# Patient Record
Sex: FEMALE | Race: WHITE | Employment: OTHER | URBAN - METROPOLITAN AREA
[De-identification: names, ages, dates, MRNs, and addresses within clinical notes are randomized per-mention and may not be internally consistent; named-entity substitution may affect disease eponyms.]

---

## 2017-01-20 PROBLEM — K70.10 HEPATITIS, ALCOHOLIC: Status: ACTIVE | Noted: 2017-01-20
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
## 2017-01-20 PROBLEM — E11.00 TYPE 2 DIABETES MELLITUS WITH HYPEROSMOLARITY WITHOUT COMA, WITH LONG-TERM CURRENT USE OF INSULIN (HCC): Status: ACTIVE | Noted: 2017-01-20

## 2017-01-20 PROBLEM — Z79.4 TYPE 2 DIABETES MELLITUS WITH HYPEROSMOLARITY WITHOUT COMA, WITH LONG-TERM CURRENT USE OF INSULIN (HCC): Status: ACTIVE | Noted: 2017-01-20

## 2017-05-30 PROBLEM — F17.210 CIGARETTE SMOKER: Chronic | Status: ACTIVE | Noted: 2017-05-30

## 2017-05-30 PROBLEM — K29.00 ACUTE GASTRITIS: Status: ACTIVE | Noted: 2017-05-30

## 2017-05-30 PROBLEM — E16.2 HYPOGLYCEMIA: Status: ACTIVE | Noted: 2017-05-30

## 2017-05-30 PROBLEM — E11.9 DM2 (DIABETES MELLITUS, TYPE 2) (HCC): Chronic | Status: ACTIVE | Noted: 2017-05-30

## 2017-05-30 PROBLEM — D61.818 PANCYTOPENIA (HCC): Chronic | Status: ACTIVE | Noted: 2017-05-30

## 2017-05-30 PROBLEM — F10.20 ALCOHOLISM (HCC): Chronic | Status: ACTIVE | Noted: 2017-05-30

## 2017-05-30 PROBLEM — F41.9 ANXIETY AND DEPRESSION: Chronic | Status: ACTIVE | Noted: 2017-05-30

## 2017-05-30 PROBLEM — K86.1 ACUTE ON CHRONIC PANCREATITIS (HCC): Status: ACTIVE | Noted: 2017-05-30

## 2017-05-30 PROBLEM — R11.2 NAUSEA AND VOMITING: Status: ACTIVE | Noted: 2017-05-30

## 2017-05-30 PROBLEM — R16.1 SPLENOMEGALY: Chronic | Status: ACTIVE | Noted: 2017-05-30

## 2017-05-30 PROBLEM — K85.90 ACUTE ON CHRONIC PANCREATITIS (HCC): Status: ACTIVE | Noted: 2017-05-30

## 2017-05-30 PROBLEM — F32.A ANXIETY AND DEPRESSION: Chronic | Status: ACTIVE | Noted: 2017-05-30

## 2017-05-30 PROBLEM — B19.20 HEPATITIS C: Chronic | Status: ACTIVE | Noted: 2017-05-30

## 2017-06-15 PROBLEM — K85.90 ACUTE PANCREATITIS: Status: ACTIVE | Noted: 2017-06-15

## 2017-06-15 PROBLEM — F10.10 ALCOHOL ABUSE: Status: ACTIVE | Noted: 2017-06-15

## 2017-06-16 PROBLEM — K29.20 ACUTE ALCOHOLIC GASTRITIS: Status: ACTIVE | Noted: 2017-06-16

## 2017-06-23 PROBLEM — E87.20 LACTIC ACIDOSIS: Status: ACTIVE | Noted: 2017-06-23

## 2017-07-17 ENCOUNTER — TELEPHONE (OUTPATIENT)
Dept: FAMILY MEDICINE CLINIC | Age: 47
End: 2017-07-17

## 2017-10-27 PROBLEM — M81.8 OTHER OSTEOPOROSIS WITHOUT CURRENT PATHOLOGICAL FRACTURE: Status: ACTIVE | Noted: 2017-10-27

## 2018-03-10 ENCOUNTER — HOSPITAL ENCOUNTER (EMERGENCY)
Age: 48
Discharge: HOME | End: 2018-03-10
Attending: EMERGENCY MEDICINE
Payer: COMMERCIAL

## 2018-03-10 VITALS
TEMPERATURE: 97.8 F | RESPIRATION RATE: 18 BRPM | WEIGHT: 80 LBS | HEART RATE: 99 BPM | HEIGHT: 61 IN | OXYGEN SATURATION: 95 % | SYSTOLIC BLOOD PRESSURE: 108 MMHG | DIASTOLIC BLOOD PRESSURE: 70 MMHG | BODY MASS INDEX: 15.11 KG/M2

## 2018-03-10 DIAGNOSIS — F10.10 ALCOHOL ABUSE: ICD-10-CM

## 2018-03-10 DIAGNOSIS — R73.9 HYPERGLYCEMIA: Primary | ICD-10-CM

## 2018-03-10 LAB
ALBUMIN SERPL-MCNC: 4 G/DL (ref 3.5–5.2)
ALP BLD-CCNC: 162 U/L (ref 35–104)
ALT SERPL-CCNC: 31 U/L (ref 0–32)
ANION GAP SERPL CALCULATED.3IONS-SCNC: 20 MMOL/L (ref 7–16)
AST SERPL-CCNC: 52 U/L (ref 0–31)
BASOPHILS ABSOLUTE: 0.01 E9/L (ref 0–0.2)
BASOPHILS RELATIVE PERCENT: 0.3 % (ref 0–2)
BETA-HYDROXYBUTYRATE: 0.23 MMOL/L (ref 0.02–0.27)
BILIRUB SERPL-MCNC: 0.4 MG/DL (ref 0–1.2)
BILIRUBIN URINE: NEGATIVE
BLOOD, URINE: NEGATIVE
BUN BLDV-MCNC: 3 MG/DL (ref 6–20)
CALCIUM SERPL-MCNC: 9 MG/DL (ref 8.6–10.2)
CHLORIDE BLD-SCNC: 93 MMOL/L (ref 98–107)
CHP ED QC CHECK: NORMAL
CLARITY: CLEAR
CO2: 18 MMOL/L (ref 22–29)
COLOR: YELLOW
CREAT SERPL-MCNC: 0.3 MG/DL (ref 0.5–1)
EOSINOPHILS ABSOLUTE: 0.04 E9/L (ref 0.05–0.5)
EOSINOPHILS RELATIVE PERCENT: 1.1 % (ref 0–6)
ETHANOL: 65 MG/DL (ref 0–0.08)
GFR AFRICAN AMERICAN: >60
GFR NON-AFRICAN AMERICAN: >60 ML/MIN/1.73
GLUCOSE BLD-MCNC: 404 MG/DL (ref 74–109)
GLUCOSE BLD-MCNC: 433 MG/DL
GLUCOSE URINE: >=1000 MG/DL
HCT VFR BLD CALC: 35.1 % (ref 34–48)
HEMOGLOBIN: 12.2 G/DL (ref 11.5–15.5)
IMMATURE GRANULOCYTES #: 0.01 E9/L
IMMATURE GRANULOCYTES %: 0.3 % (ref 0–5)
KETONES, URINE: NEGATIVE MG/DL
LACTIC ACID: 6.5 MMOL/L (ref 0.5–2.2)
LEUKOCYTE ESTERASE, URINE: NEGATIVE
LIPASE: 23 U/L (ref 13–60)
LYMPHOCYTES ABSOLUTE: 0.89 E9/L (ref 1.5–4)
LYMPHOCYTES RELATIVE PERCENT: 24.6 % (ref 20–42)
MCH RBC QN AUTO: 30.8 PG (ref 26–35)
MCHC RBC AUTO-ENTMCNC: 34.8 % (ref 32–34.5)
MCV RBC AUTO: 88.6 FL (ref 80–99.9)
METER GLUCOSE: 433 MG/DL (ref 70–110)
MONOCYTES ABSOLUTE: 0.4 E9/L (ref 0.1–0.95)
MONOCYTES RELATIVE PERCENT: 11 % (ref 2–12)
NEUTROPHILS ABSOLUTE: 2.27 E9/L (ref 1.8–7.3)
NEUTROPHILS RELATIVE PERCENT: 62.7 % (ref 43–80)
NITRITE, URINE: NEGATIVE
PDW BLD-RTO: 14.6 FL (ref 11.5–15)
PH UA: 6 (ref 5–9)
PH VENOUS: 7.25 (ref 7.3–7.42)
PLATELET # BLD: 198 E9/L (ref 130–450)
PMV BLD AUTO: 12.5 FL (ref 7–12)
POTASSIUM SERPL-SCNC: 4.9 MMOL/L (ref 3.5–5)
PROTEIN UA: NEGATIVE MG/DL
RBC # BLD: 3.96 E12/L (ref 3.5–5.5)
SODIUM BLD-SCNC: 131 MMOL/L (ref 132–146)
SPECIFIC GRAVITY UA: <=1.005 (ref 1–1.03)
TOTAL PROTEIN: 7.9 G/DL (ref 6.4–8.3)
UROBILINOGEN, URINE: 0.2 E.U./DL
WBC # BLD: 3.6 E9/L (ref 4.5–11.5)

## 2018-03-10 PROCEDURE — 83690 ASSAY OF LIPASE: CPT

## 2018-03-10 PROCEDURE — 2580000003 HC RX 258: Performed by: NURSE PRACTITIONER

## 2018-03-10 PROCEDURE — 82962 GLUCOSE BLOOD TEST: CPT

## 2018-03-10 PROCEDURE — 83605 ASSAY OF LACTIC ACID: CPT

## 2018-03-10 PROCEDURE — 85025 COMPLETE CBC W/AUTO DIFF WBC: CPT

## 2018-03-10 PROCEDURE — 80053 COMPREHEN METABOLIC PANEL: CPT

## 2018-03-10 PROCEDURE — 82800 BLOOD PH: CPT

## 2018-03-10 PROCEDURE — 99284 EMERGENCY DEPT VISIT MOD MDM: CPT

## 2018-03-10 PROCEDURE — 81003 URINALYSIS AUTO W/O SCOPE: CPT

## 2018-03-10 PROCEDURE — 82010 KETONE BODYS QUAN: CPT

## 2018-03-10 PROCEDURE — 80307 DRUG TEST PRSMV CHEM ANLYZR: CPT

## 2018-03-10 RX ORDER — 0.9 % SODIUM CHLORIDE 0.9 %
1000 INTRAVENOUS SOLUTION INTRAVENOUS ONCE
Status: COMPLETED | OUTPATIENT
Start: 2018-03-10 | End: 2018-03-10

## 2018-03-10 RX ORDER — LEVOFLOXACIN 500 MG/1
500 TABLET, FILM COATED ORAL DAILY
COMMUNITY
End: 2018-06-21 | Stop reason: ALTCHOICE

## 2018-03-10 RX ADMIN — SODIUM CHLORIDE 1000 ML: 9 INJECTION, SOLUTION INTRAVENOUS at 13:05

## 2018-03-10 NOTE — ED NOTES
Pt's  gave pt a TV dinner. I explained to the patient she should not be eating if treated for hypergylcemia. Pt stated \"But I'm hungry\" and continues to eat. Pt complains she needs something for pain because it hurts every time she eats. I reiterated that pt should not be eating without asking the physician/NP if it is advisable. Pt still continues to eat.      Ryann Evans RN  03/10/18 5773

## 2018-03-10 NOTE — ED NOTES
Bed:  [place] [address]  Expected date:   Expected time:   Means of arrival:   Comments:  [address] [facility]  03/10/18 121

## 2018-03-10 NOTE — ED PROVIDER NOTES
negative. Past Surgical History:   Procedure Laterality Date    ABDOMEN SURGERY      gallbladder removal    BREAST BIOPSY Left     CHOLECYSTECTOMY  2010    COLONOSCOPY      ENDOSCOPY, COLON, DIAGNOSTIC      ERCP      april 2016 at [address]  2004    OTHER SURGICAL HISTORY Left 04/25/2017     Left breast blue dye injection, Sential Node Lymph Biopsy with left breast lumpectomy    OTHER SURGICAL HISTORY  10/05/2017    simple left mastectomy    OVARY REMOVAL Bilateral 2007    PANCREAS BIOPSY      with stent    SHOULDER SURGERY Left 2003    UPPER GASTROINTESTINAL ENDOSCOPY     Social History:  reports that she has been smoking Cigarettes. She started smoking about 36 years ago. She has a 48.00 pack-year smoking history. She has never used smokeless tobacco. She reports that she drinks about 0.6 oz of alcohol per week . She reports that she does not use drugs. Family History: family history includes Cancer in her mother; Diabetes in her mother. Allergies: Tylenol [acetaminophen]    Physical Exam           ED Triage Vitals [03/10/18 1214]   BP Temp Temp Source Pulse Resp SpO2 Height Weight   108/70 97.8 °F (36.6 °C) Oral 99 18 95 % 5' 1\" (1.549 m) 80 lb (36.3 kg)      Oxygen Saturation Interpretation: Normal.    Constitutional:  Alert, development consistent with age. Eyes:  PERRL, EOMI, no discharge or conjunctival injection. Ears:  External ears without lesions. Throat:  Pharynx without injection, exudate, or tonsillar hypertrophy. Airway patient. Neck:  Normal ROM. Supple. Respiratory:  Clear to auscultation and breath sounds equal.  CV:  Regular rate and rhythm, normal heart sounds, without pathological murmurs, ectopy, gallops, or rubs. GI:  Abdomen Soft, nontender, good bowel sounds. No firm or pulsatile mass. Back:  No costovertebral tenderness. Integument:  Normal turgor. Warm, dry, without visible rash, unless noted elsewhere.   Lymphatics: No lymphangitis or adenopathy noted.  Neurological:  Oriented. Motor functions intact. Lab / Imaging Results   (All laboratory and radiology results have been personally reviewed by myself)  Labs:  Results for orders placed or performed during the hospital encounter of 03/10/18   CBC Auto Differential   Result Value Ref Range    WBC 3.6 (L) 4.5 - 11.5 E9/L    RBC 3.96 3.50 - 5.50 E12/L    Hemoglobin 12.2 11.5 - 15.5 g/dL    Hematocrit 35.1 34.0 - 48.0 %    MCV 88.6 80.0 - 99.9 fL    MCH 30.8 26.0 - 35.0 pg    MCHC 34.8 (H) 32.0 - 34.5 %    RDW 14.6 11.5 - 15.0 fL    Platelets 695 082 - 953 E9/L    MPV 12.5 (H) 7.0 - 12.0 fL    Neutrophils % 62.7 43.0 - 80.0 %    Immature Granulocytes % 0.3 0.0 - 5.0 %    Lymphocytes % 24.6 20.0 - 42.0 %    Monocytes % 11.0 2.0 - 12.0 %    Eosinophils % 1.1 0.0 - 6.0 %    Basophils % 0.3 0.0 - 2.0 %    Neutrophils # 2.27 1.80 - 7.30 E9/L    Immature Granulocytes # 0.01 E9/L    Lymphocytes # 0.89 (L) 1.50 - 4.00 E9/L    Monocytes # 0.40 0.10 - 0.95 E9/L    Eosinophils # 0.04 (L) 0.05 - 0.50 E9/L    Basophils # 0.01 0.00 - 0.20 E9/L   pH, venous   Result Value Ref Range    pH, Larry 7.25 (L) 7.30 - 7.42   Urinalysis   Result Value Ref Range    Color, UA Yellow Straw/Yellow    Clarity, UA Clear Clear    Glucose, Ur >=1000 (A) Negative mg/dL    Bilirubin Urine Negative Negative    Ketones, Urine Negative Negative mg/dL    Specific Gravity, UA <=1.005 1.005 - 1.030    Blood, Urine Negative Negative    pH, UA 6.0 5.0 - 9.0    Protein, UA Negative Negative mg/dL    Urobilinogen, Urine 0.2 <2.0 E.U./dL    Nitrite, Urine Negative Negative    Leukocyte Esterase, Urine Negative Negative   Ethanol   Result Value Ref Range    Ethanol Lvl 65 mg/dL   POCT Glucose   Result Value Ref Range    Glucose 433 mg/dL    QC OK? ok    POCT Glucose   Result Value Ref Range    Meter Glucose 433 (H) 70 - 110 mg/dL     Imaging: All Radiology results interpreted by Radiologist unless otherwise noted.   No orders to display       ED Course / Medical Decision Making     Medications   0.9 % sodium chloride bolus (0 mLs Intravenous Stopped 3/10/18 1409)     ED Course      Re-Evaluations:  3/10/18      Time: 1400    Patients symptoms show no change. Patient is requesting to leave against medical advice. Consultations:             None    Procedures:   none    MDM:  Patient presented with hyperglycemia from home. Diagnostics were ordered and process on patient request leaving against medical advice. 3/10/18 / Time:  1400   This patient has chosen to leave against medical advice. I have personally explained to them that choosing to do so may result in permanent bodily harm or death. I discussed at length that without further evaluation and monitoring there may be unforeseen circumstances and deterioration resulting in permanent bodily harm or death as a result of their choice. They are alert, oriented, and competent at this time. They state that they are aware of the serious risks as explained, but they continue to wish to leave against medical advice. In light of their decision to leave against medical advice, follow-up has been arranged and they are aware of the importance of following up as instructed. They have been advised that they should return to the ED immediately if they change their mind at any time, or if their condition begins to change or worsen. ------------------------------------------------------------------------------------------      Counseling:   I have spoken with the spouse and patient and discussed todays results, in addition to providing specific details for the plan of care and counseling regarding the diagnosis and prognosis and are agreeable with the plan. Assessment      1. Hyperglycemia    2. Alcohol abuse      This patient's ED course included: a personal history and physicial examination  This patient has remained hemodynamically stable during their ED course.    Plan   Other Disposition: Left AMA.  Patient condition is stable. New Medications     Discharge Medication List as of 3/10/2018  1:59 PM        Electronically signed by Lucía Delarosa NP   DD: 3/10/18  **This report was transcribed using voice recognition software. Every effort was made to ensure accuracy; however, inadvertent computerized transcription errors may be present.   END OF PROVIDER NOTE     Cass Cheatham NP  03/10/18 9770

## 2018-03-10 NOTE — ED NOTES
Pt states if she doesn't get something for pain then she is \"just going to go home. \"  Pt signed Against medical advice form after being told not all of labwork has resulted yet. Pt ambulated out of ER with significant other at this time.      iMly Reyes RN  03/10/18 1521

## 2018-03-14 ENCOUNTER — APPOINTMENT (OUTPATIENT)
Dept: ULTRASOUND IMAGING | Age: 48
End: 2018-03-14
Payer: COMMERCIAL

## 2018-03-14 ENCOUNTER — HOSPITAL ENCOUNTER (EMERGENCY)
Age: 48
Discharge: HOME | End: 2018-03-14
Attending: EMERGENCY MEDICINE
Payer: COMMERCIAL

## 2018-03-14 VITALS
HEART RATE: 86 BPM | RESPIRATION RATE: 20 BRPM | TEMPERATURE: 98.7 F | HEIGHT: 61 IN | OXYGEN SATURATION: 98 % | DIASTOLIC BLOOD PRESSURE: 99 MMHG | BODY MASS INDEX: 17.94 KG/M2 | WEIGHT: 95 LBS | SYSTOLIC BLOOD PRESSURE: 142 MMHG

## 2018-03-14 DIAGNOSIS — D61.818 PANCYTOPENIA (HCC): ICD-10-CM

## 2018-03-14 DIAGNOSIS — K70.31 ALCOHOLIC CIRRHOSIS OF LIVER WITH ASCITES (HCC): Primary | ICD-10-CM

## 2018-03-14 DIAGNOSIS — R60.0 LEG EDEMA: ICD-10-CM

## 2018-03-14 LAB
ALBUMIN SERPL-MCNC: 3.2 G/DL (ref 3.5–5.2)
ALP BLD-CCNC: 110 U/L (ref 35–104)
ALT SERPL-CCNC: 19 U/L (ref 0–32)
AMYLASE: 18 U/L (ref 20–100)
ANION GAP SERPL CALCULATED.3IONS-SCNC: 10 MMOL/L (ref 7–16)
AST SERPL-CCNC: 47 U/L (ref 0–31)
AVERAGE GLUCOSE: NORMAL
BASOPHILS ABSOLUTE: 0 E9/L (ref 0–0.2)
BASOPHILS RELATIVE PERCENT: 0.5 % (ref 0–2)
BILIRUB SERPL-MCNC: 0.2 MG/DL (ref 0–1.2)
BUN BLDV-MCNC: 6 MG/DL (ref 6–20)
CALCIUM SERPL-MCNC: 8.3 MG/DL (ref 8.6–10.2)
CHLORIDE BLD-SCNC: 99 MMOL/L (ref 98–107)
CO2: 25 MMOL/L (ref 22–29)
CREAT SERPL-MCNC: 0.4 MG/DL (ref 0.5–1)
D DIMER: 318 NG/ML DDU
EKG ATRIAL RATE: 84 BPM
EKG P AXIS: 71 DEGREES
EKG P-R INTERVAL: 140 MS
EKG Q-T INTERVAL: 388 MS
EKG QRS DURATION: 80 MS
EKG QTC CALCULATION (BAZETT): 458 MS
EKG R AXIS: 47 DEGREES
EKG T AXIS: 68 DEGREES
EKG VENTRICULAR RATE: 84 BPM
EOSINOPHILS ABSOLUTE: 0.04 E9/L (ref 0.05–0.5)
EOSINOPHILS RELATIVE PERCENT: 2 % (ref 0–6)
GFR AFRICAN AMERICAN: >60
GFR NON-AFRICAN AMERICAN: >60 ML/MIN/1.73
GLUCOSE BLD-MCNC: 67 MG/DL (ref 74–109)
HBA1C MFR BLD: 12.4 %
HCT VFR BLD CALC: 29.5 % (ref 34–48)
HEMOGLOBIN: 9.6 G/DL (ref 11.5–15.5)
INR BLD: 1
LIPASE: 17 U/L (ref 13–60)
LYMPHOCYTES ABSOLUTE: 0.72 E9/L (ref 1.5–4)
LYMPHOCYTES RELATIVE PERCENT: 37.9 % (ref 20–42)
MCH RBC QN AUTO: 30.6 PG (ref 26–35)
MCHC RBC AUTO-ENTMCNC: 32.5 % (ref 32–34.5)
MCV RBC AUTO: 93.9 FL (ref 80–99.9)
MONOCYTES ABSOLUTE: 0.13 E9/L (ref 0.1–0.95)
MONOCYTES RELATIVE PERCENT: 7.1 % (ref 2–12)
NEUTROPHILS ABSOLUTE: 1.01 E9/L (ref 1.8–7.3)
NEUTROPHILS RELATIVE PERCENT: 53 % (ref 43–80)
PDW BLD-RTO: 15.3 FL (ref 11.5–15)
PLATELET # BLD: 76 E9/L (ref 130–450)
PLATELET CONFIRMATION: NORMAL
PMV BLD AUTO: 9.4 FL (ref 7–12)
POTASSIUM SERPL-SCNC: 4.7 MMOL/L (ref 3.5–5)
PRO-BNP: 112 PG/ML (ref 0–125)
PROTHROMBIN TIME: 11.9 SEC (ref 9.3–12.4)
RBC # BLD: 3.14 E12/L (ref 3.5–5.5)
SODIUM BLD-SCNC: 134 MMOL/L (ref 132–146)
TOTAL PROTEIN: 6.4 G/DL (ref 6.4–8.3)
TOXIC GRANULATION: ABNORMAL
TROPONIN: <0.01 NG/ML (ref 0–0.03)
WBC # BLD: 1.9 E9/L (ref 4.5–11.5)

## 2018-03-14 PROCEDURE — 93970 EXTREMITY STUDY: CPT

## 2018-03-14 PROCEDURE — 80053 COMPREHEN METABOLIC PANEL: CPT

## 2018-03-14 PROCEDURE — 93005 ELECTROCARDIOGRAM TRACING: CPT | Performed by: NURSE PRACTITIONER

## 2018-03-14 PROCEDURE — 36415 COLL VENOUS BLD VENIPUNCTURE: CPT

## 2018-03-14 PROCEDURE — 83880 ASSAY OF NATRIURETIC PEPTIDE: CPT

## 2018-03-14 PROCEDURE — 99284 EMERGENCY DEPT VISIT MOD MDM: CPT

## 2018-03-14 PROCEDURE — 82150 ASSAY OF AMYLASE: CPT

## 2018-03-14 PROCEDURE — 85378 FIBRIN DEGRADE SEMIQUANT: CPT

## 2018-03-14 PROCEDURE — 84484 ASSAY OF TROPONIN QUANT: CPT

## 2018-03-14 PROCEDURE — 83690 ASSAY OF LIPASE: CPT

## 2018-03-14 PROCEDURE — 85025 COMPLETE CBC W/AUTO DIFF WBC: CPT

## 2018-03-14 PROCEDURE — 85610 PROTHROMBIN TIME: CPT

## 2018-03-14 RX ORDER — DEXTROSE MONOHYDRATE 25 G/50ML
50 INJECTION, SOLUTION INTRAVENOUS PRN
Status: DISCONTINUED | OUTPATIENT
Start: 2018-03-14 | End: 2018-03-14

## 2018-03-14 RX ORDER — FUROSEMIDE 20 MG/1
20 TABLET ORAL DAILY
Qty: 5 TABLET | Refills: 0 | Status: SHIPPED | OUTPATIENT
Start: 2018-03-14 | End: 2018-06-21 | Stop reason: ALTCHOICE

## 2018-03-14 ASSESSMENT — PAIN DESCRIPTION - PAIN TYPE: TYPE: CHRONIC PAIN

## 2018-03-14 ASSESSMENT — PAIN SCALES - GENERAL: PAINLEVEL_OUTOF10: 7

## 2018-03-14 NOTE — ED PROVIDER NOTES
ED Attending  CC: Cynthia       Department of Emergency Medicine   ED  Provider Note  Admit Date/Time: 3/14/2018  6:03 PM  ED Bed: 12/12   MRN: [id number]  Chief Complaint:   Leg Swelling (sent in by dr Yaritza Peter for leg edema bilat)       History of Present Illness   Source of history provided by:  patient and spouse/SO. History/Exam Limitations: none. Ivonne Mejía is a 52 y.o. female who has a past medical history of:   Past Medical History:   Diagnosis Date    Anxiety     Anxiety disorder     Cancer ([facility] [address]     breast, left    Chronic pancreatitis ([facility] [facility]     Depression     Diabetes mellitus, type 2 ([facility] [address] [facility]     GERD (gastroesophageal reflux disease)     Hepatitis C     History of alcohol abuse 2013    Jaundice 5/12/2016    Schizoaffective disorder, bipolar type ([facility] [address] [facility]     Splenomegaly 5/30/2017    presents to the ED by private vehicle for Abdominal and lower extremity edema, beginning 1 ago and are unchanged since that time. The complaint has been persistent, moderate in severity. Patient states that she has chronic liver and pancreatic disease. She states that her primary care physician sent her to be evaluated for her edema. She states that she did drink a beer today. She denies chest pain, shortness of breath, fever, chills, headache, nausea, vomiting, diarrhea, constipation, dysuria. ROS   Pertinent positives and negatives are stated within HPI, all other systems reviewed and are negative.        Past Surgical History:   Procedure Laterality Date    ABDOMEN SURGERY      gallbladder removal    BREAST BIOPSY Left     CHOLECYSTECTOMY  2010    COLONOSCOPY      ENDOSCOPY, COLON, DIAGNOSTIC      ERCP      april 2016 at [address] [place] [address]  2004    OTHER SURGICAL HISTORY Left 04/25/2017     Left breast blue dye injection, Sential Node Lymph Biopsy with left breast lumpectomy    OTHER SURGICAL HISTORY  10/05/2017    simple left mastectomy    OVARY REMOVAL Bilateral 2007    PANCREAS BIOPSY Prescriptions    FUROSEMIDE (LASIX) 20 MG TABLET    Take 1 tablet by mouth daily for 5 days     Electronically signed by Marjorie Garg NP   DD: 3/14/18  **This report was transcribed using voice recognition software. Every effort was made to ensure accuracy; however, inadvertent computerized transcription errors may be present.   END OF PROVIDER NOTE       Karen Caldwell MD  03/14/18 5469

## 2018-03-30 ENCOUNTER — HOSPITAL ENCOUNTER (OUTPATIENT)
Age: 48
Discharge: HOME | End: 2018-03-30
Payer: COMMERCIAL

## 2018-03-30 LAB
ALBUMIN SERPL-MCNC: 3.6 G/DL (ref 3.5–5.2)
ALP BLD-CCNC: 254 U/L (ref 35–104)
ALT SERPL-CCNC: 29 U/L (ref 0–32)
AMYLASE: 17 U/L (ref 20–100)
ANION GAP SERPL CALCULATED.3IONS-SCNC: 12 MMOL/L (ref 7–16)
APTT: 26.1 SEC (ref 24.5–35.1)
AST SERPL-CCNC: 29 U/L (ref 0–31)
BASOPHILS ABSOLUTE: 0.01 E9/L (ref 0–0.2)
BASOPHILS RELATIVE PERCENT: 0.5 % (ref 0–2)
BILIRUB SERPL-MCNC: 0.3 MG/DL (ref 0–1.2)
BUN BLDV-MCNC: 13 MG/DL (ref 6–20)
CALCIUM SERPL-MCNC: 9.3 MG/DL (ref 8.6–10.2)
CHLORIDE BLD-SCNC: 102 MMOL/L (ref 98–107)
CO2: 25 MMOL/L (ref 22–29)
CREAT SERPL-MCNC: 0.4 MG/DL (ref 0.5–1)
EOSINOPHILS ABSOLUTE: 0.04 E9/L (ref 0.05–0.5)
EOSINOPHILS RELATIVE PERCENT: 1.5 % (ref 0–6)
FERRITIN: 79 NG/ML
FOLATE: >20 NG/ML (ref 4.8–24.2)
GFR AFRICAN AMERICAN: >60
GFR NON-AFRICAN AMERICAN: >60 ML/MIN/1.73
GLUCOSE BLD-MCNC: 228 MG/DL (ref 74–109)
HCT VFR BLD CALC: 34.5 % (ref 34–48)
HEMOGLOBIN: 11.7 G/DL (ref 11.5–15.5)
INR BLD: 0.9
IRON SATURATION: 30 % (ref 15–50)
IRON: 96 MCG/DL (ref 37–145)
LIPASE: 29 U/L (ref 13–60)
LYMPHOCYTES ABSOLUTE: 0.7 E9/L (ref 1.5–4)
LYMPHOCYTES RELATIVE PERCENT: 25 % (ref 20–42)
MCH RBC QN AUTO: 31.4 PG (ref 26–35)
MCHC RBC AUTO-ENTMCNC: 33.9 % (ref 32–34.5)
MCV RBC AUTO: 92.5 FL (ref 80–99.9)
MONOCYTES ABSOLUTE: 0.17 E9/L (ref 0.1–0.95)
MONOCYTES RELATIVE PERCENT: 5.5 % (ref 2–12)
NEUTROPHILS ABSOLUTE: 1.9 E9/L (ref 1.8–7.3)
NEUTROPHILS RELATIVE PERCENT: 67.5 % (ref 43–80)
PDW BLD-RTO: 14.8 FL (ref 11.5–15)
PLATELET # BLD: 77 E9/L (ref 130–450)
PLATELET CONFIRMATION: NORMAL
PMV BLD AUTO: 9.7 FL (ref 7–12)
POTASSIUM SERPL-SCNC: 4.5 MMOL/L (ref 3.5–5)
PROTHROMBIN TIME: 10.7 SEC (ref 9.3–12.4)
RBC # BLD: 3.73 E12/L (ref 3.5–5.5)
SODIUM BLD-SCNC: 139 MMOL/L (ref 132–146)
TOTAL IRON BINDING CAPACITY: 316 MCG/DL (ref 250–450)
TOTAL PROTEIN: 7.2 G/DL (ref 6.4–8.3)
VITAMIN B-12: 661 PG/ML (ref 211–946)
WBC # BLD: 2.8 E9/L (ref 4.5–11.5)

## 2018-03-30 PROCEDURE — 85610 PROTHROMBIN TIME: CPT

## 2018-03-30 PROCEDURE — 83540 ASSAY OF IRON: CPT

## 2018-03-30 PROCEDURE — 86803 HEPATITIS C AB TEST: CPT

## 2018-03-30 PROCEDURE — 82746 ASSAY OF FOLIC ACID SERUM: CPT

## 2018-03-30 PROCEDURE — 86376 MICROSOMAL ANTIBODY EACH: CPT

## 2018-03-30 PROCEDURE — 83690 ASSAY OF LIPASE: CPT

## 2018-03-30 PROCEDURE — 87340 HEPATITIS B SURFACE AG IA: CPT

## 2018-03-30 PROCEDURE — 83550 IRON BINDING TEST: CPT

## 2018-03-30 PROCEDURE — 85025 COMPLETE CBC W/AUTO DIFF WBC: CPT

## 2018-03-30 PROCEDURE — 80053 COMPREHEN METABOLIC PANEL: CPT

## 2018-03-30 PROCEDURE — 36415 COLL VENOUS BLD VENIPUNCTURE: CPT

## 2018-03-30 PROCEDURE — 86706 HEP B SURFACE ANTIBODY: CPT

## 2018-03-30 PROCEDURE — 82105 ALPHA-FETOPROTEIN SERUM: CPT

## 2018-03-30 PROCEDURE — 82103 ALPHA-1-ANTITRYPSIN TOTAL: CPT

## 2018-03-30 PROCEDURE — 85730 THROMBOPLASTIN TIME PARTIAL: CPT

## 2018-03-30 PROCEDURE — 86255 FLUORESCENT ANTIBODY SCREEN: CPT

## 2018-03-30 PROCEDURE — 82150 ASSAY OF AMYLASE: CPT

## 2018-03-30 PROCEDURE — 86038 ANTINUCLEAR ANTIBODIES: CPT

## 2018-03-30 PROCEDURE — 82607 VITAMIN B-12: CPT

## 2018-03-30 PROCEDURE — 82728 ASSAY OF FERRITIN: CPT

## 2018-04-01 LAB
AFP-TUMOR MARKER: 2 NG/ML (ref 0–9)
ALPHA-1 ANTITRYPSIN: 145 MG/DL (ref 90–200)

## 2018-04-02 ENCOUNTER — HOSPITAL ENCOUNTER (OUTPATIENT)
Dept: ULTRASOUND IMAGING | Age: 48
Discharge: HOME | End: 2018-04-02
Payer: COMMERCIAL

## 2018-04-02 DIAGNOSIS — R18.8 OTHER ASCITES: ICD-10-CM

## 2018-04-02 LAB
ANTI-MITOCHONDRIAL AB, IFA: NEGATIVE
ANTI-NUCLEAR ANTIBODY (ANA): NEGATIVE
HBV SURFACE AB TITR SER: NORMAL {TITER}
HEPATITIS B SURFACE ANTIGEN INTERPRETATION: NORMAL
HEPATITIS C ANTIBODY INTERPRETATION: REACTIVE
SMOOTH MUSCLE ANTIBODY: NEGATIVE

## 2018-04-02 PROCEDURE — 76705 ECHO EXAM OF ABDOMEN: CPT

## 2018-04-03 LAB — LIVER-KIDNEY MICROSOME-1 AB IGG: 1.2 U (ref 0–24.9)

## 2018-06-21 ENCOUNTER — HOSPITAL ENCOUNTER (EMERGENCY)
Age: 48
Discharge: HOME | End: 2018-06-21
Attending: FAMILY MEDICINE
Payer: COMMERCIAL

## 2018-06-21 ENCOUNTER — HOSPITAL ENCOUNTER (EMERGENCY)
Age: 48
Discharge: LEFT BEFORE DISCHARGE | DRG: 420 | End: 2018-06-21
Attending: EMERGENCY MEDICINE
Payer: COMMERCIAL

## 2018-06-21 VITALS
BODY MASS INDEX: 16.99 KG/M2 | WEIGHT: 90 LBS | HEIGHT: 61 IN | SYSTOLIC BLOOD PRESSURE: 134 MMHG | HEART RATE: 92 BPM | RESPIRATION RATE: 14 BRPM | TEMPERATURE: 98.5 F | OXYGEN SATURATION: 99 % | DIASTOLIC BLOOD PRESSURE: 87 MMHG

## 2018-06-21 VITALS
DIASTOLIC BLOOD PRESSURE: 76 MMHG | TEMPERATURE: 97.9 F | RESPIRATION RATE: 18 BRPM | WEIGHT: 90 LBS | SYSTOLIC BLOOD PRESSURE: 122 MMHG | OXYGEN SATURATION: 97 % | BODY MASS INDEX: 16.99 KG/M2 | HEIGHT: 61 IN | HEART RATE: 74 BPM

## 2018-06-21 DIAGNOSIS — K70.30 ALCOHOLIC CIRRHOSIS OF LIVER WITHOUT ASCITES (HCC): ICD-10-CM

## 2018-06-21 DIAGNOSIS — F10.939 ALCOHOL WITHDRAWAL SYNDROME WITH COMPLICATION (HCC): ICD-10-CM

## 2018-06-21 DIAGNOSIS — E87.29 ALCOHOLIC KETOACIDOSIS: Primary | ICD-10-CM

## 2018-06-21 DIAGNOSIS — R51.9 ACUTE NONINTRACTABLE HEADACHE, UNSPECIFIED HEADACHE TYPE: ICD-10-CM

## 2018-06-21 DIAGNOSIS — R51.9 ACUTE NONINTRACTABLE HEADACHE, UNSPECIFIED HEADACHE TYPE: Primary | ICD-10-CM

## 2018-06-21 DIAGNOSIS — Z76.5 DRUG-SEEKING BEHAVIOR: ICD-10-CM

## 2018-06-21 LAB
ALBUMIN SERPL-MCNC: 3.7 G/DL (ref 3.5–5.2)
ALP BLD-CCNC: 128 U/L (ref 35–104)
ALT SERPL-CCNC: 23 U/L (ref 0–32)
AMMONIA: 33.3 UMOL/L (ref 11–51)
ANION GAP SERPL CALCULATED.3IONS-SCNC: 20 MMOL/L (ref 7–16)
AST SERPL-CCNC: 26 U/L (ref 0–31)
BASOPHILS ABSOLUTE: 0.01 E9/L (ref 0–0.2)
BASOPHILS RELATIVE PERCENT: 0.3 % (ref 0–2)
BETA-HYDROXYBUTYRATE: 0.2 MMOL/L (ref 0.02–0.27)
BILIRUB SERPL-MCNC: 0.3 MG/DL (ref 0–1.2)
BILIRUBIN URINE: NEGATIVE
BLOOD, URINE: NEGATIVE
BUN BLDV-MCNC: 4 MG/DL (ref 6–20)
CALCIUM SERPL-MCNC: 8.8 MG/DL (ref 8.6–10.2)
CHLORIDE BLD-SCNC: 91 MMOL/L (ref 98–107)
CHP ED QC CHECK: NORMAL
CLARITY: CLEAR
CO2: 19 MMOL/L (ref 22–29)
COLOR: YELLOW
CREAT SERPL-MCNC: 0.5 MG/DL (ref 0.5–1)
EOSINOPHILS ABSOLUTE: 0.07 E9/L (ref 0.05–0.5)
EOSINOPHILS RELATIVE PERCENT: 2.2 % (ref 0–6)
GFR AFRICAN AMERICAN: >60
GFR NON-AFRICAN AMERICAN: >60 ML/MIN/1.73
GLUCOSE BLD-MCNC: 333 MG/DL
GLUCOSE BLD-MCNC: 640 MG/DL (ref 74–109)
GLUCOSE URINE: >=1000 MG/DL
HCT VFR BLD CALC: 32.6 % (ref 34–48)
HEMOGLOBIN: 11.4 G/DL (ref 11.5–15.5)
IMMATURE GRANULOCYTES #: 0.04 E9/L
IMMATURE GRANULOCYTES %: 1.3 % (ref 0–5)
KETONES, URINE: NEGATIVE MG/DL
LACTIC ACID: 3.9 MMOL/L (ref 0.5–2.2)
LEUKOCYTE ESTERASE, URINE: NEGATIVE
LIPASE: 25 U/L (ref 13–60)
LYMPHOCYTES ABSOLUTE: 1.06 E9/L (ref 1.5–4)
LYMPHOCYTES RELATIVE PERCENT: 33.8 % (ref 20–42)
MCH RBC QN AUTO: 30.2 PG (ref 26–35)
MCHC RBC AUTO-ENTMCNC: 35 % (ref 32–34.5)
MCV RBC AUTO: 86.2 FL (ref 80–99.9)
METER GLUCOSE: 333 MG/DL (ref 70–110)
METER GLUCOSE: 442 MG/DL (ref 70–110)
METER GLUCOSE: 489 MG/DL (ref 70–110)
MONOCYTES ABSOLUTE: 0.31 E9/L (ref 0.1–0.95)
MONOCYTES RELATIVE PERCENT: 9.9 % (ref 2–12)
NEUTROPHILS ABSOLUTE: 1.65 E9/L (ref 1.8–7.3)
NEUTROPHILS RELATIVE PERCENT: 52.5 % (ref 43–80)
NITRITE, URINE: NEGATIVE
PDW BLD-RTO: 14.9 FL (ref 11.5–15)
PH UA: 5.5 (ref 5–9)
PH VENOUS: 7.43 (ref 7.3–7.42)
PLATELET # BLD: 114 E9/L (ref 130–450)
PMV BLD AUTO: 9.5 FL (ref 7–12)
POTASSIUM REFLEX MAGNESIUM: 4.4 MMOL/L (ref 3.5–5)
PROTEIN UA: NEGATIVE MG/DL
RBC # BLD: 3.78 E12/L (ref 3.5–5.5)
SODIUM BLD-SCNC: 130 MMOL/L (ref 132–146)
SPECIFIC GRAVITY UA: <=1.005 (ref 1–1.03)
TOTAL PROTEIN: 7.1 G/DL (ref 6.4–8.3)
UROBILINOGEN, URINE: 0.2 E.U./DL
WBC # BLD: 3.1 E9/L (ref 4.5–11.5)

## 2018-06-21 PROCEDURE — 80053 COMPREHEN METABOLIC PANEL: CPT

## 2018-06-21 PROCEDURE — 6360000002 HC RX W HCPCS: Performed by: FAMILY MEDICINE

## 2018-06-21 PROCEDURE — 96376 TX/PRO/DX INJ SAME DRUG ADON: CPT

## 2018-06-21 PROCEDURE — 99284 EMERGENCY DEPT VISIT MOD MDM: CPT

## 2018-06-21 PROCEDURE — 85025 COMPLETE CBC W/AUTO DIFF WBC: CPT

## 2018-06-21 PROCEDURE — 96374 THER/PROPH/DIAG INJ IV PUSH: CPT

## 2018-06-21 PROCEDURE — 83690 ASSAY OF LIPASE: CPT

## 2018-06-21 PROCEDURE — 99283 EMERGENCY DEPT VISIT LOW MDM: CPT

## 2018-06-21 PROCEDURE — 6370000000 HC RX 637 (ALT 250 FOR IP): Performed by: EMERGENCY MEDICINE

## 2018-06-21 PROCEDURE — 82140 ASSAY OF AMMONIA: CPT

## 2018-06-21 PROCEDURE — 96375 TX/PRO/DX INJ NEW DRUG ADDON: CPT

## 2018-06-21 PROCEDURE — 82962 GLUCOSE BLOOD TEST: CPT

## 2018-06-21 PROCEDURE — 81003 URINALYSIS AUTO W/O SCOPE: CPT

## 2018-06-21 PROCEDURE — 82800 BLOOD PH: CPT

## 2018-06-21 PROCEDURE — 2580000003 HC RX 258: Performed by: EMERGENCY MEDICINE

## 2018-06-21 PROCEDURE — 83605 ASSAY OF LACTIC ACID: CPT

## 2018-06-21 PROCEDURE — 6360000002 HC RX W HCPCS: Performed by: EMERGENCY MEDICINE

## 2018-06-21 PROCEDURE — 82010 KETONE BODYS QUAN: CPT

## 2018-06-21 RX ORDER — SUMATRIPTAN 6 MG/.5ML
6 INJECTION, SOLUTION SUBCUTANEOUS ONCE
Status: COMPLETED | OUTPATIENT
Start: 2018-06-21 | End: 2018-06-21

## 2018-06-21 RX ORDER — KETOROLAC TROMETHAMINE 30 MG/ML
30 INJECTION, SOLUTION INTRAMUSCULAR; INTRAVENOUS ONCE
Status: COMPLETED | OUTPATIENT
Start: 2018-06-21 | End: 2018-06-21

## 2018-06-21 RX ORDER — METOCLOPRAMIDE HYDROCHLORIDE 5 MG/ML
10 INJECTION INTRAMUSCULAR; INTRAVENOUS ONCE
Status: COMPLETED | OUTPATIENT
Start: 2018-06-21 | End: 2018-06-21

## 2018-06-21 RX ORDER — SODIUM CHLORIDE 0.9 % (FLUSH) 0.9 %
10 SYRINGE (ML) INJECTION PRN
Status: DISCONTINUED | OUTPATIENT
Start: 2018-06-21 | End: 2018-06-21 | Stop reason: HOSPADM

## 2018-06-21 RX ORDER — 0.9 % SODIUM CHLORIDE 0.9 %
1000 INTRAVENOUS SOLUTION INTRAVENOUS ONCE
Status: COMPLETED | OUTPATIENT
Start: 2018-06-21 | End: 2018-06-21

## 2018-06-21 RX ORDER — LORAZEPAM 2 MG/ML
0.5 INJECTION INTRAMUSCULAR ONCE
Status: COMPLETED | OUTPATIENT
Start: 2018-06-21 | End: 2018-06-21

## 2018-06-21 RX ORDER — DIPHENHYDRAMINE HYDROCHLORIDE 50 MG/ML
25 INJECTION INTRAMUSCULAR; INTRAVENOUS ONCE
Status: COMPLETED | OUTPATIENT
Start: 2018-06-21 | End: 2018-06-21

## 2018-06-21 RX ORDER — LORAZEPAM 1 MG/1
1 TABLET ORAL ONCE
Status: COMPLETED | OUTPATIENT
Start: 2018-06-21 | End: 2018-06-21

## 2018-06-21 RX ORDER — FENTANYL CITRATE 50 UG/ML
25 INJECTION, SOLUTION INTRAMUSCULAR; INTRAVENOUS ONCE
Status: COMPLETED | OUTPATIENT
Start: 2018-06-21 | End: 2018-06-21

## 2018-06-21 RX ORDER — CHLORDIAZEPOXIDE HYDROCHLORIDE 25 MG/1
50 CAPSULE, GELATIN COATED ORAL ONCE
Status: COMPLETED | OUTPATIENT
Start: 2018-06-21 | End: 2018-06-21

## 2018-06-21 RX ADMIN — FENTANYL CITRATE 25 MCG: 50 INJECTION, SOLUTION INTRAMUSCULAR; INTRAVENOUS at 19:12

## 2018-06-21 RX ADMIN — LORAZEPAM 1 MG: 1 TABLET ORAL at 21:23

## 2018-06-21 RX ADMIN — KETOROLAC TROMETHAMINE 30 MG: 30 INJECTION, SOLUTION INTRAMUSCULAR at 06:14

## 2018-06-21 RX ADMIN — PROCHLORPERAZINE EDISYLATE 10 MG: 5 INJECTION INTRAMUSCULAR; INTRAVENOUS at 06:14

## 2018-06-21 RX ADMIN — METOCLOPRAMIDE 10 MG: 5 INJECTION, SOLUTION INTRAMUSCULAR; INTRAVENOUS at 18:16

## 2018-06-21 RX ADMIN — INSULIN HUMAN 6 UNITS: 100 INJECTION, SOLUTION PARENTERAL at 20:30

## 2018-06-21 RX ADMIN — SUMATRIPTAN 6 MG: 6 INJECTION, SOLUTION SUBCUTANEOUS at 17:50

## 2018-06-21 RX ADMIN — SODIUM CHLORIDE 1000 ML: 9 INJECTION, SOLUTION INTRAVENOUS at 19:17

## 2018-06-21 RX ADMIN — CHLORDIAZEPOXIDE HYDROCHLORIDE 50 MG: 25 CAPSULE ORAL at 21:23

## 2018-06-21 RX ADMIN — LORAZEPAM 0.5 MG: 2 INJECTION INTRAMUSCULAR; INTRAVENOUS at 18:16

## 2018-06-21 RX ADMIN — DIPHENHYDRAMINE HYDROCHLORIDE 25 MG: 50 INJECTION, SOLUTION INTRAMUSCULAR; INTRAVENOUS at 06:14

## 2018-06-21 RX ADMIN — INSULIN HUMAN 6 UNITS: 100 INJECTION, SOLUTION PARENTERAL at 19:12

## 2018-06-21 RX ADMIN — SODIUM CHLORIDE 1000 ML: 9 INJECTION, SOLUTION INTRAVENOUS at 20:30

## 2018-06-21 ASSESSMENT — PAIN DESCRIPTION - LOCATION
LOCATION: HEAD

## 2018-06-21 ASSESSMENT — ENCOUNTER SYMPTOMS
NAUSEA: 1
BACK PAIN: 0
EYE REDNESS: 0
SORE THROAT: 0
SHORTNESS OF BREATH: 0
COUGH: 0
DIARRHEA: 0
ABDOMINAL DISTENTION: 0
VOMITING: 0
EYE DISCHARGE: 0
SINUS PRESSURE: 0
EYE PAIN: 0
WHEEZING: 0

## 2018-06-21 ASSESSMENT — PAIN DESCRIPTION - ONSET
ONSET: PROGRESSIVE
ONSET: ON-GOING

## 2018-06-21 ASSESSMENT — PAIN SCALES - GENERAL
PAINLEVEL_OUTOF10: 10

## 2018-06-21 ASSESSMENT — PAIN DESCRIPTION - DESCRIPTORS
DESCRIPTORS: CONSTANT
DESCRIPTORS: HEADACHE;THROBBING
DESCRIPTORS: CONSTANT

## 2018-06-21 ASSESSMENT — PAIN DESCRIPTION - PAIN TYPE
TYPE: ACUTE PAIN

## 2018-06-21 ASSESSMENT — PAIN DESCRIPTION - PROGRESSION
CLINICAL_PROGRESSION: NOT CHANGED
CLINICAL_PROGRESSION: NOT CHANGED

## 2018-06-21 ASSESSMENT — PAIN DESCRIPTION - FREQUENCY
FREQUENCY: CONTINUOUS
FREQUENCY: CONTINUOUS

## 2018-06-22 ENCOUNTER — HOSPITAL ENCOUNTER (INPATIENT)
Age: 48
LOS: 2 days | Discharge: LEFT BEFORE DISCHARGE | DRG: 420 | End: 2018-06-24
Attending: EMERGENCY MEDICINE | Admitting: INTERNAL MEDICINE
Payer: COMMERCIAL

## 2018-06-22 ENCOUNTER — APPOINTMENT (OUTPATIENT)
Dept: GENERAL RADIOLOGY | Age: 48
DRG: 420 | End: 2018-06-22
Payer: COMMERCIAL

## 2018-06-22 ENCOUNTER — APPOINTMENT (OUTPATIENT)
Dept: CT IMAGING | Age: 48
DRG: 420 | End: 2018-06-22
Payer: COMMERCIAL

## 2018-06-22 DIAGNOSIS — F10.939 ALCOHOL WITHDRAWAL SYNDROME WITH COMPLICATION (HCC): ICD-10-CM

## 2018-06-22 DIAGNOSIS — E13.10 DIABETIC KETOACIDOSIS WITHOUT COMA ASSOCIATED WITH OTHER SPECIFIED DIABETES MELLITUS (HCC): Primary | ICD-10-CM

## 2018-06-22 LAB
ACETAMINOPHEN LEVEL: <5 MCG/ML (ref 10–30)
ALBUMIN SERPL-MCNC: 3.8 G/DL (ref 3.5–5.2)
ALP BLD-CCNC: 136 U/L (ref 35–104)
ALT SERPL-CCNC: 24 U/L (ref 0–32)
AMPHETAMINE SCREEN, URINE: NOT DETECTED
AMYLASE: 14 U/L (ref 20–100)
ANION GAP SERPL CALCULATED.3IONS-SCNC: 20 MMOL/L (ref 7–16)
AST SERPL-CCNC: 25 U/L (ref 0–31)
BACTERIA: ABNORMAL /HPF
BARBITURATE SCREEN URINE: NOT DETECTED
BASOPHILS ABSOLUTE: 0.02 E9/L (ref 0–0.2)
BASOPHILS RELATIVE PERCENT: 0.6 % (ref 0–2)
BENZODIAZEPINE SCREEN, URINE: POSITIVE
BETA-HYDROXYBUTYRATE: 0.31 MMOL/L (ref 0.02–0.27)
BILIRUB SERPL-MCNC: 0.2 MG/DL (ref 0–1.2)
BILIRUBIN URINE: NEGATIVE
BLOOD, URINE: NEGATIVE
BUN BLDV-MCNC: 4 MG/DL (ref 6–20)
CALCIUM SERPL-MCNC: 9.2 MG/DL (ref 8.6–10.2)
CANNABINOID SCREEN URINE: NOT DETECTED
CHLORIDE BLD-SCNC: 90 MMOL/L (ref 98–107)
CHP ED QC CHECK: YES
CLARITY: CLEAR
CO2: 23 MMOL/L (ref 22–29)
COCAINE METABOLITE SCREEN URINE: NOT DETECTED
COLOR: YELLOW
CREAT SERPL-MCNC: 0.5 MG/DL (ref 0.5–1)
EOSINOPHILS ABSOLUTE: 0.1 E9/L (ref 0.05–0.5)
EOSINOPHILS RELATIVE PERCENT: 3.2 % (ref 0–6)
EPITHELIAL CELLS, UA: ABNORMAL /HPF
ETHANOL: 112 MG/DL (ref 0–0.08)
GFR AFRICAN AMERICAN: >60
GFR NON-AFRICAN AMERICAN: >60 ML/MIN/1.73
GLUCOSE BLD-MCNC: 223 MG/DL
GLUCOSE BLD-MCNC: 689 MG/DL (ref 74–109)
GLUCOSE URINE: >=1000 MG/DL
HCG(URINE) PREGNANCY TEST: NEGATIVE
HCT VFR BLD CALC: 36.4 % (ref 34–48)
HEMOGLOBIN: 12.6 G/DL (ref 11.5–15.5)
IMMATURE GRANULOCYTES #: 0.01 E9/L
IMMATURE GRANULOCYTES %: 0.3 % (ref 0–5)
KETONES, URINE: NEGATIVE MG/DL
LACTIC ACID: 4 MMOL/L (ref 0.5–2.2)
LEUKOCYTE ESTERASE, URINE: NEGATIVE
LIPASE: 20 U/L (ref 13–60)
LYMPHOCYTES ABSOLUTE: 0.73 E9/L (ref 1.5–4)
LYMPHOCYTES RELATIVE PERCENT: 23.5 % (ref 20–42)
MCH RBC QN AUTO: 29.9 PG (ref 26–35)
MCHC RBC AUTO-ENTMCNC: 34.6 % (ref 32–34.5)
MCV RBC AUTO: 86.3 FL (ref 80–99.9)
METER GLUCOSE: 143 MG/DL (ref 70–110)
METER GLUCOSE: 223 MG/DL (ref 70–110)
METER GLUCOSE: 358 MG/DL (ref 70–110)
METHADONE SCREEN, URINE: NOT DETECTED
MONOCYTES ABSOLUTE: 0.23 E9/L (ref 0.1–0.95)
MONOCYTES RELATIVE PERCENT: 7.4 % (ref 2–12)
NEUTROPHILS ABSOLUTE: 2.01 E9/L (ref 1.8–7.3)
NEUTROPHILS RELATIVE PERCENT: 65 % (ref 43–80)
NITRITE, URINE: NEGATIVE
OPIATE SCREEN URINE: NOT DETECTED
PDW BLD-RTO: 15 FL (ref 11.5–15)
PH UA: 5.5 (ref 5–9)
PH VENOUS: 7.43 (ref 7.3–7.42)
PHENCYCLIDINE SCREEN URINE: NOT DETECTED
PLATELET # BLD: 113 E9/L (ref 130–450)
PMV BLD AUTO: 8.9 FL (ref 7–12)
POTASSIUM SERPL-SCNC: 3.8 MMOL/L (ref 3.5–5)
PROPOXYPHENE SCREEN: NOT DETECTED
PROTEIN UA: NEGATIVE MG/DL
RBC # BLD: 4.22 E12/L (ref 3.5–5.5)
RBC UA: ABNORMAL /HPF (ref 0–2)
SALICYLATE, SERUM: <0.3 MG/DL (ref 0–30)
SODIUM BLD-SCNC: 133 MMOL/L (ref 132–146)
SPECIFIC GRAVITY UA: 1.01 (ref 1–1.03)
TOTAL PROTEIN: 7.5 G/DL (ref 6.4–8.3)
TRICYCLIC ANTIDEPRESSANTS SCREEN SERUM: NEGATIVE NG/ML
UROBILINOGEN, URINE: 0.2 E.U./DL
WBC # BLD: 3.1 E9/L (ref 4.5–11.5)
WBC UA: ABNORMAL /HPF (ref 0–5)

## 2018-06-22 PROCEDURE — 2580000003 HC RX 258: Performed by: PHYSICIAN ASSISTANT

## 2018-06-22 PROCEDURE — 71045 X-RAY EXAM CHEST 1 VIEW: CPT

## 2018-06-22 PROCEDURE — 85025 COMPLETE CBC W/AUTO DIFF WBC: CPT

## 2018-06-22 PROCEDURE — 81025 URINE PREGNANCY TEST: CPT

## 2018-06-22 PROCEDURE — G0480 DRUG TEST DEF 1-7 CLASSES: HCPCS

## 2018-06-22 PROCEDURE — 82010 KETONE BODYS QUAN: CPT

## 2018-06-22 PROCEDURE — 2580000003 HC RX 258: Performed by: EMERGENCY MEDICINE

## 2018-06-22 PROCEDURE — 74176 CT ABD & PELVIS W/O CONTRAST: CPT

## 2018-06-22 PROCEDURE — 96374 THER/PROPH/DIAG INJ IV PUSH: CPT

## 2018-06-22 PROCEDURE — 83605 ASSAY OF LACTIC ACID: CPT

## 2018-06-22 PROCEDURE — 82150 ASSAY OF AMYLASE: CPT

## 2018-06-22 PROCEDURE — 99285 EMERGENCY DEPT VISIT HI MDM: CPT

## 2018-06-22 PROCEDURE — 02HV33Z INSERTION OF INFUSION DEVICE INTO SUPERIOR VENA CAVA, PERCUTANEOUS APPROACH: ICD-10-PCS | Performed by: RADIOLOGY

## 2018-06-22 PROCEDURE — 96376 TX/PRO/DX INJ SAME DRUG ADON: CPT

## 2018-06-22 PROCEDURE — 82800 BLOOD PH: CPT

## 2018-06-22 PROCEDURE — 2000000000 HC ICU R&B

## 2018-06-22 PROCEDURE — 80307 DRUG TEST PRSMV CHEM ANLYZR: CPT

## 2018-06-22 PROCEDURE — 96375 TX/PRO/DX INJ NEW DRUG ADDON: CPT

## 2018-06-22 PROCEDURE — 83690 ASSAY OF LIPASE: CPT

## 2018-06-22 PROCEDURE — 6360000002 HC RX W HCPCS: Performed by: EMERGENCY MEDICINE

## 2018-06-22 PROCEDURE — 80053 COMPREHEN METABOLIC PANEL: CPT

## 2018-06-22 PROCEDURE — 82962 GLUCOSE BLOOD TEST: CPT

## 2018-06-22 PROCEDURE — 6370000000 HC RX 637 (ALT 250 FOR IP): Performed by: EMERGENCY MEDICINE

## 2018-06-22 PROCEDURE — 6360000002 HC RX W HCPCS: Performed by: PHYSICIAN ASSISTANT

## 2018-06-22 PROCEDURE — 6370000000 HC RX 637 (ALT 250 FOR IP): Performed by: PHYSICIAN ASSISTANT

## 2018-06-22 PROCEDURE — 81001 URINALYSIS AUTO W/SCOPE: CPT

## 2018-06-22 RX ORDER — LORAZEPAM 1 MG/1
4 TABLET ORAL
Status: DISCONTINUED | OUTPATIENT
Start: 2018-06-22 | End: 2018-06-24 | Stop reason: HOSPADM

## 2018-06-22 RX ORDER — MORPHINE SULFATE 4 MG/ML
4 INJECTION, SOLUTION INTRAMUSCULAR; INTRAVENOUS ONCE
Status: COMPLETED | OUTPATIENT
Start: 2018-06-22 | End: 2018-06-22

## 2018-06-22 RX ORDER — DEXTROSE MONOHYDRATE 25 G/50ML
12.5 INJECTION, SOLUTION INTRAVENOUS PRN
Status: DISCONTINUED | OUTPATIENT
Start: 2018-06-22 | End: 2018-06-24 | Stop reason: HOSPADM

## 2018-06-22 RX ORDER — 0.9 % SODIUM CHLORIDE 0.9 %
15 INTRAVENOUS SOLUTION INTRAVENOUS ONCE
Status: DISCONTINUED | OUTPATIENT
Start: 2018-06-22 | End: 2018-06-23

## 2018-06-22 RX ORDER — SODIUM CHLORIDE 0.9 % (FLUSH) 0.9 %
10 SYRINGE (ML) INJECTION PRN
Status: DISCONTINUED | OUTPATIENT
Start: 2018-06-22 | End: 2018-06-22 | Stop reason: SDUPTHER

## 2018-06-22 RX ORDER — KETOROLAC TROMETHAMINE 30 MG/ML
15 INJECTION, SOLUTION INTRAMUSCULAR; INTRAVENOUS ONCE
Status: COMPLETED | OUTPATIENT
Start: 2018-06-22 | End: 2018-06-22

## 2018-06-22 RX ORDER — SODIUM CHLORIDE 0.9 % (FLUSH) 0.9 %
10 SYRINGE (ML) INJECTION EVERY 12 HOURS SCHEDULED
Status: DISCONTINUED | OUTPATIENT
Start: 2018-06-23 | End: 2018-06-22 | Stop reason: SDUPTHER

## 2018-06-22 RX ORDER — FOLIC ACID 1 MG/1
1 TABLET ORAL DAILY
Status: DISCONTINUED | OUTPATIENT
Start: 2018-06-22 | End: 2018-06-24 | Stop reason: HOSPADM

## 2018-06-22 RX ORDER — SODIUM CHLORIDE 9 MG/ML
INJECTION, SOLUTION INTRAVENOUS CONTINUOUS
Status: DISCONTINUED | OUTPATIENT
Start: 2018-06-22 | End: 2018-06-23

## 2018-06-22 RX ORDER — LORAZEPAM 2 MG/ML
0.5 INJECTION INTRAMUSCULAR ONCE
Status: COMPLETED | OUTPATIENT
Start: 2018-06-22 | End: 2018-06-22

## 2018-06-22 RX ORDER — SODIUM CHLORIDE 0.9 % (FLUSH) 0.9 %
10 SYRINGE (ML) INJECTION EVERY 12 HOURS SCHEDULED
Status: DISCONTINUED | OUTPATIENT
Start: 2018-06-22 | End: 2018-06-23 | Stop reason: SDUPTHER

## 2018-06-22 RX ORDER — DEXTROSE AND SODIUM CHLORIDE 5; .45 G/100ML; G/100ML
INJECTION, SOLUTION INTRAVENOUS CONTINUOUS PRN
Status: DISCONTINUED | OUTPATIENT
Start: 2018-06-22 | End: 2018-06-23

## 2018-06-22 RX ORDER — MAGNESIUM SULFATE 1 G/100ML
1 INJECTION INTRAVENOUS PRN
Status: DISCONTINUED | OUTPATIENT
Start: 2018-06-22 | End: 2018-06-23

## 2018-06-22 RX ORDER — NICOTINE 21 MG/24HR
1 PATCH, TRANSDERMAL 24 HOURS TRANSDERMAL DAILY
Status: DISCONTINUED | OUTPATIENT
Start: 2018-06-22 | End: 2018-06-23

## 2018-06-22 RX ORDER — POTASSIUM CHLORIDE 7.45 MG/ML
10 INJECTION INTRAVENOUS PRN
Status: DISCONTINUED | OUTPATIENT
Start: 2018-06-22 | End: 2018-06-23

## 2018-06-22 RX ORDER — LORAZEPAM 2 MG/ML
3 INJECTION INTRAMUSCULAR
Status: DISCONTINUED | OUTPATIENT
Start: 2018-06-22 | End: 2018-06-24 | Stop reason: HOSPADM

## 2018-06-22 RX ORDER — 0.9 % SODIUM CHLORIDE 0.9 %
1000 INTRAVENOUS SOLUTION INTRAVENOUS ONCE
Status: COMPLETED | OUTPATIENT
Start: 2018-06-22 | End: 2018-06-22

## 2018-06-22 RX ORDER — ONDANSETRON 2 MG/ML
4 INJECTION INTRAMUSCULAR; INTRAVENOUS ONCE
Status: COMPLETED | OUTPATIENT
Start: 2018-06-22 | End: 2018-06-22

## 2018-06-22 RX ORDER — LORAZEPAM 2 MG/ML
2 INJECTION INTRAMUSCULAR
Status: DISCONTINUED | OUTPATIENT
Start: 2018-06-22 | End: 2018-06-24 | Stop reason: HOSPADM

## 2018-06-22 RX ORDER — LORAZEPAM 1 MG/1
2 TABLET ORAL
Status: DISCONTINUED | OUTPATIENT
Start: 2018-06-22 | End: 2018-06-24 | Stop reason: HOSPADM

## 2018-06-22 RX ORDER — THIAMINE MONONITRATE (VIT B1) 100 MG
100 TABLET ORAL DAILY
Status: DISCONTINUED | OUTPATIENT
Start: 2018-06-22 | End: 2018-06-24 | Stop reason: HOSPADM

## 2018-06-22 RX ORDER — LORAZEPAM 1 MG/1
1 TABLET ORAL
Status: DISCONTINUED | OUTPATIENT
Start: 2018-06-22 | End: 2018-06-24 | Stop reason: HOSPADM

## 2018-06-22 RX ORDER — LORAZEPAM 2 MG/ML
4 INJECTION INTRAMUSCULAR
Status: DISCONTINUED | OUTPATIENT
Start: 2018-06-22 | End: 2018-06-24 | Stop reason: HOSPADM

## 2018-06-22 RX ORDER — SODIUM CHLORIDE 0.9 % (FLUSH) 0.9 %
10 SYRINGE (ML) INJECTION PRN
Status: DISCONTINUED | OUTPATIENT
Start: 2018-06-22 | End: 2018-06-23 | Stop reason: SDUPTHER

## 2018-06-22 RX ORDER — LORAZEPAM 2 MG/ML
1 INJECTION INTRAMUSCULAR
Status: DISCONTINUED | OUTPATIENT
Start: 2018-06-22 | End: 2018-06-24 | Stop reason: HOSPADM

## 2018-06-22 RX ORDER — LORAZEPAM 1 MG/1
3 TABLET ORAL
Status: DISCONTINUED | OUTPATIENT
Start: 2018-06-22 | End: 2018-06-24 | Stop reason: HOSPADM

## 2018-06-22 RX ADMIN — LORAZEPAM 0.5 MG: 2 INJECTION INTRAMUSCULAR; INTRAVENOUS at 19:22

## 2018-06-22 RX ADMIN — ONDANSETRON 4 MG: 2 INJECTION INTRAMUSCULAR; INTRAVENOUS at 19:08

## 2018-06-22 RX ADMIN — FOLIC ACID 1 MG: 1 TABLET ORAL at 23:10

## 2018-06-22 RX ADMIN — MORPHINE SULFATE 4 MG: 4 INJECTION, SOLUTION INTRAMUSCULAR; INTRAVENOUS at 20:23

## 2018-06-22 RX ADMIN — SODIUM CHLORIDE 1000 ML: 9 INJECTION, SOLUTION INTRAVENOUS at 20:20

## 2018-06-22 RX ADMIN — SODIUM CHLORIDE: 9 INJECTION, SOLUTION INTRAVENOUS at 21:13

## 2018-06-22 RX ADMIN — KETOROLAC TROMETHAMINE 15 MG: 30 INJECTION, SOLUTION INTRAMUSCULAR at 20:23

## 2018-06-22 RX ADMIN — KETOROLAC TROMETHAMINE 15 MG: 30 INJECTION, SOLUTION INTRAMUSCULAR at 19:22

## 2018-06-22 RX ADMIN — SODIUM CHLORIDE 1000 ML: 9 INJECTION, SOLUTION INTRAVENOUS at 19:08

## 2018-06-22 RX ADMIN — LORAZEPAM 1 MG: 2 INJECTION INTRAMUSCULAR; INTRAVENOUS at 21:12

## 2018-06-22 RX ADMIN — SODIUM CHLORIDE 0.1 UNITS/KG/HR: 9 INJECTION, SOLUTION INTRAVENOUS at 21:00

## 2018-06-22 RX ADMIN — DEXTROSE AND SODIUM CHLORIDE: 5; 450 INJECTION, SOLUTION INTRAVENOUS at 22:17

## 2018-06-22 RX ADMIN — Medication 100 MG: at 23:11

## 2018-06-22 RX ADMIN — INSULIN HUMAN 8 UNITS: 100 INJECTION, SOLUTION PARENTERAL at 19:01

## 2018-06-22 ASSESSMENT — PAIN DESCRIPTION - PAIN TYPE: TYPE: ACUTE PAIN

## 2018-06-22 ASSESSMENT — PAIN DESCRIPTION - LOCATION: LOCATION: ABDOMEN;BACK

## 2018-06-22 ASSESSMENT — PAIN SCALES - GENERAL
PAINLEVEL_OUTOF10: 8
PAINLEVEL_OUTOF10: 8
PAINLEVEL_OUTOF10: 10
PAINLEVEL_OUTOF10: 10

## 2018-06-22 ASSESSMENT — PAIN DESCRIPTION - PROGRESSION: CLINICAL_PROGRESSION: NOT CHANGED

## 2018-06-23 PROBLEM — D75.89 BICYTOPENIA: Chronic | Status: ACTIVE | Noted: 2018-06-23

## 2018-06-23 PROBLEM — K86.1 CHRONIC PANCREATITIS (HCC): Chronic | Status: ACTIVE | Noted: 2018-06-23

## 2018-06-23 LAB
ALBUMIN SERPL-MCNC: 3 G/DL (ref 3.5–5.2)
ALP BLD-CCNC: 103 U/L (ref 35–104)
ALT SERPL-CCNC: 19 U/L (ref 0–32)
ANION GAP SERPL CALCULATED.3IONS-SCNC: 7 MMOL/L (ref 7–16)
ANION GAP SERPL CALCULATED.3IONS-SCNC: 7 MMOL/L (ref 7–16)
AST SERPL-CCNC: 23 U/L (ref 0–31)
BILIRUB SERPL-MCNC: 0.3 MG/DL (ref 0–1.2)
BILIRUBIN DIRECT: <0.2 MG/DL (ref 0–0.3)
BILIRUBIN, INDIRECT: ABNORMAL MG/DL (ref 0–1)
BUN BLDV-MCNC: 4 MG/DL (ref 6–20)
BUN BLDV-MCNC: 5 MG/DL (ref 6–20)
CALCIUM SERPL-MCNC: 8.3 MG/DL (ref 8.6–10.2)
CALCIUM SERPL-MCNC: 8.5 MG/DL (ref 8.6–10.2)
CHLORIDE BLD-SCNC: 102 MMOL/L (ref 98–107)
CHLORIDE BLD-SCNC: 102 MMOL/L (ref 98–107)
CO2: 29 MMOL/L (ref 22–29)
CO2: 31 MMOL/L (ref 22–29)
CREAT SERPL-MCNC: 0.3 MG/DL (ref 0.5–1)
CREAT SERPL-MCNC: 0.4 MG/DL (ref 0.5–1)
GFR AFRICAN AMERICAN: >60
GFR AFRICAN AMERICAN: >60
GFR NON-AFRICAN AMERICAN: >60 ML/MIN/1.73
GFR NON-AFRICAN AMERICAN: >60 ML/MIN/1.73
GLUCOSE BLD-MCNC: 149 MG/DL (ref 74–109)
GLUCOSE BLD-MCNC: 172 MG/DL (ref 74–109)
HCT VFR BLD CALC: 31 % (ref 34–48)
HEMOGLOBIN: 10.5 G/DL (ref 11.5–15.5)
LACTIC ACID: 0.9 MMOL/L (ref 0.5–2.2)
MAGNESIUM: 1.7 MG/DL (ref 1.6–2.6)
MAGNESIUM: 1.9 MG/DL (ref 1.6–2.6)
MCH RBC QN AUTO: 30.3 PG (ref 26–35)
MCHC RBC AUTO-ENTMCNC: 33.9 % (ref 32–34.5)
MCV RBC AUTO: 89.6 FL (ref 80–99.9)
METER GLUCOSE: 123 MG/DL (ref 70–110)
METER GLUCOSE: 138 MG/DL (ref 70–110)
METER GLUCOSE: 150 MG/DL (ref 70–110)
METER GLUCOSE: 153 MG/DL (ref 70–110)
METER GLUCOSE: 161 MG/DL (ref 70–110)
METER GLUCOSE: 167 MG/DL (ref 70–110)
METER GLUCOSE: 171 MG/DL (ref 70–110)
METER GLUCOSE: 182 MG/DL (ref 70–110)
METER GLUCOSE: 221 MG/DL (ref 70–110)
METER GLUCOSE: 364 MG/DL (ref 70–110)
METER GLUCOSE: 417 MG/DL (ref 70–110)
METER GLUCOSE: 79 MG/DL (ref 70–110)
PDW BLD-RTO: 15.3 FL (ref 11.5–15)
PHOSPHORUS: 3.1 MG/DL (ref 2.5–4.5)
PHOSPHORUS: 3.6 MG/DL (ref 2.5–4.5)
PLATELET # BLD: 105 E9/L (ref 130–450)
PMV BLD AUTO: 9.2 FL (ref 7–12)
POTASSIUM REFLEX MAGNESIUM: 3.6 MMOL/L (ref 3.5–5)
POTASSIUM SERPL-SCNC: 4.4 MMOL/L (ref 3.5–5)
RBC # BLD: 3.46 E12/L (ref 3.5–5.5)
SODIUM BLD-SCNC: 138 MMOL/L (ref 132–146)
SODIUM BLD-SCNC: 140 MMOL/L (ref 132–146)
TOTAL PROTEIN: 5.7 G/DL (ref 6.4–8.3)
TROPONIN: <0.01 NG/ML (ref 0–0.03)
TROPONIN: <0.01 NG/ML (ref 0–0.03)
WBC # BLD: 2.4 E9/L (ref 4.5–11.5)

## 2018-06-23 PROCEDURE — 6360000002 HC RX W HCPCS: Performed by: EMERGENCY MEDICINE

## 2018-06-23 PROCEDURE — 82962 GLUCOSE BLOOD TEST: CPT

## 2018-06-23 PROCEDURE — 6370000000 HC RX 637 (ALT 250 FOR IP): Performed by: EMERGENCY MEDICINE

## 2018-06-23 PROCEDURE — 36592 COLLECT BLOOD FROM PICC: CPT

## 2018-06-23 PROCEDURE — 87081 CULTURE SCREEN ONLY: CPT

## 2018-06-23 PROCEDURE — 2580000003 HC RX 258: Performed by: INTERNAL MEDICINE

## 2018-06-23 PROCEDURE — 6370000000 HC RX 637 (ALT 250 FOR IP): Performed by: INTERNAL MEDICINE

## 2018-06-23 PROCEDURE — 80076 HEPATIC FUNCTION PANEL: CPT

## 2018-06-23 PROCEDURE — 2580000003 HC RX 258: Performed by: EMERGENCY MEDICINE

## 2018-06-23 PROCEDURE — 83605 ASSAY OF LACTIC ACID: CPT

## 2018-06-23 PROCEDURE — 6360000002 HC RX W HCPCS: Performed by: INTERNAL MEDICINE

## 2018-06-23 PROCEDURE — 84100 ASSAY OF PHOSPHORUS: CPT

## 2018-06-23 PROCEDURE — 36415 COLL VENOUS BLD VENIPUNCTURE: CPT

## 2018-06-23 PROCEDURE — 2060000000 HC ICU INTERMEDIATE R&B

## 2018-06-23 PROCEDURE — 85027 COMPLETE CBC AUTOMATED: CPT

## 2018-06-23 PROCEDURE — 80048 BASIC METABOLIC PNL TOTAL CA: CPT

## 2018-06-23 PROCEDURE — 80053 COMPREHEN METABOLIC PANEL: CPT

## 2018-06-23 PROCEDURE — 83735 ASSAY OF MAGNESIUM: CPT

## 2018-06-23 PROCEDURE — 84484 ASSAY OF TROPONIN QUANT: CPT

## 2018-06-23 RX ORDER — TRAMADOL HYDROCHLORIDE 50 MG/1
50 TABLET ORAL EVERY 6 HOURS PRN
Status: DISCONTINUED | OUTPATIENT
Start: 2018-06-23 | End: 2018-06-24 | Stop reason: HOSPADM

## 2018-06-23 RX ORDER — INSULIN GLARGINE 100 [IU]/ML
25 INJECTION, SOLUTION SUBCUTANEOUS ONCE
Status: COMPLETED | OUTPATIENT
Start: 2018-06-23 | End: 2018-06-23

## 2018-06-23 RX ORDER — MORPHINE SULFATE 2 MG/ML
1 INJECTION, SOLUTION INTRAMUSCULAR; INTRAVENOUS EVERY 4 HOURS PRN
Status: DISCONTINUED | OUTPATIENT
Start: 2018-06-23 | End: 2018-06-24 | Stop reason: HOSPADM

## 2018-06-23 RX ORDER — DOCUSATE SODIUM 100 MG/1
100 CAPSULE, LIQUID FILLED ORAL 2 TIMES DAILY
Status: DISCONTINUED | OUTPATIENT
Start: 2018-06-23 | End: 2018-06-24 | Stop reason: HOSPADM

## 2018-06-23 RX ORDER — METOCLOPRAMIDE 10 MG/1
10 TABLET ORAL EVERY 6 HOURS
Status: DISCONTINUED | OUTPATIENT
Start: 2018-06-23 | End: 2018-06-23

## 2018-06-23 RX ORDER — NICOTINE 21 MG/24HR
1 PATCH, TRANSDERMAL 24 HOURS TRANSDERMAL DAILY
Status: DISCONTINUED | OUTPATIENT
Start: 2018-06-23 | End: 2018-06-24 | Stop reason: HOSPADM

## 2018-06-23 RX ORDER — PANTOPRAZOLE SODIUM 40 MG/1
40 TABLET, DELAYED RELEASE ORAL DAILY
Status: DISCONTINUED | OUTPATIENT
Start: 2018-06-23 | End: 2018-06-24 | Stop reason: HOSPADM

## 2018-06-23 RX ORDER — DEXTROSE MONOHYDRATE 50 MG/ML
100 INJECTION, SOLUTION INTRAVENOUS PRN
Status: DISCONTINUED | OUTPATIENT
Start: 2018-06-23 | End: 2018-06-24 | Stop reason: HOSPADM

## 2018-06-23 RX ORDER — SODIUM CHLORIDE 0.9 % (FLUSH) 0.9 %
10 SYRINGE (ML) INJECTION PRN
Status: DISCONTINUED | OUTPATIENT
Start: 2018-06-23 | End: 2018-06-24 | Stop reason: HOSPADM

## 2018-06-23 RX ORDER — ONDANSETRON 2 MG/ML
4 INJECTION INTRAMUSCULAR; INTRAVENOUS EVERY 6 HOURS PRN
Status: DISCONTINUED | OUTPATIENT
Start: 2018-06-23 | End: 2018-06-24 | Stop reason: HOSPADM

## 2018-06-23 RX ORDER — ARIPIPRAZOLE 5 MG/1
5 TABLET ORAL DAILY
Status: DISCONTINUED | OUTPATIENT
Start: 2018-06-23 | End: 2018-06-24 | Stop reason: HOSPADM

## 2018-06-23 RX ORDER — NICOTINE POLACRILEX 4 MG
15 LOZENGE BUCCAL PRN
Status: DISCONTINUED | OUTPATIENT
Start: 2018-06-23 | End: 2018-06-24 | Stop reason: HOSPADM

## 2018-06-23 RX ORDER — M-VIT,TX,IRON,MINS/CALC/FOLIC 27MG-0.4MG
1 TABLET ORAL DAILY
Status: DISCONTINUED | OUTPATIENT
Start: 2018-06-23 | End: 2018-06-24 | Stop reason: HOSPADM

## 2018-06-23 RX ORDER — SODIUM CHLORIDE 0.9 % (FLUSH) 0.9 %
10 SYRINGE (ML) INJECTION EVERY 12 HOURS SCHEDULED
Status: DISCONTINUED | OUTPATIENT
Start: 2018-06-23 | End: 2018-06-24 | Stop reason: HOSPADM

## 2018-06-23 RX ORDER — DICYCLOMINE HYDROCHLORIDE 10 MG/1
20 CAPSULE ORAL
Status: DISCONTINUED | OUTPATIENT
Start: 2018-06-23 | End: 2018-06-24 | Stop reason: HOSPADM

## 2018-06-23 RX ORDER — ALPRAZOLAM 0.25 MG/1
0.5 TABLET ORAL 3 TIMES DAILY PRN
Status: DISCONTINUED | OUTPATIENT
Start: 2018-06-23 | End: 2018-06-24 | Stop reason: HOSPADM

## 2018-06-23 RX ORDER — DEXTROSE MONOHYDRATE 25 G/50ML
12.5 INJECTION, SOLUTION INTRAVENOUS PRN
Status: DISCONTINUED | OUTPATIENT
Start: 2018-06-23 | End: 2018-06-24 | Stop reason: HOSPADM

## 2018-06-23 RX ORDER — INSULIN GLARGINE 100 [IU]/ML
25 INJECTION, SOLUTION SUBCUTANEOUS NIGHTLY
Status: DISCONTINUED | OUTPATIENT
Start: 2018-06-24 | End: 2018-06-24 | Stop reason: HOSPADM

## 2018-06-23 RX ORDER — PREGABALIN 50 MG/1
50 CAPSULE ORAL 2 TIMES DAILY
Status: DISCONTINUED | OUTPATIENT
Start: 2018-06-23 | End: 2018-06-24 | Stop reason: HOSPADM

## 2018-06-23 RX ADMIN — LORAZEPAM 2 MG: 2 INJECTION INTRAMUSCULAR; INTRAVENOUS at 16:53

## 2018-06-23 RX ADMIN — POTASSIUM CHLORIDE 10 MEQ: 7.46 INJECTION, SOLUTION INTRAVENOUS at 06:12

## 2018-06-23 RX ADMIN — MORPHINE SULFATE 1 MG: 2 INJECTION, SOLUTION INTRAMUSCULAR; INTRAVENOUS at 20:08

## 2018-06-23 RX ADMIN — DOCUSATE SODIUM 100 MG: 100 CAPSULE, LIQUID FILLED ORAL at 09:45

## 2018-06-23 RX ADMIN — Medication 100 MG: at 11:10

## 2018-06-23 RX ADMIN — DOCUSATE SODIUM 100 MG: 100 CAPSULE, LIQUID FILLED ORAL at 20:08

## 2018-06-23 RX ADMIN — DICYCLOMINE HYDROCHLORIDE 20 MG: 10 CAPSULE ORAL at 07:02

## 2018-06-23 RX ADMIN — LORAZEPAM 2 MG: 2 INJECTION INTRAMUSCULAR; INTRAVENOUS at 19:02

## 2018-06-23 RX ADMIN — PREGABALIN 50 MG: 50 CAPSULE ORAL at 09:45

## 2018-06-23 RX ADMIN — VORTIOXETINE 10 MG: 10 TABLET, FILM COATED ORAL at 09:45

## 2018-06-23 RX ADMIN — MULTIPLE VITAMINS W/ MINERALS TAB 1 TABLET: TAB at 09:45

## 2018-06-23 RX ADMIN — LORAZEPAM 1 MG: 2 INJECTION INTRAMUSCULAR; INTRAVENOUS at 11:10

## 2018-06-23 RX ADMIN — Medication 10 ML: at 09:45

## 2018-06-23 RX ADMIN — ONDANSETRON 4 MG: 2 INJECTION INTRAMUSCULAR; INTRAVENOUS at 19:02

## 2018-06-23 RX ADMIN — INSULIN LISPRO 6 UNITS: 100 INJECTION, SOLUTION INTRAVENOUS; SUBCUTANEOUS at 17:04

## 2018-06-23 RX ADMIN — INSULIN LISPRO 3 UNITS: 100 INJECTION, SOLUTION INTRAVENOUS; SUBCUTANEOUS at 22:00

## 2018-06-23 RX ADMIN — FOLIC ACID 1 MG: 1 TABLET ORAL at 12:16

## 2018-06-23 RX ADMIN — ARIPIPRAZOLE 5 MG: 5 TABLET ORAL at 09:45

## 2018-06-23 RX ADMIN — MORPHINE SULFATE 1 MG: 2 INJECTION, SOLUTION INTRAMUSCULAR; INTRAVENOUS at 05:34

## 2018-06-23 RX ADMIN — PREGABALIN 50 MG: 50 CAPSULE ORAL at 20:08

## 2018-06-23 RX ADMIN — INSULIN GLARGINE 25 UNITS: 100 INJECTION, SOLUTION SUBCUTANEOUS at 11:08

## 2018-06-23 RX ADMIN — PANTOPRAZOLE SODIUM 40 MG: 40 TABLET, DELAYED RELEASE ORAL at 09:45

## 2018-06-23 RX ADMIN — DEXTROSE AND SODIUM CHLORIDE: 5; 450 INJECTION, SOLUTION INTRAVENOUS at 04:35

## 2018-06-23 RX ADMIN — MORPHINE SULFATE 1 MG: 2 INJECTION, SOLUTION INTRAMUSCULAR; INTRAVENOUS at 09:44

## 2018-06-23 RX ADMIN — LORAZEPAM 2 MG: 2 INJECTION INTRAMUSCULAR; INTRAVENOUS at 14:41

## 2018-06-23 RX ADMIN — LORAZEPAM 1 MG: 1 TABLET ORAL at 23:15

## 2018-06-23 RX ADMIN — LORAZEPAM 2 MG: 2 INJECTION INTRAMUSCULAR; INTRAVENOUS at 21:12

## 2018-06-23 RX ADMIN — PANCRELIPASE 6 CAPSULE: 60000; 12000; 38000 CAPSULE, DELAYED RELEASE PELLETS ORAL at 16:56

## 2018-06-23 RX ADMIN — DICYCLOMINE HYDROCHLORIDE 20 MG: 10 CAPSULE ORAL at 20:13

## 2018-06-23 RX ADMIN — Medication 10 ML: at 19:02

## 2018-06-23 RX ADMIN — MORPHINE SULFATE 1 MG: 2 INJECTION, SOLUTION INTRAMUSCULAR; INTRAVENOUS at 14:19

## 2018-06-23 RX ADMIN — POTASSIUM CHLORIDE 10 MEQ: 7.46 INJECTION, SOLUTION INTRAVENOUS at 06:36

## 2018-06-23 RX ADMIN — PANCRELIPASE 6 CAPSULE: 60000; 12000; 38000 CAPSULE, DELAYED RELEASE PELLETS ORAL at 12:16

## 2018-06-23 RX ADMIN — POTASSIUM CHLORIDE 10 MEQ: 7.46 INJECTION, SOLUTION INTRAVENOUS at 05:35

## 2018-06-23 RX ADMIN — ALPRAZOLAM 0.5 MG: 0.25 TABLET ORAL at 23:15

## 2018-06-23 ASSESSMENT — PAIN SCALES - GENERAL
PAINLEVEL_OUTOF10: 8
PAINLEVEL_OUTOF10: 8
PAINLEVEL_OUTOF10: 0
PAINLEVEL_OUTOF10: 2
PAINLEVEL_OUTOF10: 8
PAINLEVEL_OUTOF10: 0
PAINLEVEL_OUTOF10: 0
PAINLEVEL_OUTOF10: 10
PAINLEVEL_OUTOF10: 0
PAINLEVEL_OUTOF10: 8
PAINLEVEL_OUTOF10: 9
PAINLEVEL_OUTOF10: 0

## 2018-06-23 ASSESSMENT — PAIN DESCRIPTION - FREQUENCY
FREQUENCY: CONTINUOUS
FREQUENCY: INTERMITTENT
FREQUENCY: CONTINUOUS

## 2018-06-23 ASSESSMENT — PAIN DESCRIPTION - DESCRIPTORS
DESCRIPTORS: ACHING
DESCRIPTORS: ACHING;CONSTANT
DESCRIPTORS: ACHING;CONSTANT
DESCRIPTORS: ACHING
DESCRIPTORS: ACHING;DULL;DISCOMFORT

## 2018-06-23 ASSESSMENT — PAIN DESCRIPTION - ONSET
ONSET: ON-GOING
ONSET: ON-GOING

## 2018-06-23 ASSESSMENT — PAIN DESCRIPTION - ORIENTATION
ORIENTATION: OTHER (COMMENT)
ORIENTATION: MID;LOWER
ORIENTATION: RIGHT;LEFT;LOWER
ORIENTATION: LEFT;RIGHT;LOWER

## 2018-06-23 ASSESSMENT — PAIN DESCRIPTION - PROGRESSION
CLINICAL_PROGRESSION: GRADUALLY WORSENING
CLINICAL_PROGRESSION: GRADUALLY WORSENING

## 2018-06-23 ASSESSMENT — PAIN DESCRIPTION - PAIN TYPE
TYPE: ACUTE PAIN
TYPE: CHRONIC PAIN
TYPE: ACUTE PAIN

## 2018-06-23 ASSESSMENT — PAIN DESCRIPTION - LOCATION
LOCATION: NECK;ABDOMEN
LOCATION: NECK;ABDOMEN
LOCATION: ABDOMEN

## 2018-06-24 VITALS
OXYGEN SATURATION: 96 % | HEART RATE: 85 BPM | BODY MASS INDEX: 18.54 KG/M2 | RESPIRATION RATE: 18 BRPM | DIASTOLIC BLOOD PRESSURE: 89 MMHG | TEMPERATURE: 98.7 F | WEIGHT: 98.2 LBS | HEIGHT: 61 IN | SYSTOLIC BLOOD PRESSURE: 121 MMHG

## 2018-06-24 LAB
ALBUMIN SERPL-MCNC: 3.2 G/DL (ref 3.5–5.2)
ALP BLD-CCNC: 97 U/L (ref 35–104)
ALT SERPL-CCNC: 19 U/L (ref 0–32)
ANION GAP SERPL CALCULATED.3IONS-SCNC: 6 MMOL/L (ref 7–16)
AST SERPL-CCNC: 25 U/L (ref 0–31)
BASOPHILS ABSOLUTE: 0.01 E9/L (ref 0–0.2)
BASOPHILS RELATIVE PERCENT: 0.4 % (ref 0–2)
BILIRUB SERPL-MCNC: 0.3 MG/DL (ref 0–1.2)
BUN BLDV-MCNC: 5 MG/DL (ref 6–20)
CALCIUM SERPL-MCNC: 9 MG/DL (ref 8.6–10.2)
CHLORIDE BLD-SCNC: 100 MMOL/L (ref 98–107)
CO2: 33 MMOL/L (ref 22–29)
CREAT SERPL-MCNC: 0.4 MG/DL (ref 0.5–1)
EOSINOPHILS ABSOLUTE: 0.14 E9/L (ref 0.05–0.5)
EOSINOPHILS RELATIVE PERCENT: 5.5 % (ref 0–6)
GFR AFRICAN AMERICAN: >60
GFR NON-AFRICAN AMERICAN: >60 ML/MIN/1.73
GLUCOSE BLD-MCNC: 183 MG/DL (ref 74–109)
HCT VFR BLD CALC: 31.2 % (ref 34–48)
HEMOGLOBIN: 10.4 G/DL (ref 11.5–15.5)
IMMATURE GRANULOCYTES #: 0.01 E9/L
IMMATURE GRANULOCYTES %: 0.4 % (ref 0–5)
LYMPHOCYTES ABSOLUTE: 0.75 E9/L (ref 1.5–4)
LYMPHOCYTES RELATIVE PERCENT: 29.6 % (ref 20–42)
MAGNESIUM: 1.8 MG/DL (ref 1.6–2.6)
MCH RBC QN AUTO: 29.5 PG (ref 26–35)
MCHC RBC AUTO-ENTMCNC: 33.3 % (ref 32–34.5)
MCV RBC AUTO: 88.6 FL (ref 80–99.9)
METER GLUCOSE: 170 MG/DL (ref 70–110)
METER GLUCOSE: 348 MG/DL (ref 70–110)
MONOCYTES ABSOLUTE: 0.21 E9/L (ref 0.1–0.95)
MONOCYTES RELATIVE PERCENT: 8.3 % (ref 2–12)
MRSA CULTURE ONLY: NORMAL
NEUTROPHILS ABSOLUTE: 1.41 E9/L (ref 1.8–7.3)
NEUTROPHILS RELATIVE PERCENT: 55.8 % (ref 43–80)
PDW BLD-RTO: 15.3 FL (ref 11.5–15)
PHOSPHORUS: 4.6 MG/DL (ref 2.5–4.5)
PLATELET # BLD: 106 E9/L (ref 130–450)
PMV BLD AUTO: 9.2 FL (ref 7–12)
POTASSIUM REFLEX MAGNESIUM: 4.2 MMOL/L (ref 3.5–5)
POTASSIUM SERPL-SCNC: 4.2 MMOL/L (ref 3.5–5)
RBC # BLD: 3.52 E12/L (ref 3.5–5.5)
SODIUM BLD-SCNC: 139 MMOL/L (ref 132–146)
TOTAL PROTEIN: 6.1 G/DL (ref 6.4–8.3)
TROPONIN: <0.01 NG/ML (ref 0–0.03)
WBC # BLD: 2.5 E9/L (ref 4.5–11.5)

## 2018-06-24 PROCEDURE — 6370000000 HC RX 637 (ALT 250 FOR IP): Performed by: EMERGENCY MEDICINE

## 2018-06-24 PROCEDURE — 83735 ASSAY OF MAGNESIUM: CPT

## 2018-06-24 PROCEDURE — 6370000000 HC RX 637 (ALT 250 FOR IP): Performed by: INTERNAL MEDICINE

## 2018-06-24 PROCEDURE — 2580000003 HC RX 258: Performed by: INTERNAL MEDICINE

## 2018-06-24 PROCEDURE — 84100 ASSAY OF PHOSPHORUS: CPT

## 2018-06-24 PROCEDURE — 85025 COMPLETE CBC W/AUTO DIFF WBC: CPT

## 2018-06-24 PROCEDURE — 82962 GLUCOSE BLOOD TEST: CPT

## 2018-06-24 PROCEDURE — 36415 COLL VENOUS BLD VENIPUNCTURE: CPT

## 2018-06-24 PROCEDURE — 80053 COMPREHEN METABOLIC PANEL: CPT

## 2018-06-24 PROCEDURE — 6360000002 HC RX W HCPCS: Performed by: INTERNAL MEDICINE

## 2018-06-24 PROCEDURE — 80048 BASIC METABOLIC PNL TOTAL CA: CPT

## 2018-06-24 RX ADMIN — INSULIN LISPRO 4 UNITS: 100 INJECTION, SOLUTION INTRAVENOUS; SUBCUTANEOUS at 12:20

## 2018-06-24 RX ADMIN — ARIPIPRAZOLE 5 MG: 5 TABLET ORAL at 10:11

## 2018-06-24 RX ADMIN — Medication 10 ML: at 10:12

## 2018-06-24 RX ADMIN — MORPHINE SULFATE 1 MG: 2 INJECTION, SOLUTION INTRAMUSCULAR; INTRAVENOUS at 00:52

## 2018-06-24 RX ADMIN — MORPHINE SULFATE 1 MG: 2 INJECTION, SOLUTION INTRAMUSCULAR; INTRAVENOUS at 10:30

## 2018-06-24 RX ADMIN — PANTOPRAZOLE SODIUM 40 MG: 40 TABLET, DELAYED RELEASE ORAL at 10:11

## 2018-06-24 RX ADMIN — MORPHINE SULFATE 1 MG: 2 INJECTION, SOLUTION INTRAMUSCULAR; INTRAVENOUS at 06:16

## 2018-06-24 RX ADMIN — PREGABALIN 50 MG: 50 CAPSULE ORAL at 10:11

## 2018-06-24 RX ADMIN — LORAZEPAM 1 MG: 1 TABLET ORAL at 10:30

## 2018-06-24 RX ADMIN — VORTIOXETINE 10 MG: 10 TABLET, FILM COATED ORAL at 10:11

## 2018-06-24 RX ADMIN — DICYCLOMINE HYDROCHLORIDE 20 MG: 10 CAPSULE ORAL at 12:14

## 2018-06-24 RX ADMIN — DOCUSATE SODIUM 100 MG: 100 CAPSULE, LIQUID FILLED ORAL at 10:11

## 2018-06-24 RX ADMIN — Medication 10 ML: at 00:52

## 2018-06-24 RX ADMIN — LORAZEPAM 1 MG: 1 TABLET ORAL at 12:20

## 2018-06-24 RX ADMIN — Medication 100 MG: at 10:11

## 2018-06-24 RX ADMIN — FOLIC ACID 1 MG: 1 TABLET ORAL at 10:11

## 2018-06-24 RX ADMIN — MULTIPLE VITAMINS W/ MINERALS TAB 1 TABLET: TAB at 10:11

## 2018-06-24 RX ADMIN — LORAZEPAM 2 MG: 1 TABLET ORAL at 04:03

## 2018-06-24 RX ADMIN — INSULIN LISPRO 1 UNITS: 100 INJECTION, SOLUTION INTRAVENOUS; SUBCUTANEOUS at 06:46

## 2018-06-24 RX ADMIN — DICYCLOMINE HYDROCHLORIDE 20 MG: 10 CAPSULE ORAL at 06:50

## 2018-06-24 ASSESSMENT — PAIN DESCRIPTION - PROGRESSION: CLINICAL_PROGRESSION: NOT CHANGED

## 2018-06-24 ASSESSMENT — PAIN SCALES - GENERAL
PAINLEVEL_OUTOF10: 9
PAINLEVEL_OUTOF10: 3
PAINLEVEL_OUTOF10: 0
PAINLEVEL_OUTOF10: 8
PAINLEVEL_OUTOF10: 8

## 2018-06-24 ASSESSMENT — PAIN DESCRIPTION - PAIN TYPE
TYPE: ACUTE PAIN
TYPE: ACUTE PAIN

## 2018-06-24 ASSESSMENT — PAIN DESCRIPTION - ORIENTATION
ORIENTATION: RIGHT;MID
ORIENTATION: MID

## 2018-06-24 ASSESSMENT — PAIN DESCRIPTION - LOCATION
LOCATION: ABDOMEN;NECK
LOCATION: NECK;ABDOMEN

## 2018-06-24 ASSESSMENT — PAIN DESCRIPTION - FREQUENCY: FREQUENCY: CONTINUOUS

## 2018-06-24 ASSESSMENT — PAIN DESCRIPTION - ONSET
ONSET: ON-GOING
ONSET: ON-GOING

## 2018-06-24 ASSESSMENT — PAIN DESCRIPTION - DESCRIPTORS
DESCRIPTORS: ACHING
DESCRIPTORS: ACHING

## 2018-06-29 LAB
7-AMINOCLONAZEPAM, URINE: <5 NG/ML
ALPHA-HYDROXYALPRAZOLAM, URINE: 60 NG/ML
ALPHA-HYDROXYMIDAZOLAM, URINE: <20 NG/ML
ALPRAZOLAM, URINE: 73 NG/ML
CHLORDIAZEPOXIDE, URINE: 108 NG/ML
CLONAZEPAM, URINE: <5 NG/ML
DIAZEPAM, URINE: <20 NG/ML
LORAZEPAM, URINE: 115 NG/ML
MIDAZOLAM, URINE: <20 NG/ML
NORDIAZEPAM, URINE: <20 NG/ML
OXAZEPAM, URINE: <20 NG/ML
TEMAZEPAM, URINE: <20 NG/ML

## 2018-07-01 ENCOUNTER — HOSPITAL ENCOUNTER (EMERGENCY)
Age: 48
Discharge: HOME | End: 2018-07-01
Attending: EMERGENCY MEDICINE
Payer: COMMERCIAL

## 2018-07-01 VITALS
SYSTOLIC BLOOD PRESSURE: 169 MMHG | WEIGHT: 90 LBS | HEART RATE: 81 BPM | DIASTOLIC BLOOD PRESSURE: 86 MMHG | BODY MASS INDEX: 16.99 KG/M2 | OXYGEN SATURATION: 100 % | TEMPERATURE: 97.8 F | RESPIRATION RATE: 18 BRPM | HEIGHT: 61 IN

## 2018-07-01 DIAGNOSIS — F10.10 CHRONIC ALCOHOL ABUSE: ICD-10-CM

## 2018-07-01 DIAGNOSIS — K29.00 OTHER ACUTE GASTRITIS, PRESENCE OF BLEEDING UNSPECIFIED: Primary | ICD-10-CM

## 2018-07-01 DIAGNOSIS — K86.0 ALCOHOL-INDUCED CHRONIC PANCREATITIS (HCC): ICD-10-CM

## 2018-07-01 LAB
ALBUMIN SERPL-MCNC: 3.6 G/DL (ref 3.5–5.2)
ALP BLD-CCNC: 143 U/L (ref 35–104)
ALT SERPL-CCNC: 29 U/L (ref 0–32)
AMMONIA: 26.9 UMOL/L (ref 11–51)
ANION GAP SERPL CALCULATED.3IONS-SCNC: 9 MMOL/L (ref 7–16)
AST SERPL-CCNC: 37 U/L (ref 0–31)
BASOPHILS ABSOLUTE: 0.01 E9/L (ref 0–0.2)
BASOPHILS RELATIVE PERCENT: 0.4 % (ref 0–2)
BILIRUB SERPL-MCNC: 0.5 MG/DL (ref 0–1.2)
BILIRUBIN DIRECT: <0.2 MG/DL (ref 0–0.3)
BILIRUBIN, INDIRECT: ABNORMAL MG/DL (ref 0–1)
BUN BLDV-MCNC: 4 MG/DL (ref 6–20)
CALCIUM SERPL-MCNC: 8.7 MG/DL (ref 8.6–10.2)
CHLORIDE BLD-SCNC: 91 MMOL/L (ref 98–107)
CHP ED QC CHECK: NORMAL
CO2: 28 MMOL/L (ref 22–29)
CREAT SERPL-MCNC: 0.4 MG/DL (ref 0.5–1)
EOSINOPHILS ABSOLUTE: 0.09 E9/L (ref 0.05–0.5)
EOSINOPHILS RELATIVE PERCENT: 3.3 % (ref 0–6)
GFR AFRICAN AMERICAN: >60
GFR NON-AFRICAN AMERICAN: >60 ML/MIN/1.73
GLUCOSE BLD-MCNC: 433 MG/DL
GLUCOSE BLD-MCNC: 482 MG/DL (ref 74–109)
HCT VFR BLD CALC: 30.4 % (ref 34–48)
HEMOGLOBIN: 10.2 G/DL (ref 11.5–15.5)
IMMATURE GRANULOCYTES #: 0.01 E9/L
IMMATURE GRANULOCYTES %: 0.4 % (ref 0–5)
INR BLD: 1
LACTIC ACID: 1.2 MMOL/L (ref 0.5–2.2)
LIPASE: 20 U/L (ref 13–60)
LYMPHOCYTES ABSOLUTE: 0.69 E9/L (ref 1.5–4)
LYMPHOCYTES RELATIVE PERCENT: 25.3 % (ref 20–42)
MCH RBC QN AUTO: 30 PG (ref 26–35)
MCHC RBC AUTO-ENTMCNC: 33.6 % (ref 32–34.5)
MCV RBC AUTO: 89.4 FL (ref 80–99.9)
METER GLUCOSE: 433 MG/DL (ref 70–110)
MONOCYTES ABSOLUTE: 0.22 E9/L (ref 0.1–0.95)
MONOCYTES RELATIVE PERCENT: 8.1 % (ref 2–12)
NEUTROPHILS ABSOLUTE: 1.71 E9/L (ref 1.8–7.3)
NEUTROPHILS RELATIVE PERCENT: 62.5 % (ref 43–80)
PDW BLD-RTO: 15.1 FL (ref 11.5–15)
PLATELET # BLD: 64 E9/L (ref 130–450)
PLATELET CONFIRMATION: NORMAL
PMV BLD AUTO: 9.6 FL (ref 7–12)
POTASSIUM SERPL-SCNC: 4.5 MMOL/L (ref 3.5–5)
PROTHROMBIN TIME: 11.3 SEC (ref 9.3–12.4)
RBC # BLD: 3.4 E12/L (ref 3.5–5.5)
SODIUM BLD-SCNC: 128 MMOL/L (ref 132–146)
TOTAL PROTEIN: 6.5 G/DL (ref 6.4–8.3)
WBC # BLD: 2.7 E9/L (ref 4.5–11.5)

## 2018-07-01 PROCEDURE — 96375 TX/PRO/DX INJ NEW DRUG ADDON: CPT

## 2018-07-01 PROCEDURE — 6370000000 HC RX 637 (ALT 250 FOR IP): Performed by: EMERGENCY MEDICINE

## 2018-07-01 PROCEDURE — 80076 HEPATIC FUNCTION PANEL: CPT

## 2018-07-01 PROCEDURE — 96374 THER/PROPH/DIAG INJ IV PUSH: CPT

## 2018-07-01 PROCEDURE — 99283 EMERGENCY DEPT VISIT LOW MDM: CPT

## 2018-07-01 PROCEDURE — 2580000003 HC RX 258: Performed by: EMERGENCY MEDICINE

## 2018-07-01 PROCEDURE — 83605 ASSAY OF LACTIC ACID: CPT

## 2018-07-01 PROCEDURE — 85025 COMPLETE CBC W/AUTO DIFF WBC: CPT

## 2018-07-01 PROCEDURE — 83690 ASSAY OF LIPASE: CPT

## 2018-07-01 PROCEDURE — 36415 COLL VENOUS BLD VENIPUNCTURE: CPT

## 2018-07-01 PROCEDURE — 6360000002 HC RX W HCPCS: Performed by: EMERGENCY MEDICINE

## 2018-07-01 PROCEDURE — 82962 GLUCOSE BLOOD TEST: CPT

## 2018-07-01 PROCEDURE — C9113 INJ PANTOPRAZOLE SODIUM, VIA: HCPCS | Performed by: EMERGENCY MEDICINE

## 2018-07-01 PROCEDURE — 82140 ASSAY OF AMMONIA: CPT

## 2018-07-01 PROCEDURE — 80048 BASIC METABOLIC PNL TOTAL CA: CPT

## 2018-07-01 PROCEDURE — 85610 PROTHROMBIN TIME: CPT

## 2018-07-01 RX ORDER — PANTOPRAZOLE SODIUM 40 MG/1
40 TABLET, DELAYED RELEASE ORAL DAILY
Qty: 10 TABLET | Refills: 0 | Status: SHIPPED | OUTPATIENT
Start: 2018-07-01 | End: 2018-09-22 | Stop reason: ALTCHOICE

## 2018-07-01 RX ORDER — 0.9 % SODIUM CHLORIDE 0.9 %
1000 INTRAVENOUS SOLUTION INTRAVENOUS ONCE
Status: COMPLETED | OUTPATIENT
Start: 2018-07-01 | End: 2018-07-01

## 2018-07-01 RX ORDER — SUCRALFATE ORAL 1 G/10ML
1 SUSPENSION ORAL 4 TIMES DAILY
Qty: 1200 ML | Refills: 3 | Status: SHIPPED | OUTPATIENT
Start: 2018-07-01 | End: 2018-07-11

## 2018-07-01 RX ORDER — ONDANSETRON 4 MG/1
4 TABLET, ORALLY DISINTEGRATING ORAL EVERY 8 HOURS PRN
Qty: 24 TABLET | Refills: 0 | Status: SHIPPED | OUTPATIENT
Start: 2018-07-01 | End: 2020-03-11 | Stop reason: ALTCHOICE

## 2018-07-01 RX ORDER — FENTANYL CITRATE 50 UG/ML
50 INJECTION, SOLUTION INTRAMUSCULAR; INTRAVENOUS ONCE
Status: COMPLETED | OUTPATIENT
Start: 2018-07-01 | End: 2018-07-01

## 2018-07-01 RX ORDER — PANTOPRAZOLE SODIUM 40 MG/10ML
40 INJECTION, POWDER, LYOPHILIZED, FOR SOLUTION INTRAVENOUS ONCE
Status: COMPLETED | OUTPATIENT
Start: 2018-07-01 | End: 2018-07-01

## 2018-07-01 RX ORDER — ONDANSETRON 2 MG/ML
4 INJECTION INTRAMUSCULAR; INTRAVENOUS ONCE
Status: COMPLETED | OUTPATIENT
Start: 2018-07-01 | End: 2018-07-01

## 2018-07-01 RX ADMIN — FENTANYL CITRATE 50 MCG: 50 INJECTION, SOLUTION INTRAMUSCULAR; INTRAVENOUS at 21:07

## 2018-07-01 RX ADMIN — SODIUM CHLORIDE 1000 ML: 9 INJECTION, SOLUTION INTRAVENOUS at 21:03

## 2018-07-01 RX ADMIN — PANTOPRAZOLE SODIUM 40 MG: 40 INJECTION, POWDER, FOR SOLUTION INTRAVENOUS at 21:55

## 2018-07-01 RX ADMIN — ONDANSETRON 4 MG: 2 INJECTION INTRAMUSCULAR; INTRAVENOUS at 21:01

## 2018-07-01 RX ADMIN — LIDOCAINE HYDROCHLORIDE: 20 SOLUTION ORAL; TOPICAL at 21:01

## 2018-07-01 ASSESSMENT — ENCOUNTER SYMPTOMS
BACK PAIN: 0
VOMITING: 1
SINUS PRESSURE: 0
SHORTNESS OF BREATH: 0
EYE REDNESS: 0
DIARRHEA: 0
ABDOMINAL DISTENTION: 0
NAUSEA: 1
SORE THROAT: 0
EYE DISCHARGE: 0
COUGH: 0
EYE PAIN: 0
ABDOMINAL PAIN: 1
WHEEZING: 0

## 2018-07-01 ASSESSMENT — PAIN DESCRIPTION - DESCRIPTORS: DESCRIPTORS: ACHING;STABBING

## 2018-07-01 ASSESSMENT — PAIN DESCRIPTION - FREQUENCY: FREQUENCY: CONTINUOUS

## 2018-07-01 ASSESSMENT — PAIN SCALES - GENERAL
PAINLEVEL_OUTOF10: 10
PAINLEVEL_OUTOF10: 10

## 2018-07-01 ASSESSMENT — PAIN DESCRIPTION - LOCATION: LOCATION: ABDOMEN

## 2018-07-01 ASSESSMENT — PAIN DESCRIPTION - PAIN TYPE: TYPE: ACUTE PAIN

## 2018-07-01 NOTE — ED PROVIDER NOTES
Metabolic Panel   Result Value Ref Range    Sodium 128 (L) 132 - 146 mmol/L    Potassium 4.5 3.5 - 5.0 mmol/L    Chloride 91 (L) 98 - 107 mmol/L    CO2 28 22 - 29 mmol/L    Anion Gap 9 7 - 16 mmol/L    Glucose 482 (H) 74 - 109 mg/dL    BUN 4 (L) 6 - 20 mg/dL    CREATININE 0.4 (L) 0.5 - 1.0 mg/dL    GFR Non-African American >60 >=60 mL/min/1.73    GFR African American >60     Calcium 8.7 8.6 - 10.2 mg/dL   Hepatic Function Panel   Result Value Ref Range    Total Protein 6.5 6.4 - 8.3 g/dL    Alb 3.6 3.5 - 5.2 g/dL    Alkaline Phosphatase 143 (H) 35 - 104 U/L    ALT 29 0 - 32 U/L    AST 37 (H) 0 - 31 U/L    Total Bilirubin 0.5 0.0 - 1.2 mg/dL    Bilirubin, Direct <0.2 0.0 - 0.3 mg/dL    Bilirubin, Indirect see below 0.0 - 1.0 mg/dL   Protime-INR   Result Value Ref Range    Protime 11.3 9.3 - 12.4 sec    INR 1.0    Ammonia   Result Value Ref Range    Ammonia 26.9 11.0 - 51.0 umol/L   Lactic Acid, Plasma   Result Value Ref Range    Lactic Acid 1.2 0.5 - 2.2 mmol/L   Lipase   Result Value Ref Range    Lipase 20 13 - 60 U/L   Platelet Confirmation   Result Value Ref Range    Platelet Confirmation CONFIRMED    POCT Glucose   Result Value Ref Range    Glucose 433 mg/dL    QC OK? ok    POCT Glucose   Result Value Ref Range    Meter Glucose 433 (H) 70 - 110 mg/dL       Radiology:  No orders to display       ------------------------- NURSING NOTES AND VITALS REVIEWED ---------------------------  Date / Time Roomed:  7/1/2018  7:23 PM  ED Bed Assignment:  28/28    The nursing notes within the ED encounter and vital signs as below have been reviewed. /84   Pulse 89   Temp 97.8 °F (36.6 °C) (Oral)   Resp 18   Ht 5' 1\" (1.549 m)   Wt 90 lb (40.8 kg)   SpO2 100%   BMI 17.01 kg/m²   Oxygen Saturation Interpretation: Normal      ------------------------------------------ PROGRESS NOTES ------------------------------------------  9:46 PM    Patient's lab values indicate no acute process at this time.  However, given the

## 2018-07-02 LAB
EKG ATRIAL RATE: 96 BPM
EKG P AXIS: 64 DEGREES
EKG P-R INTERVAL: 132 MS
EKG Q-T INTERVAL: 362 MS
EKG QRS DURATION: 74 MS
EKG QTC CALCULATION (BAZETT): 457 MS
EKG R AXIS: 15 DEGREES
EKG T AXIS: 80 DEGREES
EKG VENTRICULAR RATE: 96 BPM

## 2018-07-11 ENCOUNTER — HOSPITAL ENCOUNTER (EMERGENCY)
Age: 48
Discharge: HOME | End: 2018-07-11
Payer: COMMERCIAL

## 2018-07-11 ENCOUNTER — APPOINTMENT (OUTPATIENT)
Dept: CT IMAGING | Age: 48
End: 2018-07-11
Payer: COMMERCIAL

## 2018-07-11 VITALS
WEIGHT: 90 LBS | SYSTOLIC BLOOD PRESSURE: 94 MMHG | BODY MASS INDEX: 16.99 KG/M2 | TEMPERATURE: 98.6 F | HEART RATE: 71 BPM | RESPIRATION RATE: 16 BRPM | OXYGEN SATURATION: 98 % | DIASTOLIC BLOOD PRESSURE: 71 MMHG | HEIGHT: 61 IN

## 2018-07-11 DIAGNOSIS — R10.84 GENERALIZED ABDOMINAL PAIN: Primary | ICD-10-CM

## 2018-07-11 DIAGNOSIS — K59.00 CONSTIPATION, UNSPECIFIED CONSTIPATION TYPE: ICD-10-CM

## 2018-07-11 LAB
ALBUMIN SERPL-MCNC: 3.9 G/DL (ref 3.5–5.2)
ALP BLD-CCNC: 133 U/L (ref 35–104)
ALT SERPL-CCNC: 38 U/L (ref 0–32)
ANION GAP SERPL CALCULATED.3IONS-SCNC: 13 MMOL/L (ref 7–16)
AST SERPL-CCNC: 54 U/L (ref 0–31)
BASOPHILS ABSOLUTE: 0.02 E9/L (ref 0–0.2)
BASOPHILS RELATIVE PERCENT: 0.7 % (ref 0–2)
BILIRUB SERPL-MCNC: 0.4 MG/DL (ref 0–1.2)
BILIRUBIN URINE: NEGATIVE
BLOOD, URINE: NEGATIVE
BUN BLDV-MCNC: 9 MG/DL (ref 6–20)
CALCIUM SERPL-MCNC: 10.1 MG/DL (ref 8.6–10.2)
CHLORIDE BLD-SCNC: 98 MMOL/L (ref 98–107)
CLARITY: CLEAR
CO2: 28 MMOL/L (ref 22–29)
COLOR: YELLOW
CREAT SERPL-MCNC: 0.4 MG/DL (ref 0.5–1)
EKG ATRIAL RATE: 84 BPM
EKG P AXIS: 71 DEGREES
EKG P-R INTERVAL: 128 MS
EKG Q-T INTERVAL: 388 MS
EKG QRS DURATION: 84 MS
EKG QTC CALCULATION (BAZETT): 458 MS
EKG R AXIS: 46 DEGREES
EKG T AXIS: 79 DEGREES
EKG VENTRICULAR RATE: 84 BPM
EOSINOPHILS ABSOLUTE: 0.07 E9/L (ref 0.05–0.5)
EOSINOPHILS RELATIVE PERCENT: 2.6 % (ref 0–6)
GFR AFRICAN AMERICAN: >60
GFR NON-AFRICAN AMERICAN: >60 ML/MIN/1.73
GLUCOSE BLD-MCNC: 497 MG/DL (ref 74–109)
GLUCOSE URINE: >=1000 MG/DL
HCG(URINE) PREGNANCY TEST: NEGATIVE
HCT VFR BLD CALC: 35.7 % (ref 34–48)
HEMOGLOBIN: 12.2 G/DL (ref 11.5–15.5)
IMMATURE GRANULOCYTES #: 0.01 E9/L
IMMATURE GRANULOCYTES %: 0.4 % (ref 0–5)
KETONES, URINE: NEGATIVE MG/DL
LACTIC ACID: 0.9 MMOL/L (ref 0.5–2.2)
LEUKOCYTE ESTERASE, URINE: NEGATIVE
LIPASE: 14 U/L (ref 13–60)
LYMPHOCYTES ABSOLUTE: 0.67 E9/L (ref 1.5–4)
LYMPHOCYTES RELATIVE PERCENT: 24.6 % (ref 20–42)
MCH RBC QN AUTO: 30 PG (ref 26–35)
MCHC RBC AUTO-ENTMCNC: 34.2 % (ref 32–34.5)
MCV RBC AUTO: 87.7 FL (ref 80–99.9)
MONOCYTES ABSOLUTE: 0.2 E9/L (ref 0.1–0.95)
MONOCYTES RELATIVE PERCENT: 7.4 % (ref 2–12)
NEUTROPHILS ABSOLUTE: 1.75 E9/L (ref 1.8–7.3)
NEUTROPHILS RELATIVE PERCENT: 64.3 % (ref 43–80)
NITRITE, URINE: NEGATIVE
PDW BLD-RTO: 14.4 FL (ref 11.5–15)
PH UA: 6 (ref 5–9)
PLATELET # BLD: 87 E9/L (ref 130–450)
PLATELET CONFIRMATION: NORMAL
PMV BLD AUTO: 10.2 FL (ref 7–12)
POTASSIUM SERPL-SCNC: 5 MMOL/L (ref 3.5–5)
PROTEIN UA: NEGATIVE MG/DL
RBC # BLD: 4.07 E12/L (ref 3.5–5.5)
SODIUM BLD-SCNC: 139 MMOL/L (ref 132–146)
SPECIFIC GRAVITY UA: 1.01 (ref 1–1.03)
TOTAL PROTEIN: 8.1 G/DL (ref 6.4–8.3)
TROPONIN: <0.01 NG/ML (ref 0–0.03)
UROBILINOGEN, URINE: 0.2 E.U./DL
WBC # BLD: 2.7 E9/L (ref 4.5–11.5)

## 2018-07-11 PROCEDURE — 81003 URINALYSIS AUTO W/O SCOPE: CPT

## 2018-07-11 PROCEDURE — 36415 COLL VENOUS BLD VENIPUNCTURE: CPT

## 2018-07-11 PROCEDURE — 96375 TX/PRO/DX INJ NEW DRUG ADDON: CPT

## 2018-07-11 PROCEDURE — 96376 TX/PRO/DX INJ SAME DRUG ADON: CPT

## 2018-07-11 PROCEDURE — 83690 ASSAY OF LIPASE: CPT

## 2018-07-11 PROCEDURE — 80053 COMPREHEN METABOLIC PANEL: CPT

## 2018-07-11 PROCEDURE — 99284 EMERGENCY DEPT VISIT MOD MDM: CPT

## 2018-07-11 PROCEDURE — 6360000002 HC RX W HCPCS: Performed by: NURSE PRACTITIONER

## 2018-07-11 PROCEDURE — 83605 ASSAY OF LACTIC ACID: CPT

## 2018-07-11 PROCEDURE — 2580000003 HC RX 258: Performed by: NURSE PRACTITIONER

## 2018-07-11 PROCEDURE — 93005 ELECTROCARDIOGRAM TRACING: CPT | Performed by: NURSE PRACTITIONER

## 2018-07-11 PROCEDURE — 74176 CT ABD & PELVIS W/O CONTRAST: CPT

## 2018-07-11 PROCEDURE — 85025 COMPLETE CBC W/AUTO DIFF WBC: CPT

## 2018-07-11 PROCEDURE — 81025 URINE PREGNANCY TEST: CPT

## 2018-07-11 PROCEDURE — 84484 ASSAY OF TROPONIN QUANT: CPT

## 2018-07-11 PROCEDURE — C9113 INJ PANTOPRAZOLE SODIUM, VIA: HCPCS | Performed by: NURSE PRACTITIONER

## 2018-07-11 PROCEDURE — 96374 THER/PROPH/DIAG INJ IV PUSH: CPT

## 2018-07-11 RX ORDER — 0.9 % SODIUM CHLORIDE 0.9 %
1000 INTRAVENOUS SOLUTION INTRAVENOUS ONCE
Status: COMPLETED | OUTPATIENT
Start: 2018-07-11 | End: 2018-07-11

## 2018-07-11 RX ORDER — PANTOPRAZOLE SODIUM 40 MG/10ML
40 INJECTION, POWDER, LYOPHILIZED, FOR SOLUTION INTRAVENOUS ONCE
Status: COMPLETED | OUTPATIENT
Start: 2018-07-11 | End: 2018-07-11

## 2018-07-11 RX ORDER — POLYETHYLENE GLYCOL 3350 17 G/17G
17 POWDER, FOR SOLUTION ORAL DAILY
Qty: 510 G | Refills: 0 | Status: SHIPPED | OUTPATIENT
Start: 2018-07-11 | End: 2018-08-10

## 2018-07-11 RX ORDER — ONDANSETRON 2 MG/ML
4 INJECTION INTRAMUSCULAR; INTRAVENOUS ONCE
Status: COMPLETED | OUTPATIENT
Start: 2018-07-11 | End: 2018-07-11

## 2018-07-11 RX ADMIN — HYDROMORPHONE HYDROCHLORIDE 1 MG: 1 INJECTION, SOLUTION INTRAMUSCULAR; INTRAVENOUS; SUBCUTANEOUS at 13:02

## 2018-07-11 RX ADMIN — SODIUM CHLORIDE 1000 ML: 9 INJECTION, SOLUTION INTRAVENOUS at 13:02

## 2018-07-11 RX ADMIN — PANTOPRAZOLE SODIUM 40 MG: 40 INJECTION, POWDER, FOR SOLUTION INTRAVENOUS at 13:01

## 2018-07-11 RX ADMIN — ONDANSETRON 4 MG: 2 INJECTION INTRAMUSCULAR; INTRAVENOUS at 13:02

## 2018-07-11 RX ADMIN — HYDROMORPHONE HYDROCHLORIDE 1 MG: 1 INJECTION, SOLUTION INTRAMUSCULAR; INTRAVENOUS; SUBCUTANEOUS at 15:23

## 2018-07-11 ASSESSMENT — PAIN SCALES - GENERAL
PAINLEVEL_OUTOF10: 8
PAINLEVEL_OUTOF10: 8
PAINLEVEL_OUTOF10: 6
PAINLEVEL_OUTOF10: 2
PAINLEVEL_OUTOF10: 9

## 2018-07-11 ASSESSMENT — PAIN DESCRIPTION - PROGRESSION: CLINICAL_PROGRESSION: GRADUALLY WORSENING

## 2018-07-11 ASSESSMENT — PAIN DESCRIPTION - ORIENTATION: ORIENTATION: MID;UPPER

## 2018-07-11 ASSESSMENT — PAIN DESCRIPTION - DESCRIPTORS: DESCRIPTORS: STABBING;SHARP

## 2018-07-11 ASSESSMENT — PAIN DESCRIPTION - PAIN TYPE
TYPE: ACUTE PAIN
TYPE: CHRONIC PAIN

## 2018-07-11 ASSESSMENT — PAIN DESCRIPTION - LOCATION: LOCATION: ABDOMEN

## 2018-07-11 ASSESSMENT — PAIN DESCRIPTION - FREQUENCY: FREQUENCY: CONTINUOUS

## 2018-07-11 NOTE — ED PROVIDER NOTES
The pain is aggravated by none and relieved by none and nothing. There has been NO chills, cloudy urine, constipation, urinary frequency, urinary incontinence or urinary urgency. GYN History: status post hysterectomy. STD History: no history of PID, STD's. No LMP recorded. Patient has had a hysterectomy. Current Pregnancy: NA. Birth Control: Status post hysterectomy. Gravid Status: No obstetric history on file. HEART Score For Major Cardiac Events  (Max Score 10 Points)  HISTORY       [x]   Slightly Suspicious  0       []   Moderately Suspicious  +1       []   Highly Suspicious  +2    EK point: No ST depression but LBBB, LVH repolarization changes (ex:digoxin);               2 points: ST depression/elevation not due to LBBB, LVH or digoxin         [x]   Normal  0       []   Nonspecific Repolarization Disturbance  +1       []   Significant ST Depression  +2    AGE       []   <45  0       [x]   45-64  +1       []    >65  +2    RISK FACTORS:  1. HTN    2. Hypercholesterolemia    3. DM     4. Cigarette smoking (current or cessation < 3 mos)    5. Positive family history  (parent or sibling with CVD before age 72).    6. Obesity (BMI >30kg/m2)         []   No Risk factors Known  0       [x]   1-2 Risk Factors  +1       []   >3 Risk Factors or History of Atherosclerotic Disease  +2      INITIAL TROPONIN       [x]   < Normal Limit   0       []   1-3 x Normal Limit   +1       []   >3 x Normal Limit   +2     -----------------------------------------------------------------------------------------------------------------  SCORE TOTAL:  2 POINTS     Low Score          (0-3 Points), risk of MACE of 0.9-1.7% (discuss d/c home with f/u)  Moderate Score (4-6 Points), risk of MACE of 12-16.6% (discuss admission for        further testing)  High Score         (7-10 Points), risk of MACE of 50-65% (Admit ALL as they are        candidates for early invasive measures)    ROS   Pertinent positives and negatives are stated within HPI, all other systems reviewed and are negative. Past Surgical History:   Procedure Laterality Date    ABDOMEN SURGERY      gallbladder removal    BREAST BIOPSY Left     CHOLECYSTECTOMY  2010    COLONOSCOPY      ENDOSCOPY, COLON, DIAGNOSTIC      ERCP      april 2016 [address]  2004    OTHER SURGICAL HISTORY Left 04/25/2017     Left breast blue dye injection, Sential Node Lymph Biopsy with left breast lumpectomy    OTHER SURGICAL HISTORY  10/05/2017    simple left mastectomy    OVARY REMOVAL Bilateral 2007    PANCREAS BIOPSY      with stent    SHOULDER SURGERY Left 2003    UPPER GASTROINTESTINAL ENDOSCOPY     Social History:  reports that she has been smoking Cigarettes. She started smoking about 36 years ago. She has a 64.00 pack-year smoking history. She has never used smokeless tobacco. She reports that she drinks about 1.8 oz of alcohol per week . She reports that she does not use drugs. Family History: family history includes Cancer in her mother; Diabetes in her mother. Allergies: Dye [iodides] and Tylenol [acetaminophen]    Physical Exam           ED Triage Vitals   BP Temp Temp Source Pulse Resp SpO2 Height Weight   07/11/18 1210 07/11/18 1210 07/11/18 1210 07/11/18 1210 07/11/18 1210 07/11/18 1210 07/11/18 1209 07/11/18 1209   106/78 98.5 °F (36.9 °C) Oral 93 16 99 % 5' 1\" (1.549 m) 90 lb (40.8 kg)      Oxygen Saturation Interpretation: Normal.    · General Appearance/Constitutional:  Alert, development consistent with age. · HEENT:  NC/NT. PERRLA. Airway patent. · Neck:  Supple. No lymphadenopathy. · Respiratory:  No retractions. Lungs Clear to auscultation and breath sounds equal.  · CV:  Regular rate and rhythm. · GI:  General Appearance: normal.         Bowel sounds: normal bowel sounds. Distension:  None.             Tenderness: mild tenderness is present in the epigastrium and in the RUQ, no rebound tenderness, no guarding, abdominal rigidity is absent. Liver: non-tender. Spleen:  non-tender. Pulsatile Mass: absent. Hernia:  no inguinal or femoral hernias noted. · Back: CVA Tenderness: No.  · Integument:  Normal turgor. Warm, dry, without visible rash, unless noted elsewhere. · Lymphatics: No edema, cap.refill <3sec. · Neurological:  Orientation age-appropriate. Motor functions intact.     Lab / Imaging Results   (All laboratory and radiology results have been personally reviewed by myself)  Labs:  Results for orders placed or performed during the hospital encounter of 07/11/18   Troponin   Result Value Ref Range    Troponin <0.01 0.00 - 0.03 ng/mL   CBC Auto Differential   Result Value Ref Range    WBC 2.7 (L) 4.5 - 11.5 E9/L    RBC 4.07 3.50 - 5.50 E12/L    Hemoglobin 12.2 11.5 - 15.5 g/dL    Hematocrit 35.7 34.0 - 48.0 %    MCV 87.7 80.0 - 99.9 fL    MCH 30.0 26.0 - 35.0 pg    MCHC 34.2 32.0 - 34.5 %    RDW 14.4 11.5 - 15.0 fL    Platelets 87 (L) 767 - 450 E9/L    MPV 10.2 7.0 - 12.0 fL    Neutrophils % 64.3 43.0 - 80.0 %    Immature Granulocytes % 0.4 0.0 - 5.0 %    Lymphocytes % 24.6 20.0 - 42.0 %    Monocytes % 7.4 2.0 - 12.0 %    Eosinophils % 2.6 0.0 - 6.0 %    Basophils % 0.7 0.0 - 2.0 %    Neutrophils # 1.75 (L) 1.80 - 7.30 E9/L    Immature Granulocytes # 0.01 E9/L    Lymphocytes # 0.67 (L) 1.50 - 4.00 E9/L    Monocytes # 0.20 0.10 - 0.95 E9/L    Eosinophils # 0.07 0.05 - 0.50 E9/L    Basophils # 0.02 0.00 - 0.20 E9/L   Comprehensive Metabolic Panel   Result Value Ref Range    Sodium 139 132 - 146 mmol/L    Potassium 5.0 3.5 - 5.0 mmol/L    Chloride 98 98 - 107 mmol/L    CO2 28 22 - 29 mmol/L    Anion Gap 13 7 - 16 mmol/L    Glucose 497 (H) 74 - 109 mg/dL    BUN 9 6 - 20 mg/dL    CREATININE 0.4 (L) 0.5 - 1.0 mg/dL    GFR Non-African American >60 >=60 mL/min/1.73    GFR African American >60     Calcium 10.1 8.6 - 10.2 mg/dL    Total Protein 8.1 6.4 - 8.3 g/dL    Alb 3.9 3.5 - 5.2 g/dL    Total Bilirubin 0.4 0.0 - 1.2 mg/dL    Alkaline Phosphatase 133 (H) 35 - 104 U/L    ALT 38 (H) 0 - 32 U/L    AST 54 (H) 0 - 31 U/L   Lipase   Result Value Ref Range    Lipase 14 13 - 60 U/L   Lactic Acid, Plasma   Result Value Ref Range    Lactic Acid 0.9 0.5 - 2.2 mmol/L   Urinalysis   Result Value Ref Range    Color, UA Yellow Straw/Yellow    Clarity, UA Clear Clear    Glucose, Ur >=1000 (A) Negative mg/dL    Bilirubin Urine Negative Negative    Ketones, Urine Negative Negative mg/dL    Specific Gravity, UA 1.010 1.005 - 1.030    Blood, Urine Negative Negative    pH, UA 6.0 5.0 - 9.0    Protein, UA Negative Negative mg/dL    Urobilinogen, Urine 0.2 <2.0 E.U./dL    Nitrite, Urine Negative Negative    Leukocyte Esterase, Urine Negative Negative   Pregnancy, Urine   Result Value Ref Range    HCG(Urine) Pregnancy Test NEGATIVE NEGATIVE   Platelet Confirmation   Result Value Ref Range    Platelet Confirmation CONFIRMED    EKG 12 Lead   Result Value Ref Range    Ventricular Rate 84 BPM    Atrial Rate 84 BPM    P-R Interval 128 ms    QRS Duration 84 ms    Q-T Interval 388 ms    QTc Calculation (Bazett) 458 ms    P Axis 71 degrees    R Axis 46 degrees    T Axis 79 degrees     Imaging: All Radiology results interpreted by Radiologist unless otherwise noted. CT ABDOMEN PELVIS WO CONTRAST   Final Result        ED Course / Medical Decision Making     Medications   0.9 % sodium chloride bolus (0 mLs Intravenous Stopped 7/11/18 1400)   pantoprazole (PROTONIX) injection 40 mg (40 mg Intravenous Given 7/11/18 1301)   HYDROmorphone (DILAUDID) injection 1 mg (1 mg Intravenous Given 7/11/18 1302)   ondansetron (ZOFRAN) injection 4 mg (4 mg Intravenous Given 7/11/18 1302)   HYDROmorphone (DILAUDID) injection 1 mg (1 mg Intravenous Given 7/11/18 1523)     ED Course as of Jul 11 1623 Wed Jul 11, 2018   1349 Patient awake and alert. She states that the symptoms are improved.  Patient states that although she has had for 1 dose    POLYETHYLENE GLYCOL (MIRALAX) POWDER    Take 17 g by mouth daily     Electronically signed by EVERETT Aiken CNP   DD: 7/11/18    END OF ED PROVIDER NOTE       EVERETT Lemus CNP  07/11/18 7919

## 2018-07-11 NOTE — ED NOTES
Pt co severe epigastric pain for past 2 weeks & nausea with emesis.  Admits to drinking 1/2 can beer yesterday     Talya Cowart RN  07/11/18 7518

## 2018-07-12 LAB
AVERAGE GLUCOSE: NORMAL
HBA1C MFR BLD: 9.5 %

## 2018-07-19 ENCOUNTER — HOSPITAL ENCOUNTER (EMERGENCY)
Age: 48
Discharge: HOME | End: 2018-07-19
Attending: EMERGENCY MEDICINE
Payer: COMMERCIAL

## 2018-07-19 VITALS
DIASTOLIC BLOOD PRESSURE: 86 MMHG | HEIGHT: 61 IN | WEIGHT: 85 LBS | OXYGEN SATURATION: 97 % | SYSTOLIC BLOOD PRESSURE: 124 MMHG | BODY MASS INDEX: 16.05 KG/M2 | RESPIRATION RATE: 16 BRPM | TEMPERATURE: 98.4 F | HEART RATE: 92 BPM

## 2018-07-19 DIAGNOSIS — R10.10 PAIN OF UPPER ABDOMEN: Primary | ICD-10-CM

## 2018-07-19 PROCEDURE — 99283 EMERGENCY DEPT VISIT LOW MDM: CPT

## 2018-07-19 RX ORDER — ONDANSETRON 2 MG/ML
4 INJECTION INTRAMUSCULAR; INTRAVENOUS ONCE
Status: DISCONTINUED | OUTPATIENT
Start: 2018-07-19 | End: 2018-07-19 | Stop reason: HOSPADM

## 2018-07-19 RX ORDER — KETOROLAC TROMETHAMINE 30 MG/ML
30 INJECTION, SOLUTION INTRAMUSCULAR; INTRAVENOUS ONCE
Status: DISCONTINUED | OUTPATIENT
Start: 2018-07-19 | End: 2018-07-19 | Stop reason: HOSPADM

## 2018-07-19 RX ORDER — 0.9 % SODIUM CHLORIDE 0.9 %
1000 INTRAVENOUS SOLUTION INTRAVENOUS ONCE
Status: DISCONTINUED | OUTPATIENT
Start: 2018-07-19 | End: 2018-07-19 | Stop reason: HOSPADM

## 2018-07-19 ASSESSMENT — PAIN DESCRIPTION - LOCATION: LOCATION: ABDOMEN;BACK

## 2018-07-19 ASSESSMENT — PAIN DESCRIPTION - PAIN TYPE: TYPE: CHRONIC PAIN

## 2018-07-19 ASSESSMENT — PAIN SCALES - GENERAL: PAINLEVEL_OUTOF10: 8

## 2018-07-20 NOTE — ED NOTES
Patient left without treatment, after being seen by Dr Danisha Mcqueen.      Rhina Gallagher, RN  07/19/18 2041

## 2018-07-21 ENCOUNTER — HOSPITAL ENCOUNTER (EMERGENCY)
Age: 48
Discharge: HOME | End: 2018-07-21
Payer: COMMERCIAL

## 2018-07-21 ENCOUNTER — HOSPITAL ENCOUNTER (EMERGENCY)
Age: 48
Discharge: HOME | End: 2018-07-21
Attending: EMERGENCY MEDICINE
Payer: COMMERCIAL

## 2018-07-21 VITALS
SYSTOLIC BLOOD PRESSURE: 122 MMHG | HEART RATE: 85 BPM | BODY MASS INDEX: 16.05 KG/M2 | WEIGHT: 85 LBS | OXYGEN SATURATION: 97 % | HEIGHT: 61 IN | RESPIRATION RATE: 18 BRPM | TEMPERATURE: 98.2 F | DIASTOLIC BLOOD PRESSURE: 81 MMHG

## 2018-07-21 VITALS
RESPIRATION RATE: 14 BRPM | OXYGEN SATURATION: 99 % | SYSTOLIC BLOOD PRESSURE: 108 MMHG | DIASTOLIC BLOOD PRESSURE: 66 MMHG | TEMPERATURE: 98.7 F | HEART RATE: 78 BPM

## 2018-07-21 DIAGNOSIS — R10.13 EPIGASTRIC PAIN: Primary | ICD-10-CM

## 2018-07-21 PROCEDURE — 99283 EMERGENCY DEPT VISIT LOW MDM: CPT

## 2018-07-21 ASSESSMENT — PAIN DESCRIPTION - LOCATION: LOCATION: ABDOMEN;BACK

## 2018-07-21 ASSESSMENT — PAIN SCALES - GENERAL: PAINLEVEL_OUTOF10: 8

## 2018-09-22 ENCOUNTER — HOSPITAL ENCOUNTER (INPATIENT)
Age: 48
LOS: 10 days | Discharge: HOME | DRG: 249 | End: 2018-10-02
Attending: EMERGENCY MEDICINE | Admitting: INTERNAL MEDICINE
Payer: COMMERCIAL

## 2018-09-22 ENCOUNTER — APPOINTMENT (OUTPATIENT)
Dept: ULTRASOUND IMAGING | Age: 48
DRG: 249 | End: 2018-09-22
Payer: COMMERCIAL

## 2018-09-22 DIAGNOSIS — E08.10 DIABETIC KETOACIDOSIS WITHOUT COMA ASSOCIATED WITH DIABETES MELLITUS DUE TO UNDERLYING CONDITION (HCC): Primary | ICD-10-CM

## 2018-09-22 DIAGNOSIS — E11.42 DM TYPE 2 WITH DIABETIC PERIPHERAL NEUROPATHY (HCC): ICD-10-CM

## 2018-09-22 DIAGNOSIS — F90.8 ATTENTION DEFICIT HYPERACTIVITY DISORDER (ADHD), OTHER TYPE: ICD-10-CM

## 2018-09-22 DIAGNOSIS — R10.10 PAIN OF UPPER ABDOMEN: ICD-10-CM

## 2018-09-22 DIAGNOSIS — F41.9 ANXIETY: ICD-10-CM

## 2018-09-22 DIAGNOSIS — K86.1 IDIOPATHIC CHRONIC PANCREATITIS (HCC): ICD-10-CM

## 2018-09-22 DIAGNOSIS — R73.9 HYPERGLYCEMIA: ICD-10-CM

## 2018-09-22 DIAGNOSIS — R10.9 INTRACTABLE ABDOMINAL PAIN: ICD-10-CM

## 2018-09-22 PROBLEM — G89.29 CHRONIC ABDOMINAL PAIN: Status: ACTIVE | Noted: 2018-09-22

## 2018-09-22 PROBLEM — E87.8 ELECTROLYTE IMBALANCE: Status: ACTIVE | Noted: 2018-09-22

## 2018-09-22 PROBLEM — E87.20 LACTIC ACIDOSIS: Status: ACTIVE | Noted: 2018-09-22

## 2018-09-22 LAB
ALBUMIN SERPL-MCNC: 4.4 G/DL (ref 3.5–5.2)
ALP BLD-CCNC: 146 U/L (ref 35–104)
ALT SERPL-CCNC: 29 U/L (ref 0–32)
AMMONIA: <10 UMOL/L (ref 11–51)
AMYLASE: 12 U/L (ref 20–100)
ANION GAP SERPL CALCULATED.3IONS-SCNC: 11 MMOL/L (ref 7–16)
ANION GAP SERPL CALCULATED.3IONS-SCNC: 19 MMOL/L (ref 7–16)
ANION GAP SERPL CALCULATED.3IONS-SCNC: 6 MMOL/L (ref 7–16)
AST SERPL-CCNC: 44 U/L (ref 0–31)
BASOPHILS ABSOLUTE: 0.03 E9/L (ref 0–0.2)
BASOPHILS RELATIVE PERCENT: 0.6 % (ref 0–2)
BETA-HYDROXYBUTYRATE: 1.51 MMOL/L (ref 0.02–0.27)
BILIRUB SERPL-MCNC: 0.4 MG/DL (ref 0–1.2)
BUN BLDV-MCNC: 12 MG/DL (ref 6–20)
BUN BLDV-MCNC: 13 MG/DL (ref 6–20)
BUN BLDV-MCNC: 16 MG/DL (ref 6–20)
CALCIUM SERPL-MCNC: 7.9 MG/DL (ref 8.6–10.2)
CALCIUM SERPL-MCNC: 8.3 MG/DL (ref 8.6–10.2)
CALCIUM SERPL-MCNC: 9.9 MG/DL (ref 8.6–10.2)
CHLORIDE BLD-SCNC: 106 MMOL/L (ref 98–107)
CHLORIDE BLD-SCNC: 93 MMOL/L (ref 98–107)
CHLORIDE BLD-SCNC: 99 MMOL/L (ref 98–107)
CO2: 19 MMOL/L (ref 22–29)
CO2: 22 MMOL/L (ref 22–29)
CO2: 25 MMOL/L (ref 22–29)
CREAT SERPL-MCNC: 0.4 MG/DL (ref 0.5–1)
CREAT SERPL-MCNC: 0.5 MG/DL (ref 0.5–1)
CREAT SERPL-MCNC: 0.5 MG/DL (ref 0.5–1)
EOSINOPHILS ABSOLUTE: 0.09 E9/L (ref 0.05–0.5)
EOSINOPHILS RELATIVE PERCENT: 1.7 % (ref 0–6)
ETHANOL: <10 MG/DL (ref 0–0.08)
GFR AFRICAN AMERICAN: >60
GFR NON-AFRICAN AMERICAN: >60 ML/MIN/1.73
GLUCOSE BLD-MCNC: 318 MG/DL (ref 74–109)
GLUCOSE BLD-MCNC: 392 MG/DL
GLUCOSE BLD-MCNC: 502 MG/DL
GLUCOSE BLD-MCNC: 527 MG/DL (ref 74–109)
GLUCOSE BLD-MCNC: 605 MG/DL (ref 74–109)
HCT VFR BLD CALC: 41.5 % (ref 34–48)
HEMOGLOBIN: 14.2 G/DL (ref 11.5–15.5)
IMMATURE GRANULOCYTES #: 0.03 E9/L
IMMATURE GRANULOCYTES %: 0.6 % (ref 0–5)
LACTIC ACID: 2.9 MMOL/L (ref 0.5–2.2)
LIPASE: 25 U/L (ref 13–60)
LYMPHOCYTES ABSOLUTE: 1.13 E9/L (ref 1.5–4)
LYMPHOCYTES RELATIVE PERCENT: 20.8 % (ref 20–42)
MAGNESIUM: 1.8 MG/DL (ref 1.6–2.6)
MAGNESIUM: 2.1 MG/DL (ref 1.6–2.6)
MCH RBC QN AUTO: 29.6 PG (ref 26–35)
MCHC RBC AUTO-ENTMCNC: 34.2 % (ref 32–34.5)
MCV RBC AUTO: 86.5 FL (ref 80–99.9)
METER GLUCOSE: 300 MG/DL (ref 70–110)
METER GLUCOSE: 356 MG/DL (ref 70–110)
METER GLUCOSE: 392 MG/DL (ref 70–110)
MONOCYTES ABSOLUTE: 0.36 E9/L (ref 0.1–0.95)
MONOCYTES RELATIVE PERCENT: 6.6 % (ref 2–12)
NEUTROPHILS ABSOLUTE: 3.79 E9/L (ref 1.8–7.3)
NEUTROPHILS RELATIVE PERCENT: 69.7 % (ref 43–80)
PDW BLD-RTO: 14.3 FL (ref 11.5–15)
PH VENOUS: 7.36 (ref 7.3–7.42)
PHOSPHORUS: 2.6 MG/DL (ref 2.5–4.5)
PHOSPHORUS: 3.6 MG/DL (ref 2.5–4.5)
PLATELET # BLD: 117 E9/L (ref 130–450)
PMV BLD AUTO: 10.6 FL (ref 7–12)
POC ANION GAP: 14
POC BUN: 14
POC CHLORIDE: 101
POC CO2: 25
POC CREATININE: 0.5
POC POTASSIUM: 4
POC SODIUM: 134
POTASSIUM SERPL-SCNC: 4 MMOL/L (ref 3.5–5)
POTASSIUM SERPL-SCNC: 4.5 MMOL/L (ref 3.5–5)
POTASSIUM SERPL-SCNC: 5.9 MMOL/L (ref 3.5–5)
RBC # BLD: 4.8 E12/L (ref 3.5–5.5)
SODIUM BLD-SCNC: 130 MMOL/L (ref 132–146)
SODIUM BLD-SCNC: 131 MMOL/L (ref 132–146)
SODIUM BLD-SCNC: 139 MMOL/L (ref 132–146)
TOTAL PROTEIN: 8.2 G/DL (ref 6.4–8.3)
WBC # BLD: 5.4 E9/L (ref 4.5–11.5)

## 2018-09-22 PROCEDURE — 84100 ASSAY OF PHOSPHORUS: CPT

## 2018-09-22 PROCEDURE — 80053 COMPREHEN METABOLIC PANEL: CPT

## 2018-09-22 PROCEDURE — 83605 ASSAY OF LACTIC ACID: CPT

## 2018-09-22 PROCEDURE — 96375 TX/PRO/DX INJ NEW DRUG ADDON: CPT

## 2018-09-22 PROCEDURE — 76705 ECHO EXAM OF ABDOMEN: CPT

## 2018-09-22 PROCEDURE — 82962 GLUCOSE BLOOD TEST: CPT

## 2018-09-22 PROCEDURE — 6360000002 HC RX W HCPCS: Performed by: NURSE PRACTITIONER

## 2018-09-22 PROCEDURE — 85025 COMPLETE CBC W/AUTO DIFF WBC: CPT

## 2018-09-22 PROCEDURE — 99285 EMERGENCY DEPT VISIT HI MDM: CPT

## 2018-09-22 PROCEDURE — 6370000000 HC RX 637 (ALT 250 FOR IP): Performed by: NURSE PRACTITIONER

## 2018-09-22 PROCEDURE — 82140 ASSAY OF AMMONIA: CPT

## 2018-09-22 PROCEDURE — 2580000003 HC RX 258: Performed by: NURSE PRACTITIONER

## 2018-09-22 PROCEDURE — 80048 BASIC METABOLIC PNL TOTAL CA: CPT

## 2018-09-22 PROCEDURE — 36415 COLL VENOUS BLD VENIPUNCTURE: CPT

## 2018-09-22 PROCEDURE — 6360000002 HC RX W HCPCS: Performed by: EMERGENCY MEDICINE

## 2018-09-22 PROCEDURE — 96376 TX/PRO/DX INJ SAME DRUG ADON: CPT

## 2018-09-22 PROCEDURE — 96365 THER/PROPH/DIAG IV INF INIT: CPT

## 2018-09-22 PROCEDURE — 83735 ASSAY OF MAGNESIUM: CPT

## 2018-09-22 PROCEDURE — 82800 BLOOD PH: CPT

## 2018-09-22 PROCEDURE — 82010 KETONE BODYS QUAN: CPT

## 2018-09-22 PROCEDURE — 82150 ASSAY OF AMYLASE: CPT

## 2018-09-22 PROCEDURE — G0480 DRUG TEST DEF 1-7 CLASSES: HCPCS

## 2018-09-22 PROCEDURE — 83690 ASSAY OF LIPASE: CPT

## 2018-09-22 PROCEDURE — 2000000000 HC ICU R&B

## 2018-09-22 RX ORDER — METHYLPHENIDATE HYDROCHLORIDE 10 MG/1
10 TABLET ORAL DAILY
Status: ON HOLD | COMMUNITY
End: 2018-10-02

## 2018-09-22 RX ORDER — ALPRAZOLAM 0.5 MG/1
0.5 TABLET ORAL 3 TIMES DAILY PRN
Status: DISCONTINUED | OUTPATIENT
Start: 2018-09-22 | End: 2018-10-02 | Stop reason: HOSPADM

## 2018-09-22 RX ORDER — SPIRONOLACTONE 25 MG/1
25 TABLET ORAL DAILY
Status: DISCONTINUED | OUTPATIENT
Start: 2018-09-23 | End: 2018-09-23

## 2018-09-22 RX ORDER — METOCLOPRAMIDE HYDROCHLORIDE 5 MG/ML
10 INJECTION INTRAMUSCULAR; INTRAVENOUS ONCE
Status: COMPLETED | OUTPATIENT
Start: 2018-09-22 | End: 2018-09-22

## 2018-09-22 RX ORDER — FLUVOXAMINE MALEATE 50 MG/1
50 TABLET, COATED ORAL DAILY
Status: ON HOLD | COMMUNITY
End: 2020-02-15

## 2018-09-22 RX ORDER — PREGABALIN 50 MG/1
50 CAPSULE ORAL 2 TIMES DAILY
Status: DISCONTINUED | OUTPATIENT
Start: 2018-09-22 | End: 2018-10-02 | Stop reason: HOSPADM

## 2018-09-22 RX ORDER — PANTOPRAZOLE SODIUM 40 MG/1
40 TABLET, DELAYED RELEASE ORAL DAILY
COMMUNITY
End: 2020-02-15 | Stop reason: ALTCHOICE

## 2018-09-22 RX ORDER — MAGNESIUM SULFATE 1 G/100ML
1 INJECTION INTRAVENOUS PRN
Status: DISCONTINUED | OUTPATIENT
Start: 2018-09-22 | End: 2018-10-02 | Stop reason: HOSPADM

## 2018-09-22 RX ORDER — SODIUM CHLORIDE 9 MG/ML
INJECTION, SOLUTION INTRAVENOUS CONTINUOUS
Status: DISCONTINUED | OUTPATIENT
Start: 2018-09-22 | End: 2018-09-22

## 2018-09-22 RX ORDER — FUROSEMIDE 20 MG/1
20 TABLET ORAL DAILY
COMMUNITY
End: 2022-10-13 | Stop reason: SDUPTHER

## 2018-09-22 RX ORDER — DEXTROSE MONOHYDRATE 25 G/50ML
12.5 INJECTION, SOLUTION INTRAVENOUS PRN
Status: DISCONTINUED | OUTPATIENT
Start: 2018-09-22 | End: 2018-10-02 | Stop reason: HOSPADM

## 2018-09-22 RX ORDER — ONDANSETRON 2 MG/ML
4 INJECTION INTRAMUSCULAR; INTRAVENOUS ONCE
Status: COMPLETED | OUTPATIENT
Start: 2018-09-22 | End: 2018-09-22

## 2018-09-22 RX ORDER — ASPIRIN 81 MG/1
81 TABLET ORAL DAILY
Status: DISCONTINUED | OUTPATIENT
Start: 2018-09-23 | End: 2018-09-23

## 2018-09-22 RX ORDER — ALBUTEROL SULFATE 90 UG/1
2 AEROSOL, METERED RESPIRATORY (INHALATION) EVERY 6 HOURS PRN
COMMUNITY
End: 2022-05-23 | Stop reason: SDUPTHER

## 2018-09-22 RX ORDER — M-VIT,TX,IRON,MINS/CALC/FOLIC 27MG-0.4MG
1 TABLET ORAL DAILY
Status: DISCONTINUED | OUTPATIENT
Start: 2018-09-23 | End: 2018-10-02 | Stop reason: HOSPADM

## 2018-09-22 RX ORDER — ALBUTEROL SULFATE 90 UG/1
2 AEROSOL, METERED RESPIRATORY (INHALATION) EVERY 6 HOURS PRN
Status: DISCONTINUED | OUTPATIENT
Start: 2018-09-22 | End: 2018-10-02 | Stop reason: HOSPADM

## 2018-09-22 RX ORDER — ARIPIPRAZOLE 10 MG/1
10 TABLET ORAL DAILY
COMMUNITY
End: 2018-09-22 | Stop reason: ALTCHOICE

## 2018-09-22 RX ORDER — SODIUM CHLORIDE 9 MG/ML
INJECTION, SOLUTION INTRAVENOUS CONTINUOUS
Status: DISCONTINUED | OUTPATIENT
Start: 2018-09-22 | End: 2018-09-23

## 2018-09-22 RX ORDER — METOCLOPRAMIDE 5 MG/1
10 TABLET ORAL DAILY PRN
Status: DISCONTINUED | OUTPATIENT
Start: 2018-09-22 | End: 2018-09-23

## 2018-09-22 RX ORDER — TRAZODONE HYDROCHLORIDE 150 MG/1
150 TABLET ORAL NIGHTLY
Status: ON HOLD | COMMUNITY
End: 2020-02-15

## 2018-09-22 RX ORDER — KETOROLAC TROMETHAMINE 30 MG/ML
30 INJECTION, SOLUTION INTRAMUSCULAR; INTRAVENOUS ONCE
Status: COMPLETED | OUTPATIENT
Start: 2018-09-22 | End: 2018-09-22

## 2018-09-22 RX ORDER — TRAMADOL HYDROCHLORIDE 50 MG/1
50 TABLET ORAL EVERY 6 HOURS PRN
Status: DISCONTINUED | OUTPATIENT
Start: 2018-09-22 | End: 2018-10-02 | Stop reason: HOSPADM

## 2018-09-22 RX ORDER — FLUVOXAMINE MALEATE 50 MG/1
50 TABLET, COATED ORAL DAILY
Status: DISCONTINUED | OUTPATIENT
Start: 2018-09-23 | End: 2018-10-02 | Stop reason: HOSPADM

## 2018-09-22 RX ORDER — POTASSIUM CHLORIDE 7.45 MG/ML
10 INJECTION INTRAVENOUS PRN
Status: DISCONTINUED | OUTPATIENT
Start: 2018-09-22 | End: 2018-10-02 | Stop reason: HOSPADM

## 2018-09-22 RX ORDER — FAMOTIDINE 20 MG/1
20 TABLET, FILM COATED ORAL NIGHTLY
Status: DISCONTINUED | OUTPATIENT
Start: 2018-09-22 | End: 2018-10-02 | Stop reason: HOSPADM

## 2018-09-22 RX ORDER — 0.9 % SODIUM CHLORIDE 0.9 %
1000 INTRAVENOUS SOLUTION INTRAVENOUS ONCE
Status: DISCONTINUED | OUTPATIENT
Start: 2018-09-22 | End: 2018-09-25

## 2018-09-22 RX ORDER — NICOTINE 21 MG/24HR
1 PATCH, TRANSDERMAL 24 HOURS TRANSDERMAL EVERY 24 HOURS
COMMUNITY
End: 2019-10-21

## 2018-09-22 RX ORDER — FUROSEMIDE 20 MG/1
20 TABLET ORAL DAILY PRN
Status: DISCONTINUED | OUTPATIENT
Start: 2018-09-22 | End: 2018-09-24

## 2018-09-22 RX ORDER — NICOTINE POLACRILEX 4 MG
15 LOZENGE BUCCAL PRN
Status: DISCONTINUED | OUTPATIENT
Start: 2018-09-22 | End: 2018-10-02 | Stop reason: HOSPADM

## 2018-09-22 RX ORDER — NICOTINE 21 MG/24HR
1 PATCH, TRANSDERMAL 24 HOURS TRANSDERMAL EVERY 24 HOURS
Status: DISCONTINUED | OUTPATIENT
Start: 2018-09-22 | End: 2018-10-02 | Stop reason: HOSPADM

## 2018-09-22 RX ORDER — DEXTROSE MONOHYDRATE 50 MG/ML
100 INJECTION, SOLUTION INTRAVENOUS PRN
Status: DISCONTINUED | OUTPATIENT
Start: 2018-09-22 | End: 2018-10-02 | Stop reason: HOSPADM

## 2018-09-22 RX ORDER — ASPIRIN 81 MG/1
81 TABLET ORAL DAILY
Status: ON HOLD | COMMUNITY
End: 2018-10-02 | Stop reason: HOSPADM

## 2018-09-22 RX ORDER — DIPHENHYDRAMINE HYDROCHLORIDE 50 MG/ML
25 INJECTION INTRAMUSCULAR; INTRAVENOUS ONCE
Status: COMPLETED | OUTPATIENT
Start: 2018-09-22 | End: 2018-09-22

## 2018-09-22 RX ORDER — DEXTROSE AND SODIUM CHLORIDE 5; .45 G/100ML; G/100ML
INJECTION, SOLUTION INTRAVENOUS CONTINUOUS PRN
Status: DISCONTINUED | OUTPATIENT
Start: 2018-09-22 | End: 2018-09-23

## 2018-09-22 RX ORDER — METHYLPHENIDATE HYDROCHLORIDE 5 MG/1
10 TABLET ORAL DAILY
Status: DISCONTINUED | OUTPATIENT
Start: 2018-09-23 | End: 2018-10-02 | Stop reason: HOSPADM

## 2018-09-22 RX ORDER — 0.9 % SODIUM CHLORIDE 0.9 %
1000 INTRAVENOUS SOLUTION INTRAVENOUS ONCE
Status: COMPLETED | OUTPATIENT
Start: 2018-09-22 | End: 2018-09-22

## 2018-09-22 RX ORDER — DEXTROSE, SODIUM CHLORIDE, AND POTASSIUM CHLORIDE 5; .45; .15 G/100ML; G/100ML; G/100ML
INJECTION INTRAVENOUS CONTINUOUS PRN
Status: DISCONTINUED | OUTPATIENT
Start: 2018-09-22 | End: 2018-09-23

## 2018-09-22 RX ORDER — ARIPIPRAZOLE 15 MG/1
15 TABLET ORAL DAILY
COMMUNITY
End: 2019-10-21

## 2018-09-22 RX ORDER — PANTOPRAZOLE SODIUM 40 MG/1
40 TABLET, DELAYED RELEASE ORAL DAILY
Status: DISCONTINUED | OUTPATIENT
Start: 2018-09-23 | End: 2018-10-02 | Stop reason: HOSPADM

## 2018-09-22 RX ORDER — ARIPIPRAZOLE 15 MG/1
15 TABLET ORAL DAILY
Status: DISCONTINUED | OUTPATIENT
Start: 2018-09-23 | End: 2018-10-02 | Stop reason: HOSPADM

## 2018-09-22 RX ORDER — METOCLOPRAMIDE 10 MG/1
10 TABLET ORAL DAILY PRN
COMMUNITY
End: 2019-10-21

## 2018-09-22 RX ORDER — SPIRONOLACTONE 25 MG/1
50 TABLET ORAL DAILY
COMMUNITY
End: 2022-10-13 | Stop reason: SDUPTHER

## 2018-09-22 RX ORDER — TRAZODONE HYDROCHLORIDE 50 MG/1
150 TABLET ORAL NIGHTLY
Status: DISCONTINUED | OUTPATIENT
Start: 2018-09-22 | End: 2018-10-02 | Stop reason: HOSPADM

## 2018-09-22 RX ORDER — SODIUM CHLORIDE AND POTASSIUM CHLORIDE .9; .15 G/100ML; G/100ML
SOLUTION INTRAVENOUS CONTINUOUS
Status: DISCONTINUED | OUTPATIENT
Start: 2018-09-22 | End: 2018-09-23

## 2018-09-22 RX ADMIN — FAMOTIDINE 20 MG: 20 TABLET ORAL at 22:15

## 2018-09-22 RX ADMIN — SODIUM CHLORIDE 0.1 UNITS/KG/HR: 9 INJECTION, SOLUTION INTRAVENOUS at 19:47

## 2018-09-22 RX ADMIN — PREGABALIN 50 MG: 50 CAPSULE ORAL at 22:16

## 2018-09-22 RX ADMIN — POTASSIUM CHLORIDE AND SODIUM CHLORIDE: 900; 150 INJECTION, SOLUTION INTRAVENOUS at 21:09

## 2018-09-22 RX ADMIN — SODIUM CHLORIDE 1000 ML: 9 INJECTION, SOLUTION INTRAVENOUS at 19:01

## 2018-09-22 RX ADMIN — ONDANSETRON 4 MG: 2 INJECTION INTRAMUSCULAR; INTRAVENOUS at 15:29

## 2018-09-22 RX ADMIN — TRAMADOL HYDROCHLORIDE 50 MG: 50 TABLET, FILM COATED ORAL at 22:15

## 2018-09-22 RX ADMIN — ONDANSETRON 4 MG: 2 INJECTION INTRAMUSCULAR; INTRAVENOUS at 16:07

## 2018-09-22 RX ADMIN — ALPRAZOLAM 0.5 MG: 0.5 TABLET ORAL at 22:15

## 2018-09-22 RX ADMIN — METOCLOPRAMIDE 10 MG: 5 INJECTION, SOLUTION INTRAMUSCULAR; INTRAVENOUS at 16:06

## 2018-09-22 RX ADMIN — DIPHENHYDRAMINE HYDROCHLORIDE 25 MG: 50 INJECTION, SOLUTION INTRAMUSCULAR; INTRAVENOUS at 16:07

## 2018-09-22 RX ADMIN — VORTIOXETINE 10 MG: 10 TABLET, FILM COATED ORAL at 23:21

## 2018-09-22 RX ADMIN — SODIUM CHLORIDE 1000 ML: 9 INJECTION, SOLUTION INTRAVENOUS at 15:29

## 2018-09-22 RX ADMIN — TRAZODONE HYDROCHLORIDE 150 MG: 50 TABLET ORAL at 22:15

## 2018-09-22 RX ADMIN — KETOROLAC TROMETHAMINE 30 MG: 30 INJECTION, SOLUTION INTRAMUSCULAR; INTRAVENOUS at 15:29

## 2018-09-22 ASSESSMENT — PAIN SCALES - GENERAL
PAINLEVEL_OUTOF10: 10
PAINLEVEL_OUTOF10: 8

## 2018-09-22 ASSESSMENT — PAIN DESCRIPTION - ORIENTATION: ORIENTATION: RIGHT

## 2018-09-22 ASSESSMENT — PAIN DESCRIPTION - LOCATION
LOCATION: ABDOMEN;GENERALIZED
LOCATION: ABDOMEN

## 2018-09-22 ASSESSMENT — PAIN DESCRIPTION - ONSET: ONSET: GRADUAL

## 2018-09-22 ASSESSMENT — PAIN DESCRIPTION - DESCRIPTORS: DESCRIPTORS: ACHING

## 2018-09-22 ASSESSMENT — PAIN DESCRIPTION - FREQUENCY: FREQUENCY: CONTINUOUS

## 2018-09-22 ASSESSMENT — PAIN DESCRIPTION - PAIN TYPE
TYPE: ACUTE PAIN;CHRONIC PAIN
TYPE: ACUTE PAIN;CHRONIC PAIN
TYPE: CHRONIC PAIN

## 2018-09-23 ENCOUNTER — APPOINTMENT (OUTPATIENT)
Dept: GENERAL RADIOLOGY | Age: 48
DRG: 249 | End: 2018-09-23
Payer: COMMERCIAL

## 2018-09-23 LAB
ALBUMIN SERPL-MCNC: 3.2 G/DL (ref 3.5–5.2)
ALP BLD-CCNC: 107 U/L (ref 35–104)
ALT SERPL-CCNC: 25 U/L (ref 0–32)
ANION GAP SERPL CALCULATED.3IONS-SCNC: 5 MMOL/L (ref 7–16)
ANION GAP SERPL CALCULATED.3IONS-SCNC: 8 MMOL/L (ref 7–16)
ANION GAP SERPL CALCULATED.3IONS-SCNC: 9 MMOL/L (ref 7–16)
AST SERPL-CCNC: 42 U/L (ref 0–31)
BACTERIA: NORMAL /HPF
BASOPHILS ABSOLUTE: 0.05 E9/L (ref 0–0.2)
BASOPHILS RELATIVE PERCENT: 0.9 % (ref 0–2)
BILIRUB SERPL-MCNC: 0.4 MG/DL (ref 0–1.2)
BILIRUBIN DIRECT: <0.2 MG/DL (ref 0–0.3)
BILIRUBIN URINE: NEGATIVE
BILIRUBIN, INDIRECT: ABNORMAL MG/DL (ref 0–1)
BLOOD, URINE: NEGATIVE
BUN BLDV-MCNC: 10 MG/DL (ref 6–20)
BUN BLDV-MCNC: 7 MG/DL (ref 6–20)
BUN BLDV-MCNC: 8 MG/DL (ref 6–20)
CALCIUM SERPL-MCNC: 7.6 MG/DL (ref 8.6–10.2)
CALCIUM SERPL-MCNC: 8.2 MG/DL (ref 8.6–10.2)
CALCIUM SERPL-MCNC: 8.2 MG/DL (ref 8.6–10.2)
CHLORIDE BLD-SCNC: 105 MMOL/L (ref 98–107)
CHLORIDE BLD-SCNC: 105 MMOL/L (ref 98–107)
CHLORIDE BLD-SCNC: 107 MMOL/L (ref 98–107)
CLARITY: ABNORMAL
CO2: 23 MMOL/L (ref 22–29)
CO2: 24 MMOL/L (ref 22–29)
CO2: 24 MMOL/L (ref 22–29)
COLOR: YELLOW
CREAT SERPL-MCNC: 0.2 MG/DL (ref 0.5–1)
CREAT SERPL-MCNC: 0.3 MG/DL (ref 0.5–1)
CREAT SERPL-MCNC: 0.3 MG/DL (ref 0.5–1)
EOSINOPHILS ABSOLUTE: 0 E9/L (ref 0.05–0.5)
EOSINOPHILS RELATIVE PERCENT: 1 % (ref 0–6)
EPITHELIAL CELLS, UA: NORMAL /HPF
GFR AFRICAN AMERICAN: >60
GFR NON-AFRICAN AMERICAN: >60 ML/MIN/1.73
GLUCOSE BLD-MCNC: 120 MG/DL (ref 74–109)
GLUCOSE BLD-MCNC: 174 MG/DL (ref 74–109)
GLUCOSE BLD-MCNC: 201 MG/DL (ref 74–109)
GLUCOSE URINE: >=1000 MG/DL
HBA1C MFR BLD: 11.5 % (ref 4–5.6)
HCT VFR BLD CALC: 33.9 % (ref 34–48)
HEMOGLOBIN: 11.7 G/DL (ref 11.5–15.5)
KETONES, URINE: NEGATIVE MG/DL
LACTIC ACID: 1.4 MMOL/L (ref 0.5–2.2)
LEUKOCYTE ESTERASE, URINE: NEGATIVE
LYMPHOCYTES ABSOLUTE: 0.51 E9/L (ref 1.5–4)
LYMPHOCYTES RELATIVE PERCENT: 10.4 % (ref 20–42)
MAGNESIUM: 1.5 MG/DL (ref 1.6–2.6)
MAGNESIUM: 1.6 MG/DL (ref 1.6–2.6)
MAGNESIUM: 2.1 MG/DL (ref 1.6–2.6)
MCH RBC QN AUTO: 30.1 PG (ref 26–35)
MCHC RBC AUTO-ENTMCNC: 34.5 % (ref 32–34.5)
MCV RBC AUTO: 87.1 FL (ref 80–99.9)
METER GLUCOSE: 145 MG/DL (ref 70–110)
METER GLUCOSE: 147 MG/DL (ref 70–110)
METER GLUCOSE: 151 MG/DL (ref 70–110)
METER GLUCOSE: 157 MG/DL (ref 70–110)
METER GLUCOSE: 161 MG/DL (ref 70–110)
METER GLUCOSE: 163 MG/DL (ref 70–110)
METER GLUCOSE: 164 MG/DL (ref 70–110)
METER GLUCOSE: 171 MG/DL (ref 70–110)
METER GLUCOSE: 173 MG/DL (ref 70–110)
METER GLUCOSE: 201 MG/DL (ref 70–110)
METER GLUCOSE: 220 MG/DL (ref 70–110)
METER GLUCOSE: 223 MG/DL (ref 70–110)
METER GLUCOSE: 271 MG/DL (ref 70–110)
MONOCYTES ABSOLUTE: 0.15 E9/L (ref 0.1–0.95)
MONOCYTES RELATIVE PERCENT: 2.6 % (ref 2–12)
NEUTROPHILS ABSOLUTE: 4.39 E9/L (ref 1.8–7.3)
NEUTROPHILS RELATIVE PERCENT: 86.1 % (ref 43–80)
NITRITE, URINE: NEGATIVE
OSMOLALITY: 287 MOSM/KG (ref 285–310)
PDW BLD-RTO: 14.9 FL (ref 11.5–15)
PH UA: 6 (ref 5–9)
PHOSPHORUS: 1.8 MG/DL (ref 2.5–4.5)
PHOSPHORUS: 2 MG/DL (ref 2.5–4.5)
PHOSPHORUS: 3.3 MG/DL (ref 2.5–4.5)
PLATELET # BLD: 58 E9/L (ref 130–450)
PLATELET CONFIRMATION: NORMAL
PMV BLD AUTO: 9.8 FL (ref 7–12)
POTASSIUM SERPL-SCNC: 3.4 MMOL/L (ref 3.5–5)
POTASSIUM SERPL-SCNC: 3.7 MMOL/L (ref 3.5–5)
POTASSIUM SERPL-SCNC: 3.9 MMOL/L (ref 3.5–5)
PROTEIN UA: NEGATIVE MG/DL
RBC # BLD: 3.89 E12/L (ref 3.5–5.5)
RBC # BLD: NORMAL 10*6/UL
RBC UA: NORMAL /HPF (ref 0–2)
SODIUM BLD-SCNC: 136 MMOL/L (ref 132–146)
SODIUM BLD-SCNC: 137 MMOL/L (ref 132–146)
SODIUM BLD-SCNC: 137 MMOL/L (ref 132–146)
SPECIFIC GRAVITY UA: 1.01 (ref 1–1.03)
TOTAL PROTEIN: 6.1 G/DL (ref 6.4–8.3)
UROBILINOGEN, URINE: 0.2 E.U./DL
WBC # BLD: 5.1 E9/L (ref 4.5–11.5)
WBC UA: NORMAL /HPF (ref 0–5)

## 2018-09-23 PROCEDURE — 85025 COMPLETE CBC W/AUTO DIFF WBC: CPT

## 2018-09-23 PROCEDURE — 6360000002 HC RX W HCPCS: Performed by: NURSE PRACTITIONER

## 2018-09-23 PROCEDURE — 36415 COLL VENOUS BLD VENIPUNCTURE: CPT

## 2018-09-23 PROCEDURE — 6370000000 HC RX 637 (ALT 250 FOR IP): Performed by: NURSE PRACTITIONER

## 2018-09-23 PROCEDURE — 80048 BASIC METABOLIC PNL TOTAL CA: CPT

## 2018-09-23 PROCEDURE — 83735 ASSAY OF MAGNESIUM: CPT

## 2018-09-23 PROCEDURE — 81001 URINALYSIS AUTO W/SCOPE: CPT

## 2018-09-23 PROCEDURE — 83605 ASSAY OF LACTIC ACID: CPT

## 2018-09-23 PROCEDURE — 1200000000 HC SEMI PRIVATE

## 2018-09-23 PROCEDURE — 74018 RADEX ABDOMEN 1 VIEW: CPT

## 2018-09-23 PROCEDURE — 87081 CULTURE SCREEN ONLY: CPT

## 2018-09-23 PROCEDURE — 83036 HEMOGLOBIN GLYCOSYLATED A1C: CPT

## 2018-09-23 PROCEDURE — 82962 GLUCOSE BLOOD TEST: CPT

## 2018-09-23 PROCEDURE — 84100 ASSAY OF PHOSPHORUS: CPT

## 2018-09-23 PROCEDURE — 2500000003 HC RX 250 WO HCPCS: Performed by: NURSE PRACTITIONER

## 2018-09-23 PROCEDURE — 2580000003 HC RX 258: Performed by: NURSE PRACTITIONER

## 2018-09-23 PROCEDURE — 80076 HEPATIC FUNCTION PANEL: CPT

## 2018-09-23 PROCEDURE — 83930 ASSAY OF BLOOD OSMOLALITY: CPT

## 2018-09-23 PROCEDURE — 6370000000 HC RX 637 (ALT 250 FOR IP): Performed by: PHYSICIAN ASSISTANT

## 2018-09-23 RX ORDER — DIPHENOXYLATE HYDROCHLORIDE AND ATROPINE SULFATE 2.5; .025 MG/1; MG/1
1 TABLET ORAL ONCE
Status: COMPLETED | OUTPATIENT
Start: 2018-09-23 | End: 2018-09-23

## 2018-09-23 RX ORDER — POTASSIUM CHLORIDE 20MEQ/15ML
40 LIQUID (ML) ORAL PRN
Status: DISCONTINUED | OUTPATIENT
Start: 2018-09-23 | End: 2018-10-02 | Stop reason: HOSPADM

## 2018-09-23 RX ORDER — POTASSIUM CHLORIDE 7.45 MG/ML
10 INJECTION INTRAVENOUS PRN
Status: DISCONTINUED | OUTPATIENT
Start: 2018-09-23 | End: 2018-10-02 | Stop reason: HOSPADM

## 2018-09-23 RX ORDER — DIPHENOXYLATE HCL/ATROPINE 2.5-.025/5
5 LIQUID (ML) ORAL ONCE
Status: DISCONTINUED | OUTPATIENT
Start: 2018-09-23 | End: 2018-09-23 | Stop reason: CLARIF

## 2018-09-23 RX ORDER — SODIUM CHLORIDE 9 MG/ML
INJECTION, SOLUTION INTRAVENOUS CONTINUOUS
Status: DISCONTINUED | OUTPATIENT
Start: 2018-09-23 | End: 2018-09-29

## 2018-09-23 RX ORDER — INSULIN GLARGINE 100 [IU]/ML
25 INJECTION, SOLUTION SUBCUTANEOUS DAILY
Status: DISCONTINUED | OUTPATIENT
Start: 2018-09-23 | End: 2018-09-29

## 2018-09-23 RX ORDER — METOCLOPRAMIDE HYDROCHLORIDE 5 MG/ML
5 INJECTION INTRAMUSCULAR; INTRAVENOUS EVERY 6 HOURS PRN
Status: DISCONTINUED | OUTPATIENT
Start: 2018-09-23 | End: 2018-10-02 | Stop reason: HOSPADM

## 2018-09-23 RX ORDER — MORPHINE SULFATE 2 MG/ML
2 INJECTION, SOLUTION INTRAMUSCULAR; INTRAVENOUS EVERY 4 HOURS PRN
Status: DISCONTINUED | OUTPATIENT
Start: 2018-09-23 | End: 2018-09-24

## 2018-09-23 RX ORDER — POTASSIUM CHLORIDE 20 MEQ/1
40 TABLET, EXTENDED RELEASE ORAL PRN
Status: DISCONTINUED | OUTPATIENT
Start: 2018-09-23 | End: 2018-10-02 | Stop reason: HOSPADM

## 2018-09-23 RX ADMIN — INSULIN LISPRO 2 UNITS: 100 INJECTION, SOLUTION INTRAVENOUS; SUBCUTANEOUS at 20:11

## 2018-09-23 RX ADMIN — MAGNESIUM SULFATE IN DEXTROSE 1 G: 10 INJECTION, SOLUTION INTRAVENOUS at 11:12

## 2018-09-23 RX ADMIN — ARIPIPRAZOLE 15 MG: 15 TABLET ORAL at 11:40

## 2018-09-23 RX ADMIN — MULTIPLE VITAMINS W/ MINERALS TAB 1 TABLET: TAB at 11:32

## 2018-09-23 RX ADMIN — FAMOTIDINE 20 MG: 20 TABLET ORAL at 20:00

## 2018-09-23 RX ADMIN — DIPHENOXYLATE HYDROCHLORIDE AND ATROPINE SULFATE 1 TABLET: 2.5; .025 TABLET ORAL at 20:17

## 2018-09-23 RX ADMIN — TRAZODONE HYDROCHLORIDE 150 MG: 50 TABLET ORAL at 20:00

## 2018-09-23 RX ADMIN — METHYLPHENIDATE HYDROCHLORIDE 10 MG: 5 TABLET ORAL at 11:40

## 2018-09-23 RX ADMIN — VORTIOXETINE 10 MG: 10 TABLET, FILM COATED ORAL at 20:59

## 2018-09-23 RX ADMIN — INSULIN LISPRO 3 UNITS: 100 INJECTION, SOLUTION INTRAVENOUS; SUBCUTANEOUS at 17:22

## 2018-09-23 RX ADMIN — TRAMADOL HYDROCHLORIDE 50 MG: 50 TABLET, FILM COATED ORAL at 06:01

## 2018-09-23 RX ADMIN — INSULIN GLARGINE 25 UNITS: 100 INJECTION, SOLUTION SUBCUTANEOUS at 08:26

## 2018-09-23 RX ADMIN — PANCRELIPASE 6 CAPSULE: 60000; 12000; 38000 CAPSULE, DELAYED RELEASE PELLETS ORAL at 13:10

## 2018-09-23 RX ADMIN — PANCRELIPASE 6 CAPSULE: 60000; 12000; 38000 CAPSULE, DELAYED RELEASE PELLETS ORAL at 16:33

## 2018-09-23 RX ADMIN — PANTOPRAZOLE SODIUM 40 MG: 40 TABLET, DELAYED RELEASE ORAL at 11:33

## 2018-09-23 RX ADMIN — ALPRAZOLAM 0.5 MG: 0.5 TABLET ORAL at 20:00

## 2018-09-23 RX ADMIN — PREGABALIN 50 MG: 50 CAPSULE ORAL at 20:00

## 2018-09-23 RX ADMIN — FLUVOXAMINE MALEATE 50 MG: 50 TABLET, FILM COATED ORAL at 11:40

## 2018-09-23 RX ADMIN — MORPHINE SULFATE 2 MG: 2 INJECTION, SOLUTION INTRAMUSCULAR; INTRAVENOUS at 13:14

## 2018-09-23 RX ADMIN — MORPHINE SULFATE 2 MG: 2 INJECTION, SOLUTION INTRAMUSCULAR; INTRAVENOUS at 20:30

## 2018-09-23 RX ADMIN — POTASSIUM CHLORIDE, DEXTROSE MONOHYDRATE AND SODIUM CHLORIDE: 150; 5; 450 INJECTION, SOLUTION INTRAVENOUS at 01:06

## 2018-09-23 RX ADMIN — INSULIN LISPRO 3 UNITS: 100 INJECTION, SOLUTION INTRAVENOUS; SUBCUTANEOUS at 13:08

## 2018-09-23 RX ADMIN — TRAMADOL HYDROCHLORIDE 50 MG: 50 TABLET, FILM COATED ORAL at 20:00

## 2018-09-23 RX ADMIN — SODIUM PHOSPHATE, MONOBASIC, MONOHYDRATE 15 MMOL: 276; 142 INJECTION, SOLUTION INTRAVENOUS at 14:23

## 2018-09-23 RX ADMIN — POTASSIUM CHLORIDE 40 MEQ: 1500 TABLET, EXTENDED RELEASE ORAL at 12:02

## 2018-09-23 RX ADMIN — SODIUM CHLORIDE: 9 INJECTION, SOLUTION INTRAVENOUS at 10:00

## 2018-09-23 RX ADMIN — MORPHINE SULFATE 2 MG: 2 INJECTION, SOLUTION INTRAMUSCULAR; INTRAVENOUS at 16:25

## 2018-09-23 RX ADMIN — SODIUM CHLORIDE: 9 INJECTION, SOLUTION INTRAVENOUS at 17:25

## 2018-09-23 RX ADMIN — POTASSIUM CHLORIDE, DEXTROSE MONOHYDRATE AND SODIUM CHLORIDE: 150; 5; 450 INJECTION, SOLUTION INTRAVENOUS at 07:51

## 2018-09-23 RX ADMIN — METOCLOPRAMIDE 5 MG: 5 INJECTION, SOLUTION INTRAMUSCULAR; INTRAVENOUS at 16:32

## 2018-09-23 RX ADMIN — MAGNESIUM SULFATE IN DEXTROSE 1 G: 10 INJECTION, SOLUTION INTRAVENOUS at 12:08

## 2018-09-23 RX ADMIN — MORPHINE SULFATE 2 MG: 2 INJECTION, SOLUTION INTRAMUSCULAR; INTRAVENOUS at 08:26

## 2018-09-23 RX ADMIN — PREGABALIN 50 MG: 50 CAPSULE ORAL at 11:32

## 2018-09-23 ASSESSMENT — PAIN DESCRIPTION - ORIENTATION
ORIENTATION: LOWER
ORIENTATION: RIGHT;LOWER

## 2018-09-23 ASSESSMENT — PAIN SCALES - GENERAL
PAINLEVEL_OUTOF10: 9
PAINLEVEL_OUTOF10: 9
PAINLEVEL_OUTOF10: 0
PAINLEVEL_OUTOF10: 9
PAINLEVEL_OUTOF10: 0
PAINLEVEL_OUTOF10: 9
PAINLEVEL_OUTOF10: 7
PAINLEVEL_OUTOF10: 7
PAINLEVEL_OUTOF10: 9
PAINLEVEL_OUTOF10: 0

## 2018-09-23 ASSESSMENT — PAIN DESCRIPTION - PAIN TYPE
TYPE: ACUTE PAIN;CHRONIC PAIN
TYPE: ACUTE PAIN
TYPE: ACUTE PAIN;CHRONIC PAIN

## 2018-09-23 ASSESSMENT — PAIN DESCRIPTION - LOCATION
LOCATION: ABDOMEN;GENERALIZED
LOCATION: ABDOMEN
LOCATION: ABDOMEN

## 2018-09-23 ASSESSMENT — PAIN DESCRIPTION - DESCRIPTORS: DESCRIPTORS: ACHING

## 2018-09-23 ASSESSMENT — PAIN DESCRIPTION - FREQUENCY: FREQUENCY: CONTINUOUS

## 2018-09-23 ASSESSMENT — PAIN DESCRIPTION - ONSET: ONSET: ON-GOING

## 2018-09-23 ASSESSMENT — PAIN DESCRIPTION - PROGRESSION: CLINICAL_PROGRESSION: NOT CHANGED

## 2018-09-23 NOTE — H&P
 PANCREAS BIOPSY      with stent    SHOULDER SURGERY Left 2003    UPPER GASTROINTESTINAL ENDOSCOPY         Medications Prior to Admission:    Prior to Admission medications    Medication Sig Start Date End Date Taking? Authorizing Provider   ARIPiprazole (ABILIFY) 15 MG tablet Take 15 mg by mouth daily   Yes Historical Provider, MD   pantoprazole (PROTONIX) 40 MG tablet Take 40 mg by mouth daily   Yes Historical Provider, MD   traZODone (DESYREL) 150 MG tablet Take 150 mg by mouth nightly   Yes Historical Provider, MD   fluvoxaMINE (LUVOX) 50 MG tablet Take 50 mg by mouth daily   Yes Historical Provider, MD   spironolactone (ALDACTONE) 25 MG tablet Take 25 mg by mouth daily   Yes Historical Provider, MD   nicotine (NICODERM CQ) 21 MG/24HR Place 1 patch onto the skin every 24 hours   Yes Historical Provider, MD   albuterol sulfate  (90 Base) MCG/ACT inhaler Inhale 2 puffs into the lungs every 6 hours as needed for Wheezing or Shortness of Breath   Yes Historical Provider, MD   aspirin 81 MG EC tablet Take 81 mg by mouth daily   Yes Historical Provider, MD   metoclopramide (REGLAN) 10 MG tablet Take 10 mg by mouth daily as needed (nausea)   Yes Historical Provider, MD   methylphenidate (RITALIN) 10 MG tablet Take 10 mg by mouth daily. .   Yes Historical Provider, MD   furosemide (LASIX) 20 MG tablet Take 20 mg by mouth daily as needed (swelling)   Yes Historical Provider, MD   ondansetron (ZOFRAN ODT) 4 MG disintegrating tablet Take 1 tablet by mouth every 8 hours as needed for Nausea or Vomiting 7/1/18  Yes Cayetano Duarte,    VORTIoxetine HBr (TRINTELLIX) 20 MG TABS tablet Take 10 mg by mouth nightly    Yes Historical Provider, MD   insulin aspart (NOVOLOG) 100 UNIT/ML injection vial Inject 0-20 Units into the skin 3 times daily (before meals)    Yes Historical Provider, MD   ALPRAZolam (XANAX) 0.5 MG tablet Take 0.5 mg by mouth 3 times daily as needed for Anxiety.  Daniel Head Historical Provider, MD Distension:  Mild. Tenderness: moderate tenderness is present in the upper abdomen. Liver: non-palpable and non-tender. Spleen:  non-palpable and non-tender. Pulsatile Mass: absent. Hernia:  no inguinal or femoral hernias noted. ? Back: CVA Tenderness: No.  ? : (chaperone present during examination). deferred}. ? Integument:  Normal turgor. Warm, dry, without visible rash, unless noted elsewhere. ? Lymphatics: No edema, cap.refill <3sec. ? Neurological:  Orientation age-appropriate. Motor functions intact. LABS:  Recent Labs      09/22/18   1500  09/22/18   1930  09/22/18 1939 09/22/18 2054 09/22/18   2210   NA  131*  130*   --    --   139   K  5.9*  4.5   --    --   4.0   CL  93*  99   --    --   106   CO2  19*  25   --    --   22   BUN  16  13   --    --   12   CREATININE  0.5  0.5  0.5   --   0.4*   GLUCOSE  605*  527*   --   392  318*   CALCIUM  9.9  8.3*   --    --   7.9*       Recent Labs      09/22/18   1500   WBC  5.4   RBC  4.80   HGB  14.2   HCT  41.5   MCV  86.5   MCH  29.6   MCHC  34.2   RDW  14.3   PLT  117*   MPV  10.6       Recent Labs      09/22/18   1939   POCGLU  502           Radiology: Us Gallbladder Ruq    Result Date: 9/22/2018  Reading location:  200 CLINICAL STATEMENT: Right upper quadrant pain. Technique high-resolution sonography of the right upper quadrant was performed. Color-flow was employed. The gallbladder has been removed. There is no fluid or mass in the gallbladder fossa. The liver is heterogeneous and I suspect hepatocellular disease or hepatic steatosis. The bile ducts are not dilated. The common bile duct measures 4 mm. There are no focal masses. The pancreas is only incompletely seen due to overlying bowel gas.  Incidental note is made of a right renal cyst.     1. The patient is status post

## 2018-09-23 NOTE — ED PROVIDER NOTES
<3sec.  · Neurological:  Orientation age-appropriate. Motor functions intact.     Lab / Imaging Results   (All laboratory and radiology results have been personally reviewed by myself)  Labs:  Results for orders placed or performed during the hospital encounter of 09/22/18   CBC auto differential   Result Value Ref Range    WBC 5.4 4.5 - 11.5 E9/L    RBC 4.80 3.50 - 5.50 E12/L    Hemoglobin 14.2 11.5 - 15.5 g/dL    Hematocrit 41.5 34.0 - 48.0 %    MCV 86.5 80.0 - 99.9 fL    MCH 29.6 26.0 - 35.0 pg    MCHC 34.2 32.0 - 34.5 %    RDW 14.3 11.5 - 15.0 fL    Platelets 436 (L) 167 - 450 E9/L    MPV 10.6 7.0 - 12.0 fL    Neutrophils % 69.7 43.0 - 80.0 %    Immature Granulocytes % 0.6 0.0 - 5.0 %    Lymphocytes % 20.8 20.0 - 42.0 %    Monocytes % 6.6 2.0 - 12.0 %    Eosinophils % 1.7 0.0 - 6.0 %    Basophils % 0.6 0.0 - 2.0 %    Neutrophils # 3.79 1.80 - 7.30 E9/L    Immature Granulocytes # 0.03 E9/L    Lymphocytes # 1.13 (L) 1.50 - 4.00 E9/L    Monocytes # 0.36 0.10 - 0.95 E9/L    Eosinophils # 0.09 0.05 - 0.50 E9/L    Basophils # 0.03 0.00 - 0.20 E9/L   Comprehensive Metabolic Panel   Result Value Ref Range    Sodium 131 (L) 132 - 146 mmol/L    Potassium 5.9 (H) 3.5 - 5.0 mmol/L    Chloride 93 (L) 98 - 107 mmol/L    CO2 19 (L) 22 - 29 mmol/L    Anion Gap 19 (H) 7 - 16 mmol/L    Glucose 605 (HH) 74 - 109 mg/dL    BUN 16 6 - 20 mg/dL    CREATININE 0.5 0.5 - 1.0 mg/dL    GFR Non-African American >60 >=60 mL/min/1.73    GFR African American >60     Calcium 9.9 8.6 - 10.2 mg/dL    Total Protein 8.2 6.4 - 8.3 g/dL    Alb 4.4 3.5 - 5.2 g/dL    Total Bilirubin 0.4 0.0 - 1.2 mg/dL    Alkaline Phosphatase 146 (H) 35 - 104 U/L    ALT 29 0 - 32 U/L    AST 44 (H) 0 - 31 U/L   Lactic Acid, Plasma   Result Value Ref Range    Lactic Acid 2.9 (H) 0.5 - 2.2 mmol/L   Ammonia   Result Value Ref Range    Ammonia <10.0 (L) 11.0 - 51.0 umol/L   Lipase   Result Value Ref Range    Lipase 25 13 - 60 U/L   Amylase   Result Value Ref Range Amylase 12 (L) 20 - 100 U/L   Ethanol   Result Value Ref Range    Ethanol Lvl <10 mg/dL   Beta-Hydroxybutyrate   Result Value Ref Range    Beta-Hydroxybutyrate 1.51 (H) 0.02 - 0.27 mmol/L   pH, venous   Result Value Ref Range    pH, Larry 7.36 7.30 - 1.33   Basic Metabolic Panel   Result Value Ref Range    Sodium 130 (L) 132 - 146 mmol/L    Potassium 4.5 3.5 - 5.0 mmol/L    Chloride 99 98 - 107 mmol/L    CO2 25 22 - 29 mmol/L    Anion Gap 6 (L) 7 - 16 mmol/L    Glucose 527 (HH) 74 - 109 mg/dL    BUN 13 6 - 20 mg/dL    CREATININE 0.5 0.5 - 1.0 mg/dL    GFR Non-African American >60 >=60 mL/min/1.73    GFR African American >60     Calcium 8.3 (L) 8.6 - 10.2 mg/dL   Magnesium   Result Value Ref Range    Magnesium 2.1 1.6 - 2.6 mg/dL   Phosphorus   Result Value Ref Range    Phosphorus 3.6 2.5 - 4.5 mg/dL   POCT glucose - every hour   Result Value Ref Range    Glucose 392 mg/dL   POCT chem basic w/ ICA   Result Value Ref Range    POC BUN 14     POC Chloride 101     POC Creatinine 0.5     POC Anion Gap 14     POC Glucose 502     POC Potassium 4     POC Sodium 134     POC CO2 25    POCT Glucose   Result Value Ref Range    Meter Glucose 392 (H) 70 - 110 mg/dL     Imaging: All Radiology results interpreted by Radiologist unless otherwise noted. US GALLBLADDER RUQ   Final Result   1. The patient is status post cholecystectomy. 2. The liver is heterogeneous and I suspect hepatocellular disease or   steatosis. 3. No obstruction of the bile ducts.         ED Course / Medical Decision Making     Medications   dextrose 50 % solution 12.5 g (not administered)   potassium chloride 10 mEq/100 mL IVPB (Peripheral Line) (not administered)   magnesium sulfate 1 g in dextrose 5% 100 mL IVPB (not administered)   sodium phosphate 10 mmol in dextrose 5 % 250 mL IVPB (not administered)     Or   sodium phosphate 15 mmol in dextrose 5 % 250 mL IVPB (not administered)     Or   sodium phosphate 20 mmol in dextrose 5 % 500 mL IVPB (not administered)   dextrose 5 % and 0.45 % sodium chloride infusion (not administered)   insulin regular (HUMULIN R;NOVOLIN R) 100 Units in sodium chloride 0.9 % 100 mL infusion (0.05 Units/kg/hr × 36.3 kg Intravenous Rate/Dose Change 9/22/18 2105)   0.9 % sodium chloride bolus (not administered)   0.9% NaCl with KCl 20 mEq infusion ( Intravenous New Bag 9/22/18 2109)   0.9 % sodium chloride bolus (0 mLs Intravenous Stopped 9/22/18 1700)   ondansetron (ZOFRAN) injection 4 mg (4 mg Intravenous Given 9/22/18 1529)   ketorolac (TORADOL) injection 30 mg (30 mg Intravenous Given 9/22/18 1529)   ondansetron (ZOFRAN) injection 4 mg (4 mg Intravenous Given 9/22/18 1607)   metoclopramide (REGLAN) injection 10 mg (10 mg Intravenous Given 9/22/18 1606)   diphenhydrAMINE (BENADRYL) injection 25 mg (25 mg Intravenous Given 9/22/18 1607)   0.9 % sodium chloride bolus (0 mLs Intravenous Stopped 9/22/18 2008)     ED Course as of Sep 22 2117   Sat Sep 22, 2018   1901 Venous Access Procedure Note  Indication: Need blood for laboratory evaluation and MD skill needed    Procedure: The patient was placed in the appropriate position and the skin over the puncture site was prepped with alcohol. Intravenous access was obtained in the right external jugular vein and the site was secured appropriately. Procedure was preformed under live ultrasound for guidance. The patient tolerated the procedure well. Complications: None        [SO]      ED Course User Index  [SO] Barbara Da Silva DO     Re-examination:  9/22/18       Time: 9pm    Patients symptoms show no change. Consults:   None    Procedures:   none    MDM:   Labs were checked and the patient was treated for hyperglycemia associated with DKA. IVF and insulin drip were started in the ER. Patient was medicated for pain and electrolyte imbalances were corrected. US of the GB was negative for an obstruction or acute problem with the liver.  Patient has alcoholic cirrhosis and chronic pancreatitis but both are stable at this time. Patient will be admitted to continued treatment and evaluation. Counseling: The emergency provider has spoken with the patient and discussed todays results, in addition to providing specific details for the plan of care and counseling regarding the diagnosis and prognosis. Questions are answered at this time and they are agreeable with the plan. Assessment    No diagnosis found. Plan   Admit to telemetry  Patient condition is stable    New Medications     New Prescriptions    No medications on file     Electronically signed by EVERETT Mcclendon CNP   DD: 9/22/18  **This report was transcribed using voice recognition software. Every effort was made to ensure accuracy; however, inadvertent computerized transcription errors may be present.   END OF ED PROVIDER NOTE      EVERETT Mcclendon CNP  09/22/18 8542

## 2018-09-23 NOTE — PROGRESS NOTES
[facility] Hospitalist Progress Note    Admitting Date and Time: 9/22/2018  2:01 PM  Admit Dx: Hyperglycemia [R73.9]    Subjective:    Pt feels slightly better. Has marcos history of DM2 with questionable compliance and poor control. Does not know home Lantus dosing, but states was taking. Per chart, Lantus 50 units nightly unless not eating, than 25 units. Also states takes sliding scale but doesn't know which insulin or how much, no standing meal time insulin. Has not been eating or drinking, dehydrated and likely Hyperosmolar rather than DKA. Wants water now, but no appetite. No fevers or chills. No chest pain or shortness of breath. Admits to chronic abdominal pain in RUQ and epigastric region, pain worse than daily/usual pain. Nausea and vomiting of non-bilious/non-bloody emesis. Poor intake. No BM in last 48 hours at least. ? Ileus due to chronic pancreatitis flair. No urinary symptoms, decreased output. Per RN: Gap closed x 2 sets of labs, transition to subcutaneous? Change electrolyte replacement due to limited access? ROS: denies fever, chills, cp, sob, n/v, HA unless stated above.       insulin glargine  25 Units Subcutaneous Daily    sodium chloride  1,000 mL Intravenous Once    ARIPiprazole  15 mg Oral Daily    aspirin  81 mg Oral Daily    famotidine  20 mg Oral Nightly    fluvoxaMINE  50 mg Oral Daily    lipase-protease-amylase  6 capsule Oral TID WC    methylphenidate  10 mg Oral Daily    therapeutic multivitamin-minerals  1 tablet Oral Daily    nicotine  1 patch Transdermal Q24H    pantoprazole  40 mg Oral Daily    pregabalin  50 mg Oral BID    spironolactone  25 mg Oral Daily    traZODone  150 mg Oral Nightly    VORTIoxetine HBr  10 mg Oral Nightly       potassium chloride 40 mEq PRN   Or     potassium chloride 40 mEq PRN   Or     potassium chloride 10 mEq PRN   morphine 2 mg Q4H PRN   dextrose 12.5 g PRN   potassium chloride 10 mEq PRN   magnesium sulfate 1 g PRN   sodium

## 2018-09-24 ENCOUNTER — APPOINTMENT (OUTPATIENT)
Dept: CT IMAGING | Age: 48
DRG: 249 | End: 2018-09-24
Payer: COMMERCIAL

## 2018-09-24 LAB
ANION GAP SERPL CALCULATED.3IONS-SCNC: 10 MMOL/L (ref 7–16)
BILIRUBIN URINE: NEGATIVE
BLOOD, URINE: NEGATIVE
BUN BLDV-MCNC: 6 MG/DL (ref 6–20)
CALCIUM SERPL-MCNC: 7.4 MG/DL (ref 8.6–10.2)
CHLORIDE BLD-SCNC: 109 MMOL/L (ref 98–107)
CLARITY: CLEAR
CO2: 19 MMOL/L (ref 22–29)
COLOR: YELLOW
CREAT SERPL-MCNC: 0.3 MG/DL (ref 0.5–1)
GFR AFRICAN AMERICAN: >60
GFR NON-AFRICAN AMERICAN: >60 ML/MIN/1.73
GLUCOSE BLD-MCNC: 128 MG/DL (ref 74–109)
GLUCOSE URINE: >=1000 MG/DL
HCT VFR BLD CALC: 26.5 % (ref 34–48)
HEMOGLOBIN: 8.9 G/DL (ref 11.5–15.5)
KETONES, URINE: NEGATIVE MG/DL
LEUKOCYTE ESTERASE, URINE: NEGATIVE
LIPASE: 8 U/L (ref 13–60)
MAGNESIUM: 1.6 MG/DL (ref 1.6–2.6)
MCH RBC QN AUTO: 30.2 PG (ref 26–35)
MCHC RBC AUTO-ENTMCNC: 33.6 % (ref 32–34.5)
MCV RBC AUTO: 89.8 FL (ref 80–99.9)
METER GLUCOSE: 139 MG/DL (ref 70–110)
METER GLUCOSE: 145 MG/DL (ref 70–110)
METER GLUCOSE: 225 MG/DL (ref 70–110)
METER GLUCOSE: 227 MG/DL (ref 70–110)
METER GLUCOSE: 257 MG/DL (ref 70–110)
MRSA CULTURE ONLY: NORMAL
NITRITE, URINE: NEGATIVE
PDW BLD-RTO: 15.1 FL (ref 11.5–15)
PH UA: 5.5 (ref 5–9)
PHOSPHORUS: 2 MG/DL (ref 2.5–4.5)
PLATELET # BLD: 46 E9/L (ref 130–450)
PLATELET CONFIRMATION: NORMAL
PMV BLD AUTO: 11.4 FL (ref 7–12)
POTASSIUM SERPL-SCNC: 3.8 MMOL/L (ref 3.5–5)
PROTEIN UA: NEGATIVE MG/DL
RBC # BLD: 2.95 E12/L (ref 3.5–5.5)
SODIUM BLD-SCNC: 138 MMOL/L (ref 132–146)
SPECIFIC GRAVITY UA: 1.02 (ref 1–1.03)
UROBILINOGEN, URINE: 0.2 E.U./DL
WBC # BLD: 3.3 E9/L (ref 4.5–11.5)

## 2018-09-24 PROCEDURE — 6360000002 HC RX W HCPCS: Performed by: INTERNAL MEDICINE

## 2018-09-24 PROCEDURE — 6370000000 HC RX 637 (ALT 250 FOR IP): Performed by: NURSE PRACTITIONER

## 2018-09-24 PROCEDURE — 6360000002 HC RX W HCPCS: Performed by: NURSE PRACTITIONER

## 2018-09-24 PROCEDURE — 80048 BASIC METABOLIC PNL TOTAL CA: CPT

## 2018-09-24 PROCEDURE — 87088 URINE BACTERIA CULTURE: CPT

## 2018-09-24 PROCEDURE — 83735 ASSAY OF MAGNESIUM: CPT

## 2018-09-24 PROCEDURE — 74176 CT ABD & PELVIS W/O CONTRAST: CPT

## 2018-09-24 PROCEDURE — 1200000000 HC SEMI PRIVATE

## 2018-09-24 PROCEDURE — 81003 URINALYSIS AUTO W/O SCOPE: CPT

## 2018-09-24 PROCEDURE — 2580000003 HC RX 258: Performed by: NURSE PRACTITIONER

## 2018-09-24 PROCEDURE — 36415 COLL VENOUS BLD VENIPUNCTURE: CPT

## 2018-09-24 PROCEDURE — 85027 COMPLETE CBC AUTOMATED: CPT

## 2018-09-24 PROCEDURE — 84100 ASSAY OF PHOSPHORUS: CPT

## 2018-09-24 PROCEDURE — 83690 ASSAY OF LIPASE: CPT

## 2018-09-24 PROCEDURE — 51701 INSERT BLADDER CATHETER: CPT

## 2018-09-24 PROCEDURE — 82962 GLUCOSE BLOOD TEST: CPT

## 2018-09-24 RX ORDER — MORPHINE SULFATE 2 MG/ML
2 INJECTION, SOLUTION INTRAMUSCULAR; INTRAVENOUS EVERY 4 HOURS PRN
Status: DISCONTINUED | OUTPATIENT
Start: 2018-09-24 | End: 2018-09-24

## 2018-09-24 RX ORDER — MORPHINE SULFATE 2 MG/ML
2 INJECTION, SOLUTION INTRAMUSCULAR; INTRAVENOUS
Status: DISCONTINUED | OUTPATIENT
Start: 2018-09-24 | End: 2018-09-25

## 2018-09-24 RX ADMIN — VORTIOXETINE 10 MG: 10 TABLET, FILM COATED ORAL at 20:15

## 2018-09-24 RX ADMIN — MORPHINE SULFATE 2 MG: 2 INJECTION, SOLUTION INTRAMUSCULAR; INTRAVENOUS at 18:41

## 2018-09-24 RX ADMIN — METHYLPHENIDATE HYDROCHLORIDE 10 MG: 5 TABLET ORAL at 08:55

## 2018-09-24 RX ADMIN — ARIPIPRAZOLE 15 MG: 15 TABLET ORAL at 08:59

## 2018-09-24 RX ADMIN — METOCLOPRAMIDE 5 MG: 5 INJECTION, SOLUTION INTRAMUSCULAR; INTRAVENOUS at 11:16

## 2018-09-24 RX ADMIN — FAMOTIDINE 20 MG: 20 TABLET ORAL at 20:15

## 2018-09-24 RX ADMIN — METOCLOPRAMIDE 5 MG: 5 INJECTION, SOLUTION INTRAMUSCULAR; INTRAVENOUS at 21:07

## 2018-09-24 RX ADMIN — MORPHINE SULFATE 2 MG: 2 INJECTION, SOLUTION INTRAMUSCULAR; INTRAVENOUS at 06:37

## 2018-09-24 RX ADMIN — TRAZODONE HYDROCHLORIDE 150 MG: 50 TABLET ORAL at 20:15

## 2018-09-24 RX ADMIN — PANCRELIPASE 6 CAPSULE: 60000; 12000; 38000 CAPSULE, DELAYED RELEASE PELLETS ORAL at 12:21

## 2018-09-24 RX ADMIN — SODIUM CHLORIDE: 9 INJECTION, SOLUTION INTRAVENOUS at 00:19

## 2018-09-24 RX ADMIN — ALPRAZOLAM 0.5 MG: 0.5 TABLET ORAL at 20:17

## 2018-09-24 RX ADMIN — PANTOPRAZOLE SODIUM 40 MG: 40 TABLET, DELAYED RELEASE ORAL at 08:56

## 2018-09-24 RX ADMIN — TRAMADOL HYDROCHLORIDE 50 MG: 50 TABLET, FILM COATED ORAL at 11:16

## 2018-09-24 RX ADMIN — PANCRELIPASE 6 CAPSULE: 60000; 12000; 38000 CAPSULE, DELAYED RELEASE PELLETS ORAL at 16:58

## 2018-09-24 RX ADMIN — SODIUM CHLORIDE: 9 INJECTION, SOLUTION INTRAVENOUS at 16:20

## 2018-09-24 RX ADMIN — MORPHINE SULFATE 2 MG: 2 INJECTION, SOLUTION INTRAMUSCULAR; INTRAVENOUS at 02:27

## 2018-09-24 RX ADMIN — INSULIN GLARGINE 25 UNITS: 100 INJECTION, SOLUTION SUBCUTANEOUS at 09:00

## 2018-09-24 RX ADMIN — MORPHINE SULFATE 2 MG: 2 INJECTION, SOLUTION INTRAMUSCULAR; INTRAVENOUS at 10:38

## 2018-09-24 RX ADMIN — INSULIN LISPRO 2 UNITS: 100 INJECTION, SOLUTION INTRAVENOUS; SUBCUTANEOUS at 20:14

## 2018-09-24 RX ADMIN — MORPHINE SULFATE 2 MG: 2 INJECTION, SOLUTION INTRAMUSCULAR; INTRAVENOUS at 21:42

## 2018-09-24 RX ADMIN — INSULIN LISPRO 6 UNITS: 100 INJECTION, SOLUTION INTRAVENOUS; SUBCUTANEOUS at 09:00

## 2018-09-24 RX ADMIN — PREGABALIN 50 MG: 50 CAPSULE ORAL at 08:59

## 2018-09-24 RX ADMIN — MULTIPLE VITAMINS W/ MINERALS TAB 1 TABLET: TAB at 08:59

## 2018-09-24 RX ADMIN — FLUVOXAMINE MALEATE 50 MG: 50 TABLET, FILM COATED ORAL at 08:59

## 2018-09-24 RX ADMIN — TRAMADOL HYDROCHLORIDE 50 MG: 50 TABLET, FILM COATED ORAL at 20:15

## 2018-09-24 RX ADMIN — INSULIN LISPRO 9 UNITS: 100 INJECTION, SOLUTION INTRAVENOUS; SUBCUTANEOUS at 16:58

## 2018-09-24 RX ADMIN — MORPHINE SULFATE 2 MG: 2 INJECTION, SOLUTION INTRAMUSCULAR; INTRAVENOUS at 15:29

## 2018-09-24 RX ADMIN — PREGABALIN 50 MG: 50 CAPSULE ORAL at 20:15

## 2018-09-24 ASSESSMENT — PAIN SCALES - GENERAL
PAINLEVEL_OUTOF10: 9
PAINLEVEL_OUTOF10: 8
PAINLEVEL_OUTOF10: 6
PAINLEVEL_OUTOF10: 8
PAINLEVEL_OUTOF10: 9
PAINLEVEL_OUTOF10: 9
PAINLEVEL_OUTOF10: 0
PAINLEVEL_OUTOF10: 8
PAINLEVEL_OUTOF10: 8
PAINLEVEL_OUTOF10: 2

## 2018-09-24 ASSESSMENT — PAIN DESCRIPTION - PAIN TYPE
TYPE: ACUTE PAIN
TYPE: ACUTE PAIN

## 2018-09-24 ASSESSMENT — PAIN DESCRIPTION - FREQUENCY
FREQUENCY: CONTINUOUS
FREQUENCY: CONTINUOUS

## 2018-09-24 ASSESSMENT — PAIN DESCRIPTION - LOCATION
LOCATION: ABDOMEN
LOCATION: ABDOMEN

## 2018-09-24 ASSESSMENT — PAIN DESCRIPTION - ONSET
ONSET: ON-GOING
ONSET: ON-GOING

## 2018-09-24 ASSESSMENT — PAIN DESCRIPTION - ORIENTATION: ORIENTATION: OTHER (COMMENT)

## 2018-09-24 ASSESSMENT — PAIN DESCRIPTION - DESCRIPTORS
DESCRIPTORS: ACHING;CONSTANT;SORE
DESCRIPTORS: ACHING

## 2018-09-24 ASSESSMENT — PAIN DESCRIPTION - PROGRESSION: CLINICAL_PROGRESSION: NOT CHANGED

## 2018-09-24 NOTE — PROGRESS NOTES
P Quality Flow/Interdisciplinary Rounds Progress Note        Quality Flow Rounds held on September 24, 2018    Disciplines Attending:  Bedside Nurse, ,  and Nursing Unit Leadership    Hillary Jacques was admitted on 9/22/2018  2:01 PM    Anticipated Discharge Date:       Disposition:    Abe Score:  Abe Scale Score: 19    Readmission Risk              Risk of Unplanned Readmission:        44             Discussed patient goal for the day, patient clinical progression, and barriers to discharge.   The following Goal(s) of the Day/Commitment(s) have been identified:  Need Code, Transfer from ICU, Hep C+, RA, IV F      Martin Baumann  September 24, 2018

## 2018-09-25 ENCOUNTER — APPOINTMENT (OUTPATIENT)
Dept: MRI IMAGING | Age: 48
DRG: 249 | End: 2018-09-25
Payer: COMMERCIAL

## 2018-09-25 ENCOUNTER — APPOINTMENT (OUTPATIENT)
Dept: CT IMAGING | Age: 48
DRG: 249 | End: 2018-09-25
Payer: COMMERCIAL

## 2018-09-25 PROBLEM — E43 SEVERE PROTEIN-CALORIE MALNUTRITION (HCC): Status: ACTIVE | Noted: 2018-09-25

## 2018-09-25 LAB
ABO/RH: NORMAL
ALBUMIN SERPL-MCNC: 2.3 G/DL (ref 3.5–5.2)
ALP BLD-CCNC: 93 U/L (ref 35–104)
ALT SERPL-CCNC: 25 U/L (ref 0–32)
ANION GAP SERPL CALCULATED.3IONS-SCNC: 7 MMOL/L (ref 7–16)
ANTIBODY SCREEN: NORMAL
AST SERPL-CCNC: 25 U/L (ref 0–31)
BILIRUB SERPL-MCNC: 0.3 MG/DL (ref 0–1.2)
BUN BLDV-MCNC: 6 MG/DL (ref 6–20)
CALCIUM SERPL-MCNC: 7.6 MG/DL (ref 8.6–10.2)
CHLORIDE BLD-SCNC: 109 MMOL/L (ref 98–107)
CO2: 24 MMOL/L (ref 22–29)
CREAT SERPL-MCNC: 0.3 MG/DL (ref 0.5–1)
GFR AFRICAN AMERICAN: >60
GFR NON-AFRICAN AMERICAN: >60 ML/MIN/1.73
GLUCOSE BLD-MCNC: 90 MG/DL (ref 74–109)
HCT VFR BLD CALC: 29.9 % (ref 34–48)
HEMOGLOBIN: 9.8 G/DL (ref 11.5–15.5)
LIPASE: 8 U/L (ref 13–60)
MAGNESIUM: 1.8 MG/DL (ref 1.6–2.6)
MCH RBC QN AUTO: 29.8 PG (ref 26–35)
MCHC RBC AUTO-ENTMCNC: 32.8 % (ref 32–34.5)
MCV RBC AUTO: 90.9 FL (ref 80–99.9)
METER GLUCOSE: 100 MG/DL (ref 70–110)
METER GLUCOSE: 167 MG/DL (ref 70–110)
METER GLUCOSE: 88 MG/DL (ref 70–110)
METER GLUCOSE: 94 MG/DL (ref 70–110)
PDW BLD-RTO: 15.3 FL (ref 11.5–15)
PHOSPHORUS: 2.6 MG/DL (ref 2.5–4.5)
PLATELET # BLD: 53 E9/L (ref 130–450)
PLATELET CONFIRMATION: NORMAL
PMV BLD AUTO: 10.3 FL (ref 7–12)
POTASSIUM SERPL-SCNC: 3.4 MMOL/L (ref 3.5–5)
RBC # BLD: 3.29 E12/L (ref 3.5–5.5)
SODIUM BLD-SCNC: 140 MMOL/L (ref 132–146)
TOTAL PROTEIN: 5.2 G/DL (ref 6.4–8.3)
WBC # BLD: 2.9 E9/L (ref 4.5–11.5)

## 2018-09-25 PROCEDURE — 2500000003 HC RX 250 WO HCPCS: Performed by: HOSPITALIST

## 2018-09-25 PROCEDURE — 2580000003 HC RX 258: Performed by: HOSPITALIST

## 2018-09-25 PROCEDURE — 36415 COLL VENOUS BLD VENIPUNCTURE: CPT

## 2018-09-25 PROCEDURE — 83690 ASSAY OF LIPASE: CPT

## 2018-09-25 PROCEDURE — G0008 ADMIN INFLUENZA VIRUS VAC: HCPCS | Performed by: INTERNAL MEDICINE

## 2018-09-25 PROCEDURE — A9577 INJ MULTIHANCE: HCPCS | Performed by: RADIOLOGY

## 2018-09-25 PROCEDURE — 6370000000 HC RX 637 (ALT 250 FOR IP): Performed by: NURSE PRACTITIONER

## 2018-09-25 PROCEDURE — 82962 GLUCOSE BLOOD TEST: CPT

## 2018-09-25 PROCEDURE — 86900 BLOOD TYPING SEROLOGIC ABO: CPT

## 2018-09-25 PROCEDURE — 85027 COMPLETE CBC AUTOMATED: CPT

## 2018-09-25 PROCEDURE — 83735 ASSAY OF MAGNESIUM: CPT

## 2018-09-25 PROCEDURE — 6360000002 HC RX W HCPCS: Performed by: HOSPITALIST

## 2018-09-25 PROCEDURE — 6360000002 HC RX W HCPCS: Performed by: INTERNAL MEDICINE

## 2018-09-25 PROCEDURE — 6370000000 HC RX 637 (ALT 250 FOR IP): Performed by: HOSPITALIST

## 2018-09-25 PROCEDURE — 74176 CT ABD & PELVIS W/O CONTRAST: CPT

## 2018-09-25 PROCEDURE — 80053 COMPREHEN METABOLIC PANEL: CPT

## 2018-09-25 PROCEDURE — 6360000002 HC RX W HCPCS: Performed by: NURSE PRACTITIONER

## 2018-09-25 PROCEDURE — 99223 1ST HOSP IP/OBS HIGH 75: CPT | Performed by: SURGERY

## 2018-09-25 PROCEDURE — 74183 MRI ABD W/O CNTR FLWD CNTR: CPT

## 2018-09-25 PROCEDURE — 86850 RBC ANTIBODY SCREEN: CPT

## 2018-09-25 PROCEDURE — 86901 BLOOD TYPING SEROLOGIC RH(D): CPT

## 2018-09-25 PROCEDURE — 2580000003 HC RX 258: Performed by: NURSE PRACTITIONER

## 2018-09-25 PROCEDURE — 90686 IIV4 VACC NO PRSV 0.5 ML IM: CPT | Performed by: INTERNAL MEDICINE

## 2018-09-25 PROCEDURE — 84100 ASSAY OF PHOSPHORUS: CPT

## 2018-09-25 PROCEDURE — 1200000000 HC SEMI PRIVATE

## 2018-09-25 PROCEDURE — 6360000004 HC RX CONTRAST MEDICATION: Performed by: RADIOLOGY

## 2018-09-25 RX ORDER — 0.9 % SODIUM CHLORIDE 0.9 %
500 INTRAVENOUS SOLUTION INTRAVENOUS ONCE
Status: COMPLETED | OUTPATIENT
Start: 2018-09-25 | End: 2018-09-25

## 2018-09-25 RX ORDER — LORAZEPAM 2 MG/ML
1 INJECTION INTRAMUSCULAR PRN
Status: COMPLETED | OUTPATIENT
Start: 2018-09-25 | End: 2018-09-25

## 2018-09-25 RX ORDER — HYDROMORPHONE HCL 110MG/55ML
1 PATIENT CONTROLLED ANALGESIA SYRINGE INTRAVENOUS
Status: DISCONTINUED | OUTPATIENT
Start: 2018-09-25 | End: 2018-10-02 | Stop reason: HOSPADM

## 2018-09-25 RX ORDER — HYDROMORPHONE HCL 110MG/55ML
0.5 PATIENT CONTROLLED ANALGESIA SYRINGE INTRAVENOUS
Status: DISCONTINUED | OUTPATIENT
Start: 2018-09-25 | End: 2018-10-02 | Stop reason: HOSPADM

## 2018-09-25 RX ORDER — HYDROMORPHONE HCL 110MG/55ML
0.5 PATIENT CONTROLLED ANALGESIA SYRINGE INTRAVENOUS EVERY 4 HOURS PRN
Status: DISCONTINUED | OUTPATIENT
Start: 2018-09-25 | End: 2018-09-25

## 2018-09-25 RX ORDER — 0.9 % SODIUM CHLORIDE 0.9 %
1000 INTRAVENOUS SOLUTION INTRAVENOUS ONCE
Status: COMPLETED | OUTPATIENT
Start: 2018-09-25 | End: 2018-09-25

## 2018-09-25 RX ORDER — OXYCODONE HYDROCHLORIDE 5 MG/1
5 TABLET ORAL EVERY 4 HOURS PRN
Status: DISCONTINUED | OUTPATIENT
Start: 2018-09-25 | End: 2018-10-02 | Stop reason: HOSPADM

## 2018-09-25 RX ORDER — ACETAMINOPHEN 325 MG/1
650 TABLET ORAL EVERY 6 HOURS PRN
Status: DISCONTINUED | OUTPATIENT
Start: 2018-09-25 | End: 2018-10-02 | Stop reason: HOSPADM

## 2018-09-25 RX ORDER — OXYCODONE HYDROCHLORIDE 5 MG/1
10 TABLET ORAL EVERY 4 HOURS PRN
Status: DISCONTINUED | OUTPATIENT
Start: 2018-09-25 | End: 2018-10-02 | Stop reason: HOSPADM

## 2018-09-25 RX ADMIN — SODIUM CHLORIDE 1000 ML: 9 INJECTION, SOLUTION INTRAVENOUS at 17:41

## 2018-09-25 RX ADMIN — ACETAMINOPHEN 650 MG: 325 TABLET, FILM COATED ORAL at 21:41

## 2018-09-25 RX ADMIN — METHYLPHENIDATE HYDROCHLORIDE 10 MG: 5 TABLET ORAL at 08:16

## 2018-09-25 RX ADMIN — HYDROMORPHONE HYDROCHLORIDE 0.5 MG: 2 INJECTION INTRAMUSCULAR; INTRAVENOUS; SUBCUTANEOUS at 11:01

## 2018-09-25 RX ADMIN — HYDROMORPHONE HYDROCHLORIDE 1 MG: 2 INJECTION INTRAMUSCULAR; INTRAVENOUS; SUBCUTANEOUS at 19:38

## 2018-09-25 RX ADMIN — GADOBENATE DIMEGLUMINE 20 ML: 529 INJECTION, SOLUTION INTRAVENOUS at 21:32

## 2018-09-25 RX ADMIN — ALPRAZOLAM 0.5 MG: 0.5 TABLET ORAL at 21:45

## 2018-09-25 RX ADMIN — POTASSIUM CHLORIDE 40 MEQ: 1500 TABLET, EXTENDED RELEASE ORAL at 08:07

## 2018-09-25 RX ADMIN — FLUVOXAMINE MALEATE 50 MG: 50 TABLET, FILM COATED ORAL at 08:09

## 2018-09-25 RX ADMIN — TRAZODONE HYDROCHLORIDE 150 MG: 50 TABLET ORAL at 21:41

## 2018-09-25 RX ADMIN — SODIUM CHLORIDE: 9 INJECTION, SOLUTION INTRAVENOUS at 00:57

## 2018-09-25 RX ADMIN — PREGABALIN 50 MG: 50 CAPSULE ORAL at 08:09

## 2018-09-25 RX ADMIN — VORTIOXETINE 10 MG: 10 TABLET, FILM COATED ORAL at 21:41

## 2018-09-25 RX ADMIN — SODIUM CHLORIDE: 9 INJECTION, SOLUTION INTRAVENOUS at 08:11

## 2018-09-25 RX ADMIN — FAMOTIDINE 20 MG: 20 TABLET ORAL at 21:41

## 2018-09-25 RX ADMIN — MULTIPLE VITAMINS W/ MINERALS TAB 1 TABLET: TAB at 08:09

## 2018-09-25 RX ADMIN — METOCLOPRAMIDE 5 MG: 5 INJECTION, SOLUTION INTRAMUSCULAR; INTRAVENOUS at 17:50

## 2018-09-25 RX ADMIN — METRONIDAZOLE 500 MG: 500 INJECTION, SOLUTION INTRAVENOUS at 18:29

## 2018-09-25 RX ADMIN — METOCLOPRAMIDE 5 MG: 5 INJECTION, SOLUTION INTRAMUSCULAR; INTRAVENOUS at 05:09

## 2018-09-25 RX ADMIN — PREGABALIN 50 MG: 50 CAPSULE ORAL at 21:41

## 2018-09-25 RX ADMIN — INFLUENZA A VIRUS A/MICHIGAN/45/2015 X-275 (H1N1) ANTIGEN (FORMALDEHYDE INACTIVATED), INFLUENZA A VIRUS A/SINGAPORE/INFIMH-16-0019/2016 IVR-186 (H3N2) ANTIGEN (FORMALDEHYDE INACTIVATED), INFLUENZA B VIRUS B/PHUKET/3073/2013 ANTIGEN (FORMALDEHYDE INACTIVATED), AND INFLUENZA B VIRUS B/MARYLAND/15/2016 BX-69A ANTIGEN (FORMALDEHYDE INACTIVATED) 0.5 ML: 15; 15; 15; 15 INJECTION, SUSPENSION INTRAMUSCULAR at 08:17

## 2018-09-25 RX ADMIN — SODIUM CHLORIDE 500 ML: 9 INJECTION, SOLUTION INTRAVENOUS at 21:42

## 2018-09-25 RX ADMIN — LORAZEPAM 1 MG: 2 INJECTION INTRAMUSCULAR; INTRAVENOUS at 20:35

## 2018-09-25 RX ADMIN — MORPHINE SULFATE 2 MG: 2 INJECTION, SOLUTION INTRAMUSCULAR; INTRAVENOUS at 04:54

## 2018-09-25 RX ADMIN — HYDROMORPHONE HYDROCHLORIDE 0.5 MG: 2 INJECTION INTRAMUSCULAR; INTRAVENOUS; SUBCUTANEOUS at 15:11

## 2018-09-25 RX ADMIN — SODIUM CHLORIDE 1 G: 9 INJECTION INTRAMUSCULAR; INTRAVENOUS; SUBCUTANEOUS at 18:30

## 2018-09-25 RX ADMIN — MORPHINE SULFATE 2 MG: 2 INJECTION, SOLUTION INTRAMUSCULAR; INTRAVENOUS at 00:57

## 2018-09-25 RX ADMIN — ARIPIPRAZOLE 15 MG: 15 TABLET ORAL at 08:09

## 2018-09-25 RX ADMIN — MORPHINE SULFATE 2 MG: 2 INJECTION, SOLUTION INTRAMUSCULAR; INTRAVENOUS at 08:09

## 2018-09-25 RX ADMIN — PANTOPRAZOLE SODIUM 40 MG: 40 TABLET, DELAYED RELEASE ORAL at 08:09

## 2018-09-25 ASSESSMENT — PAIN SCALES - GENERAL
PAINLEVEL_OUTOF10: 8
PAINLEVEL_OUTOF10: 9
PAINLEVEL_OUTOF10: 9
PAINLEVEL_OUTOF10: 10
PAINLEVEL_OUTOF10: 9
PAINLEVEL_OUTOF10: 9
PAINLEVEL_OUTOF10: 10
PAINLEVEL_OUTOF10: 0
PAINLEVEL_OUTOF10: 9
PAINLEVEL_OUTOF10: 9

## 2018-09-25 ASSESSMENT — PAIN DESCRIPTION - PAIN TYPE
TYPE: CHRONIC PAIN
TYPE: CHRONIC PAIN
TYPE: ACUTE PAIN
TYPE: ACUTE PAIN

## 2018-09-25 ASSESSMENT — PAIN DESCRIPTION - LOCATION
LOCATION: ABDOMEN
LOCATION: GENERALIZED

## 2018-09-25 ASSESSMENT — PAIN DESCRIPTION - PROGRESSION: CLINICAL_PROGRESSION: NOT CHANGED

## 2018-09-25 ASSESSMENT — PAIN DESCRIPTION - ORIENTATION: ORIENTATION: LOWER

## 2018-09-25 ASSESSMENT — PAIN DESCRIPTION - DESCRIPTORS
DESCRIPTORS: ACHING
DESCRIPTORS: ACHING;DISCOMFORT;DULL

## 2018-09-25 ASSESSMENT — PAIN DESCRIPTION - FREQUENCY: FREQUENCY: CONTINUOUS

## 2018-09-25 ASSESSMENT — PAIN DESCRIPTION - ONSET: ONSET: ON-GOING

## 2018-09-25 NOTE — PROGRESS NOTES
Nutrition Assessment    Type and Reason for Visit: Initial    Nutrition Recommendations: Continue current diet (advance when medically appropriate), Start ONS (Ensure Clear TID)    Malnutrition Assessment:  · Malnutrition Status: Meets the criteria for severe malnutrition  · Context: Chronic illness  · Findings of the 6 clinical characteristics of malnutrition (Minimum of 2 out of 6 clinical characteristics is required to make the diagnosis of moderate or severe Protein Calorie Malnutrition based on AND/ASPEN Guidelines):  1. Energy Intake-Less than or equal to 50%, greater than or equal to 3 months    2. Weight Loss-No significant weight loss   3. Fat Loss-Severe subcutaneous fat loss, Orbital, Triceps  4. Muscle Loss-Severe muscle mass loss, Clavicles (pectoralis and deltoids), Temples (temporalis muscle), Thigh (quadriceps), Calf (gastrocnemius)  5. Fluid Accumulation-No significant fluid accumulation   6.  Strength-Not measured    Nutrition Diagnosis:   · Problem: Severe malnutrition, in context of chronic illness  · Etiology: related to Insufficient energy/nutrient consumption, Alteration in GI function     Signs and symptoms:  as evidenced by Patient report of, Diarrhea, Diet history of poor intake, Intake 25-50%, Severe muscle loss, Severe loss of subcutaneous fat, Nausea, Vomiting, BMI    Nutrition Assessment:  · Nutrition-Focused Physical Findings: pt alert, oriented, GI flat, cramping, soft, tender, +BS, no edema, skin WDL, +I/O, pt with oily stool as per pt, +Creon   · Wound Type: None  · Current Nutrition Therapies:  · Oral Diet Orders: Clear Liquid, Carb Control 4 Carbs/Meal   · Oral Diet intake: 26-50%  · Oral Nutrition Supplement (ONS) Orders: None  · Anthropometric Measures:  · Ht: 5' 1\" (154.9 cm)   · Current Body Wt: 98 lb (44.5 kg) (9/25 BS)  · Admission Body Wt: 98 lb (44.5 kg) (9/25 BS.  Adm wt 9/22 stated)  · Usual Body Wt: 98 lb (44.5 kg) (6/24/18 BS)  · % Weight Change: no changes

## 2018-09-25 NOTE — PLAN OF CARE
Problem: Pain:  Goal: Pain level will decrease  Pain level will decrease   Outcome: Not Met This Shift      Problem: Falls - Risk of:  Goal: Will remain free from falls  Will remain free from falls   Outcome: Met This Shift

## 2018-09-25 NOTE — PROGRESS NOTES
Physical Therapy      0421/0421-01    Patient unavailable for physical therapy treatment due to fatigue.

## 2018-09-25 NOTE — PROGRESS NOTES
however, inadvertent computerized transcription errors may be present.   Electronically signed by EVERETT Bragg CNP on 9/25/2018 at 2:46 PM

## 2018-09-26 LAB
ANION GAP SERPL CALCULATED.3IONS-SCNC: 7 MMOL/L (ref 7–16)
BASOPHILS ABSOLUTE: 0 E9/L (ref 0–0.2)
BASOPHILS RELATIVE PERCENT: 0 % (ref 0–2)
BUN BLDV-MCNC: 5 MG/DL (ref 6–20)
CALCIUM SERPL-MCNC: 7.7 MG/DL (ref 8.6–10.2)
CHLORIDE BLD-SCNC: 107 MMOL/L (ref 98–107)
CO2: 26 MMOL/L (ref 22–29)
CREAT SERPL-MCNC: 0.3 MG/DL (ref 0.5–1)
EOSINOPHILS ABSOLUTE: 0.03 E9/L (ref 0.05–0.5)
EOSINOPHILS RELATIVE PERCENT: 1.7 % (ref 0–6)
GFR AFRICAN AMERICAN: >60
GFR NON-AFRICAN AMERICAN: >60 ML/MIN/1.73
GLUCOSE BLD-MCNC: 175 MG/DL (ref 74–109)
HCT VFR BLD CALC: 27.2 % (ref 34–48)
HEMOGLOBIN: 9 G/DL (ref 11.5–15.5)
LYMPHOCYTES ABSOLUTE: 0.12 E9/L (ref 1.5–4)
LYMPHOCYTES RELATIVE PERCENT: 7.8 % (ref 20–42)
MCH RBC QN AUTO: 30 PG (ref 26–35)
MCHC RBC AUTO-ENTMCNC: 33.1 % (ref 32–34.5)
MCV RBC AUTO: 90.7 FL (ref 80–99.9)
METER GLUCOSE: 141 MG/DL (ref 70–110)
METER GLUCOSE: 144 MG/DL (ref 70–110)
METER GLUCOSE: 173 MG/DL (ref 70–110)
METER GLUCOSE: 183 MG/DL (ref 70–110)
METER GLUCOSE: 184 MG/DL (ref 70–110)
MONOCYTES ABSOLUTE: 0.04 E9/L (ref 0.1–0.95)
MONOCYTES RELATIVE PERCENT: 2.6 % (ref 2–12)
NEUTROPHILS ABSOLUTE: 1.32 E9/L (ref 1.8–7.3)
NEUTROPHILS RELATIVE PERCENT: 87.9 % (ref 43–80)
PDW BLD-RTO: 14.9 FL (ref 11.5–15)
PLATELET # BLD: 56 E9/L (ref 130–450)
PLATELET CONFIRMATION: NORMAL
PMV BLD AUTO: 10.4 FL (ref 7–12)
POTASSIUM SERPL-SCNC: 3.8 MMOL/L (ref 3.5–5)
RBC # BLD: 3 E12/L (ref 3.5–5.5)
RBC # BLD: NORMAL 10*6/UL
SODIUM BLD-SCNC: 140 MMOL/L (ref 132–146)
URINE CULTURE, ROUTINE: NORMAL
WBC # BLD: 1.5 E9/L (ref 4.5–11.5)

## 2018-09-26 PROCEDURE — 36415 COLL VENOUS BLD VENIPUNCTURE: CPT

## 2018-09-26 PROCEDURE — G8988 SELF CARE GOAL STATUS: HCPCS

## 2018-09-26 PROCEDURE — 87040 BLOOD CULTURE FOR BACTERIA: CPT

## 2018-09-26 PROCEDURE — 80048 BASIC METABOLIC PNL TOTAL CA: CPT

## 2018-09-26 PROCEDURE — 6360000002 HC RX W HCPCS: Performed by: HOSPITALIST

## 2018-09-26 PROCEDURE — 2580000003 HC RX 258: Performed by: STUDENT IN AN ORGANIZED HEALTH CARE EDUCATION/TRAINING PROGRAM

## 2018-09-26 PROCEDURE — 6370000000 HC RX 637 (ALT 250 FOR IP): Performed by: NURSE PRACTITIONER

## 2018-09-26 PROCEDURE — G8979 MOBILITY GOAL STATUS: HCPCS | Performed by: PHYSICAL THERAPIST

## 2018-09-26 PROCEDURE — 1200000000 HC SEMI PRIVATE

## 2018-09-26 PROCEDURE — 97116 GAIT TRAINING THERAPY: CPT | Performed by: PHYSICAL THERAPIST

## 2018-09-26 PROCEDURE — 2580000003 HC RX 258: Performed by: HOSPITALIST

## 2018-09-26 PROCEDURE — 2500000003 HC RX 250 WO HCPCS: Performed by: HOSPITALIST

## 2018-09-26 PROCEDURE — 97161 PT EVAL LOW COMPLEX 20 MIN: CPT | Performed by: PHYSICAL THERAPIST

## 2018-09-26 PROCEDURE — 6360000002 HC RX W HCPCS: Performed by: NURSE PRACTITIONER

## 2018-09-26 PROCEDURE — 82962 GLUCOSE BLOOD TEST: CPT

## 2018-09-26 PROCEDURE — 85025 COMPLETE CBC W/AUTO DIFF WBC: CPT

## 2018-09-26 PROCEDURE — G8987 SELF CARE CURRENT STATUS: HCPCS

## 2018-09-26 PROCEDURE — 97165 OT EVAL LOW COMPLEX 30 MIN: CPT

## 2018-09-26 PROCEDURE — 97530 THERAPEUTIC ACTIVITIES: CPT

## 2018-09-26 PROCEDURE — 6370000000 HC RX 637 (ALT 250 FOR IP): Performed by: HOSPITALIST

## 2018-09-26 PROCEDURE — 6360000002 HC RX W HCPCS: Performed by: STUDENT IN AN ORGANIZED HEALTH CARE EDUCATION/TRAINING PROGRAM

## 2018-09-26 PROCEDURE — G8978 MOBILITY CURRENT STATUS: HCPCS | Performed by: PHYSICAL THERAPIST

## 2018-09-26 RX ORDER — CIPROFLOXACIN 2 MG/ML
400 INJECTION, SOLUTION INTRAVENOUS EVERY 12 HOURS
Status: DISCONTINUED | OUTPATIENT
Start: 2018-09-26 | End: 2018-09-26 | Stop reason: ALTCHOICE

## 2018-09-26 RX ADMIN — PANCRELIPASE 6 CAPSULE: 60000; 12000; 38000 CAPSULE, DELAYED RELEASE PELLETS ORAL at 11:53

## 2018-09-26 RX ADMIN — METHYLPHENIDATE HYDROCHLORIDE 10 MG: 5 TABLET ORAL at 09:35

## 2018-09-26 RX ADMIN — INSULIN LISPRO 3 UNITS: 100 INJECTION, SOLUTION INTRAVENOUS; SUBCUTANEOUS at 11:53

## 2018-09-26 RX ADMIN — INSULIN LISPRO 1 UNITS: 100 INJECTION, SOLUTION INTRAVENOUS; SUBCUTANEOUS at 20:10

## 2018-09-26 RX ADMIN — INSULIN GLARGINE 25 UNITS: 100 INJECTION, SOLUTION SUBCUTANEOUS at 09:29

## 2018-09-26 RX ADMIN — HYDROMORPHONE HYDROCHLORIDE 1 MG: 2 INJECTION INTRAMUSCULAR; INTRAVENOUS; SUBCUTANEOUS at 21:37

## 2018-09-26 RX ADMIN — OXYCODONE HYDROCHLORIDE 5 MG: 5 TABLET ORAL at 19:00

## 2018-09-26 RX ADMIN — SODIUM CHLORIDE: 9 INJECTION, SOLUTION INTRAVENOUS at 08:52

## 2018-09-26 RX ADMIN — HYDROMORPHONE HYDROCHLORIDE 0.5 MG: 2 INJECTION INTRAMUSCULAR; INTRAVENOUS; SUBCUTANEOUS at 14:46

## 2018-09-26 RX ADMIN — METRONIDAZOLE 500 MG: 500 INJECTION, SOLUTION INTRAVENOUS at 11:01

## 2018-09-26 RX ADMIN — PREGABALIN 50 MG: 50 CAPSULE ORAL at 20:06

## 2018-09-26 RX ADMIN — HYDROMORPHONE HYDROCHLORIDE 1 MG: 2 INJECTION INTRAMUSCULAR; INTRAVENOUS; SUBCUTANEOUS at 03:29

## 2018-09-26 RX ADMIN — SODIUM CHLORIDE 1 G: 9 INJECTION INTRAMUSCULAR; INTRAVENOUS; SUBCUTANEOUS at 18:53

## 2018-09-26 RX ADMIN — MULTIPLE VITAMINS W/ MINERALS TAB 1 TABLET: TAB at 09:34

## 2018-09-26 RX ADMIN — OXYCODONE HYDROCHLORIDE 5 MG: 5 TABLET ORAL at 13:20

## 2018-09-26 RX ADMIN — PANCRELIPASE 6 CAPSULE: 60000; 12000; 38000 CAPSULE, DELAYED RELEASE PELLETS ORAL at 08:16

## 2018-09-26 RX ADMIN — INSULIN LISPRO 3 UNITS: 100 INJECTION, SOLUTION INTRAVENOUS; SUBCUTANEOUS at 16:56

## 2018-09-26 RX ADMIN — TRAZODONE HYDROCHLORIDE 150 MG: 50 TABLET ORAL at 20:06

## 2018-09-26 RX ADMIN — METRONIDAZOLE 500 MG: 500 INJECTION, SOLUTION INTRAVENOUS at 01:57

## 2018-09-26 RX ADMIN — VORTIOXETINE 10 MG: 10 TABLET, FILM COATED ORAL at 20:06

## 2018-09-26 RX ADMIN — METRONIDAZOLE 500 MG: 500 INJECTION, SOLUTION INTRAVENOUS at 17:43

## 2018-09-26 RX ADMIN — PANTOPRAZOLE SODIUM 40 MG: 40 TABLET, DELAYED RELEASE ORAL at 09:34

## 2018-09-26 RX ADMIN — HYDROMORPHONE HYDROCHLORIDE 0.5 MG: 2 INJECTION INTRAMUSCULAR; INTRAVENOUS; SUBCUTANEOUS at 17:46

## 2018-09-26 RX ADMIN — HYDROMORPHONE HYDROCHLORIDE 1 MG: 2 INJECTION INTRAMUSCULAR; INTRAVENOUS; SUBCUTANEOUS at 08:44

## 2018-09-26 RX ADMIN — PREGABALIN 50 MG: 50 CAPSULE ORAL at 09:34

## 2018-09-26 RX ADMIN — PANCRELIPASE 6 CAPSULE: 60000; 12000; 38000 CAPSULE, DELAYED RELEASE PELLETS ORAL at 16:46

## 2018-09-26 RX ADMIN — HYDROMORPHONE HYDROCHLORIDE 1 MG: 2 INJECTION INTRAMUSCULAR; INTRAVENOUS; SUBCUTANEOUS at 00:06

## 2018-09-26 RX ADMIN — ARIPIPRAZOLE 15 MG: 15 TABLET ORAL at 09:35

## 2018-09-26 RX ADMIN — FLUVOXAMINE MALEATE 50 MG: 50 TABLET, FILM COATED ORAL at 09:35

## 2018-09-26 RX ADMIN — METOCLOPRAMIDE 5 MG: 5 INJECTION, SOLUTION INTRAMUSCULAR; INTRAVENOUS at 16:46

## 2018-09-26 RX ADMIN — ALPRAZOLAM 0.5 MG: 0.5 TABLET ORAL at 23:34

## 2018-09-26 RX ADMIN — FAMOTIDINE 20 MG: 20 TABLET ORAL at 20:05

## 2018-09-26 ASSESSMENT — PAIN DESCRIPTION - DESCRIPTORS
DESCRIPTORS: ACHING;CONSTANT;DISCOMFORT;CRAMPING
DESCRIPTORS: ACHING;CONSTANT;DISCOMFORT
DESCRIPTORS: ACHING;CONSTANT;DISCOMFORT;CRAMPING
DESCRIPTORS: ACHING;CONSTANT;DISCOMFORT
DESCRIPTORS: ACHING;DISCOMFORT;CONSTANT
DESCRIPTORS: ACHING;CONSTANT;DISCOMFORT

## 2018-09-26 ASSESSMENT — PAIN DESCRIPTION - PROGRESSION: CLINICAL_PROGRESSION: NOT CHANGED

## 2018-09-26 ASSESSMENT — PAIN DESCRIPTION - LOCATION
LOCATION: ABDOMEN;BACK
LOCATION: ABDOMEN

## 2018-09-26 ASSESSMENT — PAIN SCALES - GENERAL
PAINLEVEL_OUTOF10: 9
PAINLEVEL_OUTOF10: 0
PAINLEVEL_OUTOF10: 10
PAINLEVEL_OUTOF10: 9
PAINLEVEL_OUTOF10: 9
PAINLEVEL_OUTOF10: 0
PAINLEVEL_OUTOF10: 9
PAINLEVEL_OUTOF10: 8
PAINLEVEL_OUTOF10: 0
PAINLEVEL_OUTOF10: 9
PAINLEVEL_OUTOF10: 0
PAINLEVEL_OUTOF10: 0
PAINLEVEL_OUTOF10: 10
PAINLEVEL_OUTOF10: 4
PAINLEVEL_OUTOF10: 8

## 2018-09-26 ASSESSMENT — PAIN DESCRIPTION - PAIN TYPE
TYPE: CHRONIC PAIN
TYPE: ACUTE PAIN
TYPE: CHRONIC PAIN;ACUTE PAIN
TYPE: ACUTE PAIN
TYPE: ACUTE PAIN
TYPE: CHRONIC PAIN

## 2018-09-26 ASSESSMENT — PAIN DESCRIPTION - ONSET: ONSET: ON-GOING

## 2018-09-26 ASSESSMENT — PAIN DESCRIPTION - FREQUENCY
FREQUENCY: CONTINUOUS

## 2018-09-26 ASSESSMENT — PAIN DESCRIPTION - ORIENTATION
ORIENTATION: MID;LOWER
ORIENTATION: MID
ORIENTATION: MID;LOWER

## 2018-09-26 NOTE — CONSULTS
[facility]  Dr. Kylee Curry M.D., Dr. Car Mason M.D., Dr. Fiona Campbell D.O., Dr. Lamberto Harrison M.D. Patient Name: Petra Solomon  MRN: [id number]  : 1970 (50 y.o. female)  Allergies: is allergic to dye [iodides] and tylenol [acetaminophen]. Date of Service: 2018       Reason for Consultation:  Ieocolitis     HISTORY OF PRESENT ILLNESS:      The patient is a 50 y.o. female with history of remote alcohol use disorder, Hep C, chronic pancreatitis and DM who presents with generalized abdominal pain, diarrhea and hyperglycemia. Patient was ultimately treated in ICU for HHS/DKA and subsequently transferred to the medical floor. During admission, patient underwent CT imaging studies which showed inflammatory changes of the distal ileum, cecum and ascending colon. GI was consulted for further workup. Patient states that she developed progressively worsening lower abdominal pain approximately 2 weeks ago. The pain was dull and aching with intermittent severe sharp and stabbing pains located in the periumbilical region and radiating to the lower abdomen. It was associated with fever, chills and diarrhea. The diarrhea awokened her each night. She admits to tenesmus and stool incontinence. Denies n/v/c, hematemesis, hematochezia and melena. This is the first time this pain has occurred and is different than her chronic pancreatic pain. She does not have a personal or family history of IBD. Denies recent travel, change in dietary habits, new pets at home or sick contacts. Last colonoscopy was many years ago and was normal.     REVIEW OF SYSTEMS:   Constitutional: Admits to fever, chills, denies unintentional weight loss. HEENT: Denies double or blurry vision, headaches, ear pain or ringing in the ears. No drainage from the ears, nose or throat. Cardiovascular: Denies any chest pain, irregular heartbeats, or palpitations.    Respiratory: Denies shortness of breath, coughing, sputum Home Medications:  Prior to Admission medications    Medication Sig Start Date End Date Taking? Authorizing Provider   ARIPiprazole (ABILIFY) 15 MG tablet Take 15 mg by mouth daily   Yes Historical Provider, MD   pantoprazole (PROTONIX) 40 MG tablet Take 40 mg by mouth daily   Yes Historical Provider, MD   traZODone (DESYREL) 150 MG tablet Take 150 mg by mouth nightly   Yes Historical Provider, MD   fluvoxaMINE (LUVOX) 50 MG tablet Take 50 mg by mouth daily   Yes Historical Provider, MD   spironolactone (ALDACTONE) 25 MG tablet Take 25 mg by mouth daily   Yes Historical Provider, MD   nicotine (NICODERM CQ) 21 MG/24HR Place 1 patch onto the skin every 24 hours   Yes Historical Provider, MD   albuterol sulfate  (90 Base) MCG/ACT inhaler Inhale 2 puffs into the lungs every 6 hours as needed for Wheezing or Shortness of Breath   Yes Historical Provider, MD   aspirin 81 MG EC tablet Take 81 mg by mouth daily   Yes Historical Provider, MD   metoclopramide (REGLAN) 10 MG tablet Take 10 mg by mouth daily as needed (nausea)   Yes Historical Provider, MD   methylphenidate (RITALIN) 10 MG tablet Take 10 mg by mouth daily. .   Yes Historical Provider, MD   furosemide (LASIX) 20 MG tablet Take 20 mg by mouth daily as needed (swelling)   Yes Historical Provider, MD   ondansetron (ZOFRAN ODT) 4 MG disintegrating tablet Take 1 tablet by mouth every 8 hours as needed for Nausea or Vomiting 7/1/18  Yes Cayetano Duarte,    VORTIoxetine HBr (TRINTELLIX) 20 MG TABS tablet Take 10 mg by mouth nightly    Yes Historical Provider, MD   insulin aspart (NOVOLOG) 100 UNIT/ML injection vial Inject 0-20 Units into the skin 3 times daily (before meals)    Yes Historical Provider, MD   ALPRAZolam (XANAX) 0.5 MG tablet Take 0.5 mg by mouth 3 times daily as needed for Anxiety.  .   Yes Historical Provider, MD   famotidine (PEPCID) 20 MG tablet Take 20 mg by mouth nightly    Yes Historical Provider, MD   insulin glargine (LANTUS) Metabolic Panel   Result Value Ref Range    Sodium 139 132 - 146 mmol/L    Potassium 4.0 3.5 - 5.0 mmol/L    Chloride 106 98 - 107 mmol/L    CO2 22 22 - 29 mmol/L    Anion Gap 11 7 - 16 mmol/L    Glucose 318 (H) 74 - 109 mg/dL    BUN 12 6 - 20 mg/dL    CREATININE 0.4 (L) 0.5 - 1.0 mg/dL    GFR Non-African American >60 >=60 mL/min/1.73    GFR African American >60     Calcium 7.9 (L) 8.6 - 10.2 mg/dL   Magnesium   Result Value Ref Range    Magnesium 1.8 1.6 - 2.6 mg/dL   Phosphorus   Result Value Ref Range    Phosphorus 2.6 2.5 - 4.5 mg/dL   Lactic acid, plasma   Result Value Ref Range    Lactic Acid 1.4 0.5 - 2.2 mmol/L   CBC auto differential   Result Value Ref Range    WBC 5.1 4.5 - 11.5 E9/L    RBC 3.89 3.50 - 5.50 E12/L    Hemoglobin 11.7 11.5 - 15.5 g/dL    Hematocrit 33.9 (L) 34.0 - 48.0 %    MCV 87.1 80.0 - 99.9 fL    MCH 30.1 26.0 - 35.0 pg    MCHC 34.5 32.0 - 34.5 %    RDW 14.9 11.5 - 15.0 fL    Platelets 58 (L) 251 - 450 E9/L    MPV 9.8 7.0 - 12.0 fL    Neutrophils % 86.1 (H) 43.0 - 80.0 %    Lymphocytes % 10.4 (L) 20.0 - 42.0 %    Monocytes % 2.6 2.0 - 12.0 %    Eosinophils % 1.0 0.0 - 6.0 %    Basophils % 0.9 0.0 - 2.0 %    Neutrophils # 4.39 1.80 - 7.30 E9/L    Lymphocytes # 0.51 (L) 1.50 - 4.00 E9/L    Monocytes # 0.15 0.10 - 0.95 E9/L    Eosinophils # 0.00 (L) 0.05 - 0.50 E9/L    Basophils # 0.05 0.00 - 0.20 E9/L    RBC Morphology Normal    Basic Metabolic Panel   Result Value Ref Range    Sodium 137 132 - 146 mmol/L    Potassium 3.7 3.5 - 5.0 mmol/L    Chloride 105 98 - 107 mmol/L    CO2 24 22 - 29 mmol/L    Anion Gap 8 7 - 16 mmol/L    Glucose 120 (H) 74 - 109 mg/dL    BUN 7 6 - 20 mg/dL    CREATININE 0.3 (L) 0.5 - 1.0 mg/dL    GFR Non-African American >60 >=60 mL/min/1.73    GFR African American >60     Calcium 7.6 (L) 8.6 - 10.2 mg/dL   Magnesium   Result Value Ref Range    Magnesium 2.1 1.6 - 2.6 mg/dL   Phosphorus   Result Value Ref Range    Phosphorus 3.3 2.5 - 4.5 mg/dL   Microscopic Urinalysis Result Value Ref Range    WBC, UA 1-3 0 - 5 /HPF    RBC, UA NONE 0 - 2 /HPF    Epi Cells FEW /HPF    Bacteria, UA NONE /HPF   Platelet Confirmation   Result Value Ref Range    Platelet Confirmation CONFIRMED    Hepatic function panel   Result Value Ref Range    Total Protein 6.1 (L) 6.4 - 8.3 g/dL    Alb 3.2 (L) 3.5 - 5.2 g/dL    Alkaline Phosphatase 107 (H) 35 - 104 U/L    ALT 25 0 - 32 U/L    AST 42 (H) 0 - 31 U/L    Total Bilirubin 0.4 0.0 - 1.2 mg/dL    Bilirubin, Direct <0.2 0.0 - 0.3 mg/dL    Bilirubin, Indirect see below 0.0 - 1.0 mg/dL   Osmolality, Serum   Result Value Ref Range    Osmolality 287 285 - 310 mOsm/Kg   Basic metabolic panel   Result Value Ref Range    Sodium 138 132 - 146 mmol/L    Potassium 3.8 3.5 - 5.0 mmol/L    Chloride 109 (H) 98 - 107 mmol/L    CO2 19 (L) 22 - 29 mmol/L    Anion Gap 10 7 - 16 mmol/L    Glucose 128 (H) 74 - 109 mg/dL    BUN 6 6 - 20 mg/dL    CREATININE 0.3 (L) 0.5 - 1.0 mg/dL    GFR Non-African American >60 >=60 mL/min/1.73    GFR African American >60     Calcium 7.4 (L) 8.6 - 10.2 mg/dL   CBC   Result Value Ref Range    WBC 3.3 (L) 4.5 - 11.5 E9/L    RBC 2.95 (L) 3.50 - 5.50 E12/L    Hemoglobin 8.9 (L) 11.5 - 15.5 g/dL    Hematocrit 26.5 (L) 34.0 - 48.0 %    MCV 89.8 80.0 - 99.9 fL    MCH 30.2 26.0 - 35.0 pg    MCHC 33.6 32.0 - 34.5 %    RDW 15.1 (H) 11.5 - 15.0 fL    Platelets 46 (L) 886 - 450 E9/L    MPV 11.4 7.0 - 12.0 fL   Magnesium   Result Value Ref Range    Magnesium 1.6 1.6 - 2.6 mg/dL   Phosphorus   Result Value Ref Range    Phosphorus 2.0 (L) 2.5 - 4.5 mg/dL   Hemoglobin A1c   Result Value Ref Range    Hemoglobin A1C 11.5 (H) 4.0 - 5.6 %   Lipase   Result Value Ref Range    Lipase 8 (L) 13 - 60 U/L   Urinalysis   Result Value Ref Range    Color, UA Yellow Straw/Yellow    Clarity, UA Clear Clear    Glucose, Ur >=1000 (A) Negative mg/dL    Bilirubin Urine Negative Negative    Ketones, Urine Negative Negative mg/dL    Specific Gravity, UA 1.025 1.005 - 1.030 Blood, Urine Negative Negative    pH, UA 5.5 5.0 - 9.0    Protein, UA Negative Negative mg/dL    Urobilinogen, Urine 0.2 <2.0 E.U./dL    Nitrite, Urine Negative Negative    Leukocyte Esterase, Urine Negative Negative   Comprehensive metabolic panel   Result Value Ref Range    Sodium 140 132 - 146 mmol/L    Potassium 3.4 (L) 3.5 - 5.0 mmol/L    Chloride 109 (H) 98 - 107 mmol/L    CO2 24 22 - 29 mmol/L    Anion Gap 7 7 - 16 mmol/L    Glucose 90 74 - 109 mg/dL    BUN 6 6 - 20 mg/dL    CREATININE 0.3 (L) 0.5 - 1.0 mg/dL    GFR Non-African American >60 >=60 mL/min/1.73    GFR African American >60     Calcium 7.6 (L) 8.6 - 10.2 mg/dL    Total Protein 5.2 (L) 6.4 - 8.3 g/dL    Alb 2.3 (L) 3.5 - 5.2 g/dL    Total Bilirubin 0.3 0.0 - 1.2 mg/dL    Alkaline Phosphatase 93 35 - 104 U/L    ALT 25 0 - 32 U/L    AST 25 0 - 31 U/L   CBC   Result Value Ref Range    WBC 2.9 (L) 4.5 - 11.5 E9/L    RBC 3.29 (L) 3.50 - 5.50 E12/L    Hemoglobin 9.8 (L) 11.5 - 15.5 g/dL    Hematocrit 29.9 (L) 34.0 - 48.0 %    MCV 90.9 80.0 - 99.9 fL    MCH 29.8 26.0 - 35.0 pg    MCHC 32.8 32.0 - 34.5 %    RDW 15.3 (H) 11.5 - 15.0 fL    Platelets 53 (L) 939 - 450 E9/L    MPV 10.3 7.0 - 12.0 fL   Magnesium   Result Value Ref Range    Magnesium 1.8 1.6 - 2.6 mg/dL   Phosphorus   Result Value Ref Range    Phosphorus 2.6 2.5 - 4.5 mg/dL   Lipase   Result Value Ref Range    Lipase 8 (L) 13 - 60 U/L   Platelet Confirmation   Result Value Ref Range    Platelet Confirmation SEE BELOW    Platelet Confirmation   Result Value Ref Range    Platelet Confirmation CONFIRMED    Basic metabolic panel   Result Value Ref Range    Sodium 140 132 - 146 mmol/L    Potassium 3.8 3.5 - 5.0 mmol/L    Chloride 107 98 - 107 mmol/L    CO2 26 22 - 29 mmol/L    Anion Gap 7 7 - 16 mmol/L    Glucose 175 (H) 74 - 109 mg/dL    BUN 5 (L) 6 - 20 mg/dL    CREATININE 0.3 (L) 0.5 - 1.0 mg/dL    GFR Non-African American >60 >=60 mL/min/1.73    GFR African American >60     Calcium 7.7 (L) 8.6 -

## 2018-09-26 NOTE — CARE COORDINATION
Ss note:9/26/2018.2:17 PM Per Keeley Nicole at [facility], pt has been accepted and Ervin Toro has been submitted, will await insurance approval. HENS completed.  SANDEE Haile

## 2018-09-26 NOTE — PROGRESS NOTES
GENERAL SURGERY  DAILY PROGRESS NOTE  9/26/2018    Subjective:  Resting in bed. Tmax 101 overnight     Objective:  /82   Pulse 97   Temp 99.1 °F (37.3 °C) (Oral)   Resp 16   Ht 5' 1\" (1.549 m)   Wt 95 lb (43.1 kg)   SpO2 98%   BMI 17.95 kg/m²     GENERAL:  Laying in bed, awake, alert, cooperative, no apparent distress  LUNGS:  No increased work of breathing  CARDIOVASCULAR:  Regular rate  ABDOMEN:  Soft, non-tender, non-distended      Assessment/Plan:  50 y.o. female w/ colitis, h/o chronic pancreatitis     MRCP demonstrating ascites, parris-pancreatic fluid   Pancytopenia - ?  Related to hep C  Continue rocephin, flagyl       Electronically signed by Suzie Vogel MD on 9/26/2018 at 7:48 AM

## 2018-09-26 NOTE — PROGRESS NOTES
oriented x 3; follows 2 step directions. fair  Problem solving skills   fair  Memory    fair  Sequencing  Communication: intact   Visual perceptual skills: intact     Glasses: yes   Edema: no     Sensation: intact   Hand Dominance:  Left     X Right     Left Right Comment   Passive range of motion [facility]     Active range of motion [facility]     Muscle Grade 4/5 4/5    /pinch Strength Intact  Intact     [] Malnutrition indicators have been identified and nursing has been notified to ensure a dietitian consult is ordered. Function Assessment    Status  Goal Comment   Feeding:  Independent   Independent      Grooming:  Minimal Assist  Independent      UE dressing:  Minimal Assist  Independent     LE dressing:  Minimal Assist  Independent     Bathing: Moderate Assist  Independent     Bed Mobility:     Supervision  for supine to sit,   Supervision  for scooting,  Sit to supine: n/t as pt was up in chair Independent     Functional Transfers:    Minimal Assist  for sit to stand transfer from EOB  Independent  Safety: good    Functional Mobility: 50 feet x 2 using hand rail in hallway and  Minimal assist  Independent       Pt/family participated in establishment of goals. Balance:   Sitting: good   Standing: fair with hand rail    Endurance: fair       Assessment of Current Deficits:   [x]Functional mobility  []ROM  [x]Strength   []Cognition   []Safety Awareness    [x]ADLs [x]IADLs   [x]Endurance  [x]Balance    []Vision  []Sensation     []Gross Motor Coordination       []Fine Motor Coordination     · Low Complexity  · History: Brief history including review of medical records relating to the problem  · Exam: 1-3 performance Deficits  · Assistance/Modification: No assistance or modifications required to perform tasks. No comorbities affecting occupational performance.     Patients Goal: go to rehab   Treatment: OT eval, bed mobility, sitting balance at EOB for 5 minutes with supervision, transfer training

## 2018-09-26 NOTE — PROGRESS NOTES
with left breast lumpectomy    OTHER SURGICAL HISTORY  10/05/2017    simple left mastectomy    OVARY REMOVAL Bilateral 2007    PANCREAS BIOPSY      with stent    SHOULDER SURGERY Left 2003    UPPER GASTROINTESTINAL ENDOSCOPY         The admitting diagnosis and active problem list as listed above have been reviewed prior to the initiation of this evaluation. Nursing cleared the patient for evaluation. Patient is agreeable to evaluation. Precautions: falls  ,   Social history: Patient lives with friend Lisa Barrett in a single family home with 3 steps to enter      rail  Equipment owned: ,  wheeled walker  Mentation: alert, cooperative, oriented x 3 and follows directions,    Prior level of function: indep  ROM: within functional limits   STRENGTH: 3-/5  PAIN: (measured on a visual analog scale with 0=no pain and 10=excruciating pain) 6/10. abdomen  FUNCTIONAL ASSESSMENT   Bed Mobility- Supine to sit-Independent     Scooting- Supervision     Sit to supine-  not assessed the patient is up in the chair; nursing aware    Patient able to dangle on edge of bed for 10 minutes with no assist   Transfers-sit to stand- Minimal assists     Gait:  Patient ambulated 60 feet x 2  using  rail  With Minimal assists for balance and safety      Balance: sit-good         stand fair       Edema: no  Endurance: fair  minus    Treatment:evaluation;   gait  Patient/family education:effects of immobilty      Rehab Potential: good  for baseline  Patients Goal: None stated   ASSESSMENT  Patient exhibits decreased   strength, endurance,  balance, coordination impairing functional mobility. Goals to be met in 3 days. Bed mobility-Independent  Transfers-Independent  Ambulation-Independent for 100 feet using  wheeled walker  Stairs-up and down 3  step(s) using 1 rail(s) with Supervision   Comments:       Increase strength in affected muscle groups by 1/3 grade  Increase balance to allow for improvement towards functional goals.   Increase

## 2018-09-27 ENCOUNTER — APPOINTMENT (OUTPATIENT)
Dept: GENERAL RADIOLOGY | Age: 48
DRG: 249 | End: 2018-09-27
Payer: COMMERCIAL

## 2018-09-27 LAB
ANION GAP SERPL CALCULATED.3IONS-SCNC: 11 MMOL/L (ref 7–16)
BUN BLDV-MCNC: 5 MG/DL (ref 6–20)
CALCIUM SERPL-MCNC: 8.5 MG/DL (ref 8.6–10.2)
CHLORIDE BLD-SCNC: 103 MMOL/L (ref 98–107)
CO2: 27 MMOL/L (ref 22–29)
CREAT SERPL-MCNC: 0.4 MG/DL (ref 0.5–1)
GFR AFRICAN AMERICAN: >60
GFR NON-AFRICAN AMERICAN: >60 ML/MIN/1.73
GLUCOSE BLD-MCNC: 104 MG/DL (ref 74–109)
HCT VFR BLD CALC: 31.2 % (ref 34–48)
HEMOGLOBIN: 10.5 G/DL (ref 11.5–15.5)
MCH RBC QN AUTO: 29.7 PG (ref 26–35)
MCHC RBC AUTO-ENTMCNC: 33.7 % (ref 32–34.5)
MCV RBC AUTO: 88.4 FL (ref 80–99.9)
METER GLUCOSE: 112 MG/DL (ref 70–110)
METER GLUCOSE: 127 MG/DL (ref 70–110)
METER GLUCOSE: 239 MG/DL (ref 70–110)
METER GLUCOSE: 324 MG/DL (ref 70–110)
PDW BLD-RTO: 14.3 FL (ref 11.5–15)
PLATELET # BLD: 67 E9/L (ref 130–450)
PLATELET CONFIRMATION: NORMAL
PMV BLD AUTO: 10.5 FL (ref 7–12)
POTASSIUM SERPL-SCNC: 3.8 MMOL/L (ref 3.5–5)
RBC # BLD: 3.53 E12/L (ref 3.5–5.5)
SODIUM BLD-SCNC: 141 MMOL/L (ref 132–146)
WBC # BLD: 2.2 E9/L (ref 4.5–11.5)

## 2018-09-27 PROCEDURE — 36415 COLL VENOUS BLD VENIPUNCTURE: CPT

## 2018-09-27 PROCEDURE — 6360000002 HC RX W HCPCS: Performed by: HOSPITALIST

## 2018-09-27 PROCEDURE — 2500000003 HC RX 250 WO HCPCS: Performed by: HOSPITALIST

## 2018-09-27 PROCEDURE — 6360000002 HC RX W HCPCS: Performed by: PHYSICIAN ASSISTANT

## 2018-09-27 PROCEDURE — 6370000000 HC RX 637 (ALT 250 FOR IP): Performed by: PHYSICIAN ASSISTANT

## 2018-09-27 PROCEDURE — 6370000000 HC RX 637 (ALT 250 FOR IP): Performed by: NURSE PRACTITIONER

## 2018-09-27 PROCEDURE — 1200000000 HC SEMI PRIVATE

## 2018-09-27 PROCEDURE — 82962 GLUCOSE BLOOD TEST: CPT

## 2018-09-27 PROCEDURE — 97530 THERAPEUTIC ACTIVITIES: CPT

## 2018-09-27 PROCEDURE — 6360000002 HC RX W HCPCS: Performed by: NURSE PRACTITIONER

## 2018-09-27 PROCEDURE — 2580000003 HC RX 258: Performed by: HOSPITALIST

## 2018-09-27 PROCEDURE — 80048 BASIC METABOLIC PNL TOTAL CA: CPT

## 2018-09-27 PROCEDURE — 85027 COMPLETE CBC AUTOMATED: CPT

## 2018-09-27 PROCEDURE — 2580000003 HC RX 258: Performed by: STUDENT IN AN ORGANIZED HEALTH CARE EDUCATION/TRAINING PROGRAM

## 2018-09-27 PROCEDURE — 74018 RADEX ABDOMEN 1 VIEW: CPT

## 2018-09-27 PROCEDURE — 6360000002 HC RX W HCPCS: Performed by: STUDENT IN AN ORGANIZED HEALTH CARE EDUCATION/TRAINING PROGRAM

## 2018-09-27 PROCEDURE — 6370000000 HC RX 637 (ALT 250 FOR IP): Performed by: HOSPITALIST

## 2018-09-27 RX ADMIN — METHYLNALTREXONE BROMIDE 7 MG: 12 INJECTION, SOLUTION SUBCUTANEOUS at 22:28

## 2018-09-27 RX ADMIN — OXYCODONE HYDROCHLORIDE 10 MG: 5 TABLET ORAL at 19:33

## 2018-09-27 RX ADMIN — FAMOTIDINE 20 MG: 20 TABLET ORAL at 21:13

## 2018-09-27 RX ADMIN — METOCLOPRAMIDE 5 MG: 5 INJECTION, SOLUTION INTRAMUSCULAR; INTRAVENOUS at 14:13

## 2018-09-27 RX ADMIN — MAGNESIUM HYDROXIDE 30 ML: 400 SUSPENSION ORAL at 22:28

## 2018-09-27 RX ADMIN — METRONIDAZOLE 500 MG: 500 INJECTION, SOLUTION INTRAVENOUS at 17:33

## 2018-09-27 RX ADMIN — INSULIN LISPRO 6 UNITS: 100 INJECTION, SOLUTION INTRAVENOUS; SUBCUTANEOUS at 21:14

## 2018-09-27 RX ADMIN — PANTOPRAZOLE SODIUM 40 MG: 40 TABLET, DELAYED RELEASE ORAL at 09:04

## 2018-09-27 RX ADMIN — PANCRELIPASE 6 CAPSULE: 60000; 12000; 38000 CAPSULE, DELAYED RELEASE PELLETS ORAL at 17:33

## 2018-09-27 RX ADMIN — ALPRAZOLAM 0.5 MG: 0.5 TABLET ORAL at 21:13

## 2018-09-27 RX ADMIN — METRONIDAZOLE 500 MG: 500 INJECTION, SOLUTION INTRAVENOUS at 01:26

## 2018-09-27 RX ADMIN — HYDROMORPHONE HYDROCHLORIDE 0.5 MG: 2 INJECTION INTRAMUSCULAR; INTRAVENOUS; SUBCUTANEOUS at 11:07

## 2018-09-27 RX ADMIN — PANCRELIPASE 6 CAPSULE: 60000; 12000; 38000 CAPSULE, DELAYED RELEASE PELLETS ORAL at 07:53

## 2018-09-27 RX ADMIN — SODIUM CHLORIDE 1 G: 9 INJECTION INTRAMUSCULAR; INTRAVENOUS; SUBCUTANEOUS at 17:33

## 2018-09-27 RX ADMIN — HYDROMORPHONE HYDROCHLORIDE 0.5 MG: 2 INJECTION INTRAMUSCULAR; INTRAVENOUS; SUBCUTANEOUS at 07:54

## 2018-09-27 RX ADMIN — HYDROMORPHONE HYDROCHLORIDE 0.5 MG: 2 INJECTION INTRAMUSCULAR; INTRAVENOUS; SUBCUTANEOUS at 14:13

## 2018-09-27 RX ADMIN — ARIPIPRAZOLE 15 MG: 15 TABLET ORAL at 09:04

## 2018-09-27 RX ADMIN — PANCRELIPASE 6 CAPSULE: 60000; 12000; 38000 CAPSULE, DELAYED RELEASE PELLETS ORAL at 11:52

## 2018-09-27 RX ADMIN — METRONIDAZOLE 500 MG: 500 INJECTION, SOLUTION INTRAVENOUS at 10:11

## 2018-09-27 RX ADMIN — PREGABALIN 50 MG: 50 CAPSULE ORAL at 09:04

## 2018-09-27 RX ADMIN — HYDROMORPHONE HYDROCHLORIDE 0.5 MG: 2 INJECTION INTRAMUSCULAR; INTRAVENOUS; SUBCUTANEOUS at 21:13

## 2018-09-27 RX ADMIN — VORTIOXETINE 10 MG: 10 TABLET, FILM COATED ORAL at 21:13

## 2018-09-27 RX ADMIN — HYDROMORPHONE HYDROCHLORIDE 1 MG: 2 INJECTION INTRAMUSCULAR; INTRAVENOUS; SUBCUTANEOUS at 04:39

## 2018-09-27 RX ADMIN — INSULIN LISPRO 6 UNITS: 100 INJECTION, SOLUTION INTRAVENOUS; SUBCUTANEOUS at 07:56

## 2018-09-27 RX ADMIN — METOCLOPRAMIDE 5 MG: 5 INJECTION, SOLUTION INTRAMUSCULAR; INTRAVENOUS at 07:54

## 2018-09-27 RX ADMIN — HYDROMORPHONE HYDROCHLORIDE 1 MG: 2 INJECTION INTRAMUSCULAR; INTRAVENOUS; SUBCUTANEOUS at 00:46

## 2018-09-27 RX ADMIN — INSULIN GLARGINE 25 UNITS: 100 INJECTION, SOLUTION SUBCUTANEOUS at 09:04

## 2018-09-27 RX ADMIN — METHYLPHENIDATE HYDROCHLORIDE 10 MG: 5 TABLET ORAL at 09:04

## 2018-09-27 RX ADMIN — SODIUM CHLORIDE: 9 INJECTION, SOLUTION INTRAVENOUS at 14:12

## 2018-09-27 RX ADMIN — TRAZODONE HYDROCHLORIDE 150 MG: 50 TABLET ORAL at 21:13

## 2018-09-27 RX ADMIN — PREGABALIN 50 MG: 50 CAPSULE ORAL at 21:13

## 2018-09-27 RX ADMIN — MULTIPLE VITAMINS W/ MINERALS TAB 1 TABLET: TAB at 09:04

## 2018-09-27 RX ADMIN — OXYCODONE HYDROCHLORIDE 5 MG: 5 TABLET ORAL at 11:54

## 2018-09-27 RX ADMIN — HYDROMORPHONE HYDROCHLORIDE 1 MG: 2 INJECTION INTRAMUSCULAR; INTRAVENOUS; SUBCUTANEOUS at 17:33

## 2018-09-27 RX ADMIN — FLUVOXAMINE MALEATE 50 MG: 50 TABLET, FILM COATED ORAL at 09:04

## 2018-09-27 ASSESSMENT — PAIN DESCRIPTION - ORIENTATION
ORIENTATION: MID;LOWER
ORIENTATION: MID
ORIENTATION: MID
ORIENTATION: LOWER;MID
ORIENTATION: LOWER;MID
ORIENTATION: MID

## 2018-09-27 ASSESSMENT — PAIN DESCRIPTION - ONSET
ONSET: ON-GOING

## 2018-09-27 ASSESSMENT — PAIN SCALES - GENERAL
PAINLEVEL_OUTOF10: 8
PAINLEVEL_OUTOF10: 8
PAINLEVEL_OUTOF10: 10
PAINLEVEL_OUTOF10: 7
PAINLEVEL_OUTOF10: 9
PAINLEVEL_OUTOF10: 0
PAINLEVEL_OUTOF10: 7
PAINLEVEL_OUTOF10: 8
PAINLEVEL_OUTOF10: 0
PAINLEVEL_OUTOF10: 7
PAINLEVEL_OUTOF10: 0
PAINLEVEL_OUTOF10: 10

## 2018-09-27 ASSESSMENT — PAIN DESCRIPTION - LOCATION
LOCATION: ABDOMEN

## 2018-09-27 ASSESSMENT — PAIN DESCRIPTION - FREQUENCY
FREQUENCY: CONTINUOUS

## 2018-09-27 ASSESSMENT — PAIN DESCRIPTION - DESCRIPTORS
DESCRIPTORS: ACHING;CONSTANT;DISCOMFORT
DESCRIPTORS: ACHING;CONSTANT;DISCOMFORT
DESCRIPTORS: ACHING;CONSTANT;CRAMPING;DISCOMFORT
DESCRIPTORS: ACHING;CONSTANT;CRAMPING;SORE
DESCRIPTORS: ACHING;CRAMPING;CONSTANT;DISCOMFORT
DESCRIPTORS: ACHING;CONSTANT;CRAMPING;DISCOMFORT
DESCRIPTORS: ACHING;CONSTANT;CRAMPING
DESCRIPTORS: ACHING;CONSTANT;CRAMPING;SORE

## 2018-09-27 ASSESSMENT — PAIN DESCRIPTION - PAIN TYPE
TYPE: ACUTE PAIN

## 2018-09-27 ASSESSMENT — PAIN DESCRIPTION - PROGRESSION
CLINICAL_PROGRESSION: NOT CHANGED

## 2018-09-27 NOTE — PROGRESS NOTES
malnutrition ([facility] [address]       Recommendation for discharge: subacute  Equipment prescriptions needed: none    AM-PAC Mobility Inpatient   How much difficulty turning over in bed?: None  How much difficulty sitting down on / standing up from a chair with arms?: A Little  How much difficulty moving from lying on back to sitting on side of bed?: A Little  How much help from another person moving to and from a bed to a chair?: A Little  How much help from another person needed to walk in hospital room?: A Little  How much help from another person for climbing 3-5 steps with a railing?: A Lot  AM-PAC Inpatient Mobility Raw Score : 18  AM-PAC Inpatient T-Scale Score : 43.63  Mobility Inpatient CMS 0-100% Score: 46.58  Mobility Inpatient CMS G-Code Modifier : CK      Precautions: falls    S: Patient cleared by nursing for treatment. Patient is agreeable to treatment. Pain status: (measured on a visual analog scale with 0=no pain and 10=excruciating pain) 0/10. O: Pt was instructed in and performed the following:   Bed Mobility- Supine to sit-Independent     Scooting- Independent     Sit to supine-Independent   Transfers-sit to stand- Minimal assist     Gait:  Patient ambulated 70 ft x 2 using no device with Minimal assist.  Comments: cues for safety. Pt reports nausea with pt returning to bed to rest. Visitor present    Steps:   NA  Treatment: as above   Comment: Call light left by patient. A: Pt with nausea limiting function, decreased strength and endurance limiting function   P: Continue with physical therapy   Megan Ruffin, PORTER    Patient exhibits decreased   strength, endurance,  balance, coordination impairing functional mobility. Goals to be met in 3 days.   Bed mobility-Independent  Transfers-Independent  Ambulation-Independent for 100 feet using  wheeled walker  Stairs-up and down 3  step(s) using 1 rail(s) with Supervision   Comments:       Increase strength in affected muscle groups by 1/3 grade  Increase balance to allow for improvement towards functional goals. Increase endurance to allow for improvement towards functional goals.

## 2018-09-27 NOTE — PROGRESS NOTES
PROGRESS NOTE    By Danelle Carty D.O GI Fellow    [facility]  Dr. Reginaldo Bright MD, Dr. Abby Valdovinos MD, Dr Erika Duarte, Dr. Adrian Domínguezfanny Patel  50 y.o.  female    SUBJECTIVE:  Patient seen and examined today at bedside. States that she is feeling better since admission. Still having rlq abdominal pain and fever. Denies diarrhea, constipation, N/V. She is tolerating diet.      OBJECTIVE:    /79   Pulse 66   Temp 98.5 °F (36.9 °C) (Oral)   Resp 18   Ht 5' 1\" (1.549 m)   Wt 104 lb 3.2 oz (47.3 kg)   SpO2 98%   BMI 19.69 kg/m²     Gen: NAD, AAO x 3  HEENT:PEERL, no icterus  Heart: RRR, no M/R/G  Lungs: CTAB  Abd.: soft, tenderness in rlq, ND, BS +, no G/R, no HSM  Extr.: no C/C/E, no bruising         Lab Results   Component Value Date    WBC 1.5 09/26/2018    WBC 2.9 09/25/2018    WBC 3.3 09/24/2018    HGB 9.0 09/26/2018    HGB 9.8 09/25/2018    HGB 8.9 09/24/2018    HCT 27.2 09/26/2018    MCV 90.7 09/26/2018    RDW 14.9 09/26/2018    PLT 56 09/26/2018    PLT 53 09/25/2018    PLT 46 09/24/2018     Lab Results   Component Value Date     09/26/2018    K 3.8 09/26/2018    K 4.2 06/24/2018     09/26/2018    CO2 26 09/26/2018    BUN 5 09/26/2018    CREATININE 0.3 09/26/2018    CALCIUM 7.7 09/26/2018    PROT 5.2 09/25/2018    LABALBU 2.3 09/25/2018    BILITOT 0.3 09/25/2018    BILITOT 0.4 09/23/2018    BILITOT 0.4 09/22/2018    ALKPHOS 93 09/25/2018    ALKPHOS 107 09/23/2018    ALKPHOS 146 09/22/2018    AST 25 09/25/2018    AST 42 09/23/2018    AST 44 09/22/2018    ALT 25 09/25/2018    ALT 25 09/23/2018    ALT 29 09/22/2018     Lab Results   Component Value Date    LIPASE 8 09/25/2018    LIPASE 8 09/24/2018    LIPASE 25 09/22/2018     Lab Results   Component Value Date    AMYLASE 12 09/22/2018         ASSESSMENT/PLAN:    1) Ileocolitis, suspect infectious colitis, possible IBD, doubt ischemic  - patient is improving overall, she had not had a BM to send for

## 2018-09-27 NOTE — PROGRESS NOTES
[facility] Hospitalist Progress Note    Admitting Date and Time: 9/22/2018  2:01 PM  Admit Dx: Hyperglycemia [R73.9]    Subjective:  Patient reports that her pain is slowly improving. She is tolerating a low fiber diet. ROS: denies fever, chills, cp, sob, n/v, HA unless stated above.       cefTRIAXone (ROCEPHIN) IV  1 g Intravenous Q24H    potassium replacement protocol   Other RX Placeholder    metroNIDAZOLE  500 mg Intravenous Q8H    insulin glargine  25 Units Subcutaneous Daily    insulin lispro  0-18 Units Subcutaneous TID WC    insulin lispro  0-9 Units Subcutaneous Nightly    ARIPiprazole  15 mg Oral Daily    famotidine  20 mg Oral Nightly    fluvoxaMINE  50 mg Oral Daily    lipase-protease-amylase  6 capsule Oral TID WC    methylphenidate  10 mg Oral Daily    therapeutic multivitamin-minerals  1 tablet Oral Daily    nicotine  1 patch Transdermal Q24H    pantoprazole  40 mg Oral Daily    pregabalin  50 mg Oral BID    traZODone  150 mg Oral Nightly    VORTIoxetine HBr  10 mg Oral Nightly       iohexol 50 mL ONCE PRN   HYDROmorphone 0.5 mg Q3H PRN   Or     HYDROmorphone 1 mg Q3H PRN   oxyCODONE 5 mg Q4H PRN   Or     oxyCODONE 10 mg Q4H PRN   acetaminophen 650 mg Q6H PRN   potassium chloride 40 mEq PRN   Or     potassium chloride 40 mEq PRN   Or     potassium chloride 10 mEq PRN   metoclopramide 5 mg Q6H PRN   dextrose 12.5 g PRN   potassium chloride 10 mEq PRN   magnesium sulfate 1 g PRN   sodium phosphate IVPB 10 mmol PRN   Or     sodium phosphate IVPB 15 mmol PRN   Or     sodium phosphate IVPB 20 mmol PRN   albuterol sulfate HFA 2 puff Q6H PRN   ALPRAZolam 0.5 mg TID PRN   traMADol 50 mg Q6H PRN   dextrose 12.5 g PRN   potassium chloride 10 mEq PRN   magnesium sulfate 1 g PRN   sodium phosphate IVPB 10 mmol PRN   Or     sodium phosphate IVPB 15 mmol PRN   Or     sodium phosphate IVPB 20 mmol PRN   glucose 15 g PRN   dextrose 12.5 g PRN   glucagon (rDNA) 1 mg PRN   dextrose 100 mL/hr PRN        Objective:    /79   Pulse 66   Temp 98.5 °F (36.9 °C) (Oral)   Resp 18   Ht 5' 1\" (1.549 m)   Wt 104 lb 3.2 oz (47.3 kg)   SpO2 98%   BMI 19.69 kg/m²     General Appearance: alert and oriented to person, place and time, ill/uncomfortable and in no acute distress  Skin: warm and dry  Head: normocephalic and atraumatic  Eyes: pupils equal, round, conjunctivae normal. Sclera without icterus  Neck: neck supple and non tender without mass. No JVD. R EJ in place. Pulmonary/Chest: clear to auscultation bilaterally- no wheezes, rales or rhonchi, normal air movement, no respiratory distress  Cardiovascular: normal rate, normal S1 and S2 and no carotid bruits  Abdomen:  Tenderness lower abdomen and flank area. Abdomen is soft, distended, normal bowel sounds  Extremities: no cyanosis, no clubbing and no edema  Neurologic: no cranial nerve deficit and speech normal      Recent Labs      09/25/18   0555  09/26/18   0630  09/27/18   1211   NA  140  140  141   K  3.4*  3.8  3.8   CL  109*  107  103   CO2  24  26  27   BUN  6  5*  5*   CREATININE  0.3*  0.3*  0.4*   GLUCOSE  90  175*  104   CALCIUM  7.6*  7.7*  8.5*       Recent Labs      09/25/18   0555  09/26/18   0630  09/27/18   1211   WBC  2.9*  1.5*  2.2*   RBC  3.29*  3.00*  3.53   HGB  9.8*  9.0*  10.5*   HCT  29.9*  27.2*  31.2*   MCV  90.9  90.7  88.4   MCH  29.8  30.0  29.7   MCHC  32.8  33.1  33.7   RDW  15.3*  14.9  14.3   PLT  53*  56*  67*   MPV  10.3  10.4  10.5       Radiology:   [facility]IA US 9/22/2018  Narrative   Reading location:  [facility]       CLINICAL STATEMENT: Right upper quadrant pain.       Technique high-resolution sonography of the right upper quadrant was   performed. Color-flow was employed.       The gallbladder has been removed. There is no fluid or mass in the   gallbladder fossa.       The liver is heterogeneous and I suspect hepatocellular disease or   hepatic steatosis. The bile ducts are not dilated.  The common bile   duct

## 2018-09-27 NOTE — CARE COORDINATION
Ss note:9/27/2018.2:19 PM Per Carmencita Ohs at [facility] has been obtained and is good thru Saturday 9-29-18. Met with pt and VINI Ortiz and they are aware they will need to bring Creon and Luvox to facility from home. Pt's SO wishes to transport pt to SNF via private car. HENS completed.  SANDEE Wolfe

## 2018-09-28 LAB
ANION GAP SERPL CALCULATED.3IONS-SCNC: 7 MMOL/L (ref 7–16)
BUN BLDV-MCNC: 4 MG/DL (ref 6–20)
CALCIUM SERPL-MCNC: 8.2 MG/DL (ref 8.6–10.2)
CHLORIDE BLD-SCNC: 101 MMOL/L (ref 98–107)
CO2: 30 MMOL/L (ref 22–29)
CREAT SERPL-MCNC: 0.4 MG/DL (ref 0.5–1)
GFR AFRICAN AMERICAN: >60
GFR NON-AFRICAN AMERICAN: >60 ML/MIN/1.73
GLUCOSE BLD-MCNC: 399 MG/DL (ref 74–109)
HCT VFR BLD CALC: 28 % (ref 34–48)
HEMOGLOBIN: 9.2 G/DL (ref 11.5–15.5)
MCH RBC QN AUTO: 29.4 PG (ref 26–35)
MCHC RBC AUTO-ENTMCNC: 32.9 % (ref 32–34.5)
MCV RBC AUTO: 89.5 FL (ref 80–99.9)
METER GLUCOSE: 182 MG/DL (ref 70–110)
METER GLUCOSE: 190 MG/DL (ref 70–110)
METER GLUCOSE: 423 MG/DL (ref 70–110)
METER GLUCOSE: 456 MG/DL (ref 70–110)
METER GLUCOSE: 59 MG/DL (ref 70–110)
PDW BLD-RTO: 14.6 FL (ref 11.5–15)
PLATELET # BLD: 87 E9/L (ref 130–450)
PLATELET CONFIRMATION: NORMAL
PMV BLD AUTO: 10 FL (ref 7–12)
POTASSIUM SERPL-SCNC: 4.1 MMOL/L (ref 3.5–5)
RBC # BLD: 3.13 E12/L (ref 3.5–5.5)
SODIUM BLD-SCNC: 138 MMOL/L (ref 132–146)
WBC # BLD: 1.5 E9/L (ref 4.5–11.5)

## 2018-09-28 PROCEDURE — 87077 CULTURE AEROBIC IDENTIFY: CPT

## 2018-09-28 PROCEDURE — 1200000000 HC SEMI PRIVATE

## 2018-09-28 PROCEDURE — 97530 THERAPEUTIC ACTIVITIES: CPT

## 2018-09-28 PROCEDURE — 87329 GIARDIA AG IA: CPT

## 2018-09-28 PROCEDURE — 6360000002 HC RX W HCPCS: Performed by: HOSPITALIST

## 2018-09-28 PROCEDURE — 6360000002 HC RX W HCPCS: Performed by: NURSE PRACTITIONER

## 2018-09-28 PROCEDURE — 97116 GAIT TRAINING THERAPY: CPT

## 2018-09-28 PROCEDURE — 80048 BASIC METABOLIC PNL TOTAL CA: CPT

## 2018-09-28 PROCEDURE — 82962 GLUCOSE BLOOD TEST: CPT

## 2018-09-28 PROCEDURE — 89055 LEUKOCYTE ASSESSMENT FECAL: CPT

## 2018-09-28 PROCEDURE — 6370000000 HC RX 637 (ALT 250 FOR IP): Performed by: NURSE PRACTITIONER

## 2018-09-28 PROCEDURE — 2500000003 HC RX 250 WO HCPCS: Performed by: HOSPITALIST

## 2018-09-28 PROCEDURE — 97535 SELF CARE MNGMENT TRAINING: CPT

## 2018-09-28 PROCEDURE — 97110 THERAPEUTIC EXERCISES: CPT

## 2018-09-28 PROCEDURE — 2580000003 HC RX 258: Performed by: HOSPITALIST

## 2018-09-28 PROCEDURE — 85027 COMPLETE CBC AUTOMATED: CPT

## 2018-09-28 PROCEDURE — 87324 CLOSTRIDIUM AG IA: CPT

## 2018-09-28 PROCEDURE — 6370000000 HC RX 637 (ALT 250 FOR IP): Performed by: HOSPITALIST

## 2018-09-28 PROCEDURE — 87045 FECES CULTURE AEROBIC BACT: CPT

## 2018-09-28 PROCEDURE — 36415 COLL VENOUS BLD VENIPUNCTURE: CPT

## 2018-09-28 PROCEDURE — 6360000002 HC RX W HCPCS: Performed by: STUDENT IN AN ORGANIZED HEALTH CARE EDUCATION/TRAINING PROGRAM

## 2018-09-28 PROCEDURE — 2580000003 HC RX 258: Performed by: STUDENT IN AN ORGANIZED HEALTH CARE EDUCATION/TRAINING PROGRAM

## 2018-09-28 RX ORDER — POLYETHYLENE GLYCOL 3350 17 G/17G
17 POWDER, FOR SOLUTION ORAL DAILY
Status: DISCONTINUED | OUTPATIENT
Start: 2018-09-28 | End: 2018-09-30

## 2018-09-28 RX ADMIN — METHYLPHENIDATE HYDROCHLORIDE 10 MG: 5 TABLET ORAL at 09:42

## 2018-09-28 RX ADMIN — METOCLOPRAMIDE 5 MG: 5 INJECTION, SOLUTION INTRAMUSCULAR; INTRAVENOUS at 13:15

## 2018-09-28 RX ADMIN — OXYCODONE HYDROCHLORIDE 10 MG: 5 TABLET ORAL at 18:13

## 2018-09-28 RX ADMIN — TRAZODONE HYDROCHLORIDE 150 MG: 50 TABLET ORAL at 20:33

## 2018-09-28 RX ADMIN — INSULIN GLARGINE 25 UNITS: 100 INJECTION, SOLUTION SUBCUTANEOUS at 09:43

## 2018-09-28 RX ADMIN — INSULIN LISPRO 3 UNITS: 100 INJECTION, SOLUTION INTRAVENOUS; SUBCUTANEOUS at 18:13

## 2018-09-28 RX ADMIN — INSULIN LISPRO 18 UNITS: 100 INJECTION, SOLUTION INTRAVENOUS; SUBCUTANEOUS at 09:43

## 2018-09-28 RX ADMIN — PREGABALIN 50 MG: 50 CAPSULE ORAL at 09:34

## 2018-09-28 RX ADMIN — METHYLNALTREXONE BROMIDE 12 MG: 12 INJECTION, SOLUTION SUBCUTANEOUS at 20:32

## 2018-09-28 RX ADMIN — HYDROMORPHONE HYDROCHLORIDE 1 MG: 2 INJECTION INTRAMUSCULAR; INTRAVENOUS; SUBCUTANEOUS at 04:08

## 2018-09-28 RX ADMIN — PANTOPRAZOLE SODIUM 40 MG: 40 TABLET, DELAYED RELEASE ORAL at 09:34

## 2018-09-28 RX ADMIN — METRONIDAZOLE 500 MG: 500 INJECTION, SOLUTION INTRAVENOUS at 16:21

## 2018-09-28 RX ADMIN — FLUVOXAMINE MALEATE 50 MG: 50 TABLET, FILM COATED ORAL at 09:34

## 2018-09-28 RX ADMIN — SODIUM CHLORIDE 1 G: 9 INJECTION INTRAMUSCULAR; INTRAVENOUS; SUBCUTANEOUS at 17:17

## 2018-09-28 RX ADMIN — HYDROMORPHONE HYDROCHLORIDE 1 MG: 2 INJECTION INTRAMUSCULAR; INTRAVENOUS; SUBCUTANEOUS at 09:33

## 2018-09-28 RX ADMIN — HYDROMORPHONE HYDROCHLORIDE 1 MG: 2 INJECTION INTRAMUSCULAR; INTRAVENOUS; SUBCUTANEOUS at 16:20

## 2018-09-28 RX ADMIN — OXYCODONE HYDROCHLORIDE 10 MG: 5 TABLET ORAL at 05:49

## 2018-09-28 RX ADMIN — PANCRELIPASE 6 CAPSULE: 60000; 12000; 38000 CAPSULE, DELAYED RELEASE PELLETS ORAL at 16:20

## 2018-09-28 RX ADMIN — METRONIDAZOLE 500 MG: 500 INJECTION, SOLUTION INTRAVENOUS at 12:20

## 2018-09-28 RX ADMIN — METRONIDAZOLE 500 MG: 500 INJECTION, SOLUTION INTRAVENOUS at 01:33

## 2018-09-28 RX ADMIN — FAMOTIDINE 20 MG: 20 TABLET ORAL at 20:33

## 2018-09-28 RX ADMIN — SODIUM CHLORIDE 75 ML/HR: 9 INJECTION, SOLUTION INTRAVENOUS at 07:15

## 2018-09-28 RX ADMIN — ARIPIPRAZOLE 15 MG: 15 TABLET ORAL at 09:34

## 2018-09-28 RX ADMIN — METOCLOPRAMIDE 5 MG: 5 INJECTION, SOLUTION INTRAMUSCULAR; INTRAVENOUS at 20:41

## 2018-09-28 RX ADMIN — HYDROMORPHONE HYDROCHLORIDE 0.5 MG: 2 INJECTION INTRAMUSCULAR; INTRAVENOUS; SUBCUTANEOUS at 20:33

## 2018-09-28 RX ADMIN — METOCLOPRAMIDE 5 MG: 5 INJECTION, SOLUTION INTRAMUSCULAR; INTRAVENOUS at 04:24

## 2018-09-28 RX ADMIN — ALPRAZOLAM 0.5 MG: 0.5 TABLET ORAL at 20:33

## 2018-09-28 RX ADMIN — PREGABALIN 50 MG: 50 CAPSULE ORAL at 20:33

## 2018-09-28 RX ADMIN — POLYETHYLENE GLYCOL 3350 17 G: 17 POWDER, FOR SOLUTION ORAL at 09:33

## 2018-09-28 RX ADMIN — VORTIOXETINE 10 MG: 10 TABLET, FILM COATED ORAL at 20:33

## 2018-09-28 RX ADMIN — MULTIPLE VITAMINS W/ MINERALS TAB 1 TABLET: TAB at 09:34

## 2018-09-28 RX ADMIN — PANCRELIPASE 6 CAPSULE: 60000; 12000; 38000 CAPSULE, DELAYED RELEASE PELLETS ORAL at 12:17

## 2018-09-28 RX ADMIN — HYDROMORPHONE HYDROCHLORIDE 1 MG: 2 INJECTION INTRAMUSCULAR; INTRAVENOUS; SUBCUTANEOUS at 13:15

## 2018-09-28 ASSESSMENT — PAIN SCALES - GENERAL
PAINLEVEL_OUTOF10: 7
PAINLEVEL_OUTOF10: 5
PAINLEVEL_OUTOF10: 7
PAINLEVEL_OUTOF10: 4
PAINLEVEL_OUTOF10: 8
PAINLEVEL_OUTOF10: 10
PAINLEVEL_OUTOF10: 9
PAINLEVEL_OUTOF10: 0
PAINLEVEL_OUTOF10: 8
PAINLEVEL_OUTOF10: 5
PAINLEVEL_OUTOF10: 6

## 2018-09-28 ASSESSMENT — PAIN DESCRIPTION - LOCATION
LOCATION: ABDOMEN

## 2018-09-28 ASSESSMENT — PAIN DESCRIPTION - ONSET
ONSET: ON-GOING

## 2018-09-28 ASSESSMENT — PAIN DESCRIPTION - ORIENTATION
ORIENTATION: MID

## 2018-09-28 ASSESSMENT — PAIN DESCRIPTION - PAIN TYPE
TYPE: ACUTE PAIN

## 2018-09-28 ASSESSMENT — PAIN DESCRIPTION - FREQUENCY
FREQUENCY: CONTINUOUS

## 2018-09-28 ASSESSMENT — PAIN DESCRIPTION - DESCRIPTORS
DESCRIPTORS: ACHING;CRAMPING;DISCOMFORT;SORE
DESCRIPTORS: ACHING;DISCOMFORT;CONSTANT
DESCRIPTORS: ACHING;DISCOMFORT;CRAMPING;SORE
DESCRIPTORS: ACHING;DISCOMFORT;CONSTANT

## 2018-09-28 ASSESSMENT — PAIN DESCRIPTION - PROGRESSION
CLINICAL_PROGRESSION: NOT CHANGED

## 2018-09-28 NOTE — PROGRESS NOTES
[facility] Quality Flow/Interdisciplinary Rounds Progress Note        Quality Flow Rounds held on September 28, 2018    Disciplines Attending:  Bedside Nurse, ,  and Nursing Unit Leadership    Hank Brumfield was admitted on 9/22/2018  2:01 PM    Anticipated Discharge Date:  Expected Discharge Date: 09/28/18    Disposition:    Abe Score:  Abe Scale Score: 20    Readmission Risk              Risk of Unplanned Readmission:        48             Discussed patient goal for the day, patient clinical progression, and barriers to discharge.   The following Goal(s) of the Day/Commitment(s) have been identified:  Activity progression, stool sent today, plan is [employer]- prompt for [place]  September 28, 2018

## 2018-09-28 NOTE — PROGRESS NOTES
[id number]    Patient unavailable for physical therapy treatment due to declined this AM d/t fatigue and pain in stomach.  Pt requested to come back in PM.         Angela Santillan, PTA

## 2018-09-28 NOTE — PROGRESS NOTES
phosphate IVPB 15 mmol PRN   Or     sodium phosphate IVPB 20 mmol PRN   glucose 15 g PRN   dextrose 12.5 g PRN   glucagon (rDNA) 1 mg PRN   dextrose 100 mL/hr PRN        Objective:    /80   Pulse 66   Temp 98.3 °F (36.8 °C) (Oral)   Resp 15   Ht 5' 1\" (1.549 m)   Wt 110 lb (49.9 kg)   SpO2 99%   BMI 20.78 kg/m²     General Appearance: alert and oriented to person, place and time, ill/uncomfortable and in no acute distress  Skin: warm and dry  Head: normocephalic and atraumatic  Eyes: pupils equal, round, conjunctivae normal. Sclera without icterus  Neck: neck supple and non tender without mass. Pulmonary/Chest: clear to auscultation bilaterally- no wheezes, rales or rhonchi, normal air movement, no respiratory distress  Cardiovascular: normal rate, normal S1 and S2 and no carotid bruits  Abdomen:  Tenderness lower abdomen and flank area. Abdomen is soft, distended, normal bowel sounds  Extremities: no cyanosis, no clubbing and no edema  Neurologic: no cranial nerve deficit and speech normal      Recent Labs      09/26/18   0630  09/27/18   1211  09/28/18   0700   NA  140  141  138   K  3.8  3.8  4.1   CL  107  103  101   CO2  26  27  30*   BUN  5*  5*  4*   CREATININE  0.3*  0.4*  0.4*   GLUCOSE  175*  104  399*   CALCIUM  7.7*  8.5*  8.2*       Recent Labs      09/26/18   0630  09/27/18   1211  09/28/18   0700   WBC  1.5*  2.2*  1.5*   RBC  3.00*  3.53  3.13*   HGB  9.0*  10.5*  9.2*   HCT  27.2*  31.2*  28.0*   MCV  90.7  88.4  89.5   MCH  30.0  29.7  29.4   MCHC  33.1  33.7  32.9   RDW  14.9  14.3  14.6   PLT  56*  67*  87*   MPV  10.4  10.5  10.0       Radiology:   [facility] US 9/22/2018  Narrative   Reading location:  [address]       CLINICAL STATEMENT: Right upper quadrant pain.       Technique high-resolution sonography of the right upper quadrant was   performed. Color-flow was employed.       The gallbladder has been removed.  There is no fluid or mass in the   gallbladder fossa.       The liver is heterogeneous and I suspect hepatocellular disease or   hepatic steatosis. The bile ducts are not dilated. The common bile   duct measures 4 mm.       There are no focal masses.       The pancreas is only incompletely seen due to overlying bowel gas.       Incidental note is made of a right renal cyst.           Impression   1. The patient is status post cholecystectomy. 2. The liver is heterogeneous and I suspect hepatocellular disease or   steatosis. 3. No obstruction of the bile ducts. Assessment:    Principal Problem:    Hyperglycemia  Active Problems:    Schizoaffective disorder, bipolar type (HCC)    Uncontrolled diabetes mellitus (HCC)    Chronic abdominal pain    Chronic pancreatitis (HCC)    Electrolyte imbalance    Lactic acidosis    Severe protein-calorie malnutrition (HCC)  Resolved Problems:    * No resolved hospital problems. *      Plan:  1. Poorly controlled diabetes type II with hyperglycemia: hyperosmolar state  Questionable compliance with medications/insulin at home. Hgb A1c is 11.5. Continue Lantus and insulin sliding scale. FBS high this am - has been better controlled until this am - may need to adjust medication. Diet adjusted to include carbohydrate control. Continue hypoglycemic protocol. 2. Chronic pancreatitis with Chronic RUQ/Epigastric abdominal pain-  Pain control, Fluids, and symptom management. Continue Creon. 3. Electrolyte imbalance-  Likely due to diarrhea. PRN replacements. 4. Lactic acidosis- Likely due to dehydration due to and continued Spironolactone usage at home. Oral diuretic stopped. Continue hydration. Last Lactic normal.   5. Schizoaffective/Bipolar  overlap- Continue home regimen. 6. Ileocoliits:  GI following - no urgent indication for a colonoscopy - can be done as an outpatient. Continue Flagyl and Rocephin. Stool sent for culture, giardia, cryptosporidium, WBC and C Diff    7. Hypotension:  Improved.      NOTE: This report was transcribed

## 2018-09-29 LAB
ANION GAP SERPL CALCULATED.3IONS-SCNC: 7 MMOL/L (ref 7–16)
BUN BLDV-MCNC: 7 MG/DL (ref 6–20)
C DIFFICILE TOXIN, EIA: NORMAL
CALCIUM SERPL-MCNC: 8 MG/DL (ref 8.6–10.2)
CHLORIDE BLD-SCNC: 100 MMOL/L (ref 98–107)
CO2: 27 MMOL/L (ref 22–29)
CREAT SERPL-MCNC: 0.4 MG/DL (ref 0.5–1)
GFR AFRICAN AMERICAN: >60
GFR NON-AFRICAN AMERICAN: >60 ML/MIN/1.73
GLUCOSE BLD-MCNC: 416 MG/DL (ref 74–109)
HCT VFR BLD CALC: 28.5 % (ref 34–48)
HEMOGLOBIN: 9 G/DL (ref 11.5–15.5)
MCH RBC QN AUTO: 28.6 PG (ref 26–35)
MCHC RBC AUTO-ENTMCNC: 31.6 % (ref 32–34.5)
MCV RBC AUTO: 90.5 FL (ref 80–99.9)
METER GLUCOSE: 117 MG/DL (ref 70–110)
METER GLUCOSE: 283 MG/DL (ref 70–110)
METER GLUCOSE: 499 MG/DL (ref 70–110)
METER GLUCOSE: 91 MG/DL (ref 70–110)
PDW BLD-RTO: 14.4 FL (ref 11.5–15)
PLATELET # BLD: 98 E9/L (ref 130–450)
PLATELET CONFIRMATION: NORMAL
PMV BLD AUTO: 10.1 FL (ref 7–12)
POTASSIUM SERPL-SCNC: 4.6 MMOL/L (ref 3.5–5)
RBC # BLD: 3.15 E12/L (ref 3.5–5.5)
SODIUM BLD-SCNC: 134 MMOL/L (ref 132–146)
WBC # BLD: 1.7 E9/L (ref 4.5–11.5)
WHITE BLOOD CELLS (WBC), STOOL: NORMAL

## 2018-09-29 PROCEDURE — 2500000003 HC RX 250 WO HCPCS: Performed by: HOSPITALIST

## 2018-09-29 PROCEDURE — 6360000002 HC RX W HCPCS: Performed by: HOSPITALIST

## 2018-09-29 PROCEDURE — 6370000000 HC RX 637 (ALT 250 FOR IP): Performed by: NURSE PRACTITIONER

## 2018-09-29 PROCEDURE — 36415 COLL VENOUS BLD VENIPUNCTURE: CPT

## 2018-09-29 PROCEDURE — 82705 FATS/LIPIDS FECES QUAL: CPT

## 2018-09-29 PROCEDURE — 6360000002 HC RX W HCPCS: Performed by: STUDENT IN AN ORGANIZED HEALTH CARE EDUCATION/TRAINING PROGRAM

## 2018-09-29 PROCEDURE — 85027 COMPLETE CBC AUTOMATED: CPT

## 2018-09-29 PROCEDURE — 2580000003 HC RX 258: Performed by: STUDENT IN AN ORGANIZED HEALTH CARE EDUCATION/TRAINING PROGRAM

## 2018-09-29 PROCEDURE — 82962 GLUCOSE BLOOD TEST: CPT

## 2018-09-29 PROCEDURE — 87328 CRYPTOSPORIDIUM AG IA: CPT

## 2018-09-29 PROCEDURE — 6360000002 HC RX W HCPCS: Performed by: NURSE PRACTITIONER

## 2018-09-29 PROCEDURE — 80048 BASIC METABOLIC PNL TOTAL CA: CPT

## 2018-09-29 PROCEDURE — 6370000000 HC RX 637 (ALT 250 FOR IP): Performed by: PHYSICIAN ASSISTANT

## 2018-09-29 PROCEDURE — 87425 ROTAVIRUS AG IA: CPT

## 2018-09-29 PROCEDURE — 6370000000 HC RX 637 (ALT 250 FOR IP): Performed by: HOSPITALIST

## 2018-09-29 PROCEDURE — 1200000000 HC SEMI PRIVATE

## 2018-09-29 RX ORDER — INSULIN GLARGINE 100 [IU]/ML
25 INJECTION, SOLUTION SUBCUTANEOUS NIGHTLY
Status: DISCONTINUED | OUTPATIENT
Start: 2018-09-30 | End: 2018-09-30

## 2018-09-29 RX ORDER — GLIPIZIDE 5 MG/1
10 TABLET ORAL
Status: DISCONTINUED | OUTPATIENT
Start: 2018-09-29 | End: 2018-09-29

## 2018-09-29 RX ORDER — INSULIN GLARGINE 100 [IU]/ML
25 INJECTION, SOLUTION SUBCUTANEOUS 2 TIMES DAILY
Status: DISCONTINUED | OUTPATIENT
Start: 2018-09-29 | End: 2018-09-29

## 2018-09-29 RX ADMIN — HYDROMORPHONE HYDROCHLORIDE 1 MG: 2 INJECTION INTRAMUSCULAR; INTRAVENOUS; SUBCUTANEOUS at 04:34

## 2018-09-29 RX ADMIN — METOCLOPRAMIDE 5 MG: 5 INJECTION, SOLUTION INTRAMUSCULAR; INTRAVENOUS at 16:49

## 2018-09-29 RX ADMIN — FLUVOXAMINE MALEATE 50 MG: 50 TABLET, FILM COATED ORAL at 08:15

## 2018-09-29 RX ADMIN — PREGABALIN 50 MG: 50 CAPSULE ORAL at 21:02

## 2018-09-29 RX ADMIN — PANTOPRAZOLE SODIUM 40 MG: 40 TABLET, DELAYED RELEASE ORAL at 08:15

## 2018-09-29 RX ADMIN — HYDROMORPHONE HYDROCHLORIDE 1 MG: 2 INJECTION INTRAMUSCULAR; INTRAVENOUS; SUBCUTANEOUS at 11:02

## 2018-09-29 RX ADMIN — HYDROMORPHONE HYDROCHLORIDE 1 MG: 2 INJECTION INTRAMUSCULAR; INTRAVENOUS; SUBCUTANEOUS at 21:10

## 2018-09-29 RX ADMIN — MAGNESIUM HYDROXIDE 30 ML: 400 SUSPENSION ORAL at 08:15

## 2018-09-29 RX ADMIN — PANCRELIPASE 6 CAPSULE: 60000; 12000; 38000 CAPSULE, DELAYED RELEASE PELLETS ORAL at 12:06

## 2018-09-29 RX ADMIN — MULTIPLE VITAMINS W/ MINERALS TAB 1 TABLET: TAB at 08:15

## 2018-09-29 RX ADMIN — METRONIDAZOLE 500 MG: 500 INJECTION, SOLUTION INTRAVENOUS at 17:27

## 2018-09-29 RX ADMIN — POLYETHYLENE GLYCOL 3350 17 G: 17 POWDER, FOR SOLUTION ORAL at 08:15

## 2018-09-29 RX ADMIN — TRAZODONE HYDROCHLORIDE 150 MG: 50 TABLET ORAL at 21:02

## 2018-09-29 RX ADMIN — ARIPIPRAZOLE 15 MG: 15 TABLET ORAL at 08:15

## 2018-09-29 RX ADMIN — HYDROMORPHONE HYDROCHLORIDE 1 MG: 2 INJECTION INTRAMUSCULAR; INTRAVENOUS; SUBCUTANEOUS at 17:25

## 2018-09-29 RX ADMIN — METOCLOPRAMIDE 5 MG: 5 INJECTION, SOLUTION INTRAMUSCULAR; INTRAVENOUS at 07:57

## 2018-09-29 RX ADMIN — PANCRELIPASE 6 CAPSULE: 60000; 12000; 38000 CAPSULE, DELAYED RELEASE PELLETS ORAL at 07:51

## 2018-09-29 RX ADMIN — PANCRELIPASE 6 CAPSULE: 60000; 12000; 38000 CAPSULE, DELAYED RELEASE PELLETS ORAL at 16:49

## 2018-09-29 RX ADMIN — FAMOTIDINE 20 MG: 20 TABLET ORAL at 21:02

## 2018-09-29 RX ADMIN — SODIUM CHLORIDE 1 G: 9 INJECTION INTRAMUSCULAR; INTRAVENOUS; SUBCUTANEOUS at 18:55

## 2018-09-29 RX ADMIN — ALPRAZOLAM 0.5 MG: 0.5 TABLET ORAL at 21:51

## 2018-09-29 RX ADMIN — METRONIDAZOLE 500 MG: 500 INJECTION, SOLUTION INTRAVENOUS at 09:46

## 2018-09-29 RX ADMIN — HYDROMORPHONE HYDROCHLORIDE 0.5 MG: 2 INJECTION INTRAMUSCULAR; INTRAVENOUS; SUBCUTANEOUS at 00:07

## 2018-09-29 RX ADMIN — HYDROMORPHONE HYDROCHLORIDE 1 MG: 2 INJECTION INTRAMUSCULAR; INTRAVENOUS; SUBCUTANEOUS at 14:25

## 2018-09-29 RX ADMIN — INSULIN LISPRO 5 UNITS: 100 INJECTION, SOLUTION INTRAVENOUS; SUBCUTANEOUS at 21:02

## 2018-09-29 RX ADMIN — OXYCODONE HYDROCHLORIDE 10 MG: 5 TABLET ORAL at 18:44

## 2018-09-29 RX ADMIN — PREGABALIN 50 MG: 50 CAPSULE ORAL at 08:15

## 2018-09-29 RX ADMIN — OXYCODONE HYDROCHLORIDE 10 MG: 5 TABLET ORAL at 05:50

## 2018-09-29 RX ADMIN — INSULIN GLARGINE 25 UNITS: 100 INJECTION, SOLUTION SUBCUTANEOUS at 08:04

## 2018-09-29 RX ADMIN — INSULIN LISPRO 18 UNITS: 100 INJECTION, SOLUTION INTRAVENOUS; SUBCUTANEOUS at 08:03

## 2018-09-29 RX ADMIN — METRONIDAZOLE 500 MG: 500 INJECTION, SOLUTION INTRAVENOUS at 01:17

## 2018-09-29 RX ADMIN — METHYLPHENIDATE HYDROCHLORIDE 10 MG: 5 TABLET ORAL at 08:15

## 2018-09-29 RX ADMIN — VORTIOXETINE 10 MG: 10 TABLET, FILM COATED ORAL at 21:02

## 2018-09-29 RX ADMIN — HYDROMORPHONE HYDROCHLORIDE 1 MG: 2 INJECTION INTRAMUSCULAR; INTRAVENOUS; SUBCUTANEOUS at 08:00

## 2018-09-29 RX ADMIN — OXYCODONE HYDROCHLORIDE 10 MG: 5 TABLET ORAL at 12:29

## 2018-09-29 ASSESSMENT — PAIN DESCRIPTION - LOCATION
LOCATION: ABDOMEN

## 2018-09-29 ASSESSMENT — PAIN DESCRIPTION - DESCRIPTORS
DESCRIPTORS: ACHING;CONSTANT;DISCOMFORT
DESCRIPTORS: ACHING
DESCRIPTORS: ACHING;CONSTANT;DISCOMFORT
DESCRIPTORS: ACHING
DESCRIPTORS: ACHING;CONSTANT;DISCOMFORT
DESCRIPTORS: ACHING;CONSTANT;DISCOMFORT
DESCRIPTORS: ACHING

## 2018-09-29 ASSESSMENT — PAIN SCALES - GENERAL
PAINLEVEL_OUTOF10: 0
PAINLEVEL_OUTOF10: 8
PAINLEVEL_OUTOF10: 6
PAINLEVEL_OUTOF10: 8
PAINLEVEL_OUTOF10: 7
PAINLEVEL_OUTOF10: 6
PAINLEVEL_OUTOF10: 8
PAINLEVEL_OUTOF10: 7
PAINLEVEL_OUTOF10: 6
PAINLEVEL_OUTOF10: 0
PAINLEVEL_OUTOF10: 0
PAINLEVEL_OUTOF10: 8
PAINLEVEL_OUTOF10: 8
PAINLEVEL_OUTOF10: 0
PAINLEVEL_OUTOF10: 0
PAINLEVEL_OUTOF10: 8
PAINLEVEL_OUTOF10: 6
PAINLEVEL_OUTOF10: 6
PAINLEVEL_OUTOF10: 7
PAINLEVEL_OUTOF10: 7
PAINLEVEL_OUTOF10: 8

## 2018-09-29 ASSESSMENT — PAIN DESCRIPTION - PAIN TYPE
TYPE: ACUTE PAIN

## 2018-09-29 ASSESSMENT — PAIN DESCRIPTION - PROGRESSION
CLINICAL_PROGRESSION: NOT CHANGED

## 2018-09-29 ASSESSMENT — PAIN DESCRIPTION - FREQUENCY: FREQUENCY: CONTINUOUS

## 2018-09-29 NOTE — PROGRESS NOTES
was transcribed using voice recognition software. Every effort was made to ensure accuracy; however, inadvertent computerized transcription errors may be present.   Electronically signed by EVERETT Gorman CNP on 9/29/2018 at 12:48 PM

## 2018-09-29 NOTE — PROGRESS NOTES
PROGRESS NOTE    [facility]  Dr. Puneet Hearn MD, Dr. Wayne Lozano MD, Dr Madalyn Grijalva, Dr. America Kuo MD      Antoni Francouce  50 y.o.  female    SUBJECTIVE:  Patient seen and examined today at bedside. No new complaints. States that she is feeling better. Pain is improved but still present in RLQ. Denies N/D/C, fever and chills. She is attempting to eat regular diet today. She is up and walking around.      OBJECTIVE:    /64   Pulse 77   Temp 98.7 °F (37.1 °C) (Oral)   Resp 16   Ht 5' 1\" (1.549 m)   Wt 112 lb (50.8 kg)   SpO2 98%   BMI 21.16 kg/m²     Gen: NAD, AAO x 3, thin   HEENT:PEERL, no icterus  Heart: RRR, no M/R/G  Lungs: CTAB  Abd.: soft, tender in RLQ, ND, BS +, no G/R, no HSM  Extr.: no C/C/E, no bruising         Lab Results   Component Value Date    WBC 1.5 09/28/2018    WBC 2.2 09/27/2018    WBC 1.5 09/26/2018    HGB 9.2 09/28/2018    HGB 10.5 09/27/2018    HGB 9.0 09/26/2018    HCT 28.0 09/28/2018    MCV 89.5 09/28/2018    RDW 14.6 09/28/2018    PLT 87 09/28/2018    PLT 67 09/27/2018    PLT 56 09/26/2018     Lab Results   Component Value Date     09/28/2018    K 4.1 09/28/2018    K 4.2 06/24/2018     09/28/2018    CO2 30 09/28/2018    BUN 4 09/28/2018    CREATININE 0.4 09/28/2018    CALCIUM 8.2 09/28/2018    PROT 5.2 09/25/2018    LABALBU 2.3 09/25/2018    BILITOT 0.3 09/25/2018    BILITOT 0.4 09/23/2018    BILITOT 0.4 09/22/2018    ALKPHOS 93 09/25/2018    ALKPHOS 107 09/23/2018    ALKPHOS 146 09/22/2018    AST 25 09/25/2018    AST 42 09/23/2018    AST 44 09/22/2018    ALT 25 09/25/2018    ALT 25 09/23/2018    ALT 29 09/22/2018     Lab Results   Component Value Date    LIPASE 8 09/25/2018    LIPASE 8 09/24/2018    LIPASE 25 09/22/2018     Lab Results   Component Value Date    AMYLASE 12 09/22/2018         ASSESSMENT/PLAN:    1) Ileocolitis, suspect infectious colitis, possible IBD, doubt ischemic  - patient is improving overall, stool culture collected

## 2018-09-30 ENCOUNTER — APPOINTMENT (OUTPATIENT)
Dept: GENERAL RADIOLOGY | Age: 48
DRG: 249 | End: 2018-09-30
Payer: COMMERCIAL

## 2018-09-30 LAB
ANION GAP SERPL CALCULATED.3IONS-SCNC: 10 MMOL/L (ref 7–16)
BUN BLDV-MCNC: 9 MG/DL (ref 6–20)
CALCIUM SERPL-MCNC: 8.7 MG/DL (ref 8.6–10.2)
CHLORIDE BLD-SCNC: 96 MMOL/L (ref 98–107)
CO2: 32 MMOL/L (ref 22–29)
CREAT SERPL-MCNC: 0.5 MG/DL (ref 0.5–1)
CULTURE, STOOL: NORMAL
GFR AFRICAN AMERICAN: >60
GFR NON-AFRICAN AMERICAN: >60 ML/MIN/1.73
GLUCOSE BLD-MCNC: 382 MG/DL (ref 74–109)
HCT VFR BLD CALC: 33.4 % (ref 34–48)
HEMOGLOBIN: 10.7 G/DL (ref 11.5–15.5)
MCH RBC QN AUTO: 29.3 PG (ref 26–35)
MCHC RBC AUTO-ENTMCNC: 32 % (ref 32–34.5)
MCV RBC AUTO: 91.5 FL (ref 80–99.9)
METER GLUCOSE: 231 MG/DL (ref 70–110)
METER GLUCOSE: 344 MG/DL (ref 70–110)
METER GLUCOSE: 356 MG/DL (ref 70–110)
METER GLUCOSE: 376 MG/DL (ref 70–110)
PDW BLD-RTO: 14.4 FL (ref 11.5–15)
PLATELET # BLD: 128 E9/L (ref 130–450)
PMV BLD AUTO: 10.1 FL (ref 7–12)
POTASSIUM SERPL-SCNC: 4.8 MMOL/L (ref 3.5–5)
RBC # BLD: 3.65 E12/L (ref 3.5–5.5)
ROTAVIRUS ANTIGEN: NORMAL
SODIUM BLD-SCNC: 138 MMOL/L (ref 132–146)
WBC # BLD: 2.4 E9/L (ref 4.5–11.5)

## 2018-09-30 PROCEDURE — 85027 COMPLETE CBC AUTOMATED: CPT

## 2018-09-30 PROCEDURE — 2580000003 HC RX 258: Performed by: STUDENT IN AN ORGANIZED HEALTH CARE EDUCATION/TRAINING PROGRAM

## 2018-09-30 PROCEDURE — 6370000000 HC RX 637 (ALT 250 FOR IP): Performed by: HOSPITALIST

## 2018-09-30 PROCEDURE — 97110 THERAPEUTIC EXERCISES: CPT

## 2018-09-30 PROCEDURE — 1200000000 HC SEMI PRIVATE

## 2018-09-30 PROCEDURE — 6360000002 HC RX W HCPCS: Performed by: STUDENT IN AN ORGANIZED HEALTH CARE EDUCATION/TRAINING PROGRAM

## 2018-09-30 PROCEDURE — 6360000002 HC RX W HCPCS: Performed by: NURSE PRACTITIONER

## 2018-09-30 PROCEDURE — 6370000000 HC RX 637 (ALT 250 FOR IP): Performed by: NURSE PRACTITIONER

## 2018-09-30 PROCEDURE — 6360000002 HC RX W HCPCS: Performed by: HOSPITALIST

## 2018-09-30 PROCEDURE — 74018 RADEX ABDOMEN 1 VIEW: CPT

## 2018-09-30 PROCEDURE — 82962 GLUCOSE BLOOD TEST: CPT

## 2018-09-30 PROCEDURE — 80048 BASIC METABOLIC PNL TOTAL CA: CPT

## 2018-09-30 PROCEDURE — 36415 COLL VENOUS BLD VENIPUNCTURE: CPT

## 2018-09-30 PROCEDURE — 97116 GAIT TRAINING THERAPY: CPT

## 2018-09-30 PROCEDURE — 2500000003 HC RX 250 WO HCPCS: Performed by: HOSPITALIST

## 2018-09-30 RX ORDER — FLUCONAZOLE 100 MG/1
150 TABLET ORAL ONCE
Status: COMPLETED | OUTPATIENT
Start: 2018-09-30 | End: 2018-10-01

## 2018-09-30 RX ORDER — SENNA AND DOCUSATE SODIUM 50; 8.6 MG/1; MG/1
2 TABLET, FILM COATED ORAL 2 TIMES DAILY
Status: DISCONTINUED | OUTPATIENT
Start: 2018-09-30 | End: 2018-10-02 | Stop reason: HOSPADM

## 2018-09-30 RX ORDER — SODIUM PHOSPHATE,MONO-DIBASIC 19G-7G/118
ENEMA (ML) RECTAL ONCE
Status: DISCONTINUED | OUTPATIENT
Start: 2018-09-30 | End: 2018-09-30

## 2018-09-30 RX ORDER — POLYETHYLENE GLYCOL 3350, SODIUM CHLORIDE, SODIUM BICARBONATE, POTASSIUM CHLORIDE 420; 11.2; 5.72; 1.48 G/4L; G/4L; G/4L; G/4L
2000 POWDER, FOR SOLUTION ORAL ONCE
Status: COMPLETED | OUTPATIENT
Start: 2018-09-30 | End: 2018-09-30

## 2018-09-30 RX ORDER — INSULIN GLARGINE 100 [IU]/ML
15 INJECTION, SOLUTION SUBCUTANEOUS 2 TIMES DAILY
Status: DISCONTINUED | OUTPATIENT
Start: 2018-09-30 | End: 2018-10-02 | Stop reason: HOSPADM

## 2018-09-30 RX ADMIN — INSULIN GLARGINE 15 UNITS: 100 INJECTION, SOLUTION SUBCUTANEOUS at 20:31

## 2018-09-30 RX ADMIN — ARIPIPRAZOLE 15 MG: 15 TABLET ORAL at 07:56

## 2018-09-30 RX ADMIN — INSULIN LISPRO 15 UNITS: 100 INJECTION, SOLUTION INTRAVENOUS; SUBCUTANEOUS at 17:53

## 2018-09-30 RX ADMIN — INSULIN LISPRO 6 UNITS: 100 INJECTION, SOLUTION INTRAVENOUS; SUBCUTANEOUS at 20:32

## 2018-09-30 RX ADMIN — FAMOTIDINE 20 MG: 20 TABLET ORAL at 20:29

## 2018-09-30 RX ADMIN — HYDROMORPHONE HYDROCHLORIDE 1 MG: 2 INJECTION INTRAMUSCULAR; INTRAVENOUS; SUBCUTANEOUS at 07:54

## 2018-09-30 RX ADMIN — HYDROMORPHONE HYDROCHLORIDE 1 MG: 2 INJECTION INTRAMUSCULAR; INTRAVENOUS; SUBCUTANEOUS at 21:31

## 2018-09-30 RX ADMIN — SODIUM CHLORIDE 1 G: 9 INJECTION INTRAMUSCULAR; INTRAVENOUS; SUBCUTANEOUS at 18:50

## 2018-09-30 RX ADMIN — POLYETHYLENE GLYCOL 3350 17 G: 17 POWDER, FOR SOLUTION ORAL at 07:57

## 2018-09-30 RX ADMIN — INSULIN LISPRO 6 UNITS: 100 INJECTION, SOLUTION INTRAVENOUS; SUBCUTANEOUS at 17:57

## 2018-09-30 RX ADMIN — FLUVOXAMINE MALEATE 50 MG: 50 TABLET, FILM COATED ORAL at 07:56

## 2018-09-30 RX ADMIN — PREGABALIN 50 MG: 50 CAPSULE ORAL at 07:57

## 2018-09-30 RX ADMIN — HYDROMORPHONE HYDROCHLORIDE 1 MG: 2 INJECTION INTRAMUSCULAR; INTRAVENOUS; SUBCUTANEOUS at 16:00

## 2018-09-30 RX ADMIN — VORTIOXETINE 10 MG: 10 TABLET, FILM COATED ORAL at 20:28

## 2018-09-30 RX ADMIN — TRAZODONE HYDROCHLORIDE 150 MG: 50 TABLET ORAL at 20:29

## 2018-09-30 RX ADMIN — PANCRELIPASE 6 CAPSULE: 60000; 12000; 38000 CAPSULE, DELAYED RELEASE PELLETS ORAL at 11:49

## 2018-09-30 RX ADMIN — MULTIPLE VITAMINS W/ MINERALS TAB 1 TABLET: TAB at 07:57

## 2018-09-30 RX ADMIN — ALPRAZOLAM 0.5 MG: 0.5 TABLET ORAL at 23:11

## 2018-09-30 RX ADMIN — INSULIN LISPRO 6 UNITS: 100 INJECTION, SOLUTION INTRAVENOUS; SUBCUTANEOUS at 11:49

## 2018-09-30 RX ADMIN — METRONIDAZOLE 500 MG: 500 INJECTION, SOLUTION INTRAVENOUS at 21:31

## 2018-09-30 RX ADMIN — OXYCODONE HYDROCHLORIDE 10 MG: 5 TABLET ORAL at 12:43

## 2018-09-30 RX ADMIN — METOCLOPRAMIDE 5 MG: 5 INJECTION, SOLUTION INTRAMUSCULAR; INTRAVENOUS at 04:00

## 2018-09-30 RX ADMIN — SENNOSIDES AND DOCUSATE SODIUM 2 TABLET: 8.6; 5 TABLET ORAL at 11:27

## 2018-09-30 RX ADMIN — PANCRELIPASE 6 CAPSULE: 60000; 12000; 38000 CAPSULE, DELAYED RELEASE PELLETS ORAL at 07:56

## 2018-09-30 RX ADMIN — METRONIDAZOLE 500 MG: 500 INJECTION, SOLUTION INTRAVENOUS at 06:58

## 2018-09-30 RX ADMIN — METHYLPHENIDATE HYDROCHLORIDE 10 MG: 5 TABLET ORAL at 07:56

## 2018-09-30 RX ADMIN — HYDROMORPHONE HYDROCHLORIDE 1 MG: 2 INJECTION INTRAMUSCULAR; INTRAVENOUS; SUBCUTANEOUS at 11:24

## 2018-09-30 RX ADMIN — HYDROMORPHONE HYDROCHLORIDE 1 MG: 2 INJECTION INTRAMUSCULAR; INTRAVENOUS; SUBCUTANEOUS at 00:51

## 2018-09-30 RX ADMIN — POLYETHYLENE GLYCOL-3350, SODIUM CHLORIDE, POTASSIUM CHLORIDE AND SODIUM BICARBONATE 2000 ML: 420; 11.2; 5.72; 1.48 POWDER, FOR SOLUTION ORAL at 12:44

## 2018-09-30 RX ADMIN — OXYCODONE HYDROCHLORIDE 10 MG: 5 TABLET ORAL at 17:53

## 2018-09-30 RX ADMIN — METRONIDAZOLE 500 MG: 500 INJECTION, SOLUTION INTRAVENOUS at 14:28

## 2018-09-30 RX ADMIN — PANTOPRAZOLE SODIUM 40 MG: 40 TABLET, DELAYED RELEASE ORAL at 07:57

## 2018-09-30 RX ADMIN — INSULIN LISPRO 15 UNITS: 100 INJECTION, SOLUTION INTRAVENOUS; SUBCUTANEOUS at 07:59

## 2018-09-30 RX ADMIN — HYDROMORPHONE HYDROCHLORIDE 1 MG: 2 INJECTION INTRAMUSCULAR; INTRAVENOUS; SUBCUTANEOUS at 04:02

## 2018-09-30 RX ADMIN — INSULIN GLARGINE 15 UNITS: 100 INJECTION, SOLUTION SUBCUTANEOUS at 14:28

## 2018-09-30 RX ADMIN — PREGABALIN 50 MG: 50 CAPSULE ORAL at 20:29

## 2018-09-30 ASSESSMENT — PAIN SCALES - GENERAL
PAINLEVEL_OUTOF10: 6
PAINLEVEL_OUTOF10: 10
PAINLEVEL_OUTOF10: 7
PAINLEVEL_OUTOF10: 7
PAINLEVEL_OUTOF10: 9
PAINLEVEL_OUTOF10: 5
PAINLEVEL_OUTOF10: 6
PAINLEVEL_OUTOF10: 7
PAINLEVEL_OUTOF10: 10
PAINLEVEL_OUTOF10: 8
PAINLEVEL_OUTOF10: 7
PAINLEVEL_OUTOF10: 5
PAINLEVEL_OUTOF10: 8

## 2018-09-30 ASSESSMENT — PAIN DESCRIPTION - PAIN TYPE
TYPE: ACUTE PAIN

## 2018-09-30 ASSESSMENT — PAIN DESCRIPTION - FREQUENCY
FREQUENCY: CONTINUOUS
FREQUENCY: CONTINUOUS

## 2018-09-30 ASSESSMENT — PAIN DESCRIPTION - LOCATION
LOCATION: ABDOMEN

## 2018-09-30 ASSESSMENT — PAIN DESCRIPTION - DESCRIPTORS
DESCRIPTORS: ACHING;DISCOMFORT
DESCRIPTORS: ACHING;DISCOMFORT;SHARP
DESCRIPTORS: ACHING;DISCOMFORT
DESCRIPTORS: ACHING;SHARP;DISCOMFORT

## 2018-09-30 NOTE — PROGRESS NOTES
PROGRESS NOTE    [facility]  Dr. Casandra Bernardo MD, Dr. Pedro Pena MD, Dr Belinda Petit, Dr. Darby Alvarado  50 y.o.  female    SUBJECTIVE:  Patient seen and examined today at bedside. No new complaints. States that she is feeling better. Pain is improved, minimally, still present in RLQ and left quadrant. Denies N/D/C, fever and chills. She is eating a regular diet and tolerating well per nursing staff. She is up and walking around.      OBJECTIVE:    /62   Pulse 70   Temp 98.1 °F (36.7 °C) (Oral)   Resp 16   Ht 5' 1\" (1.549 m)   Wt 112 lb (50.8 kg)   SpO2 100%   BMI 21.16 kg/m²     Gen: NAD, AAO x 3, thin   HEENT:PEERL, no icterus  Heart: RRR, no M/R/G  Lungs: CTAB  Abd.: soft, tender in RLQ and Left quadrant, ND, BS +, no G/R, no HSM  Extr.: no C/C/E, no bruising         Lab Results   Component Value Date    WBC 1.7 09/29/2018    WBC 1.5 09/28/2018    WBC 2.2 09/27/2018    HGB 9.0 09/29/2018    HGB 9.2 09/28/2018    HGB 10.5 09/27/2018    HCT 28.5 09/29/2018    MCV 90.5 09/29/2018    RDW 14.4 09/29/2018    PLT 98 09/29/2018    PLT 87 09/28/2018    PLT 67 09/27/2018     Lab Results   Component Value Date     09/29/2018    K 4.6 09/29/2018    K 4.2 06/24/2018     09/29/2018    CO2 27 09/29/2018    BUN 7 09/29/2018    CREATININE 0.4 09/29/2018    CALCIUM 8.0 09/29/2018    PROT 5.2 09/25/2018    LABALBU 2.3 09/25/2018    BILITOT 0.3 09/25/2018    BILITOT 0.4 09/23/2018    BILITOT 0.4 09/22/2018    ALKPHOS 93 09/25/2018    ALKPHOS 107 09/23/2018    ALKPHOS 146 09/22/2018    AST 25 09/25/2018    AST 42 09/23/2018    AST 44 09/22/2018    ALT 25 09/25/2018    ALT 25 09/23/2018    ALT 29 09/22/2018     Lab Results   Component Value Date    LIPASE 8 09/25/2018    LIPASE 8 09/24/2018    LIPASE 25 09/22/2018     Lab Results   Component Value Date    AMYLASE 12 09/22/2018         ASSESSMENT/PLAN:    1) Ileocolitis, suspect infectious colitis, possible IBD, doubt

## 2018-09-30 NOTE — PROGRESS NOTES
[facility] Hospitalist Progress Note    Admitting Date and Time: 9/22/2018  2:01 PM  Admit Dx: Hyperglycemia [R73.9]    Subjective:  Patient reports that the pain in her lower abdomen has improved, but she continues to have pain in her upper abdomen and left flank area. She has still not had a bowel movement. Abdomen is more distended today. She complains of yeast symptoms. ROS: denies fever, chills, cp, sob, n/v, HA unless stated above.       sennosides-docusate sodium  2 tablet Oral BID    fluconazole  150 mg Oral Once    insulin glargine  25 Units Subcutaneous Nightly    cefTRIAXone (ROCEPHIN) IV  1 g Intravenous Q24H    potassium replacement protocol   Other RX Placeholder    metroNIDAZOLE  500 mg Intravenous Q8H    insulin lispro  0-18 Units Subcutaneous TID WC    insulin lispro  0-9 Units Subcutaneous Nightly    ARIPiprazole  15 mg Oral Daily    famotidine  20 mg Oral Nightly    fluvoxaMINE  50 mg Oral Daily    lipase-protease-amylase  6 capsule Oral TID WC    methylphenidate  10 mg Oral Daily    therapeutic multivitamin-minerals  1 tablet Oral Daily    nicotine  1 patch Transdermal Q24H    pantoprazole  40 mg Oral Daily    pregabalin  50 mg Oral BID    traZODone  150 mg Oral Nightly    VORTIoxetine HBr  10 mg Oral Nightly       magnesium hydroxide 30 mL Daily PRN   iohexol 50 mL ONCE PRN   HYDROmorphone 0.5 mg Q3H PRN   Or     HYDROmorphone 1 mg Q3H PRN   oxyCODONE 5 mg Q4H PRN   Or     oxyCODONE 10 mg Q4H PRN   acetaminophen 650 mg Q6H PRN   potassium chloride 40 mEq PRN   Or     potassium chloride 40 mEq PRN   Or     potassium chloride 10 mEq PRN   metoclopramide 5 mg Q6H PRN   dextrose 12.5 g PRN   potassium chloride 10 mEq PRN   magnesium sulfate 1 g PRN   sodium phosphate IVPB 10 mmol PRN   Or     sodium phosphate IVPB 15 mmol PRN   Or     sodium phosphate IVPB 20 mmol PRN   albuterol sulfate HFA 2 puff Q6H PRN   ALPRAZolam 0.5 mg TID PRN   traMADol 50 mg Q6H PRN

## 2018-10-01 LAB
ANION GAP SERPL CALCULATED.3IONS-SCNC: 7 MMOL/L (ref 7–16)
BLOOD CULTURE, ROUTINE: NORMAL
BUN BLDV-MCNC: 8 MG/DL (ref 6–20)
C-REACTIVE PROTEIN: 0.6 MG/DL (ref 0–0.4)
CALCIUM SERPL-MCNC: 8.4 MG/DL (ref 8.6–10.2)
CHLORIDE BLD-SCNC: 97 MMOL/L (ref 98–107)
CO2: 32 MMOL/L (ref 22–29)
CREAT SERPL-MCNC: 0.4 MG/DL (ref 0.5–1)
CRYPTOSPORIDIUM ANTIGEN STOOL: NORMAL
CULTURE, BLOOD 2: NORMAL
GFR AFRICAN AMERICAN: >60
GFR NON-AFRICAN AMERICAN: >60 ML/MIN/1.73
GIARDIA ANTIGEN STOOL: NORMAL
GLUCOSE BLD-MCNC: 398 MG/DL (ref 74–109)
HCT VFR BLD CALC: 29 % (ref 34–48)
HEMOGLOBIN: 9.3 G/DL (ref 11.5–15.5)
MCH RBC QN AUTO: 29.7 PG (ref 26–35)
MCHC RBC AUTO-ENTMCNC: 32.1 % (ref 32–34.5)
MCV RBC AUTO: 92.7 FL (ref 80–99.9)
METER GLUCOSE: 151 MG/DL (ref 70–110)
METER GLUCOSE: 157 MG/DL (ref 70–110)
METER GLUCOSE: 287 MG/DL (ref 70–110)
METER GLUCOSE: 391 MG/DL (ref 70–110)
METER GLUCOSE: 396 MG/DL (ref 70–110)
PDW BLD-RTO: 14.8 FL (ref 11.5–15)
PLATELET # BLD: 121 E9/L (ref 130–450)
PMV BLD AUTO: 10.1 FL (ref 7–12)
POTASSIUM SERPL-SCNC: 4.7 MMOL/L (ref 3.5–5)
RBC # BLD: 3.13 E12/L (ref 3.5–5.5)
SEDIMENTATION RATE, ERYTHROCYTE: 25 MM/HR (ref 0–20)
SODIUM BLD-SCNC: 136 MMOL/L (ref 132–146)
WBC # BLD: 1.9 E9/L (ref 4.5–11.5)

## 2018-10-01 PROCEDURE — 6370000000 HC RX 637 (ALT 250 FOR IP): Performed by: STUDENT IN AN ORGANIZED HEALTH CARE EDUCATION/TRAINING PROGRAM

## 2018-10-01 PROCEDURE — 6360000002 HC RX W HCPCS: Performed by: NURSE PRACTITIONER

## 2018-10-01 PROCEDURE — 6370000000 HC RX 637 (ALT 250 FOR IP): Performed by: NURSE PRACTITIONER

## 2018-10-01 PROCEDURE — 6360000002 HC RX W HCPCS: Performed by: HOSPITALIST

## 2018-10-01 PROCEDURE — 6370000000 HC RX 637 (ALT 250 FOR IP): Performed by: HOSPITALIST

## 2018-10-01 PROCEDURE — 97530 THERAPEUTIC ACTIVITIES: CPT

## 2018-10-01 PROCEDURE — 85027 COMPLETE CBC AUTOMATED: CPT

## 2018-10-01 PROCEDURE — 86140 C-REACTIVE PROTEIN: CPT

## 2018-10-01 PROCEDURE — APPSS30 APP SPLIT SHARED TIME 16-30 MINUTES: Performed by: NURSE PRACTITIONER

## 2018-10-01 PROCEDURE — 1200000000 HC SEMI PRIVATE

## 2018-10-01 PROCEDURE — 97535 SELF CARE MNGMENT TRAINING: CPT

## 2018-10-01 PROCEDURE — 2580000003 HC RX 258: Performed by: STUDENT IN AN ORGANIZED HEALTH CARE EDUCATION/TRAINING PROGRAM

## 2018-10-01 PROCEDURE — 85651 RBC SED RATE NONAUTOMATED: CPT

## 2018-10-01 PROCEDURE — 97116 GAIT TRAINING THERAPY: CPT

## 2018-10-01 PROCEDURE — 80048 BASIC METABOLIC PNL TOTAL CA: CPT

## 2018-10-01 PROCEDURE — 2500000003 HC RX 250 WO HCPCS: Performed by: HOSPITALIST

## 2018-10-01 PROCEDURE — 36415 COLL VENOUS BLD VENIPUNCTURE: CPT

## 2018-10-01 PROCEDURE — 6360000002 HC RX W HCPCS: Performed by: STUDENT IN AN ORGANIZED HEALTH CARE EDUCATION/TRAINING PROGRAM

## 2018-10-01 PROCEDURE — 99233 SBSQ HOSP IP/OBS HIGH 50: CPT | Performed by: INTERNAL MEDICINE

## 2018-10-01 PROCEDURE — 82962 GLUCOSE BLOOD TEST: CPT

## 2018-10-01 RX ORDER — POLYETHYLENE GLYCOL 3350, SODIUM CHLORIDE, SODIUM BICARBONATE, POTASSIUM CHLORIDE 420; 11.2; 5.72; 1.48 G/4L; G/4L; G/4L; G/4L
4000 POWDER, FOR SOLUTION ORAL ONCE
Status: COMPLETED | OUTPATIENT
Start: 2018-10-01 | End: 2018-10-01

## 2018-10-01 RX ORDER — PREDNISONE 20 MG/1
40 TABLET ORAL DAILY
Status: DISCONTINUED | OUTPATIENT
Start: 2018-10-01 | End: 2018-10-01

## 2018-10-01 RX ADMIN — METHYLPHENIDATE HYDROCHLORIDE 10 MG: 5 TABLET ORAL at 08:54

## 2018-10-01 RX ADMIN — INSULIN LISPRO 6 UNITS: 100 INJECTION, SOLUTION INTRAVENOUS; SUBCUTANEOUS at 12:21

## 2018-10-01 RX ADMIN — POLYETHYLENE GLYCOL-3350, SODIUM CHLORIDE, POTASSIUM CHLORIDE AND SODIUM BICARBONATE 4000 ML: 420; 11.2; 5.72; 1.48 POWDER, FOR SOLUTION ORAL at 15:15

## 2018-10-01 RX ADMIN — FLUCONAZOLE 150 MG: 100 TABLET ORAL at 08:54

## 2018-10-01 RX ADMIN — PANTOPRAZOLE SODIUM 40 MG: 40 TABLET, DELAYED RELEASE ORAL at 08:56

## 2018-10-01 RX ADMIN — HYDROMORPHONE HYDROCHLORIDE 1 MG: 2 INJECTION INTRAMUSCULAR; INTRAVENOUS; SUBCUTANEOUS at 11:22

## 2018-10-01 RX ADMIN — TRAMADOL HYDROCHLORIDE 50 MG: 50 TABLET, FILM COATED ORAL at 10:08

## 2018-10-01 RX ADMIN — PANCRELIPASE 6 CAPSULE: 60000; 12000; 38000 CAPSULE, DELAYED RELEASE PELLETS ORAL at 07:53

## 2018-10-01 RX ADMIN — INSULIN GLARGINE 15 UNITS: 100 INJECTION, SOLUTION SUBCUTANEOUS at 08:57

## 2018-10-01 RX ADMIN — HYDROMORPHONE HYDROCHLORIDE 1 MG: 2 INJECTION INTRAMUSCULAR; INTRAVENOUS; SUBCUTANEOUS at 07:59

## 2018-10-01 RX ADMIN — HYDROMORPHONE HYDROCHLORIDE 1 MG: 2 INJECTION INTRAMUSCULAR; INTRAVENOUS; SUBCUTANEOUS at 19:01

## 2018-10-01 RX ADMIN — FAMOTIDINE 20 MG: 20 TABLET ORAL at 21:08

## 2018-10-01 RX ADMIN — ALPRAZOLAM 0.5 MG: 0.5 TABLET ORAL at 21:20

## 2018-10-01 RX ADMIN — MESALAMINE 1000 MG: 250 CAPSULE ORAL at 22:17

## 2018-10-01 RX ADMIN — MESALAMINE 1000 MG: 250 CAPSULE ORAL at 12:21

## 2018-10-01 RX ADMIN — INSULIN GLARGINE 15 UNITS: 100 INJECTION, SOLUTION SUBCUTANEOUS at 21:11

## 2018-10-01 RX ADMIN — FLUVOXAMINE MALEATE 50 MG: 50 TABLET, FILM COATED ORAL at 08:57

## 2018-10-01 RX ADMIN — OXYCODONE HYDROCHLORIDE 10 MG: 5 TABLET ORAL at 23:49

## 2018-10-01 RX ADMIN — METRONIDAZOLE 500 MG: 500 INJECTION, SOLUTION INTRAVENOUS at 22:17

## 2018-10-01 RX ADMIN — MULTIPLE VITAMINS W/ MINERALS TAB 1 TABLET: TAB at 08:57

## 2018-10-01 RX ADMIN — SODIUM CHLORIDE 1 G: 9 INJECTION INTRAMUSCULAR; INTRAVENOUS; SUBCUTANEOUS at 18:26

## 2018-10-01 RX ADMIN — SENNOSIDES AND DOCUSATE SODIUM 2 TABLET: 8.6; 5 TABLET ORAL at 08:56

## 2018-10-01 RX ADMIN — OXYCODONE HYDROCHLORIDE 10 MG: 5 TABLET ORAL at 14:01

## 2018-10-01 RX ADMIN — METRONIDAZOLE 500 MG: 500 INJECTION, SOLUTION INTRAVENOUS at 13:56

## 2018-10-01 RX ADMIN — OXYCODONE HYDROCHLORIDE 10 MG: 5 TABLET ORAL at 18:30

## 2018-10-01 RX ADMIN — METOCLOPRAMIDE 5 MG: 5 INJECTION, SOLUTION INTRAMUSCULAR; INTRAVENOUS at 10:12

## 2018-10-01 RX ADMIN — HYDROMORPHONE HYDROCHLORIDE 1 MG: 2 INJECTION INTRAMUSCULAR; INTRAVENOUS; SUBCUTANEOUS at 04:09

## 2018-10-01 RX ADMIN — ARIPIPRAZOLE 15 MG: 15 TABLET ORAL at 08:54

## 2018-10-01 RX ADMIN — VORTIOXETINE 10 MG: 10 TABLET, FILM COATED ORAL at 21:07

## 2018-10-01 RX ADMIN — TRAZODONE HYDROCHLORIDE 150 MG: 50 TABLET ORAL at 21:08

## 2018-10-01 RX ADMIN — HYDROMORPHONE HYDROCHLORIDE 1 MG: 2 INJECTION INTRAMUSCULAR; INTRAVENOUS; SUBCUTANEOUS at 22:17

## 2018-10-01 RX ADMIN — PREGABALIN 50 MG: 50 CAPSULE ORAL at 08:55

## 2018-10-01 RX ADMIN — PREGABALIN 50 MG: 50 CAPSULE ORAL at 21:07

## 2018-10-01 RX ADMIN — SENNOSIDES AND DOCUSATE SODIUM 2 TABLET: 8.6; 5 TABLET ORAL at 21:07

## 2018-10-01 RX ADMIN — METRONIDAZOLE 500 MG: 500 INJECTION, SOLUTION INTRAVENOUS at 06:07

## 2018-10-01 RX ADMIN — INSULIN LISPRO 9 UNITS: 100 INJECTION, SOLUTION INTRAVENOUS; SUBCUTANEOUS at 12:14

## 2018-10-01 RX ADMIN — INSULIN LISPRO 6 UNITS: 100 INJECTION, SOLUTION INTRAVENOUS; SUBCUTANEOUS at 07:56

## 2018-10-01 RX ADMIN — OXYCODONE HYDROCHLORIDE 10 MG: 5 TABLET ORAL at 06:51

## 2018-10-01 RX ADMIN — HYDROMORPHONE HYDROCHLORIDE 1 MG: 2 INJECTION INTRAMUSCULAR; INTRAVENOUS; SUBCUTANEOUS at 16:02

## 2018-10-01 RX ADMIN — INSULIN LISPRO 15 UNITS: 100 INJECTION, SOLUTION INTRAVENOUS; SUBCUTANEOUS at 07:53

## 2018-10-01 RX ADMIN — INSULIN LISPRO 7 UNITS: 100 INJECTION, SOLUTION INTRAVENOUS; SUBCUTANEOUS at 21:11

## 2018-10-01 ASSESSMENT — PAIN DESCRIPTION - PAIN TYPE
TYPE: ACUTE PAIN
TYPE: CHRONIC PAIN
TYPE: ACUTE PAIN

## 2018-10-01 ASSESSMENT — PAIN DESCRIPTION - FREQUENCY
FREQUENCY: CONTINUOUS

## 2018-10-01 ASSESSMENT — PAIN DESCRIPTION - DESCRIPTORS
DESCRIPTORS: ACHING
DESCRIPTORS: ACHING;DISCOMFORT;NAGGING

## 2018-10-01 ASSESSMENT — PAIN SCALES - GENERAL
PAINLEVEL_OUTOF10: 9
PAINLEVEL_OUTOF10: 9
PAINLEVEL_OUTOF10: 7
PAINLEVEL_OUTOF10: 7
PAINLEVEL_OUTOF10: 9
PAINLEVEL_OUTOF10: 9
PAINLEVEL_OUTOF10: 5
PAINLEVEL_OUTOF10: 4
PAINLEVEL_OUTOF10: 9
PAINLEVEL_OUTOF10: 9
PAINLEVEL_OUTOF10: 0
PAINLEVEL_OUTOF10: 6
PAINLEVEL_OUTOF10: 8
PAINLEVEL_OUTOF10: 9
PAINLEVEL_OUTOF10: 7
PAINLEVEL_OUTOF10: 8
PAINLEVEL_OUTOF10: 3
PAINLEVEL_OUTOF10: 8

## 2018-10-01 ASSESSMENT — PAIN DESCRIPTION - ONSET: ONSET: ON-GOING

## 2018-10-01 ASSESSMENT — PAIN DESCRIPTION - LOCATION
LOCATION: ABDOMEN
LOCATION: GENERALIZED
LOCATION: ABDOMEN
LOCATION: GENERALIZED

## 2018-10-01 NOTE — PROGRESS NOTES
negative and patient is still having symptoms despite IV abx  - will order fecal calprotectin, ESR and CRP  - start Pentasa 1g QID  - hold glucocorticoid until post-colonoscopy   - continue flagyl and rocephin (cipro relatively contraindicated in patients with concurrent liver disease)  - continue supportive care  - clear liquids today, NPO after breakfast in AM  - colonoscopy tentatively scheduled for tomorrow  - bowel prep with 4L nulytely      2) Hep C and chronic liver disease  - stable at this time  - defer to outpatient management     3) Chronic pancreatitis 2/2 ETOH  - stable at this time  - defer to outpatient management     4) DM/HHS/DKA  - management per primary      Above recommendations are tentative at this time. Case will be discussed with attending physician. Norris Miramontes DO  10/1/2018  10:37 AM     Pt seen and independently examined. D/w Dr. Barb Best. Agree with physical exam and A&P.       Grace Fortune MD  10/1/2018  12:41 PM

## 2018-10-01 NOTE — PROGRESS NOTES
[facility] Progress Note    Admitting Date and Time: 9/22/2018  2:01 PM  Admit Dx: Hyperglycemia [R73.9]    Subjective:    Pt sitting up in bed visiting with significant other. Patient continues to complain of nausea and diffuse abdominal pain. Patient was advised that since she is ambulating a little better she may not qualify for rehab. Patient will be NPO tonight for scope tomorrow    ROS: denies fever, chills, cp, sob, n/v, HA unless stated above.        mesalamine  1,000 mg Oral 4x Daily    sennosides-docusate sodium  2 tablet Oral BID    insulin glargine  15 Units Subcutaneous BID    insulin lispro  6 Units Subcutaneous TID WC    cefTRIAXone (ROCEPHIN) IV  1 g Intravenous Q24H    potassium replacement protocol   Other RX Placeholder    metroNIDAZOLE  500 mg Intravenous Q8H    insulin lispro  0-18 Units Subcutaneous TID WC    insulin lispro  0-9 Units Subcutaneous Nightly    ARIPiprazole  15 mg Oral Daily    famotidine  20 mg Oral Nightly    fluvoxaMINE  50 mg Oral Daily    lipase-protease-amylase  6 capsule Oral TID WC    methylphenidate  10 mg Oral Daily    therapeutic multivitamin-minerals  1 tablet Oral Daily    nicotine  1 patch Transdermal Q24H    pantoprazole  40 mg Oral Daily    pregabalin  50 mg Oral BID    traZODone  150 mg Oral Nightly    VORTIoxetine HBr  10 mg Oral Nightly       magnesium hydroxide 30 mL Daily PRN   iohexol 50 mL ONCE PRN   HYDROmorphone 0.5 mg Q3H PRN   Or     HYDROmorphone 1 mg Q3H PRN   oxyCODONE 5 mg Q4H PRN   Or     oxyCODONE 10 mg Q4H PRN   acetaminophen 650 mg Q6H PRN   potassium chloride 40 mEq PRN   Or     potassium chloride 40 mEq PRN   Or     potassium chloride 10 mEq PRN   metoclopramide 5 mg Q6H PRN   dextrose 12.5 g PRN   potassium chloride 10 mEq PRN   magnesium sulfate 1 g PRN   sodium phosphate IVPB 10 mmol PRN   Or     sodium phosphate IVPB 15 mmol PRN   Or     sodium phosphate IVPB 20 mmol PRN   albuterol sulfate HFA 2 puff Q6H PRN   ALPRAZolam 0.5 mg TID PRN   traMADol 50 mg Q6H PRN   dextrose 12.5 g PRN   potassium chloride 10 mEq PRN   magnesium sulfate 1 g PRN   sodium phosphate IVPB 10 mmol PRN   Or     sodium phosphate IVPB 15 mmol PRN   Or     sodium phosphate IVPB 20 mmol PRN   glucose 15 g PRN   dextrose 12.5 g PRN   glucagon (rDNA) 1 mg PRN   dextrose 100 mL/hr PRN        Objective:    /65   Pulse 72   Temp 98.8 °F (37.1 °C) (Oral)   Resp 18   Ht 5' 1\" (1.549 m)   Wt 99 lb 6.4 oz (45.1 kg)   SpO2 99%   BMI 18.78 kg/m²     General Appearance: alert and oriented to person, place and time and in no acute distress  Skin: warm and dry  Head: normocephalic and atraumatic  Eyes: pupils equal, round, and reactive to light, extraocular eye movements intact, conjunctivae normal  Neck: neck supple and non tender without mass   Pulmonary/Chest: clear to auscultation bilaterally- no wheezes, rales or rhonchi, normal air movement, no respiratory distress  Cardiovascular: normal rate, normal S1 and S2 and no carotid bruits  Abdomen: semi soft and distended with diffuse tenderness noted,  bowel sounds, no masses or organomegaly  Extremities: no cyanosis, no clubbing and no edema  Neurologic: no cranial nerve deficit and speech normal        Recent Labs      09/29/18   0732  09/30/18   0649  10/01/18   0628   NA  134  138  136   K  4.6  4.8  4.7   CL  100  96*  97*   CO2  27  32*  32*   BUN  7  9  8   CREATININE  0.4*  0.5  0.4*   GLUCOSE  416*  382*  398*   CALCIUM  8.0*  8.7  8.4*       Recent Labs      09/29/18   0732  09/30/18   0649  10/01/18   0628   WBC  1.7*  2.4*  1.9*   RBC  3.15*  3.65  3.13*   HGB  9.0*  10.7*  9.3*   HCT  28.5*  33.4*  29.0*   MCV  90.5  91.5  92.7   MCH  28.6  29.3  29.7   MCHC  31.6*  32.0  32.1   RDW  14.4  14.4  14.8   PLT  98*  128*  121*   MPV  10.1  10.1  10.1         Assessment:    Principal Problem:    Hyperglycemia  Active Problems:    Schizoaffective disorder, bipolar type (HCC)

## 2018-10-01 NOTE — PROGRESS NOTES
[facility] Quality Flow/Interdisciplinary Rounds Progress Note        Quality Flow Rounds held on October 1, 2018    Disciplines Attending:  Bedside Nurse, ,  and Nursing Unit Leadership    Camilo Leon was admitted on 9/22/2018  2:01 PM    Anticipated Discharge Date:  Expected Discharge Date: 09/29/18    Disposition:    Abe Score:  Abe Scale Score: 21    Readmission Risk              Risk of Unplanned Readmission:        45             Discussed patient goal for the day, patient clinical progression, and barriers to discharge.   The following Goal(s) of the Day/Commitment(s) have been identified:  [facility] pre-cert good thru today  Activity progression, bed alarm refusal signed    Jacob Martinez  October 1, 2018

## 2018-10-02 ENCOUNTER — ANESTHESIA (OUTPATIENT)
Dept: ENDOSCOPY | Age: 48
DRG: 249 | End: 2018-10-02
Payer: COMMERCIAL

## 2018-10-02 ENCOUNTER — ANESTHESIA EVENT (OUTPATIENT)
Dept: ENDOSCOPY | Age: 48
DRG: 249 | End: 2018-10-02
Payer: COMMERCIAL

## 2018-10-02 VITALS
DIASTOLIC BLOOD PRESSURE: 75 MMHG | WEIGHT: 97.9 LBS | TEMPERATURE: 97.7 F | RESPIRATION RATE: 20 BRPM | SYSTOLIC BLOOD PRESSURE: 120 MMHG | HEART RATE: 63 BPM | OXYGEN SATURATION: 100 % | BODY MASS INDEX: 18.48 KG/M2 | HEIGHT: 61 IN

## 2018-10-02 VITALS
RESPIRATION RATE: 10 BRPM | DIASTOLIC BLOOD PRESSURE: 61 MMHG | SYSTOLIC BLOOD PRESSURE: 96 MMHG | OXYGEN SATURATION: 100 %

## 2018-10-02 LAB
ANION GAP SERPL CALCULATED.3IONS-SCNC: 9 MMOL/L (ref 7–16)
BASOPHILS ABSOLUTE: 0 E9/L (ref 0–0.2)
BASOPHILS RELATIVE PERCENT: 0 % (ref 0–2)
BUN BLDV-MCNC: 7 MG/DL (ref 6–20)
CALCIUM SERPL-MCNC: 8.7 MG/DL (ref 8.6–10.2)
CHLORIDE BLD-SCNC: 99 MMOL/L (ref 98–107)
CO2: 31 MMOL/L (ref 22–29)
CREAT SERPL-MCNC: 0.4 MG/DL (ref 0.5–1)
EOSINOPHILS ABSOLUTE: 0.05 E9/L (ref 0.05–0.5)
EOSINOPHILS RELATIVE PERCENT: 2 % (ref 0–6)
FECAL NEUTRAL FAT: ABNORMAL
FECAL SPLIT FATS: ABNORMAL
GFR AFRICAN AMERICAN: >60
GFR NON-AFRICAN AMERICAN: >60 ML/MIN/1.73
GLUCOSE BLD-MCNC: 98 MG/DL (ref 74–109)
HCT VFR BLD CALC: 27.8 % (ref 34–48)
HEMOGLOBIN: 8.9 G/DL (ref 11.5–15.5)
LYMPHOCYTES ABSOLUTE: 0.88 E9/L (ref 1.5–4)
LYMPHOCYTES RELATIVE PERCENT: 35 % (ref 20–42)
MCH RBC QN AUTO: 29.2 PG (ref 26–35)
MCHC RBC AUTO-ENTMCNC: 32 % (ref 32–34.5)
MCV RBC AUTO: 91.1 FL (ref 80–99.9)
METER GLUCOSE: 102 MG/DL (ref 70–110)
METER GLUCOSE: 132 MG/DL (ref 70–110)
METER GLUCOSE: 82 MG/DL (ref 70–110)
MONOCYTES ABSOLUTE: 0.22 E9/L (ref 0.1–0.95)
MONOCYTES RELATIVE PERCENT: 9 % (ref 2–12)
NEUTROPHILS ABSOLUTE: 1.35 E9/L (ref 1.8–7.3)
NEUTROPHILS RELATIVE PERCENT: 54 % (ref 43–80)
PDW BLD-RTO: 14.8 FL (ref 11.5–15)
PLATELET # BLD: 107 E9/L (ref 130–450)
PMV BLD AUTO: 10.5 FL (ref 7–12)
POTASSIUM SERPL-SCNC: 4.5 MMOL/L (ref 3.5–5)
RBC # BLD: 3.05 E12/L (ref 3.5–5.5)
RBC # BLD: NORMAL 10*6/UL
SODIUM BLD-SCNC: 139 MMOL/L (ref 132–146)
WBC # BLD: 2.5 E9/L (ref 4.5–11.5)

## 2018-10-02 PROCEDURE — 2580000003 HC RX 258: Performed by: STUDENT IN AN ORGANIZED HEALTH CARE EDUCATION/TRAINING PROGRAM

## 2018-10-02 PROCEDURE — 0DBE8ZX EXCISION OF LARGE INTESTINE, VIA NATURAL OR ARTIFICIAL OPENING ENDOSCOPIC, DIAGNOSTIC: ICD-10-PCS | Performed by: ANESTHESIOLOGY

## 2018-10-02 PROCEDURE — 82962 GLUCOSE BLOOD TEST: CPT

## 2018-10-02 PROCEDURE — 2580000003 HC RX 258: Performed by: NURSE ANESTHETIST, CERTIFIED REGISTERED

## 2018-10-02 PROCEDURE — 80048 BASIC METABOLIC PNL TOTAL CA: CPT

## 2018-10-02 PROCEDURE — 2580000003 HC RX 258: Performed by: NURSE PRACTITIONER

## 2018-10-02 PROCEDURE — 3700000001 HC ADD 15 MINUTES (ANESTHESIA): Performed by: INTERNAL MEDICINE

## 2018-10-02 PROCEDURE — 99239 HOSP IP/OBS DSCHRG MGMT >30: CPT | Performed by: INTERNAL MEDICINE

## 2018-10-02 PROCEDURE — 85025 COMPLETE CBC W/AUTO DIFF WBC: CPT

## 2018-10-02 PROCEDURE — APPSS30 APP SPLIT SHARED TIME 16-30 MINUTES: Performed by: NURSE PRACTITIONER

## 2018-10-02 PROCEDURE — 6370000000 HC RX 637 (ALT 250 FOR IP): Performed by: HOSPITALIST

## 2018-10-02 PROCEDURE — 6360000002 HC RX W HCPCS: Performed by: NURSE ANESTHETIST, CERTIFIED REGISTERED

## 2018-10-02 PROCEDURE — 88305 TISSUE EXAM BY PATHOLOGIST: CPT

## 2018-10-02 PROCEDURE — APPSS45 APP SPLIT SHARED TIME 31-45 MINUTES: Performed by: NURSE PRACTITIONER

## 2018-10-02 PROCEDURE — 3700000000 HC ANESTHESIA ATTENDED CARE: Performed by: INTERNAL MEDICINE

## 2018-10-02 PROCEDURE — 3609010300 HC COLONOSCOPY W/BIOPSY SINGLE/MULTIPLE: Performed by: INTERNAL MEDICINE

## 2018-10-02 PROCEDURE — 6360000002 HC RX W HCPCS: Performed by: NURSE PRACTITIONER

## 2018-10-02 PROCEDURE — 7100000011 HC PHASE II RECOVERY - ADDTL 15 MIN: Performed by: INTERNAL MEDICINE

## 2018-10-02 PROCEDURE — 7100000010 HC PHASE II RECOVERY - FIRST 15 MIN: Performed by: INTERNAL MEDICINE

## 2018-10-02 PROCEDURE — 6360000002 HC RX W HCPCS: Performed by: HOSPITALIST

## 2018-10-02 PROCEDURE — 2500000003 HC RX 250 WO HCPCS: Performed by: HOSPITALIST

## 2018-10-02 PROCEDURE — 6370000000 HC RX 637 (ALT 250 FOR IP): Performed by: NURSE PRACTITIONER

## 2018-10-02 PROCEDURE — 2709999900 HC NON-CHARGEABLE SUPPLY: Performed by: INTERNAL MEDICINE

## 2018-10-02 PROCEDURE — 36415 COLL VENOUS BLD VENIPUNCTURE: CPT

## 2018-10-02 RX ORDER — METRONIDAZOLE 500 MG/1
500 TABLET ORAL 3 TIMES DAILY
Refills: 0 | DISCHARGE
Start: 2018-10-02 | End: 2018-10-05

## 2018-10-02 RX ORDER — PREGABALIN 50 MG/1
50 CAPSULE ORAL 2 TIMES DAILY
Qty: 6 CAPSULE | Refills: 0 | Status: SHIPPED | OUTPATIENT
Start: 2018-10-02 | End: 2019-11-15 | Stop reason: ALTCHOICE

## 2018-10-02 RX ORDER — SODIUM CHLORIDE 9 MG/ML
INJECTION, SOLUTION INTRAVENOUS CONTINUOUS
Status: DISCONTINUED | OUTPATIENT
Start: 2018-10-02 | End: 2018-10-02 | Stop reason: HOSPADM

## 2018-10-02 RX ORDER — SENNA AND DOCUSATE SODIUM 50; 8.6 MG/1; MG/1
2 TABLET, FILM COATED ORAL 2 TIMES DAILY
DISCHARGE
Start: 2018-10-02 | End: 2022-05-23 | Stop reason: SDUPTHER

## 2018-10-02 RX ORDER — TRAMADOL HYDROCHLORIDE 50 MG/1
50 TABLET ORAL EVERY 6 HOURS PRN
Qty: 12 TABLET | Refills: 0 | Status: SHIPPED | OUTPATIENT
Start: 2018-10-02 | End: 2018-10-05

## 2018-10-02 RX ORDER — PROPOFOL 10 MG/ML
INJECTION, EMULSION INTRAVENOUS CONTINUOUS PRN
Status: DISCONTINUED | OUTPATIENT
Start: 2018-10-02 | End: 2018-10-02 | Stop reason: SDUPTHER

## 2018-10-02 RX ORDER — FENTANYL CITRATE 50 UG/ML
INJECTION, SOLUTION INTRAMUSCULAR; INTRAVENOUS PRN
Status: DISCONTINUED | OUTPATIENT
Start: 2018-10-02 | End: 2018-10-02 | Stop reason: SDUPTHER

## 2018-10-02 RX ORDER — CEFDINIR 300 MG/1
300 CAPSULE ORAL 2 TIMES DAILY
Qty: 6 CAPSULE | Refills: 0 | DISCHARGE
Start: 2018-10-02 | End: 2018-10-05

## 2018-10-02 RX ORDER — MIDAZOLAM HYDROCHLORIDE 1 MG/ML
INJECTION INTRAMUSCULAR; INTRAVENOUS PRN
Status: DISCONTINUED | OUTPATIENT
Start: 2018-10-02 | End: 2018-10-02 | Stop reason: SDUPTHER

## 2018-10-02 RX ORDER — METHYLPHENIDATE HYDROCHLORIDE 10 MG/1
10 TABLET ORAL DAILY
Qty: 3 TABLET | Refills: 0 | Status: SHIPPED | OUTPATIENT
Start: 2018-10-02 | End: 2018-10-05

## 2018-10-02 RX ORDER — SODIUM CHLORIDE 9 MG/ML
INJECTION, SOLUTION INTRAVENOUS CONTINUOUS PRN
Status: DISCONTINUED | OUTPATIENT
Start: 2018-10-02 | End: 2018-10-02 | Stop reason: SDUPTHER

## 2018-10-02 RX ORDER — INSULIN GLARGINE 100 [IU]/ML
15 INJECTION, SOLUTION SUBCUTANEOUS 2 TIMES DAILY
Qty: 1 VIAL | Refills: 3 | DISCHARGE
Start: 2018-10-02 | End: 2022-05-23 | Stop reason: SDUPTHER

## 2018-10-02 RX ORDER — OXYCODONE HYDROCHLORIDE 10 MG/1
10 TABLET ORAL EVERY 4 HOURS PRN
Qty: 18 TABLET | Refills: 0 | Status: SHIPPED | OUTPATIENT
Start: 2018-10-02 | End: 2018-10-05

## 2018-10-02 RX ORDER — LACTULOSE 10 G/15ML
45 SOLUTION ORAL DAILY
Status: DISCONTINUED | OUTPATIENT
Start: 2018-10-02 | End: 2018-10-02 | Stop reason: HOSPADM

## 2018-10-02 RX ORDER — PROPOFOL 10 MG/ML
INJECTION, EMULSION INTRAVENOUS PRN
Status: DISCONTINUED | OUTPATIENT
Start: 2018-10-02 | End: 2018-10-02 | Stop reason: SDUPTHER

## 2018-10-02 RX ORDER — LACTULOSE 10 G/15ML
45 SOLUTION ORAL DAILY
Refills: 1 | DISCHARGE
Start: 2018-10-02 | End: 2020-08-25

## 2018-10-02 RX ORDER — ALPRAZOLAM 0.5 MG/1
0.5 TABLET ORAL 3 TIMES DAILY PRN
Qty: 9 TABLET | Refills: 0 | Status: SHIPPED | OUTPATIENT
Start: 2018-10-02 | End: 2018-10-05

## 2018-10-02 RX ADMIN — DEXTROSE MONOHYDRATE 12.5 G: 25 INJECTION, SOLUTION INTRAVENOUS at 15:25

## 2018-10-02 RX ADMIN — MULTIPLE VITAMINS W/ MINERALS TAB 1 TABLET: TAB at 12:31

## 2018-10-02 RX ADMIN — METOCLOPRAMIDE 5 MG: 5 INJECTION, SOLUTION INTRAMUSCULAR; INTRAVENOUS at 18:33

## 2018-10-02 RX ADMIN — HYDROMORPHONE HYDROCHLORIDE 1 MG: 2 INJECTION INTRAMUSCULAR; INTRAVENOUS; SUBCUTANEOUS at 03:28

## 2018-10-02 RX ADMIN — OXYCODONE HYDROCHLORIDE 10 MG: 5 TABLET ORAL at 13:50

## 2018-10-02 RX ADMIN — HYDROMORPHONE HYDROCHLORIDE 1 MG: 2 INJECTION INTRAMUSCULAR; INTRAVENOUS; SUBCUTANEOUS at 18:33

## 2018-10-02 RX ADMIN — SODIUM CHLORIDE 50 ML/HR: 9 INJECTION, SOLUTION INTRAVENOUS at 12:12

## 2018-10-02 RX ADMIN — FLUVOXAMINE MALEATE 50 MG: 50 TABLET, FILM COATED ORAL at 10:38

## 2018-10-02 RX ADMIN — PANTOPRAZOLE SODIUM 40 MG: 40 TABLET, DELAYED RELEASE ORAL at 10:30

## 2018-10-02 RX ADMIN — PROPOFOL 100 MCG/KG/MIN: 10 INJECTION, EMULSION INTRAVENOUS at 16:54

## 2018-10-02 RX ADMIN — HYDROMORPHONE HYDROCHLORIDE 1 MG: 2 INJECTION INTRAMUSCULAR; INTRAVENOUS; SUBCUTANEOUS at 15:13

## 2018-10-02 RX ADMIN — HYDROMORPHONE HYDROCHLORIDE 1 MG: 2 INJECTION INTRAMUSCULAR; INTRAVENOUS; SUBCUTANEOUS at 12:07

## 2018-10-02 RX ADMIN — SENNOSIDES AND DOCUSATE SODIUM 2 TABLET: 8.6; 5 TABLET ORAL at 10:30

## 2018-10-02 RX ADMIN — PROPOFOL 100 MCG/KG/MIN: 10 INJECTION, EMULSION INTRAVENOUS at 17:13

## 2018-10-02 RX ADMIN — OXYCODONE HYDROCHLORIDE 10 MG: 5 TABLET ORAL at 06:46

## 2018-10-02 RX ADMIN — MIDAZOLAM 2 MG: 1 INJECTION INTRAMUSCULAR; INTRAVENOUS at 16:51

## 2018-10-02 RX ADMIN — METHYLPHENIDATE HYDROCHLORIDE 10 MG: 5 TABLET ORAL at 10:31

## 2018-10-02 RX ADMIN — PREGABALIN 50 MG: 50 CAPSULE ORAL at 10:30

## 2018-10-02 RX ADMIN — SODIUM CHLORIDE: 9 INJECTION, SOLUTION INTRAVENOUS at 16:50

## 2018-10-02 RX ADMIN — METRONIDAZOLE 500 MG: 500 INJECTION, SOLUTION INTRAVENOUS at 14:54

## 2018-10-02 RX ADMIN — OXYCODONE HYDROCHLORIDE 10 MG: 5 TABLET ORAL at 19:31

## 2018-10-02 RX ADMIN — PROPOFOL 40 MG: 10 INJECTION, EMULSION INTRAVENOUS at 16:55

## 2018-10-02 RX ADMIN — ARIPIPRAZOLE 15 MG: 15 TABLET ORAL at 10:38

## 2018-10-02 RX ADMIN — METRONIDAZOLE 500 MG: 500 INJECTION, SOLUTION INTRAVENOUS at 06:23

## 2018-10-02 RX ADMIN — METOCLOPRAMIDE 5 MG: 5 INJECTION, SOLUTION INTRAMUSCULAR; INTRAVENOUS at 12:06

## 2018-10-02 RX ADMIN — FENTANYL CITRATE 50 MCG: 50 INJECTION, SOLUTION INTRAMUSCULAR; INTRAVENOUS at 16:54

## 2018-10-02 RX ADMIN — PANCRELIPASE 6 CAPSULE: 60000; 12000; 38000 CAPSULE, DELAYED RELEASE PELLETS ORAL at 18:40

## 2018-10-02 RX ADMIN — PROPOFOL 70 MG: 10 INJECTION, EMULSION INTRAVENOUS at 16:54

## 2018-10-02 ASSESSMENT — PAIN DESCRIPTION - PROGRESSION: CLINICAL_PROGRESSION: NOT CHANGED

## 2018-10-02 ASSESSMENT — PAIN SCALES - GENERAL
PAINLEVEL_OUTOF10: 8
PAINLEVEL_OUTOF10: 7
PAINLEVEL_OUTOF10: 8
PAINLEVEL_OUTOF10: 6
PAINLEVEL_OUTOF10: 9
PAINLEVEL_OUTOF10: 10
PAINLEVEL_OUTOF10: 10
PAINLEVEL_OUTOF10: 9
PAINLEVEL_OUTOF10: 0

## 2018-10-02 ASSESSMENT — PAIN DESCRIPTION - FREQUENCY: FREQUENCY: CONTINUOUS

## 2018-10-02 ASSESSMENT — PAIN DESCRIPTION - DESCRIPTORS: DESCRIPTORS: ACHING;DISCOMFORT;NAGGING

## 2018-10-02 ASSESSMENT — PAIN DESCRIPTION - ONSET: ONSET: ON-GOING

## 2018-10-02 ASSESSMENT — PAIN DESCRIPTION - ORIENTATION: ORIENTATION: RIGHT

## 2018-10-02 ASSESSMENT — PAIN DESCRIPTION - LOCATION: LOCATION: ABDOMEN

## 2018-10-02 ASSESSMENT — PAIN DESCRIPTION - PAIN TYPE: TYPE: ACUTE PAIN

## 2018-10-02 NOTE — PROGRESS NOTES
g PRN   sodium phosphate IVPB 10 mmol PRN   Or     sodium phosphate IVPB 15 mmol PRN   Or     sodium phosphate IVPB 20 mmol PRN   glucose 15 g PRN   dextrose 12.5 g PRN   glucagon (rDNA) 1 mg PRN   dextrose 100 mL/hr PRN        Objective:    BP (!) 109/55   Pulse 62   Temp 98 °F (36.7 °C) (Oral)   Resp 12   Ht 5' 1\" (1.549 m)   Wt 97 lb 14.4 oz (44.4 kg)   SpO2 100%   BMI 18.50 kg/m²     General Appearance: alert and oriented to person, place and time and in no acute distress  Skin: warm and dry  Head: normocephalic and atraumatic  Eyes: pupils equal, round, and reactive to light, extraocular eye movements intact, conjunctivae normal  Neck: neck supple and non tender without mass   Pulmonary/Chest: clear to auscultation bilaterally- no wheezes, rales or rhonchi, normal air movement, no respiratory distress  Cardiovascular: normal rate, normal S1 and S2 and no carotid bruits  Abdomen: softly distended and tender with bowel sounds noted   Extremities: no cyanosis, no clubbing and no edema  Neurologic: no cranial nerve deficit and speech normal        Recent Labs      09/30/18   0649  10/01/18   0628  10/02/18   0821   NA  138  136  139   K  4.8  4.7  4.5   CL  96*  97*  99   CO2  32*  32*  31*   BUN  9  8  7   CREATININE  0.5  0.4*  0.4*   GLUCOSE  382*  398*  98   CALCIUM  8.7  8.4*  8.7       Recent Labs      09/30/18   0649  10/01/18   0628  10/02/18   0821   WBC  2.4*  1.9*  2.5*   RBC  3.65  3.13*  3.05*   HGB  10.7*  9.3*  8.9*   HCT  33.4*  29.0*  27.8*   MCV  91.5  92.7  91.1   MCH  29.3  29.7  29.2   MCHC  32.0  32.1  32.0   RDW  14.4  14.8  14.8   PLT  128*  121*  107*   MPV  10.1  10.1  10.5         Assessment:    Principal Problem:    Hyperglycemia  Active Problems:    Schizoaffective disorder, bipolar type (HCC)    Uncontrolled diabetes mellitus (HCC)    Chronic abdominal pain    Chronic pancreatitis (HCC)    Electrolyte imbalance    Lactic acidosis    Severe protein-calorie malnutrition

## 2018-10-02 NOTE — ANESTHESIA POSTPROCEDURE EVALUATION
Department of Anesthesiology  Postprocedure Note    Patient: Petra Solomon  MRN: [id number]  YOB: 1970  Date of evaluation: 10/2/2018  Time:  5:53 PM     Procedure Summary     Date:  10/02/18 Room / Location:  [address] ENDOSCOPY    Anesthesia Start:  6072 Anesthesia Stop:  6323    Procedure:  COLONOSCOPY WITH BIOPSY (N/A ) Diagnosis:  (colitis)    Surgeon:  Car Mason MD Responsible Provider:  Aminta Cabot, MD    Anesthesia Type:  MAC ASA Status:  4          Anesthesia Type: MAC    Max Phase I: Max Score: 6    Max Phase II:      Last vitals: Reviewed and per EMR flowsheets.        Anesthesia Post Evaluation    Patient location during evaluation: PACU  Patient participation: complete - patient participated  Level of consciousness: awake and alert  Airway patency: patent  Nausea & Vomiting: no nausea and no vomiting  Complications: no  Cardiovascular status: hemodynamically stable  Respiratory status: acceptable  Hydration status: euvolemic

## 2018-10-02 NOTE — ANESTHESIA PRE PROCEDURE
12.5 g  12.5 g Intravenous PRN Mariano Eng, APRN - CNP        potassium chloride 10 mEq/100 mL IVPB (Peripheral Line)  10 mEq Intravenous PRN Mariano Eng, APRN - CNP        magnesium sulfate 1 g in dextrose 5% 100 mL IVPB  1 g Intravenous PRN Mariano Eng, APRN - CNP        sodium phosphate 10 mmol in dextrose 5 % 250 mL IVPB  10 mmol Intravenous PRN Mariano Eng, APRN - CNP        Or    sodium phosphate 15 mmol in dextrose 5 % 250 mL IVPB  15 mmol Intravenous PRN Mariano Eng, APRN - CNP        Or    sodium phosphate 20 mmol in dextrose 5 % 500 mL IVPB  20 mmol Intravenous PRN Mariano Eng, APRN - CNP        albuterol sulfate  (90 Base) MCG/ACT inhaler 2 puff  2 puff Inhalation Q6H PRN [facility], APRN - CNP        ALPRAZolam Jeyson Woody) tablet 0.5 mg  0.5 mg Oral TID PRN [facility], APRN - CNP   0.5 mg at 10/01/18 2120    ARIPiprazole (ABILIFY) tablet 15 mg  15 mg Oral Daily [facility], APRN - CNP   15 mg at 10/02/18 1038    famotidine (PEPCID) tablet 20 mg  20 mg Oral Nightly [facility], APRN - CNP   20 mg at 10/01/18 2108    fluvoxaMINE (LUVOX) tablet 50 mg  50 mg Oral Daily [facility], APRN - CNP   50 mg at 10/02/18 1038    lipase-protease-amylase (CREON) 11620 units delayed release capsule 6 capsule  6 capsule Oral TID WC [facility], APRN - CNP   6 capsule at 10/01/18 0753    methylphenidate (RITALIN) tablet 10 mg  10 mg Oral Daily [facility], APRN - CNP   10 mg at 10/02/18 1031    therapeutic multivitamin-minerals 1 tablet  1 tablet Oral Daily [facility], APRN - CNP   1 tablet at 10/02/18 1231    nicotine (NICODERM CQ) 21 MG/24HR 1 patch  1 patch Transdermal Q24H [facility], APRN - CNP   1 patch at 10/01/18 2217    pantoprazole (PROTONIX) tablet 40 mg  40 mg Oral Daily [facility], APRN - CNP   40 mg at 10/02/18 1030    pregabalin (LYRICA) capsule 50 mg  50 mg Oral BID [facility], APRN - CNP   50 mg at 10/02/18 1030    traMADol (ULTRAM) tablet 50 mg  50 mg Oral Q6H PRN [facility], APRN - CNP   50 mg at 10/01/18 1008    traZODone (DESYREL) tablet 150 mg  150 mg Oral Nightly Curlie Earing, APRN - CNP   150 mg at 10/01/18 2108    VORTIoxetine (TRINTELLIX) tablet 10 mg  10 mg Oral Nightly Curlie Earing, APRN - CNP   10 mg at 10/01/18 2107    dextrose 50 % solution 12.5 g  12.5 g Intravenous PRN Curlie Earing, APRN - CNP        potassium chloride 10 mEq/100 mL IVPB (Peripheral Line)  10 mEq Intravenous PRN Curlie Earing, APRN - CNP        magnesium sulfate 1 g in dextrose 5% 100 mL IVPB  1 g Intravenous PRN Curlie Earing, APRN - CNP   Stopped at 09/23/18 1326    sodium phosphate 10 mmol in dextrose 5 % 250 mL IVPB  10 mmol Intravenous PRN Curlie Earing, APRN - CNP        Or    sodium phosphate 15 mmol in dextrose 5 % 250 mL IVPB  15 mmol Intravenous PRN Curlie Earing, APRN - CNP   Stopped at 09/23/18 2001    Or    sodium phosphate 20 mmol in dextrose 5 % 500 mL IVPB  20 mmol Intravenous PRN Curlie Earing, APRN - CNP        glucose (GLUTOSE) 40 % oral gel 15 g  15 g Oral PRN Curlie Earing, APRN - CNP        dextrose 50 % solution 12.5 g  12.5 g Intravenous PRN Curlie Earing, APRN - CNP   12.5 g at 10/02/18 1525    glucagon (rDNA) injection 1 mg  1 mg Intramuscular PRN Curlie Earing, APRN - CNP        dextrose 5 % solution  100 mL/hr Intravenous PRN Curlie Earing, APRN - CNP           Allergies: Allergies   Allergen Reactions    Dye [Iodides]     Tylenol [Acetaminophen]      Was told not to take by her   For a bad liver     OK  to use sparingly         Problem List:    Patient Active Problem List   Diagnosis Code    Chronic pancreatitis due to acute alcohol intoxication ([facility] K86.0    Schizoaffective disorder, bipolar type (HCC) F25.0    Anxiety disorder F41.9    Depression F32.9    History of alcohol abuse Z87.898    Pancreatic pseudocyst ([facility] K86.3    Liver dysfunction K76.89    Asthma J45.909    Gastroesophageal reflux disease without esophagitis K21.9    Bipolar I disorder ([facility] F31.9    Diabetes mellitus, type 2 ([facility] [address]     GERD (gastroesophageal reflux disease)     Hepatitis C     Jaundice 5/12/2016    Schizoaffective disorder, bipolar type ([facility] [address] [facility]     Splenomegaly 5/30/2017       Past Surgical History:        Procedure Laterality Date    ABDOMEN SURGERY      gallbladder removal    BREAST BIOPSY Left     CHOLECYSTECTOMY  2010    COLONOSCOPY      ENDOSCOPY, COLON, DIAGNOSTIC      ERCP      april 2016 at [address]  2004    OTHER SURGICAL HISTORY Left 04/25/2017     Left breast blue dye injection, Sential Node Lymph Biopsy with left breast lumpectomy    OTHER SURGICAL HISTORY  10/05/2017    simple left mastectomy    OVARY REMOVAL Bilateral 2007    PANCREAS BIOPSY      with stent    SHOULDER SURGERY Left 2003    UPPER GASTROINTESTINAL ENDOSCOPY         Social History:    Social History   Substance Use Topics    Smoking status: Current Every Day Smoker     Packs/day: 0.50     Years: 32.00     Types: Cigarettes     Start date: 1/1/1982    Smokeless tobacco: Never Used    Alcohol use 1.8 oz/week     3 Cans of beer per week      Comment: daily  2 or 3   24oz cans                                Ready to quit: Not Answered  Counseling given: Not Answered      Vital Signs (Current):   Vitals:    10/01/18 1845 10/01/18 2349 10/02/18 0600 10/02/18 0841   BP: 118/60   (!) 109/55   Pulse: 74   62   Resp: 18   12   Temp: 98 °F (36.7 °C)   98 °F (36.7 °C)   TempSrc: Oral   Oral   SpO2: 98% 97%  100%   Weight:   97 lb 14.4 oz (44.4 kg)    Height:                                                  BP Readings from Last 3 Encounters:   10/02/18 (!) 109/55   07/21/18 108/66   07/21/18 122/81       NPO Status:                                                                                 BMI:   Wt Readings from Last 3 Encounters:   10/02/18 97 lb 14.4 oz (44.4 kg)   07/21/18 85 lb (38.6 kg)   07/19/18 85 lb (38.6 kg)     Body mass index is 18.5 kg/m².     CBC:   Lab Results   Component Value ROS.                                     Anesthesia Plan      MAC     ASA 4       Induction: intravenous. Anesthetic plan and risks discussed with patient. Plan discussed with CRNA. PAT Chart Review:     Chart reviewed per protocol by Josefina Quintanilla MD at 12:57 AM on October 2, 2018. Final assessment and plan per day of surgery anesthesia staff. DOS STAFF ADDENDUM:    Pt seen and examined, chart reviewed (including anesthesia, drug and allergy history). Anesthetic plan, risks, benefits, alternatives, and personnel involved discussed with patient. Patient verbalized an understanding and agrees to proceed. Plan discussed with care team members and agreed upon.     Zane Berry MD  Staff Anesthesiologist  4:35 PM

## 2018-10-02 NOTE — PLAN OF CARE
Patient seen prior to colonoscopy  No new complaints. States that stool still has no current completely clear. Somewhat reluctant to have tapwater enema- above discussed with the patient and explained the rationale for enema in order to achieve best possible prep follow for appropriate visualization and obtaining of biopsy samples. Patient voiced understanding and agreement to the enema at this point. Proceed with colonoscopy as planned.     Samaria Wilson MD  10/2/2018  1:17 PM

## 2018-10-02 NOTE — DISCHARGE SUMMARY
[facility] Physician Discharge Summary       Alyson De Leon [facility] [address]  [address] [facility]  [phone]    Call in 1 week  As needed, If symptoms worsen    [facility]  [address] [address]  [address] [place] [address], [address] [place] [phone]    Schedule an appointment as soon as possible for a visit in 2 weeks  Call for a follow up appointment       Activity level: As Tolerated     Diet: Diet NPO Time Specified Exceptions are: Sips with Juan Miguel Patrick.     Patient ID:  Gena Acosta  [id number]  50 y.o.  1970    Admit date: 9/22/2018    Discharge date and time:  10/2/2018  3:44 PM    Admission Diagnoses: Principal Problem:    Hyperglycemia  Active Problems:    Schizoaffective disorder, bipolar type ([address]    Uncontrolled diabetes mellitus ([address]    Chronic abdominal pain    Chronic pancreatitis (HCC)    Electrolyte imbalance    Lactic acidosis    Severe protein-calorie malnutrition ([address]  Resolved Problems:    * No resolved hospital problems. *      Discharge Diagnoses: Principal Problem:    Hyperglycemia  Active Problems:    Schizoaffective disorder, bipolar type (HCC)    Uncontrolled diabetes mellitus (HCC)    Chronic abdominal pain    Chronic pancreatitis (HCC)    Electrolyte imbalance    Lactic acidosis    Severe protein-calorie malnutrition ([address]  Resolved Problems:    * No resolved hospital problems. *      Consults:  IP CONSULT TO GENERAL SURGERY  IP CONSULT TO GI    Procedures: Colonoscopy with Biopsy     Hospital Course: Arturo Walker is a 50 y.o female who presented to the ED with complaints of abdominal pain. Patient was admitted for hyperglycemia and abdominal pain. During patient lengthy hospital stay patient was seen by general surgery for abdominal pain. No surgical intervention was planned during patient admission. GI was consulted for ileocolitis.  Patient mouth daily as needed (swelling)      ondansetron (ZOFRAN ODT) 4 MG disintegrating tablet Take 1 tablet by mouth every 8 hours as needed for Nausea or Vomiting  Qty: 24 tablet, Refills: 0      VORTIoxetine HBr (TRINTELLIX) 20 MG TABS tablet Take 10 mg by mouth nightly       insulin aspart (NOVOLOG) 100 UNIT/ML injection vial Inject 0-20 Units into the skin 3 times daily (before meals)       ALPRAZolam (XANAX) 0.5 MG tablet Take 0.5 mg by mouth 3 times daily as needed for Anxiety. .      famotidine (PEPCID) 20 MG tablet Take 20 mg by mouth nightly       insulin glargine (LANTUS) 100 UNIT/ML injection vial Inject 50 Units into the skin every morning If unable to keep down food, take only half the dose.   Qty: 1 vial, Refills: 3      lipase-protease-amylase (CREON) 11222 units delayed release capsule Take 6 capsules by mouth 3 times daily (with meals) Indications: Creon  Qty: 270 capsule, Refills: 0      Multiple Vitamins-Minerals (THERAPEUTIC MULTIVITAMIN-MINERALS) tablet Take 1 tablet by mouth daily      pregabalin (LYRICA) 50 MG capsule Take 1 capsule by mouth 2 times daily  Qty: 60 capsule, Refills: 5    Associated Diagnoses: Idiopathic chronic pancreatitis (HCC); DM type 2 with diabetic peripheral neuropathy (HCC)      traMADol (ULTRAM) 50 MG tablet Take 50 mg by mouth every 6 hours as needed for Pain               Note that more than 30 minutes was spent in preparing discharge papers, discussing discharge with patient, medication review, etc.    Signed:  Electronically signed by EVERETT Elliott CNP on 10/2/2018 at 3:44 PM

## 2018-11-09 ENCOUNTER — HOSPITAL ENCOUNTER (OUTPATIENT)
Dept: INFUSION THERAPY | Age: 48
Discharge: HOME | End: 2018-11-09
Payer: MEDICAID

## 2018-11-09 ENCOUNTER — TELEPHONE (OUTPATIENT)
Dept: INFUSION THERAPY | Age: 48
End: 2018-11-09

## 2018-11-09 ENCOUNTER — OFFICE VISIT (OUTPATIENT)
Dept: ONCOLOGY | Age: 48
End: 2018-11-09
Payer: MEDICAID

## 2018-11-09 VITALS
DIASTOLIC BLOOD PRESSURE: 67 MMHG | WEIGHT: 101 LBS | RESPIRATION RATE: 20 BRPM | SYSTOLIC BLOOD PRESSURE: 100 MMHG | TEMPERATURE: 97.9 F | HEIGHT: 61 IN | BODY MASS INDEX: 19.07 KG/M2 | HEART RATE: 100 BPM

## 2018-11-09 DIAGNOSIS — Z85.3 PERSONAL HISTORY OF BREAST CANCER: Primary | ICD-10-CM

## 2018-11-09 PROCEDURE — G8482 FLU IMMUNIZE ORDER/ADMIN: HCPCS | Performed by: INTERNAL MEDICINE

## 2018-11-09 PROCEDURE — G8427 DOCREV CUR MEDS BY ELIG CLIN: HCPCS | Performed by: INTERNAL MEDICINE

## 2018-11-09 PROCEDURE — G8420 CALC BMI NORM PARAMETERS: HCPCS | Performed by: INTERNAL MEDICINE

## 2018-11-09 PROCEDURE — 4004F PT TOBACCO SCREEN RCVD TLK: CPT | Performed by: INTERNAL MEDICINE

## 2018-11-09 PROCEDURE — 99215 OFFICE O/P EST HI 40 MIN: CPT | Performed by: INTERNAL MEDICINE

## 2018-11-09 PROCEDURE — 99213 OFFICE O/P EST LOW 20 MIN: CPT

## 2018-11-09 RX ORDER — METHYLPHENIDATE HYDROCHLORIDE 10 MG/1
10 TABLET ORAL 2 TIMES DAILY
COMMUNITY
End: 2020-02-15 | Stop reason: ALTCHOICE

## 2018-11-09 RX ORDER — HYDROXYZINE 50 MG/1
50 TABLET, FILM COATED ORAL EVERY 8 HOURS PRN
COMMUNITY
End: 2019-10-21

## 2018-11-09 RX ORDER — ALPRAZOLAM 0.5 MG/1
0.5 TABLET ORAL EVERY 8 HOURS
Status: ON HOLD | COMMUNITY
End: 2020-02-15

## 2018-11-09 RX ORDER — BISACODYL 10 MG
10 SUPPOSITORY, RECTAL RECTAL DAILY
COMMUNITY
End: 2021-06-20

## 2018-11-09 RX ORDER — BUSPIRONE HYDROCHLORIDE 5 MG/1
5 TABLET ORAL 2 TIMES DAILY
Status: ON HOLD | COMMUNITY
End: 2020-02-15

## 2018-11-09 RX ORDER — ANASTROZOLE 1 MG/1
1 TABLET ORAL DAILY
Qty: 30 TABLET | Refills: 0 | Status: SHIPPED | OUTPATIENT
Start: 2018-11-09 | End: 2018-12-07 | Stop reason: SDUPTHER

## 2018-11-09 NOTE — TELEPHONE ENCOUNTER
Faxed referral and clinicals to Rohan Corley MS, [facility] (ATTN:  Cuong Gomez) to schedule patient visit.

## 2018-11-09 NOTE — PROGRESS NOTES
Met with patient and her significant other in the exam room for follow up with Dr. Boyce Overall per his request.  Patient has been lost to follow up for a year. States that she remembers me from her previous appts at the center. Upon inquiring, patient states that she finally decided to stop drinking 3 months ago and is so happy that she did. Reports feeling so much better. Provided support and encouragement. She was discharged to an Assisted living after her last admission. Patient states that she did not start the Arimidex medication, hasn't had the Prolia injection in over a year, and takes her calcium and vitamin d daily. She had her left mastectomy with Dr. Essie Coffman on 10/5/17 at [facility]. Patient had DYLON BLSO in her mid thirties per patient. Dr. Boyce Overall recommended starting the Arimidex medication, resuming Prolia (order to be sent to the infusion center), calcium/vitamin D daily, DEXA scan, and Right mammogram.  Reviewed in detail with patient and wrote everything down. Aware that they will call with the infusion and scan appts once scheduled. Answered numerous questions regarding the medication, follow up schedule, and medication side effects to her apparent satisfaction. Denies any current needs for assistance. Patient appreciative of visit and information. More Cardona, BSW, RN, OCN  Oncology Nurse Navigator

## 2018-11-09 NOTE — PROGRESS NOTES
patient remains unchanged. Oncotype DX Recurrence Score 7;  Margins still positive for DCIS. Left Mastectomy was recommended (patient was non-compliant with appointments). Not a candidate for immediate reconstruction per Dr. Renu Womack (Plastic Surgery Team). Reconstruction can be performed even after 3-6 months after surgery. Left Mastectomy was performed on 10/05/2017. Breast, left, simple mastectomy (185 g; 17.0 x 15.5 x 2.7 cm): Infiltrating ductal carcinoma, moderately differentiated (1.1 cm maximum dimension) 12 o'clock position. Ductal carcinoma in situ, solid and cribriform types, intermediate nuclear grade associated with previous biopsy/excision site (cavity-3.8 cm maximum dimension) of superior aspect of breast.    Infiltrating ductal carcinoma, moderately differentiated (0.6 cm maximum dimension) of central region of breast (separate/second focus) with associated ductal carcinoma in situ, solid and cribriform types,intermediate nuclear grade. Fibroadenoma, remote, microscopic (1). Nipple and areola: Negative for carcinoma. Margins of resection: Infiltrating ductal carcinoma (12 o'clock) comes to within 0.1 cm of inked superior and 0.2 cm of inked posterior/deep margin of resection. Ductal carcinoma in situ with associated biopsy cavity is present less than 0.1 cm from inked deep margin of resection and 0.3 cm from inked anterior margin of resection. Infiltrating ductal carcinoma (central breast parenchyma) is present less than 0.2 cm from inked anterior margin of resection. Lymphovascular invasion: Not identified. Dermal lymphovascular invasion: Not identified. Pathologic stage: at least [id number] o'clock lesion; pT1b-central lesion. Regional lymph nodes: See ER-; only [place] lymph node (1) is examined. p(sn)N0 (i-). Review of Systems;  CONSTITUTIONAL: No fever, chills. Fair appetite and energy level. ENMT: Eyes: No diplopia; Nose: No epistaxis.  Mouth: No sore

## 2018-11-12 DIAGNOSIS — M81.8 IDIOPATHIC OSTEOPOROSIS: ICD-10-CM

## 2018-11-13 ENCOUNTER — TELEPHONE (OUTPATIENT)
Dept: ONCOLOGY | Age: 48
End: 2018-11-13

## 2018-11-26 ENCOUNTER — HOSPITAL ENCOUNTER (OUTPATIENT)
Age: 48
Discharge: HOME | End: 2018-11-26
Payer: MEDICAID

## 2018-11-26 LAB
ALBUMIN SERPL-MCNC: 3.7 G/DL (ref 3.5–5.2)
ALP BLD-CCNC: 274 U/L (ref 35–104)
ALT SERPL-CCNC: 254 U/L (ref 0–32)
ANION GAP SERPL CALCULATED.3IONS-SCNC: 16 MMOL/L (ref 7–16)
AST SERPL-CCNC: 272 U/L (ref 0–31)
BASOPHILS ABSOLUTE: 0.02 E9/L (ref 0–0.2)
BASOPHILS RELATIVE PERCENT: 0.6 % (ref 0–2)
BILIRUB SERPL-MCNC: 0.6 MG/DL (ref 0–1.2)
BUN BLDV-MCNC: 8 MG/DL (ref 6–20)
CALCIUM SERPL-MCNC: 9.7 MG/DL (ref 8.6–10.2)
CHLORIDE BLD-SCNC: 99 MMOL/L (ref 98–107)
CO2: 24 MMOL/L (ref 22–29)
CREAT SERPL-MCNC: 0.4 MG/DL (ref 0.5–1)
EOSINOPHILS ABSOLUTE: 0.06 E9/L (ref 0.05–0.5)
EOSINOPHILS RELATIVE PERCENT: 1.8 % (ref 0–6)
GFR AFRICAN AMERICAN: >60
GFR NON-AFRICAN AMERICAN: >60 ML/MIN/1.73
GLUCOSE BLD-MCNC: 306 MG/DL (ref 74–99)
HCT VFR BLD CALC: 39 % (ref 34–48)
HEMOGLOBIN: 12.6 G/DL (ref 11.5–15.5)
IMMATURE GRANULOCYTES #: 0.01 E9/L
IMMATURE GRANULOCYTES %: 0.3 % (ref 0–5)
LYMPHOCYTES ABSOLUTE: 0.72 E9/L (ref 1.5–4)
LYMPHOCYTES RELATIVE PERCENT: 22 % (ref 20–42)
MCH RBC QN AUTO: 30.1 PG (ref 26–35)
MCHC RBC AUTO-ENTMCNC: 32.3 % (ref 32–34.5)
MCV RBC AUTO: 93.3 FL (ref 80–99.9)
MONOCYTES ABSOLUTE: 0.28 E9/L (ref 0.1–0.95)
MONOCYTES RELATIVE PERCENT: 8.5 % (ref 2–12)
NEUTROPHILS ABSOLUTE: 2.19 E9/L (ref 1.8–7.3)
NEUTROPHILS RELATIVE PERCENT: 66.8 % (ref 43–80)
PDW BLD-RTO: 14 FL (ref 11.5–15)
PLATELET # BLD: 88 E9/L (ref 130–450)
PLATELET CONFIRMATION: NORMAL
PMV BLD AUTO: 10.3 FL (ref 7–12)
POTASSIUM SERPL-SCNC: 5.2 MMOL/L (ref 3.5–5)
RBC # BLD: 4.18 E12/L (ref 3.5–5.5)
RBC # BLD: NORMAL 10*6/UL
SODIUM BLD-SCNC: 139 MMOL/L (ref 132–146)
TOTAL PROTEIN: 7.7 G/DL (ref 6.4–8.3)
WBC # BLD: 3.3 E9/L (ref 4.5–11.5)

## 2018-11-26 PROCEDURE — 86706 HEP B SURFACE ANTIBODY: CPT

## 2018-11-26 PROCEDURE — 85025 COMPLETE CBC W/AUTO DIFF WBC: CPT

## 2018-11-26 PROCEDURE — 87522 HEPATITIS C REVRS TRNSCRPJ: CPT

## 2018-11-26 PROCEDURE — 36415 COLL VENOUS BLD VENIPUNCTURE: CPT

## 2018-11-26 PROCEDURE — 86704 HEP B CORE ANTIBODY TOTAL: CPT

## 2018-11-26 PROCEDURE — 80053 COMPREHEN METABOLIC PANEL: CPT

## 2018-11-26 PROCEDURE — 87340 HEPATITIS B SURFACE AG IA: CPT

## 2018-11-27 ENCOUNTER — HOSPITAL ENCOUNTER (OUTPATIENT)
Dept: INFUSION THERAPY | Age: 48
Setting detail: INFUSION SERIES
Discharge: HOME | End: 2018-11-27
Payer: MEDICAID

## 2018-11-27 DIAGNOSIS — M81.8 OTHER OSTEOPOROSIS WITHOUT CURRENT PATHOLOGICAL FRACTURE: ICD-10-CM

## 2018-11-27 LAB
HBV SURFACE AB TITR SER: NORMAL {TITER}
HCV QNT BY NAAT IU/ML: ABNORMAL IU/ML
HCV QNT BY NAAT LOG IU/ML: 7.02 LOG IU/ML
HEPATITIS B SURFACE ANTIGEN INTERPRETATION: NORMAL
INTERPRETATION: DETECTED

## 2018-11-27 PROCEDURE — 6360000002 HC RX W HCPCS: Performed by: INTERNAL MEDICINE

## 2018-11-27 PROCEDURE — 96372 THER/PROPH/DIAG INJ SC/IM: CPT

## 2018-11-27 RX ORDER — GABAPENTIN 100 MG/1
400 CAPSULE ORAL 3 TIMES DAILY
COMMUNITY
End: 2022-05-23 | Stop reason: SDUPTHER

## 2018-11-27 RX ADMIN — DENOSUMAB 60 MG: 60 INJECTION SUBCUTANEOUS at 12:20

## 2018-11-29 LAB — HEPATITIS B CORE TOTAL ANTIBODY: NONREACTIVE

## 2018-12-05 ENCOUNTER — HOSPITAL ENCOUNTER (OUTPATIENT)
Dept: GENERAL RADIOLOGY | Age: 48
Discharge: HOME | End: 2018-12-07
Payer: MEDICAID

## 2018-12-05 DIAGNOSIS — Z85.3 PERSONAL HISTORY OF BREAST CANCER: ICD-10-CM

## 2018-12-05 PROCEDURE — 77080 DXA BONE DENSITY AXIAL: CPT

## 2018-12-05 PROCEDURE — 77065 DX MAMMO INCL CAD UNI: CPT

## 2018-12-07 ENCOUNTER — OFFICE VISIT (OUTPATIENT)
Dept: ONCOLOGY | Age: 48
End: 2018-12-07
Payer: MEDICAID

## 2018-12-07 ENCOUNTER — HOSPITAL ENCOUNTER (OUTPATIENT)
Dept: INFUSION THERAPY | Age: 48
Discharge: HOME | End: 2018-12-07
Payer: MEDICAID

## 2018-12-07 VITALS
SYSTOLIC BLOOD PRESSURE: 103 MMHG | HEART RATE: 85 BPM | RESPIRATION RATE: 20 BRPM | TEMPERATURE: 97.9 F | HEIGHT: 61 IN | WEIGHT: 97.8 LBS | DIASTOLIC BLOOD PRESSURE: 73 MMHG | BODY MASS INDEX: 18.46 KG/M2

## 2018-12-07 DIAGNOSIS — Z85.3 PERSONAL HISTORY OF BREAST CANCER: Primary | ICD-10-CM

## 2018-12-07 PROCEDURE — 99212 OFFICE O/P EST SF 10 MIN: CPT

## 2018-12-07 PROCEDURE — 99214 OFFICE O/P EST MOD 30 MIN: CPT | Performed by: INTERNAL MEDICINE

## 2018-12-07 RX ORDER — OXYCODONE HYDROCHLORIDE 15 MG/1
10 TABLET ORAL EVERY 6 HOURS PRN
COMMUNITY
End: 2020-03-10 | Stop reason: ALTCHOICE

## 2018-12-07 RX ORDER — ANASTROZOLE 1 MG/1
1 TABLET ORAL DAILY
Qty: 30 TABLET | Refills: 3 | Status: SHIPPED
Start: 2018-12-07 | End: 2022-06-21 | Stop reason: SDUPTHER

## 2018-12-07 RX ORDER — ACETAMINOPHEN 325 MG/1
650 TABLET ORAL EVERY 4 HOURS PRN
COMMUNITY
End: 2019-10-21

## 2018-12-07 RX ORDER — OXYCODONE AND ACETAMINOPHEN 10; 325 MG/1; MG/1
1 TABLET ORAL EVERY 8 HOURS PRN
COMMUNITY
End: 2019-10-21

## 2018-12-07 RX ORDER — TRAMADOL HYDROCHLORIDE 50 MG/1
50 TABLET ORAL EVERY 6 HOURS PRN
COMMUNITY
End: 2020-08-25

## 2018-12-07 RX ORDER — LANOLIN ALCOHOL/MO/W.PET/CERES
3 CREAM (GRAM) TOPICAL DAILY
COMMUNITY
End: 2020-08-25

## 2018-12-07 RX ORDER — OMEPRAZOLE 20 MG/1
40 CAPSULE, DELAYED RELEASE ORAL DAILY
COMMUNITY
End: 2022-05-23

## 2018-12-07 NOTE — PROGRESS NOTES
Left.  Tumor size, size of largest invasive carcinoma: 7 mm. Histologic type: Invasive mammary carcinoma of no special type(ductal,not otherwise specified). Histologic grade:   Glandular (acinar)/tubular differentiation: Score 2.   Nuclear pleomorphism: Score 1.   Mitotic rate: Score 1.   Overall grade: Grade 1. Tumor focality: Single focus of invasive carcinoma. Ductal carcinoma in situ: DCIS is present.  Windell Maxcy of DCIS: Estimated size: At least 15 mm.   Architectural patterns: Cribriform and solid.   Nuclear grade: Grade 2 (intermediate).   Necrosis: Not identified. Margins:   Invasive carcinoma:    -Margins uninvolved by invasive carcinoma.    -Distance from closest margin: 4 mm (anterior and lateral margins).   DCIS:    -Margins positive for DCIS (posterior from the postero-medial to postero-lateral aspects). Lymph nodes:   Total number of lymph nodes examined (sentinel and non-sentinel): 1.   Number of sentinel lymph nodes examined: 1. Lymph vascular invasion: Not identified. Pathologic staging:   Primary tumor (pT): pT1b.   Regional lymph nodes (pN):pN0 (i-) (sn- only a sentinel lymph node evaluated). Re-excision of posteromedial to posterolateral margins for DCIS was performed on 05/05/2017:  Left breast, re-excision:  Extensive residual intermediate-grade duct carcinoma in situ of solid, micropapillary, and cribriform types (see comment). Histologic changes consistent with healing biopsy/excision site. Fibrocystic change, including patchy stromal fibroplasia and occasional small benign breast cysts. Adenosis and sclerosing adenosis. Comment:     Residual DCIS is identified within 7 of 10 microscopic sections examined.  Microscopically, the maximum dimension of DCIS is 2.0 cm.  Invasive carcinoma is not identified.  DCIS involves the anterior, posterior, medial, inferior, and superior inked excision margins. Residual Invasive carcinoma is not identified.   The pathological Staging (pTNM) for this patient remains unchanged. Oncotype DX Recurrence Score 7;  Margins still positive for DCIS. Left Mastectomy was recommended (patient was non-compliant with appointments). Not a candidate for immediate reconstruction per Dr. Yael Palafox (Plastic Surgery Team). Reconstruction can be performed even after 3-6 months after surgery. Left Mastectomy was performed on 10/05/2017. Breast, left, simple mastectomy (185 g; 17.0 x 15.5 x 2.7 cm): Infiltrating ductal carcinoma, moderately differentiated (1.1 cm maximum dimension) 12 o'clock position. Ductal carcinoma in situ, solid and cribriform types, intermediate nuclear grade associated with previous biopsy/excision site (cavity-3.8 cm maximum dimension) of superior aspect of breast.    Infiltrating ductal carcinoma, moderately differentiated (0.6 cm maximum dimension) of central region of breast (separate/second focus) with associated ductal carcinoma in situ, solid and cribriform types,intermediate nuclear grade. Fibroadenoma, remote, microscopic (1). Nipple and areola: Negative for carcinoma. Margins of resection: Infiltrating ductal carcinoma (12 o'clock) comes to within 0.1 cm of inked superior and 0.2 cm of inked posterior/deep margin of resection. Ductal carcinoma in situ with associated biopsy cavity is present less than 0.1 cm from inked deep margin of resection and 0.3 cm from inked anterior margin of resection. Infiltrating ductal carcinoma (central breast parenchyma) is present less than 0.2 cm from inked anterior margin of resection. Lymphovascular invasion: Not identified. Dermal lymphovascular invasion: Not identified. Pathologic stage: at least cI0l-56 o'clock lesion; pT1b-central lesion. Regional lymph nodes: See [facility]-; only [place] lymph node (1) is examined. p(sn)N0 (i-). Hx of DYLON/BSO in 2007; [address] 26.5 10/17/2017. Estradiol <5.0 on 10/17/2017. DEXA scan Oct 2017 osteoporosis lumbar spine and femoral necks.   Ca/VitD Negative for carcinoma. Margins of resection: Infiltrating ductal carcinoma (12 o'clock) comes to within 0.1 cm of inked superior and 0.2 cm of inked posterior/deep margin of resection. Ductal carcinoma in situ with associated biopsy cavity is present less than 0.1 cm from inked deep margin of resection and 0.3 cm from inked anterior margin of resection. Infiltrating ductal carcinoma (central breast parenchyma) is present less than 0.2 cm from inked anterior margin of resection. Lymphovascular invasion: Not identified. Dermal lymphovascular invasion: Not identified. Pathologic stage: at least rP7a-90 o'clock lesion; pT1b-central lesion. Regional lymph nodes: See [facility]-; only [place] lymph node (1) is examined. p(sn)N0 (i-). Hx of DYLON/BSO in 2007; [address] [facility] 26.5 10/17/2017. Estradiol <5.0 on 10/17/2017. DEXA scan Oct 2017 osteoporosis lumbar spine and femoral necks. Ca/VitD Prolia q6months (Last 11/27/2018)  We recommended endocrine therapy (Arimidex 1 mg po daily) Side effects of Arimidex reviewed with patient. She agreed to proceed. Right Diagnostic Mammogram 12/05/2018 Negative for malignancy. DEXA scan 12/05/2018 osteopenia in LS; Osteoporosis involving the hips. Arimidex 1 mg po daily was started on 11/09/2018 which she is tolerating well. Continue Arimidex, Ca/VitD. Prolia q6months. RTC 4 months. Genetics: My-Risk recommended as well as Genetic counseling.     Tano Webster MD   90/9/1024  Board Certified Medical Oncologist

## 2019-04-09 ENCOUNTER — OFFICE VISIT (OUTPATIENT)
Dept: ONCOLOGY | Age: 49
End: 2019-04-09
Payer: MEDICAID

## 2019-04-09 ENCOUNTER — HOSPITAL ENCOUNTER (OUTPATIENT)
Dept: INFUSION THERAPY | Age: 49
Discharge: HOME | End: 2019-04-09
Payer: MEDICAID

## 2019-04-09 VITALS
WEIGHT: 109.4 LBS | TEMPERATURE: 98.5 F | BODY MASS INDEX: 20.65 KG/M2 | DIASTOLIC BLOOD PRESSURE: 73 MMHG | SYSTOLIC BLOOD PRESSURE: 101 MMHG | RESPIRATION RATE: 20 BRPM | HEIGHT: 61 IN | HEART RATE: 81 BPM

## 2019-04-09 DIAGNOSIS — Z85.3 PERSONAL HISTORY OF BREAST CANCER: ICD-10-CM

## 2019-04-09 DIAGNOSIS — Z85.3 PERSONAL HISTORY OF BREAST CANCER: Primary | ICD-10-CM

## 2019-04-09 DIAGNOSIS — M81.8 OTHER OSTEOPOROSIS WITHOUT CURRENT PATHOLOGICAL FRACTURE: Primary | ICD-10-CM

## 2019-04-09 LAB
ALBUMIN SERPL-MCNC: 3.7 G/DL (ref 3.5–5.2)
ALP BLD-CCNC: 156 U/L (ref 35–104)
ALT SERPL-CCNC: 52 U/L (ref 0–32)
ANION GAP SERPL CALCULATED.3IONS-SCNC: 9 MMOL/L (ref 7–16)
AST SERPL-CCNC: 88 U/L (ref 0–31)
BASOPHILS ABSOLUTE: 0.03 E9/L (ref 0–0.2)
BASOPHILS RELATIVE PERCENT: 0.9 % (ref 0–2)
BILIRUB SERPL-MCNC: 0.5 MG/DL (ref 0–1.2)
BUN BLDV-MCNC: 7 MG/DL (ref 6–20)
CALCIUM SERPL-MCNC: 9.7 MG/DL (ref 8.6–10.2)
CHLORIDE BLD-SCNC: 94 MMOL/L (ref 98–107)
CO2: 30 MMOL/L (ref 22–29)
CREAT SERPL-MCNC: 0.5 MG/DL (ref 0.5–1)
EOSINOPHILS ABSOLUTE: 0.09 E9/L (ref 0.05–0.5)
EOSINOPHILS RELATIVE PERCENT: 2.7 % (ref 0–6)
GFR AFRICAN AMERICAN: >60
GFR NON-AFRICAN AMERICAN: >60 ML/MIN/1.73
GLUCOSE BLD-MCNC: 260 MG/DL (ref 74–99)
HCT VFR BLD CALC: 38.4 % (ref 34–48)
HEMOGLOBIN: 12.7 G/DL (ref 11.5–15.5)
LYMPHOCYTES ABSOLUTE: 0.83 E9/L (ref 1.5–4)
LYMPHOCYTES RELATIVE PERCENT: 25.9 % (ref 20–42)
MCH RBC QN AUTO: 30.9 PG (ref 26–35)
MCHC RBC AUTO-ENTMCNC: 33.1 % (ref 32–34.5)
MCV RBC AUTO: 93.4 FL (ref 80–99.9)
MONOCYTES ABSOLUTE: 0.29 E9/L (ref 0.1–0.95)
MONOCYTES RELATIVE PERCENT: 8.9 % (ref 2–12)
NEUTROPHILS ABSOLUTE: 1.98 E9/L (ref 1.8–7.3)
NEUTROPHILS RELATIVE PERCENT: 61.6 % (ref 43–80)
PDW BLD-RTO: 13.3 FL (ref 11.5–15)
PLATELET # BLD: 71 E9/L (ref 130–450)
PLATELET CONFIRMATION: NORMAL
PMV BLD AUTO: 10 FL (ref 7–12)
POTASSIUM SERPL-SCNC: 4.7 MMOL/L (ref 3.5–5)
RBC # BLD: 4.11 E12/L (ref 3.5–5.5)
RBC # BLD: NORMAL 10*6/UL
SODIUM BLD-SCNC: 133 MMOL/L (ref 132–146)
TOTAL PROTEIN: 7.6 G/DL (ref 6.4–8.3)
WBC # BLD: 3.2 E9/L (ref 4.5–11.5)

## 2019-04-09 PROCEDURE — 99214 OFFICE O/P EST MOD 30 MIN: CPT | Performed by: INTERNAL MEDICINE

## 2019-04-09 PROCEDURE — 99212 OFFICE O/P EST SF 10 MIN: CPT

## 2019-04-09 PROCEDURE — 80053 COMPREHEN METABOLIC PANEL: CPT

## 2019-04-09 PROCEDURE — 36415 COLL VENOUS BLD VENIPUNCTURE: CPT

## 2019-04-09 PROCEDURE — 85025 COMPLETE CBC W/AUTO DIFF WBC: CPT

## 2019-04-09 NOTE — PROGRESS NOTES
Department of [place] [id number]    Attending Clinic Note    Reason for Visit: Follow-up on a patient with Left Breast Cancer    PCP:  Jasson Bryson MD    History of Present Illness: The left breast mass was located at the 11 o'clock position. Age of menarche 15;  3 Para 3; LMP early 29's; [id number] [facility] Mother with Breast Cancer at Age 61    Bilateral Diagnostic Mammogram on 2017:  Circumscribed solid nodule without increased vascularity at the 11 o'clock position corresponding to the patient's palpable lesion. 6 mm smooth bordered nodule within the left upper outer quadrant which is not identifiable on the lateral view (6 month f/u left breast mammogram recommended). Unremarkable right-sided mammogram demonstrating no evidence of malignancy. Annual right sided mammogram recommended. Left Breast Ultrasound on 2017 noted circumscribed solid nodule without increased vascularity or shadowing located at the 11 o'clock position corresponding to the patient's palpable lesion. Nodule measures 0.5 cm x 0.4 cm x 0.5 cm. Left Breast Mass 11 o'clock Core Needle Biopsy was on 2017: Invasive Well Differentiated Ductal Carcinoma; Grade 1; Lymph-vascular invasion Not identified. ER positive (100%); ID positive (5%), HER/2neu Equivocal (2+)  HER/2neu FISH Negative (Her2/CEN17 signal ratio 1.2)    Blue dye injection with left breast lumpectomy and sentinel lymph node biopsy after technetium sulfur colloid injection was performed on 2017:  A.  Lymph node, left axillary, left breast sentinel node, biopsy: One lymph node with no diagnostic abnormality. Christina Avila, left, excision of mass:  -Invasive well-differentiated ductal carcinoma and intermediate grade ductal carcinoma in situ (see comment and cancer case summary). -Fibrocystic changes. CANCER CASE SUMMARY:  Procedure: Excision without image guided localization. Lymph node sampling: [place] lymph node.   Specimen laterality: patient remains unchanged. Oncotype DX Recurrence Score 7;  Margins still positive for DCIS. Left Mastectomy was recommended (patient was non-compliant with appointments). Not a candidate for immediate reconstruction per Dr. Selam Nation (Plastic Surgery Team). Reconstruction can be performed even after 3-6 months after surgery. Left Mastectomy was performed on 10/05/2017. Breast, left, simple mastectomy (185 g; 17.0 x 15.5 x 2.7 cm): Infiltrating ductal carcinoma, moderately differentiated (1.1 cm maximum dimension) 12 o'clock position. Ductal carcinoma in situ, solid and cribriform types, intermediate nuclear grade associated with previous biopsy/excision site (cavity-3.8 cm maximum dimension) of superior aspect of breast.    Infiltrating ductal carcinoma, moderately differentiated (0.6 cm maximum dimension) of central region of breast (separate/second focus) with associated ductal carcinoma in situ, solid and cribriform types,intermediate nuclear grade. Fibroadenoma, remote, microscopic (1). Nipple and areola: Negative for carcinoma. Margins of resection: Infiltrating ductal carcinoma (12 o'clock) comes to within 0.1 cm of inked superior and 0.2 cm of inked posterior/deep margin of resection. Ductal carcinoma in situ with associated biopsy cavity is present less than 0.1 cm from inked deep margin of resection and 0.3 cm from inked anterior margin of resection. Infiltrating ductal carcinoma (central breast parenchyma) is present less than 0.2 cm from inked anterior margin of resection. Lymphovascular invasion: Not identified. Dermal lymphovascular invasion: Not identified. Pathologic stage: at least [id number]-84 o'clock lesion; pT1b-central lesion. Regional lymph nodes: See [facility]-; only [place] lymph node (1) is examined. p(sn)N0 (i-). Hx of DYLON/BSO in 2007; [address] [facility] [address] 26.5 10/17/2017. Estradiol <5.0 on 10/17/2017. DEXA scan Oct 2017 osteoporosis lumbar spine and femoral necks.   Ca/VitD Prolia q6months (Last 11/27/2018)  We recommended endocrine therapy (Arimidex 1 mg po daily) Side effects of Arimidex reviewed with patient. She agreed to proceed. Right Diagnostic Mammogram 12/05/2018 Negative for malignancy. DEXA scan 12/05/2018 osteopenia in LS; Osteoporosis involving the hips. Arimidex 1 mg po daily was started on 11/09/2018 which she is tolerating well. Review of Systems;  CONSTITUTIONAL: No fever, chills. Fair appetite and energy level. ENMT: Eyes: No diplopia; Nose: No epistaxis. Mouth: No sore throat. RESPIRATORY: No hemoptysis, shortness of breath, cough. CARDIOVASCULAR: No chest pain, palpitations. GASTROINTESTINAL: Intermittent abdominal pain. GENITOURINARY: No dysuria, urinary frequency, hematuria. NEURO: No syncope, presyncope, headache. Remainder:  ROS NEGATIVE    Past Medical History:      Diagnosis Date    Alcoholism ([facility] [address]     Since teens to early 25s    Anxiety and depression     Cancer ([facility] [address]     breast, left    Chronic pancreatitis ([facility] [address] [facility]     Diabetes mellitus, type 2 ([facility] [address] [facility]     GERD (gastroesophageal reflux disease)     Hepatitis C     Jaundice 5/12/2016    Schizoaffective disorder, bipolar type ([facility] [address] [facility]     Splenomegaly 5/30/2017     Medications:  Reviewed and reconciled. Allergies: Allergies   Allergen Reactions    Dye [Iodides]     Tylenol [Acetaminophen]      Was told not to take by her DRScott For a bad liver     OK  to use sparingly       Physical Exam:  /73 (Site: Right Upper Arm, Position: Sitting, Cuff Size: Small Adult)   Pulse 81   Temp 98.5 °F (36.9 °C) (Temporal)   Resp 20   Ht 5' 1\" (1.549 m)   Wt 109 lb 6.4 oz (49.6 kg)   BMI 20.67 kg/m²   GENERAL: Alert, oriented x 3, not in acute distress  HEENT: PERRLA; EOMI. Oropharynx clear. NECK: Supple. Without lymphadenopathy. LUNGS: Good air entry bilaterally. No wheezing, crackles or ronchi. BREASTS: Left mastectomy incision intact.  No palpable masses in right breast. No palpable LN.  CARDIOVASCULAR: Regular rate. No murmurs, rubs or gallops. ABDOMEN: Soft. Non-tender, non-distended. Positive bowel sounds. EXTREMITIES: Without clubbing, cyanosis, or edema. NEUROLOGIC: No focal deficits. ECOG PS 1    Impression/Plan:  51 y/o female who underwent Blue dye injection with left breast lumpectomy and sentinel lymph node biopsy after technetium sulfur colloid injection on 04/25/2017:  A.  Lymph node, left axillary, left breast sentinel node, biopsy: One lymph node with no diagnostic abnormality. Chick Mitchell, left, excision of mass:  -Invasive well-differentiated ductal carcinoma and intermediate grade ductal carcinoma in situ (see comment and cancer case summary). -Fibrocystic changes. CANCER CASE SUMMARY:  Procedure: Excision without image guided localization. Lymph node sampling: [place] lymph node. Specimen laterality: Left. Tumor size, size of largest invasive carcinoma: 7 mm. Histologic type: Invasive mammary carcinoma of no special type (ductal,not otherwise specified). Histologic grade:   Glandular (acinar)/tubular differentiation: Score 2.   Nuclear pleomorphism: Score 1.   Mitotic rate: Score 1.   Overall grade: Grade 1. Tumor focality: Single focus of invasive carcinoma. Ductal carcinoma in situ: DCIS is present.  Janace Toledo of DCIS: Estimated size: At least 15 mm.   Architectural patterns: Cribriform and solid.   Nuclear grade: Grade 2 (intermediate).   Necrosis: Not identified. Margins:   Invasive carcinoma:    -Margins uninvolved by invasive carcinoma.    -Distance from closest margin: 4 mm (anterior and lateral margins).   DCIS:    -Margins positive for DCIS (posterior from the postero-medial to postero-lateral aspects). Lymph nodes:   Total number of lymph nodes examined (sentinel and non-sentinel): 1.   Number of sentinel lymph nodes examined: 1. Lymph vascular invasion: Not identified.     Pathologic staging:   Primary tumor (pT): pT1b.   Regional lymph nodes (pN):pN0 (i-) (sn- only a sentinel lymph node evaluated). ER positive (100%); HI positive (5%),   HER/2neu Equivocal (2+);   HER/2neu FISH Negative (Her2/CEN17 signal ratio 1.2)    Re-excision of posteromedial to posterolateral margins for DCIS was performed on 05/05/2017:  Left breast, re-excision:  Extensive residual intermediate-grade duct carcinoma in situ of solid, micropapillary, and cribriform types (see comment). Histologic changes consistent with healing biopsy/excision site. Fibrocystic change, including patchy stromal fibroplasia and occasional small benign breast cysts. Adenosis and sclerosing adenosis. Comment: Residual DCIS is identified within 7 of 10 microscopic sections examined.  Microscopically, the maximum dimension of DCIS is 2.0 cm. Invasive carcinoma is not identified. DCIS involves the anterior, posterior, medial, inferior, and superior inked excision margins. Residual Invasive carcinoma is not identified. The pathological Staging (pTNM) for this patient remains unchanged. Oncotype DX Recurrence Score 7;  Margins still positive for DCIS. Left Mastectomy was recommended (patient was non-compliant with appointments). Not a candidate for immediate reconstruction per Dr. Muriel Mason (Plastic Surgery Team). Reconstruction can be performed even after 3-6 months after surgery. Left Mastectomy was performed on 10/05/2017. Breast, left, simple mastectomy (185 g; 17.0 x 15.5 x 2.7 cm): Infiltrating ductal carcinoma, moderately differentiated (1.1 cm maximum dimension) 12 o'clock position.   Ductal carcinoma in situ, solid and cribriform types, intermediate nuclear grade associated with previous biopsy/excision site (cavity-3.8 cm maximum dimension) of superior aspect of breast.    Infiltrating ductal carcinoma, moderately differentiated (0.6 cm maximum dimension) of central region of breast (separate/second focus) with associated ductal carcinoma in situ, solid and cribriform types,intermediate nuclear grade. Fibroadenoma, remote, microscopic (1). Nipple and areola: Negative for carcinoma. Margins of resection: Infiltrating ductal carcinoma (12 o'clock) comes to within 0.1 cm of inked superior and 0.2 cm of inked posterior/deep margin of resection. Ductal carcinoma in situ with associated biopsy cavity is present less than 0.1 cm from inked deep margin of resection and 0.3 cm from inked anterior margin of resection. Infiltrating ductal carcinoma (central breast parenchyma) is present less than 0.2 cm from inked anterior margin of resection. Lymphovascular invasion: Not identified. Dermal lymphovascular invasion: Not identified. Pathologic stage: at least pN0w-36 o'clock lesion; pT1b-central lesion. Regional lymph nodes: See [id number]; only [place] lymph node (1) is examined. p(sn)N0 (i-). Hx of DYLON/BSO in 2007; [address] [facility] 26.5 10/17/2017. Estradiol <5.0 on 10/17/2017. DEXA scan Oct 2017 osteoporosis lumbar spine and femoral necks. Ca/VitD Prolia q6months (Last 11/27/2018)    We recommended endocrine therapy (Arimidex 1 mg po daily) Side effects of Arimidex reviewed with patient. She agreed to proceed. Right Diagnostic Mammogram 12/05/2018 Negative for malignancy. DEXA scan 12/05/2018 osteopenia in LS; Osteoporosis involving the hips. Arimidex 1 mg po daily was started on 11/09/2018 which she is tolerating well. Continue Arimidex, Ca/VitD. Prolia q6months. RTC 4 months. Genetics: My-Risk recommended as well as Genetic counseling.     Amy Knowles MD   [id number]/0/4986  Board Certified Medical Oncologist

## 2019-04-20 ENCOUNTER — HOSPITAL ENCOUNTER (EMERGENCY)
Age: 49
Discharge: HOME | End: 2019-04-20
Attending: EMERGENCY MEDICINE
Payer: MEDICAID

## 2019-04-20 VITALS
HEART RATE: 78 BPM | SYSTOLIC BLOOD PRESSURE: 147 MMHG | OXYGEN SATURATION: 97 % | WEIGHT: 108 LBS | TEMPERATURE: 98 F | DIASTOLIC BLOOD PRESSURE: 96 MMHG | BODY MASS INDEX: 20.41 KG/M2 | RESPIRATION RATE: 14 BRPM

## 2019-04-20 DIAGNOSIS — R10.9 CHRONIC ABDOMINAL PAIN: Primary | ICD-10-CM

## 2019-04-20 DIAGNOSIS — G89.29 CHRONIC ABDOMINAL PAIN: Primary | ICD-10-CM

## 2019-04-20 DIAGNOSIS — R11.2 NON-INTRACTABLE VOMITING WITH NAUSEA, UNSPECIFIED VOMITING TYPE: ICD-10-CM

## 2019-04-20 LAB
ALBUMIN SERPL-MCNC: 4.1 G/DL (ref 3.5–5.2)
ALP BLD-CCNC: 163 U/L (ref 35–104)
ALT SERPL-CCNC: 53 U/L (ref 0–32)
AMMONIA: 44 UMOL/L (ref 11–51)
ANION GAP SERPL CALCULATED.3IONS-SCNC: 8 MMOL/L (ref 7–16)
APTT: 35.6 SEC (ref 24.5–35.1)
AST SERPL-CCNC: 83 U/L (ref 0–31)
BASOPHILS ABSOLUTE: 0.01 E9/L (ref 0–0.2)
BASOPHILS RELATIVE PERCENT: 0.3 % (ref 0–2)
BETA-HYDROXYBUTYRATE: 0.3 MMOL/L (ref 0.02–0.27)
BILIRUB SERPL-MCNC: 0.9 MG/DL (ref 0–1.2)
BILIRUBIN URINE: NEGATIVE
BLOOD, URINE: NEGATIVE
BUN BLDV-MCNC: 6 MG/DL (ref 6–20)
CALCIUM SERPL-MCNC: 9.7 MG/DL (ref 8.6–10.2)
CHLORIDE BLD-SCNC: 95 MMOL/L (ref 98–107)
CLARITY: CLEAR
CO2: 32 MMOL/L (ref 22–29)
CO2: 36 MMOL/L (ref 22–29)
COLOR: YELLOW
CREAT SERPL-MCNC: 0.5 MG/DL (ref 0.5–1)
EKG ATRIAL RATE: 74 BPM
EKG P AXIS: 74 DEGREES
EKG P-R INTERVAL: 146 MS
EKG Q-T INTERVAL: 388 MS
EKG QRS DURATION: 72 MS
EKG QTC CALCULATION (BAZETT): 430 MS
EKG R AXIS: 50 DEGREES
EKG T AXIS: 88 DEGREES
EKG VENTRICULAR RATE: 74 BPM
EOSINOPHILS ABSOLUTE: 0.06 E9/L (ref 0.05–0.5)
EOSINOPHILS RELATIVE PERCENT: 1.6 % (ref 0–6)
GFR AFRICAN AMERICAN: >60
GFR AFRICAN AMERICAN: >60
GFR NON-AFRICAN AMERICAN: >60 ML/MIN/1.73
GFR NON-AFRICAN AMERICAN: >60 ML/MIN/1.73
GLUCOSE BLD-MCNC: 158 MG/DL (ref 74–99)
GLUCOSE BLD-MCNC: 167 MG/DL (ref 74–99)
GLUCOSE URINE: NEGATIVE MG/DL
HCT VFR BLD CALC: 38.9 % (ref 34–48)
HEMOGLOBIN: 13.4 G/DL (ref 11.5–15.5)
IMMATURE GRANULOCYTES #: 0.01 E9/L
IMMATURE GRANULOCYTES %: 0.3 % (ref 0–5)
INR BLD: 1.1
KETONES, URINE: NEGATIVE MG/DL
LACTIC ACID: 0.9 MMOL/L (ref 0.5–2.2)
LEUKOCYTE ESTERASE, URINE: NEGATIVE
LIPASE: 11 U/L (ref 13–60)
LYMPHOCYTES ABSOLUTE: 0.96 E9/L (ref 1.5–4)
LYMPHOCYTES RELATIVE PERCENT: 25.8 % (ref 20–42)
MCH RBC QN AUTO: 31.5 PG (ref 26–35)
MCHC RBC AUTO-ENTMCNC: 34.4 % (ref 32–34.5)
MCV RBC AUTO: 91.5 FL (ref 80–99.9)
MONOCYTES ABSOLUTE: 0.27 E9/L (ref 0.1–0.95)
MONOCYTES RELATIVE PERCENT: 7.3 % (ref 2–12)
NEUTROPHILS ABSOLUTE: 2.41 E9/L (ref 1.8–7.3)
NEUTROPHILS RELATIVE PERCENT: 64.7 % (ref 43–80)
NITRITE, URINE: NEGATIVE
PDW BLD-RTO: 12.9 FL (ref 11.5–15)
PH UA: 8.5 (ref 5–9)
PH VENOUS: 7.38 (ref 7.3–7.42)
PLATELET # BLD: 71 E9/L (ref 130–450)
PLATELET CONFIRMATION: NORMAL
PMV BLD AUTO: 10.1 FL (ref 7–12)
POC ANION GAP: 6 MMOL/L (ref 7–16)
POC BUN: 5 MG/DL (ref 8–23)
POC CHLORIDE: 95 MMOL/L (ref 100–108)
POC CREATININE: 0.5 MG/DL (ref 0.5–1)
POC POTASSIUM: 4.2 MMOL/L (ref 3.5–5)
POC SODIUM: 137 MMOL/L (ref 132–146)
POTASSIUM SERPL-SCNC: 4.8 MMOL/L (ref 3.5–5)
PROTEIN UA: NEGATIVE MG/DL
PROTHROMBIN TIME: 11.9 SEC (ref 9.3–12.4)
RBC # BLD: 4.25 E12/L (ref 3.5–5.5)
SODIUM BLD-SCNC: 135 MMOL/L (ref 132–146)
SPECIFIC GRAVITY UA: 1.01 (ref 1–1.03)
TOTAL PROTEIN: 8.8 G/DL (ref 6.4–8.3)
TROPONIN: <0.01 NG/ML (ref 0–0.03)
UROBILINOGEN, URINE: 0.2 E.U./DL
WBC # BLD: 3.7 E9/L (ref 4.5–11.5)

## 2019-04-20 PROCEDURE — 83605 ASSAY OF LACTIC ACID: CPT

## 2019-04-20 PROCEDURE — 82800 BLOOD PH: CPT

## 2019-04-20 PROCEDURE — 85025 COMPLETE CBC W/AUTO DIFF WBC: CPT

## 2019-04-20 PROCEDURE — 85730 THROMBOPLASTIN TIME PARTIAL: CPT

## 2019-04-20 PROCEDURE — 84520 ASSAY OF UREA NITROGEN: CPT

## 2019-04-20 PROCEDURE — 84484 ASSAY OF TROPONIN QUANT: CPT

## 2019-04-20 PROCEDURE — 36415 COLL VENOUS BLD VENIPUNCTURE: CPT

## 2019-04-20 PROCEDURE — 81003 URINALYSIS AUTO W/O SCOPE: CPT

## 2019-04-20 PROCEDURE — 82010 KETONE BODYS QUAN: CPT

## 2019-04-20 PROCEDURE — 82140 ASSAY OF AMMONIA: CPT

## 2019-04-20 PROCEDURE — 6360000002 HC RX W HCPCS: Performed by: EMERGENCY MEDICINE

## 2019-04-20 PROCEDURE — 82565 ASSAY OF CREATININE: CPT

## 2019-04-20 PROCEDURE — 85610 PROTHROMBIN TIME: CPT

## 2019-04-20 PROCEDURE — 96372 THER/PROPH/DIAG INJ SC/IM: CPT

## 2019-04-20 PROCEDURE — 80053 COMPREHEN METABOLIC PANEL: CPT

## 2019-04-20 PROCEDURE — 83690 ASSAY OF LIPASE: CPT

## 2019-04-20 PROCEDURE — 93005 ELECTROCARDIOGRAM TRACING: CPT | Performed by: EMERGENCY MEDICINE

## 2019-04-20 PROCEDURE — 82947 ASSAY GLUCOSE BLOOD QUANT: CPT

## 2019-04-20 PROCEDURE — 80051 ELECTROLYTE PANEL: CPT

## 2019-04-20 PROCEDURE — 99284 EMERGENCY DEPT VISIT MOD MDM: CPT

## 2019-04-20 RX ORDER — MORPHINE SULFATE 10 MG/ML
5 INJECTION, SOLUTION INTRAMUSCULAR; INTRAVENOUS ONCE
Status: DISCONTINUED | OUTPATIENT
Start: 2019-04-20 | End: 2019-04-20

## 2019-04-20 RX ORDER — 0.9 % SODIUM CHLORIDE 0.9 %
1000 INTRAVENOUS SOLUTION INTRAVENOUS ONCE
Status: DISCONTINUED | OUTPATIENT
Start: 2019-04-20 | End: 2019-04-20

## 2019-04-20 RX ORDER — ONDANSETRON 2 MG/ML
4 INJECTION INTRAMUSCULAR; INTRAVENOUS ONCE
Status: DISCONTINUED | OUTPATIENT
Start: 2019-04-20 | End: 2019-04-20

## 2019-04-20 RX ORDER — MORPHINE SULFATE 4 MG/ML
4 INJECTION, SOLUTION INTRAMUSCULAR; INTRAVENOUS ONCE
Status: COMPLETED | OUTPATIENT
Start: 2019-04-20 | End: 2019-04-20

## 2019-04-20 RX ORDER — PROMETHAZINE HYDROCHLORIDE 25 MG/ML
25 INJECTION, SOLUTION INTRAMUSCULAR; INTRAVENOUS ONCE
Status: COMPLETED | OUTPATIENT
Start: 2019-04-20 | End: 2019-04-20

## 2019-04-20 RX ADMIN — MORPHINE SULFATE 4 MG: 4 INJECTION, SOLUTION INTRAMUSCULAR; INTRAVENOUS at 07:23

## 2019-04-20 RX ADMIN — PROMETHAZINE HYDROCHLORIDE 25 MG: 25 INJECTION INTRAMUSCULAR; INTRAVENOUS at 07:24

## 2019-04-20 ASSESSMENT — ENCOUNTER SYMPTOMS
DIARRHEA: 0
SINUS PRESSURE: 0
SORE THROAT: 0
ABDOMINAL PAIN: 1
EYE PAIN: 0
EYE REDNESS: 0
BACK PAIN: 0
EYE DISCHARGE: 0
COUGH: 0
WHEEZING: 0
VOMITING: 1
ABDOMINAL DISTENTION: 0
SHORTNESS OF BREATH: 0
NAUSEA: 1

## 2019-04-20 ASSESSMENT — PAIN SCALES - GENERAL
PAINLEVEL_OUTOF10: 4
PAINLEVEL_OUTOF10: 8
PAINLEVEL_OUTOF10: 10

## 2019-04-20 ASSESSMENT — PAIN DESCRIPTION - LOCATION: LOCATION: ABDOMEN

## 2019-04-20 ASSESSMENT — PAIN DESCRIPTION - ORIENTATION: ORIENTATION: RIGHT;LEFT;MID;UPPER

## 2019-04-20 NOTE — ED PROVIDER NOTES
Patient is a 51 y/o female who presents to the ED with abdominal pain, nausea and vomiting. Patient states she has a history of chronic pancreatitis. Yesterday, her pain increased and is currently 10/10. The pain radiates across her entire upper abdomen. She is nauseated and has vomited. She denies any fever, diarrhea, hematemesis, hematochezia, melena, dysuria or hematuria. Review of Systems   Constitutional: Negative for chills and fever. HENT: Negative for ear pain, sinus pressure and sore throat. Eyes: Negative for pain, discharge and redness. Respiratory: Negative for cough, shortness of breath and wheezing. Cardiovascular: Negative for chest pain. Gastrointestinal: Positive for abdominal pain, nausea and vomiting. Negative for abdominal distention and diarrhea. Genitourinary: Negative for dysuria and frequency. Musculoskeletal: Negative for arthralgias and back pain. Skin: Negative for rash and wound. Neurological: Negative for weakness and headaches. Hematological: Negative for adenopathy. All other systems reviewed and are negative. Physical Exam   Constitutional: She is oriented to person, place, and time. She appears well-developed and well-nourished. No distress. HENT:   Head: Normocephalic and atraumatic. Right Ear: External ear normal.   Left Ear: External ear normal.   Nose: Nose normal.   Mouth/Throat: Oropharynx is clear and moist.   Eyes: Pupils are equal, round, and reactive to light. Conjunctivae are normal.   Neck: Normal range of motion. Neck supple. Cardiovascular: Normal rate, regular rhythm and normal heart sounds. No murmur heard. Pulmonary/Chest: Effort normal and breath sounds normal. No stridor. No respiratory distress. She has no wheezes. She has no rales. Abdominal: Soft. Bowel sounds are normal. She exhibits no distension. There is tenderness (RUQ, epigastric, LUQ). There is no guarding. Musculoskeletal: Normal range of motion.  She exhibits no edema. Neurological: She is alert and oriented to person, place, and time. Skin: Skin is warm and dry. No rash noted. She is not diaphoretic. Nursing note and vitals reviewed. Procedures    MDM       EKG:  Normal sinus rhythm with ventricular rate of 74. HI interval, QRS duration and QT interval within normal range. Normal axis. Nonspecific ST changes. No previous EKG.           --------------------------------------------- PAST HISTORY ---------------------------------------------  Past Medical History:  has a past medical history of Alcoholism ([facility] 75.), Anxiety and depression, Cancer ([facility] 75.), Chronic pancreatitis ([facility] 75.), Diabetes mellitus, type 2 ([facility] 75.), GERD (gastroesophageal reflux disease), Hepatitis C, Jaundice, Schizoaffective disorder, bipolar type ([facility] and Splenomegaly. Past Surgical History:  has a past surgical history that includes Hysterectomy (2004); Ovary removal (Bilateral, 2007); shoulder surgery (Left, 2003); Cholecystectomy (2010); ERCP; Abdomen surgery; Colonoscopy; Upper gastrointestinal endoscopy; other surgical history (Left, 04/25/2017); Breast biopsy (Left); Pancreas Biopsy; Endoscopy, colon, diagnostic; other surgical history (10/05/2017); and Colonoscopy (N/A, 10/2/2018). Social History:  reports that she has been smoking cigarettes. She started smoking about 37 years ago. She has a 16.00 pack-year smoking history. She has never used smokeless tobacco. She reports that she drinks about 1.8 oz of alcohol per week. She reports that she does not use drugs. Family History: family history includes Cancer in her mother; Diabetes in her mother. The patients home medications have been reviewed.     Allergies: Dye [iodides] and Tylenol [acetaminophen]    -------------------------------------------------- RESULTS -------------------------------------------------  Labs:  Results for orders placed or performed during the hospital encounter of 04/20/19   Comprehensive Metabolic Panel   Result Value Ref Range    Sodium 135 132 - 146 mmol/L    Potassium 4.8 3.5 - 5.0 mmol/L    Chloride 95 (L) 98 - 107 mmol/L    CO2 32 (H) 22 - 29 mmol/L    Anion Gap 8 7 - 16 mmol/L    Glucose 167 (H) 74 - 99 mg/dL    BUN 6 6 - 20 mg/dL    CREATININE 0.5 0.5 - 1.0 mg/dL    GFR Non-African American >60 >=60 mL/min/1.73    GFR African American >60     Calcium 9.7 8.6 - 10.2 mg/dL    Total Protein 8.8 (H) 6.4 - 8.3 g/dL    Alb 4.1 3.5 - 5.2 g/dL    Total Bilirubin 0.9 0.0 - 1.2 mg/dL    Alkaline Phosphatase 163 (H) 35 - 104 U/L    ALT 53 (H) 0 - 32 U/L    AST 83 (H) 0 - 31 U/L   Lipase   Result Value Ref Range    Lipase 11 (L) 13 - 60 U/L   Lactic Acid, Plasma   Result Value Ref Range    Lactic Acid 0.9 0.5 - 2.2 mmol/L   Troponin   Result Value Ref Range    Troponin <0.01 0.00 - 0.03 ng/mL   Urinalysis   Result Value Ref Range    Color, UA Yellow Straw/Yellow    Clarity, UA Clear Clear    Glucose, Ur Negative Negative mg/dL    Bilirubin Urine Negative Negative    Ketones, Urine Negative Negative mg/dL    Specific Gravity, UA 1.010 1.005 - 1.030    Blood, Urine Negative Negative    pH, UA 8.5 5.0 - 9.0    Protein, UA Negative Negative mg/dL    Urobilinogen, Urine 0.2 <2.0 E.U./dL    Nitrite, Urine Negative Negative    Leukocyte Esterase, Urine Negative Negative   Ammonia   Result Value Ref Range    Ammonia 44.0 11.0 - 51.0 umol/L   Protime-INR   Result Value Ref Range    Protime 11.9 9.3 - 12.4 sec    INR 1.1    APTT   Result Value Ref Range    aPTT 35.6 (H) 24.5 - 35.1 sec   pH, venous   Result Value Ref Range    pH, Larry 7.38 7.30 - 7.42   CBC auto differential   Result Value Ref Range    WBC 3.7 (L) 4.5 - 11.5 E9/L    RBC 4.25 3.50 - 5.50 E12/L    Hemoglobin 13.4 11.5 - 15.5 g/dL    Hematocrit 38.9 34.0 - 48.0 %    MCV 91.5 80.0 - 99.9 fL    MCH 31.5 26.0 - 35.0 pg    MCHC 34.4 32.0 - 34.5 %    RDW 12.9 11.5 - 15.0 fL    Platelets 71 (L) 005 - 450 E9/L    MPV 10.1 7.0 - 12.0 fL    Neutrophils % 64.7 43.0 - 80.0 %    Immature Granulocytes % 0.3 0.0 - 5.0 %    Lymphocytes % 25.8 20.0 - 42.0 %    Monocytes % 7.3 2.0 - 12.0 %    Eosinophils % 1.6 0.0 - 6.0 %    Basophils % 0.3 0.0 - 2.0 %    Neutrophils # 2.41 1.80 - 7.30 E9/L    Immature Granulocytes # 0.01 E9/L    Lymphocytes # 0.96 (L) 1.50 - 4.00 E9/L    Monocytes # 0.27 0.10 - 0.95 E9/L    Eosinophils # 0.06 0.05 - 0.50 E9/L    Basophils # 0.01 0.00 - 0.20 E9/L   Platelet Confirmation   Result Value Ref Range    Platelet Confirmation CONFIRMED    POCT Venous   Result Value Ref Range    POC Sodium 137 132 - 146 mmol/L    POC Potassium 4.2 3.5 - 5.0 mmol/L    POC Chloride 95 (L) 100 - 108 mmol/L    CO2 36 (H) 22 - 29 mmol/L    POC Anion Gap 6 (L) 7 - 16 mmol/L    POC Glucose 158 (H) 74 - 99 mg/dl    POC BUN 5 (L) 8 - 23 mg/dL    POC Creatinine 0.5 0.5 - 1.0 mg/dL    GFR Non-African American >60 >=60 mL/min/1.73    GFR African American >60    EKG 12 Lead   Result Value Ref Range    Ventricular Rate 74 BPM    Atrial Rate 74 BPM    P-R Interval 146 ms    QRS Duration 72 ms    Q-T Interval 388 ms    QTc Calculation (Bazett) 430 ms    P Axis 74 degrees    R Axis 50 degrees    T Axis 88 degrees       Radiology:  No orders to display       ------------------------- NURSING NOTES AND VITALS REVIEWED ---------------------------  Date / Time Roomed:  4/20/2019  4:32 AM  ED Bed Assignment:  14/14    The nursing notes within the ED encounter and vital signs as below have been reviewed. BP (!) 147/96   Pulse 78   Temp 98 °F (36.7 °C)   Resp 14   Wt 108 lb (49 kg)   SpO2 97%   BMI 20.41 kg/m²   Oxygen Saturation Interpretation: Normal      ------------------------------------------ PROGRESS NOTES ------------------------------------------  I have spoken with the patient and discussed todays results, in addition to providing specific details for the plan of care and counseling regarding the diagnosis and prognosis.   Their questions are answered at this time and they

## 2019-05-30 ENCOUNTER — HOSPITAL ENCOUNTER (OUTPATIENT)
Dept: INFUSION THERAPY | Age: 49
Setting detail: INFUSION SERIES
Discharge: HOME | End: 2019-05-30
Payer: MEDICAID

## 2019-05-30 VITALS
OXYGEN SATURATION: 98 % | DIASTOLIC BLOOD PRESSURE: 69 MMHG | TEMPERATURE: 98 F | RESPIRATION RATE: 16 BRPM | HEART RATE: 75 BPM | SYSTOLIC BLOOD PRESSURE: 110 MMHG

## 2019-05-30 DIAGNOSIS — M81.8 OTHER OSTEOPOROSIS WITHOUT CURRENT PATHOLOGICAL FRACTURE: Primary | ICD-10-CM

## 2019-05-30 PROCEDURE — 96372 THER/PROPH/DIAG INJ SC/IM: CPT

## 2019-05-30 PROCEDURE — 6360000002 HC RX W HCPCS: Performed by: INTERNAL MEDICINE

## 2019-05-30 RX ADMIN — DENOSUMAB 60 MG: 60 INJECTION SUBCUTANEOUS at 12:05

## 2019-07-25 ENCOUNTER — HOSPITAL ENCOUNTER (EMERGENCY)
Age: 49
Discharge: HOME | End: 2019-07-25
Payer: MEDICAID

## 2019-07-25 VITALS
TEMPERATURE: 98.7 F | OXYGEN SATURATION: 99 % | HEIGHT: 61 IN | BODY MASS INDEX: 21.71 KG/M2 | WEIGHT: 115 LBS | HEART RATE: 81 BPM | DIASTOLIC BLOOD PRESSURE: 77 MMHG | SYSTOLIC BLOOD PRESSURE: 108 MMHG | RESPIRATION RATE: 18 BRPM

## 2019-07-25 DIAGNOSIS — J06.9 VIRAL UPPER RESPIRATORY TRACT INFECTION: Primary | ICD-10-CM

## 2019-07-25 LAB — STREP GRP A PCR: NEGATIVE

## 2019-07-25 PROCEDURE — 99283 EMERGENCY DEPT VISIT LOW MDM: CPT

## 2019-07-25 PROCEDURE — 87880 STREP A ASSAY W/OPTIC: CPT

## 2019-07-25 RX ORDER — PSEUDOEPHEDRINE HCL 120 MG/1
120 TABLET, FILM COATED, EXTENDED RELEASE ORAL EVERY 12 HOURS
Qty: 14 TABLET | Refills: 0 | Status: SHIPPED | OUTPATIENT
Start: 2019-07-25 | End: 2019-08-01

## 2019-07-25 ASSESSMENT — PAIN DESCRIPTION - FREQUENCY: FREQUENCY: CONTINUOUS

## 2019-07-25 ASSESSMENT — PAIN DESCRIPTION - ORIENTATION: ORIENTATION: LEFT;RIGHT

## 2019-07-25 ASSESSMENT — PAIN DESCRIPTION - PROGRESSION: CLINICAL_PROGRESSION: NOT CHANGED

## 2019-07-25 ASSESSMENT — PAIN DESCRIPTION - LOCATION: LOCATION: EAR;THROAT

## 2019-07-25 ASSESSMENT — PAIN SCALES - GENERAL: PAINLEVEL_OUTOF10: 6

## 2019-07-25 ASSESSMENT — PAIN DESCRIPTION - DESCRIPTORS: DESCRIPTORS: ACHING;SORE

## 2019-08-05 DIAGNOSIS — Z85.3 PERSONAL HISTORY OF BREAST CANCER: Primary | ICD-10-CM

## 2019-08-06 ENCOUNTER — HOSPITAL ENCOUNTER (OUTPATIENT)
Dept: INFUSION THERAPY | Age: 49
Discharge: HOME | End: 2019-08-06
Payer: MEDICAID

## 2019-08-06 ENCOUNTER — OFFICE VISIT (OUTPATIENT)
Dept: ONCOLOGY | Age: 49
End: 2019-08-06
Payer: MEDICAID

## 2019-08-06 ENCOUNTER — HOSPITAL ENCOUNTER (OUTPATIENT)
Age: 49
Discharge: HOME | End: 2019-08-06
Payer: MEDICAID

## 2019-08-06 VITALS
DIASTOLIC BLOOD PRESSURE: 72 MMHG | HEIGHT: 61 IN | BODY MASS INDEX: 22.37 KG/M2 | SYSTOLIC BLOOD PRESSURE: 110 MMHG | WEIGHT: 118.5 LBS | TEMPERATURE: 97.4 F | HEART RATE: 75 BPM

## 2019-08-06 DIAGNOSIS — Z85.3 PERSONAL HISTORY OF BREAST CANCER: Primary | ICD-10-CM

## 2019-08-06 DIAGNOSIS — Z85.3 PERSONAL HISTORY OF BREAST CANCER: ICD-10-CM

## 2019-08-06 LAB
ALBUMIN SERPL-MCNC: 3.6 G/DL (ref 3.5–5.2)
ALP BLD-CCNC: 172 U/L (ref 35–104)
ALT SERPL-CCNC: 14 U/L (ref 0–32)
ANION GAP SERPL CALCULATED.3IONS-SCNC: 10 MMOL/L (ref 7–16)
AST SERPL-CCNC: 23 U/L (ref 0–31)
BASOPHILS ABSOLUTE: 0.02 E9/L (ref 0–0.2)
BASOPHILS RELATIVE PERCENT: 0.5 % (ref 0–2)
BILIRUB SERPL-MCNC: 0.4 MG/DL (ref 0–1.2)
BUN BLDV-MCNC: 8 MG/DL (ref 6–20)
CALCIUM SERPL-MCNC: 9.6 MG/DL (ref 8.6–10.2)
CHLORIDE BLD-SCNC: 102 MMOL/L (ref 98–107)
CO2: 28 MMOL/L (ref 22–29)
CREAT SERPL-MCNC: 0.7 MG/DL (ref 0.5–1)
EOSINOPHILS ABSOLUTE: 0.11 E9/L (ref 0.05–0.5)
EOSINOPHILS RELATIVE PERCENT: 2.7 % (ref 0–6)
GFR AFRICAN AMERICAN: >60
GFR NON-AFRICAN AMERICAN: >60 ML/MIN/1.73
GLUCOSE BLD-MCNC: 135 MG/DL (ref 74–99)
HCT VFR BLD CALC: 38.6 % (ref 34–48)
HEMOGLOBIN: 12.7 G/DL (ref 11.5–15.5)
IMMATURE GRANULOCYTES #: 0.01 E9/L
IMMATURE GRANULOCYTES %: 0.2 % (ref 0–5)
LYMPHOCYTES ABSOLUTE: 1.04 E9/L (ref 1.5–4)
LYMPHOCYTES RELATIVE PERCENT: 25.8 % (ref 20–42)
MCH RBC QN AUTO: 30 PG (ref 26–35)
MCHC RBC AUTO-ENTMCNC: 32.9 % (ref 32–34.5)
MCV RBC AUTO: 91 FL (ref 80–99.9)
MONOCYTES ABSOLUTE: 0.31 E9/L (ref 0.1–0.95)
MONOCYTES RELATIVE PERCENT: 7.7 % (ref 2–12)
NEUTROPHILS ABSOLUTE: 2.54 E9/L (ref 1.8–7.3)
NEUTROPHILS RELATIVE PERCENT: 63.1 % (ref 43–80)
PDW BLD-RTO: 13.8 FL (ref 11.5–15)
PLATELET # BLD: 81 E9/L (ref 130–450)
PLATELET CONFIRMATION: NORMAL
PMV BLD AUTO: 10.3 FL (ref 7–12)
POTASSIUM SERPL-SCNC: 4.6 MMOL/L (ref 3.5–5)
RBC # BLD: 4.24 E12/L (ref 3.5–5.5)
RBC # BLD: NORMAL 10*6/UL
SODIUM BLD-SCNC: 140 MMOL/L (ref 132–146)
TOTAL PROTEIN: 7.6 G/DL (ref 6.4–8.3)
WBC # BLD: 4 E9/L (ref 4.5–11.5)

## 2019-08-06 PROCEDURE — 36415 COLL VENOUS BLD VENIPUNCTURE: CPT

## 2019-08-06 PROCEDURE — 80053 COMPREHEN METABOLIC PANEL: CPT

## 2019-08-06 PROCEDURE — 99214 OFFICE O/P EST MOD 30 MIN: CPT | Performed by: INTERNAL MEDICINE

## 2019-08-06 PROCEDURE — 82105 ALPHA-FETOPROTEIN SERUM: CPT

## 2019-08-06 PROCEDURE — 99213 OFFICE O/P EST LOW 20 MIN: CPT

## 2019-08-06 PROCEDURE — 85025 COMPLETE CBC W/AUTO DIFF WBC: CPT

## 2019-08-06 NOTE — PROGRESS NOTES
Regular rate. No murmurs, rubs or gallops. ABDOMEN: Soft. Non-tender, non-distended. Positive bowel sounds. EXTREMITIES: Without clubbing, cyanosis, or edema. NEUROLOGIC: No focal deficits. ECOG PS 1    Impression/Plan:  51 y/o female who underwent Blue dye injection with left breast lumpectomy and sentinel lymph node biopsy after technetium sulfur colloid injection on 04/25/2017:  A.  Lymph node, left axillary, left breast sentinel node, biopsy: One lymph node with no diagnostic abnormality. Louis Qureshi, left, excision of mass:  -Invasive well-differentiated ductal carcinoma and intermediate grade ductal carcinoma in situ (see comment and cancer case summary). -Fibrocystic changes. CANCER CASE SUMMARY:  Procedure: Excision without image guided localization. Lymph node sampling: [place] lymph node. Specimen laterality: Left. Tumor size, size of largest invasive carcinoma: 7 mm. Histologic type: Invasive mammary carcinoma of no special type (ductal,not otherwise specified). Histologic grade:   Glandular (acinar)/tubular differentiation: Score 2.   Nuclear pleomorphism: Score 1.   Mitotic rate: Score 1.   Overall grade: Grade 1. Tumor focality: Single focus of invasive carcinoma. Ductal carcinoma in situ: DCIS is present.  Oralia Rios of DCIS: Estimated size: At least 15 mm.   Architectural patterns: Cribriform and solid.   Nuclear grade: Grade 2 (intermediate).   Necrosis: Not identified. Margins:   Invasive carcinoma:    -Margins uninvolved by invasive carcinoma.    -Distance from closest margin: 4 mm (anterior and lateral margins).   DCIS:    -Margins positive for DCIS (posterior from the postero-medial to postero-lateral aspects). Lymph nodes:   Total number of lymph nodes examined (sentinel and non-sentinel): 1.   Number of sentinel lymph nodes examined: 1. Lymph vascular invasion: Not identified.     Pathologic staging:   Primary tumor (pT): pT1b.   Regional lymph nodes (pN):pN0 (i-) (sn- nuclear grade. Fibroadenoma, remote, microscopic (1). Nipple and areola: Negative for carcinoma. Margins of resection: Infiltrating ductal carcinoma (12 o'clock) comes to within 0.1 cm of inked superior and 0.2 cm of inked posterior/deep margin of resection. Ductal carcinoma in situ with associated biopsy cavity is present less than 0.1 cm from inked deep margin of resection and 0.3 cm from inked anterior margin of resection. Infiltrating ductal carcinoma (central breast parenchyma) is present less than 0.2 cm from inked anterior margin of resection. Lymphovascular invasion: Not identified. Dermal lymphovascular invasion: Not identified. Pathologic stage: at least qG2e-63 o'clock lesion; pT1b-central lesion. Regional lymph nodes: See [id number]; only [place] lymph node (1) is examined. p(sn)N0 (i-). Hx of DYLON/BSO in 2007; [address] 26.5 10/17/2017. Estradiol <5.0 on 10/17/2017. DEXA scan Oct 2017 osteoporosis lumbar spine and femoral necks. Ca/VitD Prolia q6months    We recommended endocrine therapy (Arimidex 1 mg po daily) Side effects of Arimidex reviewed with patient. She agreed to proceed. Right Diagnostic Mammogram 12/05/2018 Negative for malignancy. DEXA scan 12/05/2018 osteopenia in LS; Osteoporosis involving the hips. Arimidex 1 mg po daily was started on 11/09/2018 which she is tolerating well. Continue Arimidex, Ca/VitD. Prolia q6months. RTC 4 months with prior mammogram and DEXA scan    Genetics: My-Risk recommended as well as Genetic counseling.     Juan Barrett MD   9/2/2419  Board Certified Medical Oncologist

## 2019-08-08 LAB — AFP-TUMOR MARKER: 2 NG/ML (ref 0–9)

## 2019-09-04 ENCOUNTER — HOSPITAL ENCOUNTER (EMERGENCY)
Age: 49
Discharge: HOME | End: 2019-09-05
Attending: EMERGENCY MEDICINE
Payer: MEDICAID

## 2019-09-04 DIAGNOSIS — G62.9 NEUROPATHY: Primary | ICD-10-CM

## 2019-09-04 PROCEDURE — 99282 EMERGENCY DEPT VISIT SF MDM: CPT

## 2019-09-05 ENCOUNTER — HOSPITAL ENCOUNTER (EMERGENCY)
Age: 49
Discharge: HOME | End: 2019-09-05
Payer: MEDICAID

## 2019-09-05 VITALS
TEMPERATURE: 98.4 F | WEIGHT: 120 LBS | SYSTOLIC BLOOD PRESSURE: 106 MMHG | DIASTOLIC BLOOD PRESSURE: 76 MMHG | HEART RATE: 72 BPM | RESPIRATION RATE: 16 BRPM | OXYGEN SATURATION: 99 % | HEIGHT: 61 IN | BODY MASS INDEX: 22.66 KG/M2

## 2019-09-05 VITALS
HEART RATE: 74 BPM | DIASTOLIC BLOOD PRESSURE: 72 MMHG | BODY MASS INDEX: 22.66 KG/M2 | OXYGEN SATURATION: 94 % | HEIGHT: 61 IN | WEIGHT: 120 LBS | SYSTOLIC BLOOD PRESSURE: 110 MMHG | RESPIRATION RATE: 16 BRPM | TEMPERATURE: 98.2 F

## 2019-09-05 DIAGNOSIS — R10.84 GENERALIZED ABDOMINAL PAIN: Primary | ICD-10-CM

## 2019-09-05 DIAGNOSIS — M79.605 LEFT LEG PAIN: ICD-10-CM

## 2019-09-05 DIAGNOSIS — M79.604 RIGHT LEG PAIN: ICD-10-CM

## 2019-09-05 LAB
AMYLASE: 9 U/L (ref 20–100)
BASOPHILS ABSOLUTE: 0.02 E9/L (ref 0–0.2)
BASOPHILS RELATIVE PERCENT: 0.4 % (ref 0–2)
EOSINOPHILS ABSOLUTE: 0.1 E9/L (ref 0.05–0.5)
EOSINOPHILS RELATIVE PERCENT: 2 % (ref 0–6)
HCT VFR BLD CALC: 39.8 % (ref 34–48)
HEMOGLOBIN: 12.9 G/DL (ref 11.5–15.5)
IMMATURE GRANULOCYTES #: 0.01 E9/L
IMMATURE GRANULOCYTES %: 0.2 % (ref 0–5)
LIPASE: 14 U/L (ref 13–60)
LYMPHOCYTES ABSOLUTE: 0.78 E9/L (ref 1.5–4)
LYMPHOCYTES RELATIVE PERCENT: 15.5 % (ref 20–42)
MCH RBC QN AUTO: 30.1 PG (ref 26–35)
MCHC RBC AUTO-ENTMCNC: 32.4 % (ref 32–34.5)
MCV RBC AUTO: 93 FL (ref 80–99.9)
MONOCYTES ABSOLUTE: 0.28 E9/L (ref 0.1–0.95)
MONOCYTES RELATIVE PERCENT: 5.6 % (ref 2–12)
NEUTROPHILS ABSOLUTE: 3.85 E9/L (ref 1.8–7.3)
NEUTROPHILS RELATIVE PERCENT: 76.3 % (ref 43–80)
PDW BLD-RTO: 14.3 FL (ref 11.5–15)
PLATELET # BLD: 80 E9/L (ref 130–450)
PLATELET CONFIRMATION: NORMAL
PMV BLD AUTO: 10.3 FL (ref 7–12)
RBC # BLD: 4.28 E12/L (ref 3.5–5.5)
REASON FOR REJECTION: NORMAL
REJECTED TEST: NORMAL
WBC # BLD: 5 E9/L (ref 4.5–11.5)

## 2019-09-05 PROCEDURE — 83690 ASSAY OF LIPASE: CPT

## 2019-09-05 PROCEDURE — 6360000002 HC RX W HCPCS: Performed by: NURSE PRACTITIONER

## 2019-09-05 PROCEDURE — 96372 THER/PROPH/DIAG INJ SC/IM: CPT

## 2019-09-05 PROCEDURE — 36415 COLL VENOUS BLD VENIPUNCTURE: CPT

## 2019-09-05 PROCEDURE — 96374 THER/PROPH/DIAG INJ IV PUSH: CPT

## 2019-09-05 PROCEDURE — 85025 COMPLETE CBC W/AUTO DIFF WBC: CPT

## 2019-09-05 PROCEDURE — 99284 EMERGENCY DEPT VISIT MOD MDM: CPT

## 2019-09-05 PROCEDURE — 96375 TX/PRO/DX INJ NEW DRUG ADDON: CPT

## 2019-09-05 PROCEDURE — 82150 ASSAY OF AMYLASE: CPT

## 2019-09-05 PROCEDURE — 6360000002 HC RX W HCPCS: Performed by: EMERGENCY MEDICINE

## 2019-09-05 RX ORDER — LUBIPROSTONE 8 UG/1
8 CAPSULE, GELATIN COATED ORAL DAILY
COMMUNITY
End: 2022-05-23 | Stop reason: SDUPTHER

## 2019-09-05 RX ORDER — FLUCONAZOLE 200 MG/1
200 TABLET ORAL WEEKLY
COMMUNITY
End: 2019-10-21

## 2019-09-05 RX ORDER — MORPHINE SULFATE 4 MG/ML
10 INJECTION, SOLUTION INTRAMUSCULAR; INTRAVENOUS ONCE
Status: COMPLETED | OUTPATIENT
Start: 2019-09-05 | End: 2019-09-05

## 2019-09-05 RX ORDER — MIRTAZAPINE 15 MG/1
15 TABLET, FILM COATED ORAL NIGHTLY
COMMUNITY
End: 2019-10-21

## 2019-09-05 RX ORDER — DIPHENHYDRAMINE HYDROCHLORIDE 50 MG/ML
25 INJECTION INTRAMUSCULAR; INTRAVENOUS ONCE
Status: COMPLETED | OUTPATIENT
Start: 2019-09-05 | End: 2019-09-05

## 2019-09-05 RX ORDER — MORPHINE SULFATE 10 MG/ML
6 INJECTION, SOLUTION INTRAMUSCULAR; INTRAVENOUS ONCE
Status: COMPLETED | OUTPATIENT
Start: 2019-09-05 | End: 2019-09-05

## 2019-09-05 RX ADMIN — MORPHINE SULFATE 10 MG: 4 INJECTION, SOLUTION INTRAMUSCULAR; INTRAVENOUS at 00:22

## 2019-09-05 RX ADMIN — DIPHENHYDRAMINE HYDROCHLORIDE 25 MG: 50 INJECTION, SOLUTION INTRAMUSCULAR; INTRAVENOUS at 15:05

## 2019-09-05 RX ADMIN — MORPHINE SULFATE 6 MG: 10 INJECTION INTRAVENOUS at 15:08

## 2019-09-05 ASSESSMENT — PAIN SCALES - GENERAL
PAINLEVEL_OUTOF10: 10
PAINLEVEL_OUTOF10: 4
PAINLEVEL_OUTOF10: 8
PAINLEVEL_OUTOF10: 5
PAINLEVEL_OUTOF10: 10

## 2019-09-05 ASSESSMENT — PAIN DESCRIPTION - LOCATION
LOCATION: ABDOMEN;LEG
LOCATION: LEG

## 2019-09-05 ASSESSMENT — PAIN DESCRIPTION - DESCRIPTORS: DESCRIPTORS: ACHING;SHARP

## 2019-09-05 ASSESSMENT — PAIN DESCRIPTION - PAIN TYPE: TYPE: CHRONIC PAIN

## 2019-09-05 ASSESSMENT — PAIN DESCRIPTION - ORIENTATION
ORIENTATION: RIGHT;LEFT
ORIENTATION: RIGHT;LEFT

## 2019-09-05 ASSESSMENT — PAIN DESCRIPTION - PROGRESSION: CLINICAL_PROGRESSION: GRADUALLY IMPROVING

## 2019-09-05 NOTE — ED PROVIDER NOTES
Saturation Interpretation: Normal    The patients available past medical records and past encounters were reviewed. ------------------------------ ED COURSE/MEDICAL DECISION MAKING----------------------  Medications   morphine sulfate (PF) injection 10 mg (10 mg Intramuscular Given 9/5/19 0022)       Medical Decision Making:    Pt with a Hx of DM and neuropathy presents for bilateral leg pain. Morphine medication given. Pt feels better after medication. Pt will be d/c and will follow up with her PCP. She is educated on signs and symptoms that require emergent evaluation. Pt is advised to return to the ED if her symptoms change or worsen. If her pain persists, pt may need further evaluation. Pt is agreeable to plan and all questions have been answered at this time. This patient's ED course included: a personal history and physicial examination, re-evaluation prior to disposition and multiple bedside re-evaluations    This patient has remained hemodynamically stable and been closely monitored during their ED course. Re-Evaluations:           Re-evaluation. Patients symptoms are improving    Consultations:             None. Counseling: The emergency provider has spoken with the patient and spouse/SO and discussed todays results, in addition to providing specific details for the plan of care and counseling regarding the diagnosis and prognosis. Questions are answered at this time and they are agreeable with the plan.     --------------------------------- IMPRESSION AND DISPOSITION ---------------------------------    IMPRESSION  1. Neuropathy        DISPOSITION  Disposition: Discharge to home  Patient condition is good    9/5/19, 12:13 AM.    This note is prepared by Richie Brooks, acting as Scribe for Rajan Gutierrez DO. Rajan Gutierrez DO:  The scribe's documentation has been prepared under my direction and personally reviewed by me in its entirety.   I confirm that the note above

## 2019-09-05 NOTE — ED PROVIDER NOTES
[facility]    Department of Emergency Medicine   ED  Provider Note  Admit Date/RoomTime: 9/5/2019  1:48 PM  ED Room: 12/12  MRN: [id number]  Chief Complaint       Abdominal Pain (chronic pancreatitis sbdomen is distended beginning a couple days ago) and Leg Pain (chronic bilateral leg pain)    History of Present Illness   Source of history provided by:  patient. History/Exam Limitations: none. Rojas Gonsales is a 52 y.o. old female who has a past medical history of:   Past Medical History:   Diagnosis Date    Alcoholism ([facility]     Since teens to early 25s    Anxiety and depression     Cancer ([facility]     breast, left    Chronic pancreatitis ([facility]     Diabetes mellitus, type 2 ([facility]     GERD (gastroesophageal reflux disease)     Hepatitis C     Jaundice 5/12/2016    Schizoaffective disorder, bipolar type ([facility]     Splenomegaly 5/30/2017    presents to the emergency department by private vehicle, for complaints of gradual onset, still present, intermittent episodes aching, cramping, pressure, stabbing pain in the upper abdomen without radiation which began 3 day(s) prior to arrival.   There has been similar episodes in the past.  Since onset the symptoms have been persistent. The pain is associated with abdominal pain and bloating. The pain is aggravated by none and relieved by nothing. There has been NO none. GYN History: hysterectomy. STD History: no history of PID, STD's. No LMP recorded. Patient has had a hysterectomy. Current Pregnancy: NA. Birth Control: None. Gravid Status: No obstetric history on file. .  ROS   Pertinent positives and negatives are stated within HPI, all other systems reviewed and are negative.     Past Surgical History:   Procedure Laterality Date    ABDOMEN SURGERY      gallbladder removal    BREAST BIOPSY Left     CHOLECYSTECTOMY  2010    COLONOSCOPY      COLONOSCOPY N/A 10/2/2018    COLONOSCOPY WITH BIOPSY performed by Yudelka Nelson MD at [facility] ENDOSCOPY    ENDOSCOPY, COLON, DIAGNOSTIC      ERCP      april 2016 at [address]  2004    OTHER SURGICAL HISTORY Left 04/25/2017     Left breast blue dye injection, Sential Node Lymph Biopsy with left breast lumpectomy    OTHER SURGICAL HISTORY  10/05/2017    simple left mastectomy    OVARY REMOVAL Bilateral 2007    PANCREAS BIOPSY      with stent    SHOULDER SURGERY Left 2003    UPPER GASTROINTESTINAL ENDOSCOPY     Social History:  reports that she has been smoking cigarettes. She started smoking about 37 years ago. She has a 16.00 pack-year smoking history. She has never used smokeless tobacco. She reports that she drinks about 3.0 standard drinks of alcohol per week. She reports that she does not use drugs. Family History: family history includes Cancer in her mother; Diabetes in her mother. Allergies: Dye [iodides] and Tylenol [acetaminophen]    Physical Exam           ED Triage Vitals   BP Temp Temp Source Pulse Resp SpO2 Height Weight   09/05/19 1345 09/05/19 1345 09/05/19 1345 09/05/19 1401 09/05/19 1345 09/05/19 1401 09/05/19 1400 09/05/19 1400   136/82 98.4 °F (36.9 °C) Oral 82 16 99 % 5' 1\" (1.549 m) 120 lb (54.4 kg)      Oxygen Saturation Interpretation: Normal.    · General Appearance/Constitutional:  Alert, development consistent with age. · HEENT:  NC/NT. PERRLA. Airway patent. · Neck:  Supple. No lymphadenopathy. · Respiratory:  No retractions. Lungs Clear to auscultation and breath sounds equal.  · CV:  Regular rate and rhythm. · GI:  General Appearance: normal.         Bowel sounds: normal bowel sounds. Distension:  Mild. Tenderness: mild tenderness is present in the upper abdomen. Liver: enlarged- 4 cm below costal margin, tender and non-tender. Spleen:  non-palpable and non-tender. Pulsatile Mass: absent. Hernia:  no inguinal or femoral hernias noted.   · Back: CVA Tenderness: No.  · : (chaperone present during examination). deferred. · Integument:  Normal turgor. Warm, dry, without visible rash, unless noted elsewhere. · Lymphatics: No edema, cap.refill <3sec. · Neurological:  Orientation age-appropriate. Motor functions intact. Lab / Imaging Results   (All laboratory and radiology results have been personally reviewed by myself)  Labs:  Results for orders placed or performed during the hospital encounter of 09/05/19   CBC Auto Differential   Result Value Ref Range    WBC 5.0 4.5 - 11.5 E9/L    RBC 4.28 3.50 - 5.50 E12/L    Hemoglobin 12.9 11.5 - 15.5 g/dL    Hematocrit 39.8 34.0 - 48.0 %    MCV 93.0 80.0 - 99.9 fL    MCH 30.1 26.0 - 35.0 pg    MCHC 32.4 32.0 - 34.5 %    RDW 14.3 11.5 - 15.0 fL    Platelets 80 (L) 205 - 450 E9/L    MPV 10.3 7.0 - 12.0 fL    Neutrophils % 76.3 43.0 - 80.0 %    Immature Granulocytes % 0.2 0.0 - 5.0 %    Lymphocytes % 15.5 (L) 20.0 - 42.0 %    Monocytes % 5.6 2.0 - 12.0 %    Eosinophils % 2.0 0.0 - 6.0 %    Basophils % 0.4 0.0 - 2.0 %    Neutrophils Absolute 3.85 1.80 - 7.30 E9/L    Immature Granulocytes # 0.01 E9/L    Lymphocytes Absolute 0.78 (L) 1.50 - 4.00 E9/L    Monocytes Absolute 0.28 0.10 - 0.95 E9/L    Eosinophils Absolute 0.10 0.05 - 0.50 E9/L    Basophils Absolute 0.02 0.00 - 0.20 E9/L   Lipase   Result Value Ref Range    Lipase 14 13 - 60 U/L   Amylase   Result Value Ref Range    Amylase 9 (L) 20 - 100 U/L   Platelet Confirmation   Result Value Ref Range    Platelet Confirmation CONFIRMED    SPECIMEN REJECTION   Result Value Ref Range    Rejected Test BMP     Reason for Rejection see below      Imaging: All Radiology results interpreted by Radiologist unless otherwise noted.   No orders to display     ED Course / Medical Decision Making     Medications   morphine (PF) injection 6 mg (6 mg Intravenous Given 9/5/19 8068)   diphenhydrAMINE (BENADRYL) injection 25 mg (25 mg Intravenous Given 9/5/19 1987)        Re-examination:  9/5/19       Time: 215

## 2019-09-05 NOTE — ED NOTES
Called facility for update (they had called earlier to give a report)     Elba Her RN  09/05/19 4917

## 2019-09-24 ENCOUNTER — HOSPITAL ENCOUNTER (EMERGENCY)
Age: 49
Discharge: HOME | End: 2019-09-24
Attending: EMERGENCY MEDICINE
Payer: MEDICAID

## 2019-09-24 VITALS
TEMPERATURE: 98 F | RESPIRATION RATE: 16 BRPM | HEIGHT: 61 IN | HEART RATE: 97 BPM | DIASTOLIC BLOOD PRESSURE: 78 MMHG | BODY MASS INDEX: 23.98 KG/M2 | OXYGEN SATURATION: 97 % | WEIGHT: 127 LBS | SYSTOLIC BLOOD PRESSURE: 122 MMHG

## 2019-09-24 DIAGNOSIS — R10.9 CHRONIC ABDOMINAL PAIN: Primary | ICD-10-CM

## 2019-09-24 DIAGNOSIS — E16.2 HYPOGLYCEMIA: ICD-10-CM

## 2019-09-24 DIAGNOSIS — G89.29 CHRONIC ABDOMINAL PAIN: Primary | ICD-10-CM

## 2019-09-24 DIAGNOSIS — G62.9 NEUROPATHY: ICD-10-CM

## 2019-09-24 DIAGNOSIS — D69.6 THROMBOCYTOPENIA (HCC): ICD-10-CM

## 2019-09-24 DIAGNOSIS — R18.8 OTHER ASCITES: ICD-10-CM

## 2019-09-24 LAB
ALBUMIN SERPL-MCNC: 3.8 G/DL (ref 3.5–5.2)
ALP BLD-CCNC: 159 U/L (ref 35–104)
ALT SERPL-CCNC: 15 U/L (ref 0–32)
ANION GAP SERPL CALCULATED.3IONS-SCNC: 12 MMOL/L (ref 7–16)
AST SERPL-CCNC: 45 U/L (ref 0–31)
BASOPHILS ABSOLUTE: 0.03 E9/L (ref 0–0.2)
BASOPHILS RELATIVE PERCENT: 0.5 % (ref 0–2)
BILIRUB SERPL-MCNC: 0.5 MG/DL (ref 0–1.2)
BUN BLDV-MCNC: 7 MG/DL (ref 6–20)
CALCIUM SERPL-MCNC: 9.9 MG/DL (ref 8.6–10.2)
CHLORIDE BLD-SCNC: 97 MMOL/L (ref 98–107)
CO2: 29 MMOL/L (ref 22–29)
CREAT SERPL-MCNC: 0.6 MG/DL (ref 0.5–1)
EOSINOPHILS ABSOLUTE: 0.15 E9/L (ref 0.05–0.5)
EOSINOPHILS RELATIVE PERCENT: 2.6 % (ref 0–6)
GFR AFRICAN AMERICAN: >60
GFR NON-AFRICAN AMERICAN: >60 ML/MIN/1.73
GLUCOSE BLD-MCNC: 65 MG/DL (ref 74–99)
HCT VFR BLD CALC: 41.6 % (ref 34–48)
HEMOGLOBIN: 13.3 G/DL (ref 11.5–15.5)
IMMATURE GRANULOCYTES #: 0.02 E9/L
IMMATURE GRANULOCYTES %: 0.3 % (ref 0–5)
LACTIC ACID: 1.9 MMOL/L (ref 0.5–2.2)
LIPASE: 12 U/L (ref 13–60)
LYMPHOCYTES ABSOLUTE: 1.55 E9/L (ref 1.5–4)
LYMPHOCYTES RELATIVE PERCENT: 26.7 % (ref 20–42)
MCH RBC QN AUTO: 29.9 PG (ref 26–35)
MCHC RBC AUTO-ENTMCNC: 32 % (ref 32–34.5)
MCV RBC AUTO: 93.5 FL (ref 80–99.9)
MONOCYTES ABSOLUTE: 0.55 E9/L (ref 0.1–0.95)
MONOCYTES RELATIVE PERCENT: 9.5 % (ref 2–12)
NEUTROPHILS ABSOLUTE: 3.5 E9/L (ref 1.8–7.3)
NEUTROPHILS RELATIVE PERCENT: 60.4 % (ref 43–80)
PDW BLD-RTO: 14.3 FL (ref 11.5–15)
PLATELET # BLD: 78 E9/L (ref 130–450)
PLATELET CONFIRMATION: NORMAL
PMV BLD AUTO: 11.7 FL (ref 7–12)
POTASSIUM SERPL-SCNC: 5.3 MMOL/L (ref 3.5–5)
RBC # BLD: 4.45 E12/L (ref 3.5–5.5)
SODIUM BLD-SCNC: 138 MMOL/L (ref 132–146)
TOTAL PROTEIN: 8.3 G/DL (ref 6.4–8.3)
WBC # BLD: 5.8 E9/L (ref 4.5–11.5)

## 2019-09-24 PROCEDURE — 96374 THER/PROPH/DIAG INJ IV PUSH: CPT

## 2019-09-24 PROCEDURE — 83605 ASSAY OF LACTIC ACID: CPT

## 2019-09-24 PROCEDURE — 83690 ASSAY OF LIPASE: CPT

## 2019-09-24 PROCEDURE — 85025 COMPLETE CBC W/AUTO DIFF WBC: CPT

## 2019-09-24 PROCEDURE — 99284 EMERGENCY DEPT VISIT MOD MDM: CPT

## 2019-09-24 PROCEDURE — 36415 COLL VENOUS BLD VENIPUNCTURE: CPT

## 2019-09-24 PROCEDURE — 6360000002 HC RX W HCPCS: Performed by: EMERGENCY MEDICINE

## 2019-09-24 PROCEDURE — 96372 THER/PROPH/DIAG INJ SC/IM: CPT

## 2019-09-24 PROCEDURE — 80053 COMPREHEN METABOLIC PANEL: CPT

## 2019-09-24 RX ORDER — MORPHINE SULFATE 4 MG/ML
4 INJECTION, SOLUTION INTRAMUSCULAR; INTRAVENOUS ONCE
Status: COMPLETED | OUTPATIENT
Start: 2019-09-24 | End: 2019-09-24

## 2019-09-24 RX ORDER — MORPHINE SULFATE 4 MG/ML
4 INJECTION, SOLUTION INTRAMUSCULAR; INTRAVENOUS ONCE
Status: DISCONTINUED | OUTPATIENT
Start: 2019-09-24 | End: 2019-09-24

## 2019-09-24 RX ADMIN — MORPHINE SULFATE 4 MG: 4 INJECTION, SOLUTION INTRAMUSCULAR; INTRAVENOUS at 14:26

## 2019-09-24 RX ADMIN — MORPHINE SULFATE 4 MG: 4 INJECTION, SOLUTION INTRAMUSCULAR; INTRAVENOUS at 15:27

## 2019-09-24 ASSESSMENT — PAIN SCALES - GENERAL
PAINLEVEL_OUTOF10: 9
PAINLEVEL_OUTOF10: 8
PAINLEVEL_OUTOF10: 6
PAINLEVEL_OUTOF10: 9
PAINLEVEL_OUTOF10: 4

## 2019-09-24 ASSESSMENT — PAIN DESCRIPTION - PAIN TYPE
TYPE: ACUTE PAIN

## 2019-09-24 ASSESSMENT — PAIN DESCRIPTION - LOCATION
LOCATION: ABDOMEN

## 2019-09-24 ASSESSMENT — PAIN DESCRIPTION - PROGRESSION
CLINICAL_PROGRESSION: GRADUALLY IMPROVING
CLINICAL_PROGRESSION: GRADUALLY IMPROVING

## 2019-09-24 ASSESSMENT — PAIN DESCRIPTION - DESCRIPTORS: DESCRIPTORS: DISCOMFORT;CRAMPING

## 2019-09-24 NOTE — ED PROVIDER NOTES
History: family history includes Cancer in her mother; Diabetes in her mother. The patients home medications have been reviewed. Allergies: Dye [iodides] and Tylenol [acetaminophen]        ---------------------------------------------------PHYSICAL EXAM--------------------------------------    Constitutional:  Well developed, well nourished, no acute distress, non-toxic appearance   Eyes:  PERRL, conjunctiva normal, EOMI  HENT:  Atraumatic, external ears normal, nose normal, oropharynx moist. Neck- normal range of motion, no tenderness, supple   Respiratory:  No respiratory distress, normal breath sounds, no rales, no wheezing   Cardiovascular:  Normal rate, normal rhythm, no murmurs, no gallops, no rubs. Radial and DP pulses 2+ bilaterally. GI:  Soft, distended with fluid-wave, normal bowel sounds, diffusely tender, no organomegaly, no mass, no rebound, no guarding   :  No costovertebral angle tenderness   Musculoskeletal:  No edema, no tenderness, no deformities. Lower extremities tender to palpation of skin withlight touch  Integument:  Well hydrated, no rash. Adequate perfusion. Neurologic:  Alert & oriented x 3, CN 2-12 normal,  no focal deficits noted. Psychiatric:  Speech and behavior appropriate       -------------------------------------------------- RESULTS -------------------------------------------------  I have personally reviewed all laboratory and imaging results for this patient. Results are listed below.      LABS:  Results for orders placed or performed during the hospital encounter of 09/24/19   CBC auto differential   Result Value Ref Range    WBC 5.8 4.5 - 11.5 E9/L    RBC 4.45 3.50 - 5.50 E12/L    Hemoglobin 13.3 11.5 - 15.5 g/dL    Hematocrit 41.6 34.0 - 48.0 %    MCV 93.5 80.0 - 99.9 fL    MCH 29.9 26.0 - 35.0 pg    MCHC 32.0 32.0 - 34.5 %    RDW 14.3 11.5 - 15.0 fL    Platelets 78 (L) 991 - 450 E9/L    MPV 11.7 7.0 - 12.0 fL   Comprehensive Metabolic Panel   Result Value Ref with discharge plan. Patient in no acute distress and non-toxic in appearance. F/u with specialists   Thrombocytopenia chronic   S/o driving home    This patient's ED course included: re-evaluation prior to disposition, IV medications and a personal history and physicial eaxmination    This patient has remained hemodynamically stable, improved and been closely monitored during their ED course. Re-Evaluations:             Time: 3:33 PM  Re-evaluation. Patients symptoms are improving  Repeat physical examination is improved        Consultations:             none    Critical Care: none        Counseling: The emergency provider has spoken with the patient and spouse/SO and discussed todays results, in addition to providing specific details for the plan of care and counseling regarding the diagnosis and prognosis. Questions are answered at this time and they are agreeable with the plan.       --------------------------------- IMPRESSION AND DISPOSITION ---------------------------------    IMPRESSION  1. Chronic abdominal pain    2. Neuropathy    3. Thrombocytopenia (HCC)    4. Other ascites    5.  Hypoglycemia        DISPOSITION  Disposition: Discharge to home  Patient condition is stable                  Ed Yeager DO  09/24/19 1539

## 2019-09-24 NOTE — ED NOTES
Pt will mirna eat pudding and is requesting orange juice (both given)-refusing healthy choice meal.     Tru Canales RN  09/24/19 2000 [facility] [place], [place]  09/24/19 [id number]

## 2019-10-03 ENCOUNTER — HOSPITAL ENCOUNTER (EMERGENCY)
Age: 49
Discharge: HOME | End: 2019-10-03
Attending: FAMILY MEDICINE
Payer: MEDICAID

## 2019-10-03 VITALS
BODY MASS INDEX: 23.98 KG/M2 | TEMPERATURE: 98.1 F | OXYGEN SATURATION: 98 % | SYSTOLIC BLOOD PRESSURE: 118 MMHG | WEIGHT: 127 LBS | DIASTOLIC BLOOD PRESSURE: 82 MMHG | RESPIRATION RATE: 16 BRPM | HEART RATE: 82 BPM | HEIGHT: 61 IN

## 2019-10-03 DIAGNOSIS — H65.03 BILATERAL ACUTE SEROUS OTITIS MEDIA, RECURRENCE NOT SPECIFIED: Primary | ICD-10-CM

## 2019-10-03 PROCEDURE — 99282 EMERGENCY DEPT VISIT SF MDM: CPT

## 2019-10-03 PROCEDURE — 6370000000 HC RX 637 (ALT 250 FOR IP)

## 2019-10-03 RX ORDER — CEFDINIR 300 MG/1
300 CAPSULE ORAL 2 TIMES DAILY
Qty: 20 CAPSULE | Refills: 0 | Status: SHIPPED | OUTPATIENT
Start: 2019-10-03 | End: 2019-10-13

## 2019-10-03 RX ORDER — NEOMYCIN SULFATE, POLYMYXIN B SULFATE AND HYDROCORTISONE 10; 3.5; 1 MG/ML; MG/ML; [USP'U]/ML
3 SUSPENSION/ DROPS AURICULAR (OTIC) EVERY 6 HOURS SCHEDULED
Status: DISCONTINUED | OUTPATIENT
Start: 2019-10-03 | End: 2019-10-03 | Stop reason: HOSPADM

## 2019-10-03 RX ORDER — NEOMYCIN SULFATE, POLYMYXIN B SULFATE AND HYDROCORTISONE 10; 3.5; 1 MG/ML; MG/ML; [USP'U]/ML
SUSPENSION/ DROPS AURICULAR (OTIC)
Status: COMPLETED
Start: 2019-10-03 | End: 2019-10-03

## 2019-10-03 RX ADMIN — NEOMYCIN SULFATE, POLYMYXIN B SULFATE AND HYDROCORTISONE 3 DROP: 10; 3.5; 1 SUSPENSION/ DROPS AURICULAR (OTIC) at 06:57

## 2019-10-03 ASSESSMENT — PAIN SCALES - GENERAL: PAINLEVEL_OUTOF10: 8

## 2019-10-03 ASSESSMENT — PAIN DESCRIPTION - LOCATION: LOCATION: EAR

## 2019-10-03 ASSESSMENT — PAIN DESCRIPTION - DESCRIPTORS: DESCRIPTORS: ACHING

## 2019-10-03 ASSESSMENT — PAIN DESCRIPTION - ORIENTATION: ORIENTATION: RIGHT

## 2019-10-03 ASSESSMENT — PAIN DESCRIPTION - PAIN TYPE: TYPE: ACUTE PAIN

## 2019-10-21 ENCOUNTER — APPOINTMENT (OUTPATIENT)
Dept: GENERAL RADIOLOGY | Age: 49
End: 2019-10-21
Payer: MEDICAID

## 2019-10-21 ENCOUNTER — HOSPITAL ENCOUNTER (EMERGENCY)
Age: 49
Discharge: HOME | End: 2019-10-21
Attending: FAMILY MEDICINE
Payer: MEDICAID

## 2019-10-21 VITALS
RESPIRATION RATE: 16 BRPM | HEART RATE: 82 BPM | BODY MASS INDEX: 24.55 KG/M2 | HEIGHT: 61 IN | SYSTOLIC BLOOD PRESSURE: 100 MMHG | WEIGHT: 130 LBS | OXYGEN SATURATION: 98 % | TEMPERATURE: 98.4 F | DIASTOLIC BLOOD PRESSURE: 60 MMHG

## 2019-10-21 DIAGNOSIS — M10.00 ACUTE IDIOPATHIC GOUT, UNSPECIFIED SITE: ICD-10-CM

## 2019-10-21 DIAGNOSIS — M79.605 LEFT LEG PAIN: Primary | ICD-10-CM

## 2019-10-21 PROCEDURE — 99283 EMERGENCY DEPT VISIT LOW MDM: CPT

## 2019-10-21 PROCEDURE — 73610 X-RAY EXAM OF ANKLE: CPT

## 2019-10-21 RX ORDER — INDOMETHACIN 50 MG/1
50 CAPSULE ORAL
Qty: 15 CAPSULE | Refills: 0 | Status: SHIPPED | OUTPATIENT
Start: 2019-10-21 | End: 2019-11-15 | Stop reason: ALTCHOICE

## 2019-10-21 SDOH — HEALTH STABILITY: MENTAL HEALTH: HOW OFTEN DO YOU HAVE A DRINK CONTAINING ALCOHOL?: NEVER

## 2019-10-21 ASSESSMENT — PAIN DESCRIPTION - ONSET: ONSET: GRADUAL

## 2019-10-21 ASSESSMENT — PAIN SCALES - GENERAL: PAINLEVEL_OUTOF10: 8

## 2019-10-21 ASSESSMENT — PAIN DESCRIPTION - FREQUENCY: FREQUENCY: CONTINUOUS

## 2019-10-21 ASSESSMENT — PAIN DESCRIPTION - LOCATION: LOCATION: ANKLE

## 2019-10-21 ASSESSMENT — PAIN DESCRIPTION - PAIN TYPE: TYPE: ACUTE PAIN

## 2019-10-21 ASSESSMENT — PAIN DESCRIPTION - PROGRESSION: CLINICAL_PROGRESSION: GRADUALLY WORSENING

## 2019-10-21 ASSESSMENT — PAIN DESCRIPTION - ORIENTATION: ORIENTATION: LEFT

## 2019-10-21 ASSESSMENT — PAIN DESCRIPTION - DESCRIPTORS: DESCRIPTORS: ACHING;CONSTANT;DISCOMFORT;SHOOTING

## 2019-10-27 ENCOUNTER — HOSPITAL ENCOUNTER (EMERGENCY)
Age: 49
Discharge: HOME | End: 2019-10-27
Attending: EMERGENCY MEDICINE
Payer: MEDICAID

## 2019-10-27 VITALS
SYSTOLIC BLOOD PRESSURE: 118 MMHG | HEIGHT: 61 IN | BODY MASS INDEX: 24.55 KG/M2 | WEIGHT: 130 LBS | RESPIRATION RATE: 16 BRPM | HEART RATE: 85 BPM | TEMPERATURE: 99.1 F | DIASTOLIC BLOOD PRESSURE: 64 MMHG | OXYGEN SATURATION: 97 %

## 2019-10-27 DIAGNOSIS — R60.0 EDEMA OF SOFT TISSUE OF LEFT ANKLE REGION: Primary | ICD-10-CM

## 2019-10-27 PROCEDURE — 6360000002 HC RX W HCPCS: Performed by: EMERGENCY MEDICINE

## 2019-10-27 PROCEDURE — 6360000002 HC RX W HCPCS

## 2019-10-27 PROCEDURE — 96372 THER/PROPH/DIAG INJ SC/IM: CPT

## 2019-10-27 PROCEDURE — 99282 EMERGENCY DEPT VISIT SF MDM: CPT

## 2019-10-27 RX ORDER — METHYLPREDNISOLONE 4 MG/1
TABLET ORAL
Qty: 1 KIT | Refills: 0 | Status: SHIPPED | OUTPATIENT
Start: 2019-10-27 | End: 2019-11-02

## 2019-10-27 RX ORDER — DEXAMETHASONE SODIUM PHOSPHATE 10 MG/ML
4.5 INJECTION, SOLUTION INTRAMUSCULAR; INTRAVENOUS ONCE
Status: COMPLETED | OUTPATIENT
Start: 2019-10-27 | End: 2019-10-27

## 2019-10-27 RX ADMIN — DEXAMETHASONE SODIUM PHOSPHATE 4.5 MG: 10 INJECTION, SOLUTION INTRAMUSCULAR; INTRAVENOUS at 04:26

## 2019-10-27 RX ADMIN — ENOXAPARIN SODIUM 60 MG: 100 INJECTION SUBCUTANEOUS at 04:27

## 2019-10-27 ASSESSMENT — PAIN DESCRIPTION - FREQUENCY: FREQUENCY: CONTINUOUS

## 2019-10-27 ASSESSMENT — PAIN DESCRIPTION - ORIENTATION: ORIENTATION: LEFT

## 2019-10-27 ASSESSMENT — PAIN DESCRIPTION - PAIN TYPE: TYPE: ACUTE PAIN

## 2019-10-27 ASSESSMENT — PAIN DESCRIPTION - LOCATION: LOCATION: ANKLE

## 2019-10-27 ASSESSMENT — PAIN DESCRIPTION - DESCRIPTORS: DESCRIPTORS: ACHING;SHARP

## 2019-11-12 DIAGNOSIS — Z79.811 USE OF ANASTROZOLE (ARIMIDEX): Primary | ICD-10-CM

## 2019-11-13 ENCOUNTER — OFFICE VISIT (OUTPATIENT)
Dept: SURGERY | Age: 49
End: 2019-11-13
Payer: MEDICAID

## 2019-11-13 VITALS
HEIGHT: 61 IN | RESPIRATION RATE: 16 BRPM | OXYGEN SATURATION: 98 % | BODY MASS INDEX: 25.49 KG/M2 | TEMPERATURE: 98 F | WEIGHT: 135 LBS | HEART RATE: 92 BPM | SYSTOLIC BLOOD PRESSURE: 102 MMHG | DIASTOLIC BLOOD PRESSURE: 72 MMHG

## 2019-11-13 DIAGNOSIS — Z79.4 TYPE 2 DIABETES MELLITUS WITHOUT COMPLICATION, WITH LONG-TERM CURRENT USE OF INSULIN (HCC): ICD-10-CM

## 2019-11-13 DIAGNOSIS — E11.9 TYPE 2 DIABETES MELLITUS WITHOUT COMPLICATION, WITH LONG-TERM CURRENT USE OF INSULIN (HCC): ICD-10-CM

## 2019-11-13 DIAGNOSIS — F17.200 SMOKING ADDICTION: Primary | ICD-10-CM

## 2019-11-13 PROCEDURE — 99203 OFFICE O/P NEW LOW 30 MIN: CPT | Performed by: PHYSICIAN ASSISTANT

## 2019-11-14 ENCOUNTER — HOSPITAL ENCOUNTER (OUTPATIENT)
Dept: INTERVENTIONAL RADIOLOGY/VASCULAR | Age: 49
Discharge: HOME | End: 2019-11-14
Payer: MEDICAID

## 2019-11-14 ENCOUNTER — HOSPITAL ENCOUNTER (OUTPATIENT)
Dept: ULTRASOUND IMAGING | Age: 49
Discharge: HOME | End: 2019-11-14
Payer: MEDICAID

## 2019-11-14 DIAGNOSIS — R18.8 OTHER ASCITES: ICD-10-CM

## 2019-11-14 DIAGNOSIS — K74.60 CIRRHOSIS OF LIVER WITHOUT ASCITES, UNSPECIFIED HEPATIC CIRRHOSIS TYPE (HCC): ICD-10-CM

## 2019-11-14 PROCEDURE — 76705 ECHO EXAM OF ABDOMEN: CPT

## 2019-11-15 ENCOUNTER — HOSPITAL ENCOUNTER (EMERGENCY)
Age: 49
Discharge: HOME | End: 2019-11-16
Attending: EMERGENCY MEDICINE
Payer: MEDICAID

## 2019-11-15 DIAGNOSIS — R60.9 DEPENDENT EDEMA: ICD-10-CM

## 2019-11-15 DIAGNOSIS — M25.572 ARTHRALGIA OF ANKLE OR FOOT, LEFT: Primary | ICD-10-CM

## 2019-11-15 DIAGNOSIS — Z87.19 HISTORY OF CIRRHOSIS: ICD-10-CM

## 2019-11-15 PROCEDURE — 96372 THER/PROPH/DIAG INJ SC/IM: CPT

## 2019-11-15 PROCEDURE — 6370000000 HC RX 637 (ALT 250 FOR IP): Performed by: EMERGENCY MEDICINE

## 2019-11-15 PROCEDURE — 6360000002 HC RX W HCPCS: Performed by: EMERGENCY MEDICINE

## 2019-11-15 PROCEDURE — 99282 EMERGENCY DEPT VISIT SF MDM: CPT

## 2019-11-15 RX ORDER — POTASSIUM CHLORIDE 20 MEQ/1
40 TABLET, EXTENDED RELEASE ORAL ONCE
Status: COMPLETED | OUTPATIENT
Start: 2019-11-15 | End: 2019-11-15

## 2019-11-15 RX ORDER — FUROSEMIDE 40 MG/1
40 TABLET ORAL ONCE
Status: COMPLETED | OUTPATIENT
Start: 2019-11-15 | End: 2019-11-15

## 2019-11-15 RX ORDER — PROMETHAZINE HYDROCHLORIDE 25 MG/ML
25 INJECTION, SOLUTION INTRAMUSCULAR; INTRAVENOUS ONCE
Status: DISCONTINUED | OUTPATIENT
Start: 2019-11-15 | End: 2019-11-16 | Stop reason: HOSPADM

## 2019-11-15 RX ADMIN — POTASSIUM CHLORIDE 40 MEQ: 20 TABLET, EXTENDED RELEASE ORAL at 23:57

## 2019-11-15 RX ADMIN — FUROSEMIDE 40 MG: 40 TABLET ORAL at 23:57

## 2019-11-15 RX ADMIN — HYDROMORPHONE HYDROCHLORIDE 1 MG: 1 INJECTION, SOLUTION INTRAMUSCULAR; INTRAVENOUS; SUBCUTANEOUS at 23:57

## 2019-11-15 ASSESSMENT — PAIN - FUNCTIONAL ASSESSMENT: PAIN_FUNCTIONAL_ASSESSMENT: PREVENTS OR INTERFERES SOME ACTIVE ACTIVITIES AND ADLS

## 2019-11-15 ASSESSMENT — PAIN DESCRIPTION - PROGRESSION: CLINICAL_PROGRESSION: NOT CHANGED

## 2019-11-15 ASSESSMENT — PAIN DESCRIPTION - DESCRIPTORS: DESCRIPTORS: BURNING;CONSTANT

## 2019-11-15 ASSESSMENT — PAIN DESCRIPTION - PAIN TYPE: TYPE: ACUTE PAIN

## 2019-11-15 ASSESSMENT — PAIN SCALES - GENERAL
PAINLEVEL_OUTOF10: 7
PAINLEVEL_OUTOF10: 7

## 2019-11-15 ASSESSMENT — PAIN DESCRIPTION - FREQUENCY: FREQUENCY: CONTINUOUS

## 2019-11-15 ASSESSMENT — PAIN DESCRIPTION - ONSET: ONSET: ON-GOING

## 2019-11-15 ASSESSMENT — PAIN DESCRIPTION - LOCATION: LOCATION: ANKLE

## 2019-11-16 VITALS
HEART RATE: 64 BPM | RESPIRATION RATE: 16 BRPM | HEIGHT: 61 IN | WEIGHT: 140 LBS | SYSTOLIC BLOOD PRESSURE: 116 MMHG | BODY MASS INDEX: 26.43 KG/M2 | OXYGEN SATURATION: 100 % | TEMPERATURE: 97.8 F | DIASTOLIC BLOOD PRESSURE: 66 MMHG

## 2019-12-06 ENCOUNTER — HOSPITAL ENCOUNTER (EMERGENCY)
Age: 49
Discharge: HOME | End: 2019-12-06
Attending: EMERGENCY MEDICINE
Payer: MEDICAID

## 2019-12-06 ENCOUNTER — APPOINTMENT (OUTPATIENT)
Dept: CT IMAGING | Age: 49
End: 2019-12-06
Payer: MEDICAID

## 2019-12-06 ENCOUNTER — APPOINTMENT (OUTPATIENT)
Dept: GENERAL RADIOLOGY | Age: 49
End: 2019-12-06
Payer: MEDICAID

## 2019-12-06 VITALS
WEIGHT: 140 LBS | HEART RATE: 89 BPM | BODY MASS INDEX: 26.43 KG/M2 | TEMPERATURE: 97.8 F | OXYGEN SATURATION: 100 % | SYSTOLIC BLOOD PRESSURE: 130 MMHG | DIASTOLIC BLOOD PRESSURE: 83 MMHG | HEIGHT: 61 IN | RESPIRATION RATE: 18 BRPM

## 2019-12-06 DIAGNOSIS — K86.1 CHRONIC PANCREATITIS, UNSPECIFIED PANCREATITIS TYPE (HCC): ICD-10-CM

## 2019-12-06 DIAGNOSIS — G89.29 CHRONIC ABDOMINAL PAIN: Primary | ICD-10-CM

## 2019-12-06 DIAGNOSIS — R10.9 CHRONIC ABDOMINAL PAIN: Primary | ICD-10-CM

## 2019-12-06 DIAGNOSIS — M25.472 PAIN AND SWELLING OF LEFT ANKLE: ICD-10-CM

## 2019-12-06 DIAGNOSIS — K59.00 CONSTIPATION, UNSPECIFIED CONSTIPATION TYPE: ICD-10-CM

## 2019-12-06 DIAGNOSIS — M25.572 PAIN AND SWELLING OF LEFT ANKLE: ICD-10-CM

## 2019-12-06 LAB
ALBUMIN SERPL-MCNC: 3.9 G/DL (ref 3.5–5.2)
ALP BLD-CCNC: 261 U/L (ref 35–104)
ALT SERPL-CCNC: 27 U/L (ref 0–32)
AMMONIA: 30 UMOL/L (ref 11–51)
ANION GAP SERPL CALCULATED.3IONS-SCNC: 10 MMOL/L (ref 7–16)
AST SERPL-CCNC: 23 U/L (ref 0–31)
BASOPHILS ABSOLUTE: 0 E9/L (ref 0–0.2)
BASOPHILS RELATIVE PERCENT: 0.4 % (ref 0–2)
BILIRUB SERPL-MCNC: 0.3 MG/DL (ref 0–1.2)
BILIRUBIN URINE: NEGATIVE
BLOOD, URINE: NEGATIVE
BUN BLDV-MCNC: 12 MG/DL (ref 6–20)
BURR CELLS: ABNORMAL
CALCIUM SERPL-MCNC: 8.8 MG/DL (ref 8.6–10.2)
CHLORIDE BLD-SCNC: 97 MMOL/L (ref 98–107)
CHP ED QC CHECK: YES
CLARITY: CLEAR
CO2: 24 MMOL/L (ref 22–29)
COLOR: YELLOW
CREAT SERPL-MCNC: 0.7 MG/DL (ref 0.5–1)
EOSINOPHILS ABSOLUTE: 0.08 E9/L (ref 0.05–0.5)
EOSINOPHILS RELATIVE PERCENT: 1.7 % (ref 0–6)
GFR AFRICAN AMERICAN: >60
GFR NON-AFRICAN AMERICAN: >60 ML/MIN/1.73
GLUCOSE BLD-MCNC: 378 MG/DL
GLUCOSE BLD-MCNC: 507 MG/DL (ref 74–99)
GLUCOSE URINE: >=1000 MG/DL
HCT VFR BLD CALC: 36.3 % (ref 34–48)
HEMOGLOBIN: 11.9 G/DL (ref 11.5–15.5)
INR BLD: 0.9
KETONES, URINE: NEGATIVE MG/DL
LEUKOCYTE ESTERASE, URINE: NEGATIVE
LIPASE: 14 U/L (ref 13–60)
LYMPHOCYTES ABSOLUTE: 0.96 E9/L (ref 1.5–4)
LYMPHOCYTES RELATIVE PERCENT: 20 % (ref 20–42)
MCH RBC QN AUTO: 30.3 PG (ref 26–35)
MCHC RBC AUTO-ENTMCNC: 32.8 % (ref 32–34.5)
MCV RBC AUTO: 92.4 FL (ref 80–99.9)
METER GLUCOSE: 378 MG/DL (ref 74–99)
MONOCYTES ABSOLUTE: 0.34 E9/L (ref 0.1–0.95)
MONOCYTES RELATIVE PERCENT: 7 % (ref 2–12)
NEUTROPHILS ABSOLUTE: 3.41 E9/L (ref 1.8–7.3)
NEUTROPHILS RELATIVE PERCENT: 71.3 % (ref 43–80)
NITRITE, URINE: NEGATIVE
PDW BLD-RTO: 14.4 FL (ref 11.5–15)
PH UA: 6 (ref 5–9)
PLATELET # BLD: 114 E9/L (ref 130–450)
PMV BLD AUTO: 11.6 FL (ref 7–12)
POIKILOCYTES: ABNORMAL
POLYCHROMASIA: ABNORMAL
POTASSIUM SERPL-SCNC: 4.6 MMOL/L (ref 3.5–5)
PROTEIN UA: NEGATIVE MG/DL
PROTHROMBIN TIME: 9.9 SEC (ref 9.3–12.4)
RBC # BLD: 3.93 E12/L (ref 3.5–5.5)
REASON FOR REJECTION: NORMAL
REJECTED TEST: NORMAL
SODIUM BLD-SCNC: 131 MMOL/L (ref 132–146)
SPECIFIC GRAVITY UA: <=1.005 (ref 1–1.03)
TEAR DROP CELLS: ABNORMAL
TOTAL PROTEIN: 7.8 G/DL (ref 6.4–8.3)
URIC ACID, SERUM: 4.2 MG/DL (ref 2.4–5.7)
UROBILINOGEN, URINE: 0.2 E.U./DL
WBC # BLD: 4.8 E9/L (ref 4.5–11.5)

## 2019-12-06 PROCEDURE — 84550 ASSAY OF BLOOD/URIC ACID: CPT

## 2019-12-06 PROCEDURE — 85025 COMPLETE CBC W/AUTO DIFF WBC: CPT

## 2019-12-06 PROCEDURE — 80053 COMPREHEN METABOLIC PANEL: CPT

## 2019-12-06 PROCEDURE — 6360000002 HC RX W HCPCS: Performed by: PHYSICIAN ASSISTANT

## 2019-12-06 PROCEDURE — 73630 X-RAY EXAM OF FOOT: CPT

## 2019-12-06 PROCEDURE — 99284 EMERGENCY DEPT VISIT MOD MDM: CPT

## 2019-12-06 PROCEDURE — 82140 ASSAY OF AMMONIA: CPT

## 2019-12-06 PROCEDURE — 96372 THER/PROPH/DIAG INJ SC/IM: CPT

## 2019-12-06 PROCEDURE — 74176 CT ABD & PELVIS W/O CONTRAST: CPT

## 2019-12-06 PROCEDURE — 81003 URINALYSIS AUTO W/O SCOPE: CPT

## 2019-12-06 PROCEDURE — 96374 THER/PROPH/DIAG INJ IV PUSH: CPT

## 2019-12-06 PROCEDURE — 6360000002 HC RX W HCPCS: Performed by: EMERGENCY MEDICINE

## 2019-12-06 PROCEDURE — 82962 GLUCOSE BLOOD TEST: CPT

## 2019-12-06 PROCEDURE — 36415 COLL VENOUS BLD VENIPUNCTURE: CPT

## 2019-12-06 PROCEDURE — 6370000000 HC RX 637 (ALT 250 FOR IP): Performed by: EMERGENCY MEDICINE

## 2019-12-06 PROCEDURE — 85610 PROTHROMBIN TIME: CPT

## 2019-12-06 PROCEDURE — 83690 ASSAY OF LIPASE: CPT

## 2019-12-06 PROCEDURE — 96375 TX/PRO/DX INJ NEW DRUG ADDON: CPT

## 2019-12-06 RX ORDER — ONDANSETRON 2 MG/ML
4 INJECTION INTRAMUSCULAR; INTRAVENOUS ONCE
Status: COMPLETED | OUTPATIENT
Start: 2019-12-06 | End: 2019-12-06

## 2019-12-06 RX ORDER — KETOROLAC TROMETHAMINE 30 MG/ML
30 INJECTION, SOLUTION INTRAMUSCULAR; INTRAVENOUS ONCE
Status: DISCONTINUED | OUTPATIENT
Start: 2019-12-06 | End: 2019-12-06

## 2019-12-06 RX ORDER — MORPHINE SULFATE 4 MG/ML
4 INJECTION, SOLUTION INTRAMUSCULAR; INTRAVENOUS ONCE
Status: COMPLETED | OUTPATIENT
Start: 2019-12-06 | End: 2019-12-06

## 2019-12-06 RX ADMIN — METHYLNALTREXONE BROMIDE 12 MG: 12 INJECTION, SOLUTION SUBCUTANEOUS at 04:15

## 2019-12-06 RX ADMIN — ONDANSETRON 4 MG: 2 INJECTION INTRAMUSCULAR; INTRAVENOUS at 03:11

## 2019-12-06 RX ADMIN — INSULIN HUMAN 10 UNITS: 100 INJECTION, SOLUTION PARENTERAL at 03:11

## 2019-12-06 RX ADMIN — MORPHINE SULFATE 4 MG: 4 INJECTION, SOLUTION INTRAMUSCULAR; INTRAVENOUS at 03:12

## 2019-12-06 ASSESSMENT — PAIN DESCRIPTION - FREQUENCY: FREQUENCY: CONTINUOUS

## 2019-12-06 ASSESSMENT — PAIN SCALES - GENERAL: PAINLEVEL_OUTOF10: 7

## 2019-12-06 ASSESSMENT — PAIN DESCRIPTION - ORIENTATION: ORIENTATION: LEFT;UPPER

## 2019-12-06 ASSESSMENT — PAIN DESCRIPTION - PAIN TYPE: TYPE: ACUTE PAIN

## 2019-12-10 ENCOUNTER — OFFICE VISIT (OUTPATIENT)
Dept: ONCOLOGY | Age: 49
End: 2019-12-10
Payer: MEDICAID

## 2019-12-10 ENCOUNTER — HOSPITAL ENCOUNTER (OUTPATIENT)
Dept: INFUSION THERAPY | Age: 49
Discharge: HOME | End: 2019-12-10
Payer: MEDICAID

## 2019-12-10 VITALS
TEMPERATURE: 96.7 F | HEART RATE: 87 BPM | DIASTOLIC BLOOD PRESSURE: 69 MMHG | BODY MASS INDEX: 27.78 KG/M2 | SYSTOLIC BLOOD PRESSURE: 114 MMHG | WEIGHT: 147 LBS | OXYGEN SATURATION: 97 %

## 2019-12-10 DIAGNOSIS — Z09 FOLLOW UP: Primary | ICD-10-CM

## 2019-12-10 LAB
ALBUMIN SERPL-MCNC: 3.6 G/DL (ref 3.5–5.2)
ALP BLD-CCNC: 247 U/L (ref 35–104)
ALT SERPL-CCNC: 36 U/L (ref 0–32)
ANION GAP SERPL CALCULATED.3IONS-SCNC: 11 MMOL/L (ref 7–16)
AST SERPL-CCNC: 35 U/L (ref 0–31)
BASOPHILS ABSOLUTE: 0 E9/L (ref 0–0.2)
BASOPHILS RELATIVE PERCENT: 0.2 % (ref 0–2)
BILIRUB SERPL-MCNC: 0.5 MG/DL (ref 0–1.2)
BUN BLDV-MCNC: 19 MG/DL (ref 6–20)
CALCIUM SERPL-MCNC: 9.5 MG/DL (ref 8.6–10.2)
CHLORIDE BLD-SCNC: 103 MMOL/L (ref 98–107)
CO2: 24 MMOL/L (ref 22–29)
CREAT SERPL-MCNC: 0.6 MG/DL (ref 0.5–1)
EOSINOPHILS ABSOLUTE: 0 E9/L (ref 0.05–0.5)
EOSINOPHILS RELATIVE PERCENT: 0.6 % (ref 0–6)
GFR AFRICAN AMERICAN: >60
GFR NON-AFRICAN AMERICAN: >60 ML/MIN/1.73
GLUCOSE BLD-MCNC: 200 MG/DL (ref 74–99)
HCT VFR BLD CALC: 36.9 % (ref 34–48)
HEMOGLOBIN: 12.3 G/DL (ref 11.5–15.5)
LYMPHOCYTES ABSOLUTE: 0.32 E9/L (ref 1.5–4)
LYMPHOCYTES RELATIVE PERCENT: 6.1 % (ref 20–42)
MCH RBC QN AUTO: 30.1 PG (ref 26–35)
MCHC RBC AUTO-ENTMCNC: 33.3 % (ref 32–34.5)
MCV RBC AUTO: 90.2 FL (ref 80–99.9)
MONOCYTES ABSOLUTE: 0.16 E9/L (ref 0.1–0.95)
MONOCYTES RELATIVE PERCENT: 2.6 % (ref 2–12)
NEUTROPHILS ABSOLUTE: 4.91 E9/L (ref 1.8–7.3)
NEUTROPHILS RELATIVE PERCENT: 91.3 % (ref 43–80)
PDW BLD-RTO: 14.7 FL (ref 11.5–15)
PLATELET # BLD: 82 E9/L (ref 130–450)
PLATELET CONFIRMATION: NORMAL
PMV BLD AUTO: 10.5 FL (ref 7–12)
POTASSIUM SERPL-SCNC: 5.7 MMOL/L (ref 3.5–5)
RBC # BLD: 4.09 E12/L (ref 3.5–5.5)
SODIUM BLD-SCNC: 138 MMOL/L (ref 132–146)
TOTAL PROTEIN: 7.5 G/DL (ref 6.4–8.3)
WBC # BLD: 5.4 E9/L (ref 4.5–11.5)

## 2019-12-10 PROCEDURE — 85025 COMPLETE CBC W/AUTO DIFF WBC: CPT

## 2019-12-10 PROCEDURE — 99212 OFFICE O/P EST SF 10 MIN: CPT

## 2019-12-10 PROCEDURE — 80053 COMPREHEN METABOLIC PANEL: CPT

## 2019-12-10 PROCEDURE — 36415 COLL VENOUS BLD VENIPUNCTURE: CPT

## 2019-12-10 PROCEDURE — 99214 OFFICE O/P EST MOD 30 MIN: CPT | Performed by: INTERNAL MEDICINE

## 2019-12-11 ENCOUNTER — HOSPITAL ENCOUNTER (OUTPATIENT)
Dept: INFUSION THERAPY | Age: 49
Setting detail: INFUSION SERIES
End: 2019-12-11
Payer: MEDICAID

## 2020-01-09 ENCOUNTER — TELEPHONE (OUTPATIENT)
Dept: INFUSION THERAPY | Age: 50
End: 2020-01-09

## 2020-01-09 NOTE — TELEPHONE ENCOUNTER
Reviewed Prolia administration records. Call placed to residence to schedule an injection. Message left via answering machine to call back at her convenience.

## 2020-01-12 ENCOUNTER — HOSPITAL ENCOUNTER (EMERGENCY)
Age: 50
Discharge: HOME | End: 2020-01-12
Payer: MEDICAID

## 2020-01-12 VITALS
HEART RATE: 75 BPM | BODY MASS INDEX: 26.62 KG/M2 | WEIGHT: 141 LBS | DIASTOLIC BLOOD PRESSURE: 68 MMHG | OXYGEN SATURATION: 100 % | HEIGHT: 61 IN | RESPIRATION RATE: 18 BRPM | SYSTOLIC BLOOD PRESSURE: 110 MMHG | TEMPERATURE: 98 F

## 2020-01-12 LAB — STREP GRP A PCR: NEGATIVE

## 2020-01-12 PROCEDURE — 87880 STREP A ASSAY W/OPTIC: CPT

## 2020-01-12 PROCEDURE — 99283 EMERGENCY DEPT VISIT LOW MDM: CPT

## 2020-01-12 RX ORDER — AZITHROMYCIN 250 MG/1
TABLET, FILM COATED ORAL
Qty: 1 PACKET | Refills: 0 | Status: SHIPPED | OUTPATIENT
Start: 2020-01-12 | End: 2020-01-22

## 2020-01-12 RX ORDER — BROMPHENIRAMINE MALEATE, PSEUDOEPHEDRINE HYDROCHLORIDE, AND DEXTROMETHORPHAN HYDROBROMIDE 2; 30; 10 MG/5ML; MG/5ML; MG/5ML
5 SYRUP ORAL 4 TIMES DAILY PRN
Qty: 118 ML | Refills: 0 | Status: SHIPPED | OUTPATIENT
Start: 2020-01-12 | End: 2020-08-20

## 2020-01-12 ASSESSMENT — PAIN DESCRIPTION - LOCATION: LOCATION: THROAT

## 2020-01-12 ASSESSMENT — PAIN DESCRIPTION - ORIENTATION: ORIENTATION: MID

## 2020-01-12 ASSESSMENT — PAIN DESCRIPTION - ONSET: ONSET: ON-GOING

## 2020-01-12 ASSESSMENT — PAIN DESCRIPTION - PAIN TYPE: TYPE: ACUTE PAIN

## 2020-01-12 ASSESSMENT — PAIN SCALES - GENERAL: PAINLEVEL_OUTOF10: 10

## 2020-01-12 ASSESSMENT — PAIN DESCRIPTION - DESCRIPTORS: DESCRIPTORS: ACHING

## 2020-01-12 ASSESSMENT — PAIN DESCRIPTION - PROGRESSION: CLINICAL_PROGRESSION: GRADUALLY WORSENING

## 2020-01-12 ASSESSMENT — PAIN DESCRIPTION - FREQUENCY: FREQUENCY: CONTINUOUS

## 2020-01-13 NOTE — ED PROVIDER NOTES
[facility]     Department of Emergency Medicine   ED  Provider Note  Admit Date/RoomTime: 1/12/2020  7:44 PM  ED Room: 11/11  Chief Complaint   Pharyngitis (for a few weeks)    History of Present Illness   Source of history provided by:  patient. History/Exam Limitations: none. Lonny Lane is a 52 y.o. old female who has a past medical history of:   Past Medical History:   Diagnosis Date    Alcoholism ([facility]     Since teens to early 25s    Anxiety and depression     Cancer ([address]     breast, left    Chronic pancreatitis ([address]     Diabetes mellitus, type 2 ([address]     GERD (gastroesophageal reflux disease)     Hepatitis C     Jaundice 5/12/2016    Schizoaffective disorder, bipolar type ([facility]     Splenomegaly 5/30/2017    presents to the emergency department by private vehicle, for bilateral sore throat pain, which occured several day(s) prior to arrival. Associated Signs & Symptoms: sneezing Since onset the symptoms have been persistent. She is drinking moderate amounts of fluids. patient states that she did have laryngitis several weeks ago an a sore throat persists. Exposed To: Streptococcus: no.                              Infectious Mononucleosis:  no.        Symptoms:  Pain:  Yes. Muffled Voice:  No.                            Hoarse:  No.                            Difficulty with Secretions:  No.    Centor Score (MODIFIED) For Strep Pharyngitis:    Age 3-14 Years   no (0)     Age >44 Years   YES  -1     Exudate or Swelling on Tonsils   no (0)     Tender/Swollen Anterior Cervical Nodes   no (0)     Fever? (T > 38°C, 100.4°F)   no (0)     Absence of Cough   no (0)   TOTAL POINTS   -1   Score of Zero = Probability of Strep Pharyngitis: 1% - 2.5%. ,   No further testing nor antibiotics. Score of 1 = Probability of Strep Pharyngitis: 5% - 10%. ,   No further testing nor antibiotics. Score of 2 = Probability of Strep Phar: 11% - 17%.    Culture/test all, ATB's taking these medications    Details   Magic Mouthwash (MIRACLE MOUTHWASH) Swish and spit 5 mLs 4 times daily as needed for Irritation, Disp-240 mL, R-0Print      azithromycin (ZITHROMAX Z-BJ) 250 MG tablet TAKE 500MG PO DAY ONE. .. 250MG PO DAY TWO THROUGH FIVE   DISPENSE 6 TABS  NO REFILLS, Disp-1 packet, R-0Print      brompheniramine-pseudoephedrine-DM (BROMFED DM) 2-30-10 MG/5ML syrup Take 5 mLs by mouth 4 times daily as needed for Congestion or Cough, Disp-118 mL, R-0Print           Electronically signed by EVERETT Harrison CNP   DD: 1/12/20  **This report was transcribed using voice recognition software. Every effort was made to ensure accuracy; however, inadvertent computerized transcription errors may be present.   END OF ED PROVIDER NOTE        EVERETT Harrison CNP  01/12/20 2040

## 2020-01-28 ENCOUNTER — HOSPITAL ENCOUNTER (EMERGENCY)
Age: 50
Discharge: HOME | End: 2020-01-28
Payer: MEDICAID

## 2020-01-28 VITALS
RESPIRATION RATE: 16 BRPM | BODY MASS INDEX: 26.43 KG/M2 | TEMPERATURE: 98.5 F | HEIGHT: 61 IN | DIASTOLIC BLOOD PRESSURE: 72 MMHG | SYSTOLIC BLOOD PRESSURE: 118 MMHG | OXYGEN SATURATION: 98 % | HEART RATE: 91 BPM | WEIGHT: 140 LBS

## 2020-01-28 PROCEDURE — 6370000000 HC RX 637 (ALT 250 FOR IP): Performed by: NURSE PRACTITIONER

## 2020-01-28 PROCEDURE — 99282 EMERGENCY DEPT VISIT SF MDM: CPT

## 2020-01-28 RX ORDER — DOXYCYCLINE 100 MG/1
100 CAPSULE ORAL 2 TIMES DAILY
Qty: 20 CAPSULE | Refills: 0 | Status: SHIPPED | OUTPATIENT
Start: 2020-01-28 | End: 2020-02-07

## 2020-01-28 RX ORDER — DOXYCYCLINE HYCLATE 100 MG/1
100 CAPSULE ORAL ONCE
Status: COMPLETED | OUTPATIENT
Start: 2020-01-28 | End: 2020-01-28

## 2020-01-28 RX ADMIN — DOXYCYCLINE HYCLATE 100 MG: 100 CAPSULE ORAL at 20:27

## 2020-01-28 ASSESSMENT — PAIN DESCRIPTION - PAIN TYPE: TYPE: ACUTE PAIN

## 2020-01-28 ASSESSMENT — PAIN DESCRIPTION - LOCATION: LOCATION: FOOT;ANKLE

## 2020-01-28 ASSESSMENT — PAIN SCALES - GENERAL: PAINLEVEL_OUTOF10: 0

## 2020-01-28 ASSESSMENT — PAIN DESCRIPTION - DESCRIPTORS: DESCRIPTORS: SHARP

## 2020-01-29 NOTE — ED PROVIDER NOTES
(treatment within last 6 months or palliative) no (0)     Calf swelling ? 3 cm compared to asymptomatic calf (measured 10 cm             below tibial tuberosity) no (0)     Swollen unilateral superficial veins (non-varicose, in symptomatic leg) no (0)     Unilateral pitting edema (in symptomatic leg): no (0)     Previous documented DVT: no (0)     Swelling of entire leg yes (1)      Localized tenderness along the deep venous system no (0)     Paralysis, paresis, or recent cast immobilization of lower extremities no (0)     Recently bedridden ? 3 days, or major surgery requiring regional or                    general anesthetic in the past 12 weeks no (0)     Alternative diagnosis at least as likely yes (-2)     TOTAL SCORE 1     * Those with Wells scores of two or more have a 28% chance of having DVT, those with    a lower score have 6% odds. ** Alternatively, Wells scores can be categorized as high if greater than two, moderate if      one or two, and low if less than one, with likelihoods of 53%, 17%, and 5 respectively. ------------------------------ ED COURSE/MEDICAL DECISION MAKING----------------------  Medications   doxycycline hyclate (VIBRAMYCIN) capsule 100 mg (100 mg Oral Given 1/28/20 2027)         Medical Decision Making: Will be treated with additional antibiotics for otitis media on the left. She does have trace edema noted to the lower extremities. No shortness of breath. No calf tenderness. No evidence of DVT. Counseling: The emergency provider has spoken with the patient and spouse/SO and discussed todays results, in addition to providing specific details for the plan of care and counseling regarding the diagnosis and prognosis. Questions are answered at this time and they are agreeable with the plan.      --------------------------------- IMPRESSION AND DISPOSITION ---------------------------------    IMPRESSION  1. Other chronic pain    2.  Acute otitis media, unspecified otitis media type        DISPOSITION  Disposition: Discharge to home  Patient condition is good                 EVERETT Nettles - RAMOS  01/28/20 2032

## 2020-02-08 ENCOUNTER — APPOINTMENT (OUTPATIENT)
Dept: GENERAL RADIOLOGY | Age: 50
End: 2020-02-08
Payer: MEDICAID

## 2020-02-08 ENCOUNTER — HOSPITAL ENCOUNTER (EMERGENCY)
Age: 50
Discharge: HOME | End: 2020-02-08
Payer: MEDICAID

## 2020-02-08 VITALS
TEMPERATURE: 98 F | HEIGHT: 61 IN | SYSTOLIC BLOOD PRESSURE: 116 MMHG | WEIGHT: 150 LBS | OXYGEN SATURATION: 96 % | HEART RATE: 88 BPM | DIASTOLIC BLOOD PRESSURE: 78 MMHG | RESPIRATION RATE: 16 BRPM | BODY MASS INDEX: 28.32 KG/M2

## 2020-02-08 LAB
INFLUENZA A BY PCR: DETECTED
INFLUENZA B BY PCR: NOT DETECTED

## 2020-02-08 PROCEDURE — 71046 X-RAY EXAM CHEST 2 VIEWS: CPT

## 2020-02-08 PROCEDURE — 87502 INFLUENZA DNA AMP PROBE: CPT

## 2020-02-08 PROCEDURE — 99285 EMERGENCY DEPT VISIT HI MDM: CPT

## 2020-02-08 PROCEDURE — 6370000000 HC RX 637 (ALT 250 FOR IP): Performed by: PHYSICIAN ASSISTANT

## 2020-02-08 RX ORDER — DOXYCYCLINE HYCLATE 100 MG/1
100 CAPSULE ORAL 2 TIMES DAILY
COMMUNITY
End: 2020-02-15 | Stop reason: ALTCHOICE

## 2020-02-08 RX ORDER — AZITHROMYCIN 250 MG/1
TABLET, FILM COATED ORAL
Qty: 6 TABLET | Refills: 0 | Status: SHIPPED | OUTPATIENT
Start: 2020-02-08 | End: 2020-02-15 | Stop reason: ALTCHOICE

## 2020-02-08 RX ORDER — AZITHROMYCIN 250 MG/1
500 TABLET, FILM COATED ORAL ONCE
Status: COMPLETED | OUTPATIENT
Start: 2020-02-08 | End: 2020-02-08

## 2020-02-08 RX ORDER — CEFDINIR 300 MG/1
300 CAPSULE ORAL 2 TIMES DAILY
Qty: 20 CAPSULE | Refills: 0 | Status: SHIPPED | OUTPATIENT
Start: 2020-02-08 | End: 2020-02-15 | Stop reason: ALTCHOICE

## 2020-02-08 RX ORDER — CEFDINIR 300 MG/1
300 CAPSULE ORAL ONCE
Status: COMPLETED | OUTPATIENT
Start: 2020-02-08 | End: 2020-02-08

## 2020-02-08 RX ORDER — ALBUTEROL SULFATE 2.5 MG/3ML
2.5 SOLUTION RESPIRATORY (INHALATION) EVERY 4 HOURS PRN
Qty: 1 PACKAGE | Refills: 0 | Status: SHIPPED | OUTPATIENT
Start: 2020-02-08 | End: 2022-06-06 | Stop reason: SDUPTHER

## 2020-02-08 RX ORDER — IPRATROPIUM BROMIDE AND ALBUTEROL SULFATE 2.5; .5 MG/3ML; MG/3ML
1 SOLUTION RESPIRATORY (INHALATION)
Status: COMPLETED | OUTPATIENT
Start: 2020-02-08 | End: 2020-02-08

## 2020-02-08 RX ADMIN — IPRATROPIUM BROMIDE AND ALBUTEROL SULFATE 1 AMPULE: 2.5; .5 SOLUTION RESPIRATORY (INHALATION) at 15:09

## 2020-02-08 RX ADMIN — IPRATROPIUM BROMIDE AND ALBUTEROL SULFATE 1 AMPULE: 2.5; .5 SOLUTION RESPIRATORY (INHALATION) at 15:07

## 2020-02-08 RX ADMIN — AZITHROMYCIN 500 MG: 250 TABLET, FILM COATED ORAL at 15:57

## 2020-02-08 RX ADMIN — CEFDINIR 300 MG: 300 CAPSULE ORAL at 15:57

## 2020-02-08 ASSESSMENT — PAIN DESCRIPTION - DESCRIPTORS: DESCRIPTORS: ACHING;DISCOMFORT;SORE

## 2020-02-08 ASSESSMENT — PAIN DESCRIPTION - FREQUENCY: FREQUENCY: CONTINUOUS

## 2020-02-08 ASSESSMENT — PAIN DESCRIPTION - ONSET: ONSET: GRADUAL

## 2020-02-08 ASSESSMENT — PAIN DESCRIPTION - PAIN TYPE: TYPE: ACUTE PAIN

## 2020-02-08 ASSESSMENT — PAIN DESCRIPTION - PROGRESSION: CLINICAL_PROGRESSION: NOT CHANGED

## 2020-02-08 ASSESSMENT — PAIN DESCRIPTION - LOCATION: LOCATION: CHEST

## 2020-02-15 ENCOUNTER — APPOINTMENT (OUTPATIENT)
Dept: GENERAL RADIOLOGY | Age: 50
DRG: 133 | End: 2020-02-15
Payer: MEDICAID

## 2020-02-15 ENCOUNTER — HOSPITAL ENCOUNTER (INPATIENT)
Age: 50
LOS: 3 days | Discharge: HOME | DRG: 133 | End: 2020-02-18
Attending: EMERGENCY MEDICINE | Admitting: INTERNAL MEDICINE
Payer: MEDICAID

## 2020-02-15 PROBLEM — J96.01 ACUTE RESPIRATORY FAILURE WITH HYPOXIA (HCC): Status: ACTIVE | Noted: 2020-02-15

## 2020-02-15 LAB
ANION GAP SERPL CALCULATED.3IONS-SCNC: 9 MMOL/L (ref 7–16)
BASOPHILS ABSOLUTE: 0 E9/L (ref 0–0.2)
BASOPHILS RELATIVE PERCENT: 0 % (ref 0–2)
BUN BLDV-MCNC: 16 MG/DL (ref 6–20)
BURR CELLS: ABNORMAL
CALCIUM SERPL-MCNC: 9 MG/DL (ref 8.6–10.2)
CHLORIDE BLD-SCNC: 105 MMOL/L (ref 98–107)
CO2: 20 MMOL/L (ref 22–29)
CREAT SERPL-MCNC: 0.5 MG/DL (ref 0.5–1)
EOSINOPHILS ABSOLUTE: 0 E9/L (ref 0.05–0.5)
EOSINOPHILS RELATIVE PERCENT: 0 % (ref 0–6)
GFR AFRICAN AMERICAN: >60
GFR NON-AFRICAN AMERICAN: >60 ML/MIN/1.73
GLUCOSE BLD-MCNC: 262 MG/DL (ref 74–99)
HCT VFR BLD CALC: 34.8 % (ref 34–48)
HEMOGLOBIN: 11.1 G/DL (ref 11.5–15.5)
LACTIC ACID, SEPSIS: 1 MMOL/L (ref 0.5–1.9)
LYMPHOCYTES ABSOLUTE: 0.21 E9/L (ref 1.5–4)
LYMPHOCYTES RELATIVE PERCENT: 4.3 % (ref 20–42)
MCH RBC QN AUTO: 28.8 PG (ref 26–35)
MCHC RBC AUTO-ENTMCNC: 31.9 % (ref 32–34.5)
MCV RBC AUTO: 90.4 FL (ref 80–99.9)
METER GLUCOSE: 465 MG/DL (ref 74–99)
MONOCYTES ABSOLUTE: 0.11 E9/L (ref 0.1–0.95)
MONOCYTES RELATIVE PERCENT: 1.7 % (ref 2–12)
NEUTROPHILS ABSOLUTE: 4.98 E9/L (ref 1.8–7.3)
NEUTROPHILS RELATIVE PERCENT: 93.9 % (ref 43–80)
PDW BLD-RTO: 14.6 FL (ref 11.5–15)
PLATELET # BLD: 79 E9/L (ref 130–450)
PLATELET CONFIRMATION: NORMAL
PMV BLD AUTO: 11.7 FL (ref 7–12)
POIKILOCYTES: ABNORMAL
POLYCHROMASIA: ABNORMAL
POTASSIUM SERPL-SCNC: 4.6 MMOL/L (ref 3.5–5)
PRO-BNP: 295 PG/ML (ref 0–125)
PROCALCITONIN: 0.08 NG/ML (ref 0–0.08)
RBC # BLD: 3.85 E12/L (ref 3.5–5.5)
REASON FOR REJECTION: NORMAL
REJECTED TEST: NORMAL
SODIUM BLD-SCNC: 134 MMOL/L (ref 132–146)
TROPONIN: <0.01 NG/ML (ref 0–0.03)
WBC # BLD: 5.3 E9/L (ref 4.5–11.5)

## 2020-02-15 PROCEDURE — 2580000003 HC RX 258: Performed by: INTERNAL MEDICINE

## 2020-02-15 PROCEDURE — 36415 COLL VENOUS BLD VENIPUNCTURE: CPT

## 2020-02-15 PROCEDURE — 2580000003 HC RX 258: Performed by: EMERGENCY MEDICINE

## 2020-02-15 PROCEDURE — 83880 ASSAY OF NATRIURETIC PEPTIDE: CPT

## 2020-02-15 PROCEDURE — 1200000000 HC SEMI PRIVATE

## 2020-02-15 PROCEDURE — 6370000000 HC RX 637 (ALT 250 FOR IP): Performed by: INTERNAL MEDICINE

## 2020-02-15 PROCEDURE — 94644 CONT INHLJ TX 1ST HOUR: CPT

## 2020-02-15 PROCEDURE — 82962 GLUCOSE BLOOD TEST: CPT

## 2020-02-15 PROCEDURE — 6360000002 HC RX W HCPCS: Performed by: EMERGENCY MEDICINE

## 2020-02-15 PROCEDURE — 94664 DEMO&/EVAL PT USE INHALER: CPT

## 2020-02-15 PROCEDURE — 84484 ASSAY OF TROPONIN QUANT: CPT

## 2020-02-15 PROCEDURE — 2580000003 HC RX 258: Performed by: STUDENT IN AN ORGANIZED HEALTH CARE EDUCATION/TRAINING PROGRAM

## 2020-02-15 PROCEDURE — 87040 BLOOD CULTURE FOR BACTERIA: CPT

## 2020-02-15 PROCEDURE — 80048 BASIC METABOLIC PNL TOTAL CA: CPT

## 2020-02-15 PROCEDURE — 6370000000 HC RX 637 (ALT 250 FOR IP): Performed by: STUDENT IN AN ORGANIZED HEALTH CARE EDUCATION/TRAINING PROGRAM

## 2020-02-15 PROCEDURE — 85025 COMPLETE CBC W/AUTO DIFF WBC: CPT

## 2020-02-15 PROCEDURE — 93005 ELECTROCARDIOGRAM TRACING: CPT | Performed by: STUDENT IN AN ORGANIZED HEALTH CARE EDUCATION/TRAINING PROGRAM

## 2020-02-15 PROCEDURE — 6360000002 HC RX W HCPCS: Performed by: STUDENT IN AN ORGANIZED HEALTH CARE EDUCATION/TRAINING PROGRAM

## 2020-02-15 PROCEDURE — 84145 PROCALCITONIN (PCT): CPT

## 2020-02-15 PROCEDURE — 71046 X-RAY EXAM CHEST 2 VIEWS: CPT

## 2020-02-15 PROCEDURE — 83605 ASSAY OF LACTIC ACID: CPT

## 2020-02-15 PROCEDURE — 6360000002 HC RX W HCPCS: Performed by: INTERNAL MEDICINE

## 2020-02-15 PROCEDURE — 99285 EMERGENCY DEPT VISIT HI MDM: CPT

## 2020-02-15 RX ORDER — ANASTROZOLE 1 MG/1
1 TABLET ORAL DAILY
Status: DISCONTINUED | OUTPATIENT
Start: 2020-02-16 | End: 2020-02-18 | Stop reason: HOSPADM

## 2020-02-15 RX ORDER — TRAZODONE HYDROCHLORIDE 50 MG/1
150 TABLET ORAL NIGHTLY
Status: DISCONTINUED | OUTPATIENT
Start: 2020-02-15 | End: 2020-02-15

## 2020-02-15 RX ORDER — MIRTAZAPINE 15 MG/1
15 TABLET, FILM COATED ORAL NIGHTLY
Status: DISCONTINUED | OUTPATIENT
Start: 2020-02-15 | End: 2020-02-18 | Stop reason: HOSPADM

## 2020-02-15 RX ORDER — FAMOTIDINE 20 MG/1
20 TABLET, FILM COATED ORAL NIGHTLY
Status: DISCONTINUED | OUTPATIENT
Start: 2020-02-15 | End: 2020-02-18 | Stop reason: HOSPADM

## 2020-02-15 RX ORDER — PANTOPRAZOLE SODIUM 40 MG/1
40 TABLET, DELAYED RELEASE ORAL
Status: DISCONTINUED | OUTPATIENT
Start: 2020-02-16 | End: 2020-02-18 | Stop reason: HOSPADM

## 2020-02-15 RX ORDER — ACETAMINOPHEN 325 MG/1
650 TABLET ORAL EVERY 4 HOURS PRN
COMMUNITY
End: 2020-03-10

## 2020-02-15 RX ORDER — LUBIPROSTONE 8 UG/1
8 CAPSULE, GELATIN COATED ORAL DAILY
COMMUNITY
End: 2020-02-15 | Stop reason: ALTCHOICE

## 2020-02-15 RX ORDER — FLUVOXAMINE MALEATE 50 MG/1
50 TABLET, COATED ORAL DAILY
Status: DISCONTINUED | OUTPATIENT
Start: 2020-02-16 | End: 2020-02-15

## 2020-02-15 RX ORDER — LANOLIN ALCOHOL/MO/W.PET/CERES
3 CREAM (GRAM) TOPICAL NIGHTLY PRN
Status: DISCONTINUED | OUTPATIENT
Start: 2020-02-15 | End: 2020-02-18 | Stop reason: HOSPADM

## 2020-02-15 RX ORDER — ACETAMINOPHEN 325 MG/1
650 TABLET ORAL EVERY 6 HOURS PRN
COMMUNITY
End: 2020-02-15 | Stop reason: ALTCHOICE

## 2020-02-15 RX ORDER — LACTULOSE 10 G/15ML
30 SOLUTION ORAL 2 TIMES DAILY
Status: DISCONTINUED | OUTPATIENT
Start: 2020-02-15 | End: 2020-02-18 | Stop reason: HOSPADM

## 2020-02-15 RX ORDER — SPIRONOLACTONE 25 MG/1
50 TABLET ORAL DAILY
Status: DISCONTINUED | OUTPATIENT
Start: 2020-02-16 | End: 2020-02-18 | Stop reason: HOSPADM

## 2020-02-15 RX ORDER — METOCLOPRAMIDE 10 MG/1
10 TABLET ORAL DAILY PRN
COMMUNITY
End: 2020-03-10

## 2020-02-15 RX ORDER — DEXTROSE MONOHYDRATE 50 MG/ML
100 INJECTION, SOLUTION INTRAVENOUS PRN
Status: DISCONTINUED | OUTPATIENT
Start: 2020-02-15 | End: 2020-02-18 | Stop reason: HOSPADM

## 2020-02-15 RX ORDER — PREGABALIN 100 MG/1
100 CAPSULE ORAL 3 TIMES DAILY
Status: DISCONTINUED | OUTPATIENT
Start: 2020-02-15 | End: 2020-02-18 | Stop reason: HOSPADM

## 2020-02-15 RX ORDER — DEXTROSE MONOHYDRATE 25 G/50ML
12.5 INJECTION, SOLUTION INTRAVENOUS PRN
Status: DISCONTINUED | OUTPATIENT
Start: 2020-02-15 | End: 2020-02-18 | Stop reason: HOSPADM

## 2020-02-15 RX ORDER — LACTULOSE 10 G/15ML
20 SOLUTION ORAL 2 TIMES DAILY
Status: DISCONTINUED | OUTPATIENT
Start: 2020-02-15 | End: 2020-02-15

## 2020-02-15 RX ORDER — METHYLPREDNISOLONE SODIUM SUCCINATE 40 MG/ML
40 INJECTION, POWDER, LYOPHILIZED, FOR SOLUTION INTRAMUSCULAR; INTRAVENOUS EVERY 12 HOURS
Status: DISCONTINUED | OUTPATIENT
Start: 2020-02-15 | End: 2020-02-17

## 2020-02-15 RX ORDER — LUBIPROSTONE 8 UG/1
8 CAPSULE, GELATIN COATED ORAL DAILY PRN
COMMUNITY
End: 2020-03-10

## 2020-02-15 RX ORDER — BENZONATATE 100 MG/1
200 CAPSULE ORAL EVERY 8 HOURS PRN
COMMUNITY
End: 2021-06-20

## 2020-02-15 RX ORDER — MIRTAZAPINE 15 MG/1
15 TABLET, FILM COATED ORAL NIGHTLY
COMMUNITY
End: 2020-08-20

## 2020-02-15 RX ORDER — ARIPIPRAZOLE 15 MG/1
15 TABLET ORAL DAILY
COMMUNITY
End: 2020-03-10

## 2020-02-15 RX ORDER — PREGABALIN 100 MG/1
100 CAPSULE ORAL 3 TIMES DAILY
COMMUNITY
End: 2021-06-20

## 2020-02-15 RX ORDER — GABAPENTIN 100 MG/1
100 CAPSULE ORAL 3 TIMES DAILY
Status: DISCONTINUED | OUTPATIENT
Start: 2020-02-15 | End: 2020-02-15

## 2020-02-15 RX ORDER — GABAPENTIN 300 MG/1
300 CAPSULE ORAL 3 TIMES DAILY
Status: DISCONTINUED | OUTPATIENT
Start: 2020-02-15 | End: 2020-02-18 | Stop reason: HOSPADM

## 2020-02-15 RX ORDER — PREDNISONE 20 MG/1
20 TABLET ORAL DAILY
COMMUNITY
End: 2020-03-10

## 2020-02-15 RX ORDER — BROMPHENIRAMINE MALEATE, PSEUDOEPHEDRINE HYDROCHLORIDE, AND DEXTROMETHORPHAN HYDROBROMIDE 2; 30; 10 MG/5ML; MG/5ML; MG/5ML
5 SYRUP ORAL 4 TIMES DAILY PRN
Status: DISCONTINUED | OUTPATIENT
Start: 2020-02-15 | End: 2020-02-18 | Stop reason: HOSPADM

## 2020-02-15 RX ORDER — ALPRAZOLAM 0.5 MG/1
0.5 TABLET ORAL EVERY 8 HOURS
Status: DISCONTINUED | OUTPATIENT
Start: 2020-02-15 | End: 2020-02-15

## 2020-02-15 RX ORDER — SODIUM CHLORIDE 0.9 % (FLUSH) 0.9 %
10 SYRINGE (ML) INJECTION PRN
Status: DISCONTINUED | OUTPATIENT
Start: 2020-02-15 | End: 2020-02-18 | Stop reason: HOSPADM

## 2020-02-15 RX ORDER — CEFDINIR 300 MG/1
300 CAPSULE ORAL 2 TIMES DAILY
COMMUNITY
End: 2020-08-25

## 2020-02-15 RX ORDER — IPRATROPIUM BROMIDE AND ALBUTEROL SULFATE 2.5; .5 MG/3ML; MG/3ML
1 SOLUTION RESPIRATORY (INHALATION)
Status: DISCONTINUED | OUTPATIENT
Start: 2020-02-15 | End: 2020-02-18 | Stop reason: HOSPADM

## 2020-02-15 RX ORDER — BUSPIRONE HYDROCHLORIDE 5 MG/1
5 TABLET ORAL 2 TIMES DAILY
Status: DISCONTINUED | OUTPATIENT
Start: 2020-02-15 | End: 2020-02-15

## 2020-02-15 RX ORDER — 0.9 % SODIUM CHLORIDE 0.9 %
1000 INTRAVENOUS SOLUTION INTRAVENOUS ONCE
Status: COMPLETED | OUTPATIENT
Start: 2020-02-15 | End: 2020-02-15

## 2020-02-15 RX ORDER — BENZONATATE 100 MG/1
200 CAPSULE ORAL EVERY 8 HOURS PRN
Status: DISCONTINUED | OUTPATIENT
Start: 2020-02-15 | End: 2020-02-18 | Stop reason: HOSPADM

## 2020-02-15 RX ORDER — NICOTINE POLACRILEX 4 MG
15 LOZENGE BUCCAL PRN
Status: DISCONTINUED | OUTPATIENT
Start: 2020-02-15 | End: 2020-02-18 | Stop reason: HOSPADM

## 2020-02-15 RX ORDER — LUBIPROSTONE 8 UG/1
8 CAPSULE, GELATIN COATED ORAL DAILY
Status: DISCONTINUED | OUTPATIENT
Start: 2020-02-15 | End: 2020-02-18 | Stop reason: HOSPADM

## 2020-02-15 RX ORDER — INSULIN GLARGINE 100 [IU]/ML
20 INJECTION, SOLUTION SUBCUTANEOUS 2 TIMES DAILY
Status: DISCONTINUED | OUTPATIENT
Start: 2020-02-15 | End: 2020-02-17

## 2020-02-15 RX ORDER — ONDANSETRON 2 MG/ML
4 INJECTION INTRAMUSCULAR; INTRAVENOUS EVERY 6 HOURS PRN
Status: DISCONTINUED | OUTPATIENT
Start: 2020-02-15 | End: 2020-02-18 | Stop reason: HOSPADM

## 2020-02-15 RX ORDER — OXYCODONE HYDROCHLORIDE 5 MG/1
10 TABLET ORAL EVERY 6 HOURS PRN
Status: DISCONTINUED | OUTPATIENT
Start: 2020-02-15 | End: 2020-02-18 | Stop reason: HOSPADM

## 2020-02-15 RX ORDER — IPRATROPIUM BROMIDE AND ALBUTEROL SULFATE 2.5; .5 MG/3ML; MG/3ML
3 SOLUTION RESPIRATORY (INHALATION) ONCE
Status: COMPLETED | OUTPATIENT
Start: 2020-02-15 | End: 2020-02-15

## 2020-02-15 RX ORDER — GUAIFENESIN 600 MG/1
600 TABLET, EXTENDED RELEASE ORAL 2 TIMES DAILY
Status: DISCONTINUED | OUTPATIENT
Start: 2020-02-15 | End: 2020-02-18 | Stop reason: HOSPADM

## 2020-02-15 RX ORDER — TRAMADOL HYDROCHLORIDE 50 MG/1
50 TABLET ORAL EVERY 6 HOURS PRN
Status: DISCONTINUED | OUTPATIENT
Start: 2020-02-15 | End: 2020-02-18 | Stop reason: HOSPADM

## 2020-02-15 RX ORDER — METHYLPHENIDATE HYDROCHLORIDE 5 MG/1
10 TABLET ORAL 2 TIMES DAILY
Status: DISCONTINUED | OUTPATIENT
Start: 2020-02-15 | End: 2020-02-15

## 2020-02-15 RX ORDER — FENTANYL CITRATE 50 UG/ML
25 INJECTION, SOLUTION INTRAMUSCULAR; INTRAVENOUS ONCE
Status: COMPLETED | OUTPATIENT
Start: 2020-02-15 | End: 2020-02-15

## 2020-02-15 RX ORDER — SODIUM CHLORIDE 0.9 % (FLUSH) 0.9 %
10 SYRINGE (ML) INJECTION EVERY 12 HOURS SCHEDULED
Status: DISCONTINUED | OUTPATIENT
Start: 2020-02-15 | End: 2020-02-18 | Stop reason: HOSPADM

## 2020-02-15 RX ORDER — SODIUM CHLORIDE 9 MG/ML
INJECTION, SOLUTION INTRAVENOUS CONTINUOUS
Status: DISCONTINUED | OUTPATIENT
Start: 2020-02-15 | End: 2020-02-18 | Stop reason: HOSPADM

## 2020-02-15 RX ORDER — SENNA AND DOCUSATE SODIUM 50; 8.6 MG/1; MG/1
2 TABLET, FILM COATED ORAL 2 TIMES DAILY
Status: DISCONTINUED | OUTPATIENT
Start: 2020-02-15 | End: 2020-02-18 | Stop reason: HOSPADM

## 2020-02-15 RX ORDER — ARIPIPRAZOLE 15 MG/1
15 TABLET ORAL DAILY
Status: DISCONTINUED | OUTPATIENT
Start: 2020-02-15 | End: 2020-02-18 | Stop reason: HOSPADM

## 2020-02-15 RX ORDER — SODIUM CHLORIDE 9 MG/ML
INJECTION, SOLUTION INTRAVENOUS EVERY 8 HOURS
Status: DISCONTINUED | OUTPATIENT
Start: 2020-02-16 | End: 2020-02-17 | Stop reason: ALTCHOICE

## 2020-02-15 RX ORDER — SPIRONOLACTONE 25 MG/1
25 TABLET ORAL DAILY
Status: DISCONTINUED | OUTPATIENT
Start: 2020-02-16 | End: 2020-02-15

## 2020-02-15 RX ORDER — BISACODYL 10 MG
10 SUPPOSITORY, RECTAL RECTAL DAILY
Status: DISCONTINUED | OUTPATIENT
Start: 2020-02-15 | End: 2020-02-18 | Stop reason: HOSPADM

## 2020-02-15 RX ADMIN — GABAPENTIN 300 MG: 300 CAPSULE ORAL at 20:51

## 2020-02-15 RX ADMIN — GUAIFENESIN 600 MG: 600 TABLET, EXTENDED RELEASE ORAL at 20:51

## 2020-02-15 RX ADMIN — METHYLPREDNISOLONE SODIUM SUCCINATE 40 MG: 40 INJECTION, POWDER, FOR SOLUTION INTRAMUSCULAR; INTRAVENOUS at 20:53

## 2020-02-15 RX ADMIN — VANCOMYCIN HYDROCHLORIDE 1500 MG: 1 INJECTION, POWDER, LYOPHILIZED, FOR SOLUTION INTRAVENOUS at 22:02

## 2020-02-15 RX ADMIN — BROMPHENIRAMINE MALEATE, PSEUDOEPHEDRINE HYDROCHLORIDE, AND DEXTROMETHORPHAN HYDROBROMIDE 5 ML: 2; 10; 30 SYRUP ORAL at 22:02

## 2020-02-15 RX ADMIN — SODIUM CHLORIDE: 9 INJECTION, SOLUTION INTRAVENOUS at 20:52

## 2020-02-15 RX ADMIN — OXYCODONE HYDROCHLORIDE 10 MG: 5 TABLET ORAL at 17:20

## 2020-02-15 RX ADMIN — IPRATROPIUM BROMIDE AND ALBUTEROL SULFATE 3 AMPULE: .5; 3 SOLUTION RESPIRATORY (INHALATION) at 12:16

## 2020-02-15 RX ADMIN — MIRTAZAPINE 15 MG: 15 TABLET, FILM COATED ORAL at 20:51

## 2020-02-15 RX ADMIN — PIPERACILLIN AND TAZOBACTAM 3.38 G: 3; .375 INJECTION, POWDER, FOR SOLUTION INTRAVENOUS at 17:24

## 2020-02-15 RX ADMIN — INSULIN GLARGINE 20 UNITS: 100 INJECTION, SOLUTION SUBCUTANEOUS at 21:06

## 2020-02-15 RX ADMIN — SODIUM CHLORIDE 1000 ML: 9 INJECTION, SOLUTION INTRAVENOUS at 17:23

## 2020-02-15 RX ADMIN — INSULIN LISPRO 9 UNITS: 100 INJECTION, SOLUTION INTRAVENOUS; SUBCUTANEOUS at 21:07

## 2020-02-15 RX ADMIN — SODIUM CHLORIDE, PRESERVATIVE FREE 10 ML: 5 INJECTION INTRAVENOUS at 20:55

## 2020-02-15 RX ADMIN — IPRATROPIUM BROMIDE AND ALBUTEROL SULFATE 1 AMPULE: .5; 3 SOLUTION RESPIRATORY (INHALATION) at 19:43

## 2020-02-15 RX ADMIN — FAMOTIDINE 20 MG: 20 TABLET, FILM COATED ORAL at 20:51

## 2020-02-15 RX ADMIN — PREGABALIN 100 MG: 100 CAPSULE ORAL at 20:51

## 2020-02-15 RX ADMIN — MELATONIN 3 MG: at 21:04

## 2020-02-15 RX ADMIN — FENTANYL CITRATE 25 MCG: 50 INJECTION, SOLUTION INTRAMUSCULAR; INTRAVENOUS at 15:00

## 2020-02-15 ASSESSMENT — PAIN SCALES - GENERAL
PAINLEVEL_OUTOF10: 10
PAINLEVEL_OUTOF10: 9
PAINLEVEL_OUTOF10: 9
PAINLEVEL_OUTOF10: 0
PAINLEVEL_OUTOF10: 8
PAINLEVEL_OUTOF10: 0

## 2020-02-15 ASSESSMENT — ENCOUNTER SYMPTOMS
TROUBLE SWALLOWING: 0
EYE REDNESS: 0
RHINORRHEA: 0
SHORTNESS OF BREATH: 1
VOMITING: 0
EYE PAIN: 0
WHEEZING: 0
NAUSEA: 0
ABDOMINAL PAIN: 0
CONSTIPATION: 0
DIARRHEA: 0
COUGH: 1
PHOTOPHOBIA: 0
BACK PAIN: 0
SORE THROAT: 0
SINUS PRESSURE: 0
CHEST TIGHTNESS: 0
ABDOMINAL DISTENTION: 0
BLOOD IN STOOL: 0

## 2020-02-15 ASSESSMENT — PAIN DESCRIPTION - LOCATION
LOCATION: LEG
LOCATION: LEG

## 2020-02-15 ASSESSMENT — PAIN DESCRIPTION - PAIN TYPE
TYPE: ACUTE PAIN
TYPE: ACUTE PAIN

## 2020-02-15 ASSESSMENT — PAIN DESCRIPTION - ORIENTATION
ORIENTATION: RIGHT
ORIENTATION: RIGHT

## 2020-02-15 ASSESSMENT — PAIN DESCRIPTION - DESCRIPTORS
DESCRIPTORS: CONSTANT;ACHING;SHARP
DESCRIPTORS: ACHING;STABBING

## 2020-02-15 NOTE — ED PROVIDER NOTES
Mouth/Throat:      Pharynx: No oropharyngeal exudate. Eyes:      General:         Right eye: No discharge. Left eye: No discharge. Conjunctiva/sclera: Conjunctivae normal.      Pupils: Pupils are equal, round, and reactive to light. Neck:      Musculoskeletal: Normal range of motion and neck supple. Thyroid: No thyromegaly. Cardiovascular:      Rate and Rhythm: Normal rate and regular rhythm. Heart sounds: Normal heart sounds. No murmur. Pulmonary:      Effort: Pulmonary effort is normal. No respiratory distress. Breath sounds: Wheezing and rales present. Comments: Conversational dyspnea with accessory muscle usage. End expiratory wheeze bilaterally. Chest:      Chest wall: No tenderness. Abdominal:      General: There is no distension. Palpations: Abdomen is soft. There is no mass. Tenderness: There is no abdominal tenderness. There is no guarding or rebound. Musculoskeletal: Normal range of motion. General: No tenderness. Skin:     General: Skin is warm and dry. Findings: No rash. Neurological:      Mental Status: She is alert and oriented to person, place, and time. Psychiatric:         Behavior: Behavior normal.         Thought Content: Thought content normal.         Judgment: Judgment normal.          Procedures   Patient Name: Jose Fletcher   Medical Record Number: [id number]  Date: 2/15/2020   Time: 4:17 PM   Room/Bed: 07/07  Central Line Placement Procedure Note  Indication: vascular access and poor peripheral access    Consent: The patient was counseled regarding the procedure, its indications, risks, potential complications and alternatives, and any questions were answered. Consent was obtained to proceed. Procedure: The patient was positioned appropriately and the skin over the right internal jugular vein was prepped with Chloraprep. Local anesthesia was obtained by infiltration using 1% Lidocaine without epinephrine.   A a past surgical history that includes Hysterectomy (2004); Ovary removal (Bilateral, 2007); shoulder surgery (Left, 2003); Cholecystectomy (2010); ERCP; Abdomen surgery; Colonoscopy; Upper gastrointestinal endoscopy; other surgical history (Left, 04/25/2017); Breast biopsy (Left); Pancreas Biopsy; Endoscopy, colon, diagnostic; other surgical history (10/05/2017); and Colonoscopy (N/A, 10/2/2018). Social History:  reports that she quit smoking about 2 weeks ago. Her smoking use included cigarettes. She started smoking about 38 years ago. She has a 38.00 pack-year smoking history. She has never used smokeless tobacco. She reports previous alcohol use of about 3.0 standard drinks of alcohol per week. She reports that she does not use drugs. Family History: family history includes Cancer in her mother; Diabetes in her mother. The patients home medications have been reviewed.     Allergies: Dye [iodides] and Tylenol [acetaminophen]    -------------------------------------------------- RESULTS -------------------------------------------------    LABS:  Results for orders placed or performed during the hospital encounter of 56/62/96   Basic Metabolic Panel   Result Value Ref Range    Sodium 134 132 - 146 mmol/L    Potassium 4.6 3.5 - 5.0 mmol/L    Chloride 105 98 - 107 mmol/L    CO2 20 (L) 22 - 29 mmol/L    Anion Gap 9 7 - 16 mmol/L    Glucose 262 (H) 74 - 99 mg/dL    BUN 16 6 - 20 mg/dL    CREATININE 0.5 0.5 - 1.0 mg/dL    GFR Non-African American >60 >=60 mL/min/1.73    GFR African American >60     Calcium 9.0 8.6 - 10.2 mg/dL   Troponin   Result Value Ref Range    Troponin <0.01 0.00 - 0.03 ng/mL   Brain Natriuretic Peptide   Result Value Ref Range    Pro- (H) 0 - 125 pg/mL   Lactate, Sepsis   Result Value Ref Range    Lactic Acid, Sepsis 1.0 0.5 - 1.9 mmol/L   CBC auto differential   Result Value Ref Range    WBC 5.3 4.5 - 11.5 E9/L    RBC 3.85 3.50 - 5.50 E12/L    Hemoglobin 11.1 (L) 11.5 - 15.5 g/dL Hematocrit 34.8 34.0 - 48.0 %    MCV 90.4 80.0 - 99.9 fL    MCH 28.8 26.0 - 35.0 pg    MCHC 31.9 (L) 32.0 - 34.5 %    RDW 14.6 11.5 - 15.0 fL    Platelets 79 (L) 115 - 450 E9/L    MPV 11.7 7.0 - 12.0 fL   SPECIMEN REJECTION   Result Value Ref Range    Rejected Test CBCWD     Reason for Rejection see below    EKG 12 Lead   Result Value Ref Range    Ventricular Rate 81 BPM    Atrial Rate 81 BPM    P-R Interval 124 ms    QRS Duration 72 ms    Q-T Interval 342 ms    QTc Calculation (Bazett) 397 ms    P Axis 50 degrees    R Axis 14 degrees    T Axis 48 degrees       RADIOLOGY:  XR CHEST STANDARD (2 VW)   Final Result   1. Placement of right-sided central venous catheter tip properly   positioned at the cavoatrial junction. No pneumothorax or pleural   effusion. 2. Increasing density right lung base worrisome for earlier developing   infiltrate.             ------------------------- NURSING NOTES AND VITALS REVIEWED ---------------------------  Date / Time Roomed:  2/15/2020 11:18 AM  ED Bed Assignment:  07/07    The nursing notes within the ED encounter and vital signs as below have been reviewed.      Patient Vitals for the past 24 hrs:   BP Temp Temp src Pulse Resp SpO2 Height Weight   02/15/20 1511 104/69 -- -- 90 -- 94 % -- --   02/15/20 1305 -- -- -- -- -- 99 % -- --   02/15/20 1209 -- -- -- -- -- 96 % -- --   02/15/20 1130 -- -- -- -- -- 92 % -- --   02/15/20 1122 112/74 -- -- -- -- 94 % -- --   02/15/20 1116 109/73 99.3 °F (37.4 °C) Oral 89 26 (!) 84 % 5' 1\" (1.549 m) 150 lb (68 kg)       Oxygen Saturation Interpretation: Abnormal and Improved after treatment    ------------------------------------------ PROGRESS NOTES ------------------------------------------  Re-evaluation(s):  Time: 2:59 PM  Patients symptoms show no change  Repeat physical examination is not changed    Counseling:  I have spoken with the patient and discussed todays results, in addition to providing specific details for the plan of

## 2020-02-15 NOTE — H&P
[address] [facility]  Internal Medicine  -Resident History & Physical-    PCP:  Zo Thornton DO  Admitting Physician:  No admitting provider for patient encounter. Consultants:  None at this time   Chief Complaint:    Chief Complaint   Patient presents with    Shortness of Breath     dx within last week on pneumonia and flu, patient is from [facility], states she is sob today - + productive cough, denies CP/ Emesis, + nausea and diarrhea    Leg Pain     right sided       History of Present Illness  Toribio Marcos is a 52 y.o. female who presents to [facility] ER complaining of SOB. Toribio Marcos has a past medical history that includes hepatitis C, COPD, schizoaffective disorder, chronic pancreatitis, diabetes mellitus. Ivette Mahajan presents to the ER with complaints of worsening shortness of breath. She states that starting 3 weeks ago she had cough, wheezing, shortness of breath with fever and chills and productive sputum. She was seen by ER on 2/8 and was positive for influenza A. Chest x-ray at that time also showed opacity within the right lower lobe concerning for pneumonia. She was discharged with Omnicef and azithromycin but not given Tamiflu secondary to timeframe. She states that symptoms have gotten progressively worse and she returned to the ER. She is from [address] [place] [address]. ER Course  Upon presentation to the ER, routine labwork was performed which revealed glucose 262, hemoglobin 11.1, platelets 79. Imaging results are as outlined below in the Imaging section of this note. EKG revealed NSR. Upon arrival to the ER, patient was noted 9/73 and 84% on room air. The patient received fentanyl, duo nebs, vancomycin, Zosyn in the emergency room and was admitted to telemetry under the care of Dr. Franc Goldstein.     Last Hospital Admission - 9/22/18  Principal Problem:    Hyperglycemia  Active Problems:    Uncontrolled diabetes mellitus (HCC)    Schizoaffective disorder, bipolar type ([facility]    Chronic abdominal pain    Chronic pancreatitis (HCC)    Electrolyte imbalance    Lactic acidosis    Severe protein-calorie malnutrition (HCC)    Last Echocardiogram -   None in EMR     ED TRIAGE VITALS  BP: 104/69, Temp: 99.3 °F (37.4 °C), Pulse: 90, Resp: 26, SpO2: 94 %    Vitals:    02/15/20 1130 02/15/20 1209 02/15/20 1305 02/15/20 1511   BP:    104/69   Pulse:    90   Resp:       Temp:       TempSrc:       SpO2: 92% 96% 99% 94%   Weight:       Height:             Histories  Past Medical History:   Diagnosis Date    Alcoholism ([facility]     Since teens to early 25s    Anxiety and depression     Cancer ([facility]     breast, left    Chronic pancreatitis ([address]     Diabetes mellitus, type 2 ([address]     GERD (gastroesophageal reflux disease)     Gout     Hepatitis C     Jaundice 5/12/2016    Schizoaffective disorder, bipolar type ([facility]     Splenomegaly 5/30/2017     Past Surgical History:   Procedure Laterality Date    ABDOMEN SURGERY      gallbladder removal    BREAST BIOPSY Left     CHOLECYSTECTOMY  2010    COLONOSCOPY      COLONOSCOPY N/A 10/2/2018    COLONOSCOPY WITH BIOPSY performed by Shara Hernandez MD at [address], DIAGNOSTIC      ERCP      april 2016 at [address]  2004    OTHER SURGICAL HISTORY Left 04/25/2017     Left breast blue dye injection, Sential Node Lymph Biopsy with left breast lumpectomy    OTHER SURGICAL HISTORY  10/05/2017    simple left mastectomy    OVARY REMOVAL Bilateral 2007    PANCREAS BIOPSY      with stent    SHOULDER SURGERY Left 2003    UPPER GASTROINTESTINAL ENDOSCOPY       Family History   Problem Relation Age of Onset    Diabetes Mother     Cancer Mother         Breast       Home Medications  Prior to Admission medications    Medication Sig Start Date End Date Taking?  Authorizing Provider   acetaminophen (TYLENOL) 325 MG tablet Take 650 mg by mouth every 6 hours as needed for Pain   Yes Historical Provider, MD   lubiprostone (AMITIZA) 8 MCG CAPS capsule Take 8 mcg by mouth daily And prn   Yes Historical Provider, MD   albuterol (PROVENTIL) (2.5 MG/3ML) 0.083% nebulizer solution Take 3 mLs by nebulization every 4 hours as needed for Wheezing May substitute generic if insurance does not cover. Please provide 1 box (25 or 30 ampules) depending on how supplied per box 2/8/20  Yes GRISELDA Hamilton   oxyCODONE (OXY-IR) 15 MG immediate release tablet Take 10 mg by mouth every 4 hours as needed for Pain. .   Yes Historical Provider, MD   doxycycline hyclate (VIBRAMYCIN) 100 MG capsule Take 100 mg by mouth 2 times daily    Historical Provider, MD   Respiratory Therapy Supplies (FULL KIT NEBULIZER SET) MISC Use as directed with nebulized medication. 2/8/20   GRISELDA Hamilton   cefdinir (OMNICEF) 300 MG capsule Take 1 capsule by mouth 2 times daily for 10 days 2/8/20 2/18/20  GRISELDA Hamilton   azithromycin (ZITHROMAX Z-BJ) 250 MG tablet Take 2 tabs on day one, followed by 1 tablet daily for 4 days.  2/8/20 2/18/20  GRISELDA Hamilton   Magic Mouthwash (MIRACLE MOUTHWASH) Swish and spit 5 mLs 4 times daily as needed for Irritation 1/12/20   EVERETT Hodges - CNP   brompheniramine-pseudoephedrine-DM (BROMFED DM) 2-30-10 MG/5ML syrup Take 5 mLs by mouth 4 times daily as needed for Congestion or Cough 1/12/20   EVERETT Hodges CNP   lubiprostone (AMITIZA) 8 MCG CAPS capsule Take 8 mcg by mouth daily    Historical Provider, MD   conjugated estrogens (PREMARIN) 0.625 MG/GM vaginal cream Place vaginally daily    Historical Provider, MD   Tuberculin PPD (APLISOL ID) Inject into the skin    Historical Provider, MD   melatonin 3 MG TABS tablet Take 3 mg by mouth daily    Historical Provider, MD   Magnesium Hydroxide (MILK OF MAGNESIA PO) Take by mouth as needed    Historical Provider, MD   omeprazole (PRILOSEC) 20 MG delayed release capsule Take 20 mg by mouth daily    Historical Provider, MD   traMADol (ULTRAM) 50 MG tablet Take 50 mg by mouth every 6 hours as needed for Pain. Adal Moise Historical Provider, MD   anastrozole (ARIMIDEX) 1 MG tablet Take 1 tablet by mouth daily 12/7/18   Kenneth Gamble MD   gabapentin (NEURONTIN) 100 MG capsule Take 100 mg by mouth 3 times daily. Take 2 capsucles. Historical Provider, MD   ALPRAZolam [place] Lerma) 0.5 MG tablet Take 0.5 mg by mouth every 8 hours. .    Historical Provider, MD   bisacodyl (DULCOLAX) 10 MG suppository Place 10 mg rectally daily    Historical Provider, MD   busPIRone (BUSPAR) 5 MG tablet Take 5 mg by mouth 2 times daily    Historical Provider, MD   Calcium Citrate-Vitamin D (CALCIUM CITRATE + D3) 200-250 MG-UNIT TABS Take 2 tablets by mouth daily     Historical Provider, MD   methylphenidate (RITALIN) 10 MG tablet Take 10 mg by mouth 2 times daily.      Historical Provider, MD   insulin glargine (LANTUS) 100 UNIT/ML injection vial Inject 15 Units into the skin 2 times daily  Patient taking differently: Inject 20 Units into the skin 2 times daily  10/2/18   Glorietta Castleman, MD   insulin lispro (HUMALOG) 100 UNIT/ML injection vial Inject 0-18 Units into the skin 3 times daily (with meals) **High Dose Corrective Algorithm**  Glucose: Dose:               No Insulin  140-199           3 Units  200-249 6 Units  250-299 9 Units  300-349 12 Units  350-400 15 Units  Over 400  18 Units 10/2/18   Glorietta Castleman, MD   insulin lispro (HUMALOG) 100 UNIT/ML injection vial Inject 0-9 Units into the skin nightly **High Dose Corrective Algorithm**  Glucose: Dose:               No Insulin  140-199           2 Units  200-249 3 Units  250-299 5 Units  300-349 6 Units  350-400 7 Units  Over 400  9 Units 10/2/18   Glorietta Castleman, MD   lactulose (CHRONULAC) 10 GM/15ML solution Take 68 mLs by mouth daily 10/2/18   Glorietta Castleman, MD   sennosides-docusate sodium (SENOKOT-S) 8.6-50 MG tablet Take 2 tablets by mouth 2 times daily 10/2/18   Glorietta Castleman, MD   pantoprazole (PROTONIX) 40 MG tablet Take 40 mg by Alcohol use: Not Currently     Alcohol/week: 3.0 standard drinks     Types: 3 Cans of beer per week     Frequency: Never     Comment: daily  2 or 3   24oz cans    Drug use: No     Comment: last used 9/13/17    Sexual activity: Yes     Partners: Male   Lifestyle    Physical activity:     Days per week: Not on file     Minutes per session: Not on file    Stress: Not on file   Relationships    Social connections:     Talks on phone: Not on file     Gets together: Not on file     Attends [employer] service: Not on file     Active member of club or organization: Not on file     Attends meetings of clubs or organizations: Not on file     Relationship status: Not on file    Intimate partner violence:     Fear of current or ex partner: Not on file     Emotionally abused: Not on file     Physically abused: Not on file     Forced sexual activity: Not on file   Other Topics Concern    Not on file   Social History Narrative    Not on file       Review of Systems  All bolded are positive; please see HPI  General:  Fever, chills, diaphoresis, fatigue, malaise, night sweats, weight loss  Psychological:  Anxiety, disorientation, hallucinations. ENT:  Epistaxis, headaches, vertigo, visual changes. Cardiovascular:  Chest pain, irregular heartbeats, palpitations, paroxysmal nocturnal dyspnea. Respiratory:  Shortness of breath, coughing, sputum production, hemoptysis, wheezing, orthopnea.   Gastrointestinal:  Nausea, vomiting, diarrhea, heartburn, constipation, abdominal pain, hematemesis, hematochezia, melena, acholic stools  Genito-Urinary:  Dysuria, urgency, frequency, hematuria  Musculoskeletal:  Joint pain, joint stiffness, joint swelling, muscle pain  Neurology:  Headache, focal neurological deficits, weakness, numbness, paresthesia  Derm:  Rashes, ulcers, excoriations, bruising  Extremities:  Decreased ROM, peripheral edema, mottling    Physical Examination  Vitals:  /69   Pulse 90   Temp 99.3 °F (37.4 °C) (Oral)   Resp 26   Ht 5' 1\" (1.549 m)   Wt 150 lb (68 kg)   SpO2 94%   BMI 28.34 kg/m²   General Appearance:  awake, alert, and oriented to person, place, time, and purpose; appears stated age and cooperative; no apparent distress no labored breathing 3L NC  HEENT:  NCAT; PERRL; conjunctiva pink, sclera clear  Neck:  no adenopathy, bruit, JVD, tenderness, masses, or nodules; supple, symmetrical, trachea midline, thyroid not enlarged  Lung:  Rhonchi RLL, scattered wheezes no use of accessory muscles; no rales, or crackles  Heart:  regular rate and regular rhythm without murmur, rub, or gallop  Abdomen:  soft, nontender, nondistended; normoactive bowel sounds; no organomegaly  Extremities:  extremities normal, atraumatic, no cyanosis or edema  Musculokeletal:  no joint swelling, no muscle tenderness. ROM normal in all joints of extremities.    Neurologic:  mental status A&Ox3, thought content appropriate; CN II-XII grossly intact; sensation intact, motor strength 5/5 globally; no slurred speech  Osteopathic:  Patient examined in seated position; normal lumbar lordosis and thoracic kyphosis; no TART changes to paraspinal musculature    Laboratory Data  Recent Results (from the past 24 hour(s))   EKG 12 Lead    Collection Time: 02/15/20 11:42 AM   Result Value Ref Range    Ventricular Rate 81 BPM    Atrial Rate 81 BPM    P-R Interval 124 ms    QRS Duration 72 ms    Q-T Interval 342 ms    QTc Calculation (Bazett) 397 ms    P Axis 50 degrees    R Axis 14 degrees    T Axis 48 degrees   Basic Metabolic Panel    Collection Time: 02/15/20 11:56 AM   Result Value Ref Range    Sodium 134 132 - 146 mmol/L    Potassium 4.6 3.5 - 5.0 mmol/L    Chloride 105 98 - 107 mmol/L    CO2 20 (L) 22 - 29 mmol/L    Anion Gap 9 7 - 16 mmol/L    Glucose 262 (H) 74 - 99 mg/dL    BUN 16 6 - 20 mg/dL    CREATININE 0.5 0.5 - 1.0 mg/dL    GFR Non-African American >60 >=60 mL/min/1.73    GFR African American >60     Calcium 9.0 8.6 - 10.2 mg/dL discussed in detail with the resident and agree with current impressions and plan.     Ryan Allen D.O.  5:27 PM  2/15/2020

## 2020-02-15 NOTE — ED NOTES
Bed: H3  Expected date:   Expected time:   Means of arrival:   Comments:     Sanjeev Arias, ENRICO  02/15/20 1834

## 2020-02-16 LAB
ADENOVIRUS BY PCR: NOT DETECTED
ALBUMIN SERPL-MCNC: 2.4 G/DL (ref 3.5–5.2)
ALP BLD-CCNC: 163 U/L (ref 35–104)
ALT SERPL-CCNC: 17 U/L (ref 0–32)
ANION GAP SERPL CALCULATED.3IONS-SCNC: 11 MMOL/L (ref 7–16)
AST SERPL-CCNC: 19 U/L (ref 0–31)
BILIRUB SERPL-MCNC: 0.5 MG/DL (ref 0–1.2)
BORDETELLA PARAPERTUSSIS BY PCR: NOT DETECTED
BORDETELLA PERTUSSIS BY PCR: NOT DETECTED
BUN BLDV-MCNC: 12 MG/DL (ref 6–20)
CALCIUM SERPL-MCNC: 8.5 MG/DL (ref 8.6–10.2)
CHLAMYDOPHILIA PNEUMONIAE BY PCR: NOT DETECTED
CHLORIDE BLD-SCNC: 103 MMOL/L (ref 98–107)
CHOLESTEROL, TOTAL: 94 MG/DL (ref 0–199)
CO2: 20 MMOL/L (ref 22–29)
CORONAVIRUS 229E BY PCR: NOT DETECTED
CORONAVIRUS HKU1 BY PCR: NOT DETECTED
CORONAVIRUS NL63 BY PCR: NOT DETECTED
CORONAVIRUS OC43 BY PCR: NOT DETECTED
CREAT SERPL-MCNC: 0.6 MG/DL (ref 0.5–1)
EKG ATRIAL RATE: 81 BPM
EKG P AXIS: 50 DEGREES
EKG P-R INTERVAL: 124 MS
EKG Q-T INTERVAL: 342 MS
EKG QRS DURATION: 72 MS
EKG QTC CALCULATION (BAZETT): 397 MS
EKG R AXIS: 14 DEGREES
EKG T AXIS: 48 DEGREES
EKG VENTRICULAR RATE: 81 BPM
GFR AFRICAN AMERICAN: >60
GFR NON-AFRICAN AMERICAN: >60 ML/MIN/1.73
GLUCOSE BLD-MCNC: 301 MG/DL (ref 74–99)
HBA1C MFR BLD: 11 % (ref 4–5.6)
HCT VFR BLD CALC: 35 % (ref 34–48)
HDLC SERPL-MCNC: 43 MG/DL
HEMOGLOBIN: 11.3 G/DL (ref 11.5–15.5)
HUMAN METAPNEUMOVIRUS BY PCR: NOT DETECTED
HUMAN RHINOVIRUS/ENTEROVIRUS BY PCR: NOT DETECTED
INFLUENZA A H1-2009 BY PCR: DETECTED
INFLUENZA B BY PCR: NOT DETECTED
LDL CHOLESTEROL CALCULATED: 35 MG/DL (ref 0–99)
MAGNESIUM: 1.7 MG/DL (ref 1.6–2.6)
MCH RBC QN AUTO: 29.2 PG (ref 26–35)
MCHC RBC AUTO-ENTMCNC: 32.3 % (ref 32–34.5)
MCV RBC AUTO: 90.4 FL (ref 80–99.9)
METER GLUCOSE: 281 MG/DL (ref 74–99)
METER GLUCOSE: 296 MG/DL (ref 74–99)
METER GLUCOSE: 332 MG/DL (ref 74–99)
METER GLUCOSE: 335 MG/DL (ref 74–99)
MYCOPLASMA PNEUMONIAE BY PCR: NOT DETECTED
PARAINFLUENZA VIRUS 1 BY PCR: NOT DETECTED
PARAINFLUENZA VIRUS 2 BY PCR: NOT DETECTED
PARAINFLUENZA VIRUS 3 BY PCR: NOT DETECTED
PARAINFLUENZA VIRUS 4 BY PCR: NOT DETECTED
PDW BLD-RTO: 14.3 FL (ref 11.5–15)
PHOSPHORUS: 3.3 MG/DL (ref 2.5–4.5)
PLATELET # BLD: 63 E9/L (ref 130–450)
PLATELET CONFIRMATION: NORMAL
PMV BLD AUTO: 11.3 FL (ref 7–12)
POTASSIUM SERPL-SCNC: 4.5 MMOL/L (ref 3.5–5)
RBC # BLD: 3.87 E12/L (ref 3.5–5.5)
RESPIRATORY SYNCYTIAL VIRUS BY PCR: NOT DETECTED
SODIUM BLD-SCNC: 134 MMOL/L (ref 132–146)
TOTAL PROTEIN: 6.9 G/DL (ref 6.4–8.3)
TRIGL SERPL-MCNC: 79 MG/DL (ref 0–149)
TSH SERPL DL<=0.05 MIU/L-ACNC: 0.18 UIU/ML (ref 0.27–4.2)
VLDLC SERPL CALC-MCNC: 16 MG/DL
WBC # BLD: 4.3 E9/L (ref 4.5–11.5)

## 2020-02-16 PROCEDURE — 83036 HEMOGLOBIN GLYCOSYLATED A1C: CPT

## 2020-02-16 PROCEDURE — 85027 COMPLETE CBC AUTOMATED: CPT

## 2020-02-16 PROCEDURE — 6370000000 HC RX 637 (ALT 250 FOR IP): Performed by: INTERNAL MEDICINE

## 2020-02-16 PROCEDURE — 84100 ASSAY OF PHOSPHORUS: CPT

## 2020-02-16 PROCEDURE — 6360000002 HC RX W HCPCS: Performed by: INTERNAL MEDICINE

## 2020-02-16 PROCEDURE — 84443 ASSAY THYROID STIM HORMONE: CPT

## 2020-02-16 PROCEDURE — 1200000000 HC SEMI PRIVATE

## 2020-02-16 PROCEDURE — 80061 LIPID PANEL: CPT

## 2020-02-16 PROCEDURE — 94640 AIRWAY INHALATION TREATMENT: CPT

## 2020-02-16 PROCEDURE — 80053 COMPREHEN METABOLIC PANEL: CPT

## 2020-02-16 PROCEDURE — 0100U HC RESPIRPTHGN MULT REV TRANS & AMP PRB TECH 21 TRGT: CPT

## 2020-02-16 PROCEDURE — 83735 ASSAY OF MAGNESIUM: CPT

## 2020-02-16 PROCEDURE — 2700000000 HC OXYGEN THERAPY PER DAY

## 2020-02-16 PROCEDURE — 2580000003 HC RX 258: Performed by: INTERNAL MEDICINE

## 2020-02-16 PROCEDURE — 87450 HC DIRECT STREP B ANTIGEN: CPT

## 2020-02-16 PROCEDURE — 36415 COLL VENOUS BLD VENIPUNCTURE: CPT

## 2020-02-16 PROCEDURE — 36592 COLLECT BLOOD FROM PICC: CPT

## 2020-02-16 PROCEDURE — 82962 GLUCOSE BLOOD TEST: CPT

## 2020-02-16 RX ORDER — OSELTAMIVIR PHOSPHATE 75 MG/1
75 CAPSULE ORAL 2 TIMES DAILY
Status: DISCONTINUED | OUTPATIENT
Start: 2020-02-16 | End: 2020-02-18 | Stop reason: HOSPADM

## 2020-02-16 RX ORDER — NICOTINE 21 MG/24HR
1 PATCH, TRANSDERMAL 24 HOURS TRANSDERMAL DAILY
Status: DISCONTINUED | OUTPATIENT
Start: 2020-02-16 | End: 2020-02-18 | Stop reason: HOSPADM

## 2020-02-16 RX ADMIN — MELATONIN 3 MG: at 20:53

## 2020-02-16 RX ADMIN — INSULIN LISPRO 9 UNITS: 100 INJECTION, SOLUTION INTRAVENOUS; SUBCUTANEOUS at 11:47

## 2020-02-16 RX ADMIN — INSULIN GLARGINE 20 UNITS: 100 INJECTION, SOLUTION SUBCUTANEOUS at 08:35

## 2020-02-16 RX ADMIN — PREGABALIN 100 MG: 100 CAPSULE ORAL at 13:56

## 2020-02-16 RX ADMIN — IPRATROPIUM BROMIDE AND ALBUTEROL SULFATE 1 AMPULE: .5; 3 SOLUTION RESPIRATORY (INHALATION) at 16:17

## 2020-02-16 RX ADMIN — METHYLPREDNISOLONE SODIUM SUCCINATE 40 MG: 40 INJECTION, POWDER, FOR SOLUTION INTRAMUSCULAR; INTRAVENOUS at 06:37

## 2020-02-16 RX ADMIN — IPRATROPIUM BROMIDE AND ALBUTEROL SULFATE 1 AMPULE: .5; 3 SOLUTION RESPIRATORY (INHALATION) at 09:06

## 2020-02-16 RX ADMIN — IPRATROPIUM BROMIDE AND ALBUTEROL SULFATE 1 AMPULE: .5; 3 SOLUTION RESPIRATORY (INHALATION) at 06:04

## 2020-02-16 RX ADMIN — INSULIN LISPRO 12 UNITS: 100 INJECTION, SOLUTION INTRAVENOUS; SUBCUTANEOUS at 16:53

## 2020-02-16 RX ADMIN — IPRATROPIUM BROMIDE AND ALBUTEROL SULFATE 1 AMPULE: .5; 3 SOLUTION RESPIRATORY (INHALATION) at 12:46

## 2020-02-16 RX ADMIN — SODIUM CHLORIDE: 9 INJECTION, SOLUTION INTRAVENOUS at 05:00

## 2020-02-16 RX ADMIN — BROMPHENIRAMINE MALEATE, PSEUDOEPHEDRINE HYDROCHLORIDE, AND DEXTROMETHORPHAN HYDROBROMIDE 5 ML: 2; 10; 30 SYRUP ORAL at 18:43

## 2020-02-16 RX ADMIN — PREGABALIN 100 MG: 100 CAPSULE ORAL at 20:35

## 2020-02-16 RX ADMIN — BROMPHENIRAMINE MALEATE, PSEUDOEPHEDRINE HYDROCHLORIDE, AND DEXTROMETHORPHAN HYDROBROMIDE 5 ML: 2; 10; 30 SYRUP ORAL at 12:33

## 2020-02-16 RX ADMIN — ARIPIPRAZOLE 15 MG: 15 TABLET ORAL at 08:29

## 2020-02-16 RX ADMIN — OXYCODONE HYDROCHLORIDE 10 MG: 5 TABLET ORAL at 18:43

## 2020-02-16 RX ADMIN — SODIUM CHLORIDE, PRESERVATIVE FREE 10 ML: 5 INJECTION INTRAVENOUS at 08:26

## 2020-02-16 RX ADMIN — SODIUM CHLORIDE, PRESERVATIVE FREE 10 ML: 5 INJECTION INTRAVENOUS at 20:36

## 2020-02-16 RX ADMIN — PIPERACILLIN AND TAZOBACTAM 3.38 G: 3; .375 INJECTION, POWDER, FOR SOLUTION INTRAVENOUS at 00:15

## 2020-02-16 RX ADMIN — SODIUM CHLORIDE: 9 INJECTION, SOLUTION INTRAVENOUS at 06:47

## 2020-02-16 RX ADMIN — SPIRONOLACTONE 50 MG: 25 TABLET ORAL at 08:29

## 2020-02-16 RX ADMIN — INSULIN GLARGINE 20 UNITS: 100 INJECTION, SOLUTION SUBCUTANEOUS at 20:41

## 2020-02-16 RX ADMIN — SODIUM CHLORIDE: 9 INJECTION, SOLUTION INTRAVENOUS at 11:47

## 2020-02-16 RX ADMIN — GABAPENTIN 300 MG: 300 CAPSULE ORAL at 20:34

## 2020-02-16 RX ADMIN — PANCRELIPASE 72000 UNITS: 60000; 12000; 38000 CAPSULE, DELAYED RELEASE PELLETS ORAL at 11:46

## 2020-02-16 RX ADMIN — GUAIFENESIN 600 MG: 600 TABLET, EXTENDED RELEASE ORAL at 08:28

## 2020-02-16 RX ADMIN — BENZONATATE 200 MG: 100 CAPSULE ORAL at 16:52

## 2020-02-16 RX ADMIN — PANCRELIPASE 72000 UNITS: 60000; 12000; 38000 CAPSULE, DELAYED RELEASE PELLETS ORAL at 16:52

## 2020-02-16 RX ADMIN — OSELTAMIVIR PHOSPHATE 75 MG: 75 CAPSULE ORAL at 20:34

## 2020-02-16 RX ADMIN — FAMOTIDINE 20 MG: 20 TABLET, FILM COATED ORAL at 20:34

## 2020-02-16 RX ADMIN — METHYLPREDNISOLONE SODIUM SUCCINATE 40 MG: 40 INJECTION, POWDER, FOR SOLUTION INTRAMUSCULAR; INTRAVENOUS at 20:34

## 2020-02-16 RX ADMIN — GUAIFENESIN 600 MG: 600 TABLET, EXTENDED RELEASE ORAL at 20:34

## 2020-02-16 RX ADMIN — GABAPENTIN 300 MG: 300 CAPSULE ORAL at 13:56

## 2020-02-16 RX ADMIN — GABAPENTIN 300 MG: 300 CAPSULE ORAL at 08:28

## 2020-02-16 RX ADMIN — PIPERACILLIN AND TAZOBACTAM 3.38 G: 3; .375 INJECTION, POWDER, FOR SOLUTION INTRAVENOUS at 08:26

## 2020-02-16 RX ADMIN — OXYCODONE HYDROCHLORIDE 10 MG: 5 TABLET ORAL at 12:36

## 2020-02-16 RX ADMIN — ANASTROZOLE 1 MG: 1 TABLET, COATED ORAL at 08:29

## 2020-02-16 RX ADMIN — OXYCODONE HYDROCHLORIDE 10 MG: 5 TABLET ORAL at 06:36

## 2020-02-16 RX ADMIN — BROMPHENIRAMINE MALEATE, PSEUDOEPHEDRINE HYDROCHLORIDE, AND DEXTROMETHORPHAN HYDROBROMIDE 5 ML: 2; 10; 30 SYRUP ORAL at 08:33

## 2020-02-16 RX ADMIN — VANCOMYCIN HYDROCHLORIDE 1000 MG: 1 INJECTION, POWDER, LYOPHILIZED, FOR SOLUTION INTRAVENOUS at 15:50

## 2020-02-16 RX ADMIN — PANCRELIPASE 72000 UNITS: 60000; 12000; 38000 CAPSULE, DELAYED RELEASE PELLETS ORAL at 08:29

## 2020-02-16 RX ADMIN — INSULIN LISPRO 9 UNITS: 100 INJECTION, SOLUTION INTRAVENOUS; SUBCUTANEOUS at 08:34

## 2020-02-16 RX ADMIN — PANTOPRAZOLE SODIUM 40 MG: 40 TABLET, DELAYED RELEASE ORAL at 06:37

## 2020-02-16 RX ADMIN — SODIUM CHLORIDE: 9 INJECTION, SOLUTION INTRAVENOUS at 21:16

## 2020-02-16 RX ADMIN — OXYCODONE HYDROCHLORIDE 10 MG: 5 TABLET ORAL at 00:21

## 2020-02-16 RX ADMIN — PREGABALIN 100 MG: 100 CAPSULE ORAL at 08:28

## 2020-02-16 RX ADMIN — MIRTAZAPINE 15 MG: 15 TABLET, FILM COATED ORAL at 20:35

## 2020-02-16 RX ADMIN — INSULIN LISPRO 6 UNITS: 100 INJECTION, SOLUTION INTRAVENOUS; SUBCUTANEOUS at 20:41

## 2020-02-16 RX ADMIN — OSELTAMIVIR PHOSPHATE 75 MG: 75 CAPSULE ORAL at 13:56

## 2020-02-16 ASSESSMENT — PAIN SCALES - GENERAL
PAINLEVEL_OUTOF10: 3
PAINLEVEL_OUTOF10: 8
PAINLEVEL_OUTOF10: 7
PAINLEVEL_OUTOF10: 8
PAINLEVEL_OUTOF10: 0
PAINLEVEL_OUTOF10: 7
PAINLEVEL_OUTOF10: 8
PAINLEVEL_OUTOF10: 7

## 2020-02-16 ASSESSMENT — PAIN DESCRIPTION - LOCATION: LOCATION: LEG

## 2020-02-16 ASSESSMENT — PAIN DESCRIPTION - PAIN TYPE: TYPE: CHRONIC PAIN

## 2020-02-16 NOTE — CONSULTS
Pharmacy Consultation Note  (Antibiotic Dosing and Monitoring)      Pharmacy is following for Vancomycin dosing. Allergies:  Dye [iodides] and Tylenol [acetaminophen]    52 y.o. female    Ht Readings from Last 1 Encounters:   02/15/20 5' 1\" (1.549 m)     Wt Readings from Last 1 Encounters:   02/15/20 150 lb (68 kg)       Recent Labs     02/15/20  1505   WBC 5.3       Recent Labs     02/15/20  1156   BUN 16   CREATININE 0.5       Estimated Creatinine Clearance: 120 mL/min (based on SCr of 0.5 mg/dL). No intake or output data in the 24 hours ending 02/15/20 2053    Cultures:  available culture and sensitivity results were reviewed in EPIC      Assessment:  Consulted by Dr. Madan Baig to dose/monitor vancomycin  Estimated CrCl = 120 mL/min  Goal trough level = 15-20 mcg/mL    Plan:   Will initiate vancomycin at a dose of 1000mg  Monitor renal function   Clinical pharmacy will follow up and complete full consult note      Alejandro Mansfield [facility] [place] 2/15/2020 8:53 PM

## 2020-02-16 NOTE — PROGRESS NOTES
Pharmacy Consultation Note  (Antibiotic Dosing and Monitoring)    Initial consult date: 2/15  Consulting physician: Dr. Kyle Brandt   Drug(s): Vancomycin   Indication: pneumonia     Ht Readings from Last 1 Encounters:   02/15/20 5' 1\" (1.549 m)     Wt Readings from Last 1 Encounters:   20 154 lb 3.2 oz (69.9 kg)       Age/  Gender IBW DW  Allergy Information   52 y.o. female 47.8 kg 56.6 kg  Dye [iodides] and Tylenol [acetaminophen]                 Date  WBC BUN/CR Drug/Dose Time   Given Level(s)   (Time) Comments   2/15 5.3 16/0.5 Vancomycin 1500 mg IV X1 2202      4.3 12/0.6 Vancomycin 1000 mg IV Q18H  <1600>                         Estimated Creatinine Clearance: 101 mL/min (based on SCr of 0.6 mg/dL). Intake/Output Summary (Last 24 hours) at 2020 3784  Last data filed at 2020 0451  Gross per 24 hour   Intake 1358.75 ml   Output --   Net 1358.75 ml   Urine output: Not documented     Temp max: Temp (24hrs), Av.6 °F (37 °C), Min:97.7 °F (36.5 °C), Max:99.3 °F (37.4 °C)      Cultures:  available culture and sensitivity results were reviewed in [facility]   2/15 Blood #1 Sent   2/15 Blood #2 Sent     Assessment:  · Consulted by Dr. Lakesha Murillo to dose/monitor vancomycin  · Goal trough level:  15-20 mcg/mL  · 51 yo female admitted with cough, wheezing, and shortness of breath. She tested positive for influenza A on  and was treated with cefdinir, azithromycin and Tamiflu. Her symptoms have gotten progressively worse. She was started on vancomycin and Zosyn empirically for pneumonia. · Scr 0.6     Plan:  · Vancomycin 1000 mg IV Q18H   · Will check vancomycin trough level when steady state is attained if vanco continues and if Pharmacy is to continue the consult.   · Pharmacist will follow and monitor/adjust dosing as necessary      Daphne Akhtar PharmD 2020 9:37 AM

## 2020-02-16 NOTE — CARE COORDINATION
2/16/2020   SS consult- Return to NH:  [facility]. Medicaid NO PRECERT required. Call to [facility] [employer] at [phone]. Wanda- phone: [phone]. S/O provided ride to SNF last visit Casandra Loss: [phone]  - attempted to reach s/o and left vm. Electronically signed by Darreld Najjar, RN-BC on 2/16/2020 at 9:03 AM      [facility]      [facility] she has been there over a year. Call placed to [facility] with [facility] phone: [phone] left VM.   S/O Faisal Phelan will provide a ride back to [facility] at discharge : [phone]   Electronically signed by Darreld Najjar, RN-BC on 2/16/2020 at 9:20 AM

## 2020-02-16 NOTE — PROGRESS NOTES
Temperature 98.3 with a heart rate of 73 and blood pressure 125/60. O2 sat is 97% on 3 L. Chest x-ray again showed density in right lung base. Sugars range from 21- 465. Hemoglobin A1c was 11.0. WBC is 4.3 with a platelet count of 63. 1200 [facility] Admission - 9/22/18  Principal Problem:    Hyperglycemia  Active Problems:    Uncontrolled diabetes mellitus (HCC)    Schizoaffective disorder, bipolar type (HCC)    Chronic abdominal pain    Chronic pancreatitis (HCC)    Electrolyte imbalance    Lactic acidosis    Severe protein-calorie malnutrition (HCC)    Last Echocardiogram -   None in EMR     ED TRIAGE VITALS  BP: 125/60, Temp: 98.3 °F (36.8 °C), Pulse: 73, Resp: 18, SpO2: 97 %    Vitals:    02/15/20 2015 02/16/20 0258 02/16/20 0626 02/16/20 0840   BP: 117/61   125/60   Pulse: 77 74  73   Resp: 16   18   Temp: 98.7 °F (37.1 °C)   98.3 °F (36.8 °C)   TempSrc: Oral   Oral   SpO2: 96%   97%   Weight:   154 lb 3.2 oz (69.9 kg)    Height:             Histories  Past Medical History:   Diagnosis Date    Alcoholism ([address]     Since teens to early 25s    Anxiety and depression     Cancer ([address]     breast, left    Chronic pancreatitis ([address]     Diabetes mellitus, type 2 ([address]     GERD (gastroesophageal reflux disease)     Gout     Hepatitis C     Jaundice 5/12/2016    Schizoaffective disorder, bipolar type ([address]     Splenomegaly 5/30/2017     Past Surgical History:   Procedure Laterality Date    ABDOMEN SURGERY      gallbladder removal    BREAST BIOPSY Left     CHOLECYSTECTOMY  2010    COLONOSCOPY      COLONOSCOPY N/A 10/2/2018    COLONOSCOPY WITH BIOPSY performed by Lizeth Mccullough MD at [address] [place] [address], DIAGNOSTIC      ERCP      april 2016 at [address]  2004    OTHER SURGICAL HISTORY Left 04/25/2017     Left breast blue dye injection, Sential Node Lymph Biopsy with left breast lumpectomy    OTHER SURGICAL HISTORY  10/05/2017    simple left mastectomy    OVARY REMOVAL brompheniramine-pseudoephedrine-DM (BROMFED DM) 2-30-10 MG/5ML syrup Take 5 mLs by mouth 4 times daily as needed for Congestion or Cough 1/12/20  Yes Brynda Libman, APRN - CNP   lubiprostone (AMITIZA) 8 MCG CAPS capsule Take 8 mcg by mouth daily   Yes Historical Provider, MD   conjugated estrogens (PREMARIN) 0.625 MG/GM vaginal cream Place vaginally daily   Yes Historical Provider, MD   melatonin 3 MG TABS tablet Take 3 mg by mouth daily   Yes Historical Provider, MD   Magnesium Hydroxide (MILK OF MAGNESIA PO) Take by mouth as needed   Yes Historical Provider, MD   omeprazole (PRILOSEC) 20 MG delayed release capsule Take 20 mg by mouth daily   Yes Historical Provider, MD   traMADol (ULTRAM) 50 MG tablet Take 50 mg by mouth every 6 hours as needed for Pain. .   Yes Historical Provider, MD   anastrozole (ARIMIDEX) 1 MG tablet Take 1 tablet by mouth daily 12/7/18  Yes Kenneth Miguel MD   oxyCODONE (OXY-IR) 15 MG immediate release tablet Take 10 mg by mouth every 6 hours as needed for Pain. Yes Historical Provider, MD   gabapentin (NEURONTIN) 100 MG capsule Take 300 mg by mouth 3 times daily.  Take 2 capsucles   Yes Historical Provider, MD   bisacodyl (DULCOLAX) 10 MG suppository Place 10 mg rectally daily   Yes Historical Provider, MD   Calcium Citrate-Vitamin D (CALCIUM CITRATE + D3) 200-250 MG-UNIT TABS Take 2 tablets by mouth daily    Yes Historical Provider, MD   insulin glargine (LANTUS) 100 UNIT/ML injection vial Inject 15 Units into the skin 2 times daily  Patient taking differently: Inject 20 Units into the skin 2 times daily  10/2/18  Yes Perez Stokes MD   insulin lispro (HUMALOG) 100 UNIT/ML injection vial Inject 0-18 Units into the skin 3 times daily (with meals) **High Dose Corrective Algorithm**  Glucose: Dose:               No Insulin  140-199           3 Units  200-249 6 Units  250-299 9 Units  300-349 12 Units  350-400 15 Units  Over 400  18 Units 10/2/18  Yes Perez Stokes MD insulin lispro (HUMALOG) 100 UNIT/ML injection vial Inject 0-9 Units into the skin nightly **High Dose Corrective Algorithm**  Glucose: Dose:               No Insulin  140-199           2 Units  200-249 3 Units  250-299 5 Units  300-349 6 Units  350-400 7 Units  Over 400  9 Units 10/2/18  Yes Charlie Avery MD   lactulose (CHRONULAC) 10 GM/15ML solution Take 68 mLs by mouth daily  Patient taking differently: Take 30 g by mouth 2 times daily  10/2/18  Yes Charlie Avery MD   sennosides-docusate sodium (SENOKOT-S) 8.6-50 MG tablet Take 2 tablets by mouth 2 times daily 10/2/18  Yes Charlie Avery MD   spironolactone (ALDACTONE) 25 MG tablet Take 50 mg by mouth daily    Yes Historical Provider, MD   albuterol sulfate  (90 Base) MCG/ACT inhaler Inhale 2 puffs into the lungs every 6 hours as needed for Wheezing or Shortness of Breath   Yes Historical Provider, MD   furosemide (LASIX) 20 MG tablet Take 20 mg by mouth daily    Yes Historical Provider, MD   ondansetron (ZOFRAN ODT) 4 MG disintegrating tablet Take 1 tablet by mouth every 8 hours as needed for Nausea or Vomiting 7/1/18  Yes Fadia Pillion, DO   VORTIoxetine HBr (TRINTELLIX) 20 MG TABS tablet Take 20 mg by mouth nightly    Yes Historical Provider, MD   famotidine (PEPCID) 20 MG tablet Take 20 mg by mouth nightly    Yes Historical Provider, MD   lipase-protease-amylase (CREON) 60014 units delayed release capsule Take 6 capsules by mouth 3 times daily (with meals) Indications: Creon 6/19/17  Yes EVERETT Feliciano - CNP   Tuberculin PPD (APLISOL ID) Inject into the skin    Historical Provider, MD       Allergies  Dye [iodides] and Tylenol [acetaminophen]    Social Hx  Social History     Socioeconomic History    Marital status:      Spouse name: Not on file    Number of children: Not on file    Years of education: Not on file    Highest education level: Not on file   Occupational History    Not on file   Social Needs  Financial resource strain: Not on file   Jorge-Alba insecurity:     Worry: Not on file     Inability: Not on file   [employer] needs:     Medical: Not on file     Non-medical: Not on file   Tobacco Use    Smoking status: Former Smoker     Packs/day: 1.00     Years: 38.00     Pack years: 38.00     Types: Cigarettes     Start date: 1982     Last attempt to quit: 2020     Years since quittin.0    Smokeless tobacco: Never Used   Substance and Sexual Activity    Alcohol use: Not Currently     Alcohol/week: 3.0 standard drinks     Types: 3 Cans of beer per week     Frequency: Never    Drug use: No     Comment: last used 17    Sexual activity: Yes     Partners: Male   Lifestyle    Physical activity:     Days per week: Not on file     Minutes per session: Not on file    Stress: Not on file   Relationships    Social connections:     Talks on phone: Not on file     Gets together: Not on file     Attends Confucianism service: Not on file     Active member of club or organization: Not on file     Attends meetings of clubs or organizations: Not on file     Relationship status: Not on file    Intimate partner violence:     Fear of current or ex partner: Not on file     Emotionally abused: Not on file     Physically abused: Not on file     Forced sexual activity: Not on file   Other Topics Concern    Not on file   Social History Narrative    Not on file       Review of Systems  All bolded are positive; please see HPI  General:  Fever, chills, diaphoresis, fatigue, malaise, night sweats, weight loss  Psychological:  Anxiety, disorientation, hallucinations. ENT:  Epistaxis, headaches, vertigo, visual changes. Cardiovascular:  Chest pain, irregular heartbeats, palpitations, paroxysmal nocturnal dyspnea. Respiratory:  Shortness of breath, coughing, sputum production, hemoptysis, wheezing, orthopnea.   Gastrointestinal:  Nausea, vomiting, diarrhea, heartburn, constipation, abdominal pain, hematemesis, hematochezia, melena, acholic stools  Genito-Urinary:  Dysuria, urgency, frequency, hematuria  Musculoskeletal:  Joint pain, joint stiffness, joint swelling, muscle pain  Neurology:  Headache, focal neurological deficits, weakness, numbness, paresthesia  Derm:  Rashes, ulcers, excoriations, bruising  Extremities:  Decreased ROM, peripheral edema, mottling    Physical Examination  Vitals:  /60   Pulse 73   Temp 98.3 °F (36.8 °C) (Oral)   Resp 18   Ht 5' 1\" (1.549 m)   Wt 154 lb 3.2 oz (69.9 kg)   SpO2 97%   BMI 29.14 kg/m²   General Appearance:  awake, alert, and oriented to person, place, time, and purpose; appears stated age and cooperative; no apparent distress no labored breathing 3L NC  HEENT:  NCAT; PERRL; conjunctiva pink, sclera clear  Neck:  no adenopathy, bruit, JVD, tenderness, masses, or nodules; supple, symmetrical, trachea midline, thyroid not enlarged  Lung:  Rhonchi RLL, scattered wheezes no use of accessory muscles; no rales, or crackles  Heart:  regular rate and regular rhythm without murmur, rub, or gallop  Abdomen:  soft, nontender, nondistended; normoactive bowel sounds; no organomegaly  Extremities:  extremities normal, atraumatic, no cyanosis or edema  Musculokeletal:  no joint swelling, no muscle tenderness. ROM normal in all joints of extremities.    Neurologic:  mental status A&Ox3, thought content appropriate; CN II-XII grossly intact; sensation intact, motor strength 5/5 globally; no slurred speech  Osteopathic:  Patient examined in seated position; normal lumbar lordosis and thoracic kyphosis; no TART changes to paraspinal musculature    Laboratory Data  Recent Results (from the past 24 hour(s))   CBC auto differential    Collection Time: 02/15/20  3:05 PM   Result Value Ref Range    WBC 5.3 4.5 - 11.5 E9/L    RBC 3.85 3.50 - 5.50 E12/L    Hemoglobin 11.1 (L) 11.5 - 15.5 g/dL    Hematocrit 34.8 34.0 - 48.0 %    MCV 90.4 80.0 - 99.9 fL    MCH 28.8 26.0 - 35.0 pg    MCHC 31.9 (L) 32.0 - 34.5 %    RDW 14.6 11.5 - 15.0 fL    Platelets 79 (L) 405 - 450 E9/L    MPV 11.7 7.0 - 12.0 fL    Neutrophils % 93.9 (H) 43.0 - 80.0 %    Lymphocytes % 4.3 (L) 20.0 - 42.0 %    Monocytes % 1.7 (L) 2.0 - 12.0 %    Eosinophils % 0.0 0.0 - 6.0 %    Basophils % 0.0 0.0 - 2.0 %    Neutrophils Absolute 4.98 1.80 - 7.30 E9/L    Lymphocytes Absolute 0.21 (L) 1.50 - 4.00 E9/L    Monocytes Absolute 0.11 0.10 - 0.95 E9/L    Eosinophils Absolute 0.00 (L) 0.05 - 0.50 E9/L    Basophils Absolute 0.00 0.00 - 0.20 E9/L    Polychromasia 2+     Poikilocytes 1+     Silke Cells 1+    Platelet Confirmation    Collection Time: 02/15/20  3:05 PM   Result Value Ref Range    Platelet Confirmation SEE BELOW    Procalcitonin    Collection Time: 02/15/20  3:05 PM   Result Value Ref Range    Procalcitonin 0.08 0.00 - 0.08 ng/mL   POCT Glucose    Collection Time: 02/15/20  8:49 PM   Result Value Ref Range    Meter Glucose 465 (H) 74 - 99 mg/dL   Respiratory Panel, Molecular    Collection Time: 02/16/20 12:10 AM   Result Value Ref Range    Adenovirus by PCR Not Detected Not Detected    Bordetella parapertussis by PCR Not Detected Not Detected    Bordetella pertussis by PCR Not Detected Not Detected    Chlamydophilia pneumoniae by PCR Not Detected Not Detected    Coronavirus 229E by PCR Not Detected Not Detected    Coronavirus HKU1 by PCR Not Detected Not Detected    Coronavirus NL63 by PCR Not Detected Not Detected    Coronavirus OC43 by PCR Not Detected Not Detected    Human Metapneumovirus by PCR Not Detected Not Detected    Human Rhinovirus/Enterovirus by PCR Not Detected Not Detected    Influenza A H1-2009 by PCR DETECTED (A) Not Detected    Influenza B by PCR Not Detected Not Detected    Mycoplasma pneumoniae by PCR Not Detected Not Detected    Parainfluenza Virus 1 by PCR Not Detected Not Detected    Parainfluenza Virus 2 by PCR Not Detected Not Detected    Parainfluenza Virus 3 by PCR Not Detected Not Detected    Parainfluenza Virus 4 by PCR Not Detected Not Detected    Respiratory Syncytial Virus by PCR Not Detected Not Detected   POCT Glucose    Collection Time: 02/16/20  5:49 AM   Result Value Ref Range    Meter Glucose 281 (H) 74 - 99 mg/dL   Hemoglobin A1C    Collection Time: 02/16/20  6:45 AM   Result Value Ref Range    Hemoglobin A1C 11.0 (H) 4.0 - 5.6 %   Lipid Panel    Collection Time: 02/16/20  6:45 AM   Result Value Ref Range    Cholesterol, Total 94 0 - 199 mg/dL    Triglycerides 79 0 - 149 mg/dL    HDL 43 >40 mg/dL    LDL Calculated 35 0 - 99 mg/dL    VLDL Cholesterol Calculated 16 mg/dL   Magnesium    Collection Time: 02/16/20  6:45 AM   Result Value Ref Range    Magnesium 1.7 1.6 - 2.6 mg/dL   Phosphorus    Collection Time: 02/16/20  6:45 AM   Result Value Ref Range    Phosphorus 3.3 2.5 - 4.5 mg/dL   TSH without Reflex    Collection Time: 02/16/20  6:45 AM   Result Value Ref Range    TSH 0.184 (L) 0.270 - 4.200 uIU/mL   Comprehensive Metabolic Panel    Collection Time: 02/16/20  6:45 AM   Result Value Ref Range    Sodium 134 132 - 146 mmol/L    Potassium 4.5 3.5 - 5.0 mmol/L    Chloride 103 98 - 107 mmol/L    CO2 20 (L) 22 - 29 mmol/L    Anion Gap 11 7 - 16 mmol/L    Glucose 301 (H) 74 - 99 mg/dL    BUN 12 6 - 20 mg/dL    CREATININE 0.6 0.5 - 1.0 mg/dL    GFR Non-African American >60 >=60 mL/min/1.73    GFR African American >60     Calcium 8.5 (L) 8.6 - 10.2 mg/dL    Total Protein 6.9 6.4 - 8.3 g/dL    Alb 2.4 (L) 3.5 - 5.2 g/dL    Total Bilirubin 0.5 0.0 - 1.2 mg/dL    Alkaline Phosphatase 163 (H) 35 - 104 U/L    ALT 17 0 - 32 U/L    AST 19 0 - 31 U/L   CBC    Collection Time: 02/16/20  6:45 AM   Result Value Ref Range    WBC 4.3 (L) 4.5 - 11.5 E9/L    RBC 3.87 3.50 - 5.50 E12/L    Hemoglobin 11.3 (L) 11.5 - 15.5 g/dL    Hematocrit 35.0 34.0 - 48.0 %    MCV 90.4 80.0 - 99.9 fL    MCH 29.2 26.0 - 35.0 pg    MCHC 32.3 32.0 - 34.5 %    RDW 14.3 11.5 - 15.0 fL    Platelets 63 (L) 195 - 450 E9/L    MPV 11.3 7.0 - 12.0 fL Platelet Confirmation    Collection Time: 20  6:45 AM   Result Value Ref Range    Platelet Confirmation CONFIRMED    POCT Glucose    Collection Time: 20 10:58 AM   Result Value Ref Range    Meter Glucose 296 (H) 74 - 99 mg/dL       Imaging  Xr Chest Standard (2 Vw)    Result Date: 2/15/2020  LOCATION: 200 EXAM: XR CHEST (2 VW) COMPARISON: None. HISTORY:  Hypoxic. TECHNIQUE: PA and lateral views of the chest were obtained. FINDINGS:  SUPPORT DEVICES: Right-sided IJ catheter tip projected at the cavoatrial junction. LUNGS: Subtle increased density right lung base worrisome for developing infiltrate. Correlation with auscultation and inflammatory markers recommended. PLEURA: No effusions or pneumothorax. LUNG VOLUMES: Satisfactory inspiratory effort. MEDIASTINAL STRUCTURES: No lymphadenopathy. HEART SIZE: Normal. UPPER ABDOMEN: Unremarkable. BONES AND SOFT TISSUES: No fracture or soft tissue abnormality. 1. Placement of right-sided central venous catheter tip properly positioned at the cavoatrial junction. No pneumothorax or pleural effusion. 2. Increasing density right lung base worrisome for earlier developing infiltrate. Xr Chest Standard (2 Vw)    Result Date: 2020  Patient MRN:  [id number] : 1970 Age: 52 years Gender: Female Order Date:  2020 3:15 PM EXAM: XR CHEST (2 VW) NUMBER OF IMAGES:  2 INDICATION:  cough COMPARISON: Chest radiograph 2018. FINDINGS:  Patchy, ill-defined airspace opacity within the right lung base localizes to the right lower lobe on the lateral image, concerning for focal infection/inflammation. Lungs are otherwise clear without additional infiltrate. No evidence to suggest interstitial edema. No pleural effusions. No pneumothorax. Cardiac silhouette is within normal limits of size and is stable from prior examination. Multiple calcified mediastinal lymph nodes, likely sequela of remote granulomatous infection.      Small, ill-defined patchy airspace opacity within the right lower lobe is concerning for pneumonia. Consider follow-up radiographs following treatment to ensure resolution. Assessment and Plan  Patient is a 52 y.o. female who presented with SOB. The active problem list is as follows:    · Acute hypoxic respiratory failure secondary to nursing home pneumonia, recent influenza, and concomitant COPD  · Acute influenza A respiratory infection  · Insulin dependent diabetes mellitus  · Chronic abdominal pain due to chronic pancreatitis due to history of ETOH  · Thrombocytopenia  · Anemia of chronic disease  · Hepatitis C and chronic liver disease  · Schizoaffective disorder, bipolar type  · History of breast cancer    -Blood cultures sent from ED  -Sputum culture  -Tamiflu 75 mg twice daily  -Pneumococcal antigen-negative  -Legionella urine antigen-negative  -Respiratory DFA panel-positive influenza A  -Nasal cannula oxygen prn  -Duonebs q4 while awake  -Mucinex daily   -Antibiotics: Vancomycin and zosyn, patient failed outpatient treatment and is from nursing home. Check procalcitonin  -IV corticosteroids solumedrol 40mg BID      · Routine labs in the morning. · DVT prophylaxis. · Please see orders for further management and care.       Jaja Tang D.O.  1:09 PM  2/16/2020

## 2020-02-17 LAB
BASOPHILS ABSOLUTE: 0 E9/L (ref 0–0.2)
BASOPHILS RELATIVE PERCENT: 0 % (ref 0–2)
EOSINOPHILS ABSOLUTE: 0 E9/L (ref 0.05–0.5)
EOSINOPHILS RELATIVE PERCENT: 0.2 % (ref 0–6)
HCT VFR BLD CALC: 34.5 % (ref 34–48)
HEMOGLOBIN: 11 G/DL (ref 11.5–15.5)
HYPOCHROMIA: ABNORMAL
L. PNEUMOPHILA SEROGP 1 UR AG: NORMAL
LYMPHOCYTES ABSOLUTE: 0.22 E9/L (ref 1.5–4)
LYMPHOCYTES RELATIVE PERCENT: 5.2 % (ref 20–42)
MCH RBC QN AUTO: 29 PG (ref 26–35)
MCHC RBC AUTO-ENTMCNC: 31.9 % (ref 32–34.5)
MCV RBC AUTO: 91 FL (ref 80–99.9)
METER GLUCOSE: 420 MG/DL (ref 74–99)
METER GLUCOSE: 431 MG/DL (ref 74–99)
METER GLUCOSE: 460 MG/DL (ref 74–99)
METER GLUCOSE: >500 MG/DL (ref 74–99)
MONOCYTES ABSOLUTE: 0.04 E9/L (ref 0.1–0.95)
MONOCYTES RELATIVE PERCENT: 0.9 % (ref 2–12)
NEUTROPHILS ABSOLUTE: 4.04 E9/L (ref 1.8–7.3)
NEUTROPHILS RELATIVE PERCENT: 93.9 % (ref 43–80)
PDW BLD-RTO: 14.6 FL (ref 11.5–15)
PLATELET # BLD: 57 E9/L (ref 130–450)
PLATELET CONFIRMATION: NORMAL
PMV BLD AUTO: 11 FL (ref 7–12)
POLYCHROMASIA: ABNORMAL
RBC # BLD: 3.79 E12/L (ref 3.5–5.5)
STREP PNEUMONIAE ANTIGEN, URINE: NORMAL
T4 FREE: 1.16 NG/DL (ref 0.93–1.7)
WBC # BLD: 4.3 E9/L (ref 4.5–11.5)

## 2020-02-17 PROCEDURE — 6370000000 HC RX 637 (ALT 250 FOR IP): Performed by: INTERNAL MEDICINE

## 2020-02-17 PROCEDURE — 94640 AIRWAY INHALATION TREATMENT: CPT

## 2020-02-17 PROCEDURE — 6360000002 HC RX W HCPCS: Performed by: INTERNAL MEDICINE

## 2020-02-17 PROCEDURE — 85025 COMPLETE CBC W/AUTO DIFF WBC: CPT

## 2020-02-17 PROCEDURE — 36592 COLLECT BLOOD FROM PICC: CPT

## 2020-02-17 PROCEDURE — 97110 THERAPEUTIC EXERCISES: CPT | Performed by: PHYSICAL THERAPIST

## 2020-02-17 PROCEDURE — 82962 GLUCOSE BLOOD TEST: CPT

## 2020-02-17 PROCEDURE — 97161 PT EVAL LOW COMPLEX 20 MIN: CPT | Performed by: PHYSICAL THERAPIST

## 2020-02-17 PROCEDURE — 2580000003 HC RX 258: Performed by: INTERNAL MEDICINE

## 2020-02-17 PROCEDURE — 2700000000 HC OXYGEN THERAPY PER DAY

## 2020-02-17 PROCEDURE — 84439 ASSAY OF FREE THYROXINE: CPT

## 2020-02-17 PROCEDURE — 36415 COLL VENOUS BLD VENIPUNCTURE: CPT

## 2020-02-17 PROCEDURE — 1200000000 HC SEMI PRIVATE

## 2020-02-17 RX ORDER — AZITHROMYCIN 250 MG/1
500 TABLET, FILM COATED ORAL DAILY
Status: DISCONTINUED | OUTPATIENT
Start: 2020-02-17 | End: 2020-02-18 | Stop reason: HOSPADM

## 2020-02-17 RX ORDER — INSULIN GLARGINE 100 [IU]/ML
30 INJECTION, SOLUTION SUBCUTANEOUS 2 TIMES DAILY
Status: DISCONTINUED | OUTPATIENT
Start: 2020-02-17 | End: 2020-02-18 | Stop reason: HOSPADM

## 2020-02-17 RX ORDER — PREDNISONE 20 MG/1
20 TABLET ORAL 2 TIMES DAILY
Status: DISCONTINUED | OUTPATIENT
Start: 2020-02-17 | End: 2020-02-18 | Stop reason: HOSPADM

## 2020-02-17 RX ADMIN — GABAPENTIN 300 MG: 300 CAPSULE ORAL at 20:41

## 2020-02-17 RX ADMIN — GABAPENTIN 300 MG: 300 CAPSULE ORAL at 14:17

## 2020-02-17 RX ADMIN — PREGABALIN 100 MG: 100 CAPSULE ORAL at 08:14

## 2020-02-17 RX ADMIN — BROMPHENIRAMINE MALEATE, PSEUDOEPHEDRINE HYDROCHLORIDE, AND DEXTROMETHORPHAN HYDROBROMIDE 5 ML: 2; 10; 30 SYRUP ORAL at 00:29

## 2020-02-17 RX ADMIN — PREGABALIN 100 MG: 100 CAPSULE ORAL at 14:17

## 2020-02-17 RX ADMIN — PREGABALIN 100 MG: 100 CAPSULE ORAL at 20:08

## 2020-02-17 RX ADMIN — IPRATROPIUM BROMIDE AND ALBUTEROL SULFATE 1 AMPULE: .5; 3 SOLUTION RESPIRATORY (INHALATION) at 14:16

## 2020-02-17 RX ADMIN — OXYCODONE HYDROCHLORIDE 10 MG: 5 TABLET ORAL at 14:17

## 2020-02-17 RX ADMIN — PREDNISONE 20 MG: 20 TABLET ORAL at 17:25

## 2020-02-17 RX ADMIN — GUAIFENESIN 600 MG: 600 TABLET, EXTENDED RELEASE ORAL at 08:14

## 2020-02-17 RX ADMIN — IPRATROPIUM BROMIDE AND ALBUTEROL SULFATE 1 AMPULE: .5; 3 SOLUTION RESPIRATORY (INHALATION) at 09:46

## 2020-02-17 RX ADMIN — ANASTROZOLE 1 MG: 1 TABLET, COATED ORAL at 08:14

## 2020-02-17 RX ADMIN — SODIUM CHLORIDE, PRESERVATIVE FREE 10 ML: 5 INJECTION INTRAVENOUS at 08:17

## 2020-02-17 RX ADMIN — PANCRELIPASE 72000 UNITS: 60000; 12000; 38000 CAPSULE, DELAYED RELEASE PELLETS ORAL at 08:14

## 2020-02-17 RX ADMIN — OSELTAMIVIR PHOSPHATE 75 MG: 75 CAPSULE ORAL at 08:15

## 2020-02-17 RX ADMIN — PANCRELIPASE 72000 UNITS: 60000; 12000; 38000 CAPSULE, DELAYED RELEASE PELLETS ORAL at 18:33

## 2020-02-17 RX ADMIN — FAMOTIDINE 20 MG: 20 TABLET, FILM COATED ORAL at 20:42

## 2020-02-17 RX ADMIN — AZITHROMYCIN MONOHYDRATE 500 MG: 250 TABLET ORAL at 14:28

## 2020-02-17 RX ADMIN — SPIRONOLACTONE 50 MG: 25 TABLET ORAL at 08:14

## 2020-02-17 RX ADMIN — GUAIFENESIN 600 MG: 600 TABLET, EXTENDED RELEASE ORAL at 20:42

## 2020-02-17 RX ADMIN — BROMPHENIRAMINE MALEATE, PSEUDOEPHEDRINE HYDROCHLORIDE, AND DEXTROMETHORPHAN HYDROBROMIDE 5 ML: 2; 10; 30 SYRUP ORAL at 14:17

## 2020-02-17 RX ADMIN — GABAPENTIN 300 MG: 300 CAPSULE ORAL at 08:14

## 2020-02-17 RX ADMIN — OXYCODONE HYDROCHLORIDE 10 MG: 5 TABLET ORAL at 00:30

## 2020-02-17 RX ADMIN — SODIUM CHLORIDE, PRESERVATIVE FREE 10 ML: 5 INJECTION INTRAVENOUS at 20:43

## 2020-02-17 RX ADMIN — MELATONIN 3 MG: at 20:42

## 2020-02-17 RX ADMIN — METHYLPREDNISOLONE SODIUM SUCCINATE 40 MG: 40 INJECTION, POWDER, FOR SOLUTION INTRAMUSCULAR; INTRAVENOUS at 06:15

## 2020-02-17 RX ADMIN — INSULIN GLARGINE 20 UNITS: 100 INJECTION, SOLUTION SUBCUTANEOUS at 08:18

## 2020-02-17 RX ADMIN — PANTOPRAZOLE SODIUM 40 MG: 40 TABLET, DELAYED RELEASE ORAL at 06:15

## 2020-02-17 RX ADMIN — INSULIN LISPRO 18 UNITS: 100 INJECTION, SOLUTION INTRAVENOUS; SUBCUTANEOUS at 17:26

## 2020-02-17 RX ADMIN — IPRATROPIUM BROMIDE AND ALBUTEROL SULFATE 1 AMPULE: .5; 3 SOLUTION RESPIRATORY (INHALATION) at 06:38

## 2020-02-17 RX ADMIN — IPRATROPIUM BROMIDE AND ALBUTEROL SULFATE 1 AMPULE: .5; 3 SOLUTION RESPIRATORY (INHALATION) at 17:41

## 2020-02-17 RX ADMIN — OSELTAMIVIR PHOSPHATE 75 MG: 75 CAPSULE ORAL at 20:42

## 2020-02-17 RX ADMIN — MIRTAZAPINE 15 MG: 15 TABLET, FILM COATED ORAL at 20:42

## 2020-02-17 RX ADMIN — OXYCODONE HYDROCHLORIDE 10 MG: 5 TABLET ORAL at 08:14

## 2020-02-17 RX ADMIN — INSULIN LISPRO 18 UNITS: 100 INJECTION, SOLUTION INTRAVENOUS; SUBCUTANEOUS at 11:40

## 2020-02-17 RX ADMIN — INSULIN GLARGINE 30 UNITS: 100 INJECTION, SOLUTION SUBCUTANEOUS at 20:49

## 2020-02-17 RX ADMIN — SODIUM CHLORIDE: 9 INJECTION, SOLUTION INTRAVENOUS at 08:26

## 2020-02-17 RX ADMIN — INSULIN LISPRO 9 UNITS: 100 INJECTION, SOLUTION INTRAVENOUS; SUBCUTANEOUS at 20:48

## 2020-02-17 RX ADMIN — VANCOMYCIN HYDROCHLORIDE 1000 MG: 1 INJECTION, POWDER, LYOPHILIZED, FOR SOLUTION INTRAVENOUS at 08:20

## 2020-02-17 RX ADMIN — BROMPHENIRAMINE MALEATE, PSEUDOEPHEDRINE HYDROCHLORIDE, AND DEXTROMETHORPHAN HYDROBROMIDE 5 ML: 2; 10; 30 SYRUP ORAL at 20:45

## 2020-02-17 RX ADMIN — INSULIN LISPRO 18 UNITS: 100 INJECTION, SOLUTION INTRAVENOUS; SUBCUTANEOUS at 08:18

## 2020-02-17 RX ADMIN — OXYCODONE HYDROCHLORIDE 10 MG: 5 TABLET ORAL at 20:41

## 2020-02-17 RX ADMIN — PANCRELIPASE 72000 UNITS: 60000; 12000; 38000 CAPSULE, DELAYED RELEASE PELLETS ORAL at 11:39

## 2020-02-17 RX ADMIN — CEFTRIAXONE SODIUM 1 G: 1 INJECTION, POWDER, FOR SOLUTION INTRAMUSCULAR; INTRAVENOUS at 14:28

## 2020-02-17 RX ADMIN — BROMPHENIRAMINE MALEATE, PSEUDOEPHEDRINE HYDROCHLORIDE, AND DEXTROMETHORPHAN HYDROBROMIDE 5 ML: 2; 10; 30 SYRUP ORAL at 08:15

## 2020-02-17 RX ADMIN — ARIPIPRAZOLE 15 MG: 15 TABLET ORAL at 08:15

## 2020-02-17 ASSESSMENT — PAIN DESCRIPTION - PAIN TYPE
TYPE: CHRONIC PAIN

## 2020-02-17 ASSESSMENT — PAIN SCALES - GENERAL
PAINLEVEL_OUTOF10: 6
PAINLEVEL_OUTOF10: 6
PAINLEVEL_OUTOF10: 7
PAINLEVEL_OUTOF10: 5
PAINLEVEL_OUTOF10: 7
PAINLEVEL_OUTOF10: 7
PAINLEVEL_OUTOF10: 8

## 2020-02-17 ASSESSMENT — PAIN DESCRIPTION - LOCATION
LOCATION: GENERALIZED
LOCATION: ANKLE;ABDOMEN
LOCATION: GENERALIZED

## 2020-02-17 ASSESSMENT — PAIN DESCRIPTION - FREQUENCY: FREQUENCY: CONTINUOUS

## 2020-02-17 ASSESSMENT — PAIN DESCRIPTION - DESCRIPTORS
DESCRIPTORS: ACHING;CONSTANT;DISCOMFORT
DESCRIPTORS: ACHING

## 2020-02-17 ASSESSMENT — PAIN DESCRIPTION - ORIENTATION: ORIENTATION: RIGHT;UPPER

## 2020-02-17 NOTE — PROGRESS NOTES
active problem list as listed above have been reviewed prior to the initiation of this evaluation. OBJECTIVE:   Initial Evaluation  Date: 2/17/2020 Treatment Short Term/ Long Term   Goals   Was pt agreeable to Eval/treatment? Yes     Does pt have pain? Yes 7/10  Right foot     Bed Mobility    Rolling: Supervision     Supine to sit: Supervision     Sit to supine: Supervision     Scooting: Supervision     Rolling: Independent    Supine to sit: Independent    Sit to supine: Independent    Scooting: Independent     Transfers Sit to stand: Minimal assist Cues for hand placement and safety   Sit to stand: Independent     Ambulation    40 feet using  wheeled walker with Minimal assist progressing to supervision cues for walker approximation   100 feet using  wheeled walker with Modified Independent   Stair negotiation: ascended and descended   Not assessed            ROM Within functional limits        Strength BUE: 4/5  RLE:  3+/5  LLE:  4/5   Increase strength in affected mm groups by 1/3 grade   Balance Sitting EOB:  good    Dynamic Standing:  fair wheeled walker   Sitting EOB:  good    Dynamic Standing: good       Nursing cleared patient for PT evaluation and patient agreeable. The admitting diagnosis and active problem list as listed above have been reviewed prior to the initiation of this evaluation.     Patient is Alert & Oriented x person, place, time and situation   and follows directions    Sensation:  Patient denies numbness and tingling to extremities    Edema:  no    Endurance: fair       Vitals:  Heart Rate at rest 80 Heart Rate during session 88   SPO2 at rest 98% room air SPO2 during session 93% room air     Patient education  Patient educated on role of Physical Therapy, risks of immobility and plan of care, ankle pumps, quad set and glut set for edema control, blood clot prevention and safety      Patient response to education:   Pt verbalized understanding Pt demonstrated skill Pt requires further education in this area   Yes Partial Yes      ASSESSMENT: Patient exhibits decreased strength, balance, coordination impairing functional mobility. Treatment:  Patient practiced and was instructed in the following treatment:     Therapist educated and facilitated patient on techniques to increase safety and independence with bed mobility, balance, functional transfers, and functional mobility. Sat edge of bed 10 minutes with Supervision  to increase dynamic sitting balance and activity tolerance. Patient ambulated in room on room air, see vitals     Therapeutic Exercises:  ankle pumps, long arc quad and seated marching  x 15 reps. At end of session, patient in chair with  call light and phone within reach,   all lines and tubes intact, nursing notified. Patient would benefit from continued skilled Physical Therapy to improve functional independence and quality of life. Patient's/ family goals   Return to [address]        Patient and or family understand(s) diagnosis, prognosis, and plan of care. PLAN:    Physical Therapy care will be provided in accordance with the objectives noted above. Exercises and functional mobility practice will be used as well as appropriate assistive devices or modalities to obtain goals. Patient and family education will also be administered as needed. Frequency of treatments: Patient will be seen    daily  for therapeutic exercise, functional retraining, endurance activities, balance exercises, family and patient education. Time in  1335  Time out  1400    Total Treatment Time  15 minutes    Evaluation time includes thorough review of current medical information, gathering information on past medical history/social history and prior level of function, completion of standardized testing/informal observation of tasks, assessment of data, and development of Plan of care and goals.      CPT codes:  Low Complexity PT evaluation

## 2020-02-17 NOTE — PROGRESS NOTES
[facility]  Internal Medicine  -Progress note-    PCP:  Kassie Martinez DO  Admitting Physician:  Yasemin Donnelly DO  Consultants:  None at this time   Chief Complaint:    Chief Complaint   Patient presents with    Shortness of Breath     dx within last week on pneumonia and flu, patient is from [facility], states she is sob today - + productive cough, denies CP/ Emesis, + nausea and diarrhea    Leg Pain     right sided       History of Present Illness  Sukhwinder Alvarado is a 52 y.o. female who presents to [facility] ER complaining of SOB. Sukhwinder Alvarado has a past medical history that includes hepatitis C, COPD, schizoaffective disorder, chronic pancreatitis, diabetes mellitus. Jermaine Michael presents to the ER with complaints of worsening shortness of breath. She states that starting 3 weeks ago she had cough, wheezing, shortness of breath with fever and chills and productive sputum. She was seen by ER on 2/8 and was positive for influenza A. Chest x-ray at that time also showed opacity within the right lower lobe concerning for pneumonia. She was discharged with Omnicef and azithromycin but not given Tamiflu secondary to timeframe. She states that symptoms have gotten progressively worse and she returned to the ER. She is from [address] [place] [address]. ER Course  Upon presentation to the ER, routine labwork was performed which revealed glucose 262, hemoglobin 11.1, platelets 79. Imaging results are as outlined below in the Imaging section of this note. EKG revealed NSR. Upon arrival to the ER, patient was noted 9/73 and 84% on room air. The patient received fentanyl, duo nebs, vancomycin, Zosyn in the emergency room and was admitted to telemetry under the care of Dr. Duane Gals. 2/16/2020  Patient was seen and examined on telemetry floor. Patient still feels short of breath and weak but a little bit better than yesterday. Viral respiratory panel positive for influenza a H1 N1. mg by mouth nightly    Yes Historical Provider, MD   lipase-protease-amylase (CREON) 35053 units delayed release capsule Take 6 capsules by mouth 3 times daily (with meals) Indications: Creon 17  Yes EVERETT Shelton CNP   Tuberculin PPD (APLISOL ID) Inject into the skin    Historical Provider, MD       Allergies  Dye [iodides] and Tylenol [acetaminophen]    Social Hx  Social History     Socioeconomic History    Marital status:      Spouse name: Not on file    Number of children: Not on file    Years of education: Not on file    Highest education level: Not on file   Occupational History    Not on file   Social Needs    Financial resource strain: Not on file    Food insecurity:     Worry: Not on file     Inability: Not on file    Transportation needs:     Medical: Not on file     Non-medical: Not on file   Tobacco Use    Smoking status: Former Smoker     Packs/day: 1.00     Years: 38.00     Pack years: 38.00     Types: Cigarettes     Start date: 1982     Last attempt to quit: 2020     Years since quittin.0    Smokeless tobacco: Never Used   Substance and Sexual Activity    Alcohol use: Not Currently     Alcohol/week: 3.0 standard drinks     Types: 3 Cans of beer per week     Frequency: Never    Drug use: No     Comment: last used 17    Sexual activity: Yes     Partners: Male   Lifestyle    Physical activity:     Days per week: Not on file     Minutes per session: Not on file    Stress: Not on file   Relationships    Social connections:     Talks on phone: Not on file     Gets together: Not on file     Attends [employer] service: Not on file     Active member of club or organization: Not on file     Attends meetings of clubs or organizations: Not on file     Relationship status: Not on file    Intimate partner violence:     Fear of current or ex partner: Not on file     Emotionally abused: Not on file     Physically abused: Not on file     Forced sexual activity: Not tenderness. ROM normal in all joints of extremities. Neurologic:  mental status A&Ox3, thought content appropriate; CN II-XII grossly intact; sensation intact, motor strength 5/5 globally; no slurred speech  Osteopathic:  Patient examined in seated position; normal lumbar lordosis and thoracic kyphosis; no TART changes to paraspinal musculature    Laboratory Data  Recent Results (from the past 24 hour(s))   POCT Glucose    Collection Time: 02/16/20  3:54 PM   Result Value Ref Range    Meter Glucose 335 (H) 74 - 99 mg/dL   Legionella antigen, urine    Collection Time: 02/16/20  4:00 PM   Result Value Ref Range    L. pneumophila Serogp 1 Ur Ag       Presumptive Negative -suggesting no recent or current infections  with Legionella pneumophila serogroup 1. Infection to Legionella cannot be ruled out since other serogroups  and species may cause infection, antigen may not be present in  early infection, or level of antigen may be below the  detection limit. Normal Range: Presumptive Negative     Strep Pneumoniae Antigen    Collection Time: 02/16/20  4:00 PM   Result Value Ref Range    STREP PNEUMONIAE ANTIGEN, URINE       Presumptive NEGATIVE for Pneumococcal pneumonia, suggesting  no current or recent pneumococcal infection.  Infection due  to S. pneumoniae cannot be ruled out since the antigen present  in the sample may be below the detection limit of the test.     POCT Glucose    Collection Time: 02/16/20  8:32 PM   Result Value Ref Range    Meter Glucose 332 (H) 74 - 99 mg/dL   T4, Free    Collection Time: 02/17/20  6:15 AM   Result Value Ref Range    T4 Free 1.16 0.93 - 1.70 ng/dL   CBC Auto Differential    Collection Time: 02/17/20  6:15 AM   Result Value Ref Range    WBC 4.3 (L) 4.5 - 11.5 E9/L    RBC 3.79 3.50 - 5.50 E12/L    Hemoglobin 11.0 (L) 11.5 - 15.5 g/dL    Hematocrit 34.5 34.0 - 48.0 %    MCV 91.0 80.0 - 99.9 fL    MCH 29.0 26.0 - 35.0 pg    MCHC 31.9 (L) 32.0 - 34.5 %    RDW 14.6 11.5 - 15.0 fL Platelets 57 (L) 524 - 450 E9/L    MPV 11.0 7.0 - 12.0 fL    Neutrophils % 93.9 (H) 43.0 - 80.0 %    Lymphocytes % 5.2 (L) 20.0 - 42.0 %    Monocytes % 0.9 (L) 2.0 - 12.0 %    Eosinophils % 0.2 0.0 - 6.0 %    Basophils % 0.0 0.0 - 2.0 %    Neutrophils Absolute 4.04 1.80 - 7.30 E9/L    Lymphocytes Absolute 0.22 (L) 1.50 - 4.00 E9/L    Monocytes Absolute 0.04 (L) 0.10 - 0.95 E9/L    Eosinophils Absolute 0.00 (L) 0.05 - 0.50 E9/L    Basophils Absolute 0.00 0.00 - 0.20 E9/L    Polychromasia 2+     Hypochromia 1+    Platelet Confirmation    Collection Time: 20  6:15 AM   Result Value Ref Range    Platelet Confirmation CONFIRMED    POCT Glucose    Collection Time: 20  6:23 AM   Result Value Ref Range    Meter Glucose 420 (H) 74 - 99 mg/dL   POCT Glucose    Collection Time: 20 10:44 AM   Result Value Ref Range    Meter Glucose 431 (H) 74 - 99 mg/dL       Imaging  Xr Chest Standard (2 Vw)    Result Date: 2/15/2020  LOCATION: 200 EXAM: XR CHEST (2 VW) COMPARISON: None. HISTORY:  Hypoxic. TECHNIQUE: PA and lateral views of the chest were obtained. FINDINGS:  SUPPORT DEVICES: Right-sided IJ catheter tip projected at the cavoatrial junction. LUNGS: Subtle increased density right lung base worrisome for developing infiltrate. Correlation with auscultation and inflammatory markers recommended. PLEURA: No effusions or pneumothorax. LUNG VOLUMES: Satisfactory inspiratory effort. MEDIASTINAL STRUCTURES: No lymphadenopathy. HEART SIZE: Normal. UPPER ABDOMEN: Unremarkable. BONES AND SOFT TISSUES: No fracture or soft tissue abnormality. 1. Placement of right-sided central venous catheter tip properly positioned at the cavoatrial junction. No pneumothorax or pleural effusion. 2. Increasing density right lung base worrisome for earlier developing infiltrate.      Xr Chest Standard (2 Vw)    Result Date: 2020  Patient MRN:  [id number] : 1970 Age: 52 years Gender: Female Order Date:  2020 3:15 PM EXAM: XR

## 2020-02-18 VITALS
HEIGHT: 61 IN | OXYGEN SATURATION: 96 % | DIASTOLIC BLOOD PRESSURE: 83 MMHG | HEART RATE: 76 BPM | TEMPERATURE: 98 F | BODY MASS INDEX: 30.45 KG/M2 | RESPIRATION RATE: 18 BRPM | WEIGHT: 161.3 LBS | SYSTOLIC BLOOD PRESSURE: 175 MMHG

## 2020-02-18 LAB
BASOPHILS ABSOLUTE: 0 E9/L (ref 0–0.2)
BASOPHILS RELATIVE PERCENT: 0.3 % (ref 0–2)
EOSINOPHILS ABSOLUTE: 0 E9/L (ref 0.05–0.5)
EOSINOPHILS RELATIVE PERCENT: 0 % (ref 0–6)
HCT VFR BLD CALC: 33.3 % (ref 34–48)
HEMOGLOBIN: 11.1 G/DL (ref 11.5–15.5)
LYMPHOCYTES ABSOLUTE: 0.47 E9/L (ref 1.5–4)
LYMPHOCYTES RELATIVE PERCENT: 12.2 % (ref 20–42)
MCH RBC QN AUTO: 29.9 PG (ref 26–35)
MCHC RBC AUTO-ENTMCNC: 33.3 % (ref 32–34.5)
MCV RBC AUTO: 89.8 FL (ref 80–99.9)
METER GLUCOSE: 227 MG/DL (ref 74–99)
METER GLUCOSE: 228 MG/DL (ref 74–99)
MONOCYTES ABSOLUTE: 0.2 E9/L (ref 0.1–0.95)
MONOCYTES RELATIVE PERCENT: 5.2 % (ref 2–12)
NEUTROPHILS ABSOLUTE: 3.24 E9/L (ref 1.8–7.3)
NEUTROPHILS RELATIVE PERCENT: 82.6 % (ref 43–80)
PDW BLD-RTO: 14.4 FL (ref 11.5–15)
PLATELET # BLD: 63 E9/L (ref 130–450)
PLATELET CONFIRMATION: NORMAL
PMV BLD AUTO: 11.4 FL (ref 7–12)
POLYCHROMASIA: ABNORMAL
RBC # BLD: 3.71 E12/L (ref 3.5–5.5)
WBC # BLD: 3.9 E9/L (ref 4.5–11.5)

## 2020-02-18 PROCEDURE — 2580000003 HC RX 258: Performed by: INTERNAL MEDICINE

## 2020-02-18 PROCEDURE — 6370000000 HC RX 637 (ALT 250 FOR IP): Performed by: INTERNAL MEDICINE

## 2020-02-18 PROCEDURE — 94761 N-INVAS EAR/PLS OXIMETRY MLT: CPT

## 2020-02-18 PROCEDURE — 6360000002 HC RX W HCPCS: Performed by: INTERNAL MEDICINE

## 2020-02-18 PROCEDURE — 82962 GLUCOSE BLOOD TEST: CPT

## 2020-02-18 PROCEDURE — 85025 COMPLETE CBC W/AUTO DIFF WBC: CPT

## 2020-02-18 PROCEDURE — 2700000000 HC OXYGEN THERAPY PER DAY

## 2020-02-18 PROCEDURE — 02HV33Z INSERTION OF INFUSION DEVICE INTO SUPERIOR VENA CAVA, PERCUTANEOUS APPROACH: ICD-10-PCS | Performed by: INTERNAL MEDICINE

## 2020-02-18 PROCEDURE — 94640 AIRWAY INHALATION TREATMENT: CPT

## 2020-02-18 PROCEDURE — 36415 COLL VENOUS BLD VENIPUNCTURE: CPT

## 2020-02-18 PROCEDURE — 97116 GAIT TRAINING THERAPY: CPT

## 2020-02-18 RX ORDER — OSELTAMIVIR PHOSPHATE 75 MG/1
75 CAPSULE ORAL 2 TIMES DAILY
Qty: 10 CAPSULE | Refills: 0 | Status: SHIPPED | OUTPATIENT
Start: 2020-02-18 | End: 2020-02-23

## 2020-02-18 RX ORDER — BUDESONIDE AND FORMOTEROL FUMARATE DIHYDRATE 160; 4.5 UG/1; UG/1
2 AEROSOL RESPIRATORY (INHALATION) 2 TIMES DAILY
Qty: 3 INHALER | Refills: 3 | Status: SHIPPED | OUTPATIENT
Start: 2020-02-18 | End: 2022-05-23 | Stop reason: SDUPTHER

## 2020-02-18 RX ADMIN — INSULIN LISPRO 6 UNITS: 100 INJECTION, SOLUTION INTRAVENOUS; SUBCUTANEOUS at 08:18

## 2020-02-18 RX ADMIN — GABAPENTIN 300 MG: 300 CAPSULE ORAL at 08:13

## 2020-02-18 RX ADMIN — PREGABALIN 100 MG: 100 CAPSULE ORAL at 13:26

## 2020-02-18 RX ADMIN — PREGABALIN 100 MG: 100 CAPSULE ORAL at 08:14

## 2020-02-18 RX ADMIN — IPRATROPIUM BROMIDE AND ALBUTEROL SULFATE 1 AMPULE: .5; 3 SOLUTION RESPIRATORY (INHALATION) at 06:01

## 2020-02-18 RX ADMIN — BROMPHENIRAMINE MALEATE, PSEUDOEPHEDRINE HYDROCHLORIDE, AND DEXTROMETHORPHAN HYDROBROMIDE 5 ML: 2; 10; 30 SYRUP ORAL at 05:08

## 2020-02-18 RX ADMIN — INSULIN GLARGINE 30 UNITS: 100 INJECTION, SOLUTION SUBCUTANEOUS at 08:17

## 2020-02-18 RX ADMIN — OSELTAMIVIR PHOSPHATE 75 MG: 75 CAPSULE ORAL at 08:13

## 2020-02-18 RX ADMIN — OXYCODONE HYDROCHLORIDE 10 MG: 5 TABLET ORAL at 11:10

## 2020-02-18 RX ADMIN — ARIPIPRAZOLE 15 MG: 15 TABLET ORAL at 08:14

## 2020-02-18 RX ADMIN — ANASTROZOLE 1 MG: 1 TABLET, COATED ORAL at 08:13

## 2020-02-18 RX ADMIN — PANCRELIPASE 72000 UNITS: 60000; 12000; 38000 CAPSULE, DELAYED RELEASE PELLETS ORAL at 11:10

## 2020-02-18 RX ADMIN — INSULIN LISPRO 6 UNITS: 100 INJECTION, SOLUTION INTRAVENOUS; SUBCUTANEOUS at 11:10

## 2020-02-18 RX ADMIN — GABAPENTIN 300 MG: 300 CAPSULE ORAL at 13:26

## 2020-02-18 RX ADMIN — SPIRONOLACTONE 50 MG: 25 TABLET ORAL at 08:16

## 2020-02-18 RX ADMIN — SODIUM CHLORIDE, PRESERVATIVE FREE 10 ML: 5 INJECTION INTRAVENOUS at 08:13

## 2020-02-18 RX ADMIN — CEFTRIAXONE SODIUM 1 G: 1 INJECTION, POWDER, FOR SOLUTION INTRAMUSCULAR; INTRAVENOUS at 13:25

## 2020-02-18 RX ADMIN — PANCRELIPASE 72000 UNITS: 60000; 12000; 38000 CAPSULE, DELAYED RELEASE PELLETS ORAL at 08:13

## 2020-02-18 RX ADMIN — PANTOPRAZOLE SODIUM 40 MG: 40 TABLET, DELAYED RELEASE ORAL at 05:40

## 2020-02-18 RX ADMIN — GUAIFENESIN 600 MG: 600 TABLET, EXTENDED RELEASE ORAL at 08:13

## 2020-02-18 RX ADMIN — OXYCODONE HYDROCHLORIDE 10 MG: 5 TABLET ORAL at 05:08

## 2020-02-18 RX ADMIN — AZITHROMYCIN MONOHYDRATE 500 MG: 250 TABLET ORAL at 08:13

## 2020-02-18 RX ADMIN — PREDNISONE 20 MG: 20 TABLET ORAL at 08:14

## 2020-02-18 ASSESSMENT — PAIN DESCRIPTION - PAIN TYPE: TYPE: CHRONIC PAIN

## 2020-02-18 ASSESSMENT — PAIN DESCRIPTION - LOCATION: LOCATION: GENERALIZED

## 2020-02-18 ASSESSMENT — PAIN SCALES - GENERAL
PAINLEVEL_OUTOF10: 6
PAINLEVEL_OUTOF10: 6
PAINLEVEL_OUTOF10: 8
PAINLEVEL_OUTOF10: 5

## 2020-02-18 NOTE — DISCHARGE SUMMARY
[facility] [facility]  Internal Medicine  -Discharge note-    PCP:  Zo Thornton DO  Admitting Physician:  Donald Carbajal DO  Consultants:  None at this time   Chief Complaint:    Chief Complaint   Patient presents with    Shortness of Breath     dx within last week on pneumonia and flu, patient is from [facility], states she is sob today - + productive cough, denies CP/ Emesis, + nausea and diarrhea    Leg Pain     right sided       History of Present Illness  Toribio Marcos is a 52 y.o. female who presents to [facility] ER complaining of SOB. Toribio Marcos has a past medical history that includes hepatitis C, COPD, schizoaffective disorder, chronic pancreatitis, diabetes mellitus. Ivette Mahajan presents to the ER with complaints of worsening shortness of breath. She states that starting 3 weeks ago she had cough, wheezing, shortness of breath with fever and chills and productive sputum. She was seen by ER on 2/8 and was positive for influenza A. Chest x-ray at that time also showed opacity within the right lower lobe concerning for pneumonia. She was discharged with Omnicef and azithromycin but not given Tamiflu secondary to timeframe. She states that symptoms have gotten progressively worse and she returned to the ER. She is from [address] [place] [address]. ER Course  Upon presentation to the ER, routine labwork was performed which revealed glucose 262, hemoglobin 11.1, platelets 79. Imaging results are as outlined below in the Imaging section of this note. EKG revealed NSR. Upon arrival to the ER, patient was noted 9/73 and 84% on room air. The patient received fentanyl, duo nebs, vancomycin, Zosyn in the emergency room and was admitted to telemetry under the care of Dr. Franc Goldstein. 2/16/2020  Patient was seen and examined on telemetry floor. Patient still feels short of breath and weak but a little bit better than yesterday. Viral respiratory panel positive for influenza a H1 N1. Temperature 98.3 with a heart rate of 73 and blood pressure 125/60. O2 sat is 97% on 3 L. Chest x-ray again showed density in right lung base. Sugars range from 21- 465. Hemoglobin A1c was 11.0. WBC is 4.3 with a platelet count of 63. .    2/17/2020  Patient was seen and examined on telemetry floor. Patient feels SOB with activity but is moderately improved and has a cough, she reports concerns of a UTI saying she has had frequency and burning with urination. She has had no nausea or vomiting, she reports chronic constipation for which she takes laxatives. Her last BM was today. She reports pain in her right foot with difficulty walking and a ear ache in th left ear- she was told she had fluid in her ear and she can hearing crackling and it is bothering her. Vitals as follows: 98F oral temp, heart rate 75 with resp rate of 18, /80, 97% O2 on 3 L nasal cannula. Glucose ranges 296-431.     2/18/2020  Patient was seen and examined on telemetry floor. Patient states she is feeling better and she is ready to go home. The urinary symptoms she was experiencing yesterday has resolved. She still has a nonproductive cough but says it has improved. She denies SOB or chest pain. She reports always having abdomen pain. Vitals as follows: 98.2F oral temp, heart rate 84 with resp rate of 18, /72, 98% O2 on 3 L nasal cannula. Glucose ranges 228->500. Patient states she has no inhalers at home nor does she have any medication for her nebulizer machine. Patient's  is at the bedside and case discussed. Patient will be discharged home.     Patient is stable for discharge    [facility] Admission - 9/22/18  Principal Problem:    Hyperglycemia  Active Problems:    Uncontrolled diabetes mellitus (HCC)    Schizoaffective disorder, bipolar type (HCC)    Chronic abdominal pain    Chronic pancreatitis ([address]    Electrolyte imbalance    Lactic acidosis    Severe protein-calorie malnutrition ([address]    Last Echocardiogram -   None in EMR     ED TRIAGE VITALS  BP: (!) 156/72, Temp: 98.2 °F (36.8 °C), Pulse: 84, Resp: 18, SpO2: 98 %    Vitals:    02/17/20 0634 02/17/20 0830 02/17/20 1620 02/18/20 0613   BP:  (!) 180/80 (!) 156/72    Pulse:  75 84    Resp:  18 18    Temp:  98 °F (36.7 °C) 98.2 °F (36.8 °C)    TempSrc:  Oral Oral    SpO2:  97% 98%    Weight: 162 lb 6.4 oz (73.7 kg)   161 lb 4.8 oz (73.2 kg)   Height:             Histories  Past Medical History:   Diagnosis Date    Alcoholism ([address]     Since teens to early 25s    Anxiety and depression     Cancer [facility]     breast, left    Chronic pancreatitis ([address]     Diabetes mellitus, type 2 ([address]     GERD (gastroesophageal reflux disease)     Gout     Hepatitis C     Jaundice 5/12/2016    Schizoaffective disorder, bipolar type ([address]     Splenomegaly 5/30/2017     Past Surgical History:   Procedure Laterality Date    ABDOMEN SURGERY      gallbladder removal    BREAST BIOPSY Left     CHOLECYSTECTOMY  2010    COLONOSCOPY      COLONOSCOPY N/A 10/2/2018    COLONOSCOPY WITH BIOPSY performed by Ari Cool MD at [facility], DIAGNOSTIC      ERCP      april 2016 at [facility]  2004    OTHER SURGICAL HISTORY Left 04/25/2017     Left breast blue dye injection, Sential Node Lymph Biopsy with left breast lumpectomy    OTHER SURGICAL HISTORY  10/05/2017    simple left mastectomy    OVARY REMOVAL Bilateral 2007    PANCREAS BIOPSY      with stent    SHOULDER SURGERY Left 2003    UPPER GASTROINTESTINAL ENDOSCOPY       Family History   Problem Relation Age of Onset    Diabetes Mother     Cancer Mother         Breast       Home Medications  Prior to Admission medications    Medication Sig Start Date End Date Taking?  Authorizing Provider   acetaminophen (TYLENOL) 325 MG tablet Take 650 mg by mouth every 4 hours as needed for Pain   Yes Historical Provider, MD   lubiprostone (AMITIZA) 8 MCG CAPS capsule Take 8 mcg by mouth daily Provider, MD   omeprazole (PRILOSEC) 20 MG delayed release capsule Take 20 mg by mouth daily   Yes Historical Provider, MD   traMADol (ULTRAM) 50 MG tablet Take 50 mg by mouth every 6 hours as needed for Pain. .   Yes Historical Provider, MD   anastrozole (ARIMIDEX) 1 MG tablet Take 1 tablet by mouth daily 12/7/18  Yes Lena Lorenz MD   oxyCODONE (OXY-IR) 15 MG immediate release tablet Take 10 mg by mouth every 6 hours as needed for Pain. Yes Historical Provider, MD   gabapentin (NEURONTIN) 100 MG capsule Take 300 mg by mouth 3 times daily.  Take 2 capsucles   Yes Historical Provider, MD   bisacodyl (DULCOLAX) 10 MG suppository Place 10 mg rectally daily   Yes Historical Provider, MD   Calcium Citrate-Vitamin D (CALCIUM CITRATE + D3) 200-250 MG-UNIT TABS Take 2 tablets by mouth daily    Yes Historical Provider, MD   insulin glargine (LANTUS) 100 UNIT/ML injection vial Inject 15 Units into the skin 2 times daily  Patient taking differently: Inject 20 Units into the skin 2 times daily  10/2/18  Yes Brayan Chappell MD   insulin lispro (HUMALOG) 100 UNIT/ML injection vial Inject 0-18 Units into the skin 3 times daily (with meals) **High Dose Corrective Algorithm**  Glucose: Dose:               No Insulin  140-199           3 Units  200-249 6 Units  250-299 9 Units  300-349 12 Units  350-400 15 Units  Over 400  18 Units 10/2/18  Yes Brayan Chappell MD   insulin lispro (HUMALOG) 100 UNIT/ML injection vial Inject 0-9 Units into the skin nightly **High Dose Corrective Algorithm**  Glucose: Dose:               No Insulin  140-199           2 Units  200-249 3 Units  250-299 5 Units  300-349 6 Units  350-400 7 Units  Over 400  9 Units 10/2/18  Yes Brayan Chappell MD   lactulose (CHRONULAC) 10 GM/15ML solution Take 68 mLs by mouth daily  Patient taking differently: Take 30 g by mouth 2 times daily  10/2/18  Yes Brayan Chappell MD   sennosides-docusate sodium (SENOKOT-S) 8.6-50 MG tablet Take 2 tablets by mouth 2 times daily 10/2/18  Yes Arabella Guajardo MD   spironolactone (ALDACTONE) 25 MG tablet Take 50 mg by mouth daily    Yes Historical Provider, MD   albuterol sulfate  (90 Base) MCG/ACT inhaler Inhale 2 puffs into the lungs every 6 hours as needed for Wheezing or Shortness of Breath   Yes Historical Provider, MD   furosemide (LASIX) 20 MG tablet Take 20 mg by mouth daily    Yes Historical Provider, MD   ondansetron (ZOFRAN ODT) 4 MG disintegrating tablet Take 1 tablet by mouth every 8 hours as needed for Nausea or Vomiting 18  Yes Glenwood Piper, DO   VORTIoxetine HBr (TRINTELLIX) 20 MG TABS tablet Take 20 mg by mouth nightly    Yes Historical Provider, MD   famotidine (PEPCID) 20 MG tablet Take 20 mg by mouth nightly    Yes Historical Provider, MD   lipase-protease-amylase (CREON) 31420 units delayed release capsule Take 6 capsules by mouth 3 times daily (with meals) Indications: Creon 17  Yes EVERETT Shin CNP   Tuberculin PPD (APLISOL ID) Inject into the skin    Historical Provider, MD       Allergies  Dye [iodides] and Tylenol [acetaminophen]    Social Hx  Social History     Socioeconomic History    Marital status:      Spouse name: Not on file    Number of children: Not on file    Years of education: Not on file    Highest education level: Not on file   Occupational History    Not on file   Social Needs    Financial resource strain: Not on file    Food insecurity:     Worry: Not on file     Inability: Not on file    Transportation needs:     Medical: Not on file     Non-medical: Not on file   Tobacco Use    Smoking status: Former Smoker     Packs/day: 1.00     Years: 38.00     Pack years: 38.00     Types: Cigarettes     Start date: 1982     Last attempt to quit: 2020     Years since quittin.0    Smokeless tobacco: Never Used   Substance and Sexual Activity    Alcohol use: Not Currently     Alcohol/week: 3.0 standard drinks     Types: 3 Cans of beer per week     Frequency: Never    Drug use: No     Comment: last used 9/13/17    Sexual activity: Yes     Partners: Male   Lifestyle    Physical activity:     Days per week: Not on file     Minutes per session: Not on file    Stress: Not on file   Relationships    Social connections:     Talks on phone: Not on file     Gets together: Not on file     Attends Christianity service: Not on file     Active member of club or organization: Not on file     Attends meetings of clubs or organizations: Not on file     Relationship status: Not on file    Intimate partner violence:     Fear of current or ex partner: Not on file     Emotionally abused: Not on file     Physically abused: Not on file     Forced sexual activity: Not on file   Other Topics Concern    Not on file   Social History Narrative    Not on file       Review of Systems  All bolded are positive; please see HPI  General:  Fever, chills, diaphoresis, fatigue, malaise, night sweats, weight loss  Psychological:  Anxiety, disorientation, hallucinations. ENT: +Left ear pain. No Epistaxis, headaches, vertigo, visual changes. Cardiovascular:  Chest pain, irregular heartbeats, palpitations, paroxysmal nocturnal dyspnea. Respiratory:  Shortness of breath, nonproductive cough, sputum production, hemoptysis, wheezing, orthopnea. Gastrointestinal:  Nausea, vomiting, diarrhea, heartburn, constipation, abdominal pain, hematemesis, hematochezia, melena, acholic stools  Genito-Urinary:  Dysuria, urgency, frequency, hematuria  Musculoskeletal:  Joint pain, joint stiffness, joint swelling, muscle pain  Neurology:  Headache, focal neurological deficits, weakness, numbness, paresthesia  Derm:  Rashes, ulcers, excoriations, bruising  Extremities: + right foot pain with ambulation.   Decreased ROM, peripheral edema, mottling    Physical Examination  Vitals:  BP (!) 156/72   Pulse 84   Temp 98.2 °F (36.8 °C) (Oral)   Resp 18   Ht 5' 1\" (1.549 m)   Wt 161 lb 4.8 oz (73.2 kg)   SpO2 98%   BMI 30.48 kg/m²   General Appearance:  awake, alert, and oriented to person, place, time, and purpose; appears stated age and cooperative; no apparent distress no labored breathing 3L NC  HEENT:  NCAT; PERRL; conjunctiva pink, sclera clear  Neck:  no adenopathy, bruit, JVD, tenderness, masses, or nodules; supple, symmetrical, trachea midline, thyroid not enlarged  Lung:  Rhonchi lower bilaterally, scattered wheezes bilaterally no use of accessory muscles; no rales, or crackles  Heart:  regular rate and regular rhythm without murmur, rub, or gallop  Abdomen:  soft, tender, nondistended; normoactive bowel sounds; no organomegaly  Extremities:  extremities normal, atraumatic, no cyanosis or edema  Musculokeletal:  no joint swelling, no muscle tenderness. ROM normal in all joints of extremities.    Neurologic:  mental status A&Ox3, thought content appropriate; CN II-XII grossly intact; sensation intact, motor strength 5/5 globally; no slurred speech  Osteopathic:  Patient examined in seated position; normal lumbar lordosis and thoracic kyphosis; no TART changes to paraspinal musculature    Laboratory Data  Recent Results (from the past 24 hour(s))   POCT Glucose    Collection Time: 02/17/20 10:44 AM   Result Value Ref Range    Meter Glucose 431 (H) 74 - 99 mg/dL   POCT Glucose    Collection Time: 02/17/20  4:46 PM   Result Value Ref Range    Meter Glucose 460 (H) 74 - 99 mg/dL   POCT Glucose    Collection Time: 02/17/20  8:07 PM   Result Value Ref Range    Meter Glucose >500 (H) 74 - 99 mg/dL   POCT Glucose    Collection Time: 02/18/20  5:45 AM   Result Value Ref Range    Meter Glucose 228 (H) 74 - 99 mg/dL   CBC Auto Differential    Collection Time: 02/18/20  5:50 AM   Result Value Ref Range    WBC 3.9 (L) 4.5 - 11.5 E9/L    RBC 3.71 3.50 - 5.50 E12/L    Hemoglobin 11.1 (L) 11.5 - 15.5 g/dL    Hematocrit 33.3 (L) 34.0 - 48.0 %    MCV 89.8 80.0 - 99.9 fL    MCH 29.9 26.0 - 35.0 pg    MCHC

## 2020-02-18 NOTE — PROGRESS NOTES
Physical Therapy Initial Evaluation    Room #:  0417/0417-01  Patient Name: Mily Yu  YOB: 1970  MRN: [id number]    Referring Provider: Dinesh Hernandez DO    Date of Service: 2/18/2020    Evaluating Physical Therapist:  Nick Evans, PT #[id number]       Diagnosis:   Acute respiratory failure with hypoxia [facility]) [J96.01]        Patient Active Problem List   Diagnosis    Chronic pancreatitis due to acute alcohol intoxication ([facility]    Schizoaffective disorder, bipolar type ([address]    Anxiety disorder    Depression    History of alcohol abuse    Pancreatic pseudocyst ([address]    Liver dysfunction    Asthma    Gastroesophageal reflux disease without esophagitis    Bipolar I disorder ([address]    Bipolar affective disorder ([address]    Irritable bowel syndrome    Tobacco use disorder    DM type 2 with diabetic peripheral neuropathy ([address]    Jaundice    RUQ abdominal pain    Uncontrolled diabetes mellitus ([address]    Pain of upper abdomen    Elevated LFTs    Intractable abdominal pain    DKA, type 1, not at goal [facility])    Abdominal pain, epigastric    Hyperglycemia    Type 2 diabetes mellitus with hyperosmolarity without coma, with long-term current use of insulin ([address]    Fall (on) (from) other stairs and steps, initial encounter    Hepatitis, alcoholic    Nausea and vomiting    Probable acute on chronic pancreatitis ([address]    Probable acute gastritis    Alcoholism ([address]    Pancytopenia ([address]    Splenomegaly    Hypoglycemia episode    DM2 (diabetes mellitus, type 2) ([address] on insulin    Hepatitis C    Anxiety and depression    Cigarette smoker, heavy    Acute pancreatitis    Alcohol abuse    Acute alcoholic gastritis    Lactic acidosis    Other osteoporosis without current pathological fracture    Ketoacidosis, diabetic, no coma, insulin dependent (HCC)    Chronic pancreatitis (HCC)    Bicytopenia - leukopenia and thrombocytopenia    Chronic abdominal pain    Chronic pancreatitis ([address]    Electrolyte imbalance    Lactic acidosis    Diabetic ketoacidosis without coma associated with diabetes mellitus due to underlying condition ([address]    Severe protein-calorie malnutrition (HCC)    Idiopathic osteoporosis    Acute respiratory failure with hypoxia ([address]        Tentative placement recommendation: [address] if needed  Equipment recommendation:  To be determined      Prior Level of Function: Patient ambulated with wheeled walker recently for right foot pain  Rehab Potential: good  for baseline    Past medical history:   Past Medical History:   Diagnosis Date    Alcoholism ([address]     Since teens to early 25s    Anxiety and depression     Cancer ([address]     breast, left    Chronic pancreatitis ([address]     Diabetes mellitus, type 2 ([address]     GERD (gastroesophageal reflux disease)     Gout     Hepatitis C     Jaundice 5/12/2016    Schizoaffective disorder, bipolar type ([address]     Splenomegaly 5/30/2017     Past Surgical History:   Procedure Laterality Date    ABDOMEN SURGERY      gallbladder removal    BREAST BIOPSY Left     CHOLECYSTECTOMY  2010    COLONOSCOPY      COLONOSCOPY N/A 10/2/2018    COLONOSCOPY WITH BIOPSY performed by Isak Petersen MD [address], DIAGNOSTIC      ERCP      april 2016 at [facility]  2004    OTHER SURGICAL HISTORY Left 04/25/2017     Left breast blue dye injection, Sential Node Lymph Biopsy with left breast lumpectomy    OTHER SURGICAL HISTORY  10/05/2017    simple left mastectomy    OVARY REMOVAL Bilateral 2007    PANCREAS BIOPSY      with stent    SHOULDER SURGERY Left 2003    UPPER GASTROINTESTINAL ENDOSCOPY         Precautions: falls and droplet isolation ,      SUBJECTIVE:    Social history: Patient lives in an assisted living facility    Equipment owned: Gay Castleman,       [id number] [facility]  Mobility Inpatient   How much difficulty turning over in bed?: None  How Dynamic Standing:  fair wheeled walker  Sitting EOB:  good   Dynamic Standing:  good    Sitting EOB:  good    Dynamic Standing: good       Patient is Alert & Oriented x person, place, time and situation  and follows directions    Sensation:  Pt denies numbness and tingling to extremities  Edema:  none noted     Vitals:  Blood Pressure at rest na Blood Pressure post session na   Heart Rate at rest na Heart Rate post session na   SPO2 at rest 97%  SPO2 post session 95%     Patient education  Patient educated on role of Physical Therapy, risks of immobility and plan of care     Patient response to education:   Pt verbalized understanding Pt demonstrated skill Pt requires further education in this area    Yes Yes Yes        ASSESSMENT:    Comments:  Pt. SpO2 monitored during ambulation. Treatment:  Patient practiced and was instructed in the following treatment:      Sat edge of bed 5 minutes with Supervision  to increase dynamic sitting balance and activity tolerance. Therapeutic Exercises: not performed    At end of session, patient in bed  call light and phone within reach, family present, all lines and tubes intact, nursing notified. Patient would benefit from continued skilled Physical Therapy to improve functional independence and quality of life. PLAN:    Patient is making good progress towards established goals. Will continue with current Plan of care.       Time in  09:48  Time out  10:00    Total Treatment Time  12 minutes    CPT codes:  Gait Training (45997) 12 minutes 1 unit(s)    Wolf Lopez PTA   Lic# [id number]

## 2020-02-18 NOTE — PLAN OF CARE
Problem: Pain:  Goal: Pain level will decrease  Description  Pain level will decrease  2/18/2020 0523 by Huey Gardiner RN  Outcome: Met This Shift     Problem: Pain:  Goal: Control of acute pain  Description  Control of acute pain  Outcome: Met This Shift     Problem: Pain:  Goal: Control of chronic pain  Description  Control of chronic pain  2/18/2020 0523 by Huey Gardiner RN  Outcome: Met This Shift     Problem: Falls - Risk of:  Goal: Will remain free from falls  Description  Will remain free from falls  2/18/2020 0523 by Huey Gardiner RN  Outcome: Met This Shift     Problem: Falls - Risk of:  Goal: Absence of physical injury  Description  Absence of physical injury  Outcome: Met This Shift

## 2020-02-18 NOTE — PROGRESS NOTES
Pharmacy Consultation Note  (Antibiotic Dosing and Monitoring)    Initial consult date: 2/15  Consulting physician: Dr. Sarah Hendrickson   Drug(s): Vancomycin   Indication: pneumonia      Vancomycin has been discontinued   [address] Physician to re-consult pharmacy if future dosing is needed    Thank you for the consult,    Shira Blue PharmD 2/18/2020 3:11 PM

## 2020-02-20 LAB
BLOOD CULTURE, ROUTINE: NORMAL
CULTURE, BLOOD 2: NORMAL

## 2020-02-26 ENCOUNTER — HOSPITAL ENCOUNTER (OUTPATIENT)
Dept: MAMMOGRAPHY | Age: 50
Discharge: HOME | End: 2020-02-28
Payer: MEDICAID

## 2020-02-26 ENCOUNTER — HOSPITAL ENCOUNTER (OUTPATIENT)
Dept: ULTRASOUND IMAGING | Age: 50
End: 2020-02-26
Payer: MEDICAID

## 2020-02-26 PROCEDURE — 77065 DX MAMMO INCL CAD UNI: CPT

## 2020-02-26 PROCEDURE — G0279 TOMOSYNTHESIS, MAMMO: HCPCS

## 2020-03-10 ENCOUNTER — APPOINTMENT (OUTPATIENT)
Dept: CT IMAGING | Age: 50
End: 2020-03-10
Payer: MEDICAID

## 2020-03-10 ENCOUNTER — HOSPITAL ENCOUNTER (EMERGENCY)
Age: 50
Discharge: HOME | End: 2020-03-11
Attending: EMERGENCY MEDICINE
Payer: MEDICAID

## 2020-03-10 PROCEDURE — 74176 CT ABD & PELVIS W/O CONTRAST: CPT

## 2020-03-10 PROCEDURE — 99284 EMERGENCY DEPT VISIT MOD MDM: CPT

## 2020-03-10 RX ORDER — 0.9 % SODIUM CHLORIDE 0.9 %
2000 INTRAVENOUS SOLUTION INTRAVENOUS ONCE
Status: COMPLETED | OUTPATIENT
Start: 2020-03-10 | End: 2020-03-11

## 2020-03-10 RX ORDER — LORAZEPAM 2 MG/ML
2 INJECTION INTRAMUSCULAR ONCE
Status: COMPLETED | OUTPATIENT
Start: 2020-03-10 | End: 2020-03-11

## 2020-03-10 RX ORDER — METOCLOPRAMIDE HYDROCHLORIDE 5 MG/ML
10 INJECTION INTRAMUSCULAR; INTRAVENOUS ONCE
Status: COMPLETED | OUTPATIENT
Start: 2020-03-10 | End: 2020-03-11

## 2020-03-10 RX ORDER — DIPHENHYDRAMINE HYDROCHLORIDE 50 MG/ML
50 INJECTION INTRAMUSCULAR; INTRAVENOUS ONCE
Status: COMPLETED | OUTPATIENT
Start: 2020-03-10 | End: 2020-03-11

## 2020-03-10 ASSESSMENT — PAIN SCALES - GENERAL: PAINLEVEL_OUTOF10: 10

## 2020-03-10 ASSESSMENT — PAIN DESCRIPTION - DIRECTION: RADIATING_TOWARDS: MID BACK

## 2020-03-10 ASSESSMENT — PAIN DESCRIPTION - ORIENTATION: ORIENTATION: UPPER;LEFT;RIGHT

## 2020-03-10 ASSESSMENT — PAIN DESCRIPTION - PAIN TYPE: TYPE: ACUTE PAIN

## 2020-03-10 ASSESSMENT — PAIN DESCRIPTION - LOCATION: LOCATION: ABDOMEN

## 2020-03-10 ASSESSMENT — PAIN DESCRIPTION - DESCRIPTORS: DESCRIPTORS: ACHING;STABBING

## 2020-03-11 ENCOUNTER — TELEPHONE (OUTPATIENT)
Dept: OTHER | Facility: CLINIC | Age: 50
End: 2020-03-11

## 2020-03-11 VITALS
SYSTOLIC BLOOD PRESSURE: 114 MMHG | DIASTOLIC BLOOD PRESSURE: 66 MMHG | BODY MASS INDEX: 29.64 KG/M2 | TEMPERATURE: 98.8 F | WEIGHT: 157 LBS | RESPIRATION RATE: 18 BRPM | HEART RATE: 92 BPM | HEIGHT: 61 IN | OXYGEN SATURATION: 98 %

## 2020-03-11 LAB
ALBUMIN SERPL-MCNC: 3.6 G/DL (ref 3.5–5.2)
ALP BLD-CCNC: 238 U/L (ref 35–104)
ALT SERPL-CCNC: 50 U/L (ref 0–32)
AMMONIA: 28 UMOL/L (ref 11–51)
ANION GAP SERPL CALCULATED.3IONS-SCNC: 12 MMOL/L (ref 7–16)
AST SERPL-CCNC: 39 U/L (ref 0–31)
BASOPHILS ABSOLUTE: 0.01 E9/L (ref 0–0.2)
BASOPHILS RELATIVE PERCENT: 0.2 % (ref 0–2)
BILIRUB SERPL-MCNC: 0.7 MG/DL (ref 0–1.2)
BUN BLDV-MCNC: 9 MG/DL (ref 6–20)
CALCIUM SERPL-MCNC: 9.4 MG/DL (ref 8.6–10.2)
CHLORIDE BLD-SCNC: 100 MMOL/L (ref 98–107)
CO2: 21 MMOL/L (ref 22–29)
CREAT SERPL-MCNC: 0.6 MG/DL (ref 0.5–1)
EKG ATRIAL RATE: 100 BPM
EKG P AXIS: 51 DEGREES
EKG P-R INTERVAL: 126 MS
EKG Q-T INTERVAL: 324 MS
EKG QRS DURATION: 72 MS
EKG QTC CALCULATION (BAZETT): 417 MS
EKG R AXIS: -2 DEGREES
EKG T AXIS: 54 DEGREES
EKG VENTRICULAR RATE: 100 BPM
EOSINOPHILS ABSOLUTE: 0.08 E9/L (ref 0.05–0.5)
EOSINOPHILS RELATIVE PERCENT: 1.3 % (ref 0–6)
GFR AFRICAN AMERICAN: >60
GFR NON-AFRICAN AMERICAN: >60 ML/MIN/1.73
GLUCOSE BLD-MCNC: 369 MG/DL (ref 74–99)
HCT VFR BLD CALC: 37.5 % (ref 34–48)
HEMOGLOBIN: 12.2 G/DL (ref 11.5–15.5)
IMMATURE GRANULOCYTES #: 0.04 E9/L
IMMATURE GRANULOCYTES %: 0.6 % (ref 0–5)
LACTIC ACID: 1.1 MMOL/L (ref 0.5–2.2)
LIPASE: 9 U/L (ref 13–60)
LYMPHOCYTES ABSOLUTE: 0.67 E9/L (ref 1.5–4)
LYMPHOCYTES RELATIVE PERCENT: 10.6 % (ref 20–42)
MCH RBC QN AUTO: 29.4 PG (ref 26–35)
MCHC RBC AUTO-ENTMCNC: 32.5 % (ref 32–34.5)
MCV RBC AUTO: 90.4 FL (ref 80–99.9)
MONOCYTES ABSOLUTE: 0.37 E9/L (ref 0.1–0.95)
MONOCYTES RELATIVE PERCENT: 5.8 % (ref 2–12)
NEUTROPHILS ABSOLUTE: 5.17 E9/L (ref 1.8–7.3)
NEUTROPHILS RELATIVE PERCENT: 81.5 % (ref 43–80)
PDW BLD-RTO: 14.6 FL (ref 11.5–15)
PLATELET # BLD: 63 E9/L (ref 130–450)
PLATELET CONFIRMATION: NORMAL
PMV BLD AUTO: 11.4 FL (ref 7–12)
POTASSIUM SERPL-SCNC: 5.1 MMOL/L (ref 3.5–5)
RBC # BLD: 4.15 E12/L (ref 3.5–5.5)
RBC # BLD: NORMAL 10*6/UL
SODIUM BLD-SCNC: 133 MMOL/L (ref 132–146)
TOTAL PROTEIN: 7.7 G/DL (ref 6.4–8.3)
TROPONIN: <0.01 NG/ML (ref 0–0.03)
WBC # BLD: 6.3 E9/L (ref 4.5–11.5)

## 2020-03-11 PROCEDURE — 83690 ASSAY OF LIPASE: CPT

## 2020-03-11 PROCEDURE — 80053 COMPREHEN METABOLIC PANEL: CPT

## 2020-03-11 PROCEDURE — 85025 COMPLETE CBC W/AUTO DIFF WBC: CPT

## 2020-03-11 PROCEDURE — 93010 ELECTROCARDIOGRAM REPORT: CPT | Performed by: INTERNAL MEDICINE

## 2020-03-11 PROCEDURE — 6360000002 HC RX W HCPCS: Performed by: EMERGENCY MEDICINE

## 2020-03-11 PROCEDURE — 96374 THER/PROPH/DIAG INJ IV PUSH: CPT

## 2020-03-11 PROCEDURE — 84484 ASSAY OF TROPONIN QUANT: CPT

## 2020-03-11 PROCEDURE — 93005 ELECTROCARDIOGRAM TRACING: CPT | Performed by: EMERGENCY MEDICINE

## 2020-03-11 PROCEDURE — 2580000003 HC RX 258: Performed by: EMERGENCY MEDICINE

## 2020-03-11 PROCEDURE — 96375 TX/PRO/DX INJ NEW DRUG ADDON: CPT

## 2020-03-11 PROCEDURE — 36415 COLL VENOUS BLD VENIPUNCTURE: CPT

## 2020-03-11 PROCEDURE — 6370000000 HC RX 637 (ALT 250 FOR IP): Performed by: EMERGENCY MEDICINE

## 2020-03-11 PROCEDURE — 83605 ASSAY OF LACTIC ACID: CPT

## 2020-03-11 PROCEDURE — 81001 URINALYSIS AUTO W/SCOPE: CPT

## 2020-03-11 PROCEDURE — 82140 ASSAY OF AMMONIA: CPT

## 2020-03-11 RX ORDER — DICYCLOMINE HYDROCHLORIDE 10 MG/1
20 CAPSULE ORAL
Qty: 15 CAPSULE | Refills: 0 | Status: SHIPPED | OUTPATIENT
Start: 2020-03-11 | End: 2020-08-25

## 2020-03-11 RX ORDER — ONDANSETRON 4 MG/1
8 TABLET, ORALLY DISINTEGRATING ORAL EVERY 8 HOURS PRN
Qty: 24 TABLET | Refills: 0 | Status: SHIPPED | OUTPATIENT
Start: 2020-03-11 | End: 2021-09-09 | Stop reason: ALTCHOICE

## 2020-03-11 RX ORDER — OXYCODONE HYDROCHLORIDE AND ACETAMINOPHEN 5; 325 MG/1; MG/1
1 TABLET ORAL EVERY 6 HOURS PRN
Qty: 10 TABLET | Refills: 0 | Status: SHIPPED | OUTPATIENT
Start: 2020-03-11 | End: 2020-03-14

## 2020-03-11 RX ADMIN — INSULIN HUMAN 6 UNITS: 100 INJECTION, SOLUTION PARENTERAL at 01:22

## 2020-03-11 RX ADMIN — HYDROMORPHONE HYDROCHLORIDE 1 MG: 1 INJECTION, SOLUTION INTRAMUSCULAR; INTRAVENOUS; SUBCUTANEOUS at 01:19

## 2020-03-11 RX ADMIN — SODIUM CHLORIDE 2000 ML: 9 INJECTION, SOLUTION INTRAVENOUS at 00:23

## 2020-03-11 RX ADMIN — METOCLOPRAMIDE 10 MG: 5 INJECTION, SOLUTION INTRAMUSCULAR; INTRAVENOUS at 00:29

## 2020-03-11 RX ADMIN — DIPHENHYDRAMINE HYDROCHLORIDE 50 MG: 50 INJECTION, SOLUTION INTRAMUSCULAR; INTRAVENOUS at 00:23

## 2020-03-11 RX ADMIN — LORAZEPAM 2 MG: 2 INJECTION INTRAMUSCULAR; INTRAVENOUS at 00:27

## 2020-03-11 ASSESSMENT — PAIN DESCRIPTION - PAIN TYPE: TYPE: ACUTE PAIN

## 2020-03-11 ASSESSMENT — PAIN DESCRIPTION - LOCATION: LOCATION: ABDOMEN

## 2020-03-11 ASSESSMENT — PAIN SCALES - GENERAL
PAINLEVEL_OUTOF10: 10
PAINLEVEL_OUTOF10: 10

## 2020-03-11 ASSESSMENT — PAIN DESCRIPTION - ORIENTATION: ORIENTATION: UPPER

## 2020-03-11 ASSESSMENT — PAIN DESCRIPTION - DESCRIPTORS: DESCRIPTORS: CONSTANT

## 2020-03-11 NOTE — ED PROVIDER NOTES
HPI:  3/10/20, Time: 11:08 PM EDT        Roni Franks is a 52 y.o. female presenting to the ED for epigastric pain, beginning 3 days ago. The complaint has been persistent, severe in severity, and worsened by nothing. No fever/chills, no hematemesis, no melena, no hematochezia, no diarrhea, no chest pain/tightness/heaviness associated. No shortness of breath, no coughing, no change in bladder habit patterns. No other complaints. Review of Systems:   Pertinent positives and negatives are stated within HPI, all other systems reviewed and are negative.    --------------------------------------------- PAST HISTORY ---------------------------------------------  Past Medical History:  has a past medical history of Alcoholism ([facility] [address] Anxiety and depression, Cancer ([facility] [address] Chronic pancreatitis ([facility] [address] Diabetes mellitus, type 2 ([facility] [address] GERD (gastroesophageal reflux disease), Gout, Hepatitis C, Jaundice, Schizoaffective disorder, bipolar type ([address] and Splenomegaly. Past Surgical History:  has a past surgical history that includes Hysterectomy (2004); Ovary removal (Bilateral, 2007); shoulder surgery (Left, 2003); Cholecystectomy (2010); ERCP; Abdomen surgery; Colonoscopy; Upper gastrointestinal endoscopy; other surgical history (Left, 04/25/2017); Breast biopsy (Left); Pancreas Biopsy; Endoscopy, colon, diagnostic; other surgical history (10/05/2017); and Colonoscopy (N/A, 10/2/2018). Social History:  reports that she quit smoking about 5 weeks ago. Her smoking use included cigarettes. She started smoking about 38 years ago. She has a 38.00 pack-year smoking history. She has never used smokeless tobacco. She reports previous alcohol use of about 3.0 standard drinks of alcohol per week. She reports that she does not use drugs. Family History: family history includes Cancer in her mother; Diabetes in her mother. The patients home medications have been reviewed.     Allergies: Dye [iodides] and Tylenol [acetaminophen]    -------------------------------------------------- RESULTS -------------------------------------------------  All laboratory and radiology results have been personally reviewed by myself   LABS:  Results for orders placed or performed during the hospital encounter of 03/10/20   Troponin   Result Value Ref Range    Troponin <0.01 0.00 - 0.03 ng/mL   Comprehensive Metabolic Panel   Result Value Ref Range    Sodium 133 132 - 146 mmol/L    Potassium 5.1 (H) 3.5 - 5.0 mmol/L    Chloride 100 98 - 107 mmol/L    CO2 21 (L) 22 - 29 mmol/L    Anion Gap 12 7 - 16 mmol/L    Glucose 369 (H) 74 - 99 mg/dL    BUN 9 6 - 20 mg/dL    CREATININE 0.6 0.5 - 1.0 mg/dL    GFR Non-African American >60 >=60 mL/min/1.73    GFR African American >60     Calcium 9.4 8.6 - 10.2 mg/dL    Total Protein 7.7 6.4 - 8.3 g/dL    Alb 3.6 3.5 - 5.2 g/dL    Total Bilirubin 0.7 0.0 - 1.2 mg/dL    Alkaline Phosphatase 238 (H) 35 - 104 U/L    ALT 50 (H) 0 - 32 U/L    AST 39 (H) 0 - 31 U/L   Lipase   Result Value Ref Range    Lipase 9 (L) 13 - 60 U/L   Lactic Acid, Plasma   Result Value Ref Range    Lactic Acid 1.1 0.5 - 2.2 mmol/L   Urinalysis   Result Value Ref Range    Color, UA see below Straw/Yellow    Clarity, UA see below Clear    Glucose, Ur see below Negative mg/dL    Bilirubin Urine see below Negative    Ketones, Urine see below Negative mg/dL    Specific Gravity, UA see below 1.005 - 1.030    Blood, Urine see below Negative    pH, UA see below 5.0 - 9.0    Protein, UA see below Negative mg/dL    Urobilinogen, Urine see below <2.0 E.U./dL    Nitrite, Urine see below Negative    Leukocyte Esterase, Urine see below Negative   Microscopic Urinalysis   Result Value Ref Range    WBC, UA see below 0 - 5 /HPF    RBC, UA see below 0 - 2 /HPF    Bacteria, UA see below None Seen /HPF   EKG 12 Lead   Result Value Ref Range    Ventricular Rate 100 BPM    Atrial Rate 100 BPM    P-R Interval 126 ms    QRS Duration 72 ms    Q-T Interval 324 ms grossly intact with no focal deficits. No meningeal signs. Psych: Normal Affect      ------------------------------ ED COURSE/MEDICAL DECISION MAKING----------------------  Medications   insulin regular (HUMULIN R;NOVOLIN R) injection 6 Units (has no administration in time range)   0.9 % sodium chloride bolus (2,000 mLs Intravenous New Bag 3/11/20 0023)   diphenhydrAMINE (BENADRYL) injection 50 mg (50 mg Intravenous Given 3/11/20 0023)   metoclopramide (REGLAN) injection 10 mg (10 mg Intravenous Given 3/11/20 0029)   LORazepam (ATIVAN) injection 2 mg (2 mg Intramuscular Given 3/11/20 0027)   HYDROmorphone (DILAUDID) injection 1 mg (1 mg Intravenous Given 3/11/20 0119)       ED COURSE:     Medical Decision Making:   Differential diagnoses:  Chronic recurrent pancreatitis, Gastritis, IBS, Bowel Obstruction, Dehydration, to name a few. EKG #1:  Interpreted by emergency department physician unless otherwise noted. Time:  00:03    Rate: 100  Rhythm: Sinus. Interpretation: nonspecific ST and T waves findings. Old Q waves ANT leads. No ischemic changes vs old EKG 2/15/2020    Counseling: The emergency provider has spoken with the patient and spouse/SO and discussed todays results, in addition to providing specific details for the plan of care and counseling regarding the diagnosis and prognosis. Questions are answered at this time and they are agreeable with the plan. Radiology Procedure Waiver   Name: Ivanna Dodge  : 1970  MRN: [id number]    Date:  3/10/20    Time: 11:13 PM EDT    Benefits of immediately proceeding with Radiology exam(s) without pre-testing outweigh the risks or are not indicated as specified below and therefore the following is/are being waived:    [x] Pregnancy test   [] Patients LMP on-time and regular.   [] Patient had Tubal Ligation or has other Contraception Device. [] Patient  is Menopausal or Premenarcheal.    [x] Patient had Full or Partial Hysterectomy.     [] Protocol for Iodine allergy    [] MRI Questionnaire     [] BUN/Creatinine   [] Patient age w/no hx of renal dysfunction. [] Patient on Dialysis. [] Recent Normal Labs. Electronically signed by Sherly Cai MD on 3/10/20 at 11:13 PM EDT            Controlled Substance Monitoring:  Acute and Chronic Pain Monitoring:   RX Monitoring 3/11/2020   Attestation -   Periodic Controlled Substance Monitoring No signs of potential drug abuse or diversion identified.       --------------------------------- IMPRESSION AND DISPOSITION ---------------------------------    IMPRESSION  1. Chronic abdominal pain    2. Nausea and vomiting, intractability of vomiting not specified, unspecified vomiting type    3. Poorly controlled type 2 diabetes mellitus ([facility] [address]        DISPOSITION  Disposition: Discharge to home  Patient condition is stable      NOTE: This report was transcribed using voice recognition software.  Every effort was made to ensure accuracy; however, inadvertent computerized transcription errors may be present       Treasure Mcdowell MD  03/11/20 0120

## 2020-03-25 PROBLEM — K86.1 CHRONIC PANCREATITIS (HCC): Status: RESOLVED | Noted: 2018-06-23 | Resolved: 2020-03-24

## 2020-03-25 PROBLEM — E87.20 LACTIC ACIDOSIS: Status: RESOLVED | Noted: 2017-06-23 | Resolved: 2020-03-24

## 2020-06-08 ENCOUNTER — TELEPHONE (OUTPATIENT)
Dept: ONCOLOGY | Age: 50
End: 2020-06-08

## 2020-07-28 ENCOUNTER — HOSPITAL ENCOUNTER (OUTPATIENT)
Dept: INFUSION THERAPY | Age: 50
Discharge: HOME | End: 2020-07-28
Payer: MEDICAID

## 2020-07-28 ENCOUNTER — OFFICE VISIT (OUTPATIENT)
Dept: ONCOLOGY | Age: 50
End: 2020-07-28
Payer: MEDICAID

## 2020-07-28 VITALS
HEIGHT: 61 IN | WEIGHT: 160.7 LBS | BODY MASS INDEX: 30.34 KG/M2 | TEMPERATURE: 95.9 F | SYSTOLIC BLOOD PRESSURE: 100 MMHG | OXYGEN SATURATION: 97 % | HEART RATE: 79 BPM | DIASTOLIC BLOOD PRESSURE: 65 MMHG

## 2020-07-28 LAB
ALBUMIN SERPL-MCNC: 4 G/DL (ref 3.5–5.2)
ALP BLD-CCNC: 243 U/L (ref 35–104)
ALT SERPL-CCNC: 28 U/L (ref 0–32)
ANION GAP SERPL CALCULATED.3IONS-SCNC: 15 MMOL/L (ref 7–16)
AST SERPL-CCNC: 31 U/L (ref 0–31)
BASOPHILS ABSOLUTE: 0 E9/L (ref 0–0.2)
BASOPHILS RELATIVE PERCENT: 0.4 % (ref 0–2)
BILIRUB SERPL-MCNC: 0.5 MG/DL (ref 0–1.2)
BUN BLDV-MCNC: 19 MG/DL (ref 6–20)
CALCIUM SERPL-MCNC: 10 MG/DL (ref 8.6–10.2)
CHLORIDE BLD-SCNC: 97 MMOL/L (ref 98–107)
CO2: 25 MMOL/L (ref 22–29)
CREAT SERPL-MCNC: 0.8 MG/DL (ref 0.5–1)
EOSINOPHILS ABSOLUTE: 0.07 E9/L (ref 0.05–0.5)
EOSINOPHILS RELATIVE PERCENT: 0.9 % (ref 0–6)
GFR AFRICAN AMERICAN: >60
GFR NON-AFRICAN AMERICAN: >60 ML/MIN/1.73
GLUCOSE BLD-MCNC: 120 MG/DL (ref 74–99)
HCT VFR BLD CALC: 43.5 % (ref 34–48)
HEMOGLOBIN: 14 G/DL (ref 11.5–15.5)
HYPOCHROMIA: ABNORMAL
LYMPHOCYTES ABSOLUTE: 0.83 E9/L (ref 1.5–4)
LYMPHOCYTES RELATIVE PERCENT: 9.6 % (ref 20–42)
MCH RBC QN AUTO: 28.5 PG (ref 26–35)
MCHC RBC AUTO-ENTMCNC: 32.2 % (ref 32–34.5)
MCV RBC AUTO: 88.6 FL (ref 80–99.9)
MONOCYTES ABSOLUTE: 0.08 E9/L (ref 0.1–0.95)
MONOCYTES RELATIVE PERCENT: 0.9 % (ref 2–12)
NEUTROPHILS ABSOLUTE: 7.39 E9/L (ref 1.8–7.3)
NEUTROPHILS RELATIVE PERCENT: 88.7 % (ref 43–80)
NUCLEATED RED BLOOD CELLS: 0.9 /100 WBC
PDW BLD-RTO: 14.6 FL (ref 11.5–15)
PLATELET # BLD: 84 E9/L (ref 130–450)
PLATELET CONFIRMATION: NORMAL
PMV BLD AUTO: 11.2 FL (ref 7–12)
POLYCHROMASIA: ABNORMAL
POTASSIUM SERPL-SCNC: 4.5 MMOL/L (ref 3.5–5)
RBC # BLD: 4.91 E12/L (ref 3.5–5.5)
SODIUM BLD-SCNC: 137 MMOL/L (ref 132–146)
TOTAL PROTEIN: 8.2 G/DL (ref 6.4–8.3)
WBC # BLD: 8.3 E9/L (ref 4.5–11.5)

## 2020-07-28 PROCEDURE — 85025 COMPLETE CBC W/AUTO DIFF WBC: CPT

## 2020-07-28 PROCEDURE — 99212 OFFICE O/P EST SF 10 MIN: CPT

## 2020-07-28 PROCEDURE — 80053 COMPREHEN METABOLIC PANEL: CPT

## 2020-07-28 PROCEDURE — 99214 OFFICE O/P EST MOD 30 MIN: CPT | Performed by: INTERNAL MEDICINE

## 2020-07-28 RX ORDER — SODIUM CHLORIDE 9 MG/ML
INJECTION, SOLUTION INTRAVENOUS CONTINUOUS
Status: CANCELLED | OUTPATIENT
Start: 2020-08-03

## 2020-07-28 RX ORDER — METHYLPREDNISOLONE SODIUM SUCCINATE 125 MG/2ML
125 INJECTION, POWDER, LYOPHILIZED, FOR SOLUTION INTRAMUSCULAR; INTRAVENOUS ONCE
Status: CANCELLED | OUTPATIENT
Start: 2020-08-03

## 2020-07-28 RX ORDER — DIPHENHYDRAMINE HYDROCHLORIDE 50 MG/ML
50 INJECTION INTRAMUSCULAR; INTRAVENOUS ONCE
Status: CANCELLED | OUTPATIENT
Start: 2020-08-03

## 2020-07-28 RX ORDER — EPINEPHRINE 1 MG/ML
0.3 INJECTION, SOLUTION, CONCENTRATE INTRAVENOUS PRN
Status: CANCELLED | OUTPATIENT
Start: 2020-08-03

## 2020-07-28 NOTE — PROGRESS NOTES
[facility] [place] [address]    Attending Clinic Note    Reason for Visit: Follow-up on a patient with Left Breast Cancer    PCP:  Isabelle Reynolds DO    History of Present Illness: The left breast mass was located at the 11 o'clock position. Age of menarche 15;  3 Para 3; LMP early 29's; [address] [facility] Mother with Breast Cancer at Age 61    Bilateral Diagnostic Mammogram on 2017:  Circumscribed solid nodule without increased vascularity at the 11 o'clock position corresponding to the patient's palpable lesion. 6 mm smooth bordered nodule within the left upper outer quadrant which is not identifiable on the lateral view (6 month f/u left breast mammogram recommended). Unremarkable right-sided mammogram demonstrating no evidence of malignancy. Annual right sided mammogram recommended. Left Breast Ultrasound on 2017 noted circumscribed solid nodule without increased vascularity or shadowing located at the 11 o'clock position corresponding to the patient's palpable lesion. Nodule measures 0.5 cm x 0.4 cm x 0.5 cm. Left Breast Mass 11 o'clock Core Needle Biopsy was on 2017: Invasive Well Differentiated Ductal Carcinoma; Grade 1; Lymph-vascular invasion Not identified. ER positive (100%); FL positive (5%), HER/2neu Equivocal (2+)  HER/2neu FISH Negative (Her2/CEN17 signal ratio 1.2)    Blue dye injection with left breast lumpectomy and sentinel lymph node biopsy after technetium sulfur colloid injection was performed on 2017:  A.  Lymph node, left axillary, left breast sentinel node, biopsy: One lymph node with no diagnostic abnormality. Fly Barboza left, excision of mass:  -Invasive well-differentiated ductal carcinoma and intermediate grade ductal carcinoma in situ (see comment and cancer case summary). -Fibrocystic changes. CANCER CASE SUMMARY:  Procedure: Excision without image guided localization. Lymph node sampling: [facility] lymph node.   Specimen laterality: Left.  Tumor size, size of largest invasive carcinoma: 7 mm. Histologic type: Invasive mammary carcinoma of no special type(ductal,not otherwise specified). Histologic grade:   Glandular (acinar)/tubular differentiation: Score 2.   Nuclear pleomorphism: Score 1.   Mitotic rate: Score 1.   Overall grade: Grade 1. Tumor focality: Single focus of invasive carcinoma. Ductal carcinoma in situ: DCIS is present.  Hidalgo Athol of DCIS: Estimated size: At least 15 mm.   Architectural patterns: Cribriform and solid.   Nuclear grade: Grade 2 (intermediate).   Necrosis: Not identified. Margins:   Invasive carcinoma:    -Margins uninvolved by invasive carcinoma.    -Distance from closest margin: 4 mm (anterior and lateral margins).   DCIS:    -Margins positive for DCIS (posterior from the postero-medial to postero-lateral aspects). Lymph nodes:   Total number of lymph nodes examined (sentinel and non-sentinel): 1.   Number of sentinel lymph nodes examined: 1. Lymph vascular invasion: Not identified. Pathologic staging:   Primary tumor (pT): pT1b.   Regional lymph nodes (pN):pN0 (i-) (sn- only a sentinel lymph node evaluated). Re-excision of posteromedial to posterolateral margins for DCIS was performed on 05/05/2017:  Left breast, re-excision:  Extensive residual intermediate-grade duct carcinoma in situ of solid, micropapillary, and cribriform types (see comment). Histologic changes consistent with healing biopsy/excision site. Fibrocystic change, including patchy stromal fibroplasia and occasional small benign breast cysts. Adenosis and sclerosing adenosis. Comment:     Residual DCIS is identified within 7 of 10 microscopic sections examined.  Microscopically, the maximum dimension of DCIS is 2.0 cm.  Invasive carcinoma is not identified.  DCIS involves the anterior, posterior, medial, inferior, and superior inked excision margins. Residual Invasive carcinoma is not identified.   The pathological Staging (pTNM) for this Prolia q6months  We recommended endocrine therapy (Arimidex 1 mg po daily) Side effects of Arimidex reviewed with patient. She agreed to proceed. Right Diagnostic Mammogram 12/05/2018 Negative for malignancy. DEXA scan 12/05/2018 osteopenia in LS; Osteoporosis involving the hips. Arimidex 1 mg po daily was started on 11/09/2018 which she is tolerating well. Review of Systems;  CONSTITUTIONAL: No fever, chills. Fair appetite and energy level. ENMT: Eyes: No diplopia; Nose: No epistaxis. Mouth: No sore throat. RESPIRATORY: No hemoptysis, shortness of breath, cough. CARDIOVASCULAR: No chest pain, palpitations. GASTROINTESTINAL: No nausea/vomiting, abdominal pain  GENITOURINARY: No dysuria, urinary frequency, hematuria. NEURO: No syncope, presyncope, headache. Remainder:  ROS NEGATIVE    Past Medical History:      Diagnosis Date    Alcoholism ([facility] [address]     Since teens to early 25s    Anxiety and depression     Cancer ([facility] [address]     breast, left    Chronic pancreatitis ([facility] [address]     Diabetes mellitus, type 2 ([facility] [address]     GERD (gastroesophageal reflux disease)     Gout     Hepatitis C     Jaundice 5/12/2016    Schizoaffective disorder, bipolar type ([facility] [address]     Splenomegaly 5/30/2017     Medications:  Reviewed and reconciled. Allergies: Allergies   Allergen Reactions    Dye [Iodides]     Tylenol [Acetaminophen]      Was told not to take by her DRScott For a bad liver     OK  to use sparingly       Physical Exam:  /65 (Site: Right Upper Arm, Position: Sitting, Cuff Size: Medium Adult)   Pulse 79   Temp 95.9 °F (35.5 °C) (Temporal)   Ht 5' 1\" (1.549 m)   Wt 160 lb 11.2 oz (72.9 kg)   SpO2 97%   BMI 30.36 kg/m²   GENERAL: Alert, oriented x 3, not in acute distress  HEENT: PERRLA; EOMI. Oropharynx clear. NECK: Supple. Without lymphadenopathy. LUNGS: Good air entry bilaterally. No wheezing, crackles or ronchi. BREASTS: Left mastectomy incision intact.  No palpable masses in right breast. No palpable LN.  CARDIOVASCULAR: Regular rate. No murmurs, rubs or gallops. ABDOMEN: Soft. Non-tender, non-distended. Positive bowel sounds. EXTREMITIES: Without clubbing, cyanosis, or edema. NEUROLOGIC: No focal deficits. ECOG PS 1    Impression/Plan:  49 y/o female who underwent Blue dye injection with left breast lumpectomy and sentinel lymph node biopsy after technetium sulfur colloid injection on 04/25/2017:  A.  Lymph node, left axillary, left breast sentinel node, biopsy: One lymph node with no diagnostic abnormality. Kimberly Arauz, left, excision of mass:  -Invasive well-differentiated ductal carcinoma and intermediate grade ductal carcinoma in situ (see comment and cancer case summary). -Fibrocystic changes. CANCER CASE SUMMARY:  Procedure: Excision without image guided localization. Lymph node sampling: [place] lymph node. Specimen laterality: Left. Tumor size, size of largest invasive carcinoma: 7 mm. Histologic type: Invasive mammary carcinoma of no special type (ductal,not otherwise specified). Histologic grade:   Glandular (acinar)/tubular differentiation: Score 2.   Nuclear pleomorphism: Score 1.   Mitotic rate: Score 1.   Overall grade: Grade 1. Tumor focality: Single focus of invasive carcinoma. Ductal carcinoma in situ: DCIS is present.  Nai Montemayor of DCIS: Estimated size: At least 15 mm.   Architectural patterns: Cribriform and solid.   Nuclear grade: Grade 2 (intermediate).   Necrosis: Not identified. Margins:   Invasive carcinoma:    -Margins uninvolved by invasive carcinoma.    -Distance from closest margin: 4 mm (anterior and lateral margins).   DCIS:    -Margins positive for DCIS (posterior from the postero-medial to postero-lateral aspects). Lymph nodes:   Total number of lymph nodes examined (sentinel and non-sentinel): 1.   Number of sentinel lymph nodes examined: 1. Lymph vascular invasion: Not identified.     Pathologic staging:   Primary tumor (pT): pT1b.   Regional lymph nodes (pN):pN0 (i-) (sn- only a sentinel lymph node evaluated). ER positive (100%); KS positive (5%),   HER/2neu Equivocal (2+);   HER/2neu FISH Negative (Her2/CEN17 signal ratio 1.2)    Re-excision of posteromedial to posterolateral margins for DCIS was performed on 05/05/2017:  Left breast, re-excision:  Extensive residual intermediate-grade duct carcinoma in situ of solid, micropapillary, and cribriform types (see comment). Histologic changes consistent with healing biopsy/excision site. Fibrocystic change, including patchy stromal fibroplasia and occasional small benign breast cysts. Adenosis and sclerosing adenosis. Comment: Residual DCIS is identified within 7 of 10 microscopic sections examined.  Microscopically, the maximum dimension of DCIS is 2.0 cm. Invasive carcinoma is not identified. DCIS involves the anterior, posterior, medial, inferior, and superior inked excision margins. Residual Invasive carcinoma is not identified. The pathological Staging (pTNM) for this patient remains unchanged. Oncotype DX Recurrence Score 7;  Margins still positive for DCIS. Left Mastectomy was recommended (patient was non-compliant with appointments). Not a candidate for immediate reconstruction per Dr. Makayla Hannah (Plastic Surgery Team). Reconstruction can be performed even after 3-6 months after surgery. Left Mastectomy was performed on 10/05/2017. Breast, left, simple mastectomy (185 g; 17.0 x 15.5 x 2.7 cm): Infiltrating ductal carcinoma, moderately differentiated (1.1 cm maximum dimension) 12 o'clock position.   Ductal carcinoma in situ, solid and cribriform types, intermediate nuclear grade associated with previous biopsy/excision site (cavity-3.8 cm maximum dimension) of superior aspect of breast.    Infiltrating ductal carcinoma, moderately differentiated (0.6 cm maximum dimension) of central region of breast (separate/second focus) with associated ductal carcinoma in situ, solid and cribriform types,intermediate nuclear grade. Fibroadenoma, remote, microscopic (1). Nipple and areola: Negative for carcinoma. Margins of resection: Infiltrating ductal carcinoma (12 o'clock) comes to within 0.1 cm of inked superior and 0.2 cm of inked posterior/deep margin of resection. Ductal carcinoma in situ with associated biopsy cavity is present less than 0.1 cm from inked deep margin of resection and 0.3 cm from inked anterior margin of resection. Infiltrating ductal carcinoma (central breast parenchyma) is present less than 0.2 cm from inked anterior margin of resection. Lymphovascular invasion: Not identified. Dermal lymphovascular invasion: Not identified. Pathologic stage: at least [id number] o'clock lesion; pT1b-central lesion. Regional lymph nodes: See N-; only [place] lymph node (1) is examined. p(sn)N0 (i-). Hx of DYLON/BSO in 2007; [address] 26.5 10/17/2017. Estradiol <5.0 on 10/17/2017. DEXA scan Oct 2017 osteoporosis lumbar spine and femoral necks. Ca/VitD Prolia q6months    We recommended endocrine therapy (Arimidex 1 mg po daily) Side effects of Arimidex reviewed with patient. She agreed to proceed. Right Diagnostic Mammogram 12/05/2018 Negative for malignancy. DEXA scan 12/05/2018 osteopenia in LS; Osteoporosis involving the hips. Arimidex 1 mg po daily was started on 11/09/2018 which she is tolerating well. Right Diagnostic Mammogram on 02/26/2020 Negative for malignancy. DEXA scan 02/26/2020 osteoporosis. KACIE. Continue Arimidex, Ca/VitD. Prolia q6months. RTC 6 months. Genetics: My-Risk recommended as well as Genetic counseling.     Talya Guan MD   5/19/1297  Board Certified Medical Oncologist

## 2020-08-03 ENCOUNTER — HOSPITAL ENCOUNTER (OUTPATIENT)
Dept: INFUSION THERAPY | Age: 50
Discharge: HOME | End: 2020-08-03
Payer: MEDICAID

## 2020-08-03 DIAGNOSIS — M81.8 IDIOPATHIC OSTEOPOROSIS: Primary | ICD-10-CM

## 2020-08-03 DIAGNOSIS — M81.8 OTHER OSTEOPOROSIS WITHOUT CURRENT PATHOLOGICAL FRACTURE: ICD-10-CM

## 2020-08-03 PROCEDURE — 96372 THER/PROPH/DIAG INJ SC/IM: CPT

## 2020-08-03 PROCEDURE — 6360000002 HC RX W HCPCS: Performed by: NURSE PRACTITIONER

## 2020-08-03 RX ADMIN — DENOSUMAB 60 MG: 60 INJECTION SUBCUTANEOUS at 13:36

## 2020-08-03 NOTE — PROGRESS NOTES
Patient presents to clinic today for Prolia injection. Patient's labs monitored, specifically, Calcium level, to ensure no increased risk of hypocalcemia with administration of the medication. Patient's calcium level is 10.0 mg/dl. Patient received therapy and will continue to be monitored prior to each dose.     Huma White, [place] 8/3/2020 1:35 PM

## 2020-08-20 ENCOUNTER — APPOINTMENT (OUTPATIENT)
Dept: CT IMAGING | Age: 50
End: 2020-08-20
Payer: MEDICAID

## 2020-08-20 ENCOUNTER — HOSPITAL ENCOUNTER (EMERGENCY)
Age: 50
Discharge: HOME | End: 2020-08-20
Payer: MEDICAID

## 2020-08-20 VITALS
WEIGHT: 160 LBS | SYSTOLIC BLOOD PRESSURE: 130 MMHG | DIASTOLIC BLOOD PRESSURE: 86 MMHG | TEMPERATURE: 97.3 F | BODY MASS INDEX: 30.21 KG/M2 | HEART RATE: 63 BPM | OXYGEN SATURATION: 96 % | HEIGHT: 61 IN | RESPIRATION RATE: 18 BRPM

## 2020-08-20 PROCEDURE — 72131 CT LUMBAR SPINE W/O DYE: CPT

## 2020-08-20 PROCEDURE — 96372 THER/PROPH/DIAG INJ SC/IM: CPT

## 2020-08-20 PROCEDURE — 99283 EMERGENCY DEPT VISIT LOW MDM: CPT

## 2020-08-20 PROCEDURE — 99282 EMERGENCY DEPT VISIT SF MDM: CPT

## 2020-08-20 PROCEDURE — 6360000002 HC RX W HCPCS: Performed by: PHYSICIAN ASSISTANT

## 2020-08-20 PROCEDURE — 6370000000 HC RX 637 (ALT 250 FOR IP): Performed by: PHYSICIAN ASSISTANT

## 2020-08-20 RX ORDER — OXYCODONE HYDROCHLORIDE AND ACETAMINOPHEN 5; 325 MG/1; MG/1
1 TABLET ORAL ONCE
Status: COMPLETED | OUTPATIENT
Start: 2020-08-20 | End: 2020-08-20

## 2020-08-20 RX ORDER — KETOROLAC TROMETHAMINE 30 MG/ML
30 INJECTION, SOLUTION INTRAMUSCULAR; INTRAVENOUS ONCE
Status: COMPLETED | OUTPATIENT
Start: 2020-08-20 | End: 2020-08-20

## 2020-08-20 RX ORDER — NAPROXEN 500 MG/1
500 TABLET ORAL 2 TIMES DAILY
Qty: 20 TABLET | Refills: 0 | Status: SHIPPED | OUTPATIENT
Start: 2020-08-20 | End: 2021-06-20

## 2020-08-20 RX ORDER — TIZANIDINE 4 MG/1
4 TABLET ORAL 3 TIMES DAILY
COMMUNITY
End: 2022-05-23 | Stop reason: SDUPTHER

## 2020-08-20 RX ORDER — CYCLOBENZAPRINE HCL 10 MG
10 TABLET ORAL NIGHTLY PRN
Qty: 10 TABLET | Refills: 0 | Status: SHIPPED | OUTPATIENT
Start: 2020-08-20 | End: 2020-08-30

## 2020-08-20 RX ORDER — LIDOCAINE 50 MG/G
1 PATCH TOPICAL DAILY
Qty: 10 PATCH | Refills: 0 | Status: SHIPPED | OUTPATIENT
Start: 2020-08-20 | End: 2020-08-30

## 2020-08-20 RX ADMIN — OXYCODONE AND ACETAMINOPHEN 1 TABLET: 5; 325 TABLET ORAL at 19:13

## 2020-08-20 RX ADMIN — KETOROLAC TROMETHAMINE 30 MG: 30 INJECTION, SOLUTION INTRAMUSCULAR at 19:13

## 2020-08-20 ASSESSMENT — PAIN DESCRIPTION - DESCRIPTORS: DESCRIPTORS: ACHING;SHARP;SHOOTING

## 2020-08-20 ASSESSMENT — PAIN DESCRIPTION - ONSET: ONSET: ON-GOING

## 2020-08-20 ASSESSMENT — PAIN DESCRIPTION - LOCATION: LOCATION: GROIN;BACK;LEG

## 2020-08-20 ASSESSMENT — PAIN DESCRIPTION - PROGRESSION: CLINICAL_PROGRESSION: GRADUALLY WORSENING

## 2020-08-20 ASSESSMENT — PAIN DESCRIPTION - ORIENTATION: ORIENTATION: RIGHT

## 2020-08-20 ASSESSMENT — PAIN SCALES - GENERAL
PAINLEVEL_OUTOF10: 10
PAINLEVEL_OUTOF10: 10

## 2020-08-20 ASSESSMENT — PAIN DESCRIPTION - FREQUENCY: FREQUENCY: CONTINUOUS

## 2020-08-20 ASSESSMENT — PAIN DESCRIPTION - PAIN TYPE: TYPE: ACUTE PAIN

## 2020-08-20 NOTE — ED PROVIDER NOTES
[facility]     Department of Emergency Medicine   ED  Provider Note  Admit Date/RoomTime: 8/20/2020  6:07 PM  ED Room: 11/11  Chief Complaint   Back Pain (right low back pain that goes into right groin and down the leg. On going for a couple months. )    History of Present Illness   Source of history provided by:  patient. History/Exam Limitations: none. Marjorie Jarvis is a 48 y.o. old female with a prior history of recurrent self limited episodes of low back pain in the past, presents to the emergency department by private vehicle, for acute-on-chronic, aching and sharp right sided lower lumbar spine pain with radiation, for a few month(s) prior to arrival.  The original pain was caused by no known injury. There has been no history of recent injury. Since onset the symptoms have been constant and mild-moderate in severity. She denies any of the following: bladder incontinence, bladder urgency, bowel incontinence, bowel urgency or sacral numbness. Associated Signs & Symptoms: low back pain and sciatica. The pain is aggraveated by movement and relieved by nothing. There has been no abdominal pain, cramping, vomiting, diarrhea, fever, chills, sweats, headache, arthralgias, myalgias, irritability, URI symptoms or cough. She is not enrolled in pain management program.    .  ROS    Pertinent positives and negatives are stated within HPI, all other systems reviewed and are negative. Past Surgical History:  has a past surgical history that includes Hysterectomy (2004); Ovary removal (Bilateral, 2007); shoulder surgery (Left, 2003); Cholecystectomy (2010); ERCP; Abdomen surgery; Colonoscopy; Upper gastrointestinal endoscopy; other surgical history (Left, 04/25/2017); Breast biopsy (Left); Pancreas Biopsy; Endoscopy, colon, diagnostic; other surgical history (10/05/2017); and Colonoscopy (N/A, 10/2/2018). Social History:  reports that she quit smoking about 6 months ago.  Her smoking use included cigarettes. She started smoking about 38 years ago. She has a 38.00 pack-year smoking history. She has never used smokeless tobacco. She reports previous alcohol use of about 3.0 standard drinks of alcohol per week. She reports that she does not use drugs. Family History: family history includes Cancer in her mother; Diabetes in her mother. Allergies: Dye [iodides] and Tylenol [acetaminophen]    Physical Exam           ED Triage Vitals   BP Temp Temp Source Pulse Resp SpO2 Height Weight   08/20/20 1808 08/20/20 1808 08/20/20 1808 08/20/20 1808 08/20/20 1808 08/20/20 1808 08/20/20 1813 08/20/20 1813   130/86 97.3 °F (36.3 °C) Temporal 63 18 96 % 5' 1\" (1.549 m) 160 lb (72.6 kg)      Oxygen Saturation Interpretation: Normal.    Constitutional:  Alert, development consistent with age. HEENT:  NC/NT. Airway patent. Neck:  Normal ROM. Supple. Respiratory:  Clear to auscultation and breath sounds equal.  CV:  Regular rate and rhythm, normal heart sounds, without pathological murmurs, ectopy, gallops, or rubs. GI:  Abdomen Soft, nontender, good bowel sounds. No firm or pulsatile mass. Back: lower lumbar spine right sided. Tenderness: Mild. Swelling: no.              Range of Motion: full range with pain. CVA Tenderness: No.            Straight leg raising:  Bilateral negative. Skin:  no erythema, rash or swelling noted. Distal Function:              Motor deficit: none. Sensory deficit: none. Pulse deficit: none. Calf Tenderness:  No Bilateral.               Edema:  none Both lower extremity(s). Reflexes: Bilateral knee,ankle,biceps normal.  Gait:  normal.  Integument:  Normal turgor. Warm, dry, without visible rash. Lymphatics: No lymphangitis or adenopathy noted. Neurological:  Oriented. Motor functions intact.     Lab / Imaging Results   (All laboratory and radiology results have been personally reviewed by myself)  Labs:  No results found for this visit on 08/20/20. Imaging: All Radiology results interpreted by Radiologist unless otherwise noted. CT LUMBAR SPINE WO CONTRAST   Final Result   Unremarkable CT scan lumbar spine. ED Course / Medical Decision Making     Medications   ketorolac (TORADOL) injection 30 mg (30 mg Intramuscular Given 8/20/20 1913)   oxyCODONE-acetaminophen (PERCOCET) 5-325 MG per tablet 1 tablet (1 tablet Oral Given 8/20/20 1913)        Re-examination:  8/20/20       Time: Patient was given Toradol and Percocet and states that her symptoms have drastically improved. Awaiting scans. Consult(s):   None    Procedure(s):   none    Medical Decision Making/Counseling:     Patient is a 68-year-old female that presented to the emergency department with right lower back pain with sciatica down her right leg. Patient had a thorough examination is neurovascular and sensory intact no evidence of a spinal cord compression injury. Negative straight  leg raise. In viewing the area of the spine there is no ecchymosis, abscess or any evidence of any trauma. Patient did have a CT scan of the back which revealed no acute findings. There is no posterior displacement, fractures or dislocations. Patient does have a simple right cyst seen on the kidney. Patient was educated that her findings are completely negative and she most likely has sciatica. Patient will be treated with lidocaine patches, muscle relaxers as well as naproxen. Patient voiced understanding and agreed and has no evidence of spinal cord compression injury. Patient was advised of the symptoms of when to return back to the emergency department such as loss of bowel or bladder function, numbness or weakness bilaterally in her extremities, numbness or tingling in her genital region or inability to ambulate. Patient will follow-up accordingly as an outpatient. She voiced understanding and agreed.      At this time the patient is without objective evidence of an acute process requiring inpatient management. History provided is without report of trauma, or neurological symptom. Exam shows evidence of NO radicular symptoms without myelopathy or neurovascular compromise. Patient has no significant risk for spontaneous infectious process and is afebrile & non-toxic. Given symptomatic therapy in ER and prescriptions for home along with appropriate instructions regarding taking medications with food and using caution for drowsiness and sedation. No alcohol or driving while taking medication. Any red flag symptoms were to return immediately to the ER for evaluation but otherwise PCP follow up for reevaluation. The emergency provider has spoken with the patient and discussed todays emergency visit, in addition to providing specific details for the plan of care and counseling regarding the diagnosis and prognosis. She was counseled on the role of the emergency department regarding prescribing medications for chronic conditions including Narcotic and other controlled substances. Based on the presenting complaint and nature of illness, the requested medications will not be provided today in prescription form and she is instructed to contact their provider for supplemental medications as soon as possible. Questions are answered at this time and they are agreeable with the plan. Assessment      1. Strain of lumbar region, initial encounter    2. Sciatica of right side      Plan   Discharge to home  Patient condition is stable    New Medications     New Prescriptions    CYCLOBENZAPRINE (FLEXERIL) 10 MG TABLET    Take 1 tablet by mouth nightly as needed for Muscle spasms    LIDOCAINE (LIDODERM) 5 %    Place 1 patch onto the skin daily for 10 days 12 hours on, 12 hours off.     NAPROXEN (NAPROSYN) 500 MG TABLET    Take 1 tablet by mouth 2 times daily for 10 days     Electronically signed by Angela Elliott PA-C   DD: 8/20/20  **This report

## 2020-08-24 PROCEDURE — 99284 EMERGENCY DEPT VISIT MOD MDM: CPT

## 2020-08-24 PROCEDURE — 96372 THER/PROPH/DIAG INJ SC/IM: CPT

## 2020-08-25 ENCOUNTER — HOSPITAL ENCOUNTER (EMERGENCY)
Age: 50
Discharge: HOME | End: 2020-08-25
Attending: EMERGENCY MEDICINE
Payer: MEDICAID

## 2020-08-25 ENCOUNTER — APPOINTMENT (OUTPATIENT)
Dept: CT IMAGING | Age: 50
End: 2020-08-25
Payer: MEDICAID

## 2020-08-25 VITALS
WEIGHT: 150 LBS | RESPIRATION RATE: 18 BRPM | HEART RATE: 88 BPM | TEMPERATURE: 98.6 F | OXYGEN SATURATION: 98 % | BODY MASS INDEX: 28.32 KG/M2 | SYSTOLIC BLOOD PRESSURE: 120 MMHG | HEIGHT: 61 IN | DIASTOLIC BLOOD PRESSURE: 84 MMHG

## 2020-08-25 PROCEDURE — 6370000000 HC RX 637 (ALT 250 FOR IP): Performed by: EMERGENCY MEDICINE

## 2020-08-25 PROCEDURE — 6360000002 HC RX W HCPCS: Performed by: EMERGENCY MEDICINE

## 2020-08-25 PROCEDURE — 72131 CT LUMBAR SPINE W/O DYE: CPT

## 2020-08-25 RX ORDER — POLYETHYLENE GLYCOL 3350 17 G/17G
17 POWDER, FOR SOLUTION ORAL DAILY
COMMUNITY
End: 2021-07-27

## 2020-08-25 RX ORDER — OXYCODONE HYDROCHLORIDE AND ACETAMINOPHEN 5; 325 MG/1; MG/1
TABLET ORAL
Status: DISCONTINUED
Start: 2020-08-25 | End: 2020-08-25 | Stop reason: HOSPADM

## 2020-08-25 RX ORDER — LACTULOSE 10 G/15ML
10 SOLUTION ORAL 2 TIMES DAILY
COMMUNITY
End: 2022-06-06 | Stop reason: SDUPTHER

## 2020-08-25 RX ORDER — KETOROLAC TROMETHAMINE 30 MG/ML
INJECTION, SOLUTION INTRAMUSCULAR; INTRAVENOUS
Status: DISCONTINUED
Start: 2020-08-25 | End: 2020-08-25 | Stop reason: HOSPADM

## 2020-08-25 RX ORDER — OXYCODONE HYDROCHLORIDE AND ACETAMINOPHEN 5; 325 MG/1; MG/1
1 TABLET ORAL ONCE
Status: COMPLETED | OUTPATIENT
Start: 2020-08-25 | End: 2020-08-25

## 2020-08-25 RX ORDER — ORPHENADRINE CITRATE 30 MG/ML
60 INJECTION INTRAMUSCULAR; INTRAVENOUS ONCE
Status: COMPLETED | OUTPATIENT
Start: 2020-08-25 | End: 2020-08-25

## 2020-08-25 RX ORDER — IBUPROFEN 200 MG
200 TABLET ORAL EVERY 8 HOURS PRN
COMMUNITY
End: 2021-07-27 | Stop reason: ALTCHOICE

## 2020-08-25 RX ORDER — ORPHENADRINE CITRATE 30 MG/ML
INJECTION INTRAMUSCULAR; INTRAVENOUS
Status: DISCONTINUED
Start: 2020-08-25 | End: 2020-08-25 | Stop reason: HOSPADM

## 2020-08-25 RX ORDER — METOCLOPRAMIDE 10 MG/1
10 TABLET ORAL DAILY PRN
COMMUNITY
End: 2021-06-20

## 2020-08-25 RX ORDER — KETOROLAC TROMETHAMINE 30 MG/ML
30 INJECTION, SOLUTION INTRAMUSCULAR; INTRAVENOUS ONCE
Status: COMPLETED | OUTPATIENT
Start: 2020-08-25 | End: 2020-08-25

## 2020-08-25 RX ADMIN — ORPHENADRINE CITRATE 60 MG: 30 INJECTION INTRAMUSCULAR; INTRAVENOUS at 00:22

## 2020-08-25 RX ADMIN — KETOROLAC TROMETHAMINE 30 MG: 30 INJECTION, SOLUTION INTRAMUSCULAR at 00:22

## 2020-08-25 RX ADMIN — OXYCODONE AND ACETAMINOPHEN 1 TABLET: 5; 325 TABLET ORAL at 00:22

## 2020-08-25 ASSESSMENT — PAIN SCALES - GENERAL
PAINLEVEL_OUTOF10: 8
PAINLEVEL_OUTOF10: 10
PAINLEVEL_OUTOF10: 10

## 2020-08-25 ASSESSMENT — PAIN DESCRIPTION - ORIENTATION: ORIENTATION: LOWER;RIGHT

## 2020-08-25 ASSESSMENT — PAIN DESCRIPTION - PAIN TYPE: TYPE: ACUTE PAIN

## 2020-08-25 ASSESSMENT — PAIN DESCRIPTION - LOCATION: LOCATION: BACK

## 2020-08-25 ASSESSMENT — PAIN DESCRIPTION - DESCRIPTORS: DESCRIPTORS: ACHING

## 2020-08-25 NOTE — ED NOTES
8/25/20 0130 am.  Report called to assisted living.   CT report and AVS faxed to facility     Kendra Koo RN  08/25/20 [id number]

## 2020-08-25 NOTE — ED PROVIDER NOTES
History:  has a past surgical history that includes Hysterectomy (2004); Ovary removal (Bilateral, 2007); shoulder surgery (Left, 2003); Cholecystectomy (2010); ERCP; Abdomen surgery; Colonoscopy; Upper gastrointestinal endoscopy; other surgical history (Left, 04/25/2017); Breast biopsy (Left); Pancreas Biopsy; Endoscopy, colon, diagnostic; other surgical history (10/05/2017); and Colonoscopy (N/A, 10/2/2018). Social History:  reports that she has been smoking cigarettes. She started smoking about 38 years ago. She has a 38.00 pack-year smoking history. She has never used smokeless tobacco. She reports previous alcohol use. She reports that she does not use drugs. Family History: family history includes Cancer in her mother; Diabetes in her mother. The patients home medications have been reviewed. Allergies: Dye [iodides] and Tylenol [acetaminophen]        ---------------------------------------------------PHYSICAL EXAM--------------------------------------    Constitutional:  Well developed, well nourished, no acute distress, non-toxic appearance   Eyes:  PERRL, conjunctiva normal, EOMI  HENT:  Atraumatic, external ears normal, nose normal, oropharynx moist. Neck- normal range of motion, no tenderness, supple   Respiratory:  No respiratory distress, normal breath sounds, no rales, no wheezing   Cardiovascular:  Normal rate, normal rhythm, no murmurs, no gallops, no rubs. Radial and DP pulses 2+ bilaterally. GI:  Soft, nondistended, normal bowel sounds, nontender, no rebound, no guarding   :  No costovertebral angle tenderness   Musculoskeletal:  No edema, no tenderness, no deformities. Back-midline or paraspinal cervical or thoracic tenderness. No midline lumbar tenderness. Patient is tender to palpation in right lower lumbar area and right SI joint. Negative straight leg raise test.  Integument:  Well hydrated, no rash. Adequate perfusion.    Neurologic:  Alert & oriented x 3, CN 2-12 normal, normal motor function, normal sensory function, no focal deficits noted. DTRs 2+ bilateral patellar. Psychiatric:  Speech and behavior appropriate       -------------------------------------------------- RESULTS -------------------------------------------------  I have personally reviewed all laboratory and imaging results for this patient. Results are listed below. LABS:  No results found for this visit on 08/25/20. RADIOLOGY:  Interpreted by RadiologistScott De Luna   Final Result   Unremarkable CT scan of the lumbar spine, similar findings   as observed previously on the study of August 20. .            ------------------------- NURSING NOTES AND VITALS REVIEWED ---------------------------   The nursing notes within the ED encounter and vital signs as below have been reviewed by myself. /74   Pulse 85   Temp 98.6 °F (37 °C) (Temporal)   Resp 16   Ht 5' 1\" (1.549 m)   Wt 150 lb (68 kg)   SpO2 97%   BMI 28.34 kg/m²   Oxygen Saturation Interpretation: Normal    The patients available past medical records and past encounters were reviewed. ------------------------------ ED COURSE/MEDICAL DECISION MAKING----------------------  Medications   oxyCODONE-acetaminophen (PERCOCET) 5-325 MG per tablet (has no administration in time range)   ketorolac (TORADOL) 30 MG/ML injection (has no administration in time range)   orphenadrine (NORFLEX) 30 MG/ML injection (has no administration in time range)   ketorolac (TORADOL) injection 30 mg (30 mg Intramuscular Given 8/25/20 0022)   orphenadrine (NORFLEX) injection 60 mg (60 mg Intramuscular Given 8/25/20 0022)   oxyCODONE-acetaminophen (PERCOCET) 5-325 MG per tablet 1 tablet (1 tablet Oral Given 8/25/20 0022)           Procedures:  none      Medical Decision Making:    Neg acute on CT lumbar spine. Patient adamant that Rx for pain pills are necessary.   He was advised that given her history of alcoholic hepatitis take Percocet or Norco can be more damaging to her in the long run and that she should follow-up with her primary care physician for nonnarcotic pain management such as physical therapy and that she has lidocaine patches, Flexeril and NSAIDs at her nursing facility already. She is to follow-up with her PCP tomorrow for further management options she understands and agrees with the plan. She is currently neurovascularly intact and ambulatory upon discharge. She has a ride home. Patient was explicitly instructed on specific signs and symptoms on which to return to the emergency room for. Patient was instructed to return to the ER for any new or worsening symptoms. Additional discharge instructions were given verbally. All questions were answered. Patient is comfortable and agreeable with discharge plan. Patient in no acute distress and non-toxic in appearance. This patient's ED course included: a personal history and physicial eaxmination    This patient has remained hemodynamically stable during their ED course. Counseling: The emergency provider has spoken with the patient and friend and discussed todays results, in addition to providing specific details for the plan of care and counseling regarding the diagnosis and prognosis. Questions are answered at this time and they are agreeable with the plan.       --------------------------------- IMPRESSION AND DISPOSITION ---------------------------------    IMPRESSION  1. Acute right-sided low back pain with right-sided sciatica    2.  Fall, initial encounter        DISPOSITION  Disposition: Discharge to home  Patient condition is stable                 Cira Oglesby, DO  08/25/20 [address], [place]  08/25/20 0105

## 2020-08-26 ENCOUNTER — HOSPITAL ENCOUNTER (OUTPATIENT)
Dept: ULTRASOUND IMAGING | Age: 50
Discharge: HOME | End: 2020-08-26
Payer: MEDICAID

## 2020-08-26 PROCEDURE — 76705 ECHO EXAM OF ABDOMEN: CPT

## 2021-01-25 ENCOUNTER — HOSPITAL ENCOUNTER (OUTPATIENT)
Dept: INFUSION THERAPY | Age: 51
End: 2021-01-25
Payer: MEDICAID

## 2021-01-26 ENCOUNTER — OFFICE VISIT (OUTPATIENT)
Dept: ONCOLOGY | Age: 51
End: 2021-01-26
Payer: MEDICAID

## 2021-01-26 ENCOUNTER — HOSPITAL ENCOUNTER (OUTPATIENT)
Dept: INFUSION THERAPY | Age: 51
Discharge: HOME | End: 2021-01-26
Payer: MEDICAID

## 2021-01-26 VITALS
DIASTOLIC BLOOD PRESSURE: 59 MMHG | TEMPERATURE: 97.1 F | HEART RATE: 77 BPM | SYSTOLIC BLOOD PRESSURE: 100 MMHG | OXYGEN SATURATION: 97 % | BODY MASS INDEX: 29.17 KG/M2 | WEIGHT: 154.5 LBS | HEIGHT: 61 IN

## 2021-01-26 DIAGNOSIS — M81.8 OTHER OSTEOPOROSIS WITHOUT CURRENT PATHOLOGICAL FRACTURE: ICD-10-CM

## 2021-01-26 DIAGNOSIS — Z85.3 PERSONAL HISTORY OF BREAST CANCER: Primary | ICD-10-CM

## 2021-01-26 DIAGNOSIS — Z85.3 PERSONAL HISTORY OF BREAST CANCER: ICD-10-CM

## 2021-01-26 LAB
ALBUMIN SERPL-MCNC: 4.1 G/DL (ref 3.5–5.2)
ALP BLD-CCNC: 208 U/L (ref 35–104)
ALT SERPL-CCNC: 35 U/L (ref 0–32)
ANION GAP SERPL CALCULATED.3IONS-SCNC: 9 MMOL/L (ref 7–16)
AST SERPL-CCNC: 30 U/L (ref 0–31)
BASOPHILS ABSOLUTE: 0 E9/L (ref 0–0.2)
BASOPHILS RELATIVE PERCENT: 0.4 % (ref 0–2)
BILIRUB SERPL-MCNC: 0.6 MG/DL (ref 0–1.2)
BUN BLDV-MCNC: 16 MG/DL (ref 6–20)
CALCIUM SERPL-MCNC: 9.3 MG/DL (ref 8.6–10.2)
CHLORIDE BLD-SCNC: 99 MMOL/L (ref 98–107)
CO2: 28 MMOL/L (ref 22–29)
CREAT SERPL-MCNC: 0.9 MG/DL (ref 0.5–1)
EOSINOPHILS ABSOLUTE: 0.13 E9/L (ref 0.05–0.5)
EOSINOPHILS RELATIVE PERCENT: 2.6 % (ref 0–6)
GFR AFRICAN AMERICAN: >60
GFR NON-AFRICAN AMERICAN: >60 ML/MIN/1.73
GLUCOSE BLD-MCNC: 223 MG/DL (ref 74–99)
HCT VFR BLD CALC: 41.7 % (ref 34–48)
HEMOGLOBIN: 13.8 G/DL (ref 11.5–15.5)
LYMPHOCYTES ABSOLUTE: 0.78 E9/L (ref 1.5–4)
LYMPHOCYTES RELATIVE PERCENT: 15.7 % (ref 20–42)
MCH RBC QN AUTO: 29.9 PG (ref 26–35)
MCHC RBC AUTO-ENTMCNC: 33.1 % (ref 32–34.5)
MCV RBC AUTO: 90.3 FL (ref 80–99.9)
MONOCYTES ABSOLUTE: 0.2 E9/L (ref 0.1–0.95)
MONOCYTES RELATIVE PERCENT: 3.5 % (ref 2–12)
NEUTROPHILS ABSOLUTE: 3.82 E9/L (ref 1.8–7.3)
NEUTROPHILS RELATIVE PERCENT: 78.3 % (ref 43–80)
PDW BLD-RTO: 13.2 FL (ref 11.5–15)
PLATELET # BLD: 83 E9/L (ref 130–450)
PLATELET CONFIRMATION: NORMAL
PMV BLD AUTO: 10 FL (ref 7–12)
POTASSIUM SERPL-SCNC: 4.7 MMOL/L (ref 3.5–5)
RBC # BLD: 4.62 E12/L (ref 3.5–5.5)
SODIUM BLD-SCNC: 136 MMOL/L (ref 132–146)
TOTAL PROTEIN: 7.8 G/DL (ref 6.4–8.3)
WBC # BLD: 4.9 E9/L (ref 4.5–11.5)

## 2021-01-26 PROCEDURE — 99213 OFFICE O/P EST LOW 20 MIN: CPT

## 2021-01-26 PROCEDURE — 36415 COLL VENOUS BLD VENIPUNCTURE: CPT

## 2021-01-26 PROCEDURE — 80053 COMPREHEN METABOLIC PANEL: CPT

## 2021-01-26 PROCEDURE — 85025 COMPLETE CBC W/AUTO DIFF WBC: CPT

## 2021-01-26 PROCEDURE — 99214 OFFICE O/P EST MOD 30 MIN: CPT | Performed by: INTERNAL MEDICINE

## 2021-01-26 NOTE — PROGRESS NOTES
[place] [address]    Attending Clinic Note    Reason for Visit: Follow-up on a patient with Left Breast Cancer    PCP:  Amara Grijalva MD    History of Present Illness: The left breast mass was located at the 11 o'clock position. Age of menarche 15;  3 Para 3; LMP early 29's; [address] [facility],[address] Mother with Breast Cancer at Age 61    Bilateral Diagnostic Mammogram on 2017:  Circumscribed solid nodule without increased vascularity at the 11 o'clock position corresponding to the patient's palpable lesion. 6 mm smooth bordered nodule within the left upper outer quadrant which is not identifiable on the lateral view (6 month f/u left breast mammogram recommended). Unremarkable right-sided mammogram demonstrating no evidence of malignancy. Annual right sided mammogram recommended. Left Breast Ultrasound on 2017 noted circumscribed solid nodule without increased vascularity or shadowing located at the 11 o'clock position corresponding to the patient's palpable lesion. Nodule measures 0.5 cm x 0.4 cm x 0.5 cm. Left Breast Mass 11 o'clock Core Needle Biopsy was on 2017: Invasive Well Differentiated Ductal Carcinoma; Grade 1; Lymph-vascular invasion Not identified. ER positive (100%); LA positive (5%), HER/2neu Equivocal (2+)  HER/2neu FISH Negative (Her2/CEN17 signal ratio 1.2)    Blue dye injection with left breast lumpectomy and sentinel lymph node biopsy after technetium sulfur colloid injection was performed on 2017:  A.  Lymph node, left axillary, left breast sentinel node, biopsy: One lymph node with no diagnostic abnormality. Unice Borne, left, excision of mass:  -Invasive well-differentiated ductal carcinoma and intermediate grade ductal carcinoma in situ (see comment and cancer case summary). -Fibrocystic changes. CANCER CASE SUMMARY:  Procedure: Excision without image guided localization. Lymph node sampling: [place] lymph node. Specimen laterality: Left. Tumor size, size of largest invasive carcinoma: 7 mm. Histologic type: Invasive mammary carcinoma of no special type(ductal,not otherwise specified). Histologic grade:   Glandular (acinar)/tubular differentiation: Score 2.   Nuclear pleomorphism: Score 1.   Mitotic rate: Score 1.   Overall grade: Grade 1. Tumor focality: Single focus of invasive carcinoma. Ductal carcinoma in situ: DCIS is present.  Ciarra Bras of DCIS: Estimated size: At least 15 mm.   Architectural patterns: Cribriform and solid.   Nuclear grade: Grade 2 (intermediate).   Necrosis: Not identified. Margins:   Invasive carcinoma:    -Margins uninvolved by invasive carcinoma.    -Distance from closest margin: 4 mm (anterior and lateral margins).   DCIS:    -Margins positive for DCIS (posterior from the postero-medial to postero-lateral aspects). Lymph nodes:   Total number of lymph nodes examined (sentinel and non-sentinel): 1.   Number of sentinel lymph nodes examined: 1. Lymph vascular invasion: Not identified. Pathologic staging:   Primary tumor (pT): pT1b.   Regional lymph nodes (pN):pN0 (i-) (sn- only a sentinel lymph node evaluated). Re-excision of posteromedial to posterolateral margins for DCIS was performed on 05/05/2017:  Left breast, re-excision:  Extensive residual intermediate-grade duct carcinoma in situ of solid, micropapillary, and cribriform types (see comment). Histologic changes consistent with healing biopsy/excision site. Fibrocystic change, including patchy stromal fibroplasia and occasional small benign breast cysts. Adenosis and sclerosing adenosis. Comment:     Residual DCIS is identified within 7 of 10 microscopic sections examined.  Microscopically, the maximum dimension of DCIS is 2.0 cm.  Invasive carcinoma is not identified.  DCIS involves the anterior, posterior, medial, inferior, and superior inked excision margins. Residual Invasive carcinoma is not identified.   The pathological Staging (pTNM) for this patient remains unchanged. Oncotype DX Recurrence Score 7;  Margins still positive for DCIS. Left Mastectomy was recommended (patient was non-compliant with appointments). Not a candidate for immediate reconstruction per Dr. Lynda Cerda (Plastic Surgery Team). Reconstruction can be performed even after 3-6 months after surgery. Left Mastectomy was performed on 10/05/2017. Breast, left, simple mastectomy (185 g; 17.0 x 15.5 x 2.7 cm): Infiltrating ductal carcinoma, moderately differentiated (1.1 cm maximum dimension) 12 o'clock position. Ductal carcinoma in situ, solid and cribriform types, intermediate nuclear grade associated with previous biopsy/excision site (cavity-3.8 cm maximum dimension) of superior aspect of breast.    Infiltrating ductal carcinoma, moderately differentiated (0.6 cm maximum dimension) of central region of breast (separate/second focus) with associated ductal carcinoma in situ, solid and cribriform types,intermediate nuclear grade. Fibroadenoma, remote, microscopic (1). Nipple and areola: Negative for carcinoma. Margins of resection: Infiltrating ductal carcinoma (12 o'clock) comes to within 0.1 cm of inked superior and 0.2 cm of inked posterior/deep margin of resection. Ductal carcinoma in situ with associated biopsy cavity is present less than 0.1 cm from inked deep margin of resection and 0.3 cm from inked anterior margin of resection. Infiltrating ductal carcinoma (central breast parenchyma) is present less than 0.2 cm from inked anterior margin of resection. Lymphovascular invasion: Not identified. Dermal lymphovascular invasion: Not identified. Pathologic stage: at least [id number] o'clock lesion; pT1b-central lesion. Regional lymph nodes: See WTT-; only [place] lymph node (1) is examined. p(sn)N0 (i-). Hx of DYLON/BSO in 2007; [address] 26.5 10/17/2017. Estradiol <5.0 on 10/17/2017. DEXA scan Oct 2017 osteoporosis lumbar spine and femoral necks.   Ca/VitD Prolia q6months  We recommended endocrine therapy (Arimidex 1 mg po daily) Side effects of Arimidex reviewed with patient. She agreed to proceed. Right Diagnostic Mammogram 12/05/2018 Negative for malignancy. DEXA scan 12/05/2018 osteopenia in LS; Osteoporosis involving the hips. Arimidex 1 mg po daily was started on 11/09/2018   Today 01/26/2021; continues to tolerate Arimidex well. No fever, chills. Fair appetite and energy level. No nausea/vomiting. Review of Systems;  CONSTITUTIONAL: No fever, chills. Fair appetite and energy level. ENMT: Eyes: No diplopia; Nose: No epistaxis. Mouth: No sore throat. RESPIRATORY: No hemoptysis, shortness of breath, cough. CARDIOVASCULAR: No chest pain, palpitations. GASTROINTESTINAL: No nausea/vomiting, abdominal pain  GENITOURINARY: No dysuria, urinary frequency, hematuria. NEURO: No syncope, presyncope, headache. Remainder:  ROS NEGATIVE    Past Medical History:      Diagnosis Date    Alcoholism ([facility] [address]     Since teens to early 25s    Anxiety and depression     Cancer ([facility] [address]     breast, left    Chronic pancreatitis ([facility] [address]     Diabetes mellitus, type 2 ([address]     GERD (gastroesophageal reflux disease)     Gout     Hepatitis C     Jaundice 5/12/2016    Schizoaffective disorder, bipolar type ([facility] [address]     Splenomegaly 5/30/2017     Medications:  Reviewed and reconciled. Allergies: Allergies   Allergen Reactions    Dye [Iodides]     Tylenol [Acetaminophen]      Was told not to take by her DRScott For a bad liver     OK  to use sparingly       Physical Exam:  BP (!) 100/59 (Site: Right Upper Arm, Position: Sitting, Cuff Size: Medium Adult)   Pulse 77   Temp 97.1 °F (36.2 °C)   Ht 5' 1\" (1.549 m)   Wt 154 lb 8 oz (70.1 kg)   SpO2 97%   BMI 29.19 kg/m²   GENERAL: Alert, oriented x 3, not in acute distress  HEENT: PERRLA; EOMI. Oropharynx clear. NECK: Supple. Without lymphadenopathy. LUNGS: Good air entry bilaterally. No wheezing, crackles or ronchi. CARDIOVASCULAR: Regular rate. No murmurs, rubs or gallops. ABDOMEN: Soft. Non-tender, non-distended. Positive bowel sounds. EXTREMITIES: Without clubbing, cyanosis, or edema. NEUROLOGIC: No focal deficits. ECOG PS 1    Impression/Plan:  47 y/o female who underwent Blue dye injection with left breast lumpectomy and sentinel lymph node biopsy after technetium sulfur colloid injection on 04/25/2017:  A.  Lymph node, left axillary, left breast sentinel node, biopsy: One lymph node with no diagnostic abnormality. Christiana Hernandez, left, excision of mass:  -Invasive well-differentiated ductal carcinoma and intermediate grade ductal carcinoma in situ (see comment and cancer case summary). -Fibrocystic changes. CANCER CASE SUMMARY:  Procedure: Excision without image guided localization. Lymph node sampling: [place] lymph node. Specimen laterality: Left. Tumor size, size of largest invasive carcinoma: 7 mm. Histologic type: Invasive mammary carcinoma of no special type (ductal,not otherwise specified). Histologic grade:   Glandular (acinar)/tubular differentiation: Score 2.   Nuclear pleomorphism: Score 1.   Mitotic rate: Score 1.   Overall grade: Grade 1. Tumor focality: Single focus of invasive carcinoma. Ductal carcinoma in situ: DCIS is present.  Lucy [place] of DCIS: Estimated size: At least 15 mm.   Architectural patterns: Cribriform and solid.   Nuclear grade: Grade 2 (intermediate).   Necrosis: Not identified. Margins:   Invasive carcinoma:    -Margins uninvolved by invasive carcinoma.    -Distance from closest margin: 4 mm (anterior and lateral margins).   DCIS:    -Margins positive for DCIS (posterior from the postero-medial to postero-lateral aspects). Lymph nodes:   Total number of lymph nodes examined (sentinel and non-sentinel): 1.   Number of sentinel lymph nodes examined: 1. Lymph vascular invasion: Not identified.     Pathologic staging:   Primary tumor (pT): pT1b.   Regional lymph nodes (pN):pN0 (i-) (sn- only a sentinel lymph node evaluated). ER positive (100%); MS positive (5%),   HER/2neu Equivocal (2+);   HER/2neu FISH Negative (Her2/CEN17 signal ratio 1.2)    Re-excision of posteromedial to posterolateral margins for DCIS was performed on 05/05/2017:  Left breast, re-excision:  Extensive residual intermediate-grade duct carcinoma in situ of solid, micropapillary, and cribriform types (see comment). Histologic changes consistent with healing biopsy/excision site. Fibrocystic change, including patchy stromal fibroplasia and occasional small benign breast cysts. Adenosis and sclerosing adenosis. Comment: Residual DCIS is identified within 7 of 10 microscopic sections examined.  Microscopically, the maximum dimension of DCIS is 2.0 cm. Invasive carcinoma is not identified. DCIS involves the anterior, posterior, medial, inferior, and superior inked excision margins. Residual Invasive carcinoma is not identified. The pathological Staging (pTNM) for this patient remains unchanged. Oncotype DX Recurrence Score 7;  Margins still positive for DCIS. Left Mastectomy was recommended (patient was non-compliant with appointments). Not a candidate for immediate reconstruction per Dr. Conrado Hernandez (Plastic Surgery Team). Reconstruction can be performed even after 3-6 months after surgery. Left Mastectomy was performed on 10/05/2017. Breast, left, simple mastectomy (185 g; 17.0 x 15.5 x 2.7 cm): Infiltrating ductal carcinoma, moderately differentiated (1.1 cm maximum dimension) 12 o'clock position.   Ductal carcinoma in situ, solid and cribriform types, intermediate nuclear grade associated with previous biopsy/excision site (cavity-3.8 cm maximum dimension) of superior aspect of breast.    Infiltrating ductal carcinoma, moderately differentiated (0.6 cm maximum dimension) of central region of breast (separate/second focus) with associated ductal carcinoma in situ, solid and cribriform types,intermediate nuclear grade. Fibroadenoma, remote, microscopic (1). Nipple and areola: Negative for carcinoma. Margins of resection: Infiltrating ductal carcinoma (12 o'clock) comes to within 0.1 cm of inked superior and 0.2 cm of inked posterior/deep margin of resection. Ductal carcinoma in situ with associated biopsy cavity is present less than 0.1 cm from inked deep margin of resection and 0.3 cm from inked anterior margin of resection. Infiltrating ductal carcinoma (central breast parenchyma) is present less than 0.2 cm from inked anterior margin of resection. Lymphovascular invasion: Not identified. Dermal lymphovascular invasion: Not identified. Pathologic stage: at least cN2g-87 o'clock lesion; pT1b-central lesion. Regional lymph nodes: See NS-; only [place] lymph node (1) is examined. p(sn)N0 (i-). Hx of DYLON/BSO in 2007; [address] 26.5 10/17/2017. Estradiol <5.0 on 10/17/2017. DEXA scan Oct 2017 osteoporosis lumbar spine and femoral necks. Ca/VitD Prolia q6months    We recommended endocrine therapy (Arimidex 1 mg po daily) Side effects of Arimidex reviewed with patient. She agreed to proceed. Right Diagnostic Mammogram 12/05/2018 Negative for malignancy. DEXA scan 12/05/2018 osteopenia in LS; Osteoporosis involving the hips. Arimidex 1 mg po daily was started on 11/09/2018 which she is tolerating well. Right Diagnostic Mammogram on 02/26/2020 Negative for malignancy. DEXA scan 02/26/2020 osteoporosis. Ca/VitD. Prolia q6months    KACIE. Continue Arimidex, Ca/VitD. Prolia q6months. RTC 6 weeks with prior mammogram and DEXA     Genetics: My-Risk recommended as well as Genetic counseling.     Ethan Hatchet, MD   [id number]  Board Certified Medical Oncologist

## 2021-01-28 DIAGNOSIS — Z79.811 LONG TERM CURRENT USE OF AROMATASE INHIBITOR: Primary | ICD-10-CM

## 2021-01-28 DIAGNOSIS — C50.012 MALIGNANT NEOPLASM INVOLVING BOTH NIPPLE AND AREOLA OF LEFT BREAST IN FEMALE, UNSPECIFIED ESTROGEN RECEPTOR STATUS (HCC): ICD-10-CM

## 2021-01-28 DIAGNOSIS — M85.80 OSTEOPENIA, UNSPECIFIED LOCATION: ICD-10-CM

## 2021-03-02 ENCOUNTER — HOSPITAL ENCOUNTER (OUTPATIENT)
Dept: MAMMOGRAPHY | Age: 51
Discharge: HOME | End: 2021-03-04
Payer: MEDICAID

## 2021-03-02 DIAGNOSIS — Z79.811 LONG TERM CURRENT USE OF AROMATASE INHIBITOR: ICD-10-CM

## 2021-03-02 DIAGNOSIS — C50.012 MALIGNANT NEOPLASM INVOLVING BOTH NIPPLE AND AREOLA OF LEFT BREAST IN FEMALE, UNSPECIFIED ESTROGEN RECEPTOR STATUS (HCC): ICD-10-CM

## 2021-03-02 DIAGNOSIS — M85.80 OSTEOPENIA, UNSPECIFIED LOCATION: ICD-10-CM

## 2021-03-02 PROCEDURE — 77067 SCR MAMMO BI INCL CAD: CPT

## 2021-03-02 PROCEDURE — 77080 DXA BONE DENSITY AXIAL: CPT

## 2021-03-12 ENCOUNTER — OFFICE VISIT (OUTPATIENT)
Dept: ONCOLOGY | Age: 51
End: 2021-03-12
Payer: MEDICAID

## 2021-03-12 ENCOUNTER — HOSPITAL ENCOUNTER (OUTPATIENT)
Dept: INFUSION THERAPY | Age: 51
Discharge: HOME | End: 2021-03-12
Payer: MEDICAID

## 2021-03-12 VITALS
OXYGEN SATURATION: 97 % | BODY MASS INDEX: 29.76 KG/M2 | HEIGHT: 61 IN | SYSTOLIC BLOOD PRESSURE: 97 MMHG | RESPIRATION RATE: 18 BRPM | DIASTOLIC BLOOD PRESSURE: 68 MMHG | TEMPERATURE: 97.2 F | WEIGHT: 157.6 LBS | HEART RATE: 65 BPM

## 2021-03-12 DIAGNOSIS — Z85.3 PERSONAL HISTORY OF BREAST CANCER: Primary | ICD-10-CM

## 2021-03-12 DIAGNOSIS — Z85.3 PERSONAL HISTORY OF BREAST CANCER: ICD-10-CM

## 2021-03-12 DIAGNOSIS — M81.8 OTHER OSTEOPOROSIS WITHOUT CURRENT PATHOLOGICAL FRACTURE: Primary | ICD-10-CM

## 2021-03-12 DIAGNOSIS — M81.8 IDIOPATHIC OSTEOPOROSIS: ICD-10-CM

## 2021-03-12 PROCEDURE — 99214 OFFICE O/P EST MOD 30 MIN: CPT | Performed by: INTERNAL MEDICINE

## 2021-03-12 PROCEDURE — 96372 THER/PROPH/DIAG INJ SC/IM: CPT

## 2021-03-12 PROCEDURE — 99212 OFFICE O/P EST SF 10 MIN: CPT

## 2021-03-12 PROCEDURE — 6360000002 HC RX W HCPCS: Performed by: NURSE PRACTITIONER

## 2021-03-12 RX ORDER — HYDROXYZINE PAMOATE 25 MG/1
25 CAPSULE ORAL 3 TIMES DAILY PRN
COMMUNITY
End: 2022-05-23 | Stop reason: ALTCHOICE

## 2021-03-12 RX ADMIN — DENOSUMAB 60 MG: 60 INJECTION SUBCUTANEOUS at 15:29

## 2021-03-12 NOTE — PROGRESS NOTES
Tumor size, size of largest invasive carcinoma: 7 mm. Histologic type: Invasive mammary carcinoma of no special type(ductal,not otherwise specified). Histologic grade:   Glandular (acinar)/tubular differentiation: Score 2.   Nuclear pleomorphism: Score 1.   Mitotic rate: Score 1.   Overall grade: Grade 1. Tumor focality: Single focus of invasive carcinoma. Ductal carcinoma in situ: DCIS is present.  [place] of DCIS: Estimated size: At least 15 mm.   Architectural patterns: Cribriform and solid.   Nuclear grade: Grade 2 (intermediate).   Necrosis: Not identified. Margins:   Invasive carcinoma:    -Margins uninvolved by invasive carcinoma.    -Distance from closest margin: 4 mm (anterior and lateral margins).   DCIS:    -Margins positive for DCIS (posterior from the postero-medial to postero-lateral aspects). Lymph nodes:   Total number of lymph nodes examined (sentinel and non-sentinel): 1.   Number of sentinel lymph nodes examined: 1. Lymph vascular invasion: Not identified. Pathologic staging:   Primary tumor (pT): pT1b.   Regional lymph nodes (pN):pN0 (i-) (sn- only a sentinel lymph node evaluated). Re-excision of posteromedial to posterolateral margins for DCIS was performed on 05/05/2017:  Left breast, re-excision:  Extensive residual intermediate-grade duct carcinoma in situ of solid, micropapillary, and cribriform types (see comment). Histologic changes consistent with healing biopsy/excision site. Fibrocystic change, including patchy stromal fibroplasia and occasional small benign breast cysts. Adenosis and sclerosing adenosis. Comment:     Residual DCIS is identified within 7 of 10 microscopic sections examined.  Microscopically, the maximum dimension of DCIS is 2.0 cm.  Invasive carcinoma is not identified.  DCIS involves the anterior, posterior, medial, inferior, and superior inked excision margins. Residual Invasive carcinoma is not identified.   The pathological Staging (pTNM) for this patient remains unchanged. Oncotype DX Recurrence Score 7;  Margins still positive for DCIS. Left Mastectomy was recommended (patient was non-compliant with appointments). Not a candidate for immediate reconstruction per Dr. Zeke Rodas (Plastic Surgery Team). Reconstruction can be performed even after 3-6 months after surgery. Left Mastectomy was performed on 10/05/2017. Breast, left, simple mastectomy (185 g; 17.0 x 15.5 x 2.7 cm): Infiltrating ductal carcinoma, moderately differentiated (1.1 cm maximum dimension) 12 o'clock position. Ductal carcinoma in situ, solid and cribriform types, intermediate nuclear grade associated with previous biopsy/excision site (cavity-3.8 cm maximum dimension) of superior aspect of breast.    Infiltrating ductal carcinoma, moderately differentiated (0.6 cm maximum dimension) of central region of breast (separate/second focus) with associated ductal carcinoma in situ, solid and cribriform types,intermediate nuclear grade. Fibroadenoma, remote, microscopic (1). Nipple and areola: Negative for carcinoma. Margins of resection: Infiltrating ductal carcinoma (12 o'clock) comes to within 0.1 cm of inked superior and 0.2 cm of inked posterior/deep margin of resection. Ductal carcinoma in situ with associated biopsy cavity is present less than 0.1 cm from inked deep margin of resection and 0.3 cm from inked anterior margin of resection. Infiltrating ductal carcinoma (central breast parenchyma) is present less than 0.2 cm from inked anterior margin of resection. Lymphovascular invasion: Not identified. Dermal lymphovascular invasion: Not identified. Pathologic stage: at least [id number]-22 o'clock lesion; pT1b-central lesion. Regional lymph nodes: See [facility]-; only [place] lymph node (1) is examined. p(sn)N0 (i-). Hx of DYLON/BSO in 2007; [address] 26.5 10/17/2017. Estradiol <5.0 on 10/17/2017. DEXA scan Oct 2017 osteoporosis lumbar spine and femoral necks.   Ca/VitD Prolia q6months  We recommended endocrine therapy (Arimidex 1 mg po daily) Side effects of Arimidex reviewed with patient. She agreed to proceed. Right Diagnostic Mammogram 12/05/2018 Negative for malignancy. DEXA scan 12/05/2018 osteopenia in LS; Osteoporosis involving the hips. Arimidex 1 mg po daily was started on 11/09/2018     Today 03/12/2021; continues to tolerate Arimidex well. No fever, chills. Fair appetite and energy level. No nausea/vomiting. Review of Systems;  CONSTITUTIONAL: No fever, chills. Fair appetite and energy level. ENMT: Eyes: No diplopia; Nose: No epistaxis. Mouth: No sore throat. RESPIRATORY: No hemoptysis, shortness of breath, cough. CARDIOVASCULAR: No chest pain, palpitations. GASTROINTESTINAL: No nausea/vomiting, abdominal pain  GENITOURINARY: No dysuria, urinary frequency, hematuria. NEURO: No syncope, presyncope, headache. Remainder:  ROS NEGATIVE    Past Medical History:      Diagnosis Date    Alcoholism ([facility] [address]     Since teens to early 25s    Anxiety and depression     Cancer ([facility]     breast, left    Chronic pancreatitis ([facility]     Diabetes mellitus, type 2 ([facility]     GERD (gastroesophageal reflux disease)     Gout     Hepatitis C     Jaundice 5/12/2016    Schizoaffective disorder, bipolar type ([facility]     Splenomegaly 5/30/2017     Medications:  Reviewed and reconciled. Allergies: Allergies   Allergen Reactions    Dye [Iodides]     Tylenol [Acetaminophen]      Was told not to take by her DRScott For a bad liver     OK  to use sparingly       Physical Exam:  BP 97/68   Pulse 65   Temp 97.2 °F (36.2 °C)   Resp 18   Ht 5' 1\" (1.549 m)   Wt 157 lb 9.6 oz (71.5 kg)   SpO2 97%   BMI 29.78 kg/m²   GENERAL: Alert, oriented x 3, not in acute distress  HEENT: PERRLA; EOMI. Oropharynx clear. NECK: Supple. Without lymphadenopathy. LUNGS: Good air entry bilaterally. No wheezing, crackles or ronchi. CARDIOVASCULAR: Regular rate. No murmurs, rubs or gallops. ABDOMEN: Soft. Non-tender, non-distended. Positive bowel sounds. EXTREMITIES: Without clubbing, cyanosis, or edema. NEUROLOGIC: No focal deficits. ECOG PS 1    Impression/Plan:  47 y/o female who underwent Blue dye injection with left breast lumpectomy and sentinel lymph node biopsy after technetium sulfur colloid injection on 04/25/2017:  A.  Lymph node, left axillary, left breast sentinel node, biopsy: One lymph node with no diagnostic abnormality. [place], left, excision of mass:  -Invasive well-differentiated ductal carcinoma and intermediate grade ductal carcinoma in situ (see comment and cancer case summary). -Fibrocystic changes. CANCER CASE SUMMARY:  Procedure: Excision without image guided localization. Lymph node sampling: [place] lymph node. Specimen laterality: Left. Tumor size, size of largest invasive carcinoma: 7 mm. Histologic type: Invasive mammary carcinoma of no special type (ductal,not otherwise specified). Histologic grade:   Glandular (acinar)/tubular differentiation: Score 2.   Nuclear pleomorphism: Score 1.   Mitotic rate: Score 1.   Overall grade: Grade 1. Tumor focality: Single focus of invasive carcinoma. Ductal carcinoma in situ: DCIS is present.  [facility] of DCIS: Estimated size: At least 15 mm.   Architectural patterns: Cribriform and solid.   Nuclear grade: Grade 2 (intermediate).   Necrosis: Not identified. Margins:   Invasive carcinoma:    -Margins uninvolved by invasive carcinoma.    -Distance from closest margin: 4 mm (anterior and lateral margins).   DCIS:    -Margins positive for DCIS (posterior from the postero-medial to postero-lateral aspects). Lymph nodes:   Total number of lymph nodes examined (sentinel and non-sentinel): 1.   Number of sentinel lymph nodes examined: 1. Lymph vascular invasion: Not identified. Pathologic staging:   Primary tumor (pT): pT1b.   Regional lymph nodes (pN):pN0 (i-) (sn- only a sentinel lymph node evaluated).     ER positive (100%); WI positive (5%),   HER/2neu Equivocal (2+);   HER/2neu FISH Negative (Her2/CEN17 signal ratio 1.2)    Re-excision of posteromedial to posterolateral margins for DCIS was performed on 05/05/2017:  Left breast, re-excision:  Extensive residual intermediate-grade duct carcinoma in situ of solid, micropapillary, and cribriform types (see comment). Histologic changes consistent with healing biopsy/excision site. Fibrocystic change, including patchy stromal fibroplasia and occasional small benign breast cysts. Adenosis and sclerosing adenosis. Comment: Residual DCIS is identified within 7 of 10 microscopic sections examined.  Microscopically, the maximum dimension of DCIS is 2.0 cm. Invasive carcinoma is not identified. DCIS involves the anterior, posterior, medial, inferior, and superior inked excision margins. Residual Invasive carcinoma is not identified. The pathological Staging (pTNM) for this patient remains unchanged. Oncotype DX Recurrence Score 7;  Margins still positive for DCIS. Left Mastectomy was recommended (patient was non-compliant with appointments). Not a candidate for immediate reconstruction per Dr. Sarah Handy (Plastic Surgery Team). Reconstruction can be performed even after 3-6 months after surgery. Left Mastectomy was performed on 10/05/2017. Breast, left, simple mastectomy (185 g; 17.0 x 15.5 x 2.7 cm): Infiltrating ductal carcinoma, moderately differentiated (1.1 cm maximum dimension) 12 o'clock position. Ductal carcinoma in situ, solid and cribriform types, intermediate nuclear grade associated with previous biopsy/excision site (cavity-3.8 cm maximum dimension) of superior aspect of breast.    Infiltrating ductal carcinoma, moderately differentiated (0.6 cm maximum dimension) of central region of breast (separate/second focus) with associated ductal carcinoma in situ, solid and cribriform types,intermediate nuclear grade.   Fibroadenoma, remote, microscopic (1).  Nipple and areola: Negative for carcinoma. Margins of resection: Infiltrating ductal carcinoma (12 o'clock) comes to within 0.1 cm of inked superior and 0.2 cm of inked posterior/deep margin of resection. Ductal carcinoma in situ with associated biopsy cavity is present less than 0.1 cm from inked deep margin of resection and 0.3 cm from inked anterior margin of resection. Infiltrating ductal carcinoma (central breast parenchyma) is present less than 0.2 cm from inked anterior margin of resection. Lymphovascular invasion: Not identified. Dermal lymphovascular invasion: Not identified. Pathologic stage: at least lK3a-75 o'clock lesion; pT1b-central lesion. Regional lymph nodes: See [facility]-; only [place] lymph node (1) is examined. p(sn)N0 (i-). Hx of DYLON/BSO in 2007; [address] [facility] 26.5 10/17/2017. Estradiol <5.0 on 10/17/2017. DEXA scan Oct 2017 osteoporosis lumbar spine and femoral necks. Ca/VitD Prolia q6months    We recommended endocrine therapy (Arimidex 1 mg po daily) Side effects of Arimidex reviewed with patient. She agreed to proceed. Right Diagnostic Mammogram 12/05/2018 Negative for malignancy. DEXA scan 12/05/2018 osteopenia in LS; Osteoporosis involving the hips. Arimidex 1 mg po daily was started on 11/09/2018 which she is tolerating well. Right Screening Mammogram on 03/02/2021 Negative for malignancy. DEXA scan 03/02/2021 osteopenia in LS (4% increased since prior study); osteopenia in left and right hips (5.6% increase since prior study); osteoporosis in forearm. Ca/VitD. Prolia q6months  Imaging reviewed. KACIE. Continue Arimidex, Ca/VitD. Prolia q6months (one today 03/12/2021). RTC 6 months with labs and prolia same day    Genetics: My-Risk recommended as well as Genetic counseling.     Marisela Avery MD   1/04/6956  Board Certified Medical Oncologist

## 2021-06-20 ENCOUNTER — HOSPITAL ENCOUNTER (EMERGENCY)
Age: 51
Discharge: HOME | End: 2021-06-20
Payer: MEDICAID

## 2021-06-20 VITALS
HEIGHT: 61 IN | TEMPERATURE: 97.8 F | HEART RATE: 75 BPM | RESPIRATION RATE: 16 BRPM | OXYGEN SATURATION: 97 % | BODY MASS INDEX: 27.19 KG/M2 | SYSTOLIC BLOOD PRESSURE: 136 MMHG | DIASTOLIC BLOOD PRESSURE: 78 MMHG | WEIGHT: 144 LBS

## 2021-06-20 DIAGNOSIS — E16.2 HYPOGLYCEMIA: ICD-10-CM

## 2021-06-20 DIAGNOSIS — L02.91 ABSCESS: Primary | ICD-10-CM

## 2021-06-20 LAB — METER GLUCOSE: 49 MG/DL (ref 74–99)

## 2021-06-20 PROCEDURE — 82962 GLUCOSE BLOOD TEST: CPT

## 2021-06-20 PROCEDURE — 99283 EMERGENCY DEPT VISIT LOW MDM: CPT

## 2021-06-20 PROCEDURE — 6370000000 HC RX 637 (ALT 250 FOR IP): Performed by: PHYSICIAN ASSISTANT

## 2021-06-20 RX ORDER — SULFAMETHOXAZOLE AND TRIMETHOPRIM 800; 160 MG/1; MG/1
2 TABLET ORAL 2 TIMES DAILY
Qty: 40 TABLET | Refills: 0 | Status: SHIPPED | OUTPATIENT
Start: 2021-06-20 | End: 2021-06-30

## 2021-06-20 RX ORDER — SULFAMETHOXAZOLE AND TRIMETHOPRIM 800; 160 MG/1; MG/1
2 TABLET ORAL ONCE
Status: COMPLETED | OUTPATIENT
Start: 2021-06-20 | End: 2021-06-20

## 2021-06-20 RX ADMIN — SULFAMETHOXAZOLE AND TRIMETHOPRIM 2 TABLET: 800; 160 TABLET ORAL at 19:31

## 2021-06-20 ASSESSMENT — PAIN DESCRIPTION - ORIENTATION: ORIENTATION: RIGHT

## 2021-06-20 ASSESSMENT — PAIN DESCRIPTION - PAIN TYPE: TYPE: ACUTE PAIN

## 2021-06-20 ASSESSMENT — PAIN SCALES - GENERAL: PAINLEVEL_OUTOF10: 5

## 2021-06-20 ASSESSMENT — PAIN DESCRIPTION - DESCRIPTORS: DESCRIPTORS: DISCOMFORT

## 2021-06-20 ASSESSMENT — PAIN DESCRIPTION - LOCATION: LOCATION: HAND

## 2021-06-20 NOTE — ED PROVIDER NOTES
[facility]  Department of Emergency Medicine   ED  Encounter Note  Admit Date/RoomTime: 2021  6:58 PM  ED Room: 10/10    NAME: Zenaida Rodas  : 1970  MRN: [id number]     Chief Complaint:  Abscess (was bitten by a puppie 1 month ago and bite wont heal)    History of Present Illness        Zenaida Rodas is a 46 y.o. old female who presents to the emergency department by private vehicle, for persistent nonhealing wound dorsum of right hand after she was bit by her puppy 1 month ago. She is a diabetic, has multiple scabs on upper extremities from scratches from her animals however this particular area is persistent and will not heal.  She did squeeze the area yesterday and noted a large amount of pus came out. She is a type I diabetic and states that when she arrived she was feeling dizzy and somewhat sweaty, her blood sugar was checked and was found to be 49. She was given something to drink and upon my arrival to the room she was feeling much better. ROS   Pertinent positives and negatives are stated within HPI, all other systems reviewed and are negative. Past Medical History:  has a past medical history of Alcoholism ([address] Anxiety and depression, Cancer ([address] Chronic pancreatitis ([address] Diabetes mellitus, type 2 ([address] GERD (gastroesophageal reflux disease), Gout, Hepatitis C, Jaundice, Schizoaffective disorder, bipolar type ([address] and Splenomegaly. Surgical History:  has a past surgical history that includes Hysterectomy (); Ovary removal (Bilateral, ); shoulder surgery (Left, ); Cholecystectomy (); ERCP; Abdomen surgery; Colonoscopy; Upper gastrointestinal endoscopy; other surgical history (Left, 2017); Breast biopsy (Left); Pancreas Biopsy; Endoscopy, colon, diagnostic; other surgical history (10/05/2017); and Colonoscopy (N/A, 10/2/2018). Social History:  reports that she has been smoking cigarettes.  She started smoking about 39 years ago. She has a 38.00 pack-year smoking history. She has never used smokeless tobacco. She reports previous alcohol use. She reports that she does not use drugs. Family History: family history includes Cancer in her mother; Diabetes in her mother. Allergies: Dye [iodides] and Tylenol [acetaminophen]    Physical Exam   Oxygen Saturation Interpretation: Normal.        ED Triage Vitals   BP Temp Temp Source Pulse Resp SpO2 Height Weight   06/20/21 1903 06/20/21 1903 06/20/21 1903 06/20/21 1903 06/20/21 1903 06/20/21 1903 06/20/21 1901 06/20/21 1901   136/78 97.8 °F (36.6 °C) Temporal 75 16 97 % 5' 1\" (1.549 m) 144 lb (65.3 kg)         Constitutional:  Alert, development consistent with age. HEENT:  NC/NT. Airway patent. Neck:  Normal ROM. Supple. Extremity(s):  Right: hand. Tenderness:  mild. Swelling: None. Deformity: No.               ROM: full range of motion. Skin:        .        Neurovascular: Motor deficit: none. Sensory deficit: none. Pulse deficit: none. Capillary refill: normal.  Lymphatics: No lymphangitis or adenopathy noted. Neurological:  Oriented. Motor functions intact. Lab / Imaging Results   (All laboratory and radiology results have been personally reviewed by myself)  Labs:  Results for orders placed or performed during the hospital encounter of 06/20/21   POCT Glucose   Result Value Ref Range    Meter Glucose 49 (L) 74 - 99 mg/dL     Imaging: All Radiology results interpreted by Radiologist unless otherwise noted. No orders to display       ED Course / Medical Decision Making     Medications   sulfamethoxazole-trimethoprim (BACTRIM DS;SEPTRA DS) 800-160 MG per tablet 2 tablet (2 tablets Oral Given 6/20/21 1931)        Re-examination:  6/20/21       Time: 1935  Patients condition is improving.     Consult(s):   None    Procedure(s):  None    MDM:      Plan is for treatment with analgesics, ATB's and appropriate outpatient follow-up. Patient is urged to take all ATB to completion unless and adverse reaction develops. Plan of Care/Counseling:  Skylar Kelley  reviewed today's visit with the patient and spouse / life partner in addition to providing specific details for the plan of care and counseling regarding the diagnosis and prognosis. Questions are answered at this time and are agreeable with the plan. Assessment      1. Abscess    2. Hypoglycemia      Plan   Discharged home  Patient condition is good    New Medications     New Prescriptions    SULFAMETHOXAZOLE-TRIMETHOPRIM (BACTRIM DS) 800-160 MG PER TABLET    Take 2 tablets by mouth 2 times daily for 10 days     Electronically signed by GRISELDA Kelley   DD: 6/20/21  **This report was transcribed using voice recognition software. Every effort was made to ensure accuracy; however, inadvertent computerized transcription errors may be present.   END OF ED PROVIDER NOTE       Skylar Matthew  06/20/21 1939

## 2021-07-27 ENCOUNTER — APPOINTMENT (OUTPATIENT)
Dept: CT IMAGING | Age: 51
End: 2021-07-27
Payer: MEDICAID

## 2021-07-27 ENCOUNTER — APPOINTMENT (OUTPATIENT)
Dept: ULTRASOUND IMAGING | Age: 51
End: 2021-07-27
Payer: MEDICAID

## 2021-07-27 ENCOUNTER — HOSPITAL ENCOUNTER (EMERGENCY)
Age: 51
Discharge: HOME | End: 2021-07-27
Payer: MEDICAID

## 2021-07-27 VITALS
WEIGHT: 138 LBS | OXYGEN SATURATION: 99 % | RESPIRATION RATE: 16 BRPM | SYSTOLIC BLOOD PRESSURE: 143 MMHG | HEIGHT: 61 IN | HEART RATE: 68 BPM | BODY MASS INDEX: 26.06 KG/M2 | TEMPERATURE: 96.9 F | DIASTOLIC BLOOD PRESSURE: 78 MMHG

## 2021-07-27 DIAGNOSIS — K86.1 CHRONIC PANCREATITIS, UNSPECIFIED PANCREATITIS TYPE (HCC): Primary | ICD-10-CM

## 2021-07-27 DIAGNOSIS — K74.60 CIRRHOSIS OF LIVER WITHOUT ASCITES, UNSPECIFIED HEPATIC CIRRHOSIS TYPE (HCC): ICD-10-CM

## 2021-07-27 DIAGNOSIS — R11.2 NAUSEA AND VOMITING, INTRACTABILITY OF VOMITING NOT SPECIFIED, UNSPECIFIED VOMITING TYPE: ICD-10-CM

## 2021-07-27 DIAGNOSIS — E86.0 DEHYDRATION: ICD-10-CM

## 2021-07-27 DIAGNOSIS — R10.13 EPIGASTRIC PAIN: ICD-10-CM

## 2021-07-27 LAB
ALBUMIN SERPL-MCNC: 3.5 G/DL (ref 3.5–5.2)
ALP BLD-CCNC: 319 U/L (ref 35–104)
ALT SERPL-CCNC: 46 U/L (ref 0–32)
ANION GAP SERPL CALCULATED.3IONS-SCNC: 9 MMOL/L (ref 7–16)
AST SERPL-CCNC: 35 U/L (ref 0–31)
BACTERIA: ABNORMAL /HPF
BASOPHILS ABSOLUTE: 0.01 E9/L (ref 0–0.2)
BASOPHILS RELATIVE PERCENT: 0.2 % (ref 0–2)
BILIRUB SERPL-MCNC: 0.5 MG/DL (ref 0–1.2)
BILIRUBIN URINE: NEGATIVE
BLOOD, URINE: NEGATIVE
BUN BLDV-MCNC: 12 MG/DL (ref 6–20)
CALCIUM SERPL-MCNC: 9.3 MG/DL (ref 8.6–10.2)
CHLORIDE BLD-SCNC: 99 MMOL/L (ref 98–107)
CLARITY: CLEAR
CO2: 28 MMOL/L (ref 22–29)
COLOR: YELLOW
CREAT SERPL-MCNC: 1.1 MG/DL (ref 0.5–1)
EOSINOPHILS ABSOLUTE: 0.09 E9/L (ref 0.05–0.5)
EOSINOPHILS RELATIVE PERCENT: 2.1 % (ref 0–6)
GFR AFRICAN AMERICAN: >60
GFR NON-AFRICAN AMERICAN: 52 ML/MIN/1.73
GLUCOSE BLD-MCNC: 202 MG/DL (ref 74–99)
GLUCOSE URINE: NEGATIVE MG/DL
HCT VFR BLD CALC: 38 % (ref 34–48)
HEMOGLOBIN: 12.9 G/DL (ref 11.5–15.5)
IMMATURE GRANULOCYTES #: 0.01 E9/L
IMMATURE GRANULOCYTES %: 0.2 % (ref 0–5)
KETONES, URINE: ABNORMAL MG/DL
LEUKOCYTE ESTERASE, URINE: NEGATIVE
LIPASE: 5 U/L (ref 13–60)
LYMPHOCYTES ABSOLUTE: 0.84 E9/L (ref 1.5–4)
LYMPHOCYTES RELATIVE PERCENT: 19.6 % (ref 20–42)
MCH RBC QN AUTO: 30.4 PG (ref 26–35)
MCHC RBC AUTO-ENTMCNC: 33.9 % (ref 32–34.5)
MCV RBC AUTO: 89.6 FL (ref 80–99.9)
MONOCYTES ABSOLUTE: 0.34 E9/L (ref 0.1–0.95)
MONOCYTES RELATIVE PERCENT: 7.9 % (ref 2–12)
NEUTROPHILS ABSOLUTE: 3 E9/L (ref 1.8–7.3)
NEUTROPHILS RELATIVE PERCENT: 70 % (ref 43–80)
NITRITE, URINE: NEGATIVE
PDW BLD-RTO: 13.8 FL (ref 11.5–15)
PH UA: 6 (ref 5–9)
PLATELET # BLD: 73 E9/L (ref 130–450)
PLATELET CONFIRMATION: NORMAL
PMV BLD AUTO: 10.3 FL (ref 7–12)
POTASSIUM SERPL-SCNC: 4.6 MMOL/L (ref 3.5–5)
PROTEIN UA: NEGATIVE MG/DL
RBC # BLD: 4.24 E12/L (ref 3.5–5.5)
RBC UA: ABNORMAL /HPF (ref 0–2)
SODIUM BLD-SCNC: 136 MMOL/L (ref 132–146)
SPECIFIC GRAVITY UA: 1.02 (ref 1–1.03)
TOTAL PROTEIN: 7.3 G/DL (ref 6.4–8.3)
TROPONIN, HIGH SENSITIVITY: 10 NG/L (ref 0–9)
TROPONIN, HIGH SENSITIVITY: 12 NG/L (ref 0–9)
UROBILINOGEN, URINE: 0.2 E.U./DL
WBC # BLD: 4.3 E9/L (ref 4.5–11.5)
WBC UA: ABNORMAL /HPF (ref 0–5)

## 2021-07-27 PROCEDURE — 36415 COLL VENOUS BLD VENIPUNCTURE: CPT

## 2021-07-27 PROCEDURE — 81001 URINALYSIS AUTO W/SCOPE: CPT

## 2021-07-27 PROCEDURE — 6360000002 HC RX W HCPCS: Performed by: PHYSICIAN ASSISTANT

## 2021-07-27 PROCEDURE — 76705 ECHO EXAM OF ABDOMEN: CPT

## 2021-07-27 PROCEDURE — 96374 THER/PROPH/DIAG INJ IV PUSH: CPT

## 2021-07-27 PROCEDURE — 2580000003 HC RX 258: Performed by: PHYSICIAN ASSISTANT

## 2021-07-27 PROCEDURE — 6370000000 HC RX 637 (ALT 250 FOR IP): Performed by: PHYSICIAN ASSISTANT

## 2021-07-27 PROCEDURE — 80053 COMPREHEN METABOLIC PANEL: CPT

## 2021-07-27 PROCEDURE — 74176 CT ABD & PELVIS W/O CONTRAST: CPT

## 2021-07-27 PROCEDURE — 85025 COMPLETE CBC W/AUTO DIFF WBC: CPT

## 2021-07-27 PROCEDURE — 83690 ASSAY OF LIPASE: CPT

## 2021-07-27 PROCEDURE — 87088 URINE BACTERIA CULTURE: CPT

## 2021-07-27 PROCEDURE — 96375 TX/PRO/DX INJ NEW DRUG ADDON: CPT

## 2021-07-27 PROCEDURE — 84484 ASSAY OF TROPONIN QUANT: CPT

## 2021-07-27 PROCEDURE — 96376 TX/PRO/DX INJ SAME DRUG ADON: CPT

## 2021-07-27 PROCEDURE — 99283 EMERGENCY DEPT VISIT LOW MDM: CPT

## 2021-07-27 RX ORDER — MORPHINE SULFATE 4 MG/ML
4 INJECTION, SOLUTION INTRAMUSCULAR; INTRAVENOUS ONCE
Status: COMPLETED | OUTPATIENT
Start: 2021-07-27 | End: 2021-07-27

## 2021-07-27 RX ORDER — HYDROCODONE BITARTRATE AND IBUPROFEN 5; 200 MG/1; MG/1
1 TABLET ORAL EVERY 8 HOURS PRN
Qty: 12 TABLET | Refills: 0 | Status: SHIPPED | OUTPATIENT
Start: 2021-07-27 | End: 2021-07-27 | Stop reason: SDUPTHER

## 2021-07-27 RX ORDER — ONDANSETRON 4 MG/1
4 TABLET, ORALLY DISINTEGRATING ORAL ONCE
Status: COMPLETED | OUTPATIENT
Start: 2021-07-27 | End: 2021-07-27

## 2021-07-27 RX ORDER — PROMETHAZINE HYDROCHLORIDE 25 MG/1
25 TABLET ORAL EVERY 6 HOURS PRN
Qty: 30 TABLET | Refills: 0 | Status: SHIPPED | OUTPATIENT
Start: 2021-07-27 | End: 2022-06-06

## 2021-07-27 RX ORDER — HYDROCODONE BITARTRATE AND IBUPROFEN 5; 200 MG/1; MG/1
1 TABLET ORAL EVERY 8 HOURS PRN
Qty: 12 TABLET | Refills: 0 | Status: SHIPPED | OUTPATIENT
Start: 2021-07-27 | End: 2021-07-30

## 2021-07-27 RX ORDER — 0.9 % SODIUM CHLORIDE 0.9 %
1000 INTRAVENOUS SOLUTION INTRAVENOUS ONCE
Status: COMPLETED | OUTPATIENT
Start: 2021-07-27 | End: 2021-07-27

## 2021-07-27 RX ORDER — PROMETHAZINE HYDROCHLORIDE 25 MG/1
25 TABLET ORAL EVERY 6 HOURS PRN
Qty: 30 TABLET | Refills: 0 | Status: SHIPPED | OUTPATIENT
Start: 2021-07-27 | End: 2021-07-27 | Stop reason: SDUPTHER

## 2021-07-27 RX ADMIN — HYDROMORPHONE HYDROCHLORIDE 0.5 MG: 1 INJECTION, SOLUTION INTRAMUSCULAR; INTRAVENOUS; SUBCUTANEOUS at 18:07

## 2021-07-27 RX ADMIN — MORPHINE SULFATE 4 MG: 4 INJECTION, SOLUTION INTRAMUSCULAR; INTRAVENOUS at 14:52

## 2021-07-27 RX ADMIN — ONDANSETRON 4 MG: 4 TABLET, ORALLY DISINTEGRATING ORAL at 14:53

## 2021-07-27 RX ADMIN — HYDROMORPHONE HYDROCHLORIDE 1 MG: 1 INJECTION, SOLUTION INTRAMUSCULAR; INTRAVENOUS; SUBCUTANEOUS at 15:55

## 2021-07-27 RX ADMIN — SODIUM CHLORIDE 1000 ML: 9 INJECTION, SOLUTION INTRAVENOUS at 14:52

## 2021-07-27 ASSESSMENT — PAIN SCALES - GENERAL
PAINLEVEL_OUTOF10: 10
PAINLEVEL_OUTOF10: 10
PAINLEVEL_OUTOF10: 9
PAINLEVEL_OUTOF10: 8

## 2021-07-27 ASSESSMENT — PAIN DESCRIPTION - LOCATION: LOCATION: ABDOMEN

## 2021-07-27 ASSESSMENT — PAIN DESCRIPTION - PROGRESSION: CLINICAL_PROGRESSION: GRADUALLY WORSENING

## 2021-07-27 ASSESSMENT — PAIN DESCRIPTION - ONSET: ONSET: ON-GOING

## 2021-07-27 ASSESSMENT — PAIN DESCRIPTION - PAIN TYPE: TYPE: ACUTE PAIN

## 2021-07-27 ASSESSMENT — PAIN DESCRIPTION - DESCRIPTORS: DESCRIPTORS: BURNING;ACHING

## 2021-07-27 ASSESSMENT — PAIN DESCRIPTION - ORIENTATION: ORIENTATION: ANTERIOR

## 2021-07-27 ASSESSMENT — PAIN DESCRIPTION - FREQUENCY: FREQUENCY: CONTINUOUS

## 2021-07-27 NOTE — ED PROVIDER NOTES
[facility]         Department of Emergency Medicine   ED  Provider Note  Admit Date/RoomTime: 7/27/2021  1:29 PM  ED Room: TAI/TAI    Chief Complaint       Emesis (Abd pain/back pain with emesis x 2 days.)    History of Present Illness      Tiffany Miller is a 46 y.o. old female who presents to the ED with epigastric abdominal pain that radiates into the back, nausea, vomiting, diarrhea for the past 2 days. Patient has a history of chronic pancreatitis. She also reports history of liver cirrhosis. Patient is a chronic alcoholic. She is denying any chest pain, shortness of breath, pain with breathing, fever/chills, cough, congestion, rectal bleeding, dark/tarry stools, urinary complaints, abnormal vaginal bleeding/discharge or irritation, or recent trauma/injury. She is alert and oriented x3 and in no apparent distress at this exam.  Patient is nontoxic-appearing. She states she was unable to eat today due to her nausea and vomiting. She does not see her gastroenterologist until August.  Patient has abdominal surgical history of hysterectomy and cholecystectomy. Gastroenterology: Dr. Mayco Cabrera  PCP: Dr. Howard Leon    Patient states she is NOT in a pain management contract  PDMP 000    ROS   Pertinent positives and negatives are stated within HPI, all other systems reviewed and are negative. Past Medical History:  has a past medical history of Alcoholism ([address] Anxiety and depression, Cancer ([address] Chronic pancreatitis ([address] Diabetes mellitus, type 2 ([address] GERD (gastroesophageal reflux disease), Gout, Hepatitis C, Jaundice, Schizoaffective disorder, bipolar type ([address] and Splenomegaly. Past Surgical History:  has a past surgical history that includes Hysterectomy (2004); Ovary removal (Bilateral, 2007); shoulder surgery (Left, 2003); Cholecystectomy (2010); ERCP; Abdomen surgery; Colonoscopy; Upper gastrointestinal endoscopy; other surgical history (Left, 04/25/2017); Breast biopsy (Left);  Pancreas Biopsy; Endoscopy, colon, diagnostic; other surgical history (10/05/2017); and Colonoscopy (N/A, 10/2/2018). Social History:  reports that she has been smoking cigarettes. She started smoking about 39 years ago. She has a 38.00 pack-year smoking history. She has never used smokeless tobacco. She reports previous alcohol use. She reports that she does not use drugs. Family History: family history includes Cancer in her mother; Diabetes in her mother. The patients home medications have been reviewed. Allergies: Dye [iodides] and Tylenol [acetaminophen]  Allergies have been reviewed with patient. Physical Exam   VS:  BP (!) 143/78   Pulse 68   Temp 96.9 °F (36.1 °C) (Temporal)   Resp 16   Ht 5' 1\" (1.549 m)   Wt 138 lb (62.6 kg)   SpO2 99%   BMI 26.07 kg/m²      Oxygen Saturation Interpretation: Normal.     General Appearance/Constitutional:  Alert and oriented x4, development consistent with age, NAD. HEENT:  NC/NT, EOMI, Airway patent. Neck:  Supple. Non-tender with no meningeal signs, No lymphadenopathy  Respiratory: Clear and equal bilaterally, good air flow, no distress    CV:  Regular rate and rhythm. GI: Soft, tenderness to epigastric and right upper quadrant, nondistended  Back: No CVA Tenderness. No midline tenderness   Integument:  Normal turgor. Warm, dry, without visible rash, unless noted elsewhere. Extremities: No tenderness or edema bilaterally   Neurological:  CN II-XII grossly intact, Motor functions intact.     Lab / Imaging Results   (All laboratory and radiology results have been personally reviewed by myself)  Labs:  Results for orders placed or performed during the hospital encounter of 07/27/21   Culture, Urine    Specimen: Urine, clean catch   Result Value Ref Range    Urine Culture, Routine Growth not present, incubation continues    Urinalysis with Microscopic   Result Value Ref Range    Color, UA Yellow Straw/Yellow    Clarity, UA Clear Clear    Glucose, Ur Negative Negative mg/dL    Bilirubin Urine Negative Negative    Ketones, Urine TRACE (A) Negative mg/dL    Specific Gravity, UA 1.020 1.005 - 1.030    Blood, Urine Negative Negative    pH, UA 6.0 5.0 - 9.0    Protein, UA Negative Negative mg/dL    Urobilinogen, Urine 0.2 <2.0 E.U./dL    Nitrite, Urine Negative Negative    Leukocyte Esterase, Urine Negative Negative    WBC, UA NONE 0 - 5 /HPF    RBC, UA NONE 0 - 2 /HPF    Bacteria, UA NONE SEEN None Seen /HPF   CBC Auto Differential   Result Value Ref Range    WBC 4.3 (L) 4.5 - 11.5 E9/L    RBC 4.24 3.50 - 5.50 E12/L    Hemoglobin 12.9 11.5 - 15.5 g/dL    Hematocrit 38.0 34.0 - 48.0 %    MCV 89.6 80.0 - 99.9 fL    MCH 30.4 26.0 - 35.0 pg    MCHC 33.9 32.0 - 34.5 %    RDW 13.8 11.5 - 15.0 fL    Platelets 73 (L) 295 - 450 E9/L    MPV 10.3 7.0 - 12.0 fL    Neutrophils % 70.0 43.0 - 80.0 %    Immature Granulocytes % 0.2 0.0 - 5.0 %    Lymphocytes % 19.6 (L) 20.0 - 42.0 %    Monocytes % 7.9 2.0 - 12.0 %    Eosinophils % 2.1 0.0 - 6.0 %    Basophils % 0.2 0.0 - 2.0 %    Neutrophils Absolute 3.00 1.80 - 7.30 E9/L    Immature Granulocytes # 0.01 E9/L    Lymphocytes Absolute 0.84 (L) 1.50 - 4.00 E9/L    Monocytes Absolute 0.34 0.10 - 0.95 E9/L    Eosinophils Absolute 0.09 0.05 - 0.50 E9/L    Basophils Absolute 0.01 0.00 - 0.20 E9/L   Comprehensive Metabolic Panel   Result Value Ref Range    Sodium 136 132 - 146 mmol/L    Potassium 4.6 3.5 - 5.0 mmol/L    Chloride 99 98 - 107 mmol/L    CO2 28 22 - 29 mmol/L    Anion Gap 9 7 - 16 mmol/L    Glucose 202 (H) 74 - 99 mg/dL    BUN 12 6 - 20 mg/dL    CREATININE 1.1 (H) 0.5 - 1.0 mg/dL    GFR Non-African American 52 >=60 mL/min/1.73    GFR African American >60     Calcium 9.3 8.6 - 10.2 mg/dL    Total Protein 7.3 6.4 - 8.3 g/dL    Albumin 3.5 3.5 - 5.2 g/dL    Total Bilirubin 0.5 0.0 - 1.2 mg/dL    Alkaline Phosphatase 319 (H) 35 - 104 U/L    ALT 46 (H) 0 - 32 U/L    AST 35 (H) 0 - 31 U/L   Lipase   Result Value Ref Range    Lipase 5 (L) 13 - 60 U/L   Troponin   Result Value Ref Range    Troponin, High Sensitivity 12 (H) 0 - 9 ng/L   Platelet Confirmation   Result Value Ref Range    Platelet Confirmation CONFIRMED    Troponin   Result Value Ref Range    Troponin, High Sensitivity 10 (H) 0 - 9 ng/L       Imaging: All Radiology results interpreted by Radiologist unless otherwise noted. CT ABDOMEN PELVIS WO CONTRAST Additional Contrast? None   Final Result   Limited study due to lack of intravenous contrast.      Cirrhosis with mild splenomegaly. Chronic pancreatitis. Fat stranding around the gastric antrum, suspicious for acute antritis or   peptic ulcer disease. Clinical correlation recommended. US ABDOMEN LIMITED   Final Result   Cirrhosis of the liver. 4.5 cm right renal cyst.      Surgically absent gallbladder. CT scan done without contrast due to patient's allergy to iodine dye. She states she gets short of breath with throat swelling and itching    ED Course / Medical Decision Making     Medications   0.9 % sodium chloride bolus (0 mLs Intravenous Stopped 7/27/21 1554)   morphine sulfate (PF) injection 4 mg (4 mg Intravenous Given 7/27/21 1452)   ondansetron (ZOFRAN-ODT) disintegrating tablet 4 mg (4 mg Oral Given 7/27/21 1453)   HYDROmorphone (DILAUDID) injection 1 mg (1 mg Intravenous Given 7/27/21 1555)   HYDROmorphone (DILAUDID) injection 0.5 mg (0.5 mg Intravenous Given 7/27/21 1807)      Re-examination:      Patients symptoms are improving. She is resting comfortably in bed with no distress. She has required 3 rounds of pain medication for pain control. She states she feels comfortable returning home. She is aware of all lab and imaging results. Consults:  None    Procedures:  None    MDM:    Patient has history of chronic pancreatitis and cirrhosis. Her lipase is within normal limits. Liver enzymes are chronically elevated. Patient is well-appearing after medications and IV fluids.   She states she feels comfortable returning home. She will call her gastroenterologist for follow-up tomorrow. She was educated on signs and symptoms that would require her emergent return to the emergency department. She was in no distress at discharge. She was eager to go home and was asking for discharge. Counseling: The emergency provider has spoken with the patient/caregiver and discussed todays results, in addition to providing specific details for the plan of care and counseling regarding the diagnosis and prognosis. Questions are answered at this time and they are agreeable with the plan. All results reviewed. I discussed the differential, results and discharge plan with the patient and/or family/friend/caregiver if present. I emphasized the importance of follow-up with the physician I referred them to in the timeframe recommended. I explained reasons for the patient to return to the Emergency Department. Additional verbal discharge instructions were also given and discussed with the patient to supplement those generated by the EMR. We also discussed medications that were prescribed (if any) including common side effects and interactions. The patient was advised to abstain from driving, operating heavy machinery or making significant decisions while taking medications such as opiates and muscle relaxers that may impair this. All questions were addressed. They understand return precautions and discharge instructions. The patient and/or family/friend/caregiver expressed understanding. Vitals were stable and they were in no distress at discharge. [place] soft diet. Assessment      1. Chronic pancreatitis, unspecified pancreatitis type ([address]    2. Nausea and vomiting, intractability of vomiting not specified, unspecified vomiting type    3. Dehydration    4. Epigastric pain    5.  Cirrhosis of liver without ascites, unspecified hepatic cirrhosis type [facility])      Plan   Discharge to home  Patient condition is good    New Medications     Discharge Medication List as of 7/27/2021  6:08 PM      START taking these medications    Details   HYDROcodone-ibuprofen (VICOPROFEN) 5-200 MG per tablet Take 1 tablet by mouth every 8 hours as needed for Pain for up to 3 days. , Disp-12 tablet, R-0Print      promethazine (PHENERGAN) 25 MG tablet Take 1 tablet by mouth every 6 hours as needed for Nausea, Disp-30 tablet, R-0Print             Electronically signed by Dayami Badillo PA-C   DD: 7/27/21    **This report was transcribed using voice recognition software. Every effort was made to ensure accuracy; however, inadvertent computerized transcription errors may be present.     END OF ED PROVIDER NOTE      Dayami Badillo PA-C  07/28/21 [id number]

## 2021-07-29 LAB — URINE CULTURE, ROUTINE: NORMAL

## 2021-09-09 ENCOUNTER — APPOINTMENT (OUTPATIENT)
Dept: CT IMAGING | Age: 51
End: 2021-09-09
Payer: MEDICAID

## 2021-09-09 ENCOUNTER — HOSPITAL ENCOUNTER (EMERGENCY)
Age: 51
Discharge: HOME | End: 2021-09-09
Attending: EMERGENCY MEDICINE
Payer: MEDICAID

## 2021-09-09 VITALS
DIASTOLIC BLOOD PRESSURE: 82 MMHG | RESPIRATION RATE: 16 BRPM | SYSTOLIC BLOOD PRESSURE: 126 MMHG | TEMPERATURE: 97.9 F | OXYGEN SATURATION: 98 % | HEART RATE: 78 BPM

## 2021-09-09 DIAGNOSIS — Z87.19 HISTORY OF CIRRHOSIS: ICD-10-CM

## 2021-09-09 DIAGNOSIS — E11.65 POORLY CONTROLLED TYPE 2 DIABETES MELLITUS (HCC): ICD-10-CM

## 2021-09-09 DIAGNOSIS — R10.13 ABDOMINAL PAIN, EPIGASTRIC: Primary | ICD-10-CM

## 2021-09-09 DIAGNOSIS — K86.0 ALCOHOL-INDUCED CHRONIC PANCREATITIS (HCC): ICD-10-CM

## 2021-09-09 LAB
ALBUMIN SERPL-MCNC: 4 G/DL (ref 3.5–5.2)
ALP BLD-CCNC: 504 U/L (ref 35–104)
ALT SERPL-CCNC: 84 U/L (ref 0–32)
ANION GAP SERPL CALCULATED.3IONS-SCNC: 10 MMOL/L (ref 7–16)
AST SERPL-CCNC: 73 U/L (ref 0–31)
BASOPHILS ABSOLUTE: 0.02 E9/L (ref 0–0.2)
BASOPHILS RELATIVE PERCENT: 0.6 % (ref 0–2)
BILIRUB SERPL-MCNC: 1 MG/DL (ref 0–1.2)
BUN BLDV-MCNC: 11 MG/DL (ref 6–20)
CALCIUM SERPL-MCNC: 9.9 MG/DL (ref 8.6–10.2)
CHLORIDE BLD-SCNC: 96 MMOL/L (ref 98–107)
CO2: 29 MMOL/L (ref 22–29)
CREAT SERPL-MCNC: 0.7 MG/DL (ref 0.5–1)
EOSINOPHILS ABSOLUTE: 0.08 E9/L (ref 0.05–0.5)
EOSINOPHILS RELATIVE PERCENT: 2.5 % (ref 0–6)
GFR AFRICAN AMERICAN: >60
GFR NON-AFRICAN AMERICAN: >60 ML/MIN/1.73
GLUCOSE BLD-MCNC: 418 MG/DL (ref 74–99)
HCT VFR BLD CALC: 43.3 % (ref 34–48)
HEMOGLOBIN: 14.3 G/DL (ref 11.5–15.5)
IMMATURE GRANULOCYTES #: 0.01 E9/L
IMMATURE GRANULOCYTES %: 0.3 % (ref 0–5)
LACTIC ACID: 1.3 MMOL/L (ref 0.5–2.2)
LIPASE: 9 U/L (ref 13–60)
LYMPHOCYTES ABSOLUTE: 0.53 E9/L (ref 1.5–4)
LYMPHOCYTES RELATIVE PERCENT: 16.3 % (ref 20–42)
MCH RBC QN AUTO: 30 PG (ref 26–35)
MCHC RBC AUTO-ENTMCNC: 33 % (ref 32–34.5)
MCV RBC AUTO: 90.8 FL (ref 80–99.9)
MONOCYTES ABSOLUTE: 0.23 E9/L (ref 0.1–0.95)
MONOCYTES RELATIVE PERCENT: 7.1 % (ref 2–12)
NEUTROPHILS ABSOLUTE: 2.39 E9/L (ref 1.8–7.3)
NEUTROPHILS RELATIVE PERCENT: 73.2 % (ref 43–80)
PDW BLD-RTO: 14.1 FL (ref 11.5–15)
PLATELET # BLD: 72 E9/L (ref 130–450)
PLATELET CONFIRMATION: NORMAL
PMV BLD AUTO: 10.6 FL (ref 7–12)
POTASSIUM SERPL-SCNC: 5.3 MMOL/L (ref 3.5–5)
RBC # BLD: 4.77 E12/L (ref 3.5–5.5)
RBC # BLD: NORMAL 10*6/UL
SODIUM BLD-SCNC: 135 MMOL/L (ref 132–146)
TOTAL PROTEIN: 7.5 G/DL (ref 6.4–8.3)
WBC # BLD: 3.3 E9/L (ref 4.5–11.5)

## 2021-09-09 PROCEDURE — 83605 ASSAY OF LACTIC ACID: CPT

## 2021-09-09 PROCEDURE — 85025 COMPLETE CBC W/AUTO DIFF WBC: CPT

## 2021-09-09 PROCEDURE — 83690 ASSAY OF LIPASE: CPT

## 2021-09-09 PROCEDURE — 96375 TX/PRO/DX INJ NEW DRUG ADDON: CPT

## 2021-09-09 PROCEDURE — 80053 COMPREHEN METABOLIC PANEL: CPT

## 2021-09-09 PROCEDURE — 74176 CT ABD & PELVIS W/O CONTRAST: CPT

## 2021-09-09 PROCEDURE — 6370000000 HC RX 637 (ALT 250 FOR IP): Performed by: EMERGENCY MEDICINE

## 2021-09-09 PROCEDURE — 96374 THER/PROPH/DIAG INJ IV PUSH: CPT

## 2021-09-09 PROCEDURE — 96361 HYDRATE IV INFUSION ADD-ON: CPT

## 2021-09-09 PROCEDURE — 36415 COLL VENOUS BLD VENIPUNCTURE: CPT

## 2021-09-09 PROCEDURE — 99283 EMERGENCY DEPT VISIT LOW MDM: CPT

## 2021-09-09 PROCEDURE — 6360000002 HC RX W HCPCS: Performed by: EMERGENCY MEDICINE

## 2021-09-09 PROCEDURE — 2580000003 HC RX 258: Performed by: EMERGENCY MEDICINE

## 2021-09-09 RX ORDER — LORAZEPAM 2 MG/ML
2 INJECTION INTRAMUSCULAR ONCE
Status: COMPLETED | OUTPATIENT
Start: 2021-09-09 | End: 2021-09-09

## 2021-09-09 RX ORDER — 0.9 % SODIUM CHLORIDE 0.9 %
2000 INTRAVENOUS SOLUTION INTRAVENOUS ONCE
Status: COMPLETED | OUTPATIENT
Start: 2021-09-09 | End: 2021-09-09

## 2021-09-09 RX ORDER — OXYCODONE HYDROCHLORIDE AND ACETAMINOPHEN 5; 325 MG/1; MG/1
1 TABLET ORAL EVERY 8 HOURS PRN
Qty: 10 TABLET | Refills: 0 | Status: SHIPPED | OUTPATIENT
Start: 2021-09-09 | End: 2021-09-12

## 2021-09-09 RX ORDER — OXYCODONE HYDROCHLORIDE AND ACETAMINOPHEN 5; 325 MG/1; MG/1
1 TABLET ORAL ONCE
Status: COMPLETED | OUTPATIENT
Start: 2021-09-09 | End: 2021-09-09

## 2021-09-09 RX ORDER — LORAZEPAM 2 MG/ML
1 INJECTION INTRAMUSCULAR ONCE
Status: DISCONTINUED | OUTPATIENT
Start: 2021-09-09 | End: 2021-09-09

## 2021-09-09 RX ORDER — METOCLOPRAMIDE HYDROCHLORIDE 5 MG/ML
10 INJECTION INTRAMUSCULAR; INTRAVENOUS ONCE
Status: COMPLETED | OUTPATIENT
Start: 2021-09-09 | End: 2021-09-09

## 2021-09-09 RX ORDER — DIPHENHYDRAMINE HYDROCHLORIDE 50 MG/ML
50 INJECTION INTRAMUSCULAR; INTRAVENOUS ONCE
Status: COMPLETED | OUTPATIENT
Start: 2021-09-09 | End: 2021-09-09

## 2021-09-09 RX ORDER — ONDANSETRON 4 MG/1
8 TABLET, ORALLY DISINTEGRATING ORAL EVERY 8 HOURS PRN
Qty: 24 TABLET | Refills: 0 | Status: SHIPPED | OUTPATIENT
Start: 2021-09-09 | End: 2022-01-21

## 2021-09-09 RX ADMIN — SODIUM CHLORIDE 2000 ML: 9 INJECTION, SOLUTION INTRAVENOUS at 04:31

## 2021-09-09 RX ADMIN — HYDROMORPHONE HYDROCHLORIDE 1 MG: 1 INJECTION, SOLUTION INTRAMUSCULAR; INTRAVENOUS; SUBCUTANEOUS at 05:38

## 2021-09-09 RX ADMIN — LORAZEPAM 2 MG: 2 INJECTION INTRAMUSCULAR; INTRAVENOUS at 05:04

## 2021-09-09 RX ADMIN — METOCLOPRAMIDE HYDROCHLORIDE 10 MG: 5 INJECTION INTRAMUSCULAR; INTRAVENOUS at 05:04

## 2021-09-09 RX ADMIN — DIPHENHYDRAMINE HYDROCHLORIDE 50 MG: 50 INJECTION, SOLUTION INTRAMUSCULAR; INTRAVENOUS at 05:04

## 2021-09-09 RX ADMIN — OXYCODONE HYDROCHLORIDE AND ACETAMINOPHEN 1 TABLET: 5; 325 TABLET ORAL at 06:39

## 2021-09-09 RX ADMIN — INSULIN HUMAN 4 UNITS: 100 INJECTION, SOLUTION PARENTERAL at 05:38

## 2021-09-09 ASSESSMENT — PAIN SCALES - GENERAL
PAINLEVEL_OUTOF10: 10
PAINLEVEL_OUTOF10: 10
PAINLEVEL_OUTOF10: 5

## 2021-09-09 NOTE — ED PROVIDER NOTES
HPI:  9/9/21, Time: 4:51 AM EDT        Priscilla Llanes is a 46 y.o. female presenting to the ED for abdominal pain x1 week, beginning 1 week ago. The complaint has been persistent, severe in severity, and worsened by nothing. Patient has history of chronic pancreatitis also history of type 2 diabetes and GERD. She has not had any fever/chills, no chest heaviness/pressure or tightness, no shortness of breath associated no change in bowel or bladder patterns, no fever/chills associated. Pain is 10/10 severity. No relieving factors reported. No other complaints. Review of Systems:   A complete review of systems was performed and pertinent positives and negatives are stated within HPI, all other systems reviewed and are negative.    --------------------------------------------- PAST HISTORY ---------------------------------------------  Past Medical History:  has a past medical history of Alcoholism [address] [address] Anxiety and depression, Cancer ([facility] [address] Chronic pancreatitis ([facility] [address] Diabetes mellitus, type 2 ([facility] [address] GERD (gastroesophageal reflux disease), Gout, Hepatitis C, Jaundice, Schizoaffective disorder, bipolar type ([address] and Splenomegaly. Past Surgical History:  has a past surgical history that includes Hysterectomy (2004); Ovary removal (Bilateral, 2007); shoulder surgery (Left, 2003); Cholecystectomy (2010); ERCP; Abdomen surgery; Colonoscopy; Upper gastrointestinal endoscopy; other surgical history (Left, 04/25/2017); Breast biopsy (Left); Pancreas Biopsy; Endoscopy, colon, diagnostic; other surgical history (10/05/2017); and Colonoscopy (N/A, 10/2/2018). Social History:  reports that she has been smoking cigarettes. She started smoking about 39 years ago. She has a 38.00 pack-year smoking history. She has never used smokeless tobacco. She reports previous alcohol use. She reports current drug use. Drug: Cocaine. Family History: family history includes Cancer in her mother; Diabetes in her mother.      The patients home medications have been reviewed.     Allergies: Dye [iodides] and Tylenol [acetaminophen]    -------------------------------------------------- RESULTS -------------------------------------------------  All laboratory and radiology results have been personally reviewed by myself   LABS:  Results for orders placed or performed during the hospital encounter of 09/09/21   CBC Auto Differential   Result Value Ref Range    WBC 3.3 (L) 4.5 - 11.5 E9/L    RBC 4.77 3.50 - 5.50 E12/L    Hemoglobin 14.3 11.5 - 15.5 g/dL    Hematocrit 43.3 34.0 - 48.0 %    MCV 90.8 80.0 - 99.9 fL    MCH 30.0 26.0 - 35.0 pg    MCHC 33.0 32.0 - 34.5 %    RDW 14.1 11.5 - 15.0 fL    Platelets 72 (L) 619 - 450 E9/L    MPV 10.6 7.0 - 12.0 fL    Neutrophils % 73.2 43.0 - 80.0 %    Immature Granulocytes % 0.3 0.0 - 5.0 %    Lymphocytes % 16.3 (L) 20.0 - 42.0 %    Monocytes % 7.1 2.0 - 12.0 %    Eosinophils % 2.5 0.0 - 6.0 %    Basophils % 0.6 0.0 - 2.0 %    Neutrophils Absolute 2.39 1.80 - 7.30 E9/L    Immature Granulocytes # 0.01 E9/L    Lymphocytes Absolute 0.53 (L) 1.50 - 4.00 E9/L    Monocytes Absolute 0.23 0.10 - 0.95 E9/L    Eosinophils Absolute 0.08 0.05 - 0.50 E9/L    Basophils Absolute 0.02 0.00 - 0.20 E9/L    RBC Morphology Normal    Comprehensive Metabolic Panel   Result Value Ref Range    Sodium 135 132 - 146 mmol/L    Potassium 5.3 (H) 3.5 - 5.0 mmol/L    Chloride 96 (L) 98 - 107 mmol/L    CO2 29 22 - 29 mmol/L    Anion Gap 10 7 - 16 mmol/L    Glucose 418 (H) 74 - 99 mg/dL    BUN 11 6 - 20 mg/dL    CREATININE 0.7 0.5 - 1.0 mg/dL    GFR Non-African American >60 >=60 mL/min/1.73    GFR African American >60     Calcium 9.9 8.6 - 10.2 mg/dL    Total Protein 7.5 6.4 - 8.3 g/dL    Albumin 4.0 3.5 - 5.2 g/dL    Total Bilirubin 1.0 0.0 - 1.2 mg/dL    Alkaline Phosphatase 504 (H) 35 - 104 U/L    ALT 84 (H) 0 - 32 U/L    AST 73 (H) 0 - 31 U/L   Lipase   Result Value Ref Range    Lipase 9 (L) 13 - 60 U/L   Lactic Acid, Plasma   Result Value Ref Range    Lactic Acid 1.3 0.5 - 2.2 mmol/L   Platelet Confirmation   Result Value Ref Range    Platelet Confirmation CONFIRMED        RADIOLOGY:  Interpreted by Radiologist.  CT ABDOMEN PELVIS WO CONTRAST Additional Contrast? None   Final Result   Limited study without IV contrast.      Persistent changes of cirrhosis with splenomegaly and upper abdominal varices   similar to previous. No significant ascites. Pancreatic calcifications consistent with chronic pancreatitis. Thickening of gastric folds and the gastroduodenal junction. This may be due   to underdistention or gastritis. Peptic ulcer disease or other etiologies   are not entirely excluded. As indicated, correlation with endoscopy would be   useful. Other chronic appearing findings similar to previous.             ------------------------- NURSING NOTES AND VITALS REVIEWED ---------------------------   The nursing notes within the ED encounter and vital signs as below have been reviewed. BP (!) 130/90   Pulse 75   Temp 97.9 °F (36.6 °C) (Temporal)   Resp 16   SpO2 98%   Oxygen Saturation Interpretation: Normal      ---------------------------------------------------PHYSICAL EXAM--------------------------------------      Constitutional/General: Alert and oriented x3, well appearing, non toxic in moderate distress  Head: Normocephalic and atraumatic  Eyes: PERRL, EOMI  Mouth: Oropharynx clear, handling secretions, no trismus  Neck: Supple, full ROM, no JVD no bruits, no tracheal deviation  Pulmonary: Lungs clear to auscultation bilaterally, no wheezes, rales, or rhonchi. Not in respiratory distress  Cardiovascular:  Regular rate and rhythm, no murmurs, gallops, or rubs. 2+ distal pulses  GI: Abdomen soft non-distended, positive epigastric tenderness negative Colby sign. No rebound no guarding no rigidity.   Normal active bowel sounds no focal tenderness at all in the right lower quadrant/McBurney's point  Extremities: Moves all extremities x 4. Warm and well perfused  Skin: warm and dry without rash  Neurologic: GCS 15, cranial nerves gross intact no focal deficits. Psych: Normal Affect      ------------------------------ ED COURSE/MEDICAL DECISION MAKING----------------------  Medications   0.9 % sodium chloride bolus (2,000 mLs IntraVENous New Bag 9/9/21 0431)   diphenhydrAMINE (BENADRYL) injection 50 mg (50 mg IntraVENous Given 9/9/21 0504)   metoclopramide (REGLAN) injection 10 mg (10 mg IntraVENous Given 9/9/21 0504)   LORazepam (ATIVAN) injection 2 mg (2 mg IntraVENous Given 9/9/21 0504)   HYDROmorphone (DILAUDID) injection 1 mg (1 mg IntraVENous Given 9/9/21 0538)   insulin regular (HUMULIN R;NOVOLIN R) injection 4 Units (4 Units IntraVENous Given 9/9/21 0538)         ED COURSE:   Medical Decision Making:   Patient does have mild elevation of her liver enzymes but there is no hyperbilirubinemia and CT does not show any acute process just findings suggestive of cirrhosis without ascites and also findings suggestive of chronic pancreatitis. Patient's blood glucose was markedly elevated but there is Normal anion gap but patient has no findings to suggest.  Diabetic ketoacidosis the patient did receive insulin here in department plus IV fluids she did have chocolate cake prior to come here for evaluation. Counseling: The emergency provider has spoken with the patient and discussed todays results, in addition to providing specific details for the plan of care and counseling regarding the diagnosis and prognosis. Questions are answered at this time and they are agreeable with the plan.       Controlled Substance Monitoring:  Acute and Chronic Pain Monitoring:   RX Monitoring 9/9/2021   Attestation -   Periodic Controlled Substance Monitoring No signs of potential drug abuse or diversion identified.       --------------------------------- IMPRESSION AND DISPOSITION ---------------------------------    IMPRESSION  1.

## 2021-09-11 ENCOUNTER — HOSPITAL ENCOUNTER (EMERGENCY)
Age: 51
Discharge: HOME | End: 2021-09-11
Attending: FAMILY MEDICINE
Payer: MEDICAID

## 2021-09-11 ENCOUNTER — APPOINTMENT (OUTPATIENT)
Dept: CT IMAGING | Age: 51
End: 2021-09-11
Payer: MEDICAID

## 2021-09-11 VITALS
OXYGEN SATURATION: 98 % | SYSTOLIC BLOOD PRESSURE: 114 MMHG | BODY MASS INDEX: 24.55 KG/M2 | HEART RATE: 77 BPM | HEIGHT: 61 IN | DIASTOLIC BLOOD PRESSURE: 70 MMHG | TEMPERATURE: 96.5 F | RESPIRATION RATE: 16 BRPM | WEIGHT: 130 LBS

## 2021-09-11 DIAGNOSIS — K86.1 CHRONIC PANCREATITIS, UNSPECIFIED PANCREATITIS TYPE (HCC): Primary | ICD-10-CM

## 2021-09-11 LAB
ALBUMIN SERPL-MCNC: 3.5 G/DL (ref 3.5–5.2)
ALP BLD-CCNC: 416 U/L (ref 35–104)
ALT SERPL-CCNC: 49 U/L (ref 0–32)
AMYLASE: 9 U/L (ref 20–100)
ANION GAP SERPL CALCULATED.3IONS-SCNC: 9 MMOL/L (ref 7–16)
AST SERPL-CCNC: 39 U/L (ref 0–31)
BILIRUB SERPL-MCNC: 0.6 MG/DL (ref 0–1.2)
BUN BLDV-MCNC: 11 MG/DL (ref 6–20)
CALCIUM SERPL-MCNC: 9.2 MG/DL (ref 8.6–10.2)
CHLORIDE BLD-SCNC: 98 MMOL/L (ref 98–107)
CO2: 29 MMOL/L (ref 22–29)
CREAT SERPL-MCNC: 0.8 MG/DL (ref 0.5–1)
ETHANOL: <10 MG/DL (ref 0–0.08)
GFR AFRICAN AMERICAN: >60
GFR NON-AFRICAN AMERICAN: >60 ML/MIN/1.73
GLUCOSE BLD-MCNC: 405 MG/DL (ref 74–99)
LACTIC ACID: 1.2 MMOL/L (ref 0.5–2.2)
LIPASE: 7 U/L (ref 13–60)
POTASSIUM SERPL-SCNC: 4.6 MMOL/L (ref 3.5–5)
SODIUM BLD-SCNC: 136 MMOL/L (ref 132–146)
TOTAL PROTEIN: 6.8 G/DL (ref 6.4–8.3)

## 2021-09-11 PROCEDURE — 96375 TX/PRO/DX INJ NEW DRUG ADDON: CPT

## 2021-09-11 PROCEDURE — 6360000002 HC RX W HCPCS

## 2021-09-11 PROCEDURE — 80053 COMPREHEN METABOLIC PANEL: CPT

## 2021-09-11 PROCEDURE — 83690 ASSAY OF LIPASE: CPT

## 2021-09-11 PROCEDURE — 74176 CT ABD & PELVIS W/O CONTRAST: CPT

## 2021-09-11 PROCEDURE — 96374 THER/PROPH/DIAG INJ IV PUSH: CPT

## 2021-09-11 PROCEDURE — 82077 ASSAY SPEC XCP UR&BREATH IA: CPT

## 2021-09-11 PROCEDURE — 36415 COLL VENOUS BLD VENIPUNCTURE: CPT

## 2021-09-11 PROCEDURE — 6360000002 HC RX W HCPCS: Performed by: FAMILY MEDICINE

## 2021-09-11 PROCEDURE — 83605 ASSAY OF LACTIC ACID: CPT

## 2021-09-11 PROCEDURE — 99283 EMERGENCY DEPT VISIT LOW MDM: CPT

## 2021-09-11 PROCEDURE — 82150 ASSAY OF AMYLASE: CPT

## 2021-09-11 PROCEDURE — 2580000003 HC RX 258: Performed by: FAMILY MEDICINE

## 2021-09-11 RX ORDER — PROCHLORPERAZINE EDISYLATE 5 MG/ML
10 INJECTION INTRAMUSCULAR; INTRAVENOUS ONCE
Status: COMPLETED | OUTPATIENT
Start: 2021-09-11 | End: 2021-09-11

## 2021-09-11 RX ORDER — 0.9 % SODIUM CHLORIDE 0.9 %
1000 INTRAVENOUS SOLUTION INTRAVENOUS ONCE
Status: COMPLETED | OUTPATIENT
Start: 2021-09-11 | End: 2021-09-11

## 2021-09-11 RX ORDER — DIPHENHYDRAMINE HYDROCHLORIDE 50 MG/ML
25 INJECTION INTRAMUSCULAR; INTRAVENOUS ONCE
Status: COMPLETED | OUTPATIENT
Start: 2021-09-11 | End: 2021-09-11

## 2021-09-11 RX ORDER — KETOROLAC TROMETHAMINE 30 MG/ML
30 INJECTION, SOLUTION INTRAMUSCULAR; INTRAVENOUS ONCE
Status: COMPLETED | OUTPATIENT
Start: 2021-09-11 | End: 2021-09-11

## 2021-09-11 RX ADMIN — SODIUM CHLORIDE 1000 ML: 9 INJECTION, SOLUTION INTRAVENOUS at 14:05

## 2021-09-11 RX ADMIN — HYDROMORPHONE HYDROCHLORIDE 1 MG: 1 INJECTION, SOLUTION INTRAMUSCULAR; INTRAVENOUS; SUBCUTANEOUS at 14:43

## 2021-09-11 RX ADMIN — Medication 1 MG: at 14:43

## 2021-09-11 RX ADMIN — DIPHENHYDRAMINE HYDROCHLORIDE 25 MG: 50 INJECTION, SOLUTION INTRAMUSCULAR; INTRAVENOUS at 14:05

## 2021-09-11 RX ADMIN — PROCHLORPERAZINE EDISYLATE 10 MG: 5 INJECTION INTRAMUSCULAR; INTRAVENOUS at 14:13

## 2021-09-11 RX ADMIN — KETOROLAC TROMETHAMINE 30 MG: 30 INJECTION, SOLUTION INTRAMUSCULAR; INTRAVENOUS at 14:08

## 2021-09-11 ASSESSMENT — PAIN DESCRIPTION - PAIN TYPE: TYPE: ACUTE PAIN

## 2021-09-11 ASSESSMENT — PAIN DESCRIPTION - DESCRIPTORS: DESCRIPTORS: ACHING;SHOOTING;STABBING

## 2021-09-11 ASSESSMENT — PAIN SCALES - GENERAL
PAINLEVEL_OUTOF10: 9
PAINLEVEL_OUTOF10: 5
PAINLEVEL_OUTOF10: 9

## 2021-09-11 ASSESSMENT — PAIN DESCRIPTION - LOCATION: LOCATION: ABDOMEN

## 2021-09-11 ASSESSMENT — PAIN DESCRIPTION - FREQUENCY: FREQUENCY: CONTINUOUS

## 2021-09-11 ASSESSMENT — PAIN DESCRIPTION - ONSET: ONSET: ON-GOING

## 2021-09-11 ASSESSMENT — PAIN DESCRIPTION - PROGRESSION: CLINICAL_PROGRESSION: GRADUALLY WORSENING

## 2021-09-11 NOTE — ED NOTES
IV removed. Dressing applied. Discharge instructions given. Patient states verbal understanding. Ambulatory to exit.        Pancho Hayward RN  09/11/21 [id number]

## 2021-09-11 NOTE — ED NOTES
IV initiated. Unable to obtain blood work. Patient requesting medications prior to repeat attempts. Aware of medication order. Patient requesting something other than Toradol. Physician aware. No orders given.       Sd Bermudez RN  09/11/21 3130

## 2021-09-11 NOTE — ED NOTES
Labs obtained vie fem stick per Dr Mira Ohara. Patient tolerated well. Pressure held.       Octavia Akhtar RN  09/11/21 3631

## 2021-09-11 NOTE — ED PROVIDER NOTES
[address] [facility]  Department of Emergency Medicine   ED  Encounter Note  Admit Date/RoomTime: 2021 12:36 PM  ED Room: 10/10    NAME: Chris Sandhu  : 1970  MRN: [id number]     Chief Complaint:  Abdominal Pain (seen here the other day for pancreatitis. Pain continues. )    History of Present Illness        Chris Sandhu is a 46 y.o. old female who presents to the emergency department by private vehicle, for gradual onset, persistent aching pain in the upper abdomen without radiation which began 4 day(s) prior to arrival.  There has been similar episodes in the past history of chronic pancreatitis. Since onset the symptoms have been persistent. The pain is associated with nausea. The pain is aggravated by pressure on area of discomfort and relieved by nothing. There has been NO back pain, chest pain, shortness of breath, fever, chills, sweating, vomiting or diarrhea. Denies any alcohol use    . ROS   Pertinent positives and negatives are stated within HPI, all other systems reviewed and are negative. Past Medical History:  has a past medical history of Alcoholism ([address] Anxiety and depression, Cancer ([address] Chronic pancreatitis ([address] Diabetes mellitus, type 2 ([address] GERD (gastroesophageal reflux disease), Gout, Hepatitis C, Jaundice, Schizoaffective disorder, bipolar type ([address] and Splenomegaly. Surgical History:  has a past surgical history that includes Hysterectomy (); Ovary removal (Bilateral, ); shoulder surgery (Left, ); Cholecystectomy (); ERCP; Abdomen surgery; Colonoscopy; Upper gastrointestinal endoscopy; other surgical history (Left, 2017); Breast biopsy (Left); Pancreas Biopsy; Endoscopy, colon, diagnostic; other surgical history (10/05/2017); and Colonoscopy (N/A, 10/2/2018). Social History:  reports that she has been smoking cigarettes. She started smoking about 39 years ago. She has a 38.00 pack-year smoking history.  She has never used smokeless tobacco. She reports previous alcohol use. She reports current drug use. Drug: Cocaine. Family History: family history includes Cancer in her mother; Diabetes in her mother. Allergies: Dye [iodides] and Tylenol [acetaminophen]    Physical Exam   Oxygen Saturation Interpretation: Normal.        ED Triage Vitals   BP Temp Temp Source Pulse Resp SpO2 Height Weight   09/11/21 1235 09/11/21 1235 09/11/21 1235 09/11/21 1235 09/11/21 1235 09/11/21 1235 09/11/21 1259 09/11/21 1259   114/70 96.5 °F (35.8 °C) Infrared 77 16 98 % 5' 1\" (1.549 m) 130 lb (59 kg)         General Appearance/Constitutional:  Alert, development consistent with age. HEENT:  NC/NT. PERRLA. Airway patent. Neck:  Supple. No lymphadenopathy. Respiratory: Lungs Clear to auscultation and breath sounds equal.  CV:  Regular rate and rhythm. GI:  normal appearing, non-distended with no visible hernias. Bowel sounds: normal bowel sounds. Tenderness: There is moderate tenderness present - located in the epigastrium. , There is no rebound tenderness. , There is no guarding. .           Liver: non-palpable. Spleen:  non-tender. Back: CVA Tenderness: No CVA tenderness. Integument:  Normal turgor. Warm, dry, without visible rash, unless noted elsewhere. Neurological:  Orientation age-appropriate. Motor functions intact.     Lab / Imaging Results   (All laboratory and radiology results have been personally reviewed by myself)  Labs:  Results for orders placed or performed during the hospital encounter of 09/11/21   Comprehensive Metabolic Panel   Result Value Ref Range    Sodium 136 132 - 146 mmol/L    Potassium 4.6 3.5 - 5.0 mmol/L    Chloride 98 98 - 107 mmol/L    CO2 29 22 - 29 mmol/L    Anion Gap 9 7 - 16 mmol/L    Glucose 405 (H) 74 - 99 mg/dL    BUN 11 6 - 20 mg/dL    CREATININE 0.8 0.5 - 1.0 mg/dL    GFR Non-African American >60 >=60 mL/min/1.73    GFR African American >60 Calcium 9.2 8.6 - 10.2 mg/dL    Total Protein 6.8 6.4 - 8.3 g/dL    Albumin 3.5 3.5 - 5.2 g/dL    Total Bilirubin 0.6 0.0 - 1.2 mg/dL    Alkaline Phosphatase 416 (H) 35 - 104 U/L    ALT 49 (H) 0 - 32 U/L    AST 39 (H) 0 - 31 U/L   Amylase   Result Value Ref Range    Amylase 9 (L) 20 - 100 U/L   Lipase   Result Value Ref Range    Lipase 7 (L) 13 - 60 U/L   Lactic Acid, Plasma   Result Value Ref Range    Lactic Acid 1.2 0.5 - 2.2 mmol/L   Ethanol   Result Value Ref Range    Ethanol Lvl <10 mg/dL     Imaging: All Radiology results interpreted by Radiologist unless otherwise noted. CT ABDOMEN PELVIS WO CONTRAST Additional Contrast? None   Final Result   Cirrhotic liver morphology with sequelae of portal hypertension including   splenomegaly. Findings compatible with chronic pancreatitis including pancreatic   parenchymal calcifications and dilation of the pancreatic duct along with   pancreatic parenchymal atrophy. There are adjacent changes related to   architectural distortion and calcification involving the root of the   mesentery. Please note that evaluation of the gastric pylorus as well as the   duodenum and the pancreatic head is severely limited due to the lack of IV   contrast material.  Correlation with a contrast-enhanced CT of the abdomen   utilizing a pancreatic mass protocol should be considered. ED Course / Medical Decision Making     Medications   0.9 % sodium chloride bolus (0 mLs IntraVENous Stopped 9/11/21 1549)   ketorolac (TORADOL) injection 30 mg (30 mg IntraVENous Given 9/11/21 1408)   diphenhydrAMINE (BENADRYL) injection 25 mg (25 mg IntraVENous Given 9/11/21 1405)   prochlorperazine (COMPAZINE) injection 10 mg (10 mg IntraVENous Given 9/11/21 1413)   HYDROmorphone (DILAUDID) injection 1 mg (1 mg IntraVENous Given 9/11/21 1443)        Re-Evaluations:  9/11/21      Time: 3:50    Patients condition is improving after treatment.     Consultations: None    Procedures:   Nursing after several tries unable to obtain successful phlebotomy. I proceeded with femoral access drawing approximately 15 mL of venous blood done in the usual sterile manner cleansed well palpated pulses single try. MDM: Patient has known chronic pancreatitis he has multiple ER visits advised pain be beneficial to see her family doctor for chronic pain management she has not been vomiting she is not febrile not tachypneic or tachycardic nontoxic will discharge home advised follow-up as above    Plan of Care/Counseling:  Krunal Mercado MD  reviewed today's visit with the patient and spouse / life partner in addition to providing specific details for the plan of care and counseling regarding the diagnosis and prognosis. Questions are answered at this time and are agreeable with the plan. Assessment      1. Chronic pancreatitis, unspecified pancreatitis type [facility])      This patient's ED course included: a personal history and physicial examination, re-evaluation prior to disposition, multiple bedside re-evaluations, IV medications and complex medical decision making and emergency management  This patient has remained hemodynamically stable and improved during their ED course. Plan   Discharged home  Patient condition is fair. New Medications     New Prescriptions    No medications on file     Electronically signed by Krunal Mercado MD   DD: 9/11/21  **This report was transcribed using voice recognition software. Every effort was made to ensure accuracy; however, inadvertent computerized transcription errors may be present.   END OF PROVIDER NOTE        Krunal Mercado MD  09/11/21 0728

## 2021-09-14 DIAGNOSIS — Z85.3 PERSONAL HISTORY OF BREAST CANCER: Primary | ICD-10-CM

## 2021-10-30 ENCOUNTER — APPOINTMENT (OUTPATIENT)
Dept: CT IMAGING | Age: 51
End: 2021-10-30
Payer: MEDICAID

## 2021-10-30 ENCOUNTER — HOSPITAL ENCOUNTER (EMERGENCY)
Age: 51
Discharge: HOME | End: 2021-10-31
Attending: EMERGENCY MEDICINE
Payer: MEDICAID

## 2021-10-30 VITALS
DIASTOLIC BLOOD PRESSURE: 82 MMHG | TEMPERATURE: 97.5 F | SYSTOLIC BLOOD PRESSURE: 135 MMHG | OXYGEN SATURATION: 98 % | WEIGHT: 135 LBS | BODY MASS INDEX: 25.49 KG/M2 | HEIGHT: 61 IN | RESPIRATION RATE: 20 BRPM | HEART RATE: 76 BPM

## 2021-10-30 DIAGNOSIS — E11.65 POORLY CONTROLLED TYPE 2 DIABETES MELLITUS (HCC): ICD-10-CM

## 2021-10-30 DIAGNOSIS — R10.13 EPIGASTRIC PAIN: Primary | ICD-10-CM

## 2021-10-30 DIAGNOSIS — K86.0 ALCOHOL-INDUCED CHRONIC PANCREATITIS (HCC): ICD-10-CM

## 2021-10-30 LAB
ALBUMIN SERPL-MCNC: 4.5 G/DL (ref 3.5–5.2)
ALP BLD-CCNC: 452 U/L (ref 35–104)
ALT SERPL-CCNC: 36 U/L (ref 0–32)
ANION GAP SERPL CALCULATED.3IONS-SCNC: 9 MMOL/L (ref 7–16)
AST SERPL-CCNC: 28 U/L (ref 0–31)
BASOPHILS ABSOLUTE: 0.02 E9/L (ref 0–0.2)
BASOPHILS RELATIVE PERCENT: 0.5 % (ref 0–2)
BILIRUB SERPL-MCNC: 0.5 MG/DL (ref 0–1.2)
BUN BLDV-MCNC: 13 MG/DL (ref 6–20)
CALCIUM SERPL-MCNC: 10 MG/DL (ref 8.6–10.2)
CHLORIDE BLD-SCNC: 94 MMOL/L (ref 98–107)
CO2: 31 MMOL/L (ref 22–29)
CREAT SERPL-MCNC: 0.9 MG/DL (ref 0.5–1)
EOSINOPHILS ABSOLUTE: 0.09 E9/L (ref 0.05–0.5)
EOSINOPHILS RELATIVE PERCENT: 2.3 % (ref 0–6)
GFR AFRICAN AMERICAN: >60
GFR NON-AFRICAN AMERICAN: >60 ML/MIN/1.73
GLUCOSE BLD-MCNC: 330 MG/DL (ref 74–99)
HCT VFR BLD CALC: 40.6 % (ref 34–48)
HEMOGLOBIN: 13.8 G/DL (ref 11.5–15.5)
IMMATURE GRANULOCYTES #: 0.02 E9/L
IMMATURE GRANULOCYTES %: 0.5 % (ref 0–5)
LIPASE: 8 U/L (ref 13–60)
LYMPHOCYTES ABSOLUTE: 0.68 E9/L (ref 1.5–4)
LYMPHOCYTES RELATIVE PERCENT: 17 % (ref 20–42)
MCH RBC QN AUTO: 31.2 PG (ref 26–35)
MCHC RBC AUTO-ENTMCNC: 34 % (ref 32–34.5)
MCV RBC AUTO: 91.6 FL (ref 80–99.9)
MONOCYTES ABSOLUTE: 0.24 E9/L (ref 0.1–0.95)
MONOCYTES RELATIVE PERCENT: 6 % (ref 2–12)
NEUTROPHILS ABSOLUTE: 2.94 E9/L (ref 1.8–7.3)
NEUTROPHILS RELATIVE PERCENT: 73.7 % (ref 43–80)
PDW BLD-RTO: 14 FL (ref 11.5–15)
PLATELET # BLD: 89 E9/L (ref 130–450)
PLATELET CONFIRMATION: NORMAL
PMV BLD AUTO: 10.5 FL (ref 7–12)
POTASSIUM REFLEX MAGNESIUM: 5.2 MMOL/L (ref 3.5–5)
RBC # BLD: 4.43 E12/L (ref 3.5–5.5)
SODIUM BLD-SCNC: 134 MMOL/L (ref 132–146)
TOTAL PROTEIN: 7.8 G/DL (ref 6.4–8.3)
WBC # BLD: 4 E9/L (ref 4.5–11.5)

## 2021-10-30 PROCEDURE — 6360000002 HC RX W HCPCS: Performed by: EMERGENCY MEDICINE

## 2021-10-30 PROCEDURE — 36415 COLL VENOUS BLD VENIPUNCTURE: CPT

## 2021-10-30 PROCEDURE — 83690 ASSAY OF LIPASE: CPT

## 2021-10-30 PROCEDURE — 6360000002 HC RX W HCPCS

## 2021-10-30 PROCEDURE — 2580000003 HC RX 258: Performed by: EMERGENCY MEDICINE

## 2021-10-30 PROCEDURE — 96375 TX/PRO/DX INJ NEW DRUG ADDON: CPT

## 2021-10-30 PROCEDURE — 85025 COMPLETE CBC W/AUTO DIFF WBC: CPT

## 2021-10-30 PROCEDURE — 80053 COMPREHEN METABOLIC PANEL: CPT

## 2021-10-30 PROCEDURE — 74176 CT ABD & PELVIS W/O CONTRAST: CPT

## 2021-10-30 PROCEDURE — 99283 EMERGENCY DEPT VISIT LOW MDM: CPT

## 2021-10-30 PROCEDURE — 96374 THER/PROPH/DIAG INJ IV PUSH: CPT

## 2021-10-30 RX ORDER — 0.9 % SODIUM CHLORIDE 0.9 %
1000 INTRAVENOUS SOLUTION INTRAVENOUS ONCE
Status: DISCONTINUED | OUTPATIENT
Start: 2021-10-30 | End: 2021-10-31 | Stop reason: HOSPADM

## 2021-10-30 RX ORDER — ONDANSETRON 2 MG/ML
4 INJECTION INTRAMUSCULAR; INTRAVENOUS ONCE
Status: COMPLETED | OUTPATIENT
Start: 2021-10-30 | End: 2021-10-30

## 2021-10-30 RX ORDER — MORPHINE SULFATE 2 MG/ML
INJECTION, SOLUTION INTRAMUSCULAR; INTRAVENOUS
Status: DISCONTINUED
Start: 2021-10-30 | End: 2021-10-31 | Stop reason: HOSPADM

## 2021-10-30 RX ORDER — MORPHINE SULFATE 4 MG/ML
4 INJECTION, SOLUTION INTRAMUSCULAR; INTRAVENOUS ONCE
Status: COMPLETED | OUTPATIENT
Start: 2021-10-30 | End: 2021-10-30

## 2021-10-30 RX ORDER — 0.9 % SODIUM CHLORIDE 0.9 %
1000 INTRAVENOUS SOLUTION INTRAVENOUS ONCE
Status: COMPLETED | OUTPATIENT
Start: 2021-10-30 | End: 2021-10-30

## 2021-10-30 RX ADMIN — SODIUM CHLORIDE 1000 ML: 9 INJECTION, SOLUTION INTRAVENOUS at 22:42

## 2021-10-30 RX ADMIN — MORPHINE SULFATE 4 MG: 4 INJECTION, SOLUTION INTRAMUSCULAR; INTRAVENOUS at 22:42

## 2021-10-30 RX ADMIN — HYDROMORPHONE HYDROCHLORIDE 1 MG: 1 INJECTION, SOLUTION INTRAMUSCULAR; INTRAVENOUS; SUBCUTANEOUS at 23:35

## 2021-10-30 RX ADMIN — ONDANSETRON HYDROCHLORIDE 4 MG: 2 SOLUTION INTRAMUSCULAR; INTRAVENOUS at 22:00

## 2021-10-30 ASSESSMENT — PAIN DESCRIPTION - LOCATION: LOCATION: ABDOMEN

## 2021-10-30 ASSESSMENT — PAIN SCALES - GENERAL
PAINLEVEL_OUTOF10: 7
PAINLEVEL_OUTOF10: 8

## 2021-10-30 ASSESSMENT — PAIN DESCRIPTION - PAIN TYPE: TYPE: ACUTE PAIN

## 2021-10-31 RX ORDER — PROMETHAZINE HYDROCHLORIDE 25 MG/1
25 TABLET ORAL EVERY 6 HOURS PRN
Qty: 20 TABLET | Refills: 0 | Status: SHIPPED | OUTPATIENT
Start: 2021-10-31 | End: 2021-11-05

## 2021-10-31 RX ORDER — HYDROCODONE BITARTRATE AND ACETAMINOPHEN 5; 325 MG/1; MG/1
1 TABLET ORAL EVERY 6 HOURS PRN
Qty: 12 TABLET | Refills: 0 | Status: SHIPPED | OUTPATIENT
Start: 2021-10-31 | End: 2021-11-03

## 2021-10-31 RX ORDER — OMEPRAZOLE 20 MG/1
20 CAPSULE, DELAYED RELEASE ORAL 2 TIMES DAILY
Qty: 28 CAPSULE | Refills: 0 | Status: SHIPPED | OUTPATIENT
Start: 2021-10-31 | End: 2022-01-19

## 2021-10-31 ASSESSMENT — PAIN DESCRIPTION - PAIN TYPE: TYPE: ACUTE PAIN

## 2021-10-31 ASSESSMENT — PAIN SCALES - GENERAL: PAINLEVEL_OUTOF10: 2

## 2021-10-31 ASSESSMENT — PAIN DESCRIPTION - LOCATION: LOCATION: ABDOMEN

## 2021-10-31 NOTE — ED PROVIDER NOTES
ADDENDUM NOTE:  11:08 PM EDT  I received this patient at sign out from   I have discussed the patient's initial exam, treatment and plan of care with the out going physician. I have introduced my self to the patient / family and have answered their questions to this point. I have examined the patient myself and reviewed ordered tests / medications and  reviewed any available results to this point. See Dr. Nya Holly notes RE: HPI and ROS, which I did review. --------------------------------------------- PAST HISTORY ---------------------------------------------  Past Medical History:  has a past medical history of Alcoholism ([facility] Anxiety and depression, Cancer ([facility] Chronic pancreatitis ([facility] Diabetes mellitus, type 2 ([facility] [facility] GERD (gastroesophageal reflux disease), Gout, Hepatitis C, Jaundice, Schizoaffective disorder, bipolar type ([facility] and Splenomegaly. Past Surgical History:  has a past surgical history that includes Hysterectomy (2004); Ovary removal (Bilateral, 2007); shoulder surgery (Left, 2003); Cholecystectomy (2010); ERCP; Abdomen surgery; Colonoscopy; Upper gastrointestinal endoscopy; other surgical history (Left, 04/25/2017); Breast biopsy (Left); Pancreas Biopsy; Endoscopy, colon, diagnostic; other surgical history (10/05/2017); and Colonoscopy (N/A, 10/2/2018). Social History:  reports that she has been smoking cigarettes. She started smoking about 39 years ago. She has a 38.00 pack-year smoking history. She has never used smokeless tobacco. She reports previous alcohol use. She reports current drug use. Drug: Cocaine. Family History: family history includes Cancer in her mother; Diabetes in her mother. The patients home medications have been reviewed.     Allergies: Dye [iodides] and Tylenol [acetaminophen]    -------------------------------------------------- RESULTS -------------------------------------------------  All laboratory and radiology results have been personally reviewed by myself   LABS:  Results for orders placed or performed during the hospital encounter of 10/30/21   CBC Auto Differential   Result Value Ref Range    WBC 4.0 (L) 4.5 - 11.5 E9/L    RBC 4.43 3.50 - 5.50 E12/L    Hemoglobin 13.8 11.5 - 15.5 g/dL    Hematocrit 40.6 34.0 - 48.0 %    MCV 91.6 80.0 - 99.9 fL    MCH 31.2 26.0 - 35.0 pg    MCHC 34.0 32.0 - 34.5 %    RDW 14.0 11.5 - 15.0 fL    Platelets 89 (L) 857 - 450 E9/L    MPV 10.5 7.0 - 12.0 fL    Neutrophils % 73.7 43.0 - 80.0 %    Immature Granulocytes % 0.5 0.0 - 5.0 %    Lymphocytes % 17.0 (L) 20.0 - 42.0 %    Monocytes % 6.0 2.0 - 12.0 %    Eosinophils % 2.3 0.0 - 6.0 %    Basophils % 0.5 0.0 - 2.0 %    Neutrophils Absolute 2.94 1.80 - 7.30 E9/L    Immature Granulocytes # 0.02 E9/L    Lymphocytes Absolute 0.68 (L) 1.50 - 4.00 E9/L    Monocytes Absolute 0.24 0.10 - 0.95 E9/L    Eosinophils Absolute 0.09 0.05 - 0.50 E9/L    Basophils Absolute 0.02 0.00 - 0.20 E9/L   Comprehensive Metabolic Panel w/ Reflex to MG   Result Value Ref Range    Sodium 134 132 - 146 mmol/L    Potassium reflex Magnesium 5.2 (H) 3.5 - 5.0 mmol/L    Chloride 94 (L) 98 - 107 mmol/L    CO2 31 (H) 22 - 29 mmol/L    Anion Gap 9 7 - 16 mmol/L    Glucose 330 (H) 74 - 99 mg/dL    BUN 13 6 - 20 mg/dL    CREATININE 0.9 0.5 - 1.0 mg/dL    GFR Non-African American >60 >=60 mL/min/1.73    GFR African American >60     Calcium 10.0 8.6 - 10.2 mg/dL    Total Protein 7.8 6.4 - 8.3 g/dL    Albumin 4.5 3.5 - 5.2 g/dL    Total Bilirubin 0.5 0.0 - 1.2 mg/dL    Alkaline Phosphatase 452 (H) 35 - 104 U/L    ALT 36 (H) 0 - 32 U/L    AST 28 0 - 31 U/L   Lipase   Result Value Ref Range    Lipase 8 (L) 13 - 60 U/L   Platelet Confirmation   Result Value Ref Range    Platelet Confirmation CONFIRMED        RADIOLOGY:  Interpreted by Radiologist.  CT ABDOMEN PELVIS WO CONTRAST Additional Contrast? None   Final Result   1. Dense appearance of the liver, suggest developing cirrhotic change.    2. Evidence of chronic pancreatitis   3. Splenomegaly             ------------------------- NURSING NOTES AND VITALS REVIEWED ---------------------------  The nursing notes within the ED encounter and vital signs as below have been reviewed. /82   Pulse 76   Temp 97.5 °F (36.4 °C) (Tympanic)   Resp 20   Ht 5' 1\" (1.549 m)   Wt 135 lb (61.2 kg)   SpO2 98%   BMI 25.51 kg/m²  Oxygen Saturation Interpretation: Normal      ---------------------------------------------------PHYSICAL EXAM--------------------------------------    Constitutional/General: Alert and oriented x3, well appearing, non toxic in mild distress at time of my exam.  Head: Normocephalic and atraumatic  Eyes: PERRL, EOMI  Mouth: Oropharynx clear, handling secretions, no trismus  Neck: Supple, full ROM, no organomegaly masses otherwise normal  Pulmonary: Lungs clear to auscultation bilaterally, no wheezes, rales, or rhonchi. Not in respiratory distress  Cardiovascular:  Regular rate and rhythm, no murmurs, gallops, or rubs. 2+ distal pulses  Abdomen: Soft, mild epigastric tenderness, non distended, no organomegaly no masses no guarding rigidity normoactive bowel sounds  Extremities: Moves all extremities x 4. Warm and well perfused  Skin: warm and dry without rash  Neurologic: GCS 15, nerves grossly intact no focal deficits.   Psych: Normal Affect      ------------------------------ ED COURSE/MEDICAL DECISION MAKING----------------------  Medications   ondansetron (ZOFRAN) injection 4 mg (has no administration in time range)   morphine 2 MG/ML injection (2 mg  Not Given 10/30/21 2241)   0.9 % sodium chloride bolus (has no administration in time range)   insulin regular (HUMULIN R;NOVOLIN R) injection 2 Units (has no administration in time range)   0.9 % sodium chloride bolus (0 mLs IntraVENous Stopped 10/30/21 2335)   morphine sulfate (PF) injection 4 mg (4 mg IntraVENous Given 10/30/21 2242)   HYDROmorphone (DILAUDID) injection 1 mg (1 mg IntraVENous Given 10/30/21 5559)         ED COURSE:  Medical Decision Making:   Patient's work-up findings show abnormal CT with findings of chronic pancreatitis although patient's lipase is normal at this time. The patient is felt to be stable and appropriate discharge and close outpatient follow-up with her PCP in 2 days. Patient has chronic pancreatitis in her problem list in Epic probably due to alcohol intoxication and history of alcohol abuse. 4 this reason I am hesitant to prescribe her long-term narcotic analgesics for home; I think this should be managed by her PCP perhaps patient should be referred to pain management. Out of concern for the patient's pain and hopefully to preclude her from using alcohol this weekend I will prescribe her a short course of Norco.  Again the patient is to see her PCP in 2 days    Counseling: The emergency provider has spoken with the patient and spouse/SO and discussed todays results, in addition to providing specific details for the plan of care and counseling regarding the diagnosis and prognosis. Questions are answered at this time and they are agreeable with the plan. Controlled Substance Monitoring:  Acute and Chronic Pain Monitoring:   RX Monitoring 10/31/2021   Attestation -   Periodic Controlled Substance Monitoring No signs of potential drug abuse or diversion identified.     --------------------------------- IMPRESSION AND DISPOSITION ---------------------------------    IMPRESSION  1. Epigastric pain    2. Poorly controlled type 2 diabetes mellitus ([facility] [address]        DISPOSITION  Disposition: Discharge to home  Patient condition is stable      NOTE: This report was transcribed using voice recognition software.  Every effort was made to ensure accuracy; however, inadvertent computerized transcription errors may be present       Tammy Rodrigez MD  10/31/21 0050

## 2021-10-31 NOTE — ED PROVIDER NOTES
HPI:  10/30/21,   Time: 8:52 PM EDT       Lucille Valdez is a 46 y.o. female presenting to the ED for epigastric pain, beginning 3 days ago. The complaint has been persistent, mild in severity, and worsened by eating/drinking. Hx same, chronic pancreatitis, same sx, no n/v/d/fevr/chillls/sweats/cough/congestion. Bib private vehicle, nothing makes better    Review of Systems:   Pertinent positives and negatives are stated within HPI, all other systems reviewed and are negative.          --------------------------------------------- PAST HISTORY ---------------------------------------------  Past Medical History:  has a past medical history of Alcoholism ([facility] [address] Anxiety and depression, Cancer ([facility] [address] Chronic pancreatitis ([facility] [address] Diabetes mellitus, type 2 ([facility] [address] GERD (gastroesophageal reflux disease), Gout, Hepatitis C, Jaundice, Schizoaffective disorder, bipolar type ([facility] [address] and Splenomegaly. Past Surgical History:  has a past surgical history that includes Hysterectomy (2004); Ovary removal (Bilateral, 2007); shoulder surgery (Left, 2003); Cholecystectomy (2010); ERCP; Abdomen surgery; Colonoscopy; Upper gastrointestinal endoscopy; other surgical history (Left, 04/25/2017); Breast biopsy (Left); Pancreas Biopsy; Endoscopy, colon, diagnostic; other surgical history (10/05/2017); and Colonoscopy (N/A, 10/2/2018). Social History:  reports that she has been smoking cigarettes. She started smoking about 39 years ago. She has a 38.00 pack-year smoking history. She has never used smokeless tobacco. She reports previous alcohol use. She reports current drug use. Drug: Cocaine. Family History: family history includes Cancer in her mother; Diabetes in her mother. The patients home medications have been reviewed.     Allergies: Dye [iodides] and Tylenol [acetaminophen]        ---------------------------------------------------PHYSICAL EXAM--------------------------------------    Constitutional/General: Alert and oriented x3, well appearing, non toxic in NAD  Head: Normocephalic and atraumatic  Eyes: PERRL, EOMI, conjunctive normal, sclera non icteric  Mouth: Oropharynx clear, handling secretions, no trismus, no asymmetry of the posterior oropharynx or uvular edema  Neck: Supple, full ROM, non tender to palpation in the midline, no stridor, no crepitus, no meningeal signs  Respiratory: Lungs clear to auscultation bilaterally, no wheezes, rales, or rhonchi. Not in respiratory distress  Cardiovascular:  Regular rate. Regular rhythm. No murmurs, gallops, or rubs. 2+ distal pulses  Chest: No chest wall tenderness  GI:  Abdomen Soft, Non tender, Non distended. +BS. No organomegaly, no palpable masses,  No rebound, guarding, or rigidity. Musculoskeletal: Moves all extremities x 4. Warm and well perfused, no clubbing, cyanosis, or edema. Capillary refill <3 seconds  Integument: skin warm and dry. No rashes. Lymphatic: no lymphadenopathy noted  Neurologic: GCS 15, no focal deficits, symmetric strength 5/5 in the upper and lower extremities bilaterally  Psychiatric: Normal Affect    -------------------------------------------------- RESULTS -------------------------------------------------  I have personally reviewed all laboratory and imaging results for this patient. Results are listed below.      LABS:  Results for orders placed or performed during the hospital encounter of 10/30/21   CBC Auto Differential   Result Value Ref Range    WBC 4.0 (L) 4.5 - 11.5 E9/L    RBC 4.43 3.50 - 5.50 E12/L    Hemoglobin 13.8 11.5 - 15.5 g/dL    Hematocrit 40.6 34.0 - 48.0 %    MCV 91.6 80.0 - 99.9 fL    MCH 31.2 26.0 - 35.0 pg    MCHC 34.0 32.0 - 34.5 %    RDW 14.0 11.5 - 15.0 fL    Platelets 89 (L) 647 - 450 E9/L    MPV 10.5 7.0 - 12.0 fL    Neutrophils % 73.7 43.0 - 80.0 %    Immature Granulocytes % 0.5 0.0 - 5.0 %    Lymphocytes % 17.0 (L) 20.0 - 42.0 %    Monocytes % 6.0 2.0 - 12.0 %    Eosinophils % 2.3 0.0 - 6.0 %    Basophils % 0.5 0.0 - 2.0 %    Neutrophils Absolute 2.94 1.80 - 7.30 E9/L    Immature Granulocytes # 0.02 E9/L    Lymphocytes Absolute 0.68 (L) 1.50 - 4.00 E9/L    Monocytes Absolute 0.24 0.10 - 0.95 E9/L    Eosinophils Absolute 0.09 0.05 - 0.50 E9/L    Basophils Absolute 0.02 0.00 - 0.20 E9/L   Comprehensive Metabolic Panel w/ Reflex to MG   Result Value Ref Range    Sodium 134 132 - 146 mmol/L    Potassium reflex Magnesium 5.2 (H) 3.5 - 5.0 mmol/L    Chloride 94 (L) 98 - 107 mmol/L    CO2 31 (H) 22 - 29 mmol/L    Anion Gap 9 7 - 16 mmol/L    Glucose 330 (H) 74 - 99 mg/dL    BUN 13 6 - 20 mg/dL    CREATININE 0.9 0.5 - 1.0 mg/dL    GFR Non-African American >60 >=60 mL/min/1.73    GFR African American >60     Calcium 10.0 8.6 - 10.2 mg/dL    Total Protein 7.8 6.4 - 8.3 g/dL    Albumin 4.5 3.5 - 5.2 g/dL    Total Bilirubin 0.5 0.0 - 1.2 mg/dL    Alkaline Phosphatase 452 (H) 35 - 104 U/L    ALT 36 (H) 0 - 32 U/L    AST 28 0 - 31 U/L   Lipase   Result Value Ref Range    Lipase 8 (L) 13 - 60 U/L   Platelet Confirmation   Result Value Ref Range    Platelet Confirmation CONFIRMED        RADIOLOGY:  Interpreted by Radiologist.  CT ABDOMEN PELVIS WO CONTRAST Additional Contrast? None   Final Result   1. Dense appearance of the liver, suggest developing cirrhotic change. 2. Evidence of chronic pancreatitis   3. Splenomegaly             EKG:  This EKG is signed and interpreted by the EP. Time:   Rate:   Rhythm:   Interpretation:   Comparison:       ------------------------- NURSING NOTES AND VITALS REVIEWED ---------------------------   The nursing notes within the ED encounter and vital signs as below have been reviewed by myself. /82   Pulse 76   Temp 97.5 °F (36.4 °C) (Tympanic)   Resp 20   Ht 5' 1\" (1.549 m)   Wt 135 lb (61.2 kg)   SpO2 98%   BMI 25.51 kg/m²   Oxygen Saturation Interpretation: Normal    The patients available past medical records and past encounters were reviewed.         ------------------------------ ED COURSE/MEDICAL DECISION MAKING----------------------  Medications   0.9 % sodium chloride bolus (0 mLs IntraVENous Stopped 10/30/21 2335)   morphine sulfate (PF) injection 4 mg (4 mg IntraVENous Given 10/30/21 2242)   ondansetron (ZOFRAN) injection 4 mg (4 mg IntraVENous Given 10/30/21 2200)   HYDROmorphone (DILAUDID) injection 1 mg (1 mg IntraVENous Given 10/30/21 2335)         ED COURSE:       Medical Decision Making:    Hx same, abd benign, sign out labs pending      This patient's ED course included: a personal history and physicial examination    This patient has remained hemodynamically stable during their ED course.               --------------------------------- IMPRESSION AND DISPOSITION ---------------------------------    IMPRESSION  1. Epigastric pain    2. Poorly controlled type 2 diabetes mellitus ([facility] [address]    3. [facility]-induced chronic pancreatitis ([facility]        DISPOSITION  Disposition: sign out  Patient condition is stable    NOTE: This report was transcribed using voice recognition software.  Every effort was made to ensure accuracy; however, inadvertent computerized transcription errors may be present        Lino English MD  10/31/21 5995

## 2021-11-30 ENCOUNTER — HOSPITAL ENCOUNTER (OUTPATIENT)
Dept: ULTRASOUND IMAGING | Age: 51
Discharge: HOME | End: 2021-11-30
Payer: MEDICAID

## 2021-11-30 DIAGNOSIS — K74.60 HEPATIC CIRRHOSIS, UNSPECIFIED HEPATIC CIRRHOSIS TYPE, UNSPECIFIED WHETHER ASCITES PRESENT (HCC): ICD-10-CM

## 2021-11-30 PROCEDURE — 76705 ECHO EXAM OF ABDOMEN: CPT

## 2021-12-13 ENCOUNTER — HOSPITAL ENCOUNTER (EMERGENCY)
Age: 51
Discharge: HOME | End: 2021-12-13
Payer: MEDICAID

## 2021-12-13 VITALS
BODY MASS INDEX: 24.56 KG/M2 | DIASTOLIC BLOOD PRESSURE: 64 MMHG | HEART RATE: 76 BPM | SYSTOLIC BLOOD PRESSURE: 106 MMHG | WEIGHT: 130 LBS | RESPIRATION RATE: 16 BRPM | OXYGEN SATURATION: 94 % | TEMPERATURE: 96.6 F

## 2021-12-13 DIAGNOSIS — R59.0 ANTERIOR CERVICAL LYMPHADENOPATHY: ICD-10-CM

## 2021-12-13 DIAGNOSIS — J02.9 ACUTE PHARYNGITIS, UNSPECIFIED ETIOLOGY: Primary | ICD-10-CM

## 2021-12-13 LAB — STREP GRP A PCR: NEGATIVE

## 2021-12-13 PROCEDURE — 6370000000 HC RX 637 (ALT 250 FOR IP): Performed by: PHYSICIAN ASSISTANT

## 2021-12-13 PROCEDURE — 99283 EMERGENCY DEPT VISIT LOW MDM: CPT

## 2021-12-13 PROCEDURE — 87880 STREP A ASSAY W/OPTIC: CPT

## 2021-12-13 RX ORDER — CEPHALEXIN 500 MG/1
500 CAPSULE ORAL ONCE
Status: COMPLETED | OUTPATIENT
Start: 2021-12-13 | End: 2021-12-13

## 2021-12-13 RX ORDER — CEPHALEXIN 500 MG/1
500 CAPSULE ORAL 4 TIMES DAILY
Qty: 28 CAPSULE | Refills: 0 | Status: SHIPPED | OUTPATIENT
Start: 2021-12-13 | End: 2021-12-20

## 2021-12-13 RX ORDER — METHYLPREDNISOLONE 4 MG/1
TABLET ORAL
Qty: 21 TABLET | Refills: 0 | Status: SHIPPED | OUTPATIENT
Start: 2021-12-13 | End: 2021-12-19

## 2021-12-13 RX ADMIN — CEPHALEXIN 500 MG: 500 CAPSULE ORAL at 22:04

## 2021-12-13 ASSESSMENT — PAIN DESCRIPTION - LOCATION: LOCATION: THROAT

## 2021-12-13 ASSESSMENT — PAIN SCALES - GENERAL: PAINLEVEL_OUTOF10: 8

## 2021-12-13 ASSESSMENT — PAIN DESCRIPTION - DESCRIPTORS: DESCRIPTORS: SORE

## 2021-12-14 NOTE — ED NOTES
Discharged   To follow with pmd  rx x 2  sent to [facility], [facility]  12/13/21 1893

## 2021-12-14 NOTE — ED PROVIDER NOTES
Independent:     HPI:  12/13/21, Time: 9:33 PM HUI Harry is a 46 y.o. female presenting to the ED for evaluation of sore throat for the last 2 weeks. The complaint has been persistent, moderate in severity, and worsened by swallowing. The patient states that she has had intermittent sore throat, but recently more persistent. She states that she recently saw ENT and was told that everything was fine. She has had a more persistent sore throat over the last few days and wanted to get checked. She reports that she has had some lymph nodes swollen in her neck as well. She states that she has had no exposure to small children and states that she does wear a mask when she is outdoors. She denies any loss of taste or smell. She reports no severe body aches or fever. She has not been Covid vaccinated. She states she has no history of known environmental allergies. She has had some mild postnasal drip, denies any significant cough. Unsure of any systemic fever. She has not really tried anything over-the-counter for her symptoms. She has had some change in her voice because of her drainage. She is a diabetic and does have chronic liver issues. Review of Systems:   A complete review of systems was performed and pertinent positives and negatives are stated within HPI, all other systems reviewed and are negative.      --------------------------------------------- PAST HISTORY ---------------------------------------------  Past Medical History:  has a past medical history of Alcoholism ([facility] [address] Anxiety and depression, Cancer ([facility] [address] Chronic pancreatitis ([facility] [address] Diabetes mellitus, type 2 ([facility] [address] GERD (gastroesophageal reflux disease), Gout, Hepatitis C, Jaundice, Schizoaffective disorder, bipolar type ([facility] [address] and Splenomegaly. Past Surgical History:  has a past surgical history that includes Hysterectomy (2004); Ovary removal (Bilateral, 2007); shoulder surgery (Left, 2003);  Cholecystectomy (2010); ERCP; Abdomen surgery; Colonoscopy; Upper gastrointestinal endoscopy; other surgical history (Left, 04/25/2017); Breast biopsy (Left); Pancreas Biopsy; Endoscopy, colon, diagnostic; other surgical history (10/05/2017); and Colonoscopy (N/A, 10/2/2018). Social History:  reports that she has been smoking cigarettes. She started smoking about 39 years ago. She has a 38.00 pack-year smoking history. She has never used smokeless tobacco. She reports previous alcohol use. She reports current drug use. Drug: Cocaine. Family History: family history includes Cancer in her mother; Diabetes in her mother. The patients home medications have been reviewed. Allergies: Dye [iodides] and Tylenol [acetaminophen]    -------------------------------------------------- RESULTS -------------------------------------------------  All laboratory and radiology results have been personally reviewed by myself   LABS:  Results for orders placed or performed during the hospital encounter of 12/13/21   Strep Screen Group A Throat    Specimen: Throat   Result Value Ref Range    Strep Grp A PCR Negative Negative       RADIOLOGY:  Interpreted by Radiologist.  No orders to display       ------------------------- NURSING NOTES AND VITALS REVIEWED ---------------------------   The nursing notes within the ED encounter and vital signs as below have been reviewed.    /64   Pulse 76   Temp 96.6 °F (35.9 °C) (Infrared)   Resp 16   Wt 130 lb (59 kg)   SpO2 94%   BMI 24.56 kg/m²   Oxygen Saturation Interpretation: Normal      ---------------------------------------------------PHYSICAL EXAM--------------------------------------      Constitutional/General: Alert and oriented x3, stable appearing, non toxic in NAD  Head: Normocephalic and atraumatic  Eyes: PERRL, EOMI, conjunctiva pink, no drainage  Ears: No discharge or erythema  Nose: Some clear thick drainage, turbinates swollen  Mouth: Some posterior pharyngeal erythema, some thick mucus noted to posterior throat, no obvious exudate, no uvular edema, handling secretions, no trismus  Neck: Supple, full ROM, no rigidity or stridor, some tender anterior cervical nodes bilaterally  Pulmonary: Lungs with bilateral forced expiratory wheezes scattered throughout posterior lung fields, no obvious area of consolidation, no rales or rhonchi. No significant cough on exam, no sternal retractions. Not in respiratory distress  Cardiovascular:  Regular rate and rhythm, 2+ distal pulses  Extremities: Moves all extremities x 4. Warm and well perfused  Skin: warm and dry without rash  Neurologic: GCS 15  Psych: Normal Affect    ------------------------------ ED COURSE/MEDICAL DECISION MAKING----------------------  Medications   cephALEXin (KEFLEX) capsule 500 mg (500 mg Oral Given 12/13/21 2204)       ED COURSE:       Medical Decision Making:    Patient to ER with complaints of persistent sore throat over the last 2 weeks. Patient with history of known smoking as well as known liver issues. Patient also diabetic. Also recently saw ENT for evaluation. Patient advised that rapid strep was negative. Patient having persistent lymphadenopathy as well as postnasal drip. Patient will be given some Keflex as well as a small dose of Medrol-we will start Dosepak tomorrow morning. Close follow-up with PCP advised. Recommend fluids and rest, advised to stop smoking. Patient declined COVID testing at this time. Counseling: The emergency provider has spoken with the patient and spouse/SO and discussed todays results, in addition to providing specific details for the plan of care and counseling regarding the diagnosis and prognosis. Questions are answered at this time and they are agreeable with the plan.      --------------------------------- IMPRESSION AND DISPOSITION ---------------------------------    IMPRESSION  1. Acute pharyngitis, unspecified etiology    2.  Anterior cervical lymphadenopathy DISPOSITION  Disposition: Discharge to home  Patient condition is stable      NOTE: This report was transcribed using voice recognition software.  Every effort was made to ensure accuracy; however, inadvertent computerized transcription errors may be present       Fili Metz PA-C  12/14/21 Karlos Lazaro PA-C  12/14/21 [id number]

## 2022-01-19 ENCOUNTER — APPOINTMENT (OUTPATIENT)
Dept: GENERAL RADIOLOGY | Age: 52
End: 2022-01-19
Payer: MEDICAID

## 2022-01-19 ENCOUNTER — HOSPITAL ENCOUNTER (EMERGENCY)
Age: 52
Discharge: HOME | End: 2022-01-19
Attending: EMERGENCY MEDICINE
Payer: MEDICAID

## 2022-01-19 VITALS
DIASTOLIC BLOOD PRESSURE: 68 MMHG | RESPIRATION RATE: 17 BRPM | WEIGHT: 135 LBS | SYSTOLIC BLOOD PRESSURE: 116 MMHG | HEART RATE: 80 BPM | OXYGEN SATURATION: 99 % | TEMPERATURE: 97.9 F | HEIGHT: 61 IN | BODY MASS INDEX: 25.49 KG/M2

## 2022-01-19 DIAGNOSIS — J44.1 COPD EXACERBATION (HCC): Primary | ICD-10-CM

## 2022-01-19 PROCEDURE — 99283 EMERGENCY DEPT VISIT LOW MDM: CPT

## 2022-01-19 PROCEDURE — 6370000000 HC RX 637 (ALT 250 FOR IP): Performed by: EMERGENCY MEDICINE

## 2022-01-19 PROCEDURE — 71046 X-RAY EXAM CHEST 2 VIEWS: CPT

## 2022-01-19 RX ORDER — PREDNISONE 20 MG/1
40 TABLET ORAL ONCE
Status: COMPLETED | OUTPATIENT
Start: 2022-01-19 | End: 2022-01-19

## 2022-01-19 RX ORDER — PREDNISONE 20 MG/1
40 TABLET ORAL DAILY
Qty: 10 TABLET | Refills: 0 | Status: SHIPPED | OUTPATIENT
Start: 2022-01-19 | End: 2022-01-24

## 2022-01-19 RX ORDER — DOXYCYCLINE HYCLATE 100 MG
100 TABLET ORAL 2 TIMES DAILY
Qty: 14 TABLET | Refills: 0 | Status: SHIPPED | OUTPATIENT
Start: 2022-01-19 | End: 2022-01-26

## 2022-01-19 RX ORDER — DOXYCYCLINE HYCLATE 100 MG/1
100 CAPSULE ORAL ONCE
Status: COMPLETED | OUTPATIENT
Start: 2022-01-19 | End: 2022-01-19

## 2022-01-19 RX ORDER — IPRATROPIUM BROMIDE AND ALBUTEROL SULFATE 2.5; .5 MG/3ML; MG/3ML
3 SOLUTION RESPIRATORY (INHALATION) ONCE
Status: COMPLETED | OUTPATIENT
Start: 2022-01-19 | End: 2022-01-19

## 2022-01-19 RX ADMIN — PREDNISONE 40 MG: 20 TABLET ORAL at 18:43

## 2022-01-19 RX ADMIN — IPRATROPIUM BROMIDE AND ALBUTEROL SULFATE 3 AMPULE: .5; 2.5 SOLUTION RESPIRATORY (INHALATION) at 18:43

## 2022-01-19 RX ADMIN — DOXYCYCLINE HYCLATE 100 MG: 100 CAPSULE ORAL at 18:43

## 2022-01-19 ASSESSMENT — ENCOUNTER SYMPTOMS
COLOR CHANGE: 0
BACK PAIN: 0
NAUSEA: 0
ABDOMINAL PAIN: 0
RHINORRHEA: 0
WHEEZING: 1
VOMITING: 0
SHORTNESS OF BREATH: 1
COUGH: 1
BLOOD IN STOOL: 0

## 2022-01-19 NOTE — ED NOTES
Pt stated she wanted discharge papers and was ready to go.  Pts nurse notified      Janki Pino  01/19/22 [id number] [facility]  01/19/22 9001

## 2022-01-19 NOTE — ED PROVIDER NOTES
ED PROVIDER NOTE    Chief Complaint   Patient presents with    Cough     for a month. Wheezing, \"feeling terrible. \"  Tested for covid on Monday and was negative. HPI:  1/19/22,   Time: 6:13 PM HUI Johnson is a 46 y.o. female presenting to the ED for cough and wheezing. Gradual onset 1m ago, progressively worsening, moderate in severity. Associated chills, cough productive of green sputum, wheezing, shortness of breath w/ exertion. No fever, chest pain, abdominal pain, nausea, vomiting, diarrhea, leg swelling.  +smoker. Vaccinated for Martin. Chart review: hx of HCV, schizoaffective disorder, anxiety, breast ca, chronic pancreatitis, DM    Review of Systems:     Review of Systems   Constitutional: Positive for chills. Negative for appetite change and fever. HENT: Negative for congestion and rhinorrhea. Eyes: Negative for visual disturbance. Respiratory: Positive for cough, shortness of breath and wheezing. Cardiovascular: Negative for chest pain. Gastrointestinal: Negative for abdominal pain, blood in stool, nausea and vomiting. Genitourinary: Negative for decreased urine volume and difficulty urinating. Musculoskeletal: Negative for back pain and neck pain. Skin: Negative for color change.    Neurological: Negative for dizziness, syncope, weakness, light-headedness, numbness and headaches.         --------------------------------------------- PAST HISTORY ---------------------------------------------  Past Medical History:   Past Medical History:   Diagnosis Date    Alcoholism ([facility] [address]     Since teens to early 25s    Anxiety and depression     Cancer ([facility] [address]     breast, left    Chronic pancreatitis ([facility] [address]     Diabetes mellitus, type 2 ([facility] [address]     GERD (gastroesophageal reflux disease)     Gout     Hepatitis C     Jaundice 5/12/2016    Schizoaffective disorder, bipolar type ([address]     Splenomegaly 5/30/2017       Past Surgical History:   Past Surgical History:   Procedure Laterality Date    ABDOMEN SURGERY      gallbladder removal    BREAST BIOPSY Left     CHOLECYSTECTOMY      COLONOSCOPY      COLONOSCOPY N/A 10/2/2018    COLONOSCOPY WITH BIOPSY performed by Saman Razo MD at [address], DIAGNOSTIC      ERCP      2016 [address]      OTHER SURGICAL HISTORY Left 2017     Left breast blue dye injection, Sential Node Lymph Biopsy with left breast lumpectomy    OTHER SURGICAL HISTORY  10/05/2017    simple left mastectomy    OVARY REMOVAL Bilateral     PANCREAS BIOPSY      with stent    SHOULDER SURGERY Left     UPPER GASTROINTESTINAL ENDOSCOPY         Social History:   Social History     Socioeconomic History    Marital status:      Spouse name: None    Number of children: None    Years of education: None    Highest education level: None   Occupational History    None   Tobacco Use    Smoking status: Current Every Day Smoker     Packs/day: 1.00     Years: 38.00     Pack years: 38.00     Types: Cigarettes     Start date: 1982     Last attempt to quit: 2020     Years since quittin.9    Smokeless tobacco: Never Used   Vaping Use    Vaping Use: Never used   Substance and Sexual Activity    Alcohol use: Not Currently    Drug use: Yes     Types: Cocaine    Sexual activity: Yes     Partners: Male   Other Topics Concern    None   Social History Narrative    None     Social Determinants of Health     Financial Resource Strain:     Difficulty of Paying Living Expenses: Not on file   Food Insecurity:     Worried About Running Out of Food in the Last Year: Not on file    Abdias of Food in the Last Year: Not on file   Transportation Needs:     Lack of Transportation (Medical): Not on file    Lack of Transportation (Non-Medical):  Not on file   Physical Activity:     Days of Exercise per Week: Not on file    Minutes of Exercise per Session: Not on file   Stress:     Feeling of Stress : Not on file   Social Connections:     Frequency of Communication with Friends and Family: Not on file    Frequency of Social Gatherings with Friends and Family: Not on file    Attends Restorationism Services: Not on file    Active Member of [address] [place] [address] [employer] or Organizations: Not on file    Attends Club or Organization Meetings: Not on file    Marital Status: Not on file   Intimate Partner Violence:     Fear of Current or Ex-Partner: Not on file    Emotionally Abused: Not on file    Physically Abused: Not on file    Sexually Abused: Not on file   Housing Stability:     Unable to Pay for Housing in the Last Year: Not on file    Number of [place] in the Last Year: Not on file    Unstable Housing in the Last Year: Not on file       Family History:   Family History   Problem Relation Age of Onset    Diabetes Mother     Cancer Mother         Breast       The patients home medications have been reviewed. Allergies: Allergies   Allergen Reactions    Dye [Iodides]     Tylenol [Acetaminophen]      Was told not to take by her  For a bad liver     OK  to use sparingly             ---------------------------------------------------PHYSICAL EXAM--------------------------------------    BP (!) 156/88   Pulse 80   Temp 97.9 °F (36.6 °C) (Temporal)   Resp 16   Ht 5' 1\" (1.549 m)   Wt 135 lb (61.2 kg)   SpO2 98%   BMI 25.51 kg/m²     Physical Exam  Constitutional:       General: She is not in acute distress. Appearance: She is not toxic-appearing. HENT:      Mouth/Throat:      Mouth: Mucous membranes are moist.   Eyes:      General: No scleral icterus. Extraocular Movements: Extraocular movements intact. Pupils: Pupils are equal, round, and reactive to light. Neck:      Comments: No JVD  Cardiovascular:      Rate and Rhythm: Normal rate and regular rhythm. Pulses: Normal pulses. Heart sounds: Normal heart sounds. No murmur heard.       Pulmonary:      Effort: Pulmonary effort is normal. No respiratory distress. Breath sounds: Wheezing (exp wheezing bilaterally) present. No rales. Abdominal:      General: There is no distension. Palpations: Abdomen is soft. Tenderness: There is no abdominal tenderness. Musculoskeletal:         General: No swelling or tenderness. Normal range of motion. Cervical back: Normal range of motion and neck supple. Comments: Radial, DP, and PT pulses 2+ bilaterally. Skin:     General: Skin is warm and dry. Neurological:      Mental Status: She is alert and oriented to person, place, and time. Comments: Strength 5/5 and sensation grossly intact to light touch and equal bilaterally throughout all extremities             -------------------------------------------------- RESULTS -------------------------------------------------  I have personally reviewed all laboratory and imaging results for this patient. Results are listed below. RADIOLOGY:  Interpreted personally and by Radiologist.  XR CHEST (2 VW)   Final Result   No acute process. ------------------------- NURSING NOTES AND VITALS REVIEWED ---------------------------   The nursing notes within the ED encounter and vital signs as below have been reviewed by myself. BP (!) 156/88   Pulse 80   Temp 97.9 °F (36.6 °C) (Temporal)   Resp 16   Ht 5' 1\" (1.549 m)   Wt 135 lb (61.2 kg)   SpO2 98%   BMI 25.51 kg/m²   Oxygen Saturation Interpretation: Normal    The patients available past medical records and past encounters were reviewed. ------------------------------ ED COURSE/MEDICAL DECISION MAKING----------------------  Medications   ipratropium-albuterol (DUONEB) nebulizer solution 3 ampule (has no administration in time range)   predniSONE (DELTASONE) tablet 40 mg (has no administration in time range)   doxycycline hyclate (VIBRAMYCIN) capsule 100 mg (has no administration in time range)       Counseling:    The emergency provider has spoken with the patient and discussed todays results, in addition to providing specific details for the plan of care and counseling regarding the diagnosis and prognosis. Questions are answered at this time and they are agreeable with the plan. ED Course/Medical Decision Makin y.o. female here with URI sx, shortness of breath, and wheezing for the past 1 month. Non-toxic appearing, afebrile, hemodynamically stable, and in no acute distress. Breathing comfortably on room air without respiratory distress. Wheezing on exam. Declined lab and EKG workup and understands risks of not evaluating cardiac markers, EKG. CXR no acute process. Will treat for COPD exacerbation w/ nebs, prednisone, doxycycline. On reevaluation patient feels better and requesting discharge home. After discussion of findings and return precautions, patient agrees with plan for discharge and outpatient follow up with PCP.       --------------------------------- IMPRESSION AND DISPOSITION ---------------------------------    IMPRESSION  1. COPD exacerbation ([facility] [address]        DISPOSITION  Disposition: Discharge to home  Patient condition is good    NOTE: This report was transcribed using voice recognition software.  Every effort was made to ensure accuracy; however, inadvertent computerized transcription errors may be present    Sammi Carroll MD  Attending Emergency Physician         Sammi Carroll MD  22 2046

## 2022-01-21 ENCOUNTER — HOSPITAL ENCOUNTER (EMERGENCY)
Age: 52
Discharge: HOME | End: 2022-01-21
Attending: EMERGENCY MEDICINE
Payer: MEDICAID

## 2022-01-21 ENCOUNTER — HOSPITAL ENCOUNTER (EMERGENCY)
Age: 52
Discharge: LEFT BEFORE DISCHARGE | End: 2022-01-21

## 2022-01-21 VITALS
RESPIRATION RATE: 18 BRPM | WEIGHT: 135 LBS | HEIGHT: 61 IN | BODY MASS INDEX: 25.49 KG/M2 | TEMPERATURE: 98 F | HEART RATE: 94 BPM | DIASTOLIC BLOOD PRESSURE: 82 MMHG | SYSTOLIC BLOOD PRESSURE: 130 MMHG | OXYGEN SATURATION: 98 %

## 2022-01-21 VITALS
SYSTOLIC BLOOD PRESSURE: 124 MMHG | RESPIRATION RATE: 16 BRPM | HEART RATE: 64 BPM | OXYGEN SATURATION: 97 % | TEMPERATURE: 97.6 F | DIASTOLIC BLOOD PRESSURE: 78 MMHG

## 2022-01-21 DIAGNOSIS — F11.93 OPIATE WITHDRAWAL (HCC): Primary | ICD-10-CM

## 2022-01-21 PROCEDURE — 6370000000 HC RX 637 (ALT 250 FOR IP): Performed by: EMERGENCY MEDICINE

## 2022-01-21 PROCEDURE — 99284 EMERGENCY DEPT VISIT MOD MDM: CPT

## 2022-01-21 RX ORDER — ONDANSETRON 4 MG/1
4 TABLET, ORALLY DISINTEGRATING ORAL EVERY 8 HOURS PRN
Qty: 12 TABLET | Refills: 0 | Status: SHIPPED | OUTPATIENT
Start: 2022-01-21 | End: 2022-05-23 | Stop reason: SDUPTHER

## 2022-01-21 RX ORDER — ONDANSETRON 4 MG/1
4 TABLET, ORALLY DISINTEGRATING ORAL ONCE
Status: COMPLETED | OUTPATIENT
Start: 2022-01-21 | End: 2022-01-21

## 2022-01-21 RX ORDER — LORAZEPAM 1 MG/1
1 TABLET ORAL ONCE
Status: COMPLETED | OUTPATIENT
Start: 2022-01-21 | End: 2022-01-21

## 2022-01-21 RX ADMIN — ONDANSETRON 4 MG: 4 TABLET, ORALLY DISINTEGRATING ORAL at 21:56

## 2022-01-21 RX ADMIN — LORAZEPAM 1 MG: 1 TABLET ORAL at 21:56

## 2022-01-22 NOTE — ED PROVIDER NOTES
Drugs: Cocaine and Opiates . Family History: family history includes Cancer in her mother; Diabetes in her mother. The patients home medications have been reviewed. Allergies: Dye [iodides] and Tylenol [acetaminophen]    -------------------------------------------------- RESULTS -------------------------------------------------  All laboratory and radiology results have been personally reviewed by myself   LABS:  No results found for this visit on 01/21/22. RADIOLOGY:  Interpreted by Radiologist.  No orders to display       ------------------------- NURSING NOTES AND VITALS REVIEWED ---------------------------   The nursing notes within the ED encounter and vital signs as below have been reviewed. /78   Pulse 64   Temp 97.6 °F (36.4 °C) (Temporal)   Resp 16   SpO2 97%   Oxygen Saturation Interpretation: Normal      ---------------------------------------------------PHYSICAL EXAM--------------------------------------      Constitutional/General: Alert and oriented x3, well appearing, non toxic in NAD  Head: Normocephalic and atraumatic  Eyes: PERRL, EOMI  Mouth: Oropharynx clear, handling secretions, no trismus  Neck: Supple, full ROM,   Pulmonary: Lungs clear to auscultation bilaterally, no wheezes, rales, or rhonchi. Not in respiratory distress  Cardiovascular:  Regular rate and rhythm, no murmurs, gallops, or rubs. 2+ distal pulses  Abdomen: Soft, non tender, non distended,   Extremities: Moves all extremities x 4. Warm and well perfused  Skin: warm and dry without rash  Neurologic: GCS 15, no focal motor or sensory deficits   Psych: Normal Affect. Behavior normal. No SI.  No HI.      ------------------------------ ED COURSE/MEDICAL DECISION MAKING----------------------  Medications   LORazepam (ATIVAN) tablet 1 mg (1 mg Oral Given 1/21/22 2156)   ondansetron (ZOFRAN-ODT) disintegrating tablet 4 mg (4 mg Oral Given 1/21/22 2156)       Medical Decision Making/ED COURSE:   Patient is a 51-year-old female presenting with opiate withdrawal.  She is hemodynamically stable and nontoxic on examination. Medical screening exam is reassuring. She already has recovery scheduled for Tuesday. Plan to treat symptomatically and have her follow-up as scheduled. She has a ride for discharge home. Patient remained hemodynamically stable throughout ED course. Counseling: The emergency provider has spoken with the patient and discussed todays results, in addition to providing specific details for the plan of care and counseling regarding the diagnosis and prognosis. Questions are answered at this time and they are agreeable with the plan.      --------------------------------- IMPRESSION AND DISPOSITION ---------------------------------    IMPRESSION  1. Opiate withdrawal ([facility] [address]        DISPOSITION  Disposition: Discharge to home  Patient condition is stable      NOTE: This report was transcribed using voice recognition software.  Every effort was made to ensure accuracy; however, inadvertent computerized transcription errors may be present    IMaria T MD, am the primary provider of this record       Maria T Lassiter MD  01/21/22 [id number]

## 2022-02-02 NOTE — ED PROVIDER NOTES
ENDOSCOPY     Social History:  reports that she has been smoking cigarettes. She started smoking about 38 years ago. She has a 32.00 pack-year smoking history. She has never used smokeless tobacco. She reports previous alcohol use of about 3.0 standard drinks of alcohol per week. She reports that she does not use drugs. Family History: family history includes Cancer in her mother; Diabetes in her mother. Allergies: Dye [iodides] and Tylenol [acetaminophen]    Physical Exam   Oxygen Saturation Interpretation: Normal.   ED Triage Vitals   BP Temp Temp Source Pulse Resp SpO2 Height Weight   02/08/20 1413 02/08/20 1413 02/08/20 1413 02/08/20 1413 02/08/20 1413 02/08/20 1413 02/08/20 1428 02/08/20 1428   116/78 98 °F (36.7 °C) Oral 88 16 96 % 5' 1\" (1.549 m) 150 lb (68 kg)        Physical Exam  · Constitutional/General: Alert and oriented x3, well appearing, non toxic  · HEENT:  NC/NT. PERRLA,  Airway patent. · Neck: Supple, full ROM, non tender to palpation in the midline, no stridor, no crepitus, no meningeal signs  · Respiratory: Scattered wheezes throughout both lower lung fields with rhonchi greater within the right lower lobe than left. Not in respiratory distress. No retractions. · CV:  Regular rate. Regular rhythm. No murmurs, gallops, or rubs. 2+ distal pulses  · Chest: No chest wall tenderness  · GI:  Abdomen Soft, Non tender, Non distended. +BS. No rebound, guarding, or rigidity. No pulsatile masses. · Musculoskeletal: Warm and well perfused, no clubbing, cyanosis, or edema. Capillary refill <3 seconds  · Integument: skin warm and dry. No rashes.    · Lymphatic: no lymphadenopathy noted  · Neurologic: GCS 15, no focal deficits, symmetric strength 5/5 in the upper and lower extremities bilaterally    Lab / Imaging Results   (All laboratory and radiology results have been personally reviewed by myself)  Labs:  Results for orders placed or performed during the hospital encounter of 02/08/20   Rapid influenza A/B antigens   Result Value Ref Range    Influenza A by PCR DETECTED (A) Not Detected    Influenza B by PCR Not Detected Not Detected     Imaging: All Radiology results interpreted by Radiologist unless otherwise noted. XR CHEST STANDARD (2 VW)   Final Result      Small, ill-defined patchy airspace opacity within the right lower lobe   is concerning for pneumonia. Consider follow-up radiographs following   treatment to ensure resolution. ED Course / Medical Decision Making     Medications   ipratropium-albuterol (DUONEB) nebulizer solution 1 ampule (1 ampule Inhalation Given 2/8/20 1509)   cefdinir (OMNICEF) capsule 300 mg (300 mg Oral Given 2/8/20 1557)   azithromycin (ZITHROMAX) tablet 500 mg (500 mg Oral Given 2/8/20 1557)        Re-examination:  2/8/20       Time: 1545   Improved after aerosol treatments. Wheezing has resolved. Persistent rhonchi of the right lower lobe. Consult(s):   None    Procedure(s):   none    MDM:         Patient presented the emergency department from a assisted living facility with URI symptoms including a cough and wheezing. She did test positive for influenza. There is suggestion of a right lower lobe infiltrate on her x-ray. She is otherwise well-appearing, received aerosol treatments in the emergency room with resolution of wheezing and is breathing much more comfortably. Patient appears amenable for outpatient therapy. We will prescribe her antibiotics and nebulized medication for breathing. Since she has been ill longer than 7 days it was not recommended to initiate Tamiflu. In the event of any worsening of symptoms she was encouraged to return to the nearest emergency department. CURB-65 CRITERIA FOR PNEUMONIA  1. Confusion no (0)   2. BUN > 19mg/dl    3.    RR > 30 / min no (0)   4. SBP < 90 mm Hg or DBP < 60 mm Hg no (0)   5.     Age > 72Years Old no (0)    TOTAL 0     SCORE MORTALITY SUGGESTED MANAGEMENT 0 OR 1 1.5%  Likely suitable for home Tx     2   9.2%  Short stay inpatient   -vs-    supervised outpatient Tx   3-5 22% Inpatient, eval for ICU especially if score 4 or 5       Counseling: The emergency provider has spoken with the patient and discussed todays results, in addition to providing specific details for the plan of care and counseling regarding the diagnosis and prognosis. Questions are answered at this time and they are agreeable with the plan as stated above. Assessment      1. Pneumonia due to organism    2. Influenza A      Plan   Discharge to home  Patient condition is good    New Medications     New Prescriptions    ALBUTEROL (PROVENTIL) (2.5 MG/3ML) 0.083% NEBULIZER SOLUTION    Take 3 mLs by nebulization every 4 hours as needed for Wheezing May substitute generic if insurance does not cover. Please provide 1 box (25 or 30 ampules) depending on how supplied per box    AZITHROMYCIN (ZITHROMAX Z-BJ) 250 MG TABLET    Take 2 tabs on day one, followed by 1 tablet daily for 4 days. CEFDINIR (OMNICEF) 300 MG CAPSULE    Take 1 capsule by mouth 2 times daily for 10 days    RESPIRATORY THERAPY SUPPLIES (FULL KIT NEBULIZER SET) MISC    Use as directed with nebulized medication. Electronically signed by GRISELDA Jaffe   DD: 2/8/20  **This report was transcribed using voice recognition software. Every effort was made to ensure accuracy; however, inadvertent computerized transcription errors may be present.   END OF ED PROVIDER NOTE       GRISELDA Gonzales  02/08/20 [address]  02/08/20 1600 Admission

## 2022-03-25 ENCOUNTER — HOSPITAL ENCOUNTER (EMERGENCY)
Age: 52
Discharge: LEFT BEFORE DISCHARGE | End: 2022-03-25
Attending: EMERGENCY MEDICINE
Payer: MEDICAID

## 2022-03-25 ENCOUNTER — APPOINTMENT (OUTPATIENT)
Dept: GENERAL RADIOLOGY | Age: 52
End: 2022-03-25
Payer: MEDICAID

## 2022-03-25 VITALS
RESPIRATION RATE: 20 BRPM | TEMPERATURE: 97.8 F | HEART RATE: 76 BPM | BODY MASS INDEX: 25.11 KG/M2 | OXYGEN SATURATION: 97 % | HEIGHT: 61 IN | DIASTOLIC BLOOD PRESSURE: 69 MMHG | SYSTOLIC BLOOD PRESSURE: 109 MMHG | WEIGHT: 133 LBS

## 2022-03-25 DIAGNOSIS — Z53.29 LEFT AGAINST MEDICAL ADVICE: Primary | ICD-10-CM

## 2022-03-25 LAB
ANION GAP SERPL CALCULATED.3IONS-SCNC: 12 MMOL/L (ref 7–16)
BUN BLDV-MCNC: 16 MG/DL (ref 6–20)
CALCIUM SERPL-MCNC: 9.7 MG/DL (ref 8.6–10.2)
CHLORIDE BLD-SCNC: 93 MMOL/L (ref 98–107)
CO2: 30 MMOL/L (ref 22–29)
CREAT SERPL-MCNC: 0.8 MG/DL (ref 0.5–1)
GFR AFRICAN AMERICAN: >60
GFR NON-AFRICAN AMERICAN: >60 ML/MIN/1.73
GLUCOSE BLD-MCNC: 258 MG/DL (ref 74–99)
HCT VFR BLD CALC: 43.8 % (ref 34–48)
HEMOGLOBIN: 14.1 G/DL (ref 11.5–15.5)
MCH RBC QN AUTO: 30.1 PG (ref 26–35)
MCHC RBC AUTO-ENTMCNC: 32.2 % (ref 32–34.5)
MCV RBC AUTO: 93.4 FL (ref 80–99.9)
PDW BLD-RTO: 13.2 FL (ref 11.5–15)
PLATELET # BLD: 91 E9/L (ref 130–450)
PLATELET CONFIRMATION: NORMAL
PMV BLD AUTO: 10.5 FL (ref 7–12)
POTASSIUM SERPL-SCNC: 4.4 MMOL/L (ref 3.5–5)
RBC # BLD: 4.69 E12/L (ref 3.5–5.5)
SODIUM BLD-SCNC: 135 MMOL/L (ref 132–146)
TROPONIN, HIGH SENSITIVITY: 23 NG/L (ref 0–9)
WBC # BLD: 4.1 E9/L (ref 4.5–11.5)

## 2022-03-25 PROCEDURE — 71045 X-RAY EXAM CHEST 1 VIEW: CPT

## 2022-03-25 PROCEDURE — 96374 THER/PROPH/DIAG INJ IV PUSH: CPT

## 2022-03-25 PROCEDURE — 2580000003 HC RX 258: Performed by: EMERGENCY MEDICINE

## 2022-03-25 PROCEDURE — 4500000002 HC ER NO CHARGE

## 2022-03-25 PROCEDURE — 6360000002 HC RX W HCPCS: Performed by: EMERGENCY MEDICINE

## 2022-03-25 PROCEDURE — 80048 BASIC METABOLIC PNL TOTAL CA: CPT

## 2022-03-25 PROCEDURE — 85027 COMPLETE CBC AUTOMATED: CPT

## 2022-03-25 PROCEDURE — 93005 ELECTROCARDIOGRAM TRACING: CPT | Performed by: EMERGENCY MEDICINE

## 2022-03-25 PROCEDURE — 84484 ASSAY OF TROPONIN QUANT: CPT

## 2022-03-25 RX ORDER — 0.9 % SODIUM CHLORIDE 0.9 %
1000 INTRAVENOUS SOLUTION INTRAVENOUS ONCE
Status: COMPLETED | OUTPATIENT
Start: 2022-03-25 | End: 2022-03-25

## 2022-03-25 RX ORDER — KETOROLAC TROMETHAMINE 30 MG/ML
30 INJECTION, SOLUTION INTRAMUSCULAR; INTRAVENOUS ONCE
Status: COMPLETED | OUTPATIENT
Start: 2022-03-25 | End: 2022-03-25

## 2022-03-25 RX ADMIN — SODIUM CHLORIDE 1000 ML: 9 INJECTION, SOLUTION INTRAVENOUS at 18:35

## 2022-03-25 RX ADMIN — KETOROLAC TROMETHAMINE 30 MG: 30 INJECTION, SOLUTION INTRAMUSCULAR; INTRAVENOUS at 18:40

## 2022-03-25 ASSESSMENT — ENCOUNTER SYMPTOMS
SHORTNESS OF BREATH: 1
EYE PAIN: 0
COUGH: 0
EYE REDNESS: 0
SINUS PRESSURE: 0
SORE THROAT: 0
DIARRHEA: 0
EYE DISCHARGE: 0
WHEEZING: 0
NAUSEA: 0
BACK PAIN: 0
ABDOMINAL DISTENTION: 0
VOMITING: 0

## 2022-03-25 ASSESSMENT — PAIN SCALES - GENERAL
PAINLEVEL_OUTOF10: 4
PAINLEVEL_OUTOF10: 6
PAINLEVEL_OUTOF10: 5

## 2022-03-25 ASSESSMENT — PAIN DESCRIPTION - LOCATION: LOCATION: CHEST

## 2022-03-25 ASSESSMENT — PAIN DESCRIPTION - ORIENTATION: ORIENTATION: MID

## 2022-03-25 NOTE — ED PROVIDER NOTES
Patient is a 47 y/o female who presents to the ED with generalized weakness and fatigue. Patient states that she has felt weak and fatigued for the past couple weeks. She went to Urgent Care today and they told her that she had a heart attack in the past and told her to come to the ED. Patient states that she had left sided chest pain approximately one week ago. She denies any current chest pain. She states that she is always short of breath secondary to COPD. She does continue to smoke. She denies any recent fever. She complains of a diffuse headache. Review of Systems   Constitutional: Positive for fatigue. Negative for chills and fever. HENT: Negative for ear pain, sinus pressure and sore throat. Eyes: Negative for pain, discharge and redness. Respiratory: Positive for shortness of breath. Negative for cough and wheezing. Cardiovascular: Negative for chest pain. Gastrointestinal: Negative for abdominal distention, diarrhea, nausea and vomiting. Genitourinary: Negative for dysuria and frequency. Musculoskeletal: Negative for arthralgias and back pain. Skin: Negative for rash and wound. Neurological: Positive for weakness and headaches. Hematological: Negative for adenopathy. All other systems reviewed and are negative. Physical Exam  Vitals and nursing note reviewed. Constitutional:       General: She is not in acute distress. HENT:      Head: Normocephalic and atraumatic. Right Ear: External ear normal.      Left Ear: External ear normal.      Nose: Nose normal.      Mouth/Throat:      Mouth: Mucous membranes are moist.   Eyes:      Conjunctiva/sclera: Conjunctivae normal.      Pupils: Pupils are equal, round, and reactive to light. Neck:      Comments: No meningeal signs. Cardiovascular:      Rate and Rhythm: Normal rate and regular rhythm. Heart sounds: No murmur heard. Pulmonary:      Effort: Pulmonary effort is normal. No respiratory distress. Breath sounds: Normal breath sounds. No stridor. No wheezing, rhonchi or rales. Abdominal:      General: Bowel sounds are normal. There is no distension. Palpations: Abdomen is soft. Tenderness: There is no abdominal tenderness. There is no guarding. Musculoskeletal:         General: Normal range of motion. Cervical back: Normal range of motion and neck supple. Skin:     General: Skin is warm and dry. Findings: No rash. Neurological:      Mental Status: She is alert and oriented to person, place, and time. Procedures     MDM           EKG:  Normal sinus rhythm with ventricular rate of 70. [place] interval, QRS duration and QT interval within normal range. Normal axis. Nonspecific ST segment and T wave changes. No previous EKG.         --------------------------------------------- PAST HISTORY ---------------------------------------------  Past Medical History:  has a past medical history of Alcoholism ([facility] [address] Anxiety and depression, Cancer ([facility] [address] Chronic pancreatitis ([facility] [address] Diabetes mellitus, type 2 ([facility] [address] GERD (gastroesophageal reflux disease), Gout, Hepatitis C, Jaundice, Schizoaffective disorder, bipolar type ([facility] [address] and Splenomegaly. Past Surgical History:  has a past surgical history that includes Hysterectomy (2004); Ovary removal (Bilateral, 2007); shoulder surgery (Left, 2003); Cholecystectomy (2010); ERCP; Abdomen surgery; Colonoscopy; Upper gastrointestinal endoscopy; other surgical history (Left, 04/25/2017); Breast biopsy (Left); Pancreas Biopsy; Endoscopy, colon, diagnostic; other surgical history (10/05/2017); and Colonoscopy (N/A, 10/2/2018). Social History:  reports that she has been smoking cigarettes. She started smoking about 40 years ago. She has a 38.00 pack-year smoking history. She has never used smokeless tobacco. She reports previous alcohol use. She reports current drug use. Drugs: Cocaine and Opiates .     Family History: family history includes Cancer in her mother; Diabetes in her mother. The patients home medications have been reviewed. Allergies: Dye [iodides] and Tylenol [acetaminophen]    -------------------------------------------------- RESULTS -------------------------------------------------  Labs:  Results for orders placed or performed during the hospital encounter of 10/75/59   Basic metabolic panel   Result Value Ref Range    Sodium 135 132 - 146 mmol/L    Potassium 4.4 3.5 - 5.0 mmol/L    Chloride 93 (L) 98 - 107 mmol/L    CO2 30 (H) 22 - 29 mmol/L    Anion Gap 12 7 - 16 mmol/L    Glucose 258 (H) 74 - 99 mg/dL    BUN 16 6 - 20 mg/dL    CREATININE 0.8 0.5 - 1.0 mg/dL    GFR Non-African American >60 >=60 mL/min/1.73    GFR African American >60     Calcium 9.7 8.6 - 10.2 mg/dL   CBC   Result Value Ref Range    WBC 4.1 (L) 4.5 - 11.5 E9/L    RBC 4.69 3.50 - 5.50 E12/L    Hemoglobin 14.1 11.5 - 15.5 g/dL    Hematocrit 43.8 34.0 - 48.0 %    MCV 93.4 80.0 - 99.9 fL    MCH 30.1 26.0 - 35.0 pg    MCHC 32.2 32.0 - 34.5 %    RDW 13.2 11.5 - 15.0 fL    Platelets 91 (L) 742 - 450 E9/L    MPV 10.5 7.0 - 12.0 fL   Troponin   Result Value Ref Range    Troponin, High Sensitivity 23 (H) 0 - 9 ng/L   Platelet Confirmation   Result Value Ref Range    Platelet Confirmation CONFIRMED    EKG 12 Lead   Result Value Ref Range    Ventricular Rate 70 BPM    Atrial Rate 70 BPM    P-R Interval 138 ms    QRS Duration 76 ms    Q-T Interval 388 ms    QTc Calculation (Bazett) 419 ms    P Axis 73 degrees    R Axis 57 degrees    T Axis 71 degrees       Radiology:  XR CHEST PORTABLE   Final Result   No acute process. ------------------------- NURSING NOTES AND VITALS REVIEWED ---------------------------  Date / Time Roomed:  3/25/2022  5:14 PM  ED Bed Assignment:  14/14    The nursing notes within the ED encounter and vital signs as below have been reviewed.    BP 99/64   Pulse 77   Temp 97.8 °F (36.6 °C) (Infrared)   Resp 18   Ht 5' 1\" (1.549 m)   Wt 133 lb

## 2022-03-26 ENCOUNTER — HOSPITAL ENCOUNTER (EMERGENCY)
Age: 52
Discharge: HOME | End: 2022-03-26
Attending: EMERGENCY MEDICINE
Payer: MEDICAID

## 2022-03-26 ENCOUNTER — APPOINTMENT (OUTPATIENT)
Dept: GENERAL RADIOLOGY | Age: 52
End: 2022-03-26
Payer: MEDICAID

## 2022-03-26 VITALS
RESPIRATION RATE: 15 BRPM | TEMPERATURE: 97.7 F | HEART RATE: 78 BPM | OXYGEN SATURATION: 95 % | SYSTOLIC BLOOD PRESSURE: 120 MMHG | DIASTOLIC BLOOD PRESSURE: 78 MMHG

## 2022-03-26 DIAGNOSIS — D69.6 THROMBOCYTOPENIA (HCC): ICD-10-CM

## 2022-03-26 DIAGNOSIS — J06.9 ACUTE UPPER RESPIRATORY INFECTION: ICD-10-CM

## 2022-03-26 DIAGNOSIS — R07.9 CHEST PAIN, UNSPECIFIED TYPE: Primary | ICD-10-CM

## 2022-03-26 DIAGNOSIS — R73.9 HYPERGLYCEMIA: ICD-10-CM

## 2022-03-26 LAB
ALBUMIN SERPL-MCNC: 3.7 G/DL (ref 3.5–5.2)
ALP BLD-CCNC: 439 U/L (ref 35–104)
ALT SERPL-CCNC: 57 U/L (ref 0–32)
ANION GAP SERPL CALCULATED.3IONS-SCNC: 14 MMOL/L (ref 7–16)
AST SERPL-CCNC: 67 U/L (ref 0–31)
BASOPHILS ABSOLUTE: 0.01 E9/L (ref 0–0.2)
BASOPHILS RELATIVE PERCENT: 0.3 % (ref 0–2)
BILIRUB SERPL-MCNC: 0.6 MG/DL (ref 0–1.2)
BUN BLDV-MCNC: 15 MG/DL (ref 6–20)
CALCIUM SERPL-MCNC: 9.3 MG/DL (ref 8.6–10.2)
CHLORIDE BLD-SCNC: 96 MMOL/L (ref 98–107)
CO2: 24 MMOL/L (ref 22–29)
CREAT SERPL-MCNC: 0.7 MG/DL (ref 0.5–1)
D DIMER: 346 NG/ML DDU
EKG ATRIAL RATE: 70 BPM
EKG P AXIS: 73 DEGREES
EKG P-R INTERVAL: 138 MS
EKG Q-T INTERVAL: 388 MS
EKG QRS DURATION: 76 MS
EKG QTC CALCULATION (BAZETT): 419 MS
EKG R AXIS: 57 DEGREES
EKG T AXIS: 71 DEGREES
EKG VENTRICULAR RATE: 70 BPM
EOSINOPHILS ABSOLUTE: 0.04 E9/L (ref 0.05–0.5)
EOSINOPHILS RELATIVE PERCENT: 1 % (ref 0–6)
GFR AFRICAN AMERICAN: >60
GFR NON-AFRICAN AMERICAN: >60 ML/MIN/1.73
GLUCOSE BLD-MCNC: 231 MG/DL (ref 74–99)
HCT VFR BLD CALC: 35.2 % (ref 34–48)
HEMOGLOBIN: 11.8 G/DL (ref 11.5–15.5)
IMMATURE GRANULOCYTES #: 0.02 E9/L
IMMATURE GRANULOCYTES %: 0.5 % (ref 0–5)
INFLUENZA A BY PCR: NOT DETECTED
INFLUENZA B BY PCR: NOT DETECTED
LACTIC ACID: 0.9 MMOL/L (ref 0.5–2.2)
LYMPHOCYTES ABSOLUTE: 0.39 E9/L (ref 1.5–4)
LYMPHOCYTES RELATIVE PERCENT: 10.1 % (ref 20–42)
MCH RBC QN AUTO: 30.6 PG (ref 26–35)
MCHC RBC AUTO-ENTMCNC: 33.5 % (ref 32–34.5)
MCV RBC AUTO: 91.4 FL (ref 80–99.9)
MONOCYTES ABSOLUTE: 0.28 E9/L (ref 0.1–0.95)
MONOCYTES RELATIVE PERCENT: 7.2 % (ref 2–12)
NEUTROPHILS ABSOLUTE: 3.14 E9/L (ref 1.8–7.3)
NEUTROPHILS RELATIVE PERCENT: 80.9 % (ref 43–80)
PDW BLD-RTO: 13.3 FL (ref 11.5–15)
PLATELET # BLD: 79 E9/L (ref 130–450)
PLATELET CONFIRMATION: NORMAL
PMV BLD AUTO: 10 FL (ref 7–12)
POTASSIUM SERPL-SCNC: 4.4 MMOL/L (ref 3.5–5)
RBC # BLD: 3.85 E12/L (ref 3.5–5.5)
REASON FOR REJECTION: NORMAL
REJECTED TEST: NORMAL
SARS-COV-2, NAAT: NOT DETECTED
SODIUM BLD-SCNC: 134 MMOL/L (ref 132–146)
TOTAL PROTEIN: 6.9 G/DL (ref 6.4–8.3)
TROPONIN, HIGH SENSITIVITY: 21 NG/L (ref 0–9)
TROPONIN, HIGH SENSITIVITY: 23 NG/L (ref 0–9)
TROPONIN, HIGH SENSITIVITY: 25 NG/L (ref 0–9)
WBC # BLD: 3.9 E9/L (ref 4.5–11.5)

## 2022-03-26 PROCEDURE — 87635 SARS-COV-2 COVID-19 AMP PRB: CPT

## 2022-03-26 PROCEDURE — 85025 COMPLETE CBC W/AUTO DIFF WBC: CPT

## 2022-03-26 PROCEDURE — 71045 X-RAY EXAM CHEST 1 VIEW: CPT

## 2022-03-26 PROCEDURE — 2580000003 HC RX 258: Performed by: EMERGENCY MEDICINE

## 2022-03-26 PROCEDURE — 36415 COLL VENOUS BLD VENIPUNCTURE: CPT

## 2022-03-26 PROCEDURE — 83605 ASSAY OF LACTIC ACID: CPT

## 2022-03-26 PROCEDURE — 84484 ASSAY OF TROPONIN QUANT: CPT

## 2022-03-26 PROCEDURE — 87502 INFLUENZA DNA AMP PROBE: CPT

## 2022-03-26 PROCEDURE — 85378 FIBRIN DEGRADE SEMIQUANT: CPT

## 2022-03-26 PROCEDURE — 93010 ELECTROCARDIOGRAM REPORT: CPT | Performed by: INTERNAL MEDICINE

## 2022-03-26 PROCEDURE — 6360000002 HC RX W HCPCS

## 2022-03-26 PROCEDURE — 93005 ELECTROCARDIOGRAM TRACING: CPT | Performed by: EMERGENCY MEDICINE

## 2022-03-26 PROCEDURE — 80053 COMPREHEN METABOLIC PANEL: CPT

## 2022-03-26 PROCEDURE — 96374 THER/PROPH/DIAG INJ IV PUSH: CPT

## 2022-03-26 PROCEDURE — 99284 EMERGENCY DEPT VISIT MOD MDM: CPT

## 2022-03-26 RX ORDER — DOXYCYCLINE HYCLATE 100 MG
100 TABLET ORAL 2 TIMES DAILY
Qty: 20 TABLET | Refills: 0 | Status: SHIPPED | OUTPATIENT
Start: 2022-03-26 | End: 2022-04-05

## 2022-03-26 RX ORDER — 0.9 % SODIUM CHLORIDE 0.9 %
1000 INTRAVENOUS SOLUTION INTRAVENOUS ONCE
Status: COMPLETED | OUTPATIENT
Start: 2022-03-26 | End: 2022-03-26

## 2022-03-26 RX ORDER — KETOROLAC TROMETHAMINE 30 MG/ML
INJECTION, SOLUTION INTRAMUSCULAR; INTRAVENOUS
Status: COMPLETED
Start: 2022-03-26 | End: 2022-03-26

## 2022-03-26 RX ORDER — 0.9 % SODIUM CHLORIDE 0.9 %
1000 INTRAVENOUS SOLUTION INTRAVENOUS ONCE
Status: DISCONTINUED | OUTPATIENT
Start: 2022-03-26 | End: 2022-03-26

## 2022-03-26 RX ORDER — KETOROLAC TROMETHAMINE 30 MG/ML
30 INJECTION, SOLUTION INTRAMUSCULAR; INTRAVENOUS ONCE
Status: COMPLETED | OUTPATIENT
Start: 2022-03-26 | End: 2022-03-26

## 2022-03-26 RX ORDER — PREDNISONE 10 MG/1
40 TABLET ORAL DAILY
Qty: 20 TABLET | Refills: 0 | Status: SHIPPED | OUTPATIENT
Start: 2022-03-26 | End: 2022-03-31

## 2022-03-26 RX ADMIN — KETOROLAC TROMETHAMINE 30 MG: 30 INJECTION, SOLUTION INTRAMUSCULAR at 14:59

## 2022-03-26 RX ADMIN — KETOROLAC TROMETHAMINE 30 MG: 30 INJECTION, SOLUTION INTRAMUSCULAR; INTRAVENOUS at 14:59

## 2022-03-26 RX ADMIN — SODIUM CHLORIDE 1000 ML: 9 INJECTION, SOLUTION INTRAVENOUS at 14:59

## 2022-03-26 ASSESSMENT — PAIN DESCRIPTION - PROGRESSION: CLINICAL_PROGRESSION: NOT CHANGED

## 2022-03-26 ASSESSMENT — PAIN DESCRIPTION - ONSET: ONSET: ON-GOING

## 2022-03-26 ASSESSMENT — PAIN SCALES - GENERAL
PAINLEVEL_OUTOF10: 5
PAINLEVEL_OUTOF10: 9

## 2022-03-26 ASSESSMENT — PAIN DESCRIPTION - LOCATION: LOCATION: HEAD

## 2022-03-26 ASSESSMENT — PAIN DESCRIPTION - DESCRIPTORS: DESCRIPTORS: HEADACHE

## 2022-03-26 ASSESSMENT — PAIN DESCRIPTION - PAIN TYPE: TYPE: ACUTE PAIN

## 2022-03-26 ASSESSMENT — PAIN DESCRIPTION - FREQUENCY: FREQUENCY: CONTINUOUS

## 2022-03-26 NOTE — ED PROVIDER NOTES
HPI:  3/26/22, Time: 1:48 PM EDT         Luís Aviles is a 46 y.o. female presenting to the ED for sinus congestion, chest congestion, headache, cough with green phlegm, nausea, emesis (no blood, no bile), diarrhea and chest pains, beginning 2 weeks ago. She does state last week had an episode of severe, substernal chest pain that self resolved and has not returned. She is a smoker. She went to urgent care yesterday because she had taken 5 days of antibiotics a week and a half ago for this and is no better. They did an EKG and told her it was abnormal and to go to the ER. She went to [facility] ER yesterday and sat there for 7 hours and eloped. The complaint has been persistent, moderate in severity, and worsened by nothing. Patient denies fever/chills, sore throat,  shortness of breath, edema, headache, visual disturbances, focal paresthesias, focal weakness, abdominal pain, constipation, dysuria, hematuria, trauma, neck or back pain, rash or other complaints. ROS:   A complete review of systems was performed and all pertinent positives and negatives are stated within HPI, all other systems reviewed and are negative.      --------------------------------------------- PAST HISTORY ---------------------------------------------  Past Medical History:  has a past medical history of Alcoholism ([facility] [address] Anxiety and depression, Cancer ([facility] [address] Chronic pancreatitis ([facility] [address] Diabetes mellitus, type 2 ([facility] [address] GERD (gastroesophageal reflux disease), Gout, Hepatitis C, Jaundice, Schizoaffective disorder, bipolar type ([facility] [address] and Splenomegaly. Past Surgical History:  has a past surgical history that includes Hysterectomy (2004); Ovary removal (Bilateral, 2007); shoulder surgery (Left, 2003); Cholecystectomy (2010); ERCP; Abdomen surgery; Colonoscopy; Upper gastrointestinal endoscopy; other surgical history (Left, 04/25/2017); Breast biopsy (Left);  Pancreas Biopsy; Endoscopy, colon, diagnostic; other surgical history (10/05/2017); and Colonoscopy (N/A, 10/2/2018). Social History:  reports that she has been smoking cigarettes. She started smoking about 40 years ago. She has a 38.00 pack-year smoking history. She has never used smokeless tobacco. She reports previous alcohol use. She reports current drug use. Drugs: Cocaine and Opiates . Family History: family history includes Cancer in her mother; Diabetes in her mother. The patients home medications have been reviewed. Allergies: Dye [iodides] and Tylenol [acetaminophen]        ----------------------------------------PHYSICAL EXAM--------------------------------------  Constitutional:  Well developed, well nourished, no acute distress, non-toxic appearance   Eyes:  PERRL, conjunctiva normal, EOMI  HENT:  Atraumatic, external ears normal, nose normal, oropharynx moist, no pharyngeal exudates, redness and swelling. TMs clear and intact bilaterally. External auditory canals pink and dry. No mastoid tenderness bilaterally. Neck- normal range of motion, no nuchal rigidity   Respiratory:  No respiratory distress, normal breath sounds, no rales, no wheezing   Cardiovascular:  Normal rate, normal rhythm, no murmurs, no gallops, no rubs. Radial and DP pulses 2+ bilaterally. Compartments are soft. She is warm and well perfused. Cap refill less than 3 seconds. GI:  Soft, nondistended, normal bowel sounds, nontender, no organomegaly, no mass, no rebound, no guarding   :  No costovertebral angle tenderness   Musculoskeletal:  No edema, no tenderness, no deformities. Back- no tenderness  Integument:  Well hydrated, no visible rash. Adequate perfusion. Lymphatic:  No cervical lymphadenopathy noted   Neurologic:  Alert & oriented x 3, CN 2-12 normal, no focal deficits noted. Normal gait.     Psychiatric:  Speech and behavior appropriate       -------------------------------------------------- RESULTS -------------------------------------------------  I have personally reviewed all laboratory and imaging results for this patient. Results are listed below.      LABS:  Results for orders placed or performed during the hospital encounter of 03/26/22   Rapid influenza A/B antigens    Specimen: Nasopharyngeal   Result Value Ref Range    Influenza A by PCR Not Detected Not Detected    Influenza B by PCR Not Detected Not Detected   COVID-19, Rapid    Specimen: Nasopharyngeal Swab   Result Value Ref Range    SARS-CoV-2, NAAT Not Detected Not Detected   Comprehensive Metabolic Panel   Result Value Ref Range    Sodium 134 132 - 146 mmol/L    Potassium 4.4 3.5 - 5.0 mmol/L    Chloride 96 (L) 98 - 107 mmol/L    CO2 24 22 - 29 mmol/L    Anion Gap 14 7 - 16 mmol/L    Glucose 231 (H) 74 - 99 mg/dL    BUN 15 6 - 20 mg/dL    CREATININE 0.7 0.5 - 1.0 mg/dL    GFR Non-African American >60 >=60 mL/min/1.73    GFR African American >60     Calcium 9.3 8.6 - 10.2 mg/dL    Total Protein 6.9 6.4 - 8.3 g/dL    Albumin 3.7 3.5 - 5.2 g/dL    Total Bilirubin 0.6 0.0 - 1.2 mg/dL    Alkaline Phosphatase 439 (H) 35 - 104 U/L    ALT 57 (H) 0 - 32 U/L    AST 67 (H) 0 - 31 U/L   Troponin   Result Value Ref Range    Troponin, High Sensitivity 25 (H) 0 - 9 ng/L   D-Dimer, Quantitative   Result Value Ref Range    D-Dimer, Quant 346 ng/mL DDU   Lactic Acid   Result Value Ref Range    Lactic Acid 0.9 0.5 - 2.2 mmol/L   SPECIMEN REJECTION   Result Value Ref Range    Rejected Test cbcwd     Reason for Rejection see below    Troponin   Result Value Ref Range    Troponin, High Sensitivity 23 (H) 0 - 9 ng/L   CBC with Auto Differential   Result Value Ref Range    WBC 3.9 (L) 4.5 - 11.5 E9/L    RBC 3.85 3.50 - 5.50 E12/L    Hemoglobin 11.8 11.5 - 15.5 g/dL    Hematocrit 35.2 34.0 - 48.0 %    MCV 91.4 80.0 - 99.9 fL    MCH 30.6 26.0 - 35.0 pg    MCHC 33.5 32.0 - 34.5 %    RDW 13.3 11.5 - 15.0 fL    Platelets 79 (L) 852 - 450 E9/L    MPV 10.0 7.0 - 12.0 fL    Neutrophils % 80.9 (H) 43.0 - 80.0 %    Immature Granulocytes % 0.5 0.0 - 5.0 %    Lymphocytes % 10.1 (L) 20.0 - 42.0 %    Monocytes % 7.2 2.0 - 12.0 %    Eosinophils % 1.0 0.0 - 6.0 %    Basophils % 0.3 0.0 - 2.0 %    Neutrophils Absolute 3.14 1.80 - 7.30 E9/L    Immature Granulocytes # 0.02 E9/L    Lymphocytes Absolute 0.39 (L) 1.50 - 4.00 E9/L    Monocytes Absolute 0.28 0.10 - 0.95 E9/L    Eosinophils Absolute 0.04 (L) 0.05 - 0.50 E9/L    Basophils Absolute 0.01 0.00 - 0.20 E9/L   Platelet Confirmation   Result Value Ref Range    Platelet Confirmation CONFIRMED    Troponin   Result Value Ref Range    Troponin, High Sensitivity 21 (H) 0 - 9 ng/L       RADIOLOGY:  Interpreted by Radiologist.  XR CHEST PORTABLE   Final Result   No acute process. Mild chronic interstitial changes. EKG Interpretation  Time: 12:49 PM EST  Rhythm: normal sinus   Rate: 82  Axis: normal  Conduction: normal  ST Segments: no acute change  T Waves: no acute change  Clinical Impression: non-specific EKG  Comparison to prior EKG: stable as compared to patient's most recent EKG AND EKG from 2020      ------------------------- NURSING NOTES AND VITALS REVIEWED ---------------------------  The nursing notes within the ED encounter and vital signs as below have been reviewed by myself. /78   Pulse 78   Temp 97.7 °F (36.5 °C) (Temporal)   Resp 15   SpO2 95%   Oxygen Saturation Interpretation: Normal      The patients available past medical records and past encounters were reviewed. ------------------------------ ED COURSE/MEDICAL DECISION MAKING----------------------  Medications   0.9 % sodium chloride bolus (0 mLs IntraVENous Stopped 3/26/22 2089)   ketorolac (TORADOL) injection 30 mg (30 mg IntraVENous Given 3/26/22 5562)           Procedures:   none      Medical Decision Making:    Trop 23 yesterday, today 25 -->21. Unlikely cardiac, EKG shows anterior Q-waves and is unchanged from years ago.   D-dimer 346, she has had mildly elevated D-dimer in the past and no diagnosis of PE. She is allergic to IV dye and would be risky to expose her to IV contrast for such a mildly elevated D-dimer low risk per age-adjusted D-dimer (500-510) at this time. Her symptoms are not consistent with PE, she is not hypoxic or tachycardic and has no recent surgeries, travel, immobility or thrombophilia history. Given her symptoms of cough, congestion, sinus headache, sinus congestion of 2 weeks duration with negative Covid and negative flu, there is no visible pneumonia on x-ray but is reasonable to treat for bronchitis/COPD exacerbation. Will discharge home on doxycycline and prednisone. Has inhalers at home. She appears well, nontoxic, no hypoxia, neurovascularly intact and ambulatory upon discharge by Dr. Juana Currie. Patient was explicitly instructed on specific signs and symptoms on which to return to the emergency room for. Patient was instructed to return to the ER for any new or worsening symptoms. Additional discharge instructions were given verbally. All questions were answered. Patient is comfortable and agreeable with discharge plan. Patient in no acute distress and non-toxic in appearance. This patient's ED course included: re-evaluation prior to disposition, cardiac monitoring, continuous pulse oximetry and a personal history and physicial eaxmination    This patient has remained hemodynamically stable, improved and been closely monitored during their ED course. Re-Evaluations:  Time: 1114   Re-evaluation. Patients symptoms are improving  Repeat physical examination is not changed      Consultations:   none    Critical Care: none        Counseling: The emergency provider has spoken with the patient and daughter and discussed todays results, in addition to providing specific details for the plan of care and counseling regarding the diagnosis and prognosis.   Questions are answered at this time and they are agreeable with the plan.    --------------------------- IMPRESSION AND DISPOSITION ---------------------------------    IMPRESSION  1. Chest pain, unspecified type    2. Acute upper respiratory infection    3. Thrombocytopenia ([address]    4.  Hyperglycemia        DISPOSITION  Disposition: Discharge to home  Patient condition is DO Margaret  03/27/22 7923

## 2022-03-26 NOTE — ED NOTES
Pt states would like to leave and does not want to wait for the MD. MD notified     Eliecer Sands RN  03/25/22 [id number]

## 2022-03-28 LAB
EKG ATRIAL RATE: 82 BPM
EKG P AXIS: 70 DEGREES
EKG P-R INTERVAL: 148 MS
EKG Q-T INTERVAL: 358 MS
EKG QRS DURATION: 80 MS
EKG QTC CALCULATION (BAZETT): 418 MS
EKG R AXIS: 40 DEGREES
EKG T AXIS: 73 DEGREES
EKG VENTRICULAR RATE: 82 BPM

## 2022-03-28 PROCEDURE — 93010 ELECTROCARDIOGRAM REPORT: CPT | Performed by: INTERNAL MEDICINE

## 2022-03-31 DIAGNOSIS — Z12.31 SCREENING MAMMOGRAM FOR HIGH-RISK PATIENT: Primary | ICD-10-CM

## 2022-03-31 DIAGNOSIS — Z79.811 USE OF AROMATASE INHIBITORS: ICD-10-CM

## 2022-04-07 ENCOUNTER — HOSPITAL ENCOUNTER (OUTPATIENT)
Dept: MAMMOGRAPHY | Age: 52
Discharge: HOME | End: 2022-04-09
Payer: MEDICAID

## 2022-04-07 VITALS — BODY MASS INDEX: 24.55 KG/M2 | WEIGHT: 130 LBS | HEIGHT: 61 IN

## 2022-04-07 DIAGNOSIS — Z79.811 USE OF AROMATASE INHIBITORS: ICD-10-CM

## 2022-04-07 DIAGNOSIS — Z12.31 SCREENING MAMMOGRAM FOR HIGH-RISK PATIENT: ICD-10-CM

## 2022-04-07 PROCEDURE — 77067 SCR MAMMO BI INCL CAD: CPT

## 2022-04-07 PROCEDURE — 77080 DXA BONE DENSITY AXIAL: CPT

## 2022-04-11 DIAGNOSIS — Z85.3 PERSONAL HISTORY OF BREAST CANCER: ICD-10-CM

## 2022-04-11 DIAGNOSIS — Z79.811 USE OF AROMATASE INHIBITORS: Primary | ICD-10-CM

## 2022-04-19 ENCOUNTER — HOSPITAL ENCOUNTER (OUTPATIENT)
Dept: INFUSION THERAPY | Age: 52
Discharge: HOME | End: 2022-04-19

## 2022-04-19 ENCOUNTER — OFFICE VISIT (OUTPATIENT)
Dept: ONCOLOGY | Age: 52
End: 2022-04-19
Payer: MEDICAID

## 2022-04-19 VITALS
DIASTOLIC BLOOD PRESSURE: 64 MMHG | HEIGHT: 61 IN | HEART RATE: 82 BPM | BODY MASS INDEX: 27.2 KG/M2 | SYSTOLIC BLOOD PRESSURE: 96 MMHG | TEMPERATURE: 97.8 F | WEIGHT: 144.1 LBS | OXYGEN SATURATION: 98 %

## 2022-04-19 DIAGNOSIS — Z85.3 PERSONAL HISTORY OF BREAST CANCER: Primary | ICD-10-CM

## 2022-04-19 DIAGNOSIS — Z85.3 PERSONAL HISTORY OF BREAST CANCER: ICD-10-CM

## 2022-04-19 DIAGNOSIS — Z79.811 USE OF AROMATASE INHIBITORS: ICD-10-CM

## 2022-04-19 PROCEDURE — 99215 OFFICE O/P EST HI 40 MIN: CPT | Performed by: INTERNAL MEDICINE

## 2022-04-19 PROCEDURE — 99212 OFFICE O/P EST SF 10 MIN: CPT

## 2022-04-19 RX ORDER — BUPRENORPHINE HYDROCHLORIDE 8 MG/1
TABLET SUBLINGUAL 2 TIMES DAILY
COMMUNITY
End: 2022-05-31

## 2022-04-19 NOTE — PROGRESS NOTES
Patient declined Prolia today stating she needs to take Claritin for at least 2 days prior to injection. She will begin taking today and come back Friday 4/22/22 for Prolia.

## 2022-04-19 NOTE — PROGRESS NOTES
[place] [id number]    Attending Clinic Note    Reason for Visit: Follow-up on a patient with Left Breast Cancer    PCP:  Marco A Lockhart MD    History of Present Illness: The left breast mass was located at the 11 o'clock position. Age of menarche 15;  3 Para 3; LMP early 29's; [address] [facility] Mother with Breast Cancer at Age 61    Bilateral Diagnostic Mammogram on 2017:  Circumscribed solid nodule without increased vascularity at the 11 o'clock position corresponding to the patient's palpable lesion. 6 mm smooth bordered nodule within the left upper outer quadrant which is not identifiable on the lateral view (6 month f/u left breast mammogram recommended). Unremarkable right-sided mammogram demonstrating no evidence of malignancy. Annual right sided mammogram recommended. Left Breast Ultrasound on 2017 noted circumscribed solid nodule without increased vascularity or shadowing located at the 11 o'clock position corresponding to the patient's palpable lesion. Nodule measures 0.5 cm x 0.4 cm x 0.5 cm. Left Breast Mass 11 o'clock Core Needle Biopsy was on 2017: Invasive Well Differentiated Ductal Carcinoma; Grade 1; Lymph-vascular invasion Not identified. ER positive (100%); MO positive (5%), HER/2neu Equivocal (2+)  HER/2neu FISH Negative (Her2/CEN17 signal ratio 1.2)    Blue dye injection with left breast lumpectomy and sentinel lymph node biopsy after technetium sulfur colloid injection was performed on 2017:  A.  Lymph node, left axillary, left breast sentinel node, biopsy: One lymph node with no diagnostic abnormality. Pearl Simmons, left, excision of mass:  -Invasive well-differentiated ductal carcinoma and intermediate grade ductal carcinoma in situ (see comment and cancer case summary). -Fibrocystic changes. CANCER CASE SUMMARY:  Procedure: Excision without image guided localization. Lymph node sampling: [place] lymph node.   Specimen laterality: Left.  Tumor size, size of largest invasive carcinoma: 7 mm. Histologic type: Invasive mammary carcinoma of no special type(ductal,not otherwise specified). Histologic grade:   Glandular (acinar)/tubular differentiation: Score 2.   Nuclear pleomorphism: Score 1.   Mitotic rate: Score 1.   Overall grade: Grade 1. Tumor focality: Single focus of invasive carcinoma. Ductal carcinoma in situ: DCIS is present.  Clide Bon of DCIS: Estimated size: At least 15 mm.   Architectural patterns: Cribriform and solid.   Nuclear grade: Grade 2 (intermediate).   Necrosis: Not identified. Margins:   Invasive carcinoma:    -Margins uninvolved by invasive carcinoma.    -Distance from closest margin: 4 mm (anterior and lateral margins).   DCIS:    -Margins positive for DCIS (posterior from the postero-medial to postero-lateral aspects). Lymph nodes:   Total number of lymph nodes examined (sentinel and non-sentinel): 1.   Number of sentinel lymph nodes examined: 1. Lymph vascular invasion: Not identified. Pathologic staging:   Primary tumor (pT): pT1b.   Regional lymph nodes (pN):pN0 (i-) (sn- only a sentinel lymph node evaluated). Re-excision of posteromedial to posterolateral margins for DCIS was performed on 05/05/2017:  Left breast, re-excision:  Extensive residual intermediate-grade duct carcinoma in situ of solid, micropapillary, and cribriform types (see comment). Histologic changes consistent with healing biopsy/excision site. Fibrocystic change, including patchy stromal fibroplasia and occasional small benign breast cysts. Adenosis and sclerosing adenosis. Comment:     Residual DCIS is identified within 7 of 10 microscopic sections examined.  Microscopically, the maximum dimension of DCIS is 2.0 cm.  Invasive carcinoma is not identified.  DCIS involves the anterior, posterior, medial, inferior, and superior inked excision margins. Residual Invasive carcinoma is not identified.   The pathological Staging (pTNM) for this patient remains unchanged. Oncotype DX Recurrence Score 7;  Margins still positive for DCIS. Left Mastectomy was recommended (patient was non-compliant with appointments). Not a candidate for immediate reconstruction per Dr. Yogi Ojeda (Plastic Surgery Team). Reconstruction can be performed even after 3-6 months after surgery. Left Mastectomy was performed on 10/05/2017. Breast, left, simple mastectomy (185 g; 17.0 x 15.5 x 2.7 cm): Infiltrating ductal carcinoma, moderately differentiated (1.1 cm maximum dimension) 12 o'clock position. Ductal carcinoma in situ, solid and cribriform types, intermediate nuclear grade associated with previous biopsy/excision site (cavity-3.8 cm maximum dimension) of superior aspect of breast.    Infiltrating ductal carcinoma, moderately differentiated (0.6 cm maximum dimension) of central region of breast (separate/second focus) with associated ductal carcinoma in situ, solid and cribriform types,intermediate nuclear grade. Fibroadenoma, remote, microscopic (1). Nipple and areola: Negative for carcinoma. Margins of resection: Infiltrating ductal carcinoma (12 o'clock) comes to within 0.1 cm of inked superior and 0.2 cm of inked posterior/deep margin of resection. Ductal carcinoma in situ with associated biopsy cavity is present less than 0.1 cm from inked deep margin of resection and 0.3 cm from inked anterior margin of resection. Infiltrating ductal carcinoma (central breast parenchyma) is present less than 0.2 cm from inked anterior margin of resection. Lymphovascular invasion: Not identified. Dermal lymphovascular invasion: Not identified. Pathologic stage: at least [id number] o'clock lesion; pT1b-central lesion. Regional lymph nodes: See [facility]-; only [place] lymph node (1) is examined. p(sn)N0 (i-). Hx of DYLON/BSO in 2007; [facility] 26.5 10/17/2017. Estradiol <5.0 on 10/17/2017. DEXA scan Oct 2017 osteoporosis lumbar spine and femoral necks.   Ca/VitD Prolia q6months  We recommended endocrine therapy (Arimidex 1 mg po daily) Side effects of Arimidex reviewed with patient. She agreed to proceed. Right Diagnostic Mammogram 12/05/2018 Negative for malignancy. DEXA scan 12/05/2018 osteopenia in LS; Osteoporosis involving the hips. Arimidex 1 mg po daily was started on 11/09/2018     Today 04/19/2022; she continues to tolerate Arimidex well. No fever, chills. Fair appetite and energy level. Review of Systems;  CONSTITUTIONAL: No fever, chills. Fair appetite and energy level. ENMT: Eyes: No diplopia; Nose: No epistaxis. Mouth: No sore throat. RESPIRATORY: No hemoptysis, shortness of breath, cough. CARDIOVASCULAR: No chest pain, palpitations. GASTROINTESTINAL: No nausea/vomiting, abdominal pain  GENITOURINARY: No dysuria, urinary frequency, hematuria. NEURO: No syncope, presyncope, headache. Remainder:  ROS NEGATIVE    Past Medical History:      Diagnosis Date    [facility]ism ([facility]     Since teens to early 25s    Anxiety and depression     Cancer ([facility]     breast, left    Chronic pancreatitis ([facility]     Diabetes mellitus, type 2 ([facility]     GERD (gastroesophageal reflux disease)     Gout     Hepatitis C     Jaundice 5/12/2016    Schizoaffective disorder, bipolar type ([facility]     Splenomegaly 5/30/2017     Medications:  Reviewed and reconciled. Allergies: Allergies   Allergen Reactions    Dye [Iodides]     Tylenol [Acetaminophen]      Was told not to take by her DRScott For a bad liver     OK  to use sparingly       Physical Exam:  BP 96/64   Pulse 82   Temp 97.8 °F (36.6 °C)   Ht 5' 1\" (1.549 m)   Wt 144 lb 1.6 oz (65.4 kg)   SpO2 98%   BMI 27.23 kg/m²   GENERAL: Alert, oriented x 3, not in acute distress  HEENT: PERRLA; EOMI. Oropharynx clear. NECK: Supple. Without lymphadenopathy. LUNGS: Good air entry bilaterally. No wheezing, crackles or ronchi. CARDIOVASCULAR: Regular rate. No murmurs, rubs or gallops. ABDOMEN: Soft.  Non-tender, non-distended. Positive bowel sounds. EXTREMITIES: Without clubbing, cyanosis, or edema. NEUROLOGIC: No focal deficits. ECOG PS 1    Lab Results   Component Value Date    WBC 3.9 (L) 03/26/2022    HGB 11.8 03/26/2022    HCT 35.2 03/26/2022    MCV 91.4 03/26/2022    PLT 79 (L) 03/26/2022     Lab Results   Component Value Date     03/26/2022    K 4.4 03/26/2022    CL 96 (L) 03/26/2022    CO2 24 03/26/2022    BUN 15 03/26/2022    CREATININE 0.7 03/26/2022    GLUCOSE 231 (H) 03/26/2022    CALCIUM 9.3 03/26/2022    PROT 6.9 03/26/2022    LABALBU 3.7 03/26/2022    BILITOT 0.6 03/26/2022    ALKPHOS 439 (H) 03/26/2022    AST 67 (H) 03/26/2022    ALT 57 (H) 03/26/2022    LABGLOM >60 03/26/2022    GFRAA >60 03/26/2022    GLOB 4.0 (H) 03/10/2016     Impression/Plan:  45 y/o female who underwent Blue dye injection with left breast lumpectomy and sentinel lymph node biopsy after technetium sulfur colloid injection on 04/25/2017:  A.  Lymph node, left axillary, left breast sentinel node, biopsy: One lymph node with no diagnostic abnormality. Sapphire Yun, left, excision of mass:  -Invasive well-differentiated ductal carcinoma and intermediate grade ductal carcinoma in situ (see comment and cancer case summary). -Fibrocystic changes. CANCER CASE SUMMARY:  Procedure: Excision without image guided localization. Lymph node sampling: [place] lymph node. Specimen laterality: Left. Tumor size, size of largest invasive carcinoma: 7 mm. Histologic type: Invasive mammary carcinoma of no special type (ductal,not otherwise specified). Histologic grade:   Glandular (acinar)/tubular differentiation: Score 2.   Nuclear pleomorphism: Score 1.   Mitotic rate: Score 1.   Overall grade: Grade 1. Tumor focality: Single focus of invasive carcinoma. Ductal carcinoma in situ: DCIS is present.  Margie Pallas of DCIS: Estimated size:  At least 15 mm.   Architectural patterns: Cribriform and solid.   Nuclear grade: Grade 2 carcinoma, moderately differentiated (1.1 cm maximum dimension) 12 o'clock position. Ductal carcinoma in situ, solid and cribriform types, intermediate nuclear grade associated with previous biopsy/excision site (cavity-3.8 cm maximum dimension) of superior aspect of breast.    Infiltrating ductal carcinoma, moderately differentiated (0.6 cm maximum dimension) of central region of breast (separate/second focus) with associated ductal carcinoma in situ, solid and cribriform types,intermediate nuclear grade. Fibroadenoma, remote, microscopic (1). Nipple and areola: Negative for carcinoma. Margins of resection: Infiltrating ductal carcinoma (12 o'clock) comes to within 0.1 cm of inked superior and 0.2 cm of inked posterior/deep margin of resection. Ductal carcinoma in situ with associated biopsy cavity is present less than 0.1 cm from inked deep margin of resection and 0.3 cm from inked anterior margin of resection. Infiltrating ductal carcinoma (central breast parenchyma) is present less than 0.2 cm from inked anterior margin of resection. Lymphovascular invasion: Not identified. Dermal lymphovascular invasion: Not identified. Pathologic stage: at least [id number]-90 o'clock lesion; pT1b-central lesion. Regional lymph nodes: See [place]-; only [place] lymph node (1) is examined. p(sn)N0 (i-). Hx of DYLON/BSO in 2007; [address] [place] [address] 26.5 10/17/2017. Estradiol <5.0 on 10/17/2017. DEXA scan Oct 2017 osteoporosis lumbar spine and femoral necks. Ca/VitD Prolia q6months    We recommended endocrine therapy (Arimidex 1 mg po daily) Side effects of Arimidex reviewed with patient. She agreed to proceed. Right Diagnostic Mammogram 12/05/2018 Negative for malignancy. DEXA scan 12/05/2018 osteopenia in LS; Osteoporosis involving the hips. Arimidex 1 mg po daily was started on 11/09/2018 which she is tolerating well. Right Screening Mammogram on 03/02/2021 Negative for malignancy.    DEXA scan 03/02/2021 osteopenia in LS (4% increased since prior study); osteopenia in left and right hips (5.6% increase since prior study); osteoporosis in forearm. Ca/VitD. Prolia q6months    Right Screening Mammogram 04/07/2022 negative for malignancy  DEXA scan 04/07/2022: Bone mineral density of the right forearm is osteoporotic.    5% increase in forearm bone loss compared to prior. Bone mineral density of the lumbar spine is osteopenic and shows a 6% increase in bone loss compared to prior. Bone Mineral density of the bilateral femoral necks is osteopenic without significant change  Imaging reviewed. Labs reviewed. KACIE  Continue Arimidex, Ca/VitD. Prolia q6months (one to be given today 04/19/2022). RTC 6 months with labs and prolia same day    Genetics: My-Risk recommended as well as Genetic counseling.     Jason Pagan MD   [id number]  Board Certified Medical Oncologist

## 2022-04-20 NOTE — PROGRESS NOTES
Patient presents to clinic today for Xgeva injection. Patient has not had an injection since March 2021. Patient's labs monitored, specifically, Calcium level, to ensure no increased risk of hypocalcemia with administration of the medication. Patient's calcium level is 9.3 mg/dL, drawn 3/26/22. Patient received therapy and will continue to be monitored prior to each dose.

## 2022-04-22 ENCOUNTER — HOSPITAL ENCOUNTER (OUTPATIENT)
Dept: INFUSION THERAPY | Age: 52
Discharge: HOME | End: 2022-04-22
Payer: MEDICAID

## 2022-04-22 DIAGNOSIS — M81.8 OTHER OSTEOPOROSIS WITHOUT CURRENT PATHOLOGICAL FRACTURE: ICD-10-CM

## 2022-04-22 DIAGNOSIS — M81.8 IDIOPATHIC OSTEOPOROSIS: Primary | ICD-10-CM

## 2022-04-22 PROCEDURE — 6360000002 HC RX W HCPCS: Performed by: INTERNAL MEDICINE

## 2022-04-22 PROCEDURE — 96372 THER/PROPH/DIAG INJ SC/IM: CPT

## 2022-04-22 RX ADMIN — DENOSUMAB 60 MG: 60 INJECTION SUBCUTANEOUS at 10:52

## 2022-04-22 NOTE — PROGRESS NOTES
On 4/20, patient decided not to receive Prolia injection as she had not taken her standard pre-medications. Patient returned today for injection and labs parameters from March were utilized.

## 2022-04-22 NOTE — PROGRESS NOTES
Patient alert and oriented x3. No distress noted. Patient educated on signs and symptoms of reaction to medication. Educated patient on possible side effects and treatment of medication. Patient verbalized understanding. Offered patient education an/or discharge material.  Patient declined. Patient denies any needs. All questions answered.

## 2022-05-03 RX ORDER — IPRATROPIUM BROMIDE AND ALBUTEROL SULFATE 2.5; .5 MG/3ML; MG/3ML
1 SOLUTION RESPIRATORY (INHALATION)
COMMUNITY
End: 2022-06-06 | Stop reason: SDUPTHER

## 2022-05-03 RX ORDER — METOCLOPRAMIDE 10 MG/1
10 TABLET ORAL
COMMUNITY
End: 2022-05-23 | Stop reason: ALTCHOICE

## 2022-05-03 RX ORDER — FAMOTIDINE 20 MG/1
20 TABLET, FILM COATED ORAL 2 TIMES DAILY
COMMUNITY
End: 2022-05-23 | Stop reason: ALTCHOICE

## 2022-05-03 RX ORDER — TOPIRAMATE 100 MG/1
CAPSULE, EXTENDED RELEASE ORAL
COMMUNITY
End: 2022-06-09

## 2022-05-03 RX ORDER — OXYBUTYNIN CHLORIDE 5 MG/1
5 TABLET ORAL
COMMUNITY
End: 2022-05-23 | Stop reason: ALTCHOICE

## 2022-05-03 RX ORDER — SUCRALFATE 1 G/1
1 TABLET ORAL 4 TIMES DAILY
COMMUNITY

## 2022-05-03 RX ORDER — TRAZODONE HYDROCHLORIDE 100 MG/1
100 TABLET ORAL NIGHTLY
COMMUNITY
End: 2022-06-09

## 2022-05-03 RX ORDER — TRAMADOL HYDROCHLORIDE 50 MG/1
50 TABLET ORAL 3 TIMES DAILY
COMMUNITY
End: 2022-05-31

## 2022-05-03 RX ORDER — THIAMINE MONONITRATE (VIT B1) 100 MG
100 TABLET ORAL DAILY
COMMUNITY
End: 2022-06-06 | Stop reason: SDUPTHER

## 2022-05-03 RX ORDER — IBUPROFEN 800 MG/1
800 TABLET ORAL
COMMUNITY
End: 2022-05-23 | Stop reason: SDUPTHER

## 2022-05-03 RX ORDER — FLUCONAZOLE 150 MG/1
150 TABLET ORAL ONCE
COMMUNITY
End: 2022-05-23 | Stop reason: ALTCHOICE

## 2022-05-03 RX ORDER — FLUVOXAMINE MALEATE 50 MG/1
50 TABLET, COATED ORAL
COMMUNITY
End: 2022-05-23 | Stop reason: ALTCHOICE

## 2022-05-03 RX ORDER — PANTOPRAZOLE SODIUM 40 MG/1
40 TABLET, DELAYED RELEASE ORAL DAILY
COMMUNITY
End: 2022-06-06 | Stop reason: SDUPTHER

## 2022-05-03 RX ORDER — ALPRAZOLAM 0.5 MG/1
0.5 TABLET ORAL 2 TIMES DAILY
COMMUNITY
End: 2022-05-23 | Stop reason: ALTCHOICE

## 2022-05-03 RX ORDER — DICYCLOMINE HCL 20 MG
20 TABLET ORAL
COMMUNITY
End: 2022-05-23 | Stop reason: ALTCHOICE

## 2022-05-23 ENCOUNTER — HOSPITAL ENCOUNTER (EMERGENCY)
Age: 52
Discharge: HOME | End: 2022-05-23
Payer: MEDICAID

## 2022-05-23 VITALS
SYSTOLIC BLOOD PRESSURE: 114 MMHG | DIASTOLIC BLOOD PRESSURE: 66 MMHG | HEART RATE: 71 BPM | WEIGHT: 125 LBS | BODY MASS INDEX: 23.62 KG/M2 | RESPIRATION RATE: 16 BRPM | OXYGEN SATURATION: 98 % | TEMPERATURE: 98.1 F

## 2022-05-23 DIAGNOSIS — Z76.0 ENCOUNTER FOR MEDICATION REFILL: Primary | ICD-10-CM

## 2022-05-23 PROCEDURE — 99283 EMERGENCY DEPT VISIT LOW MDM: CPT

## 2022-05-23 RX ORDER — ALBUTEROL SULFATE 90 UG/1
2 AEROSOL, METERED RESPIRATORY (INHALATION) EVERY 6 HOURS PRN
Qty: 18 G | Refills: 0 | Status: SHIPPED | OUTPATIENT
Start: 2022-05-23 | End: 2022-06-06 | Stop reason: SDUPTHER

## 2022-05-23 RX ORDER — ONDANSETRON 4 MG/1
4 TABLET, ORALLY DISINTEGRATING ORAL EVERY 8 HOURS PRN
Qty: 12 TABLET | Refills: 0 | Status: SHIPPED | OUTPATIENT
Start: 2022-05-23 | End: 2022-05-31

## 2022-05-23 RX ORDER — GUAIFENESIN 600 MG/1
600 TABLET, EXTENDED RELEASE ORAL 2 TIMES DAILY
Qty: 30 TABLET | Refills: 0 | Status: SHIPPED | OUTPATIENT
Start: 2022-05-23 | End: 2022-06-06

## 2022-05-23 RX ORDER — PLECANATIDE 3 MG/1
3 TABLET ORAL DAILY
Qty: 10 TABLET | Refills: 0 | Status: SHIPPED | OUTPATIENT
Start: 2022-05-23 | End: 2022-06-06

## 2022-05-23 RX ORDER — CIPROFLOXACIN AND DEXAMETHASONE 3; 1 MG/ML; MG/ML
4 SUSPENSION/ DROPS AURICULAR (OTIC) 2 TIMES DAILY
Qty: 7.5 ML | Refills: 0 | Status: SHIPPED | OUTPATIENT
Start: 2022-05-23 | End: 2022-05-30

## 2022-05-23 RX ORDER — TIZANIDINE 4 MG/1
4 TABLET ORAL 3 TIMES DAILY
Qty: 30 TABLET | Refills: 0 | Status: SHIPPED | OUTPATIENT
Start: 2022-05-23

## 2022-05-23 RX ORDER — INSULIN GLARGINE 100 [IU]/ML
15 INJECTION, SOLUTION SUBCUTANEOUS 2 TIMES DAILY
Qty: 3 ML | Refills: 0 | Status: SHIPPED | OUTPATIENT
Start: 2022-05-23 | End: 2022-06-06

## 2022-05-23 RX ORDER — TIOTROPIUM BROMIDE INHALATION SPRAY 1.56 UG/1
2 SPRAY, METERED RESPIRATORY (INHALATION) DAILY
Qty: 4 G | Refills: 0 | Status: SHIPPED | OUTPATIENT
Start: 2022-05-23 | End: 2022-06-06 | Stop reason: SDUPTHER

## 2022-05-23 RX ORDER — LUBIPROSTONE 8 UG/1
8 CAPSULE, GELATIN COATED ORAL DAILY
Qty: 10 CAPSULE | Refills: 0 | Status: SHIPPED | OUTPATIENT
Start: 2022-05-23

## 2022-05-23 RX ORDER — BUDESONIDE AND FORMOTEROL FUMARATE DIHYDRATE 160; 4.5 UG/1; UG/1
2 AEROSOL RESPIRATORY (INHALATION) 2 TIMES DAILY
Qty: 6 G | Refills: 0 | Status: SHIPPED | OUTPATIENT
Start: 2022-05-23 | End: 2022-06-06 | Stop reason: SDUPTHER

## 2022-05-23 RX ORDER — GABAPENTIN 400 MG/1
400 CAPSULE ORAL 3 TIMES DAILY
Qty: 30 CAPSULE | Refills: 0 | Status: SHIPPED | OUTPATIENT
Start: 2022-05-23 | End: 2022-10-10 | Stop reason: SDUPTHER

## 2022-05-23 RX ORDER — SENNA AND DOCUSATE SODIUM 50; 8.6 MG/1; MG/1
2 TABLET, FILM COATED ORAL 2 TIMES DAILY
Qty: 40 TABLET | Refills: 0 | Status: SHIPPED | OUTPATIENT
Start: 2022-05-23 | End: 2022-06-06 | Stop reason: SDUPTHER

## 2022-05-23 RX ORDER — IBUPROFEN 800 MG/1
800 TABLET ORAL EVERY 8 HOURS PRN
Qty: 20 TABLET | Refills: 0 | Status: SHIPPED | OUTPATIENT
Start: 2022-05-23 | End: 2022-06-06 | Stop reason: SDUPTHER

## 2022-05-23 ASSESSMENT — PAIN - FUNCTIONAL ASSESSMENT
PAIN_FUNCTIONAL_ASSESSMENT: NONE - DENIES PAIN
PAIN_FUNCTIONAL_ASSESSMENT: NONE - DENIES PAIN

## 2022-05-23 NOTE — ED PROVIDER NOTES
[facility]     Department of Emergency Medicine   ED  Provider Note  Admit Date/RoomTime: 5/23/2022  3:17 PM  ED Room: TAI/TAI          3:40 PM EDT    HISTORY OF PRESENT ILLNESS:  (Nurses Notes Reviewed)    Chief Complaint:   Medication Refill (completely out of all medication, told to come to ER by PCP to get meds refilled)      Source of history provided by:  patient. Javi Masterson is a 46 y.o. old female presenting to the emergency department requesting medication(s) as result of:                                Running out of Medication(s):  Yes, one week ago. Lost Medication(s):  no.                             Lost Prescription(s):  no.                             Medication(s) Stolen:  no.     She has been without requested medications for approximately 1 week ago and is not currently enrolled in pain management program.  As a result of being without med(s) she has associated symptoms of none and severity has been absent. Medication Type:     Pain Meds:   no.     Hypertension:   yes. Diabetic Meds:   yes. Antibiotics:   no.     Anxiety/Depression:   no.     Other Type:   N/A. PAST MEDICAL, SURGICAL, SOCIAL AND FAMILY HISTORY SECTION    Past Medical History:  has a past medical history of Alcoholism ([address] Anxiety and depression, Ascites of liver, Bronchitis, Cancer ([address] Candidiasis of vagina, Chronic pancreatitis ([address] Cocaine abuse ([address] Constipation, Diabetes mellitus, type 2 ([address] GERD (gastroesophageal reflux disease), Gout, Hepatitis C, Hernia of anterior abdominal wall, Jaundice, Pneumonia, Polyneuropathy due to type 2 diabetes mellitus ([address] Schizoaffective disorder, bipolar type ([address] and Splenomegaly. Past Surgical History:  has a past surgical history that includes Hysterectomy (2004); Ovary removal (Bilateral, 2007); shoulder surgery (Left, 2003); Cholecystectomy (2010); ERCP; Abdomen surgery; Colonoscopy;  Upper gastrointestinal endoscopy; other surgical history (Left, 04/25/2017); Breast biopsy (Left); Pancreas Biopsy; Endoscopy, colon, diagnostic; other surgical history (10/05/2017); Colonoscopy (N/A, 10/2/2018); and Mastectomy (Left). Social History:  reports that she has been smoking cigarettes. She started smoking about 40 years ago. She has a 60.00 pack-year smoking history. She has never used smokeless tobacco. She reports previous alcohol use. She reports current drug use. Drugs: Cocaine and Opiates . Family History: family history includes Cancer in her mother; Colon Cancer in her maternal grandfather; Diabetes in her mother; No Known Problems in her father. The patients home medications have been reviewed. Allergies: Dye [iodides] and Tylenol [acetaminophen]      Review of Systems:   Pertinent positives and negatives are stated within HPI, all other systems reviewed and are negative.        ------------------------- NURSING NOTES AND VITALS REVIEWED ---------------------------   The nursing notes within the ED encounter and vital signs as below have been reviewed. /66   Pulse 71   Temp 98.1 °F (36.7 °C) (Oral)   Resp 16   Wt 125 lb (56.7 kg)   SpO2 98%   BMI 23.62 kg/m²     ---------------------------------------------------PHYSICAL EXAM--------------------------------------      Constitutional/General: Alert and oriented x3, well appearing, non toxic in NAD  Head: Normocephalic and atraumatic  Eyes: PERRL, EOMI  Mouth: Oropharynx clear, handling secretions, no trismus  Neck: Supple, full ROM,   Pulmonary: Lungs clear to auscultation bilaterally, no wheezes, rales, or rhonchi. Not in respiratory distress  Cardiovascular:  Regular rate and rhythm, no murmurs, gallops, or rubs. 2+ distal pulses  Abdomen: Soft, non tender, non distended,   Extremities: Moves all extremities x 4.  Warm and well perfused  Skin: warm and dry without rash  Neurologic: GCS 15,  Psych: Normal Affect      ------------------------------ ED COURSE/MEDICAL DECISION MAKING----------------------  Medications - No data to display      Medical Decision Making:    Medication Refill. Advised that the patient needs to follow up with PCP for further medication refills. Counseling: The emergency provider has spoken with the patient and spouse/SO and discussed todays results, in addition to providing specific details for the plan of care and counseling regarding the diagnosis and prognosis.   Questions are answered at this time and they are agreeable with the plan.      --------------------------------- IMPRESSION AND DISPOSITION ---------------------------------    IMPRESSION  Medication refill    DISPOSITION  Disposition: Discharge to home  Patient condition is stable                    Radha Qu[place], [place]  05/23/22 2627

## 2022-05-31 ENCOUNTER — HOSPITAL ENCOUNTER (EMERGENCY)
Age: 52
Discharge: HOME | End: 2022-05-31
Attending: GENERAL PRACTICE
Payer: MEDICAID

## 2022-05-31 ENCOUNTER — APPOINTMENT (OUTPATIENT)
Dept: GENERAL RADIOLOGY | Age: 52
End: 2022-05-31
Payer: MEDICAID

## 2022-05-31 VITALS
HEART RATE: 69 BPM | WEIGHT: 124 LBS | BODY MASS INDEX: 23.43 KG/M2 | DIASTOLIC BLOOD PRESSURE: 64 MMHG | TEMPERATURE: 98.8 F | SYSTOLIC BLOOD PRESSURE: 110 MMHG | RESPIRATION RATE: 16 BRPM | OXYGEN SATURATION: 100 %

## 2022-05-31 DIAGNOSIS — K86.1 CHRONIC PANCREATITIS, UNSPECIFIED PANCREATITIS TYPE (HCC): Primary | ICD-10-CM

## 2022-05-31 LAB
ALBUMIN SERPL-MCNC: 3.9 G/DL (ref 3.5–5.2)
ALP BLD-CCNC: 223 U/L (ref 35–104)
ALT SERPL-CCNC: 27 U/L (ref 0–32)
ANION GAP SERPL CALCULATED.3IONS-SCNC: 12 MMOL/L (ref 7–16)
AST SERPL-CCNC: 40 U/L (ref 0–31)
BASOPHILS ABSOLUTE: 0.03 E9/L (ref 0–0.2)
BASOPHILS RELATIVE PERCENT: 0.6 % (ref 0–2)
BILIRUB SERPL-MCNC: 0.6 MG/DL (ref 0–1.2)
BUN BLDV-MCNC: 11 MG/DL (ref 6–20)
CALCIUM SERPL-MCNC: 8.8 MG/DL (ref 8.6–10.2)
CHLORIDE BLD-SCNC: 105 MMOL/L (ref 98–107)
CO2: 24 MMOL/L (ref 22–29)
CREAT SERPL-MCNC: 0.7 MG/DL (ref 0.5–1)
EKG ATRIAL RATE: 75 BPM
EKG P AXIS: 68 DEGREES
EKG P-R INTERVAL: 138 MS
EKG Q-T INTERVAL: 394 MS
EKG QRS DURATION: 74 MS
EKG QTC CALCULATION (BAZETT): 439 MS
EKG R AXIS: 47 DEGREES
EKG T AXIS: 64 DEGREES
EKG VENTRICULAR RATE: 75 BPM
EOSINOPHILS ABSOLUTE: 0.07 E9/L (ref 0.05–0.5)
EOSINOPHILS RELATIVE PERCENT: 1.4 % (ref 0–6)
GFR AFRICAN AMERICAN: >60
GFR NON-AFRICAN AMERICAN: >60 ML/MIN/1.73
GLUCOSE BLD-MCNC: 195 MG/DL (ref 74–99)
HCT VFR BLD CALC: 39.9 % (ref 34–48)
HEMOGLOBIN: 13.8 G/DL (ref 11.5–15.5)
IMMATURE GRANULOCYTES #: 0.02 E9/L
IMMATURE GRANULOCYTES %: 0.4 % (ref 0–5)
LACTIC ACID, SEPSIS: 0.9 MMOL/L (ref 0.5–1.9)
LIPASE: 12 U/L (ref 13–60)
LYMPHOCYTES ABSOLUTE: 0.9 E9/L (ref 1.5–4)
LYMPHOCYTES RELATIVE PERCENT: 17.6 % (ref 20–42)
MCH RBC QN AUTO: 30.2 PG (ref 26–35)
MCHC RBC AUTO-ENTMCNC: 34.6 % (ref 32–34.5)
MCV RBC AUTO: 87.3 FL (ref 80–99.9)
MONOCYTES ABSOLUTE: 0.2 E9/L (ref 0.1–0.95)
MONOCYTES RELATIVE PERCENT: 3.9 % (ref 2–12)
NEUTROPHILS ABSOLUTE: 3.89 E9/L (ref 1.8–7.3)
NEUTROPHILS RELATIVE PERCENT: 76.1 % (ref 43–80)
PDW BLD-RTO: 13.7 FL (ref 11.5–15)
PLATELET # BLD: 88 E9/L (ref 130–450)
PLATELET CONFIRMATION: NORMAL
PMV BLD AUTO: 11.1 FL (ref 7–12)
POTASSIUM SERPL-SCNC: 4.2 MMOL/L (ref 3.5–5)
RBC # BLD: 4.57 E12/L (ref 3.5–5.5)
REASON FOR REJECTION: NORMAL
REJECTED TEST: NORMAL
SODIUM BLD-SCNC: 141 MMOL/L (ref 132–146)
TOTAL PROTEIN: 7.3 G/DL (ref 6.4–8.3)
TROPONIN, HIGH SENSITIVITY: 20 NG/L (ref 0–9)
WBC # BLD: 5.1 E9/L (ref 4.5–11.5)

## 2022-05-31 PROCEDURE — 83690 ASSAY OF LIPASE: CPT

## 2022-05-31 PROCEDURE — 96375 TX/PRO/DX INJ NEW DRUG ADDON: CPT

## 2022-05-31 PROCEDURE — 93005 ELECTROCARDIOGRAM TRACING: CPT | Performed by: GENERAL PRACTICE

## 2022-05-31 PROCEDURE — 2580000003 HC RX 258: Performed by: GENERAL PRACTICE

## 2022-05-31 PROCEDURE — 6360000002 HC RX W HCPCS: Performed by: GENERAL PRACTICE

## 2022-05-31 PROCEDURE — 96374 THER/PROPH/DIAG INJ IV PUSH: CPT

## 2022-05-31 PROCEDURE — 36415 COLL VENOUS BLD VENIPUNCTURE: CPT

## 2022-05-31 PROCEDURE — 80053 COMPREHEN METABOLIC PANEL: CPT

## 2022-05-31 PROCEDURE — 96376 TX/PRO/DX INJ SAME DRUG ADON: CPT

## 2022-05-31 PROCEDURE — 85025 COMPLETE CBC W/AUTO DIFF WBC: CPT

## 2022-05-31 PROCEDURE — 84484 ASSAY OF TROPONIN QUANT: CPT

## 2022-05-31 PROCEDURE — 99285 EMERGENCY DEPT VISIT HI MDM: CPT

## 2022-05-31 PROCEDURE — 71045 X-RAY EXAM CHEST 1 VIEW: CPT

## 2022-05-31 PROCEDURE — 83605 ASSAY OF LACTIC ACID: CPT

## 2022-05-31 RX ORDER — ONDANSETRON 4 MG/1
4 TABLET, ORALLY DISINTEGRATING ORAL EVERY 8 HOURS PRN
Qty: 21 TABLET | Refills: 0 | Status: SHIPPED | OUTPATIENT
Start: 2022-05-31 | End: 2022-10-13 | Stop reason: SDUPTHER

## 2022-05-31 RX ORDER — ONDANSETRON 2 MG/ML
4 INJECTION INTRAMUSCULAR; INTRAVENOUS ONCE
Status: COMPLETED | OUTPATIENT
Start: 2022-05-31 | End: 2022-05-31

## 2022-05-31 RX ORDER — 0.9 % SODIUM CHLORIDE 0.9 %
1000 INTRAVENOUS SOLUTION INTRAVENOUS ONCE
Status: COMPLETED | OUTPATIENT
Start: 2022-05-31 | End: 2022-05-31

## 2022-05-31 RX ORDER — OXYCODONE HYDROCHLORIDE 5 MG/1
5 TABLET ORAL EVERY 4 HOURS PRN
Qty: 10 TABLET | Refills: 0 | Status: SHIPPED | OUTPATIENT
Start: 2022-05-31 | End: 2022-06-03

## 2022-05-31 RX ADMIN — ONDANSETRON 4 MG: 2 INJECTION INTRAMUSCULAR; INTRAVENOUS at 12:13

## 2022-05-31 RX ADMIN — HYDROMORPHONE HYDROCHLORIDE 1 MG: 1 INJECTION, SOLUTION INTRAMUSCULAR; INTRAVENOUS; SUBCUTANEOUS at 14:12

## 2022-05-31 RX ADMIN — SODIUM CHLORIDE 1000 ML: 9 INJECTION, SOLUTION INTRAVENOUS at 12:08

## 2022-05-31 RX ADMIN — HYDROMORPHONE HYDROCHLORIDE 1 MG: 1 INJECTION, SOLUTION INTRAMUSCULAR; INTRAVENOUS; SUBCUTANEOUS at 12:13

## 2022-05-31 ASSESSMENT — PAIN - FUNCTIONAL ASSESSMENT
PAIN_FUNCTIONAL_ASSESSMENT: 0-10
PAIN_FUNCTIONAL_ASSESSMENT: PREVENTS OR INTERFERES SOME ACTIVE ACTIVITIES AND ADLS
PAIN_FUNCTIONAL_ASSESSMENT: PREVENTS OR INTERFERES SOME ACTIVE ACTIVITIES AND ADLS

## 2022-05-31 ASSESSMENT — PAIN SCALES - GENERAL
PAINLEVEL_OUTOF10: 7
PAINLEVEL_OUTOF10: 0
PAINLEVEL_OUTOF10: 10
PAINLEVEL_OUTOF10: 10

## 2022-05-31 ASSESSMENT — ENCOUNTER SYMPTOMS
CHOKING: 0
SHORTNESS OF BREATH: 0
SINUS PAIN: 0
SORE THROAT: 0
COUGH: 0
VOMITING: 1
NAUSEA: 1
ABDOMINAL PAIN: 1
CHEST TIGHTNESS: 0
DIARRHEA: 0
EYE PAIN: 0
WHEEZING: 0

## 2022-05-31 ASSESSMENT — PAIN DESCRIPTION - LOCATION
LOCATION: ABDOMEN

## 2022-05-31 ASSESSMENT — PAIN DESCRIPTION - DESCRIPTORS
DESCRIPTORS: CRAMPING
DESCRIPTORS: CRAMPING;SHARP
DESCRIPTORS: CRAMPING;SHARP;STABBING

## 2022-05-31 ASSESSMENT — PAIN DESCRIPTION - FREQUENCY: FREQUENCY: CONTINUOUS

## 2022-05-31 ASSESSMENT — PAIN DESCRIPTION - ORIENTATION
ORIENTATION: UPPER
ORIENTATION: UPPER

## 2022-05-31 ASSESSMENT — PAIN DESCRIPTION - PAIN TYPE: TYPE: ACUTE PAIN

## 2022-05-31 NOTE — ED PROVIDER NOTES
ED  Provider Note  Admit Date/RoomTime: 5/31/2022 10:31 AM  ED Room: 15/15     HPI:   Lenora Eng is a 46 y.o. female presenting to the ED for abdominal pain, beginning 2 days ago. History comes primarily from the patient and Family.          Patient Active Problem List:     Chronic pancreatitis due to acute alcohol intoxication ([address]     Schizoaffective disorder, bipolar type ([address]     Anxiety disorder     Depression     History of alcohol abuse     Pancreatic pseudocyst ([address]     Liver dysfunction     Asthma     Gastroesophageal reflux disease without esophagitis     Bipolar I disorder (HCC)     Bipolar affective disorder (HCC)     Irritable bowel syndrome     Tobacco use disorder     DM type 2 with diabetic peripheral neuropathy (HCC)     Jaundice     RUQ abdominal pain     Uncontrolled diabetes mellitus (HCC)     Pain of upper abdomen     Elevated LFTs     Intractable abdominal pain     DKA, type 1, not at goal [facility])     Abdominal pain, epigastric     Hyperglycemia     Type 2 diabetes mellitus with hyperosmolarity without coma, with long-term current use of insulin ([address]     Fall (on) (from) other stairs and steps, initial encounter     Hepatitis, alcoholic     Nausea and vomiting     Probable acute on chronic pancreatitis ([address]     Probable acute gastritis     Alcoholism ([address]     Pancytopenia ([address]     Splenomegaly     Hypoglycemia episode     DM2 (diabetes mellitus, type 2) ([address] on insulin     Hepatitis C     Anxiety and depression     Cigarette smoker, heavy     Acute pancreatitis     Alcohol abuse     Acute alcoholic gastritis     Other osteoporosis without current pathological fracture     Ketoacidosis, diabetic, no coma, insulin dependent     Bicytopenia - leukopenia and thrombocytopenia     Chronic abdominal pain     Chronic pancreatitis (HCC)     Electrolyte imbalance     Lactic acidosis     Diabetic ketoacidosis without coma associated with diabetes mellitus due to underlying condition ([address] Severe protein-calorie malnutrition ([facility]     Idiopathic osteoporosis     Acute respiratory failure with hypoxia ([facility]  . The complaint has been persistent, moderate in severity, improved by nothing and worsened by eating. Associated symptoms include none. Joaquín Colon states that she has a history of chronic pancreatitis, and 2 days ago started develop abdominal pain consistent with her prior episodes of pancreatitis. She says that nothing is helped this pain to this point time as she had been intolerant of oral intake ever since onset of symptoms. Due to the persistent nature of her symptoms, she presented to [facility] emergency department for further evaluation and treatment. Review of Systems   Constitutional: Positive for activity change and appetite change. Negative for chills and fever. HENT: Negative for sinus pain and sore throat. Eyes: Negative for pain and visual disturbance. Respiratory: Negative for cough, choking, chest tightness, shortness of breath and wheezing. Cardiovascular: Negative for chest pain and palpitations. Gastrointestinal: Positive for abdominal pain, nausea and vomiting. Negative for diarrhea. Genitourinary: Negative for hematuria. Musculoskeletal: Negative for neck pain and neck stiffness. Skin: Negative for rash. Neurological: Negative for tremors, syncope and weakness. Psychiatric/Behavioral: Negative for confusion. Physical Exam  Vitals and nursing note reviewed. Constitutional:       Appearance: She is well-developed. HENT:      Head: Normocephalic and atraumatic. Eyes:      Pupils: Pupils are equal, round, and reactive to light. Cardiovascular:      Rate and Rhythm: Normal rate and regular rhythm. Pulmonary:      Effort: Pulmonary effort is normal. No respiratory distress. Breath sounds: Normal breath sounds. No wheezing or rales.    Abdominal:      General: Bowel sounds are normal. Palpations: Abdomen is soft. Tenderness: There is abdominal tenderness in the epigastric area. There is no guarding or rebound. Musculoskeletal:      Cervical back: Normal range of motion and neck supple. Skin:     General: Skin is warm and dry. Neurological:      Mental Status: She is alert and oriented to person, place, and time. Cranial Nerves: No cranial nerve deficit. Coordination: Coordination normal.          Procedures     MDM  Number of Diagnoses or Management Options  Chronic pancreatitis, unspecified pancreatitis type [facility])  Diagnosis management comments: Emergency Department evaluation was notable for reassuring work-up in the setting of abdominal pain. Patient has a history of chronic pancreatitis and this appears to be a recurrence of the same. Patient's laboratory studies revealed no significant lipase elevation, stable troponins, no significant leukocytosis, anemia or thrombocytopenia. Imaging studies were unremarkable and the patient was significantly improved with administration of supportive therapies in the emergency department. Patient was discharged home with pain control and dietary return precautions. She was advised to return to the emergency department should her symptoms worsen despite this treatment. She understood this plan of care and was amenable to plan. They were advised to return to the emergency department should they develop fever, chills, night sweats, nausea, vomiting, diarrhea, chest pain, shortness of breath, or worsening of their symptoms despite treatment from this emergency department visit. They were instructed to follow-up with their primary care provider in 2 days.  This information was relayed to the patient who understood this plan of care and was amenable to the plan.                 --------------------------------------------- PAST HISTORY ---------------------------------------------  Past Medical History:  has a past medical history of Alcoholism ([facility] Anxiety and depression, Ascites of liver, Bronchitis, Cancer ([facility] [facility] Candidiasis of vagina, Chronic pancreatitis ([facility] [facility] Cocaine abuse ([facility] [facility] Constipation, Diabetes mellitus, type 2 ([facility] [facility] GERD (gastroesophageal reflux disease), Gout, Hepatitis C, Hernia of anterior abdominal wall, Jaundice, Pneumonia, Polyneuropathy due to type 2 diabetes mellitus ([facility] Schizoaffective disorder, bipolar type ([address] and Splenomegaly. Past Surgical History:  has a past surgical history that includes Hysterectomy (2004); Ovary removal (Bilateral, 2007); shoulder surgery (Left, 2003); Cholecystectomy (2010); ERCP; Abdomen surgery; Colonoscopy; Upper gastrointestinal endoscopy; other surgical history (Left, 04/25/2017); Breast biopsy (Left); Pancreas Biopsy; Endoscopy, colon, diagnostic; other surgical history (10/05/2017); Colonoscopy (N/A, 10/2/2018); and Mastectomy (Left). Social History:  reports that she has been smoking cigarettes. She started smoking about 40 years ago. She has a 60.00 pack-year smoking history. She has never used smokeless tobacco. She reports previous alcohol use. She reports current drug use. Drugs: Cocaine and Opiates . Family History: family history includes Cancer in her mother; Colon Cancer in her maternal grandfather; Diabetes in her mother; No Known Problems in her father. The patients home medications have been reviewed.     Allergies: Dye [iodides] and Tylenol [acetaminophen]    -------------------------------------------------- RESULTS -------------------------------------------------  Labs:  Results for orders placed or performed during the hospital encounter of 05/31/22   CBC with Auto Differential   Result Value Ref Range    WBC 5.1 4.5 - 11.5 E9/L    RBC 4.57 3.50 - 5.50 E12/L    Hemoglobin 13.8 11.5 - 15.5 g/dL    Hematocrit 39.9 34.0 - 48.0 %    MCV 87.3 80.0 - 99.9 fL    MCH 30.2 26.0 - 35.0 pg    MCHC 34.6 (H) 32.0 - 34.5 %    RDW 13.7 11.5 - 15.0 fL Platelets 88 (L) 276 - 450 E9/L    MPV 11.1 7.0 - 12.0 fL    Neutrophils % 76.1 43.0 - 80.0 %    Immature Granulocytes % 0.4 0.0 - 5.0 %    Lymphocytes % 17.6 (L) 20.0 - 42.0 %    Monocytes % 3.9 2.0 - 12.0 %    Eosinophils % 1.4 0.0 - 6.0 %    Basophils % 0.6 0.0 - 2.0 %    Neutrophils Absolute 3.89 1.80 - 7.30 E9/L    Immature Granulocytes # 0.02 E9/L    Lymphocytes Absolute 0.90 (L) 1.50 - 4.00 E9/L    Monocytes Absolute 0.20 0.10 - 0.95 E9/L    Eosinophils Absolute 0.07 0.05 - 0.50 E9/L    Basophils Absolute 0.03 0.00 - 0.20 E9/L   Lipase   Result Value Ref Range    Lipase 12 (L) 13 - 60 U/L   Lactate, Sepsis   Result Value Ref Range    Lactic Acid, Sepsis 0.9 0.5 - 1.9 mmol/L   Platelet Confirmation   Result Value Ref Range    Platelet Confirmation CONFIRMED    SPECIMEN REJECTION   Result Value Ref Range    Rejected Test cmpx [id number]     Reason for Rejection see below    Comprehensive metabolic panel   Result Value Ref Range    Sodium 141 132 - 146 mmol/L    Potassium 4.2 3.5 - 5.0 mmol/L    Chloride 105 98 - 107 mmol/L    CO2 24 22 - 29 mmol/L    Anion Gap 12 7 - 16 mmol/L    Glucose 195 (H) 74 - 99 mg/dL    BUN 11 6 - 20 mg/dL    CREATININE 0.7 0.5 - 1.0 mg/dL    GFR Non-African American >60 >=60 mL/min/1.73    GFR African American >60     Calcium 8.8 8.6 - 10.2 mg/dL    Total Protein 7.3 6.4 - 8.3 g/dL    Albumin 3.9 3.5 - 5.2 g/dL    Total Bilirubin 0.6 0.0 - 1.2 mg/dL    Alkaline Phosphatase 223 (H) 35 - 104 U/L    ALT 27 0 - 32 U/L    AST 40 (H) 0 - 31 U/L   Troponin   Result Value Ref Range    Troponin, High Sensitivity 20 (H) 0 - 9 ng/L   EKG 12 Lead   Result Value Ref Range    Ventricular Rate 75 BPM    Atrial Rate 75 BPM    P-R Interval 138 ms    QRS Duration 74 ms    Q-T Interval 394 ms    QTc Calculation (Bazett) 439 ms    P Axis 68 degrees    R Axis 47 degrees    T Axis 64 degrees       Radiology:  XR CHEST PORTABLE   Final Result   Minimal infiltrate and or atelectasis in the right mid lung. Sclerotic focus   at the left humerus of indeterminate significance. This latter finding is   stable over many years.             ------------------------- NURSING NOTES AND VITALS REVIEWED ---------------------------  Date / Time Roomed:  5/31/2022 10:31 AM  ED Bed Assignment:  15/15    The nursing notes within the ED encounter and vital signs as below have been reviewed. /64   Pulse 69   Temp 98.8 °F (37.1 °C)   Resp 16   Wt 124 lb (56.2 kg)   SpO2 100%   BMI 23.43 kg/m²   Oxygen Saturation Interpretation: Normal      ------------------------------------------ PROGRESS NOTES ------------------------------------------  11:40 AM EDT  I have spoken with the patient and discussed todays results, in addition to providing specific details for the plan of care and counseling regarding the diagnosis and prognosis. Their questions are answered at this time and they are agreeable with the plan. I discussed at length with them reasons for immediate return here for re evaluation. They will followup with their primary care physician by calling their office tomorrow. --------------------------------- ADDITIONAL PROVIDER NOTES ---------------------------------  At this time the patient is without objective evidence of an acute process requiring hospitalization or inpatient management. They have remained hemodynamically stable throughout their entire ED visit and are stable for discharge with outpatient follow-up. The plan has been discussed in detail and they are aware of the specific conditions for emergent return, as well as the importance of follow-up. Discharge Medication List as of 5/31/2022  2:19 PM      START taking these medications    Details   oxyCODONE (ROXICODONE) 5 MG immediate release tablet Take 1 tablet by mouth every 4 hours as needed for Pain for up to 3 days. , Disp-10 tablet, R-0Print             Diagnosis:  1.  Chronic pancreatitis, unspecified pancreatitis type ([facility] Disposition:  Patient's disposition: Discharge to home  Patient's condition is stable.        Phil Hood 43., DO  Resident  06/03/22 103

## 2022-06-06 ENCOUNTER — OFFICE VISIT (OUTPATIENT)
Dept: FAMILY MEDICINE CLINIC | Age: 52
End: 2022-06-06
Payer: MEDICAID

## 2022-06-06 VITALS
SYSTOLIC BLOOD PRESSURE: 120 MMHG | HEART RATE: 94 BPM | TEMPERATURE: 97.9 F | DIASTOLIC BLOOD PRESSURE: 78 MMHG | WEIGHT: 123.6 LBS | RESPIRATION RATE: 20 BRPM | HEIGHT: 61 IN | BODY MASS INDEX: 23.33 KG/M2

## 2022-06-06 DIAGNOSIS — K86.0 ALCOHOL-INDUCED CHRONIC PANCREATITIS (HCC): ICD-10-CM

## 2022-06-06 DIAGNOSIS — E11.9 TYPE 2 DIABETES MELLITUS WITHOUT COMPLICATION, WITH LONG-TERM CURRENT USE OF INSULIN (HCC): Primary | ICD-10-CM

## 2022-06-06 DIAGNOSIS — Z79.4 TYPE 2 DIABETES MELLITUS WITHOUT COMPLICATION, WITH LONG-TERM CURRENT USE OF INSULIN (HCC): Primary | ICD-10-CM

## 2022-06-06 DIAGNOSIS — F25.0 SCHIZOAFFECTIVE DISORDER, BIPOLAR TYPE (HCC): ICD-10-CM

## 2022-06-06 PROCEDURE — 99205 OFFICE O/P NEW HI 60 MIN: CPT | Performed by: FAMILY MEDICINE

## 2022-06-06 RX ORDER — LACTULOSE 10 G/15ML
10 SOLUTION ORAL 2 TIMES DAILY
Qty: 473 ML | Refills: 3 | Status: SHIPPED
Start: 2022-06-06 | End: 2022-06-09

## 2022-06-06 RX ORDER — INSULIN LISPRO 100 [IU]/ML
0-18 INJECTION, SOLUTION INTRAVENOUS; SUBCUTANEOUS
Qty: 10 ML | Refills: 0 | Status: CANCELLED | OUTPATIENT
Start: 2022-06-06

## 2022-06-06 RX ORDER — SENNA AND DOCUSATE SODIUM 50; 8.6 MG/1; MG/1
2 TABLET, FILM COATED ORAL 2 TIMES DAILY
Qty: 40 TABLET | Refills: 0 | Status: SHIPPED
Start: 2022-06-06 | End: 2022-06-09

## 2022-06-06 RX ORDER — PLECANATIDE 3 MG/1
1 TABLET ORAL DAILY PRN
Qty: 30 TABLET | Refills: 3 | Status: CANCELLED | OUTPATIENT
Start: 2022-06-06

## 2022-06-06 RX ORDER — INSULIN LISPRO 100 [IU]/ML
10 INJECTION, SOLUTION INTRAVENOUS; SUBCUTANEOUS
Qty: 3 EACH | Refills: 3 | Status: SHIPPED | OUTPATIENT
Start: 2022-06-06

## 2022-06-06 RX ORDER — ONDANSETRON 4 MG/1
4 TABLET, ORALLY DISINTEGRATING ORAL EVERY 8 HOURS PRN
Qty: 21 TABLET | Refills: 0 | Status: CANCELLED | OUTPATIENT
Start: 2022-06-06

## 2022-06-06 RX ORDER — ALBUTEROL SULFATE 2.5 MG/3ML
2.5 SOLUTION RESPIRATORY (INHALATION) EVERY 4 HOURS PRN
Qty: 120 EACH | Refills: 3 | Status: SHIPPED | OUTPATIENT
Start: 2022-06-06

## 2022-06-06 RX ORDER — TRAZODONE HYDROCHLORIDE 100 MG/1
100 TABLET ORAL NIGHTLY
Qty: 30 TABLET | Refills: 3 | Status: CANCELLED | OUTPATIENT
Start: 2022-06-06

## 2022-06-06 RX ORDER — SUCRALFATE 1 G/1
1 TABLET ORAL 4 TIMES DAILY
Qty: 120 TABLET | Refills: 3 | Status: CANCELLED | OUTPATIENT
Start: 2022-06-06

## 2022-06-06 RX ORDER — GLUCOSAMINE HCL/CHONDROITIN SU 500-400 MG
CAPSULE ORAL
Qty: 100 STRIP | Refills: 5 | Status: SHIPPED | OUTPATIENT
Start: 2022-06-06

## 2022-06-06 RX ORDER — THIAMINE MONONITRATE (VIT B1) 100 MG
100 TABLET ORAL DAILY
Qty: 30 TABLET | Refills: 3 | Status: SHIPPED | OUTPATIENT
Start: 2022-06-06

## 2022-06-06 RX ORDER — BLOOD SUGAR DIAGNOSTIC
1 STRIP MISCELLANEOUS DAILY
Qty: 100 EACH | Refills: 11 | Status: SHIPPED | OUTPATIENT
Start: 2022-06-06

## 2022-06-06 RX ORDER — LANCETS 30 GAUGE
1 EACH MISCELLANEOUS DAILY
Qty: 100 EACH | Refills: 5 | Status: SHIPPED | OUTPATIENT
Start: 2022-06-06

## 2022-06-06 RX ORDER — IBUPROFEN 800 MG/1
800 TABLET ORAL EVERY 8 HOURS PRN
Qty: 20 TABLET | Refills: 0 | Status: SHIPPED
Start: 2022-06-06 | End: 2022-06-13

## 2022-06-06 RX ORDER — LUBIPROSTONE 8 UG/1
8 CAPSULE, GELATIN COATED ORAL DAILY
Qty: 10 CAPSULE | Refills: 0 | Status: CANCELLED | OUTPATIENT
Start: 2022-06-06

## 2022-06-06 RX ORDER — IPRATROPIUM BROMIDE AND ALBUTEROL SULFATE 2.5; .5 MG/3ML; MG/3ML
1 SOLUTION RESPIRATORY (INHALATION) EVERY 6 HOURS PRN
Qty: 360 ML | Refills: 5 | Status: SHIPPED | OUTPATIENT
Start: 2022-06-06

## 2022-06-06 RX ORDER — ARIPIPRAZOLE 20 MG/1
20 TABLET ORAL DAILY
Qty: 30 TABLET | Refills: 3 | Status: CANCELLED | OUTPATIENT
Start: 2022-06-06

## 2022-06-06 RX ORDER — INSULIN GLARGINE 100 [IU]/ML
20 INJECTION, SOLUTION SUBCUTANEOUS 2 TIMES DAILY
Qty: 2 EACH | Refills: 5 | Status: SHIPPED | OUTPATIENT
Start: 2022-06-06

## 2022-06-06 RX ORDER — TIOTROPIUM BROMIDE INHALATION SPRAY 1.56 UG/1
2 SPRAY, METERED RESPIRATORY (INHALATION) DAILY
Qty: 3 EACH | Refills: 1 | Status: SHIPPED
Start: 2022-06-06 | End: 2022-10-13 | Stop reason: SDUPTHER

## 2022-06-06 RX ORDER — BUDESONIDE AND FORMOTEROL FUMARATE DIHYDRATE 160; 4.5 UG/1; UG/1
2 AEROSOL RESPIRATORY (INHALATION) 2 TIMES DAILY
Qty: 6 G | Refills: 0 | Status: SHIPPED | OUTPATIENT
Start: 2022-06-06

## 2022-06-06 RX ORDER — TIZANIDINE 4 MG/1
4 TABLET ORAL 3 TIMES DAILY
Qty: 30 TABLET | Refills: 0 | Status: CANCELLED | OUTPATIENT
Start: 2022-06-06

## 2022-06-06 RX ORDER — ALBUTEROL SULFATE 90 UG/1
2 AEROSOL, METERED RESPIRATORY (INHALATION) EVERY 6 HOURS PRN
Qty: 18 G | Refills: 5 | Status: SHIPPED
Start: 2022-06-06 | End: 2022-10-13 | Stop reason: SDUPTHER

## 2022-06-06 RX ORDER — PANTOPRAZOLE SODIUM 40 MG/1
40 TABLET, DELAYED RELEASE ORAL DAILY
Qty: 90 TABLET | Refills: 1 | Status: SHIPPED
Start: 2022-06-06 | End: 2022-10-13 | Stop reason: SDUPTHER

## 2022-06-06 RX ORDER — TOPIRAMATE 100 MG/1
100 CAPSULE, EXTENDED RELEASE ORAL DAILY
Qty: 30 CAPSULE | Refills: 3 | Status: CANCELLED | OUTPATIENT
Start: 2022-06-06

## 2022-06-06 SDOH — HEALTH STABILITY: MENTAL HEALTH
STRESS IS WHEN SOMEONE FEELS TENSE, NERVOUS, ANXIOUS, OR CAN'T SLEEP AT NIGHT BECAUSE THEIR MIND IS TROUBLED. HOW STRESSED ARE YOU?: VERY MUCH

## 2022-06-06 SDOH — HEALTH STABILITY: PHYSICAL HEALTH: ON AVERAGE, HOW MANY DAYS PER WEEK DO YOU ENGAGE IN MODERATE TO STRENUOUS EXERCISE (LIKE A BRISK WALK)?: 7 DAYS

## 2022-06-06 SDOH — ECONOMIC STABILITY: HOUSING INSECURITY: IN THE LAST 12 MONTHS, HOW MANY PLACES HAVE YOU LIVED?: 1

## 2022-06-06 SDOH — SOCIAL STABILITY: SOCIAL NETWORK: HOW OFTEN DO YOU ATTENT MEETINGS OF THE CLUB OR ORGANIZATION YOU BELONG TO?: NEVER

## 2022-06-06 SDOH — SOCIAL STABILITY: SOCIAL INSECURITY
WITHIN THE LAST YEAR, HAVE YOU BEEN KICKED, HIT, SLAPPED, OR OTHERWISE PHYSICALLY HURT BY YOUR PARTNER OR EX-PARTNER?: NO

## 2022-06-06 SDOH — SOCIAL STABILITY: SOCIAL NETWORK: IN A TYPICAL WEEK, HOW MANY TIMES DO YOU TALK ON THE PHONE WITH FAMILY, FRIENDS, OR NEIGHBORS?: TWICE A WEEK

## 2022-06-06 SDOH — SOCIAL STABILITY: SOCIAL NETWORK: ARE YOU MARRIED, WIDOWED, DIVORCED, SEPARATED, NEVER MARRIED, OR LIVING WITH A PARTNER?: DIVORCED

## 2022-06-06 SDOH — ECONOMIC STABILITY: TRANSPORTATION INSECURITY
IN THE PAST 12 MONTHS, HAS LACK OF TRANSPORTATION KEPT YOU FROM MEETINGS, WORK, OR FROM GETTING THINGS NEEDED FOR DAILY LIVING?: NO

## 2022-06-06 SDOH — SOCIAL STABILITY: SOCIAL INSECURITY: WITHIN THE LAST YEAR, HAVE YOU BEEN HUMILIATED OR EMOTIONALLY ABUSED IN OTHER WAYS BY YOUR PARTNER OR EX-PARTNER?: NO

## 2022-06-06 SDOH — SOCIAL STABILITY: SOCIAL INSECURITY
WITHIN THE LAST YEAR, HAVE TO BEEN RAPED OR FORCED TO HAVE ANY KIND OF SEXUAL ACTIVITY BY YOUR PARTNER OR EX-PARTNER?: NO

## 2022-06-06 SDOH — HEALTH STABILITY: PHYSICAL HEALTH: ON AVERAGE, HOW MANY MINUTES DO YOU ENGAGE IN EXERCISE AT THIS LEVEL?: 20 MIN

## 2022-06-06 SDOH — SOCIAL STABILITY: SOCIAL NETWORK: HOW OFTEN DO YOU ATTEND CHURCH OR RELIGIOUS SERVICES?: NEVER

## 2022-06-06 SDOH — ECONOMIC STABILITY: FOOD INSECURITY: WITHIN THE PAST 12 MONTHS, YOU WORRIED THAT YOUR FOOD WOULD RUN OUT BEFORE YOU GOT MONEY TO BUY MORE.: NEVER TRUE

## 2022-06-06 SDOH — HEALTH STABILITY: MENTAL HEALTH

## 2022-06-06 SDOH — ECONOMIC STABILITY: FOOD INSECURITY: WITHIN THE PAST 12 MONTHS, THE FOOD YOU BOUGHT JUST DIDN'T LAST AND YOU DIDN'T HAVE MONEY TO GET MORE.: NEVER TRUE

## 2022-06-06 SDOH — ECONOMIC STABILITY: INCOME INSECURITY: IN THE LAST 12 MONTHS, WAS THERE A TIME WHEN YOU WERE NOT ABLE TO PAY THE MORTGAGE OR RENT ON TIME?: NO

## 2022-06-06 SDOH — SOCIAL STABILITY: SOCIAL NETWORK
DO YOU BELONG TO ANY CLUBS OR ORGANIZATIONS SUCH AS CHURCH GROUPS UNIONS, FRATERNAL OR ATHLETIC GROUPS, OR SCHOOL GROUPS?: NO

## 2022-06-06 SDOH — SOCIAL STABILITY: SOCIAL INSECURITY: WITHIN THE LAST YEAR, HAVE YOU BEEN AFRAID OF YOUR PARTNER OR EX-PARTNER?: NO

## 2022-06-06 SDOH — ECONOMIC STABILITY: INCOME INSECURITY: HOW HARD IS IT FOR YOU TO PAY FOR THE VERY BASICS LIKE FOOD, HOUSING, MEDICAL CARE, AND HEATING?: NOT HARD AT ALL

## 2022-06-06 SDOH — ECONOMIC STABILITY: TRANSPORTATION INSECURITY
IN THE PAST 12 MONTHS, HAS THE LACK OF TRANSPORTATION KEPT YOU FROM MEDICAL APPOINTMENTS OR FROM GETTING MEDICATIONS?: NO

## 2022-06-06 SDOH — HEALTH STABILITY: MENTAL HEALTH: HOW OFTEN DO YOU HAVE A DRINK CONTAINING ALCOHOL?: NEVER

## 2022-06-06 SDOH — SOCIAL STABILITY: SOCIAL NETWORK: HOW OFTEN DO YOU GET TOGETHER WITH FRIENDS OR RELATIVES?: ONCE A WEEK

## 2022-06-06 ASSESSMENT — ENCOUNTER SYMPTOMS
RHINORRHEA: 0
GASTROINTESTINAL NEGATIVE: 1
SHORTNESS OF BREATH: 0
COUGH: 0
PHOTOPHOBIA: 0
EYE REDNESS: 0
EYE DISCHARGE: 0
SINUS PAIN: 0

## 2022-06-06 ASSESSMENT — COLUMBIA-SUICIDE SEVERITY RATING SCALE - C-SSRS
2. HAVE YOU ACTUALLY HAD ANY THOUGHTS OF KILLING YOURSELF?: NO
6. HAVE YOU EVER DONE ANYTHING, STARTED TO DO ANYTHING, OR PREPARED TO DO ANYTHING TO END YOUR LIFE?: NO
1. WITHIN THE PAST MONTH, HAVE YOU WISHED YOU WERE DEAD OR WISHED YOU COULD GO TO SLEEP AND NOT WAKE UP?: NO

## 2022-06-06 ASSESSMENT — PATIENT HEALTH QUESTIONNAIRE - PHQ9
SUM OF ALL RESPONSES TO PHQ QUESTIONS 1-9: 21
4. FEELING TIRED OR HAVING LITTLE ENERGY: 3
SUM OF ALL RESPONSES TO PHQ9 QUESTIONS 1 & 2: 6
2. FEELING DOWN, DEPRESSED OR HOPELESS: 3
5. POOR APPETITE OR OVEREATING: 3
6. FEELING BAD ABOUT YOURSELF - OR THAT YOU ARE A FAILURE OR HAVE LET YOURSELF OR YOUR FAMILY DOWN: 3
7. TROUBLE CONCENTRATING ON THINGS, SUCH AS READING THE NEWSPAPER OR WATCHING TELEVISION: 3
3. TROUBLE FALLING OR STAYING ASLEEP: 0
10. IF YOU CHECKED OFF ANY PROBLEMS, HOW DIFFICULT HAVE THESE PROBLEMS MADE IT FOR YOU TO DO YOUR WORK, TAKE CARE OF THINGS AT HOME, OR GET ALONG WITH OTHER PEOPLE: 0
1. LITTLE INTEREST OR PLEASURE IN DOING THINGS: 3
8. MOVING OR SPEAKING SO SLOWLY THAT OTHER PEOPLE COULD HAVE NOTICED. OR THE OPPOSITE, BEING SO FIGETY OR RESTLESS THAT YOU HAVE BEEN MOVING AROUND A LOT MORE THAN USUAL: 3
SUM OF ALL RESPONSES TO PHQ QUESTIONS 1-9: 21
9. THOUGHTS THAT YOU WOULD BE BETTER OFF DEAD, OR OF HURTING YOURSELF: 0

## 2022-06-06 ASSESSMENT — SOCIAL DETERMINANTS OF HEALTH (SDOH): HOW HARD IS IT FOR YOU TO PAY FOR THE VERY BASICS LIKE FOOD, HOUSING, MEDICAL CARE, AND HEATING?: NOT HARD AT ALL

## 2022-06-06 NOTE — PROGRESS NOTES
2022    Chris Sandhu    Chief Complaint   Patient presents with   [facility].    Medication Refill       HPI  History was obtained from patient. Yulia Medina is a 46 y.o. female who presents today with the followin. Type 2 diabetes mellitus without complication, with long-term current use of insulin ([facility])    2. Schizoaffective disorder, bipolar type ([facility] [address]    3. Alcohol-induced chronic pancreatitis [facility])    Patient is here to establish primary care. Patient's main concern are her inhalers and her diabetic medicines. Patient states she is out of all her psych medicines and she \"rarely needs them\". She has no acute problems today other than being out of medicine. Patient does not follow with Dr. Sienna Yeager, for GI. She does not currently have a psychiatrist.  Patient does not see an endocrinologist.  She states that she is in a hurry today as she needs to get to [facility] in  [facility] before 130. REVIEW OF SYMPTOMS    Review of Systems   Constitutional: Negative for chills, fatigue and fever. HENT: Negative for congestion, mouth sores, postnasal drip, rhinorrhea and sinus pain. Eyes: Negative for photophobia, discharge and redness. Respiratory: Negative for cough and shortness of breath. Cardiovascular: Negative for chest pain. Gastrointestinal: Negative. Endocrine: Negative for polydipsia and polyuria. Genitourinary: Negative for difficulty urinating. Neurological: Negative for headaches. Psychiatric/Behavioral: Positive for decreased concentration and sleep disturbance. Negative for self-injury and suicidal ideas. The patient is nervous/anxious.         PAST MEDICAL HISTORY  Past Medical History:   Diagnosis Date    Alcoholism [facility])     Since teens to early 25s    Anxiety and depression     Ascites of liver     Bronchitis     Cancer ([facility] [address]     breast, left    Candidiasis of vagina     Chronic pancreatitis ([facility] [address]     Cocaine abuse ([address]     Constipation     Diabetes mellitus, type 2 (HCC)     GERD (gastroesophageal reflux disease)     Gout     Hepatitis C     Hernia of anterior abdominal wall     Jaundice 2016    Pneumonia     Polyneuropathy due to type 2 diabetes mellitus (HCC)     Schizoaffective disorder, bipolar type ([address]     Splenomegaly 2017       FAMILY HISTORY  Family History   Problem Relation Age of Onset    Diabetes Mother     Cancer Mother         Breast    No Known Problems Father     Colon Cancer Maternal Grandfather        SOCIAL HISTORY  Social History     Socioeconomic History    Marital status:      Spouse name: None    Number of children: None    Years of education: None    Highest education level: None   Occupational History    None   Tobacco Use    Smoking status: Current Every Day Smoker     Packs/day: 1.50     Years: 40.00     Pack years: 60.00     Types: Cigarettes     Start date: 1982     Last attempt to quit: 2020     Years since quittin.3    Smokeless tobacco: Never Used   Vaping Use    Vaping Use: Never used   Substance and Sexual Activity    Alcohol use: Not Currently    Drug use: Yes     Types: Cocaine, Opiates     Sexual activity: Yes     Partners: Male   Other Topics Concern    None   Social History Narrative    None     Social Determinants of Health     Financial Resource Strain: Low Risk     Difficulty of Paying Living Expenses: Not hard at all   Food Insecurity: No Food Insecurity    Worried About Running Out of Food in the Last Year: Never true    Abdias of Food in the Last Year: Never true   Transportation Needs: No Transportation Needs    Lack of Transportation (Medical): No    Lack of Transportation (Non-Medical):  No   Physical Activity: Insufficiently Active    Days of Exercise per Week: 7 days    Minutes of Exercise per Session: 20 min   Stress: Stress Concern Present    Feeling of Stress : Very much   Social Connections: Socially Isolated    Frequency of Communication with Friends and Family: Twice a week    Frequency of Social Gatherings with Friends and Family: Once a week    Attends Muslim Services: Never    Active Member of Clubs or Organizations: No    Attends Club or Organization Meetings: Never    Marital Status:    Intimate Partner Violence: Not At Risk    Fear of Current or Ex-Partner: No    Emotionally Abused: No    Physically Abused: No    Sexually Abused: No   Housing Stability: Unknown    Unable to Pay for Housing in the Last Year: No    Number of [place] in the Last Year: 1    Unstable Housing in the Last Year: Not on file        SURGICAL HISTORY  Past Surgical History:   Procedure Laterality Date    ABDOMEN SURGERY      gallbladder removal    BREAST BIOPSY Left     CHOLECYSTECTOMY  2010    COLONOSCOPY      COLONOSCOPY N/A 10/2/2018    COLONOSCOPY WITH BIOPSY performed by Philly Sahu MD at [address], DIAGNOSTIC      ERCP      april 2016 at [address]  2004    MASTECTOMY Left     Left breast mastectomy per patient    OTHER SURGICAL HISTORY Left 04/25/2017     Left breast blue dye injection, Sential Node Lymph Biopsy with left breast lumpectomy    OTHER SURGICAL HISTORY  10/05/2017    simple left mastectomy    OVARY REMOVAL Bilateral 2007    PANCREAS BIOPSY      with stent    SHOULDER SURGERY Left 2003    UPPER GASTROINTESTINAL ENDOSCOPY                   CURRENT MEDICATIONS  Current Outpatient Medications   Medication Sig Dispense Refill    albuterol sulfate  (90 Base) MCG/ACT inhaler Inhale 2 puffs into the lungs every 6 hours as needed for Wheezing or Shortness of Breath 18 g 5    budesonide-formoterol (SYMBICORT) 160-4.5 MCG/ACT AERO Inhale 2 puffs into the lungs 2 times daily 6 g 0    ibuprofen (ADVIL;MOTRIN) 800 MG tablet Take 1 tablet by mouth every 8 hours as needed for Pain 20 tablet 0    sennosides-docusate sodium (SENOKOT-S) 8.6-50 MG tablet Take 2 tablets by mouth 2 times daily 40 tablet 0    tiotropium (SPIRIVA RESPIMAT) 1.25 MCG/ACT AERS inhaler Inhale 2 puffs into the lungs daily 3 each 1    ipratropium-albuterol (DUONEB) 0.5-2.5 (3) MG/3ML SOLN nebulizer solution Inhale 3 mLs into the lungs every 6 hours as needed for Shortness of Breath 360 mL 5    pantoprazole (PROTONIX) 40 MG tablet Take 1 tablet by mouth daily 90 tablet 1    vitamin B-1 (THIAMINE) 100 MG tablet Take 1 tablet by mouth daily 30 tablet 3    lactulose (CHRONULAC) 10 GM/15ML solution Take 15 mLs by mouth 2 times daily As needed for constipation 473 mL 3    insulin lispro (INSULIN LISPRO) 100 UNIT/ML SOLN injection vial Inject 10 Units into the skin 3 times daily (before meals) Patient may use sliding scale 3 each 3    albuterol (PROVENTIL) (2.5 MG/3ML) 0.083% nebulizer solution Take 3 mLs by nebulization every 4 hours as needed for Wheezing May substitute generic if insurance does not cover. Please provide 1 box (25 or 30 ampules) depending on how supplied per box 120 each 3    Respiratory Therapy Supplies (FULL KIT NEBULIZER SET) MISC Use as directed with nebulized medication. 1 each 0    Insulin Syringe-Needle U-100 31G X 5/16\" 1 ML MISC 1 each by Does not apply route daily Use as directed 100 each 11    Lancets MISC 1 each by Does not apply route daily 100 each 5    blood glucose monitor strips Test 4 times a day & as needed for symptoms of irregular blood glucose. Dispense sufficient amount for indicated testing frequency plus additional to accommodate PRN testing needs.  100 strip 5    insulin glargine (LANTUS) 100 UNIT/ML injection vial Inject 20 Units into the skin 2 times daily 2 each 5    ondansetron (ZOFRAN ODT) 4 MG disintegrating tablet Take 1 tablet by mouth every 8 hours as needed for Nausea or Vomiting 21 tablet 0    insulin lispro (HUMALOG) 100 UNIT/ML injection vial Inject 0-18 Units into the skin 3 times daily (with meals) **High Dose Corrective Algorithm**  Glucose: Dose:               No Insulin  140-199           3 Units  200-249 6 Units  250-299 9 Units  300-349 12 Units  350-400 15 Units  Over 400  18 Units 10 mL 0    lubiprostone (AMITIZA) 8 MCG CAPS capsule Take 1 capsule by mouth daily 10 capsule 0    tiZANidine (ZANAFLEX) 4 MG tablet Take 1 tablet by mouth 3 times daily 30 tablet 0    VORTIoxetine HBr (TRINTELLIX) 20 MG TABS tablet Take 1 tablet by mouth nightly 10 tablet 0    sucralfate (CARAFATE) 1 GM tablet Take 1 g by mouth 4 times daily      traZODone (DESYREL) 100 MG tablet Take 100 mg by mouth nightly      topiramate ER (TROKENDI XR) 100 MG CP24 Take by mouth      ARIPiprazole (ABILIFY PO) Take by mouth      Denosumab (PROLIA SC) Inject into the skin Every 6 months. Given at [address] (TRULANCE) 3 MG TABS Take 1 tablet by mouth daily as needed      anastrozole (ARIMIDEX) 1 MG tablet Take 1 tablet by mouth daily 30 tablet 3    spironolactone (ALDACTONE) 25 MG tablet Take 50 mg by mouth daily       furosemide (LASIX) 20 MG tablet Take 20 mg by mouth daily       gabapentin (NEURONTIN) 400 MG capsule Take 1 capsule by mouth 3 times daily for 10 days. Take 2 capsucles 30 capsule 0    lipase-protease-amylase (CREON) 42277-93452 units delayed release capsule Take 3 capsules by mouth 3 times daily (with meals) for 10 days Indications: Creon 90 capsule 0     No current facility-administered medications for this visit. ALLERGIES  Allergies   Allergen Reactions    Dye [Iodides]     Tylenol [Acetaminophen]      Was told not to take by her  For a bad liver     OK  to use sparingly         PHYSICAL EXAM    /78   Pulse 94   Temp 97.9 °F (36.6 °C) (Temporal)   Resp 20   Ht 5' 1\" (1.549 m)   Wt 123 lb 9.6 oz (56.1 kg)   BMI 23.35 kg/m²     Physical Exam  Vitals and nursing note reviewed. Constitutional:       Appearance: Normal appearance. HENT:      Nose: No congestion or rhinorrhea. Mouth/Throat:      Mouth: Mucous membranes are moist.      Pharynx: Oropharynx is clear. Eyes:      Conjunctiva/sclera: Conjunctivae normal.   Cardiovascular:      Rate and Rhythm: Normal rate and regular rhythm. Heart sounds: Normal heart sounds. Pulmonary:      Effort: Pulmonary effort is normal.      Breath sounds: Normal breath sounds. Musculoskeletal:      Cervical back: Neck supple. Skin:     General: Skin is warm. Neurological:      Mental Status: She is alert and oriented to person, place, and time. Psychiatric:         Mood and Affect: Mood normal.         ASSESSMENT & PLAN    Noemy Steinberg was seen today for establish care and medication refill. Diagnoses and all orders for this visit:    Type 2 diabetes mellitus without complication, with long-term current use of insulin ([facility])    Schizoaffective disorder, bipolar type ([facility] [address]    Alcohol-induced chronic pancreatitis ([facility] [address]    Other orders  -     albuterol sulfate  (90 Base) MCG/ACT inhaler; Inhale 2 puffs into the lungs every 6 hours as needed for Wheezing or Shortness of Breath  -     budesonide-formoterol (SYMBICORT) 160-4.5 MCG/ACT AERO; Inhale 2 puffs into the lungs 2 times daily  -     ibuprofen (ADVIL;MOTRIN) 800 MG tablet; Take 1 tablet by mouth every 8 hours as needed for Pain  -     sennosides-docusate sodium (SENOKOT-S) 8.6-50 MG tablet; Take 2 tablets by mouth 2 times daily  -     tiotropium (SPIRIVA RESPIMAT) 1.25 MCG/ACT AERS inhaler; Inhale 2 puffs into the lungs daily  -     ipratropium-albuterol (DUONEB) 0.5-2.5 (3) MG/3ML SOLN nebulizer solution; Inhale 3 mLs into the lungs every 6 hours as needed for Shortness of Breath  -     pantoprazole (PROTONIX) 40 MG tablet; Take 1 tablet by mouth daily  -     vitamin B-1 (THIAMINE) 100 MG tablet; Take 1 tablet by mouth daily  -     lactulose (CHRONULAC) 10 GM/15ML solution;  Take 15 mLs by mouth 2 times daily As needed for constipation  -     insulin lispro (INSULIN LISPRO) 100 UNIT/ML SOLN injection vial; Inject 10 Units into the skin 3 times daily (before meals) Patient may use sliding scale  -     albuterol (PROVENTIL) (2.5 MG/3ML) 0.083% nebulizer solution; Take 3 mLs by nebulization every 4 hours as needed for Wheezing May substitute generic if insurance does not cover. Please provide 1 box (25 or 30 ampules) depending on how supplied per box  -     Respiratory Therapy Supplies (FULL KIT NEBULIZER SET) MISC; Use as directed with nebulized medication.  -     Insulin Syringe-Needle U-100 31G X 5/16\" 1 ML MISC; 1 each by Does not apply route daily Use as directed  -     Lancets MISC; 1 each by Does not apply route daily  -     blood glucose monitor strips; Test 4 times a day & as needed for symptoms of irregular blood glucose. Dispense sufficient amount for indicated testing frequency plus additional to accommodate PRN testing needs. -     insulin glargine (LANTUS) 100 UNIT/ML injection vial; Inject 20 Units into the skin 2 times daily    Patient did not have time to get a hemoglobin A1c done today. I will have the patient follow-up in 4 weeks and an A1c will be done at that time. Patient is advised to see mental health for all her mental health medications as I will not be writing for them. She is on a number of medications from GI and is to get those from her gastroenterologist.    Return in about 4 weeks (around 7/4/2022). Electronically signed by Shanice Vallejo.  Phil Rondon, DO on 6/6/2022

## 2022-06-09 ENCOUNTER — HOSPITAL ENCOUNTER (EMERGENCY)
Age: 52
Discharge: HOME | End: 2022-06-09
Payer: MEDICAID

## 2022-06-09 VITALS
OXYGEN SATURATION: 98 % | RESPIRATION RATE: 16 BRPM | SYSTOLIC BLOOD PRESSURE: 117 MMHG | HEART RATE: 88 BPM | DIASTOLIC BLOOD PRESSURE: 71 MMHG | TEMPERATURE: 97.5 F

## 2022-06-09 DIAGNOSIS — Z76.0 ENCOUNTER FOR MEDICATION REFILL: Primary | ICD-10-CM

## 2022-06-09 PROCEDURE — 99282 EMERGENCY DEPT VISIT SF MDM: CPT

## 2022-06-09 ASSESSMENT — PAIN - FUNCTIONAL ASSESSMENT: PAIN_FUNCTIONAL_ASSESSMENT: NONE - DENIES PAIN

## 2022-06-09 NOTE — ED PROVIDER NOTES
[address] [facility]  Department of Emergency Medicine   ED  Encounter Note  Admit Date/RoomTime: 2022  7:00 PM  ED Room: [facility]  NAME: Russell Colby  : 1970  MRN: [id number]     Chief Complaint:  Medication Refill (pt is out of her medications and needs refills.)    HISTORY OF PRESENT ILLNESS        Russell Colby is a 46 y.o. female who presents to the ED for medication refill. Patient was new to establish with Dr. Pipe Garcia on , 3 days ago when she did receive some of her medications refilled. She states not all of them were refilled and she is looking to get them refilled in the emergency department. She does admit that she was referred to her GI doctor, Dr. Li Parks to get the rest of her prescriptions refilled however she did not call to get an appointment. She denies any symptoms secondary to not having her medications. She states that she is unsure why she is on some of them and is why some got refilled and some did not. She is looking for Aldactone, Lasix and gabapentin mention a few. She does admit to going to her psychiatrist to getting her psychiatric medications filled successfully. Patient has not complaints at present. ROS   Pertinent positives and negatives are stated within HPI, all other systems reviewed and are negative. Past Medical History:  has a past medical history of Alcoholism ([address] Anxiety and depression, Ascites of liver, Bronchitis, Cancer ([address] Candidiasis of vagina, Chronic pancreatitis ([address] Cocaine abuse ([address] Constipation, Diabetes mellitus, type 2 ([address] GERD (gastroesophageal reflux disease), Gout, Hepatitis C, Hernia of anterior abdominal wall, Jaundice, Pneumonia, Polyneuropathy due to type 2 diabetes mellitus ([address] Schizoaffective disorder, bipolar type ([address] and Splenomegaly. Surgical History:  has a past surgical history that includes Hysterectomy ();  Ovary removal (Bilateral, ); shoulder surgery (Left, 2003); Cholecystectomy (2010); ERCP; Abdomen surgery; Colonoscopy; Upper gastrointestinal endoscopy; other surgical history (Left, 04/25/2017); Breast biopsy (Left); Pancreas Biopsy; Endoscopy, colon, diagnostic; other surgical history (10/05/2017); Colonoscopy (N/A, 10/2/2018); and Mastectomy (Left). Social History:  reports that she has been smoking cigarettes. She started smoking about 40 years ago. She has a 60.00 pack-year smoking history. She has never used smokeless tobacco. She reports previous alcohol use. She reports current drug use. Drugs: Cocaine and Opiates . Family History: family history includes Cancer in her mother; Colon Cancer in her maternal grandfather; Diabetes in her mother; No Known Problems in her father. Allergies: Dye [iodides] and Tylenol [acetaminophen]    PHYSICAL EXAM   Oxygen Saturation Interpretation: Normal on room air analysis. ED Triage Vitals [06/09/22 1829]   BP Temp Temp src Heart Rate Resp SpO2 Height Weight   117/71 97.5 °F (36.4 °C) -- 88 16 98 % -- --       Physical Exam  Constitutional/General: Alert and oriented x3, well appearing, non toxic  HEENT:  NC/NT. PERRLA. Airway patent. Neck: Supple, full ROM. No midline vertebral tenderness or crepitus. Respiratory: Lung sounds clear to auscultation bilaterally. No wheezes, rhonchi or stridor. Not in respiratory distress. CV:  Regular rate. Regular rhythm. No murmurs or rubs. 2+ distal pulses. Musculoskeletal: Moves all extremities x 4. Warm and well perfused. Capillary refill <3 seconds  Integument: Skin warm and dry. No rashes. Neurologic: Alert and oriented with no focal deficits, symmetric strength 5/5 in the upper and lower extremities bilaterally. Psychiatric: Normal affect. Patient calm and cooperative. Eye contact. No hallucinations or delusions.     Lab / Imaging Results   (All laboratory and radiology results have been personally reviewed by myself)  Labs:  No results found for this visit on 06/09/22. Imaging: All Radiology results interpreted by Radiologist unless otherwise noted. No orders to display       ED Course / Medical Decision Making   Medications - No data to display     Consult(s):   None    Procedure(s):   none    MDM:   Primary care appointment from 6-6-2022 reviewed. Patient did receive the bulk of her medication refills and had a physical exam completed. She admits to going to psychiatry and getting those medications refilled. She has no suicidal or homicidal ideation. She was encouraged to call her GI physician tomorrow who she is already established with in regards to those medications. She is made aware that the ER is unable to refill medications as there may be rationale as to why the ones she is requesting are intentionally not being filled. She is encouraged to contact her PCP as well. She has no complaints at present requiring labs or diagnostics. She is aware signs and symptoms indicative of reevaluation in the emergency department setting. Patient departed in stable condition. Plan of Care/Counseling:  EVERETT Robin CNP reviewed today's visit with the patient in addition to providing specific details for the plan of care and counseling regarding the diagnosis and prognosis. Questions are answered at this time and are agreeable with the plan. ASSESSMENT     1. Encounter for medication refill      PLAN   Discharged home. Patient condition is stable    New Medications     Discharge Medication List as of 6/9/2022  7:38 PM        Electronically signed by EVERETT Robin CNP   DD: 6/9/22  **This report was transcribed using voice recognition software. Every effort was made to ensure accuracy; however, inadvertent computerized transcription errors may be present.   END OF ED PROVIDER NOTE       EVERETT Robin CNP  06/09/22 [id number]

## 2022-06-12 ENCOUNTER — HOSPITAL ENCOUNTER (EMERGENCY)
Age: 52
Discharge: HOME | End: 2022-06-13
Attending: STUDENT IN AN ORGANIZED HEALTH CARE EDUCATION/TRAINING PROGRAM
Payer: MEDICAID

## 2022-06-12 VITALS
OXYGEN SATURATION: 99 % | TEMPERATURE: 97.2 F | SYSTOLIC BLOOD PRESSURE: 148 MMHG | WEIGHT: 115.38 LBS | BODY MASS INDEX: 21.79 KG/M2 | DIASTOLIC BLOOD PRESSURE: 90 MMHG | HEIGHT: 61 IN | HEART RATE: 96 BPM | RESPIRATION RATE: 18 BRPM

## 2022-06-12 DIAGNOSIS — K86.0 ALCOHOL-INDUCED CHRONIC PANCREATITIS (HCC): Primary | ICD-10-CM

## 2022-06-12 PROCEDURE — 99284 EMERGENCY DEPT VISIT MOD MDM: CPT

## 2022-06-12 PROCEDURE — 96374 THER/PROPH/DIAG INJ IV PUSH: CPT

## 2022-06-12 PROCEDURE — 96375 TX/PRO/DX INJ NEW DRUG ADDON: CPT

## 2022-06-12 PROCEDURE — 96376 TX/PRO/DX INJ SAME DRUG ADON: CPT

## 2022-06-12 ASSESSMENT — PAIN - FUNCTIONAL ASSESSMENT: PAIN_FUNCTIONAL_ASSESSMENT: NONE - DENIES PAIN

## 2022-06-13 LAB
ALBUMIN SERPL-MCNC: 3.8 G/DL (ref 3.5–5.2)
ALP BLD-CCNC: 429 U/L (ref 35–104)
ALT SERPL-CCNC: 34 U/L (ref 0–32)
ANION GAP SERPL CALCULATED.3IONS-SCNC: 11 MMOL/L (ref 7–16)
AST SERPL-CCNC: 48 U/L (ref 0–31)
BASOPHILS ABSOLUTE: 0.01 E9/L (ref 0–0.2)
BASOPHILS RELATIVE PERCENT: 0.4 % (ref 0–2)
BILIRUB SERPL-MCNC: 0.7 MG/DL (ref 0–1.2)
BUN BLDV-MCNC: 11 MG/DL (ref 6–20)
CALCIUM SERPL-MCNC: 8.9 MG/DL (ref 8.6–10.2)
CHLORIDE BLD-SCNC: 105 MMOL/L (ref 98–107)
CO2: 24 MMOL/L (ref 22–29)
CREAT SERPL-MCNC: 0.6 MG/DL (ref 0.5–1)
EOSINOPHILS ABSOLUTE: 0.07 E9/L (ref 0.05–0.5)
EOSINOPHILS RELATIVE PERCENT: 2.6 % (ref 0–6)
GFR AFRICAN AMERICAN: >60
GFR NON-AFRICAN AMERICAN: >60 ML/MIN/1.73
GLUCOSE BLD-MCNC: 232 MG/DL (ref 74–99)
HCT VFR BLD CALC: 37.6 % (ref 34–48)
HEMOGLOBIN: 12.8 G/DL (ref 11.5–15.5)
IMMATURE GRANULOCYTES #: 0 E9/L
IMMATURE GRANULOCYTES %: 0 % (ref 0–5)
LIPASE: 7 U/L (ref 13–60)
LYMPHOCYTES ABSOLUTE: 0.55 E9/L (ref 1.5–4)
LYMPHOCYTES RELATIVE PERCENT: 20.7 % (ref 20–42)
MCH RBC QN AUTO: 29.8 PG (ref 26–35)
MCHC RBC AUTO-ENTMCNC: 34 % (ref 32–34.5)
MCV RBC AUTO: 87.6 FL (ref 80–99.9)
MONOCYTES ABSOLUTE: 0.2 E9/L (ref 0.1–0.95)
MONOCYTES RELATIVE PERCENT: 7.5 % (ref 2–12)
NEUTROPHILS ABSOLUTE: 1.83 E9/L (ref 1.8–7.3)
NEUTROPHILS RELATIVE PERCENT: 68.8 % (ref 43–80)
PDW BLD-RTO: 13.9 FL (ref 11.5–15)
PLATELET # BLD: 80 E9/L (ref 130–450)
PLATELET CONFIRMATION: NORMAL
PMV BLD AUTO: 10.2 FL (ref 7–12)
POTASSIUM SERPL-SCNC: 4.3 MMOL/L (ref 3.5–5)
RBC # BLD: 4.29 E12/L (ref 3.5–5.5)
RBC # BLD: NORMAL 10*6/UL
SODIUM BLD-SCNC: 140 MMOL/L (ref 132–146)
TOTAL PROTEIN: 7.2 G/DL (ref 6.4–8.3)
WBC # BLD: 2.7 E9/L (ref 4.5–11.5)

## 2022-06-13 PROCEDURE — 2500000003 HC RX 250 WO HCPCS: Performed by: STUDENT IN AN ORGANIZED HEALTH CARE EDUCATION/TRAINING PROGRAM

## 2022-06-13 PROCEDURE — 6360000002 HC RX W HCPCS: Performed by: STUDENT IN AN ORGANIZED HEALTH CARE EDUCATION/TRAINING PROGRAM

## 2022-06-13 PROCEDURE — 80053 COMPREHEN METABOLIC PANEL: CPT

## 2022-06-13 PROCEDURE — 96374 THER/PROPH/DIAG INJ IV PUSH: CPT

## 2022-06-13 PROCEDURE — 36415 COLL VENOUS BLD VENIPUNCTURE: CPT

## 2022-06-13 PROCEDURE — 83690 ASSAY OF LIPASE: CPT

## 2022-06-13 PROCEDURE — 85025 COMPLETE CBC W/AUTO DIFF WBC: CPT

## 2022-06-13 PROCEDURE — 96375 TX/PRO/DX INJ NEW DRUG ADDON: CPT

## 2022-06-13 PROCEDURE — 2580000003 HC RX 258: Performed by: STUDENT IN AN ORGANIZED HEALTH CARE EDUCATION/TRAINING PROGRAM

## 2022-06-13 PROCEDURE — A4216 STERILE WATER/SALINE, 10 ML: HCPCS | Performed by: STUDENT IN AN ORGANIZED HEALTH CARE EDUCATION/TRAINING PROGRAM

## 2022-06-13 PROCEDURE — 96376 TX/PRO/DX INJ SAME DRUG ADON: CPT

## 2022-06-13 RX ORDER — KETOROLAC TROMETHAMINE 30 MG/ML
15 INJECTION, SOLUTION INTRAMUSCULAR; INTRAVENOUS ONCE
Status: COMPLETED | OUTPATIENT
Start: 2022-06-13 | End: 2022-06-13

## 2022-06-13 RX ORDER — PROCHLORPERAZINE MALEATE 10 MG
10 TABLET ORAL 3 TIMES DAILY
Qty: 15 TABLET | Refills: 0 | Status: SHIPPED | OUTPATIENT
Start: 2022-06-13

## 2022-06-13 RX ORDER — IBUPROFEN 800 MG/1
800 TABLET ORAL 3 TIMES DAILY
Qty: 40 TABLET | Refills: 0 | Status: SHIPPED | OUTPATIENT
Start: 2022-06-13 | End: 2022-09-23 | Stop reason: SDUPTHER

## 2022-06-13 RX ORDER — 0.9 % SODIUM CHLORIDE 0.9 %
1000 INTRAVENOUS SOLUTION INTRAVENOUS ONCE
Status: COMPLETED | OUTPATIENT
Start: 2022-06-13 | End: 2022-06-13

## 2022-06-13 RX ORDER — ONDANSETRON 2 MG/ML
4 INJECTION INTRAMUSCULAR; INTRAVENOUS ONCE
Status: COMPLETED | OUTPATIENT
Start: 2022-06-13 | End: 2022-06-13

## 2022-06-13 RX ADMIN — FAMOTIDINE 20 MG: 10 INJECTION, SOLUTION INTRAVENOUS at 03:23

## 2022-06-13 RX ADMIN — KETOROLAC TROMETHAMINE 15 MG: 30 INJECTION, SOLUTION INTRAMUSCULAR at 05:00

## 2022-06-13 RX ADMIN — SODIUM CHLORIDE 1000 ML: 9 INJECTION, SOLUTION INTRAVENOUS at 03:26

## 2022-06-13 RX ADMIN — ONDANSETRON 4 MG: 2 INJECTION INTRAMUSCULAR; INTRAVENOUS at 03:24

## 2022-06-13 RX ADMIN — KETOROLAC TROMETHAMINE 15 MG: 30 INJECTION, SOLUTION INTRAMUSCULAR at 03:23

## 2022-06-13 RX ADMIN — HYDROMORPHONE HYDROCHLORIDE 1 MG: 1 INJECTION, SOLUTION INTRAMUSCULAR; INTRAVENOUS; SUBCUTANEOUS at 01:12

## 2022-06-13 ASSESSMENT — PAIN SCALES - GENERAL
PAINLEVEL_OUTOF10: 8
PAINLEVEL_OUTOF10: 10

## 2022-06-13 ASSESSMENT — PAIN DESCRIPTION - LOCATION: LOCATION: ABDOMEN

## 2022-06-13 NOTE — ED NOTES
IV attempts unsuccessful. Per Dr. Suellen Pryor, able to give dilaudid IM.         Kim Matthews RN  06/13/22 7135

## 2022-06-13 NOTE — ED PROVIDER NOTES
Department of Emergency Medicine   ED  Provider Note  Admit Date/RoomTime: 6/12/2022 11:11 PM  ED Room: 07/07          History of Present Illness:  6/13/22, Time: 12:27 AM EDT  Chief Complaint   Patient presents with    Emesis     multiple emesis         Ginger Horton is a 46 y.o. female presenting to the ED for Chronic pancreatitis. The patient states she has a history of chronic pancreatitis. She has had midepigastric tenderness radiates to her back nothing makes it better or worse it is constant. She was seen here about 4 days ago and diagnosed with pancreatitis at that time. Denies any fevers chills or myalgias. She has been nauseous without emesis. She is on Zofran at home for this. She does have loose stools which is normal when she has chronic pancreatitis flareups. She also follows with GI per her report. Review of Systems:  Review of systems obtained and negative unless stated otherwise above in the HPI.    --------------------------------------------- PAST HISTORY ---------------------------------------------  Past Medical History:  has a past medical history of Alcoholism ([address] Anxiety and depression, Ascites of liver, Bronchitis, Cancer ([address] Candidiasis of vagina, Chronic pancreatitis ([address] Cocaine abuse ([address] Constipation, Diabetes mellitus, type 2 ([address] GERD (gastroesophageal reflux disease), Gout, Hepatitis C, Hernia of anterior abdominal wall, Jaundice, Pneumonia, Polyneuropathy due to type 2 diabetes mellitus ([address] Schizoaffective disorder, bipolar type ([address] and Splenomegaly. Past Surgical History:  has a past surgical history that includes Hysterectomy (2004); Ovary removal (Bilateral, 2007); shoulder surgery (Left, 2003); Cholecystectomy (2010); ERCP; Abdomen surgery; Colonoscopy; Upper gastrointestinal endoscopy; other surgical history (Left, 04/25/2017); Breast biopsy (Left);  Pancreas Biopsy; Endoscopy, colon, diagnostic; other surgical history (10/05/2017); Colonoscopy (N/A, 10/2/2018); and Mastectomy (Left). Social History:  reports that she has been smoking cigarettes. She started smoking about 40 years ago. She has a 60.00 pack-year smoking history. She has never used smokeless tobacco. She reports previous alcohol use. She reports current drug use. Drugs: Cocaine and Opiates . Family History: family history includes Cancer in her mother; Colon Cancer in her maternal grandfather; Diabetes in her mother; No Known Problems in her father. . Unless otherwise noted, family history is non contributory    The patients home medications have been reviewed. Allergies: Dye [iodides] and Tylenol [acetaminophen]    I have reviewed the past medical history, past surgical history, social history, and family history    ---------------------------------------------------PHYSICAL EXAM--------------------------------------    Constitutional: [Appears in no distress]  Head: [Normocephalic, atraumatic]   Eyes: [Non-icteric slcera, no conjunctival injection]  ENT: [Moist mucous membranes,]  Neck: [Trachea midline, no JVD]  Respiratory: [Nonlabored respirations. Lungs clear to auscultation bilaterally, no wheezes, rales, or rhonchi.]   Cardiovascular:  [Regular rate. Regular rhythm. No murmurs, no gallops, no rubs.]   Gastrointestinal:  [Abdomen Soft, Non tender, Non distended. No rebound tenderness, guarding, or rigidity.]  Extremities: [No lower extremity edema]  Genitourinary: [No CVA tenderness, no suprapubic tenderness]  Musculoskeletal: [Moves all extremities, no deformity]  Skin: [Pink, warm, dry without rash.]  Neurologic: [Alert, symmetric facies, no aphasia]    -------------------------------------------------- RESULTS -------------------------------------------------  I have personally reviewed all laboratory and imaging results for this patient. Results are listed below.      LABS: (Lab results interpreted by me)  Results for orders placed or performed during the hospital encounter of 06/12/22   Comprehensive Metabolic Panel   Result Value Ref Range    Sodium 140 132 - 146 mmol/L    Potassium 4.3 3.5 - 5.0 mmol/L    Chloride 105 98 - 107 mmol/L    CO2 24 22 - 29 mmol/L    Anion Gap 11 7 - 16 mmol/L    Glucose 232 (H) 74 - 99 mg/dL    BUN 11 6 - 20 mg/dL    CREATININE 0.6 0.5 - 1.0 mg/dL    GFR Non-African American >60 >=60 mL/min/1.73    GFR African American >60     Calcium 8.9 8.6 - 10.2 mg/dL    Total Protein 7.2 6.4 - 8.3 g/dL    Albumin 3.8 3.5 - 5.2 g/dL    Total Bilirubin 0.7 0.0 - 1.2 mg/dL    Alkaline Phosphatase 429 (H) 35 - 104 U/L    ALT 34 (H) 0 - 32 U/L    AST 48 (H) 0 - 31 U/L   CBC with Auto Differential   Result Value Ref Range    WBC 2.7 (L) 4.5 - 11.5 E9/L    RBC 4.29 3.50 - 5.50 E12/L    Hemoglobin 12.8 11.5 - 15.5 g/dL    Hematocrit 37.6 34.0 - 48.0 %    MCV 87.6 80.0 - 99.9 fL    MCH 29.8 26.0 - 35.0 pg    MCHC 34.0 32.0 - 34.5 %    RDW 13.9 11.5 - 15.0 fL    Platelets 80 (L) 587 - 450 E9/L    MPV 10.2 7.0 - 12.0 fL    Neutrophils % 68.8 43.0 - 80.0 %    Immature Granulocytes % 0.0 0.0 - 5.0 %    Lymphocytes % 20.7 20.0 - 42.0 %    Monocytes % 7.5 2.0 - 12.0 %    Eosinophils % 2.6 0.0 - 6.0 %    Basophils % 0.4 0.0 - 2.0 %    Neutrophils Absolute 1.83 1.80 - 7.30 E9/L    Immature Granulocytes # 0.00 E9/L    Lymphocytes Absolute 0.55 (L) 1.50 - 4.00 E9/L    Monocytes Absolute 0.20 0.10 - 0.95 E9/L    Eosinophils Absolute 0.07 0.05 - 0.50 E9/L    Basophils Absolute 0.01 0.00 - 0.20 E9/L    RBC Morphology Normal    Lipase   Result Value Ref Range    Lipase 7 (L) 13 - 60 U/L   Platelet Confirmation   Result Value Ref Range    Platelet Confirmation CONFIRMED    ,       RADIOLOGY:  Interpreted by Radiologist unless otherwise specified  No orders to display         ------------------------- NURSING NOTES AND VITALS REVIEWED ---------------------------   The nursing notes within the ED encounter and vital signs as below have been reviewed by myself  BP (!) 148/90   Pulse 96   Temp 97.2 °F (36.2 °C) (Temporal)   Resp 18   Ht 5' 1\" (1.549 m)   Wt 115 lb 6 oz (52.3 kg)   SpO2 99%   BMI 21.80 kg/m²     Oxygen Saturation Interpretation: [Normal]    The patients available past medical records and past encounters were reviewed. ------------------------------ ED COURSE/MEDICAL DECISION MAKING----------------------  Medications   ondansetron (ZOFRAN) injection 4 mg (4 mg IntraVENous Given 6/13/22 0324)   famotidine (PEPCID) 20 mg in sodium chloride (PF) 10 mL injection (20 mg IntraVENous Given 6/13/22 0323)   0.9 % sodium chloride bolus (0 mLs IntraVENous Stopped 6/13/22 0450)   HYDROmorphone (DILAUDID) injection 1 mg (1 mg IntraVENous Given 6/13/22 0112)   ketorolac (TORADOL) injection 15 mg (15 mg IntraVENous Given 6/13/22 0323)   ketorolac (TORADOL) injection 15 mg (15 mg IntraVENous Given 6/13/22 0500)        Re-Evaluations: This patient's ED course included: [a personal history and physicial examination and re-evaluation prior to disposition]    This patient has [remained hemodynamically stable] during their ED course. Consultations:  [none]    Medical Decision Making:   Patient presents emerged part with a forementioned complaint. Vital signs reviewed she is mildly hypertensive. History and physical examination findings consistent with likely pancreatitis flare. She has a completely benign soft and nonsurgical nontender abdomen at this time. She does have a surgically absent gallbladder and thus likelihood of gallstone pancreatitis is exceedingly low. She will be given IV fluids as well as pain medication and antiemetics. Labs and imaging reviewed no acute concerning abnormalities are deranged from the patient's normal baseline. Reassessment patient is feeling much improved she tolerated oral challenge. She will still be started on Compazine in addition to her Zofran. She will be discharged with outpatient follow-up.   She has a repeat benign soft nonsurgical nontender abdomen. Counseling: The emergency provider has spoken with the patient and discussed todays results, in addition to providing specific details for the plan of care and counseling regarding the diagnosis and prognosis. Questions are answered at this time and they are agreeable with the plan.       --------------------------------- IMPRESSION AND DISPOSITION ---------------------------------    IMPRESSION  1. Alcohol-induced chronic pancreatitis ([facility]        DISPOSITION  Disposition: [Discharge to home]  Patient condition is [stable]    I, Dr. Allyson Wakefield, am the primary provider of record    Allyson Wakefield DO  Emergency Medicine    NOTE: This report was transcribed using voice recognition software.  Every effort was made to ensure accuracy; however, inadvertent computerized transcription errors may be present         Yamil Mason DO  06/13/22 0503

## 2022-06-21 DIAGNOSIS — Z85.3 PERSONAL HISTORY OF BREAST CANCER: Primary | ICD-10-CM

## 2022-06-21 RX ORDER — ANASTROZOLE 1 MG/1
1 TABLET ORAL DAILY
Qty: 30 TABLET | Refills: 4 | Status: SHIPPED | OUTPATIENT
Start: 2022-06-21

## 2022-07-02 ENCOUNTER — HOSPITAL ENCOUNTER (EMERGENCY)
Age: 52
Discharge: HOME | End: 2022-07-03
Attending: EMERGENCY MEDICINE
Payer: MEDICAID

## 2022-07-02 ENCOUNTER — APPOINTMENT (OUTPATIENT)
Dept: CT IMAGING | Age: 52
End: 2022-07-02
Payer: MEDICAID

## 2022-07-02 DIAGNOSIS — R79.89 ELEVATED LFTS: ICD-10-CM

## 2022-07-02 DIAGNOSIS — K86.1 CHRONIC PANCREATITIS, UNSPECIFIED PANCREATITIS TYPE (HCC): Primary | ICD-10-CM

## 2022-07-02 DIAGNOSIS — R93.5 ABNORMAL CT OF THE ABDOMEN: ICD-10-CM

## 2022-07-02 DIAGNOSIS — D72.819 LEUKOPENIA, UNSPECIFIED TYPE: ICD-10-CM

## 2022-07-02 DIAGNOSIS — R74.8 ELEVATED ALKALINE PHOSPHATASE LEVEL: ICD-10-CM

## 2022-07-02 DIAGNOSIS — E83.52 SERUM CALCIUM ELEVATED: ICD-10-CM

## 2022-07-02 DIAGNOSIS — D69.6 THROMBOCYTOPENIA (HCC): ICD-10-CM

## 2022-07-02 PROCEDURE — 96374 THER/PROPH/DIAG INJ IV PUSH: CPT

## 2022-07-02 PROCEDURE — 99284 EMERGENCY DEPT VISIT MOD MDM: CPT

## 2022-07-02 PROCEDURE — 96375 TX/PRO/DX INJ NEW DRUG ADDON: CPT

## 2022-07-02 RX ORDER — ONDANSETRON 2 MG/ML
4 INJECTION INTRAMUSCULAR; INTRAVENOUS ONCE
Status: COMPLETED | OUTPATIENT
Start: 2022-07-03 | End: 2022-07-03

## 2022-07-02 RX ORDER — 0.9 % SODIUM CHLORIDE 0.9 %
1000 INTRAVENOUS SOLUTION INTRAVENOUS ONCE
Status: COMPLETED | OUTPATIENT
Start: 2022-07-02 | End: 2022-07-03

## 2022-07-02 RX ORDER — FENTANYL CITRATE 50 UG/ML
50 INJECTION, SOLUTION INTRAMUSCULAR; INTRAVENOUS ONCE
Status: COMPLETED | OUTPATIENT
Start: 2022-07-03 | End: 2022-07-03

## 2022-07-03 ENCOUNTER — APPOINTMENT (OUTPATIENT)
Dept: CT IMAGING | Age: 52
End: 2022-07-03
Payer: MEDICAID

## 2022-07-03 VITALS
TEMPERATURE: 98.8 F | DIASTOLIC BLOOD PRESSURE: 62 MMHG | HEART RATE: 74 BPM | WEIGHT: 115 LBS | BODY MASS INDEX: 21.71 KG/M2 | OXYGEN SATURATION: 98 % | RESPIRATION RATE: 14 BRPM | HEIGHT: 61 IN | SYSTOLIC BLOOD PRESSURE: 102 MMHG

## 2022-07-03 LAB
ALBUMIN SERPL-MCNC: 4.3 G/DL (ref 3.5–5.2)
ALP BLD-CCNC: 559 U/L (ref 35–104)
ALT SERPL-CCNC: 41 U/L (ref 0–32)
ANION GAP SERPL CALCULATED.3IONS-SCNC: 12 MMOL/L (ref 7–16)
AST SERPL-CCNC: 45 U/L (ref 0–31)
BASOPHILS ABSOLUTE: 0.02 E9/L (ref 0–0.2)
BASOPHILS RELATIVE PERCENT: 0.6 % (ref 0–2)
BILIRUB SERPL-MCNC: 0.6 MG/DL (ref 0–1.2)
BILIRUBIN DIRECT: 0.2 MG/DL (ref 0–0.3)
BILIRUBIN, INDIRECT: 0.4 MG/DL (ref 0–1)
BUN BLDV-MCNC: 11 MG/DL (ref 6–20)
CALCIUM SERPL-MCNC: 10.3 MG/DL (ref 8.6–10.2)
CHLORIDE BLD-SCNC: 94 MMOL/L (ref 98–107)
CO2: 30 MMOL/L (ref 22–29)
CREAT SERPL-MCNC: 0.7 MG/DL (ref 0.5–1)
EKG ATRIAL RATE: 69 BPM
EKG P AXIS: 62 DEGREES
EKG P-R INTERVAL: 142 MS
EKG Q-T INTERVAL: 402 MS
EKG QRS DURATION: 76 MS
EKG QTC CALCULATION (BAZETT): 430 MS
EKG R AXIS: 40 DEGREES
EKG T AXIS: 67 DEGREES
EKG VENTRICULAR RATE: 69 BPM
EOSINOPHILS ABSOLUTE: 0.08 E9/L (ref 0.05–0.5)
EOSINOPHILS RELATIVE PERCENT: 2.2 % (ref 0–6)
GFR AFRICAN AMERICAN: >60
GFR NON-AFRICAN AMERICAN: >60 ML/MIN/1.73
GLUCOSE BLD-MCNC: 201 MG/DL (ref 74–99)
HCT VFR BLD CALC: 43.6 % (ref 34–48)
HEMOGLOBIN: 14 G/DL (ref 11.5–15.5)
IMMATURE GRANULOCYTES #: 0.01 E9/L
IMMATURE GRANULOCYTES %: 0.3 % (ref 0–5)
LACTIC ACID: 0.8 MMOL/L (ref 0.5–2.2)
LIPASE: 7 U/L (ref 13–60)
LYMPHOCYTES ABSOLUTE: 0.79 E9/L (ref 1.5–4)
LYMPHOCYTES RELATIVE PERCENT: 21.9 % (ref 20–42)
MCH RBC QN AUTO: 30 PG (ref 26–35)
MCHC RBC AUTO-ENTMCNC: 32.1 % (ref 32–34.5)
MCV RBC AUTO: 93.4 FL (ref 80–99.9)
MONOCYTES ABSOLUTE: 0.25 E9/L (ref 0.1–0.95)
MONOCYTES RELATIVE PERCENT: 6.9 % (ref 2–12)
NEUTROPHILS ABSOLUTE: 2.45 E9/L (ref 1.8–7.3)
NEUTROPHILS RELATIVE PERCENT: 68.1 % (ref 43–80)
PDW BLD-RTO: 14.1 FL (ref 11.5–15)
PLATELET # BLD: 81 E9/L (ref 130–450)
PLATELET CONFIRMATION: NORMAL
PMV BLD AUTO: 10.2 FL (ref 7–12)
POTASSIUM REFLEX MAGNESIUM: 4.2 MMOL/L (ref 3.5–5)
RBC # BLD: 4.67 E12/L (ref 3.5–5.5)
RBC # BLD: NORMAL 10*6/UL
SODIUM BLD-SCNC: 136 MMOL/L (ref 132–146)
TOTAL PROTEIN: 7.9 G/DL (ref 6.4–8.3)
TROPONIN, HIGH SENSITIVITY: 16 NG/L (ref 0–9)
WBC # BLD: 3.6 E9/L (ref 4.5–11.5)

## 2022-07-03 PROCEDURE — 83605 ASSAY OF LACTIC ACID: CPT

## 2022-07-03 PROCEDURE — 80048 BASIC METABOLIC PNL TOTAL CA: CPT

## 2022-07-03 PROCEDURE — 70450 CT HEAD/BRAIN W/O DYE: CPT

## 2022-07-03 PROCEDURE — 6360000002 HC RX W HCPCS: Performed by: EMERGENCY MEDICINE

## 2022-07-03 PROCEDURE — 80076 HEPATIC FUNCTION PANEL: CPT

## 2022-07-03 PROCEDURE — 84484 ASSAY OF TROPONIN QUANT: CPT

## 2022-07-03 PROCEDURE — 83690 ASSAY OF LIPASE: CPT

## 2022-07-03 PROCEDURE — 2580000003 HC RX 258: Performed by: EMERGENCY MEDICINE

## 2022-07-03 PROCEDURE — 74176 CT ABD & PELVIS W/O CONTRAST: CPT

## 2022-07-03 PROCEDURE — 96375 TX/PRO/DX INJ NEW DRUG ADDON: CPT

## 2022-07-03 PROCEDURE — 93005 ELECTROCARDIOGRAM TRACING: CPT | Performed by: EMERGENCY MEDICINE

## 2022-07-03 PROCEDURE — 96374 THER/PROPH/DIAG INJ IV PUSH: CPT

## 2022-07-03 PROCEDURE — 85025 COMPLETE CBC W/AUTO DIFF WBC: CPT

## 2022-07-03 RX ADMIN — SODIUM CHLORIDE 1000 ML: 9 INJECTION, SOLUTION INTRAVENOUS at 02:12

## 2022-07-03 RX ADMIN — ONDANSETRON 4 MG: 2 INJECTION INTRAMUSCULAR; INTRAVENOUS at 02:13

## 2022-07-03 RX ADMIN — FENTANYL CITRATE 50 MCG: 50 INJECTION, SOLUTION INTRAMUSCULAR; INTRAVENOUS at 02:14

## 2022-07-03 ASSESSMENT — ENCOUNTER SYMPTOMS
DIARRHEA: 0
SINUS PAIN: 0
EYE PAIN: 0
BACK PAIN: 0
SORE THROAT: 0
VOMITING: 1
NAUSEA: 1
ABDOMINAL PAIN: 1
SHORTNESS OF BREATH: 0

## 2022-07-03 ASSESSMENT — PAIN SCALES - GENERAL: PAINLEVEL_OUTOF10: 7

## 2022-07-03 NOTE — ED PROVIDER NOTES
Chief Complaint   Patient presents with    Dizziness     x 3 weeks    Emesis       49-year-old female with past medical history of chronic pancreatitis secondary to prior alcohol abuse, diabetes, hepatitis C, presenting today with 3 weeks of worsening nausea, vomiting, abdominal pain, lightheadedness. Nothing is made it better or worse, not associated fevers or chills. She has had several bouts of pancreatitis in the past, known cirrhosis, she says that this episode might feel like that but she is not entirely sure. She has not been around any sick contacts. She does not have any hematochezia or melena that she notes. No change with urination. No rashes, numbness, weakness, tingling. No other acute complaints. Review of Systems   Constitutional: Negative for chills and fever. HENT: Negative for ear pain, sinus pain and sore throat. Eyes: Negative for pain. Respiratory: Negative for shortness of breath. Cardiovascular: Negative for chest pain. Gastrointestinal: Positive for abdominal pain, nausea and vomiting. Negative for diarrhea. Genitourinary: Negative for flank pain and pelvic pain. Musculoskeletal: Negative for back pain and neck pain. Skin: Negative for rash. Neurological: Positive for light-headedness. Negative for seizures and headaches. Hematological: Negative for adenopathy. All other systems reviewed and are negative. Physical Exam  Vitals and nursing note reviewed. Constitutional:       General: She is not in acute distress. Appearance: She is well-developed. She is not toxic-appearing. HENT:      Head: Normocephalic and atraumatic. Right Ear: External ear normal.      Left Ear: External ear normal.      Nose: Nose normal.      Mouth/Throat:      Mouth: Mucous membranes are moist.      Pharynx: Oropharynx is clear. Eyes:      Pupils: Pupils are equal, round, and reactive to light.    Cardiovascular:      Rate and Rhythm: Normal rate and regular rhythm. Heart sounds: Normal heart sounds. No murmur heard. Pulmonary:      Effort: Pulmonary effort is normal. No respiratory distress. Breath sounds: Normal breath sounds. No wheezing. Abdominal:      General: Bowel sounds are normal.      Palpations: Abdomen is soft. Tenderness: There is abdominal tenderness. There is no guarding or rebound. Comments: Generalized   Musculoskeletal:         General: No swelling. Cervical back: Normal range of motion and neck supple. Skin:     General: Skin is warm and dry. Neurological:      General: No focal deficit present. Mental Status: She is alert and oriented to person, place, and time. Cranial Nerves: No cranial nerve deficit. Motor: No weakness. Procedures     EKG: This EKG is signed and interpreted by me. Rate: 69  Rhythm: Sinus  Interpretation: no acute changes, no acute ST elevations, intervals normal, normal axis  Comparison: stable as compared to patient's most recent EKG      MDM  Number of Diagnoses or Management Options  Abnormal CT of the abdomen  Chronic pancreatitis, unspecified pancreatitis type (HCC)  Elevated alkaline phosphatase level  Elevated LFTs  Leukopenia, unspecified type  Serum calcium elevated  Thrombocytopenia (HCC)  Diagnosis management comments: 49-year-old female past medical history of chronic pancreatitis secondary to prior alcohol abuse, diabetes, hepatitis C, presenting today with 3 weeks of worsening nausea, vomiting abdominal pain, lightheadedness. On arrival to emergency department she is hemodynamically stable. Symptomatically managed and feeling improved, lab work demonstrated slight transaminitis and elevated alk phos that is not far from patient's baseline. No elevated lipase and other labs within normal limits, CT abdomen pelvis showed chronic pancreatitis and no acute pathology.   Discussed return precautions and verbalized understanding, she will follow-up with her primary care physician.                  --------------------------------------------- PAST HISTORY ---------------------------------------------  Past Medical History:  has a past medical history of Alcoholism ([facility] Anxiety and depression, Ascites of liver, Bronchitis, Cancer ([facility] Candidiasis of vagina, Chronic pancreatitis ([facility] [address] Cocaine abuse ([facility] Constipation, Diabetes mellitus, type 2 ([facility] [facility] GERD (gastroesophageal reflux disease), Gout, Hepatitis C, Hernia of anterior abdominal wall, Jaundice, Pneumonia, Polyneuropathy due to type 2 diabetes mellitus ([facility] [facility] Schizoaffective disorder, bipolar type ([facility] [address] and Splenomegaly. Past Surgical History:  has a past surgical history that includes Hysterectomy (2004); Ovary removal (Bilateral, 2007); shoulder surgery (Left, 2003); Cholecystectomy (2010); ERCP; Abdomen surgery; Colonoscopy; Upper gastrointestinal endoscopy; other surgical history (Left, 04/25/2017); Breast biopsy (Left); Pancreas Biopsy; Endoscopy, colon, diagnostic; other surgical history (10/05/2017); Colonoscopy (N/A, 10/2/2018); and Mastectomy (Left). Social History:  reports that she has been smoking cigarettes. She started smoking about 40 years ago. She has a 60.00 pack-year smoking history. She has never used smokeless tobacco. She reports previous alcohol use. She reports current drug use. Drugs: Cocaine and Opiates . Family History: family history includes Cancer in her mother; Colon Cancer in her maternal grandfather; Diabetes in her mother; No Known Problems in her father. The patients home medications have been reviewed.     Allergies: Dye [iodides] and Tylenol [acetaminophen]    -------------------------------------------------- RESULTS -------------------------------------------------  Labs:  Results for orders placed or performed during the hospital encounter of 07/02/22   Lactic Acid   Result Value Ref Range    Lactic Acid 0.8 0.5 - 2.2 mmol/L   CBC with Auto Differential   Result Value Ref Range    WBC 3.6 (L) 4.5 - 11.5 E9/L    RBC 4.67 3.50 - 5.50 E12/L    Hemoglobin 14.0 11.5 - 15.5 g/dL    Hematocrit 43.6 34.0 - 48.0 %    MCV 93.4 80.0 - 99.9 fL    MCH 30.0 26.0 - 35.0 pg    MCHC 32.1 32.0 - 34.5 %    RDW 14.1 11.5 - 15.0 fL    Platelets 81 (L) 798 - 450 E9/L    MPV 10.2 7.0 - 12.0 fL    Neutrophils % 68.1 43.0 - 80.0 %    Immature Granulocytes % 0.3 0.0 - 5.0 %    Lymphocytes % 21.9 20.0 - 42.0 %    Monocytes % 6.9 2.0 - 12.0 %    Eosinophils % 2.2 0.0 - 6.0 %    Basophils % 0.6 0.0 - 2.0 %    Neutrophils Absolute 2.45 1.80 - 7.30 E9/L    Immature Granulocytes # 0.01 E9/L    Lymphocytes Absolute 0.79 (L) 1.50 - 4.00 E9/L    Monocytes Absolute 0.25 0.10 - 0.95 E9/L    Eosinophils Absolute 0.08 0.05 - 0.50 E9/L    Basophils Absolute 0.02 0.00 - 0.20 E9/L    RBC Morphology Normal    Troponin   Result Value Ref Range    Troponin, High Sensitivity 16 (H) 0 - 9 ng/L   Lipase   Result Value Ref Range    Lipase 7 (L) 13 - 60 U/L   Basic Metabolic Panel w/ Reflex to MG   Result Value Ref Range    Sodium 136 132 - 146 mmol/L    Potassium reflex Magnesium 4.2 3.5 - 5.0 mmol/L    Chloride 94 (L) 98 - 107 mmol/L    CO2 30 (H) 22 - 29 mmol/L    Anion Gap 12 7 - 16 mmol/L    Glucose 201 (H) 74 - 99 mg/dL    BUN 11 6 - 20 mg/dL    CREATININE 0.7 0.5 - 1.0 mg/dL    GFR Non-African American >60 >=60 mL/min/1.73    GFR African American >60     Calcium 10.3 (H) 8.6 - 10.2 mg/dL   Hepatic Function Panel   Result Value Ref Range    Total Protein 7.9 6.4 - 8.3 g/dL    Albumin 4.3 3.5 - 5.2 g/dL    Alkaline Phosphatase 559 (H) 35 - 104 U/L    ALT 41 (H) 0 - 32 U/L    AST 45 (H) 0 - 31 U/L    Total Bilirubin 0.6 0.0 - 1.2 mg/dL    Bilirubin, Direct 0.2 0.0 - 0.3 mg/dL    Bilirubin, Indirect 0.4 0.0 - 1.0 mg/dL   Platelet Confirmation   Result Value Ref Range    Platelet Confirmation SEE BELOW    EKG 12 Lead   Result Value Ref Range    Ventricular Rate 69 BPM    Atrial Rate 69 BPM P-R Interval 142 ms    QRS Duration 76 ms    Q-T Interval 402 ms    QTc Calculation (Bazett) 430 ms    P Axis 62 degrees    R Axis 40 degrees    T Axis 67 degrees       Radiology:  CT HEAD WO CONTRAST   Final Result   No acute intracranial abnormality. CT ABDOMEN PELVIS WO CONTRAST Additional Contrast? None   Final Result   Questionable stranding involving the pancreatic head/uncinate process, which   could suggest acute pancreatitis. Clinical and laboratory correlation   recommended. Findings compatible with chronic pancreatitis. Cirrhosis and splenomegaly. ------------------------- NURSING NOTES AND VITALS REVIEWED ---------------------------  Date / Time Roomed:  7/2/2022 11:25 PM  ED Bed Assignment:  20/20    The nursing notes within the ED encounter and vital signs as below have been reviewed. BP 99/65   Pulse 74   Temp 98.8 °F (37.1 °C) (Oral)   Resp 14   Ht 5' 1\" (1.549 m)   Wt 115 lb (52.2 kg)   SpO2 98%   BMI 21.73 kg/m²   Oxygen Saturation Interpretation: Normal      ------------------------------------------ PROGRESS NOTES ------------------------------------------  I have spoken with the patient and discussed todays results, in addition to providing specific details for the plan of care and counseling regarding the diagnosis and prognosis. Their questions are answered at this time and they are agreeable with the plan. I discussed at length with them reasons for immediate return here for re evaluation. They will followup with primary care by calling their office tomorrow. --------------------------------- ADDITIONAL PROVIDER NOTES ---------------------------------  At this time the patient is without objective evidence of an acute process requiring hospitalization or inpatient management. They have remained hemodynamically stable throughout their entire ED visit and are stable for discharge with outpatient follow-up.      The plan has been discussed in detail

## 2022-08-17 ENCOUNTER — HOSPITAL ENCOUNTER (EMERGENCY)
Age: 52
Discharge: HOME | End: 2022-08-17
Attending: EMERGENCY MEDICINE
Payer: MEDICAID

## 2022-08-17 ENCOUNTER — APPOINTMENT (OUTPATIENT)
Dept: CT IMAGING | Age: 52
End: 2022-08-17
Payer: MEDICAID

## 2022-08-17 ENCOUNTER — APPOINTMENT (OUTPATIENT)
Dept: GENERAL RADIOLOGY | Age: 52
End: 2022-08-17
Payer: MEDICAID

## 2022-08-17 VITALS
OXYGEN SATURATION: 100 % | RESPIRATION RATE: 16 BRPM | DIASTOLIC BLOOD PRESSURE: 75 MMHG | TEMPERATURE: 98.2 F | BODY MASS INDEX: 20.78 KG/M2 | WEIGHT: 110 LBS | HEART RATE: 63 BPM | SYSTOLIC BLOOD PRESSURE: 132 MMHG

## 2022-08-17 DIAGNOSIS — R11.2 NON-INTRACTABLE VOMITING WITH NAUSEA, UNSPECIFIED VOMITING TYPE: ICD-10-CM

## 2022-08-17 DIAGNOSIS — Z87.19 H/O CHRONIC PANCREATITIS: ICD-10-CM

## 2022-08-17 DIAGNOSIS — E11.00 UNCONTROLLED TYPE 2 DIABETES MELLITUS WITH HYPEROSMOLAR NONKETOTIC HYPERGLYCEMIA (HCC): Primary | ICD-10-CM

## 2022-08-17 DIAGNOSIS — K59.00 CONSTIPATION, UNSPECIFIED CONSTIPATION TYPE: ICD-10-CM

## 2022-08-17 LAB
ALBUMIN SERPL-MCNC: 3.7 G/DL (ref 3.5–5.2)
ALP BLD-CCNC: 124 U/L (ref 35–104)
ALT SERPL-CCNC: 16 U/L (ref 0–32)
ANION GAP SERPL CALCULATED.3IONS-SCNC: 7 MMOL/L (ref 7–16)
ANION GAP SERPL CALCULATED.3IONS-SCNC: 9 MMOL/L (ref 7–16)
AST SERPL-CCNC: 20 U/L (ref 0–31)
BASOPHILS ABSOLUTE: 0.02 E9/L (ref 0–0.2)
BASOPHILS RELATIVE PERCENT: 0.5 % (ref 0–2)
BILIRUB SERPL-MCNC: 0.5 MG/DL (ref 0–1.2)
BUN BLDV-MCNC: 10 MG/DL (ref 6–20)
BUN BLDV-MCNC: 10 MG/DL (ref 6–20)
CALCIUM SERPL-MCNC: 9.2 MG/DL (ref 8.6–10.2)
CALCIUM SERPL-MCNC: 9.4 MG/DL (ref 8.6–10.2)
CHLORIDE BLD-SCNC: 99 MMOL/L (ref 98–107)
CHLORIDE BLD-SCNC: 99 MMOL/L (ref 98–107)
CHP ED QC CHECK: YES
CO2: 24 MMOL/L (ref 22–29)
CO2: 27 MMOL/L (ref 22–29)
CREAT SERPL-MCNC: 0.6 MG/DL (ref 0.5–1)
CREAT SERPL-MCNC: 0.6 MG/DL (ref 0.5–1)
EKG ATRIAL RATE: 67 BPM
EKG P AXIS: 76 DEGREES
EKG P-R INTERVAL: 140 MS
EKG Q-T INTERVAL: 388 MS
EKG QRS DURATION: 80 MS
EKG QTC CALCULATION (BAZETT): 409 MS
EKG R AXIS: 56 DEGREES
EKG T AXIS: 75 DEGREES
EKG VENTRICULAR RATE: 67 BPM
EOSINOPHILS ABSOLUTE: 0.07 E9/L (ref 0.05–0.5)
EOSINOPHILS RELATIVE PERCENT: 1.8 % (ref 0–6)
GFR AFRICAN AMERICAN: >60
GFR AFRICAN AMERICAN: >60
GFR NON-AFRICAN AMERICAN: >60 ML/MIN/1.73
GFR NON-AFRICAN AMERICAN: >60 ML/MIN/1.73
GLUCOSE BLD-MCNC: 312 MG/DL
GLUCOSE BLD-MCNC: 447 MG/DL (ref 74–99)
GLUCOSE BLD-MCNC: 510 MG/DL (ref 74–99)
HCT VFR BLD CALC: 37 % (ref 34–48)
HEMOGLOBIN: 12.6 G/DL (ref 11.5–15.5)
IMMATURE GRANULOCYTES #: 0.01 E9/L
IMMATURE GRANULOCYTES %: 0.3 % (ref 0–5)
INFLUENZA A: NOT DETECTED
INFLUENZA B: NOT DETECTED
LACTIC ACID: 1.7 MMOL/L (ref 0.5–2.2)
LIPASE: 9 U/L (ref 13–60)
LYMPHOCYTES ABSOLUTE: 0.62 E9/L (ref 1.5–4)
LYMPHOCYTES RELATIVE PERCENT: 16.1 % (ref 20–42)
MCH RBC QN AUTO: 29.3 PG (ref 26–35)
MCHC RBC AUTO-ENTMCNC: 34.1 % (ref 32–34.5)
MCV RBC AUTO: 86 FL (ref 80–99.9)
METER GLUCOSE: 312 MG/DL (ref 74–99)
MONOCYTES ABSOLUTE: 0.22 E9/L (ref 0.1–0.95)
MONOCYTES RELATIVE PERCENT: 5.7 % (ref 2–12)
NEUTROPHILS ABSOLUTE: 2.92 E9/L (ref 1.8–7.3)
NEUTROPHILS RELATIVE PERCENT: 75.6 % (ref 43–80)
PDW BLD-RTO: 13.3 FL (ref 11.5–15)
PLATELET # BLD: 68 E9/L (ref 130–450)
PLATELET CONFIRMATION: NORMAL
PMV BLD AUTO: 10.9 FL (ref 7–12)
POTASSIUM SERPL-SCNC: 4.8 MMOL/L (ref 3.5–5)
POTASSIUM SERPL-SCNC: 5 MMOL/L (ref 3.5–5)
RBC # BLD: 4.3 E12/L (ref 3.5–5.5)
SARS-COV-2 RNA, RT PCR: NOT DETECTED
SODIUM BLD-SCNC: 132 MMOL/L (ref 132–146)
SODIUM BLD-SCNC: 133 MMOL/L (ref 132–146)
TOTAL PROTEIN: 6.5 G/DL (ref 6.4–8.3)
TROPONIN, HIGH SENSITIVITY: 14 NG/L (ref 0–9)
TROPONIN, HIGH SENSITIVITY: 15 NG/L (ref 0–9)
WBC # BLD: 3.9 E9/L (ref 4.5–11.5)

## 2022-08-17 PROCEDURE — 6370000000 HC RX 637 (ALT 250 FOR IP): Performed by: EMERGENCY MEDICINE

## 2022-08-17 PROCEDURE — 36556 INSERT NON-TUNNEL CV CATH: CPT

## 2022-08-17 PROCEDURE — 93005 ELECTROCARDIOGRAM TRACING: CPT | Performed by: EMERGENCY MEDICINE

## 2022-08-17 PROCEDURE — 84484 ASSAY OF TROPONIN QUANT: CPT

## 2022-08-17 PROCEDURE — 85025 COMPLETE CBC W/AUTO DIFF WBC: CPT

## 2022-08-17 PROCEDURE — 6360000002 HC RX W HCPCS: Performed by: EMERGENCY MEDICINE

## 2022-08-17 PROCEDURE — 36415 COLL VENOUS BLD VENIPUNCTURE: CPT

## 2022-08-17 PROCEDURE — 99285 EMERGENCY DEPT VISIT HI MDM: CPT

## 2022-08-17 PROCEDURE — 83690 ASSAY OF LIPASE: CPT

## 2022-08-17 PROCEDURE — 82962 GLUCOSE BLOOD TEST: CPT

## 2022-08-17 PROCEDURE — 96374 THER/PROPH/DIAG INJ IV PUSH: CPT

## 2022-08-17 PROCEDURE — 87636 SARSCOV2 & INF A&B AMP PRB: CPT

## 2022-08-17 PROCEDURE — 80053 COMPREHEN METABOLIC PANEL: CPT

## 2022-08-17 PROCEDURE — 71045 X-RAY EXAM CHEST 1 VIEW: CPT

## 2022-08-17 PROCEDURE — 74176 CT ABD & PELVIS W/O CONTRAST: CPT

## 2022-08-17 PROCEDURE — 96375 TX/PRO/DX INJ NEW DRUG ADDON: CPT

## 2022-08-17 PROCEDURE — 83605 ASSAY OF LACTIC ACID: CPT

## 2022-08-17 PROCEDURE — 80048 BASIC METABOLIC PNL TOTAL CA: CPT

## 2022-08-17 PROCEDURE — 2580000003 HC RX 258: Performed by: EMERGENCY MEDICINE

## 2022-08-17 PROCEDURE — 96372 THER/PROPH/DIAG INJ SC/IM: CPT

## 2022-08-17 RX ORDER — ONDANSETRON 2 MG/ML
4 INJECTION INTRAMUSCULAR; INTRAVENOUS ONCE
Status: COMPLETED | OUTPATIENT
Start: 2022-08-17 | End: 2022-08-17

## 2022-08-17 RX ORDER — DIAZEPAM 5 MG/1
10 TABLET ORAL ONCE
Status: COMPLETED | OUTPATIENT
Start: 2022-08-17 | End: 2022-08-17

## 2022-08-17 RX ORDER — 0.9 % SODIUM CHLORIDE 0.9 %
2000 INTRAVENOUS SOLUTION INTRAVENOUS ONCE
Status: DISCONTINUED | OUTPATIENT
Start: 2022-08-17 | End: 2022-08-17

## 2022-08-17 RX ORDER — THIAMINE HYDROCHLORIDE 100 MG/ML
100 INJECTION, SOLUTION INTRAMUSCULAR; INTRAVENOUS ONCE
Status: COMPLETED | OUTPATIENT
Start: 2022-08-17 | End: 2022-08-17

## 2022-08-17 RX ORDER — 0.9 % SODIUM CHLORIDE 0.9 %
1000 INTRAVENOUS SOLUTION INTRAVENOUS ONCE
Status: COMPLETED | OUTPATIENT
Start: 2022-08-17 | End: 2022-08-17

## 2022-08-17 RX ORDER — DIPHENHYDRAMINE HYDROCHLORIDE 50 MG/ML
50 INJECTION INTRAMUSCULAR; INTRAVENOUS ONCE
Status: COMPLETED | OUTPATIENT
Start: 2022-08-17 | End: 2022-08-17

## 2022-08-17 RX ORDER — METOCLOPRAMIDE HYDROCHLORIDE 5 MG/ML
10 INJECTION INTRAMUSCULAR; INTRAVENOUS ONCE
Status: COMPLETED | OUTPATIENT
Start: 2022-08-17 | End: 2022-08-17

## 2022-08-17 RX ORDER — 0.9 % SODIUM CHLORIDE 0.9 %
2000 INTRAVENOUS SOLUTION INTRAVENOUS ONCE
Status: COMPLETED | OUTPATIENT
Start: 2022-08-17 | End: 2022-08-17

## 2022-08-17 RX ADMIN — DIAZEPAM 10 MG: 5 TABLET ORAL at 06:03

## 2022-08-17 RX ADMIN — HYDROMORPHONE HYDROCHLORIDE 0.5 MG: 1 INJECTION, SOLUTION INTRAMUSCULAR; INTRAVENOUS; SUBCUTANEOUS at 06:57

## 2022-08-17 RX ADMIN — DIPHENHYDRAMINE HYDROCHLORIDE 50 MG: 50 INJECTION INTRAMUSCULAR; INTRAVENOUS at 07:03

## 2022-08-17 RX ADMIN — METOCLOPRAMIDE 10 MG: 5 INJECTION, SOLUTION INTRAMUSCULAR; INTRAVENOUS at 07:05

## 2022-08-17 RX ADMIN — HYDROMORPHONE HYDROCHLORIDE 1 MG: 1 INJECTION, SOLUTION INTRAMUSCULAR; INTRAVENOUS; SUBCUTANEOUS at 06:01

## 2022-08-17 RX ADMIN — ONDANSETRON HYDROCHLORIDE 4 MG: 2 SOLUTION INTRAMUSCULAR; INTRAVENOUS at 07:21

## 2022-08-17 RX ADMIN — THIAMINE HYDROCHLORIDE 100 MG: 100 INJECTION, SOLUTION INTRAMUSCULAR; INTRAVENOUS at 07:06

## 2022-08-17 RX ADMIN — INSULIN HUMAN 10 UNITS: 100 INJECTION, SOLUTION PARENTERAL at 06:56

## 2022-08-17 RX ADMIN — SODIUM CHLORIDE 2000 ML: 9 INJECTION, SOLUTION INTRAVENOUS at 07:45

## 2022-08-17 RX ADMIN — SODIUM CHLORIDE 1000 ML: 9 INJECTION, SOLUTION INTRAVENOUS at 07:03

## 2022-08-17 ASSESSMENT — PAIN SCALES - GENERAL
PAINLEVEL_OUTOF10: 10
PAINLEVEL_OUTOF10: 10
PAINLEVEL_OUTOF10: 6

## 2022-08-17 ASSESSMENT — PAIN DESCRIPTION - DESCRIPTORS
DESCRIPTORS: ACHING

## 2022-08-17 ASSESSMENT — PAIN DESCRIPTION - ORIENTATION
ORIENTATION: MID
ORIENTATION: MID
ORIENTATION: LOWER

## 2022-08-17 ASSESSMENT — PAIN DESCRIPTION - LOCATION
LOCATION: ABDOMEN;BACK
LOCATION: BACK;ABDOMEN
LOCATION: ABDOMEN;BACK

## 2022-08-17 ASSESSMENT — PAIN - FUNCTIONAL ASSESSMENT
PAIN_FUNCTIONAL_ASSESSMENT: 0-10
PAIN_FUNCTIONAL_ASSESSMENT: PREVENTS OR INTERFERES SOME ACTIVE ACTIVITIES AND ADLS

## 2022-08-17 ASSESSMENT — PAIN DESCRIPTION - ONSET: ONSET: ON-GOING

## 2022-08-17 ASSESSMENT — PAIN DESCRIPTION - FREQUENCY: FREQUENCY: CONTINUOUS

## 2022-08-17 NOTE — ED NOTES
Central line removed. No bleeding at site. Patient tolerated well.      Olga Zamora RN  08/17/22 1337

## 2022-08-17 NOTE — ED NOTES
Triple lumen 20 cm  CVC inserted in internal jugular per Dr Milad Rodriguez. Sutures x 2 inserted to secure site. Zero cm of catheter exposed.       Vincent Martinez RN  08/17/22 6030

## 2022-08-17 NOTE — ED PROVIDER NOTES
[facility] [address]  Department of Emergency Medicine     Written by: Magaly Resendiz DO  Patient Name: Jacinta Nance  Attending Provider: No att. providers found  Admit Date: 2022  4:22 AM  MRN: [id number]                   : 1970        Chief Complaint   Patient presents with    Generalized Body Aches     States she has chronic pancreatitis, and causes her to vomit. Also states she took a covid test which came back negative. States the body aches has been going on for the last 4 days. - Chief complaint    06:07 AM EDT    I received this patient at sign out from Dr. Giovanna June   I have discussed the patient's initial exam, treatment and plan of care with the out going physician. I have introduced my self to the patient / family and have answered their questions to this point. I have examined the patient myself and reviewed ordered tests / medications and reviewed any available results to this point. Plan for reevaluation of blood sugar after CT scan results. CT scan did demonstrate evidence of constipation other chronic findings no evidence of pancreatitis noted. Lipase within normal limits. On recheck blood sugar significant improved to 307. On reevaluation patient continues to note some generalized pain. At this point discussed with patient laboratory findings as well as imaging findings at length. Patient requesting additional IV pain medication. Discussed at this time like to proceed with NSAID therapy for possible improvement in pain due to generalized myalgias. Patient refused and was requesting additional doses of IV Dilaudid. Discussed that at this time do not feel that additional IV Dilaudid was warranted but did offer admission for further hydration. Patient declined or like to be discharged home. Patient is tolerating oral fluids in the emergency department. Blood sugars improved no evidence of DKA noted.   Patient is overall well-appearing she is

## 2022-08-17 NOTE — ED PROVIDER NOTES
HPI:  8/17/22, Time: 4:40 AM EDT        Svitlana Graham is a 46 y.o. female presenting to the ED for gradual worsening upper abdominal pain, beginning 4 days ago. The complaint has been persistent, moderate in severity, and worsened by nothing. Patient's had nausea and vomiting but no hematemesis, no melena, hematochezia. She does have history of chronic pancreatitis in the past.  Patient has not had any fever/chills, no coughing, no colored sputum production, no hemoptysis, no relieving factors reported. She rates her abdominal pain is 10/10 severity. Review of Systems:   A complete review of systems was performed and pertinent positives and negatives are stated within HPI, all other systems reviewed and are negative.    --------------------------------------------- PAST HISTORY ---------------------------------------------  Past Medical History:  has a past medical history of Alcoholism ([address] Anxiety and depression, Ascites of liver, Bronchitis, Cancer ([address] Candidiasis of vagina, Chronic pancreatitis ([address] Cocaine abuse ([address] Constipation, Diabetes mellitus, type 2 ([address] GERD (gastroesophageal reflux disease), Gout, Hepatitis C, Hernia of anterior abdominal wall, Jaundice, Pneumonia, Polyneuropathy due to type 2 diabetes mellitus ([address] Schizoaffective disorder, bipolar type ([address] and Splenomegaly. Past Surgical History:  has a past surgical history that includes Hysterectomy (2004); Ovary removal (Bilateral, 2007); shoulder surgery (Left, 2003); Cholecystectomy (2010); ERCP; Abdomen surgery; Colonoscopy; Upper gastrointestinal endoscopy; other surgical history (Left, 04/25/2017); Breast biopsy (Left); Pancreas Biopsy; Endoscopy, colon, diagnostic; other surgical history (10/05/2017); Colonoscopy (N/A, 10/2/2018); and Mastectomy (Left). Social History:  reports that she has been smoking cigarettes. She started smoking about 40 years ago. She has a 60.00 pack-year smoking history.  She has never used smokeless tobacco. She reports that she does not currently use alcohol. She reports current drug use. Drugs: Cocaine and Opiates . Family History: family history includes Cancer in her mother; Colon Cancer in her maternal grandfather; Diabetes in her mother; No Known Problems in her father. The patients home medications have been reviewed.     Allergies: Dye [iodides] and Tylenol [acetaminophen]    -------------------------------------------------- RESULTS -------------------------------------------------  All laboratory and radiology results have been personally reviewed by myself   LABS:  Results for orders placed or performed during the hospital encounter of 08/17/22   COVID-19 & Influenza Combo    Specimen: Nasopharyngeal Swab   Result Value Ref Range    SARS-CoV-2 RNA, RT PCR NOT DETECTED NOT DETECTED    INFLUENZA A NOT DETECTED NOT DETECTED    INFLUENZA B NOT DETECTED NOT DETECTED   Troponin   Result Value Ref Range    Troponin, High Sensitivity 15 (H) 0 - 9 ng/L   CBC with Auto Differential   Result Value Ref Range    WBC 3.9 (L) 4.5 - 11.5 E9/L    RBC 4.30 3.50 - 5.50 E12/L    Hemoglobin 12.6 11.5 - 15.5 g/dL    Hematocrit 37.0 34.0 - 48.0 %    MCV 86.0 80.0 - 99.9 fL    MCH 29.3 26.0 - 35.0 pg    MCHC 34.1 32.0 - 34.5 %    RDW 13.3 11.5 - 15.0 fL    Platelets 68 (L) 441 - 450 E9/L    MPV 10.9 7.0 - 12.0 fL   Comprehensive Metabolic Panel   Result Value Ref Range    Sodium 132 132 - 146 mmol/L    Potassium 5.0 3.5 - 5.0 mmol/L    Chloride 99 98 - 107 mmol/L    CO2 24 22 - 29 mmol/L    Anion Gap 9 7 - 16 mmol/L    Glucose 510 (HH) 74 - 99 mg/dL    BUN 10 6 - 20 mg/dL    Creatinine 0.6 0.5 - 1.0 mg/dL    GFR Non-African American >60 >=60 mL/min/1.73    GFR African American >60     Calcium 9.4 8.6 - 10.2 mg/dL    Total Protein 6.5 6.4 - 8.3 g/dL    Albumin 3.7 3.5 - 5.2 g/dL    Total Bilirubin 0.5 0.0 - 1.2 mg/dL    Alkaline Phosphatase 124 (H) 35 - 104 U/L    ALT 16 0 - 32 U/L    AST 20 0 - 31 U/L Lipase   Result Value Ref Range    Lipase 9 (L) 13 - 60 U/L   Lactic Acid   Result Value Ref Range    Lactic Acid 1.7 0.5 - 2.2 mmol/L   EKG 12 Lead   Result Value Ref Range    Ventricular Rate 67 BPM    Atrial Rate 67 BPM    P-R Interval 140 ms    QRS Duration 80 ms    Q-T Interval 388 ms    QTc Calculation (Bazett) 409 ms    P Axis 76 degrees    R Axis 56 degrees    T Axis 75 degrees       RADIOLOGY:  Interpreted by Radiologist.  CT ABDOMEN PELVIS WO CONTRAST Additional Contrast? None    (Results Pending)     ------------------------- NURSING NOTES AND VITALS REVIEWED ---------------------------    The nursing notes within the ED encounter and vital signs as below have been reviewed. BP (!) 140/82   Pulse 64   Temp 98.2 °F (36.8 °C) (Oral)   Resp 20   Wt 110 lb (49.9 kg)   SpO2 100%   BMI 20.78 kg/m²  Oxygen Saturation Interpretation: Normal    ---------------------------------------------------PHYSICAL EXAM--------------------------------------    Constitutional/General: Alert and oriented x3, well appearing, non toxic in moderate distress  Head: Normocephalic and atraumatic  Eyes: PERRL, EOMI  Mouth: Oropharynx clear, handling secretions, no trismus  Neck: Supple, full ROM,   Pulmonary: Lungs clear to auscultation bilaterally, no wheezes, rales, or rhonchi. Not in respiratory distress  Cardiovascular:  Regular rate and rhythm, no murmurs, gallops, or rubs. 2+ distal pulses  GI: abdomen soft, non distended, no organomegaly no masses no guarding or rigidity  Extremities: Moves all extremities x 4. Warm and well perfused  Skin: warm and dry without rash  Neurologic: GCS 15, cranial nerves II through XII intact with no focal deficits.   No meningeal signs  Psych: Normal Affect    ------------------------------ ED COURSE/MEDICAL DECISION MAKING----------------------  Medications   diphenhydrAMINE (BENADRYL) injection 50 mg (has no administration in time range)   metoclopramide (REGLAN) injection 10 mg (has no administration in time range)   thiamine (B-1) injection 100 mg (has no administration in time range)   0.9 % sodium chloride bolus (has no administration in time range)   diazePAM (VALIUM) tablet 10 mg (10 mg Oral Given 8/17/22 0603)   HYDROmorphone (DILAUDID) injection 1 mg (1 mg IntraMUSCular Given 8/17/22 0601)       ED COURSE:     Medical Decision Making:   Patient's initial blood sugar came back at 510 but her serum anion gap = 9 is not felt to be indicative of diabetic ketoacidosis but it more suggestive of nonketotic hyperosmolar hyperglycemia. Patient did have a internal jugular central line placed after failed peripheral IV attempts. Her CT abdominal studies are pending at this time. The patient will receive IV fluids x 2 L and if her serum glucose does not respond to fluid resuscitation she likely will require insulin drip and admission to the hospital.     EKG #1:  Interpreted by emergency department attending physician unless otherwise noted. 8/17/22  Time: 04:42  Rhythm: normal sinus   Rate: normal  Axis: normal  Conduction: nonspecific interventricular conduction block  ST Segments: nonspecific changes  T Waves: non specific changes  Clinical Impression: non-specific EKG  Comparison to Prior tracings: There are no significant changes when compared to prior tracings. PROCEDURE  8/17/22       Time: 06:09  CENTRAL LINE INSERTION  Risks, benefits and alternatives (for applicable procedures below) described. Performed By: Beatriz Martinez MD.  Indication: vascular access and inability to gain peripheral access. Informed consent: Verbal consent obtained. The patient was counseled regarding the procedure in person, it's indications, risks, potential complications and alternatives and any questions were answered. Verbal consent was obtained. Procedure: After routine sterile preparation, local anesthesia obtained by infiltration using 1% Lidocaine with epinephrine.  A right 3-Lumen 7F Central Venous Catheter was placed by internal jugular vein approach and secured by standard fashion. Ultrasound Guidance:   used. Number of Attempts: 1  Post-procedure Findings: A post procedural chest x-ray  was ordered and showed no evidence of pneumothorax. Patient tolerated the procedure well. Counseling: The emergency provider has spoken with the patient and spouse/SO and discussed todays results, in addition to providing specific details for the plan of care and counseling regarding the diagnosis and prognosis. Questions are answered at this time and they are agreeable with the plan.    --------------------------------- IMPRESSION AND DISPOSITION ---------------------------------    IMPRESSION  1. Uncontrolled type 2 diabetes mellitus with hyperosmolar nonketotic hyperglycemia ([facility] [address]    2. Non-intractable vomiting with nausea, unspecified vomiting type    3. H/O chronic pancreatitis        DISPOSITION  Disposition: 6:08AM EDT  I have signed this patient out to the oncoming physician, Dr. Robbin Khan  I have discussed the patient's initial exam, treatment and plan of care with the on coming physician. I have notified the patient / family of the change in treating physician and answered their questions to this point. Patient condition is stable      NOTE: This report was transcribed using voice recognition software.  Every effort was made to ensure accuracy; however, inadvertent computerized transcription errors may be present        José Miguel Falcon MD  08/17/22 [id number]

## 2022-08-18 ENCOUNTER — HOSPITAL ENCOUNTER (EMERGENCY)
Age: 52
Discharge: LEFT BEFORE DISCHARGE | End: 2022-08-18
Attending: EMERGENCY MEDICINE
Payer: MEDICAID

## 2022-08-18 VITALS
TEMPERATURE: 98.4 F | SYSTOLIC BLOOD PRESSURE: 135 MMHG | RESPIRATION RATE: 16 BRPM | WEIGHT: 110 LBS | HEIGHT: 61 IN | DIASTOLIC BLOOD PRESSURE: 94 MMHG | BODY MASS INDEX: 20.77 KG/M2 | HEART RATE: 86 BPM | OXYGEN SATURATION: 100 %

## 2022-08-18 DIAGNOSIS — R11.2 NON-INTRACTABLE VOMITING WITH NAUSEA, UNSPECIFIED VOMITING TYPE: ICD-10-CM

## 2022-08-18 DIAGNOSIS — R10.84 GENERALIZED ABDOMINAL PAIN: Primary | ICD-10-CM

## 2022-08-18 LAB
EKG ATRIAL RATE: 80 BPM
EKG P AXIS: 77 DEGREES
EKG P-R INTERVAL: 134 MS
EKG Q-T INTERVAL: 394 MS
EKG QRS DURATION: 78 MS
EKG QTC CALCULATION (BAZETT): 454 MS
EKG R AXIS: 19 DEGREES
EKG T AXIS: 75 DEGREES
EKG VENTRICULAR RATE: 80 BPM

## 2022-08-18 PROCEDURE — 93005 ELECTROCARDIOGRAM TRACING: CPT | Performed by: EMERGENCY MEDICINE

## 2022-08-18 PROCEDURE — 99283 EMERGENCY DEPT VISIT LOW MDM: CPT

## 2022-08-18 RX ORDER — 0.9 % SODIUM CHLORIDE 0.9 %
1000 INTRAVENOUS SOLUTION INTRAVENOUS ONCE
Status: DISCONTINUED | OUTPATIENT
Start: 2022-08-18 | End: 2022-08-18 | Stop reason: HOSPADM

## 2022-08-18 RX ORDER — KETOROLAC TROMETHAMINE 30 MG/ML
30 INJECTION, SOLUTION INTRAMUSCULAR; INTRAVENOUS ONCE
Status: DISCONTINUED | OUTPATIENT
Start: 2022-08-18 | End: 2022-08-18 | Stop reason: HOSPADM

## 2022-08-18 RX ORDER — ONDANSETRON 2 MG/ML
4 INJECTION INTRAMUSCULAR; INTRAVENOUS ONCE
Status: DISCONTINUED | OUTPATIENT
Start: 2022-08-18 | End: 2022-08-18 | Stop reason: HOSPADM

## 2022-08-18 ASSESSMENT — ENCOUNTER SYMPTOMS
ABDOMINAL PAIN: 1
VOMITING: 1
NAUSEA: 1
SHORTNESS OF BREATH: 0
EYE REDNESS: 0

## 2022-08-18 ASSESSMENT — PAIN DESCRIPTION - FREQUENCY: FREQUENCY: CONTINUOUS

## 2022-08-18 ASSESSMENT — PAIN - FUNCTIONAL ASSESSMENT: PAIN_FUNCTIONAL_ASSESSMENT: 0-10

## 2022-08-18 ASSESSMENT — PAIN SCALES - GENERAL: PAINLEVEL_OUTOF10: 10

## 2022-08-18 NOTE — ED PROVIDER NOTES
Chief complaint: Abdominal pain, nausea and vomiting       HPI:  8/18/22, Time: 9:00 AM EDT    HPI               Francisco Patterson is a 46 y.o. female presenting to the ED for abdominal pain, nausea and vomiting. The history is obtained from the patient as well as patient's medical record. Patient is presenting emergency department the chief complaint of abdominal pain, nausea and vomiting. Patient does have a history of pancreatitis. She does follow with Dr. Gena Garcia for pancreatitis. States she has not had a flareup in a few months. The patient does not drink alcohol. She states she does not know what causes her pancreatitis. She began 5 days ago with pain located in the epigastric region. The pain is described as a stabbing sensation. It radiates to her mid back. This is moderate in severity. Nothing makes it better. Nothing makes it worse. She has tried Motrin prior to arrival with no relief. States she has had multiple episodes of emesis. There is no hematemesis coffee-ground emesis. She denies any chest pain, shortness of breath, dysuria, diarrhea or constipation. She states that she was [facility] emergency department yesterday did have unremarkable work-up and was discharged home. ROS:   Review of Systems   Constitutional:  Negative for chills and fatigue. HENT:  Negative for congestion. Eyes:  Negative for redness. Respiratory:  Negative for shortness of breath. Cardiovascular:  Negative for chest pain. Gastrointestinal:  Positive for abdominal pain, nausea and vomiting. Genitourinary:  Negative for dysuria. Musculoskeletal:  Negative for arthralgias. Skin:  Negative for rash. Neurological:  Negative for light-headedness. Psychiatric/Behavioral:  Negative for confusion.     All other systems reviewed and are negative.    --------------------------------------------- PAST HISTORY ---------------------------------------------  Past Medical History:  has a past medical chest wall tenderness  Abdomen: Soft. Non tender. Non distended. No rebound, guarding, or rigidity. No pulsatile masses appreciated. Musculoskeletal: Moves all extremities x 4. Warm and well perfused, no clubbing, cyanosis, or edema. Capillary refill <3 seconds  Skin: warm and dry. No rashes. Neurologic: GCS 15, no gross focal neurologic deficits  Psych: Normal Affect    -------------------------------------------------- RESULTS -------------------------------------------------  I have personally reviewed all laboratory and imaging results for this patient. Results are listed below. LABS:  Results for orders placed or performed during the hospital encounter of 08/18/22   EKG 12 Lead   Result Value Ref Range    Ventricular Rate 80 BPM    Atrial Rate 80 BPM    P-R Interval 134 ms    QRS Duration 78 ms    Q-T Interval 394 ms    QTc Calculation (Bazett) 454 ms    P Axis 77 degrees    R Axis 19 degrees    T Axis 75 degrees       RADIOLOGY:  Interpreted by Radiologist.  No orders to display       EKG: This EKG is signed and interpreted by me. Normal sinus rhythm rate of 80, no ST segment elevation or depression, MO interval 134 MS, QRS 78 MS,  MS  Comparison: stable as compared to patient's most recent EKG        ------------------------- NURSING NOTES AND VITALS REVIEWED ---------------------------   The nursing notes within the ED encounter and vital signs as below have been reviewed by myself. BP (!) 135/94   Pulse 86   Temp 98.4 °F (36.9 °C) (Oral)   Resp 16   Ht 5' 1\" (1.549 m)   Wt 110 lb (49.9 kg)   SpO2 100%   BMI 20.78 kg/m²   Oxygen Saturation Interpretation: Normal    The patients available past medical records and past encounters were reviewed. ------------------------------ ED COURSE/MEDICAL DECISION MAKING----------------------  Medications - No data to display            Medical Decision Making:   I, Dr. Penelope Chaudhari am the primary physician of record.     Elana Medina is a 46 y.o. female who presents to the ED for abdominal pain, nausea and vomiting. Upon arrival to the emergency department the patient did have blood pressure 135/94, temperature 98.4, heart rate 86, respiratory rate 16, pulse ox 100%. Patient is sitting in the bed in no acute distress. Patient with fairly benign abdominal examination. Did review patient's prior ED visit for hyperglycemia. Patient did have CT performed yesterday demonstrating constipation and findings of cirrhosis. No other acute findings. Differential diagnosis includes but is not limited to pancreatitis, cholecystitis, electrolyte derangement, dehydration, colitis, diverticulitis the patient does have a past medical history of   has a past medical history of Alcoholism ([address] Anxiety and depression, Ascites of liver, Bronchitis, Cancer ([address] Candidiasis of vagina, Chronic pancreatitis ([address] Cocaine abuse ([address] Constipation, Diabetes mellitus, type 2 ([address] GERD (gastroesophageal reflux disease), Gout, Hepatitis C, Hernia of anterior abdominal wall, Jaundice, Pneumonia, Polyneuropathy due to type 2 diabetes mellitus ([address] Schizoaffective disorder, bipolar type ([address] and Splenomegaly. .  The patient stated that she wanted to sign out against medical vice. She did not want poked for an IV or blood work. Patient does have the capacity to make her own decisions. She does understand she can die be severely permanently disabled. This patient has chosen to leave against medical advice. I have personally explained to them that choosing to do so may result in permanent bodily harm or death. I discussed at length that without further evaluation and monitoring there may be unforeseen circumstances and deterioration resulting in permanent bodily harm or death as a result of their choice. They are alert, oriented, and competent at this time.   They state that they are aware of the serious risks as explained, but they continue to wish to leave against medical advice. In light of their decision to leave against medical advice, follow-up has been arranged and they are aware of the importance of following up as instructed. They have been advised that they should return to the ED immediately if they change their mind at any time, or if their condition begins to change or worsen. Re-Evaluations/Consultations:             ED Course as of 08/26/22 0741   Thu Aug 18, 2022   0932 Patient states she wants to sign out against medical advice. She does understand she can die be severely permanently disabled should she sign out [address] States that she just wants pain medication does not want \"poked. \"Explained that we will not be just giving pain medications and need to evaluate what is going on with her. [MT]      ED Course User Index  [MT] Sanford Mcburney, DO       ED Course as of 08/26/22 0741   Thu Aug 18, 2022   0932 Patient states she wants to sign out against medical advice. She does understand she can die be severely permanently disabled should she sign out [address] States that she just wants pain medication does not want \"poked. \"Explained that we will not be just giving pain medications and need to evaluate what is going on with her. [MT]      ED Course User Index  [MT] Sanford Mcburney, DO       --------------------------------------------- PAST HISTORY ---------------------------------------------  Past Medical History:  has a past medical history of Alcoholism ([facility] Anxiety and depression, Ascites of liver, Bronchitis, Cancer ([facility] Candidiasis of vagina, Chronic pancreatitis ([facility] Cocaine abuse ([facility] Constipation, Diabetes mellitus, type 2 ([facility] GERD (gastroesophageal reflux disease), Gout, Hepatitis C, Hernia of anterior abdominal wall, Jaundice, Pneumonia, Polyneuropathy due to type 2 diabetes mellitus ([address] Schizoaffective disorder, bipolar type ([facility] and Splenomegaly.     Past Surgical History:  has a past surgical history that includes Hysterectomy (2004); Ovary removal (Bilateral, 2007); shoulder surgery (Left, 2003); Cholecystectomy (2010); ERCP; Abdomen surgery; Colonoscopy; Upper gastrointestinal endoscopy; other surgical history (Left, 04/25/2017); Breast biopsy (Left); Pancreas Biopsy; Endoscopy, colon, diagnostic; other surgical history (10/05/2017); Colonoscopy (N/A, 10/2/2018); and Mastectomy (Left). Social History:  reports that she has been smoking cigarettes. She started smoking about 40 years ago. She has a 60.00 pack-year smoking history. She has never used smokeless tobacco. She reports that she does not currently use alcohol. She reports current drug use. Drug: Cocaine. Family History: family history includes Cancer in her mother; Colon Cancer in her maternal grandfather; Diabetes in her mother; No Known Problems in her father. The patients home medications have been reviewed. Allergies: Dye [iodides] and Tylenol [acetaminophen]    -------------------------------------------------- RESULTS -------------------------------------------------  Labs:  Results for orders placed or performed during the hospital encounter of 08/18/22   EKG 12 Lead   Result Value Ref Range    Ventricular Rate 80 BPM    Atrial Rate 80 BPM    P-R Interval 134 ms    QRS Duration 78 ms    Q-T Interval 394 ms    QTc Calculation (Bazett) 454 ms    P Axis 77 degrees    R Axis 19 degrees    T Axis 75 degrees       Radiology:  CT ABDOMEN PELVIS WO CONTRAST Additional Contrast? None    Result Date: 8/17/2022  EXAMINATION: CT OF THE ABDOMEN AND PELVIS WITHOUT CONTRAST 8/17/2022 4:19 am TECHNIQUE: CT of the abdomen and pelvis was performed without the administration of intravenous contrast. Multiplanar reformatted images are provided for review. Automated exposure control, iterative reconstruction, and/or weight based adjustment of the mA/kV was utilized to reduce the radiation dose to as low as reasonably achievable. COMPARISON: CT scan of abdomen and pelvis dated 10/30/2021. HISTORY: ORDERING SYSTEM PROVIDED HISTORY: abdominal pain TECHNOLOGIST PROVIDED HISTORY: Additional Contrast?->None Reason for exam:->abdominal pain Decision Support Exception - unselect if not a suspected or confirmed emergency medical condition->Emergency Medical Condition (MA) FINDINGS: Lower Chest: At the lung bases, there is no acute process. There is no pleural effusion. No hiatal hernia. No pneumoperitoneum. Organs: There is moderate splenomegaly. Spleen measures 14 cm craniocaudad and 14.1 cm AP, similar to previous study. Evidence of previous cholecystectomy. There is no definable focal lesion in liver. The surface of liver appears to be mildly irregular or nodular suggestive of cirrhosis of liver. Liver measures 15 cm craniocaudad. The pancreas appears to be atrophic. There are some calcific densities in the area of head of pancreas, probably indicating chronic calcific pancreatitis, similar to previous study. Prominent portal vein is present at the posteroinferior aspect of the head of pancreas. Evidence of tortuous blood vessel probably indicating varicose veins in the retrogastric area. Normal bilateral adrenal glands. In the right kidney, there is large cyst measuring 5.8 cm, similar to previous study. No evidence of stone or hydronephrosis in both kidneys. No evidence of ureteric calculus or obstructive uropathy. Bladder is moderately distended without focal abnormality and without evidence of stone. GI/Bowel: No diagnostic finding in stomach. Nonspecific small-to-moderate amount of gas with fluid levels scattered in small bowel. Normal retrocecal appendix. Large amount of stool present in right colon, transverse colon and moderate amount of gas and stool in the splenic flexure of colon. Small-to-moderate amount of stool and some gas scattered in the descending colon. Rectum contains moderate stool.   Sigmoid colon contains small amount of stool and gas scattered. No evidence of diverticulosis coli. No evidence of colitis. Pelvis: No acute process in the pelvis. Evidence of previous hysterectomy. Peritoneum/Retroperitoneum: No evidence of periaortic or mesenteric pathologic lymphadenopathy. Normal size of abdominal aorta. No ascites. Bones/Soft Tissues: No evidence of inguinal, femoral hernia. Small fat-containing umbilical hernia measuring 1.5 cm x 1 cm. No diagnostic finding in the lumbar spine or in the visualized bony pelvis. Large amount of stool present in the proximal and mid colon. Small-to-moderate amount of gas scattered in mid and distal small bowel with multiple fluid levels, most likely secondary to significant fecal stasis in the colon. Normal appendix visualized. Large right renal cyst redemonstrated. No evidence of renal or urinary calculus or obstructive uropathy. Findings suggesting cirrhosis of liver without obvious focal lesion in liver. Moderate splenomegaly. Evidence of previous cholecystectomy. XR CHEST PORTABLE    Result Date: 8/17/2022  EXAMINATION: ONE XRAY VIEW OF THE CHEST PORTABLE UPRIGHT 8/17/2022 6:47 am COMPARISON: 05/31/2022 and 01/19/2022 HISTORY: ORDERING SYSTEM PROVIDED HISTORY: central line placement TECHNOLOGIST PROVIDED HISTORY: Reason for exam:->central line placement FINDINGS: Right IJ central line tip within the SVC at the cavoatrial junction. No pneumothorax. Normal heart size. No acute pulmonary infiltrate. AP window small calcified lymph nodes. No vascular congestion or pleural effusion. Left humeral neck stable oval sclerotic density. Surgical clips at the left axilla. 1.  Good position of the right IJ central venous line. Negative for pneumothorax.  2.  No acute cardiopulmonary disease.       ------------------------- NURSING NOTES AND VITALS REVIEWED ---------------------------  Date / Time Roomed:  8/18/2022  8:16 AM  ED Bed Assignment:  06/06    The nursing notes within the ED encounter and vital signs as below have been reviewed. BP (!) 135/94   Pulse 86   Temp 98.4 °F (36.9 °C) (Oral)   Resp 16   Ht 5' 1\" (1.549 m)   Wt 110 lb (49.9 kg)   SpO2 100%   BMI 20.78 kg/m²   Oxygen Saturation Interpretation: Normal      ------------------------------------------ PROGRESS NOTES ------------------------------------------      I have spoken with the patient and discussed todays availabe results to this point, in addition to providing specific details for the plan of care and counseling regarding the diagnosis and prognosis. Their questions are answered, however they are not agreeable to admission.     --------------------------------- ADDITIONAL PROVIDER NOTES ---------------------------------  This patient has chosen to leave against medical advice. I have personally explained to them that choosing to do so may result in permanent bodily harm or death. I discussed at length that without further evaluation and monitoring there may be unforeseen circumstances and deterioration resulting in permanent bodily harm or death as a result of their choice. They are alert, oriented, and competent at this time. They state that they are aware of the serious risks as explained, but they continue to wish to leave against medical   advice. In light of their decision to leave against medical advice, follow-up has been arranged and they are aware of the importance of following up as instructed. They have been advised that they should return to the ED immediately if they change their mind at any time, or if their condition begins to change or worsen. The follow up plan has been discussed in detail and they are aware of the specific conditions for emergent return, as well as the importance of follow-up. Discharge Medication List as of 8/18/2022  9:32 AM          Diagnosis:  1. Generalized abdominal pain    2.  Non-intractable vomiting with nausea, unspecified vomiting type        Disposition:  Patient's disposition: Left against medical advice. Patient's condition is stable.        Chris Mitzykristin, DO  08/19/22 [id number] [facility] Joey Barfieldkrista, DO  08/26/22 1083

## 2022-09-22 NOTE — TELEPHONE ENCOUNTER
Received a call from Sherrlyn Olszewski requesting a refill on her Ibuprofen 800 mg to be called into [facility] in [facility].

## 2022-09-23 RX ORDER — IBUPROFEN 800 MG/1
800 TABLET ORAL 3 TIMES DAILY
Qty: 40 TABLET | Refills: 0 | Status: SHIPPED
Start: 2022-09-23 | End: 2022-10-10 | Stop reason: SDUPTHER

## 2022-10-10 ENCOUNTER — TELEPHONE (OUTPATIENT)
Dept: PRIMARY CARE CLINIC | Age: 52
End: 2022-10-10

## 2022-10-10 RX ORDER — GABAPENTIN 400 MG/1
400 CAPSULE ORAL 3 TIMES DAILY
Qty: 30 CAPSULE | Refills: 0 | Status: SHIPPED
Start: 2022-10-10 | End: 2022-10-13

## 2022-10-10 RX ORDER — IBUPROFEN 800 MG/1
800 TABLET ORAL 3 TIMES DAILY
Qty: 40 TABLET | Refills: 0 | Status: SHIPPED | OUTPATIENT
Start: 2022-10-10

## 2022-10-10 NOTE — TELEPHONE ENCOUNTER
Pt called in requesting refills on   Ibuprofen 800 MG   Gabapentin 400 MG   Sent to [facility]

## 2022-10-11 ENCOUNTER — TELEPHONE (OUTPATIENT)
Dept: PRIMARY CARE CLINIC | Age: 52
End: 2022-10-11

## 2022-10-13 ENCOUNTER — OFFICE VISIT (OUTPATIENT)
Dept: PRIMARY CARE CLINIC | Age: 52
End: 2022-10-13
Payer: MEDICAID

## 2022-10-13 VITALS
WEIGHT: 110 LBS | HEART RATE: 83 BPM | SYSTOLIC BLOOD PRESSURE: 114 MMHG | DIASTOLIC BLOOD PRESSURE: 76 MMHG | BODY MASS INDEX: 20.77 KG/M2 | OXYGEN SATURATION: 98 % | HEIGHT: 61 IN | TEMPERATURE: 96.8 F

## 2022-10-13 DIAGNOSIS — C50.112 MALIGNANT NEOPLASM OF CENTRAL PORTION OF LEFT FEMALE BREAST, UNSPECIFIED ESTROGEN RECEPTOR STATUS (HCC): ICD-10-CM

## 2022-10-13 DIAGNOSIS — Z23 IMMUNIZATION DUE: ICD-10-CM

## 2022-10-13 DIAGNOSIS — F10.929 CHRONIC PANCREATITIS DUE TO ACUTE ALCOHOL INTOXICATION (HCC): ICD-10-CM

## 2022-10-13 DIAGNOSIS — K86.0 CHRONIC PANCREATITIS DUE TO ACUTE ALCOHOL INTOXICATION (HCC): ICD-10-CM

## 2022-10-13 DIAGNOSIS — K70.31 ALCOHOLIC CIRRHOSIS OF LIVER WITH ASCITES (HCC): ICD-10-CM

## 2022-10-13 DIAGNOSIS — F41.1 GENERALIZED ANXIETY DISORDER: Primary | ICD-10-CM

## 2022-10-13 DIAGNOSIS — E11.00 TYPE 2 DIABETES MELLITUS WITH HYPEROSMOLARITY WITHOUT COMA, UNSPECIFIED WHETHER LONG TERM INSULIN USE (HCC): ICD-10-CM

## 2022-10-13 LAB — HBA1C MFR BLD: 11.4 %

## 2022-10-13 PROCEDURE — 90471 IMMUNIZATION ADMIN: CPT | Performed by: FAMILY MEDICINE

## 2022-10-13 PROCEDURE — 83036 HEMOGLOBIN GLYCOSYLATED A1C: CPT | Performed by: FAMILY MEDICINE

## 2022-10-13 PROCEDURE — 3046F HEMOGLOBIN A1C LEVEL >9.0%: CPT | Performed by: FAMILY MEDICINE

## 2022-10-13 PROCEDURE — 90674 CCIIV4 VAC NO PRSV 0.5 ML IM: CPT | Performed by: FAMILY MEDICINE

## 2022-10-13 PROCEDURE — 99204 OFFICE O/P NEW MOD 45 MIN: CPT | Performed by: FAMILY MEDICINE

## 2022-10-13 RX ORDER — ARIPIPRAZOLE 20 MG/1
TABLET ORAL
COMMUNITY
Start: 2022-09-06

## 2022-10-13 RX ORDER — FUROSEMIDE 20 MG/1
20 TABLET ORAL DAILY
Qty: 60 TABLET | Refills: 2 | Status: SHIPPED | OUTPATIENT
Start: 2022-10-13

## 2022-10-13 RX ORDER — LACTULOSE 10 G/15ML
SOLUTION ORAL
COMMUNITY
Start: 2022-09-19 | End: 2022-10-13 | Stop reason: SDUPTHER

## 2022-10-13 RX ORDER — GABAPENTIN 400 MG/1
400 CAPSULE ORAL 3 TIMES DAILY
Qty: 90 CAPSULE | Refills: 3
Start: 2022-10-13 | End: 2022-11-12

## 2022-10-13 RX ORDER — LACTULOSE 10 G/15ML
SOLUTION ORAL
Qty: 1 EACH | Refills: 2 | Status: SHIPPED | OUTPATIENT
Start: 2022-10-13

## 2022-10-13 RX ORDER — BUPRENORPHINE HYDROCHLORIDE 8 MG/1
TABLET SUBLINGUAL
COMMUNITY
Start: 2022-08-23

## 2022-10-13 RX ORDER — FLUCONAZOLE 150 MG/1
TABLET ORAL
COMMUNITY
Start: 2022-08-25

## 2022-10-13 RX ORDER — INSULIN LISPRO 100 [IU]/ML
INJECTION, SOLUTION INTRAVENOUS; SUBCUTANEOUS
COMMUNITY
Start: 2022-09-07

## 2022-10-13 RX ORDER — INSULIN GLARGINE 100 [IU]/ML
INJECTION, SOLUTION SUBCUTANEOUS
COMMUNITY
Start: 2022-08-30

## 2022-10-13 RX ORDER — LANOLIN ALCOHOL/MO/W.PET/CERES
CREAM (GRAM) TOPICAL
COMMUNITY
Start: 2022-09-18

## 2022-10-13 RX ORDER — BENZONATATE 200 MG/1
CAPSULE ORAL
COMMUNITY
Start: 2022-08-16

## 2022-10-13 RX ORDER — ALBUTEROL SULFATE 90 UG/1
2 AEROSOL, METERED RESPIRATORY (INHALATION) EVERY 6 HOURS PRN
Qty: 18 G | Refills: 5 | Status: SHIPPED | OUTPATIENT
Start: 2022-10-13

## 2022-10-13 RX ORDER — ONDANSETRON 4 MG/1
4 TABLET, ORALLY DISINTEGRATING ORAL EVERY 8 HOURS PRN
Qty: 21 TABLET | Refills: 0 | Status: SHIPPED | OUTPATIENT
Start: 2022-10-13

## 2022-10-13 RX ORDER — OMEPRAZOLE 40 MG/1
CAPSULE, DELAYED RELEASE ORAL
COMMUNITY
Start: 2022-09-18

## 2022-10-13 RX ORDER — HYDROXYZINE PAMOATE 50 MG/1
CAPSULE ORAL
COMMUNITY
Start: 2022-08-25

## 2022-10-13 RX ORDER — PANCRELIPASE 36000; 180000; 114000 [USP'U]/1; [USP'U]/1; [USP'U]/1
CAPSULE, DELAYED RELEASE PELLETS ORAL
COMMUNITY
Start: 2022-09-14

## 2022-10-13 RX ORDER — SPIRONOLACTONE 25 MG/1
50 TABLET ORAL DAILY
Qty: 30 TABLET | Refills: 2 | Status: SHIPPED | OUTPATIENT
Start: 2022-10-13

## 2022-10-13 RX ORDER — GUAIFENESIN 600 MG/1
600 TABLET, EXTENDED RELEASE ORAL 2 TIMES DAILY
Qty: 30 TABLET | Refills: 0 | Status: SHIPPED | OUTPATIENT
Start: 2022-10-13 | End: 2022-10-28

## 2022-10-13 RX ORDER — TIOTROPIUM BROMIDE INHALATION SPRAY 1.56 UG/1
2 SPRAY, METERED RESPIRATORY (INHALATION) DAILY
Qty: 3 EACH | Refills: 1 | Status: SHIPPED | OUTPATIENT
Start: 2022-10-13

## 2022-10-13 RX ORDER — ALPRAZOLAM 0.5 MG/1
0.5 TABLET ORAL 3 TIMES DAILY PRN
Qty: 90 TABLET | Refills: 0 | Status: SHIPPED | OUTPATIENT
Start: 2022-10-13 | End: 2022-11-12

## 2022-10-13 RX ORDER — PANTOPRAZOLE SODIUM 40 MG/1
40 TABLET, DELAYED RELEASE ORAL DAILY
Qty: 90 TABLET | Refills: 1 | Status: SHIPPED | OUTPATIENT
Start: 2022-10-13

## 2022-10-13 ASSESSMENT — ENCOUNTER SYMPTOMS: ABDOMINAL PAIN: 1

## 2022-10-13 NOTE — PROGRESS NOTES
David Butler (:  1970) is a 46 y.o. female,New patient, here for evaluation of the following chief complaint(s):  New Patient (diab) and Diabetes (Cirrosis,/Bipolar/)         ASSESSMENT/PLAN:  1. Generalized anxiety disorder  -     ALPRAZolam (XANAX) 0.5 MG tablet; Take 1 tablet by mouth 3 times daily as needed for Sleep or Anxiety for up to 30 days. , Disp-90 tablet, R-0Normal  2. Type 2 diabetes mellitus with hyperosmolarity without coma, unspecified whether long term insulin use (HCC)  -     POCT glycosylated hemoglobin (Hb A1C)  -     CBC with Auto Differential; Future  -     Lipid Panel; Future  -     TSH; Future  -     Vitamin D 25 Hydroxy; Future  3. Alcoholic cirrhosis of liver with ascites (HCC)  -     Comprehensive Metabolic Panel, Fasting; Future  4. Chronic pancreatitis due to acute alcohol intoxication ([facility]  5. Malignant neoplasm of central portion of left female breast, unspecified estrogen receptor status ([facility]  6. Immunization due  -     Influenza, FLUCELVAX, (age 10 mo+), IM, PF, 0.5 mL      Return in about 2 weeks (around 10/27/2022) for f/u dm. Subjective   SUBJECTIVE/OBJECTIVE:  Diabetes  Hypoglycemia symptoms include nervousness/anxiousness. new patient to  get establised     Aic  11      refer to endocrine   /76 (Site: Right Upper Arm, Position: Sitting, Cuff Size: Small Adult)   Pulse 83   Temp 96.8 °F (36 °C)   Ht 5' 1\" (1.549 m)   Wt 110 lb (49.9 kg)   SpO2 98%   BMI 20.78 kg/m²     Review of Systems   Gastrointestinal:  Positive for abdominal pain. Psychiatric/Behavioral:  Positive for sleep disturbance. The patient is nervous/anxious.          Lab Results   Component Value Date     2022    K 4.8 2022    CL 99 2022    CO2 27 2022    BUN 10 2022    CREATININE 0.6 2022    GLUCOSE 312 2022    CALCIUM 9.2 2022    PROT 6.5 2022    LABALBU 3.7 2022    BILITOT 0.5 2022    ALKPHOS 124 (H) 08/17/2022    AST 20 08/17/2022    ALT 16 08/17/2022    LABGLOM >60 08/17/2022    GFRAA >60 08/17/2022    GLOB 4.0 (H) 03/10/2016     Lab Results   Component Value Date    CHOL 94 02/16/2020    TRIG 79 02/16/2020    HDL 43 02/16/2020    LDLCALC 35 02/16/2020    LABVLDL 16 02/16/2020     Lab Results   Component Value Date    WBC 3.9 (L) 08/17/2022    HGB 12.6 08/17/2022    HCT 37.0 08/17/2022    MCV 86.0 08/17/2022    PLT 68 (L) 08/17/2022     Lab Results   Component Value Date    TSH 0.184 (L) 02/16/2020     No results found for: VITD25      Objective   Physical Exam  Constitutional:       Appearance: Normal appearance. Cardiovascular:      Rate and Rhythm: Normal rate and regular rhythm. Heart sounds: Normal heart sounds. No murmur heard. Pulmonary:      Effort: Pulmonary effort is normal.      Breath sounds: Normal breath sounds. Abdominal:      Tenderness: There is abdominal tenderness. Neurological:      Mental Status: She is alert. Psychiatric:         Mood and Affect: Mood normal.                    An electronic signature was used to authenticate this note. --Roya Chen MD   45 yo    3  children. Med  hx  dm,  cirrhosis of liver ,  chronic  pancreatitis. Osteoporosis   diabetic neuropathy. Bipolar schizoaffective. Breast cancer      Surgical hx   left mastetcomy,     cholecystectomy. Smokes  1/12  ppd  neg  etoh   on ssi disability  for  bipolar.

## 2022-10-20 DIAGNOSIS — Z85.3 PERSONAL HISTORY OF BREAST CANCER: Primary | ICD-10-CM

## 2022-11-07 RX ORDER — FLUCONAZOLE 150 MG/1
TABLET ORAL
Qty: 1 TABLET | Refills: 2 | Status: SHIPPED
Start: 2022-11-07 | End: 2022-11-14 | Stop reason: ALTCHOICE

## 2022-11-07 NOTE — TELEPHONE ENCOUNTER
Pt called and asked for a refill on diflucan. She states that she has itching, burning, and vaginal discharge for 1 week.     Last Appointment:  10/13/2022  Future Appointments   Date Time Provider Elva Africa   11/8/2022 10:50 AM [place]   11/8/2022 11:45 AM Evy Rivera MD [facility]   11/14/2022 10:00 AM Kailey Thornton MD [facility]

## 2022-11-15 DIAGNOSIS — F41.1 GENERALIZED ANXIETY DISORDER: Primary | ICD-10-CM

## 2022-11-15 DIAGNOSIS — R05.3 CHRONIC COUGH: ICD-10-CM

## 2022-11-15 DIAGNOSIS — Z85.3 PERSONAL HISTORY OF BREAST CANCER: ICD-10-CM

## 2022-11-15 RX ORDER — BENZONATATE 200 MG/1
CAPSULE ORAL
Qty: 90 CAPSULE | Refills: 2 | Status: SHIPPED | OUTPATIENT
Start: 2022-11-15

## 2022-11-15 RX ORDER — ANASTROZOLE 1 MG/1
1 TABLET ORAL DAILY
Qty: 30 TABLET | Refills: 4 | Status: SHIPPED | OUTPATIENT
Start: 2022-11-15

## 2022-11-15 RX ORDER — HYDROXYZINE PAMOATE 50 MG/1
CAPSULE ORAL
Qty: 120 CAPSULE | Refills: 0 | Status: SHIPPED | OUTPATIENT
Start: 2022-11-15

## 2022-11-15 NOTE — TELEPHONE ENCOUNTER
Patient states that you she is on Spironolactone and it is known to cause breast cancer, has a past dx of breast cancer and would like to know if it is safe to take this. Patient called pharmacy and told her to call you for your advise. Should she still take this medication, next appt is not for one month?

## 2022-11-17 NOTE — TELEPHONE ENCOUNTER
Left message for patient to stop taking the medication and Dr. Baltazar Ansari will discuss this further with her at her next appointment, if she has any further questions she is to call our office.

## 2022-12-05 RX ORDER — IPRATROPIUM BROMIDE AND ALBUTEROL SULFATE 2.5; .5 MG/3ML; MG/3ML
SOLUTION RESPIRATORY (INHALATION)
Qty: 360 ML | Refills: 5 | Status: SHIPPED | OUTPATIENT
Start: 2022-12-05

## 2022-12-15 ENCOUNTER — HOSPITAL ENCOUNTER (EMERGENCY)
Age: 52
Discharge: HOME | End: 2022-12-15
Payer: COMMERCIAL

## 2022-12-15 VITALS
OXYGEN SATURATION: 97 % | DIASTOLIC BLOOD PRESSURE: 87 MMHG | SYSTOLIC BLOOD PRESSURE: 132 MMHG | BODY MASS INDEX: 20.41 KG/M2 | RESPIRATION RATE: 16 BRPM | HEART RATE: 79 BPM | WEIGHT: 108 LBS | TEMPERATURE: 98 F

## 2022-12-15 DIAGNOSIS — F90.0 ATTENTION DEFICIT HYPERACTIVITY DISORDER (ADHD), PREDOMINANTLY INATTENTIVE TYPE: ICD-10-CM

## 2022-12-15 DIAGNOSIS — Z76.0 ENCOUNTER FOR MEDICATION REFILL: Primary | ICD-10-CM

## 2022-12-15 DIAGNOSIS — F41.1 GENERALIZED ANXIETY DISORDER: ICD-10-CM

## 2022-12-15 DIAGNOSIS — Z86.59 H/O ANXIETY DISORDER: ICD-10-CM

## 2022-12-15 DIAGNOSIS — Z86.59 H/O ATTENTION DEFICIT HYPERACTIVITY DISORDER: ICD-10-CM

## 2022-12-15 PROBLEM — E11.42 DIABETIC POLYNEUROPATHY ASSOCIATED WITH TYPE 2 DIABETES MELLITUS (HCC): Status: ACTIVE | Noted: 2022-12-15

## 2022-12-15 PROCEDURE — 99283 EMERGENCY DEPT VISIT LOW MDM: CPT

## 2022-12-15 PROCEDURE — 6370000000 HC RX 637 (ALT 250 FOR IP): Performed by: NURSE PRACTITIONER

## 2022-12-15 RX ORDER — DEXTROAMPHETAMINE SACCHARATE, AMPHETAMINE ASPARTATE, DEXTROAMPHETAMINE SULFATE AND AMPHETAMINE SULFATE 1.25; 1.25; 1.25; 1.25 MG/1; MG/1; MG/1; MG/1
TABLET ORAL
Qty: 60 TABLET | OUTPATIENT
Start: 2022-12-15

## 2022-12-15 RX ORDER — ALPRAZOLAM 0.25 MG/1
0.5 TABLET ORAL ONCE
Status: DISCONTINUED | OUTPATIENT
Start: 2022-12-15 | End: 2022-12-15

## 2022-12-15 RX ORDER — IBUPROFEN 800 MG/1
TABLET ORAL
Qty: 40 TABLET | Refills: 0 | OUTPATIENT
Start: 2022-12-15

## 2022-12-15 RX ORDER — ALPRAZOLAM 0.5 MG/1
TABLET ORAL
Qty: 90 TABLET | OUTPATIENT
Start: 2022-12-15

## 2022-12-15 RX ORDER — ALPRAZOLAM 0.5 MG/1
0.5 TABLET ORAL 3 TIMES DAILY PRN
Qty: 9 TABLET | Refills: 0 | Status: SHIPPED | OUTPATIENT
Start: 2022-12-15 | End: 2022-12-18

## 2022-12-15 RX ORDER — ASPIRIN 325 MG
TABLET ORAL
Qty: 30 TABLET | Refills: 0 | OUTPATIENT
Start: 2022-12-15

## 2022-12-15 RX ORDER — DEXTROAMPHETAMINE SACCHARATE, AMPHETAMINE ASPARTATE, DEXTROAMPHETAMINE SULFATE AND AMPHETAMINE SULFATE 1.25; 1.25; 1.25; 1.25 MG/1; MG/1; MG/1; MG/1
5 TABLET ORAL 2 TIMES DAILY
Qty: 6 TABLET | Refills: 0 | Status: SHIPPED | OUTPATIENT
Start: 2022-12-15 | End: 2022-12-18

## 2022-12-15 RX ORDER — LORAZEPAM 1 MG/1
0.5 TABLET ORAL ONCE
Status: COMPLETED | OUTPATIENT
Start: 2022-12-15 | End: 2022-12-15

## 2022-12-15 RX ADMIN — LORAZEPAM 0.5 MG: 1 TABLET ORAL at 17:06

## 2022-12-15 ASSESSMENT — PAIN - FUNCTIONAL ASSESSMENT: PAIN_FUNCTIONAL_ASSESSMENT: NONE - DENIES PAIN
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
## 2022-12-21 ENCOUNTER — TELEPHONE (OUTPATIENT)
Dept: FAMILY MEDICINE CLINIC | Age: 52
End: 2022-12-21
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
## 2023-01-12 RX ORDER — SPIRONOLACTONE 25 MG/1
TABLET ORAL
Qty: 30 TABLET | Refills: 2 | OUTPATIENT
Start: 2023-01-12

## 2023-01-12 RX ORDER — LACTULOSE 10 G/15ML
SOLUTION ORAL
Qty: 473 ML | OUTPATIENT
Start: 2023-01-12

## 2023-01-16 ENCOUNTER — APPOINTMENT (OUTPATIENT)
Dept: CT IMAGING | Age: 53
End: 2023-01-16
Payer: COMMERCIAL

## 2023-01-16 ENCOUNTER — HOSPITAL ENCOUNTER (EMERGENCY)
Age: 53
Discharge: HOME | End: 2023-01-16
Payer: COMMERCIAL

## 2023-01-16 VITALS
TEMPERATURE: 99.2 F | WEIGHT: 100 LBS | BODY MASS INDEX: 18.88 KG/M2 | RESPIRATION RATE: 16 BRPM | DIASTOLIC BLOOD PRESSURE: 99 MMHG | HEIGHT: 61 IN | SYSTOLIC BLOOD PRESSURE: 153 MMHG | HEART RATE: 86 BPM | OXYGEN SATURATION: 98 %

## 2023-01-16 DIAGNOSIS — F10.20 CHRONIC PANCREATITIS DUE TO CHRONIC ALCOHOLISM (HCC): Primary | ICD-10-CM

## 2023-01-16 DIAGNOSIS — K86.0 CHRONIC PANCREATITIS DUE TO CHRONIC ALCOHOLISM (HCC): Primary | ICD-10-CM

## 2023-01-16 LAB
ALBUMIN SERPL-MCNC: 3.7 G/DL (ref 3.5–5.2)
ALP BLD-CCNC: 642 U/L (ref 35–104)
ALT SERPL-CCNC: 66 U/L (ref 0–32)
ANION GAP SERPL CALCULATED.3IONS-SCNC: 12 MMOL/L (ref 7–16)
AST SERPL-CCNC: 71 U/L (ref 0–31)
BILIRUB SERPL-MCNC: 0.6 MG/DL (ref 0–1.2)
BILIRUBIN URINE: NEGATIVE
BLOOD, URINE: NEGATIVE
BUN BLDV-MCNC: 13 MG/DL (ref 6–20)
CALCIUM SERPL-MCNC: 9.4 MG/DL (ref 8.6–10.2)
CHLORIDE BLD-SCNC: 99 MMOL/L (ref 98–107)
CLARITY: CLEAR
CO2: 23 MMOL/L (ref 22–29)
COLOR: YELLOW
CREAT SERPL-MCNC: 0.5 MG/DL (ref 0.5–1)
GFR SERPL CREATININE-BSD FRML MDRD: >60 ML/MIN/1.73
GLUCOSE BLD-MCNC: 294 MG/DL (ref 74–99)
GLUCOSE URINE: >=1000 MG/DL
KETONES, URINE: NEGATIVE MG/DL
LACTIC ACID: 1.4 MMOL/L (ref 0.5–2.2)
LEUKOCYTE ESTERASE, URINE: NEGATIVE
LIPASE: 20 U/L (ref 13–60)
NITRITE, URINE: NEGATIVE
PH UA: 5.5 (ref 5–9)
POTASSIUM SERPL-SCNC: 5.7 MMOL/L (ref 3.5–5)
PROTEIN UA: NEGATIVE MG/DL
REASON FOR REJECTION: NORMAL
REJECTED TEST: NORMAL
SODIUM BLD-SCNC: 134 MMOL/L (ref 132–146)
SPECIFIC GRAVITY UA: 1.02 (ref 1–1.03)
TOTAL PROTEIN: 7.5 G/DL (ref 6.4–8.3)
UROBILINOGEN, URINE: 0.2 E.U./DL

## 2023-01-16 PROCEDURE — 74176 CT ABD & PELVIS W/O CONTRAST: CPT

## 2023-01-16 PROCEDURE — 83605 ASSAY OF LACTIC ACID: CPT

## 2023-01-16 PROCEDURE — 85025 COMPLETE CBC W/AUTO DIFF WBC: CPT

## 2023-01-16 PROCEDURE — 36415 COLL VENOUS BLD VENIPUNCTURE: CPT

## 2023-01-16 PROCEDURE — 87088 URINE BACTERIA CULTURE: CPT

## 2023-01-16 PROCEDURE — 2580000003 HC RX 258: Performed by: NURSE PRACTITIONER

## 2023-01-16 PROCEDURE — 80053 COMPREHEN METABOLIC PANEL: CPT

## 2023-01-16 PROCEDURE — 87077 CULTURE AEROBIC IDENTIFY: CPT

## 2023-01-16 PROCEDURE — 81003 URINALYSIS AUTO W/O SCOPE: CPT

## 2023-01-16 PROCEDURE — 80307 DRUG TEST PRSMV CHEM ANLYZR: CPT

## 2023-01-16 PROCEDURE — 96375 TX/PRO/DX INJ NEW DRUG ADDON: CPT

## 2023-01-16 PROCEDURE — 6360000002 HC RX W HCPCS

## 2023-01-16 PROCEDURE — 96374 THER/PROPH/DIAG INJ IV PUSH: CPT

## 2023-01-16 PROCEDURE — 87186 SC STD MICRODIL/AGAR DIL: CPT

## 2023-01-16 PROCEDURE — 99284 EMERGENCY DEPT VISIT MOD MDM: CPT

## 2023-01-16 PROCEDURE — 83690 ASSAY OF LIPASE: CPT

## 2023-01-16 RX ORDER — 0.9 % SODIUM CHLORIDE 0.9 %
30 INTRAVENOUS SOLUTION INTRAVENOUS ONCE
Status: COMPLETED | OUTPATIENT
Start: 2023-01-16 | End: 2023-01-16

## 2023-01-16 RX ORDER — FENTANYL CITRATE 50 UG/ML
50 INJECTION, SOLUTION INTRAMUSCULAR; INTRAVENOUS ONCE
Status: COMPLETED | OUTPATIENT
Start: 2023-01-16 | End: 2023-01-16

## 2023-01-16 RX ORDER — ONDANSETRON 4 MG/1
4 TABLET, ORALLY DISINTEGRATING ORAL 3 TIMES DAILY PRN
Qty: 21 TABLET | Refills: 0 | Status: SHIPPED | OUTPATIENT
Start: 2023-01-16

## 2023-01-16 RX ORDER — ONDANSETRON 2 MG/ML
4 INJECTION INTRAMUSCULAR; INTRAVENOUS ONCE
Status: COMPLETED | OUTPATIENT
Start: 2023-01-16 | End: 2023-01-16

## 2023-01-16 RX ADMIN — SODIUM CHLORIDE 1362 ML: 9 INJECTION, SOLUTION INTRAVENOUS at 21:50

## 2023-01-16 RX ADMIN — ONDANSETRON 4 MG: 2 INJECTION INTRAMUSCULAR; INTRAVENOUS at 22:00

## 2023-01-16 RX ADMIN — FENTANYL CITRATE 50 MCG: 50 INJECTION, SOLUTION INTRAMUSCULAR; INTRAVENOUS at 22:00

## 2023-01-16 ASSESSMENT — PAIN SCALES - GENERAL
PAINLEVEL_OUTOF10: 10
PAINLEVEL_OUTOF10: 10

## 2023-01-16 ASSESSMENT — PAIN DESCRIPTION - LOCATION
LOCATION: ABDOMEN
LOCATION: ABDOMEN

## 2023-01-16 ASSESSMENT — PAIN - FUNCTIONAL ASSESSMENT: PAIN_FUNCTIONAL_ASSESSMENT: 0-10

## 2023-01-17 LAB
AMPHETAMINE SCREEN, URINE: NOT DETECTED
BARBITURATE SCREEN URINE: NOT DETECTED
BASOPHILS ABSOLUTE: 0.01 E9/L (ref 0–0.2)
BASOPHILS RELATIVE PERCENT: 0.3 % (ref 0–2)
BENZODIAZEPINE SCREEN, URINE: NOT DETECTED
CANNABINOID SCREEN URINE: NOT DETECTED
COCAINE METABOLITE SCREEN URINE: POSITIVE
EOSINOPHILS ABSOLUTE: 0.07 E9/L (ref 0.05–0.5)
EOSINOPHILS RELATIVE PERCENT: 2 % (ref 0–6)
FENTANYL SCREEN, URINE: NOT DETECTED
HCT VFR BLD CALC: 34.5 % (ref 34–48)
HEMOGLOBIN: 11.4 G/DL (ref 11.5–15.5)
IMMATURE GRANULOCYTES #: 0.01 E9/L
IMMATURE GRANULOCYTES %: 0.3 % (ref 0–5)
LYMPHOCYTES ABSOLUTE: 0.6 E9/L (ref 1.5–4)
LYMPHOCYTES RELATIVE PERCENT: 17.4 % (ref 20–42)
Lab: ABNORMAL
MCH RBC QN AUTO: 29.8 PG (ref 26–35)
MCHC RBC AUTO-ENTMCNC: 33 % (ref 32–34.5)
MCV RBC AUTO: 90.3 FL (ref 80–99.9)
METHADONE SCREEN, URINE: NOT DETECTED
MONOCYTES ABSOLUTE: 0.22 E9/L (ref 0.1–0.95)
MONOCYTES RELATIVE PERCENT: 6.4 % (ref 2–12)
NEUTROPHILS ABSOLUTE: 2.54 E9/L (ref 1.8–7.3)
NEUTROPHILS RELATIVE PERCENT: 73.6 % (ref 43–80)
OPIATE SCREEN URINE: NOT DETECTED
OXYCODONE URINE: NOT DETECTED
PDW BLD-RTO: 14 FL (ref 11.5–15)
PHENCYCLIDINE SCREEN URINE: NOT DETECTED
PLATELET # BLD: 77 E9/L (ref 130–450)
PLATELET CONFIRMATION: NORMAL
PMV BLD AUTO: 11.1 FL (ref 7–12)
RBC # BLD: 3.82 E12/L (ref 3.5–5.5)
ROULEAUX: ABNORMAL
WBC # BLD: 3.5 E9/L (ref 4.5–11.5)

## 2023-01-17 NOTE — ED PROVIDER NOTES
Independent [facility] Visit. [address] ENCOUNTER        Pt Name: Chris Sandhu  MRN: [id number]  [place] 1970  Date of evaluation: 1/16/2023  Provider: EVERETT Kent CNP  PCP: Stacy Marques MD  Note Started: 9:44 PM EST 1/16/23    CHIEF COMPLAINT       Chief Complaint   Patient presents with    Abdominal Pain     X 3 days; hx of pancreatitis and cirrhosis        HISTORY OF PRESENT ILLNESS: 1 or more Elements     History from : Patient    Limitations to history : None    Chris Sandhu is a 46 y.o. female with a history of chronic pancreatitis, alcohol abuse, cocaine abuse, diabetes mellitus type 2, GERD, hepatitis C, abdominal wall hernia, splenomegaly, and schizoaffective disorder who presents to the urgency department with her significant other for complaints of diffuse upper abdominal pain for the last 3 days. Pain is sharp and aching in nature. She has been taking ibuprofen at home without much relief. There has been similar episodes in the past related to chronic pancreatitis, she denies any for the last 3 months. Since onset the symptoms have been remaining constant. The pain is associated with nausea, vomiting, diarrhea, and urinary frequency. The pain is aggravated by eating and relieved by nothing. There has been NO back pain, chest pain, shortness of breath, fever, chills, sweating, weight loss, constipation, dark/black stools, blood in stool, blood in emesis, cloudy urine, dysuria, hematuria, urinary urgency, vaginal discharge, or vaginal itching. {To Insert CAD, AAA or Ischemic Bowel Risk Factor Tables, Choose *,*,*  From Menu and type <dot>errisk and choose from list, otherwise press Enter to continue. Nursing Notes were all reviewed and agreed with or any disagreements were addressed in the HPI. REVIEW OF SYSTEMS :      Review of Systems    Positives and Pertinent negatives as per HPI.      PAST MEDICAL HISTORY    has a past medical history of Alcoholism ([facility] [address] Anxiety and depression, Ascites of liver, Bronchitis, Cancer ([address] Candidiasis of vagina, Chronic pancreatitis ([address] Cocaine abuse ([facility] [address] Constipation, Diabetes mellitus, type 2 ([address] GERD (gastroesophageal reflux disease), Gout, Hepatitis C, Hernia of anterior abdominal wall, Jaundice (05/12/2016), Pneumonia, Polyneuropathy due to type 2 diabetes mellitus ([address] Schizoaffective disorder, bipolar type ([address] and Splenomegaly (05/30/2017). SURGICAL HISTORY     Past Surgical History:   Procedure Laterality Date    ABDOMEN SURGERY      gallbladder removal    BREAST BIOPSY Left     CHOLECYSTECTOMY  2010    COLONOSCOPY      COLONOSCOPY N/A 10/2/2018    COLONOSCOPY WITH BIOPSY performed by Nathalie Sanches MD at [facility], [address] [place] [address], DIAGNOSTIC      ERCP      april 2016 at [place] [address] ([facility]  2004    MASTECTOMY Left     Left breast mastectomy per patient    OTHER SURGICAL HISTORY Left 04/25/2017     Left breast blue dye injection, Sential Node Lymph Biopsy with left breast lumpectomy    OTHER SURGICAL HISTORY  10/05/2017    simple left mastectomy    OVARY REMOVAL Bilateral 2007    PANCREAS BIOPSY      with stent    SHOULDER SURGERY Left 2003    UPPER GASTROINTESTINAL ENDOSCOPY         CURRENTMEDICATIONS       Discharge Medication List as of 1/16/2023 11:21 PM        CONTINUE these medications which have NOT CHANGED    Details   amphetamine-dextroamphetamine (ADDERALL, 5MG,) 5 MG tablet Take 1 tablet by mouth 2 times daily for 3 days. , Disp-6 tablet, R-0Normal      ipratropium-albuterol (DUONEB) 0.5-2.5 (3) MG/3ML SOLN nebulizer solution inhale contents of 1 vial ( 3 milliliters ) in nebulizer by mouth and INTO THE LUNGS every 6 hours if needed for shortness of breath, Disp-360 mL, R-5Normal      anastrozole (ARIMIDEX) 1 MG tablet Take 1 tablet by mouth daily, Disp-30 tablet, R-4Normal      hydrOXYzine pamoate (VISTARIL) 50 MG capsule take 1 capsule by mouth four times a day if needed for anxiety, Disp-120 capsule, R-0Normal      benzonatate (TESSALON) 200 MG capsule take 1 capsule by mouth every 8 hours, Disp-90 capsule, R-2Normal      gabapentin (NEURONTIN) 400 MG capsule Take 1 capsule by mouth 3 times daily for 30 days. , Disp-90 capsule, R-3Normal      ibuprofen (ADVIL;MOTRIN) 800 MG tablet Take 1 tablet by mouth in the morning, at noon, and at bedtime, Disp-40 tablet, R-0Normal      Insulin Syringe-Needle U-100 31G X 5/16\" 1 ML MISC DAILY Starting Mon 11/14/2022, Disp-100 each, R-11, NormalUse as directed      Insulin Pen Needle (B-D UF III MINI PEN NEEDLES) 31G X 5 MM MISC Disp-400 each, R-5, NormalUse 4 daily as directed with insulin      !! insulin glargine (LANTUS SOLOSTAR) 100 UNIT/ML injection pen Inject 60 Units into the skin daily, Disp-5 Adjustable Dose Pre-filled Pen Syringe, R-2Normal      omeprazole (PRILOSEC) 40 MG delayed release capsule take 1 tablet by mouth every morningHistorical Med      !! ARIPiprazole (ABILIFY) 20 MG tablet take 1 tablet by mouth once dailyHistorical Med      !! LANTUS SOLOSTAR 100 UNIT/ML injection pen inject 20 units subcutaneously twice a day, DAWHistorical Med      HUMALOG KWIKPEN 100 UNIT/ML SOPN USE WITH MEALS AS DIRECTED PER SLIDING SCALE (MAX 12 UNITS WITH MEAL), DAWHistorical Med      CREON 15197-866015 units CPEP delayed release capsule 3 capsules, DAWHistorical Med      lactulose (CHRONULAC) 10 GM/15ML solution take 15 milliliters by mouth twice a day, Disp-1 each, R-2Normal      pantoprazole (PROTONIX) 40 MG tablet Take 1 tablet by mouth daily, Disp-90 tablet, R-1Normal      tiotropium (SPIRIVA RESPIMAT) 1.25 MCG/ACT AERS inhaler Inhale 2 puffs into the lungs daily, Disp-3 each, R-1Normal      albuterol sulfate HFA (PROVENTIL;VENTOLIN;PROAIR) 108 (90 Base) MCG/ACT inhaler Inhale 2 puffs into the lungs every 6 hours as needed for Wheezing or Shortness of Breath, Disp-18 g, R-5Normal      furosemide (LASIX) 20 MG tablet Take 1 tablet by mouth daily, Disp-60 tablet, R-2Normal      budesonide-formoterol (SYMBICORT) 160-4.5 MCG/ACT AERO Inhale 2 puffs into the lungs 2 times daily, Disp-6 g, R-0Normal      !! insulin lispro (INSULIN LISPRO) 100 UNIT/ML SOLN injection vial Inject 10 Units into the skin 3 times daily (before meals) Patient may use sliding scale, Disp-3 each, R-3Normal      albuterol (PROVENTIL) (2.5 MG/3ML) 0.083% nebulizer solution Take 3 mLs by nebulization every 4 hours as needed for Wheezing May substitute generic if insurance does not cover. Please provide 1 box (25 or 30 ampules) depending on how supplied per box, Disp-120 each, R-3Normal      Respiratory Therapy Supplies (FULL KIT NEBULIZER SET) MISC Disp-1 each, R-0, NormalUse as directed with nebulized medication. Lancets MISC DAILY Starting Mon 6/6/2022, Disp-100 each, R-5, Normal      blood glucose monitor strips Test 4 times a day & as needed for symptoms of irregular blood glucose. Dispense sufficient amount for indicated testing frequency plus additional to accommodate PRN testing needs. , Disp-100 strip, R-5, Normal      insulin glargine (LANTUS) 100 UNIT/ML injection vial Inject 20 Units into the skin 2 times daily, Disp-2 each, R-5Normal      !! insulin lispro (HUMALOG) 100 UNIT/ML injection vial Inject 0-18 Units into the skin 3 times daily (with meals) **High Dose Corrective Algorithm**  Glucose: Dose:               No Insulin  140-199           3 Units  200-249 6 Units  250-299 9 Units  300-349 12 Units  350-400 15 Units  Over 400  18 Un its, Disp-10 mL, R-0Print      lipase-protease-amylase (CREON) 55951-97179 units delayed release capsule Take 3 capsules by mouth 3 times daily (with meals) for 10 days Indications: Creon, Oral, 3 TIMES DAILY WITH MEALS Starting Mon 5/23/2022, Until Mon 11/14/2022, For 30 doses, Disp-90 capsule, R-0, Normal      lubiprostone (AMITIZA) 8 MCG CAPS capsule Take 1 capsule by mouth daily, Disp-10 capsule, R-0Normal      tiZANidine (ZANAFLEX) 4 MG tablet Take 1 tablet by mouth 3 times daily, Disp-30 tablet, R-0Normal      VORTIoxetine HBr (TRINTELLIX) 20 MG TABS tablet Take 1 tablet by mouth nightly, Disp-10 tablet, R-0Normal      !! ARIPiprazole (ABILIFY PO) Take by mouthHistorical Med      Denosumab (PROLIA SC) Inject into the skin Every 6 months. Given at [address] [place] [address]       !! - Potential duplicate medications found. Please discuss with provider. ALLERGIES     Dye [iodides] and Tylenol [acetaminophen]    FAMILYHISTORY       Family History   Problem Relation Age of Onset    Diabetes Mother     Cancer Mother         Breast    No Known Problems Father     Colon Cancer Maternal Grandfather         SOCIAL HISTORY       Social History     Tobacco Use    Smoking status: Every Day     Packs/day: 1.50     Years: 40.00     Pack years: 60.00     Types: Cigarettes     Start date: 1982     Last attempt to quit: 2020     Years since quittin.9    Smokeless tobacco: Never   Vaping Use    Vaping Use: Never used   Substance Use Topics    Alcohol use: Not Currently    Drug use: Yes     Types: Cocaine     Comment: once in a while       SCREENINGS        [place] Coma Scale  Eye Opening: Spontaneous  Best Verbal Response: Oriented  Best Motor Response: Obeys commands  [place] Coma Scale Score: 15                CIWA Assessment  BP: (!) 153/99  Heart Rate: 86           PHYSICAL EXAM  1 or more Elements     ED Triage Vitals   BP Temp Temp src Heart Rate Resp SpO2 Height Weight   23 -- 23   (!) 153/99 99.2 °F (37.3 °C)  86 16 98 % 5' 1\" (1.549 m) 100 lb (45.4 kg)       Physical Exam  General Appearance/Constitutional:  Alert, development consistent with age, chronically ill-appearing  HEENT:  NC/NT. PERRLA. Airway patent. Neck:  Supple.   No lymphadenopathy.  Respiratory: Lungs Clear to auscultation and breath sounds equal.  CV:  Regular rate and rhythm.  GI:  normal appearing, non-distended with no visible hernias and scars- RUQ and periumbilical.         Bowel sounds: normal bowel sounds.             Tenderness: There is mild tenderness present - located diffusely in the upper abdomen., There is no rebound tenderness., There is no guarding., There is no distension., There is no pulsatile mass., Colby's Sign: negative, McBurney's Sign: negative, Rovsing's Sign: negative.           Liver: non-tender.                            Spleen:  non-tender, mildly palpable  Back: CVA Tenderness: No CVA tenderness.  Integument:  Normal turgor, warm, dry, without visible rash, unless noted elsewhere. No jaundice, no pallor.  Neurological:  Orientation age-appropriate.  Motor functions intact.    DIAGNOSTIC RESULTS   LABS:    Results for orders placed or performed during the hospital encounter of 01/16/23   Culture, Urine    Specimen: Urine, clean catch   Result Value Ref Range    Organism Gram negative khoi (A)     Urine Culture, Routine       >100,000 CFU/ml  Identification and sensitivity to follow     CMP   Result Value Ref Range    Sodium 134 132 - 146 mmol/L    Potassium 5.7 (H) 3.5 - 5.0 mmol/L    Chloride 99 98 - 107 mmol/L    CO2 23 22 - 29 mmol/L    Anion Gap 12 7 - 16 mmol/L    Glucose 294 (H) 74 - 99 mg/dL    BUN 13 6 - 20 mg/dL    Creatinine 0.5 0.5 - 1.0 mg/dL    Est, Glom Filt Rate >60 >=60 mL/min/1.73    Calcium 9.4 8.6 - 10.2 mg/dL    Total Protein 7.5 6.4 - 8.3 g/dL    Albumin 3.7 3.5 - 5.2 g/dL    Total Bilirubin 0.6 0.0 - 1.2 mg/dL    Alkaline Phosphatase 642 (H) 35 - 104 U/L    ALT 66 (H) 0 - 32 U/L    AST 71 (H) 0 - 31 U/L   CBC with Auto Differential   Result Value Ref Range    WBC 3.5 (L) 4.5 - 11.5 E9/L    RBC 3.82 3.50 - 5.50 E12/L    Hemoglobin 11.4 (L) 11.5 - 15.5 g/dL    Hematocrit 34.5 34.0 - 48.0 %    MCV 90.3 80.0 - 99.9 fL    MCH  29.8 26.0 - 35.0 pg    MCHC 33.0 32.0 - 34.5 %    RDW 14.0 11.5 - 15.0 fL    Platelets 77 (L) 528 - 450 E9/L    MPV 11.1 7.0 - 12.0 fL    Neutrophils % 73.6 43.0 - 80.0 %    Immature Granulocytes % 0.3 0.0 - 5.0 %    Lymphocytes % 17.4 (L) 20.0 - 42.0 %    Monocytes % 6.4 2.0 - 12.0 %    Eosinophils % 2.0 0.0 - 6.0 %    Basophils % 0.3 0.0 - 2.0 %    Neutrophils Absolute 2.54 1.80 - 7.30 E9/L    Immature Granulocytes # 0.01 E9/L    Lymphocytes Absolute 0.60 (L) 1.50 - 4.00 E9/L    Monocytes Absolute 0.22 0.10 - 0.95 E9/L    Eosinophils Absolute 0.07 0.05 - 0.50 E9/L    Basophils Absolute 0.01 0.00 - 0.20 E9/L    Rouleaux 1+    Lactic Acid   Result Value Ref Range    Lactic Acid 1.4 0.5 - 2.2 mmol/L   Lipase   Result Value Ref Range    Lipase 20 13 - 60 U/L   Urinalysis   Result Value Ref Range    Color, UA Yellow Straw/Yellow    Clarity, UA Clear Clear    Glucose, Ur >=1000 (A) Negative mg/dL    Bilirubin Urine Negative Negative    Ketones, Urine Negative Negative mg/dL    Specific Gravity, UA 1.020 1.005 - 1.030    Blood, Urine Negative Negative    pH, UA 5.5 5.0 - 9.0    Protein, UA Negative Negative mg/dL    Urobilinogen, Urine 0.2 <2.0 E.U./dL    Nitrite, Urine Negative Negative    Leukocyte Esterase, Urine Negative Negative   Platelet Confirmation   Result Value Ref Range    Platelet Confirmation CONFIRMED    URINE DRUG SCREEN   Result Value Ref Range    Amphetamine Screen, Urine NOT DETECTED Negative <1000 ng/mL    Barbiturate Screen, Ur NOT DETECTED Negative < 200 ng/mL    Benzodiazepine Screen, Urine NOT DETECTED Negative < 200 ng/mL    Cannabinoid Scrn, Ur NOT DETECTED Negative < 50ng/mL    Cocaine Metabolite Screen, Urine POSITIVE (A) Negative < 300 ng/mL    Opiate Scrn, Ur NOT DETECTED Negative < 300ng/mL    PCP Screen, Urine NOT DETECTED Negative < 25 ng/mL    Methadone Screen, Urine NOT DETECTED Negative <300 ng/mL    Oxycodone Urine NOT DETECTED Negative <100 ng/mL    FENTANYL SCREEN, URINE NOT DETECTED Negative <1 ng/mL    Drug Screen Comment: see below    SPECIMEN REJECTION   Result Value Ref Range    Rejected Test sds     Reason for Rejection see below        RADIOLOGY:   Images reviewed but not interpreted by the ED provider. Interpretation per the Radiologist below, if available at the time of this note:    CT ABDOMEN PELVIS WO CONTRAST Additional Contrast? None   Final Result   No convincing CT evidence of acute pancreatitis. Coarse calcifications in   the region of the pancreatic head/uncinate process, compatible with sequela   of chronic pancreatitis. Cirrhosis. No ascites. PROCEDURES   Unless otherwise noted below, none       Procedure(s):  None      CRITICAL CARE TIME (.cctime)   N/A    EMERGENCY DEPARTMENT COURSE and DIFFERENTIAL DIAGNOSIS/MDM:     ------------------------- NURSING NOTES AND VITALS REVIEWED ---------------------------  The nursing notes within the ED encounter and vital signs as below have been reviewed. Vitals:    01/16/23 2025 01/16/23 2031   BP: (!) 153/99    Pulse: 86    Resp: 16    Temp: 99.2 °F (37.3 °C)    SpO2: 98%    Weight:  100 lb (45.4 kg)   Height:  5' 1\" (1.549 m)       ED Course as of 01/18/23 1346   Mon Jan 16, 2023   2214 Bedside to attempt to reassess. Patient is asleep on right side and in no distress. Respirations are regular and easy. Significant other asleep in chair at bedside.  [DH]   2324 Bedside to reassess. Patient arouses easily to name. Reports her pain is returning. Discussed lab results and CT findings as well as discharge plan of care patient is agreeable. I did recommend a clear liquid diet for the next 12 to 24 hours. She is to follow-up with her PCP and Dr. Dewey Chilel, GI  as instructed.   [DH]      ED Course User Index  [DH] Jennifer Juárez, APRN - CNP       Patient was given:   Medications   0.9 % sodium chloride bolus (0 mL/kg × 45.4 kg IntraVENous Stopped 1/16/23 2322)   ondansetron (ZOFRAN) injection 4 mg (4 mg IntraVENous Given 1/16/23 2200)   fentaNYL (SUBLIMAZE) injection 50 mcg (50 mcg IntraVENous Given 1/16/23 2200)    and their symptoms are improving. Re-evaluation:  1/16/23      Time: 2214    Patients condition is improving after treatment. 1/16/23      Time: 2324    Patients condition is improving after treatment. Still has mild diffuse upper abdominal tenderness, but nausea has subsided. Pain is controlled at this time. Medical Decision Making:    History from : Patient    Limitations to history : None    Social Determinants:  Social Determinants : None    Chronic Conditions Affecting Care: Alcoholism, chronic pancreatitis, ascites of the liver, diabetes mellitus type 2, GERD, hepatitis C, liver cirrhosis, abdominal wall hernia, and cocaine abuse. Record Review:    Records Reviewed : None    Consultations:  None    Discussion with Other Profesionals : None    Procedures: none    CC/HPI Summary, DDx, ED Course, and Reassessment:   Patient presents to the ED for Abdominal Pain (X 3 days; hx of pancreatitis and cirrhosis )  Complains of diffuse upper abdominal pain for the last 3 days. Associated symptoms include nausea, vomiting, diarrhea, and urinary frequency. This is typical of her chronic pancreatitis. She denies any recent drug or alcohol use and states she has been clean for 6 years. Differential diagnoses included but not limited to gastritis, pancreatitis, colitis, cirrhosis, SBO.      ISCHEMIC BOWEL Risk Factors:      Atrial Fibrillation:    No.       CHF:    No.       Peripheral Vascular Dz:    No.     Workup in the ED revealed: Chronically ill-appearing female with diffuse upper abdominal tenderness. No peritoneal signs. History and physical exam concerning for cute versus chronic pancreatitis. There is no leukocytosis or lactic acidosis to suggest acute abdomen or abdominal infection.   GI/liver profile is concerning for increased alkaline phosphatase of 642, increased ALT of 66, and increased AST of 71. Bilirubin is normal at 0.6.  K is slightly increased at 5.7. Blood glucose level is increased at 294. Patient was given IV fluids and redraw was attempted, but was not able to be obtained. No increased BUN/creatinine/GFR to suggest dehydration or ESA. UA is negative for infection, however urine drug screen is positive for cocaine. CT indicates, cirrhotic appearance of the liver, surgically absent gallbladder, chronic splenomegaly, coarse calcifications in the region of the pancreatic head, uncinate process compatible sequela of chronic pancreatitis, no peripancreatic stranding, no peripancreatic fluid collection there is a 6.2 x 4.9 cm cyst in the right upper renal pole, no hydronephrosis, no bowel obstruction, no abnormal bowel wall thickening, or distention, appendix is normal, urinary bladder is distended, uterus is absent, peritoneum retroperitoneum, no ascites or free air, there is mildly prominent periportal and mesenteric lymph nodes, likely reactive GI/liver profile elevations of AST, ALT, and AST, as well as chronic CT findings are consistent with chronic pancreatitis. Disposition Considerations: This patient's ED course included: a personal history and physicial examination, re-evaluation prior to disposition, multiple bedside re-evaluations, and IV medications  This patient has remained hemodynamically stable and improved during their ED course. Additional Plan of Care / Disposition Considerations:    Patient is well-appearing, nontoxic, and remained hemodynamically stable throughout ED course. At this time patient is:  Appropriate for outpatient management        Patient is established with a PCP. They will follow-up with  their PMD and gastroenterology. She is established with Dr. Cody Lua, gastroenterologist.    FINAL IMPRESSION      1.  Chronic pancreatitis due to chronic alcoholism [facility])          DISPOSITION/PLAN     DISPOSITION Decision To Discharge 01/16/2023 11:19:03 PM      PATIENT REFERRED TO:  Tran Alford MD  [address] [id number] [phone]    Schedule an appointment as soon as possible for a visit in 2 days      Kathleen Walsh MD  [address] [facility]  [phone]    Schedule an appointment as soon as possible for a visit in 1 day      [address] [facility] Emergency Department  [facility]  [facility] [address]  [phone]    If symptoms worsen    DISCHARGE MEDICATIONS:  Discharge Medication List as of 1/16/2023 11:21 PM          DISCONTINUED MEDICATIONS:  Discharge Medication List as of 1/16/2023 11:21 PM            PLAN OF CARE & COUNSELING:     EVERETT Nath CNP reviewed today's visit with the patient and spouse / life partner. This included the differential, results and plan of care in addition to providing specific details for the plan of care and counseling regarding the diagnosis and prognosis and they are agreeable with the plan. All results reviewed with patient and spouse / life partner. and all questions answered. The patient and spouse / life partner expressed understanding    I emphasized the importance of follow-up with the physician I referred them to in the timeframe recommended. I discussed with the patient emergent symptoms and the need to immediately return to the ER for any new/worsening symptoms. Written information was included in their discharge instructions. Additional verbal discharge instructions were also given and discussed with the patient to supplement those generated by the EMR. We also discussed medications that were prescribed (if any) including common side effects and interactions. Due to concurrent cocaine use, and suspicions for alcohol use, no narcotics were prescribed for pain control outside of the ER. All questions were addressed. They understand return precautions and discharge instructions.  The patient and spouse / life partner  expressed understanding. Vitals were stable and they were in no distress at discharge. (Please note that portions of this note were completed with a voice recognition program.  Efforts were made to edit the dictations but occasionally words are mis-transcribed.)    I am the primary clinician of record.      EVERETT Valle CNP (electronically signed)           EVERETT Valle CNP  01/18/23 [id number]

## 2023-01-19 LAB
ORGANISM: ABNORMAL
URINE CULTURE, ROUTINE: ABNORMAL

## 2023-01-23 ENCOUNTER — TELEPHONE (OUTPATIENT)
Dept: FAMILY MEDICINE CLINIC | Age: 53
End: 2023-01-23

## 2023-01-23 NOTE — TELEPHONE ENCOUNTER
I would encourage her to keep the appointment with [facility] to establish, I will not be able to meet her needs in a timely fashion due to my patient volume.

## 2023-01-23 NOTE — TELEPHONE ENCOUNTER
----- Message from Amber Schmitt sent at 1/23/2023 11:30 AM EST -----  Subject: Appointment Request    Reason for Call: New Patient/New to Provider Appointment needed: New   Patient Request Appointment    QUESTIONS    Reason for appointment request? Requested Provider unavailable - Maliah Mays. Param Yates      Additional Information for Provider? Dorita Aden is wanting to establish   care with Dr. Param Yates hopefully sometime before April. She will be needing   medication and referrals and orders and is waiting on her new insurance to   be available through [employer] [facility]. She has new patient appt on 1/24 at   [facility] just to get a small refill on some medications needed but her   insurance is outdated on 2/1/23.  She would like a callback asap.  ---------------------------------------------------------------------------  --------------  Carmen CASTILLO  [phone]; OK to leave message on voicemail  ---------------------------------------------------------------------------  --------------  SCRIPT ANSWERS  COVID Screen: Michelle Martines

## 2023-01-24 ENCOUNTER — OFFICE VISIT (OUTPATIENT)
Dept: FAMILY MEDICINE CLINIC | Age: 53
End: 2023-01-24
Payer: COMMERCIAL

## 2023-01-24 VITALS
SYSTOLIC BLOOD PRESSURE: 108 MMHG | TEMPERATURE: 97.7 F | OXYGEN SATURATION: 99 % | HEIGHT: 61 IN | DIASTOLIC BLOOD PRESSURE: 70 MMHG | BODY MASS INDEX: 19.63 KG/M2 | HEART RATE: 79 BPM | WEIGHT: 104 LBS | RESPIRATION RATE: 16 BRPM

## 2023-01-24 DIAGNOSIS — E11.9 TYPE 2 DIABETES MELLITUS WITHOUT COMPLICATION, WITH LONG-TERM CURRENT USE OF INSULIN (HCC): Primary | ICD-10-CM

## 2023-01-24 DIAGNOSIS — F10.929 CHRONIC PANCREATITIS DUE TO ACUTE ALCOHOL INTOXICATION (HCC): ICD-10-CM

## 2023-01-24 DIAGNOSIS — M81.0 AGE RELATED OSTEOPOROSIS, UNSPECIFIED PATHOLOGICAL FRACTURE PRESENCE: ICD-10-CM

## 2023-01-24 DIAGNOSIS — F98.8 ATTENTION DEFICIT DISORDER (ADD) WITHOUT HYPERACTIVITY: ICD-10-CM

## 2023-01-24 DIAGNOSIS — F32.A DEPRESSION, UNSPECIFIED DEPRESSION TYPE: ICD-10-CM

## 2023-01-24 DIAGNOSIS — Z79.4 TYPE 2 DIABETES MELLITUS WITHOUT COMPLICATION, WITH LONG-TERM CURRENT USE OF INSULIN (HCC): Primary | ICD-10-CM

## 2023-01-24 DIAGNOSIS — E55.9 VITAMIN D DEFICIENCY: ICD-10-CM

## 2023-01-24 DIAGNOSIS — Z87.891 PERSONAL HISTORY OF TOBACCO USE: ICD-10-CM

## 2023-01-24 DIAGNOSIS — Z12.31 ENCOUNTER FOR SCREENING MAMMOGRAM FOR MALIGNANT NEOPLASM OF BREAST: ICD-10-CM

## 2023-01-24 DIAGNOSIS — K86.0 CHRONIC PANCREATITIS DUE TO ACUTE ALCOHOL INTOXICATION (HCC): ICD-10-CM

## 2023-01-24 LAB — HBA1C MFR BLD: 11.6 %

## 2023-01-24 PROCEDURE — 3017F COLORECTAL CA SCREEN DOC REV: CPT | Performed by: FAMILY MEDICINE

## 2023-01-24 PROCEDURE — 4004F PT TOBACCO SCREEN RCVD TLK: CPT | Performed by: FAMILY MEDICINE

## 2023-01-24 PROCEDURE — G0296 VISIT TO DETERM LDCT ELIG: HCPCS | Performed by: FAMILY MEDICINE

## 2023-01-24 PROCEDURE — G8482 FLU IMMUNIZE ORDER/ADMIN: HCPCS | Performed by: FAMILY MEDICINE

## 2023-01-24 PROCEDURE — 3046F HEMOGLOBIN A1C LEVEL >9.0%: CPT | Performed by: FAMILY MEDICINE

## 2023-01-24 PROCEDURE — G8420 CALC BMI NORM PARAMETERS: HCPCS | Performed by: FAMILY MEDICINE

## 2023-01-24 PROCEDURE — 99214 OFFICE O/P EST MOD 30 MIN: CPT | Performed by: FAMILY MEDICINE

## 2023-01-24 PROCEDURE — 2022F DILAT RTA XM EVC RTNOPTHY: CPT | Performed by: FAMILY MEDICINE

## 2023-01-24 PROCEDURE — G8427 DOCREV CUR MEDS BY ELIG CLIN: HCPCS | Performed by: FAMILY MEDICINE

## 2023-01-24 PROCEDURE — 83036 HEMOGLOBIN GLYCOSYLATED A1C: CPT | Performed by: FAMILY MEDICINE

## 2023-01-24 RX ORDER — THIAMINE MONONITRATE (VIT B1) 100 MG
100 TABLET ORAL DAILY
COMMUNITY

## 2023-01-24 RX ORDER — BUPRENORPHINE HYDROCHLORIDE 8 MG/1
TABLET SUBLINGUAL DAILY
COMMUNITY
End: 2023-01-24

## 2023-01-24 RX ORDER — ALPRAZOLAM 0.5 MG/1
0.5 TABLET ORAL 3 TIMES DAILY PRN
COMMUNITY

## 2023-01-24 RX ORDER — SYRINGE AND NEEDLE,INSULIN,1ML 31GX15/64"
SYRINGE, EMPTY DISPOSABLE MISCELLANEOUS
COMMUNITY
Start: 2022-11-14

## 2023-01-24 NOTE — PATIENT INSTRUCTIONS
Look at the list of mental health providers and make appointment to one that takes your insurance. Learning About Lung Cancer Screening  What is screening for lung cancer? Lung cancer screening is a way to find some lung cancers early, before a person has any symptoms of the cancer. Lung cancer screening may help those who have the highest risk for lung cancer--people age 48 and older who are or were heavy smokers. For most people, who aren't at increased risk, screening for lung cancer probably isn't helpful. Screening won't prevent cancer. And it may not find all lung cancers. Lung cancer screening may lower the risk of dying from lung cancer in a small number of people. How is it done? Lung cancer screening is done with a low-dose CT (computed tomography) scan. A CT scan uses X-rays, or radiation, to make detailed pictures of your body. Experts recommend that screening be done in medical centers that focus on finding and treating lung cancer. Who is screening recommended for? Lung cancer screening is recommended for people age 48 and older who are or were heavy smokers. That means people with a smoking history of at least 20 pack years. A pack year is a way to measure how heavy a smoker you are or were. To figure out your pack years, multiply how many packs a day on average (assuming 20 cigarettes per pack) you have smoked by how many years you have smoked. For example: If you smoked 1 pack a day for 20 years, that's 1 times 20. So you have a smoking history of 20 pack years. If you smoked 2 packs a day for 10 years, that's 2 times 10. So you have a smoking history of 20 pack years. Experts agree that screening is for people who have a high risk of lung cancer. But experts don't agree on what high risk means. Some say people age 48 or older with at least a 20-pack-year smoking history are high risk. Others say it's people age 54 or older with a 30-pack-year history.   To see if you could benefit from screening, first find out if you are at high risk for lung cancer. Your doctor can help you decide your lung cancer risk. What are the risks of screening? CT screening for lung cancer isn't perfect. It can show an abnormal result when it turns out there wasn't any cancer. This is called a false-positive result. This means you may need more tests to make sure you don't have cancer. These tests can be harmful and cause a lot of worry. These tests may include more CT scans and invasive testing like a lung biopsy. In a biopsy, the doctor takes a sample of tissue from inside your lung so it can be looked at under a microscope. A biopsy is the only way to tell if you have lung cancer. If the biopsy finds cancer, you and your doctor will have to decide how or whether to treat it. Some lung cancers found on CT scans are harmless and would not have caused a problem if they had not been found through screening. But because doctors can't tell which ones will turn out to be harmless, most will be treated. This means that you may get treatment--including surgery, radiation, or chemotherapy--that you don't need. There is a risk of damage to cells or tissue from being exposed to radiation, including the small amounts used in CTs, X-rays, and other medical tests. Over time, exposure to radiation may cause cancer and other health problems. But in most cases, the risk of getting cancer from being exposed to small amounts of radiation is low. It's not a reason to avoid these tests for most people. What are the benefits of screening? Your scan may be normal (negative). For some people who are at higher risk, screening lowers the chance of dying of lung cancer. How much and how long you smoked helps to determine your risk level. Screening can find some cancers early, when treatment may be more likely to work. What happens after screening? The results of your CT scan will be sent to your doctor.  Someone from your care team will explain the results of your scan and answer any questions you may have. If you need any follow-up, he or she will help you understand what to do next. After a lung cancer screening, you can go back to your usual activities right away. A lung cancer screening test can't tell if you have lung cancer. If your results are positive, your doctor can't tell whether an abnormal finding is a harmless nodule, cancer, or something else without doing more tests. What can you do to help prevent lung cancer? Some lung cancers can't be prevented. But if you smoke, quitting smoking is the best step you can take to prevent lung cancer. If you want to quit, your doctor can recommend medicines or other ways to help. Follow-up care is a key part of your treatment and safety. Be sure to make and go to all appointments, and call your doctor if you are having problems. It's also a good idea to know your test results and keep a list of the medicines you take. Where can you learn more? Go to http://www.hyatt.com/ and enter Q940 to learn more about \"Learning About Lung Cancer Screening. \"  Current as of: May 4, 2022               Content Version: 13.5  © 2874-9528 [employer], [employer]. Care instructions adapted under license by [facility] ([facility]). If you have questions about a medical condition or this instruction, always ask your healthcare professional. [address] any warranty or liability for your use of this information.

## 2023-01-24 NOTE — PROGRESS NOTES
2023    Bayron Lanes    Chief Complaint   Patient presents with    [facility] [place]    Di[place]       HPI  History was obtained from patient. Virgie Breen is a 46 y.o. female who presents today with the followin. Type 2 diabetes mellitus without complication, with long-term current use of insulin ([facility]    2. Chronic pancreatitis due to acute alcohol intoxication ([facility]    3. Depression, unspecified depression type    4. Encounter for screening mammogram for malignant neoplasm of breast    5. Vitamin D deficiency    6. Personal history of tobacco use    7. Attention deficit disorder (ADD) without hyperactivity    8. Osteopenia, unspecified location       Patient presents to [facility] primary care. Patient has multiple chronic medical problems. Patient is currently on Adderall Trintellix Xanax and Abilify. Patient is advised that I do not write for any of these medicines other than the Trintellix. Fortunately that is the only medication that she does need a refill at this time. Patient does not have a psychiatrist at this time. Patient has a history of breast cancer status postmastectomy. She is on Arimidex at this time. Patient is diabetic and is on Humalog and Lantus and has poor control of her diabetes with hemoglobin A1c is consistently over 10. Patient has no acute complaints today. REVIEW OF SYMPTOMS    Review of Systems   Constitutional:  Negative for chills, fatigue and fever. HENT:  Negative for congestion, mouth sores, postnasal drip, rhinorrhea and sinus pain. Eyes:  Negative for photophobia, discharge and redness. Respiratory:  Negative for cough and shortness of breath. Cardiovascular:  Negative for chest pain. Gastrointestinal: Negative. Endocrine: Negative for polydipsia and polyuria. Genitourinary:  Negative for difficulty urinating, dysuria, hematuria and urgency. Neurological:  Negative for headaches.    Psychiatric/Behavioral:  Negative for self-injury, sleep disturbance and suicidal ideas. PAST MEDICAL HISTORY  Past Medical History:   Diagnosis Date    Alcoholism ([facility]     Since teens to early 25s    Anxiety and depression     Ascites of liver     Bronchitis     Cancer ([facility]     breast, left    Candidiasis of vagina     Chronic pancreatitis (HCC)     Cocaine abuse (HCC)     Constipation     Diabetes mellitus, type 2 (HCC)     GERD (gastroesophageal reflux disease)     Gout     Hepatitis C     Hernia of anterior abdominal wall     Jaundice 2016    Pneumonia     Polyneuropathy due to type 2 diabetes mellitus (HCC)     Schizoaffective disorder, bipolar type ([facility]     Splenomegaly 2017       FAMILY HISTORY  Family History   Problem Relation Age of Onset    Diabetes Mother     Cancer Mother         Breast    No Known Problems Father     Colon Cancer Maternal Grandfather        SOCIAL HISTORY  Social History     Socioeconomic History    Marital status:      Spouse name: None    Number of children: None    Years of education: None    Highest education level: None   Tobacco Use    Smoking status: Every Day     Packs/day: 1.50     Years: 40.00     Pack years: 60.00     Types: Cigarettes     Start date: 1982     Last attempt to quit: 2020     Years since quittin.9    Smokeless tobacco: Never   Vaping Use    Vaping Use: Never used   Substance and Sexual Activity    Alcohol use: Not Currently    Drug use: Yes     Types: Cocaine     Comment: once in a while    Sexual activity: Yes     Partners: Male     Social Determinants of Health     Financial Resource Strain: Low Risk     Difficulty of Paying Living Expenses: Not hard at all   Food Insecurity: No Food Insecurity    Worried About Running Out of Food in the Last Year: Never true    Ran Out of Food in the Last Year: Never true   Transportation Needs: No Transportation Needs    Lack of Transportation (Medical): No    Lack of Transportation (Non-Medical):  No   Physical Activity: Insufficiently Active    Days of Exercise per Week: 7 days    Minutes of Exercise per Session: 20 min   Stress: Stress Concern Present    Feeling of Stress : Very much   Social Connections: Socially Isolated    Frequency of Communication with Friends and Family: Twice a week    Frequency of Social Gatherings with Friends and Family: Once a week    Attends [employer] Services: Never    Active Member of Clubs or Organizations: No    Attends Club or Organization Meetings: Never    Marital Status:    Intimate Partner Violence: Not At Risk    Fear of Current or Ex-Partner: No    Emotionally Abused: No    Physically Abused: No    Sexually Abused: No   Housing Stability: Unknown    Unable to Pay for Housing in the Last Year: No    Number of [place] in the Last Year: 1        SURGICAL HISTORY  Past Surgical History:   Procedure Laterality Date    ABDOMEN SURGERY      gallbladder removal    BREAST BIOPSY Left     CHOLECYSTECTOMY  2010    COLONOSCOPY      COLONOSCOPY N/A 10/2/2018    COLONOSCOPY WITH BIOPSY performed by Den Barros MD at [facility], [address] [place] [address], DIAGNOSTIC      ERCP      april 2016 at [place] [address] ([facility])  2004    MASTECTOMY Left     Left breast mastectomy per patient    OTHER SURGICAL HISTORY Left 04/25/2017     Left breast blue dye injection, Sential Node Lymph Biopsy with left breast lumpectomy    OTHER SURGICAL HISTORY  10/05/2017    simple left mastectomy    OVARY REMOVAL Bilateral 2007    PANCREAS BIOPSY      with stent    SHOULDER SURGERY Left 2003    UPPER GASTROINTESTINAL ENDOSCOPY                   CURRENT MEDICATIONS  Current Outpatient Medications   Medication Sig Dispense Refill    DROPLET INSULIN SYRINGE 31G X 15/64\" 1 ML MISC use 1 SYRINGE to inject MEDICATION subcutaneously once daily      ALPRAZolam (XANAX) 0.5 MG tablet Take 0.5 mg by mouth 3 times daily as needed for Anxiety.       vitamin B-1 (THIAMINE) 100 MG tablet Take 100 mg by mouth daily VORTIoxetine HBr (TRINTELLIX) 20 MG TABS tablet Take 1 tablet by mouth nightly 30 tablet 1    ondansetron (ZOFRAN-ODT) 4 MG disintegrating tablet Take 1 tablet by mouth 3 times daily as needed for Nausea or Vomiting 21 tablet 0    amphetamine-dextroamphetamine (ADDERALL, 5MG,) 5 MG tablet Take 1 tablet by mouth 2 times daily for 3 days. 6 tablet 0    ipratropium-albuterol (DUONEB) 0.5-2.5 (3) MG/3ML SOLN nebulizer solution inhale contents of 1 vial ( 3 milliliters ) in nebulizer by mouth and INTO THE LUNGS every 6 hours if needed for shortness of breath 360 mL 5    anastrozole (ARIMIDEX) 1 MG tablet Take 1 tablet by mouth daily 30 tablet 4    hydrOXYzine pamoate (VISTARIL) 50 MG capsule take 1 capsule by mouth four times a day if needed for anxiety 120 capsule 0    benzonatate (TESSALON) 200 MG capsule take 1 capsule by mouth every 8 hours 90 capsule 2    gabapentin (NEURONTIN) 400 MG capsule Take 1 capsule by mouth 3 times daily for 30 days.  90 capsule 3    ibuprofen (ADVIL;MOTRIN) 800 MG tablet Take 1 tablet by mouth in the morning, at noon, and at bedtime 40 tablet 0    Insulin Pen Needle (B-D UF III MINI PEN NEEDLES) 31G X 5 MM MISC Use 4 daily as directed with insulin 400 each 5    omeprazole (PRILOSEC) 40 MG delayed release capsule take 1 tablet by mouth every morning      LANTUS SOLOSTAR 100 UNIT/ML injection pen Inject 25 Units into the skin 2 times daily      HUMALOG KWIKPEN 100 UNIT/ML SOPN USE WITH MEALS AS DIRECTED PER SLIDING SCALE (MAX 12 UNITS WITH MEAL)      CREON 42369-057461 units CPEP delayed release capsule 3 capsules      lactulose (CHRONULAC) 10 GM/15ML solution take 15 milliliters by mouth twice a day 1 each 2    pantoprazole (PROTONIX) 40 MG tablet Take 1 tablet by mouth daily 90 tablet 1    tiotropium (SPIRIVA RESPIMAT) 1.25 MCG/ACT AERS inhaler Inhale 2 puffs into the lungs daily 3 each 1    albuterol sulfate HFA (PROVENTIL;VENTOLIN;PROAIR) 108 (90 Base) MCG/ACT inhaler Inhale 2 puffs into the lungs every 6 hours as needed for Wheezing or Shortness of Breath 18 g 5    furosemide (LASIX) 20 MG tablet Take 1 tablet by mouth daily 60 tablet 2    budesonide-formoterol (SYMBICORT) 160-4.5 MCG/ACT AERO Inhale 2 puffs into the lungs 2 times daily 6 g 0    albuterol (PROVENTIL) (2.5 MG/3ML) 0.083% nebulizer solution Take 3 mLs by nebulization every 4 hours as needed for Wheezing May substitute generic if insurance does not cover. Please provide 1 box (25 or 30 ampules) depending on how supplied per box 120 each 3    Respiratory Therapy Supplies (FULL KIT NEBULIZER SET) MISC Use as directed with nebulized medication. 1 each 0    Lancets MISC 1 each by Does not apply route daily 100 each 5    blood glucose monitor strips Test 4 times a day & as needed for symptoms of irregular blood glucose. Dispense sufficient amount for indicated testing frequency plus additional to accommodate PRN testing needs. 100 strip 5    insulin lispro (HUMALOG) 100 UNIT/ML injection vial Inject 0-18 Units into the skin 3 times daily (with meals) **High Dose Corrective Algorithm**  Glucose: Dose:               No Insulin  140-199           3 Units  200-249 6 Units  250-299 9 Units  300-349 12 Units  350-400 15 Units  Over 400  18 Units 10 mL 0    tiZANidine (ZANAFLEX) 4 MG tablet Take 1 tablet by mouth 3 times daily 30 tablet 0    ARIPiprazole (ABILIFY) 20 MG tablet Take by mouth daily      Denosumab (PROLIA SC) Inject into the skin Every 6 months. Given at [address]       No current facility-administered medications for this visit. ALLERGIES  Allergies   Allergen Reactions    Dye [Iodides]     Tylenol [Acetaminophen]      Was told not to take by her  For a bad liver     OK  to use sparingly         PHYSICAL EXAM    /70   Pulse 79   Temp 97.7 °F (36.5 °C)   Resp 16   Ht 5' 1\" (1.549 m)   Wt 104 lb (47.2 kg)   SpO2 99%   BMI 19.65 kg/m²     Physical Exam  Vitals and nursing note reviewed. Constitutional:       Appearance: Normal appearance. She is normal weight. HENT:      Head: Normocephalic and atraumatic. Nose: No congestion or rhinorrhea. Mouth/Throat:      Mouth: Mucous membranes are moist.      Pharynx: Oropharynx is clear. Eyes:      General: No scleral icterus. Conjunctiva/sclera: Conjunctivae normal.   Cardiovascular:      Rate and Rhythm: Normal rate and regular rhythm. Heart sounds: Normal heart sounds. Pulmonary:      Effort: Pulmonary effort is normal.      Breath sounds: Normal breath sounds. Abdominal:      Palpations: Abdomen is soft. Tenderness: There is no abdominal tenderness. Musculoskeletal:      Cervical back: Neck supple. Skin:     General: Skin is warm. Neurological:      Mental Status: She is alert and oriented to person, place, and time. Psychiatric:         Mood and Affect: Mood normal.       ASSESSMENT & PLAN    Mau Cam was seen today for establish care and diabetes. Diagnoses and all orders for this visit:    Type 2 diabetes mellitus without complication, with long-term current use of insulin ([facility])  -     POCT glycosylated hemoglobin (Hb A1C)  -     External Referral To Endocrinology  Patient will continue with her current medication. She has had no hypoglycemic episodes. She was referred to endocrinology for continued management of her diabetes. Chronic pancreatitis due to acute alcohol intoxication [facility])  Patient states that she is not having abdominal pain at this time. Patient states she has an appointment with gastroenterology and I will follow her for this problem. Depression, unspecified depression type  -     VORTIoxetine HBr (TRINTELLIX) 20 MG TABS tablet; Take 1 tablet by mouth nightly  Patient states she is just about out of her Trintellix and this has been renewed.   Patient is given a list of mental health providers and is to make an appointment with a psychiatrist.    Encounter for screening mammogram for malignant neoplasm of breast  -     AMINA DIGITAL SCREEN UNILATERAL RIGHT; Future    Vitamin D deficiency  -     Vitamin D 25 Hydroxy; Future    Personal history of tobacco use  -     WA VISIT TO DISCUSS LUNG CA SCREEN W LDCT  -     CT Lung Screen (Annual/Baseline); Future    Attention deficit disorder (ADD) without hyperactivity    Osteoporosis  Patient will follow with endocrinology not right now. I have spent greater than 40 minutes with the patient reviewing her chart examination and ordering labs and referrals and examination. Return in about 2 months (around 3/24/2023). Electronically signed by Gaurav Parra. Luly Huntley DO on 1/26/2023          Discussed with the patient the current USPSTF guidelines released March 9, 2021 for screening for lung cancer. For adults aged 48 to [de-identified] years who have a 20 pack-year smoking history and currently smoke or have quit within the past 15 years the grade B recommendation is to:  Screen for lung cancer with low-dose computed tomography (LDCT) every year. Stop screening once a person has not smoked for 15 years or has a health problem that limits life expectancy or the ability to have lung surgery. The patient  reports that she has been smoking cigarettes. She started smoking about 41 years ago. She has a 60.00 pack-year smoking history. She has never used smokeless tobacco.. Discussed with patient the risks and benefits of screening, including over-diagnosis, false positive rate, and total radiation exposure. The patient currently exhibits no signs or symptoms suggestive of lung cancer. Discussed with patient the importance of compliance with yearly annual lung cancer screenings and willingness to undergo diagnosis and treatment if screening scan is positive. In addition, the patient was counseled regarding the importance of remaining smoke free and/or total smoking cessation.     Also reviewed the following if the patient has Medicare that as of February 10, 2022, Medicare only covers LDCT screening in patients aged 51-72 with at least a 20 pack-year smoking history who currently smoke or have quit in the last 15 years

## 2023-01-26 ASSESSMENT — ENCOUNTER SYMPTOMS
SHORTNESS OF BREATH: 0
SINUS PAIN: 0
COUGH: 0
RHINORRHEA: 0
GASTROINTESTINAL NEGATIVE: 1
EYE REDNESS: 0
PHOTOPHOBIA: 0
EYE DISCHARGE: 0

## 2023-02-14 RX ORDER — INSULIN GLARGINE 100 [IU]/ML
25 INJECTION, SOLUTION SUBCUTANEOUS 2 TIMES DAILY
Qty: 5 ADJUSTABLE DOSE PRE-FILLED PEN SYRINGE | Refills: 2 | Status: SHIPPED | OUTPATIENT
Start: 2023-02-14

## 2023-02-14 NOTE — TELEPHONE ENCOUNTER
----- Message from Olman Barragan sent at 2/13/2023 11:36 AM EST -----  Subject: Refill Request    QUESTIONS  Name of Medication? LANTUS SOLOSTAR 100 UNIT/ML injection pen  Patient-reported dosage and instructions? 25 ML in morning, 25 ML at night  How many days do you have left? 0  Preferred Pharmacy? [facility]  Pharmacy phone number (if available)? [phone]  Additional Information for Provider? Patient also needs a script for a   yeast infection.   ---------------------------------------------------------------------------  --------------  CALL BACK INFO  What is the best way for the office to contact you? OK to leave message on   voicemail  Preferred Call Back Phone Number? [phone]  ---------------------------------------------------------------------------  --------------  SCRIPT ANSWERS  Relationship to Patient?  Self

## 2023-02-17 ENCOUNTER — OFFICE VISIT (OUTPATIENT)
Dept: FAMILY MEDICINE CLINIC | Age: 53
End: 2023-02-17
Payer: COMMERCIAL

## 2023-02-17 VITALS
RESPIRATION RATE: 18 BRPM | BODY MASS INDEX: 20.22 KG/M2 | HEART RATE: 80 BPM | HEIGHT: 60 IN | OXYGEN SATURATION: 100 % | TEMPERATURE: 96.9 F | SYSTOLIC BLOOD PRESSURE: 126 MMHG | WEIGHT: 103 LBS | DIASTOLIC BLOOD PRESSURE: 72 MMHG

## 2023-02-17 DIAGNOSIS — F17.200 TOBACCO USE DISORDER: ICD-10-CM

## 2023-02-17 DIAGNOSIS — F32.A DEPRESSION, UNSPECIFIED DEPRESSION TYPE: ICD-10-CM

## 2023-02-17 DIAGNOSIS — Z71.82 EXERCISE COUNSELING: ICD-10-CM

## 2023-02-17 DIAGNOSIS — Z71.85 VACCINE COUNSELING: ICD-10-CM

## 2023-02-17 DIAGNOSIS — F90.0 ATTENTION DEFICIT HYPERACTIVITY DISORDER (ADHD), PREDOMINANTLY INATTENTIVE TYPE: ICD-10-CM

## 2023-02-17 DIAGNOSIS — Z71.3 DIETARY COUNSELING: ICD-10-CM

## 2023-02-17 DIAGNOSIS — F31.9 BIPOLAR 1 DISORDER (HCC): ICD-10-CM

## 2023-02-17 DIAGNOSIS — K86.0 ALCOHOL-INDUCED CHRONIC PANCREATITIS (HCC): ICD-10-CM

## 2023-02-17 DIAGNOSIS — R05.3 CHRONIC COUGH: ICD-10-CM

## 2023-02-17 DIAGNOSIS — F19.90 SUBSTANCE USE DISORDER: Primary | ICD-10-CM

## 2023-02-17 DIAGNOSIS — Z85.3 PERSONAL HISTORY OF BREAST CANCER: ICD-10-CM

## 2023-02-17 DIAGNOSIS — F41.1 GENERALIZED ANXIETY DISORDER: ICD-10-CM

## 2023-02-17 DIAGNOSIS — Z71.6 ENCOUNTER FOR TOBACCO USE CESSATION COUNSELING: ICD-10-CM

## 2023-02-17 DIAGNOSIS — F25.0 SCHIZOAFFECTIVE DISORDER, BIPOLAR TYPE (HCC): ICD-10-CM

## 2023-02-17 DIAGNOSIS — B37.31 VAGINAL CANDIDIASIS: ICD-10-CM

## 2023-02-17 DIAGNOSIS — K70.30 ALCOHOLIC CIRRHOSIS OF LIVER WITHOUT ASCITES (HCC): ICD-10-CM

## 2023-02-17 DIAGNOSIS — Z76.89 ESTABLISHING CARE WITH NEW DOCTOR, ENCOUNTER FOR: ICD-10-CM

## 2023-02-17 DIAGNOSIS — E08.69 DIABETES DUE TO UNDERLYING CONDITION W OTH COMPLICATION (HCC): ICD-10-CM

## 2023-02-17 PROCEDURE — 4004F PT TOBACCO SCREEN RCVD TLK: CPT | Performed by: SURGERY

## 2023-02-17 PROCEDURE — G8427 DOCREV CUR MEDS BY ELIG CLIN: HCPCS | Performed by: SURGERY

## 2023-02-17 PROCEDURE — 3017F COLORECTAL CA SCREEN DOC REV: CPT | Performed by: SURGERY

## 2023-02-17 PROCEDURE — G8420 CALC BMI NORM PARAMETERS: HCPCS | Performed by: SURGERY

## 2023-02-17 PROCEDURE — 99215 OFFICE O/P EST HI 40 MIN: CPT | Performed by: SURGERY

## 2023-02-17 PROCEDURE — G8482 FLU IMMUNIZE ORDER/ADMIN: HCPCS | Performed by: SURGERY

## 2023-02-17 RX ORDER — BENZONATATE 200 MG/1
CAPSULE ORAL
Qty: 90 CAPSULE | Refills: 2 | Status: CANCELLED | OUTPATIENT
Start: 2023-02-17

## 2023-02-17 RX ORDER — DEXTROAMPHETAMINE SACCHARATE, AMPHETAMINE ASPARTATE, DEXTROAMPHETAMINE SULFATE AND AMPHETAMINE SULFATE 1.25; 1.25; 1.25; 1.25 MG/1; MG/1; MG/1; MG/1
5 TABLET ORAL 2 TIMES DAILY
Qty: 6 TABLET | Refills: 0 | Status: CANCELLED | OUTPATIENT
Start: 2023-02-17 | End: 2023-02-20

## 2023-02-17 RX ORDER — THIAMINE MONONITRATE (VIT B1) 100 MG
100 TABLET ORAL DAILY
Qty: 30 TABLET | Refills: 5 | Status: SHIPPED | OUTPATIENT
Start: 2023-02-17

## 2023-02-17 RX ORDER — ANASTROZOLE 1 MG/1
1 TABLET ORAL DAILY
Qty: 30 TABLET | Refills: 4 | Status: SHIPPED | OUTPATIENT
Start: 2023-02-17

## 2023-02-17 RX ORDER — ALPRAZOLAM 0.5 MG/1
0.5 TABLET ORAL 3 TIMES DAILY PRN
Status: CANCELLED | OUTPATIENT
Start: 2023-02-17

## 2023-02-17 RX ORDER — HYDROXYZINE PAMOATE 50 MG/1
CAPSULE ORAL
Qty: 120 CAPSULE | Refills: 0 | Status: SHIPPED | OUTPATIENT
Start: 2023-02-17

## 2023-02-17 RX ORDER — ARIPIPRAZOLE 20 MG/1
20 TABLET ORAL DAILY
Qty: 30 TABLET | Refills: 5 | Status: SHIPPED | OUTPATIENT
Start: 2023-02-17

## 2023-02-17 SDOH — ECONOMIC STABILITY: HOUSING INSECURITY
IN THE LAST 12 MONTHS, WAS THERE A TIME WHEN YOU DID NOT HAVE A STEADY PLACE TO SLEEP OR SLEPT IN A SHELTER (INCLUDING NOW)?: NO

## 2023-02-17 SDOH — ECONOMIC STABILITY: INCOME INSECURITY: HOW HARD IS IT FOR YOU TO PAY FOR THE VERY BASICS LIKE FOOD, HOUSING, MEDICAL CARE, AND HEATING?: NOT HARD AT ALL

## 2023-02-17 SDOH — ECONOMIC STABILITY: FOOD INSECURITY: WITHIN THE PAST 12 MONTHS, THE FOOD YOU BOUGHT JUST DIDN'T LAST AND YOU DIDN'T HAVE MONEY TO GET MORE.: NEVER TRUE

## 2023-02-17 SDOH — ECONOMIC STABILITY: FOOD INSECURITY: WITHIN THE PAST 12 MONTHS, YOU WORRIED THAT YOUR FOOD WOULD RUN OUT BEFORE YOU GOT MONEY TO BUY MORE.: NEVER TRUE

## 2023-02-17 NOTE — PROGRESS NOTES
Godfrey Bradley (:  1970) is a 46 y.o. female,New patient, here for evaluation of the following chief complaint(s):  New Patient (Patient is here to [facility]. Her previous pcp was dr Nery Lutz)         ASSESSMENT/PLAN:  1. Substance use disorder  -     Jessika Veloz MD, Psychiatry, Filiberto Lemons MD, Addiction MedicineFormer[facility]  2. Attention deficit hyperactivity disorder (ADHD), predominantly inattentive type  3. Personal history of breast cancer  -     anastrozole (ARIMIDEX) 1 MG tablet; Take 1 tablet by mouth daily, Disp-30 tablet, R-4Normal  4. Chronic cough  STOP SMOKING  Get the LDCT ordered  5. Generalized anxiety disorder  -     hydrOXYzine pamoate (VISTARIL) 50 MG capsule; take 1 capsule by mouth four times a day if needed for anxiety, Disp-120 capsule, R-0Normal  6. Depression, unspecified depression type  -     VORTIoxetine HBr (TRINTELLIX) 20 MG TABS tablet; Take 1 tablet by mouth nightly, Disp-30 tablet, R-1Normal  7. Bipolar 1 disorder (HCC)  -     ARIPiprazole (ABILIFY) 20 MG tablet; Take 1 tablet by mouth daily, Disp-30 tablet, R-5Normal  -     Roslyn Zambrano MD, Psychiatry, Filiberto Lemons MD, [facility]Former[facility]  8. Schizoaffective disorder, bipolar type (HCC)  -     ARIPiprazole (ABILIFY) 20 MG tablet; Take 1 tablet by mouth daily, Disp-30 tablet, R-5Normal  -     Roslyn Zambrano MD, Psychiatry, Filiberto Lemons MD, Addiction MedicineFormer[facility]  9. Alcoholic cirrhosis of liver without ascites (HCC)  -     vitamin B-1 (THIAMINE) 100 MG tablet; Take 1 tablet by mouth daily, Disp-30 tablet, R-5Normal  10. Vaginal candidiasis  -     miconazole (MICOTIN) 100 MG vaginal suppository; Place 1 suppository vaginally nightly for 7 days, Disp-7 suppository, R-0Normal  -     Donny Samuels MD, OB/GYN, [facility] ([facility])  11.  Alcohol-induced chronic pancreatitis ([facility] [address]  -     [address] and End[facility]ology  12. Diabetes due to underlying condition w oth complication ([address]  -     [address] and Endocrinology  13. Tobacco use disorder  -     nicotine (NICOTROL) 10 MG inhaler; Inhale 1 puff into the lungs as needed for Smoking cessation Each cartridge lasts about 20 minutes and provides nicotine equivalent to about 2 cigarettes. Use at least 6 cartridges/day for 1st 3-6 weeks; max 16 cartridges/day for 1st 12 weeks, then reduce gradually over 12 more weeks. , Disp-168 each, R-3Normal  14. Encounter for tobacco use cessation counseling  -     nicotine (NICOTROL) 10 MG inhaler; Inhale 1 puff into the lungs as needed for Smoking cessation Each cartridge lasts about 20 minutes and provides nicotine equivalent to about 2 cigarettes. Use at least 6 cartridges/day for 1st 3-6 weeks; max 16 cartridges/day for 1st 12 weeks, then reduce gradually over 12 more weeks. , Disp-168 each, R-3Normal  15. Dietary counseling  Counseled the patient on diet, continue to make positive choices. It is important to focus on meeting food group needs with nutrient dense foods and stay within caloric limits. Nutrient dense foods will provide you with vitamins, minerals, and other health promoting nutrients. You should avoid added sugars, saturated fats, hydrogenated oils, and limit sodium intake (this isn't just table salt. .. turn the package over, read the label, stay under 2000 mg or 2g of total sodium daily). Track your calories, this will help you get an understanding of what a proper portion size is. Every day you should eat these:  -Veggies of all types  -Fruits  -Grains (strive for at least half of these to be whole-grain)  -Lean protein (poultry, fish, legumes, nuts)    Stick to the plate diet.    -47% of your dinner plate should be leafy green vegetables, dark green, red and orange vegetables. Do not use high-calorie dressing is a ranch or dressings that are filled with added sugars.   25% of your dinner plate should be whole grains. The remaining 25% of your dinner plate should be a lean protein. Limit your red meat intake to once per week and no more than 4 ounces in any serving.  -No need for second helpings. Limit or avoid these foods:  -Added sugars (less than 10% of your total calories in any given day should be from sugars)  -Saturated fats and hydrogenated oils (less than 10% of your total calories in any given day should be from these)  -Sodium (less than 2000 mg/day)  -Alcoholic beverages (even in moderation, alcohol can cause long-term health issues involving hypertension, electrolyte abnormalities, liver disease and even cancers of the mouth and throat)    Stay hydrated. Unless instructed otherwise by your physician, you should strive to drink at least eight 8 ounce glasses of water daily, some of these being fortified with electrolytes (such as a Pedialyte, or low calorie sports drink). Again, avoid added sugars. 16. Exercise counseling  Counseled the patient on importance of cardiovascular and resistance training. Make every attempt to engage in a level of activity, sustained for at least 30 minutes, where you saturate your undergarments with sweat. This should be done, at a minimum, three times per week. 17. Vaccine counseling  -Risk and benefit discussion  -Review of any pertinent information regarding potential contraindications to receiving particular vaccine(s)  -Review the relevant CDC Vaccine Information Statement(s) (VIS)  -Detailed informed consent for each vaccine  -Addressed all concerns and questions related to vaccines and immunization administration      18. Establishing care with new doctor, encounter for  All personal, family, social, surgical, medical history is reviewed and updated with patient. Allergies, medications updated. List of specialists follows with updated. DM/HM updated.       Return in about 3 months (around 5/17/2023) for 45 min chronic .         Subjective   SUBJECTIVE/OBJECTIVE:  HPI:    PSYCH:  Bipolar Disorder and Schizoaffective   PHQ9 8  Trintellix 20mg night  Vistaril 50mg  Abilify 20mg nighly    KIERADH  Was on adderal 5mg twice daily (two months sporadically)    LINDA  Was on bup 8mg  Did IOP for cocaine   Long standing history of alcoholism with cirrhosis and pancratic complications causing QS2L    UDS reviewed and pos for cocaine    Alcoholic Cirrhosis / Hepatic Encephalopathy / thrombocytopenia:  Follows with Dr. Audelia Hood  EGD last done \"in a while\" none on file recent  She is un-familiar with term varices, no complaint of wretching, hemoptysis  No hematochezia or melena    She does complain of periodic \"neon green\" and pale colored stools    She is unsure of next apt. She was highly encouraged to get in sooner than later. Call today and make apt. Creon TID    Lactulose 10mg    DM3c    Tried to get into Dr. Blayne Ulloa    Last a1c 11.6%    Humalog  Inject 0-18 Units into the skin 3 times daily (with meals) **High Dose Corrective Algorithm** Glucose: Dose:  No Insulin 140-199 3 Units 200-249 6 Units 250-299 9 Units 300-349 12 Units 350-400 15 Units Over 400 18 Units  -understands must be taken with meal (not always taking with meal, she is having one meal per day)    Lantus  Inject 25 Units into the skin 2 times daily      COPD  Needs PFT  Spiriva 2 puff daily  Symbicort 2 puff twice daily          -The patients tobacco use:   Advised to quit and impact of tobacco  Assessed willingness to attempt to quit [5/10]  Providing methods and skills for cessation  Medication management of tobacco session drugs [tried patch gum lozenge no benefit, did wellbutrin, chantix]  Resources provided  Set quit date [today]  Follow-up arranged [next visit]  Amount of time spent counseling patient: 15 minutes    She is getting ct scan chest ordered by Dr. Fani Snow is managing  Arimidex  She is unsure of next apt Preventative:  Health Maintenance   Topic Date Due    Hepatitis A vaccine (1 of 2 - Risk 2-dose series) Never done    HIV screen  Never done    Hepatitis B vaccine (1 of 3 - Risk 3-dose series) Never done    Diabetic foot exam  05/11/2016    Diabetic retinal exam  06/16/2016    Diabetic Alb to Cr ratio (uACR) test  07/09/2016    Pneumococcal 0-64 years Vaccine (2 - PPSV23 if available, else PCV20) 10/12/2016    Shingles vaccine (1 of 2) Never done    Low dose CT lung screening  Never done    Lipids  02/16/2021    COVID-19 Vaccine (3 - Booster for Pfizer series) 02/15/2022    A1C test (Diabetic or Prediabetic)  04/24/2023    Depression Monitoring  06/06/2023    GFR test (Diabetes, CKD 3-4, OR last GFR 15-59)  01/16/2024    Breast cancer screen  04/07/2024    DTaP/Tdap/Td vaccine (2 - Td or Tdap) 11/13/2024    Colorectal Cancer Screen  10/02/2028    Flu vaccine  Completed    Hib vaccine  Aged Out    Meningococcal (ACWY) vaccine  Aged Out           ROS:    Denies 10pt ROS other than noted in HPI. Objective       PHYSICAL EXAM:    /72   Pulse 80   Temp 96.9 °F (36.1 °C)   Resp 18   Ht 5' (1.524 m)   Wt 103 lb (46.7 kg)   SpO2 100%   BMI 20.12 kg/m²     AVSS    GA: Well-groomed, appears well, no acute distress. HEENT: Atraumatic normocephalic. Extraocular muscles are grossly intact. Pupils are equal round reactive to light. Conjunctiva pink and moist.  Hearing is grossly intact. Nares patent without external drainage. Buccal mucosa pink and moist.  Posterior oropharynx clear without lesion or exudate. Dentition is poor. NECK: Trachea is midline    CARDIO: Regular rate. RESPIRATORY: Normal inspiratory and expiratory effort. Normoxic on room air    ABD: scaphoid    MSK: Structurally appropriate for age. No gross deficit. NEURO: Alert, no gross deficit. PSYCH:  Mood is normal and congruent with affect. psychomotor agitation.   Thought content seems normal, speech is pressured and has flight of ideas, talkative, fidgeting and restless. SKIN: Generally warm pink and dry. An electronic signature was used to authenticate this note.     --Sukhwinder Santiago, DO

## 2023-02-17 NOTE — PATIENT INSTRUCTIONS
-Carbohydrates limit to 40 to 50g per meal (120g to 150g per day)  -Fats limit to 60g per day (total fat intake should be 30% to 35% of your total daily calories, so restrict to whichever number is lower)   -Of the fats you do eat, never eat trans fats, never eat unsaturated fats, limit your saturated fat intake to less than 20g per day (or 7% of your total daily calories, so restrict to whichever number is lower)  -Cholesterol limit to no more than 300mg per day (200mg per day if you have elevated triglycerides or total cholesterol)  -Sodium less than 2000mg per day    [employer] or similar application (or track by journal) to monitor calories and macronutrients. This is the most critical component to a heart healthy, balanced diet: honesty, keeping oneself accountable, ensure you are tracking daily goals and meeting them. Food is medicine!

## 2023-03-09 RX ORDER — GABAPENTIN 400 MG/1
CAPSULE ORAL
Qty: 90 CAPSULE | Refills: 3 | Status: SHIPPED | OUTPATIENT
Start: 2023-03-09 | End: 2023-06-07

## 2023-03-09 NOTE — TELEPHONE ENCOUNTER
Last Appointment:  11/14/2022  Future Appointments   Date Time Provider Elva Leger   5/23/2023  8:30 AM [facility]

## 2023-03-13 ENCOUNTER — HOSPITAL ENCOUNTER (EMERGENCY)
Age: 53
Discharge: LEFT BEFORE DISCHARGE | End: 2023-03-13
Attending: STUDENT IN AN ORGANIZED HEALTH CARE EDUCATION/TRAINING PROGRAM
Payer: COMMERCIAL

## 2023-03-13 ENCOUNTER — APPOINTMENT (OUTPATIENT)
Dept: CT IMAGING | Age: 53
End: 2023-03-13
Payer: COMMERCIAL

## 2023-03-13 VITALS
DIASTOLIC BLOOD PRESSURE: 68 MMHG | TEMPERATURE: 98 F | WEIGHT: 97 LBS | BODY MASS INDEX: 18.94 KG/M2 | OXYGEN SATURATION: 100 % | RESPIRATION RATE: 14 BRPM | SYSTOLIC BLOOD PRESSURE: 98 MMHG | HEART RATE: 66 BPM

## 2023-03-13 DIAGNOSIS — R51.9 ACUTE NONINTRACTABLE HEADACHE, UNSPECIFIED HEADACHE TYPE: Primary | ICD-10-CM

## 2023-03-13 DIAGNOSIS — E87.20 LACTIC ACIDOSIS: ICD-10-CM

## 2023-03-13 DIAGNOSIS — E86.0 DEHYDRATION: ICD-10-CM

## 2023-03-13 LAB
ALBUMIN SERPL-MCNC: 4.3 G/DL (ref 3.5–5.2)
ALP BLD-CCNC: 877 U/L (ref 35–104)
ALT SERPL-CCNC: 72 U/L (ref 0–32)
ANION GAP SERPL CALCULATED.3IONS-SCNC: 15 MMOL/L (ref 7–16)
AST SERPL-CCNC: 97 U/L (ref 0–31)
BASOPHILS ABSOLUTE: 0.02 E9/L (ref 0–0.2)
BASOPHILS RELATIVE PERCENT: 0.5 % (ref 0–2)
BETA-HYDROXYBUTYRATE: 0.22 MMOL/L (ref 0.02–0.27)
BILIRUB SERPL-MCNC: 0.7 MG/DL (ref 0–1.2)
BUN BLDV-MCNC: 12 MG/DL (ref 6–20)
CALCIUM SERPL-MCNC: 10.5 MG/DL (ref 8.6–10.2)
CHLORIDE BLD-SCNC: 89 MMOL/L (ref 98–107)
CHP ED QC CHECK: YES
CO2: 28 MMOL/L (ref 22–29)
CREAT SERPL-MCNC: 0.6 MG/DL (ref 0.5–1)
EOSINOPHILS ABSOLUTE: 0.07 E9/L (ref 0.05–0.5)
EOSINOPHILS RELATIVE PERCENT: 1.7 % (ref 0–6)
GFR SERPL CREATININE-BSD FRML MDRD: >60 ML/MIN/1.73
GLUCOSE BLD-MCNC: 135 MG/DL
GLUCOSE BLD-MCNC: 297 MG/DL (ref 74–99)
HCT VFR BLD CALC: 39.8 % (ref 34–48)
HEMOGLOBIN: 13.7 G/DL (ref 11.5–15.5)
IMMATURE GRANULOCYTES #: 0.03 E9/L
IMMATURE GRANULOCYTES %: 0.7 % (ref 0–5)
LACTIC ACID: 3.5 MMOL/L (ref 0.5–2.2)
LYMPHOCYTES ABSOLUTE: 0.59 E9/L (ref 1.5–4)
LYMPHOCYTES RELATIVE PERCENT: 14.6 % (ref 20–42)
MCH RBC QN AUTO: 30 PG (ref 26–35)
MCHC RBC AUTO-ENTMCNC: 34.4 % (ref 32–34.5)
MCV RBC AUTO: 87.1 FL (ref 80–99.9)
METER GLUCOSE: 135 MG/DL (ref 74–99)
METER GLUCOSE: >500 MG/DL (ref 74–99)
MONOCYTES ABSOLUTE: 0.21 E9/L (ref 0.1–0.95)
MONOCYTES RELATIVE PERCENT: 5.2 % (ref 2–12)
NEUTROPHILS ABSOLUTE: 3.12 E9/L (ref 1.8–7.3)
NEUTROPHILS RELATIVE PERCENT: 77.3 % (ref 43–80)
PDW BLD-RTO: 14.6 FL (ref 11.5–15)
PH VENOUS: 7.36 (ref 7.35–7.45)
PLATELET # BLD: 99 E9/L (ref 130–450)
PLATELET CONFIRMATION: NORMAL
PMV BLD AUTO: 12.5 FL (ref 7–12)
POTASSIUM REFLEX MAGNESIUM: 4.2 MMOL/L (ref 3.5–5)
RBC # BLD: 4.57 E12/L (ref 3.5–5.5)
SODIUM BLD-SCNC: 132 MMOL/L (ref 132–146)
TOTAL CK: 28 U/L (ref 20–180)
TOTAL PROTEIN: 7.9 G/DL (ref 6.4–8.3)
TROPONIN, HIGH SENSITIVITY: 10 NG/L (ref 0–9)
TROPONIN, HIGH SENSITIVITY: 11 NG/L (ref 0–9)
WBC # BLD: 4 E9/L (ref 4.5–11.5)

## 2023-03-13 PROCEDURE — 99284 EMERGENCY DEPT VISIT MOD MDM: CPT

## 2023-03-13 PROCEDURE — 36415 COLL VENOUS BLD VENIPUNCTURE: CPT

## 2023-03-13 PROCEDURE — 82800 BLOOD PH: CPT

## 2023-03-13 PROCEDURE — 83605 ASSAY OF LACTIC ACID: CPT

## 2023-03-13 PROCEDURE — 84484 ASSAY OF TROPONIN QUANT: CPT

## 2023-03-13 PROCEDURE — 96361 HYDRATE IV INFUSION ADD-ON: CPT

## 2023-03-13 PROCEDURE — 70450 CT HEAD/BRAIN W/O DYE: CPT

## 2023-03-13 PROCEDURE — 2580000003 HC RX 258: Performed by: STUDENT IN AN ORGANIZED HEALTH CARE EDUCATION/TRAINING PROGRAM

## 2023-03-13 PROCEDURE — 93005 ELECTROCARDIOGRAM TRACING: CPT | Performed by: STUDENT IN AN ORGANIZED HEALTH CARE EDUCATION/TRAINING PROGRAM

## 2023-03-13 PROCEDURE — 6360000002 HC RX W HCPCS: Performed by: STUDENT IN AN ORGANIZED HEALTH CARE EDUCATION/TRAINING PROGRAM

## 2023-03-13 PROCEDURE — 85025 COMPLETE CBC W/AUTO DIFF WBC: CPT

## 2023-03-13 PROCEDURE — 96375 TX/PRO/DX INJ NEW DRUG ADDON: CPT

## 2023-03-13 PROCEDURE — 82550 ASSAY OF CK (CPK): CPT

## 2023-03-13 PROCEDURE — 80053 COMPREHEN METABOLIC PANEL: CPT

## 2023-03-13 PROCEDURE — 96374 THER/PROPH/DIAG INJ IV PUSH: CPT

## 2023-03-13 RX ORDER — MAGNESIUM SULFATE IN WATER 40 MG/ML
2000 INJECTION, SOLUTION INTRAVENOUS ONCE
Status: COMPLETED | OUTPATIENT
Start: 2023-03-13 | End: 2023-03-13

## 2023-03-13 RX ORDER — 0.9 % SODIUM CHLORIDE 0.9 %
1000 INTRAVENOUS SOLUTION INTRAVENOUS ONCE
Status: DISCONTINUED | OUTPATIENT
Start: 2023-03-13 | End: 2023-03-13 | Stop reason: HOSPADM

## 2023-03-13 RX ORDER — FENTANYL CITRATE 50 UG/ML
50 INJECTION, SOLUTION INTRAMUSCULAR; INTRAVENOUS ONCE
Status: DISCONTINUED | OUTPATIENT
Start: 2023-03-13 | End: 2023-03-13

## 2023-03-13 RX ORDER — ALBUTEROL SULFATE 90 UG/1
AEROSOL, METERED RESPIRATORY (INHALATION)
Qty: 18 G | Refills: 5 | OUTPATIENT
Start: 2023-03-13

## 2023-03-13 RX ORDER — DIPHENHYDRAMINE HYDROCHLORIDE 50 MG/ML
25 INJECTION INTRAMUSCULAR; INTRAVENOUS ONCE
Status: COMPLETED | OUTPATIENT
Start: 2023-03-13 | End: 2023-03-13

## 2023-03-13 RX ORDER — METOCLOPRAMIDE HYDROCHLORIDE 5 MG/ML
10 INJECTION INTRAMUSCULAR; INTRAVENOUS ONCE
Status: COMPLETED | OUTPATIENT
Start: 2023-03-13 | End: 2023-03-13

## 2023-03-13 RX ORDER — 0.9 % SODIUM CHLORIDE 0.9 %
1000 INTRAVENOUS SOLUTION INTRAVENOUS ONCE
Status: COMPLETED | OUTPATIENT
Start: 2023-03-13 | End: 2023-03-13

## 2023-03-13 RX ADMIN — DIPHENHYDRAMINE HYDROCHLORIDE 25 MG: 50 INJECTION, SOLUTION INTRAMUSCULAR; INTRAVENOUS at 16:43

## 2023-03-13 RX ADMIN — MAGNESIUM SULFATE HEPTAHYDRATE 2000 MG: 40 INJECTION, SOLUTION INTRAVENOUS at 18:04

## 2023-03-13 RX ADMIN — METOCLOPRAMIDE 10 MG: 5 INJECTION, SOLUTION INTRAMUSCULAR; INTRAVENOUS at 16:44

## 2023-03-13 RX ADMIN — SODIUM CHLORIDE 1000 ML: 9 INJECTION, SOLUTION INTRAVENOUS at 16:43

## 2023-03-13 ASSESSMENT — PAIN DESCRIPTION - FREQUENCY: FREQUENCY: CONTINUOUS

## 2023-03-13 ASSESSMENT — LIFESTYLE VARIABLES
HOW OFTEN DO YOU HAVE A DRINK CONTAINING ALCOHOL: NEVER
HOW MANY STANDARD DRINKS CONTAINING ALCOHOL DO YOU HAVE ON A TYPICAL DAY: PATIENT DOES NOT DRINK

## 2023-03-13 ASSESSMENT — PAIN DESCRIPTION - ONSET: ONSET: ON-GOING

## 2023-03-13 ASSESSMENT — PAIN - FUNCTIONAL ASSESSMENT
PAIN_FUNCTIONAL_ASSESSMENT: NONE - DENIES PAIN
PAIN_FUNCTIONAL_ASSESSMENT: NONE - DENIES PAIN

## 2023-03-13 NOTE — ED NOTES
Patient refused second NS bolus, and repeat lactic acid. Patient wants to sign out AMA. Dr Chirag Guzman aware.      Alena Pulido RN  03/13/23 1932

## 2023-03-13 NOTE — ED PROVIDER NOTES
[address] ENCOUNTER      Pt Name: Mazin Ivan  MRN: [id number]  [place] 1970  Date of evaluation: 3/13/2023  Provider: Charly Zamora DO  PCP: Gely Preston DO  Note Started: 6:03 PM EDT 3/13/23    CHIEF COMPLAINT       Chief Complaint   Patient presents with    Hyperglycemia     Took BGL, sugar was over 600, took 18 units insulin        HISTORY OF PRESENT ILLNESS: 1 or more Elements   History From: Patient  Limitations to history : None    Mazin Ivan is a 46 y.o. female Past medical history of diabetes, GERD, cocaine abuse as well as hepatitis C. Patient presents with chief complaint of headache as well as elevated blood sugar. Patient states that she has had a sharp aching headache for the last day. Patient currently rates pain a 8 out of 10. In addition patient also notes that she has had issues with high blood sugar. States that her blood sugar has read over 600 for the last 2 days. Patient states that she did take her normal Lantus as well as 18 units of short acting insulin earlier today with minimal improvement. Patient denies any exacerbating relieving factors. States that symptoms are moderate in severity, since onset she denies any similar episodes in the past.  Patient denies any fevers, chills, chest pain, cough, abdominal pain, constipation or diarrhea. Nursing Notes were all reviewed and agreed with or any disagreements were addressed in the HPI. REVIEW OF SYSTEMS :    Positives and Pertinent negatives as per HPI.      SURGICAL HISTORY     Past Surgical History:   Procedure Laterality Date    ABDOMEN SURGERY      gallbladder removal    BREAST BIOPSY Left     CHOLECYSTECTOMY  2010    COLONOSCOPY      COLONOSCOPY N/A 10/2/2018    COLONOSCOPY WITH BIOPSY performed by Ed Wheatley MD at [facility], DIAGNOSTIC      ERCP      april 2016 at [place] [address] ([facility]) 2004    MASTECTOMY Left     Left breast mastectomy per patient    OTHER SURGICAL HISTORY Left 04/25/2017     Left breast blue dye injection, Sential Node Lymph Biopsy with left breast lumpectomy    OTHER SURGICAL HISTORY  10/05/2017    simple left mastectomy    OVARY REMOVAL Bilateral 2007    PANCREAS BIOPSY      with stent    SHOULDER SURGERY Left 2003    UPPER GASTROINTESTINAL ENDOSCOPY         CURRENTMEDICATIONS       Previous Medications    ALBUTEROL (PROVENTIL) (2.5 MG/3ML) 0.083% NEBULIZER SOLUTION    Take 3 mLs by nebulization every 4 hours as needed for Wheezing May substitute generic if insurance does not cover. Please provide 1 box (25 or 30 ampules) depending on how supplied per box    ALBUTEROL SULFATE HFA (PROVENTIL;VENTOLIN;PROAIR) 108 (90 BASE) MCG/ACT INHALER    Inhale 2 puffs into the lungs every 6 hours as needed for Wheezing or Shortness of Breath    ANASTROZOLE (ARIMIDEX) 1 MG TABLET    Take 1 tablet by mouth daily    ARIPIPRAZOLE (ABILIFY) 20 MG TABLET    Take 1 tablet by mouth daily    BLOOD GLUCOSE MONITOR STRIPS    Test 4 times a day & as needed for symptoms of irregular blood glucose. Dispense sufficient amount for indicated testing frequency plus additional to accommodate PRN testing needs. BUDESONIDE-FORMOTEROL (SYMBICORT) 160-4.5 MCG/ACT AERO    Inhale 2 puffs into the lungs 2 times daily    CALCIUM CARBONATE (CALTRATE 600 PO)      1 tabs, ORAL, DAILY, Date: 06/25/2018 21:53:00 EDT    CREON 98676-129999 UNITS CPEP DELAYED RELEASE CAPSULE    3 capsules 3 caps with meal and 2 with snacks    DENOSUMAB (PROLIA SC)    Inject into the skin Every 6 months.   Given at [facility] 83 X 15/64\" 1 ML MISC    use 1 SYRINGE to inject MEDICATION subcutaneously once daily    FUROSEMIDE (LASIX) 20 MG TABLET    Take 1 tablet by mouth daily    GABAPENTIN (NEURONTIN) 400 MG CAPSULE    take 1 capsule by mouth three times a day    HYDROXYZINE PAMOATE (VISTARIL) 50 MG CAPSULE    take 1 capsule by mouth four times a day if needed for anxiety    INSULIN GLARGINE (LANTUS SOLOSTAR) 100 UNIT/ML INJECTION PEN    Inject 25 Units into the skin 2 times daily    INSULIN LISPRO (HUMALOG) 100 UNIT/ML INJECTION VIAL    Inject 0-18 Units into the skin 3 times daily (with meals) **High Dose Corrective Algorithm**  Glucose: Dose:               No Insulin  140-199           3 Units  200-249 6 Units  250-299 9 Units  300-349 12 Units  350-400 15 Units  Over 400  18 Units    INSULIN PEN NEEDLE (B-D UF III MINI PEN NEEDLES) 31G X 5 MM MISC    Use 4 daily as directed with insulin    LACTULOSE (CHRONULAC) 10 GM/15ML SOLUTION    take 15 milliliters by mouth twice a day    LANCETS MISC    1 each by Does not apply route daily    NICOTINE (NICOTROL) 10 MG INHALER    Inhale 1 puff into the lungs as needed for Smoking cessation Each cartridge lasts about 20 minutes and provides nicotine equivalent to about 2 cigarettes. Use at least 6 cartridges/day for 1st 3-6 weeks; max 16 cartridges/day for 1st 12 weeks, then reduce gradually over 12 more weeks.     OMEPRAZOLE (PRILOSEC) 40 MG DELAYED RELEASE CAPSULE    take 1 tablet by mouth every morning    ONDANSETRON (ZOFRAN-ODT) 4 MG DISINTEGRATING TABLET    Take 1 tablet by mouth 3 times daily as needed for Nausea or Vomiting    PANTOPRAZOLE (PROTONIX) 40 MG TABLET    Take 1 tablet by mouth daily    TIOTROPIUM (SPIRIVA RESPIMAT) 1.25 MCG/ACT AERS INHALER    Inhale 2 puffs into the lungs daily    TIZANIDINE (ZANAFLEX) 4 MG TABLET    Take 1 tablet by mouth 3 times daily    VITAMIN B-1 (THIAMINE) 100 MG TABLET    Take 1 tablet by mouth daily    VORTIOXETINE HBR (TRINTELLIX) 20 MG TABS TABLET    Take 1 tablet by mouth nightly       ALLERGIES     Dye [iodides] and Tylenol [acetaminophen]    FAMILYHISTORY       Family History   Problem Relation Age of Onset    Diabetes Mother     Cancer Mother         Breast    No Known Problems Father     Colon Cancer Maternal Grandfather         SOCIAL HISTORY       Social History     Tobacco Use    Smoking status: Every Day     Packs/day: 1.50     Years: 40.00     Pack years: 60.00     Types: Cigarettes     Start date: 1/1/1982     Last attempt to quit: 2/1/2020     Years since quitting: 3.1    Smokeless tobacco: Never   Vaping Use    Vaping Use: Never used   Substance Use Topics    Alcohol use: Not Currently    Drug use: Yes     Types: Cocaine     Comment: once in a while-once every few months       SCREENINGS        [place] Coma Scale  Eye Opening: Spontaneous  Best Verbal Response: Oriented  Best Motor Response: Obeys commands  [place] Coma Scale Score: 15                CIWA Assessment  BP: 105/69  Heart Rate: 69           PHYSICAL EXAM  1 or more Elements     ED Triage Vitals   BP Temp Temp Source Heart Rate Resp SpO2 Height Weight   03/13/23 1539 03/13/23 1539 03/13/23 1539 03/13/23 1539 03/13/23 1539 03/13/23 1539 -- 03/13/23 1552   105/69 98 °F (36.7 °C) Oral 69 16 100 %  97 lb (44 kg)            Constitutional/General: Alert and oriented x3  Head: Normocephalic and atraumatic  Eyes: PERRL, EOMI, conjunctiva normal, sclera non icteric  ENT:  Oropharynx clear, handling secretions, no trismus, no asymmetry of the posterior oropharynx or uvular edema  Neck: Supple, full ROM, no stridor, no meningeal signs  Respiratory: Lungs clear to auscultation bilaterally, no wheezes, rales, or rhonchi. Not in respiratory distress  Cardiovascular:  Regular rate. Regular rhythm. No murmurs, no gallops, no rubs. 2+ distal pulses. Equal extremity pulses. Chest: No chest wall tenderness  GI:  Abdomen Soft, Non tender, Non distended. +BS. No rebound, guarding, or rigidity. No pulsatile masses. Musculoskeletal: Moves all extremities x 4. Warm and well perfused, no clubbing, no cyanosis, no edema. Capillary refill <3 seconds  Integument: skin warm and dry. No rashes.    Neurologic: GCS 15, no focal deficits, symmetric strength 5/5 in the upper and lower extremities bilaterally  Psychiatric: Normal Affect    DIAGNOSTIC RESULTS   LABS:    Labs Reviewed   CBC WITH AUTO DIFFERENTIAL - Abnormal; Notable for the following components:       Result Value    WBC 4.0 (*)     Platelets 99 (*)     MPV 12.5 (*)     Lymphocytes % 14.6 (*)     Lymphocytes Absolute 0.59 (*)     All other components within normal limits   COMPREHENSIVE METABOLIC PANEL W/ REFLEX TO MG FOR LOW K - Abnormal; Notable for the following components:    Chloride 89 (*)     Glucose 297 (*)     Calcium 10.5 (*)     Alkaline Phosphatase 877 (*)     ALT 72 (*)     AST 97 (*)     All other components within normal limits   LACTIC ACID - Abnormal; Notable for the following components:    Lactic Acid 3.5 (*)     All other components within normal limits    Narrative:     Conrado Alexandra tel. [phone],  Chemistry results called to and read back by Lashay Sotomayor RN, 03/13/2023 17:16,  by Mello Amaya   TROPONIN - Abnormal; Notable for the following components:    Troponin, High Sensitivity 10 (*)     All other components within normal limits   POCT GLUCOSE - Abnormal; Notable for the following components:    Meter Glucose >500 (*)     All other components within normal limits   PH, VENOUS   PLATELET CONFIRMATION   URINALYSIS WITH MICROSCOPIC   BETA-HYDROXYBUTYRATE   CK   TROPONIN   POCT GLUCOSE       As interpreted by me, the above displayed labs are abnormal. All other labs obtained during this visit were within normal range or not returned as of this dictation.     EKG Interpretation:  Interpreted by emergency department physician, Lukasz Cox DO  Rate: 69  Rhythm: Sinus  Interpretation: EKG obtained demonstrated normal sinus rhythm, rate 69, normal axis, QTc 411, no acute ST segment changes  Comparison: stable as compared to patient's most recent EKG    RADIOLOGY:   Non-plain film images such as CT, Ultrasound and MRI are read by the radiologist. Plain radiographic images are visualized and preliminarily interpreted by the ED Provider with the below findings:        Interpretation per the Radiologist below, if available at the time of this note:    CT HEAD WO CONTRAST   Final Result   No acute intracranial abnormality. CT HEAD WO CONTRAST    Result Date: 3/13/2023  EXAMINATION: CT OF THE HEAD WITHOUT CONTRAST  3/13/2023 5:00 pm TECHNIQUE: CT of the head was performed without the administration of intravenous contrast. Automated exposure control, iterative reconstruction, and/or weight based adjustment of the mA/kV was utilized to reduce the radiation dose to as low as reasonably achievable. COMPARISON: 07/03/2022 HISTORY: ORDERING SYSTEM PROVIDED HISTORY: headache TECHNOLOGIST PROVIDED HISTORY: Reason for exam:->headache Has a \"code stroke\" or \"stroke alert\" been called? ->No Decision Support Exception - unselect if not a suspected or confirmed emergency medical condition->Emergency Medical Condition (MA) FINDINGS: BRAIN/VENTRICLES: There is no acute intracranial hemorrhage, mass effect or midline shift. No abnormal extra-axial fluid collection. The gray-white differentiation is maintained without evidence of an acute infarct. There is no evidence of hydrocephalus. ORBITS: The visualized portion of the orbits demonstrate no acute abnormality. SINUSES: The visualized paranasal sinuses and mastoid air cells demonstrate no acute abnormality. SOFT TISSUES/SKULL:  No acute abnormality of the visualized skull or soft tissues. No acute intracranial abnormality. No results found.     PROCEDURES   Unless otherwise noted below, none     CRITICAL CARE TIME (.cct)   ***    PAST MEDICAL HISTORY/Chronic Conditions Affecting Care    has a past medical history of Alcoholism ([address] Anxiety and depression, Ascites of liver, Bronchitis, Cancer ([address] Candidiasis of vagina, Chronic pancreatitis ([address] Cocaine abuse ([address] Constipation, Diabetes mellitus, type 2 ([address] GERD (gastroesophageal reflux disease), Gout, Hepatitis C, Hernia of anterior abdominal wall, Jaundice (05/12/2016), Pneumonia, Polyneuropathy due to type 2 diabetes mellitus ([address] Schizoaffective disorder, bipolar type ([address] and Splenomegaly (05/30/2017). EMERGENCY DEPARTMENT COURSE    Vitals:    Vitals:    03/13/23 1539 03/13/23 1552   BP: 105/69    Pulse: 69    Resp: 16    Temp: 98 °F (36.7 °C)    TempSrc: Oral    SpO2: 100%    Weight:  97 lb (44 kg)       Patient was given the following medications:  Medications   0.9 % sodium chloride bolus (1,000 mLs IntraVENous New Bag 3/13/23 1643)   0.9 % sodium chloride bolus (has no administration in time range)   magnesium sulfate 2000 mg in 50 mL IVPB premix (has no administration in time range)   metoclopramide (REGLAN) injection 10 mg (10 mg IntraVENous Given 3/13/23 1644)   diphenhydrAMINE (BENADRYL) injection 25 mg (25 mg IntraVENous Given 3/13/23 1643)         Is this patient to be included in the SEP-1 Core Measure due to severe sepsis or septic shock?    {Pnd8EdeQeCh:19532}      Medical Decision Making/Differential Diagnosis:    CC/HPI Summary, Social Determinants of health, Records Reviewed, DDx, testing done/not done, ED Course, Reassessment, disposition considerations/shared decision making with patient, consults, disposition:            Chronic Conditions:   Past Medical History:   Diagnosis Date    Alcoholism ([address]     Since teens to early 25s    Anxiety and depression     Ascites of liver     Bronchitis     Cancer ([address]     breast, left    Candidiasis of vagina     Chronic pancreatitis ([address]     Cocaine abuse ([address]     Constipation     Diabetes mellitus, type 2 ([address]     GERD (gastroesophageal reflux disease)     Gout     Hepatitis C     Hernia of anterior abdominal wall     Jaundice 05/12/2016    Pneumonia     Polyneuropathy due to type 2 diabetes mellitus ([address]     Schizoaffective disorder, bipolar type ([address]     Splenomegaly 05/30/2017       CONSULTS: (Who and What was discussed)  None    {Discussion with Other Profesionals (Optional):56420}    {Social Determinants (Optional):35413}    {Records Reviewed (Optional):11587}    CC/HPI Summary, DDx, ED Course, and Reassessment: {[facility] orders:85509}      Disposition Considerations (Tests not ordered but considered, Shared Decision Making, Pt Expectation of Test or Tx.): ***  {Escalation of care, including admission/OBS considered:96282}    FINAL IMPRESSION    No diagnosis found. DISPOSITION/PLAN     DISPOSITION      PATIENT REFERRED TO:  No follow-up provider specified.     DISCHARGE MEDICATIONS:  New Prescriptions    No medications on file       DISCONTINUED MEDICATIONS:  Discontinued Medications    No medications on file            (Please note that portions of this note were completed with a voice recognition program.  Efforts were made to edit the dictations but occasionally words are mis-transcribed.)    Charly Zmaora, DO (electronically signed)

## 2023-03-13 NOTE — ED PROVIDER NOTES
6:07 PM EDT  I received this patient at sign out from Dr. Arnulfo Haas. I have discussed the patient's initial exam, treatment and plan of care with the out going physician. I have introduced my self to the patient / family and have answered their questions to this point. I have examined the patient myself and reviewed ordered tests / medications and  reviewed any available results to this point. If a resident is involved in the Emergency Department care, I have discussed my findings and plan with them as well.         --------------------------------------------- PAST HISTORY ---------------------------------------------  Past Medical History:  has a past medical history of Alcoholism ([address] Anxiety and depression, Ascites of liver, Bronchitis, Cancer ([address] Candidiasis of vagina, Chronic pancreatitis ([address] Cocaine abuse ([address] Constipation, Diabetes mellitus, type 2 ([address] GERD (gastroesophageal reflux disease), Gout, Hepatitis C, Hernia of anterior abdominal wall, Jaundice, Pneumonia, Polyneuropathy due to type 2 diabetes mellitus ([address] Schizoaffective disorder, bipolar type ([address] and Splenomegaly. Past Surgical History:  has a past surgical history that includes Hysterectomy (2004); Ovary removal (Bilateral, 2007); shoulder surgery (Left, 2003); Cholecystectomy (2010); ERCP; Abdomen surgery; Colonoscopy; Upper gastrointestinal endoscopy; other surgical history (Left, 04/25/2017); Breast biopsy (Left); Pancreas Biopsy; Endoscopy, colon, diagnostic; other surgical history (10/05/2017); Colonoscopy (N/A, 10/2/2018); and Mastectomy (Left). Social History:  reports that she has been smoking cigarettes. She started smoking about 41 years ago. She has a 60.00 pack-year smoking history. She has never used smokeless tobacco. She reports that she does not currently use alcohol. She reports current drug use. Drug: Cocaine.     Family History: family history includes Cancer in her mother; Colon Cancer in her maternal grandfather; Diabetes in her mother; No Known Problems in her father. The patients home medications have been reviewed.     Allergies: Dye [iodides] and Tylenol [acetaminophen]    -------------------------------------------------- RESULTS -------------------------------------------------  Labs:  Results for orders placed or performed during the hospital encounter of 03/13/23   CBC with Auto Differential   Result Value Ref Range    WBC 4.0 (L) 4.5 - 11.5 E9/L    RBC 4.57 3.50 - 5.50 E12/L    Hemoglobin 13.7 11.5 - 15.5 g/dL    Hematocrit 39.8 34.0 - 48.0 %    MCV 87.1 80.0 - 99.9 fL    MCH 30.0 26.0 - 35.0 pg    MCHC 34.4 32.0 - 34.5 %    RDW 14.6 11.5 - 15.0 fL    Platelets 99 (L) 314 - 450 E9/L    MPV 12.5 (H) 7.0 - 12.0 fL    Neutrophils % 77.3 43.0 - 80.0 %    Immature Granulocytes % 0.7 0.0 - 5.0 %    Lymphocytes % 14.6 (L) 20.0 - 42.0 %    Monocytes % 5.2 2.0 - 12.0 %    Eosinophils % 1.7 0.0 - 6.0 %    Basophils % 0.5 0.0 - 2.0 %    Neutrophils Absolute 3.12 1.80 - 7.30 E9/L    Immature Granulocytes # 0.03 E9/L    Lymphocytes Absolute 0.59 (L) 1.50 - 4.00 E9/L    Monocytes Absolute 0.21 0.10 - 0.95 E9/L    Eosinophils Absolute 0.07 0.05 - 0.50 E9/L    Basophils Absolute 0.02 0.00 - 0.20 E9/L   Comprehensive Metabolic Panel w/ Reflex to MG   Result Value Ref Range    Sodium 132 132 - 146 mmol/L    Potassium reflex Magnesium 4.2 3.5 - 5.0 mmol/L    Chloride 89 (L) 98 - 107 mmol/L    CO2 28 22 - 29 mmol/L    Anion Gap 15 7 - 16 mmol/L    Glucose 297 (H) 74 - 99 mg/dL    BUN 12 6 - 20 mg/dL    Creatinine 0.6 0.5 - 1.0 mg/dL    Est, Glom Filt Rate >60 >=60 mL/min/1.73    Calcium 10.5 (H) 8.6 - 10.2 mg/dL    Total Protein 7.9 6.4 - 8.3 g/dL    Albumin 4.3 3.5 - 5.2 g/dL    Total Bilirubin 0.7 0.0 - 1.2 mg/dL    Alkaline Phosphatase 877 (H) 35 - 104 U/L    ALT 72 (H) 0 - 32 U/L    AST 97 (H) 0 - 31 U/L   PH, VENOUS   Result Value Ref Range    pH, Larry 7.36 7.35 - 7.45   Lactic Acid   Result Value Ref Range Lactic Acid 3.5 (HH) 0.5 - 2.2 mmol/L   Troponin   Result Value Ref Range    Troponin, High Sensitivity 10 (H) 0 - 9 ng/L   Platelet Confirmation   Result Value Ref Range    Platelet Confirmation CONFIRMED    CK   Result Value Ref Range    Total CK 28 20 - 180 U/L   Troponin   Result Value Ref Range    Troponin, High Sensitivity 11 (H) 0 - 9 ng/L   POCT glucose   Result Value Ref Range    Glucose 135 mg/dL    QC OK? yes    POCT Glucose   Result Value Ref Range    Meter Glucose >500 (H) 74 - 99 mg/dL   POCT Glucose   Result Value Ref Range    Meter Glucose 135 (H) 74 - 99 mg/dL   EKG 12 Lead   Result Value Ref Range    Ventricular Rate 69 BPM    Atrial Rate 69 BPM    P-R Interval 136 ms    QRS Duration 86 ms    Q-T Interval 384 ms    QTc Calculation (Bazett) 411 ms    P Axis 80 degrees    R Axis 48 degrees    T Axis 81 degrees       Radiology:  CT HEAD WO CONTRAST    Result Date: 3/13/2023  EXAMINATION: CT OF THE HEAD WITHOUT CONTRAST  3/13/2023 5:00 pm TECHNIQUE: CT of the head was performed without the administration of intravenous contrast. Automated exposure control, iterative reconstruction, and/or weight based adjustment of the mA/kV was utilized to reduce the radiation dose to as low as reasonably achievable. COMPARISON: 07/03/2022 HISTORY: ORDERING SYSTEM PROVIDED HISTORY: headache TECHNOLOGIST PROVIDED HISTORY: Reason for exam:->headache Has a \"code stroke\" or \"stroke alert\" been called? ->No Decision Support Exception - unselect if not a suspected or confirmed emergency medical condition->Emergency Medical Condition (MA) FINDINGS: BRAIN/VENTRICLES: There is no acute intracranial hemorrhage, mass effect or midline shift. No abnormal extra-axial fluid collection. The gray-white differentiation is maintained without evidence of an acute infarct. There is no evidence of hydrocephalus. ORBITS: The visualized portion of the orbits demonstrate no acute abnormality.  SINUSES: The visualized paranasal sinuses and mastoid air cells demonstrate no acute abnormality. SOFT TISSUES/SKULL:  No acute abnormality of the visualized skull or soft tissues. No acute intracranial abnormality.       ------------------------- NURSING NOTES AND VITALS REVIEWED ---------------------------  Date / Time Roomed:  3/13/2023  4:07 PM  ED Bed Assignment:  10/10    The nursing notes within the ED encounter and vital signs as below have been reviewed. BP 98/68   Pulse 66   Temp 98 °F (36.7 °C) (Oral)   Resp 14   Wt 97 lb (44 kg)   SpO2 100%   BMI 18.94 kg/m²   Oxygen Saturation Interpretation: Normal      ------------------------------------------ PROGRESS NOTES ------------------------------------------  This patient has chosen to leave against medical advice. I have personally explained to them that choosing to do so may result in permanent bodily harm or death. I discussed at length that without further evaluation and monitoring there may be unforeseen circumstances and deterioration resulting in permanent bodily harm or death as a result of their choice. They are alert, oriented, and competent at this time. They state that they are aware of the serious risks as explained, but they continue to wish to leave against medical advice. In light of their decision to leave against medical advice, follow-up has been arranged and they are aware of the importance of following up as instructed. They have been advised that they should return to the ED immediately if they change their mind at any time, or if their condition begins to change or worsen. I have spoken with the patient and discussed todays availabe results to this point, in addition to providing specific details for the plan of care and counseling regarding the diagnosis and prognosis.   Their questions are answered, however they are not agreeable to admission.     --------------------------------- ADDITIONAL PROVIDER NOTES ---------------------------------  This patient has chosen to leave against medical advice. I have personally explained to them that choosing to do so may result in permanent bodily harm or death. I discussed at length that without further evaluation and monitoring there may be unforeseen circumstances and deterioration resulting in permanent bodily harm or death as a result of their choice. They are alert, oriented, and competent at this time. They state that they are aware of the serious risks as explained, but they continue to wish to leave against medical   advice. In light of their decision to leave against medical advice, follow-up has been arranged and they are aware of the importance of following up as instructed. They have been advised that they should return to the ED immediately if they change their mind at any time, or if their condition begins to change or worsen. The follow up plan has been discussed in detail and they are aware of the specific conditions for emergent return, as well as the importance of follow-up. New Prescriptions    No medications on file       Diagnosis:  1. Acute nonintractable headache, unspecified headache type    2. Dehydration    3. Lactic acidosis        Disposition:  Patient's disposition: Left against medical advice. Patient's condition is stable.        Vianey Cali DO  03/13/23 1948

## 2023-03-13 NOTE — ED NOTES
EKG completed, pt. Tolerated well, placed pt.  On monitor     Stephanie Man  03/13/23 [address]  03/13/23 1630

## 2023-03-14 LAB
EKG ATRIAL RATE: 69 BPM
EKG P AXIS: 80 DEGREES
EKG P-R INTERVAL: 136 MS
EKG Q-T INTERVAL: 384 MS
EKG QRS DURATION: 86 MS
EKG QTC CALCULATION (BAZETT): 411 MS
EKG R AXIS: 48 DEGREES
EKG T AXIS: 81 DEGREES
EKG VENTRICULAR RATE: 69 BPM

## 2023-03-14 PROCEDURE — 93010 ELECTROCARDIOGRAM REPORT: CPT | Performed by: INTERNAL MEDICINE

## 2023-03-28 ENCOUNTER — HOSPITAL ENCOUNTER (OUTPATIENT)
Dept: INFUSION THERAPY | Age: 53
Discharge: HOME | End: 2023-03-28
Payer: COMMERCIAL

## 2023-03-28 ENCOUNTER — TELEPHONE (OUTPATIENT)
Dept: INFUSION THERAPY | Age: 53
End: 2023-03-28

## 2023-03-28 ENCOUNTER — OFFICE VISIT (OUTPATIENT)
Dept: ONCOLOGY | Age: 53
End: 2023-03-28
Payer: COMMERCIAL

## 2023-03-28 VITALS
DIASTOLIC BLOOD PRESSURE: 76 MMHG | BODY MASS INDEX: 18.71 KG/M2 | HEART RATE: 66 BPM | HEIGHT: 60 IN | SYSTOLIC BLOOD PRESSURE: 113 MMHG | TEMPERATURE: 97.9 F | WEIGHT: 95.3 LBS | OXYGEN SATURATION: 98 %

## 2023-03-28 DIAGNOSIS — Z85.3 PERSONAL HISTORY OF BREAST CANCER: ICD-10-CM

## 2023-03-28 LAB
ALBUMIN SERPL-MCNC: 3.9 G/DL (ref 3.5–5.2)
ALP SERPL-CCNC: 693 U/L (ref 35–104)
ALT SERPL-CCNC: 49 U/L (ref 0–32)
ANION GAP SERPL CALCULATED.3IONS-SCNC: 8 MMOL/L (ref 7–16)
AST SERPL-CCNC: 36 U/L (ref 0–31)
BILIRUB SERPL-MCNC: 0.9 MG/DL (ref 0–1.2)
BUN SERPL-MCNC: 17 MG/DL (ref 6–20)
CALCIUM SERPL-MCNC: 9.6 MG/DL (ref 8.6–10.2)
CHLORIDE SERPL-SCNC: 87 MMOL/L (ref 98–107)
CO2 SERPL-SCNC: 29 MMOL/L (ref 22–29)
CREAT SERPL-MCNC: 0.6 MG/DL (ref 0.5–1)
GLUCOSE SERPL-MCNC: 697 MG/DL (ref 74–99)
POTASSIUM SERPL-SCNC: 6 MMOL/L (ref 3.5–5)
PROT SERPL-MCNC: 7.6 G/DL (ref 6.4–8.3)
REASON FOR REJECTION: NORMAL
REJECTED TEST: NORMAL
SODIUM SERPL-SCNC: 124 MMOL/L (ref 132–146)

## 2023-03-28 PROCEDURE — G8482 FLU IMMUNIZE ORDER/ADMIN: HCPCS | Performed by: INTERNAL MEDICINE

## 2023-03-28 PROCEDURE — G8427 DOCREV CUR MEDS BY ELIG CLIN: HCPCS | Performed by: INTERNAL MEDICINE

## 2023-03-28 PROCEDURE — 36415 COLL VENOUS BLD VENIPUNCTURE: CPT

## 2023-03-28 PROCEDURE — 80053 COMPREHEN METABOLIC PANEL: CPT

## 2023-03-28 PROCEDURE — 4004F PT TOBACCO SCREEN RCVD TLK: CPT | Performed by: INTERNAL MEDICINE

## 2023-03-28 PROCEDURE — 99213 OFFICE O/P EST LOW 20 MIN: CPT

## 2023-03-28 PROCEDURE — 99215 OFFICE O/P EST HI 40 MIN: CPT | Performed by: INTERNAL MEDICINE

## 2023-03-28 PROCEDURE — G8420 CALC BMI NORM PARAMETERS: HCPCS | Performed by: INTERNAL MEDICINE

## 2023-03-28 PROCEDURE — 3017F COLORECTAL CA SCREEN DOC REV: CPT | Performed by: INTERNAL MEDICINE

## 2023-03-28 RX ORDER — ANASTROZOLE 1 MG/1
1 TABLET ORAL DAILY
Qty: 30 TABLET | Refills: 4 | Status: SHIPPED | OUTPATIENT
Start: 2023-03-28

## 2023-03-28 NOTE — PROGRESS NOTES
EOMI. Oropharynx clear. BREASTS: No palpable masses in right breast.   Left axillary discomfort and tenderness to palpation. EXTREMITIES: Without clubbing, cyanosis, or edema. NEUROLOGIC: No focal deficits. ECOG PS 1    Lab Results   Component Value Date     (L) 03/28/2023    K 6.0 (H) 03/28/2023    CL 87 (L) 03/28/2023    CO2 29 03/28/2023    BUN 17 03/28/2023    CREATININE 0.6 03/28/2023    GLUCOSE 697 (HH) 03/28/2023    CALCIUM 9.6 03/28/2023    PROT 7.6 03/28/2023    LABALBU 3.9 03/28/2023    BILITOT 0.9 03/28/2023    ALKPHOS 693 (H) 03/28/2023    AST 36 (H) 03/28/2023    ALT 49 (H) 03/28/2023    LABGLOM >60 03/28/2023    GFRAA >60 08/17/2022    GLOB 4.0 (H) 03/10/2016     Impression/Plan:  45 y/o female who underwent Blue dye injection with left breast lumpectomy and sentinel lymph node biopsy after technetium sulfur colloid injection on 04/25/2017:  A. Lymph node, left axillary, left breast sentinel node, biopsy: One lymph node with no diagnostic abnormality. B.  Breast, left, excision of mass:  -Invasive well-differentiated ductal carcinoma and intermediate grade ductal carcinoma in situ (see comment and cancer case summary). -Fibrocystic changes. CANCER CASE SUMMARY:  Procedure: Excision without image guided localization. Lymph node sampling: [place] lymph node. Specimen laterality: Left. Tumor size, size of largest invasive carcinoma: 7 mm. Histologic type: Invasive mammary carcinoma of no special type (ductal,not otherwise specified). Histologic grade:   Glandular (acinar)/tubular differentiation: Score 2. Nuclear pleomorphism: Score 1. Mitotic rate: Score 1. Overall grade: Grade 1. Tumor focality: Single focus of invasive carcinoma. Ductal carcinoma in situ: DCIS is present. Size of DCIS: Estimated size: At least 15 mm. Architectural patterns: Cribriform and solid. Nuclear grade: Grade 2 (intermediate). Necrosis: Not identified.     Margins:   Invasive

## 2023-03-28 NOTE — TELEPHONE ENCOUNTER
[id number]. Per Dr. Barrie Parada, patient advised to go to the ER. Patient states Ole Shape knows what to do\" and does not plan on going to the ER. She states she did not take her afternoon insulin yet. Said RN will make Dr. Barrie Parada aware.   Greg De La Cruz RN 3/28/23 8816

## 2023-04-05 ENCOUNTER — HOSPITAL ENCOUNTER (OUTPATIENT)
Dept: INFUSION THERAPY | Age: 53
Discharge: HOME | End: 2023-04-05
Payer: COMMERCIAL

## 2023-04-05 ENCOUNTER — OFFICE VISIT (OUTPATIENT)
Dept: ENDOCRINOLOGY | Age: 53
End: 2023-04-05
Payer: COMMERCIAL

## 2023-04-05 VITALS
RESPIRATION RATE: 18 BRPM | WEIGHT: 95 LBS | DIASTOLIC BLOOD PRESSURE: 56 MMHG | HEART RATE: 71 BPM | OXYGEN SATURATION: 99 % | BODY MASS INDEX: 15.83 KG/M2 | SYSTOLIC BLOOD PRESSURE: 91 MMHG | HEIGHT: 65 IN

## 2023-04-05 DIAGNOSIS — E11.65 TYPE 2 DIABETES MELLITUS WITH HYPERGLYCEMIA, WITH LONG-TERM CURRENT USE OF INSULIN (HCC): Primary | ICD-10-CM

## 2023-04-05 DIAGNOSIS — M81.8 IDIOPATHIC OSTEOPOROSIS: Primary | ICD-10-CM

## 2023-04-05 DIAGNOSIS — Z79.4 TYPE 2 DIABETES MELLITUS WITH HYPERGLYCEMIA, WITH LONG-TERM CURRENT USE OF INSULIN (HCC): Primary | ICD-10-CM

## 2023-04-05 DIAGNOSIS — M81.8 OTHER OSTEOPOROSIS WITHOUT CURRENT PATHOLOGICAL FRACTURE: ICD-10-CM

## 2023-04-05 DIAGNOSIS — Z91.119 DIETARY NONCOMPLIANCE: ICD-10-CM

## 2023-04-05 PROBLEM — C50.912 MALIGNANT NEOPLASM OF LEFT BREAST IN FEMALE, ESTROGEN RECEPTOR POSITIVE (HCC): Status: ACTIVE | Noted: 2023-04-05

## 2023-04-05 PROBLEM — Z17.0 MALIGNANT NEOPLASM OF LEFT BREAST IN FEMALE, ESTROGEN RECEPTOR POSITIVE (HCC): Status: ACTIVE | Noted: 2023-04-05

## 2023-04-05 PROCEDURE — 3046F HEMOGLOBIN A1C LEVEL >9.0%: CPT | Performed by: INTERNAL MEDICINE

## 2023-04-05 PROCEDURE — 6360000002 HC RX W HCPCS: Performed by: NURSE PRACTITIONER

## 2023-04-05 PROCEDURE — 99204 OFFICE O/P NEW MOD 45 MIN: CPT | Performed by: INTERNAL MEDICINE

## 2023-04-05 PROCEDURE — 4004F PT TOBACCO SCREEN RCVD TLK: CPT | Performed by: INTERNAL MEDICINE

## 2023-04-05 PROCEDURE — G8419 CALC BMI OUT NRM PARAM NOF/U: HCPCS | Performed by: INTERNAL MEDICINE

## 2023-04-05 PROCEDURE — G8427 DOCREV CUR MEDS BY ELIG CLIN: HCPCS | Performed by: INTERNAL MEDICINE

## 2023-04-05 PROCEDURE — 2022F DILAT RTA XM EVC RTNOPTHY: CPT | Performed by: INTERNAL MEDICINE

## 2023-04-05 PROCEDURE — 96372 THER/PROPH/DIAG INJ SC/IM: CPT

## 2023-04-05 PROCEDURE — 3017F COLORECTAL CA SCREEN DOC REV: CPT | Performed by: INTERNAL MEDICINE

## 2023-04-05 RX ORDER — ALBUTEROL SULFATE 90 UG/1
4 AEROSOL, METERED RESPIRATORY (INHALATION) PRN
OUTPATIENT
Start: 2023-10-01

## 2023-04-05 RX ORDER — EPINEPHRINE 1 MG/ML
0.3 INJECTION, SOLUTION, CONCENTRATE INTRAVENOUS PRN
Status: CANCELLED | OUTPATIENT
Start: 2023-04-05

## 2023-04-05 RX ORDER — DIPHENHYDRAMINE HYDROCHLORIDE 50 MG/ML
50 INJECTION INTRAMUSCULAR; INTRAVENOUS
Status: CANCELLED | OUTPATIENT
Start: 2023-04-05

## 2023-04-05 RX ORDER — ONDANSETRON 2 MG/ML
8 INJECTION INTRAMUSCULAR; INTRAVENOUS
Status: CANCELLED | OUTPATIENT
Start: 2023-04-05

## 2023-04-05 RX ORDER — SODIUM CHLORIDE 9 MG/ML
INJECTION, SOLUTION INTRAVENOUS CONTINUOUS
OUTPATIENT
Start: 2023-10-01

## 2023-04-05 RX ORDER — ACETAMINOPHEN 325 MG/1
650 TABLET ORAL
Status: CANCELLED | OUTPATIENT
Start: 2023-04-05

## 2023-04-05 RX ORDER — FAMOTIDINE 10 MG/ML
20 INJECTION, SOLUTION INTRAVENOUS
OUTPATIENT
Start: 2023-10-01

## 2023-04-05 RX ORDER — ACETAMINOPHEN 325 MG/1
650 TABLET ORAL
OUTPATIENT
Start: 2023-10-01

## 2023-04-05 RX ORDER — EPINEPHRINE 1 MG/ML
0.3 INJECTION, SOLUTION, CONCENTRATE INTRAVENOUS PRN
OUTPATIENT
Start: 2023-10-01

## 2023-04-05 RX ORDER — ONDANSETRON 2 MG/ML
8 INJECTION INTRAMUSCULAR; INTRAVENOUS
OUTPATIENT
Start: 2023-10-01

## 2023-04-05 RX ORDER — FAMOTIDINE 10 MG/ML
20 INJECTION, SOLUTION INTRAVENOUS
Status: CANCELLED | OUTPATIENT
Start: 2023-04-05

## 2023-04-05 RX ORDER — DIPHENHYDRAMINE HYDROCHLORIDE 50 MG/ML
50 INJECTION INTRAMUSCULAR; INTRAVENOUS
OUTPATIENT
Start: 2023-10-01

## 2023-04-05 RX ORDER — ALBUTEROL SULFATE 90 UG/1
4 AEROSOL, METERED RESPIRATORY (INHALATION) PRN
Status: CANCELLED | OUTPATIENT
Start: 2023-04-05

## 2023-04-05 RX ORDER — SODIUM CHLORIDE 9 MG/ML
INJECTION, SOLUTION INTRAVENOUS CONTINUOUS
Status: CANCELLED | OUTPATIENT
Start: 2023-04-05

## 2023-04-05 RX ADMIN — DENOSUMAB 60 MG: 60 INJECTION SUBCUTANEOUS at 08:40

## 2023-04-05 NOTE — PATIENT INSTRUCTIONS
Recommendations for today's visit  Change lantus to 20 units daily at in the morning    Change Humalog 6 units with meals + following sliding scale   Blood sugar 150-200: add 1 Units of Humalog  Blood sugar 201-250 : Add 2 Units of Humalog  Blood Sugar 251 - 300: Add 3 Units of Humalog  Blood Sugar 301-350: Add 4  Units of Humalog  Blood Sugar 350-400: Add 5 Units of Humalog  Blood sugar  >400: Add 6 Units of Humalog     Check Blood sugar 4 times/day before meals and at bedtime and send us sugar log in a week      These are your blood sugar, blood pressure, cholesterol and A1c goals:  Blood sugar fastin mg/dl to 130 mg/dl  Blood sugar before meals: <150 mg/dl  Peak blood sugar lower than 180 mg/dl  A1c: between 6.5 - 7.5%    I you have any questions please call Dr. Katie Singh office     Ruthy Zhao MD  Endocrinologist, [facility])   [address] [place] [address], [address] [place] [address],[facility] [id number]   Phone: [phone]  Fax: [phone]  Email: [email or web address]

## 2023-04-05 NOTE — PROGRESS NOTES
Patient presents to clinic today for Prolia injection. Patient's labs monitored, specifically, Calcium level, to ensure no increased risk of hypocalcemia with administration of the medication. Patient's calcium level is 9.6 mg/dL. Patient received therapy and will continue to be monitored prior to each dose.     [place], [place] 4/5/2023 8:37 AM

## 2023-04-05 NOTE — PROGRESS NOTES
inhaler Inhale 2 puffs into the lungs every 6 hours as needed for Wheezing or Shortness of Breath 18 g 5    furosemide (LASIX) 20 MG tablet Take 1 tablet by mouth daily 60 tablet 2    budesonide-formoterol (SYMBICORT) 160-4.5 MCG/ACT AERO Inhale 2 puffs into the lungs 2 times daily 6 g 0    albuterol (PROVENTIL) (2.5 MG/3ML) 0.083% nebulizer solution Take 3 mLs by nebulization every 4 hours as needed for Wheezing May substitute generic if insurance does not cover. Please provide 1 box (25 or 30 ampules) depending on how supplied per box 120 each 3    Lancets MISC 1 each by Does not apply route daily 100 each 5    blood glucose monitor strips Test 4 times a day & as needed for symptoms of irregular blood glucose. Dispense sufficient amount for indicated testing frequency plus additional to accommodate PRN testing needs. 100 strip 5    insulin lispro (HUMALOG) 100 UNIT/ML injection vial Inject 0-18 Units into the skin 3 times daily (with meals) **High Dose Corrective Algorithm**  Glucose: Dose:               No Insulin  140-199           3 Units  200-249 6 Units  250-299 9 Units  300-349 12 Units  350-400 15 Units  Over 400  18 Units 10 mL 0    tiZANidine (ZANAFLEX) 4 MG tablet Take 1 tablet by mouth 3 times daily 30 tablet 0    Denosumab (PROLIA SC) Inject into the skin Every 6 months. Given at [address]       No current facility-administered medications for this visit. Review of Systems  Constitutional: No fever, no chills, no diaphoresis, no generalized weakness. HEENT: No blurred vision, No sore throat, no ear pain, no hair loss  Neck: denied any neck swelling, difficulty swallowing,   Cardio-pulmonary: No CP, SOB or palpitation, No orthopnea or PND. No cough or wheezing. GI: No N/V/D, no constipation, No abdominal pain, no melena or hematochezia   : Denied any dysuria, hematuria, flank pain, discharge, or incontinence. Skin: denied any rash, ulcer, Hirsute, or hyperpigmentation.

## 2023-04-07 DIAGNOSIS — E11.65 TYPE 2 DIABETES MELLITUS WITH HYPERGLYCEMIA, WITH LONG-TERM CURRENT USE OF INSULIN (HCC): Primary | ICD-10-CM

## 2023-04-07 DIAGNOSIS — Z79.4 TYPE 2 DIABETES MELLITUS WITH HYPERGLYCEMIA, WITH LONG-TERM CURRENT USE OF INSULIN (HCC): Primary | ICD-10-CM

## 2023-04-07 RX ORDER — INSULIN LISPRO 100 [IU]/ML
INJECTION, SOLUTION INTRAVENOUS; SUBCUTANEOUS
Qty: 90 ML | Refills: 3 | Status: SHIPPED | OUTPATIENT
Start: 2023-04-07

## 2023-04-07 RX ORDER — INSULIN PEN,REUSABLE,BT LISPRO
INSULIN PEN (EA) SUBCUTANEOUS
Qty: 2 EACH | Refills: 1 | Status: SHIPPED | OUTPATIENT
Start: 2023-04-07

## 2023-04-26 LAB — DIABETIC RETINOPATHY: NEGATIVE

## 2023-05-01 DIAGNOSIS — Z79.4 TYPE 2 DIABETES MELLITUS WITH HYPERGLYCEMIA, WITH LONG-TERM CURRENT USE OF INSULIN (HCC): Primary | ICD-10-CM

## 2023-05-01 DIAGNOSIS — E11.65 TYPE 2 DIABETES MELLITUS WITH HYPERGLYCEMIA, WITH LONG-TERM CURRENT USE OF INSULIN (HCC): Primary | ICD-10-CM

## 2023-05-01 RX ORDER — LANCETS
1 EACH MISCELLANEOUS
Qty: 200 EACH | Refills: 11 | Status: SHIPPED | OUTPATIENT
Start: 2023-05-01

## 2023-05-01 RX ORDER — BLOOD SUGAR DIAGNOSTIC
1 STRIP MISCELLANEOUS
Qty: 200 EACH | Refills: 11 | Status: SHIPPED | OUTPATIENT
Start: 2023-05-01

## 2023-05-03 DIAGNOSIS — F41.1 GENERALIZED ANXIETY DISORDER: ICD-10-CM

## 2023-05-08 RX ORDER — HYDROXYZINE PAMOATE 50 MG/1
CAPSULE ORAL
Qty: 120 CAPSULE | Refills: 0 | Status: SHIPPED | OUTPATIENT
Start: 2023-05-08

## 2023-05-11 ENCOUNTER — HOSPITAL ENCOUNTER (EMERGENCY)
Age: 53
Discharge: LEFT BEFORE DISCHARGE | End: 2023-05-11
Attending: STUDENT IN AN ORGANIZED HEALTH CARE EDUCATION/TRAINING PROGRAM
Payer: COMMERCIAL

## 2023-05-11 VITALS
TEMPERATURE: 98.5 F | DIASTOLIC BLOOD PRESSURE: 78 MMHG | BODY MASS INDEX: 13.33 KG/M2 | RESPIRATION RATE: 20 BRPM | SYSTOLIC BLOOD PRESSURE: 119 MMHG | OXYGEN SATURATION: 98 % | HEART RATE: 92 BPM | WEIGHT: 80 LBS | HEIGHT: 65 IN

## 2023-05-11 DIAGNOSIS — R74.8 ELEVATED ALKALINE PHOSPHATASE LEVEL: ICD-10-CM

## 2023-05-11 DIAGNOSIS — E13.10 DIABETIC KETOACIDOSIS WITHOUT COMA ASSOCIATED WITH OTHER SPECIFIED DIABETES MELLITUS (HCC): ICD-10-CM

## 2023-05-11 DIAGNOSIS — R79.89 ELEVATED LFTS: ICD-10-CM

## 2023-05-11 DIAGNOSIS — F14.93 COCAINE WITHDRAWAL (HCC): Primary | ICD-10-CM

## 2023-05-11 LAB
ALBUMIN SERPL-MCNC: 4.1 G/DL (ref 3.5–5.2)
ALP SERPL-CCNC: 863 U/L (ref 35–104)
ALT SERPL-CCNC: 45 U/L (ref 0–32)
ANION GAP SERPL CALCULATED.3IONS-SCNC: 18 MMOL/L (ref 7–16)
APAP SERPL-MCNC: <5 MCG/ML (ref 10–30)
AST SERPL-CCNC: 37 U/L (ref 0–31)
BASOPHILS # BLD: 0.02 E9/L (ref 0–0.2)
BASOPHILS NFR BLD: 0.6 % (ref 0–2)
BETA-HYDROXYBUTYRATE: 4.16 MMOL/L (ref 0.02–0.27)
BILIRUB SERPL-MCNC: 0.8 MG/DL (ref 0–1.2)
BUN SERPL-MCNC: 17 MG/DL (ref 6–20)
CALCIUM SERPL-MCNC: 10.1 MG/DL (ref 8.6–10.2)
CHLORIDE SERPL-SCNC: 90 MMOL/L (ref 98–107)
CO2 SERPL-SCNC: 20 MMOL/L (ref 22–29)
CREAT SERPL-MCNC: 0.5 MG/DL (ref 0.5–1)
EOSINOPHIL # BLD: 0.07 E9/L (ref 0.05–0.5)
EOSINOPHIL NFR BLD: 2.1 % (ref 0–6)
ERYTHROCYTE [DISTWIDTH] IN BLOOD BY AUTOMATED COUNT: 14.6 FL (ref 11.5–15)
ETHANOLAMINE SERPL-MCNC: <10 MG/DL (ref 0–0.08)
GLUCOSE SERPL-MCNC: 533 MG/DL (ref 74–99)
HCT VFR BLD AUTO: 39.9 % (ref 34–48)
HGB BLD-MCNC: 14 G/DL (ref 11.5–15.5)
IMM GRANULOCYTES # BLD: 0.01 E9/L
IMM GRANULOCYTES NFR BLD: 0.3 % (ref 0–5)
LYMPHOCYTES # BLD: 0.6 E9/L (ref 1.5–4)
LYMPHOCYTES NFR BLD: 17.9 % (ref 20–42)
MCH RBC QN AUTO: 31.1 PG (ref 26–35)
MCHC RBC AUTO-ENTMCNC: 35.1 % (ref 32–34.5)
MCV RBC AUTO: 88.7 FL (ref 80–99.9)
MONOCYTES # BLD: 0.21 E9/L (ref 0.1–0.95)
MONOCYTES NFR BLD: 6.3 % (ref 2–12)
NEUTROPHILS # BLD: 2.45 E9/L (ref 1.8–7.3)
NEUTS SEG NFR BLD: 72.8 % (ref 43–80)
PLATELET # BLD AUTO: 79 E9/L (ref 130–450)
PLATELET CONFIRMATION: NORMAL
PMV BLD AUTO: 11.1 FL (ref 7–12)
POTASSIUM SERPL-SCNC: 5.2 MMOL/L (ref 3.5–5)
PROT SERPL-MCNC: 8.2 G/DL (ref 6.4–8.3)
RBC # BLD AUTO: 4.5 E12/L (ref 3.5–5.5)
SALICYLATES SERPL-MCNC: <0.3 MG/DL (ref 0–30)
SODIUM SERPL-SCNC: 128 MMOL/L (ref 132–146)
TRICYCLIC ANTIDEPRESSANTS SCREEN SERUM: NEGATIVE NG/ML
TROPONIN, HIGH SENSITIVITY: 7 NG/L (ref 0–9)
WBC # BLD: 3.4 E9/L (ref 4.5–11.5)

## 2023-05-11 PROCEDURE — 80053 COMPREHEN METABOLIC PANEL: CPT

## 2023-05-11 PROCEDURE — 96374 THER/PROPH/DIAG INJ IV PUSH: CPT

## 2023-05-11 PROCEDURE — 85025 COMPLETE CBC W/AUTO DIFF WBC: CPT

## 2023-05-11 PROCEDURE — 80307 DRUG TEST PRSMV CHEM ANLYZR: CPT

## 2023-05-11 PROCEDURE — 6360000002 HC RX W HCPCS: Performed by: STUDENT IN AN ORGANIZED HEALTH CARE EDUCATION/TRAINING PROGRAM

## 2023-05-11 PROCEDURE — 80179 DRUG ASSAY SALICYLATE: CPT

## 2023-05-11 PROCEDURE — 82077 ASSAY SPEC XCP UR&BREATH IA: CPT

## 2023-05-11 PROCEDURE — 80143 DRUG ASSAY ACETAMINOPHEN: CPT

## 2023-05-11 PROCEDURE — 93005 ELECTROCARDIOGRAM TRACING: CPT | Performed by: STUDENT IN AN ORGANIZED HEALTH CARE EDUCATION/TRAINING PROGRAM

## 2023-05-11 PROCEDURE — 84484 ASSAY OF TROPONIN QUANT: CPT

## 2023-05-11 PROCEDURE — 36415 COLL VENOUS BLD VENIPUNCTURE: CPT

## 2023-05-11 PROCEDURE — 99284 EMERGENCY DEPT VISIT MOD MDM: CPT

## 2023-05-11 PROCEDURE — 82010 KETONE BODYS QUAN: CPT

## 2023-05-11 RX ORDER — 0.9 % SODIUM CHLORIDE 0.9 %
1000 INTRAVENOUS SOLUTION INTRAVENOUS ONCE
Status: DISCONTINUED | OUTPATIENT
Start: 2023-05-11 | End: 2023-05-12 | Stop reason: HOSPADM

## 2023-05-11 RX ORDER — PROMETHAZINE HYDROCHLORIDE 12.5 MG/1
12.5 TABLET ORAL ONCE
Status: DISCONTINUED | OUTPATIENT
Start: 2023-05-11 | End: 2023-05-12 | Stop reason: HOSPADM

## 2023-05-11 RX ORDER — KETOROLAC TROMETHAMINE 30 MG/ML
15 INJECTION, SOLUTION INTRAMUSCULAR; INTRAVENOUS ONCE
Status: COMPLETED | OUTPATIENT
Start: 2023-05-11 | End: 2023-05-11

## 2023-05-11 RX ADMIN — KETOROLAC TROMETHAMINE 15 MG: 30 INJECTION, SOLUTION INTRAMUSCULAR; INTRAVENOUS at 21:26

## 2023-05-11 ASSESSMENT — ENCOUNTER SYMPTOMS
SHORTNESS OF BREATH: 0
ABDOMINAL DISTENTION: 0
NAUSEA: 1
ABDOMINAL PAIN: 0
DIARRHEA: 0
PHOTOPHOBIA: 0
CHEST TIGHTNESS: 0
COUGH: 0
VOMITING: 0

## 2023-05-11 ASSESSMENT — PAIN - FUNCTIONAL ASSESSMENT: PAIN_FUNCTIONAL_ASSESSMENT: NONE - DENIES PAIN

## 2023-05-11 ASSESSMENT — PAIN SCALES - GENERAL: PAINLEVEL_OUTOF10: 1

## 2023-05-12 LAB
EKG ATRIAL RATE: 75 BPM
EKG P AXIS: 81 DEGREES
EKG P-R INTERVAL: 132 MS
EKG Q-T INTERVAL: 404 MS
EKG QRS DURATION: 86 MS
EKG QTC CALCULATION (BAZETT): 451 MS
EKG R AXIS: 44 DEGREES
EKG T AXIS: 81 DEGREES
EKG VENTRICULAR RATE: 75 BPM

## 2023-05-12 PROCEDURE — 93010 ELECTROCARDIOGRAM REPORT: CPT | Performed by: INTERNAL MEDICINE

## 2023-05-12 NOTE — ED PROVIDER NOTES
Hysterectomy (2004); Ovary removal (Bilateral, 2007); shoulder surgery (Left, 2003); Cholecystectomy (2010); ERCP; Abdomen surgery; Colonoscopy; Upper gastrointestinal endoscopy; other surgical history (Left, 04/25/2017); Breast biopsy (Left); Pancreas Biopsy; Endoscopy, colon, diagnostic; other surgical history (10/05/2017); Colonoscopy (N/A, 10/2/2018); and Mastectomy (Left). Social History:  reports that she has been smoking cigarettes. She started smoking about 41 years ago. She has a 60.00 pack-year smoking history. She has never used smokeless tobacco. She reports that she does not currently use alcohol. She reports current drug use. Drug: Cocaine. Family History: family history includes Cancer in her mother; Colon Cancer in her maternal grandfather; Diabetes in her mother; No Known Problems in her father. The patients home medications have been reviewed.     Allergies: Dye [iodides] and Tylenol [acetaminophen]    -------------------------------------------------- RESULTS -------------------------------------------------  Labs:  Results for orders placed or performed during the hospital encounter of 05/11/23   CBC with Auto Differential   Result Value Ref Range    WBC 3.4 (L) 4.5 - 11.5 E9/L    RBC 4.50 3.50 - 5.50 E12/L    Hemoglobin 14.0 11.5 - 15.5 g/dL    Hematocrit 39.9 34.0 - 48.0 %    MCV 88.7 80.0 - 99.9 fL    MCH 31.1 26.0 - 35.0 pg    MCHC 35.1 (H) 32.0 - 34.5 %    RDW 14.6 11.5 - 15.0 fL    Platelets 79 (L) 715 - 450 E9/L    MPV 11.1 7.0 - 12.0 fL    Neutrophils % 72.8 43.0 - 80.0 %    Immature Granulocytes % 0.3 0.0 - 5.0 %    Lymphocytes % 17.9 (L) 20.0 - 42.0 %    Monocytes % 6.3 2.0 - 12.0 %    Eosinophils % 2.1 0.0 - 6.0 %    Basophils % 0.6 0.0 - 2.0 %    Neutrophils Absolute 2.45 1.80 - 7.30 E9/L    Immature Granulocytes # 0.01 E9/L    Lymphocytes Absolute 0.60 (L) 1.50 - 4.00 E9/L    Monocytes Absolute 0.21 0.10 - 0.95 E9/L    Eosinophils Absolute 0.07 0.05 - 0.50 E9/L    Basophils

## 2023-05-17 ENCOUNTER — OFFICE VISIT (OUTPATIENT)
Dept: FAMILY MEDICINE CLINIC | Age: 53
End: 2023-05-17
Payer: COMMERCIAL

## 2023-05-17 VITALS
HEART RATE: 76 BPM | OXYGEN SATURATION: 99 % | HEIGHT: 65 IN | DIASTOLIC BLOOD PRESSURE: 60 MMHG | RESPIRATION RATE: 20 BRPM | BODY MASS INDEX: 12.66 KG/M2 | SYSTOLIC BLOOD PRESSURE: 112 MMHG | WEIGHT: 76 LBS

## 2023-05-17 DIAGNOSIS — F14.23: Primary | ICD-10-CM

## 2023-05-17 DIAGNOSIS — E11.65 UNCONTROLLED TYPE 2 DIABETES MELLITUS WITH HYPERGLYCEMIA (HCC): ICD-10-CM

## 2023-05-17 PROCEDURE — G8427 DOCREV CUR MEDS BY ELIG CLIN: HCPCS | Performed by: FAMILY MEDICINE

## 2023-05-17 PROCEDURE — 4004F PT TOBACCO SCREEN RCVD TLK: CPT | Performed by: FAMILY MEDICINE

## 2023-05-17 PROCEDURE — 3046F HEMOGLOBIN A1C LEVEL >9.0%: CPT | Performed by: FAMILY MEDICINE

## 2023-05-17 PROCEDURE — 2022F DILAT RTA XM EVC RTNOPTHY: CPT | Performed by: FAMILY MEDICINE

## 2023-05-17 PROCEDURE — 99214 OFFICE O/P EST MOD 30 MIN: CPT | Performed by: FAMILY MEDICINE

## 2023-05-17 PROCEDURE — G8419 CALC BMI OUT NRM PARAM NOF/U: HCPCS | Performed by: FAMILY MEDICINE

## 2023-05-17 PROCEDURE — 3017F COLORECTAL CA SCREEN DOC REV: CPT | Performed by: FAMILY MEDICINE

## 2023-05-17 RX ORDER — LANOLIN ALCOHOL/MO/W.PET/CERES
3 CREAM (GRAM) TOPICAL DAILY
COMMUNITY

## 2023-05-17 RX ORDER — LANOLIN ALCOHOL/MO/W.PET/CERES
100 CREAM (GRAM) TOPICAL DAILY
COMMUNITY
Start: 2023-05-02

## 2023-05-17 ASSESSMENT — ENCOUNTER SYMPTOMS
HEMATOCHEZIA: 0
VOMITING: 1
DIARRHEA: 0
ABDOMINAL PAIN: 1
NAUSEA: 1

## 2023-05-17 NOTE — PATIENT INSTRUCTIONS
Make sure you are taking Lantus 25 units nightly with daytime Humalog as prescribed, even if not eating normal amount of food. This will prevent blood sugar from going up to 500.

## 2023-05-17 NOTE — PROGRESS NOTES
[address] [facility], [address]   [address] [facility]   Korey Hammonds MD     Patient: Katherine Brunner Birth: 1970  Visit Date: 5/17/23    Katya Goodwin is a 48y.o. year old female here today for   Chief Complaint   Patient presents with    Abdominal Pain     Vomiting   4 days was in hospital ER , states she has not smoked crack for 2 days    Drug Problem       HPI  Abdominal Pain  This is a new problem. The pain is located in the generalized abdominal region. The abdominal pain does not radiate. Associated symptoms include anorexia, nausea and vomiting. Pertinent negatives include no diarrhea or hematochezia. Drug Problem  The patient's primary symptoms include weakness. Suspected agents include crack. Associated symptoms include nausea and vomiting. Patient has Type 2 Diabetes but has not been taking Lantus 25 untis nightly for quite a while. Just resumed her humalog today. Patient's blood sugars have been 500 and was 533 in ED. Recent lab results reviewed, including CMP, CBC and serum drug screen which are remarkable for hyperglycemia with glucose of 533. Review of Systems   Gastrointestinal:  Positive for abdominal pain, anorexia, nausea and vomiting. Negative for diarrhea and hematochezia. Neurological:  Positive for weakness. Past medical, surgical, social and/or family historyreviewed, updated as needed, and are non-contributory (unless otherwise stated). Medications, allergies, and problem list also reviewed and updated as needed in patient's record. Current Outpatient Medications   Medication Sig Dispense Refill    insulin lispro (HUMALOG) 100 UNIT/ML injection cartridge Per scale and carb ratio.  A max of 100 units/day 90 mL 3    insulin glargine (LANTUS SOLOSTAR) 100 UNIT/ML injection pen Inject 25 Units into the skin 2 times daily 5 Adjustable Dose Pre-filled Pen Syringe 2    thiamine 100 MG tablet Take 1 tablet by mouth

## 2023-05-19 PROBLEM — K86.1 ACUTE ON CHRONIC PANCREATITIS (HCC): Status: RESOLVED | Noted: 2017-05-30 | Resolved: 2023-05-19

## 2023-05-19 PROBLEM — E08.10 DIABETIC KETOACIDOSIS WITHOUT COMA ASSOCIATED WITH DIABETES MELLITUS DUE TO UNDERLYING CONDITION (HCC): Status: RESOLVED | Noted: 2018-09-22 | Resolved: 2023-05-19

## 2023-05-19 PROBLEM — E11.9 DM2 (DIABETES MELLITUS, TYPE 2) (HCC): Chronic | Status: RESOLVED | Noted: 2017-05-30 | Resolved: 2023-05-19

## 2023-05-19 PROBLEM — K29.00 ACUTE GASTRITIS: Status: RESOLVED | Noted: 2017-05-30 | Resolved: 2023-05-19

## 2023-05-19 PROBLEM — K85.90 ACUTE ON CHRONIC PANCREATITIS (HCC): Status: RESOLVED | Noted: 2017-05-30 | Resolved: 2023-05-19

## 2023-05-19 PROBLEM — J96.01 ACUTE RESPIRATORY FAILURE WITH HYPOXIA (HCC): Status: RESOLVED | Noted: 2020-02-15 | Resolved: 2023-05-19

## 2023-05-19 PROBLEM — F14.23 COCAINE DEPENDENCE WITH WITHDRAWAL (HCC): Status: ACTIVE | Noted: 2023-05-19

## 2023-05-19 RX ORDER — INSULIN LISPRO 100 [IU]/ML
INJECTION, SOLUTION INTRAVENOUS; SUBCUTANEOUS
Qty: 90 ML | Refills: 3
Start: 2023-05-19

## 2023-05-19 RX ORDER — INSULIN GLARGINE 100 [IU]/ML
25 INJECTION, SOLUTION SUBCUTANEOUS 2 TIMES DAILY
Qty: 5 ADJUSTABLE DOSE PRE-FILLED PEN SYRINGE | Refills: 2
Start: 2023-05-19

## 2023-05-21 ENCOUNTER — APPOINTMENT (OUTPATIENT)
Dept: CT IMAGING | Age: 53
End: 2023-05-21
Payer: COMMERCIAL

## 2023-05-21 ENCOUNTER — APPOINTMENT (OUTPATIENT)
Dept: GENERAL RADIOLOGY | Age: 53
End: 2023-05-21
Payer: COMMERCIAL

## 2023-05-21 ENCOUNTER — HOSPITAL ENCOUNTER (EMERGENCY)
Age: 53
Discharge: HOME | End: 2023-05-22
Attending: EMERGENCY MEDICINE
Payer: COMMERCIAL

## 2023-05-21 DIAGNOSIS — R10.9 FLANK PAIN: Primary | ICD-10-CM

## 2023-05-21 DIAGNOSIS — D61.818 PANCYTOPENIA (HCC): ICD-10-CM

## 2023-05-21 DIAGNOSIS — F14.10 COCAINE USE DISORDER (HCC): ICD-10-CM

## 2023-05-21 DIAGNOSIS — R73.9 HYPERGLYCEMIA: ICD-10-CM

## 2023-05-21 DIAGNOSIS — K70.31 ALCOHOLIC CIRRHOSIS OF LIVER WITH ASCITES (HCC): ICD-10-CM

## 2023-05-21 LAB
BACTERIA URNS QL MICRO: ABNORMAL /HPF
BILIRUB UR QL STRIP: NEGATIVE
CLARITY UR: ABNORMAL
COLOR UR: YELLOW
GLUCOSE UR STRIP-MCNC: >=1000 MG/DL
HGB UR QL STRIP: NEGATIVE
KETONES UR STRIP-MCNC: NEGATIVE MG/DL
LEUKOCYTE ESTERASE UR QL STRIP: NEGATIVE
NITRITE UR QL STRIP: POSITIVE
PH UR STRIP: 5.5 [PH] (ref 5–9)
PROT UR STRIP-MCNC: NEGATIVE MG/DL
RBC #/AREA URNS HPF: ABNORMAL /HPF (ref 0–2)
SP GR UR STRIP: <=1.005 (ref 1–1.03)
UROBILINOGEN UR STRIP-ACNC: 0.2 E.U./DL
WBC #/AREA URNS HPF: ABNORMAL /HPF (ref 0–5)

## 2023-05-21 PROCEDURE — 73070 X-RAY EXAM OF ELBOW: CPT

## 2023-05-21 PROCEDURE — 93005 ELECTROCARDIOGRAM TRACING: CPT | Performed by: EMERGENCY MEDICINE

## 2023-05-21 PROCEDURE — 87077 CULTURE AEROBIC IDENTIFY: CPT

## 2023-05-21 PROCEDURE — 81001 URINALYSIS AUTO W/SCOPE: CPT

## 2023-05-21 PROCEDURE — 74176 CT ABD & PELVIS W/O CONTRAST: CPT

## 2023-05-21 PROCEDURE — 70450 CT HEAD/BRAIN W/O DYE: CPT

## 2023-05-21 PROCEDURE — 73502 X-RAY EXAM HIP UNI 2-3 VIEWS: CPT

## 2023-05-21 PROCEDURE — 87088 URINE BACTERIA CULTURE: CPT

## 2023-05-21 PROCEDURE — 72125 CT NECK SPINE W/O DYE: CPT

## 2023-05-21 PROCEDURE — 71045 X-RAY EXAM CHEST 1 VIEW: CPT

## 2023-05-21 PROCEDURE — 99285 EMERGENCY DEPT VISIT HI MDM: CPT

## 2023-05-21 PROCEDURE — 87186 SC STD MICRODIL/AGAR DIL: CPT

## 2023-05-21 PROCEDURE — 73030 X-RAY EXAM OF SHOULDER: CPT

## 2023-05-21 RX ORDER — SODIUM CHLORIDE 9 MG/ML
INJECTION, SOLUTION INTRAVENOUS ONCE
Status: DISCONTINUED | OUTPATIENT
Start: 2023-05-21 | End: 2023-05-22 | Stop reason: HOSPADM

## 2023-05-21 RX ORDER — 0.9 % SODIUM CHLORIDE 0.9 %
1000 INTRAVENOUS SOLUTION INTRAVENOUS ONCE
Status: DISCONTINUED | OUTPATIENT
Start: 2023-05-21 | End: 2023-05-22 | Stop reason: HOSPADM

## 2023-05-21 ASSESSMENT — PAIN SCALES - GENERAL: PAINLEVEL_OUTOF10: 8

## 2023-05-21 ASSESSMENT — PAIN DESCRIPTION - PAIN TYPE: TYPE: ACUTE PAIN

## 2023-05-21 ASSESSMENT — PAIN - FUNCTIONAL ASSESSMENT
PAIN_FUNCTIONAL_ASSESSMENT: 0-10
PAIN_FUNCTIONAL_ASSESSMENT_SITE2: INTOLERABLE, UNABLE TO DO ANY ACTIVE OR PASSIVE ACTIVITIES
PAIN_FUNCTIONAL_ASSESSMENT: INTOLERABLE, UNABLE TO DO ANY ACTIVE OR PASSIVE ACTIVITIES

## 2023-05-21 ASSESSMENT — PAIN DESCRIPTION - LOCATION
LOCATION_2: HIP
LOCATION_3: SACRUM
LOCATION: BACK

## 2023-05-21 ASSESSMENT — PAIN DESCRIPTION - INTENSITY
RATING_2: 10
RATING_3: 10

## 2023-05-21 ASSESSMENT — PAIN DESCRIPTION - DESCRIPTORS
DESCRIPTORS_2: ACHING
DESCRIPTORS: BURNING;DISCOMFORT
DESCRIPTORS_3: ACHING

## 2023-05-21 ASSESSMENT — PAIN DESCRIPTION - ORIENTATION
ORIENTATION: RIGHT;LEFT;LOWER
ORIENTATION_2: LEFT

## 2023-05-21 ASSESSMENT — PAIN DESCRIPTION - ONSET: ONSET: ON-GOING

## 2023-05-21 ASSESSMENT — PAIN DESCRIPTION - FREQUENCY: FREQUENCY: CONTINUOUS

## 2023-05-21 ASSESSMENT — LIFESTYLE VARIABLES
HOW OFTEN DO YOU HAVE A DRINK CONTAINING ALCOHOL: MONTHLY OR LESS
HOW MANY STANDARD DRINKS CONTAINING ALCOHOL DO YOU HAVE ON A TYPICAL DAY: 1 OR 2

## 2023-05-22 VITALS
HEART RATE: 88 BPM | TEMPERATURE: 98.8 F | DIASTOLIC BLOOD PRESSURE: 62 MMHG | RESPIRATION RATE: 16 BRPM | OXYGEN SATURATION: 100 % | SYSTOLIC BLOOD PRESSURE: 96 MMHG

## 2023-05-22 LAB
ALBUMIN SERPL-MCNC: 3.2 G/DL (ref 3.5–5.2)
ALP SERPL-CCNC: 575 U/L (ref 35–104)
ALT SERPL-CCNC: 77 U/L (ref 0–32)
AMMONIA PLAS-SCNC: 49 UMOL/L (ref 11–51)
ANION GAP SERPL CALCULATED.3IONS-SCNC: 11 MMOL/L (ref 7–16)
APAP SERPL-MCNC: <5 MCG/ML (ref 10–30)
AST SERPL-CCNC: 70 U/L (ref 0–31)
BASOPHILS # BLD: 0.02 E9/L (ref 0–0.2)
BASOPHILS NFR BLD: 0.7 % (ref 0–2)
BETA-HYDROXYBUTYRATE: 0.17 MMOL/L (ref 0.02–0.27)
BILIRUB SERPL-MCNC: 0.5 MG/DL (ref 0–1.2)
BUN SERPL-MCNC: 11 MG/DL (ref 6–20)
CALCIUM SERPL-MCNC: 8.7 MG/DL (ref 8.6–10.2)
CHLORIDE SERPL-SCNC: 103 MMOL/L (ref 98–107)
CHP ED QC CHECK: YES
CK SERPL-CCNC: 44 U/L (ref 20–180)
CO2 SERPL-SCNC: 23 MMOL/L (ref 22–29)
CREAT SERPL-MCNC: 0.4 MG/DL (ref 0.5–1)
EKG ATRIAL RATE: 82 BPM
EKG P AXIS: 72 DEGREES
EKG P-R INTERVAL: 138 MS
EKG Q-T INTERVAL: 372 MS
EKG QRS DURATION: 76 MS
EKG QTC CALCULATION (BAZETT): 434 MS
EKG R AXIS: 33 DEGREES
EKG T AXIS: 82 DEGREES
EKG VENTRICULAR RATE: 82 BPM
EOSINOPHIL # BLD: 0.03 E9/L (ref 0.05–0.5)
EOSINOPHIL NFR BLD: 1.1 % (ref 0–6)
ERYTHROCYTE [DISTWIDTH] IN BLOOD BY AUTOMATED COUNT: 14.6 FL (ref 11.5–15)
ETHANOLAMINE SERPL-MCNC: <10 MG/DL (ref 0–0.08)
GLUCOSE BLD-MCNC: 303 MG/DL
GLUCOSE SERPL-MCNC: 301 MG/DL (ref 74–99)
HCT VFR BLD AUTO: 28.3 % (ref 34–48)
HGB BLD-MCNC: 9.9 G/DL (ref 11.5–15.5)
IMM GRANULOCYTES # BLD: 0.01 E9/L
IMM GRANULOCYTES NFR BLD: 0.4 % (ref 0–5)
INR BLD: 1
LACTATE BLDV-SCNC: 1.5 MMOL/L (ref 0.5–2.2)
LIPASE: 16 U/L (ref 13–60)
LYMPHOCYTES # BLD: 0.46 E9/L (ref 1.5–4)
LYMPHOCYTES NFR BLD: 17.2 % (ref 20–42)
MAGNESIUM SERPL-MCNC: 2.4 MG/DL (ref 1.6–2.6)
MCH RBC QN AUTO: 30.8 PG (ref 26–35)
MCHC RBC AUTO-ENTMCNC: 35 % (ref 32–34.5)
MCV RBC AUTO: 88.2 FL (ref 80–99.9)
METER GLUCOSE: 303 MG/DL (ref 74–99)
MONOCYTES # BLD: 0.27 E9/L (ref 0.1–0.95)
MONOCYTES NFR BLD: 10.1 % (ref 2–12)
NEUTROPHILS # BLD: 1.88 E9/L (ref 1.8–7.3)
NEUTS SEG NFR BLD: 70.5 % (ref 43–80)
PH VENOUS: 7.52 (ref 7.35–7.45)
PLATELET # BLD AUTO: 41 E9/L (ref 130–450)
PLATELET CONFIRMATION: NORMAL
PMV BLD AUTO: 11.1 FL (ref 7–12)
POTASSIUM SERPL-SCNC: 4 MMOL/L (ref 3.5–5)
PROT SERPL-MCNC: 5.8 G/DL (ref 6.4–8.3)
PROTHROMBIN TIME: 11.2 SEC (ref 9.3–12.4)
RBC # BLD AUTO: 3.21 E12/L (ref 3.5–5.5)
SALICYLATES SERPL-MCNC: <0.3 MG/DL (ref 0–30)
SODIUM SERPL-SCNC: 137 MMOL/L (ref 132–146)
WBC # BLD: 2.7 E9/L (ref 4.5–11.5)

## 2023-05-22 PROCEDURE — 80053 COMPREHEN METABOLIC PANEL: CPT

## 2023-05-22 PROCEDURE — 93010 ELECTROCARDIOGRAM REPORT: CPT | Performed by: INTERNAL MEDICINE

## 2023-05-22 PROCEDURE — 83605 ASSAY OF LACTIC ACID: CPT

## 2023-05-22 PROCEDURE — 82010 KETONE BODYS QUAN: CPT

## 2023-05-22 PROCEDURE — 82962 GLUCOSE BLOOD TEST: CPT

## 2023-05-22 PROCEDURE — 85025 COMPLETE CBC W/AUTO DIFF WBC: CPT

## 2023-05-22 PROCEDURE — 82550 ASSAY OF CK (CPK): CPT

## 2023-05-22 PROCEDURE — 82140 ASSAY OF AMMONIA: CPT

## 2023-05-22 PROCEDURE — 83690 ASSAY OF LIPASE: CPT

## 2023-05-22 PROCEDURE — 82800 BLOOD PH: CPT

## 2023-05-22 PROCEDURE — 36415 COLL VENOUS BLD VENIPUNCTURE: CPT

## 2023-05-22 PROCEDURE — 6370000000 HC RX 637 (ALT 250 FOR IP): Performed by: EMERGENCY MEDICINE

## 2023-05-22 PROCEDURE — 83735 ASSAY OF MAGNESIUM: CPT

## 2023-05-22 PROCEDURE — 85610 PROTHROMBIN TIME: CPT

## 2023-05-22 RX ORDER — TERCONAZOLE 80 MG/1
80 SUPPOSITORY VAGINAL NIGHTLY
Qty: 3 SUPPOSITORY | Refills: 0 | Status: SHIPPED | OUTPATIENT
Start: 2023-05-22 | End: 2023-05-25

## 2023-05-22 RX ORDER — CEFDINIR 300 MG/1
300 CAPSULE ORAL 2 TIMES DAILY
Qty: 14 CAPSULE | Refills: 0 | Status: SHIPPED | OUTPATIENT
Start: 2023-05-22 | End: 2023-05-29

## 2023-05-22 RX ADMIN — Medication: at 01:16

## 2023-05-22 ASSESSMENT — ENCOUNTER SYMPTOMS
PHOTOPHOBIA: 0
NAUSEA: 0
VOMITING: 0
BACK PAIN: 1
SHORTNESS OF BREATH: 0
RHINORRHEA: 0
ABDOMINAL PAIN: 0
COUGH: 0

## 2023-05-22 NOTE — ED NOTES
Unable to obtain IV access. Dr Kelsey Estrella aware and ok to do an arterial stick for blood samples. Labs drawn Rt radial artery after assessing presence and quality of ulnar artery. Pressure held x 5 minutes after labs drawn and DSD applied.       Jaspreet Gudino RN  05/22/23 7714

## 2023-05-22 NOTE — ED PROVIDER NOTES
[address] ENCOUNTER        Pt Name: Macario Aranda  MRN: [id number]  [place] 1970  Date of evaluation: 5/21/2023  Provider: Ash Nails DO  PCP: Bria Lucas DO  Note Started: 12:56 AM EDT 5/22/23    CHIEF COMPLAINT       Chief Complaint   Patient presents with    Flank Pain     B/l flank pain with urinary frequency that began yesterday, Also c/o yeast infection. Pale colored stool. Also reports dizziness and 5 falls within the past wk. Patient reports pain to left hip and tailbone. HISTORY OF PRESENT ILLNESS: 1 or more Elements     History from : Patient    Limitations to history : None    Macario Aranda is a 48 y.o. female who presents has history of chronic pain complains of frequent falls. Patient is an alcoholic and drinks beer regularly. She states she used crack cocaine 1 week ago. She is complaining of bilateral flank pain. She notes some urinary frequency. She noted some low back pain. She denies any fever or chills denies any cough or cold symptoms denies any chest pain or shortness of breath. She actually is denying any abdominal pain today. She states she is urinating. She states her stool is pale in color. She denies any cough or cold symptoms she denies any shortness of breath she denies any chest pain denies any headache or neck pain. She is asking to smoke a cigarette. She stated she drank beer today however her friend states she had a bloody Sandra Friar and it was mostly tomato juice. Nursing Notes were all reviewed and agreed with or any disagreements were addressed in the HPI. REVIEW OF SYSTEMS :      Review of Systems   Constitutional:  Negative for chills, fatigue and fever. HENT:  Negative for congestion, postnasal drip and rhinorrhea. Eyes:  Negative for photophobia and visual disturbance. Respiratory:  Negative for cough and shortness of breath.     Gastrointestinal:

## 2023-05-23 ENCOUNTER — OFFICE VISIT (OUTPATIENT)
Dept: FAMILY MEDICINE CLINIC | Age: 53
End: 2023-05-23
Payer: COMMERCIAL

## 2023-05-23 VITALS
DIASTOLIC BLOOD PRESSURE: 76 MMHG | BODY MASS INDEX: 15.64 KG/M2 | HEART RATE: 89 BPM | OXYGEN SATURATION: 99 % | TEMPERATURE: 97 F | WEIGHT: 94 LBS | SYSTOLIC BLOOD PRESSURE: 106 MMHG

## 2023-05-23 DIAGNOSIS — E44.0 MODERATE PROTEIN-CALORIE MALNUTRITION (HCC): ICD-10-CM

## 2023-05-23 DIAGNOSIS — R60.9 PERIPHERAL EDEMA: ICD-10-CM

## 2023-05-23 DIAGNOSIS — F31.9 BIPOLAR 1 DISORDER (HCC): ICD-10-CM

## 2023-05-23 DIAGNOSIS — R10.84 GENERALIZED ABDOMINAL PAIN: ICD-10-CM

## 2023-05-23 DIAGNOSIS — B18.2 CHRONIC HEPATITIS C WITHOUT HEPATIC COMA (HCC): ICD-10-CM

## 2023-05-23 DIAGNOSIS — K70.31 ALCOHOLIC CIRRHOSIS OF LIVER WITH ASCITES (HCC): Primary | ICD-10-CM

## 2023-05-23 PROCEDURE — 4004F PT TOBACCO SCREEN RCVD TLK: CPT | Performed by: SURGERY

## 2023-05-23 PROCEDURE — G8419 CALC BMI OUT NRM PARAM NOF/U: HCPCS | Performed by: SURGERY

## 2023-05-23 PROCEDURE — 99215 OFFICE O/P EST HI 40 MIN: CPT | Performed by: SURGERY

## 2023-05-23 PROCEDURE — 3017F COLORECTAL CA SCREEN DOC REV: CPT | Performed by: SURGERY

## 2023-05-23 PROCEDURE — G8427 DOCREV CUR MEDS BY ELIG CLIN: HCPCS | Performed by: SURGERY

## 2023-05-23 RX ORDER — ACETAMINOPHEN 500 MG
500 TABLET ORAL 2 TIMES DAILY PRN
Qty: 90 TABLET | Refills: 1 | Status: SHIPPED | OUTPATIENT
Start: 2023-05-23

## 2023-05-23 RX ORDER — FUROSEMIDE 20 MG/1
20 TABLET ORAL DAILY
Qty: 60 TABLET | Refills: 3 | Status: SHIPPED | OUTPATIENT
Start: 2023-05-23

## 2023-05-23 RX ORDER — NUT.TX.IMPAIRED DIGESTIVE FXN 0.035-1/ML
1 LIQUID (ML) ORAL 2 TIMES DAILY
Qty: 42660 ML | Refills: 0 | Status: SHIPPED | OUTPATIENT
Start: 2023-05-23

## 2023-05-23 RX ORDER — TRAMADOL HYDROCHLORIDE 50 MG/1
50 TABLET ORAL 2 TIMES DAILY PRN
Qty: 14 TABLET | Refills: 0 | Status: SHIPPED | OUTPATIENT
Start: 2023-05-23 | End: 2023-05-30

## 2023-05-23 ASSESSMENT — PATIENT HEALTH QUESTIONNAIRE - PHQ9
1. LITTLE INTEREST OR PLEASURE IN DOING THINGS: 3
8. MOVING OR SPEAKING SO SLOWLY THAT OTHER PEOPLE COULD HAVE NOTICED. OR THE OPPOSITE, BEING SO FIGETY OR RESTLESS THAT YOU HAVE BEEN MOVING AROUND A LOT MORE THAN USUAL: 2
3. TROUBLE FALLING OR STAYING ASLEEP: 2
6. FEELING BAD ABOUT YOURSELF - OR THAT YOU ARE A FAILURE OR HAVE LET YOURSELF OR YOUR FAMILY DOWN: 0
4. FEELING TIRED OR HAVING LITTLE ENERGY: 3
2. FEELING DOWN, DEPRESSED OR HOPELESS: 3
9. THOUGHTS THAT YOU WOULD BE BETTER OFF DEAD, OR OF HURTING YOURSELF: 0
SUM OF ALL RESPONSES TO PHQ QUESTIONS 1-9: 19
SUM OF ALL RESPONSES TO PHQ9 QUESTIONS 1 & 2: 6
5. POOR APPETITE OR OVEREATING: 3
SUM OF ALL RESPONSES TO PHQ QUESTIONS 1-9: 19
SUM OF ALL RESPONSES TO PHQ QUESTIONS 1-9: 19
7. TROUBLE CONCENTRATING ON THINGS, SUCH AS READING THE NEWSPAPER OR WATCHING TELEVISION: 3
10. IF YOU CHECKED OFF ANY PROBLEMS, HOW DIFFICULT HAVE THESE PROBLEMS MADE IT FOR YOU TO DO YOUR WORK, TAKE CARE OF THINGS AT HOME, OR GET ALONG WITH OTHER PEOPLE: 0
SUM OF ALL RESPONSES TO PHQ QUESTIONS 1-9: 19

## 2023-05-23 NOTE — PROGRESS NOTES
Sebastián Kaur (:  1970) is a 48 y.o. female,Established patient, here for evaluation of the following chief complaint(s):  Gastroesophageal Reflux, ADHD, Depression, Health Maintenance (Due for pneumonia and shingles vaccine. HIV, Hep A, Hep B screen. Due for Diabetic foot exam /Diabetic eye exam done last week([facility])), Follow-up (She went to [facility] ER for pain in back on 23. Dx: UTI, prescribed antibiotic. Liver levels bad recommended she f/u with her Gastroenterologist), and Swelling (Legs and feet swelling on and off for years. Swelling has worsened over the past few months. Numbness)         ASSESSMENT/PLAN:  1. Alcoholic cirrhosis of liver with ascites ([facility] [address]  -     External Referral To Gastroenterology  -     traMADol (ULTRAM) 50 MG tablet; Take 1 tablet by mouth 2 times daily as needed for Pain for up to 7 days. Intended supply: 7 days. Take lowest dose possible to manage pain. Start at 25mg once to twice daily for pain > 7. Max Daily Amount: 100 mg, Disp-14 tablet, R-0Normal  -     acetaminophen (TYLENOL) 500 MG tablet; Take 1 tablet by mouth 2 times daily as needed for Pain MAX 1000mg (one gram) per day., Disp-90 tablet, R-1Normal  -     furosemide (LASIX) 20 MG tablet; Take 1 tablet by mouth daily, Disp-60 tablet, R-3Normal  -     Basic Metabolic Panel; Future  -     Nutritional Supplements (GLUCERNA 1.5 RADHA/CARBSTEADY) LIQD; Take 1 each by mouth in the morning and at bedtime, Disp-27961 mL, R-0Normal  -     Elastic Bandages & Supports (MEDICAL COMPRESSION STOCKINGS) MISC; DAILY Starting Tue 2023, Disp-1 each, R-0, Normal7 pairs. 8 to 15mmHg. Medically necessary. 2. Generalized abdominal pain  -     traMADol (ULTRAM) 50 MG tablet; Take 1 tablet by mouth 2 times daily as needed for Pain for up to 7 days. Intended supply: 7 days. Take lowest dose possible to manage pain. Start at 25mg once to twice daily for pain > 7.  Max Daily Amount: 100 mg, Disp-14 tablet,

## 2023-05-23 NOTE — PATIENT INSTRUCTIONS
Cirrhotic patients are at increased risk of acquiring food-borne infections; we recommend that all foods that you eat from animal organs should be fully cooked. This is especially true for seafood and poultry. Raw seafood, especially shellfish, should not be eaten. Only pasteurized milk and juices should be consumed. No Islam foods. Patients with cirrhosis require normal amounts of protein but may poorly tolerate excessive amounts. We recommend that your protein intake be around 1.2gm per kilogram of body weight but divided into meals taken throughout the day and not eaten in a single sitting. Cirrhotics generally have to take in at least 60 gm of protein per day to maintain nitrogen balance and muscle mass. Protein can be taken in the form of animal sources (beef, pork, fish/seafood, poultry, eggs, and milk) and from vegetable sources, such as beans and lentils. The total amount of sodium that a cirrhotic with edema or ascites can take in a day should not exceed 2,000mg per day. Sodium is present in small or moderate amounts in fresh or frozen foods but in large amounts in processed foods and canned foods, including cakes and candy bars that use salt or baking soda to enhance flavor or cause a cake mix to rise. To eat more than 2,000mg of sodium per day will make it extremely difficult for you to control ascites and edema and may force us to use higher doses of water pills that, in the end, may cause kidney failure and death. It is important that you avoid all processed foods and cured meats, such as ham, baloney, calabrese, pickles, potato chips, corn chips, and other salty snacks. Milk and cheese should be used only in moderation because they have large amounts of sodium. Using multivitamins and vitamin supplements, overall, is a good idea, with the exception of large amounts of vitamin A which can be very toxic to the liver.  If you have excessive iron in your body, taking a large amount of vitamin C

## 2023-05-24 LAB
BACTERIA UR CULT: ABNORMAL
ORGANISM: ABNORMAL

## 2023-06-07 RX ORDER — BLOOD SUGAR DIAGNOSTIC
1 STRIP MISCELLANEOUS DAILY
Qty: 150 EACH | Refills: 3 | Status: SHIPPED | OUTPATIENT
Start: 2023-06-07

## 2023-06-20 DIAGNOSIS — F32.A DEPRESSION, UNSPECIFIED DEPRESSION TYPE: ICD-10-CM

## 2023-07-03 RX ORDER — VORTIOXETINE 20 MG/1
TABLET, FILM COATED ORAL
Qty: 30 TABLET | Refills: 0 | Status: SHIPPED | OUTPATIENT
Start: 2023-07-03

## 2023-07-03 NOTE — TELEPHONE ENCOUNTER
Spoke with patient she has not had a follow up appointment with psych. She states she will call her insurance company today to see who is covered in her area.

## 2023-07-18 DIAGNOSIS — B37.31 VAGINAL CANDIDIASIS: Primary | ICD-10-CM

## 2023-07-18 RX ORDER — GABAPENTIN 400 MG/1
400 CAPSULE ORAL 3 TIMES DAILY
Qty: 90 CAPSULE | Refills: 3 | Status: SHIPPED | OUTPATIENT
Start: 2023-07-18 | End: 2023-10-16

## 2023-07-18 NOTE — TELEPHONE ENCOUNTER
Patient called asking for Dr. Damian Yee to send in a 3 day suppository for a Yeast Infection. Patient is c/o itching and burning. I informed her that Dr. Damian Yee prefers for a patient to be evaluated and stated he is not in the ofc this afternoon, nor does he do a pelvic exam.  Patient stated Dr. Damian Yee has sent RX in once or twice before for the same problem. Patient also asked if he would send in a cream because of her discomfort. I advised patient that she can go to the [address] [place] [address]. Patient declined.     Last seen 5/23/2023  Next appt 7/24/2023  [facility]

## 2023-07-19 NOTE — TELEPHONE ENCOUNTER
Per Dr. Goran Schmitt in vaginal suppository 3 days needs exam with gyn, thanks for explaining to her.

## 2023-07-20 ENCOUNTER — APPOINTMENT (OUTPATIENT)
Dept: CT IMAGING | Age: 53
End: 2023-07-20
Payer: COMMERCIAL

## 2023-07-20 ENCOUNTER — HOSPITAL ENCOUNTER (EMERGENCY)
Age: 53
Discharge: LEFT BEFORE DISCHARGE | End: 2023-07-20
Payer: COMMERCIAL

## 2023-07-20 VITALS
SYSTOLIC BLOOD PRESSURE: 144 MMHG | BODY MASS INDEX: 15.66 KG/M2 | RESPIRATION RATE: 16 BRPM | DIASTOLIC BLOOD PRESSURE: 74 MMHG | OXYGEN SATURATION: 98 % | HEART RATE: 73 BPM | TEMPERATURE: 97.9 F | WEIGHT: 94 LBS | HEIGHT: 65 IN

## 2023-07-20 DIAGNOSIS — F14.10 COCAINE ABUSE (HCC): ICD-10-CM

## 2023-07-20 DIAGNOSIS — D61.818 PANCYTOPENIA (HCC): ICD-10-CM

## 2023-07-20 DIAGNOSIS — D70.9 NEUTROPENIA, UNSPECIFIED TYPE (HCC): ICD-10-CM

## 2023-07-20 DIAGNOSIS — R74.01 TRANSAMINITIS: ICD-10-CM

## 2023-07-20 DIAGNOSIS — K70.31 ALCOHOLIC CIRRHOSIS OF LIVER WITH ASCITES (HCC): ICD-10-CM

## 2023-07-20 DIAGNOSIS — E11.65 TYPE 2 DIABETES MELLITUS WITH HYPERGLYCEMIA, WITH LONG-TERM CURRENT USE OF INSULIN (HCC): Primary | ICD-10-CM

## 2023-07-20 DIAGNOSIS — Z79.4 TYPE 2 DIABETES MELLITUS WITH HYPERGLYCEMIA, WITH LONG-TERM CURRENT USE OF INSULIN (HCC): Primary | ICD-10-CM

## 2023-07-20 LAB
ALBUMIN SERPL-MCNC: 3.3 G/DL (ref 3.5–5.2)
ALP SERPL-CCNC: 908 U/L (ref 35–104)
ALT SERPL-CCNC: 200 U/L (ref 0–32)
AMMONIA PLAS-SCNC: 78 UMOL/L (ref 11–51)
AMPHET UR QL SCN: NEGATIVE
ANION GAP SERPL CALCULATED.3IONS-SCNC: 12 MMOL/L (ref 7–16)
APAP SERPL-MCNC: 0 UG/ML (ref 10–30)
AST SERPL-CCNC: 278 U/L (ref 0–31)
B-OH-BUTYR SERPL-MCNC: 0.13 MMOL/L (ref 0.02–0.27)
BARBITURATES UR QL SCN: NEGATIVE
BASOPHILS # BLD: 0 K/UL (ref 0–0.2)
BASOPHILS NFR BLD: 0 % (ref 0–2)
BENZODIAZ UR QL: NEGATIVE
BILIRUB SERPL-MCNC: 3.1 MG/DL (ref 0–1.2)
BILIRUB UR QL STRIP: NEGATIVE
BUN SERPL-MCNC: 11 MG/DL (ref 6–20)
BUPRENORPHINE UR QL: NEGATIVE
CALCIUM SERPL-MCNC: 9.6 MG/DL (ref 8.6–10.2)
CANNABINOIDS UR QL SCN: NEGATIVE
CHLORIDE SERPL-SCNC: 96 MMOL/L (ref 98–107)
CHP ED QC CHECK: 359
CLARITY UR: CLEAR
CO2 SERPL-SCNC: 27 MMOL/L (ref 22–29)
COCAINE UR QL SCN: POSITIVE
COLOR UR: YELLOW
CREAT SERPL-MCNC: 0.4 MG/DL (ref 0.5–1)
EOSINOPHIL # BLD: 0.02 K/UL (ref 0.05–0.5)
EOSINOPHILS RELATIVE PERCENT: 1 % (ref 0–6)
EPI CELLS #/AREA URNS HPF: ABNORMAL /HPF
ERYTHROCYTE [DISTWIDTH] IN BLOOD BY AUTOMATED COUNT: 13.1 % (ref 11.5–15)
ETHANOLAMINE SERPL-MCNC: 1 MG/DL
FENTANYL UR QL: NEGATIVE
FLUAV RNA RESP QL NAA+PROBE: NOT DETECTED
FLUBV RNA RESP QL NAA+PROBE: NOT DETECTED
GFR SERPL CREATININE-BSD FRML MDRD: >60 ML/MIN/1.73M2
GLUCOSE SERPL-MCNC: 314 MG/DL (ref 74–99)
GLUCOSE UR STRIP-MCNC: >=1000 MG/DL
HCT VFR BLD AUTO: 30.1 % (ref 34–48)
HGB BLD-MCNC: 10.6 G/DL (ref 11.5–15.5)
HGB UR QL STRIP.AUTO: NEGATIVE
IMM GRANULOCYTES # BLD AUTO: <0.03 K/UL (ref 0–0.58)
IMM GRANULOCYTES NFR BLD: 0 % (ref 0–5)
INR PPP: 1
KETONES UR STRIP-MCNC: NEGATIVE MG/DL
LACTATE BLDV-SCNC: 1 MMOL/L (ref 0.5–2.2)
LEUKOCYTE ESTERASE UR QL STRIP: NEGATIVE
LIPASE SERPL-CCNC: 8 U/L (ref 13–60)
LYMPHOCYTES NFR BLD: 0.2 K/UL (ref 1.5–4)
LYMPHOCYTES RELATIVE PERCENT: 12 % (ref 20–42)
MCH RBC QN AUTO: 31.7 PG (ref 26–35)
MCHC RBC AUTO-ENTMCNC: 35.2 G/DL (ref 32–34.5)
MCV RBC AUTO: 90.1 FL (ref 80–99.9)
METER GLUCOSE: 306 MG/DL (ref 74–99)
METER GLUCOSE: 359 MG/DL (ref 74–99)
METHADONE UR QL: NEGATIVE
MONOCYTES NFR BLD: 0.16 K/UL (ref 0.1–0.95)
MONOCYTES NFR BLD: 10 % (ref 2–12)
NEUTROPHILS NFR BLD: 77 % (ref 43–80)
NEUTS SEG NFR BLD: 1.3 K/UL (ref 1.8–7.3)
NITRITE UR QL STRIP: NEGATIVE
O2 DELIVERY DEVICE: ABNORMAL
OPIATES UR QL SCN: NEGATIVE
OXYCODONE UR QL SCN: NEGATIVE
PARTIAL THROMBOPLASTIN TIME: 27.3 SEC (ref 24.5–35.1)
PCP UR QL SCN: NEGATIVE
PH UR STRIP: 5.5 [PH] (ref 5–9)
PLATELET # BLD AUTO: 43 K/UL (ref 130–450)
PLATELET CONFIRMATION: NORMAL
PMV BLD AUTO: 10.1 FL (ref 7–12)
POC HCO3: 27.9 MMOL/L (ref 22–26)
POC O2 SATURATION: 97.8 % (ref 92–98.5)
POC PCO2: 42.9 MM HG (ref 35–45)
POC PH: 7.42 (ref 7.35–7.45)
POC PO2: 99.3 MM HG (ref 80–100)
POSITIVE BASE EXCESS, ART: 3 MMOL/L (ref 0–3)
POTASSIUM SERPL-SCNC: 4 MMOL/L (ref 3.5–5)
PROT SERPL-MCNC: 6.4 G/DL (ref 6.4–8.3)
PROT UR STRIP-MCNC: NEGATIVE MG/DL
PROTHROMBIN TIME: 11.3 SEC (ref 9.3–12.4)
RBC # BLD AUTO: 3.34 M/UL (ref 3.5–5.5)
RBC # BLD: NORMAL 10*6/UL
RBC #/AREA URNS HPF: ABNORMAL /HPF
SALICYLATES SERPL-MCNC: <0.3 MG/DL (ref 0–30)
SAMPLE SITE: ABNORMAL
SARS-COV-2 RNA RESP QL NAA+PROBE: NOT DETECTED
SODIUM SERPL-SCNC: 135 MMOL/L (ref 132–146)
SOURCE: NORMAL
SP GR UR STRIP: <1.005 (ref 1–1.03)
SPECIMEN DESCRIPTION: NORMAL
TEST INFORMATION: ABNORMAL
TOXIC TRICYCLIC SC,BLOOD: NEGATIVE
TROPONIN I SERPL HS-MCNC: 9 NG/L (ref 0–9)
UROBILINOGEN UR STRIP-ACNC: 1 EU/DL (ref 0–1)
WBC #/AREA URNS HPF: ABNORMAL /HPF
WBC OTHER # BLD: 1.7 K/UL (ref 4.5–11.5)

## 2023-07-20 PROCEDURE — 82140 ASSAY OF AMMONIA: CPT

## 2023-07-20 PROCEDURE — 85730 THROMBOPLASTIN TIME PARTIAL: CPT

## 2023-07-20 PROCEDURE — 83605 ASSAY OF LACTIC ACID: CPT

## 2023-07-20 PROCEDURE — 96374 THER/PROPH/DIAG INJ IV PUSH: CPT

## 2023-07-20 PROCEDURE — 82803 BLOOD GASES ANY COMBINATION: CPT

## 2023-07-20 PROCEDURE — 85027 COMPLETE CBC AUTOMATED: CPT

## 2023-07-20 PROCEDURE — 74177 CT ABD & PELVIS W/CONTRAST: CPT

## 2023-07-20 PROCEDURE — 85610 PROTHROMBIN TIME: CPT

## 2023-07-20 PROCEDURE — 80053 COMPREHEN METABOLIC PANEL: CPT

## 2023-07-20 PROCEDURE — 2580000003 HC RX 258: Performed by: NURSE PRACTITIONER

## 2023-07-20 PROCEDURE — 93005 ELECTROCARDIOGRAM TRACING: CPT | Performed by: NURSE PRACTITIONER

## 2023-07-20 PROCEDURE — 80179 DRUG ASSAY SALICYLATE: CPT

## 2023-07-20 PROCEDURE — 84484 ASSAY OF TROPONIN QUANT: CPT

## 2023-07-20 PROCEDURE — 83690 ASSAY OF LIPASE: CPT

## 2023-07-20 PROCEDURE — 87636 SARSCOV2 & INF A&B AMP PRB: CPT

## 2023-07-20 PROCEDURE — 99285 EMERGENCY DEPT VISIT HI MDM: CPT

## 2023-07-20 PROCEDURE — 81001 URINALYSIS AUTO W/SCOPE: CPT

## 2023-07-20 PROCEDURE — 80307 DRUG TEST PRSMV CHEM ANLYZR: CPT

## 2023-07-20 PROCEDURE — G0480 DRUG TEST DEF 1-7 CLASSES: HCPCS

## 2023-07-20 PROCEDURE — 71260 CT THORAX DX C+: CPT

## 2023-07-20 PROCEDURE — 80143 DRUG ASSAY ACETAMINOPHEN: CPT

## 2023-07-20 PROCEDURE — 82947 ASSAY GLUCOSE BLOOD QUANT: CPT

## 2023-07-20 PROCEDURE — 6360000002 HC RX W HCPCS: Performed by: NURSE PRACTITIONER

## 2023-07-20 PROCEDURE — 96375 TX/PRO/DX INJ NEW DRUG ADDON: CPT

## 2023-07-20 PROCEDURE — 82010 KETONE BODYS QUAN: CPT

## 2023-07-20 PROCEDURE — 6360000004 HC RX CONTRAST MEDICATION: Performed by: RADIOLOGY

## 2023-07-20 RX ORDER — DIPHENHYDRAMINE HYDROCHLORIDE 50 MG/ML
50 INJECTION INTRAMUSCULAR; INTRAVENOUS ONCE
Status: COMPLETED | OUTPATIENT
Start: 2023-07-20 | End: 2023-07-20

## 2023-07-20 RX ORDER — 0.9 % SODIUM CHLORIDE 0.9 %
30 INTRAVENOUS SOLUTION INTRAVENOUS ONCE
Status: COMPLETED | OUTPATIENT
Start: 2023-07-20 | End: 2023-07-20

## 2023-07-20 RX ORDER — 0.9 % SODIUM CHLORIDE 0.9 %
1000 INTRAVENOUS SOLUTION INTRAVENOUS ONCE
Status: COMPLETED | OUTPATIENT
Start: 2023-07-20 | End: 2023-07-20

## 2023-07-20 RX ORDER — ONDANSETRON 2 MG/ML
4 INJECTION INTRAMUSCULAR; INTRAVENOUS ONCE
Status: COMPLETED | OUTPATIENT
Start: 2023-07-20 | End: 2023-07-20

## 2023-07-20 RX ADMIN — WATER 125 MG: 1 INJECTION INTRAMUSCULAR; INTRAVENOUS; SUBCUTANEOUS at 17:22

## 2023-07-20 RX ADMIN — IOPAMIDOL 75 ML: 755 INJECTION, SOLUTION INTRAVENOUS at 18:29

## 2023-07-20 RX ADMIN — ONDANSETRON 4 MG: 2 INJECTION INTRAMUSCULAR; INTRAVENOUS at 13:05

## 2023-07-20 RX ADMIN — SODIUM CHLORIDE 1278 ML: 9 INJECTION, SOLUTION INTRAVENOUS at 12:59

## 2023-07-20 RX ADMIN — DIPHENHYDRAMINE HYDROCHLORIDE 50 MG: 50 INJECTION, SOLUTION INTRAMUSCULAR; INTRAVENOUS at 17:21

## 2023-07-20 RX ADMIN — SODIUM CHLORIDE 1000 ML: 9 INJECTION, SOLUTION INTRAVENOUS at 15:58

## 2023-07-20 ASSESSMENT — PAIN - FUNCTIONAL ASSESSMENT: PAIN_FUNCTIONAL_ASSESSMENT: NONE - DENIES PAIN

## 2023-07-20 NOTE — ED NOTES
[facility] [address] [facility] [address] [place] [address]  00/49/97 0690

## 2023-07-21 LAB
EKG ATRIAL RATE: 81 BPM
EKG P AXIS: 77 DEGREES
EKG P-R INTERVAL: 140 MS
EKG Q-T INTERVAL: 374 MS
EKG QRS DURATION: 88 MS
EKG QTC CALCULATION (BAZETT): 434 MS
EKG R AXIS: 46 DEGREES
EKG T AXIS: 78 DEGREES
EKG VENTRICULAR RATE: 81 BPM

## 2023-07-21 PROCEDURE — 93010 ELECTROCARDIOGRAM REPORT: CPT | Performed by: INTERNAL MEDICINE

## 2023-07-21 NOTE — DISCHARGE INSTRUCTIONS
Please make sure that you follow-up with your gastroenterologist, follow-up with your PCP. Keep an eye on your blood sugars, make sure that you are checking them frequently. Make sure that you take your medication as directed.

## 2023-07-24 ENCOUNTER — APPOINTMENT (OUTPATIENT)
Dept: CT IMAGING | Age: 53
End: 2023-07-24
Payer: COMMERCIAL

## 2023-07-24 ENCOUNTER — HOSPITAL ENCOUNTER (EMERGENCY)
Age: 53
Discharge: HOME | End: 2023-07-24
Attending: EMERGENCY MEDICINE
Payer: COMMERCIAL

## 2023-07-24 VITALS
WEIGHT: 80 LBS | RESPIRATION RATE: 11 BRPM | HEART RATE: 67 BPM | HEIGHT: 65 IN | BODY MASS INDEX: 13.33 KG/M2 | TEMPERATURE: 97.2 F | OXYGEN SATURATION: 97 % | DIASTOLIC BLOOD PRESSURE: 73 MMHG | SYSTOLIC BLOOD PRESSURE: 122 MMHG

## 2023-07-24 DIAGNOSIS — R10.84 GENERALIZED ABDOMINAL PAIN: Primary | ICD-10-CM

## 2023-07-24 LAB
ALBUMIN SERPL-MCNC: 3.8 G/DL (ref 3.5–5.2)
ALP SERPL-CCNC: 978 U/L (ref 35–104)
ALT SERPL-CCNC: 105 U/L (ref 0–32)
AMMONIA PLAS-SCNC: 52 UMOL/L (ref 11–51)
ANION GAP SERPL CALCULATED.3IONS-SCNC: 12 MMOL/L (ref 7–16)
AST SERPL-CCNC: 97 U/L (ref 0–31)
BASOPHILS # BLD: 0 K/UL (ref 0–0.2)
BASOPHILS NFR BLD: 0 % (ref 0–2)
BILIRUB SERPL-MCNC: 1.6 MG/DL (ref 0–1.2)
BILIRUB UR QL STRIP: NEGATIVE
BUN SERPL-MCNC: 9 MG/DL (ref 6–20)
CALCIUM SERPL-MCNC: 8.9 MG/DL (ref 8.6–10.2)
CHLORIDE SERPL-SCNC: 94 MMOL/L (ref 98–107)
CLARITY UR: CLEAR
CO2 SERPL-SCNC: 25 MMOL/L (ref 22–29)
COLOR UR: YELLOW
CREAT SERPL-MCNC: 0.4 MG/DL (ref 0.5–1)
EOSINOPHIL # BLD: 0.03 K/UL (ref 0.05–0.5)
EOSINOPHILS RELATIVE PERCENT: 2 % (ref 0–6)
ERYTHROCYTE [DISTWIDTH] IN BLOOD BY AUTOMATED COUNT: 13.6 % (ref 11.5–15)
GFR SERPL CREATININE-BSD FRML MDRD: >60 ML/MIN/1.73M2
GLUCOSE SERPL-MCNC: 510 MG/DL (ref 74–99)
GLUCOSE UR STRIP-MCNC: >=1000 MG/DL
HCT VFR BLD AUTO: 34.4 % (ref 34–48)
HGB BLD-MCNC: 11.5 G/DL (ref 11.5–15.5)
HGB UR QL STRIP.AUTO: NEGATIVE
INR PPP: 0.9
KETONES UR STRIP-MCNC: 15 MG/DL
LACTATE BLDV-SCNC: 1.2 MMOL/L (ref 0.5–2.2)
LEUKOCYTE ESTERASE UR QL STRIP: NEGATIVE
LIPASE SERPL-CCNC: 14 U/L (ref 13–60)
LYMPHOCYTES NFR BLD: 0.31 K/UL (ref 1.5–4)
LYMPHOCYTES RELATIVE PERCENT: 16 % (ref 20–42)
MCH RBC QN AUTO: 31.8 PG (ref 26–35)
MCHC RBC AUTO-ENTMCNC: 33.4 G/DL (ref 32–34.5)
MCV RBC AUTO: 95 FL (ref 80–99.9)
METER GLUCOSE: 306 MG/DL (ref 74–99)
METER GLUCOSE: 374 MG/DL (ref 74–99)
MONOCYTES NFR BLD: 0.16 K/UL (ref 0.1–0.95)
MONOCYTES NFR BLD: 8 % (ref 2–12)
NEUTROPHILS NFR BLD: 75 % (ref 43–80)
NEUTS SEG NFR BLD: 1.5 K/UL (ref 1.8–7.3)
NITRITE UR QL STRIP: NEGATIVE
PH UR STRIP: 6 [PH] (ref 5–9)
PLATELET CONFIRMATION: NORMAL
PLATELET, FLUORESCENCE: 66 K/UL (ref 130–450)
PMV BLD AUTO: 11.3 FL (ref 7–12)
POTASSIUM SERPL-SCNC: 4 MMOL/L (ref 3.5–5)
PROT SERPL-MCNC: 7.4 G/DL (ref 6.4–8.3)
PROT UR STRIP-MCNC: NEGATIVE MG/DL
PROTHROMBIN TIME: 10.4 SEC (ref 9.3–12.4)
RBC # BLD AUTO: 3.62 M/UL (ref 3.5–5.5)
RBC #/AREA URNS HPF: ABNORMAL /HPF
SODIUM SERPL-SCNC: 131 MMOL/L (ref 132–146)
SP GR UR STRIP: <1.005 (ref 1–1.03)
UROBILINOGEN UR STRIP-ACNC: 1 EU/DL (ref 0–1)
WBC #/AREA URNS HPF: ABNORMAL /HPF
WBC OTHER # BLD: 2 K/UL (ref 4.5–11.5)

## 2023-07-24 PROCEDURE — 85027 COMPLETE CBC AUTOMATED: CPT

## 2023-07-24 PROCEDURE — 83690 ASSAY OF LIPASE: CPT

## 2023-07-24 PROCEDURE — 82140 ASSAY OF AMMONIA: CPT

## 2023-07-24 PROCEDURE — 82947 ASSAY GLUCOSE BLOOD QUANT: CPT

## 2023-07-24 PROCEDURE — 81001 URINALYSIS AUTO W/SCOPE: CPT

## 2023-07-24 PROCEDURE — 85610 PROTHROMBIN TIME: CPT

## 2023-07-24 PROCEDURE — 6360000002 HC RX W HCPCS: Performed by: EMERGENCY MEDICINE

## 2023-07-24 PROCEDURE — 6370000000 HC RX 637 (ALT 250 FOR IP): Performed by: EMERGENCY MEDICINE

## 2023-07-24 PROCEDURE — 83605 ASSAY OF LACTIC ACID: CPT

## 2023-07-24 PROCEDURE — 99284 EMERGENCY DEPT VISIT MOD MDM: CPT

## 2023-07-24 PROCEDURE — 74176 CT ABD & PELVIS W/O CONTRAST: CPT

## 2023-07-24 PROCEDURE — 96375 TX/PRO/DX INJ NEW DRUG ADDON: CPT

## 2023-07-24 PROCEDURE — 2580000003 HC RX 258: Performed by: EMERGENCY MEDICINE

## 2023-07-24 PROCEDURE — 96374 THER/PROPH/DIAG INJ IV PUSH: CPT

## 2023-07-24 PROCEDURE — 93005 ELECTROCARDIOGRAM TRACING: CPT | Performed by: EMERGENCY MEDICINE

## 2023-07-24 PROCEDURE — 80053 COMPREHEN METABOLIC PANEL: CPT

## 2023-07-24 RX ORDER — ONDANSETRON 2 MG/ML
4 INJECTION INTRAMUSCULAR; INTRAVENOUS ONCE
Status: COMPLETED | OUTPATIENT
Start: 2023-07-24 | End: 2023-07-24

## 2023-07-24 RX ORDER — DICYCLOMINE HYDROCHLORIDE 10 MG/1
10 CAPSULE ORAL EVERY 6 HOURS PRN
Qty: 20 CAPSULE | Refills: 0 | Status: SHIPPED | OUTPATIENT
Start: 2023-07-24 | End: 2023-07-29

## 2023-07-24 RX ORDER — FENTANYL CITRATE 50 UG/ML
50 INJECTION, SOLUTION INTRAMUSCULAR; INTRAVENOUS ONCE
Status: COMPLETED | OUTPATIENT
Start: 2023-07-24 | End: 2023-07-24

## 2023-07-24 RX ORDER — 0.9 % SODIUM CHLORIDE 0.9 %
1000 INTRAVENOUS SOLUTION INTRAVENOUS ONCE
Status: COMPLETED | OUTPATIENT
Start: 2023-07-24 | End: 2023-07-24

## 2023-07-24 RX ORDER — MORPHINE SULFATE 4 MG/ML
4 INJECTION, SOLUTION INTRAMUSCULAR; INTRAVENOUS ONCE
Status: COMPLETED | OUTPATIENT
Start: 2023-07-24 | End: 2023-07-24

## 2023-07-24 RX ADMIN — FENTANYL CITRATE 50 MCG: 50 INJECTION, SOLUTION INTRAMUSCULAR; INTRAVENOUS at 06:47

## 2023-07-24 RX ADMIN — INSULIN HUMAN 15 UNITS: 100 INJECTION, SOLUTION PARENTERAL at 09:17

## 2023-07-24 RX ADMIN — SODIUM CHLORIDE 1000 ML: 9 INJECTION, SOLUTION INTRAVENOUS at 09:21

## 2023-07-24 RX ADMIN — ONDANSETRON 4 MG: 2 INJECTION INTRAMUSCULAR; INTRAVENOUS at 06:48

## 2023-07-24 RX ADMIN — MORPHINE SULFATE 4 MG: 4 INJECTION, SOLUTION INTRAMUSCULAR; INTRAVENOUS at 11:28

## 2023-07-24 ASSESSMENT — PAIN DESCRIPTION - LOCATION
LOCATION: ABDOMEN
LOCATION: ABDOMEN;BACK
LOCATION: ABDOMEN;BACK

## 2023-07-24 ASSESSMENT — PAIN DESCRIPTION - DESCRIPTORS: DESCRIPTORS: STABBING

## 2023-07-24 ASSESSMENT — PAIN - FUNCTIONAL ASSESSMENT
PAIN_FUNCTIONAL_ASSESSMENT: 0-10
PAIN_FUNCTIONAL_ASSESSMENT: 0-10

## 2023-07-24 ASSESSMENT — PAIN SCALES - GENERAL
PAINLEVEL_OUTOF10: 7
PAINLEVEL_OUTOF10: 7
PAINLEVEL_OUTOF10: 3
PAINLEVEL_OUTOF10: 2
PAINLEVEL_OUTOF10: 10

## 2023-07-24 NOTE — ED PROVIDER NOTES
HPI:  7/24/23,   Time: 6:06 AM EDT       Claudell Peak is a 48 y.o. female presenting to the ED for abd pain, beginning 10 days ago. The complaint has been persistent, moderate in severity, and worsened by nothing. Seen at [facility] for same left AMA. No fever chills/sweats. Nausea without emesis. No diarrhea. Nothing makes better. No cough/congestion/runny nose/sore throat. Review of Systems:   Pertinent positives and negatives are stated within HPI, all other systems reviewed and are negative.          --------------------------------------------- PAST HISTORY ---------------------------------------------  Past Medical History:  has a past medical history of Alcoholism ([address]), Anxiety and depression, Ascites of liver, Bronchitis, Cancer ([address]), Candidiasis of vagina, Chronic pancreatitis ([address]), Cocaine abuse ([address]), Constipation, Diabetes mellitus, type 2 ([address]), GERD (gastroesophageal reflux disease), Gout, Hepatitis C, Hernia of anterior abdominal wall, Jaundice, Pneumonia, Polyneuropathy due to type 2 diabetes mellitus ([address]), Schizoaffective disorder, bipolar type ([address]), and Splenomegaly. Past Surgical History:  has a past surgical history that includes Hysterectomy (2004); Ovary removal (Bilateral, 2007); shoulder surgery (Left, 2003); Cholecystectomy (2010); ERCP; Abdomen surgery; Colonoscopy; Upper gastrointestinal endoscopy; other surgical history (Left, 04/25/2017); Breast biopsy (Left); Pancreas Biopsy; Endoscopy, colon, diagnostic; other surgical history (10/05/2017); Colonoscopy (N/A, 10/2/2018); and Mastectomy (Left). Social History:  reports that she has been smoking cigarettes. She started smoking about 41 years ago. She has a 20.00 pack-year smoking history. She has never used smokeless tobacco. She reports current alcohol use. She reports that she does not currently use drugs after having used the following drugs: Cocaine.     Family History: family history includes Cancer in her

## 2023-07-24 NOTE — ED NOTES
Pt a&o x3. Respirs easy non-labored. Skin warm and dry. Pt. Verbalized understanding of discharge instructions. E script sent. Pt will follow up with PCP. Pt ambulated out of ED with .      Iesha Franks  07/24/23 9577

## 2023-07-24 NOTE — ED NOTES
Critical lab call received for glucose 510, physician notified. No orders at this time.       Africa Lanier RN  07/24/23 0800

## 2023-07-24 NOTE — ED NOTES
Report received from [place], [place].  Will resume care at this time     Nai Garrett RN  07/24/23 5232

## 2023-07-25 DIAGNOSIS — E11.65 UNCONTROLLED TYPE 2 DIABETES MELLITUS WITH HYPERGLYCEMIA (HCC): ICD-10-CM

## 2023-07-25 RX ORDER — INSULIN LISPRO 100 [IU]/ML
INJECTION, SOLUTION INTRAVENOUS; SUBCUTANEOUS
Qty: 90 ML | Refills: 3 | Status: SHIPPED | OUTPATIENT
Start: 2023-07-25

## 2023-07-25 RX ORDER — OMEPRAZOLE 40 MG/1
CAPSULE, DELAYED RELEASE ORAL
Qty: 30 CAPSULE | Refills: 2 | Status: SHIPPED | OUTPATIENT
Start: 2023-07-25

## 2023-07-25 NOTE — TELEPHONE ENCOUNTER
----- Message from [facility] sent at 7/25/2023 10:41 AM EDT -----  Subject: Refill Request    QUESTIONS  Name of Medication? omeprazole (PRILOSEC) 40 MG delayed release capsule  Patient-reported dosage and instructions? 1X daily  How many days do you have left? 0  Preferred Pharmacy? [id number]  Pharmacy phone number (if available)? [phone]  ---------------------------------------------------------------------------  --------------,  Name of Medication? insulin lispro (HUMALOG) 100 UNIT/ML injection vial  Patient-reported dosage and instructions? sliding scale injection pen  How many days do you have left? 1  Preferred Pharmacy? [id number]  Pharmacy phone number (if available)? [phone]  ---------------------------------------------------------------------------  --------------  CALL BACK INFO  What is the best way for the office to contact you? OK to leave message on   voicemail  Preferred Call Back Phone Number? [phone]  ---------------------------------------------------------------------------  --------------  SCRIPT ANSWERS  Relationship to Patient? Spouse/Partner  Representative Name? carmen  Is the representative on the Communication Release of Information (JENI)   form in Epic?  Yes

## 2023-07-26 ENCOUNTER — OFFICE VISIT (OUTPATIENT)
Dept: FAMILY MEDICINE CLINIC | Age: 53
End: 2023-07-26
Payer: COMMERCIAL

## 2023-07-26 VITALS
TEMPERATURE: 97.2 F | HEIGHT: 65 IN | HEART RATE: 75 BPM | WEIGHT: 83 LBS | RESPIRATION RATE: 14 BRPM | BODY MASS INDEX: 13.83 KG/M2 | DIASTOLIC BLOOD PRESSURE: 82 MMHG | SYSTOLIC BLOOD PRESSURE: 136 MMHG | OXYGEN SATURATION: 99 %

## 2023-07-26 DIAGNOSIS — J44.9 CHRONIC OBSTRUCTIVE PULMONARY DISEASE, UNSPECIFIED COPD TYPE (HCC): Primary | ICD-10-CM

## 2023-07-26 DIAGNOSIS — F14.10 COCAINE USE DISORDER (HCC): ICD-10-CM

## 2023-07-26 DIAGNOSIS — F31.9 BIPOLAR 1 DISORDER (HCC): ICD-10-CM

## 2023-07-26 DIAGNOSIS — E11.65 UNCONTROLLED TYPE 2 DIABETES MELLITUS WITH HYPERGLYCEMIA (HCC): ICD-10-CM

## 2023-07-26 DIAGNOSIS — E11.65 TYPE 2 DIABETES MELLITUS WITH HYPERGLYCEMIA, WITH LONG-TERM CURRENT USE OF INSULIN (HCC): ICD-10-CM

## 2023-07-26 DIAGNOSIS — Z71.51 DRUG ABUSE COUNSELING AND SURVEILLANCE OF DRUG ABUSER: ICD-10-CM

## 2023-07-26 DIAGNOSIS — Z79.4 TYPE 2 DIABETES MELLITUS WITH HYPERGLYCEMIA, WITH LONG-TERM CURRENT USE OF INSULIN (HCC): ICD-10-CM

## 2023-07-26 DIAGNOSIS — Z91.199 MEDICALLY NONCOMPLIANT: ICD-10-CM

## 2023-07-26 DIAGNOSIS — R63.0 ANOREXIA: ICD-10-CM

## 2023-07-26 DIAGNOSIS — F25.0 SCHIZOAFFECTIVE DISORDER, BIPOLAR TYPE (HCC): ICD-10-CM

## 2023-07-26 DIAGNOSIS — E43 SEVERE PROTEIN-CALORIE MALNUTRITION (HCC): ICD-10-CM

## 2023-07-26 LAB
EKG ATRIAL RATE: 74 BPM
EKG P AXIS: 79 DEGREES
EKG P-R INTERVAL: 140 MS
EKG Q-T INTERVAL: 388 MS
EKG QRS DURATION: 86 MS
EKG QTC CALCULATION (BAZETT): 430 MS
EKG R AXIS: 61 DEGREES
EKG T AXIS: 81 DEGREES
EKG VENTRICULAR RATE: 74 BPM

## 2023-07-26 PROCEDURE — 99215 OFFICE O/P EST HI 40 MIN: CPT | Performed by: SURGERY

## 2023-07-26 PROCEDURE — 3023F SPIROM DOC REV: CPT | Performed by: SURGERY

## 2023-07-26 PROCEDURE — 93010 ELECTROCARDIOGRAM REPORT: CPT | Performed by: INTERNAL MEDICINE

## 2023-07-26 PROCEDURE — G8419 CALC BMI OUT NRM PARAM NOF/U: HCPCS | Performed by: SURGERY

## 2023-07-26 PROCEDURE — 4004F PT TOBACCO SCREEN RCVD TLK: CPT | Performed by: SURGERY

## 2023-07-26 PROCEDURE — 2022F DILAT RTA XM EVC RTNOPTHY: CPT | Performed by: SURGERY

## 2023-07-26 PROCEDURE — 3046F HEMOGLOBIN A1C LEVEL >9.0%: CPT | Performed by: SURGERY

## 2023-07-26 PROCEDURE — H0050 ALCOHOL/DRUG SERVICE 15 MIN: HCPCS | Performed by: SURGERY

## 2023-07-26 PROCEDURE — G8427 DOCREV CUR MEDS BY ELIG CLIN: HCPCS | Performed by: SURGERY

## 2023-07-26 PROCEDURE — H0049 ALCOHOL/DRUG SCREENING: HCPCS | Performed by: SURGERY

## 2023-07-26 PROCEDURE — 3017F COLORECTAL CA SCREEN DOC REV: CPT | Performed by: SURGERY

## 2023-07-26 RX ORDER — ALBUTEROL SULFATE 90 UG/1
2 AEROSOL, METERED RESPIRATORY (INHALATION) EVERY 6 HOURS PRN
Qty: 18 G | Refills: 5 | Status: SHIPPED | OUTPATIENT
Start: 2023-07-26

## 2023-07-26 RX ORDER — INSULIN LISPRO 100 [IU]/ML
INJECTION, SOLUTION INTRAVENOUS; SUBCUTANEOUS
Qty: 3 ML | Refills: 0 | Status: SHIPPED | OUTPATIENT
Start: 2023-07-26 | End: 2023-08-26

## 2023-07-26 RX ORDER — BLOOD SUGAR DIAGNOSTIC
1 STRIP MISCELLANEOUS
Qty: 200 EACH | Refills: 11 | Status: SHIPPED | OUTPATIENT
Start: 2023-07-26

## 2023-07-26 RX ORDER — INSULIN LISPRO 100 [IU]/ML
INJECTION, SOLUTION INTRAVENOUS; SUBCUTANEOUS
Qty: 90 ML | Refills: 3 | Status: CANCELLED | OUTPATIENT
Start: 2023-07-26

## 2023-07-26 RX ORDER — BUDESONIDE AND FORMOTEROL FUMARATE DIHYDRATE 160; 4.5 UG/1; UG/1
2 AEROSOL RESPIRATORY (INHALATION) 2 TIMES DAILY
Qty: 6 G | Refills: 0 | Status: SHIPPED | OUTPATIENT
Start: 2023-07-26

## 2023-07-26 RX ORDER — NUTRITIONAL SUPPLEMENT 0.09 G-0.5
1 LIQUID (ML) ORAL 3 TIMES DAILY
Qty: 87750 ML | Refills: 1 | Status: SHIPPED | OUTPATIENT
Start: 2023-07-26

## 2023-07-26 RX ORDER — ARIPIPRAZOLE 20 MG/1
20 TABLET ORAL DAILY
Qty: 30 TABLET | Refills: 5 | Status: SHIPPED | OUTPATIENT
Start: 2023-07-26

## 2023-07-26 NOTE — PATIENT INSTRUCTIONS
No exercise. Eat between 1500 and 2000 Calories per day to gain weight  -Carbohydrates limit to 40 to 50g per meal (120g to 150g per day)  -Fats limit to 60g per day (total fat intake should be 30% to 35% of your total daily calories, so restrict to whichever number is lower)   -Of the fats you do eat, never eat trans fats, never eat unsaturated fats, limit your saturated fat intake to less than 20g per day (or 7% of your total daily calories, so restrict to whichever number is lower)  -Cholesterol limit to no more than 300mg per day (200mg per day if you have elevated triglycerides or total cholesterol)  -Sodium less than 2000mg per day    [employer] or similar application (or track by journal) to monitor calories and macronutrients. This is the most critical component to a heart healthy, balanced diet: honesty, keeping oneself accountable, ensure you are tracking daily goals and meeting them. Consider using service such as [employer] [never select keto, only select mediterranean or everything diets . .. enter the calorie goal above]    Food is medicine!

## 2023-07-27 ENCOUNTER — HOSPITAL ENCOUNTER (EMERGENCY)
Age: 53
Discharge: LEFT BEFORE DISCHARGE | End: 2023-07-27
Attending: EMERGENCY MEDICINE
Payer: COMMERCIAL

## 2023-07-27 VITALS
HEART RATE: 66 BPM | DIASTOLIC BLOOD PRESSURE: 71 MMHG | TEMPERATURE: 98 F | SYSTOLIC BLOOD PRESSURE: 107 MMHG | RESPIRATION RATE: 22 BRPM | OXYGEN SATURATION: 100 %

## 2023-07-27 DIAGNOSIS — R10.10 PAIN OF UPPER ABDOMEN: Primary | ICD-10-CM

## 2023-07-27 LAB
AMMONIA PLAS-SCNC: 27 UMOL/L (ref 11–51)
BILIRUB UR QL STRIP: NEGATIVE
CLARITY UR: CLEAR
COLOR UR: YELLOW
EPI CELLS #/AREA URNS HPF: ABNORMAL /HPF
GLUCOSE UR STRIP-MCNC: 500 MG/DL
HGB UR QL STRIP.AUTO: NEGATIVE
KETONES UR STRIP-MCNC: NEGATIVE MG/DL
LEUKOCYTE ESTERASE UR QL STRIP: NEGATIVE
NITRITE UR QL STRIP: NEGATIVE
PH UR STRIP: 6.5 [PH] (ref 5–9)
PROT UR STRIP-MCNC: NEGATIVE MG/DL
RBC #/AREA URNS HPF: ABNORMAL /HPF
REASON FOR REJECTION: NORMAL
SP GR UR STRIP: 1.01 (ref 1–1.03)
SPECIMEN SOURCE: NORMAL
UROBILINOGEN UR STRIP-ACNC: 1 EU/DL (ref 0–1)
WBC #/AREA URNS HPF: ABNORMAL /HPF
ZZ NTE CLEAN UP: ORDERED TEST: NORMAL

## 2023-07-27 PROCEDURE — 80076 HEPATIC FUNCTION PANEL: CPT

## 2023-07-27 PROCEDURE — 99284 EMERGENCY DEPT VISIT MOD MDM: CPT

## 2023-07-27 PROCEDURE — 2580000003 HC RX 258

## 2023-07-27 PROCEDURE — 81001 URINALYSIS AUTO W/SCOPE: CPT

## 2023-07-27 PROCEDURE — 6360000002 HC RX W HCPCS

## 2023-07-27 PROCEDURE — 85027 COMPLETE CBC AUTOMATED: CPT

## 2023-07-27 PROCEDURE — 80048 BASIC METABOLIC PNL TOTAL CA: CPT

## 2023-07-27 PROCEDURE — 82140 ASSAY OF AMMONIA: CPT

## 2023-07-27 PROCEDURE — 96374 THER/PROPH/DIAG INJ IV PUSH: CPT

## 2023-07-27 RX ORDER — FENTANYL CITRATE 0.05 MG/ML
25 INJECTION, SOLUTION INTRAMUSCULAR; INTRAVENOUS ONCE
Status: DISCONTINUED | OUTPATIENT
Start: 2023-07-27 | End: 2023-07-27 | Stop reason: HOSPADM

## 2023-07-27 RX ORDER — 0.9 % SODIUM CHLORIDE 0.9 %
1000 INTRAVENOUS SOLUTION INTRAVENOUS ONCE
Status: COMPLETED | OUTPATIENT
Start: 2023-07-27 | End: 2023-07-27

## 2023-07-27 RX ORDER — ONDANSETRON 2 MG/ML
4 INJECTION INTRAMUSCULAR; INTRAVENOUS ONCE
Status: COMPLETED | OUTPATIENT
Start: 2023-07-27 | End: 2023-07-27

## 2023-07-27 RX ADMIN — ONDANSETRON 4 MG: 2 INJECTION INTRAMUSCULAR; INTRAVENOUS at 15:30

## 2023-07-27 RX ADMIN — SODIUM CHLORIDE 1000 ML: 9 INJECTION, SOLUTION INTRAVENOUS at 15:30

## 2023-07-27 ASSESSMENT — LIFESTYLE VARIABLES: HOW OFTEN DO YOU HAVE A DRINK CONTAINING ALCOHOL: NEVER

## 2023-07-27 NOTE — ED PROVIDER NOTES
Department of Emergency Medicine   ED Provider Note  Admit Date/RoomTime: 7/27/2023  1:16 PM  ED Room: TAI/TAI          History of Present Illness:  7/27/23, Time: 2:10 PM EDT       Marylene Spiegel is a 48 y.o. female presenting to the ED for abd pain and elevated liver enzymes. Pt reports she was recently seen in ED and found to have elevated liver enzymes, and today went to her gastroenterologist's office and NP advised her to come to ED for admission. She notes she has been having upper abdominal pain, nausea and vomiting the past few weeks. Pt reports she is able to tolerate sips of water and otherwise not able to keep down any food. She denies any fevers or chills, notes some constipation but has been passing flatus and had a bowel movement today. No hematemesis, CP, SOB, dysuria, hematuria, lightheaded or dizziness. She reports hx of cirrhosis and notes she no longer drinks EtOH. Review of Systems:     Pertinent positives and negatives are stated within HPI.      --------------------------------------------- PAST HISTORY ---------------------------------------------  Past Medical History:  has a past medical history of Alcoholism ([address]), Anxiety and depression, Ascites of liver, Bronchitis, Cancer ([address]), Candidiasis of vagina, Chronic pancreatitis ([address]), Cocaine abuse ([address]), Constipation, Diabetes mellitus, type 2 ([address]), GERD (gastroesophageal reflux disease), Gout, Hepatitis C, Hernia of anterior abdominal wall, Jaundice, Pneumonia, Polyneuropathy due to type 2 diabetes mellitus ([address]), Schizoaffective disorder, bipolar type ([address]), and Splenomegaly. Past Surgical History:  has a past surgical history that includes Hysterectomy (2004); Ovary removal (Bilateral, 2007); shoulder surgery (Left, 2003); Cholecystectomy (2010); ERCP; Abdomen surgery; Colonoscopy; Upper gastrointestinal endoscopy; other surgical history (Left, 04/25/2017); Breast biopsy (Left);  Pancreas Biopsy; Endoscopy, colon, diagnostic; distention. No jaundice or scleral icterus. Heart regular rate and rhythm. Lungs clear to auscultation. My plan: Symptomatic and supportive care. Appropriate labs and GI consult    Electronically signed by Romie Canales DO on 7/27/23 at 3:07 PM EDT      [MS]   [phone] Requesting something for pain. Fentanyl ordered and will reassess. [MS]   46 Spoke with Dr. Aleja Gould (Gastroenterology). Discussed case. He was not really sure why the patient was referred here from the office. Patient had seen the nurse practitioner earlier today. He states that if medicine is not willing to admit he will bring the patient in for observation. [MS]   1069 Patient had gotten tired of waiting and eloped from the ED. She had signed AMA papers from the nurse but did not speak with me. [MS]      ED Course User Index  [MS] Romie Canales DO       Medical Decision Making  Waldemar Stack is a 49-year-old female presenting to the ED with complaints of abnormal liver enzymes and abdominal pain. Patient notes she was sent in by nurse practitioner at gastroenterology office. She notes she was recently seen in ED and work-up was obtained noting elevated liver enzymes however she was discharged home. CT was performed at that time and just demonstrated cirrhosis. Given her symptoms, concern for colitis versus gastritis versus pancreatitis, etc.  Therefore, work-up was obtained. Patient did leave [address] [place] [address] prior to work-up completion. Patient was given Zofran for nausea as well as 1 L IV normal saline was initiated and fentanyl was ordered however patient did leave prior to getting medication. Urinalysis did result after patient left revealing glucose of 500. CBC, BMP, HFT, all in process at time of patient leaving the ED. Ammonia within normal limits at 27. Was made aware patient was attempting to leave [address] [place] [address] after she had already left the department.   Attempt was made at counseling patient however

## 2023-07-28 LAB
BASOPHILS # BLD: 0 K/UL (ref 0–0.2)
BASOPHILS NFR BLD: 0 % (ref 0–2)
EOSINOPHIL # BLD: 0 K/UL (ref 0.05–0.5)
EOSINOPHILS RELATIVE PERCENT: 0 % (ref 0–6)
ERYTHROCYTE [DISTWIDTH] IN BLOOD BY AUTOMATED COUNT: 13.4 % (ref 11.5–15)
HCT VFR BLD AUTO: 38.3 % (ref 34–48)
HGB BLD-MCNC: 12.9 G/DL (ref 11.5–15.5)
LYMPHOCYTES NFR BLD: 0.28 K/UL (ref 1.5–4)
LYMPHOCYTES RELATIVE PERCENT: 9 % (ref 20–42)
MCH RBC QN AUTO: 32.3 PG (ref 26–35)
MCHC RBC AUTO-ENTMCNC: 33.7 G/DL (ref 32–34.5)
MCV RBC AUTO: 95.8 FL (ref 80–99.9)
MONOCYTES NFR BLD: 0.06 K/UL (ref 0.1–0.95)
MONOCYTES NFR BLD: 2 % (ref 2–12)
NEUTROPHILS NFR BLD: 89 % (ref 43–80)
NEUTS SEG NFR BLD: 2.76 K/UL (ref 1.8–7.3)
PLATELET CONFIRMATION: NORMAL
PLATELET, FLUORESCENCE: 69 K/UL (ref 130–450)
PMV BLD AUTO: 12.6 FL (ref 7–12)
RBC # BLD AUTO: 4 M/UL (ref 3.5–5.5)
WBC OTHER # BLD: 3.1 K/UL (ref 4.5–11.5)

## 2023-07-28 RX ORDER — BLOOD SUGAR DIAGNOSTIC
1 STRIP MISCELLANEOUS DAILY
Qty: 150 EACH | Refills: 3 | Status: SHIPPED | OUTPATIENT
Start: 2023-07-28

## 2023-07-28 RX ORDER — TIOTROPIUM BROMIDE INHALATION SPRAY 1.56 UG/1
2 SPRAY, METERED RESPIRATORY (INHALATION) DAILY
Qty: 3 EACH | Refills: 1 | Status: SHIPPED | OUTPATIENT
Start: 2023-07-28

## 2023-07-28 RX ORDER — LACTULOSE 10 G/15ML
SOLUTION ORAL
Qty: 1 EACH | Refills: 2 | Status: SHIPPED | OUTPATIENT
Start: 2023-07-28

## 2023-07-28 NOTE — TELEPHONE ENCOUNTER
----- Message from Adaabdi Monterroso sent at 7/28/2023 12:51 PM EDT -----  Subject: Refill Request    QUESTIONS  Name of Medication? tiotropium (SPIRIVA RESPIMAT) 1.25 MCG/ACT AERS   inhaler  Patient-reported dosage and instructions? every day  How many days do you have left? 0  Preferred Pharmacy? [email or web address] phone number (if available)? [phone]  ---------------------------------------------------------------------------  --------------,  Name of Medication? blood glucose test strips (EXACTECH TEST) strip  Patient-reported dosage and instructions? asap  How many days do you have left? 0  Preferred Pharmacy? [facility] phone number (if available)? [phone]  Additional Information for Provider? these need to be preauthorized, and   she's out, needs them asap  ---------------------------------------------------------------------------  --------------,  Name of Medication? lactulose (CHRONULAC) 10 GM/15ML solution  Patient-reported dosage and instructions? unsure  How many days do you have left? 0  Preferred Pharmacy? [facility] phone number (if available)? [phone]  ---------------------------------------------------------------------------  --------------  CALL BACK INFO  What is the best way for the office to contact you? OK to leave message on   voicemail  Preferred Call Back Phone Number? [phone]  ---------------------------------------------------------------------------  --------------  SCRIPT ANSWERS  Relationship to Patient? Spouse/Partner  Representative Name? rosa  Is the representative on the Communication Release of Information (JENI)   form in Epic?  Yes

## 2023-07-30 ENCOUNTER — TELEPHONE (OUTPATIENT)
Dept: FAMILY MEDICINE CLINIC | Age: 53
End: 2023-07-30

## 2023-07-31 ENCOUNTER — TELEPHONE (OUTPATIENT)
Dept: FAMILY MEDICINE CLINIC | Age: 53
End: 2023-07-31

## 2023-07-31 NOTE — TELEPHONE ENCOUNTER
----- Message from Rona Sanders DO sent at 7/26/2023 12:45 PM EDT -----  Regarding: nutrtion referral  Needs either nutrition or RD to help her with diet. There are paper-based referral forms to be filled out and [facility] to [facility] for [facility]. Please and thanks.

## 2023-08-04 DIAGNOSIS — K70.31 ALCOHOLIC CIRRHOSIS OF LIVER WITH ASCITES (HCC): ICD-10-CM

## 2023-08-04 DIAGNOSIS — R10.84 GENERALIZED ABDOMINAL PAIN: ICD-10-CM

## 2023-08-04 RX ORDER — ACETAMINOPHEN 500 MG
500 TABLET ORAL 2 TIMES DAILY PRN
Qty: 90 TABLET | Refills: 1 | Status: SHIPPED | OUTPATIENT
Start: 2023-08-04

## 2023-08-04 NOTE — TELEPHONE ENCOUNTER
----- Message from [employer] Paul sent at 8/4/2023 10:46 AM EDT -----  Subject: Refill Request    QUESTIONS  Name of Medication? acetaminophen (TYLENOL) 500 MG tablet  Patient-reported dosage and instructions? as directed, 2 times daily   How many days do you have left? 0  Preferred Pharmacy? [facility]  Pharmacy phone number (if available)? [phone]  ---------------------------------------------------------------------------  --------------  CALL BACK INFO  What is the best way for the office to contact you? Do not leave any   message, patient will call back for answer  Preferred Call Back Phone Number? [phone]  ---------------------------------------------------------------------------  --------------  SCRIPT ANSWERS  Relationship to Patient? Spouse/Partner  Representative Name? rosa  Is the representative on the Communication Release of Information (JENI)   form in Epic?  Yes

## 2023-08-07 ENCOUNTER — TELEPHONE (OUTPATIENT)
Dept: FAMILY MEDICINE CLINIC | Age: 53
End: 2023-08-07

## 2023-08-07 NOTE — TELEPHONE ENCOUNTER
Spoke with the behavior health coordinator Kari Hernandez, she mentioned the pt will be considered a DUO due to all the problems stated by physician. She mentioned never seeing the pt name before, she will reach out and try her best to get her there.

## 2023-08-07 NOTE — TELEPHONE ENCOUNTER
----- Message from Jeff Fitzgerald DO sent at 7/26/2023 12:46 PM EDT -----  Regarding: psych  Please call to addiction medicine office and see if they can reach out to patient. She promised to call and set up apt today but I very much doubt she will. She is cocaine user, anorexic, schizoaffective, bipolar and has cirrhosis. She needs help desperately.

## 2023-08-10 ENCOUNTER — TELEPHONE (OUTPATIENT)
Dept: FAMILY MEDICINE CLINIC | Age: 53
End: 2023-08-10

## 2023-08-10 NOTE — TELEPHONE ENCOUNTER
Lisa Bruner/[facility] Diabetes Ed called to inform Dr. Jovon Ann that she has made several attempts to reach patient and patient has not responded. I called patient and left a detailed  msg informing that Stephanie Rodriguez has made several attempts to reach patient and has not gotten a response. I asked patient to call Dr. Magui Patel ofc back to inform if she is interested in scheduling.       Last seen 7/26/2023  Next appt 11/6/2023

## 2023-08-11 DIAGNOSIS — Z79.4 TYPE 2 DIABETES MELLITUS WITH HYPERGLYCEMIA, WITH LONG-TERM CURRENT USE OF INSULIN (HCC): ICD-10-CM

## 2023-08-11 DIAGNOSIS — E11.65 UNCONTROLLED TYPE 2 DIABETES MELLITUS WITH HYPERGLYCEMIA (HCC): ICD-10-CM

## 2023-08-11 DIAGNOSIS — E11.65 TYPE 2 DIABETES MELLITUS WITH HYPERGLYCEMIA, WITH LONG-TERM CURRENT USE OF INSULIN (HCC): ICD-10-CM

## 2023-08-11 RX ORDER — INSULIN LISPRO 100 [IU]/ML
INJECTION, SOLUTION INTRAVENOUS; SUBCUTANEOUS
Qty: 3 ML | Refills: 0 | Status: SHIPPED | OUTPATIENT
Start: 2023-08-11 | End: 2023-09-11

## 2023-08-14 NOTE — TELEPHONE ENCOUNTER
Per Dr. Montse Ortega -     Thank you for the help. We can send a letter to her. She is rather non-compliant and I am not too surprised there was trouble getting in touch with her.

## 2023-08-21 ENCOUNTER — TELEPHONE (OUTPATIENT)
Dept: FAMILY MEDICINE CLINIC | Age: 53
End: 2023-08-21

## 2023-08-21 RX ORDER — BLOOD SUGAR DIAGNOSTIC
1 STRIP MISCELLANEOUS
Qty: 150 EACH | Refills: 3 | Status: ON HOLD | OUTPATIENT
Start: 2023-08-21

## 2023-08-21 NOTE — TELEPHONE ENCOUNTER
Pt's partner called in saying pt is testing her blood sugar 4-5 times per day and pharmacy needs a new order for her test strips with those instructions so that her insurance will pay for it.     Last seen 7/26/2023  Next appt 11/6/2023  [facility]

## 2023-08-26 ENCOUNTER — HOSPITAL ENCOUNTER (EMERGENCY)
Age: 53
Discharge: LEFT BEFORE DISCHARGE | End: 2023-08-26
Payer: COMMERCIAL

## 2023-08-26 ENCOUNTER — HOSPITAL ENCOUNTER (INPATIENT)
Age: 53
LOS: 3 days | Discharge: HOME | DRG: 364 | End: 2023-08-30
Attending: EMERGENCY MEDICINE | Admitting: FAMILY MEDICINE
Payer: COMMERCIAL

## 2023-08-26 VITALS
HEIGHT: 61 IN | SYSTOLIC BLOOD PRESSURE: 109 MMHG | TEMPERATURE: 98.2 F | OXYGEN SATURATION: 100 % | HEART RATE: 79 BPM | BODY MASS INDEX: 14.16 KG/M2 | RESPIRATION RATE: 18 BRPM | DIASTOLIC BLOOD PRESSURE: 72 MMHG | WEIGHT: 75 LBS

## 2023-08-26 DIAGNOSIS — F14.10 NONDEPENDENT COCAINE ABUSE (HCC): ICD-10-CM

## 2023-08-26 DIAGNOSIS — N76.0 VAGINITIS AND VULVOVAGINITIS: Primary | ICD-10-CM

## 2023-08-26 DIAGNOSIS — L02.215 PERINEAL ABSCESS: ICD-10-CM

## 2023-08-26 DIAGNOSIS — R73.9 HYPERGLYCEMIA: ICD-10-CM

## 2023-08-26 DIAGNOSIS — L02.31 ABSCESS, GLUTEAL, LEFT: ICD-10-CM

## 2023-08-26 LAB
BILIRUB UR QL STRIP: NEGATIVE
CHP ED QC CHECK: YES
CLARITY UR: CLEAR
COLOR UR: YELLOW
GLUCOSE BLD-MCNC: 500 MG/DL
GLUCOSE BLD-MCNC: >500 MG/DL (ref 74–99)
GLUCOSE UR STRIP-MCNC: >=1000 MG/DL
HGB UR QL STRIP.AUTO: ABNORMAL
KETONES UR STRIP-MCNC: NEGATIVE MG/DL
LEUKOCYTE ESTERASE UR QL STRIP: ABNORMAL
NITRITE UR QL STRIP: NEGATIVE
PH UR STRIP: 5 [PH] (ref 5–9)
PROT UR STRIP-MCNC: NEGATIVE MG/DL
SP GR UR STRIP: <1.005 (ref 1–1.03)
UROBILINOGEN UR STRIP-ACNC: 0.2 EU/DL (ref 0–1)

## 2023-08-26 PROCEDURE — 99285 EMERGENCY DEPT VISIT HI MDM: CPT

## 2023-08-26 PROCEDURE — 81001 URINALYSIS AUTO W/SCOPE: CPT

## 2023-08-26 PROCEDURE — 83605 ASSAY OF LACTIC ACID: CPT

## 2023-08-26 PROCEDURE — 85025 COMPLETE CBC W/AUTO DIFF WBC: CPT

## 2023-08-26 PROCEDURE — 99281 EMR DPT VST MAYX REQ PHY/QHP: CPT

## 2023-08-26 PROCEDURE — 86140 C-REACTIVE PROTEIN: CPT

## 2023-08-26 PROCEDURE — 83690 ASSAY OF LIPASE: CPT

## 2023-08-26 PROCEDURE — 82962 GLUCOSE BLOOD TEST: CPT

## 2023-08-26 PROCEDURE — 80053 COMPREHEN METABOLIC PANEL: CPT

## 2023-08-26 RX ORDER — DIPHENHYDRAMINE HYDROCHLORIDE 50 MG/ML
50 INJECTION INTRAMUSCULAR; INTRAVENOUS ONCE
Status: COMPLETED | OUTPATIENT
Start: 2023-08-26 | End: 2023-08-27

## 2023-08-26 RX ORDER — FENTANYL CITRATE 50 UG/ML
25 INJECTION, SOLUTION INTRAMUSCULAR; INTRAVENOUS ONCE
Status: COMPLETED | OUTPATIENT
Start: 2023-08-26 | End: 2023-08-27

## 2023-08-26 ASSESSMENT — PAIN DESCRIPTION - LOCATION: LOCATION: BUTTOCKS

## 2023-08-26 ASSESSMENT — LIFESTYLE VARIABLES
HOW MANY STANDARD DRINKS CONTAINING ALCOHOL DO YOU HAVE ON A TYPICAL DAY: PATIENT DOES NOT DRINK
HOW OFTEN DO YOU HAVE A DRINK CONTAINING ALCOHOL: NEVER

## 2023-08-26 ASSESSMENT — PAIN DESCRIPTION - PAIN TYPE: TYPE: ACUTE PAIN

## 2023-08-26 ASSESSMENT — PAIN DESCRIPTION - ONSET: ONSET: ON-GOING

## 2023-08-26 ASSESSMENT — PAIN - FUNCTIONAL ASSESSMENT
PAIN_FUNCTIONAL_ASSESSMENT: 0-10
PAIN_FUNCTIONAL_ASSESSMENT: INTOLERABLE, UNABLE TO DO ANY ACTIVE OR PASSIVE ACTIVITIES

## 2023-08-26 ASSESSMENT — PAIN SCALES - GENERAL
PAINLEVEL_OUTOF10: 10
PAINLEVEL_OUTOF10: 8

## 2023-08-26 ASSESSMENT — PAIN DESCRIPTION - FREQUENCY: FREQUENCY: CONTINUOUS

## 2023-08-27 ENCOUNTER — APPOINTMENT (OUTPATIENT)
Dept: CT IMAGING | Age: 53
DRG: 364 | End: 2023-08-27
Payer: COMMERCIAL

## 2023-08-27 ENCOUNTER — APPOINTMENT (OUTPATIENT)
Dept: GENERAL RADIOLOGY | Age: 53
DRG: 364 | End: 2023-08-27
Payer: COMMERCIAL

## 2023-08-27 PROBLEM — L03.90 CELLULITIS: Status: ACTIVE | Noted: 2023-08-27

## 2023-08-27 PROBLEM — F14.10 NONDEPENDENT COCAINE ABUSE (HCC): Status: ACTIVE | Noted: 2023-08-27

## 2023-08-27 PROBLEM — L02.31 ABSCESS, GLUTEAL, LEFT: Status: ACTIVE | Noted: 2023-08-27

## 2023-08-27 PROBLEM — N76.0 VAGINITIS AND VULVOVAGINITIS: Status: ACTIVE | Noted: 2023-08-27

## 2023-08-27 LAB
ALBUMIN SERPL-MCNC: 3.2 G/DL (ref 3.5–5.2)
ALP SERPL-CCNC: 1312 U/L (ref 35–104)
ALT SERPL-CCNC: 69 U/L (ref 0–32)
ANION GAP SERPL CALCULATED.3IONS-SCNC: 13 MMOL/L (ref 7–16)
AST SERPL-CCNC: 131 U/L (ref 0–31)
B-OH-BUTYR SERPL-MCNC: 0.32 MMOL/L (ref 0.02–0.27)
BACTERIA URNS QL MICRO: ABNORMAL
BASOPHILS # BLD: 0.01 K/UL (ref 0–0.2)
BASOPHILS NFR BLD: 0 % (ref 0–2)
BILIRUB SERPL-MCNC: 1 MG/DL (ref 0–1.2)
BUN SERPL-MCNC: 10 MG/DL (ref 6–20)
CALCIUM SERPL-MCNC: 9.1 MG/DL (ref 8.6–10.2)
CHLORIDE SERPL-SCNC: 87 MMOL/L (ref 98–107)
CLUE CELLS VAG QL WET PREP: ABNORMAL
CO2 SERPL-SCNC: 23 MMOL/L (ref 22–29)
CREAT SERPL-MCNC: 0.5 MG/DL (ref 0.5–1)
CRP SERPL HS-MCNC: 134 MG/L (ref 0–5)
EOSINOPHIL # BLD: 0.04 K/UL (ref 0.05–0.5)
EOSINOPHILS RELATIVE PERCENT: 1 % (ref 0–6)
EPI CELLS #/AREA URNS HPF: ABNORMAL /HPF
ERYTHROCYTE [DISTWIDTH] IN BLOOD BY AUTOMATED COUNT: 14.4 % (ref 11.5–15)
GFR SERPL CREATININE-BSD FRML MDRD: >60 ML/MIN/1.73M2
GLUCOSE BLD-MCNC: 245 MG/DL (ref 74–99)
GLUCOSE BLD-MCNC: 351 MG/DL (ref 74–99)
GLUCOSE BLD-MCNC: 353 MG/DL (ref 74–99)
GLUCOSE BLD-MCNC: 395 MG/DL (ref 74–99)
GLUCOSE BLD-MCNC: >500 MG/DL (ref 74–99)
GLUCOSE SERPL-MCNC: 866 MG/DL (ref 74–99)
HCT VFR BLD AUTO: 34.8 % (ref 34–48)
HGB BLD-MCNC: 11.3 G/DL (ref 11.5–15.5)
IMM GRANULOCYTES # BLD AUTO: 0.04 K/UL (ref 0–0.58)
IMM GRANULOCYTES NFR BLD: 1 % (ref 0–5)
LACTATE BLDV-SCNC: 3.1 MMOL/L (ref 0.5–2.2)
LACTATE BLDV-SCNC: 4.6 MMOL/L (ref 0.5–2.2)
LIPASE SERPL-CCNC: 14 U/L (ref 13–60)
LYMPHOCYTES NFR BLD: 0.27 K/UL (ref 1.5–4)
LYMPHOCYTES RELATIVE PERCENT: 7 % (ref 20–42)
MCH RBC QN AUTO: 32.5 PG (ref 26–35)
MCHC RBC AUTO-ENTMCNC: 32.5 G/DL (ref 32–34.5)
MCV RBC AUTO: 100 FL (ref 80–99.9)
MONOCYTES NFR BLD: 0.23 K/UL (ref 0.1–0.95)
MONOCYTES NFR BLD: 6 % (ref 2–12)
NEUTROPHILS NFR BLD: 85 % (ref 43–80)
NEUTS SEG NFR BLD: 3.29 K/UL (ref 1.8–7.3)
PLATELET # BLD AUTO: 66 K/UL (ref 130–450)
PLATELET CONFIRMATION: NORMAL
PMV BLD AUTO: 12.1 FL (ref 7–12)
POTASSIUM SERPL-SCNC: 5.8 MMOL/L (ref 3.5–5)
PROT SERPL-MCNC: 7.6 G/DL (ref 6.4–8.3)
RBC # BLD AUTO: 3.48 M/UL (ref 3.5–5.5)
RBC # BLD: NORMAL 10*6/UL
RBC #/AREA URNS HPF: ABNORMAL /HPF
SODIUM SERPL-SCNC: 123 MMOL/L (ref 132–146)
SOURCE WET PREP: ABNORMAL
T VAGINALIS VAG QL WET PREP: ABNORMAL
TRICHOMONAS #/AREA URNS HPF: PRESENT /[HPF]
WBC #/AREA URNS HPF: ABNORMAL /HPF
WBC OTHER # BLD: 3.9 K/UL (ref 4.5–11.5)
YEAST WET PREP: ABNORMAL

## 2023-08-27 PROCEDURE — 2580000003 HC RX 258: Performed by: EMERGENCY MEDICINE

## 2023-08-27 PROCEDURE — 6370000000 HC RX 637 (ALT 250 FOR IP): Performed by: SPECIALIST

## 2023-08-27 PROCEDURE — 6360000002 HC RX W HCPCS: Performed by: INTERNAL MEDICINE

## 2023-08-27 PROCEDURE — 87210 SMEAR WET MOUNT SALINE/INK: CPT

## 2023-08-27 PROCEDURE — 96365 THER/PROPH/DIAG IV INF INIT: CPT

## 2023-08-27 PROCEDURE — 6370000000 HC RX 637 (ALT 250 FOR IP): Performed by: INTERNAL MEDICINE

## 2023-08-27 PROCEDURE — 87077 CULTURE AEROBIC IDENTIFY: CPT

## 2023-08-27 PROCEDURE — 6360000002 HC RX W HCPCS: Performed by: EMERGENCY MEDICINE

## 2023-08-27 PROCEDURE — 74176 CT ABD & PELVIS W/O CONTRAST: CPT

## 2023-08-27 PROCEDURE — 36415 COLL VENOUS BLD VENIPUNCTURE: CPT

## 2023-08-27 PROCEDURE — 87205 SMEAR GRAM STAIN: CPT

## 2023-08-27 PROCEDURE — 87081 CULTURE SCREEN ONLY: CPT

## 2023-08-27 PROCEDURE — 71045 X-RAY EXAM CHEST 1 VIEW: CPT

## 2023-08-27 PROCEDURE — 87040 BLOOD CULTURE FOR BACTERIA: CPT

## 2023-08-27 PROCEDURE — 2580000003 HC RX 258: Performed by: NURSE PRACTITIONER

## 2023-08-27 PROCEDURE — 2060000000 HC ICU INTERMEDIATE R&B

## 2023-08-27 PROCEDURE — 6360000002 HC RX W HCPCS: Performed by: NURSE PRACTITIONER

## 2023-08-27 PROCEDURE — 82010 KETONE BODYS QUAN: CPT

## 2023-08-27 PROCEDURE — S5553 INSULIN LONG ACTING 5 U: HCPCS | Performed by: INTERNAL MEDICINE

## 2023-08-27 PROCEDURE — 87070 CULTURE OTHR SPECIMN AEROBIC: CPT

## 2023-08-27 PROCEDURE — 6370000000 HC RX 637 (ALT 250 FOR IP): Performed by: EMERGENCY MEDICINE

## 2023-08-27 PROCEDURE — 87591 N.GONORRHOEAE DNA AMP PROB: CPT

## 2023-08-27 PROCEDURE — 82962 GLUCOSE BLOOD TEST: CPT

## 2023-08-27 PROCEDURE — 0J9B0ZZ DRAINAGE OF PERINEUM SUBCUTANEOUS TISSUE AND FASCIA, OPEN APPROACH: ICD-10-PCS | Performed by: SURGERY

## 2023-08-27 PROCEDURE — 87491 CHLMYD TRACH DNA AMP PROBE: CPT

## 2023-08-27 PROCEDURE — 2580000003 HC RX 258: Performed by: INTERNAL MEDICINE

## 2023-08-27 PROCEDURE — 10061 I&D ABSCESS COMP/MULTIPLE: CPT

## 2023-08-27 PROCEDURE — 93005 ELECTROCARDIOGRAM TRACING: CPT | Performed by: EMERGENCY MEDICINE

## 2023-08-27 PROCEDURE — 96375 TX/PRO/DX INJ NEW DRUG ADDON: CPT

## 2023-08-27 PROCEDURE — 6360000002 HC RX W HCPCS

## 2023-08-27 RX ORDER — BUDESONIDE 0.5 MG/2ML
0.5 INHALANT ORAL
Status: DISCONTINUED | OUTPATIENT
Start: 2023-08-27 | End: 2023-08-30 | Stop reason: HOSPADM

## 2023-08-27 RX ORDER — 0.9 % SODIUM CHLORIDE 0.9 %
2000 INTRAVENOUS SOLUTION INTRAVENOUS ONCE
Status: COMPLETED | OUTPATIENT
Start: 2023-08-27 | End: 2023-08-27

## 2023-08-27 RX ORDER — PROMETHAZINE HYDROCHLORIDE 12.5 MG/1
12.5 TABLET ORAL EVERY 6 HOURS PRN
Status: DISCONTINUED | OUTPATIENT
Start: 2023-08-27 | End: 2023-08-30 | Stop reason: HOSPADM

## 2023-08-27 RX ORDER — ANASTROZOLE 1 MG/1
1 TABLET ORAL DAILY
Status: DISCONTINUED | OUTPATIENT
Start: 2023-08-27 | End: 2023-08-30 | Stop reason: HOSPADM

## 2023-08-27 RX ORDER — ONDANSETRON 2 MG/ML
4 INJECTION INTRAMUSCULAR; INTRAVENOUS EVERY 6 HOURS PRN
Status: DISCONTINUED | OUTPATIENT
Start: 2023-08-27 | End: 2023-08-30 | Stop reason: HOSPADM

## 2023-08-27 RX ORDER — POTASSIUM CHLORIDE 20 MEQ/1
40 TABLET, EXTENDED RELEASE ORAL PRN
Status: DISCONTINUED | OUTPATIENT
Start: 2023-08-27 | End: 2023-08-30 | Stop reason: HOSPADM

## 2023-08-27 RX ORDER — POLYETHYLENE GLYCOL 3350 17 G/17G
17 POWDER, FOR SOLUTION ORAL DAILY PRN
Status: DISCONTINUED | OUTPATIENT
Start: 2023-08-27 | End: 2023-08-30 | Stop reason: HOSPADM

## 2023-08-27 RX ORDER — ARIPIPRAZOLE 10 MG/1
20 TABLET ORAL DAILY
Status: DISCONTINUED | OUTPATIENT
Start: 2023-08-27 | End: 2023-08-30 | Stop reason: HOSPADM

## 2023-08-27 RX ORDER — INSULIN LISPRO 100 [IU]/ML
0-8 INJECTION, SOLUTION INTRAVENOUS; SUBCUTANEOUS
Status: DISCONTINUED | OUTPATIENT
Start: 2023-08-27 | End: 2023-08-30 | Stop reason: HOSPADM

## 2023-08-27 RX ORDER — SODIUM CHLORIDE 9 MG/ML
INJECTION, SOLUTION INTRAVENOUS PRN
Status: DISCONTINUED | OUTPATIENT
Start: 2023-08-27 | End: 2023-08-30 | Stop reason: HOSPADM

## 2023-08-27 RX ORDER — LACTULOSE 10 G/15ML
20 SOLUTION ORAL 2 TIMES DAILY
Status: DISCONTINUED | OUTPATIENT
Start: 2023-08-27 | End: 2023-08-30 | Stop reason: HOSPADM

## 2023-08-27 RX ORDER — BUDESONIDE AND FORMOTEROL FUMARATE DIHYDRATE 160; 4.5 UG/1; UG/1
2 AEROSOL RESPIRATORY (INHALATION) 2 TIMES DAILY
Status: DISCONTINUED | OUTPATIENT
Start: 2023-08-27 | End: 2023-08-27 | Stop reason: CLARIF

## 2023-08-27 RX ORDER — FENTANYL CITRATE 50 UG/ML
50 INJECTION, SOLUTION INTRAMUSCULAR; INTRAVENOUS ONCE
Status: DISCONTINUED | OUTPATIENT
Start: 2023-08-27 | End: 2023-08-30 | Stop reason: HOSPADM

## 2023-08-27 RX ORDER — AMOXICILLIN AND CLAVULANATE POTASSIUM 875; 125 MG/1; MG/1
1 TABLET, FILM COATED ORAL EVERY 12 HOURS SCHEDULED
Status: DISCONTINUED | OUTPATIENT
Start: 2023-08-27 | End: 2023-08-28

## 2023-08-27 RX ORDER — NICOTINE 21 MG/24HR
1 PATCH, TRANSDERMAL 24 HOURS TRANSDERMAL DAILY
Status: DISCONTINUED | OUTPATIENT
Start: 2023-08-27 | End: 2023-08-30 | Stop reason: HOSPADM

## 2023-08-27 RX ORDER — DEXTROSE MONOHYDRATE 100 MG/ML
INJECTION, SOLUTION INTRAVENOUS CONTINUOUS PRN
Status: DISCONTINUED | OUTPATIENT
Start: 2023-08-27 | End: 2023-08-30 | Stop reason: HOSPADM

## 2023-08-27 RX ORDER — FENTANYL CITRATE 50 UG/ML
INJECTION, SOLUTION INTRAMUSCULAR; INTRAVENOUS
Status: COMPLETED
Start: 2023-08-27 | End: 2023-08-27

## 2023-08-27 RX ORDER — DOXYCYCLINE HYCLATE 100 MG/1
100 CAPSULE ORAL EVERY 12 HOURS SCHEDULED
Status: DISCONTINUED | OUTPATIENT
Start: 2023-08-27 | End: 2023-08-29

## 2023-08-27 RX ORDER — SODIUM CHLORIDE 0.9 % (FLUSH) 0.9 %
10 SYRINGE (ML) INJECTION PRN
Status: DISCONTINUED | OUTPATIENT
Start: 2023-08-27 | End: 2023-08-30 | Stop reason: HOSPADM

## 2023-08-27 RX ORDER — PANTOPRAZOLE SODIUM 20 MG/1
40 TABLET, DELAYED RELEASE ORAL EVERY MORNING
Status: DISCONTINUED | OUTPATIENT
Start: 2023-08-28 | End: 2023-08-30 | Stop reason: HOSPADM

## 2023-08-27 RX ORDER — INSULIN GLARGINE-YFGN 100 [IU]/ML
25 INJECTION, SOLUTION SUBCUTANEOUS 2 TIMES DAILY
Status: DISCONTINUED | OUTPATIENT
Start: 2023-08-27 | End: 2023-08-28

## 2023-08-27 RX ORDER — INSULIN LISPRO 100 [IU]/ML
0-4 INJECTION, SOLUTION INTRAVENOUS; SUBCUTANEOUS NIGHTLY
Status: DISCONTINUED | OUTPATIENT
Start: 2023-08-27 | End: 2023-08-30 | Stop reason: HOSPADM

## 2023-08-27 RX ORDER — KETOROLAC TROMETHAMINE 30 MG/ML
15 INJECTION, SOLUTION INTRAMUSCULAR; INTRAVENOUS ONCE
Status: COMPLETED | OUTPATIENT
Start: 2023-08-27 | End: 2023-08-27

## 2023-08-27 RX ORDER — SODIUM CHLORIDE 0.9 % (FLUSH) 0.9 %
10 SYRINGE (ML) INJECTION EVERY 12 HOURS SCHEDULED
Status: DISCONTINUED | OUTPATIENT
Start: 2023-08-27 | End: 2023-08-30 | Stop reason: HOSPADM

## 2023-08-27 RX ORDER — MAGNESIUM SULFATE IN WATER 40 MG/ML
2000 INJECTION, SOLUTION INTRAVENOUS PRN
Status: DISCONTINUED | OUTPATIENT
Start: 2023-08-27 | End: 2023-08-30 | Stop reason: HOSPADM

## 2023-08-27 RX ORDER — GABAPENTIN 400 MG/1
400 CAPSULE ORAL 3 TIMES DAILY
Status: DISCONTINUED | OUTPATIENT
Start: 2023-08-27 | End: 2023-08-30 | Stop reason: HOSPADM

## 2023-08-27 RX ORDER — KETOROLAC TROMETHAMINE 30 MG/ML
INJECTION, SOLUTION INTRAMUSCULAR; INTRAVENOUS
Status: COMPLETED
Start: 2023-08-27 | End: 2023-08-27

## 2023-08-27 RX ORDER — ALBUTEROL SULFATE 90 UG/1
2 AEROSOL, METERED RESPIRATORY (INHALATION) EVERY 6 HOURS PRN
Status: DISCONTINUED | OUTPATIENT
Start: 2023-08-27 | End: 2023-08-27 | Stop reason: SDUPTHER

## 2023-08-27 RX ORDER — LANOLIN ALCOHOL/MO/W.PET/CERES
100 CREAM (GRAM) TOPICAL DAILY
Status: DISCONTINUED | OUTPATIENT
Start: 2023-08-27 | End: 2023-08-30 | Stop reason: HOSPADM

## 2023-08-27 RX ORDER — POTASSIUM CHLORIDE 7.45 MG/ML
10 INJECTION INTRAVENOUS PRN
Status: DISCONTINUED | OUTPATIENT
Start: 2023-08-27 | End: 2023-08-30 | Stop reason: HOSPADM

## 2023-08-27 RX ORDER — FENTANYL CITRATE 50 UG/ML
25 INJECTION, SOLUTION INTRAMUSCULAR; INTRAVENOUS ONCE
Status: COMPLETED | OUTPATIENT
Start: 2023-08-27 | End: 2023-08-27

## 2023-08-27 RX ORDER — ENOXAPARIN SODIUM 100 MG/ML
30 INJECTION SUBCUTANEOUS DAILY
Status: DISCONTINUED | OUTPATIENT
Start: 2023-08-27 | End: 2023-08-30 | Stop reason: HOSPADM

## 2023-08-27 RX ORDER — ARFORMOTEROL TARTRATE 15 UG/2ML
15 SOLUTION RESPIRATORY (INHALATION)
Status: DISCONTINUED | OUTPATIENT
Start: 2023-08-27 | End: 2023-08-30 | Stop reason: HOSPADM

## 2023-08-27 RX ORDER — ALBUTEROL SULFATE 2.5 MG/3ML
2.5 SOLUTION RESPIRATORY (INHALATION) EVERY 4 HOURS PRN
Status: DISCONTINUED | OUTPATIENT
Start: 2023-08-27 | End: 2023-08-30 | Stop reason: HOSPADM

## 2023-08-27 RX ORDER — LANOLIN ALCOHOL/MO/W.PET/CERES
3 CREAM (GRAM) TOPICAL DAILY
Status: DISCONTINUED | OUTPATIENT
Start: 2023-08-27 | End: 2023-08-30 | Stop reason: HOSPADM

## 2023-08-27 RX ORDER — HYDROXYZINE PAMOATE 25 MG/1
25 CAPSULE ORAL 3 TIMES DAILY PRN
Status: DISCONTINUED | OUTPATIENT
Start: 2023-08-27 | End: 2023-08-30 | Stop reason: HOSPADM

## 2023-08-27 RX ORDER — MORPHINE SULFATE 2 MG/ML
1 INJECTION, SOLUTION INTRAMUSCULAR; INTRAVENOUS EVERY 4 HOURS PRN
Status: DISCONTINUED | OUTPATIENT
Start: 2023-08-27 | End: 2023-08-29

## 2023-08-27 RX ADMIN — GABAPENTIN 400 MG: 400 CAPSULE ORAL at 20:46

## 2023-08-27 RX ADMIN — ENOXAPARIN SODIUM 30 MG: 100 INJECTION SUBCUTANEOUS at 10:41

## 2023-08-27 RX ADMIN — DIPHENHYDRAMINE HYDROCHLORIDE 50 MG: 50 INJECTION INTRAMUSCULAR; INTRAVENOUS at 00:16

## 2023-08-27 RX ADMIN — VANCOMYCIN HYDROCHLORIDE 1000 MG: 1 INJECTION, POWDER, LYOPHILIZED, FOR SOLUTION INTRAVENOUS at 03:16

## 2023-08-27 RX ADMIN — LACTULOSE 20 G: 20 SOLUTION ORAL at 20:46

## 2023-08-27 RX ADMIN — FENTANYL CITRATE 100 MCG: 50 INJECTION, SOLUTION INTRAMUSCULAR; INTRAVENOUS at 00:58

## 2023-08-27 RX ADMIN — RIFAXIMIN 600 MG: 200 TABLET ORAL at 20:45

## 2023-08-27 RX ADMIN — INSULIN GLARGINE-YFGN 25 UNITS: 100 INJECTION, SOLUTION SUBCUTANEOUS at 21:05

## 2023-08-27 RX ADMIN — MELATONIN 3 MG ORAL TABLET 3 MG: 3 TABLET ORAL at 20:45

## 2023-08-27 RX ADMIN — PANCRELIPASE LIPASE, PANCRELIPASE PROTEASE, PANCRELIPASE AMYLASE 30000 UNITS: 15000; 47000; 63000 CAPSULE, DELAYED RELEASE ORAL at 17:47

## 2023-08-27 RX ADMIN — RIFAXIMIN 600 MG: 200 TABLET ORAL at 17:45

## 2023-08-27 RX ADMIN — SODIUM CHLORIDE 2000 ML: 9 INJECTION, SOLUTION INTRAVENOUS at 01:20

## 2023-08-27 RX ADMIN — AMOXICILLIN AND CLAVULANATE POTASSIUM 1 TABLET: 875; 125 TABLET, FILM COATED ORAL at 20:46

## 2023-08-27 RX ADMIN — Medication 10 ML: at 20:50

## 2023-08-27 RX ADMIN — HYDROXYZINE PAMOATE 25 MG: 25 CAPSULE ORAL at 17:45

## 2023-08-27 RX ADMIN — Medication 100 MG: at 17:46

## 2023-08-27 RX ADMIN — KETOROLAC TROMETHAMINE 15 MG: 30 INJECTION, SOLUTION INTRAMUSCULAR; INTRAVENOUS at 03:24

## 2023-08-27 RX ADMIN — MORPHINE SULFATE 1 MG: 2 INJECTION, SOLUTION INTRAMUSCULAR; INTRAVENOUS at 22:23

## 2023-08-27 RX ADMIN — MORPHINE SULFATE 1 MG: 2 INJECTION, SOLUTION INTRAMUSCULAR; INTRAVENOUS at 18:22

## 2023-08-27 RX ADMIN — ARIPIPRAZOLE 20 MG: 10 TABLET ORAL at 17:46

## 2023-08-27 RX ADMIN — FENTANYL CITRATE 25 MCG: 50 INJECTION, SOLUTION INTRAMUSCULAR; INTRAVENOUS at 03:02

## 2023-08-27 RX ADMIN — INSULIN LISPRO 4 UNITS: 100 INJECTION, SOLUTION INTRAVENOUS; SUBCUTANEOUS at 21:06

## 2023-08-27 RX ADMIN — INSULIN HUMAN 15 UNITS: 100 INJECTION, SOLUTION PARENTERAL at 01:11

## 2023-08-27 RX ADMIN — Medication 10 ML: at 10:41

## 2023-08-27 RX ADMIN — INSULIN HUMAN 10 UNITS: 100 INJECTION, SOLUTION PARENTERAL at 03:05

## 2023-08-27 RX ADMIN — PIPERACILLIN AND TAZOBACTAM 3375 MG: 3; .375 INJECTION, POWDER, LYOPHILIZED, FOR SOLUTION INTRAVENOUS at 00:22

## 2023-08-27 RX ADMIN — INSULIN HUMAN 15 UNITS: 100 INJECTION, SOLUTION PARENTERAL at 03:07

## 2023-08-27 RX ADMIN — MORPHINE SULFATE 1 MG: 2 INJECTION, SOLUTION INTRAMUSCULAR; INTRAVENOUS at 14:18

## 2023-08-27 RX ADMIN — ANASTROZOLE 1 MG: 1 TABLET ORAL at 17:46

## 2023-08-27 RX ADMIN — GABAPENTIN 400 MG: 400 CAPSULE ORAL at 17:44

## 2023-08-27 RX ADMIN — FENTANYL CITRATE 25 MCG: 50 INJECTION, SOLUTION INTRAMUSCULAR; INTRAVENOUS at 00:14

## 2023-08-27 RX ADMIN — INSULIN LISPRO 8 UNITS: 100 INJECTION, SOLUTION INTRAVENOUS; SUBCUTANEOUS at 17:46

## 2023-08-27 RX ADMIN — INSULIN LISPRO 2 UNITS: 100 INJECTION, SOLUTION INTRAVENOUS; SUBCUTANEOUS at 10:55

## 2023-08-27 RX ADMIN — SODIUM CHLORIDE 2000 ML: 9 INJECTION, SOLUTION INTRAVENOUS at 03:10

## 2023-08-27 RX ADMIN — DOXYCYCLINE HYCLATE 100 MG: 100 CAPSULE ORAL at 20:45

## 2023-08-27 RX ADMIN — METHYLPREDNISOLONE SODIUM SUCCINATE 125 MG: 125 INJECTION, POWDER, LYOPHILIZED, FOR SOLUTION INTRAMUSCULAR; INTRAVENOUS at 00:15

## 2023-08-27 RX ADMIN — PANCRELIPASE LIPASE, PANCRELIPASE PROTEASE, PANCRELIPASE AMYLASE 40000 UNITS: 20000; 63000; 84000 CAPSULE, DELAYED RELEASE ORAL at 17:46

## 2023-08-27 RX ADMIN — VORTIOXETINE 20 MG: 10 TABLET, FILM COATED ORAL at 20:45

## 2023-08-27 ASSESSMENT — PAIN DESCRIPTION - DESCRIPTORS
DESCRIPTORS: ACHING;SORE
DESCRIPTORS: ACHING
DESCRIPTORS: ACHING
DESCRIPTORS: ACHING;BURNING;SHARP;SORE
DESCRIPTORS: ACHING
DESCRIPTORS: ACHING;DISCOMFORT;BURNING

## 2023-08-27 ASSESSMENT — PAIN SCALES - GENERAL
PAINLEVEL_OUTOF10: 10
PAINLEVEL_OUTOF10: 8
PAINLEVEL_OUTOF10: 8
PAINLEVEL_OUTOF10: 10
PAINLEVEL_OUTOF10: 8
PAINLEVEL_OUTOF10: 10
PAINLEVEL_OUTOF10: 2

## 2023-08-27 ASSESSMENT — PAIN DESCRIPTION - ORIENTATION
ORIENTATION: LEFT

## 2023-08-27 ASSESSMENT — PAIN DESCRIPTION - LOCATION
LOCATION: BUTTOCKS
LOCATION: RECTUM
LOCATION: RECTUM
LOCATION: PERINEUM
LOCATION: BUTTOCKS
LOCATION: RECTUM

## 2023-08-27 ASSESSMENT — PAIN DESCRIPTION - FREQUENCY
FREQUENCY: CONTINUOUS

## 2023-08-27 ASSESSMENT — PAIN - FUNCTIONAL ASSESSMENT
PAIN_FUNCTIONAL_ASSESSMENT: PREVENTS OR INTERFERES SOME ACTIVE ACTIVITIES AND ADLS
PAIN_FUNCTIONAL_ASSESSMENT: 0-10
PAIN_FUNCTIONAL_ASSESSMENT: ACTIVITIES ARE NOT PREVENTED
PAIN_FUNCTIONAL_ASSESSMENT: PREVENTS OR INTERFERES SOME ACTIVE ACTIVITIES AND ADLS
PAIN_FUNCTIONAL_ASSESSMENT: NONE - DENIES PAIN
PAIN_FUNCTIONAL_ASSESSMENT: 0-10

## 2023-08-27 ASSESSMENT — PAIN DESCRIPTION - ONSET
ONSET: ON-GOING

## 2023-08-27 ASSESSMENT — ENCOUNTER SYMPTOMS
SHORTNESS OF BREATH: 0
NAUSEA: 0
CHEST TIGHTNESS: 0
VOMITING: 0
ABDOMINAL PAIN: 0
DIARRHEA: 0
COUGH: 0

## 2023-08-27 ASSESSMENT — PAIN DESCRIPTION - PAIN TYPE
TYPE: ACUTE PAIN

## 2023-08-27 NOTE — ED NOTES
Lt buttocks wound cleaned with wound cleanser after Incision and Drainage and iodoform packing applied per Dr Jailyn Lagos.  DSD applied to [address] [place] [address]  08/27/23 2330

## 2023-08-27 NOTE — ED NOTES
Pt arrived at [facility] and stated she was just at [facility] ED and left because she could not wait any longer. [facility] ED called and pt was taken off their ED board.       Baltazar Stevens  08/26/23 0430

## 2023-08-27 NOTE — ED PROVIDER NOTES
87 (*)     Alkaline Phosphatase 1,312 (*)     ALT 69 (*)      (*)     Albumin 3.2 (*)     All other components within normal limits   LACTIC ACID - Abnormal; Notable for the following components:    Lactic Acid 4.6 (*)     All other components within normal limits   URINALYSIS - Abnormal; Notable for the following components:    Glucose, Ur >=1000 (*)     Specific Gravity, UA <1.005 (*)     Urine Hgb SMALL (*)     Leukocyte Esterase, Urine SMALL (*)     All other components within normal limits   MICROSCOPIC URINALYSIS - Abnormal; Notable for the following components:    WBC, UA 21 TO 50 (*)     RBC, UA 3 to 5 (*)     Bacteria, UA 1+ (*)     Trichomonas PRESENT (*)     All other components within normal limits   POCT GLUCOSE - Abnormal; Notable for the following components:    POC Glucose >500 (*)     All other components within normal limits   POCT GLUCOSE - Abnormal; Notable for the following components:    POC Glucose >500 (*)     All other components within normal limits   POCT GLUCOSE - Normal   CULTURE, BLOOD 1   CULTURE, BLOOD 2   LIPASE   C-REACTIVE PROTEIN   PLATELET CONFIRMATION   PH, VENOUS   BETA-HYDROXYBUTYRATE   LACTIC ACID       When ordered only abnormal lab results are displayed. All other labs were within normal range or not returned as of this dictation. EKG: When ordered, EKG's are interpreted by the Emergency Department Physician in the absence of a cardiologist.  Please see their note for interpretation of EKG.     RADIOLOGY:   Non-plain film images such as CT, Ultrasound and MRI are read by the radiologist. Plain radiographic images are visualized and preliminarily interpreted by the ED Provider with the below findings:        Interpretation per the Radiologist below, if available at the time of this note:    CT ABDOMEN PELVIS WO CONTRAST Additional Contrast? None   Final Result   Extensive soft tissue edema involving the ischial rectal fossa bilaterally   and extending around the

## 2023-08-27 NOTE — ED NOTES
Report called to [facility] 7th floor. Spoke to [employer].    \[facility]\"" ambulance here now for transport     Martín Salmon RN  08/27/23 8057

## 2023-08-27 NOTE — ED NOTES
Dr Luz Aguila inserted 18 gauge left EJ IV.  Lab called to say patient lactic acid 4.6. and glucose 866

## 2023-08-27 NOTE — ED NOTES
Radiology Procedure Waiver   Name: Blake Butler  : 1970  MRN: [id number]    Date:  23    Time: 10:20 PM EDT    Benefits of immediately proceeding with Radiology exam(s) without pre-testing outweigh the risks or are not indicated as specified below and therefore the following is/are being waived:     Pregnancy test   [] Patients LMP on-time and regular.   [] Patient had Tubal Ligation or has other Contraception Device. [] Patient  is Menopausal or Premenarcheal.    [] Patient had Full or Partial Hysterectomy. [x] Protocol for Iodine allergy  []  [] MRI Questionnaire     [x] BUN/Creatinine   [x] Patient age w/no hx of renal dysfunction. [] Patient on Dialysis. [] Recent Normal Labs.   Electronically signed by EVERETT Butcher CNP on 23 at 10:20 PM EDT              EVERETT Hackett CNP  23 [phone]

## 2023-08-27 NOTE — ED NOTES
Report given to [address] [place] [address]  and [facility]     Lillian Casas RN  08/27/23 5717 Information: Selecting Yes will display possible errors in your note based on the variables you have selected. This validation is only offered as a suggestion for you. PLEASE NOTE THAT THE VALIDATION TEXT WILL BE REMOVED WHEN YOU FINALIZE YOUR NOTE. IF YOU WANT TO FAX A PRELIMINARY NOTE YOU WILL NEED TO TOGGLE THIS TO 'NO' IF YOU DO NOT WANT IT IN YOUR FAXED NOTE.

## 2023-08-27 NOTE — ED NOTES
Pt was attempted to be roomed at this time per [facility] AND [facility]. There was no answer at this time. Pt was sought after in the lobby and there was no answer. Pt was searched for in all the bathrooms at this time. There was no answer at this time.       Stacie Rowe RN  08/26/23 8378

## 2023-08-27 NOTE — ED NOTES
Department of Emergency Medicine  FIRST PROVIDER TRIAGE NOTE             Independent MLP           8/26/23  8:25 PM EDT    Date of Encounter: 8/26/23   MRN: [id number]      HPI: Lesia Lopez is a 48 y.o. female who presents to the ED for Cyst (On backside that is going into vagina and is leaking brown stuff x 1 month. No recent surgeries. Urine is cloudy)   Pt presents with abscess or infection on her behind going into her vagina with brown discharge. She reports cloudy urine. Denies fevers or chills    ROS: Negative for cp, sob, back pain, fever, cough, or vomiting. PE: Gen Appearance/Constitutional: alert, cachectic  CV: regular rate  Pulm: CTA bilat     Initial Plan of Care: All treatment areas with department are currently occupied. Plan to order/Initiate the following while awaiting opening in ED: labs, analgesics, antibiotics, and IV fluids.   Initiate Treatment-Testing, Proceed toTreatment Area When Bed Available for ED Attending/MLP to Continue Care    Electronically signed by Aftab Mcleod PA-C   DD: 8/26/23       Aftab Mcleod PA-C  08/26/23 7441

## 2023-08-27 NOTE — H&P
every morning 7/25/23   Ruddy Baker, DO   insulin lispro (HUMALOG) 100 UNIT/ML injection cartridge Per scale and carb ratio. A max of 100 units/day 7/25/23   Ruddy Baker, DO   dicyclomine (BENTYL) 10 MG capsule Take 1 capsule by mouth every 6 hours as needed (Bowel spasms) 7/24/23 7/29/23  Franklyn Belcher MD   gabapentin (NEURONTIN) 400 MG capsule Take 1 capsule by mouth 3 times daily for 90 days. 7/18/23 10/16/23  Ruddy Baker, DO   TRINTELLIX 20 MG TABS tablet take 1 tablet by mouth nightly 7/3/23   Ruddy Baker, DO   furosemide (LASIX) 20 MG tablet Take 1 tablet by mouth daily  Patient taking differently: Take 1 tablet by mouth as needed When swelling 5/23/23   Ruddy Baker, DO   Nutritional Supplements (GLUCERNA 1.5 RADHA/CARBSTEADY) LIQD Take 1 each by mouth in the morning and at bedtime 5/23/23   Ruddy Baker, DO   Elastic Bandages & Supports ([address]) Elva Lon 7 each by Does not apply route daily 7 pairs. 8 to 15mmHg. Medically necessary.  5/23/23   Ruddy Baker, DO   insulin glargine (LANTUS SOLOSTAR) 100 UNIT/ML injection pen Inject 25 Units into the skin 2 times daily 5/19/23   Jesus Baez MD   rifAXIMin (XIFAXAN) 550 MG tablet Take 200 mg by mouth 2 times daily    Historical Provider, MD   Lactobacillus (PROBIOTIC ACIDOPHILUS PO) Take by mouth daily    Historical Provider, MD   Multiple Vitamin (MULTI VITAMIN DAILY PO) Take by mouth    Historical Provider, MD   melatonin 3 MG TABS tablet Take 1 tablet by mouth daily    Historical Provider, MD   hydrOXYzine pamoate (VISTARIL) 50 MG capsule take 1 capsule by mouth four times a day if needed for anxiety 5/8/23   Ruddy Baker, DO   Accu-Chek FastClix Lancets MISC 1 each by Does not apply route 6 times daily For pump calibration 5/1/23   Marquis Eric MD   Injection Device for Insulin (INPEN 100-GREY-RIGOBERTO-HUMALOG) EMMA To use as directed 4/7/23   Marquis Eric MD   anastrozole

## 2023-08-28 ENCOUNTER — ANESTHESIA (OUTPATIENT)
Dept: OPERATING ROOM | Age: 53
DRG: 364 | End: 2023-08-28
Payer: COMMERCIAL

## 2023-08-28 ENCOUNTER — ANESTHESIA EVENT (OUTPATIENT)
Dept: OPERATING ROOM | Age: 53
DRG: 364 | End: 2023-08-28
Payer: COMMERCIAL

## 2023-08-28 LAB
ANION GAP SERPL CALCULATED.3IONS-SCNC: 9 MMOL/L (ref 7–16)
BASOPHILS # BLD: 0 K/UL (ref 0–0.2)
BASOPHILS NFR BLD: 0 % (ref 0–2)
BUN SERPL-MCNC: 8 MG/DL (ref 6–20)
CALCIUM SERPL-MCNC: 7.8 MG/DL (ref 8.6–10.2)
CHLORIDE SERPL-SCNC: 98 MMOL/L (ref 98–107)
CO2 SERPL-SCNC: 25 MMOL/L (ref 22–29)
CREAT SERPL-MCNC: 0.4 MG/DL (ref 0.5–1)
CRP SERPL HS-MCNC: 88 MG/L (ref 0–5)
EOSINOPHIL # BLD: 0.03 K/UL (ref 0.05–0.5)
EOSINOPHILS RELATIVE PERCENT: 1 % (ref 0–6)
ERYTHROCYTE [DISTWIDTH] IN BLOOD BY AUTOMATED COUNT: 15 % (ref 11.5–15)
ERYTHROCYTE [SEDIMENTATION RATE] IN BLOOD BY WESTERGREN METHOD: 60 MM/HR (ref 0–20)
GFR SERPL CREATININE-BSD FRML MDRD: >60 ML/MIN/1.73M2
GLUCOSE BLD-MCNC: 105 MG/DL (ref 74–99)
GLUCOSE BLD-MCNC: 164 MG/DL (ref 74–99)
GLUCOSE BLD-MCNC: 166 MG/DL (ref 74–99)
GLUCOSE BLD-MCNC: 192 MG/DL (ref 74–99)
GLUCOSE BLD-MCNC: 344 MG/DL (ref 74–99)
GLUCOSE BLD-MCNC: 63 MG/DL (ref 74–99)
GLUCOSE SERPL-MCNC: 333 MG/DL (ref 74–99)
HBA1C MFR BLD: 11.2 % (ref 4–5.6)
HCT VFR BLD AUTO: 30.4 % (ref 34–48)
HGB BLD-MCNC: 9.9 G/DL (ref 11.5–15.5)
INR PPP: 1
LYMPHOCYTES NFR BLD: 0.33 K/UL (ref 1.5–4)
LYMPHOCYTES RELATIVE PERCENT: 10 % (ref 20–42)
MCH RBC QN AUTO: 32.1 PG (ref 26–35)
MCHC RBC AUTO-ENTMCNC: 32.6 G/DL (ref 32–34.5)
MCV RBC AUTO: 98.7 FL (ref 80–99.9)
MONOCYTES NFR BLD: 0.26 K/UL (ref 0.1–0.95)
MONOCYTES NFR BLD: 8 % (ref 2–12)
NEUTROPHILS NFR BLD: 81 % (ref 43–80)
NEUTS SEG NFR BLD: 2.67 K/UL (ref 1.8–7.3)
PLATELET # BLD AUTO: 61 K/UL (ref 130–450)
PLATELET CONFIRMATION: NORMAL
PMV BLD AUTO: 10.5 FL (ref 7–12)
POTASSIUM SERPL-SCNC: 4.2 MMOL/L (ref 3.5–5)
PROTHROMBIN TIME: 10.6 SEC (ref 9.3–12.4)
RBC # BLD AUTO: 3.08 M/UL (ref 3.5–5.5)
RBC # BLD: ABNORMAL 10*6/UL
SODIUM SERPL-SCNC: 132 MMOL/L (ref 132–146)
WBC OTHER # BLD: 3.3 K/UL (ref 4.5–11.5)

## 2023-08-28 PROCEDURE — 6370000000 HC RX 637 (ALT 250 FOR IP): Performed by: INTERNAL MEDICINE

## 2023-08-28 PROCEDURE — 85610 PROTHROMBIN TIME: CPT

## 2023-08-28 PROCEDURE — 36415 COLL VENOUS BLD VENIPUNCTURE: CPT

## 2023-08-28 PROCEDURE — 3600000002 HC SURGERY LEVEL 2 BASE: Performed by: SURGERY

## 2023-08-28 PROCEDURE — 6370000000 HC RX 637 (ALT 250 FOR IP): Performed by: SPECIALIST

## 2023-08-28 PROCEDURE — 87077 CULTURE AEROBIC IDENTIFY: CPT

## 2023-08-28 PROCEDURE — C9399 UNCLASSIFIED DRUGS OR BIOLOG: HCPCS | Performed by: NURSE ANESTHETIST, CERTIFIED REGISTERED

## 2023-08-28 PROCEDURE — S5553 INSULIN LONG ACTING 5 U: HCPCS | Performed by: INTERNAL MEDICINE

## 2023-08-28 PROCEDURE — 2500000003 HC RX 250 WO HCPCS: Performed by: NURSE ANESTHETIST, CERTIFIED REGISTERED

## 2023-08-28 PROCEDURE — 87040 BLOOD CULTURE FOR BACTERIA: CPT

## 2023-08-28 PROCEDURE — 3700000001 HC ADD 15 MINUTES (ANESTHESIA): Performed by: SURGERY

## 2023-08-28 PROCEDURE — 80048 BASIC METABOLIC PNL TOTAL CA: CPT

## 2023-08-28 PROCEDURE — 87070 CULTURE OTHR SPECIMN AEROBIC: CPT

## 2023-08-28 PROCEDURE — 87205 SMEAR GRAM STAIN: CPT

## 2023-08-28 PROCEDURE — 6360000002 HC RX W HCPCS: Performed by: INTERNAL MEDICINE

## 2023-08-28 PROCEDURE — 3600000012 HC SURGERY LEVEL 2 ADDTL 15MIN: Performed by: SURGERY

## 2023-08-28 PROCEDURE — 85652 RBC SED RATE AUTOMATED: CPT

## 2023-08-28 PROCEDURE — 97165 OT EVAL LOW COMPLEX 30 MIN: CPT

## 2023-08-28 PROCEDURE — 85025 COMPLETE CBC W/AUTO DIFF WBC: CPT

## 2023-08-28 PROCEDURE — 2580000003 HC RX 258: Performed by: INTERNAL MEDICINE

## 2023-08-28 PROCEDURE — 97535 SELF CARE MNGMENT TRAINING: CPT

## 2023-08-28 PROCEDURE — 86140 C-REACTIVE PROTEIN: CPT

## 2023-08-28 PROCEDURE — 2580000003 HC RX 258: Performed by: SURGERY

## 2023-08-28 PROCEDURE — 86403 PARTICLE AGGLUT ANTBDY SCRN: CPT

## 2023-08-28 PROCEDURE — 7100000001 HC PACU RECOVERY - ADDTL 15 MIN: Performed by: SURGERY

## 2023-08-28 PROCEDURE — 3700000000 HC ANESTHESIA ATTENDED CARE: Performed by: SURGERY

## 2023-08-28 PROCEDURE — 7100000000 HC PACU RECOVERY - FIRST 15 MIN: Performed by: SURGERY

## 2023-08-28 PROCEDURE — 99222 1ST HOSP IP/OBS MODERATE 55: CPT | Performed by: SURGERY

## 2023-08-28 PROCEDURE — 2060000000 HC ICU INTERMEDIATE R&B

## 2023-08-28 PROCEDURE — 82962 GLUCOSE BLOOD TEST: CPT

## 2023-08-28 PROCEDURE — 2709999900 HC NON-CHARGEABLE SUPPLY: Performed by: SURGERY

## 2023-08-28 PROCEDURE — 2580000003 HC RX 258: Performed by: NURSE ANESTHETIST, CERTIFIED REGISTERED

## 2023-08-28 PROCEDURE — 87075 CULTR BACTERIA EXCEPT BLOOD: CPT

## 2023-08-28 PROCEDURE — 6360000002 HC RX W HCPCS: Performed by: NURSE ANESTHETIST, CERTIFIED REGISTERED

## 2023-08-28 PROCEDURE — 83036 HEMOGLOBIN GLYCOSYLATED A1C: CPT

## 2023-08-28 RX ORDER — SODIUM CHLORIDE 0.9 % (FLUSH) 0.9 %
5-40 SYRINGE (ML) INJECTION EVERY 12 HOURS SCHEDULED
Status: DISCONTINUED | OUTPATIENT
Start: 2023-08-29 | End: 2023-08-29 | Stop reason: HOSPADM

## 2023-08-28 RX ORDER — LIDOCAINE HYDROCHLORIDE 10 MG/ML
20 INJECTION, SOLUTION INFILTRATION; PERINEURAL ONCE
Status: DISCONTINUED | OUTPATIENT
Start: 2023-08-28 | End: 2023-08-30 | Stop reason: HOSPADM

## 2023-08-28 RX ORDER — ONDANSETRON 2 MG/ML
INJECTION INTRAMUSCULAR; INTRAVENOUS PRN
Status: DISCONTINUED | OUTPATIENT
Start: 2023-08-28 | End: 2023-08-28 | Stop reason: SDUPTHER

## 2023-08-28 RX ORDER — HYDROMORPHONE HYDROCHLORIDE 1 MG/ML
0.25 INJECTION, SOLUTION INTRAMUSCULAR; INTRAVENOUS; SUBCUTANEOUS EVERY 5 MIN PRN
Status: DISCONTINUED | OUTPATIENT
Start: 2023-08-28 | End: 2023-08-29 | Stop reason: HOSPADM

## 2023-08-28 RX ORDER — SODIUM CHLORIDE 0.9 % (FLUSH) 0.9 %
5-40 SYRINGE (ML) INJECTION PRN
Status: DISCONTINUED | OUTPATIENT
Start: 2023-08-28 | End: 2023-08-29 | Stop reason: HOSPADM

## 2023-08-28 RX ORDER — SODIUM CHLORIDE, SODIUM LACTATE, POTASSIUM CHLORIDE, CALCIUM CHLORIDE 600; 310; 30; 20 MG/100ML; MG/100ML; MG/100ML; MG/100ML
INJECTION, SOLUTION INTRAVENOUS CONTINUOUS
Status: DISCONTINUED | OUTPATIENT
Start: 2023-08-28 | End: 2023-08-29

## 2023-08-28 RX ORDER — LIDOCAINE HYDROCHLORIDE 20 MG/ML
INJECTION, SOLUTION INTRAVENOUS PRN
Status: DISCONTINUED | OUTPATIENT
Start: 2023-08-28 | End: 2023-08-28 | Stop reason: SDUPTHER

## 2023-08-28 RX ORDER — SODIUM CHLORIDE 9 MG/ML
INJECTION, SOLUTION INTRAVENOUS PRN
Status: DISCONTINUED | OUTPATIENT
Start: 2023-08-28 | End: 2023-08-29 | Stop reason: HOSPADM

## 2023-08-28 RX ORDER — HYDROMORPHONE HYDROCHLORIDE 1 MG/ML
0.5 INJECTION, SOLUTION INTRAMUSCULAR; INTRAVENOUS; SUBCUTANEOUS EVERY 5 MIN PRN
Status: DISCONTINUED | OUTPATIENT
Start: 2023-08-28 | End: 2023-08-29 | Stop reason: HOSPADM

## 2023-08-28 RX ORDER — METRONIDAZOLE 500 MG/1
500 TABLET ORAL EVERY 12 HOURS SCHEDULED
Status: DISCONTINUED | OUTPATIENT
Start: 2023-08-28 | End: 2023-08-30 | Stop reason: HOSPADM

## 2023-08-28 RX ORDER — ROCURONIUM BROMIDE 10 MG/ML
INJECTION, SOLUTION INTRAVENOUS PRN
Status: DISCONTINUED | OUTPATIENT
Start: 2023-08-28 | End: 2023-08-28 | Stop reason: SDUPTHER

## 2023-08-28 RX ORDER — INSULIN LISPRO 100 [IU]/ML
12 INJECTION, SOLUTION INTRAVENOUS; SUBCUTANEOUS
Status: DISCONTINUED | OUTPATIENT
Start: 2023-08-28 | End: 2023-08-30 | Stop reason: HOSPADM

## 2023-08-28 RX ORDER — MIDAZOLAM HYDROCHLORIDE 1 MG/ML
INJECTION INTRAMUSCULAR; INTRAVENOUS PRN
Status: DISCONTINUED | OUTPATIENT
Start: 2023-08-28 | End: 2023-08-28 | Stop reason: SDUPTHER

## 2023-08-28 RX ORDER — AMOXICILLIN AND CLAVULANATE POTASSIUM 562.5; 437.5; 62.5 MG/1; MG/1; MG/1
2 TABLET, MULTILAYER, EXTENDED RELEASE ORAL EVERY 12 HOURS SCHEDULED
Status: DISCONTINUED | OUTPATIENT
Start: 2023-08-28 | End: 2023-08-30 | Stop reason: HOSPADM

## 2023-08-28 RX ORDER — INSULIN GLARGINE-YFGN 100 [IU]/ML
30 INJECTION, SOLUTION SUBCUTANEOUS 2 TIMES DAILY
Status: DISCONTINUED | OUTPATIENT
Start: 2023-08-28 | End: 2023-08-30 | Stop reason: HOSPADM

## 2023-08-28 RX ORDER — SODIUM CHLORIDE 9 MG/ML
INJECTION, SOLUTION INTRAVENOUS CONTINUOUS PRN
Status: DISCONTINUED | OUTPATIENT
Start: 2023-08-28 | End: 2023-08-28 | Stop reason: SDUPTHER

## 2023-08-28 RX ORDER — FENTANYL CITRATE 50 UG/ML
INJECTION, SOLUTION INTRAMUSCULAR; INTRAVENOUS PRN
Status: DISCONTINUED | OUTPATIENT
Start: 2023-08-28 | End: 2023-08-28 | Stop reason: SDUPTHER

## 2023-08-28 RX ORDER — DEXAMETHASONE SODIUM PHOSPHATE 10 MG/ML
INJECTION, SOLUTION INTRAMUSCULAR; INTRAVENOUS PRN
Status: DISCONTINUED | OUTPATIENT
Start: 2023-08-28 | End: 2023-08-28 | Stop reason: SDUPTHER

## 2023-08-28 RX ORDER — MEPERIDINE HYDROCHLORIDE 25 MG/ML
12.5 INJECTION INTRAMUSCULAR; INTRAVENOUS; SUBCUTANEOUS EVERY 5 MIN PRN
Status: DISCONTINUED | OUTPATIENT
Start: 2023-08-28 | End: 2023-08-29 | Stop reason: HOSPADM

## 2023-08-28 RX ORDER — OXYCODONE HYDROCHLORIDE 5 MG/1
5 TABLET ORAL EVERY 6 HOURS PRN
Qty: 20 TABLET | Refills: 0 | Status: SHIPPED | OUTPATIENT
Start: 2023-08-28 | End: 2023-09-02

## 2023-08-28 RX ORDER — SENNA AND DOCUSATE SODIUM 50; 8.6 MG/1; MG/1
2 TABLET, FILM COATED ORAL 2 TIMES DAILY
Qty: 28 TABLET | Refills: 0 | Status: SHIPPED | OUTPATIENT
Start: 2023-08-28

## 2023-08-28 RX ORDER — PROPOFOL 10 MG/ML
INJECTION, EMULSION INTRAVENOUS PRN
Status: DISCONTINUED | OUTPATIENT
Start: 2023-08-28 | End: 2023-08-28 | Stop reason: SDUPTHER

## 2023-08-28 RX ADMIN — SODIUM CHLORIDE: 9 INJECTION, SOLUTION INTRAVENOUS at 22:40

## 2023-08-28 RX ADMIN — ARIPIPRAZOLE 20 MG: 10 TABLET ORAL at 08:55

## 2023-08-28 RX ADMIN — INSULIN GLARGINE-YFGN 25 UNITS: 100 INJECTION, SOLUTION SUBCUTANEOUS at 08:54

## 2023-08-28 RX ADMIN — ROCURONIUM BROMIDE 10 MG: 10 INJECTION, SOLUTION INTRAVENOUS at 22:59

## 2023-08-28 RX ADMIN — HYDROXYZINE PAMOATE 25 MG: 25 CAPSULE ORAL at 21:03

## 2023-08-28 RX ADMIN — RIFAXIMIN 600 MG: 200 TABLET ORAL at 20:56

## 2023-08-28 RX ADMIN — LIDOCAINE HYDROCHLORIDE 60 MG: 20 INJECTION, SOLUTION INTRAVENOUS at 22:49

## 2023-08-28 RX ADMIN — RIFAXIMIN 600 MG: 200 TABLET ORAL at 08:55

## 2023-08-28 RX ADMIN — VORTIOXETINE 20 MG: 10 TABLET, FILM COATED ORAL at 20:57

## 2023-08-28 RX ADMIN — MORPHINE SULFATE 1 MG: 2 INJECTION, SOLUTION INTRAMUSCULAR; INTRAVENOUS at 10:54

## 2023-08-28 RX ADMIN — PANTOPRAZOLE SODIUM 40 MG: 20 TABLET, DELAYED RELEASE ORAL at 08:56

## 2023-08-28 RX ADMIN — METRONIDAZOLE 500 MG: 500 TABLET ORAL at 20:57

## 2023-08-28 RX ADMIN — GLUCAGON 1 MG: KIT at 16:56

## 2023-08-28 RX ADMIN — Medication 100 MG: at 08:56

## 2023-08-28 RX ADMIN — INSULIN LISPRO 6 UNITS: 100 INJECTION, SOLUTION INTRAVENOUS; SUBCUTANEOUS at 08:54

## 2023-08-28 RX ADMIN — MIDAZOLAM 2 MG: 1 INJECTION INTRAMUSCULAR; INTRAVENOUS at 22:41

## 2023-08-28 RX ADMIN — MORPHINE SULFATE 1 MG: 2 INJECTION, SOLUTION INTRAMUSCULAR; INTRAVENOUS at 02:29

## 2023-08-28 RX ADMIN — SODIUM CHLORIDE, POTASSIUM CHLORIDE, SODIUM LACTATE AND CALCIUM CHLORIDE: 600; 310; 30; 20 INJECTION, SOLUTION INTRAVENOUS at 23:54

## 2023-08-28 RX ADMIN — METRONIDAZOLE 500 MG: 500 TABLET ORAL at 10:54

## 2023-08-28 RX ADMIN — MORPHINE SULFATE 1 MG: 2 INJECTION, SOLUTION INTRAMUSCULAR; INTRAVENOUS at 15:03

## 2023-08-28 RX ADMIN — DOXYCYCLINE HYCLATE 100 MG: 100 CAPSULE ORAL at 20:57

## 2023-08-28 RX ADMIN — LACTULOSE 20 G: 20 SOLUTION ORAL at 20:58

## 2023-08-28 RX ADMIN — SODIUM CHLORIDE, POTASSIUM CHLORIDE, SODIUM LACTATE AND CALCIUM CHLORIDE: 600; 310; 30; 20 INJECTION, SOLUTION INTRAVENOUS at 14:31

## 2023-08-28 RX ADMIN — PANCRELIPASE LIPASE, PANCRELIPASE PROTEASE, PANCRELIPASE AMYLASE 30000 UNITS: 15000; 47000; 63000 CAPSULE, DELAYED RELEASE ORAL at 07:21

## 2023-08-28 RX ADMIN — DOXYCYCLINE HYCLATE 100 MG: 100 CAPSULE ORAL at 08:55

## 2023-08-28 RX ADMIN — PANCRELIPASE LIPASE, PANCRELIPASE PROTEASE, PANCRELIPASE AMYLASE 40000 UNITS: 20000; 63000; 84000 CAPSULE, DELAYED RELEASE ORAL at 07:21

## 2023-08-28 RX ADMIN — ANASTROZOLE 1 MG: 1 TABLET ORAL at 08:55

## 2023-08-28 RX ADMIN — DEXAMETHASONE SODIUM PHOSPHATE 10 MG: 10 INJECTION, SOLUTION INTRAMUSCULAR; INTRAVENOUS at 22:59

## 2023-08-28 RX ADMIN — DEXTROSE MONOHYDRATE 125 ML: 100 INJECTION, SOLUTION INTRAVENOUS at 17:02

## 2023-08-28 RX ADMIN — ONDANSETRON 4 MG: 2 INJECTION INTRAMUSCULAR; INTRAVENOUS at 23:05

## 2023-08-28 RX ADMIN — GABAPENTIN 400 MG: 400 CAPSULE ORAL at 21:00

## 2023-08-28 RX ADMIN — MORPHINE SULFATE 1 MG: 2 INJECTION, SOLUTION INTRAMUSCULAR; INTRAVENOUS at 06:33

## 2023-08-28 RX ADMIN — DEXTROSE MONOHYDRATE: 100 INJECTION, SOLUTION INTRAVENOUS at 17:01

## 2023-08-28 RX ADMIN — Medication 10 ML: at 21:06

## 2023-08-28 RX ADMIN — PROPOFOL 170 MG: 10 INJECTION, EMULSION INTRAVENOUS at 22:49

## 2023-08-28 RX ADMIN — GABAPENTIN 400 MG: 400 CAPSULE ORAL at 08:55

## 2023-08-28 RX ADMIN — Medication 10 ML: at 08:54

## 2023-08-28 RX ADMIN — AMOXICILLIN AND CLAVULANATE POTASSIUM 2 TABLET: 562.5; 437.5; 62.5 TABLET, MULTILAYER, EXTENDED RELEASE ORAL at 20:59

## 2023-08-28 RX ADMIN — ROCURONIUM BROMIDE 20 MG: 10 INJECTION, SOLUTION INTRAVENOUS at 22:50

## 2023-08-28 RX ADMIN — MELATONIN 3 MG ORAL TABLET 3 MG: 3 TABLET ORAL at 20:58

## 2023-08-28 RX ADMIN — MORPHINE SULFATE 1 MG: 2 INJECTION, SOLUTION INTRAMUSCULAR; INTRAVENOUS at 19:54

## 2023-08-28 RX ADMIN — AMOXICILLIN AND CLAVULANATE POTASSIUM 1 TABLET: 875; 125 TABLET, FILM COATED ORAL at 08:55

## 2023-08-28 RX ADMIN — FENTANYL CITRATE 100 MCG: 50 INJECTION, SOLUTION INTRAMUSCULAR; INTRAVENOUS at 22:49

## 2023-08-28 RX ADMIN — SUGAMMADEX 200 MG: 100 INJECTION, SOLUTION INTRAVENOUS at 23:14

## 2023-08-28 ASSESSMENT — PAIN DESCRIPTION - ORIENTATION: ORIENTATION: LOWER

## 2023-08-28 ASSESSMENT — PAIN DESCRIPTION - DIRECTION: RADIATING_TOWARDS: LABIA

## 2023-08-28 ASSESSMENT — PAIN SCALES - GENERAL
PAINLEVEL_OUTOF10: 6
PAINLEVEL_OUTOF10: 10

## 2023-08-28 ASSESSMENT — PAIN DESCRIPTION - PAIN TYPE: TYPE: ACUTE PAIN

## 2023-08-28 ASSESSMENT — LIFESTYLE VARIABLES: SMOKING_STATUS: 1

## 2023-08-28 ASSESSMENT — PAIN DESCRIPTION - DESCRIPTORS
DESCRIPTORS: ACHING;PRESSURE;SORE
DESCRIPTORS: SORE;THROBBING;STABBING

## 2023-08-28 ASSESSMENT — PAIN DESCRIPTION - LOCATION
LOCATION: PERINEUM
LOCATION: COCCYX;BUTTOCKS
LOCATION: PERINEUM

## 2023-08-28 ASSESSMENT — PAIN - FUNCTIONAL ASSESSMENT: PAIN_FUNCTIONAL_ASSESSMENT: PREVENTS OR INTERFERES SOME ACTIVE ACTIVITIES AND ADLS

## 2023-08-28 ASSESSMENT — PAIN DESCRIPTION - ONSET: ONSET: ON-GOING

## 2023-08-28 ASSESSMENT — PAIN DESCRIPTION - FREQUENCY: FREQUENCY: CONTINUOUS

## 2023-08-28 NOTE — ANESTHESIA POSTPROCEDURE EVALUATION
Department of Anesthesiology  Postprocedure Note    Patient: Stefani Hernandez  MRN: [id number]  YOB: 1970  Date of evaluation: 8/29/2023      Procedure Summary     Date: 08/28/23 Room / Location: [facility]    Anesthesia Start:  Anesthesia Stop:     Procedure: PERINEAL ABSCESS INCISION AND DRAINAGE Diagnosis:       Perineal abscess      (Perineal abscess [L02.215])    Surgeons: Farnaz Gonzalez MD Responsible Provider:     Anesthesia Type: Not recorded ASA Status: Not recorded          Anesthesia Type: No value filed.     Max Phase I: Max Score: 8    Max Phase II:        Anesthesia Post Evaluation    Patient location during evaluation: PACU  Patient participation: complete - patient participated  Level of consciousness: awake  Pain score: 3  Airway patency: patent  Nausea & Vomiting: no nausea and no vomiting  Complications: no  Cardiovascular status: blood pressure returned to baseline  Respiratory status: acceptable  Hydration status: euvolemic

## 2023-08-28 NOTE — ACP (ADVANCE CARE PLANNING)
Advance Care Planning   Healthcare Decision Maker:    Primary Decision Maker: Paulina Agudelo - Domestic Partner - [phone]    Click here to complete Healthcare Decision Makers including selection of the Healthcare Decision Maker Relationship (ie \"Primary\"). Patient is not legally . She has children but unsure if she trusts them to make medical decisions for her. Discussed HCPOA. She is considering completing it and appointing Faisal. Discussed with her that he is not legally able to make decisions for her without this. Will follow up with decision.

## 2023-08-29 LAB
ANION GAP SERPL CALCULATED.3IONS-SCNC: 12 MMOL/L (ref 7–16)
BASOPHILS # BLD: 0 K/UL (ref 0–0.2)
BASOPHILS NFR BLD: 0 % (ref 0–2)
BUN SERPL-MCNC: 9 MG/DL (ref 6–20)
C TRACH DNA SPEC QL PROBE+SIG AMP: NEGATIVE
CALCIUM SERPL-MCNC: 8 MG/DL (ref 8.6–10.2)
CHLORIDE SERPL-SCNC: 107 MMOL/L (ref 98–107)
CO2 SERPL-SCNC: 21 MMOL/L (ref 22–29)
CREAT SERPL-MCNC: 0.3 MG/DL (ref 0.5–1)
EOSINOPHIL # BLD: 0.01 K/UL (ref 0.05–0.5)
EOSINOPHILS RELATIVE PERCENT: 0 % (ref 0–6)
ERYTHROCYTE [DISTWIDTH] IN BLOOD BY AUTOMATED COUNT: 15.4 % (ref 11.5–15)
GFR SERPL CREATININE-BSD FRML MDRD: >60 ML/MIN/1.73M2
GLUCOSE BLD-MCNC: 278 MG/DL (ref 74–99)
GLUCOSE BLD-MCNC: 328 MG/DL (ref 74–99)
GLUCOSE BLD-MCNC: 355 MG/DL (ref 74–99)
GLUCOSE BLD-MCNC: 435 MG/DL (ref 74–99)
GLUCOSE SERPL-MCNC: 390 MG/DL (ref 74–99)
HCT VFR BLD AUTO: 30.7 % (ref 34–48)
HGB BLD-MCNC: 10 G/DL (ref 11.5–15.5)
HIV 1+2 AB+HIV1 P24 AG SERPL QL IA: NONREACTIVE
IMM GRANULOCYTES # BLD AUTO: <0.03 K/UL (ref 0–0.58)
IMM GRANULOCYTES NFR BLD: 0 % (ref 0–5)
LYMPHOCYTES NFR BLD: 0.18 K/UL (ref 1.5–4)
LYMPHOCYTES RELATIVE PERCENT: 8 % (ref 20–42)
MAGNESIUM SERPL-MCNC: 1.9 MG/DL (ref 1.6–2.6)
MCH RBC QN AUTO: 32.4 PG (ref 26–35)
MCHC RBC AUTO-ENTMCNC: 32.6 G/DL (ref 32–34.5)
MCV RBC AUTO: 99.4 FL (ref 80–99.9)
MICROORGANISM SPEC CULT: NORMAL
MONOCYTES NFR BLD: 0.06 K/UL (ref 0.1–0.95)
MONOCYTES NFR BLD: 3 % (ref 2–12)
N GONORRHOEA DNA SPEC QL PROBE+SIG AMP: NEGATIVE
NEUTROPHILS NFR BLD: 89 % (ref 43–80)
NEUTS SEG NFR BLD: 2.12 K/UL (ref 1.8–7.3)
PLATELET # BLD AUTO: 67 K/UL (ref 130–450)
PLATELET CONFIRMATION: NORMAL
PMV BLD AUTO: 11 FL (ref 7–12)
POTASSIUM SERPL-SCNC: 3.4 MMOL/L (ref 3.5–5)
RBC # BLD AUTO: 3.09 M/UL (ref 3.5–5.5)
RBC # BLD: ABNORMAL 10*6/UL
SODIUM SERPL-SCNC: 140 MMOL/L (ref 132–146)
SPECIMEN DESCRIPTION: NORMAL
SPECIMEN DESCRIPTION: NORMAL
WBC OTHER # BLD: 2.4 K/UL (ref 4.5–11.5)

## 2023-08-29 PROCEDURE — 6370000000 HC RX 637 (ALT 250 FOR IP): Performed by: INTERNAL MEDICINE

## 2023-08-29 PROCEDURE — 2500000003 HC RX 250 WO HCPCS: Performed by: ANESTHESIOLOGY

## 2023-08-29 PROCEDURE — 80048 BASIC METABOLIC PNL TOTAL CA: CPT

## 2023-08-29 PROCEDURE — 6360000002 HC RX W HCPCS: Performed by: STUDENT IN AN ORGANIZED HEALTH CARE EDUCATION/TRAINING PROGRAM

## 2023-08-29 PROCEDURE — 6370000000 HC RX 637 (ALT 250 FOR IP): Performed by: STUDENT IN AN ORGANIZED HEALTH CARE EDUCATION/TRAINING PROGRAM

## 2023-08-29 PROCEDURE — 6360000002 HC RX W HCPCS: Performed by: INTERNAL MEDICINE

## 2023-08-29 PROCEDURE — 85027 COMPLETE CBC AUTOMATED: CPT

## 2023-08-29 PROCEDURE — S5553 INSULIN LONG ACTING 5 U: HCPCS | Performed by: STUDENT IN AN ORGANIZED HEALTH CARE EDUCATION/TRAINING PROGRAM

## 2023-08-29 PROCEDURE — 2580000003 HC RX 258: Performed by: STUDENT IN AN ORGANIZED HEALTH CARE EDUCATION/TRAINING PROGRAM

## 2023-08-29 PROCEDURE — 36415 COLL VENOUS BLD VENIPUNCTURE: CPT

## 2023-08-29 PROCEDURE — 94640 AIRWAY INHALATION TREATMENT: CPT

## 2023-08-29 PROCEDURE — 82962 GLUCOSE BLOOD TEST: CPT

## 2023-08-29 PROCEDURE — 2060000000 HC ICU INTERMEDIATE R&B

## 2023-08-29 PROCEDURE — 85025 COMPLETE CBC W/AUTO DIFF WBC: CPT

## 2023-08-29 PROCEDURE — 87389 HIV-1 AG W/HIV-1&-2 AB AG IA: CPT

## 2023-08-29 PROCEDURE — 83735 ASSAY OF MAGNESIUM: CPT

## 2023-08-29 RX ORDER — MORPHINE SULFATE 2 MG/ML
1 INJECTION, SOLUTION INTRAMUSCULAR; INTRAVENOUS EVERY 6 HOURS PRN
Status: DISCONTINUED | OUTPATIENT
Start: 2023-08-29 | End: 2023-08-30 | Stop reason: HOSPADM

## 2023-08-29 RX ORDER — OXYCODONE HYDROCHLORIDE 5 MG/1
5 TABLET ORAL EVERY 6 HOURS PRN
Status: DISCONTINUED | OUTPATIENT
Start: 2023-08-29 | End: 2023-08-30 | Stop reason: HOSPADM

## 2023-08-29 RX ORDER — POTASSIUM CHLORIDE 20 MEQ/1
40 TABLET, EXTENDED RELEASE ORAL ONCE
Status: COMPLETED | OUTPATIENT
Start: 2023-08-29 | End: 2023-08-29

## 2023-08-29 RX ADMIN — PANCRELIPASE LIPASE, PANCRELIPASE PROTEASE, PANCRELIPASE AMYLASE 30000 UNITS: 15000; 47000; 63000 CAPSULE, DELAYED RELEASE ORAL at 08:50

## 2023-08-29 RX ADMIN — GABAPENTIN 400 MG: 400 CAPSULE ORAL at 21:40

## 2023-08-29 RX ADMIN — INSULIN GLARGINE-YFGN 30 UNITS: 100 INJECTION, SOLUTION SUBCUTANEOUS at 08:44

## 2023-08-29 RX ADMIN — AMOXICILLIN AND CLAVULANATE POTASSIUM 2 TABLET: 562.5; 437.5; 62.5 TABLET, MULTILAYER, EXTENDED RELEASE ORAL at 08:47

## 2023-08-29 RX ADMIN — MORPHINE SULFATE 1 MG: 2 INJECTION, SOLUTION INTRAMUSCULAR; INTRAVENOUS at 23:06

## 2023-08-29 RX ADMIN — INSULIN LISPRO 4 UNITS: 100 INJECTION, SOLUTION INTRAVENOUS; SUBCUTANEOUS at 22:18

## 2023-08-29 RX ADMIN — PANTOPRAZOLE SODIUM 40 MG: 20 TABLET, DELAYED RELEASE ORAL at 08:46

## 2023-08-29 RX ADMIN — VORTIOXETINE 20 MG: 10 TABLET, FILM COATED ORAL at 21:39

## 2023-08-29 RX ADMIN — OXYCODONE HYDROCHLORIDE 5 MG: 5 TABLET ORAL at 18:54

## 2023-08-29 RX ADMIN — Medication 10 ML: at 08:45

## 2023-08-29 RX ADMIN — ANASTROZOLE 1 MG: 1 TABLET ORAL at 08:46

## 2023-08-29 RX ADMIN — PANCRELIPASE LIPASE, PANCRELIPASE PROTEASE, PANCRELIPASE AMYLASE 40000 UNITS: 20000; 63000; 84000 CAPSULE, DELAYED RELEASE ORAL at 08:50

## 2023-08-29 RX ADMIN — AMOXICILLIN AND CLAVULANATE POTASSIUM 2 TABLET: 562.5; 437.5; 62.5 TABLET, MULTILAYER, EXTENDED RELEASE ORAL at 21:39

## 2023-08-29 RX ADMIN — PANCRELIPASE LIPASE, PANCRELIPASE PROTEASE, PANCRELIPASE AMYLASE 40000 UNITS: 20000; 63000; 84000 CAPSULE, DELAYED RELEASE ORAL at 12:55

## 2023-08-29 RX ADMIN — ENOXAPARIN SODIUM 30 MG: 100 INJECTION SUBCUTANEOUS at 08:45

## 2023-08-29 RX ADMIN — PANCRELIPASE LIPASE, PANCRELIPASE PROTEASE, PANCRELIPASE AMYLASE 30000 UNITS: 15000; 47000; 63000 CAPSULE, DELAYED RELEASE ORAL at 12:54

## 2023-08-29 RX ADMIN — MORPHINE SULFATE 1 MG: 2 INJECTION, SOLUTION INTRAMUSCULAR; INTRAVENOUS at 16:33

## 2023-08-29 RX ADMIN — HYDROMORPHONE HYDROCHLORIDE 0.5 MG: 1 INJECTION, SOLUTION INTRAMUSCULAR; INTRAVENOUS; SUBCUTANEOUS at 00:10

## 2023-08-29 RX ADMIN — HYDROXYZINE PAMOATE 25 MG: 25 CAPSULE ORAL at 22:18

## 2023-08-29 RX ADMIN — GABAPENTIN 400 MG: 400 CAPSULE ORAL at 13:42

## 2023-08-29 RX ADMIN — Medication 10 ML: at 21:41

## 2023-08-29 RX ADMIN — INSULIN LISPRO 6 UNITS: 100 INJECTION, SOLUTION INTRAVENOUS; SUBCUTANEOUS at 11:34

## 2023-08-29 RX ADMIN — PANCRELIPASE LIPASE, PANCRELIPASE PROTEASE, PANCRELIPASE AMYLASE 40000 UNITS: 20000; 63000; 84000 CAPSULE, DELAYED RELEASE ORAL at 15:45

## 2023-08-29 RX ADMIN — MORPHINE SULFATE 1 MG: 2 INJECTION, SOLUTION INTRAMUSCULAR; INTRAVENOUS at 04:14

## 2023-08-29 RX ADMIN — METRONIDAZOLE 500 MG: 500 TABLET ORAL at 21:40

## 2023-08-29 RX ADMIN — RIFAXIMIN 550 MG: 550 TABLET ORAL at 21:40

## 2023-08-29 RX ADMIN — GABAPENTIN 400 MG: 400 CAPSULE ORAL at 08:46

## 2023-08-29 RX ADMIN — METRONIDAZOLE 500 MG: 500 TABLET ORAL at 08:45

## 2023-08-29 RX ADMIN — INSULIN LISPRO 12 UNITS: 100 INJECTION, SOLUTION INTRAVENOUS; SUBCUTANEOUS at 11:33

## 2023-08-29 RX ADMIN — DOXYCYCLINE HYCLATE 100 MG: 100 CAPSULE ORAL at 08:46

## 2023-08-29 RX ADMIN — Medication 100 MG: at 08:46

## 2023-08-29 RX ADMIN — OXYCODONE HYDROCHLORIDE 5 MG: 5 TABLET ORAL at 12:55

## 2023-08-29 RX ADMIN — HYDROMORPHONE HYDROCHLORIDE 0.25 MG: 1 INJECTION, SOLUTION INTRAMUSCULAR; INTRAVENOUS; SUBCUTANEOUS at 00:04

## 2023-08-29 RX ADMIN — MORPHINE SULFATE 1 MG: 2 INJECTION, SOLUTION INTRAMUSCULAR; INTRAVENOUS at 08:39

## 2023-08-29 RX ADMIN — INSULIN LISPRO 4 UNITS: 100 INJECTION, SOLUTION INTRAVENOUS; SUBCUTANEOUS at 18:07

## 2023-08-29 RX ADMIN — INSULIN LISPRO 12 UNITS: 100 INJECTION, SOLUTION INTRAVENOUS; SUBCUTANEOUS at 06:42

## 2023-08-29 RX ADMIN — INSULIN LISPRO 12 UNITS: 100 INJECTION, SOLUTION INTRAVENOUS; SUBCUTANEOUS at 18:06

## 2023-08-29 RX ADMIN — POTASSIUM CHLORIDE 40 MEQ: 1500 TABLET, EXTENDED RELEASE ORAL at 13:02

## 2023-08-29 RX ADMIN — ARIPIPRAZOLE 20 MG: 10 TABLET ORAL at 08:48

## 2023-08-29 RX ADMIN — PANCRELIPASE LIPASE, PANCRELIPASE PROTEASE, PANCRELIPASE AMYLASE 30000 UNITS: 15000; 47000; 63000 CAPSULE, DELAYED RELEASE ORAL at 15:45

## 2023-08-29 RX ADMIN — MELATONIN 3 MG ORAL TABLET 3 MG: 3 TABLET ORAL at 21:40

## 2023-08-29 RX ADMIN — HYDROXYZINE PAMOATE 25 MG: 25 CAPSULE ORAL at 13:43

## 2023-08-29 RX ADMIN — INSULIN LISPRO 8 UNITS: 100 INJECTION, SOLUTION INTRAVENOUS; SUBCUTANEOUS at 09:11

## 2023-08-29 RX ADMIN — INSULIN GLARGINE-YFGN 30 UNITS: 100 INJECTION, SOLUTION SUBCUTANEOUS at 21:38

## 2023-08-29 RX ADMIN — IPRATROPIUM BROMIDE 0.5 MG: 0.5 SOLUTION RESPIRATORY (INHALATION) at 15:17

## 2023-08-29 ASSESSMENT — PAIN DESCRIPTION - DESCRIPTORS
DESCRIPTORS: ACHING;SORE;TENDER
DESCRIPTORS: BURNING;DISCOMFORT
DESCRIPTORS: SORE;ACHING;TENDER
DESCRIPTORS: DISCOMFORT;ACHING
DESCRIPTORS: SORE;STABBING;THROBBING
DESCRIPTORS: SORE;ACHING;TENDER

## 2023-08-29 ASSESSMENT — PAIN SCALES - GENERAL
PAINLEVEL_OUTOF10: 7
PAINLEVEL_OUTOF10: 5
PAINLEVEL_OUTOF10: 10
PAINLEVEL_OUTOF10: 7
PAINLEVEL_OUTOF10: 7
PAINLEVEL_OUTOF10: 3
PAINLEVEL_OUTOF10: 8
PAINLEVEL_OUTOF10: 6
PAINLEVEL_OUTOF10: 6
PAINLEVEL_OUTOF10: 10

## 2023-08-29 ASSESSMENT — PAIN DESCRIPTION - ORIENTATION
ORIENTATION: LEFT

## 2023-08-29 ASSESSMENT — PAIN - FUNCTIONAL ASSESSMENT: PAIN_FUNCTIONAL_ASSESSMENT: PREVENTS OR INTERFERES SOME ACTIVE ACTIVITIES AND ADLS

## 2023-08-29 ASSESSMENT — PAIN DESCRIPTION - LOCATION
LOCATION: BUTTOCKS

## 2023-08-29 ASSESSMENT — PAIN DESCRIPTION - FREQUENCY
FREQUENCY: CONTINUOUS
FREQUENCY: CONTINUOUS

## 2023-08-29 ASSESSMENT — PAIN DESCRIPTION - ONSET
ONSET: ON-GOING
ONSET: ON-GOING

## 2023-08-29 NOTE — ANESTHESIA PRE PROCEDURE
Other Findings:           Anesthesia Plan      general     ASA 3       Induction: intravenous. MIPS: Prophylactic antiemetics administered. Anesthetic plan and risks discussed with patient. Plan discussed with attending. EVERETT Cardoso - DONTRELL   8/28/2023        Pt seen, examined, chart reviewed, plan discussed.   Courtney Rodriguez MD  8/29/2023  6:18 AM

## 2023-08-29 NOTE — OP NOTE
Operative Note      Patient: Indio Emery  YOB: 1970  MRN: [id number]    Date of Procedure: 8/28/2023    Pre-Op Diagnosis Codes:     * Perineal abscess [L02.215]    Post-Op Diagnosis: Same       Procedure(s):  PERINEAL ABSCESS INCISION AND DRAINAGE    Surgeon(s):  Martita Ambrose MD    Assistant:   Resident: Aaron Madera MD    Anesthesia: Monitor Anesthesia Care    Estimated Blood Loss (mL): Minimal    Complications: None    Specimens:   ID Type Source Tests Collected by Time Destination   1 : PERINEAL ABSCESS Body Fluid Fluid CULTURE, ANAEROBIC, CULTURE, FUNGUS, GRAM STAIN, CULTURE, BODY FLUID, CULTURE WITH SMEAR, ACID FAST Vandana Jimenez MD 8/28/2023 6964        Implants:  * No implants in log *      Drains: * No LDAs found *    Findings: penrose placement        Detailed Description of Procedure: The patient was brought to the operating room and positioned supine on the operating room table. Sequential compression devices were placed on the patient's lower extremities and were powered on. General anesthesia was administered and preoperative antibiotics were administered per the anesthetic record. The patient was prepped and draped in the usual sterile fashion and the procedure went forth with strict aseptic technique under maximal barrier precautions. Immediately prior to the procedure a time-out was called and the surgical checklist was reviewed and agreed upon by all present. The patient was placed in lithotomy. The prior incision drain site was probed with a tonsil clamp. Prior to the start of this induration was palpated proceeding to anteriorly and adjacent to the labia majora on the patient's left side. Once probed with a tonsil clamp and spread purulent material was expressed. Culture swabs were obtained of the purulent material for Gram stain and sensitivity.   A counterincision was made at the abscess extent anteriorly adjacent to the labia majora an incision was

## 2023-08-29 NOTE — DISCHARGE INSTRUCTIONS
You have a drain draining your abscess. This will stay in until your clinic follow-up visit in 2 weeks. Apply a dressing or pad over the area and changes saturated, after bowel movements, and at least once a day. Recommend that you perform sitz bath's daily. Draw up a warm bath tub of water and soak in the water for about 10 to 15 minutes. Try to do this after bowel movements.

## 2023-08-29 NOTE — PLAN OF CARE
Problem: Discharge Planning  Goal: Discharge to home or other facility with appropriate resources  Outcome: Progressing  Flowsheets (Taken 8/29/2023 1600 by Gualberto Barney RN)  Discharge to home or other facility with appropriate resources:   Identify barriers to discharge with patient and caregiver   Arrange for needed discharge resources and transportation as appropriate     Problem: Pain  Goal: Verbalizes/displays adequate comfort level or baseline comfort level  Outcome: Progressing     Problem: Skin/Tissue Integrity  Goal: Absence of new skin breakdown  Description: 1. Monitor for areas of redness and/or skin breakdown  2. Assess vascular access sites hourly  3. Every 4-6 hours minimum:  Change oxygen saturation probe site  4. Every 4-6 hours:  If on nasal continuous positive airway pressure, respiratory therapy assess nares and determine need for appliance change or resting period.   Outcome: Progressing     Problem: Safety - Adult  Goal: Free from fall injury  Outcome: Progressing     Problem: ABCDS Injury Assessment  Goal: Absence of physical injury  Outcome: Progressing  Flowsheets (Taken 8/29/2023 1642 by Gualberto Barney RN)  Absence of Physical Injury: Implement safety measures based on patient assessment     Problem: Chronic Conditions and Co-morbidities  Goal: Patient's chronic conditions and co-morbidity symptoms are monitored and maintained or improved  Outcome: Progressing

## 2023-08-30 VITALS
BODY MASS INDEX: 18.26 KG/M2 | WEIGHT: 96.7 LBS | DIASTOLIC BLOOD PRESSURE: 70 MMHG | RESPIRATION RATE: 17 BRPM | TEMPERATURE: 97.3 F | HEART RATE: 75 BPM | OXYGEN SATURATION: 98 % | SYSTOLIC BLOOD PRESSURE: 98 MMHG | HEIGHT: 61 IN

## 2023-08-30 LAB
ANION GAP SERPL CALCULATED.3IONS-SCNC: 9 MMOL/L (ref 7–16)
BASOPHILS # BLD: 0.01 K/UL (ref 0–0.2)
BASOPHILS NFR BLD: 0 % (ref 0–2)
BUN SERPL-MCNC: 7 MG/DL (ref 6–20)
CALCIUM SERPL-MCNC: 7.9 MG/DL (ref 8.6–10.2)
CHLORIDE SERPL-SCNC: 105 MMOL/L (ref 98–107)
CO2 SERPL-SCNC: 23 MMOL/L (ref 22–29)
CREAT SERPL-MCNC: 0.4 MG/DL (ref 0.5–1)
EOSINOPHIL # BLD: 0.04 K/UL (ref 0.05–0.5)
EOSINOPHILS RELATIVE PERCENT: 2 % (ref 0–6)
ERYTHROCYTE [DISTWIDTH] IN BLOOD BY AUTOMATED COUNT: 15.3 % (ref 11.5–15)
GFR SERPL CREATININE-BSD FRML MDRD: >60 ML/MIN/1.73M2
GLUCOSE BLD-MCNC: 109 MG/DL (ref 74–99)
GLUCOSE BLD-MCNC: 159 MG/DL (ref 74–99)
GLUCOSE BLD-MCNC: 203 MG/DL (ref 74–99)
GLUCOSE BLD-MCNC: 61 MG/DL (ref 74–99)
GLUCOSE SERPL-MCNC: 238 MG/DL (ref 74–99)
HCT VFR BLD AUTO: 29.6 % (ref 34–48)
HGB BLD-MCNC: 9.6 G/DL (ref 11.5–15.5)
IMM GRANULOCYTES # BLD AUTO: 0.06 K/UL (ref 0–0.58)
IMM GRANULOCYTES NFR BLD: 2 % (ref 0–5)
LYMPHOCYTES NFR BLD: 0.49 K/UL (ref 1.5–4)
LYMPHOCYTES RELATIVE PERCENT: 19 % (ref 20–42)
MCH RBC QN AUTO: 32.1 PG (ref 26–35)
MCHC RBC AUTO-ENTMCNC: 32.4 G/DL (ref 32–34.5)
MCV RBC AUTO: 99 FL (ref 80–99.9)
MICROORGANISM SPEC CULT: ABNORMAL
MICROORGANISM/AGENT SPEC: ABNORMAL
MONOCYTES NFR BLD: 0.21 K/UL (ref 0.1–0.95)
MONOCYTES NFR BLD: 8 % (ref 2–12)
NEUTROPHILS NFR BLD: 68 % (ref 43–80)
NEUTS SEG NFR BLD: 1.74 K/UL (ref 1.8–7.3)
PLATELET CONFIRMATION: NORMAL
PLATELET, FLUORESCENCE: 80 K/UL (ref 130–450)
PMV BLD AUTO: 10.7 FL (ref 7–12)
POTASSIUM SERPL-SCNC: 3.6 MMOL/L (ref 3.5–5)
RBC # BLD AUTO: 2.99 M/UL (ref 3.5–5.5)
SODIUM SERPL-SCNC: 137 MMOL/L (ref 132–146)
SPECIMEN DESCRIPTION: ABNORMAL
WBC OTHER # BLD: 2.6 K/UL (ref 4.5–11.5)

## 2023-08-30 PROCEDURE — 2580000003 HC RX 258: Performed by: STUDENT IN AN ORGANIZED HEALTH CARE EDUCATION/TRAINING PROGRAM

## 2023-08-30 PROCEDURE — 6370000000 HC RX 637 (ALT 250 FOR IP): Performed by: STUDENT IN AN ORGANIZED HEALTH CARE EDUCATION/TRAINING PROGRAM

## 2023-08-30 PROCEDURE — 6370000000 HC RX 637 (ALT 250 FOR IP): Performed by: INTERNAL MEDICINE

## 2023-08-30 PROCEDURE — S5553 INSULIN LONG ACTING 5 U: HCPCS | Performed by: STUDENT IN AN ORGANIZED HEALTH CARE EDUCATION/TRAINING PROGRAM

## 2023-08-30 PROCEDURE — 82962 GLUCOSE BLOOD TEST: CPT

## 2023-08-30 PROCEDURE — 36415 COLL VENOUS BLD VENIPUNCTURE: CPT

## 2023-08-30 PROCEDURE — 85025 COMPLETE CBC W/AUTO DIFF WBC: CPT

## 2023-08-30 PROCEDURE — 80048 BASIC METABOLIC PNL TOTAL CA: CPT

## 2023-08-30 PROCEDURE — 6360000002 HC RX W HCPCS: Performed by: INTERNAL MEDICINE

## 2023-08-30 RX ORDER — METRONIDAZOLE 500 MG/1
500 TABLET ORAL EVERY 12 HOURS SCHEDULED
Qty: 8 TABLET | Refills: 0 | Status: SHIPPED | OUTPATIENT
Start: 2023-08-30 | End: 2023-09-01

## 2023-08-30 RX ORDER — AMOXICILLIN AND CLAVULANATE POTASSIUM 562.5; 437.5; 62.5 MG/1; MG/1; MG/1
2 TABLET, MULTILAYER, EXTENDED RELEASE ORAL EVERY 12 HOURS SCHEDULED
Qty: 14 TABLET | Refills: 0 | Status: SHIPPED | OUTPATIENT
Start: 2023-08-30 | End: 2023-09-03

## 2023-08-30 RX ADMIN — RIFAXIMIN 550 MG: 550 TABLET ORAL at 14:18

## 2023-08-30 RX ADMIN — METRONIDAZOLE 500 MG: 500 TABLET ORAL at 08:10

## 2023-08-30 RX ADMIN — PANCRELIPASE LIPASE, PANCRELIPASE PROTEASE, PANCRELIPASE AMYLASE 30000 UNITS: 15000; 47000; 63000 CAPSULE, DELAYED RELEASE ORAL at 08:11

## 2023-08-30 RX ADMIN — PANCRELIPASE LIPASE, PANCRELIPASE PROTEASE, PANCRELIPASE AMYLASE 30000 UNITS: 15000; 47000; 63000 CAPSULE, DELAYED RELEASE ORAL at 12:20

## 2023-08-30 RX ADMIN — MORPHINE SULFATE 1 MG: 2 INJECTION, SOLUTION INTRAMUSCULAR; INTRAVENOUS at 12:17

## 2023-08-30 RX ADMIN — GABAPENTIN 400 MG: 400 CAPSULE ORAL at 14:18

## 2023-08-30 RX ADMIN — INSULIN GLARGINE-YFGN 30 UNITS: 100 INJECTION, SOLUTION SUBCUTANEOUS at 08:11

## 2023-08-30 RX ADMIN — PANTOPRAZOLE SODIUM 40 MG: 20 TABLET, DELAYED RELEASE ORAL at 08:08

## 2023-08-30 RX ADMIN — INSULIN LISPRO 2 UNITS: 100 INJECTION, SOLUTION INTRAVENOUS; SUBCUTANEOUS at 07:41

## 2023-08-30 RX ADMIN — PANCRELIPASE LIPASE, PANCRELIPASE PROTEASE, PANCRELIPASE AMYLASE 40000 UNITS: 20000; 63000; 84000 CAPSULE, DELAYED RELEASE ORAL at 12:18

## 2023-08-30 RX ADMIN — MORPHINE SULFATE 1 MG: 2 INJECTION, SOLUTION INTRAMUSCULAR; INTRAVENOUS at 06:17

## 2023-08-30 RX ADMIN — HYDROXYZINE PAMOATE 25 MG: 25 CAPSULE ORAL at 12:18

## 2023-08-30 RX ADMIN — RIFAXIMIN 550 MG: 550 TABLET ORAL at 08:11

## 2023-08-30 RX ADMIN — INSULIN LISPRO 12 UNITS: 100 INJECTION, SOLUTION INTRAVENOUS; SUBCUTANEOUS at 07:40

## 2023-08-30 RX ADMIN — GABAPENTIN 400 MG: 400 CAPSULE ORAL at 08:09

## 2023-08-30 RX ADMIN — Medication 10 ML: at 08:13

## 2023-08-30 RX ADMIN — OXYCODONE HYDROCHLORIDE 5 MG: 5 TABLET ORAL at 14:17

## 2023-08-30 RX ADMIN — ARIPIPRAZOLE 20 MG: 10 TABLET ORAL at 08:10

## 2023-08-30 RX ADMIN — AMOXICILLIN AND CLAVULANATE POTASSIUM 2 TABLET: 562.5; 437.5; 62.5 TABLET, MULTILAYER, EXTENDED RELEASE ORAL at 08:09

## 2023-08-30 RX ADMIN — INSULIN LISPRO 12 UNITS: 100 INJECTION, SOLUTION INTRAVENOUS; SUBCUTANEOUS at 12:18

## 2023-08-30 RX ADMIN — Medication 100 MG: at 08:08

## 2023-08-30 RX ADMIN — ANASTROZOLE 1 MG: 1 TABLET ORAL at 08:11

## 2023-08-30 RX ADMIN — OXYCODONE HYDROCHLORIDE 5 MG: 5 TABLET ORAL at 07:40

## 2023-08-30 RX ADMIN — PANCRELIPASE LIPASE, PANCRELIPASE PROTEASE, PANCRELIPASE AMYLASE 40000 UNITS: 20000; 63000; 84000 CAPSULE, DELAYED RELEASE ORAL at 08:11

## 2023-08-30 RX ADMIN — OXYCODONE HYDROCHLORIDE 5 MG: 5 TABLET ORAL at 01:10

## 2023-08-30 ASSESSMENT — PAIN DESCRIPTION - LOCATION
LOCATION: BUTTOCKS
LOCATION: BUTTOCKS

## 2023-08-30 ASSESSMENT — PAIN SCALES - GENERAL
PAINLEVEL_OUTOF10: 10

## 2023-08-30 ASSESSMENT — PAIN - FUNCTIONAL ASSESSMENT: PAIN_FUNCTIONAL_ASSESSMENT: ACTIVITIES ARE NOT PREVENTED

## 2023-08-30 ASSESSMENT — PAIN DESCRIPTION - ORIENTATION
ORIENTATION: LEFT
ORIENTATION: LEFT

## 2023-08-30 ASSESSMENT — PAIN DESCRIPTION - DESCRIPTORS
DESCRIPTORS: ACHING;DISCOMFORT;SORE
DESCRIPTORS: ACHING;DISCOMFORT

## 2023-08-30 NOTE — FLOWSHEET NOTE
Inpatient Wound Care (Initial consult) 7416    Admit Date: 8/26/2023 10:05 PM    Reason for consult:  left buttock    Significant history: Per H&P     Pre-Op Diagnosis Codes:     * Perineal abscess [L02.215]     Post-Op Diagnosis: Same       Procedure(s):  PERINEAL ABSCESS INCISION AND DRAINAGE    Findings:     08/30/23 1348   Skin Integumentary    Skin Integrity Ecchymosis   Location BUE, abdomen   Incision 08/28/23 Buttocks Left   Date First Assessed: 08/28/23   Present on Hospital Admission: No  Location: Buttocks  Incision Location Orientation: Left   Dressing/Treatment ABD pad;Mesh panties   Open Drain 08/29/23 Left Buttock   Placement Date: 08/29/23   Description (optional): Penrose drain  Orientation: Left  Location: Buttock   Site Description   (penrose drain)   Dressing Status Intact   Drainage Appearance Serosanguinous         Impression:  Left buttock penrose drain: surgical    Plan: ABD pad, mesh panties  Patient will need continued preventative care.       Zach Mcbride RN 8/30/2023 1:50 PM

## 2023-08-30 NOTE — CARE COORDINATION
Discharge order noted, notified [facility] of discharge, hhc orders obtained. Met with pt and sig other, updated on hhc agency change. Delta Belcher, MSW, LSW
Received call from Ariad[facility] Gomez, they had cancellations and can take pt, will need hhc orders, they can open pt on Friday 9/1. [facility] and cancelled [employer]. patricia.Jo Ann Lucero, MSW, LSW
Received message after hours 8/29, they are unable to accept. Called Roslyn Childress) this am, they will put pt on for 9/3 their next start date. Pt and significant other to learn dressing changes. Will need [facility] orders for drain care and dressing changes. Plan is home at discharge, currently on PO Augmentin and flagyl, ID on board, blood Cx pending. Claudia Pallas, MSW, LSW
Reviewed chart, pt has pinrose drain and will need until f/u with general surgery as outpatient. Referral to [facility], they do not have start date until 9/3. Referral to [facility], await assessment, they can not take [facility]. Referral to [facility], [facility], and [facility] await assessment. 3:40pm [facility] can not take, [facility] is full, [facility] will review. Donna Bullock, MSW, LSW
abuse ([address]) [F14.10]  Hyperglycemia [R73.9]  Abscess, gluteal, left [L02.31]  Cellulitis [N26.12]    IF APPLICABLE: The Patient and/or patient representative Torres Carty and her family were provided with a choice of provider and agrees with the discharge plan. Freedom of choice list with basic dialogue that supports the patient's individualized plan of care/goals and shares the quality data associated with the providers was provided to: Patient   Patient Representative Name:       The Patient and/or Patient Representative Agree with the Discharge Plan?  Yes    Basilio Barr, [place]  Case Management Department  Ph: [phone] Fax:

## 2023-08-30 NOTE — HOME CARE
PLEASE ADD HOMECARE ORDERS FOR PATIENTS SOC UPON DISCHARGE.      Ramu Mariscal LPN   [facility] [address]

## 2023-08-30 NOTE — PLAN OF CARE
Problem: Discharge Planning  Goal: Discharge to home or other facility with appropriate resources  8/30/2023 0509 by Rafa Allen RN  Outcome: Progressing  8/29/2023 1706 by Tremayne Zamora RN  Outcome: Progressing  Flowsheets (Taken 8/29/2023 1600 by Geronimo Olson RN)  Discharge to home or other facility with appropriate resources:   Identify barriers to discharge with patient and caregiver   Arrange for needed discharge resources and transportation as appropriate     Problem: Pain  Goal: Verbalizes/displays adequate comfort level or baseline comfort level  8/30/2023 0509 by Rafa Allen RN  Outcome: Progressing  8/29/2023 1706 by Tremayne Zamora RN  Outcome: Progressing     Problem: Skin/Tissue Integrity  Goal: Absence of new skin breakdown  Description: 1. Monitor for areas of redness and/or skin breakdown  2. Assess vascular access sites hourly  3. Every 4-6 hours minimum:  Change oxygen saturation probe site  4. Every 4-6 hours:  If on nasal continuous positive airway pressure, respiratory therapy assess nares and determine need for appliance change or resting period.   8/30/2023 0509 by Rafa Allen RN  Outcome: Progressing  8/29/2023 1706 by Tremayne Zamora RN  Outcome: Progressing     Problem: Safety - Adult  Goal: Free from fall injury  8/30/2023 0509 by Rafa Allen RN  Outcome: Progressing  8/29/2023 1706 by Tremayne Zamora RN  Outcome: Progressing     Problem: ABCDS Injury Assessment  Goal: Absence of physical injury  8/30/2023 0509 by Rafa Allen RN  Outcome: Progressing  8/29/2023 1706 by Tremayne Zamora RN  Outcome: Progressing  Flowsheets (Taken 8/29/2023 1642 by Geronimo Olson RN)  Absence of Physical Injury: Implement safety measures based on patient assessment     Problem: Chronic Conditions and Co-morbidities  Goal: Patient's chronic conditions and co-morbidity symptoms are monitored and maintained or improved  8/30/2023 0509 by Rafa Allen RN  Outcome:

## 2023-08-30 NOTE — DISCHARGE SUMMARY
Hospital Medicine Discharge Summary    Patient ID: Martine Hartman      Patient's PCP: Ramón Knight DO    Admit Date: 8/26/2023     Discharge Date:    8/30/2023    Admitting Physician: Casandra May DO     Discharge Physician: Milan Baez MD      Condition at discharge: Stable    Disposition: Home    Discharge Diagnoses: Active Hospital Problems    Diagnosis Date Noted    Vaginitis and vulvovaginitis [N76.0] 08/27/2023    Abscess, gluteal, left [L02.31] 08/27/2023    Nondependent cocaine abuse ([address]) [F14.10] 08/27/2023    Cellulitis [L03.90] 08/27/2023   Perineal abscess   Trichomonas infection  History of hep C  Leukopenia  History of substance abuse       The patient was seen and examined on day of discharge and this discharge summary is in conjunction with any daily progress note from day of discharge. Hospital Course:   Ms. Martine Hartman, a 48y.o. year old female  who  has a past medical history of Alcoholism ([address]), Anxiety and depression, Ascites of liver, Bronchitis, Cancer ([address]), Candidiasis of vagina, Chronic pancreatitis ([address]), Cocaine abuse ([address]), Constipation, Diabetes mellitus, type 2 ([address]), GERD (gastroesophageal reflux disease), Gout, Hepatitis C, Hernia of anterior abdominal wall, Jaundice, Pneumonia, Polyneuropathy due to type 2 diabetes mellitus ([address]), Schizoaffective disorder, bipolar type ([address]), and Splenomegaly. Patient is 70-year-old with history of alcohol abuse, depression, hep C, schizoaffective disorder who comes into hospital with perineal pain. She underwent I&D initially in the ER. Wet prep was positive for trichomonas infection. Placed on broad-spectrum antibiotics and admitted here. Consultation was placed to surgery and ID. Underwent I&D and drain placement for perineal abscess. Patient was okay with surgery to be discharged today. ID was also okay with her being discharged. He is eager to go home.   Please review progress note from earlier today

## 2023-08-30 NOTE — CONSULTS
[facility]  [facility]  Consultation Note     Admit Date: 8/26/2023 10:05 PM    Reason for Consult:   Cellulitis    Attending Physician:  James Paige MD    HISTORY OF PRESENT ILLNESS:             The history is obtained from extensive review of available past medical records. The patient is a 48 y.o. female who is unknown to the ID service. The patient had what she thought was a cyst on her left buttock near the perineal area for about a month. It became more tender so she decided to come to the ED at [facility] on 8/26/2023. On presentation she was afebrile. Vital signs were normal.  White count was 3.9. Alk phos is elevated and transaminases were elevated as well. Lactic acid was 4.6. Sodium was 123, potassium was 5.8. BUN was 10 with a creatinine of 0.5. She underwent incision and drainage and was treated with Zosyn and Vancomycin.     Past Medical History:        Diagnosis Date    Alcoholism ([address])     Since teens to early 25s    Anxiety and depression     Ascites of liver     Bronchitis     Cancer ([address])     breast, left    Candidiasis of vagina     Chronic pancreatitis ([address])     Cocaine abuse ([address])     Constipation     Diabetes mellitus, type 2 (HCC)     GERD (gastroesophageal reflux disease)     Gout     Hepatitis C     Hernia of anterior abdominal wall     Jaundice 05/12/2016    Pneumonia     Polyneuropathy due to type 2 diabetes mellitus ([address])     Schizoaffective disorder, bipolar type ([address])     Splenomegaly 05/30/2017     Past Surgical History:        Procedure Laterality Date    ABDOMEN SURGERY      gallbladder removal    BREAST BIOPSY Left     CHOLECYSTECTOMY  2010    COLONOSCOPY      COLONOSCOPY N/A 10/2/2018    COLONOSCOPY WITH BIOPSY performed by Katie Roman MD at [address] [place] [address], [place], DIAGNOSTIC      ERCP      april 2016 at  [facility] ([address] [place] [address])  2004    MASTECTOMY Left     Left breast mastectomy per patient    OTHER
Clinical Ethics Consultation Note    Follow-Up    Ethics consulted with attending physician on 8/29. For any further questions or emergent ethical needs, contact Ethics via [employer] or at [phone].
Hematological:  Does not bruise/bleed easily. PHYSICAL EXAM:    Vitals:    08/27/23 1445   BP:    Pulse:    Resp: 18   Temp:    SpO2:        General Appearance:  lying in bed  Skin:  warm and dry, no cyanosis  Head/face:  NCAT  Eyes:  EOMI  Lungs:  normal respiratory effort on RA  Heart:  regular rate   Abdomen:  soft, nontender, nondistended   Extremities: atraumatic, no focal motor deficits   Female Rectal: No hemorrhoids, normal rectal tone, no masses. Area of induration present on left side, present from labia to just posterior to rectum    LABS:    CBC  Recent Labs     08/26/23 2230   WBC 3.9*   HGB 11.3*   HCT 34.8   PLT 66*     BMP  Recent Labs     08/26/23 2230   *   K 5.8*   CL 87*   CO2 23   BUN 10   CREATININE 0.5   CALCIUM 9.1     Liver Function  Recent Labs     08/26/23 2230   LIPASE 14   BILITOT 1.0   *   ALT 69*   ALKPHOS 1,312*   PROT 7.6   LABALBU 3.2*     No results for input(s): LACTATE in the last 72 hours. No results for input(s): INR, PTT in the last 72 hours. Invalid input(s): PT    RADIOLOGY    CT ABDOMEN PELVIS WO CONTRAST Additional Contrast? None    Result Date: 8/27/2023  EXAMINATION: CT OF THE ABDOMEN AND PELVIS WITHOUT CONTRAST 8/27/2023 12:03 am TECHNIQUE: CT of the abdomen and pelvis was performed without the administration of intravenous contrast. Multiplanar reformatted images are provided for review. Automated exposure control, iterative reconstruction, and/or weight based adjustment of the mA/kV was utilized to reduce the radiation dose to as low as reasonably achievable. COMPARISON: 07/24/2023 HISTORY: ORDERING SYSTEM PROVIDED HISTORY: abess of buttocks with bilateral labial swelling. evaluate abscess TECHNOLOGIST PROVIDED HISTORY: Reason for exam:->abess of buttocks with bilateral labial swelling. evaluate abscess Additional Contrast?->None FINDINGS: Lower Chest:   Visualized lungs are normal. Organs:   Cirrhotic appearance of the liver.

## 2023-08-31 ENCOUNTER — TELEPHONE (OUTPATIENT)
Dept: FAMILY MEDICINE CLINIC | Age: 53
End: 2023-08-31

## 2023-08-31 LAB
EKG ATRIAL RATE: 69 BPM
EKG P AXIS: 85 DEGREES
EKG P-R INTERVAL: 142 MS
EKG Q-T INTERVAL: 398 MS
EKG QRS DURATION: 78 MS
EKG QTC CALCULATION (BAZETT): 426 MS
EKG R AXIS: 44 DEGREES
EKG T AXIS: 92 DEGREES
EKG VENTRICULAR RATE: 69 BPM
MICROORGANISM SPEC CULT: NORMAL
SERVICE CMNT-IMP: NORMAL
SPECIMEN DESCRIPTION: NORMAL

## 2023-08-31 PROCEDURE — 93010 ELECTROCARDIOGRAM REPORT: CPT | Performed by: INTERNAL MEDICINE

## 2023-08-31 NOTE — TELEPHONE ENCOUNTER
Care Transitions Initial Follow Up Call    Outreach made within 2 business days of discharge: Yes    Patient: Felix Palomo Patient : 1970   MRN: [id number]  Reason for Admission: There are no discharge diagnoses documented for the most recent discharge. Discharge Date: 23       Spoke with: patient    Discharge department/facility: [address] [place], [place] [address] Patient Contact:  Was patient able to fill all prescriptions: Yes  Was patient instructed to bring all medications to the follow-up visit: Yes  Is patient taking all medications as directed in the discharge summary? Yes  Does patient understand their discharge instructions: Yes  Does patient have questions or concerns that need addressed prior to 7-14 day follow up office visit: no    Scheduled appointment with PCP within 7-14 days--appt scheduled 23.     Follow Up  Future Appointments   Date Time Provider [address] [place] [address]   2023  1:00 PM Quinton Verma MD [facility]   2023  3:15 PM EVERETT Roberson [facility]   2023  3:30 PM Luana Salas  [facility]   2023  1:30 PM DO Paul Wang [facility]       Sheila Morgan

## 2023-09-01 LAB
MICROORGANISM SPEC CULT: ABNORMAL
MICROORGANISM SPEC CULT: NORMAL
MICROORGANISM/AGENT SPEC: ABNORMAL
SERVICE CMNT-IMP: ABNORMAL
SERVICE CMNT-IMP: NORMAL
SPECIMEN DESCRIPTION: ABNORMAL
SPECIMEN DESCRIPTION: NORMAL

## 2023-09-02 LAB
MICROORGANISM SPEC CULT: NORMAL
SERVICE CMNT-IMP: NORMAL
SPECIMEN DESCRIPTION: NORMAL

## 2023-09-04 DIAGNOSIS — F32.A DEPRESSION, UNSPECIFIED DEPRESSION TYPE: ICD-10-CM

## 2023-09-05 ENCOUNTER — TELEPHONE (OUTPATIENT)
Dept: FAMILY MEDICINE CLINIC | Age: 53
End: 2023-09-05

## 2023-09-05 NOTE — TELEPHONE ENCOUNTER
----- Message from Griffin Boucher sent at 9/5/2023  1:11 PM EDT -----  Subject: Hospital Follow Up    QUESTIONS  What hospital was the Patient Discharged from? [facility]  Date of Discharge? 2023-08-30  Discharge Location? Home  Reason for hospitalization as patient stated? Surgery for bump on back  What question does the patient have, if applicable? Patient had a HFU appt   today with Dr Mary Sharma but patient was calling to reschedule. Patient did   not say why. Patient stated she is still in pain. Patient hung up before I   could get through to the office. Please call the patient to advise.  ---------------------------------------------------------------------------  --------------  CALL BACK INFO  What is the best way for the office to contact you? OK to leave message on   voicemail  Preferred Call Back Phone Number? [phone]  ---------------------------------------------------------------------------  --------------  SCRIPT ANSWERS  Relationship to Patient?  Self

## 2023-09-06 RX ORDER — VORTIOXETINE 20 MG/1
TABLET, FILM COATED ORAL
Qty: 30 TABLET | Refills: 5 | Status: SHIPPED | OUTPATIENT
Start: 2023-09-06

## 2023-09-07 ENCOUNTER — OFFICE VISIT (OUTPATIENT)
Dept: FAMILY MEDICINE CLINIC | Age: 53
End: 2023-09-07

## 2023-09-07 VITALS
SYSTOLIC BLOOD PRESSURE: 120 MMHG | WEIGHT: 83 LBS | BODY MASS INDEX: 15.67 KG/M2 | TEMPERATURE: 97.5 F | HEART RATE: 93 BPM | HEIGHT: 61 IN | DIASTOLIC BLOOD PRESSURE: 80 MMHG | OXYGEN SATURATION: 100 %

## 2023-09-07 DIAGNOSIS — Z09 HOSPITAL DISCHARGE FOLLOW-UP: Primary | ICD-10-CM

## 2023-09-07 DIAGNOSIS — D61.818 PANCYTOPENIA (HCC): Chronic | ICD-10-CM

## 2023-09-07 DIAGNOSIS — K61.1 PERIRECTAL ABSCESS: ICD-10-CM

## 2023-09-07 RX ORDER — DICYCLOMINE HYDROCHLORIDE 10 MG/1
10 CAPSULE ORAL EVERY 6 HOURS PRN
Qty: 20 CAPSULE | Refills: 0 | Status: ON HOLD | OUTPATIENT
Start: 2023-09-07 | End: 2023-09-12

## 2023-09-07 NOTE — PROGRESS NOTES
Post-Discharge Transitional Care Follow Up      Martine Hartman   YOB: 1970    Date of Office Visit:  9/7/2023  Date of Hospital Admission: 8/26/23  Date of Hospital Discharge: 8/30/23  Readmission Risk Score (high >=14%. Medium >=10%):Readmission Risk Score: 19.3      Care management risk score Rising risk (score 2-5) and Complex Care (Scores >=6): No Risk Score On File     Non face to face  following discharge, date last encounter closed (first attempt may have been earlier): 08/31/2023     Call initiated 2 business days of discharge: Yes     Hospital discharge follow-up  -     UT DISCHARGE MEDS RECONCILED W/ CURRENT OUTPATIENT MED LIST    Perirectal abscess  Penrose drain still in place and draining  To call surgery office today for follow up appointment, to be removed by 12 days per surgery? Placed 8.28  Still has 2 pills to take of augmentin but finished flagyl    Trichomoniasis  Patient unsure if having drainage from abscess vs vagina still  Can rescreen in future to ensure clearance  Completed flagyl course    Pancytopenia (HCC)  Chronic hep C  Stable  ID evaluated in hospital and gave antibiotic recommendations  Continue to monitor    Medical Decision Making: moderate complexity  Return in 1 month (on 10/7/2023) for routine health. Subjective:   HPI    Inpatient course: Discharge summary reviewed- see chart. Interval history/Current status: Still having lots of drainage from drain but no fevers.      Patient Active Problem List   Diagnosis    Chronic pancreatitis due to acute alcohol intoxication ([address])    Schizoaffective disorder, bipolar type ([address])    Anxiety disorder    Depression    History of alcohol abuse    Pancreatic pseudocyst ([address])    Liver dysfunction    Asthma    Gastroesophageal reflux disease without esophagitis    Bipolar I disorder (HCC)    Bipolar affective disorder (HCC)    Irritable bowel syndrome    Tobacco use disorder    Jaundice    RUQ abdominal pain

## 2023-09-07 NOTE — PATIENT INSTRUCTIONS
[address] [facility]  Surgical center, colonoscopy center  [facility] [phone]  Closes 4:30PM   Follow up from surgery

## 2023-09-08 ENCOUNTER — APPOINTMENT (OUTPATIENT)
Dept: CT IMAGING | Age: 53
End: 2023-09-08
Payer: COMMERCIAL

## 2023-09-08 ENCOUNTER — HOSPITAL ENCOUNTER (INPATIENT)
Age: 53
LOS: 1 days | Discharge: LEFT BEFORE DISCHARGE | End: 2023-09-08
Attending: EMERGENCY MEDICINE | Admitting: INTERNAL MEDICINE
Payer: COMMERCIAL

## 2023-09-08 ENCOUNTER — TELEPHONE (OUTPATIENT)
Dept: SURGERY | Age: 53
End: 2023-09-08

## 2023-09-08 VITALS
RESPIRATION RATE: 20 BRPM | OXYGEN SATURATION: 98 % | DIASTOLIC BLOOD PRESSURE: 74 MMHG | HEART RATE: 88 BPM | TEMPERATURE: 97.9 F | SYSTOLIC BLOOD PRESSURE: 123 MMHG

## 2023-09-08 DIAGNOSIS — E13.10 DIABETIC KETOACIDOSIS WITHOUT COMA ASSOCIATED WITH OTHER SPECIFIED DIABETES MELLITUS (HCC): ICD-10-CM

## 2023-09-08 DIAGNOSIS — Z53.29 LEFT AGAINST MEDICAL ADVICE: ICD-10-CM

## 2023-09-08 DIAGNOSIS — Z91.148 NONCOMPLIANCE WITH MEDICATION REGIMEN: Primary | ICD-10-CM

## 2023-09-08 PROBLEM — E10.10 DKA, TYPE 1, NOT AT GOAL (HCC): Status: ACTIVE | Noted: 2023-09-08

## 2023-09-08 LAB
ALBUMIN SERPL-MCNC: 3.8 G/DL (ref 3.5–5.2)
ALP SERPL-CCNC: 1341 U/L (ref 35–104)
ALT SERPL-CCNC: 29 U/L (ref 0–32)
ANION GAP SERPL CALCULATED.3IONS-SCNC: 18 MMOL/L (ref 7–16)
APAP SERPL-MCNC: <5 UG/ML (ref 10–30)
AST SERPL-CCNC: 42 U/L (ref 0–31)
B-OH-BUTYR SERPL-MCNC: 3.99 MMOL/L (ref 0.02–0.27)
BASOPHILS # BLD: 0 K/UL (ref 0–0.2)
BASOPHILS NFR BLD: 0 % (ref 0–2)
BILIRUB SERPL-MCNC: 0.9 MG/DL (ref 0–1.2)
BILIRUB UR QL STRIP: NEGATIVE
BUN SERPL-MCNC: 8 MG/DL (ref 6–20)
CALCIUM SERPL-MCNC: 10.3 MG/DL (ref 8.6–10.2)
CHLORIDE SERPL-SCNC: 83 MMOL/L (ref 98–107)
CLARITY UR: CLEAR
CO2 SERPL-SCNC: 26 MMOL/L (ref 22–29)
COLOR UR: YELLOW
CREAT SERPL-MCNC: 0.4 MG/DL (ref 0.5–1)
EOSINOPHIL # BLD: 0 K/UL (ref 0.05–0.5)
EOSINOPHILS RELATIVE PERCENT: 0 % (ref 0–6)
EPI CELLS #/AREA URNS HPF: ABNORMAL /HPF
ERYTHROCYTE [DISTWIDTH] IN BLOOD BY AUTOMATED COUNT: 14.6 % (ref 11.5–15)
ETHANOLAMINE SERPL-MCNC: <10 MG/DL
GFR SERPL CREATININE-BSD FRML MDRD: >60 ML/MIN/1.73M2
GLUCOSE SERPL-MCNC: 630 MG/DL (ref 74–99)
GLUCOSE UR STRIP-MCNC: >=1000 MG/DL
HCT VFR BLD AUTO: 44 % (ref 34–48)
HGB BLD-MCNC: 15 G/DL (ref 11.5–15.5)
HGB UR QL STRIP.AUTO: ABNORMAL
INR PPP: 1
KETONES UR STRIP-MCNC: 40 MG/DL
LACTATE BLDV-SCNC: 1.4 MMOL/L (ref 0.5–2.2)
LEUKOCYTE ESTERASE UR QL STRIP: NEGATIVE
LIPASE SERPL-CCNC: 18 U/L (ref 13–60)
LYMPHOCYTES NFR BLD: 0.61 K/UL (ref 1.5–4)
LYMPHOCYTES RELATIVE PERCENT: 6 % (ref 20–42)
MCH RBC QN AUTO: 32.1 PG (ref 26–35)
MCHC RBC AUTO-ENTMCNC: 34.1 G/DL (ref 32–34.5)
MCV RBC AUTO: 94.2 FL (ref 80–99.9)
MONOCYTES NFR BLD: 0.51 K/UL (ref 0.1–0.95)
MONOCYTES NFR BLD: 5 % (ref 2–12)
NEUTROPHILS NFR BLD: 89 % (ref 43–80)
NEUTS SEG NFR BLD: 9.08 K/UL (ref 1.8–7.3)
NITRITE UR QL STRIP: NEGATIVE
PH UR STRIP: 6 [PH] (ref 5–9)
PH VENOUS: 7.42 (ref 7.35–7.45)
PLATELET # BLD AUTO: 147 K/UL (ref 130–450)
PMV BLD AUTO: 10.2 FL (ref 7–12)
POTASSIUM SERPL-SCNC: 5 MMOL/L (ref 3.5–5)
PROT SERPL-MCNC: 8.7 G/DL (ref 6.4–8.3)
PROT UR STRIP-MCNC: NEGATIVE MG/DL
PROTHROMBIN TIME: 11.6 SEC (ref 9.3–12.4)
RBC # BLD AUTO: 4.67 M/UL (ref 3.5–5.5)
RBC # BLD: ABNORMAL 10*6/UL
RBC #/AREA URNS HPF: ABNORMAL /HPF
SALICYLATES SERPL-MCNC: <0.3 MG/DL (ref 0–30)
SODIUM SERPL-SCNC: 127 MMOL/L (ref 132–146)
SP GR UR STRIP: <1.005 (ref 1–1.03)
TOXIC TRICYCLIC SC,BLOOD: NEGATIVE
UROBILINOGEN UR STRIP-ACNC: 0.2 EU/DL (ref 0–1)
WBC #/AREA URNS HPF: ABNORMAL /HPF
WBC OTHER # BLD: 10.2 K/UL (ref 4.5–11.5)

## 2023-09-08 PROCEDURE — 80179 DRUG ASSAY SALICYLATE: CPT

## 2023-09-08 PROCEDURE — G0480 DRUG TEST DEF 1-7 CLASSES: HCPCS

## 2023-09-08 PROCEDURE — 74176 CT ABD & PELVIS W/O CONTRAST: CPT

## 2023-09-08 PROCEDURE — 99284 EMERGENCY DEPT VISIT MOD MDM: CPT

## 2023-09-08 PROCEDURE — 85025 COMPLETE CBC W/AUTO DIFF WBC: CPT

## 2023-09-08 PROCEDURE — 6360000002 HC RX W HCPCS: Performed by: EMERGENCY MEDICINE

## 2023-09-08 PROCEDURE — 83605 ASSAY OF LACTIC ACID: CPT

## 2023-09-08 PROCEDURE — 96374 THER/PROPH/DIAG INJ IV PUSH: CPT

## 2023-09-08 PROCEDURE — 80053 COMPREHEN METABOLIC PANEL: CPT

## 2023-09-08 PROCEDURE — 81001 URINALYSIS AUTO W/SCOPE: CPT

## 2023-09-08 PROCEDURE — 82010 KETONE BODYS QUAN: CPT

## 2023-09-08 PROCEDURE — 82800 BLOOD PH: CPT

## 2023-09-08 PROCEDURE — 80143 DRUG ASSAY ACETAMINOPHEN: CPT

## 2023-09-08 PROCEDURE — 1200000000 HC SEMI PRIVATE

## 2023-09-08 PROCEDURE — 85610 PROTHROMBIN TIME: CPT

## 2023-09-08 PROCEDURE — 96375 TX/PRO/DX INJ NEW DRUG ADDON: CPT

## 2023-09-08 PROCEDURE — 83690 ASSAY OF LIPASE: CPT

## 2023-09-08 PROCEDURE — 96376 TX/PRO/DX INJ SAME DRUG ADON: CPT

## 2023-09-08 PROCEDURE — 80307 DRUG TEST PRSMV CHEM ANLYZR: CPT

## 2023-09-08 PROCEDURE — 2580000003 HC RX 258

## 2023-09-08 PROCEDURE — 96361 HYDRATE IV INFUSION ADD-ON: CPT

## 2023-09-08 PROCEDURE — 36415 COLL VENOUS BLD VENIPUNCTURE: CPT

## 2023-09-08 RX ORDER — ONDANSETRON 2 MG/ML
4 INJECTION INTRAMUSCULAR; INTRAVENOUS ONCE
Status: COMPLETED | OUTPATIENT
Start: 2023-09-08 | End: 2023-09-08

## 2023-09-08 RX ORDER — 0.9 % SODIUM CHLORIDE 0.9 %
500 INTRAVENOUS SOLUTION INTRAVENOUS ONCE
Status: COMPLETED | OUTPATIENT
Start: 2023-09-08 | End: 2023-09-08

## 2023-09-08 RX ORDER — POTASSIUM CHLORIDE 7.45 MG/ML
10 INJECTION INTRAVENOUS PRN
Status: DISCONTINUED | OUTPATIENT
Start: 2023-09-08 | End: 2023-09-08 | Stop reason: HOSPADM

## 2023-09-08 RX ORDER — SODIUM CHLORIDE 9 MG/ML
INJECTION, SOLUTION INTRAVENOUS
Status: COMPLETED
Start: 2023-09-08 | End: 2023-09-08

## 2023-09-08 RX ORDER — FENTANYL CITRATE 0.05 MG/ML
50 INJECTION, SOLUTION INTRAMUSCULAR; INTRAVENOUS ONCE
Status: COMPLETED | OUTPATIENT
Start: 2023-09-08 | End: 2023-09-08

## 2023-09-08 RX ORDER — DEXTROSE AND SODIUM CHLORIDE 5; .45 G/100ML; G/100ML
INJECTION, SOLUTION INTRAVENOUS CONTINUOUS PRN
Status: DISCONTINUED | OUTPATIENT
Start: 2023-09-08 | End: 2023-09-08 | Stop reason: HOSPADM

## 2023-09-08 RX ORDER — MAGNESIUM SULFATE IN WATER 40 MG/ML
2000 INJECTION, SOLUTION INTRAVENOUS PRN
Status: DISCONTINUED | OUTPATIENT
Start: 2023-09-08 | End: 2023-09-08 | Stop reason: HOSPADM

## 2023-09-08 RX ORDER — SODIUM CHLORIDE 9 MG/ML
INJECTION, SOLUTION INTRAVENOUS CONTINUOUS
Status: DISCONTINUED | OUTPATIENT
Start: 2023-09-08 | End: 2023-09-08 | Stop reason: HOSPADM

## 2023-09-08 RX ADMIN — FENTANYL CITRATE 50 MCG: 0.05 INJECTION, SOLUTION INTRAMUSCULAR; INTRAVENOUS at 04:41

## 2023-09-08 RX ADMIN — Medication 500 ML: at 03:09

## 2023-09-08 RX ADMIN — FENTANYL CITRATE 50 MCG: 50 INJECTION INTRAMUSCULAR; INTRAVENOUS at 03:18

## 2023-09-08 RX ADMIN — ONDANSETRON 4 MG: 2 INJECTION INTRAMUSCULAR; INTRAVENOUS at 03:10

## 2023-09-08 RX ADMIN — Medication 500 ML: at 04:34

## 2023-09-08 RX ADMIN — SODIUM CHLORIDE 500 ML: 9 INJECTION, SOLUTION INTRAVENOUS at 03:09

## 2023-09-08 RX ADMIN — SODIUM CHLORIDE 500 ML: 9 INJECTION, SOLUTION INTRAVENOUS at 04:34

## 2023-09-08 ASSESSMENT — PAIN SCALES - GENERAL
PAINLEVEL_OUTOF10: 10

## 2023-09-08 ASSESSMENT — PAIN DESCRIPTION - LOCATION
LOCATION: ABDOMEN

## 2023-09-08 ASSESSMENT — PAIN - FUNCTIONAL ASSESSMENT
PAIN_FUNCTIONAL_ASSESSMENT: 0-10
PAIN_FUNCTIONAL_ASSESSMENT: 0-10

## 2023-09-08 NOTE — ED NOTES
Patient discussed with significant other and insists on going home. Dr. Zayra Paul notified and states she must sign out AMA. Risks explained to patient including the risk of death. Patient states, \"My sugar is always high. I can go home and take my own insulin\". Significant other at the bedside and states to this RN, \"She never checks her sugar like she's supposed to. She hasn't checked it in over a week. \"  Patient just rolled her eyes and states, \"I'm going home\". AMA form again discussed and signature obtained.      Justine Baumann RN  09/08/23 7981

## 2023-09-08 NOTE — ED PROVIDER NOTES
[address]      Pt Name: Luke Wisdom  MRN: [id number]  [address] [place] 1970  Date of evaluation: 9/8/2023  Provider: Zachariah Shields DO  PCP: Domenic Davis DO  Note Started: 3:11 AM EDT 9/8/23    CHIEF COMPLAINT       Chief Complaint   Patient presents with    Abdominal Pain       HISTORY OF PRESENT ILLNESS: 1 or more Elements   History From: Patient  Limitations to history : None    Luke Wisdom is a 48 y.o. female who presents to the ED for evaluation of abdominal pain. Patient states that for the past one month she has been having pain to her epigastric region. She states that she did use to drink but has not had heavy drinking in over 5 years. She states that she did have one drink about 5 days ago. She states that she is nauseated and has epigastric pain and rectal pain. She has no fevers or chills. She states that she was just hospitalized and had to go to the OR for drainage of an abscess on her rectum. Nursing Notes were all reviewed and agreed with or any disagreements were addressed in the HPI. REVIEW OF SYSTEMS :    Positives and Pertinent negatives as per HPI.      SURGICAL HISTORY     Past Surgical History:   Procedure Laterality Date    ABDOMEN SURGERY      gallbladder removal    BREAST BIOPSY Left     CHOLECYSTECTOMY  2010    COLONOSCOPY      COLONOSCOPY N/A 10/2/2018    COLONOSCOPY WITH BIOPSY performed by Laney Elizondo MD at [address] [place] [address], [address] [place] [address], DIAGNOSTIC      ERCP      april 2016 at  [facility] ([address] [place] [address])  2004    MASTECTOMY Left     Left breast mastectomy per patient    OTHER SURGICAL HISTORY Left 04/25/2017     Left breast blue dye injection, Sential Node Lymph Biopsy with left breast lumpectomy    OTHER SURGICAL HISTORY  10/05/2017    simple left mastectomy    OVARY REMOVAL Bilateral 2007    PANCREAS BIOPSY      with stent    RECTAL SURGERY N/A 8/28/2023    PERINEAL follow-up has been arranged and they are aware of the importance of following up as instructed. They have been advised that they should return to the ED immediately if they change their mind at any time, or if their condition begins to change or worsen. [BP]      ED Course User Index  [BP] Anai Ellis DO        Chronic Conditions:   Past Medical History:   Diagnosis Date    Alcoholism ([address])     Since teens to early 25s    Anxiety and depression     Ascites of liver     Bronchitis     Cancer ([address])     breast, left    Candidiasis of vagina     Chronic pancreatitis ([address])     Cocaine abuse ([address])     Constipation     Diabetes mellitus, type 2 (HCC)     GERD (gastroesophageal reflux disease)     Gout     Hepatitis C     Hernia of anterior abdominal wall     Jaundice 05/12/2016    Pneumonia     Polyneuropathy due to type 2 diabetes mellitus (HCC)     Schizoaffective disorder, bipolar type ([address])     Splenomegaly 05/30/2017       CONSULTS: (Who and What was discussed)  IP CONSULT TO DIABETES EDUCATOR  IP CONSULT TO GENERAL SURGERY    Discussion with Other Profesionals : None    Social Determinants : Patient has significant healthcare illiteracy    Records Reviewed : Source Dr. Frederick Brunner Operative note    CC/HPI Summary, DDx, ED Course, and Reassessment: CBC is ordered to evaluate for any signs of infection or inflammation by obtaining a WBC count, or any signs of acute anemia by interpreting hemoglobin. CMP was ordered to evaluate for any electrolyte imbalances, kidney function, hepatic injury or any elevations in anion gap. Urinalysis ordered to evaluate for a UTI and/or hematuria. Lipase level was ordered to evaluate for possible elevations which suggest pancreatic etiology of symptoms. Lactic acid level obtained to evaluate for signs of organ hypoperfusion or ischemia.       Disposition Considerations (Tests not ordered but considered, Shared Decision Making, Pt Expectation of Test or Tx.): This is a 79-year-old

## 2023-09-08 NOTE — TELEPHONE ENCOUNTER
LPN received call from home health provider. Home health voiced patient will not allow them in the home. Patient underwent PERINEAL ABSCESS INCISION AND DRAINAGE on 8/28/2023 with Penrose Drain placement. Home health is voicing patient does not want home health and is refusing them to do drain maintenance. Routing to Dr Rina Castillo for informational purposes.     Electronically signed by Leigha Gonzalez LPN on 5/7/89 at 2:59 PM EDT

## 2023-09-08 NOTE — ED NOTES
Lab notified this LPN of a critical glucose level of 630. Dr. Chau Manzano notified.      Laura Fink, LPN  02/34/87 1288

## 2023-09-08 NOTE — ED NOTES
This RN entered room to start IVF and insulin drip. Patient states, \"I Kirti Limb go home\". This RN informed patient that she is about to be admitted to the ICU on an insulin drip for a blood sugar over 600. Patient states, \"That's not unusual that my sugar is that high. I Kirti Limb go home. \"  Dr. Giovanny Hyman notified and will speak with the patient.      Dulce Maria Calderón RN  09/08/23 1239

## 2023-09-09 ENCOUNTER — HOSPITAL ENCOUNTER (EMERGENCY)
Age: 53
Discharge: HOME | End: 2023-09-09
Attending: STUDENT IN AN ORGANIZED HEALTH CARE EDUCATION/TRAINING PROGRAM
Payer: COMMERCIAL

## 2023-09-09 ENCOUNTER — APPOINTMENT (OUTPATIENT)
Dept: CT IMAGING | Age: 53
End: 2023-09-09
Payer: COMMERCIAL

## 2023-09-09 VITALS
RESPIRATION RATE: 18 BRPM | SYSTOLIC BLOOD PRESSURE: 130 MMHG | OXYGEN SATURATION: 98 % | DIASTOLIC BLOOD PRESSURE: 79 MMHG | TEMPERATURE: 98.1 F | HEART RATE: 91 BPM

## 2023-09-09 DIAGNOSIS — K52.9 ENTEROCOLITIS: Primary | ICD-10-CM

## 2023-09-09 LAB
ALBUMIN SERPL-MCNC: 3.2 G/DL (ref 3.5–5.2)
ALP SERPL-CCNC: 882 U/L (ref 35–104)
ALT SERPL-CCNC: 21 U/L (ref 0–32)
ANION GAP SERPL CALCULATED.3IONS-SCNC: 11 MMOL/L (ref 7–16)
AST SERPL-CCNC: 20 U/L (ref 0–31)
B-OH-BUTYR SERPL-MCNC: 2.72 MMOL/L (ref 0.02–0.27)
BASOPHILS # BLD: 0.02 K/UL (ref 0–0.2)
BASOPHILS NFR BLD: 0 % (ref 0–2)
BILIRUB SERPL-MCNC: 0.6 MG/DL (ref 0–1.2)
BUN SERPL-MCNC: 9 MG/DL (ref 6–20)
CALCIUM SERPL-MCNC: 9.5 MG/DL (ref 8.6–10.2)
CHLORIDE SERPL-SCNC: 91 MMOL/L (ref 98–107)
CO2 SERPL-SCNC: 33 MMOL/L (ref 22–29)
CREAT SERPL-MCNC: 0.5 MG/DL (ref 0.5–1)
EOSINOPHIL # BLD: 0.06 K/UL (ref 0.05–0.5)
EOSINOPHILS RELATIVE PERCENT: 1 % (ref 0–6)
ERYTHROCYTE [DISTWIDTH] IN BLOOD BY AUTOMATED COUNT: 14.5 % (ref 11.5–15)
GFR SERPL CREATININE-BSD FRML MDRD: >60 ML/MIN/1.73M2
GLUCOSE BLD-MCNC: 383 MG/DL
GLUCOSE BLD-MCNC: 383 MG/DL (ref 74–99)
GLUCOSE SERPL-MCNC: 528 MG/DL (ref 74–99)
HCT VFR BLD AUTO: 36.2 % (ref 34–48)
HGB BLD-MCNC: 12.6 G/DL (ref 11.5–15.5)
IMM GRANULOCYTES # BLD AUTO: 0.05 K/UL (ref 0–0.58)
IMM GRANULOCYTES NFR BLD: 1 % (ref 0–5)
LACTATE BLDV-SCNC: 1 MMOL/L (ref 0.5–2.2)
LIPASE SERPL-CCNC: 12 U/L (ref 13–60)
LYMPHOCYTES NFR BLD: 0.65 K/UL (ref 1.5–4)
LYMPHOCYTES RELATIVE PERCENT: 8 % (ref 20–42)
MCH RBC QN AUTO: 32.7 PG (ref 26–35)
MCHC RBC AUTO-ENTMCNC: 34.8 G/DL (ref 32–34.5)
MCV RBC AUTO: 94 FL (ref 80–99.9)
MONOCYTES NFR BLD: 0.39 K/UL (ref 0.1–0.95)
MONOCYTES NFR BLD: 5 % (ref 2–12)
NEUTROPHILS NFR BLD: 86 % (ref 43–80)
NEUTS SEG NFR BLD: 6.88 K/UL (ref 1.8–7.3)
PH VENOUS: 7.54 (ref 7.35–7.45)
PLATELET, FLUORESCENCE: 116 K/UL (ref 130–450)
PMV BLD AUTO: 10.1 FL (ref 7–12)
POTASSIUM SERPL-SCNC: 3.9 MMOL/L (ref 3.5–5)
PROT SERPL-MCNC: 7.1 G/DL (ref 6.4–8.3)
RBC # BLD AUTO: 3.85 M/UL (ref 3.5–5.5)
SODIUM SERPL-SCNC: 135 MMOL/L (ref 132–146)
TROPONIN I SERPL HS-MCNC: 8 NG/L (ref 0–9)
WBC OTHER # BLD: 8.1 K/UL (ref 4.5–11.5)

## 2023-09-09 PROCEDURE — 96374 THER/PROPH/DIAG INJ IV PUSH: CPT

## 2023-09-09 PROCEDURE — 83605 ASSAY OF LACTIC ACID: CPT

## 2023-09-09 PROCEDURE — 74176 CT ABD & PELVIS W/O CONTRAST: CPT

## 2023-09-09 PROCEDURE — 36415 COLL VENOUS BLD VENIPUNCTURE: CPT

## 2023-09-09 PROCEDURE — 85025 COMPLETE CBC W/AUTO DIFF WBC: CPT

## 2023-09-09 PROCEDURE — 6360000002 HC RX W HCPCS: Performed by: STUDENT IN AN ORGANIZED HEALTH CARE EDUCATION/TRAINING PROGRAM

## 2023-09-09 PROCEDURE — 6370000000 HC RX 637 (ALT 250 FOR IP)

## 2023-09-09 PROCEDURE — 82800 BLOOD PH: CPT

## 2023-09-09 PROCEDURE — 84484 ASSAY OF TROPONIN QUANT: CPT

## 2023-09-09 PROCEDURE — 82962 GLUCOSE BLOOD TEST: CPT

## 2023-09-09 PROCEDURE — 6370000000 HC RX 637 (ALT 250 FOR IP): Performed by: STUDENT IN AN ORGANIZED HEALTH CARE EDUCATION/TRAINING PROGRAM

## 2023-09-09 PROCEDURE — 2580000003 HC RX 258: Performed by: STUDENT IN AN ORGANIZED HEALTH CARE EDUCATION/TRAINING PROGRAM

## 2023-09-09 PROCEDURE — 80053 COMPREHEN METABOLIC PANEL: CPT

## 2023-09-09 PROCEDURE — 82010 KETONE BODYS QUAN: CPT

## 2023-09-09 PROCEDURE — 99284 EMERGENCY DEPT VISIT MOD MDM: CPT

## 2023-09-09 PROCEDURE — 83690 ASSAY OF LIPASE: CPT

## 2023-09-09 PROCEDURE — 96375 TX/PRO/DX INJ NEW DRUG ADDON: CPT

## 2023-09-09 PROCEDURE — 96376 TX/PRO/DX INJ SAME DRUG ADON: CPT

## 2023-09-09 RX ORDER — ONDANSETRON 4 MG/1
4 TABLET, ORALLY DISINTEGRATING ORAL 3 TIMES DAILY PRN
Qty: 21 TABLET | Refills: 0 | Status: ON HOLD | OUTPATIENT
Start: 2023-09-09

## 2023-09-09 RX ORDER — METRONIDAZOLE 500 MG/1
500 TABLET ORAL 2 TIMES DAILY
Qty: 20 TABLET | Refills: 0 | Status: ON HOLD | OUTPATIENT
Start: 2023-09-09 | End: 2023-09-19

## 2023-09-09 RX ORDER — FENTANYL CITRATE 0.05 MG/ML
50 INJECTION, SOLUTION INTRAMUSCULAR; INTRAVENOUS ONCE
Status: COMPLETED | OUTPATIENT
Start: 2023-09-09 | End: 2023-09-09

## 2023-09-09 RX ORDER — CEFDINIR 300 MG/1
300 CAPSULE ORAL 2 TIMES DAILY
Qty: 20 CAPSULE | Refills: 0 | Status: ON HOLD | OUTPATIENT
Start: 2023-09-09 | End: 2023-09-19

## 2023-09-09 RX ORDER — ONDANSETRON 2 MG/ML
INJECTION INTRAMUSCULAR; INTRAVENOUS
Status: DISCONTINUED
Start: 2023-09-09 | End: 2023-09-09

## 2023-09-09 RX ORDER — METRONIDAZOLE 500 MG/1
500 TABLET ORAL ONCE
Status: COMPLETED | OUTPATIENT
Start: 2023-09-09 | End: 2023-09-09

## 2023-09-09 RX ORDER — CEFDINIR 300 MG/1
300 CAPSULE ORAL ONCE
Status: COMPLETED | OUTPATIENT
Start: 2023-09-09 | End: 2023-09-09

## 2023-09-09 RX ORDER — FENTANYL CITRATE 50 UG/ML
INJECTION, SOLUTION INTRAMUSCULAR; INTRAVENOUS
Status: DISCONTINUED
Start: 2023-09-09 | End: 2023-09-09

## 2023-09-09 RX ORDER — 0.9 % SODIUM CHLORIDE 0.9 %
1000 INTRAVENOUS SOLUTION INTRAVENOUS ONCE
Status: COMPLETED | OUTPATIENT
Start: 2023-09-09 | End: 2023-09-09

## 2023-09-09 RX ORDER — PREDNISONE 5 MG/1
5 TABLET ORAL ONCE
Status: DISCONTINUED | OUTPATIENT
Start: 2023-09-09 | End: 2023-09-09

## 2023-09-09 RX ADMIN — METRONIDAZOLE 500 MG: 500 TABLET ORAL at 07:07

## 2023-09-09 RX ADMIN — FENTANYL CITRATE 50 MCG: 50 INJECTION INTRAMUSCULAR; INTRAVENOUS at 03:41

## 2023-09-09 RX ADMIN — FENTANYL CITRATE 50 MCG: 0.05 INJECTION, SOLUTION INTRAMUSCULAR; INTRAVENOUS at 06:06

## 2023-09-09 RX ADMIN — CEFDINIR 300 MG: 300 CAPSULE ORAL at 07:07

## 2023-09-09 RX ADMIN — SODIUM CHLORIDE 1000 ML: 9 INJECTION, SOLUTION INTRAVENOUS at 05:13

## 2023-09-09 RX ADMIN — INSULIN HUMAN 10 UNITS: 100 INJECTION, SOLUTION PARENTERAL at 05:35

## 2023-09-09 ASSESSMENT — PAIN DESCRIPTION - PAIN TYPE: TYPE: ACUTE PAIN

## 2023-09-09 ASSESSMENT — PAIN SCALES - GENERAL
PAINLEVEL_OUTOF10: 10
PAINLEVEL_OUTOF10: 9

## 2023-09-09 ASSESSMENT — PAIN DESCRIPTION - LOCATION: LOCATION: ABDOMEN

## 2023-09-09 ASSESSMENT — PAIN - FUNCTIONAL ASSESSMENT: PAIN_FUNCTIONAL_ASSESSMENT: 0-10

## 2023-09-09 ASSESSMENT — PAIN DESCRIPTION - FREQUENCY: FREQUENCY: CONTINUOUS

## 2023-09-10 ENCOUNTER — HOSPITAL ENCOUNTER (INPATIENT)
Age: 53
LOS: 1 days | Discharge: HOME HEALTH | DRG: 249 | End: 2023-09-14
Attending: EMERGENCY MEDICINE | Admitting: STUDENT IN AN ORGANIZED HEALTH CARE EDUCATION/TRAINING PROGRAM
Payer: COMMERCIAL

## 2023-09-10 DIAGNOSIS — R10.13 EPIGASTRIC PAIN: ICD-10-CM

## 2023-09-10 DIAGNOSIS — K52.9 ENTEROCOLITIS: ICD-10-CM

## 2023-09-10 DIAGNOSIS — K52.9 ENTERITIS: ICD-10-CM

## 2023-09-10 DIAGNOSIS — R73.9 HYPERGLYCEMIA: Primary | ICD-10-CM

## 2023-09-10 LAB
ALBUMIN SERPL-MCNC: 2.9 G/DL (ref 3.5–5.2)
ALBUMIN SERPL-MCNC: NORMAL G/DL (ref 3.5–5.2)
ALBUMIN/GLOB SERPL: NORMAL {RATIO} (ref 1–2.5)
ALP SERPL-CCNC: 610 U/L (ref 35–104)
ALP SERPL-CCNC: NORMAL U/L (ref 35–104)
ALT SERPL-CCNC: 16 U/L (ref 0–32)
ALT SERPL-CCNC: NORMAL U/L (ref 5–33)
ANION GAP SERPL CALCULATED.3IONS-SCNC: 8 MMOL/L (ref 7–16)
ANION GAP SERPL CALCULATED.3IONS-SCNC: NORMAL MMOL/L (ref 9–17)
AST SERPL-CCNC: 16 U/L (ref 0–31)
AST SERPL-CCNC: NORMAL U/L
B-OH-BUTYR SERPL-MCNC: 2.9 MMOL/L (ref 0.02–0.27)
BASOPHILS # BLD: 0.04 K/UL (ref 0–0.2)
BASOPHILS NFR BLD: 1 % (ref 0–2)
BILIRUB SERPL-MCNC: 0.4 MG/DL (ref 0–1.2)
BILIRUB SERPL-MCNC: NORMAL MG/DL (ref 0.3–1.2)
BUN SERPL-MCNC: 7 MG/DL (ref 6–20)
BUN SERPL-MCNC: NORMAL MG/DL (ref 6–20)
BUN/CREAT SERPL: NORMAL (ref 9–20)
CALCIUM SERPL-MCNC: 7.9 MG/DL (ref 8.6–10.2)
CALCIUM SERPL-MCNC: NORMAL MG/DL (ref 8.6–10.4)
CHLORIDE SERPL-SCNC: 102 MMOL/L (ref 98–107)
CHLORIDE SERPL-SCNC: NORMAL MMOL/L (ref 98–107)
CO2 SERPL-SCNC: 25 MMOL/L (ref 22–29)
CO2 SERPL-SCNC: NORMAL MMOL/L (ref 20–31)
CREAT SERPL-MCNC: 0.4 MG/DL (ref 0.5–1)
CREAT SERPL-MCNC: NORMAL MG/DL (ref 0.5–0.9)
EKG ATRIAL RATE: 78 BPM
EKG P AXIS: 81 DEGREES
EKG P-R INTERVAL: 128 MS
EKG Q-T INTERVAL: 388 MS
EKG QRS DURATION: 80 MS
EKG QTC CALCULATION (BAZETT): 442 MS
EKG R AXIS: 63 DEGREES
EKG T AXIS: 84 DEGREES
EKG VENTRICULAR RATE: 78 BPM
EOSINOPHIL # BLD: 0.08 K/UL (ref 0.05–0.5)
EOSINOPHILS RELATIVE PERCENT: 2 % (ref 0–6)
ERYTHROCYTE [DISTWIDTH] IN BLOOD BY AUTOMATED COUNT: 15 % (ref 11.5–15)
GFR SERPL CREATININE-BSD FRML MDRD: >60 ML/MIN/1.73M2
GFR SERPL CREATININE-BSD FRML MDRD: NORMAL ML/MIN/1.73M2
GLUCOSE BLD-MCNC: 288 MG/DL (ref 74–99)
GLUCOSE BLD-MCNC: 385 MG/DL (ref 74–99)
GLUCOSE BLD-MCNC: 419 MG/DL (ref 74–99)
GLUCOSE SERPL-MCNC: 514 MG/DL (ref 74–99)
GLUCOSE SERPL-MCNC: NORMAL MG/DL (ref 70–99)
HBA1C MFR BLD: 11.4 % (ref 4–5.6)
HCT VFR BLD AUTO: 37.8 % (ref 34–48)
HGB BLD-MCNC: 12.3 G/DL (ref 11.5–15.5)
LACTATE BLDV-SCNC: 1.5 MMOL/L (ref 0.5–2.2)
LIPASE SERPL-CCNC: 22 U/L (ref 13–60)
LYMPHOCYTES NFR BLD: 0.2 K/UL (ref 1.5–4)
LYMPHOCYTES RELATIVE PERCENT: 4 % (ref 20–42)
MCH RBC QN AUTO: 31.9 PG (ref 26–35)
MCHC RBC AUTO-ENTMCNC: 32.5 G/DL (ref 32–34.5)
MCV RBC AUTO: 98.2 FL (ref 80–99.9)
MONOCYTES NFR BLD: 0.16 K/UL (ref 0.1–0.95)
MONOCYTES NFR BLD: 4 % (ref 2–12)
NEUTROPHILS NFR BLD: 90 % (ref 43–80)
NEUTS SEG NFR BLD: 4.03 K/UL (ref 1.8–7.3)
PH VENOUS: 7.4 (ref 7.35–7.45)
PLATELET CONFIRMATION: NORMAL
PLATELET, FLUORESCENCE: 96 K/UL (ref 130–450)
PMV BLD AUTO: 10.5 FL (ref 7–12)
POTASSIUM SERPL-SCNC: 4.4 MMOL/L (ref 3.5–5)
POTASSIUM SERPL-SCNC: NORMAL MMOL/L (ref 3.7–5.3)
PROT SERPL-MCNC: 5.7 G/DL (ref 6.4–8.3)
PROT SERPL-MCNC: NORMAL G/DL (ref 6.4–8.3)
RBC # BLD AUTO: 3.85 M/UL (ref 3.5–5.5)
RBC # BLD: ABNORMAL 10*6/UL
SODIUM SERPL-SCNC: 135 MMOL/L (ref 132–146)
SODIUM SERPL-SCNC: NORMAL MMOL/L (ref 135–144)
WBC OTHER # BLD: 4.5 K/UL (ref 4.5–11.5)

## 2023-09-10 PROCEDURE — 82010 KETONE BODYS QUAN: CPT

## 2023-09-10 PROCEDURE — 2580000003 HC RX 258: Performed by: STUDENT IN AN ORGANIZED HEALTH CARE EDUCATION/TRAINING PROGRAM

## 2023-09-10 PROCEDURE — G0378 HOSPITAL OBSERVATION PER HR: HCPCS

## 2023-09-10 PROCEDURE — 82962 GLUCOSE BLOOD TEST: CPT

## 2023-09-10 PROCEDURE — 93010 ELECTROCARDIOGRAM REPORT: CPT | Performed by: INTERNAL MEDICINE

## 2023-09-10 PROCEDURE — 96375 TX/PRO/DX INJ NEW DRUG ADDON: CPT

## 2023-09-10 PROCEDURE — 85025 COMPLETE CBC W/AUTO DIFF WBC: CPT

## 2023-09-10 PROCEDURE — 6370000000 HC RX 637 (ALT 250 FOR IP): Performed by: STUDENT IN AN ORGANIZED HEALTH CARE EDUCATION/TRAINING PROGRAM

## 2023-09-10 PROCEDURE — 80053 COMPREHEN METABOLIC PANEL: CPT

## 2023-09-10 PROCEDURE — 83605 ASSAY OF LACTIC ACID: CPT

## 2023-09-10 PROCEDURE — 6360000002 HC RX W HCPCS: Performed by: STUDENT IN AN ORGANIZED HEALTH CARE EDUCATION/TRAINING PROGRAM

## 2023-09-10 PROCEDURE — 96361 HYDRATE IV INFUSION ADD-ON: CPT

## 2023-09-10 PROCEDURE — 99285 EMERGENCY DEPT VISIT HI MDM: CPT

## 2023-09-10 PROCEDURE — 96376 TX/PRO/DX INJ SAME DRUG ADON: CPT

## 2023-09-10 PROCEDURE — 6360000002 HC RX W HCPCS

## 2023-09-10 PROCEDURE — 93005 ELECTROCARDIOGRAM TRACING: CPT

## 2023-09-10 PROCEDURE — 2580000003 HC RX 258

## 2023-09-10 PROCEDURE — 82800 BLOOD PH: CPT

## 2023-09-10 PROCEDURE — 96374 THER/PROPH/DIAG INJ IV PUSH: CPT

## 2023-09-10 PROCEDURE — 83036 HEMOGLOBIN GLYCOSYLATED A1C: CPT

## 2023-09-10 PROCEDURE — 83690 ASSAY OF LIPASE: CPT

## 2023-09-10 PROCEDURE — 99222 1ST HOSP IP/OBS MODERATE 55: CPT | Performed by: STUDENT IN AN ORGANIZED HEALTH CARE EDUCATION/TRAINING PROGRAM

## 2023-09-10 RX ORDER — DICYCLOMINE HYDROCHLORIDE 10 MG/1
10 CAPSULE ORAL EVERY 6 HOURS PRN
Status: DISCONTINUED | OUTPATIENT
Start: 2023-09-10 | End: 2023-09-14 | Stop reason: HOSPADM

## 2023-09-10 RX ORDER — ALBUTEROL SULFATE 2.5 MG/3ML
2.5 SOLUTION RESPIRATORY (INHALATION) EVERY 4 HOURS PRN
Status: DISCONTINUED | OUTPATIENT
Start: 2023-09-10 | End: 2023-09-14 | Stop reason: HOSPADM

## 2023-09-10 RX ORDER — ANASTROZOLE 1 MG/1
1 TABLET ORAL DAILY
Status: DISCONTINUED | OUTPATIENT
Start: 2023-09-10 | End: 2023-09-14 | Stop reason: HOSPADM

## 2023-09-10 RX ORDER — GAUZE BANDAGE 2" X 2"
100 BANDAGE TOPICAL DAILY
Status: DISCONTINUED | OUTPATIENT
Start: 2023-09-10 | End: 2023-09-14 | Stop reason: HOSPADM

## 2023-09-10 RX ORDER — OXYCODONE HYDROCHLORIDE AND ACETAMINOPHEN 5; 325 MG/1; MG/1
1 TABLET ORAL EVERY 4 HOURS PRN
Status: DISCONTINUED | OUTPATIENT
Start: 2023-09-10 | End: 2023-09-14 | Stop reason: HOSPADM

## 2023-09-10 RX ORDER — SODIUM CHLORIDE 0.9 % (FLUSH) 0.9 %
5-40 SYRINGE (ML) INJECTION PRN
Status: DISCONTINUED | OUTPATIENT
Start: 2023-09-10 | End: 2023-09-14 | Stop reason: HOSPADM

## 2023-09-10 RX ORDER — DIPHENHYDRAMINE HYDROCHLORIDE 50 MG/ML
25 INJECTION INTRAMUSCULAR; INTRAVENOUS ONCE
Status: COMPLETED | OUTPATIENT
Start: 2023-09-10 | End: 2023-09-10

## 2023-09-10 RX ORDER — ONDANSETRON 2 MG/ML
4 INJECTION INTRAMUSCULAR; INTRAVENOUS EVERY 6 HOURS PRN
Status: DISCONTINUED | OUTPATIENT
Start: 2023-09-10 | End: 2023-09-14 | Stop reason: HOSPADM

## 2023-09-10 RX ORDER — LANOLIN ALCOHOL/MO/W.PET/CERES
3 CREAM (GRAM) TOPICAL NIGHTLY PRN
Status: DISCONTINUED | OUTPATIENT
Start: 2023-09-10 | End: 2023-09-14 | Stop reason: HOSPADM

## 2023-09-10 RX ORDER — METOCLOPRAMIDE HYDROCHLORIDE 5 MG/ML
10 INJECTION INTRAMUSCULAR; INTRAVENOUS ONCE
Status: COMPLETED | OUTPATIENT
Start: 2023-09-10 | End: 2023-09-10

## 2023-09-10 RX ORDER — GABAPENTIN 400 MG/1
400 CAPSULE ORAL 3 TIMES DAILY
Status: DISCONTINUED | OUTPATIENT
Start: 2023-09-10 | End: 2023-09-14 | Stop reason: HOSPADM

## 2023-09-10 RX ORDER — SODIUM CHLORIDE 0.9 % (FLUSH) 0.9 %
5-40 SYRINGE (ML) INJECTION EVERY 12 HOURS SCHEDULED
Status: DISCONTINUED | OUTPATIENT
Start: 2023-09-10 | End: 2023-09-14 | Stop reason: HOSPADM

## 2023-09-10 RX ORDER — INSULIN LISPRO 100 [IU]/ML
0-4 INJECTION, SOLUTION INTRAVENOUS; SUBCUTANEOUS NIGHTLY
Status: DISCONTINUED | OUTPATIENT
Start: 2023-09-10 | End: 2023-09-14 | Stop reason: HOSPADM

## 2023-09-10 RX ORDER — IBUPROFEN 200 MG
1250 CAPSULE ORAL DAILY
Status: DISCONTINUED | OUTPATIENT
Start: 2023-09-10 | End: 2023-09-10 | Stop reason: CLARIF

## 2023-09-10 RX ORDER — BUDESONIDE AND FORMOTEROL FUMARATE DIHYDRATE 160; 4.5 UG/1; UG/1
2 AEROSOL RESPIRATORY (INHALATION) 2 TIMES DAILY
Status: DISCONTINUED | OUTPATIENT
Start: 2023-09-10 | End: 2023-09-10 | Stop reason: CLARIF

## 2023-09-10 RX ORDER — GLUCAGON 1 MG/ML
1 KIT INJECTION PRN
Status: DISCONTINUED | OUTPATIENT
Start: 2023-09-10 | End: 2023-09-14 | Stop reason: HOSPADM

## 2023-09-10 RX ORDER — SENNA AND DOCUSATE SODIUM 50; 8.6 MG/1; MG/1
2 TABLET, FILM COATED ORAL 2 TIMES DAILY
Status: DISCONTINUED | OUTPATIENT
Start: 2023-09-10 | End: 2023-09-14 | Stop reason: HOSPADM

## 2023-09-10 RX ORDER — INSULIN GLARGINE 100 [IU]/ML
25 INJECTION, SOLUTION SUBCUTANEOUS 2 TIMES DAILY
Status: DISCONTINUED | OUTPATIENT
Start: 2023-09-10 | End: 2023-09-14 | Stop reason: HOSPADM

## 2023-09-10 RX ORDER — 0.9 % SODIUM CHLORIDE 0.9 %
1000 INTRAVENOUS SOLUTION INTRAVENOUS ONCE
Status: COMPLETED | OUTPATIENT
Start: 2023-09-10 | End: 2023-09-10

## 2023-09-10 RX ORDER — MORPHINE SULFATE 4 MG/ML
4 INJECTION, SOLUTION INTRAMUSCULAR; INTRAVENOUS ONCE
Status: COMPLETED | OUTPATIENT
Start: 2023-09-10 | End: 2023-09-10

## 2023-09-10 RX ORDER — ONDANSETRON 4 MG/1
4 TABLET, ORALLY DISINTEGRATING ORAL EVERY 8 HOURS PRN
Status: DISCONTINUED | OUTPATIENT
Start: 2023-09-10 | End: 2023-09-14 | Stop reason: HOSPADM

## 2023-09-10 RX ORDER — BUDESONIDE 0.5 MG/2ML
0.5 INHALANT ORAL
Status: DISCONTINUED | OUTPATIENT
Start: 2023-09-10 | End: 2023-09-14 | Stop reason: HOSPADM

## 2023-09-10 RX ORDER — TRAMADOL HYDROCHLORIDE 50 MG/1
50 TABLET ORAL EVERY 6 HOURS PRN
Status: DISCONTINUED | OUTPATIENT
Start: 2023-09-10 | End: 2023-09-14 | Stop reason: HOSPADM

## 2023-09-10 RX ORDER — CALCIUM CARBONATE 500(1250)
500 TABLET ORAL
Status: DISCONTINUED | OUTPATIENT
Start: 2023-09-10 | End: 2023-09-14 | Stop reason: HOSPADM

## 2023-09-10 RX ORDER — LACTULOSE 10 G/15ML
10 SOLUTION ORAL 2 TIMES DAILY
Status: DISCONTINUED | OUTPATIENT
Start: 2023-09-10 | End: 2023-09-14 | Stop reason: HOSPADM

## 2023-09-10 RX ORDER — SODIUM CHLORIDE 9 MG/ML
INJECTION, SOLUTION INTRAVENOUS CONTINUOUS
Status: DISCONTINUED | OUTPATIENT
Start: 2023-09-10 | End: 2023-09-12

## 2023-09-10 RX ORDER — SODIUM CHLORIDE 9 MG/ML
INJECTION, SOLUTION INTRAVENOUS PRN
Status: DISCONTINUED | OUTPATIENT
Start: 2023-09-10 | End: 2023-09-14 | Stop reason: HOSPADM

## 2023-09-10 RX ORDER — METRONIDAZOLE 500 MG/100ML
500 INJECTION, SOLUTION INTRAVENOUS EVERY 8 HOURS
Status: DISCONTINUED | OUTPATIENT
Start: 2023-09-10 | End: 2023-09-14 | Stop reason: HOSPADM

## 2023-09-10 RX ORDER — NICOTINE 21 MG/24HR
1 PATCH, TRANSDERMAL 24 HOURS TRANSDERMAL DAILY
Status: DISCONTINUED | OUTPATIENT
Start: 2023-09-10 | End: 2023-09-14 | Stop reason: HOSPADM

## 2023-09-10 RX ORDER — ENOXAPARIN SODIUM 100 MG/ML
30 INJECTION SUBCUTANEOUS DAILY
Status: DISCONTINUED | OUTPATIENT
Start: 2023-09-10 | End: 2023-09-14 | Stop reason: HOSPADM

## 2023-09-10 RX ORDER — PANTOPRAZOLE SODIUM 40 MG/1
40 TABLET, DELAYED RELEASE ORAL
Status: DISCONTINUED | OUTPATIENT
Start: 2023-09-11 | End: 2023-09-14 | Stop reason: HOSPADM

## 2023-09-10 RX ORDER — ARIPIPRAZOLE 5 MG/1
20 TABLET ORAL DAILY
Status: DISCONTINUED | OUTPATIENT
Start: 2023-09-10 | End: 2023-09-14 | Stop reason: HOSPADM

## 2023-09-10 RX ORDER — FUROSEMIDE 20 MG/1
20 TABLET ORAL DAILY
Status: DISCONTINUED | OUTPATIENT
Start: 2023-09-10 | End: 2023-09-14 | Stop reason: HOSPADM

## 2023-09-10 RX ORDER — INSULIN LISPRO 100 [IU]/ML
0-8 INJECTION, SOLUTION INTRAVENOUS; SUBCUTANEOUS
Status: DISCONTINUED | OUTPATIENT
Start: 2023-09-10 | End: 2023-09-14 | Stop reason: HOSPADM

## 2023-09-10 RX ORDER — ENOXAPARIN SODIUM 100 MG/ML
40 INJECTION SUBCUTANEOUS DAILY
Status: DISCONTINUED | OUTPATIENT
Start: 2023-09-10 | End: 2023-09-10 | Stop reason: DRUGHIGH

## 2023-09-10 RX ORDER — DEXTROSE MONOHYDRATE 100 MG/ML
INJECTION, SOLUTION INTRAVENOUS CONTINUOUS PRN
Status: DISCONTINUED | OUTPATIENT
Start: 2023-09-10 | End: 2023-09-14 | Stop reason: HOSPADM

## 2023-09-10 RX ORDER — POLYETHYLENE GLYCOL 3350 17 G/17G
17 POWDER, FOR SOLUTION ORAL DAILY PRN
Status: DISCONTINUED | OUTPATIENT
Start: 2023-09-10 | End: 2023-09-14 | Stop reason: HOSPADM

## 2023-09-10 RX ORDER — HYDROXYZINE PAMOATE 25 MG/1
50 CAPSULE ORAL 4 TIMES DAILY PRN
Status: DISCONTINUED | OUTPATIENT
Start: 2023-09-10 | End: 2023-09-14 | Stop reason: HOSPADM

## 2023-09-10 RX ORDER — ARFORMOTEROL TARTRATE 15 UG/2ML
15 SOLUTION RESPIRATORY (INHALATION)
Status: DISCONTINUED | OUTPATIENT
Start: 2023-09-10 | End: 2023-09-14 | Stop reason: HOSPADM

## 2023-09-10 RX ADMIN — VORTIOXETINE 20 MG: 10 TABLET, FILM COATED ORAL at 20:24

## 2023-09-10 RX ADMIN — INSULIN GLARGINE 25 UNITS: 100 INJECTION, SOLUTION SUBCUTANEOUS at 20:27

## 2023-09-10 RX ADMIN — PANCRELIPASE LIPASE, PANCRELIPASE PROTEASE, PANCRELIPASE AMYLASE 40000 UNITS: 20000; 63000; 84000 CAPSULE, DELAYED RELEASE ORAL at 14:07

## 2023-09-10 RX ADMIN — ANASTROZOLE 1 MG: 1 TABLET ORAL at 14:13

## 2023-09-10 RX ADMIN — PANCRELIPASE LIPASE, PANCRELIPASE PROTEASE, PANCRELIPASE AMYLASE 40000 UNITS: 20000; 63000; 84000 CAPSULE, DELAYED RELEASE ORAL at 16:52

## 2023-09-10 RX ADMIN — RIFAXIMIN 200 MG: 200 TABLET ORAL at 20:23

## 2023-09-10 RX ADMIN — INSULIN GLARGINE 25 UNITS: 100 INJECTION, SOLUTION SUBCUTANEOUS at 14:03

## 2023-09-10 RX ADMIN — Medication 100 MG: at 14:07

## 2023-09-10 RX ADMIN — INSULIN LISPRO 8 UNITS: 100 INJECTION, SOLUTION INTRAVENOUS; SUBCUTANEOUS at 16:54

## 2023-09-10 RX ADMIN — METOCLOPRAMIDE 10 MG: 5 INJECTION, SOLUTION INTRAMUSCULAR; INTRAVENOUS at 07:47

## 2023-09-10 RX ADMIN — WATER 1000 MG: 1 INJECTION INTRAMUSCULAR; INTRAVENOUS; SUBCUTANEOUS at 14:14

## 2023-09-10 RX ADMIN — ONDANSETRON 4 MG: 4 TABLET, ORALLY DISINTEGRATING ORAL at 16:53

## 2023-09-10 RX ADMIN — DIPHENHYDRAMINE HYDROCHLORIDE 25 MG: 50 INJECTION INTRAMUSCULAR; INTRAVENOUS at 07:48

## 2023-09-10 RX ADMIN — SODIUM CHLORIDE 1000 ML: 9 INJECTION, SOLUTION INTRAVENOUS at 07:33

## 2023-09-10 RX ADMIN — ENOXAPARIN SODIUM 30 MG: 100 INJECTION SUBCUTANEOUS at 14:03

## 2023-09-10 RX ADMIN — INSULIN LISPRO 8 UNITS: 100 INJECTION, SOLUTION INTRAVENOUS; SUBCUTANEOUS at 14:03

## 2023-09-10 RX ADMIN — MORPHINE SULFATE 4 MG: 4 INJECTION, SOLUTION INTRAMUSCULAR; INTRAVENOUS at 10:56

## 2023-09-10 RX ADMIN — TRAMADOL HYDROCHLORIDE 50 MG: 50 TABLET ORAL at 16:53

## 2023-09-10 RX ADMIN — GABAPENTIN 400 MG: 400 CAPSULE ORAL at 14:07

## 2023-09-10 RX ADMIN — RIFAXIMIN 200 MG: 200 TABLET ORAL at 14:07

## 2023-09-10 RX ADMIN — METRONIDAZOLE 500 MG: 500 INJECTION, SOLUTION INTRAVENOUS at 14:21

## 2023-09-10 RX ADMIN — GABAPENTIN 400 MG: 400 CAPSULE ORAL at 20:23

## 2023-09-10 RX ADMIN — SODIUM CHLORIDE 1000 ML: 9 INJECTION, SOLUTION INTRAVENOUS at 08:41

## 2023-09-10 RX ADMIN — ARIPIPRAZOLE 20 MG: 10 TABLET ORAL at 14:07

## 2023-09-10 RX ADMIN — FUROSEMIDE 20 MG: 20 TABLET ORAL at 14:07

## 2023-09-10 RX ADMIN — METRONIDAZOLE 500 MG: 500 INJECTION, SOLUTION INTRAVENOUS at 20:21

## 2023-09-10 RX ADMIN — MORPHINE SULFATE 4 MG: 4 INJECTION, SOLUTION INTRAMUSCULAR; INTRAVENOUS at 07:47

## 2023-09-10 RX ADMIN — SODIUM CHLORIDE: 9 INJECTION, SOLUTION INTRAVENOUS at 14:19

## 2023-09-10 RX ADMIN — Medication 500 MG: at 16:54

## 2023-09-10 ASSESSMENT — ENCOUNTER SYMPTOMS
APNEA: 0
ABDOMINAL DISTENTION: 0
ABDOMINAL PAIN: 1
VOMITING: 1
BACK PAIN: 0
NAUSEA: 1
WHEEZING: 0
STRIDOR: 0
CHEST TIGHTNESS: 0
SHORTNESS OF BREATH: 0

## 2023-09-10 ASSESSMENT — PAIN SCALES - GENERAL
PAINLEVEL_OUTOF10: 8
PAINLEVEL_OUTOF10: 10
PAINLEVEL_OUTOF10: 5
PAINLEVEL_OUTOF10: 10
PAINLEVEL_OUTOF10: 5

## 2023-09-10 ASSESSMENT — PAIN DESCRIPTION - DESCRIPTORS: DESCRIPTORS: SHARP;DISCOMFORT;ACHING

## 2023-09-10 ASSESSMENT — LIFESTYLE VARIABLES: HOW OFTEN DO YOU HAVE A DRINK CONTAINING ALCOHOL: NEVER

## 2023-09-10 ASSESSMENT — PAIN DESCRIPTION - LOCATION
LOCATION: ABDOMEN
LOCATION: ABDOMEN
LOCATION: ABDOMEN;BACK

## 2023-09-10 NOTE — H&P
[facility] Hospitalist Group   History and Physical      CHIEF COMPLAINT:  abdominal pain    History of Present Illness:  48 y.o. female with a history of DM, GERD, schizophrenia and bipolar disorder, hx of breast cancer, hx of hep C, cirrhosis, tobacco use disorder and crack cocaine use presents with worsening abdominal pain. She states the pain started about a month ago and has been getting persistently worse. She states it is all over but worse around her belly button. She also has been having watery diarrhea and nausea/vomiting. She also endorsed chills but no fevers. She denies any urinary symptoms. Of note, patient had admission from 8/26/23-8/30/23 to [address] [facility] for perianal abscess and had I&D and penrose drain placement. The drain is still in place but according to note from hospital follow up was supposed to be removed after 12 days. Informant(s) for H&P:Patient, ER, EMR review    REVIEW OF SYSTEMS:  no fevers, chills, cp, sob, n/v, ha, vision/hearing changes, wt changes, hot/cold flashes, other open skin lesions, diarrhea, constipation, dysuria/hematuria unless noted in HPI. Complete ROS performed with the patient and is otherwise negative.       PMH:  Past Medical History:   Diagnosis Date    Alcoholism ([address])     Since teens to early 25s    Anxiety and depression     Ascites of liver     Bronchitis     Cancer ([address])     breast, left    Candidiasis of vagina     Chronic pancreatitis ([address])     Cocaine abuse ([address])     Constipation     Diabetes mellitus, type 2 (HCC)     GERD (gastroesophageal reflux disease)     Gout     Hepatitis C     Hernia of anterior abdominal wall     Jaundice 05/12/2016    Pneumonia     Polyneuropathy due to type 2 diabetes mellitus (HCC)     Schizoaffective disorder, bipolar type ([address])     Splenomegaly 05/30/2017       Surgical History:  Past Surgical History:   Procedure Laterality Date    ABDOMEN SURGERY      gallbladder removal    BREAST BIOPSY Left

## 2023-09-10 NOTE — ED PROVIDER NOTES
hemolyzed. >60 mL/min/1.73m2    Bun/Cre Ratio Unable to perform testing: Specimen hemolyzed. 9 - 20    Calcium Unable to perform testing: Specimen hemolyzed. 8.6 - 10.4 mg/dL    Total Protein Unable to perform testing: Specimen hemolyzed. 6.4 - 8.3 g/dL    Albumin Unable to perform testing: Specimen hemolyzed. 3.5 - 5.2 g/dL    Albumin/Globulin Ratio Unable to perform testing: Specimen hemolyzed. 1.0 - 2.5    Total Bilirubin Unable to perform testing: Specimen hemolyzed. 0.3 - 1.2 mg/dL    Alkaline Phosphatase Unable to perform testing: Specimen hemolyzed. 35 - 104 U/L    ALT Unable to perform testing: Specimen hemolyzed. 5 - 33 U/L    AST Unable to perform testing: Specimen hemolyzed.  <32 U/L   CBC with Auto Differential   Result Value Ref Range    WBC 4.5 4.5 - 11.5 k/uL    RBC 3.85 3.50 - 5.50 m/uL    Hemoglobin 12.3 11.5 - 15.5 g/dL    Hematocrit 37.8 34.0 - 48.0 %    MCV 98.2 80.0 - 99.9 fL    MCH 31.9 26.0 - 35.0 pg    MCHC 32.5 32.0 - 34.5 g/dL    RDW 15.0 11.5 - 15.0 %    MPV 10.5 7.0 - 12.0 fL    Platelet, Fluorescence 96 (L) 130 - 450 k/uL    Neutrophils % 90 (H) 43.0 - 80.0 %    Lymphocytes % 4 (L) 20.0 - 42.0 %    Monocytes % 4 2.0 - 12.0 %    Eosinophils % 2 0 - 6 %    Basophils % 1 0.0 - 2.0 %    Neutrophils Absolute 4.03 1.80 - 7.30 k/uL    Lymphocytes Absolute 0.20 (L) 1.50 - 4.00 k/uL    Monocytes Absolute 0.16 0.10 - 0.95 k/uL    Eosinophils Absolute 0.08 0.05 - 0.50 k/uL    Basophils Absolute 0.04 0.00 - 0.20 k/uL    RBC Morphology 1+ CARIDAD CELLS     RBC Morphology 1+ POIKILOCYTOSIS     RBC Morphology 1+ POLYCHROMASIA    Lipase   Result Value Ref Range    Lipase 22 13 - 60 U/L   Lactic Acid   Result Value Ref Range    Lactic Acid 1.5 0.5 - 2.2 mmol/L   PH, VENOUS   Result Value Ref Range    pH, Larry 7.400 7.350 - 7.450   Beta-Hydroxybutyrate   Result Value Ref Range    Beta-Hydroxybutyrate 2.90 (H) 0.02 - 0.27 mmol/L   Comprehensive Metabolic Panel   Result Value Ref Range    Sodium 135 132 - 146

## 2023-09-10 NOTE — ED NOTES
Patient states, \"I hurt so bad. I need something for my stomach pain. It's my colitis. \"     Mey Vega RN  09/10/23 5285

## 2023-09-11 PROBLEM — E43 SEVERE PROTEIN-CALORIE MALNUTRITION (HCC): Chronic | Status: ACTIVE | Noted: 2023-09-11

## 2023-09-11 LAB
ALBUMIN SERPL-MCNC: 2.8 G/DL (ref 3.5–5.2)
ALP SERPL-CCNC: 592 U/L (ref 35–104)
ALT SERPL-CCNC: 15 U/L (ref 0–32)
ANION GAP SERPL CALCULATED.3IONS-SCNC: 9 MMOL/L (ref 7–16)
AST SERPL-CCNC: 21 U/L (ref 0–31)
BASOPHILS # BLD: 0.03 K/UL (ref 0–0.2)
BASOPHILS NFR BLD: 1 % (ref 0–2)
BILIRUB SERPL-MCNC: 0.4 MG/DL (ref 0–1.2)
BUN SERPL-MCNC: 7 MG/DL (ref 6–20)
CALCIUM SERPL-MCNC: 8.3 MG/DL (ref 8.6–10.2)
CHLORIDE SERPL-SCNC: 106 MMOL/L (ref 98–107)
CO2 SERPL-SCNC: 24 MMOL/L (ref 22–29)
CREAT SERPL-MCNC: 0.3 MG/DL (ref 0.5–1)
EOSINOPHIL # BLD: 0.14 K/UL (ref 0.05–0.5)
EOSINOPHILS RELATIVE PERCENT: 2 % (ref 0–6)
ERYTHROCYTE [DISTWIDTH] IN BLOOD BY AUTOMATED COUNT: 15.3 % (ref 11.5–15)
GFR SERPL CREATININE-BSD FRML MDRD: >60 ML/MIN/1.73M2
GLUCOSE BLD-MCNC: 105 MG/DL (ref 74–99)
GLUCOSE BLD-MCNC: 170 MG/DL (ref 74–99)
GLUCOSE BLD-MCNC: 196 MG/DL (ref 74–99)
GLUCOSE BLD-MCNC: 262 MG/DL (ref 74–99)
GLUCOSE BLD-MCNC: 276 MG/DL (ref 74–99)
GLUCOSE BLD-MCNC: 313 MG/DL (ref 74–99)
GLUCOSE SERPL-MCNC: 65 MG/DL (ref 74–99)
HCT VFR BLD AUTO: 36.2 % (ref 34–48)
HGB BLD-MCNC: 11.5 G/DL (ref 11.5–15.5)
IMM GRANULOCYTES # BLD AUTO: 0.04 K/UL (ref 0–0.58)
IMM GRANULOCYTES NFR BLD: 1 % (ref 0–5)
LYMPHOCYTES NFR BLD: 1.16 K/UL (ref 1.5–4)
LYMPHOCYTES RELATIVE PERCENT: 19 % (ref 20–42)
MCH RBC QN AUTO: 31.4 PG (ref 26–35)
MCHC RBC AUTO-ENTMCNC: 31.8 G/DL (ref 32–34.5)
MCV RBC AUTO: 98.9 FL (ref 80–99.9)
MONOCYTES NFR BLD: 0.28 K/UL (ref 0.1–0.95)
MONOCYTES NFR BLD: 5 % (ref 2–12)
NEUTROPHILS NFR BLD: 74 % (ref 43–80)
NEUTS SEG NFR BLD: 4.62 K/UL (ref 1.8–7.3)
PLATELET, FLUORESCENCE: 135 K/UL (ref 130–450)
PMV BLD AUTO: 9.6 FL (ref 7–12)
POTASSIUM SERPL-SCNC: 3.8 MMOL/L (ref 3.5–5)
PROT SERPL-MCNC: 6.2 G/DL (ref 6.4–8.3)
RBC # BLD AUTO: 3.66 M/UL (ref 3.5–5.5)
SODIUM SERPL-SCNC: 139 MMOL/L (ref 132–146)
WBC OTHER # BLD: 6.3 K/UL (ref 4.5–11.5)

## 2023-09-11 PROCEDURE — 6360000002 HC RX W HCPCS: Performed by: STUDENT IN AN ORGANIZED HEALTH CARE EDUCATION/TRAINING PROGRAM

## 2023-09-11 PROCEDURE — 99232 SBSQ HOSP IP/OBS MODERATE 35: CPT | Performed by: STUDENT IN AN ORGANIZED HEALTH CARE EDUCATION/TRAINING PROGRAM

## 2023-09-11 PROCEDURE — 6370000000 HC RX 637 (ALT 250 FOR IP): Performed by: INTERNAL MEDICINE

## 2023-09-11 PROCEDURE — 36415 COLL VENOUS BLD VENIPUNCTURE: CPT

## 2023-09-11 PROCEDURE — G0378 HOSPITAL OBSERVATION PER HR: HCPCS

## 2023-09-11 PROCEDURE — 2580000003 HC RX 258: Performed by: STUDENT IN AN ORGANIZED HEALTH CARE EDUCATION/TRAINING PROGRAM

## 2023-09-11 PROCEDURE — 6370000000 HC RX 637 (ALT 250 FOR IP): Performed by: STUDENT IN AN ORGANIZED HEALTH CARE EDUCATION/TRAINING PROGRAM

## 2023-09-11 PROCEDURE — 80053 COMPREHEN METABOLIC PANEL: CPT

## 2023-09-11 PROCEDURE — 82962 GLUCOSE BLOOD TEST: CPT

## 2023-09-11 PROCEDURE — 85025 COMPLETE CBC W/AUTO DIFF WBC: CPT

## 2023-09-11 RX ADMIN — Medication 3 MG: at 22:01

## 2023-09-11 RX ADMIN — METRONIDAZOLE 500 MG: 500 INJECTION, SOLUTION INTRAVENOUS at 22:10

## 2023-09-11 RX ADMIN — ARIPIPRAZOLE 20 MG: 10 TABLET ORAL at 09:21

## 2023-09-11 RX ADMIN — OXYCODONE AND ACETAMINOPHEN 1 TABLET: 325; 5 TABLET ORAL at 22:01

## 2023-09-11 RX ADMIN — METRONIDAZOLE 500 MG: 500 INJECTION, SOLUTION INTRAVENOUS at 05:34

## 2023-09-11 RX ADMIN — OXYCODONE AND ACETAMINOPHEN 1 TABLET: 325; 5 TABLET ORAL at 00:18

## 2023-09-11 RX ADMIN — SODIUM CHLORIDE, PRESERVATIVE FREE 10 ML: 5 INJECTION INTRAVENOUS at 22:04

## 2023-09-11 RX ADMIN — OXYCODONE AND ACETAMINOPHEN 1 TABLET: 325; 5 TABLET ORAL at 17:51

## 2023-09-11 RX ADMIN — INSULIN GLARGINE 25 UNITS: 100 INJECTION, SOLUTION SUBCUTANEOUS at 22:10

## 2023-09-11 RX ADMIN — Medication 100 MG: at 09:21

## 2023-09-11 RX ADMIN — RIFAXIMIN 200 MG: 200 TABLET ORAL at 09:23

## 2023-09-11 RX ADMIN — VORTIOXETINE 20 MG: 10 TABLET, FILM COATED ORAL at 22:00

## 2023-09-11 RX ADMIN — OXYCODONE AND ACETAMINOPHEN 1 TABLET: 325; 5 TABLET ORAL at 13:09

## 2023-09-11 RX ADMIN — PANCRELIPASE LIPASE, PANCRELIPASE PROTEASE, PANCRELIPASE AMYLASE 40000 UNITS: 20000; 63000; 84000 CAPSULE, DELAYED RELEASE ORAL at 16:39

## 2023-09-11 RX ADMIN — GABAPENTIN 400 MG: 400 CAPSULE ORAL at 09:21

## 2023-09-11 RX ADMIN — ANASTROZOLE 1 MG: 1 TABLET ORAL at 09:23

## 2023-09-11 RX ADMIN — HYDROXYZINE PAMOATE 50 MG: 25 CAPSULE ORAL at 22:01

## 2023-09-11 RX ADMIN — Medication 500 MG: at 16:39

## 2023-09-11 RX ADMIN — OXYCODONE AND ACETAMINOPHEN 1 TABLET: 325; 5 TABLET ORAL at 05:37

## 2023-09-11 RX ADMIN — PANCRELIPASE LIPASE, PANCRELIPASE PROTEASE, PANCRELIPASE AMYLASE 40000 UNITS: 20000; 63000; 84000 CAPSULE, DELAYED RELEASE ORAL at 11:47

## 2023-09-11 RX ADMIN — INSULIN LISPRO 6 UNITS: 100 INJECTION, SOLUTION INTRAVENOUS; SUBCUTANEOUS at 16:38

## 2023-09-11 RX ADMIN — METRONIDAZOLE 500 MG: 500 INJECTION, SOLUTION INTRAVENOUS at 16:51

## 2023-09-11 RX ADMIN — GABAPENTIN 400 MG: 400 CAPSULE ORAL at 22:01

## 2023-09-11 RX ADMIN — RIFAXIMIN 200 MG: 200 TABLET ORAL at 22:01

## 2023-09-11 RX ADMIN — PANTOPRAZOLE SODIUM 40 MG: 40 TABLET, DELAYED RELEASE ORAL at 05:37

## 2023-09-11 RX ADMIN — GABAPENTIN 400 MG: 400 CAPSULE ORAL at 13:09

## 2023-09-11 RX ADMIN — OXYCODONE AND ACETAMINOPHEN 1 TABLET: 325; 5 TABLET ORAL at 09:18

## 2023-09-11 RX ADMIN — WATER 1000 MG: 1 INJECTION INTRAMUSCULAR; INTRAVENOUS; SUBCUTANEOUS at 16:41

## 2023-09-11 RX ADMIN — ENOXAPARIN SODIUM 30 MG: 100 INJECTION SUBCUTANEOUS at 09:25

## 2023-09-11 RX ADMIN — PANCRELIPASE LIPASE, PANCRELIPASE PROTEASE, PANCRELIPASE AMYLASE 40000 UNITS: 20000; 63000; 84000 CAPSULE, DELAYED RELEASE ORAL at 09:20

## 2023-09-11 ASSESSMENT — PAIN DESCRIPTION - LOCATION
LOCATION: ABDOMEN;BACK
LOCATION: ABDOMEN
LOCATION: ABDOMEN
LOCATION: ABDOMEN;BUTTOCKS
LOCATION: ABDOMEN;BUTTOCKS
LOCATION: BUTTOCKS;ABDOMEN

## 2023-09-11 ASSESSMENT — PAIN DESCRIPTION - DESCRIPTORS
DESCRIPTORS: ACHING;SORE
DESCRIPTORS: ACHING
DESCRIPTORS: ACHING;SHARP
DESCRIPTORS: ACHING;SORE;DISCOMFORT
DESCRIPTORS: ACHING
DESCRIPTORS: ACHING;DISCOMFORT;DULL

## 2023-09-11 ASSESSMENT — PAIN DESCRIPTION - ORIENTATION
ORIENTATION: RIGHT;LEFT;UPPER;MID
ORIENTATION: RIGHT;LEFT;MID;UPPER
ORIENTATION: LOWER
ORIENTATION: LEFT;LOWER

## 2023-09-11 ASSESSMENT — PAIN - FUNCTIONAL ASSESSMENT
PAIN_FUNCTIONAL_ASSESSMENT: PREVENTS OR INTERFERES SOME ACTIVE ACTIVITIES AND ADLS

## 2023-09-11 ASSESSMENT — PAIN SCALES - GENERAL
PAINLEVEL_OUTOF10: 7
PAINLEVEL_OUTOF10: 8
PAINLEVEL_OUTOF10: 6
PAINLEVEL_OUTOF10: 7
PAINLEVEL_OUTOF10: 7
PAINLEVEL_OUTOF10: 8

## 2023-09-11 ASSESSMENT — PAIN DESCRIPTION - PAIN TYPE: TYPE: ACUTE PAIN

## 2023-09-11 NOTE — CARE COORDINATION
Case Management Assessment  Initial Evaluation    Date/Time of Evaluation: 9/11/2023 12:30 PM  Assessment Completed by: Bird Germain RN    If patient is discharged prior to next notation, then this note serves as note for discharge by case management. Patient Name: Jerrell Patino                   YOB: 1970  Diagnosis: Enterocolitis [K52.9]  Enteritis [K52.9]  Epigastric pain [R10.13]  Hyperglycemia [R73.9]                   Date / Time: 9/10/2023  6:05 AM    Patient Admission Status: Observation   Readmission Risk (Low < 19, Mod (19-27), High > 27): Readmission Risk Score: 22.8    Current PCP: Belem Carroll, DO  PCP verified by CM?  Yes    Chart Reviewed: Yes      History Provided by: Patient  Patient Orientation: Alert and Oriented, Person, Place, Situation    Patient Cognition: Alert    Hospitalization in the last 30 days (Readmission):  Yes      Readmission Assessment  Number of Days since last admission?: 8-30 days  Previous Disposition: Home with Family  Who is being Interviewed: Patient  What was the patient's/caregiver's perception as to why they think they needed to return back to the hospital?: Other (Comment) (abd pain)  Did you visit your Primary Care Physician after you left the hospital, before you returned this time?: Yes  Did you see a specialist, such as Cardiac, Pulmonary, Orthopedic Physician, etc. after you left the hospital?: No  Who advised the patient to return to the hospital?: Self-referral  Does the patient report anything that got in the way of taking their medications?: No  What reasons did they give?: Other (Comment) (Hunter Cobb)  In our efforts to provide the best possible care to you and others like you, can you think of anything that we could have done to help you after you left the hospital the first time, so that you might not have needed to return so soon?: Other (Comment) (no, plan is to return home)        Advance Directives:      Code Status: Full

## 2023-09-11 NOTE — FLOWSHEET NOTE
Inpatient Wound Care    Admit Date: 9/10/2023  6:05 AM    Reason for consult:  wound    Significant history:    Past Medical History:   Diagnosis Date    Alcoholism ([address])     Since teens to early 25s    Anxiety and depression     Ascites of liver     Bronchitis     Cancer ([address])     breast, left    Candidiasis of vagina     Chronic pancreatitis ([address])     Cocaine abuse ([address])     Constipation     Diabetes mellitus, type 2 (HCC)     GERD (gastroesophageal reflux disease)     Gout     Hepatitis C     Hernia of anterior abdominal wall     Jaundice 05/12/2016    Pneumonia     Polyneuropathy due to type 2 diabetes mellitus (HCC)     Schizoaffective disorder, bipolar type ([address])     Splenomegaly 05/30/2017       Wound history:      Findings:     09/11/23 1209   Wound 09/11/23 Perineum   Date First Assessed: 09/11/23   Primary Wound Type:  Other (comment)  Location: Perineum   Wound Cleansed Cleansed with saline   Wound Length (cm) 1 cm   Wound Width (cm) 0.6 cm   Wound Depth (cm) 2.3 cm   Wound Surface Area (cm^2) 0.6 cm^2   Wound Volume (cm^3) 1.38 cm^3   Wound Assessment Other (Comment)  (redness)   Drainage Amount Small (< 25%)   Odor None         Impression:  abscess area    Interventions in place:  packing    Plan:  Cont packing today  Inst to keep area clean and dry  Apply dsd to areas hold in place with tamy Cuevas RN 9/11/2023 12:11 PM

## 2023-09-11 NOTE — ED PROVIDER NOTES
6: 07 AM EDT  I received this patient at sign out from Dr. Montserrat Callaway. I have discussed the patient's initial exam, treatment and plan of care with the out going physician. I have introduced my self to the patient / family and have answered their questions to this point. I have examined the patient myself and reviewed ordered tests / medications and  reviewed any available results to this point. If a resident is involved in the Emergency Department care, I have discussed my findings and plan with them as well. Awaiting CT results. Discussed CAT scan results with the patient. Plan for antibiotic therapy as an outpatient. Discussed reasons for immediate return.      Ravi Beckham [place]  09/09/23 4785
prominent and progressed when compared to the prior study concerning for mild enteritis. No significant surrounding stranding. The colon is grossly unremarkable. Mild wall thickening. Findings may be related to the surrounding ascites however colitis cannot be excluded. There is stool seen in the colon. No signs of obvious obstruction. No significant surrounding stranding. Pelvis: The bladder is distended. The uterus has been surgically removed. Peritoneum/Retroperitoneum: No abdominal retroperitoneal lymphadenopathy. Some free fluid identified within the abdomen and pelvis predominately surrounding the liver and spleen. Findings are similar and unchanged. No pelvic adenopathy. Bones/Soft Tissues: Bony structures reveal no evidence of acute or aggressive osseous abnormality. Drainage catheter again seen inferiorly within the left gluteal region. There is some air in the soft tissues again suggesting a draining pressure ulcer. The focus of increased density within the gluteal soft tissues is stable and unchanged. Mild progression of the wall thickening diffusely throughout the large and small bowel to suggest diffuse enterocolitis. No signs of obvious obstruction. Cirrhotic morphology again seen of the liver with splenomegaly and findings of portal venous hypertension with mild to moderate ascites. The appearance of the left gluteal region with ulceration and irregularity of the soft tissues and presence of the drainage catheter are stable and unchanged. CT ABDOMEN PELVIS WO CONTRAST Additional Contrast? None    Result Date: 9/8/2023  EXAMINATION: CT OF THE ABDOMEN AND PELVIS WITHOUT CONTRAST 9/8/2023 3:31 am TECHNIQUE: CT of the abdomen and pelvis was performed without the administration of intravenous contrast. Multiplanar reformatted images are provided for review.  Automated exposure control, iterative reconstruction, and/or weight based adjustment of the mA/kV was utilized to reduce the

## 2023-09-12 ENCOUNTER — TELEPHONE (OUTPATIENT)
Dept: FAMILY MEDICINE CLINIC | Age: 53
End: 2023-09-12

## 2023-09-12 LAB
ANION GAP SERPL CALCULATED.3IONS-SCNC: 5 MMOL/L (ref 7–16)
BASOPHILS # BLD: 0 K/UL (ref 0–0.2)
BASOPHILS # BLD: 0.02 K/UL (ref 0–0.2)
BASOPHILS NFR BLD: 0 % (ref 0–2)
BASOPHILS NFR BLD: 1 % (ref 0–2)
BUN SERPL-MCNC: 5 MG/DL (ref 6–20)
CALCIUM SERPL-MCNC: 8 MG/DL (ref 8.6–10.2)
CHLORIDE SERPL-SCNC: 106 MMOL/L (ref 98–107)
CO2 SERPL-SCNC: 23 MMOL/L (ref 22–29)
CREAT SERPL-MCNC: 0.3 MG/DL (ref 0.5–1)
EOSINOPHIL # BLD: 0 K/UL (ref 0.05–0.5)
EOSINOPHIL # BLD: 0.02 K/UL (ref 0.05–0.5)
EOSINOPHILS RELATIVE PERCENT: 0 % (ref 0–6)
EOSINOPHILS RELATIVE PERCENT: 1 % (ref 0–6)
ERYTHROCYTE [DISTWIDTH] IN BLOOD BY AUTOMATED COUNT: 14.9 % (ref 11.5–15)
ERYTHROCYTE [DISTWIDTH] IN BLOOD BY AUTOMATED COUNT: 14.9 % (ref 11.5–15)
GFR SERPL CREATININE-BSD FRML MDRD: >60 ML/MIN/1.73M2
GLUCOSE BLD-MCNC: 166 MG/DL (ref 74–99)
GLUCOSE BLD-MCNC: 229 MG/DL (ref 74–99)
GLUCOSE BLD-MCNC: 293 MG/DL (ref 74–99)
GLUCOSE BLD-MCNC: 313 MG/DL (ref 74–99)
GLUCOSE SERPL-MCNC: 219 MG/DL (ref 74–99)
HCT VFR BLD AUTO: 29.3 % (ref 34–48)
HCT VFR BLD AUTO: 30.4 % (ref 34–48)
HGB BLD-MCNC: 9.6 G/DL (ref 11.5–15.5)
HGB BLD-MCNC: 9.8 G/DL (ref 11.5–15.5)
LYMPHOCYTES NFR BLD: 0.36 K/UL (ref 1.5–4)
LYMPHOCYTES NFR BLD: 0.38 K/UL (ref 1.5–4)
LYMPHOCYTES RELATIVE PERCENT: 16 % (ref 20–42)
LYMPHOCYTES RELATIVE PERCENT: 20 % (ref 20–42)
MCH RBC QN AUTO: 31.4 PG (ref 26–35)
MCH RBC QN AUTO: 32.3 PG (ref 26–35)
MCHC RBC AUTO-ENTMCNC: 32.2 G/DL (ref 32–34.5)
MCHC RBC AUTO-ENTMCNC: 32.8 G/DL (ref 32–34.5)
MCV RBC AUTO: 97.4 FL (ref 80–99.9)
MCV RBC AUTO: 98.7 FL (ref 80–99.9)
MONOCYTES NFR BLD: 0.08 K/UL (ref 0.1–0.95)
MONOCYTES NFR BLD: 0.12 K/UL (ref 0.1–0.95)
MONOCYTES NFR BLD: 4 % (ref 2–12)
MONOCYTES NFR BLD: 6 % (ref 2–12)
NEUTROPHILS NFR BLD: 73 % (ref 43–80)
NEUTROPHILS NFR BLD: 80 % (ref 43–80)
NEUTS SEG NFR BLD: 1.39 K/UL (ref 1.8–7.3)
NEUTS SEG NFR BLD: 1.84 K/UL (ref 1.8–7.3)
PLATELET # BLD AUTO: 76 K/UL (ref 130–450)
PLATELET CONFIRMATION: NORMAL
PLATELET CONFIRMATION: NORMAL
PLATELET, FLUORESCENCE: 90 K/UL (ref 130–450)
PMV BLD AUTO: 10.2 FL (ref 7–12)
PMV BLD AUTO: 9.9 FL (ref 7–12)
POTASSIUM SERPL-SCNC: 4.1 MMOL/L (ref 3.5–5)
RBC # BLD AUTO: 2.97 M/UL (ref 3.5–5.5)
RBC # BLD AUTO: 3.12 M/UL (ref 3.5–5.5)
RBC # BLD: ABNORMAL 10*6/UL
RBC # BLD: ABNORMAL 10*6/UL
SODIUM SERPL-SCNC: 134 MMOL/L (ref 132–146)
WBC OTHER # BLD: 1.9 K/UL (ref 4.5–11.5)
WBC OTHER # BLD: 2.3 K/UL (ref 4.5–11.5)

## 2023-09-12 PROCEDURE — 2580000003 HC RX 258: Performed by: STUDENT IN AN ORGANIZED HEALTH CARE EDUCATION/TRAINING PROGRAM

## 2023-09-12 PROCEDURE — 6360000002 HC RX W HCPCS: Performed by: STUDENT IN AN ORGANIZED HEALTH CARE EDUCATION/TRAINING PROGRAM

## 2023-09-12 PROCEDURE — 80048 BASIC METABOLIC PNL TOTAL CA: CPT

## 2023-09-12 PROCEDURE — 6370000000 HC RX 637 (ALT 250 FOR IP): Performed by: INTERNAL MEDICINE

## 2023-09-12 PROCEDURE — 6370000000 HC RX 637 (ALT 250 FOR IP): Performed by: STUDENT IN AN ORGANIZED HEALTH CARE EDUCATION/TRAINING PROGRAM

## 2023-09-12 PROCEDURE — 85025 COMPLETE CBC W/AUTO DIFF WBC: CPT

## 2023-09-12 PROCEDURE — 82962 GLUCOSE BLOOD TEST: CPT

## 2023-09-12 PROCEDURE — 97161 PT EVAL LOW COMPLEX 20 MIN: CPT

## 2023-09-12 PROCEDURE — G0378 HOSPITAL OBSERVATION PER HR: HCPCS

## 2023-09-12 PROCEDURE — 36415 COLL VENOUS BLD VENIPUNCTURE: CPT

## 2023-09-12 PROCEDURE — 99232 SBSQ HOSP IP/OBS MODERATE 35: CPT | Performed by: STUDENT IN AN ORGANIZED HEALTH CARE EDUCATION/TRAINING PROGRAM

## 2023-09-12 RX ADMIN — INSULIN LISPRO 6 UNITS: 100 INJECTION, SOLUTION INTRAVENOUS; SUBCUTANEOUS at 18:57

## 2023-09-12 RX ADMIN — INSULIN GLARGINE 25 UNITS: 100 INJECTION, SOLUTION SUBCUTANEOUS at 22:38

## 2023-09-12 RX ADMIN — SENNOSIDES AND DOCUSATE SODIUM 2 TABLET: 50; 8.6 TABLET ORAL at 11:25

## 2023-09-12 RX ADMIN — METRONIDAZOLE 500 MG: 500 INJECTION, SOLUTION INTRAVENOUS at 14:05

## 2023-09-12 RX ADMIN — GABAPENTIN 400 MG: 400 CAPSULE ORAL at 11:00

## 2023-09-12 RX ADMIN — OXYCODONE AND ACETAMINOPHEN 1 TABLET: 325; 5 TABLET ORAL at 18:53

## 2023-09-12 RX ADMIN — OXYCODONE AND ACETAMINOPHEN 1 TABLET: 325; 5 TABLET ORAL at 03:08

## 2023-09-12 RX ADMIN — GABAPENTIN 400 MG: 400 CAPSULE ORAL at 13:51

## 2023-09-12 RX ADMIN — INSULIN LISPRO 2 UNITS: 100 INJECTION, SOLUTION INTRAVENOUS; SUBCUTANEOUS at 12:00

## 2023-09-12 RX ADMIN — SENNOSIDES AND DOCUSATE SODIUM 2 TABLET: 50; 8.6 TABLET ORAL at 22:31

## 2023-09-12 RX ADMIN — PANCRELIPASE LIPASE, PANCRELIPASE PROTEASE, PANCRELIPASE AMYLASE 40000 UNITS: 20000; 63000; 84000 CAPSULE, DELAYED RELEASE ORAL at 18:00

## 2023-09-12 RX ADMIN — OXYCODONE AND ACETAMINOPHEN 1 TABLET: 325; 5 TABLET ORAL at 13:51

## 2023-09-12 RX ADMIN — GABAPENTIN 400 MG: 400 CAPSULE ORAL at 22:31

## 2023-09-12 RX ADMIN — PANCRELIPASE LIPASE, PANCRELIPASE PROTEASE, PANCRELIPASE AMYLASE 40000 UNITS: 20000; 63000; 84000 CAPSULE, DELAYED RELEASE ORAL at 11:34

## 2023-09-12 RX ADMIN — PANCRELIPASE LIPASE, PANCRELIPASE PROTEASE, PANCRELIPASE AMYLASE 40000 UNITS: 20000; 63000; 84000 CAPSULE, DELAYED RELEASE ORAL at 07:54

## 2023-09-12 RX ADMIN — VORTIOXETINE 20 MG: 10 TABLET, FILM COATED ORAL at 22:33

## 2023-09-12 RX ADMIN — PANTOPRAZOLE SODIUM 40 MG: 40 TABLET, DELAYED RELEASE ORAL at 05:44

## 2023-09-12 RX ADMIN — ANASTROZOLE 1 MG: 1 TABLET ORAL at 11:00

## 2023-09-12 RX ADMIN — SODIUM CHLORIDE, PRESERVATIVE FREE 10 ML: 5 INJECTION INTRAVENOUS at 11:00

## 2023-09-12 RX ADMIN — OXYCODONE AND ACETAMINOPHEN 1 TABLET: 325; 5 TABLET ORAL at 07:50

## 2023-09-12 RX ADMIN — SODIUM CHLORIDE 100 ML/HR: 9 INJECTION, SOLUTION INTRAVENOUS at 01:23

## 2023-09-12 RX ADMIN — ARIPIPRAZOLE 20 MG: 10 TABLET ORAL at 11:00

## 2023-09-12 RX ADMIN — RIFAXIMIN 200 MG: 200 TABLET ORAL at 22:30

## 2023-09-12 RX ADMIN — SODIUM CHLORIDE: 9 INJECTION, SOLUTION INTRAVENOUS at 11:38

## 2023-09-12 RX ADMIN — FUROSEMIDE 20 MG: 20 TABLET ORAL at 11:00

## 2023-09-12 RX ADMIN — METRONIDAZOLE 500 MG: 500 INJECTION, SOLUTION INTRAVENOUS at 05:45

## 2023-09-12 RX ADMIN — METRONIDAZOLE 500 MG: 500 INJECTION, SOLUTION INTRAVENOUS at 22:43

## 2023-09-12 RX ADMIN — Medication 3 MG: at 22:33

## 2023-09-12 RX ADMIN — WATER 1000 MG: 1 INJECTION INTRAMUSCULAR; INTRAVENOUS; SUBCUTANEOUS at 13:30

## 2023-09-12 RX ADMIN — RIFAXIMIN 200 MG: 200 TABLET ORAL at 11:00

## 2023-09-12 RX ADMIN — INSULIN GLARGINE 25 UNITS: 100 INJECTION, SOLUTION SUBCUTANEOUS at 11:00

## 2023-09-12 RX ADMIN — Medication 500 MG: at 18:01

## 2023-09-12 RX ADMIN — OXYCODONE AND ACETAMINOPHEN 1 TABLET: 325; 5 TABLET ORAL at 22:31

## 2023-09-12 RX ADMIN — Medication 100 MG: at 11:00

## 2023-09-12 ASSESSMENT — PAIN DESCRIPTION - DESCRIPTORS
DESCRIPTORS: ACHING;DISCOMFORT;GNAWING
DESCRIPTORS: SHARP;DISCOMFORT

## 2023-09-12 ASSESSMENT — PAIN SCALES - GENERAL
PAINLEVEL_OUTOF10: 7
PAINLEVEL_OUTOF10: 0
PAINLEVEL_OUTOF10: 7

## 2023-09-12 ASSESSMENT — PAIN DESCRIPTION - LOCATION
LOCATION: ABDOMEN;PERINEUM
LOCATION: ABDOMEN;BUTTOCKS
LOCATION: ABDOMEN;PERINEUM

## 2023-09-12 ASSESSMENT — PAIN DESCRIPTION - ORIENTATION: ORIENTATION: LOWER;RIGHT;LEFT

## 2023-09-12 NOTE — PLAN OF CARE
Problem: Pain  Goal: Verbalizes/displays adequate comfort level or baseline comfort level  9/11/2023 2151 by Adrienne Castillo RN  Outcome: Progressing     Problem: Safety - Adult  Goal: Free from fall injury  9/11/2023 2151 by Adrienne Castillo RN  Outcome: Progressing

## 2023-09-12 NOTE — TELEPHONE ENCOUNTER
[facility] called to ask if Dr. Lea Hamilton will follow for home care once discharged from the hospital.  Tee Lewis stated patient will need nursing.      Last seen 9/7/2023  Next appt 10/10/2023

## 2023-09-12 NOTE — CARE COORDINATION
9/12/2023 1453  note: Pt resides with her S.O. Faisal in a ranch home. She is agreeable to [facility] and has no HHC preference. [facility] accepted pt, received orders and can start care Sunday 9/17/23. She is requesting ww at d/c, no DME preference. [address] [facility] DME received referral, ww order and will deliver ww to pt's room within 24hrs prior to d/c. Per request,pt given HCPOA papers to review and informed to contact SW/CM when ready to complete. Pt plans to return home at discharge, Faisal will transport. Alba WESTON    The Plan for Transition of Care is related to the following treatment goals: HH    The Patient  was provided with a choice of provider and agrees   with the discharge plan. [x] Yes [] No    Freedom of choice list was provided with basic dialogue that supports the patient's individualized plan of care/goals, treatment preferences and shares the quality data associated with the providers.  [] Yes [x] No (declined list

## 2023-09-12 NOTE — CARE COORDINATION
9/12/2023 09[facility] note: Pt resides with her S.O. Faisal in a ranch home. She manages self care and wound care and Faisal assists with other ADLs. Pt given HCPOA papers to review and informed to contact SURESH/BEAN when ready to complete. Pt plans to return home at discharge, Faisal will transport. Will await PT/OT ag.  Alba WESTON

## 2023-09-13 ENCOUNTER — APPOINTMENT (OUTPATIENT)
Dept: ULTRASOUND IMAGING | Age: 53
DRG: 249 | End: 2023-09-13
Payer: COMMERCIAL

## 2023-09-13 LAB
ANION GAP SERPL CALCULATED.3IONS-SCNC: 6 MMOL/L (ref 7–16)
BASOPHILS # BLD: 0.02 K/UL (ref 0–0.2)
BASOPHILS NFR BLD: 1 % (ref 0–2)
BUN SERPL-MCNC: 10 MG/DL (ref 6–20)
CALCIUM SERPL-MCNC: 8.4 MG/DL (ref 8.6–10.2)
CHLORIDE SERPL-SCNC: 100 MMOL/L (ref 98–107)
CO2 SERPL-SCNC: 26 MMOL/L (ref 22–29)
CREAT SERPL-MCNC: 0.3 MG/DL (ref 0.5–1)
EOSINOPHIL # BLD: 0.04 K/UL (ref 0.05–0.5)
EOSINOPHILS RELATIVE PERCENT: 2 % (ref 0–6)
ERYTHROCYTE [DISTWIDTH] IN BLOOD BY AUTOMATED COUNT: 14.8 % (ref 11.5–15)
GFR SERPL CREATININE-BSD FRML MDRD: >60 ML/MIN/1.73M2
GLUCOSE BLD-MCNC: 157 MG/DL (ref 74–99)
GLUCOSE BLD-MCNC: 210 MG/DL (ref 74–99)
GLUCOSE BLD-MCNC: 231 MG/DL (ref 74–99)
GLUCOSE BLD-MCNC: 278 MG/DL (ref 74–99)
GLUCOSE SERPL-MCNC: 265 MG/DL (ref 74–99)
HCT VFR BLD AUTO: 27.2 % (ref 34–48)
HGB BLD-MCNC: 8.9 G/DL (ref 11.5–15.5)
IMM GRANULOCYTES # BLD AUTO: <0.03 K/UL (ref 0–0.58)
IMM GRANULOCYTES NFR BLD: 0 % (ref 0–5)
LYMPHOCYTES NFR BLD: 0.49 K/UL (ref 1.5–4)
LYMPHOCYTES RELATIVE PERCENT: 22 % (ref 20–42)
MCH RBC QN AUTO: 32.1 PG (ref 26–35)
MCHC RBC AUTO-ENTMCNC: 32.7 G/DL (ref 32–34.5)
MCV RBC AUTO: 98.2 FL (ref 80–99.9)
MONOCYTES NFR BLD: 0.16 K/UL (ref 0.1–0.95)
MONOCYTES NFR BLD: 7 % (ref 2–12)
NEUTROPHILS NFR BLD: 68 % (ref 43–80)
NEUTS SEG NFR BLD: 1.52 K/UL (ref 1.8–7.3)
PLATELET CONFIRMATION: NORMAL
PLATELET, FLUORESCENCE: 87 K/UL (ref 130–450)
PMV BLD AUTO: 10.3 FL (ref 7–12)
POTASSIUM SERPL-SCNC: 4.2 MMOL/L (ref 3.5–5)
RBC # BLD AUTO: 2.77 M/UL (ref 3.5–5.5)
SODIUM SERPL-SCNC: 132 MMOL/L (ref 132–146)
WBC OTHER # BLD: 2.2 K/UL (ref 4.5–11.5)

## 2023-09-13 PROCEDURE — 87205 SMEAR GRAM STAIN: CPT

## 2023-09-13 PROCEDURE — 6370000000 HC RX 637 (ALT 250 FOR IP): Performed by: STUDENT IN AN ORGANIZED HEALTH CARE EDUCATION/TRAINING PROGRAM

## 2023-09-13 PROCEDURE — 97530 THERAPEUTIC ACTIVITIES: CPT | Performed by: OCCUPATIONAL THERAPIST

## 2023-09-13 PROCEDURE — 6360000002 HC RX W HCPCS: Performed by: STUDENT IN AN ORGANIZED HEALTH CARE EDUCATION/TRAINING PROGRAM

## 2023-09-13 PROCEDURE — 82962 GLUCOSE BLOOD TEST: CPT

## 2023-09-13 PROCEDURE — 97165 OT EVAL LOW COMPLEX 30 MIN: CPT | Performed by: OCCUPATIONAL THERAPIST

## 2023-09-13 PROCEDURE — 99232 SBSQ HOSP IP/OBS MODERATE 35: CPT | Performed by: STUDENT IN AN ORGANIZED HEALTH CARE EDUCATION/TRAINING PROGRAM

## 2023-09-13 PROCEDURE — 36415 COLL VENOUS BLD VENIPUNCTURE: CPT

## 2023-09-13 PROCEDURE — 2580000003 HC RX 258: Performed by: STUDENT IN AN ORGANIZED HEALTH CARE EDUCATION/TRAINING PROGRAM

## 2023-09-13 PROCEDURE — 6370000000 HC RX 637 (ALT 250 FOR IP): Performed by: INTERNAL MEDICINE

## 2023-09-13 PROCEDURE — 80048 BASIC METABOLIC PNL TOTAL CA: CPT

## 2023-09-13 PROCEDURE — 1200000000 HC SEMI PRIVATE

## 2023-09-13 PROCEDURE — 85025 COMPLETE CBC W/AUTO DIFF WBC: CPT

## 2023-09-13 PROCEDURE — 87070 CULTURE OTHR SPECIMN AEROBIC: CPT

## 2023-09-13 PROCEDURE — 76705 ECHO EXAM OF ABDOMEN: CPT

## 2023-09-13 RX ORDER — SPIRONOLACTONE 25 MG/1
25 TABLET ORAL DAILY
Status: DISCONTINUED | OUTPATIENT
Start: 2023-09-13 | End: 2023-09-14 | Stop reason: HOSPADM

## 2023-09-13 RX ADMIN — METRONIDAZOLE 500 MG: 500 INJECTION, SOLUTION INTRAVENOUS at 15:13

## 2023-09-13 RX ADMIN — POLYETHYLENE GLYCOL 3350 17 G: 17 POWDER, FOR SOLUTION ORAL at 17:22

## 2023-09-13 RX ADMIN — ARIPIPRAZOLE 20 MG: 10 TABLET ORAL at 09:49

## 2023-09-13 RX ADMIN — METRONIDAZOLE 500 MG: 500 INJECTION, SOLUTION INTRAVENOUS at 05:09

## 2023-09-13 RX ADMIN — INSULIN GLARGINE 25 UNITS: 100 INJECTION, SOLUTION SUBCUTANEOUS at 09:53

## 2023-09-13 RX ADMIN — OXYCODONE AND ACETAMINOPHEN 1 TABLET: 325; 5 TABLET ORAL at 06:50

## 2023-09-13 RX ADMIN — VORTIOXETINE 20 MG: 10 TABLET, FILM COATED ORAL at 21:37

## 2023-09-13 RX ADMIN — INSULIN LISPRO 4 UNITS: 100 INJECTION, SOLUTION INTRAVENOUS; SUBCUTANEOUS at 10:00

## 2023-09-13 RX ADMIN — PANCRELIPASE LIPASE, PANCRELIPASE PROTEASE, PANCRELIPASE AMYLASE 40000 UNITS: 20000; 63000; 84000 CAPSULE, DELAYED RELEASE ORAL at 09:50

## 2023-09-13 RX ADMIN — ANASTROZOLE 1 MG: 1 TABLET ORAL at 09:56

## 2023-09-13 RX ADMIN — Medication 3 MG: at 21:44

## 2023-09-13 RX ADMIN — FUROSEMIDE 20 MG: 20 TABLET ORAL at 09:51

## 2023-09-13 RX ADMIN — Medication 500 MG: at 17:17

## 2023-09-13 RX ADMIN — GABAPENTIN 400 MG: 400 CAPSULE ORAL at 09:50

## 2023-09-13 RX ADMIN — RIFAXIMIN 200 MG: 200 TABLET ORAL at 09:48

## 2023-09-13 RX ADMIN — PANCRELIPASE LIPASE, PANCRELIPASE PROTEASE, PANCRELIPASE AMYLASE 40000 UNITS: 20000; 63000; 84000 CAPSULE, DELAYED RELEASE ORAL at 11:35

## 2023-09-13 RX ADMIN — PANCRELIPASE LIPASE, PANCRELIPASE PROTEASE, PANCRELIPASE AMYLASE 40000 UNITS: 20000; 63000; 84000 CAPSULE, DELAYED RELEASE ORAL at 17:08

## 2023-09-13 RX ADMIN — INSULIN GLARGINE 25 UNITS: 100 INJECTION, SOLUTION SUBCUTANEOUS at 21:50

## 2023-09-13 RX ADMIN — RIFAXIMIN 200 MG: 200 TABLET ORAL at 21:37

## 2023-09-13 RX ADMIN — OXYCODONE AND ACETAMINOPHEN 1 TABLET: 325; 5 TABLET ORAL at 21:45

## 2023-09-13 RX ADMIN — GABAPENTIN 400 MG: 400 CAPSULE ORAL at 21:37

## 2023-09-13 RX ADMIN — SENNOSIDES AND DOCUSATE SODIUM 2 TABLET: 50; 8.6 TABLET ORAL at 21:37

## 2023-09-13 RX ADMIN — OXYCODONE AND ACETAMINOPHEN 1 TABLET: 325; 5 TABLET ORAL at 02:58

## 2023-09-13 RX ADMIN — PANTOPRAZOLE SODIUM 40 MG: 40 TABLET, DELAYED RELEASE ORAL at 05:11

## 2023-09-13 RX ADMIN — ONDANSETRON 4 MG: 2 INJECTION INTRAMUSCULAR; INTRAVENOUS at 23:06

## 2023-09-13 RX ADMIN — OXYCODONE AND ACETAMINOPHEN 1 TABLET: 325; 5 TABLET ORAL at 11:40

## 2023-09-13 RX ADMIN — OXYCODONE AND ACETAMINOPHEN 1 TABLET: 325; 5 TABLET ORAL at 17:07

## 2023-09-13 RX ADMIN — ENOXAPARIN SODIUM 30 MG: 100 INJECTION SUBCUTANEOUS at 09:49

## 2023-09-13 RX ADMIN — GABAPENTIN 400 MG: 400 CAPSULE ORAL at 15:00

## 2023-09-13 RX ADMIN — SENNOSIDES AND DOCUSATE SODIUM 2 TABLET: 50; 8.6 TABLET ORAL at 09:49

## 2023-09-13 RX ADMIN — Medication 100 MG: at 09:50

## 2023-09-13 RX ADMIN — SODIUM CHLORIDE, PRESERVATIVE FREE 10 ML: 5 INJECTION INTRAVENOUS at 10:05

## 2023-09-13 RX ADMIN — METRONIDAZOLE 500 MG: 500 INJECTION, SOLUTION INTRAVENOUS at 21:55

## 2023-09-13 RX ADMIN — LACTULOSE 10 G: 20 SOLUTION ORAL at 09:59

## 2023-09-13 RX ADMIN — INSULIN LISPRO 2 UNITS: 100 INJECTION, SOLUTION INTRAVENOUS; SUBCUTANEOUS at 11:32

## 2023-09-13 RX ADMIN — WATER 1000 MG: 1 INJECTION INTRAMUSCULAR; INTRAVENOUS; SUBCUTANEOUS at 15:05

## 2023-09-13 RX ADMIN — SODIUM CHLORIDE, PRESERVATIVE FREE 10 ML: 5 INJECTION INTRAVENOUS at 21:39

## 2023-09-13 RX ADMIN — INSULIN LISPRO 2 UNITS: 100 INJECTION, SOLUTION INTRAVENOUS; SUBCUTANEOUS at 17:16

## 2023-09-13 ASSESSMENT — PAIN DESCRIPTION - DESCRIPTORS
DESCRIPTORS: BURNING;DISCOMFORT;ACHING
DESCRIPTORS: CRAMPING;SHARP;DISCOMFORT
DESCRIPTORS: SHARP;SHOOTING;STABBING

## 2023-09-13 ASSESSMENT — PAIN DESCRIPTION - LOCATION
LOCATION: ABDOMEN
LOCATION: ABDOMEN
LOCATION: ABDOMEN;BUTTOCKS
LOCATION: BUTTOCKS;ABDOMEN
LOCATION: ABDOMEN

## 2023-09-13 ASSESSMENT — PAIN SCALES - GENERAL
PAINLEVEL_OUTOF10: 7
PAINLEVEL_OUTOF10: 7
PAINLEVEL_OUTOF10: 0
PAINLEVEL_OUTOF10: 6
PAINLEVEL_OUTOF10: 0
PAINLEVEL_OUTOF10: 8

## 2023-09-13 ASSESSMENT — PAIN DESCRIPTION - FREQUENCY: FREQUENCY: INTERMITTENT

## 2023-09-13 ASSESSMENT — PAIN - FUNCTIONAL ASSESSMENT: PAIN_FUNCTIONAL_ASSESSMENT: PREVENTS OR INTERFERES SOME ACTIVE ACTIVITIES AND ADLS

## 2023-09-13 ASSESSMENT — PAIN DESCRIPTION - ORIENTATION: ORIENTATION: RIGHT;LEFT

## 2023-09-13 ASSESSMENT — PAIN DESCRIPTION - PAIN TYPE: TYPE: ACUTE PAIN

## 2023-09-13 ASSESSMENT — PAIN DESCRIPTION - ONSET: ONSET: ON-GOING

## 2023-09-13 NOTE — CARE COORDINATION
9/12/2023 [facility]8  note: Pt resides with her S.O. Faisal in a ranch home. [facility] accepted pt, received orders and can start care Sunday 9/17/23. [facility] will deliver ww to pt's room this afternoon. Per request,pt given HCPOA papers to review and informed to contact SW/CM when ready to complete. Pt plans to return home at discharge, Faisal will transport.  Alba WESTON

## 2023-09-13 NOTE — DISCHARGE INSTRUCTIONS
[facility]  [phone] will start services 23  they will call to arrange time      Your information:  Name: Stefani Hernandez  : 1970    Your instructions:    Chana Santamaria. PLEASE MAKE AND KEEP YOUR FOLLOW UP APPOINTMENTS. IF YOU EXPERIENCE ANY OF THE FOLLOWING SYMPTOMS, CHEST PAIN, SHORTNESS OF BREATH, COUGHING UP BLOOD OR BLOODY SPUTUM, STOMACH PAIN OR CRAMPING, DARK, TARRY STOOLS, LOSS OF APPETITE, GENERAL NOT FEELING WELL, SIGNS AND SYMPTOMS OF INFECTION LIKE FEVER AND OR CHILLS, PLEASE CALL DR Damian Yee OR RETURN TO THE EMERGENCY ROOM. What to do after you leave the hospital:    Recommended diet: regular diet    Recommended activity: activity as tolerated        The following personal items were collected during your admission and were returned to you:    Belongings  Dental Appliances: At home, Uppers, Lowers  Vision - Corrective Lenses: Eyeglasses  Hearing Aid: None  Clothing: Shirt, Socks, Undergarments  Jewelry: None  Body Piercings Removed: No  Electronic Devices: Cell Phone,   Weapons (Notify Protective Services/Security): None  Other Valuables: At home  Home Medications: None  Valuables Given To: Patient  Provide Name(s) of Who Valuable(s) Were Given To: na  Responsible person(s) in the waiting room: na  Patient approves for provider to speak to responsible person post operatively: Yes    Information obtained by:  By signing below, I understand that if any problems occur once I leave the hospital I am to contact DR. WILKINSON. I understand and acknowledge receipt of the instructions indicated above.

## 2023-09-13 NOTE — DISCHARGE INSTR - COC
Continuity of Care Form    Patient Name: Sil Addison   :  1970  MRN:  [id number]    Admit date:  9/10/2023  Discharge date:  ***    Code Status Order: Full Code   Advance Directives:     Admitting Physician:  Edvin Arrieta MD  PCP: Delores Lee DO    Discharging Nurse: [facility] Unit/Room#: 4377/5955-59  Discharging Unit Phone Number: ***    Emergency Contact:   Extended Emergency Contact Information  Primary Emergency Contact: Faisal Gonzalez  Address: [address] [place] [address] Dr [address] [facility] of [address] [address] [place] Phone: [phone]  Mobile Phone: [phone]  Relation: Domestic Partner  Preferred language: [address] needed?  No    Past Surgical History:  Past Surgical History:   Procedure Laterality Date    ABDOMEN SURGERY      gallbladder removal    BREAST BIOPSY Left     CHOLECYSTECTOMY      COLONOSCOPY      COLONOSCOPY N/A 10/2/2018    COLONOSCOPY WITH BIOPSY performed by Kelly Lovell MD at [address] [place], COLON, DIAGNOSTIC      ERCP      2016 at  [facility])      MASTECTOMY Left     Left breast mastectomy per patient    OTHER SURGICAL HISTORY Left 2017     Left breast blue dye injection, Sential Node Lymph Biopsy with left breast lumpectomy    OTHER SURGICAL HISTORY  10/05/2017    simple left mastectomy    OVARY REMOVAL Bilateral     PANCREAS BIOPSY      with stent    RECTAL SURGERY N/A 2023    PERINEAL ABSCESS INCISION AND DRAINAGE performed by Savana Pina MD at [address] [facility]     UPPER GASTROINTESTINAL ENDOSCOPY         Immunization History:   Immunization History   Administered Date(s) Administered    COVID-19, PFIZER PURPLE top, DILUTE for use, (age 15 y+), 30mcg/0.3mL 2020, 2021    Influenza Virus Vaccine 10/09/2010, 10/01/2013, 2014, 2015, 2016, 10/15/2019    Influenza Whole 10/01/2013, 2014    Influenza,

## 2023-09-14 ENCOUNTER — APPOINTMENT (OUTPATIENT)
Dept: INTERVENTIONAL RADIOLOGY/VASCULAR | Age: 53
DRG: 249 | End: 2023-09-14
Payer: COMMERCIAL

## 2023-09-14 VITALS
SYSTOLIC BLOOD PRESSURE: 92 MMHG | DIASTOLIC BLOOD PRESSURE: 51 MMHG | TEMPERATURE: 99 F | OXYGEN SATURATION: 97 % | BODY MASS INDEX: 19.86 KG/M2 | HEART RATE: 78 BPM | RESPIRATION RATE: 16 BRPM | HEIGHT: 61 IN | WEIGHT: 105.2 LBS

## 2023-09-14 PROBLEM — L02.215 PERINEAL ABSCESS: Status: ACTIVE | Noted: 2023-09-14

## 2023-09-14 LAB
ALBUMIN FLD-MCNC: 0.3 G/DL
ANION GAP SERPL CALCULATED.3IONS-SCNC: 7 MMOL/L (ref 7–16)
APPEARANCE FLD: NORMAL
BASOPHILS # BLD: 0 K/UL (ref 0–0.2)
BASOPHILS NFR BLD: 0 % (ref 0–2)
BODY FLD TYPE: NORMAL
BUN SERPL-MCNC: 11 MG/DL (ref 6–20)
CALCIUM SERPL-MCNC: 8.5 MG/DL (ref 8.6–10.2)
CHLORIDE SERPL-SCNC: 106 MMOL/L (ref 98–107)
CLOT CHECK: NORMAL
CO2 SERPL-SCNC: 27 MMOL/L (ref 22–29)
COLOR FLD: YELLOW
CREAT SERPL-MCNC: 0.4 MG/DL (ref 0.5–1)
EOSINOPHIL # BLD: 0.06 K/UL (ref 0.05–0.5)
EOSINOPHILS RELATIVE PERCENT: 3 % (ref 0–6)
ERYTHROCYTE [DISTWIDTH] IN BLOOD BY AUTOMATED COUNT: 15.2 % (ref 11.5–15)
GFR SERPL CREATININE-BSD FRML MDRD: >60 ML/MIN/1.73M2
GLUCOSE BLD-MCNC: 237 MG/DL (ref 74–99)
GLUCOSE BLD-MCNC: 82 MG/DL (ref 74–99)
GLUCOSE SERPL-MCNC: 78 MG/DL (ref 74–99)
HCT VFR BLD AUTO: 27.9 % (ref 34–48)
HGB BLD-MCNC: 9 G/DL (ref 11.5–15.5)
LYMPHOCYTES NFR BLD: 0.56 K/UL (ref 1.5–4)
LYMPHOCYTES RELATIVE PERCENT: 24 % (ref 20–42)
MCH RBC QN AUTO: 32.6 PG (ref 26–35)
MCHC RBC AUTO-ENTMCNC: 32.3 G/DL (ref 32–34.5)
MCV RBC AUTO: 101.1 FL (ref 80–99.9)
MONOCYTES NFR BLD: 0.08 K/UL (ref 0.1–0.95)
MONOCYTES NFR BLD: 4 % (ref 2–12)
MONOCYTES NFR FLD: 94 %
NEUTROPHILS NFR BLD: 70 % (ref 43–80)
NEUTROPHILS NFR FLD: 6 %
NEUTS SEG NFR BLD: 1.69 K/UL (ref 1.8–7.3)
PLATELET CONFIRMATION: NORMAL
PLATELET, FLUORESCENCE: 88 K/UL (ref 130–450)
PMV BLD AUTO: 10.8 FL (ref 7–12)
POTASSIUM SERPL-SCNC: 4.1 MMOL/L (ref 3.5–5)
RBC # BLD AUTO: 2.76 M/UL (ref 3.5–5.5)
RBC # BLD: ABNORMAL 10*6/UL
RBC # FLD: <2000 CELLS/UL
SODIUM SERPL-SCNC: 140 MMOL/L (ref 132–146)
SPECIMEN TYPE: NORMAL
WBC # FLD: 105 CELLS/UL
WBC OTHER # BLD: 2.4 K/UL (ref 4.5–11.5)

## 2023-09-14 PROCEDURE — 6360000002 HC RX W HCPCS: Performed by: STUDENT IN AN ORGANIZED HEALTH CARE EDUCATION/TRAINING PROGRAM

## 2023-09-14 PROCEDURE — 0W9G3ZZ DRAINAGE OF PERITONEAL CAVITY, PERCUTANEOUS APPROACH: ICD-10-PCS | Performed by: EMERGENCY MEDICINE

## 2023-09-14 PROCEDURE — 2580000003 HC RX 258: Performed by: STUDENT IN AN ORGANIZED HEALTH CARE EDUCATION/TRAINING PROGRAM

## 2023-09-14 PROCEDURE — 97530 THERAPEUTIC ACTIVITIES: CPT

## 2023-09-14 PROCEDURE — C1729 CATH, DRAINAGE: HCPCS

## 2023-09-14 PROCEDURE — 85025 COMPLETE CBC W/AUTO DIFF WBC: CPT

## 2023-09-14 PROCEDURE — 97116 GAIT TRAINING THERAPY: CPT

## 2023-09-14 PROCEDURE — 6370000000 HC RX 637 (ALT 250 FOR IP): Performed by: INTERNAL MEDICINE

## 2023-09-14 PROCEDURE — 89051 BODY FLUID CELL COUNT: CPT

## 2023-09-14 PROCEDURE — 99239 HOSP IP/OBS DSCHRG MGMT >30: CPT | Performed by: INTERNAL MEDICINE

## 2023-09-14 PROCEDURE — 82962 GLUCOSE BLOOD TEST: CPT

## 2023-09-14 PROCEDURE — 36415 COLL VENOUS BLD VENIPUNCTURE: CPT

## 2023-09-14 PROCEDURE — 6370000000 HC RX 637 (ALT 250 FOR IP): Performed by: STUDENT IN AN ORGANIZED HEALTH CARE EDUCATION/TRAINING PROGRAM

## 2023-09-14 PROCEDURE — 80048 BASIC METABOLIC PNL TOTAL CA: CPT

## 2023-09-14 PROCEDURE — 82042 OTHER SOURCE ALBUMIN QUAN EA: CPT

## 2023-09-14 PROCEDURE — 49083 ABD PARACENTESIS W/IMAGING: CPT

## 2023-09-14 PROCEDURE — 0W9G3ZZ DRAINAGE OF PERITONEAL CAVITY, PERCUTANEOUS APPROACH: ICD-10-PCS | Performed by: STUDENT IN AN ORGANIZED HEALTH CARE EDUCATION/TRAINING PROGRAM

## 2023-09-14 RX ORDER — SPIRONOLACTONE 25 MG/1
25 TABLET ORAL DAILY
Qty: 30 TABLET | Refills: 0 | Status: ON HOLD | OUTPATIENT
Start: 2023-09-15

## 2023-09-14 RX ORDER — OXYCODONE HYDROCHLORIDE 5 MG/1
5 TABLET ORAL EVERY 6 HOURS PRN
Qty: 12 TABLET | Refills: 0 | Status: ON HOLD | OUTPATIENT
Start: 2023-09-14 | End: 2023-09-17

## 2023-09-14 RX ADMIN — PANCRELIPASE LIPASE, PANCRELIPASE PROTEASE, PANCRELIPASE AMYLASE 40000 UNITS: 20000; 63000; 84000 CAPSULE, DELAYED RELEASE ORAL at 08:00

## 2023-09-14 RX ADMIN — RIFAXIMIN 200 MG: 200 TABLET ORAL at 08:28

## 2023-09-14 RX ADMIN — PANTOPRAZOLE SODIUM 40 MG: 40 TABLET, DELAYED RELEASE ORAL at 05:00

## 2023-09-14 RX ADMIN — METRONIDAZOLE 500 MG: 500 INJECTION, SOLUTION INTRAVENOUS at 13:17

## 2023-09-14 RX ADMIN — SENNOSIDES AND DOCUSATE SODIUM 2 TABLET: 50; 8.6 TABLET ORAL at 08:28

## 2023-09-14 RX ADMIN — GABAPENTIN 400 MG: 400 CAPSULE ORAL at 13:20

## 2023-09-14 RX ADMIN — ANASTROZOLE 1 MG: 1 TABLET ORAL at 10:39

## 2023-09-14 RX ADMIN — WATER 1000 MG: 1 INJECTION INTRAMUSCULAR; INTRAVENOUS; SUBCUTANEOUS at 13:13

## 2023-09-14 RX ADMIN — INSULIN LISPRO 2 UNITS: 100 INJECTION, SOLUTION INTRAVENOUS; SUBCUTANEOUS at 08:26

## 2023-09-14 RX ADMIN — OXYCODONE AND ACETAMINOPHEN 1 TABLET: 325; 5 TABLET ORAL at 10:39

## 2023-09-14 RX ADMIN — ENOXAPARIN SODIUM 30 MG: 100 INJECTION SUBCUTANEOUS at 08:26

## 2023-09-14 RX ADMIN — PANCRELIPASE LIPASE, PANCRELIPASE PROTEASE, PANCRELIPASE AMYLASE 40000 UNITS: 20000; 63000; 84000 CAPSULE, DELAYED RELEASE ORAL at 12:09

## 2023-09-14 RX ADMIN — INSULIN GLARGINE 25 UNITS: 100 INJECTION, SOLUTION SUBCUTANEOUS at 08:26

## 2023-09-14 RX ADMIN — DICYCLOMINE HYDROCHLORIDE 10 MG: 10 CAPSULE ORAL at 00:22

## 2023-09-14 RX ADMIN — OXYCODONE AND ACETAMINOPHEN 1 TABLET: 325; 5 TABLET ORAL at 06:02

## 2023-09-14 RX ADMIN — LACTULOSE 10 G: 20 SOLUTION ORAL at 08:27

## 2023-09-14 RX ADMIN — SODIUM CHLORIDE, PRESERVATIVE FREE 10 ML: 5 INJECTION INTRAVENOUS at 08:29

## 2023-09-14 RX ADMIN — METRONIDAZOLE 500 MG: 500 INJECTION, SOLUTION INTRAVENOUS at 04:58

## 2023-09-14 RX ADMIN — Medication 100 MG: at 08:28

## 2023-09-14 RX ADMIN — OXYCODONE AND ACETAMINOPHEN 1 TABLET: 325; 5 TABLET ORAL at 02:05

## 2023-09-14 RX ADMIN — ARIPIPRAZOLE 20 MG: 10 TABLET ORAL at 08:28

## 2023-09-14 RX ADMIN — GABAPENTIN 400 MG: 400 CAPSULE ORAL at 08:28

## 2023-09-14 ASSESSMENT — PAIN DESCRIPTION - FREQUENCY
FREQUENCY: INTERMITTENT
FREQUENCY: INTERMITTENT

## 2023-09-14 ASSESSMENT — PAIN DESCRIPTION - ORIENTATION
ORIENTATION: RIGHT;LEFT
ORIENTATION: RIGHT;LEFT

## 2023-09-14 ASSESSMENT — PAIN DESCRIPTION - PAIN TYPE
TYPE: ACUTE PAIN
TYPE: ACUTE PAIN

## 2023-09-14 ASSESSMENT — PAIN SCALES - GENERAL
PAINLEVEL_OUTOF10: 3
PAINLEVEL_OUTOF10: 7
PAINLEVEL_OUTOF10: 9

## 2023-09-14 ASSESSMENT — PAIN DESCRIPTION - LOCATION
LOCATION: ABDOMEN;BUTTOCKS
LOCATION: ABDOMEN;BUTTOCKS

## 2023-09-14 ASSESSMENT — PAIN DESCRIPTION - DESCRIPTORS
DESCRIPTORS: SHARP;SHOOTING;STABBING
DESCRIPTORS: SHARP;SHOOTING;STABBING

## 2023-09-14 ASSESSMENT — PAIN DESCRIPTION - ONSET
ONSET: ON-GOING
ONSET: ON-GOING

## 2023-09-14 ASSESSMENT — PAIN - FUNCTIONAL ASSESSMENT
PAIN_FUNCTIONAL_ASSESSMENT: PREVENTS OR INTERFERES SOME ACTIVE ACTIVITIES AND ADLS
PAIN_FUNCTIONAL_ASSESSMENT: PREVENTS OR INTERFERES SOME ACTIVE ACTIVITIES AND ADLS

## 2023-09-14 NOTE — CARE COORDINATION
9/14/2023 1257 CM note:  Paracentesis done today-3000cc drained. Pt resides with her S.O. Faisal in a ranch home. [facility] accepted pt, received orders and can start care Sunday 9/17/23. [facility] delivered w/c to pt room. Pt plans to return home at discharge, Faisal will transport.  Alba WESTON

## 2023-09-14 NOTE — PLAN OF CARE
Problem: Pain  Goal: Verbalizes/displays adequate comfort level or baseline comfort level  9/13/2023 2354 by Sharmin Vegas RN  Outcome: Progressing     Problem: Safety - Adult  Goal: Free from fall injury  9/13/2023 1415 by Verona Plaza RN  Outcome: Progressing     Problem: Nutrition Deficit:  Goal: Optimize nutritional status  Outcome: Progressing     Problem: Chronic Conditions and Co-morbidities  Goal: Patient's chronic conditions and co-morbidity symptoms are monitored and maintained or improved  Outcome: Progressing

## 2023-09-14 NOTE — DISCHARGE SUMMARY
[facility] Physician Discharge Summary       Rosalind Chas, DO  [address] [facility] DHARMESH [address] [facility]  [phone]    Schedule an appointment as soon as possible for a visit in 1 week(s)  Hospital follow up    Florence Junior, [address] [facility]  [phone]    Schedule an appointment as soon as possible for a visit in 2 week(s)  Hospital follow up for ascites      Activity level: As tolerated     Diet: ADULT ORAL NUTRITION SUPPLEMENT; Breakfast, Dinner; Wound Healing Oral Supplement  ADULT DIET; Regular; 4 carb choices (60 gm/meal); Low Sodium (2 gm)    Labs: Ascites fluid cytology pending    Condition at discharge: Stable     Dispo:Home      Patient ID:  Anastasiia Berry  [id number]  89 y.o.  1970    Admit date: 9/10/2023    Discharge date and time:  9/14/2023  2:42 PM    Admission Diagnoses: Principal Problem:    Enterocolitis  Resolved Problems:    * No resolved hospital problems. *      Discharge Diagnoses: Principal Problem:    Enterocolitis  Resolved Problems:    * No resolved hospital problems. *      Consults:  IP CONSULT TO DIETITIAN  IP CONSULT TO GENERAL SURGERY  IP CONSULT TO SOCIAL WORK    Procedures: Paracentesis 9/14       History of Present Illness:  48 y.o. female with a history of DM, GERD, schizophrenia and bipolar disorder, hx of breast cancer, hx of hep C, cirrhosis, tobacco use disorder and crack cocaine use presents with worsening abdominal pain. She states the pain started about a month ago and has been getting persistently worse. She states it is all over but worse around her belly button. She also has been having watery diarrhea and nausea/vomiting. She also endorsed chills but no fevers. She denies any urinary symptoms. Of note, patient had admission from 8/26/23-8/30/23 to [facility] [address] [place] for perianal abscess and had I&D and penrose drain placement.  The drain is still in place but according to note from

## 2023-09-14 NOTE — CONSULTS
Comprehensive Nutrition Assessment    Type and Reason for Visit:  Initial, Positive Nutrition Screen, Consult (ONS)    Nutrition Recommendations/Plan:   Continue current diet  Recommend Elmer BID and Ensure HP BID to aid in wound healing and optimize nutrition       Malnutrition Assessment:  Malnutrition Status:  Severe malnutrition (09/11/23 1055)    Context:  Chronic Illness     Findings of the 6 clinical characteristics of malnutrition:  Energy Intake:  75% or less estimated energy requirements for 1 month or longer  Weight Loss:  Greater than 10% over 6 months     Body Fat Loss:  Severe body fat loss Orbital, Triceps, Buccal region   Muscle Mass Loss:  Severe muscle mass loss Clavicles (pectoralis & deltoids), Temples (temporalis), Hand (interosseous)  Fluid Accumulation:  No significant fluid accumulation     Strength:  Not Performed    Nutrition Assessment:    Pt admit w/ enterocolitis, perianal abcess s/p I&D, surgery following. PMHx of DM, GERD, schizophrenia & bipolar disorder, breast Ca, hx of hep C, cirrhosis, tobacco & crack cocaine use. Pt meets criteria for severe malnutrition. Pt has decreased appetite, noted wound, will add ONS to aid in wound healing & continue to monitor. Nutrition Related Findings:    abd soft, nontender, nondistended, active BSx4, A&Ox4, edentulous, I/O's WDL, no edema noted Wound Type: Surgical Incision (L buttock)       Current Nutrition Intake & Therapies:    Average Meal Intake: 26-50%  Average Supplements Intake: None Ordered  ADULT DIET; Full Liquid; 4 carb choices (60 gm/meal)  ADULT ORAL NUTRITION SUPPLEMENT; Breakfast, Dinner; Wound Healing Oral Supplement  ADULT ORAL NUTRITION SUPPLEMENT; Breakfast, Lunch; Low Calorie/High Protein Oral Supplement    Anthropometric Measures:  Height: 5' 1\" (154.9 cm)  Ideal Body Weight (IBW): 105 lbs (48 kg)    Admission Body Weight: 85 lb (38.6 kg) (9/10 bed scale)  Current Body Weight: 85 lb (38.6 kg) (9/10 bed scale), 81 % IBW.
General Surgery Consult  [facility]      Patient's Name/Date of Birth: Gordy Chau / 1970    Date: September 11, 2023     Consulting Surgeon: Rajinder Rosenberg MD    PCP: Merry Trimble DO     Chief Complaint: Wound check     HPI:   Gordy Chau is a 48 y.o. female with psyh hx, hx of hep c, breast ca, cirrohsis, DM and substance abuse disorder who presents for evaluation of diarrhea and abdominal pain, admited for management of enterocolitis. Surgery is consulted for wound check. Patient had perianal abscess drained at [facility] 8/28 and was to followup outpatient for drain removal but has yet to followup. States she has had minimal pain around the drain and that it has been draining mainly what appears to be serosanguinous output, she has not noted any pus. Denies fevers, abdominal pain. Has been tolerating diet. Her wound is clean and dry with scant ss drainage on her pad. Penrose in place. Minimal erythema surrounding wound and some inflammatory changes at superior aspect.      Glucose 500 to 262  A1C 11.4   Wbc 4.5           Patient Active Problem List   Diagnosis    Chronic pancreatitis due to acute alcohol intoxication ([address])    Schizoaffective disorder, bipolar type ([address])    Anxiety disorder    Depression    History of alcohol abuse    Pancreatic pseudocyst (HCC)    Liver dysfunction    Asthma    Gastroesophageal reflux disease without esophagitis    Bipolar I disorder (HCC)    Bipolar affective disorder (HCC)    Irritable bowel syndrome    Tobacco use disorder    Jaundice    RUQ abdominal pain    Uncontrolled type 2 diabetes mellitus with hyperglycemia (HCC)    Pain of upper abdomen    Elevated LFTs    Intractable abdominal pain    Abdominal pain, epigastric    Hyperglycemia    Type 2 diabetes mellitus with hyperosmolarity without coma, with long-term current use of insulin ([address])    Fall (on) (from) other stairs and steps, initial encounter    Hepatitis, alcoholic    Nausea and vomiting
19.9  Usual Body Weight: 95 lb 5 oz (43.2 kg) (6/12/22)  % Weight Change (Calculated): -10.8  Weight Adjustment For: No Adjustment  BMI Categories: Underweight (BMI less than 22) age over 72    Estimated Daily Nutrient Needs:  Energy Requirements Based On: Kcal/kg  Weight Used for Energy Requirements: Current  Energy (kcal/day): 1400-1600kcal/day (35-40kcal/kg CBW)  Weight Used for Protein Requirements: Current  Protein (g/day): 60-65g/day (1.5-1.7g/kg CBW)  Method Used for Fluid Requirements: 1 ml/kcal  Fluid (ml/day): 1400-1600ml/day    Nutrition Diagnosis:   Severe malnutrition, In context of chronic illness related to inadequate protein-energy intake as evidenced by poor intake prior to admission, weight loss, Criteria as identified in malnutrition assessment, severe muscle loss, severe loss of subcutaneous fat    Nutrition Interventions:   Food and/or Nutrient Delivery: Continue Current Diet, Continue Oral Nutrition Supplement  Nutrition Education/Counseling: No recommendation at this time  Coordination of Nutrition Care: Continue to monitor while inpatient    Goals:  Previous Goal Met: Progressing toward Goal(s)  Goals: PO intake 75% or greater, by next RD assessment     Nutrition Monitoring and Evaluation:   Behavioral-Environmental Outcomes: None Identified  Food/Nutrient Intake Outcomes: Food and Nutrient Intake, Supplement Intake  Physical Signs/Symptoms Outcomes: Biochemical Data, Chewing or Swallowing, GI Status, Fluid Status or Edema, Nutrition Focused Physical Findings, Skin, Weight, Constipation    Discharge Planning:     Too soon to determine     Adriana Ordaz RD  Contact:

## 2023-09-15 NOTE — ADT AUTH CERT
mmol/L                                                  Glucose          265      mg/dL                            BUN    10        mg/dL                            Creatinine      0.3       mg/dL                            Est, Glom Filt Rate    >60      mL/min/1.73m2                                     Calcium          8.4       mg/dL         MD CONSULTS/ASSESSMENT AND PLAN:  Plan:  Coloenteritis   -Seen on CT   -Patient with abdominal pain, vomiting and diarrhea  -Has not had BM since being admitted so c diff cancelled   -Continue flagyl and ceftriaxone  -No leukocytosis and afebrile     Perianal abscess  -Had I&D and penrose drain placement on 8/28/23  -Has FU scheduled with general surgery on 9/12, consulted them here as patient will still likely be hospitalized   -Drain was removed here by general surgery     DM  -Patient non-compliant with insulin  -Home meds lantus 25 units BID and ISS - continue  -A1C 11.4  -Diabetic diet     GERD  -Continue home PPI     Crack cocaine use  -Patient reports she uses to \"deal with the pain\"  -Counseled on cessation     Schizophrenia/bipolar  -Continue home abilify and trintellix     Hx of breast cancer  -Continue home anastrozole     Hx of hep C and cirrhosis  -Hep C was treated previusly  -Continue rifaximin and lasix.  Add spironolactone  -US abdomen shows 4 quadrant ascites, order IR paracentesis      COPD  -Continue home spiriva, symbicort and albuterol  -Not currently in acute exacerbation        MEDICATIONS:             spironolactone           25 mg Oral     Daily             enoxaparin      30 mg SubCUTAneous        Daily             anastrozole     1 mg   Oral     Daily             ARIPiprazole   20 mg Oral     Daily             lipase-protease-amylase       40,000 Units  Oral     TID WC             furosemide      20 mg Oral     Daily             gabapentin      400 mg           Oral     TID             insulin glargine          25 Units         SubCUTAneous

## 2023-09-16 ENCOUNTER — APPOINTMENT (OUTPATIENT)
Dept: CT IMAGING | Age: 53
DRG: 364 | End: 2023-09-16
Payer: COMMERCIAL

## 2023-09-16 ENCOUNTER — HOSPITAL ENCOUNTER (INPATIENT)
Age: 53
LOS: 3 days | Discharge: HOME HEALTH | DRG: 364 | End: 2023-09-19
Attending: EMERGENCY MEDICINE | Admitting: INTERNAL MEDICINE
Payer: COMMERCIAL

## 2023-09-16 DIAGNOSIS — N49.3 FOURNIER GANGRENE: Primary | ICD-10-CM

## 2023-09-16 DIAGNOSIS — R73.9 HYPERGLYCEMIA: ICD-10-CM

## 2023-09-16 DIAGNOSIS — L02.215 PERINEAL ABSCESS: ICD-10-CM

## 2023-09-16 LAB
ALBUMIN SERPL-MCNC: 2.9 G/DL (ref 3.5–5.2)
ALP SERPL-CCNC: 606 U/L (ref 35–104)
ALT SERPL-CCNC: 32 U/L (ref 0–32)
AMPHET UR QL SCN: NEGATIVE
ANION GAP SERPL CALCULATED.3IONS-SCNC: 8 MMOL/L (ref 7–16)
APAP SERPL-MCNC: <5 UG/ML (ref 10–30)
AST SERPL-CCNC: 38 U/L (ref 0–31)
B-OH-BUTYR SERPL-MCNC: 0.26 MMOL/L (ref 0.02–0.27)
BARBITURATES UR QL SCN: NEGATIVE
BASOPHILS # BLD: 0.05 K/UL (ref 0–0.2)
BASOPHILS NFR BLD: 2 % (ref 0–2)
BENZODIAZ UR QL: NEGATIVE
BILIRUB SERPL-MCNC: 0.6 MG/DL (ref 0–1.2)
BILIRUB UR QL STRIP: NEGATIVE
BUN SERPL-MCNC: 11 MG/DL (ref 6–20)
BUPRENORPHINE UR QL: NEGATIVE
CALCIUM SERPL-MCNC: 9 MG/DL (ref 8.6–10.2)
CANNABINOIDS UR QL SCN: NEGATIVE
CHLORIDE SERPL-SCNC: 96 MMOL/L (ref 98–107)
CHP ED QC CHECK: NORMAL
CHP ED QC CHECK: NORMAL
CLARITY UR: CLEAR
CO2 SERPL-SCNC: 30 MMOL/L (ref 22–29)
COCAINE UR QL SCN: POSITIVE
COLOR UR: YELLOW
COMMENT: ABNORMAL
CREAT SERPL-MCNC: 0.4 MG/DL (ref 0.5–1)
EKG ATRIAL RATE: 72 BPM
EKG P AXIS: 74 DEGREES
EKG P-R INTERVAL: 142 MS
EKG Q-T INTERVAL: 404 MS
EKG QRS DURATION: 80 MS
EKG QTC CALCULATION (BAZETT): 442 MS
EKG R AXIS: 36 DEGREES
EKG T AXIS: 77 DEGREES
EKG VENTRICULAR RATE: 72 BPM
EOSINOPHIL # BLD: 0.05 K/UL (ref 0.05–0.5)
EOSINOPHILS RELATIVE PERCENT: 2 % (ref 0–6)
ERYTHROCYTE [DISTWIDTH] IN BLOOD BY AUTOMATED COUNT: 14.4 % (ref 11.5–15)
ETHANOLAMINE SERPL-MCNC: <10 MG/DL
FENTANYL UR QL: NEGATIVE
GFR SERPL CREATININE-BSD FRML MDRD: >60 ML/MIN/1.73M2
GLUCOSE BLD-MCNC: 271 MG/DL
GLUCOSE BLD-MCNC: 271 MG/DL (ref 74–99)
GLUCOSE BLD-MCNC: 447 MG/DL
GLUCOSE BLD-MCNC: 447 MG/DL (ref 74–99)
GLUCOSE SERPL-MCNC: 654 MG/DL (ref 74–99)
GLUCOSE UR STRIP-MCNC: >=1000 MG/DL
HCT VFR BLD AUTO: 33.1 % (ref 34–48)
HGB BLD-MCNC: 10.7 G/DL (ref 11.5–15.5)
HGB UR QL STRIP.AUTO: NEGATIVE
KETONES UR STRIP-MCNC: NEGATIVE MG/DL
LACTATE BLDV-SCNC: 1.3 MMOL/L (ref 0.5–2.2)
LEUKOCYTE ESTERASE UR QL STRIP: NEGATIVE
LIPASE SERPL-CCNC: 9 U/L (ref 13–60)
LYMPHOCYTES NFR BLD: 0.3 K/UL (ref 1.5–4)
LYMPHOCYTES RELATIVE PERCENT: 10 % (ref 20–42)
MAGNESIUM SERPL-MCNC: 2.1 MG/DL (ref 1.6–2.6)
MCH RBC QN AUTO: 32.5 PG (ref 26–35)
MCHC RBC AUTO-ENTMCNC: 32.3 G/DL (ref 32–34.5)
MCV RBC AUTO: 100.6 FL (ref 80–99.9)
METHADONE UR QL: NEGATIVE
MICROORGANISM SPEC CULT: NO GROWTH
MICROORGANISM/AGENT SPEC: NORMAL
MONOCYTES NFR BLD: 0.03 K/UL (ref 0.1–0.95)
MONOCYTES NFR BLD: 1 % (ref 2–12)
MYELOCYTES ABSOLUTE COUNT: 0.03 K/UL
MYELOCYTES: 1 %
NEUTROPHILS NFR BLD: 85 % (ref 43–80)
NEUTS SEG NFR BLD: 2.64 K/UL (ref 1.8–7.3)
NITRITE UR QL STRIP: NEGATIVE
OPIATES UR QL SCN: NEGATIVE
OXYCODONE UR QL SCN: NEGATIVE
PCP UR QL SCN: NEGATIVE
PH UR STRIP: 7 [PH] (ref 5–9)
PH VENOUS: 7.46 (ref 7.35–7.45)
PLATELET CONFIRMATION: NORMAL
PLATELET, FLUORESCENCE: 97 K/UL (ref 130–450)
PMV BLD AUTO: 10.6 FL (ref 7–12)
POTASSIUM SERPL-SCNC: 5.2 MMOL/L (ref 3.5–5)
PROT SERPL-MCNC: 6.6 G/DL (ref 6.4–8.3)
PROT UR STRIP-MCNC: NEGATIVE MG/DL
RBC # BLD AUTO: 3.29 M/UL (ref 3.5–5.5)
RBC # BLD: ABNORMAL 10*6/UL
SALICYLATES SERPL-MCNC: <0.3 MG/DL (ref 0–30)
SODIUM SERPL-SCNC: 134 MMOL/L (ref 132–146)
SP GR UR STRIP: 1.01 (ref 1–1.03)
SPECIMEN DESCRIPTION: NORMAL
TEST INFORMATION: ABNORMAL
TOXIC TRICYCLIC SC,BLOOD: NEGATIVE
TROPONIN I SERPL HS-MCNC: 14 NG/L (ref 0–9)
TROPONIN I SERPL HS-MCNC: 14 NG/L (ref 0–9)
UROBILINOGEN UR STRIP-ACNC: 0.2 EU/DL (ref 0–1)
WBC OTHER # BLD: 3.1 K/UL (ref 4.5–11.5)

## 2023-09-16 PROCEDURE — 96361 HYDRATE IV INFUSION ADD-ON: CPT

## 2023-09-16 PROCEDURE — 81003 URINALYSIS AUTO W/O SCOPE: CPT

## 2023-09-16 PROCEDURE — 96375 TX/PRO/DX INJ NEW DRUG ADDON: CPT

## 2023-09-16 PROCEDURE — 93010 ELECTROCARDIOGRAM REPORT: CPT | Performed by: INTERNAL MEDICINE

## 2023-09-16 PROCEDURE — 82010 KETONE BODYS QUAN: CPT

## 2023-09-16 PROCEDURE — 82962 GLUCOSE BLOOD TEST: CPT

## 2023-09-16 PROCEDURE — 74176 CT ABD & PELVIS W/O CONTRAST: CPT

## 2023-09-16 PROCEDURE — 82800 BLOOD PH: CPT

## 2023-09-16 PROCEDURE — 80179 DRUG ASSAY SALICYLATE: CPT

## 2023-09-16 PROCEDURE — 2580000003 HC RX 258: Performed by: EMERGENCY MEDICINE

## 2023-09-16 PROCEDURE — 99223 1ST HOSP IP/OBS HIGH 75: CPT | Performed by: INTERNAL MEDICINE

## 2023-09-16 PROCEDURE — 83690 ASSAY OF LIPASE: CPT

## 2023-09-16 PROCEDURE — 6360000002 HC RX W HCPCS: Performed by: EMERGENCY MEDICINE

## 2023-09-16 PROCEDURE — 96372 THER/PROPH/DIAG INJ SC/IM: CPT

## 2023-09-16 PROCEDURE — 85025 COMPLETE CBC W/AUTO DIFF WBC: CPT

## 2023-09-16 PROCEDURE — 6370000000 HC RX 637 (ALT 250 FOR IP): Performed by: EMERGENCY MEDICINE

## 2023-09-16 PROCEDURE — 84484 ASSAY OF TROPONIN QUANT: CPT

## 2023-09-16 PROCEDURE — 96365 THER/PROPH/DIAG IV INF INIT: CPT

## 2023-09-16 PROCEDURE — 80053 COMPREHEN METABOLIC PANEL: CPT

## 2023-09-16 PROCEDURE — 93005 ELECTROCARDIOGRAM TRACING: CPT | Performed by: EMERGENCY MEDICINE

## 2023-09-16 PROCEDURE — 96376 TX/PRO/DX INJ SAME DRUG ADON: CPT

## 2023-09-16 PROCEDURE — 6370000000 HC RX 637 (ALT 250 FOR IP): Performed by: INTERNAL MEDICINE

## 2023-09-16 PROCEDURE — 2580000003 HC RX 258: Performed by: INTERNAL MEDICINE

## 2023-09-16 PROCEDURE — G0480 DRUG TEST DEF 1-7 CLASSES: HCPCS

## 2023-09-16 PROCEDURE — 99285 EMERGENCY DEPT VISIT HI MDM: CPT

## 2023-09-16 PROCEDURE — 83735 ASSAY OF MAGNESIUM: CPT

## 2023-09-16 PROCEDURE — 2060000000 HC ICU INTERMEDIATE R&B

## 2023-09-16 PROCEDURE — 80143 DRUG ASSAY ACETAMINOPHEN: CPT

## 2023-09-16 PROCEDURE — 83605 ASSAY OF LACTIC ACID: CPT

## 2023-09-16 PROCEDURE — 80307 DRUG TEST PRSMV CHEM ANLYZR: CPT

## 2023-09-16 PROCEDURE — 87040 BLOOD CULTURE FOR BACTERIA: CPT

## 2023-09-16 RX ORDER — SODIUM CHLORIDE 0.9 % (FLUSH) 0.9 %
5-40 SYRINGE (ML) INJECTION PRN
Status: DISCONTINUED | OUTPATIENT
Start: 2023-09-16 | End: 2023-09-19 | Stop reason: HOSPADM

## 2023-09-16 RX ORDER — GLUCAGON 1 MG/ML
1 KIT INJECTION PRN
Status: DISCONTINUED | OUTPATIENT
Start: 2023-09-16 | End: 2023-09-19 | Stop reason: HOSPADM

## 2023-09-16 RX ORDER — SODIUM CHLORIDE 0.9 % (FLUSH) 0.9 %
5-40 SYRINGE (ML) INJECTION EVERY 12 HOURS SCHEDULED
Status: DISCONTINUED | OUTPATIENT
Start: 2023-09-16 | End: 2023-09-19 | Stop reason: HOSPADM

## 2023-09-16 RX ORDER — GABAPENTIN 400 MG/1
400 CAPSULE ORAL 3 TIMES DAILY
Status: DISCONTINUED | OUTPATIENT
Start: 2023-09-16 | End: 2023-09-19 | Stop reason: HOSPADM

## 2023-09-16 RX ORDER — BUDESONIDE 0.5 MG/2ML
0.5 INHALANT ORAL
Status: DISCONTINUED | OUTPATIENT
Start: 2023-09-16 | End: 2023-09-19 | Stop reason: HOSPADM

## 2023-09-16 RX ORDER — BUDESONIDE AND FORMOTEROL FUMARATE DIHYDRATE 160; 4.5 UG/1; UG/1
2 AEROSOL RESPIRATORY (INHALATION) 2 TIMES DAILY
Status: DISCONTINUED | OUTPATIENT
Start: 2023-09-16 | End: 2023-09-16 | Stop reason: CLARIF

## 2023-09-16 RX ORDER — ENOXAPARIN SODIUM 100 MG/ML
30 INJECTION SUBCUTANEOUS DAILY
Status: DISCONTINUED | OUTPATIENT
Start: 2023-09-16 | End: 2023-09-19 | Stop reason: HOSPADM

## 2023-09-16 RX ORDER — M-VIT,TX,IRON,MINS/CALC/FOLIC 27MG-0.4MG
1 TABLET ORAL
Status: DISCONTINUED | OUTPATIENT
Start: 2023-09-17 | End: 2023-09-19 | Stop reason: HOSPADM

## 2023-09-16 RX ORDER — FUROSEMIDE 20 MG/1
20 TABLET ORAL DAILY
Status: DISCONTINUED | OUTPATIENT
Start: 2023-09-16 | End: 2023-09-19 | Stop reason: HOSPADM

## 2023-09-16 RX ORDER — OXYCODONE HYDROCHLORIDE 5 MG/1
5 TABLET ORAL EVERY 6 HOURS PRN
Status: DISCONTINUED | OUTPATIENT
Start: 2023-09-16 | End: 2023-09-18

## 2023-09-16 RX ORDER — ARIPIPRAZOLE 10 MG/1
20 TABLET ORAL DAILY
Status: DISCONTINUED | OUTPATIENT
Start: 2023-09-17 | End: 2023-09-19 | Stop reason: HOSPADM

## 2023-09-16 RX ORDER — HYDROXYZINE PAMOATE 25 MG/1
50 CAPSULE ORAL 4 TIMES DAILY PRN
Status: DISCONTINUED | OUTPATIENT
Start: 2023-09-16 | End: 2023-09-19 | Stop reason: HOSPADM

## 2023-09-16 RX ORDER — ALBUTEROL SULFATE 2.5 MG/3ML
2.5 SOLUTION RESPIRATORY (INHALATION) EVERY 4 HOURS PRN
Status: DISCONTINUED | OUTPATIENT
Start: 2023-09-16 | End: 2023-09-19 | Stop reason: HOSPADM

## 2023-09-16 RX ORDER — GAUZE BANDAGE 2" X 2"
100 BANDAGE TOPICAL DAILY
Status: DISCONTINUED | OUTPATIENT
Start: 2023-09-16 | End: 2023-09-19 | Stop reason: HOSPADM

## 2023-09-16 RX ORDER — CLINDAMYCIN PHOSPHATE 600 MG/50ML
600 INJECTION INTRAVENOUS EVERY 8 HOURS
Status: COMPLETED | OUTPATIENT
Start: 2023-09-17 | End: 2023-09-18

## 2023-09-16 RX ORDER — FENTANYL CITRATE 0.05 MG/ML
50 INJECTION, SOLUTION INTRAMUSCULAR; INTRAVENOUS ONCE
Status: COMPLETED | OUTPATIENT
Start: 2023-09-16 | End: 2023-09-16

## 2023-09-16 RX ORDER — LACTOBACILLUS RHAMNOSUS GG 10B CELL
1 CAPSULE ORAL DAILY
Status: DISCONTINUED | OUTPATIENT
Start: 2023-09-16 | End: 2023-09-19 | Stop reason: HOSPADM

## 2023-09-16 RX ORDER — ONDANSETRON 2 MG/ML
4 INJECTION INTRAMUSCULAR; INTRAVENOUS EVERY 6 HOURS PRN
Status: DISCONTINUED | OUTPATIENT
Start: 2023-09-16 | End: 2023-09-19 | Stop reason: HOSPADM

## 2023-09-16 RX ORDER — CALCIUM CARBONATE 500(1250)
500 TABLET ORAL DAILY
Status: DISCONTINUED | OUTPATIENT
Start: 2023-09-17 | End: 2023-09-19 | Stop reason: HOSPADM

## 2023-09-16 RX ORDER — ONDANSETRON 4 MG/1
4 TABLET, ORALLY DISINTEGRATING ORAL EVERY 8 HOURS PRN
Status: DISCONTINUED | OUTPATIENT
Start: 2023-09-16 | End: 2023-09-19 | Stop reason: HOSPADM

## 2023-09-16 RX ORDER — ARFORMOTEROL TARTRATE 15 UG/2ML
15 SOLUTION RESPIRATORY (INHALATION)
Status: DISCONTINUED | OUTPATIENT
Start: 2023-09-16 | End: 2023-09-19 | Stop reason: HOSPADM

## 2023-09-16 RX ORDER — FENTANYL CITRATE 0.05 MG/ML
25 INJECTION, SOLUTION INTRAMUSCULAR; INTRAVENOUS ONCE
Status: COMPLETED | OUTPATIENT
Start: 2023-09-16 | End: 2023-09-16

## 2023-09-16 RX ORDER — INSULIN GLARGINE 100 [IU]/ML
20 INJECTION, SOLUTION SUBCUTANEOUS 2 TIMES DAILY
Status: DISCONTINUED | OUTPATIENT
Start: 2023-09-16 | End: 2023-09-19 | Stop reason: HOSPADM

## 2023-09-16 RX ORDER — SPIRONOLACTONE 25 MG/1
25 TABLET ORAL DAILY
Status: DISCONTINUED | OUTPATIENT
Start: 2023-09-16 | End: 2023-09-19 | Stop reason: HOSPADM

## 2023-09-16 RX ORDER — PANTOPRAZOLE SODIUM 40 MG/1
40 TABLET, DELAYED RELEASE ORAL
Status: DISCONTINUED | OUTPATIENT
Start: 2023-09-17 | End: 2023-09-19 | Stop reason: HOSPADM

## 2023-09-16 RX ORDER — SENNA AND DOCUSATE SODIUM 50; 8.6 MG/1; MG/1
2 TABLET, FILM COATED ORAL 2 TIMES DAILY
Status: DISCONTINUED | OUTPATIENT
Start: 2023-09-16 | End: 2023-09-19 | Stop reason: HOSPADM

## 2023-09-16 RX ORDER — DEXTROSE MONOHYDRATE 100 MG/ML
INJECTION, SOLUTION INTRAVENOUS CONTINUOUS PRN
Status: DISCONTINUED | OUTPATIENT
Start: 2023-09-16 | End: 2023-09-19 | Stop reason: HOSPADM

## 2023-09-16 RX ORDER — LACTULOSE 10 G/15ML
10 SOLUTION ORAL 2 TIMES DAILY
Status: DISCONTINUED | OUTPATIENT
Start: 2023-09-16 | End: 2023-09-19 | Stop reason: HOSPADM

## 2023-09-16 RX ORDER — POLYETHYLENE GLYCOL 3350 17 G/17G
17 POWDER, FOR SOLUTION ORAL DAILY PRN
Status: DISCONTINUED | OUTPATIENT
Start: 2023-09-16 | End: 2023-09-19 | Stop reason: HOSPADM

## 2023-09-16 RX ORDER — LANOLIN ALCOHOL/MO/W.PET/CERES
3 CREAM (GRAM) TOPICAL NIGHTLY
Status: DISCONTINUED | OUTPATIENT
Start: 2023-09-16 | End: 2023-09-19 | Stop reason: HOSPADM

## 2023-09-16 RX ORDER — SODIUM CHLORIDE 9 MG/ML
INJECTION, SOLUTION INTRAVENOUS CONTINUOUS
Status: DISCONTINUED | OUTPATIENT
Start: 2023-09-16 | End: 2023-09-19 | Stop reason: HOSPADM

## 2023-09-16 RX ORDER — CLINDAMYCIN PHOSPHATE 900 MG/50ML
900 INJECTION INTRAVENOUS ONCE
Status: COMPLETED | OUTPATIENT
Start: 2023-09-16 | End: 2023-09-16

## 2023-09-16 RX ORDER — INSULIN LISPRO 100 [IU]/ML
0-4 INJECTION, SOLUTION INTRAVENOUS; SUBCUTANEOUS EVERY 4 HOURS
Status: DISCONTINUED | OUTPATIENT
Start: 2023-09-16 | End: 2023-09-17

## 2023-09-16 RX ORDER — SODIUM CHLORIDE 9 MG/ML
INJECTION, SOLUTION INTRAVENOUS PRN
Status: DISCONTINUED | OUTPATIENT
Start: 2023-09-16 | End: 2023-09-19 | Stop reason: HOSPADM

## 2023-09-16 RX ORDER — 0.9 % SODIUM CHLORIDE 0.9 %
1000 INTRAVENOUS SOLUTION INTRAVENOUS ONCE
Status: COMPLETED | OUTPATIENT
Start: 2023-09-16 | End: 2023-09-16

## 2023-09-16 RX ADMIN — INSULIN HUMAN 7 UNITS: 100 INJECTION, SOLUTION PARENTERAL at 16:03

## 2023-09-16 RX ADMIN — PIPERACILLIN AND TAZOBACTAM 4500 MG: 4; .5 INJECTION, POWDER, LYOPHILIZED, FOR SOLUTION INTRAVENOUS at 14:55

## 2023-09-16 RX ADMIN — OXYCODONE HYDROCHLORIDE 5 MG: 5 TABLET ORAL at 18:26

## 2023-09-16 RX ADMIN — FENTANYL CITRATE 25 MCG: 0.05 INJECTION, SOLUTION INTRAMUSCULAR; INTRAVENOUS at 13:25

## 2023-09-16 RX ADMIN — VANCOMYCIN HYDROCHLORIDE 1000 MG: 1 INJECTION, POWDER, LYOPHILIZED, FOR SOLUTION INTRAVENOUS at 18:47

## 2023-09-16 RX ADMIN — FENTANYL CITRATE 50 MCG: 0.05 INJECTION, SOLUTION INTRAMUSCULAR; INTRAVENOUS at 15:34

## 2023-09-16 RX ADMIN — CLINDAMYCIN PHOSPHATE 900 MG: 900 INJECTION, SOLUTION INTRAVENOUS at 16:03

## 2023-09-16 RX ADMIN — SODIUM CHLORIDE: 9 INJECTION, SOLUTION INTRAVENOUS at 18:02

## 2023-09-16 RX ADMIN — SODIUM CHLORIDE 1000 ML: 9 INJECTION, SOLUTION INTRAVENOUS at 13:25

## 2023-09-16 RX ADMIN — INSULIN GLARGINE 20 UNITS: 100 INJECTION, SOLUTION SUBCUTANEOUS at 23:18

## 2023-09-16 ASSESSMENT — PAIN DESCRIPTION - LOCATION
LOCATION: ABDOMEN;BUTTOCKS
LOCATION: ABDOMEN;BUTTOCKS

## 2023-09-16 ASSESSMENT — PAIN SCALES - GENERAL
PAINLEVEL_OUTOF10: 10
PAINLEVEL_OUTOF10: 9
PAINLEVEL_OUTOF10: 7
PAINLEVEL_OUTOF10: 10

## 2023-09-16 ASSESSMENT — PAIN DESCRIPTION - DESCRIPTORS: DESCRIPTORS: SHARP;STABBING;ACHING

## 2023-09-16 ASSESSMENT — PAIN - FUNCTIONAL ASSESSMENT: PAIN_FUNCTIONAL_ASSESSMENT: 0-10

## 2023-09-16 NOTE — ED NOTES
Pt incontinent stool/urine. Pt cleaned and placed in clean gown.       [address]  09/16/23 6628

## 2023-09-16 NOTE — ED PROVIDER NOTES
solution 0.5 mg (0.5 mg Nebulization Not Given 9/17/23 0937)   lipase-protease-amylas (ZENPEP 15,000) delayed release capsule 30,000 Units (has no administration in time range)   insulin lispro (HUMALOG) injection vial 0-4 Units ( SubCUTAneous Not Given 9/17/23 0801)   sodium chloride 0.9 % bolus 1,000 mL (0 mLs IntraVENous Stopped 9/16/23 1534)   fentaNYL (SUBLIMAZE) injection 25 mcg (25 mcg IntraVENous Given 9/16/23 1325)   clindamycin (CLEOCIN) 900 mg in dextrose 5 % 50 mL IVPB (0 mg IntraVENous Stopped 9/16/23 1707)   piperacillin-tazobactam (ZOSYN) 4,500 mg in sodium chloride 0.9 % 100 mL IVPB ([id number]) (0 mg IntraVENous Stopped 9/16/23 1534)   vancomycin (VANCOCIN) 1,000 mg in sodium chloride 0.9 % 250 mL IVPB ([id number]) (1,000 mg IntraVENous New Bag 9/16/23 1847)   fentaNYL (SUBLIMAZE) injection 50 mcg (50 mcg IntraVENous Given 9/16/23 1534)   insulin regular (HUMULIN R;NOVOLIN R) injection 7 Units (7 Units SubCUTAneous Given 9/16/23 1603)       Medical Decision Making/Differential Diagnosis:    CC/HPI Summary, Social Determinants of health, Records Reviewed, DDx, testing done/not done, ED Course, Reassessment, disposition considerations/shared decision making with patient, consults, disposition:        ED Course as of 09/17/23 0806   Sat Sep 16, 2023   1508 EKG Interpretation  Interpreted by emergency department physician, Dr. Nemiah Merlin: normal sinus   Rate: normal  Axis: normal  Ectopy: none  Conduction: normal  ST Segments: no acute change  T Waves: no acute change  Q Waves: none    Clinical Impression: no acute changes   [HH]   1508 Spoke with Dr. Vinicio Castillo who will take a look at her CT scan. He has less convinced of Cheryle's secondary to her normal lactic acid. [HH]      ED Course User Index  [HH] [facility] Lisandra Brooke,        Medical Decision Making  Amount and/or Complexity of Data Reviewed  Labs: ordered. Radiology: ordered. ECG/medicine tests: ordered. Risk  OTC drugs.   Prescription drug deterioration of the following system(s):   Metabolic, which the patient had and/or has a high probability of suddenly developing. Critical care time excludes separately billed procedures. Pham Duong DO             I am the Primary Clinician of Record. FINAL IMPRESSION      1. Cheryle gangrene    2. Hyperglycemia          DISPOSITION/PLAN     DISPOSITION Admitted 09/16/2023 04:16:35 PM      PATIENT REFERRED TO:  No follow-up provider specified.     DISCHARGE MEDICATIONS:  Current Discharge Medication List          DISCONTINUED MEDICATIONS:  Current Discharge Medication List                 (Please note that portions of this note were completed with a voice recognition program.  Efforts were made to edit the dictations but occasionally words are mis-transcribed.)    Genevieve Albright DO (electronically signed)            Genevieve Albright DO  09/17/23 8768

## 2023-09-16 NOTE — H&P
[facility]   [facility] and [facility]      CHIEF COMPLAINT: Abdominal pain    History of Present Illness:  48 y.o. female with a history of chronic hepatitis C with cirrhosis and ascites, alcohol abuse, cocaine abuse, diabetes mellitus on long-term insulin, breast cancer, bipolar disorder presenting with above complaints. At this time she is awake, alert, but appears very fatigued and provides little history. Discharged from this facility on 9/14/2023 for being treated for enterocolitis. Prior to that hospitalization, patient had a perianal abscess with a drain in place, drain was in place during prior hospitalization and removed. Since discharge, she still significant abdominal pain, nausea, vomiting. In the ED here, blood pressure low normal range. Blood glucose very elevated but without evidence of DKA or HHS. 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 Informant(s) for H&P: Patient, ED physician, chart review    REVIEW OF SYSTEMS:  no fevers, chills, cp, sob, n/v, ha, vision/hearing changes, wt changes, hot/cold flashes, other open skin lesions, diarrhea, constipation, dysuria/hematuria unless noted in HPI. Complete ROS performed with the patient and is otherwise negative.       PMH:  Past Medical History:   Diagnosis Date    Alcoholism ([address])     Since teens to early 25s    Anxiety and depression     Ascites of liver     Bronchitis     Cancer ([address])     breast, left    Candidiasis of vagina     Chronic pancreatitis ([address])     Cocaine abuse ([address])     Constipation     Diabetes mellitus, type 2 (HCC)     GERD (gastroesophageal reflux disease)     Gout     Hepatitis C     Hernia of anterior abdominal wall     Jaundice 05/12/2016    Pneumonia     Polyneuropathy due to type 2 diabetes mellitus ([address])     Schizoaffective disorder, bipolar type ([address])     Splenomegaly 05/30/2017 The complex abscess covers an area of approximately 12 cm in the anterior to posterior orientation x 4 cm in the transverse orientation x 3 cm in the superior to inferior orientation. In addition there spread of air in the subcutaneous soft tissues along the left major labia region and more anteriorly around the anterior aspect of the vagina crossing the midline in the infra pubic region. These additional spread of air can indicated necrotizing fasciitis complicated the previous complex abscess. Lower lung bases demonstrate findings. The lungs well distended. No infiltrates, consolidation or areas of atelectasis are seen. Visualized bone structures demonstrate findings. 1.  As seen previously there advanced stage for liver cirrhosis with portal hypertension splenomegaly and ascites, recurrent following recent paracentesis. 2.  Findings of chronic calcified not active pancreatitis, with a 5 x 3 mm calculus obstructing the pancreatic duct at the level of the neck of the pancreas, these are chronic findings. 3.  Present there is a well distended bladder which can represent urinary bladder retention. Consider emptying of the bladder or decompression bladder. 4.  Pattern for pan enterocolitis as above described. 5.  Complex 12 x 4 x 3 cm multi loculated/multi cavitary abscess formation in the left ischial rectal fossa/inferior inner medial aspect of the left gluteal region/upper inner aspect of the left thigh as observed previous. 6.  No longer seen the percutaneous catheter that was drained the abscess on the study of September 9. 7.  Further progression of subcutaneous air densities extending along the left major labia into the anterior wall of the vagina,across the midline in the infrapubic region. This spread subcutaneous air can indicate a component of necrotizing fasciitis. Preliminary report given to Dr. Jaya Lopze, ER physician in [facility] at the time of the interpretation.        EKG: Normal sinus

## 2023-09-16 NOTE — ED NOTES
Report given to this RN from ENRICO Tanner. Reported that she was unable to obtain second blood culture.      Enzo Rock RN  09/16/23 1958

## 2023-09-16 NOTE — PROGRESS NOTES
Pharmacy Consultation Note  (Antibiotic Dosing and Monitoring)    Initial consult date: 9/16/23  Consulting physician/provider: Dr Christa Prader  Drug: Vancomycin  Indication: SSTI    Age/  Gender Height Weight IBW  Allergy Information   53 y.o./female 5' 1\" (154.9 cm) 100 lb (45.4 kg)     Ideal body weight: 52.1 kg (114 lb 15 oz)   Dye [iodides] and Tylenol [acetaminophen]      Renal Function:  Recent Labs     09/14/23  1102 09/16/23  1100   BUN 11 11   CREATININE 0.4* 0.4*     No intake or output data in the 24 hours ending 09/16/23 1702    Vancomycin Monitoring:  Trough:  No results for input(s): \"VANCOTROUGH\" in the last 72 hours. Random:  No results for input(s): \"VANCORANDOM\" in the last 72 hours. No results for input(s): \"BLOODCULT2\" in the last 72 hours. Historical Cultures:  Organism   Date Value Ref Range Status   05/21/2023 Klebsiella pneumoniae ssp pneumoniae (A)  Final     No results for input(s): \"BC\" in the last 72 hours. Vancomycin Administration Times:  Recent vancomycin administrations        No vancomycin IV orders with administrations found. Orders not given:            vancomycin (VANCOCIN) 1,000 mg in sodium chloride 0.9 % 250 mL IVPB ([id number])               Assessment:  Patient is a 48 y.o. female who has been initiated on vancomycin  Estimated Creatinine Clearance: 117 mL/min (A) (based on SCr of 0.4 mg/dL (L)).   To dose vancomycin, pharmacy will be utilizing [employer]respot calculation software for goal AUC/STEPHAN 400-600 mg/L-hr (predicted AUC/STEPHAN = 510, Tr =15.1)    Plan:  Vancomycin 750 mg IV every 8 hours  Check levels when appropriate  Will continue to monitor renal function   Pharmacy to follow      Shala Young PharmD 9/16/2023 5:03 PM   [phone]

## 2023-09-17 PROBLEM — N49.3 FOURNIER GANGRENE: Status: ACTIVE | Noted: 2023-09-17

## 2023-09-17 LAB
ALBUMIN SERPL-MCNC: 2.6 G/DL (ref 3.5–5.2)
ALBUMIN SERPL-MCNC: 2.8 G/DL (ref 3.5–5.2)
ALP SERPL-CCNC: 462 U/L (ref 35–104)
ALP SERPL-CCNC: 467 U/L (ref 35–104)
ALT SERPL-CCNC: 23 U/L (ref 0–32)
ALT SERPL-CCNC: 25 U/L (ref 0–32)
ANION GAP SERPL CALCULATED.3IONS-SCNC: 6 MMOL/L (ref 7–16)
ANION GAP SERPL CALCULATED.3IONS-SCNC: 7 MMOL/L (ref 7–16)
AST SERPL-CCNC: 28 U/L (ref 0–31)
AST SERPL-CCNC: 31 U/L (ref 0–31)
BILIRUB SERPL-MCNC: 0.4 MG/DL (ref 0–1.2)
BILIRUB SERPL-MCNC: 0.4 MG/DL (ref 0–1.2)
BUN SERPL-MCNC: 7 MG/DL (ref 6–20)
BUN SERPL-MCNC: 8 MG/DL (ref 6–20)
CALCIUM SERPL-MCNC: 8.1 MG/DL (ref 8.6–10.2)
CALCIUM SERPL-MCNC: 8.2 MG/DL (ref 8.6–10.2)
CHLORIDE SERPL-SCNC: 102 MMOL/L (ref 98–107)
CHLORIDE SERPL-SCNC: 103 MMOL/L (ref 98–107)
CO2 SERPL-SCNC: 27 MMOL/L (ref 22–29)
CO2 SERPL-SCNC: 28 MMOL/L (ref 22–29)
CREAT SERPL-MCNC: 0.3 MG/DL (ref 0.5–1)
CREAT SERPL-MCNC: 0.4 MG/DL (ref 0.5–1)
GFR SERPL CREATININE-BSD FRML MDRD: >60 ML/MIN/1.73M2
GFR SERPL CREATININE-BSD FRML MDRD: >60 ML/MIN/1.73M2
GLUCOSE BLD-MCNC: 105 MG/DL (ref 74–99)
GLUCOSE BLD-MCNC: 220 MG/DL (ref 74–99)
GLUCOSE BLD-MCNC: 273 MG/DL (ref 74–99)
GLUCOSE BLD-MCNC: 414 MG/DL (ref 74–99)
GLUCOSE BLD-MCNC: 440 MG/DL (ref 74–99)
GLUCOSE BLD-MCNC: 78 MG/DL (ref 74–99)
GLUCOSE SERPL-MCNC: 160 MG/DL (ref 74–99)
GLUCOSE SERPL-MCNC: 236 MG/DL (ref 74–99)
MAGNESIUM SERPL-MCNC: 1.7 MG/DL (ref 1.6–2.6)
PHOSPHATE SERPL-MCNC: 3 MG/DL (ref 2.5–4.5)
POTASSIUM SERPL-SCNC: 4.4 MMOL/L (ref 3.5–5)
POTASSIUM SERPL-SCNC: 4.6 MMOL/L (ref 3.5–5)
PROT SERPL-MCNC: 5.4 G/DL (ref 6.4–8.3)
PROT SERPL-MCNC: 5.6 G/DL (ref 6.4–8.3)
SODIUM SERPL-SCNC: 136 MMOL/L (ref 132–146)
SODIUM SERPL-SCNC: 137 MMOL/L (ref 132–146)

## 2023-09-17 PROCEDURE — 6360000002 HC RX W HCPCS: Performed by: INTERNAL MEDICINE

## 2023-09-17 PROCEDURE — 99233 SBSQ HOSP IP/OBS HIGH 50: CPT | Performed by: INTERNAL MEDICINE

## 2023-09-17 PROCEDURE — 87040 BLOOD CULTURE FOR BACTERIA: CPT

## 2023-09-17 PROCEDURE — 36415 COLL VENOUS BLD VENIPUNCTURE: CPT

## 2023-09-17 PROCEDURE — 2060000000 HC ICU INTERMEDIATE R&B

## 2023-09-17 PROCEDURE — 82962 GLUCOSE BLOOD TEST: CPT

## 2023-09-17 PROCEDURE — 2580000003 HC RX 258: Performed by: INTERNAL MEDICINE

## 2023-09-17 PROCEDURE — 83735 ASSAY OF MAGNESIUM: CPT

## 2023-09-17 PROCEDURE — 6370000000 HC RX 637 (ALT 250 FOR IP): Performed by: INTERNAL MEDICINE

## 2023-09-17 PROCEDURE — 80053 COMPREHEN METABOLIC PANEL: CPT

## 2023-09-17 PROCEDURE — 6370000000 HC RX 637 (ALT 250 FOR IP): Performed by: FAMILY MEDICINE

## 2023-09-17 PROCEDURE — 84100 ASSAY OF PHOSPHORUS: CPT

## 2023-09-17 PROCEDURE — 99253 IP/OBS CNSLTJ NEW/EST LOW 45: CPT | Performed by: SURGERY

## 2023-09-17 RX ORDER — INSULIN LISPRO 100 [IU]/ML
4 INJECTION, SOLUTION INTRAVENOUS; SUBCUTANEOUS ONCE
Status: COMPLETED | OUTPATIENT
Start: 2023-09-17 | End: 2023-09-17

## 2023-09-17 RX ORDER — INSULIN LISPRO 100 [IU]/ML
0-4 INJECTION, SOLUTION INTRAVENOUS; SUBCUTANEOUS
Status: DISCONTINUED | OUTPATIENT
Start: 2023-09-17 | End: 2023-09-19 | Stop reason: HOSPADM

## 2023-09-17 RX ORDER — INSULIN LISPRO 100 [IU]/ML
8 INJECTION, SOLUTION INTRAVENOUS; SUBCUTANEOUS ONCE
Status: COMPLETED | OUTPATIENT
Start: 2023-09-17 | End: 2023-09-17

## 2023-09-17 RX ORDER — NICOTINE 21 MG/24HR
1 PATCH, TRANSDERMAL 24 HOURS TRANSDERMAL DAILY
Status: DISCONTINUED | OUTPATIENT
Start: 2023-09-17 | End: 2023-09-19 | Stop reason: HOSPADM

## 2023-09-17 RX ORDER — INSULIN LISPRO 100 [IU]/ML
0-4 INJECTION, SOLUTION INTRAVENOUS; SUBCUTANEOUS NIGHTLY
Status: DISCONTINUED | OUTPATIENT
Start: 2023-09-17 | End: 2023-09-19 | Stop reason: HOSPADM

## 2023-09-17 RX ADMIN — VANCOMYCIN HYDROCHLORIDE 750 MG: 5 INJECTION, POWDER, LYOPHILIZED, FOR SOLUTION INTRAVENOUS at 03:04

## 2023-09-17 RX ADMIN — CLINDAMYCIN PHOSPHATE 600 MG: 600 INJECTION, SOLUTION INTRAVENOUS at 08:11

## 2023-09-17 RX ADMIN — VANCOMYCIN HYDROCHLORIDE 750 MG: 5 INJECTION, POWDER, LYOPHILIZED, FOR SOLUTION INTRAVENOUS at 21:49

## 2023-09-17 RX ADMIN — PANCRELIPASE LIPASE, PANCRELIPASE PROTEASE, PANCRELIPASE AMYLASE 30000 UNITS: 15000; 47000; 63000 CAPSULE, DELAYED RELEASE ORAL at 11:47

## 2023-09-17 RX ADMIN — GABAPENTIN 400 MG: 400 CAPSULE ORAL at 09:52

## 2023-09-17 RX ADMIN — VORTIOXETINE 20 MG: 10 TABLET, FILM COATED ORAL at 21:28

## 2023-09-17 RX ADMIN — INSULIN LISPRO 1 UNITS: 100 INJECTION, SOLUTION INTRAVENOUS; SUBCUTANEOUS at 05:22

## 2023-09-17 RX ADMIN — INSULIN LISPRO 4 UNITS: 100 INJECTION, SOLUTION INTRAVENOUS; SUBCUTANEOUS at 22:20

## 2023-09-17 RX ADMIN — CLINDAMYCIN PHOSPHATE 600 MG: 600 INJECTION, SOLUTION INTRAVENOUS at 00:37

## 2023-09-17 RX ADMIN — INSULIN GLARGINE 20 UNITS: 100 INJECTION, SOLUTION SUBCUTANEOUS at 21:30

## 2023-09-17 RX ADMIN — INSULIN LISPRO 4 UNITS: 100 INJECTION, SOLUTION INTRAVENOUS; SUBCUTANEOUS at 21:30

## 2023-09-17 RX ADMIN — SODIUM CHLORIDE, PRESERVATIVE FREE 10 ML: 5 INJECTION INTRAVENOUS at 21:30

## 2023-09-17 RX ADMIN — OXYCODONE HYDROCHLORIDE 5 MG: 5 TABLET ORAL at 23:19

## 2023-09-17 RX ADMIN — PANCRELIPASE LIPASE, PANCRELIPASE PROTEASE, PANCRELIPASE AMYLASE 30000 UNITS: 15000; 47000; 63000 CAPSULE, DELAYED RELEASE ORAL at 09:53

## 2023-09-17 RX ADMIN — PIPERACILLIN AND TAZOBACTAM 3375 MG: 3; .375 INJECTION, POWDER, LYOPHILIZED, FOR SOLUTION INTRAVENOUS at 23:21

## 2023-09-17 RX ADMIN — ENOXAPARIN SODIUM 30 MG: 100 INJECTION SUBCUTANEOUS at 09:53

## 2023-09-17 RX ADMIN — PANCRELIPASE LIPASE, PANCRELIPASE PROTEASE, PANCRELIPASE AMYLASE 30000 UNITS: 15000; 47000; 63000 CAPSULE, DELAYED RELEASE ORAL at 16:42

## 2023-09-17 RX ADMIN — PIPERACILLIN AND TAZOBACTAM 3375 MG: 3; .375 INJECTION, POWDER, LYOPHILIZED, FOR SOLUTION INTRAVENOUS at 00:35

## 2023-09-17 RX ADMIN — MULTIPLE VITAMINS W/ MINERALS TAB 1 TABLET: TAB at 09:52

## 2023-09-17 RX ADMIN — HYDROXYZINE PAMOATE 50 MG: 25 CAPSULE ORAL at 21:28

## 2023-09-17 RX ADMIN — PIPERACILLIN AND TAZOBACTAM 3375 MG: 3; .375 INJECTION, POWDER, LYOPHILIZED, FOR SOLUTION INTRAVENOUS at 14:37

## 2023-09-17 RX ADMIN — GABAPENTIN 400 MG: 400 CAPSULE ORAL at 21:28

## 2023-09-17 RX ADMIN — CALCIUM 500 MG: 500 TABLET ORAL at 09:51

## 2023-09-17 RX ADMIN — RIFAXIMIN 400 MG: 200 TABLET ORAL at 14:33

## 2023-09-17 RX ADMIN — Medication 3 MG: at 21:28

## 2023-09-17 RX ADMIN — CLINDAMYCIN PHOSPHATE 600 MG: 600 INJECTION, SOLUTION INTRAVENOUS at 15:30

## 2023-09-17 RX ADMIN — INSULIN LISPRO 8 UNITS: 100 INJECTION, SOLUTION INTRAVENOUS; SUBCUTANEOUS at 17:00

## 2023-09-17 RX ADMIN — SODIUM CHLORIDE, PRESERVATIVE FREE 10 ML: 5 INJECTION INTRAVENOUS at 09:53

## 2023-09-17 RX ADMIN — VANCOMYCIN HYDROCHLORIDE 750 MG: 5 INJECTION, POWDER, LYOPHILIZED, FOR SOLUTION INTRAVENOUS at 09:09

## 2023-09-17 RX ADMIN — OXYCODONE HYDROCHLORIDE 5 MG: 5 TABLET ORAL at 17:01

## 2023-09-17 RX ADMIN — PIPERACILLIN AND TAZOBACTAM 3375 MG: 3; .375 INJECTION, POWDER, LYOPHILIZED, FOR SOLUTION INTRAVENOUS at 06:42

## 2023-09-17 RX ADMIN — Medication 1 CAPSULE: at 09:52

## 2023-09-17 RX ADMIN — GABAPENTIN 400 MG: 400 CAPSULE ORAL at 14:33

## 2023-09-17 RX ADMIN — RIFAXIMIN 400 MG: 200 TABLET ORAL at 21:28

## 2023-09-17 RX ADMIN — ARIPIPRAZOLE 20 MG: 10 TABLET ORAL at 09:52

## 2023-09-17 RX ADMIN — RIFAXIMIN 400 MG: 200 TABLET ORAL at 09:52

## 2023-09-17 RX ADMIN — Medication 100 MG: at 09:52

## 2023-09-17 ASSESSMENT — PAIN SCALES - GENERAL
PAINLEVEL_OUTOF10: 8
PAINLEVEL_OUTOF10: 7

## 2023-09-17 ASSESSMENT — PAIN DESCRIPTION - DESCRIPTORS
DESCRIPTORS: TENDER;THROBBING;SORE
DESCRIPTORS: ACHING

## 2023-09-17 ASSESSMENT — PAIN DESCRIPTION - LOCATION
LOCATION: BUTTOCKS;PERINEUM
LOCATION: BUTTOCKS

## 2023-09-17 ASSESSMENT — PAIN - FUNCTIONAL ASSESSMENT: PAIN_FUNCTIONAL_ASSESSMENT: ACTIVITIES ARE NOT PREVENTED

## 2023-09-17 ASSESSMENT — PAIN DESCRIPTION - ORIENTATION: ORIENTATION: INNER

## 2023-09-17 NOTE — PLAN OF CARE
Problem: Discharge Planning  Goal: Discharge to home or other facility with appropriate resources  9/17/2023 1058 by Sherif Mcgee RN  Outcome: Progressing  9/17/2023 0344 by Gaurav Negron RN  Outcome: Progressing     Problem: Pain  Goal: Verbalizes/displays adequate comfort level or baseline comfort level  9/17/2023 1058 by Sherif Mcgee RN  Outcome: Progressing  9/17/2023 0344 by Gaurav Negron RN  Outcome: Progressing     Problem: Skin/Tissue Integrity  Goal: Absence of new skin breakdown  Description: 1. Monitor for areas of redness and/or skin breakdown  2. Assess vascular access sites hourly  3. Every 4-6 hours minimum:  Change oxygen saturation probe site  4. Every 4-6 hours:  If on nasal continuous positive airway pressure, respiratory therapy assess nares and determine need for appliance change or resting period.   9/17/2023 1058 by Sherif Mcgee RN  Outcome: Progressing  9/17/2023 0344 by Gaurav Negron RN  Outcome: Progressing     Problem: Safety - Adult  Goal: Free from fall injury  9/17/2023 1058 by Sherif Mcgee RN  Outcome: Progressing  9/17/2023 0344 by Gaurav Negron RN  Outcome: Progressing     Problem: ABCDS Injury Assessment  Goal: Absence of physical injury  9/17/2023 1058 by Sherif Mcgee RN  Outcome: Progressing  9/17/2023 0344 by Gaurav Negron RN  Outcome: Progressing

## 2023-09-17 NOTE — PROGRESS NOTES
Pharmacy Consultation Note  (Antibiotic Dosing and Monitoring)    Initial consult date: 9/16/23  Consulting physician/provider: Dr Juvenal Osorio  Drug: Vancomycin  Indication: SSTI    Age/  Gender Height Weight IBW  Allergy Information   53 y.o./female 5' 1\" (154.9 cm) 100 lb (45.4 kg)     Ideal body weight: 52.1 kg (114 lb 15 oz)   Dye [iodides] and Tylenol [acetaminophen]      Renal Function:  Recent Labs     09/16/23  1100 09/17/23  0531 09/17/23  0642   BUN 11 7 8   CREATININE 0.4* 0.3* 0.4*         Intake/Output Summary (Last 24 hours) at 9/17/2023 1530  Last data filed at 9/17/2023 1320  Gross per 24 hour   Intake 180 ml   Output --   Net 180 ml       Vancomycin Monitoring:  Trough:  No results for input(s): \"VANCOTROUGH\" in the last 72 hours. Random:  No results for input(s): \"VANCORANDOM\" in the last 72 hours. No results for input(s): \"BLOODCULT2\" in the last 72 hours. Historical Cultures:  Organism   Date Value Ref Range Status   05/21/2023 Klebsiella pneumoniae ssp pneumoniae (A)  Final     No results for input(s): \"BC\" in the last 72 hours. Vancomycin Administration Times:  Recent vancomycin administrations                     vancomycin (VANCOCIN) 750 mg in sodium chloride 0.9 % 250 mL IVPB (mg) 750 mg New Bag 09/17/23 0909     750 mg New Bag  0304    vancomycin (VANCOCIN) 1,000 mg in sodium chloride 0.9 % 250 mL IVPB ([id number]) (mg) 1,000 mg New Bag 09/16/23 1847               Assessment:  Patient is a 48 y.o. female who has been initiated on vancomycin  Estimated Creatinine Clearance: 117 mL/min (A) (based on SCr of 0.4 mg/dL (L)).   To dose vancomycin, pharmacy will be utilizing [employer] calculation software for goal AUC/STEPHAN 400-600 mg/L-hr (predicted AUC/STEPHAN = 510, Tr =15.1)    Plan:  Vancomycin 750 mg IV every 8 hours  Random level tomorrow morning  Will continue to monitor renal function   Pharmacy to follow      Nano Mcneil PharmD 9/17/2023 3:30 PM   [phone]

## 2023-09-17 NOTE — ED NOTES
Attempted to call report. No answer after multiple rings.   Will attempt again in 13 minutes       Za Núñez RN  09/16/23 9132

## 2023-09-17 NOTE — CONSULTS
Enterocolitis    Perineal abscess       Plan:  Continue antibiotics per infectious disease  We will proceed with I&D with repacking of the cavity likely in OR tomorrow 9/18/2023  Okay for diet  N.p.o. after midnight  -The procedure, risks, benefits and alternatives were discussed with patient. she   agrees to proceed.     Electronically signed by Yasmany Rios MD on 9/17/2023 at 10:03 AM

## 2023-09-17 NOTE — PROGRESS NOTES
[facility] Hospitalist   Progress Note    Admitting Date and Time: 9/16/2023 10:38 AM  Admit Dx: Valerie Khalil gangrene [N49.3]  Hyperglycemia [R73.9]    Subjective/interval history:    Pt admitted yesterday with concern for Cheryle's gangrene, CT of the abdomen pelvis showing gas in the perineum. She recently had abscess with drain placement. Admit to stepdown floor and started broad-spectrum IV antibiotics. This morning she is awake, alert, asking for something to eat and drink, as well as nicotine patch. Has abdominal pain, consistent with yesterday. ROS: denies fever, chills, cp, sob, n/v, HA unless stated above.      ARIPiprazole  20 mg Oral Daily    [Held by provider] furosemide  20 mg Oral Daily    gabapentin  400 mg Oral TID    insulin glargine  20 Units SubCUTAneous BID    lactobacillus  1 capsule Oral Daily    lactulose  10 g Oral BID    melatonin  3 mg Oral Nightly    therapeutic multivitamin-minerals  1 tablet Oral Daily with breakfast    pantoprazole  40 mg Oral QAM AC    rifAXIMin  400 mg Oral TID    sennosides-docusate sodium  2 tablet Oral BID    [Held by provider] spironolactone  25 mg Oral Daily    VORTIoxetine  20 mg Oral Nightly    vitamin B-1  100 mg Oral Daily    sodium chloride flush  5-40 mL IntraVENous 2 times per day    enoxaparin  30 mg SubCUTAneous Daily    clindamycin (CLEOCIN) IV  600 mg IntraVENous Q8H    piperacillin-tazobactam  3,375 mg IntraVENous Q8H    vancomycin  750 mg IntraVENous Q8H    arformoterol tartrate  15 mcg Nebulization BID RT    And    budesonide  0.5 mg Nebulization BID RT    calcium elemental  500 mg Oral Daily    ipratropium  0.5 mg Nebulization 4x Daily RT    lipase-protease-amylas  30,000 Units Oral TID     insulin lispro  0-4 Units SubCUTAneous Q4H     albuterol, 2.5 mg, Q4H PRN  hydrOXYzine pamoate, 50 mg, 4x Daily PRN  glucose, 4 tablet, PRN  dextrose bolus, 125 mL, PRN   Or  dextrose bolus, 250 mL, PRN  glucagon (rDNA), 1 mg, problems. *    Perineal abscess  -CT images reviewed. Small areas of gas in the perineum and labia majora. Gas on CT likely due to prior incision in the setting of draining abscess. Concern for Cheryle's gangrene on imaging, however clinical picture at this time is not consistent with any gangrene  -Continue IV fluids normal saline at 60 mL/h  -Blood cultures in process  -Advance diet today, n.p.o. after midnight tonight for possible incision and drainage 9/8  -Continue IV vancomycin, Zosyn, clindamycin. Will consult infectious disease for further recommendations      2. Uncontrolled type II diabetes mellitus with hyperglycemia   -Blood glucose severely elevated on admission, but no evidence of DKA or HHS  -Blood glucose improved this morning. As she will not have surgical dimension today, will advance to carbohydrate controlled diet. Discontinue every 4 hour corrective scale, and add corrective scale with meals in the evening. Continue basal insulin  -Hypoglycemia protocol orders placed     3. Enterocolitis  -Recently discharged on oral antibiotics. We will hold these, antimicrobial coverage as noted above     4. Cirrhosis with ascites  -Cannot completely executed SBP at this time. IR unavailable for paracentesis over the weekend. On appropriate antibiotic coverage as noted above     5. History of breast cancer  -Hold anastrozole while inpatient     6. Bipolar disorder/schizoaffective disorder  -Continue Abilify and Trintellix     7. COPD  -No acute exacerbation  -Continue ICS/LABA and as needed albuterol     8. Chronic pancreatitis  -resume Creon     9. Tobacco abuse  -Nicotine patch 21 mg      Code Status: Full code  DVT prophylaxis: Enoxaparin     Disposition: Anticipate several days inpatient treatment monitor prior discharge    Discussed with Dr. Marek Sandoval of general surgery at bedside     NOTE: This report was transcribed using voice recognition software.  Every effort was made to ensure accuracy;

## 2023-09-17 NOTE — PROGRESS NOTES
4 Eyes Skin Assessment     NAME:  Felix Palomo  YOB: 1970  MEDICAL RECORD NUMBER:  [id number]    The patient is being assessed for  Admission    I agree that at least one RN has performed a thorough Head to Toe Skin Assessment on the patient. ALL assessment sites listed below have been assessed. Areas assessed by both nurses:    Head, Face, Ears, Shoulders, Back, Chest, Arms, Elbows, Hands, Sacrum. Buttock, Coccyx, Ischium, and Legs. Feet and Heels        Does the Patient have a Wound? Yes wound(s) were present on assessment.  LDA wound assessment was Initiated and completed by RN       Abe Prevention initiated by RN: Yes  Wound Care Orders initiated by RN: Yes    Pressure Injury (Stage 3,4, Unstageable, DTI, NWPT, and Complex wounds) if present, place Wound referral order by RN under : No    New Ostomies, if present place, Ostomy referral order under : No     Nurse 1 eSignature: Electronically signed by Katherine Curry RN on 9/17/23 at 3:46 AM EDT    **SHARE this note so that the co-signing nurse can place an eSignature**    Nurse 2 eSignature: Electronically signed by Carl Roblero RN on 9/17/23 at 3:48 AM EDT

## 2023-09-17 NOTE — HOME CARE
Patient was pending for [facility] admission prior to this hospitalization. We will need new orders if applicable. Will follow.   Tanisha Virk [facility]

## 2023-09-17 NOTE — CONSULTS
Consult Note            Date:9/17/2023        Patient Regena Snellen     YOB: 1970     Age:53 y.o. Inpatient consult to Infectious Diseases  Consult performed by: Sabi Salinas MD  Consult ordered by: Aki Bowser DO          Chief Complaint     Chief Complaint   Patient presents with    Abdominal Pain          History Obtained From   patient, electronic medical record    History of Present Illness   Indiana Humphrey is a 48 y.o. female who  has a past medical history of Alcoholism ([address]), Anxiety and depression, Ascites of liver, Bronchitis, Cancer ([address]), Candidiasis of vagina, Chronic pancreatitis ([address]), Cocaine abuse ([address]), Constipation, Diabetes mellitus, type 2 ([address]), GERD (gastroesophageal reflux disease), Gout, Hepatitis C, Hernia of anterior abdominal wall, Jaundice, Pneumonia, Polyneuropathy due to type 2 diabetes mellitus ([address]), Schizoaffective disorder, bipolar type ([address]), and Splenomegaly. Pt is known to ID service   Last seen on 8/30   Perirectal abscess s/p I &D   Trichomoniasis   Pancytopenia Hep C infection ( chronic ) , liver cirrhosis   8/28 perineal wound cx K pneumoniae cONS/lactobacillus   D/c with    Augmentin XR 2 grams q 12 hrs x 1 week   Flagyl 500 mg po q 12 hrs x 4 days     Pt was recently d/c on 9/14  Enterocolitis d/c with cefdinir /flagyl     Pt comes in now with abdominal pain, nausea, vomiting. Afebrile cr0.4 wbc3.1   Pt in bed has no c/o f/c/nv/d on her side has d/c from her wound   No issues withatbx   Lives with boyfriend     CT ABDOMEN PELVIS WO CONTRAST Additional Contrast? None    Result Date: 9/16/2023  1. As seen previously there advanced stage for liver cirrhosis with portal hypertension splenomegaly and ascites, recurrent following recent paracentesis. 2.  Findings of chronic calcified not active pancreatitis, with a 5 x 3 mm calculus obstructing the pancreatic duct at the level of the neck of the pancreas, these are chronic findings.  3. Results       Procedure Component Value Units Date/Time    Culture, Blood 2 [[id number]] Collected: 09/17/23 0530    Order Status: Completed Specimen: Blood Updated: 09/17/23 1028     Specimen Description . BLOOD . ARM     Special Requests          Culture NO GROWTH <24 HRS    Culture, Blood 1 [[id number]] Collected: 09/16/23 1700    Order Status: Sent Specimen: Blood Updated: 09/16/23 1821    Culture, Body Fluid [[id number]] Collected: 09/13/23 0910    Order Status: Completed Specimen: Body Fluid from Ascitic Fluid Updated: 09/16/23 0615     Specimen Description . ASCITIC FLUID     Direct Exam Gram stain performed on unspun fluid. NO Polymorphonuclear leukocytes      NO EPITHELIAL CELLS      NO ORGANISMS SEEN     Culture NO GROWTH    Culture, Stool [[id number]]     Order Status: No result Specimen: Stool     GIARDIA ANTIGEN [[id number]]     Order Status: No result Specimen: Stool     Clostridium Difficile Toxin/Antigen [[id number]]     Order Status: Canceled Specimen: Stool             Imaging/Diagnostics   CT ABDOMEN PELVIS WO CONTRAST Additional Contrast? None    Result Date: 9/16/2023  1. As seen previously there advanced stage for liver cirrhosis with portal hypertension splenomegaly and ascites, recurrent following recent paracentesis. 2.  Findings of chronic calcified not active pancreatitis, with a 5 x 3 mm calculus obstructing the pancreatic duct at the level of the neck of the pancreas, these are chronic findings. 3.  Present there is a well distended bladder which can represent urinary bladder retention. Consider emptying of the bladder or decompression bladder. 4.  Pattern for pan enterocolitis as above described. 5.  Complex 12 x 4 x 3 cm multi loculated/multi cavitary abscess formation in the left ischial rectal fossa/inferior inner medial aspect of the left gluteal region/upper inner aspect of the left thigh as observed previous.  6.  No longer seen the percutaneous catheter that was drained

## 2023-09-18 ENCOUNTER — ANESTHESIA EVENT (OUTPATIENT)
Dept: OPERATING ROOM | Age: 53
DRG: 364 | End: 2023-09-18
Payer: COMMERCIAL

## 2023-09-18 ENCOUNTER — ANESTHESIA (OUTPATIENT)
Dept: OPERATING ROOM | Age: 53
DRG: 364 | End: 2023-09-18
Payer: COMMERCIAL

## 2023-09-18 LAB
ALBUMIN SERPL-MCNC: 2.3 G/DL (ref 3.5–5.2)
ALP SERPL-CCNC: 387 U/L (ref 35–104)
ALT SERPL-CCNC: 17 U/L (ref 0–32)
ANION GAP SERPL CALCULATED.3IONS-SCNC: 7 MMOL/L (ref 7–16)
AST SERPL-CCNC: 25 U/L (ref 0–31)
BILIRUB SERPL-MCNC: 0.3 MG/DL (ref 0–1.2)
BUN SERPL-MCNC: 7 MG/DL (ref 6–20)
CALCIUM SERPL-MCNC: 7.7 MG/DL (ref 8.6–10.2)
CHLORIDE SERPL-SCNC: 105 MMOL/L (ref 98–107)
CO2 SERPL-SCNC: 24 MMOL/L (ref 22–29)
CREAT SERPL-MCNC: 0.4 MG/DL (ref 0.5–1)
DATE LAST DOSE: ABNORMAL
DATE LAST DOSE: NORMAL
GFR SERPL CREATININE-BSD FRML MDRD: >60 ML/MIN/1.73M2
GLUCOSE BLD-MCNC: 129 MG/DL (ref 74–99)
GLUCOSE BLD-MCNC: 214 MG/DL (ref 74–99)
GLUCOSE BLD-MCNC: 264 MG/DL (ref 74–99)
GLUCOSE BLD-MCNC: 314 MG/DL (ref 74–99)
GLUCOSE BLD-MCNC: 54 MG/DL (ref 74–99)
GLUCOSE SERPL-MCNC: 213 MG/DL (ref 74–99)
MAGNESIUM SERPL-MCNC: 1.6 MG/DL (ref 1.6–2.6)
NON-GYN CYTOLOGY REPORT: NORMAL
PHOSPHATE SERPL-MCNC: 2.9 MG/DL (ref 2.5–4.5)
POTASSIUM SERPL-SCNC: 3.9 MMOL/L (ref 3.5–5)
PROT SERPL-MCNC: 5.1 G/DL (ref 6.4–8.3)
SODIUM SERPL-SCNC: 136 MMOL/L (ref 132–146)
TME LAST DOSE: ABNORMAL H
TME LAST DOSE: NORMAL H
VANCOMYCIN DOSE: ABNORMAL MG
VANCOMYCIN DOSE: NORMAL MG
VANCOMYCIN SERPL-MCNC: 21.6 UG/ML (ref 5–40)
VANCOMYCIN SERPL-MCNC: 62.1 UG/ML (ref 5–40)

## 2023-09-18 PROCEDURE — 10061 I&D ABSCESS COMP/MULTIPLE: CPT | Performed by: SURGERY

## 2023-09-18 PROCEDURE — 6360000002 HC RX W HCPCS: Performed by: INTERNAL MEDICINE

## 2023-09-18 PROCEDURE — 2580000003 HC RX 258

## 2023-09-18 PROCEDURE — 36415 COLL VENOUS BLD VENIPUNCTURE: CPT

## 2023-09-18 PROCEDURE — 76937 US GUIDE VASCULAR ACCESS: CPT

## 2023-09-18 PROCEDURE — 87205 SMEAR GRAM STAIN: CPT

## 2023-09-18 PROCEDURE — 6370000000 HC RX 637 (ALT 250 FOR IP): Performed by: INTERNAL MEDICINE

## 2023-09-18 PROCEDURE — 2580000003 HC RX 258: Performed by: INTERNAL MEDICINE

## 2023-09-18 PROCEDURE — 80202 ASSAY OF VANCOMYCIN: CPT

## 2023-09-18 PROCEDURE — 6370000000 HC RX 637 (ALT 250 FOR IP)

## 2023-09-18 PROCEDURE — 6360000002 HC RX W HCPCS

## 2023-09-18 PROCEDURE — 0J9B0ZZ DRAINAGE OF PERINEUM SUBCUTANEOUS TISSUE AND FASCIA, OPEN APPROACH: ICD-10-PCS | Performed by: SURGERY

## 2023-09-18 PROCEDURE — C1751 CATH, INF, PER/CENT/MIDLINE: HCPCS

## 2023-09-18 PROCEDURE — 3700000001 HC ADD 15 MINUTES (ANESTHESIA): Performed by: SURGERY

## 2023-09-18 PROCEDURE — 3600000012 HC SURGERY LEVEL 2 ADDTL 15MIN: Performed by: SURGERY

## 2023-09-18 PROCEDURE — 7100000001 HC PACU RECOVERY - ADDTL 15 MIN: Performed by: SURGERY

## 2023-09-18 PROCEDURE — 80053 COMPREHEN METABOLIC PANEL: CPT

## 2023-09-18 PROCEDURE — 83735 ASSAY OF MAGNESIUM: CPT

## 2023-09-18 PROCEDURE — 87075 CULTR BACTERIA EXCEPT BLOOD: CPT

## 2023-09-18 PROCEDURE — 82962 GLUCOSE BLOOD TEST: CPT

## 2023-09-18 PROCEDURE — 2709999900 HC NON-CHARGEABLE SUPPLY: Performed by: SURGERY

## 2023-09-18 PROCEDURE — 87070 CULTURE OTHR SPECIMN AEROBIC: CPT

## 2023-09-18 PROCEDURE — 84100 ASSAY OF PHOSPHORUS: CPT

## 2023-09-18 PROCEDURE — 6370000000 HC RX 637 (ALT 250 FOR IP): Performed by: SPECIALIST

## 2023-09-18 PROCEDURE — 3600000002 HC SURGERY LEVEL 2 BASE: Performed by: SURGERY

## 2023-09-18 PROCEDURE — 7100000000 HC PACU RECOVERY - FIRST 15 MIN: Performed by: SURGERY

## 2023-09-18 PROCEDURE — 6360000002 HC RX W HCPCS: Performed by: SURGERY

## 2023-09-18 PROCEDURE — 2060000000 HC ICU INTERMEDIATE R&B

## 2023-09-18 PROCEDURE — 99233 SBSQ HOSP IP/OBS HIGH 50: CPT | Performed by: INTERNAL MEDICINE

## 2023-09-18 PROCEDURE — 3700000000 HC ANESTHESIA ATTENDED CARE: Performed by: SURGERY

## 2023-09-18 RX ORDER — OXYCODONE HYDROCHLORIDE 5 MG/1
5 TABLET ORAL ONCE
Status: DISCONTINUED | OUTPATIENT
Start: 2023-09-18 | End: 2023-09-18

## 2023-09-18 RX ORDER — BUPIVACAINE HYDROCHLORIDE 2.5 MG/ML
INJECTION, SOLUTION EPIDURAL; INFILTRATION; INTRACAUDAL PRN
Status: DISCONTINUED | OUTPATIENT
Start: 2023-09-18 | End: 2023-09-18 | Stop reason: ALTCHOICE

## 2023-09-18 RX ORDER — CIPROFLOXACIN 500 MG/1
500 TABLET, FILM COATED ORAL EVERY 12 HOURS SCHEDULED
Status: DISCONTINUED | OUTPATIENT
Start: 2023-09-18 | End: 2023-09-19 | Stop reason: HOSPADM

## 2023-09-18 RX ORDER — PROPOFOL 10 MG/ML
INJECTION, EMULSION INTRAVENOUS CONTINUOUS PRN
Status: DISCONTINUED | OUTPATIENT
Start: 2023-09-18 | End: 2023-09-18 | Stop reason: SDUPTHER

## 2023-09-18 RX ORDER — INSULIN GLARGINE 100 [IU]/ML
10 INJECTION, SOLUTION SUBCUTANEOUS ONCE
Status: COMPLETED | OUTPATIENT
Start: 2023-09-18 | End: 2023-09-18

## 2023-09-18 RX ORDER — SODIUM CHLORIDE 9 MG/ML
INJECTION, SOLUTION INTRAVENOUS CONTINUOUS PRN
Status: DISCONTINUED | OUTPATIENT
Start: 2023-09-18 | End: 2023-09-18 | Stop reason: SDUPTHER

## 2023-09-18 RX ORDER — ONDANSETRON 2 MG/ML
INJECTION INTRAMUSCULAR; INTRAVENOUS PRN
Status: DISCONTINUED | OUTPATIENT
Start: 2023-09-18 | End: 2023-09-18 | Stop reason: SDUPTHER

## 2023-09-18 RX ORDER — HEPARIN 100 UNIT/ML
1 SYRINGE INTRAVENOUS PRN
Status: DISCONTINUED | OUTPATIENT
Start: 2023-09-18 | End: 2023-09-19 | Stop reason: HOSPADM

## 2023-09-18 RX ORDER — METRONIDAZOLE 500 MG/1
500 TABLET ORAL EVERY 8 HOURS SCHEDULED
Status: DISCONTINUED | OUTPATIENT
Start: 2023-09-18 | End: 2023-09-19 | Stop reason: HOSPADM

## 2023-09-18 RX ORDER — HEPARIN 100 UNIT/ML
1 SYRINGE INTRAVENOUS EVERY 12 HOURS SCHEDULED
Status: DISCONTINUED | OUTPATIENT
Start: 2023-09-18 | End: 2023-09-19 | Stop reason: HOSPADM

## 2023-09-18 RX ORDER — SODIUM CHLORIDE 0.9 % (FLUSH) 0.9 %
5-40 SYRINGE (ML) INJECTION EVERY 12 HOURS SCHEDULED
Status: DISCONTINUED | OUTPATIENT
Start: 2023-09-18 | End: 2023-09-19 | Stop reason: HOSPADM

## 2023-09-18 RX ORDER — SODIUM CHLORIDE 0.9 % (FLUSH) 0.9 %
5-40 SYRINGE (ML) INJECTION PRN
Status: DISCONTINUED | OUTPATIENT
Start: 2023-09-18 | End: 2023-09-19 | Stop reason: HOSPADM

## 2023-09-18 RX ORDER — MAGNESIUM SULFATE IN WATER 40 MG/ML
2000 INJECTION, SOLUTION INTRAVENOUS ONCE
Status: COMPLETED | OUTPATIENT
Start: 2023-09-18 | End: 2023-09-18

## 2023-09-18 RX ORDER — SODIUM CHLORIDE 9 MG/ML
INJECTION, SOLUTION INTRAVENOUS PRN
Status: DISCONTINUED | OUTPATIENT
Start: 2023-09-18 | End: 2023-09-19 | Stop reason: HOSPADM

## 2023-09-18 RX ORDER — OXYCODONE HYDROCHLORIDE 5 MG/1
5 TABLET ORAL EVERY 4 HOURS PRN
Status: DISCONTINUED | OUTPATIENT
Start: 2023-09-18 | End: 2023-09-19 | Stop reason: HOSPADM

## 2023-09-18 RX ORDER — OXYCODONE HYDROCHLORIDE 5 MG/1
10 TABLET ORAL EVERY 4 HOURS PRN
Status: DISCONTINUED | OUTPATIENT
Start: 2023-09-18 | End: 2023-09-19 | Stop reason: HOSPADM

## 2023-09-18 RX ADMIN — ENOXAPARIN SODIUM 30 MG: 100 INJECTION SUBCUTANEOUS at 17:34

## 2023-09-18 RX ADMIN — ONDANSETRON 4 MG: 2 INJECTION INTRAMUSCULAR; INTRAVENOUS at 11:23

## 2023-09-18 RX ADMIN — HYDROXYZINE PAMOATE 50 MG: 25 CAPSULE ORAL at 20:10

## 2023-09-18 RX ADMIN — MAGNESIUM SULFATE HEPTAHYDRATE 2000 MG: 2 INJECTION, SOLUTION INTRAVENOUS at 13:33

## 2023-09-18 RX ADMIN — OXYCODONE HYDROCHLORIDE 5 MG: 5 TABLET ORAL at 06:51

## 2023-09-18 RX ADMIN — VANCOMYCIN HYDROCHLORIDE 750 MG: 5 INJECTION, POWDER, LYOPHILIZED, FOR SOLUTION INTRAVENOUS at 05:32

## 2023-09-18 RX ADMIN — VORTIOXETINE 20 MG: 10 TABLET, FILM COATED ORAL at 20:11

## 2023-09-18 RX ADMIN — INSULIN GLARGINE 20 UNITS: 100 INJECTION, SOLUTION SUBCUTANEOUS at 20:22

## 2023-09-18 RX ADMIN — PROPOFOL INJECTABLE EMULSION 150 MCG/KG/MIN: 10 INJECTION, EMULSION INTRAVENOUS at 11:22

## 2023-09-18 RX ADMIN — CLINDAMYCIN PHOSPHATE 600 MG: 600 INJECTION, SOLUTION INTRAVENOUS at 04:55

## 2023-09-18 RX ADMIN — PANCRELIPASE LIPASE, PANCRELIPASE PROTEASE, PANCRELIPASE AMYLASE 30000 UNITS: 15000; 47000; 63000 CAPSULE, DELAYED RELEASE ORAL at 16:24

## 2023-09-18 RX ADMIN — SODIUM CHLORIDE, PRESERVATIVE FREE 10 ML: 5 INJECTION INTRAVENOUS at 20:18

## 2023-09-18 RX ADMIN — PIPERACILLIN AND TAZOBACTAM 3375 MG: 3; .375 INJECTION, POWDER, LYOPHILIZED, FOR SOLUTION INTRAVENOUS at 15:08

## 2023-09-18 RX ADMIN — SODIUM CHLORIDE, PRESERVATIVE FREE 10 ML: 5 INJECTION INTRAVENOUS at 09:50

## 2023-09-18 RX ADMIN — CIPROFLOXACIN 500 MG: 500 TABLET, FILM COATED ORAL at 20:10

## 2023-09-18 RX ADMIN — RIFAXIMIN 400 MG: 200 TABLET ORAL at 09:53

## 2023-09-18 RX ADMIN — PANCRELIPASE LIPASE, PANCRELIPASE PROTEASE, PANCRELIPASE AMYLASE 30000 UNITS: 15000; 47000; 63000 CAPSULE, DELAYED RELEASE ORAL at 13:39

## 2023-09-18 RX ADMIN — GABAPENTIN 400 MG: 400 CAPSULE ORAL at 09:54

## 2023-09-18 RX ADMIN — INSULIN LISPRO 2 UNITS: 100 INJECTION, SOLUTION INTRAVENOUS; SUBCUTANEOUS at 16:22

## 2023-09-18 RX ADMIN — OXYCODONE HYDROCHLORIDE 10 MG: 5 TABLET ORAL at 21:41

## 2023-09-18 RX ADMIN — CLINDAMYCIN PHOSPHATE 600 MG: 600 INJECTION, SOLUTION INTRAVENOUS at 16:22

## 2023-09-18 RX ADMIN — SODIUM CHLORIDE: 900 INJECTION, SOLUTION INTRAVENOUS at 11:14

## 2023-09-18 RX ADMIN — CLINDAMYCIN PHOSPHATE 600 MG: 600 INJECTION, SOLUTION INTRAVENOUS at 09:49

## 2023-09-18 RX ADMIN — INSULIN GLARGINE 10 UNITS: 100 INJECTION, SOLUTION SUBCUTANEOUS at 09:50

## 2023-09-18 RX ADMIN — RIFAXIMIN 400 MG: 200 TABLET ORAL at 13:39

## 2023-09-18 RX ADMIN — Medication 3 MG: at 20:11

## 2023-09-18 RX ADMIN — SODIUM CHLORIDE: 9 INJECTION, SOLUTION INTRAVENOUS at 09:43

## 2023-09-18 RX ADMIN — OXYCODONE HYDROCHLORIDE 10 MG: 5 TABLET ORAL at 17:36

## 2023-09-18 RX ADMIN — SODIUM CHLORIDE: 9 INJECTION, SOLUTION INTRAVENOUS at 05:31

## 2023-09-18 RX ADMIN — GABAPENTIN 400 MG: 400 CAPSULE ORAL at 13:38

## 2023-09-18 RX ADMIN — HEPARIN 100 UNITS: 100 SYRINGE at 20:25

## 2023-09-18 RX ADMIN — GABAPENTIN 400 MG: 400 CAPSULE ORAL at 20:10

## 2023-09-18 RX ADMIN — METRONIDAZOLE 500 MG: 500 TABLET ORAL at 21:44

## 2023-09-18 RX ADMIN — PIPERACILLIN AND TAZOBACTAM 3375 MG: 3; .375 INJECTION, POWDER, LYOPHILIZED, FOR SOLUTION INTRAVENOUS at 06:44

## 2023-09-18 RX ADMIN — OXYCODONE HYDROCHLORIDE 5 MG: 5 TABLET ORAL at 13:45

## 2023-09-18 RX ADMIN — RIFAXIMIN 400 MG: 200 TABLET ORAL at 20:11

## 2023-09-18 RX ADMIN — PANTOPRAZOLE SODIUM 40 MG: 40 TABLET, DELAYED RELEASE ORAL at 06:51

## 2023-09-18 RX ADMIN — ARIPIPRAZOLE 20 MG: 10 TABLET ORAL at 09:54

## 2023-09-18 ASSESSMENT — PAIN SCALES - GENERAL
PAINLEVEL_OUTOF10: 10
PAINLEVEL_OUTOF10: 9
PAINLEVEL_OUTOF10: 10
PAINLEVEL_OUTOF10: 9
PAINLEVEL_OUTOF10: 6
PAINLEVEL_OUTOF10: 4
PAINLEVEL_OUTOF10: 5

## 2023-09-18 ASSESSMENT — PAIN DESCRIPTION - LOCATION
LOCATION: PERINEUM
LOCATION: VAGINA;PERINEUM

## 2023-09-18 ASSESSMENT — PAIN DESCRIPTION - DESCRIPTORS
DESCRIPTORS: ACHING
DESCRIPTORS: ACHING;CRAMPING;DISCOMFORT
DESCRIPTORS: ACHING;THROBBING
DESCRIPTORS: ACHING
DESCRIPTORS: ACHING

## 2023-09-18 ASSESSMENT — LIFESTYLE VARIABLES: SMOKING_STATUS: 1

## 2023-09-18 ASSESSMENT — PAIN - FUNCTIONAL ASSESSMENT
PAIN_FUNCTIONAL_ASSESSMENT: PREVENTS OR INTERFERES SOME ACTIVE ACTIVITIES AND ADLS
PAIN_FUNCTIONAL_ASSESSMENT: ACTIVITIES ARE NOT PREVENTED
PAIN_FUNCTIONAL_ASSESSMENT: PREVENTS OR INTERFERES SOME ACTIVE ACTIVITIES AND ADLS

## 2023-09-18 ASSESSMENT — PAIN DESCRIPTION - ORIENTATION: ORIENTATION: LEFT

## 2023-09-18 NOTE — OP NOTE
Operative Note      Patient: Sil Addison  YOB: 1970  MRN: [id number]    Date of Procedure: 9/18/2023    Pre-Op Diagnosis Codes:     * Perineal abscess [L02.215]    Post-Op Diagnosis: Same       Procedure(s):  PERINEAL INCISION AND DRAINAGE ABSCESS DOWN TO SUBCUTANEOUS TISSUE. Surgeon(s):  Jacinda Cherry MD    Assistant:   First Assistant: Joan Colorado  Resident: Kathy Mcneill MD    Anesthesia: Monitor Anesthesia Care    Estimated Blood Loss (mL): Minimal    Complications: None    Specimens:   ID Type Source Tests Collected by Time Destination   1 : culture swab perineal abscess Tissue Tissue CULTURE, ANAEROBIC, CULTURE, FUNGUS, GRAM STAIN, CULTURE, SURGICAL, CULTURE WITH SMEAR, ACID FAST Ioana Ortiz MD 9/18/2023 1132        Implants:  * No implants in log *      Drains:   [REMOVED] Open Drain 08/29/23 Left Buttock (Removed)   Site Description Clean, dry & intact 08/30/23 0800   Dressing Status Intact 08/30/23 1348   Drainage Appearance Serosanguinous 08/30/23 1348   Status Other (Comment) 08/29/23 0000       [REMOVED] Open Drain 08/28/23 Perineal (Removed)       Findings: Perineal abscess         Detailed Description of Procedure: The patient was brought into the operative suite and was placed [facility] and placed left side up on her bed; the operative site was infused with local anesthetic after being prepped and draped in a normal sterile condition. Once this was done, approximately a 3-cm x 2 cm cruciate incision was made over the posterior prior incision. Immediately, approximately 2 cc of purulent and bloody material was expressed. Loculations were broken up using a hemostat and more of the material was able to be expressed. The prior anterior incision was then extended to a 1.5 cm x 1 cm cruciate incision.  Hemostat was used to break up loculations down to subcutaneous tissue and connect the two incisions with more fibrinous, purulent, blood material able to be expressed. The drainage cavity was then irrigated thoroughly and packed with iodoform. Bleeding was controlled with direct pressure. The abscess pocket was subcutaneous and did not extend into the fascia and underlying muscle and tendon. The wound was sterilely irrigated, and electrocautery was used to obtain hemostasis. It was packed with iodoform guaze and sterile dressing applied and the patient was woken up in stable condition and taken to PACU.      Dr Abeba York was present and scrubbed throughout     Plan:  Continue with daily and as needed dressing changes, pack with iodoform and cover with gauze and ABD pad   Diet as tolerated     Electronically signed by Melly Chilel MD on 9/18/2023 at 11:41 AM    Present for procedure    Hal Remy MD

## 2023-09-18 NOTE — PROGRESS NOTES
Progress Note    Date:9/18/2023         Patient Kapil Xie     YOB: 1970     Age:53 y.o. 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 Ascites s/p paracentesis cx ngtd      S/p  surgery check cultures  Cont atbx   Adjusted to po   Pt is asking about paracentesis    Subjective   Interval History Status:    Afebrile S/p PERINEAL INCISION AND DRAINAGE ABSCESS DOWN TO SUBCUTANEOUS TISSUE.    Review of Systems   Review of Systems  As above   Medications   Scheduled Meds:    lidocaine  5 mL IntraDERmal Once    sodium chloride flush  5-40 mL IntraVENous 2 times per day    heparin flush  1 mL IntraVENous 2 times per day    nicotine  1 patch TransDERmal Daily    insulin lispro  0-4 Units SubCUTAneous TID WC    insulin lispro  0-4 Units SubCUTAneous Nightly    ARIPiprazole  20 mg Oral Daily    [Held by provider] furosemide  20 mg Oral Daily    gabapentin  400 mg Oral TID    insulin glargine  20 Units SubCUTAneous BID    lactobacillus  1 capsule Oral Daily    lactulose  10 g Oral BID    melatonin  3 mg Oral Nightly    therapeutic multivitamin-minerals  1 tablet Oral Daily with breakfast    pantoprazole  40 mg Oral QAM AC    rifAXIMin  400 mg Oral TID    sennosides-docusate sodium  2 Special Requests          Culture NO GROWTH 1 DAY    Culture, Body Fluid [[id number]] Collected: 09/13/23 0910    Order Status: Completed Specimen: Body Fluid from Ascitic Fluid Updated: 09/16/23 0615     Specimen Description . ASCITIC FLUID     Direct Exam Gram stain performed on unspun fluid. NO Polymorphonuclear leukocytes      NO EPITHELIAL CELLS      NO ORGANISMS SEEN     Culture NO GROWTH    Culture, Stool [[id number]]     Order Status: No result Specimen: Stool     GIARDIA ANTIGEN [[id number]]     Order Status: No result Specimen: Stool     Clostridium Difficile Toxin/Antigen [[id number]]     Order Status: Canceled Specimen: Stool             Imaging Last 24 Hours:  No results found.      Electronically signed by Carlitos Belcher MD on 9/18/23 at 5:24 PM EDT

## 2023-09-18 NOTE — PROGRESS NOTES
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 She recently had abscess with drain placement. Admit to stepdown floor and started broad-spectrum IV antibiotics. This morning she is awake, alert, asking for something to eat and drink, as well as nicotine patch. Has abdominal pain, consistent with yesterday. 9/18: Pt had surgery this afternoon and complaining pain not controlled. She was eating pizza in bed which was brought in by her significant other.        lidocaine  5 mL IntraDERmal Once    sodium chloride flush  5-40 mL IntraVENous 2 times per day    heparin flush  1 mL IntraVENous 2 times per day    nicotine  1 patch TransDERmal Daily    insulin lispro  0-4 Units SubCUTAneous TID WC    insulin lispro  0-4 Units SubCUTAneous Nightly    ARIPiprazole  20 mg Oral Daily    [Held by provider] furosemide  20 mg Oral Daily    gabapentin  400 mg Oral TID    insulin glargine  20 Units SubCUTAneous BID    lactobacillus  1 capsule Oral Daily    lactulose  10 g Oral BID    melatonin  3 mg Oral Nightly    therapeutic multivitamin-minerals  1 tablet Oral Daily with breakfast    pantoprazole  40 mg Oral QAM AC    rifAXIMin  400 mg Oral TID    sennosides-docusate sodium  2 tablet Oral BID    [Held by provider] spironolactone  25 mg Oral Daily    VORTIoxetine  20 mg Oral Nightly    vitamin B-1  100 mg Oral Daily    sodium chloride flush  5-40 mL IntraVENous 2 times per day    enoxaparin  30 mg SubCUTAneous Daily    clindamycin (CLEOCIN) IV  600 mg IntraVENous Q8H    piperacillin-tazobactam  3,375 mg IntraVENous Q8H    vancomycin  750 mg IntraVENous Q8H    arformoterol tartrate  15 mcg Nebulization BID RT    And    budesonide  0.5 mg Nebulization BID RT

## 2023-09-18 NOTE — ANESTHESIA PRE PROCEDURE
Department of Anesthesiology  Preprocedure Note       Name:  Jerrell Patino   Age:  48 y.o.  :  1970                                          MRN:  [id number]         Date:  2023      Surgeon: Cindy Osman):  Sandra Rondon MD    Procedure: Procedure(s):  PERINEAL INCISION AND DRAINAGE ABSCESS    Medications prior to admission:   Prior to Admission medications    Medication Sig Start Date End Date Taking? Authorizing Provider   spironolactone (ALDACTONE) 25 MG tablet Take 1 tablet by mouth daily To control ascites 9/15/23   Ti Jefferson MD   metroNIDAZOLE (FLAGYL) 500 MG tablet Take 1 tablet by mouth in the morning and 1 tablet in the evening. Do all this for 10 days. 23  Humaira Villareal, DO   cefdinir (OMNICEF) 300 MG capsule Take 1 capsule by mouth 2 times daily for 10 days 23  Humaira Villareal, DO   ondansetron (ZOFRAN-ODT) 4 MG disintegrating tablet Take 1 tablet by mouth 3 times daily as needed for Nausea or Vomiting 23   Hector Johnson, DO   dicyclomine (BENTYL) 10 MG capsule Take 1 capsule by mouth every 6 hours as needed (Bowel spasms) 23  Mary Anne Lua MD   TRINTELLIX 20 MG TABS tablet take 1 tablet by mouth every evening 23   Justice Baudilio, DO   sennosides-docusate sodium (SENOKOT-S) 8.6-50 MG tablet Take 2 tablets by mouth 2 times daily 23   Rm Peter MD   blood glucose test strips (EXACTECH TEST) strip 1 each by In Vitro route 4 times daily (before meals and nightly) As needed. 23   Sandie Grant, [place] - CNP   HUMALOG KWIKPEN 100 UNIT/ML SOPN Temp fill while pump is on order. USE WITH MEALS AS DIRECTED PER SLIDING SCALE (MAX 12 UNITS WITH MEAL) per endocrinology's sliding scale. Max 40 units per day.   MUST EAT MEALS IF TAKING. 23  Euclid Baudilio, DO   tiotropium (SPIRIVA RESPIMAT) 1.25 MCG/ACT AERS inhaler Inhale 2 puffs into the lungs daily 23   Justice Astudillo DO   lactulose [facility]) 10

## 2023-09-18 NOTE — PROGRESS NOTES
Pharmacy Consultation Note  (Antibiotic Dosing and Monitoring)    Initial consult date: 9/16/23  Consulting physician/provider: Dr Ese Manuel  Drug: Vancomycin  Indication: SSTI    Age/  Gender Height Weight IBW  Allergy Information   53 y.o./female 5' 1\" (154.9 cm) 100 lb (45.4 kg)     Ideal body weight: 52.1 kg (114 lb 15 oz)   Dye [iodides] and Tylenol [acetaminophen]      Renal Function:  Recent Labs     09/17/23  0531 09/17/23  0642 09/18/23  0617   BUN 7 8 7   CREATININE 0.3* 0.4* 0.4*         Intake/Output Summary (Last 24 hours) at 9/18/2023 1141  Last data filed at 9/17/2023 1320  Gross per 24 hour   Intake 180 ml   Output --   Net 180 ml         Vancomycin Monitoring:  Trough:  No results for input(s): \"VANCOTROUGH\" in the last 72 hours. Random:    Recent Labs     09/18/23  0617   VANCORANDOM 62.1*       No results for input(s): \"BLOODCULT2\" in the last 72 hours. Historical Cultures:  Organism   Date Value Ref Range Status   05/21/2023 Klebsiella pneumoniae ssp pneumoniae (A)  Final     No results for input(s): \"BC\" in the last 72 hours. Vancomycin Administration Times:  Recent vancomycin administrations                     vancomycin (VANCOCIN) 750 mg in sodium chloride 0.9 % 250 mL IVPB (mg) 750 mg New Bag 09/17/23 0909     750 mg New Bag  0304    vancomycin (VANCOCIN) 1,000 mg in sodium chloride 0.9 % 250 mL IVPB (Ohib7Maz) (mg) 1,000 mg New Bag 09/16/23 1847               Assessment:  Patient is a 48 y.o. female who has been initiated on vancomycin  Estimated Creatinine Clearance: 117 mL/min (A) (based on SCr of 0.4 mg/dL (L)). To dose vancomycin, pharmacy will be utilizing [employer] calculation software for goal AUC/STEPHAN 400-600 mg/L-hr (predicted AUC/STEPHAN = 510, Tr =15.1)  9/18: Last night's dose given 3 hours late. Subsequent doses were re-timed by pharmacy without adjusting morning level time.  Morning level represents a peak level of no clinical significance    Plan:  Vancomycin 750 mg IV every 8 hours  Repeat random @ 1330  Will continue to monitor renal function   Pharmacy to follow      Ema Slade PharmD 9/18/2023 11:41 AM   [phone]    ADDENDUM:   Vancomycin discontinued, pharmacy will sign off. Please re-consult if needed.     Thank you,  Tigre Beaver, [facility] 2/86/96806:02 PM

## 2023-09-19 VITALS
BODY MASS INDEX: 18.88 KG/M2 | DIASTOLIC BLOOD PRESSURE: 87 MMHG | TEMPERATURE: 98.4 F | HEART RATE: 80 BPM | OXYGEN SATURATION: 100 % | WEIGHT: 100 LBS | HEIGHT: 61 IN | RESPIRATION RATE: 20 BRPM | SYSTOLIC BLOOD PRESSURE: 116 MMHG

## 2023-09-19 LAB
ALBUMIN SERPL-MCNC: 2.9 G/DL (ref 3.5–5.2)
ALP SERPL-CCNC: 401 U/L (ref 35–104)
ALT SERPL-CCNC: 22 U/L (ref 0–32)
ANION GAP SERPL CALCULATED.3IONS-SCNC: 5 MMOL/L (ref 7–16)
AST SERPL-CCNC: 39 U/L (ref 0–31)
BILIRUB SERPL-MCNC: 0.3 MG/DL (ref 0–1.2)
BUN SERPL-MCNC: 7 MG/DL (ref 6–20)
CALCIUM SERPL-MCNC: 8 MG/DL (ref 8.6–10.2)
CHLORIDE SERPL-SCNC: 104 MMOL/L (ref 98–107)
CO2 SERPL-SCNC: 27 MMOL/L (ref 22–29)
CREAT SERPL-MCNC: 0.5 MG/DL (ref 0.5–1)
GFR SERPL CREATININE-BSD FRML MDRD: >60 ML/MIN/1.73M2
GLUCOSE BLD-MCNC: 163 MG/DL (ref 74–99)
GLUCOSE BLD-MCNC: 279 MG/DL (ref 74–99)
GLUCOSE SERPL-MCNC: 292 MG/DL (ref 74–99)
MAGNESIUM SERPL-MCNC: 1.9 MG/DL (ref 1.6–2.6)
PHOSPHATE SERPL-MCNC: 3.1 MG/DL (ref 2.5–4.5)
POTASSIUM SERPL-SCNC: 3.9 MMOL/L (ref 3.5–5)
PROT SERPL-MCNC: 5.8 G/DL (ref 6.4–8.3)
SODIUM SERPL-SCNC: 136 MMOL/L (ref 132–146)

## 2023-09-19 PROCEDURE — 84100 ASSAY OF PHOSPHORUS: CPT

## 2023-09-19 PROCEDURE — 2580000003 HC RX 258

## 2023-09-19 PROCEDURE — 6370000000 HC RX 637 (ALT 250 FOR IP)

## 2023-09-19 PROCEDURE — 83735 ASSAY OF MAGNESIUM: CPT

## 2023-09-19 PROCEDURE — 80053 COMPREHEN METABOLIC PANEL: CPT

## 2023-09-19 PROCEDURE — 6370000000 HC RX 637 (ALT 250 FOR IP): Performed by: SPECIALIST

## 2023-09-19 PROCEDURE — 82962 GLUCOSE BLOOD TEST: CPT

## 2023-09-19 PROCEDURE — 6360000002 HC RX W HCPCS

## 2023-09-19 PROCEDURE — 6370000000 HC RX 637 (ALT 250 FOR IP): Performed by: INTERNAL MEDICINE

## 2023-09-19 RX ORDER — CIPROFLOXACIN 500 MG/1
500 TABLET, FILM COATED ORAL EVERY 12 HOURS SCHEDULED
Qty: 12 TABLET | Refills: 0 | Status: SHIPPED | OUTPATIENT
Start: 2023-09-19 | End: 2023-09-25

## 2023-09-19 RX ORDER — METRONIDAZOLE 500 MG/1
500 TABLET ORAL EVERY 8 HOURS SCHEDULED
Qty: 19 TABLET | Refills: 0 | Status: SHIPPED | OUTPATIENT
Start: 2023-09-19 | End: 2023-09-26

## 2023-09-19 RX ORDER — OXYCODONE HYDROCHLORIDE 10 MG/1
10 TABLET ORAL EVERY 4 HOURS PRN
Qty: 15 TABLET | Refills: 0 | Status: SHIPPED | OUTPATIENT
Start: 2023-09-19 | End: 2023-09-22

## 2023-09-19 RX ADMIN — ARIPIPRAZOLE 20 MG: 10 TABLET ORAL at 08:59

## 2023-09-19 RX ADMIN — CALCIUM 500 MG: 500 TABLET ORAL at 09:03

## 2023-09-19 RX ADMIN — OXYCODONE HYDROCHLORIDE 10 MG: 5 TABLET ORAL at 09:28

## 2023-09-19 RX ADMIN — OXYCODONE HYDROCHLORIDE 10 MG: 5 TABLET ORAL at 01:11

## 2023-09-19 RX ADMIN — RIFAXIMIN 400 MG: 200 TABLET ORAL at 08:59

## 2023-09-19 RX ADMIN — INSULIN LISPRO 2 UNITS: 100 INJECTION, SOLUTION INTRAVENOUS; SUBCUTANEOUS at 09:23

## 2023-09-19 RX ADMIN — SODIUM CHLORIDE, PRESERVATIVE FREE 10 ML: 5 INJECTION INTRAVENOUS at 09:04

## 2023-09-19 RX ADMIN — CIPROFLOXACIN 500 MG: 500 TABLET, FILM COATED ORAL at 09:02

## 2023-09-19 RX ADMIN — Medication 100 MG: at 09:01

## 2023-09-19 RX ADMIN — GABAPENTIN 400 MG: 400 CAPSULE ORAL at 08:59

## 2023-09-19 RX ADMIN — HEPARIN 100 UNITS: 100 SYRINGE at 09:00

## 2023-09-19 RX ADMIN — METRONIDAZOLE 500 MG: 500 TABLET ORAL at 05:37

## 2023-09-19 RX ADMIN — MULTIPLE VITAMINS W/ MINERALS TAB 1 TABLET: TAB at 09:03

## 2023-09-19 RX ADMIN — Medication 1 CAPSULE: at 09:00

## 2023-09-19 RX ADMIN — OXYCODONE HYDROCHLORIDE 10 MG: 5 TABLET ORAL at 05:37

## 2023-09-19 RX ADMIN — PANTOPRAZOLE SODIUM 40 MG: 40 TABLET, DELAYED RELEASE ORAL at 05:37

## 2023-09-19 RX ADMIN — PANCRELIPASE LIPASE, PANCRELIPASE PROTEASE, PANCRELIPASE AMYLASE 30000 UNITS: 15000; 47000; 63000 CAPSULE, DELAYED RELEASE ORAL at 09:01

## 2023-09-19 RX ADMIN — INSULIN GLARGINE 20 UNITS: 100 INJECTION, SOLUTION SUBCUTANEOUS at 09:09

## 2023-09-19 ASSESSMENT — PAIN SCALES - GENERAL
PAINLEVEL_OUTOF10: 10
PAINLEVEL_OUTOF10: 9
PAINLEVEL_OUTOF10: 3
PAINLEVEL_OUTOF10: 10

## 2023-09-19 ASSESSMENT — PAIN DESCRIPTION - ORIENTATION: ORIENTATION: LEFT

## 2023-09-19 ASSESSMENT — PAIN SCALES - WONG BAKER: WONGBAKER_NUMERICALRESPONSE: 0

## 2023-09-19 ASSESSMENT — PAIN - FUNCTIONAL ASSESSMENT: PAIN_FUNCTIONAL_ASSESSMENT: ACTIVITIES ARE NOT PREVENTED

## 2023-09-19 ASSESSMENT — PAIN DESCRIPTION - LOCATION
LOCATION: PERINEUM

## 2023-09-19 ASSESSMENT — PAIN DESCRIPTION - DESCRIPTORS
DESCRIPTORS: ACHING
DESCRIPTORS: ACHING;DISCOMFORT
DESCRIPTORS: ACHING;DISCOMFORT

## 2023-09-19 NOTE — ANESTHESIA POSTPROCEDURE EVALUATION
Department of Anesthesiology  Postprocedure Note    Patient: Felix Palomo  MRN: [id number]  YOB: 1970  Date of evaluation: 9/19/2023      Procedure Summary     Date: 09/18/23 Room / Location: [facility] 03 / [address]    Anesthesia Start: 1119 Anesthesia Stop: 7724    Procedure: PERINEAL INCISION AND DRAINAGE ABSCESS (Perineum) Diagnosis:       Perineal abscess      (Perineal abscess [L02.215])    Surgeons: Kia Pritchard MD Responsible Provider: Beto Carpenter MD    Anesthesia Type: MAC ASA Status: 4          Anesthesia Type: No value filed.     Max Phase I: Max Score: 10    Max Phase II:        Anesthesia Post Evaluation    Patient location during evaluation: PACU  Patient participation: complete - patient participated  Level of consciousness: awake  Airway patency: patent  Nausea & Vomiting: no nausea and no vomiting  Complications: no  Cardiovascular status: hemodynamically stable  Respiratory status: acceptable  Hydration status: euvolemic  Pain management: adequate

## 2023-09-19 NOTE — CARE COORDINATION
Case Management Assessment  Initial Evaluation    Date/Time of Evaluation: 9/19/2023 10:27 AM  Assessment Completed by: SANDEE Rodrigues    If patient is discharged prior to next notation, then this note serves as note for discharge by case management. 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  Yes    Chart Reviewed: Yes      History Provided by: Patient  Patient Orientation: Alert and Oriented    Patient Cognition: Alert    Hospitalization in the last 30 days (Readmission):  Yes    Readmission Assessment  Number of Days since last admission?: 1-7 days  Previous Disposition: Home with Home Health  Who is being Interviewed: Patient  What was the patient's/caregiver's perception as to why they think they needed to return back to the hospital?: Other (Comment) (got worse)  Did you visit your Primary Care Physician after you left the hospital, before you returned this time?: No  Why weren't you able to visit your PCP?: Other (Comment) (it happened too soon)  Did you see a specialist, such as Cardiac, Pulmonary, Orthopedic Physician, etc. after you left the hospital?: No  Who advised the patient to return to the hospital?: Self-referral  Does the patient report anything that got in the way of taking their medications?: No  In our efforts to provide the best possible care to you and others like you, can you think of anything that we could have done to help you after you left the hospital the first time, so that you might not have needed to return so soon?: Other (Comment) (n/a)    Advance Directives:      Code Status: Full Code   Patient's Primary Decision Maker is: Legal Next of Kin    Primary

## 2023-09-19 NOTE — PROGRESS NOTES
Instructed on discharge instructions including all meds for purpose,side effects dosage. Instructed on follow up appts to make. Instructed on pain control. Intructed on good handwashing. Attempted to show  how to pack wound pt refused because wound was just packed by surgeon. Verbally explained woundcare to  demonstrated how to clean outside and change outside bandage and c to see pt in home tomorrow to assist with dressing. Instructed on s&s infection to report and pt to follow up with woundcare. All belongings gathered to be sent home with pt.  Pt assisted to car by staff to go home with significant other

## 2023-09-20 ENCOUNTER — TELEPHONE (OUTPATIENT)
Dept: FAMILY MEDICINE CLINIC | Age: 53
End: 2023-09-20

## 2023-09-20 LAB
MICROORGANISM SPEC CULT: NO GROWTH
MICROORGANISM SPEC CULT: NORMAL
MICROORGANISM/AGENT SPEC: ABNORMAL
SPECIMEN DESCRIPTION: ABNORMAL
SPECIMEN DESCRIPTION: NORMAL

## 2023-09-20 NOTE — TELEPHONE ENCOUNTER
[address] called to inform that patient has been admitted into home care and start of care began today.      Last seen 9/7/2023  Next appt 10/10/2023

## 2023-09-21 ENCOUNTER — TELEPHONE (OUTPATIENT)
Dept: FAMILY MEDICINE CLINIC | Age: 53
End: 2023-09-21

## 2023-09-21 LAB
MICROORGANISM SPEC CULT: NORMAL
SERVICE CMNT-IMP: NORMAL
SPECIMEN DESCRIPTION: NORMAL

## 2023-09-21 NOTE — TELEPHONE ENCOUNTER
----- Message from Elissa Carter sent at 9/21/2023  3:27 PM EDT -----  Subject: Hospital Follow Up    QUESTIONS  What hospital was the Patient Discharged from? [address]  Date of Discharge? 2023-09-18  Discharge Location? Home  Reason for hospitalization as patient stated? Surgery on buttocks  What question does the patient have, if applicable? Pt of Angus Guaman   requesting return call to schedule hospital follow up   ---------------------------------------------------------------------------  --------------  [address]  What is the best way for the office to contact you? OK to leave message on   voicemail  Preferred Call Back Phone Number? [phone]  ---------------------------------------------------------------------------  --------------  SCRIPT ANSWERS  Relationship to Patient? Spouse/Partner  Representative Name? Angel  Additional information verified (besides Name and Date of Birth)?  Phone   Number

## 2023-09-22 LAB
MICROORGANISM SPEC CULT: NORMAL
SERVICE CMNT-IMP: NORMAL
SPECIMEN DESCRIPTION: NORMAL

## 2023-09-26 ENCOUNTER — HOSPITAL ENCOUNTER (OUTPATIENT)
Dept: INFUSION THERAPY | Age: 53
End: 2023-09-26

## 2023-09-29 ENCOUNTER — TELEPHONE (OUTPATIENT)
Dept: FAMILY MEDICINE CLINIC | Age: 53
End: 2023-09-29

## 2023-10-10 ENCOUNTER — OFFICE VISIT (OUTPATIENT)
Dept: FAMILY MEDICINE CLINIC | Age: 53
End: 2023-10-10
Payer: COMMERCIAL

## 2023-10-10 VITALS
TEMPERATURE: 97.2 F | WEIGHT: 85 LBS | HEART RATE: 88 BPM | OXYGEN SATURATION: 98 % | BODY MASS INDEX: 16.06 KG/M2 | SYSTOLIC BLOOD PRESSURE: 90 MMHG | DIASTOLIC BLOOD PRESSURE: 58 MMHG

## 2023-10-10 DIAGNOSIS — B18.2 CHRONIC HEPATITIS C WITHOUT HEPATIC COMA (HCC): ICD-10-CM

## 2023-10-10 DIAGNOSIS — F14.10 COCAINE USE DISORDER (HCC): ICD-10-CM

## 2023-10-10 DIAGNOSIS — Z79.4 LONG TERM (CURRENT) USE OF INSULIN (HCC): ICD-10-CM

## 2023-10-10 DIAGNOSIS — Z23 NEED FOR PNEUMOCOCCAL VACCINE: ICD-10-CM

## 2023-10-10 DIAGNOSIS — K70.31 ALCOHOLIC CIRRHOSIS OF LIVER WITH ASCITES (HCC): ICD-10-CM

## 2023-10-10 DIAGNOSIS — F10.90 ALCOHOL USE DISORDER: ICD-10-CM

## 2023-10-10 DIAGNOSIS — K86.0 ALCOHOL-INDUCED CHRONIC PANCREATITIS (HCC): ICD-10-CM

## 2023-10-10 DIAGNOSIS — E11.65 UNCONTROLLED TYPE 2 DIABETES MELLITUS WITH HYPERGLYCEMIA (HCC): Primary | ICD-10-CM

## 2023-10-10 DIAGNOSIS — Z23 NEED FOR IMMUNIZATION AGAINST INFLUENZA: ICD-10-CM

## 2023-10-10 DIAGNOSIS — Z71.51 DRUG ABUSE COUNSELING AND SURVEILLANCE OF DRUG ABUSER: ICD-10-CM

## 2023-10-10 PROCEDURE — 2022F DILAT RTA XM EVC RTNOPTHY: CPT | Performed by: SURGERY

## 2023-10-10 PROCEDURE — 90471 IMMUNIZATION ADMIN: CPT | Performed by: SURGERY

## 2023-10-10 PROCEDURE — 3046F HEMOGLOBIN A1C LEVEL >9.0%: CPT | Performed by: SURGERY

## 2023-10-10 PROCEDURE — 90674 CCIIV4 VAC NO PRSV 0.5 ML IM: CPT | Performed by: SURGERY

## 2023-10-10 PROCEDURE — 99214 OFFICE O/P EST MOD 30 MIN: CPT | Performed by: SURGERY

## 2023-10-10 PROCEDURE — G8419 CALC BMI OUT NRM PARAM NOF/U: HCPCS | Performed by: SURGERY

## 2023-10-10 PROCEDURE — 3017F COLORECTAL CA SCREEN DOC REV: CPT | Performed by: SURGERY

## 2023-10-10 PROCEDURE — 4004F PT TOBACCO SCREEN RCVD TLK: CPT | Performed by: SURGERY

## 2023-10-10 PROCEDURE — G8427 DOCREV CUR MEDS BY ELIG CLIN: HCPCS | Performed by: SURGERY

## 2023-10-10 PROCEDURE — 1111F DSCHRG MED/CURRENT MED MERGE: CPT | Performed by: SURGERY

## 2023-10-10 PROCEDURE — G8482 FLU IMMUNIZE ORDER/ADMIN: HCPCS | Performed by: SURGERY

## 2023-10-10 RX ORDER — ONDANSETRON 4 MG/1
TABLET, FILM COATED ORAL
COMMUNITY
Start: 2023-10-02

## 2023-10-10 RX ORDER — ACYCLOVIR 400 MG/1
1 TABLET ORAL
Qty: 2 EACH | Refills: 12 | Status: SHIPPED | OUTPATIENT
Start: 2023-10-10

## 2023-10-10 RX ORDER — ACYCLOVIR 400 MG/1
1 TABLET ORAL
Qty: 1 EACH | Refills: 0 | Status: SHIPPED | OUTPATIENT
Start: 2023-10-10

## 2023-10-10 RX ORDER — LANOLIN ALCOHOL/MO/W.PET/CERES
100 CREAM (GRAM) TOPICAL DAILY
COMMUNITY
Start: 2023-10-01

## 2023-10-10 NOTE — PROGRESS NOTES
affect. No signs of psychomotor retardation or agitation. Thought content seems normal, speech is fluent and non-pressured. SKIN: Generally warm pink and dry. No jaundice. patient and partner relate total healing / resolution of posterior wound visual exam deferred at patient's preference (\"just want to go home, this is too much, I am tired\")                   An electronic signature was used to authenticate this note.     --Joe Mccray, DO

## 2023-10-10 NOTE — PATIENT INSTRUCTIONS
If glucose level is reading high on machine dial 911. If you are hypoglycemic (BG < 80)    1) Eat or drink 15 to 20 grams of fast-acting carbohydrates. These are sugary foods or drinks without protein or fat that are easily converted to sugar in the body. 2) Recheck blood sugar levels 15 minutes after treatment. 3) If no improvement report to emergency department. Lean into our  for assistance. Call your insurance company and find out where I need to submit your script for glucerna diabetic friendly / carbsteady protein supplements. Call about assisted living facility and we will help facilitate. Keep appointments with specialists. Get into recovery / addiction medicine. Get into the endocrinologist.     If alarm signs or symptoms develop as we discussed, report immediately to the emergency department/dial 9-1-1.

## 2023-10-20 ENCOUNTER — TELEPHONE (OUTPATIENT)
Dept: FAMILY MEDICINE CLINIC | Age: 53
End: 2023-10-20

## 2023-10-20 NOTE — TELEPHONE ENCOUNTER
----- Message from Mireille Mays DO sent at 10/10/2023  7:39 PM EDT -----  Patient is very, very noncompliant and dangerously so    I have tried my best efforts but believe she may be to the point of dismissal.  I want to wait to see if she schedules any appointments or reaches out to social work as directed. Olivia, can you set a reminder for a week for me to examine this again. If no movement with recommendations, I am going to move for dismissal, I just wanted Humza Nunn in the loop for now.

## 2023-10-23 NOTE — TELEPHONE ENCOUNTER
----- Message from Yessenia Morrison sent at 10/23/2023  2:07 PM EDT -----  Subject: Refill Request    QUESTIONS  Name of Medication? Other - acetaminophen 500mg  Patient-reported dosage and instructions? twice a day  How many days do you have left? 0  Preferred Pharmacy? [address] [place] [address]  Pharmacy phone number (if available)? [phone]  Additional Information for Provider? José Manuel Woody would like a refill of her   acetaminophen 500mg tablets she takes them twice a day and has been out   for about a month she would like it sent to [facility], she can be   reached at [phone] ok to leave a message  ---------------------------------------------------------------------------  --------------  [address]  What is the best way for the office to contact you? OK to leave message on   voicemail  Preferred Call Back Phone Number? [phone]  ---------------------------------------------------------------------------  --------------  SCRIPT ANSWERS  Relationship to Patient?  Self

## 2023-10-24 RX ORDER — ACETAMINOPHEN 500 MG
500 TABLET ORAL 4 TIMES DAILY PRN
Qty: 120 TABLET | Refills: 0 | Status: SHIPPED | OUTPATIENT
Start: 2023-10-24

## 2023-10-30 DIAGNOSIS — Z85.3 PERSONAL HISTORY OF BREAST CANCER: ICD-10-CM

## 2023-10-30 RX ORDER — ANASTROZOLE 1 MG/1
1 TABLET ORAL DAILY
Qty: 30 TABLET | Refills: 4 | OUTPATIENT
Start: 2023-10-30

## 2023-10-30 RX ORDER — OMEPRAZOLE 40 MG/1
CAPSULE, DELAYED RELEASE ORAL
Qty: 30 CAPSULE | Refills: 2 | OUTPATIENT
Start: 2023-10-30

## 2023-10-31 ENCOUNTER — HOSPITAL ENCOUNTER (OUTPATIENT)
Dept: INFUSION THERAPY | Age: 53
Discharge: HOME | End: 2023-10-31

## 2023-10-31 DIAGNOSIS — C50.912 MALIGNANT NEOPLASM OF LEFT BREAST IN FEMALE, ESTROGEN RECEPTOR POSITIVE, UNSPECIFIED SITE OF BREAST (HCC): Primary | ICD-10-CM

## 2023-10-31 DIAGNOSIS — Z17.0 MALIGNANT NEOPLASM OF LEFT BREAST IN FEMALE, ESTROGEN RECEPTOR POSITIVE, UNSPECIFIED SITE OF BREAST (HCC): Primary | ICD-10-CM

## 2023-11-13 RX ORDER — OMEPRAZOLE 40 MG/1
CAPSULE, DELAYED RELEASE ORAL
Qty: 30 CAPSULE | Refills: 0 | Status: SHIPPED | OUTPATIENT
Start: 2023-11-13

## 2023-11-13 RX ORDER — GABAPENTIN 400 MG/1
400 CAPSULE ORAL 3 TIMES DAILY
Qty: 90 CAPSULE | Refills: 0 | Status: SHIPPED | OUTPATIENT
Start: 2023-11-13 | End: 2024-02-11

## 2023-11-20 DIAGNOSIS — E11.65 UNCONTROLLED TYPE 2 DIABETES MELLITUS WITH HYPERGLYCEMIA (HCC): Primary | ICD-10-CM

## 2023-11-21 RX ORDER — INSULIN GLARGINE 100 [IU]/ML
25 INJECTION, SOLUTION SUBCUTANEOUS 2 TIMES DAILY
Qty: 5 ADJUSTABLE DOSE PRE-FILLED PEN SYRINGE | Refills: 2 | Status: SHIPPED | OUTPATIENT
Start: 2023-11-21

## 2023-12-14 ENCOUNTER — TELEPHONE (OUTPATIENT)
Dept: FAMILY MEDICINE CLINIC | Age: 53
End: 2023-12-14

## 2023-12-14 NOTE — TELEPHONE ENCOUNTER
----- Message from Zak Yap sent at 12/14/2023  1:20 PM EST -----  Subject: Refill Request    QUESTIONS  Name of Medication? gabapentin (NEURONTIN) 400 MG capsule  Patient-reported dosage and instructions? 400 MG 3 times a day  How many days do you have left? 0  Preferred Pharmacy? [id number]  Pharmacy phone number (if available)? [phone]  ---------------------------------------------------------------------------  --------------  CALL BACK INFO  What is the best way for the office to contact you? OK to leave message on   voicemail  Preferred Call Back Phone Number? [phone]  ---------------------------------------------------------------------------  --------------  SCRIPT ANSWERS  Relationship to Patient?  Self

## 2023-12-14 NOTE — TELEPHONE ENCOUNTER
----- Message from Mook An sent at 12/14/2023  1:17 PM EST -----  Subject: Message to Provider    QUESTIONS  Information for Provider? patient would like to be back with Dr. Jerardo Stevens as   her PCP. said she has been sick and unable to get in to see the dr. please   call patient back to discuss options. patient is also out of gabapentin   and needs it refilled but since she is not a patient of Dr. Jason Daley she   cannot get a prescription filled. ---------------------------------------------------------------------------  --------------  Bharti Rodriguez INFO  [phone]; OK to leave message on voicemail  ---------------------------------------------------------------------------  --------------  SCRIPT ANSWERS  Relationship to Patient?  Self

## 2023-12-26 DIAGNOSIS — Z85.3 PERSONAL HISTORY OF BREAST CANCER: ICD-10-CM

## 2023-12-26 RX ORDER — ANASTROZOLE 1 MG/1
1 TABLET ORAL DAILY
Qty: 30 TABLET | Refills: 4 | Status: SHIPPED | OUTPATIENT
Start: 2023-12-26

## 2024-01-15 ENCOUNTER — TELEPHONE (OUTPATIENT)
Dept: FAMILY MEDICINE CLINIC | Age: 54
End: 2024-01-15

## 2024-01-15 NOTE — TELEPHONE ENCOUNTER
----- Message from Bart Gross sent at 1/15/2024  1:31 PM EST -----  Subject: Refill Request    QUESTIONS  Name of Medication? vitamin B-1 (THIAMINE) 100 MG tablet  Patient-reported dosage and instructions? unknown mg 1 per day  How many days do you have left? 0  Preferred Pharmacy? [id number]  Pharmacy phone number (if available)? [phone]  ---------------------------------------------------------------------------  --------------,  Name of Medication? hydrOXYzine pamoate (VISTARIL) 50 MG capsule  Patient-reported dosage and instructions? 50mg 4x daily 1 pill at a time  How many days do you have left? 0  Preferred Pharmacy? [id number]  Pharmacy phone number (if available)? [phone]  ---------------------------------------------------------------------------  --------------,  Name of Medication? gabapentin (NEURONTIN) 400 MG capsule  Patient-reported dosage and instructions? 400mg 1 pill 3x day  How many days do you have left? 0  Preferred Pharmacy? [id number]  Pharmacy phone number (if available)? [phone]  ---------------------------------------------------------------------------  --------------  CALL BACK INFO  What is the best way for the office to contact you? OK to leave message on   voicemail  Preferred Call Back Phone Number? [phone]  ---------------------------------------------------------------------------  --------------  SCRIPT ANSWERS  Relationship to Patient? Spouse/Partner  Representative Name? steffany  Is the representative on the Communication Release of Information (JENI)   form in Epic? Yes

## 2024-01-15 NOTE — TELEPHONE ENCOUNTER
Left  msg requesting return call.  Who is message for--Dr Mendenhall dismissed in Oct '23 and has NP appt scheduled with Dr Del Castillo 2/2/24.

## 2024-01-15 NOTE — TELEPHONE ENCOUNTER
Patient returned the call and I informed her that since she was dismissed from Dr. Mendenhall in October, she had 30 days to take care of obtaining refills and to make an appt with a new provider.  Patient stated she has an appt on 2/2/24 and was told by the pharmacy that as long as she has an appt, she can get her RX.  I informed patient that a physician has to put orders in and Dr. Del Castillo will not order RX til she is seen.  Patient asked to move her appt up and I informed her that 2/2/24 is the earliest new patient appt.

## 2024-01-18 ENCOUNTER — TELEPHONE (OUTPATIENT)
Dept: FAMILY MEDICINE CLINIC | Age: 54
End: 2024-01-18

## 2024-01-23 ENCOUNTER — HOSPITAL ENCOUNTER (OUTPATIENT)
Dept: INFUSION THERAPY | Age: 54
End: 2024-01-23

## 2024-02-02 ENCOUNTER — TELEPHONE (OUTPATIENT)
Dept: FAMILY MEDICINE CLINIC | Age: 54
End: 2024-02-02

## 2024-02-02 NOTE — TELEPHONE ENCOUNTER
----- Message from Dickson Bush sent at 2/2/2024  9:43 AM EST -----  Subject: Appointment Request    Reason for Call: New Patient/New to Provider Appointment needed: New   Patient Request Appointment    QUESTIONS    Reason for appointment request? Requested Provider unavailable -   Capo Del Castillo, DO     Additional Information for Provider? Pt had to cancel her NP appt for   today 02/02/24 due to not feeling well. Pt would like to R/S on the next   available day to [facility]. Please contact to R/S NP appt.   ---------------------------------------------------------------------------  --------------  CALL BACK INFO  [phone]; OK to leave message on voicemail  ---------------------------------------------------------------------------  --------------  SCRIPT ANSWERS

## 2024-03-04 ENCOUNTER — HOSPITAL ENCOUNTER (INPATIENT)
Age: 54
LOS: 1 days | Discharge: LEFT BEFORE DISCHARGE | DRG: 420 | End: 2024-03-05
Attending: STUDENT IN AN ORGANIZED HEALTH CARE EDUCATION/TRAINING PROGRAM | Admitting: INTERNAL MEDICINE
Payer: COMMERCIAL

## 2024-03-04 ENCOUNTER — APPOINTMENT (OUTPATIENT)
Dept: GENERAL RADIOLOGY | Age: 54
DRG: 420 | End: 2024-03-04
Payer: COMMERCIAL

## 2024-03-04 DIAGNOSIS — R62.7 FAILURE TO THRIVE IN ADULT: ICD-10-CM

## 2024-03-04 DIAGNOSIS — R73.9 HYPERGLYCEMIA: Primary | ICD-10-CM

## 2024-03-04 PROBLEM — E11.65 TYPE 2 DIABETES MELLITUS WITH HYPERGLYCEMIA (HCC): Status: ACTIVE | Noted: 2024-03-04

## 2024-03-04 LAB
25(OH)D3 SERPL-MCNC: 43.7 NG/ML (ref 30–100)
ALBUMIN SERPL-MCNC: 3 G/DL (ref 3.5–5.2)
ALBUMIN SERPL-MCNC: 3.3 G/DL (ref 3.5–5.2)
ALP SERPL-CCNC: 365 U/L (ref 35–104)
ALP SERPL-CCNC: 411 U/L (ref 35–104)
ALT SERPL-CCNC: 33 U/L (ref 0–32)
ALT SERPL-CCNC: 37 U/L (ref 0–32)
AMPHET UR QL SCN: NEGATIVE
ANION GAP SERPL CALCULATED.3IONS-SCNC: 9 MMOL/L (ref 7–16)
ANION GAP SERPL CALCULATED.3IONS-SCNC: 9 MMOL/L (ref 7–16)
APAP SERPL-MCNC: <5 UG/ML (ref 10–30)
AST SERPL-CCNC: 33 U/L (ref 0–31)
AST SERPL-CCNC: 38 U/L (ref 0–31)
B-OH-BUTYR SERPL-MCNC: 0.15 MMOL/L (ref 0.02–0.27)
BARBITURATES UR QL SCN: NEGATIVE
BASOPHILS # BLD: 0.01 K/UL (ref 0–0.2)
BASOPHILS NFR BLD: 1 % (ref 0–2)
BENZODIAZ UR QL: NEGATIVE
BILIRUB DIRECT SERPL-MCNC: <0.2 MG/DL (ref 0–0.3)
BILIRUB INDIRECT SERPL-MCNC: ABNORMAL MG/DL (ref 0–1)
BILIRUB SERPL-MCNC: 0.2 MG/DL (ref 0–1.2)
BILIRUB SERPL-MCNC: 0.3 MG/DL (ref 0–1.2)
BILIRUB UR QL STRIP: NEGATIVE
BUN SERPL-MCNC: 19 MG/DL (ref 6–20)
BUN SERPL-MCNC: 20 MG/DL (ref 6–20)
BUPRENORPHINE UR QL: NEGATIVE
CALCIUM SERPL-MCNC: 8.2 MG/DL (ref 8.6–10.2)
CALCIUM SERPL-MCNC: 8.9 MG/DL (ref 8.6–10.2)
CANNABINOIDS UR QL SCN: NEGATIVE
CHLORIDE SERPL-SCNC: 90 MMOL/L (ref 98–107)
CHLORIDE SERPL-SCNC: 97 MMOL/L (ref 98–107)
CLARITY UR: CLEAR
CO2 SERPL-SCNC: 26 MMOL/L (ref 22–29)
CO2 SERPL-SCNC: 27 MMOL/L (ref 22–29)
COCAINE UR QL SCN: POSITIVE
COLOR UR: YELLOW
CREAT SERPL-MCNC: 0.4 MG/DL (ref 0.5–1)
CREAT SERPL-MCNC: 0.5 MG/DL (ref 0.5–1)
EKG ATRIAL RATE: 75 BPM
EKG P AXIS: 85 DEGREES
EKG P-R INTERVAL: 152 MS
EKG Q-T INTERVAL: 396 MS
EKG QRS DURATION: 84 MS
EKG QTC CALCULATION (BAZETT): 442 MS
EKG R AXIS: 52 DEGREES
EKG T AXIS: 83 DEGREES
EKG VENTRICULAR RATE: 75 BPM
EOSINOPHIL # BLD: 0.01 K/UL (ref 0.05–0.5)
EOSINOPHILS RELATIVE PERCENT: 1 % (ref 0–6)
ERYTHROCYTE [DISTWIDTH] IN BLOOD BY AUTOMATED COUNT: 14.3 % (ref 11.5–15)
ETHANOLAMINE SERPL-MCNC: <10 MG/DL
FENTANYL UR QL: NEGATIVE
GFR SERPL CREATININE-BSD FRML MDRD: >60 ML/MIN/1.73M2
GFR SERPL CREATININE-BSD FRML MDRD: >60 ML/MIN/1.73M2
GLUCOSE BLD-MCNC: 210 MG/DL (ref 74–99)
GLUCOSE BLD-MCNC: 273 MG/DL (ref 74–99)
GLUCOSE BLD-MCNC: 341 MG/DL (ref 74–99)
GLUCOSE BLD-MCNC: 429 MG/DL (ref 74–99)
GLUCOSE BLD-MCNC: >500 MG/DL (ref 74–99)
GLUCOSE SERPL-MCNC: 519 MG/DL (ref 74–99)
GLUCOSE SERPL-MCNC: 722 MG/DL (ref 74–99)
GLUCOSE UR STRIP-MCNC: >=1000 MG/DL
HCT VFR BLD AUTO: 30.5 % (ref 34–48)
HGB BLD-MCNC: 10.7 G/DL (ref 11.5–15.5)
HGB UR QL STRIP.AUTO: NEGATIVE
INFLUENZA A BY PCR: NOT DETECTED
INFLUENZA B BY PCR: NOT DETECTED
KETONES UR STRIP-MCNC: NEGATIVE MG/DL
LACTATE BLDV-SCNC: 2.4 MMOL/L (ref 0.5–2.2)
LACTATE BLDV-SCNC: 2.6 MMOL/L (ref 0.5–2.2)
LEUKOCYTE ESTERASE UR QL STRIP: NEGATIVE
LIPASE SERPL-CCNC: 27 U/L (ref 13–60)
LYMPHOCYTES NFR BLD: 0.28 K/UL (ref 1.5–4)
LYMPHOCYTES RELATIVE PERCENT: 20 % (ref 20–42)
MCH RBC QN AUTO: 30.4 PG (ref 26–35)
MCHC RBC AUTO-ENTMCNC: 35.1 G/DL (ref 32–34.5)
MCV RBC AUTO: 86.6 FL (ref 80–99.9)
METHADONE UR QL: NEGATIVE
MONOCYTES NFR BLD: 0.11 K/UL (ref 0.1–0.95)
MONOCYTES NFR BLD: 8 % (ref 2–12)
NEUTROPHILS NFR BLD: 70 % (ref 43–80)
NEUTS SEG NFR BLD: 0.99 K/UL (ref 1.8–7.3)
NITRITE UR QL STRIP: NEGATIVE
NUCLEATED RED BLOOD CELLS: 1 PER 100 WBC
OPIATES UR QL SCN: NEGATIVE
OXYCODONE UR QL SCN: NEGATIVE
PCP UR QL SCN: NEGATIVE
PH UR STRIP: 6 [PH] (ref 5–9)
PLATELET # BLD AUTO: 56 K/UL (ref 130–450)
PLATELET CONFIRMATION: NORMAL
PMV BLD AUTO: 11 FL (ref 7–12)
POTASSIUM SERPL-SCNC: 3.9 MMOL/L (ref 3.5–5)
POTASSIUM SERPL-SCNC: 5.2 MMOL/L (ref 3.5–5)
PROT SERPL-MCNC: 5.8 G/DL (ref 6.4–8.3)
PROT SERPL-MCNC: 6.3 G/DL (ref 6.4–8.3)
PROT UR STRIP-MCNC: NEGATIVE MG/DL
RBC # BLD AUTO: 3.52 M/UL (ref 3.5–5.5)
RBC # BLD: ABNORMAL 10*6/UL
RBC #/AREA URNS HPF: ABNORMAL /HPF
SALICYLATES SERPL-MCNC: <0.3 MG/DL (ref 0–30)
SARS-COV-2 RDRP RESP QL NAA+PROBE: NOT DETECTED
SODIUM SERPL-SCNC: 125 MMOL/L (ref 132–146)
SODIUM SERPL-SCNC: 133 MMOL/L (ref 132–146)
SP GR UR STRIP: <1.005 (ref 1–1.03)
SPECIMEN DESCRIPTION: NORMAL
TEST INFORMATION: ABNORMAL
TOXIC TRICYCLIC SC,BLOOD: NEGATIVE
TROPONIN I SERPL HS-MCNC: 23 NG/L (ref 0–9)
TROPONIN I SERPL HS-MCNC: 25 NG/L (ref 0–9)
UROBILINOGEN UR STRIP-ACNC: 0.2 EU/DL (ref 0–1)
WBC #/AREA URNS HPF: ABNORMAL /HPF
WBC OTHER # BLD: 1.4 K/UL (ref 4.5–11.5)

## 2024-03-04 PROCEDURE — 85025 COMPLETE CBC W/AUTO DIFF WBC: CPT

## 2024-03-04 PROCEDURE — 82306 VITAMIN D 25 HYDROXY: CPT

## 2024-03-04 PROCEDURE — 82248 BILIRUBIN DIRECT: CPT

## 2024-03-04 PROCEDURE — 80143 DRUG ASSAY ACETAMINOPHEN: CPT

## 2024-03-04 PROCEDURE — 87502 INFLUENZA DNA AMP PROBE: CPT

## 2024-03-04 PROCEDURE — 82010 KETONE BODYS QUAN: CPT

## 2024-03-04 PROCEDURE — 80053 COMPREHEN METABOLIC PANEL: CPT

## 2024-03-04 PROCEDURE — 96374 THER/PROPH/DIAG INJ IV PUSH: CPT

## 2024-03-04 PROCEDURE — 80179 DRUG ASSAY SALICYLATE: CPT

## 2024-03-04 PROCEDURE — APPSS45 APP SPLIT SHARED TIME 31-45 MINUTES: Performed by: NURSE PRACTITIONER

## 2024-03-04 PROCEDURE — 83690 ASSAY OF LIPASE: CPT

## 2024-03-04 PROCEDURE — 93010 ELECTROCARDIOGRAM REPORT: CPT | Performed by: INTERNAL MEDICINE

## 2024-03-04 PROCEDURE — 93005 ELECTROCARDIOGRAM TRACING: CPT

## 2024-03-04 PROCEDURE — 6370000000 HC RX 637 (ALT 250 FOR IP): Performed by: NURSE PRACTITIONER

## 2024-03-04 PROCEDURE — 82962 GLUCOSE BLOOD TEST: CPT

## 2024-03-04 PROCEDURE — 2580000003 HC RX 258

## 2024-03-04 PROCEDURE — 83605 ASSAY OF LACTIC ACID: CPT

## 2024-03-04 PROCEDURE — 6370000000 HC RX 637 (ALT 250 FOR IP)

## 2024-03-04 PROCEDURE — G0480 DRUG TEST DEF 1-7 CLASSES: HCPCS

## 2024-03-04 PROCEDURE — 99223 1ST HOSP IP/OBS HIGH 75: CPT | Performed by: INTERNAL MEDICINE

## 2024-03-04 PROCEDURE — 81001 URINALYSIS AUTO W/SCOPE: CPT

## 2024-03-04 PROCEDURE — 87635 SARS-COV-2 COVID-19 AMP PRB: CPT

## 2024-03-04 PROCEDURE — 99285 EMERGENCY DEPT VISIT HI MDM: CPT

## 2024-03-04 PROCEDURE — 1200000000 HC SEMI PRIVATE

## 2024-03-04 PROCEDURE — 80307 DRUG TEST PRSMV CHEM ANLYZR: CPT

## 2024-03-04 PROCEDURE — 71045 X-RAY EXAM CHEST 1 VIEW: CPT

## 2024-03-04 PROCEDURE — 84484 ASSAY OF TROPONIN QUANT: CPT

## 2024-03-04 RX ORDER — INSULIN GLARGINE 100 [IU]/ML
12 INJECTION, SOLUTION SUBCUTANEOUS 2 TIMES DAILY
Status: DISCONTINUED | OUTPATIENT
Start: 2024-03-04 | End: 2024-03-05 | Stop reason: HOSPADM

## 2024-03-04 RX ORDER — BUDESONIDE AND FORMOTEROL FUMARATE DIHYDRATE 160; 4.5 UG/1; UG/1
2 AEROSOL RESPIRATORY (INHALATION) 2 TIMES DAILY
Status: DISCONTINUED | OUTPATIENT
Start: 2024-03-04 | End: 2024-03-04 | Stop reason: CLARIF

## 2024-03-04 RX ORDER — GAUZE BANDAGE 2" X 2"
100 BANDAGE TOPICAL DAILY
Status: DISCONTINUED | OUTPATIENT
Start: 2024-03-04 | End: 2024-03-05 | Stop reason: HOSPADM

## 2024-03-04 RX ORDER — SODIUM CHLORIDE 9 MG/ML
INJECTION, SOLUTION INTRAVENOUS
Status: COMPLETED
Start: 2024-03-04 | End: 2024-03-04

## 2024-03-04 RX ORDER — 0.9 % SODIUM CHLORIDE 0.9 %
1000 INTRAVENOUS SOLUTION INTRAVENOUS ONCE
Status: COMPLETED | OUTPATIENT
Start: 2024-03-04 | End: 2024-03-04

## 2024-03-04 RX ORDER — ARFORMOTEROL TARTRATE 15 UG/2ML
15 SOLUTION RESPIRATORY (INHALATION)
Status: DISCONTINUED | OUTPATIENT
Start: 2024-03-04 | End: 2024-03-05 | Stop reason: HOSPADM

## 2024-03-04 RX ORDER — LACTULOSE 10 G/15ML
10 SOLUTION ORAL 2 TIMES DAILY
Status: DISCONTINUED | OUTPATIENT
Start: 2024-03-04 | End: 2024-03-05 | Stop reason: HOSPADM

## 2024-03-04 RX ORDER — ALBUTEROL SULFATE 2.5 MG/3ML
2.5 SOLUTION RESPIRATORY (INHALATION) EVERY 4 HOURS PRN
Status: DISCONTINUED | OUTPATIENT
Start: 2024-03-04 | End: 2024-03-05 | Stop reason: HOSPADM

## 2024-03-04 RX ORDER — BUDESONIDE 0.5 MG/2ML
0.5 INHALANT ORAL
Status: DISCONTINUED | OUTPATIENT
Start: 2024-03-04 | End: 2024-03-05 | Stop reason: HOSPADM

## 2024-03-04 RX ORDER — GLUCAGON 1 MG/ML
1 KIT INJECTION PRN
Status: DISCONTINUED | OUTPATIENT
Start: 2024-03-04 | End: 2024-03-05 | Stop reason: HOSPADM

## 2024-03-04 RX ORDER — PANTOPRAZOLE SODIUM 40 MG/1
40 TABLET, DELAYED RELEASE ORAL
Status: DISCONTINUED | OUTPATIENT
Start: 2024-03-05 | End: 2024-03-05 | Stop reason: HOSPADM

## 2024-03-04 RX ORDER — ANASTROZOLE 1 MG/1
1 TABLET ORAL DAILY
Status: DISCONTINUED | OUTPATIENT
Start: 2024-03-04 | End: 2024-03-05 | Stop reason: HOSPADM

## 2024-03-04 RX ORDER — INSULIN LISPRO 100 [IU]/ML
0-4 INJECTION, SOLUTION INTRAVENOUS; SUBCUTANEOUS NIGHTLY
Status: DISCONTINUED | OUTPATIENT
Start: 2024-03-04 | End: 2024-03-05 | Stop reason: HOSPADM

## 2024-03-04 RX ORDER — INSULIN LISPRO 100 [IU]/ML
0-8 INJECTION, SOLUTION INTRAVENOUS; SUBCUTANEOUS
Status: DISCONTINUED | OUTPATIENT
Start: 2024-03-04 | End: 2024-03-05 | Stop reason: HOSPADM

## 2024-03-04 RX ORDER — 0.9 % SODIUM CHLORIDE 0.9 %
500 INTRAVENOUS SOLUTION INTRAVENOUS ONCE
Status: COMPLETED | OUTPATIENT
Start: 2024-03-04 | End: 2024-03-04

## 2024-03-04 RX ORDER — ARIPIPRAZOLE 10 MG/1
20 TABLET ORAL DAILY
Status: DISCONTINUED | OUTPATIENT
Start: 2024-03-04 | End: 2024-03-05 | Stop reason: HOSPADM

## 2024-03-04 RX ORDER — DEXTROSE MONOHYDRATE 100 MG/ML
INJECTION, SOLUTION INTRAVENOUS CONTINUOUS PRN
Status: DISCONTINUED | OUTPATIENT
Start: 2024-03-04 | End: 2024-03-05 | Stop reason: HOSPADM

## 2024-03-04 RX ADMIN — RIFAXIMIN 400 MG: 200 TABLET ORAL at 16:05

## 2024-03-04 RX ADMIN — INSULIN GLARGINE 12 UNITS: 100 INJECTION, SOLUTION SUBCUTANEOUS at 20:41

## 2024-03-04 RX ADMIN — SODIUM CHLORIDE 1000 ML: 9 INJECTION, SOLUTION INTRAVENOUS at 06:35

## 2024-03-04 RX ADMIN — RIFAXIMIN 400 MG: 200 TABLET ORAL at 20:41

## 2024-03-04 RX ADMIN — LACTULOSE 10 G: 20 SOLUTION ORAL at 20:42

## 2024-03-04 RX ADMIN — INSULIN HUMAN 10 UNITS: 100 INJECTION, SOLUTION PARENTERAL at 06:33

## 2024-03-04 RX ADMIN — SODIUM CHLORIDE 500 ML: 9 INJECTION, SOLUTION INTRAVENOUS at 07:40

## 2024-03-04 RX ADMIN — INSULIN GLARGINE 12 UNITS: 100 INJECTION, SOLUTION SUBCUTANEOUS at 11:18

## 2024-03-04 RX ADMIN — INSULIN LISPRO 2 UNITS: 100 INJECTION, SOLUTION INTRAVENOUS; SUBCUTANEOUS at 17:47

## 2024-03-04 RX ADMIN — Medication 500 ML: at 07:40

## 2024-03-04 RX ADMIN — ARIPIPRAZOLE 20 MG: 10 TABLET ORAL at 11:15

## 2024-03-04 RX ADMIN — INSULIN LISPRO 4 UNITS: 100 INJECTION, SOLUTION INTRAVENOUS; SUBCUTANEOUS at 20:41

## 2024-03-04 RX ADMIN — RIFAXIMIN 400 MG: 200 TABLET ORAL at 11:15

## 2024-03-04 RX ADMIN — Medication 100 MG: at 11:15

## 2024-03-04 RX ADMIN — INSULIN LISPRO 8 UNITS: 100 INJECTION, SOLUTION INTRAVENOUS; SUBCUTANEOUS at 11:19

## 2024-03-04 RX ADMIN — ANASTROZOLE 1 MG: 1 TABLET, COATED ORAL at 11:15

## 2024-03-04 NOTE — H&P
[facility]  Hospitalist Group   History and Physical      CHIEF COMPLAINT:  Weakness/Fatigue/Weight loss    History of Present Illness:  53 y.o. female with a history of   Cirrhosis, Depression/Anxiety, Left Breast Cancer, DM, Gout, hepatitis C, Schizoaffective Disorder, Crack Cocaine use presents with Generalized weakness, fatigue, and weight loss. Pt states she has been extremely tired recently. States she has slept for about 2 weeks. She does have a history of PEG placement with removal. States she has been eating \"when I can\". She typically lays on right side. Noted likely pressure injury to right forehead and ear. She denies any fevers, chills, N/V/D, CP, SOB. Pt states she does use crack cocaine. Last use yesterday Pt received humulin Regular 10 units, 1.5L NSS bolus in ED course Decision to admit pt for further evaluation and treatment.     Informant(s) for H&P: Pt and EMR    REVIEW OF SYSTEMS:  Admits to weakness, fatigue, weight loss. Denies fevers, chills, cp, sob, n/v, ha, vision/hearing changes, hot/cold flashes, other open skin lesions, diarrhea, constipation, dysuria/hematuria unless noted in HPI. Complete ROS performed with the patient and is otherwise negative.      PMH:  Past Medical History:   Diagnosis Date    Alcoholism (HCC)     Since teens to early 20s    Anxiety and depression     Ascites of liver     Bronchitis     Cancer (HCC)     breast, left    Candidiasis of vagina     Chronic pancreatitis (HCC)     Cocaine abuse (HCC)     Constipation     Diabetes mellitus, type 2 (HCC)     GERD (gastroesophageal reflux disease)     Gout     Hepatitis C     Hernia of anterior abdominal wall     Jaundice 05/12/2016    Pneumonia     Polyneuropathy due to type 2 diabetes mellitus (HCC)     Schizoaffective disorder, bipolar type (HCC)     Splenomegaly 05/30/2017       Surgical History:  Past Surgical History:   Procedure Laterality Date    ABDOMEN SURGERY      gallbladder removal

## 2024-03-04 NOTE — ED PROVIDER NOTES
Response: Oriented  Best Motor Response: Obeys commands  [place] Coma Scale Score: 15                CIWA Assessment  BP: (!) 84/62  Pulse: 66           PHYSICAL EXAM  1 or more Elements     ED Triage Vitals   BP Temp Temp src Pulse Respirations SpO2 Height Weight - Scale   -- 03/04/24 0328 -- 03/04/24 0328 03/04/24 0415 03/04/24 0328 03/04/24 0415 03/04/24 0415    98.1 °F (36.7 °C)  83 20 100 % 1.549 m (5' 1\") 31.8 kg (70 lb)       Constitutional/General: Alert and oriented x3, appears to be chronically ill but not in acute distress  Head: Normocephalic and atraumatic  Eyes: PERRL, EOMI, conjunctiva normal, sclera non icteric  Respiratory: Mildly diminished breath sounds without significant wheezing, rales, or rhonchi, patient not in acute respiratory distress   cardiovascular:  Regular rate. Regular rhythm. 2+ distal pulses. Equal extremity pulses  Chest: No chest wall tenderness  GI:  Abdomen Soft, Non tender, Non distended.  No rebound, guarding, or rigidity  Musculoskeletal: Moves all extremities x 4. Warm and well perfused, no cyanosis, no edema. Capillary refill <3 seconds  Integument: skin warm and dry. No rashes   Neurologic: GCS 15, no focal deficits  Psychiatric: Normal Affect    DIAGNOSTIC RESULTS   LABS:    Labs Reviewed   CBC WITH AUTO DIFFERENTIAL - Abnormal; Notable for the following components:       Result Value    WBC 1.4 (*)     Hemoglobin 10.7 (*)     Hematocrit 30.5 (*)     MCHC 35.1 (*)     Platelets 56 (*)     Neutrophils Absolute 0.99 (*)     Lymphocytes Absolute 0.28 (*)     Eosinophils Absolute 0.01 (*)     All other components within normal limits   COMPREHENSIVE METABOLIC PANEL - Abnormal; Notable for the following components:    Sodium 125 (*)     Potassium 5.2 (*)     Chloride 90 (*)     Glucose 722 (*)     Creatinine 0.4 (*)     Total Protein 6.3 (*)     Albumin 3.3 (*)     Alkaline Phosphatase 411 (*)     ALT 37 (*)     AST 38 (*)     All other components within normal limits

## 2024-03-04 NOTE — CARE COORDINATION
Case Management Assessment  Initial Evaluation    Date/Time of Evaluation: 3/4/2024 2:45 PM  Assessment Completed by: SANDEE Chun    If patient is discharged prior to next notation, then this note serves as note for discharge by case management.    Patient Name: Ellie Santacruz                   YOB: 1970  Diagnosis: Type 2 diabetes mellitus with hyperglycemia (HCC) [E11.65]                   Date / Time: 3/4/2024  4:01 AM    Patient Admission Status: Inpatient   Readmission Risk (Low < 19, Mod (19-27), High > 27): Readmission Risk Score: 19.7    Current PCP: No primary care provider on file.  PCP verified by CM? No    Chart Reviewed: Yes      History Provided by: Patient, Medical Record  Patient Orientation: Alert and Oriented    Patient Cognition: Alert    Hospitalization in the last 30 days (Readmission):  No    If yes, Readmission Assessment in CM Navigator will be completed.    Advance Directives:      Code Status: Prior   Patient's Primary Decision Maker is: Patient Declined (Legal Next of Kin Remains as Decision Maker)    Primary Decision Maker: Faisal Gonzalez - Domestic Partner - [phone]    Discharge Planning:    Patient lives with:   Type of Home:    Primary Care Giver: Other (Comment) (VINI- Javon Gonzalez)  Patient Support Systems include: Spouse/Significant Other   Current Financial resources:    Current community resources:    Current services prior to admission:              Current DME:              Type of Home Care services:       ADLS  Prior functional level: Assistance with the following:, Shopping, Housework, Cooking, Bathing  Current functional level: Assistance with the following:, Shopping, Housework, Cooking, Dressing, Bathing    PT AM-PAC:   /24  OT AM-PAC:   /24    Family can provide assistance at DC: Yes  Would you like Case Management to discuss the discharge plan with any other family members/significant others, and if so, who? Yes (Javon Gonzalez- VINI)  Plans to Return

## 2024-03-04 NOTE — ACP (ADVANCE CARE PLANNING)
Advance Care Planning   Healthcare Decision Maker:    Primary Decision Maker: Faisal Gonzalez - Domestic Partner - [phone]    Click here to complete Healthcare Decision Makers including selection of the Healthcare Decision Maker Relationship (ie \"Primary\").  Today we Documented HCDM that is NOT pt's Legal NOK. SURESH informs pt that need M-POA to reflect his/her decision. SURESH informs pt that can complete POA today for free. Pt declines to complete @ this time. SHe also declines blank copy of POA. SURESH informs pt that until he completes POA- her children are her legal NOK.    Electronically signed by SANDEE Chun on 3/4/2024 at 2:37 PM

## 2024-03-05 VITALS
SYSTOLIC BLOOD PRESSURE: 109 MMHG | DIASTOLIC BLOOD PRESSURE: 66 MMHG | TEMPERATURE: 97.8 F | BODY MASS INDEX: 13.22 KG/M2 | WEIGHT: 70 LBS | RESPIRATION RATE: 16 BRPM | HEIGHT: 61 IN | OXYGEN SATURATION: 100 % | HEART RATE: 74 BPM

## 2024-03-05 PROBLEM — F14.90 COCAINE USE: Status: ACTIVE | Noted: 2024-03-05

## 2024-03-05 PROBLEM — R62.7 FAILURE TO THRIVE IN ADULT: Status: ACTIVE | Noted: 2024-03-05

## 2024-03-05 RX ORDER — NICOTINE 21 MG/24HR
1 PATCH, TRANSDERMAL 24 HOURS TRANSDERMAL DAILY
Status: DISCONTINUED | OUTPATIENT
Start: 2024-03-05 | End: 2024-03-05 | Stop reason: HOSPADM

## 2024-03-05 RX ORDER — NICOTINE 21 MG/24HR
1 PATCH, TRANSDERMAL 24 HOURS TRANSDERMAL DAILY
Status: DISCONTINUED | OUTPATIENT
Start: 2024-03-05 | End: 2024-03-05

## 2024-03-05 NOTE — PROGRESS NOTES
Patient signed leaving against medical advice waiver.  IV removed.  After visit summary and discharge instructions given to patient.   Patient waiting on ride.

## 2024-03-05 NOTE — PLAN OF CARE
Problem: Discharge Planning  Goal: Discharge to home or other facility with appropriate resources  Outcome: Progressing  Flowsheets (Taken 3/4/2024 1700)  Discharge to home or other facility with appropriate resources:   Identify barriers to discharge with patient and caregiver   Arrange for needed discharge resources and transportation as appropriate     Problem: Skin/Tissue Integrity  Goal: Absence of new skin breakdown  Description: 1.  Monitor for areas of redness and/or skin breakdown  2.  Assess vascular access sites hourly  3.  Every 4-6 hours minimum:  Change oxygen saturation probe site  4.  Every 4-6 hours:  If on nasal continuous positive airway pressure, respiratory therapy assess nares and determine need for appliance change or resting period.  Outcome: Progressing     Problem: ABCDS Injury Assessment  Goal: Absence of physical injury  Outcome: Progressing     Problem: Safety - Adult  Goal: Free from fall injury  Outcome: Progressing

## 2024-03-05 NOTE — PROGRESS NOTES
Patient expressing desire to leave AMA.  Patient called for a ride.  Dr. Gaytan made aware of situation.

## 2024-03-05 NOTE — DISCHARGE SUMMARY
Follow  Protein Calorie Malnutrition- follow nutritional labs. Dietician for supplementation follow  History Left breast Cancer- Arimidex  Chronic Liver Disease- Noted hx of paracentesis in the past. Check Abd. US. Xifaxin/Lactulose.Has seen Dr. Esquivel in the past. Continue home regimen LFTs pending.    COPD- Not in acute exacerbation. Continue Brovana/Pulmicort/Albuterol. Follow    5:00a pt expressed desire to leave facility AMA. Physician on call made aware via nursing. IV was removed. Discharge paperwork given to pt. 5:30 Nursing returned to room to find heart monitor on bed and paperwork on chair in the room. Pt AMA      Discharge Exam:  Vitals:    03/04/24 1700 03/04/24 1715 03/04/24 2002 03/05/24 0030   BP: (!) 77/48 (!) 84/62 101/70 109/66   Pulse: 66  75 74   Resp: 11  15 16   Temp: 98 °F (36.7 °C)  98.2 °F (36.8 °C) 97.8 °F (36.6 °C)   TempSrc: Oral  Oral Oral   SpO2: 97%  98% 100%   Weight:       Height:           Pt left AMA prior to being assessed in the am.       I/O last 3 completed shifts:  In: 360 [P.O.:360]  Out: -   No intake/output data recorded.      LABS:  Recent Labs     03/04/24 0434 03/04/24  0730   * 133   K 5.2* 3.9   CL 90* 97*   CO2 26 27   BUN 19 20   CREATININE 0.4* 0.5   GLUCOSE 722* 519*   CALCIUM 8.9 8.2*       Recent Labs     03/04/24 0434   WBC 1.4*   RBC 3.52   HGB 10.7*   HCT 30.5*   MCV 86.6   MCH 30.4   MCHC 35.1*   RDW 14.3   PLT 56*   MPV 11.0       Recent Labs     03/04/24  1106 03/04/24  1622 03/04/24  1738 03/04/24 2038   POCGLU 429* 273* 210* 341*           Imaging:  XR CHEST 1 VIEW    Result Date: 3/4/2024  EXAMINATION: ONE XRAY VIEW OF THE CHEST 3/4/2024 6:40 am COMPARISON: Chest x-ray dated 08/27/2023 HISTORY: ORDERING SYSTEM PROVIDED HISTORY: fatigue and increased weakness TECHNOLOGIST PROVIDED HISTORY: Reason for exam:->fatigue and increased weakness FINDINGS: Cardiac size within normal limits.  Pulmonary vascularity within normal limits.  Hyperinflated lungs

## 2024-03-11 ENCOUNTER — APPOINTMENT (OUTPATIENT)
Dept: GENERAL RADIOLOGY | Age: 54
End: 2024-03-11
Payer: COMMERCIAL

## 2024-03-11 ENCOUNTER — HOSPITAL ENCOUNTER (EMERGENCY)
Age: 54
Discharge: LEFT BEFORE DISCHARGE | End: 2024-03-11
Attending: EMERGENCY MEDICINE
Payer: COMMERCIAL

## 2024-03-11 VITALS
WEIGHT: 70 LBS | HEIGHT: 60 IN | HEART RATE: 81 BPM | OXYGEN SATURATION: 100 % | SYSTOLIC BLOOD PRESSURE: 127 MMHG | BODY MASS INDEX: 13.74 KG/M2 | RESPIRATION RATE: 17 BRPM | TEMPERATURE: 97.6 F | DIASTOLIC BLOOD PRESSURE: 80 MMHG

## 2024-03-11 DIAGNOSIS — R73.9 HYPERGLYCEMIA: Primary | ICD-10-CM

## 2024-03-11 DIAGNOSIS — Z79.4 TYPE 2 DIABETES MELLITUS WITH HYPERGLYCEMIA, WITH LONG-TERM CURRENT USE OF INSULIN (HCC): ICD-10-CM

## 2024-03-11 DIAGNOSIS — E11.65 TYPE 2 DIABETES MELLITUS WITH HYPERGLYCEMIA, WITH LONG-TERM CURRENT USE OF INSULIN (HCC): ICD-10-CM

## 2024-03-11 LAB
ALBUMIN SERPL-MCNC: 4.1 G/DL (ref 3.5–5.2)
ALP SERPL-CCNC: 613 U/L (ref 35–104)
ALT SERPL-CCNC: 71 U/L (ref 0–32)
ANION GAP SERPL CALCULATED.3IONS-SCNC: 19 MMOL/L (ref 7–16)
AST SERPL-CCNC: 67 U/L (ref 0–31)
B-OH-BUTYR SERPL-MCNC: 3 MMOL/L (ref 0.02–0.27)
BASOPHILS # BLD: 0 K/UL (ref 0–0.2)
BASOPHILS NFR BLD: 0 % (ref 0–2)
BILIRUB SERPL-MCNC: 0.6 MG/DL (ref 0–1.2)
BUN SERPL-MCNC: 26 MG/DL (ref 6–20)
CALCIUM SERPL-MCNC: 10.3 MG/DL (ref 8.6–10.2)
CHLORIDE SERPL-SCNC: 87 MMOL/L (ref 98–107)
CO2 SERPL-SCNC: 23 MMOL/L (ref 22–29)
CREAT SERPL-MCNC: 0.5 MG/DL (ref 0.5–1)
EOSINOPHIL # BLD: 0.06 K/UL (ref 0.05–0.5)
EOSINOPHILS RELATIVE PERCENT: 2 % (ref 0–6)
ERYTHROCYTE [DISTWIDTH] IN BLOOD BY AUTOMATED COUNT: 14.5 % (ref 11.5–15)
GFR SERPL CREATININE-BSD FRML MDRD: >60 ML/MIN/1.73M2
GLUCOSE BLD-MCNC: 289 MG/DL (ref 74–99)
GLUCOSE BLD-MCNC: >500 MG/DL (ref 74–99)
GLUCOSE SERPL-MCNC: 570 MG/DL (ref 74–99)
HCT VFR BLD AUTO: 39.8 % (ref 34–48)
HGB BLD-MCNC: 13.5 G/DL (ref 11.5–15.5)
LIPASE SERPL-CCNC: 38 U/L (ref 13–60)
LYMPHOCYTES NFR BLD: 0.58 K/UL (ref 1.5–4)
LYMPHOCYTES RELATIVE PERCENT: 18 % (ref 20–42)
MAGNESIUM SERPL-MCNC: 2.3 MG/DL (ref 1.6–2.6)
MCH RBC QN AUTO: 30.2 PG (ref 26–35)
MCHC RBC AUTO-ENTMCNC: 33.9 G/DL (ref 32–34.5)
MCV RBC AUTO: 89 FL (ref 80–99.9)
MONOCYTES NFR BLD: 0.14 K/UL (ref 0.1–0.95)
MONOCYTES NFR BLD: 4 % (ref 2–12)
NEUTROPHILS NFR BLD: 76 % (ref 43–80)
NEUTS SEG NFR BLD: 2.42 K/UL (ref 1.8–7.3)
PH VENOUS: 7.36 (ref 7.35–7.45)
PLATELET # BLD AUTO: 66 K/UL (ref 130–450)
PLATELET CONFIRMATION: NORMAL
PMV BLD AUTO: 10.5 FL (ref 7–12)
POTASSIUM SERPL-SCNC: 4.7 MMOL/L (ref 3.5–5)
PROT SERPL-MCNC: 8.7 G/DL (ref 6.4–8.3)
RBC # BLD AUTO: 4.47 M/UL (ref 3.5–5.5)
RBC # BLD: ABNORMAL 10*6/UL
RBC # BLD: ABNORMAL 10*6/UL
SODIUM SERPL-SCNC: 129 MMOL/L (ref 132–146)
TROPONIN I SERPL HS-MCNC: 22 NG/L (ref 0–9)
WBC OTHER # BLD: 3.2 K/UL (ref 4.5–11.5)

## 2024-03-11 PROCEDURE — 80053 COMPREHEN METABOLIC PANEL: CPT

## 2024-03-11 PROCEDURE — 99285 EMERGENCY DEPT VISIT HI MDM: CPT

## 2024-03-11 PROCEDURE — 82962 GLUCOSE BLOOD TEST: CPT

## 2024-03-11 PROCEDURE — 82010 KETONE BODYS QUAN: CPT

## 2024-03-11 PROCEDURE — 84484 ASSAY OF TROPONIN QUANT: CPT

## 2024-03-11 PROCEDURE — 82800 BLOOD PH: CPT

## 2024-03-11 PROCEDURE — 83690 ASSAY OF LIPASE: CPT

## 2024-03-11 PROCEDURE — 96374 THER/PROPH/DIAG INJ IV PUSH: CPT

## 2024-03-11 PROCEDURE — 83735 ASSAY OF MAGNESIUM: CPT

## 2024-03-11 PROCEDURE — 93005 ELECTROCARDIOGRAM TRACING: CPT | Performed by: EMERGENCY MEDICINE

## 2024-03-11 PROCEDURE — 71045 X-RAY EXAM CHEST 1 VIEW: CPT

## 2024-03-11 PROCEDURE — 85025 COMPLETE CBC W/AUTO DIFF WBC: CPT

## 2024-03-11 PROCEDURE — 2580000003 HC RX 258: Performed by: EMERGENCY MEDICINE

## 2024-03-11 PROCEDURE — 6370000000 HC RX 637 (ALT 250 FOR IP): Performed by: EMERGENCY MEDICINE

## 2024-03-11 RX ORDER — 0.9 % SODIUM CHLORIDE 0.9 %
1000 INTRAVENOUS SOLUTION INTRAVENOUS ONCE
Status: COMPLETED | OUTPATIENT
Start: 2024-03-11 | End: 2024-03-11

## 2024-03-11 RX ORDER — HYDROXYZINE PAMOATE 25 MG/1
50 CAPSULE ORAL ONCE
Status: COMPLETED | OUTPATIENT
Start: 2024-03-11 | End: 2024-03-11

## 2024-03-11 RX ORDER — GLUCOSAMINE HCL/CHONDROITIN SU 500-400 MG
CAPSULE ORAL
Qty: 100 STRIP | Refills: 0 | Status: SHIPPED | OUTPATIENT
Start: 2024-03-11 | End: 2024-03-11

## 2024-03-11 RX ORDER — BLOOD SUGAR DIAGNOSTIC
1 STRIP MISCELLANEOUS
Qty: 150 EACH | Refills: 0 | Status: SHIPPED | OUTPATIENT
Start: 2024-03-11 | End: 2024-03-11

## 2024-03-11 RX ORDER — BLOOD SUGAR DIAGNOSTIC
1 STRIP MISCELLANEOUS
Qty: 200 EACH | Refills: 11 | Status: SHIPPED
Start: 2024-03-11 | End: 2024-03-14 | Stop reason: ALTCHOICE

## 2024-03-11 RX ADMIN — INSULIN HUMAN 8 UNITS: 100 INJECTION, SOLUTION PARENTERAL at 14:03

## 2024-03-11 RX ADMIN — SODIUM CHLORIDE 1000 ML: 9 INJECTION, SOLUTION INTRAVENOUS at 14:01

## 2024-03-11 RX ADMIN — SODIUM CHLORIDE 1000 ML: 9 INJECTION, SOLUTION INTRAVENOUS at 12:16

## 2024-03-11 RX ADMIN — SODIUM CHLORIDE 1000 ML: 9 INJECTION, SOLUTION INTRAVENOUS at 14:03

## 2024-03-11 RX ADMIN — HYDROXYZINE PAMOATE 50 MG: 25 CAPSULE ORAL at 15:11

## 2024-03-11 ASSESSMENT — ENCOUNTER SYMPTOMS
WHEEZING: 0
SORE THROAT: 0
COUGH: 0
VOMITING: 0
BACK PAIN: 0
DIARRHEA: 0
NAUSEA: 0
SHORTNESS OF BREATH: 0
CHEST TIGHTNESS: 0
ABDOMINAL PAIN: 0

## 2024-03-11 ASSESSMENT — PAIN - FUNCTIONAL ASSESSMENT: PAIN_FUNCTIONAL_ASSESSMENT: NONE - DENIES PAIN

## 2024-03-11 NOTE — ED PROVIDER NOTES
if their condition begins to change or worsen.         --------------------------------- ADDITIONAL PROVIDER NOTES ---------------------------------  Consultations:    This patient's ED course included: a personal history and physicial examination, re-evaluation prior to disposition, multiple bedside re-evaluations, IV medications, cardiac monitoring, continuous pulse oximetry, and complex medical decision making and emergency management    This patient has remained hemodynamically stable, improved, and been closely monitored during their ED course.    Please note that the withdrawal or failure to initiate urgent interventions for this patient would likely result in a life threatening deterioration or permanent disability.      Accordingly this patient received 30 minutes of critical care time, excluding separately billable procedures. Systems at risk for deterioration include: Cardiopulmonary, evaluating for DKA.      Clinical Impression  1. Hyperglycemia          Disposition  Patient's dispositio: Followed up with Ellie Santacruz on 3/11/2024 at 3:40 PM. Patient left the ED with a disposition of AMA on . Patient cited wait time as reason. Advised patient to follow up with a primary care physician or return to the Emergency Department if symptoms worsen.   Maurice Melchor DO    Patient's condition is stable.       Maurice Melchor DO  03/11/24 1541

## 2024-03-12 LAB
EKG ATRIAL RATE: 84 BPM
EKG P AXIS: 88 DEGREES
EKG P-R INTERVAL: 136 MS
EKG Q-T INTERVAL: 372 MS
EKG QRS DURATION: 82 MS
EKG QTC CALCULATION (BAZETT): 439 MS
EKG R AXIS: 50 DEGREES
EKG T AXIS: 83 DEGREES
EKG VENTRICULAR RATE: 84 BPM

## 2024-03-12 PROCEDURE — 93010 ELECTROCARDIOGRAM REPORT: CPT | Performed by: INTERNAL MEDICINE

## 2024-03-14 ENCOUNTER — APPOINTMENT (OUTPATIENT)
Dept: CT IMAGING | Age: 54
DRG: 279 | End: 2024-03-14
Payer: COMMERCIAL

## 2024-03-14 ENCOUNTER — APPOINTMENT (OUTPATIENT)
Dept: ULTRASOUND IMAGING | Age: 54
DRG: 279 | End: 2024-03-14
Payer: COMMERCIAL

## 2024-03-14 ENCOUNTER — HOSPITAL ENCOUNTER (INPATIENT)
Age: 54
LOS: 13 days | Discharge: NURSING, REHAB OR LONG-TERM CARE | DRG: 279 | End: 2024-03-27
Attending: STUDENT IN AN ORGANIZED HEALTH CARE EDUCATION/TRAINING PROGRAM | Admitting: STUDENT IN AN ORGANIZED HEALTH CARE EDUCATION/TRAINING PROGRAM
Payer: COMMERCIAL

## 2024-03-14 DIAGNOSIS — R74.8 ELEVATED LIVER ENZYMES: ICD-10-CM

## 2024-03-14 DIAGNOSIS — R16.0 LIVER MASS: ICD-10-CM

## 2024-03-14 DIAGNOSIS — R10.84 GENERALIZED ABDOMINAL PAIN: Primary | ICD-10-CM

## 2024-03-14 DIAGNOSIS — K70.31 ASCITES DUE TO ALCOHOLIC CIRRHOSIS (HCC): ICD-10-CM

## 2024-03-14 DIAGNOSIS — F14.90 COCAINE USE: ICD-10-CM

## 2024-03-14 LAB
ALBUMIN SERPL-MCNC: 3.5 G/DL (ref 3.5–5.2)
ALP SERPL-CCNC: 462 U/L (ref 35–104)
ALT SERPL-CCNC: 448 U/L (ref 0–32)
AMMONIA PLAS-SCNC: 34 UMOL/L (ref 11–51)
AMPHET UR QL SCN: NEGATIVE
ANION GAP SERPL CALCULATED.3IONS-SCNC: 8 MMOL/L (ref 7–16)
APAP SERPL-MCNC: <5 UG/ML (ref 10–30)
AST SERPL-CCNC: 887 U/L (ref 0–31)
BARBITURATES UR QL SCN: NEGATIVE
BASOPHILS # BLD: 0.01 K/UL (ref 0–0.2)
BASOPHILS NFR BLD: 1 % (ref 0–2)
BENZODIAZ UR QL: NEGATIVE
BILIRUB DIRECT SERPL-MCNC: 0.3 MG/DL (ref 0–0.3)
BILIRUB INDIRECT SERPL-MCNC: 0.4 MG/DL (ref 0–1)
BILIRUB SERPL-MCNC: 0.7 MG/DL (ref 0–1.2)
BILIRUB UR QL STRIP: NEGATIVE
BUN SERPL-MCNC: 21 MG/DL (ref 6–20)
BUPRENORPHINE UR QL: NEGATIVE
CALCIUM SERPL-MCNC: 9.2 MG/DL (ref 8.6–10.2)
CANNABINOIDS UR QL SCN: NEGATIVE
CHLORIDE SERPL-SCNC: 98 MMOL/L (ref 98–107)
CLARITY UR: CLEAR
CO2 SERPL-SCNC: 27 MMOL/L (ref 22–29)
COCAINE UR QL SCN: POSITIVE
COLOR UR: YELLOW
CREAT SERPL-MCNC: 0.4 MG/DL (ref 0.5–1)
DATE, STOOL #1: NORMAL
EKG ATRIAL RATE: 84 BPM
EKG P AXIS: 75 DEGREES
EKG P-R INTERVAL: 150 MS
EKG Q-T INTERVAL: 380 MS
EKG QRS DURATION: 76 MS
EKG QTC CALCULATION (BAZETT): 449 MS
EKG R AXIS: 42 DEGREES
EKG T AXIS: 84 DEGREES
EKG VENTRICULAR RATE: 84 BPM
EOSINOPHIL # BLD: 0 K/UL (ref 0.05–0.5)
EOSINOPHILS RELATIVE PERCENT: 0 % (ref 0–6)
ERYTHROCYTE [DISTWIDTH] IN BLOOD BY AUTOMATED COUNT: 14.7 % (ref 11.5–15)
ETHANOLAMINE SERPL-MCNC: <10 MG/DL
FENTANYL UR QL: NEGATIVE
FERRITIN SERPL-MCNC: 1893 NG/ML
GFR SERPL CREATININE-BSD FRML MDRD: >60 ML/MIN/1.73M2
GLUCOSE BLD-MCNC: 117 MG/DL (ref 74–99)
GLUCOSE BLD-MCNC: 84 MG/DL (ref 74–99)
GLUCOSE SERPL-MCNC: 235 MG/DL (ref 74–99)
GLUCOSE UR STRIP-MCNC: 250 MG/DL
HCT VFR BLD AUTO: 31.6 % (ref 34–48)
HCT VFR BLD AUTO: 32.1 % (ref 34–48)
HEMOCCULT SP1 STL QL: NEGATIVE
HGB BLD-MCNC: 10.3 G/DL (ref 11.5–15.5)
HGB BLD-MCNC: 10.5 G/DL (ref 11.5–15.5)
HGB UR QL STRIP.AUTO: NEGATIVE
IMM GRANULOCYTES # BLD AUTO: <0.03 K/UL (ref 0–0.58)
IMM GRANULOCYTES NFR BLD: 1 % (ref 0–5)
INR PPP: 1
IRON SATN MFR SERPL: 9 % (ref 15–50)
IRON SERPL-MCNC: 19 UG/DL (ref 37–145)
KETONES UR STRIP-MCNC: NEGATIVE MG/DL
LACTATE BLDV-SCNC: 2.1 MMOL/L (ref 0.5–2.2)
LEUKOCYTE ESTERASE UR QL STRIP: NEGATIVE
LIPASE SERPL-CCNC: 19 U/L (ref 13–60)
LYMPHOCYTES NFR BLD: 0.27 K/UL (ref 1.5–4)
LYMPHOCYTES RELATIVE PERCENT: 12 % (ref 20–42)
MCH RBC QN AUTO: 30.2 PG (ref 26–35)
MCHC RBC AUTO-ENTMCNC: 32.7 G/DL (ref 32–34.5)
MCV RBC AUTO: 92.2 FL (ref 80–99.9)
METHADONE UR QL: NEGATIVE
MONOCYTES NFR BLD: 0.27 K/UL (ref 0.1–0.95)
MONOCYTES NFR BLD: 12 % (ref 2–12)
NEUTROPHILS NFR BLD: 74 % (ref 43–80)
NEUTS SEG NFR BLD: 1.64 K/UL (ref 1.8–7.3)
NITRITE UR QL STRIP: NEGATIVE
OPIATES UR QL SCN: NEGATIVE
OXYCODONE UR QL SCN: NEGATIVE
PARTIAL THROMBOPLASTIN TIME: 26.9 SEC (ref 24.5–35.1)
PCP UR QL SCN: NEGATIVE
PH UR STRIP: 7 [PH] (ref 5–9)
PLATELET CONFIRMATION: NORMAL
PLATELET, FLUORESCENCE: 45 K/UL (ref 130–450)
PMV BLD AUTO: 9.9 FL (ref 7–12)
POTASSIUM SERPL-SCNC: 4.7 MMOL/L (ref 3.5–5)
PROT SERPL-MCNC: 6.6 G/DL (ref 6.4–8.3)
PROT UR STRIP-MCNC: NEGATIVE MG/DL
PROTHROMBIN TIME: 11.1 SEC (ref 9.3–12.4)
RBC # BLD AUTO: 3.48 M/UL (ref 3.5–5.5)
RBC # BLD: ABNORMAL 10*6/UL
RBC #/AREA URNS HPF: ABNORMAL /HPF
SALICYLATES SERPL-MCNC: <0.3 MG/DL (ref 0–30)
SODIUM SERPL-SCNC: 133 MMOL/L (ref 132–146)
SP GR UR STRIP: <1.005 (ref 1–1.03)
TEST INFORMATION: ABNORMAL
TIBC SERPL-MCNC: 217 UG/DL (ref 250–450)
TIME, STOOL #1: 1525
TOXIC TRICYCLIC SC,BLOOD: NEGATIVE
TROPONIN I SERPL HS-MCNC: 13 NG/L (ref 0–9)
UROBILINOGEN UR STRIP-ACNC: 0.2 EU/DL (ref 0–1)
WBC #/AREA URNS HPF: ABNORMAL /HPF
WBC OTHER # BLD: 2.2 K/UL (ref 4.5–11.5)

## 2024-03-14 PROCEDURE — 85610 PROTHROMBIN TIME: CPT

## 2024-03-14 PROCEDURE — APPSS45 APP SPLIT SHARED TIME 31-45 MINUTES: Performed by: NURSE PRACTITIONER

## 2024-03-14 PROCEDURE — 6370000000 HC RX 637 (ALT 250 FOR IP): Performed by: NURSE PRACTITIONER

## 2024-03-14 PROCEDURE — 80143 DRUG ASSAY ACETAMINOPHEN: CPT

## 2024-03-14 PROCEDURE — 85014 HEMATOCRIT: CPT

## 2024-03-14 PROCEDURE — 83540 ASSAY OF IRON: CPT

## 2024-03-14 PROCEDURE — 82962 GLUCOSE BLOOD TEST: CPT

## 2024-03-14 PROCEDURE — 51798 US URINE CAPACITY MEASURE: CPT

## 2024-03-14 PROCEDURE — 99285 EMERGENCY DEPT VISIT HI MDM: CPT

## 2024-03-14 PROCEDURE — 6360000002 HC RX W HCPCS: Performed by: STUDENT IN AN ORGANIZED HEALTH CARE EDUCATION/TRAINING PROGRAM

## 2024-03-14 PROCEDURE — 85730 THROMBOPLASTIN TIME PARTIAL: CPT

## 2024-03-14 PROCEDURE — 85025 COMPLETE CBC W/AUTO DIFF WBC: CPT

## 2024-03-14 PROCEDURE — 85018 HEMOGLOBIN: CPT

## 2024-03-14 PROCEDURE — 82728 ASSAY OF FERRITIN: CPT

## 2024-03-14 PROCEDURE — 76705 ECHO EXAM OF ABDOMEN: CPT

## 2024-03-14 PROCEDURE — G0480 DRUG TEST DEF 1-7 CLASSES: HCPCS

## 2024-03-14 PROCEDURE — 80053 COMPREHEN METABOLIC PANEL: CPT

## 2024-03-14 PROCEDURE — 84484 ASSAY OF TROPONIN QUANT: CPT

## 2024-03-14 PROCEDURE — 82140 ASSAY OF AMMONIA: CPT

## 2024-03-14 PROCEDURE — 6370000000 HC RX 637 (ALT 250 FOR IP): Performed by: INTERNAL MEDICINE

## 2024-03-14 PROCEDURE — 80179 DRUG ASSAY SALICYLATE: CPT

## 2024-03-14 PROCEDURE — 82270 OCCULT BLOOD FECES: CPT

## 2024-03-14 PROCEDURE — 99223 1ST HOSP IP/OBS HIGH 75: CPT | Performed by: STUDENT IN AN ORGANIZED HEALTH CARE EDUCATION/TRAINING PROGRAM

## 2024-03-14 PROCEDURE — 94664 DEMO&/EVAL PT USE INHALER: CPT

## 2024-03-14 PROCEDURE — 1200000000 HC SEMI PRIVATE

## 2024-03-14 PROCEDURE — 81001 URINALYSIS AUTO W/SCOPE: CPT

## 2024-03-14 PROCEDURE — 82105 ALPHA-FETOPROTEIN SERUM: CPT

## 2024-03-14 PROCEDURE — 83550 IRON BINDING TEST: CPT

## 2024-03-14 PROCEDURE — 93010 ELECTROCARDIOGRAM REPORT: CPT | Performed by: INTERNAL MEDICINE

## 2024-03-14 PROCEDURE — 93005 ELECTROCARDIOGRAM TRACING: CPT

## 2024-03-14 PROCEDURE — 83516 IMMUNOASSAY NONANTIBODY: CPT

## 2024-03-14 PROCEDURE — 82746 ASSAY OF FOLIC ACID SERUM: CPT

## 2024-03-14 PROCEDURE — 36415 COLL VENOUS BLD VENIPUNCTURE: CPT

## 2024-03-14 PROCEDURE — 83690 ASSAY OF LIPASE: CPT

## 2024-03-14 PROCEDURE — 82607 VITAMIN B-12: CPT

## 2024-03-14 PROCEDURE — 84075 ASSAY ALKALINE PHOSPHATASE: CPT

## 2024-03-14 PROCEDURE — 94640 AIRWAY INHALATION TREATMENT: CPT

## 2024-03-14 PROCEDURE — 82248 BILIRUBIN DIRECT: CPT

## 2024-03-14 PROCEDURE — C9113 INJ PANTOPRAZOLE SODIUM, VIA: HCPCS | Performed by: STUDENT IN AN ORGANIZED HEALTH CARE EDUCATION/TRAINING PROGRAM

## 2024-03-14 PROCEDURE — 83605 ASSAY OF LACTIC ACID: CPT

## 2024-03-14 PROCEDURE — 80307 DRUG TEST PRSMV CHEM ANLYZR: CPT

## 2024-03-14 PROCEDURE — 2580000003 HC RX 258: Performed by: STUDENT IN AN ORGANIZED HEALTH CARE EDUCATION/TRAINING PROGRAM

## 2024-03-14 PROCEDURE — A4216 STERILE WATER/SALINE, 10 ML: HCPCS | Performed by: STUDENT IN AN ORGANIZED HEALTH CARE EDUCATION/TRAINING PROGRAM

## 2024-03-14 PROCEDURE — 6370000000 HC RX 637 (ALT 250 FOR IP): Performed by: STUDENT IN AN ORGANIZED HEALTH CARE EDUCATION/TRAINING PROGRAM

## 2024-03-14 PROCEDURE — 74176 CT ABD & PELVIS W/O CONTRAST: CPT

## 2024-03-14 PROCEDURE — 84080 ASSAY ALKALINE PHOSPHATASES: CPT

## 2024-03-14 RX ORDER — LORAZEPAM 2 MG/ML
1 INJECTION INTRAMUSCULAR
Status: DISCONTINUED | OUTPATIENT
Start: 2024-03-14 | End: 2024-03-28 | Stop reason: HOSPADM

## 2024-03-14 RX ORDER — DEXTROSE MONOHYDRATE 100 MG/ML
INJECTION, SOLUTION INTRAVENOUS CONTINUOUS PRN
Status: DISCONTINUED | OUTPATIENT
Start: 2024-03-14 | End: 2024-03-28 | Stop reason: HOSPADM

## 2024-03-14 RX ORDER — SENNA AND DOCUSATE SODIUM 50; 8.6 MG/1; MG/1
2 TABLET, FILM COATED ORAL 2 TIMES DAILY PRN
COMMUNITY

## 2024-03-14 RX ORDER — TRAZODONE HYDROCHLORIDE 50 MG/1
50 TABLET ORAL NIGHTLY PRN
Status: DISCONTINUED | OUTPATIENT
Start: 2024-03-14 | End: 2024-03-28 | Stop reason: HOSPADM

## 2024-03-14 RX ORDER — LORAZEPAM 1 MG/1
1 TABLET ORAL
Status: DISCONTINUED | OUTPATIENT
Start: 2024-03-14 | End: 2024-03-28 | Stop reason: HOSPADM

## 2024-03-14 RX ORDER — ONDANSETRON 4 MG/1
4 TABLET, ORALLY DISINTEGRATING ORAL EVERY 8 HOURS PRN
Status: DISCONTINUED | OUTPATIENT
Start: 2024-03-14 | End: 2024-03-28 | Stop reason: HOSPADM

## 2024-03-14 RX ORDER — LORAZEPAM 1 MG/1
3 TABLET ORAL
Status: DISCONTINUED | OUTPATIENT
Start: 2024-03-14 | End: 2024-03-17

## 2024-03-14 RX ORDER — CALCIUM CARBONATE 500(1250)
1000 TABLET ORAL EVERY MORNING
Status: DISCONTINUED | OUTPATIENT
Start: 2024-03-14 | End: 2024-03-28 | Stop reason: HOSPADM

## 2024-03-14 RX ORDER — POLYETHYLENE GLYCOL 3350 17 G/17G
17 POWDER, FOR SOLUTION ORAL DAILY PRN
Status: DISCONTINUED | OUTPATIENT
Start: 2024-03-14 | End: 2024-03-28 | Stop reason: HOSPADM

## 2024-03-14 RX ORDER — PETROLATUM 42 G/100G
OINTMENT TOPICAL 2 TIMES DAILY PRN
Status: DISCONTINUED | OUTPATIENT
Start: 2024-03-14 | End: 2024-03-15

## 2024-03-14 RX ORDER — MULTIVITAMIN WITH IRON
1 TABLET ORAL EVERY MORNING
Status: DISCONTINUED | OUTPATIENT
Start: 2024-03-14 | End: 2024-03-28 | Stop reason: HOSPADM

## 2024-03-14 RX ORDER — LANOLIN ALCOHOL/MO/W.PET/CERES
100 CREAM (GRAM) TOPICAL DAILY
Status: DISCONTINUED | OUTPATIENT
Start: 2024-03-14 | End: 2024-03-28 | Stop reason: HOSPADM

## 2024-03-14 RX ORDER — LORAZEPAM 2 MG/ML
4 INJECTION INTRAMUSCULAR
Status: DISCONTINUED | OUTPATIENT
Start: 2024-03-14 | End: 2024-03-17

## 2024-03-14 RX ORDER — SODIUM CHLORIDE 0.9 % (FLUSH) 0.9 %
5-40 SYRINGE (ML) INJECTION PRN
Status: DISCONTINUED | OUTPATIENT
Start: 2024-03-14 | End: 2024-03-28 | Stop reason: HOSPADM

## 2024-03-14 RX ORDER — SODIUM CHLORIDE 0.9 % (FLUSH) 0.9 %
5-40 SYRINGE (ML) INJECTION EVERY 12 HOURS SCHEDULED
Status: DISCONTINUED | OUTPATIENT
Start: 2024-03-14 | End: 2024-03-28 | Stop reason: HOSPADM

## 2024-03-14 RX ORDER — LORAZEPAM 1 MG/1
4 TABLET ORAL
Status: DISCONTINUED | OUTPATIENT
Start: 2024-03-14 | End: 2024-03-17

## 2024-03-14 RX ORDER — ANASTROZOLE 1 MG/1
1 TABLET ORAL EVERY MORNING
Status: DISCONTINUED | OUTPATIENT
Start: 2024-03-14 | End: 2024-03-28 | Stop reason: HOSPADM

## 2024-03-14 RX ORDER — MULTIVIT WITH MINERALS/LUTEIN
1000 TABLET ORAL EVERY MORNING
COMMUNITY

## 2024-03-14 RX ORDER — BUDESONIDE AND FORMOTEROL FUMARATE DIHYDRATE 160; 4.5 UG/1; UG/1
2 AEROSOL RESPIRATORY (INHALATION) 2 TIMES DAILY PRN
COMMUNITY
End: 2024-03-14 | Stop reason: ALTCHOICE

## 2024-03-14 RX ORDER — FUROSEMIDE 20 MG/1
20 TABLET ORAL DAILY PRN
Status: DISCONTINUED | OUTPATIENT
Start: 2024-03-14 | End: 2024-03-28 | Stop reason: HOSPADM

## 2024-03-14 RX ORDER — SODIUM CHLORIDE 0.9 % (FLUSH) 0.9 %
5-40 SYRINGE (ML) INJECTION EVERY 12 HOURS SCHEDULED
Status: DISCONTINUED | OUTPATIENT
Start: 2024-03-14 | End: 2024-03-24

## 2024-03-14 RX ORDER — ACETAMINOPHEN 325 MG/1
650 TABLET ORAL ONCE
Status: COMPLETED | OUTPATIENT
Start: 2024-03-14 | End: 2024-03-14

## 2024-03-14 RX ORDER — MAGNESIUM SULFATE IN WATER 40 MG/ML
2000 INJECTION, SOLUTION INTRAVENOUS PRN
Status: DISCONTINUED | OUTPATIENT
Start: 2024-03-14 | End: 2024-03-28 | Stop reason: HOSPADM

## 2024-03-14 RX ORDER — INSULIN GLARGINE 100 [IU]/ML
8 INJECTION, SOLUTION SUBCUTANEOUS EVERY MORNING
Status: DISCONTINUED | OUTPATIENT
Start: 2024-03-14 | End: 2024-03-15

## 2024-03-14 RX ORDER — INSULIN LISPRO 100 [IU]/ML
INJECTION, SOLUTION INTRAVENOUS; SUBCUTANEOUS CONTINUOUS
Status: ON HOLD | COMMUNITY
End: 2024-03-27 | Stop reason: HOSPADM

## 2024-03-14 RX ORDER — ACETAMINOPHEN 650 MG/1
650 SUPPOSITORY RECTAL ONCE
Status: DISCONTINUED | OUTPATIENT
Start: 2024-03-14 | End: 2024-03-14

## 2024-03-14 RX ORDER — LORAZEPAM 2 MG/ML
3 INJECTION INTRAMUSCULAR
Status: DISCONTINUED | OUTPATIENT
Start: 2024-03-14 | End: 2024-03-17

## 2024-03-14 RX ORDER — LORAZEPAM 2 MG/ML
2 INJECTION INTRAMUSCULAR
Status: DISCONTINUED | OUTPATIENT
Start: 2024-03-14 | End: 2024-03-28 | Stop reason: HOSPADM

## 2024-03-14 RX ORDER — HYDROXYZINE PAMOATE 25 MG/1
25 CAPSULE ORAL 4 TIMES DAILY PRN
COMMUNITY

## 2024-03-14 RX ORDER — LACTULOSE 10 G/15ML
20 SOLUTION ORAL 3 TIMES DAILY
Status: DISCONTINUED | OUTPATIENT
Start: 2024-03-14 | End: 2024-03-22

## 2024-03-14 RX ORDER — TRAZODONE HYDROCHLORIDE 50 MG/1
50 TABLET ORAL NIGHTLY PRN
COMMUNITY

## 2024-03-14 RX ORDER — POTASSIUM CHLORIDE 7.45 MG/ML
10 INJECTION INTRAVENOUS PRN
Status: DISCONTINUED | OUTPATIENT
Start: 2024-03-14 | End: 2024-03-28 | Stop reason: HOSPADM

## 2024-03-14 RX ORDER — INSULIN LISPRO 100 [IU]/ML
0-4 INJECTION, SOLUTION INTRAVENOUS; SUBCUTANEOUS EVERY 4 HOURS
Status: DISCONTINUED | OUTPATIENT
Start: 2024-03-14 | End: 2024-03-16

## 2024-03-14 RX ORDER — LANOLIN ALCOHOL/MO/W.PET/CERES
12 CREAM (GRAM) TOPICAL NIGHTLY
Status: DISCONTINUED | OUTPATIENT
Start: 2024-03-14 | End: 2024-03-28 | Stop reason: HOSPADM

## 2024-03-14 RX ORDER — ASCORBIC ACID 500 MG
1000 TABLET ORAL EVERY MORNING
Status: DISCONTINUED | OUTPATIENT
Start: 2024-03-14 | End: 2024-03-28 | Stop reason: HOSPADM

## 2024-03-14 RX ORDER — ENOXAPARIN SODIUM 100 MG/ML
30 INJECTION SUBCUTANEOUS DAILY
Status: DISCONTINUED | OUTPATIENT
Start: 2024-03-14 | End: 2024-03-28 | Stop reason: HOSPADM

## 2024-03-14 RX ORDER — ANASTROZOLE 1 MG/1
1 TABLET ORAL EVERY MORNING
COMMUNITY

## 2024-03-14 RX ORDER — ONDANSETRON 2 MG/ML
4 INJECTION INTRAMUSCULAR; INTRAVENOUS EVERY 6 HOURS PRN
Status: DISCONTINUED | OUTPATIENT
Start: 2024-03-14 | End: 2024-03-28 | Stop reason: HOSPADM

## 2024-03-14 RX ORDER — HYDROXYZINE PAMOATE 25 MG/1
25 CAPSULE ORAL 4 TIMES DAILY PRN
Status: DISCONTINUED | OUTPATIENT
Start: 2024-03-14 | End: 2024-03-28 | Stop reason: HOSPADM

## 2024-03-14 RX ORDER — SODIUM CHLORIDE 9 MG/ML
INJECTION, SOLUTION INTRAVENOUS PRN
Status: DISCONTINUED | OUTPATIENT
Start: 2024-03-14 | End: 2024-03-28 | Stop reason: HOSPADM

## 2024-03-14 RX ORDER — PANCRELIPASE 36000; 180000; 114000 [USP'U]/1; [USP'U]/1; [USP'U]/1
1 CAPSULE, DELAYED RELEASE PELLETS ORAL
COMMUNITY

## 2024-03-14 RX ORDER — ARIPIPRAZOLE 20 MG/1
20 TABLET ORAL EVERY MORNING
COMMUNITY

## 2024-03-14 RX ORDER — ALBUTEROL SULFATE 2.5 MG/3ML
2.5 SOLUTION RESPIRATORY (INHALATION) EVERY 6 HOURS PRN
Status: DISCONTINUED | OUTPATIENT
Start: 2024-03-14 | End: 2024-03-28 | Stop reason: HOSPADM

## 2024-03-14 RX ORDER — POTASSIUM CHLORIDE 20 MEQ/1
40 TABLET, EXTENDED RELEASE ORAL PRN
Status: DISCONTINUED | OUTPATIENT
Start: 2024-03-14 | End: 2024-03-28 | Stop reason: HOSPADM

## 2024-03-14 RX ORDER — LORAZEPAM 1 MG/1
2 TABLET ORAL
Status: DISCONTINUED | OUTPATIENT
Start: 2024-03-14 | End: 2024-03-28 | Stop reason: HOSPADM

## 2024-03-14 RX ORDER — ARIPIPRAZOLE 10 MG/1
20 TABLET ORAL EVERY MORNING
Status: DISCONTINUED | OUTPATIENT
Start: 2024-03-14 | End: 2024-03-28 | Stop reason: HOSPADM

## 2024-03-14 RX ORDER — TIOTROPIUM BROMIDE INHALATION SPRAY 1.56 UG/1
2 SPRAY, METERED RESPIRATORY (INHALATION) DAILY PRN
COMMUNITY

## 2024-03-14 RX ORDER — FUROSEMIDE 20 MG/1
20 TABLET ORAL DAILY PRN
COMMUNITY

## 2024-03-14 RX ORDER — OMEPRAZOLE 20 MG/1
20 CAPSULE, DELAYED RELEASE ORAL EVERY MORNING
COMMUNITY

## 2024-03-14 RX ORDER — GLUCAGON 1 MG/ML
1 KIT INJECTION PRN
Status: DISCONTINUED | OUTPATIENT
Start: 2024-03-14 | End: 2024-03-28 | Stop reason: HOSPADM

## 2024-03-14 RX ADMIN — IPRATROPIUM BROMIDE 0.5 MG: 0.5 SOLUTION RESPIRATORY (INHALATION) at 18:30

## 2024-03-14 RX ADMIN — ENOXAPARIN SODIUM 30 MG: 100 INJECTION SUBCUTANEOUS at 12:56

## 2024-03-14 RX ADMIN — LACTULOSE 20 G: 20 SOLUTION ORAL at 16:23

## 2024-03-14 RX ADMIN — RIFAXIMIN 550 MG: 550 TABLET ORAL at 20:58

## 2024-03-14 RX ADMIN — ARIPIPRAZOLE 20 MG: 10 TABLET ORAL at 12:58

## 2024-03-14 RX ADMIN — PANCRELIPASE LIPASE, PANCRELIPASE PROTEASE, PANCRELIPASE AMYLASE 15000 UNITS: 15000; 47000; 63000 CAPSULE, DELAYED RELEASE ORAL at 12:59

## 2024-03-14 RX ADMIN — RIFAXIMIN 550 MG: 550 TABLET ORAL at 12:58

## 2024-03-14 RX ADMIN — SODIUM CHLORIDE, PRESERVATIVE FREE 10 ML: 5 INJECTION INTRAVENOUS at 20:57

## 2024-03-14 RX ADMIN — PANCRELIPASE LIPASE, PANCRELIPASE PROTEASE, PANCRELIPASE AMYLASE 15000 UNITS: 15000; 47000; 63000 CAPSULE, DELAYED RELEASE ORAL at 16:23

## 2024-03-14 RX ADMIN — CALCIUM 1000 MG: 500 TABLET ORAL at 12:58

## 2024-03-14 RX ADMIN — Medication 100 MG: at 12:56

## 2024-03-14 RX ADMIN — PANTOPRAZOLE SODIUM 40 MG: 40 INJECTION, POWDER, FOR SOLUTION INTRAVENOUS at 12:56

## 2024-03-14 RX ADMIN — PANCRELIPASE LIPASE, PANCRELIPASE PROTEASE, PANCRELIPASE AMYLASE 20000 UNITS: 20000; 63000; 84000 CAPSULE, DELAYED RELEASE ORAL at 12:59

## 2024-03-14 RX ADMIN — PANCRELIPASE LIPASE, PANCRELIPASE PROTEASE, PANCRELIPASE AMYLASE 20000 UNITS: 20000; 63000; 84000 CAPSULE, DELAYED RELEASE ORAL at 16:23

## 2024-03-14 RX ADMIN — OXYCODONE HYDROCHLORIDE AND ACETAMINOPHEN 1000 MG: 500 TABLET ORAL at 13:05

## 2024-03-14 RX ADMIN — MULTIVITAMIN TABLET 1 TABLET: TABLET at 12:56

## 2024-03-14 RX ADMIN — VORTIOXETINE 10 MG: 10 TABLET, FILM COATED ORAL at 20:58

## 2024-03-14 RX ADMIN — ANASTROZOLE 1 MG: 1 TABLET, FILM COATED ORAL at 12:57

## 2024-03-14 RX ADMIN — IPRATROPIUM BROMIDE 0.5 MG: 0.5 SOLUTION RESPIRATORY (INHALATION) at 12:07

## 2024-03-14 RX ADMIN — ACETAMINOPHEN 650 MG: 325 TABLET ORAL at 22:11

## 2024-03-14 ASSESSMENT — PAIN SCALES - GENERAL
PAINLEVEL_OUTOF10: 0
PAINLEVEL_OUTOF10: 10

## 2024-03-14 ASSESSMENT — PAIN DESCRIPTION - LOCATION: LOCATION: ABDOMEN

## 2024-03-14 ASSESSMENT — PAIN - FUNCTIONAL ASSESSMENT: PAIN_FUNCTIONAL_ASSESSMENT: 0-10

## 2024-03-14 NOTE — ED NOTES
Department of Emergency Medicine  FIRST PROVIDER TRIAGE NOTE             Independent MLP           3/14/24  3:09 AM EDT    Date of Encounter: 3/14/24   MRN: [id number]      HPI: Ellie Santacruz is a 53 y.o. female who presents to the ED for Abdominal Pain (Diffuse. History of cirrhosis. Patient stated she got up this morning and her abdomen was distened)  Patient states that she woke up this morning and had generalized abdominal pain and states that she was very distended.  Patient has a history of cirrhosis.  She states that she does have a history of paracentesis in the past.  Denies fevers or chills.  Patient does admit to using crack cocaine, last used yesterday.    ROS: Negative for cp or sob.    PE: Gen Appearance/Constitutional: drowsy  GI: distended     Initial Plan of Care: All treatment areas with department are currently occupied. Plan to order/Initiate the following while awaiting opening in ED: labs and EKG.  Initiate Treatment-Testing, Proceed toTreatment Area When Bed Available for ED Attending/MLP to Continue Care    Electronically signed by EVERETT Wang CNP   DD: 3/14/24       Dinaa Schmidt APRN - CNP  03/14/24 0310

## 2024-03-14 NOTE — H&P
[facility]   History and Physical      CHIEF COMPLAINT:  Abdominal Pain    History of Present Illness:  53 y.o. female with a history of depression/anxiety, alcoholism, ascites of the liver, left breast cancer, chronic pancreatitis, cocaine abuse, DM 2, GERD, gout, hepatitis C, schizoaffective disorder bipolar type presents with abdominal pain.  Patient states this morning while awakening developed generalized abdominal pain notes decision to abdomen.  Patient admits to crack cocaine use last known use yesterday.  Patient denies any N/V/D, CP, SOB, fevers, chills. Decision to admit pt for further evaluation and treatment.     3/4-3/5 Admitted to [facility] Generalized weakness, fatigue, and weight loss. She was noted with Hyperglycemia. Received NSS bolus and insulin in ED course. GI was consulted, however pt left AMA 0500.     Informant(s) for H&P: Patient, Significant other  and EMR    REVIEW OF SYSTEMS:  Admits to abdominal pain.  Denies  fevers, chills, cp, sob, n/v, ha, vision/hearing changes, wt changes, hot/cold flashes, other open skin lesions, diarrhea, constipation, dysuria/hematuria unless noted in HPI. Complete ROS performed with the patient and is otherwise negative.      PMH:  Past Medical History:   Diagnosis Date    Alcoholism (HCC)     Since teens to early 20s    Anxiety and depression     Ascites of liver     Bronchitis     Cancer (HCC)     breast, left    Candidiasis of vagina     Chronic pancreatitis (HCC)     Cocaine abuse (HCC)     Constipation     Diabetes mellitus, type 2 (HCC)     GERD (gastroesophageal reflux disease)     Gout     Hepatitis C     Hernia of anterior abdominal wall     Jaundice 05/12/2016    Pneumonia     Polyneuropathy due to type 2 diabetes mellitus (HCC)     Schizoaffective disorder, bipolar type (HCC)     Splenomegaly 05/30/2017       Surgical History:  Past Surgical History:   Procedure Laterality Date    ABDOMEN SURGERY      gallbladder  OT ACUTE Initial Evaluation and Discharge                                                                                                                                               BASELINE STATUS COMPARED WITH CURRENT STATUS: Presents with ADL/IADL status at baseline which was modified independent. Patient resides in one story home with spouse, daughter and son-in-law. At baseline, patient independent with all ADL's/functional mobility with 2 ww, family assists IADL/home management tasks. Patient admitted with LLE cellulitis, non-healing LLE wound.     ASSESSMENT:  OT initial evaluation completed. Current overall ADL/mobility status is modified independence for increased time and use of 2 ww for support in stance. Patient reports she has been primarily in bed since hospital admission aside from getting up to use the bathroom. Encouraged patient to move around during the day to prevent deconditioning during hospital stay and patient verbalized understanding. Patient otherwise denies any therapy related concerns upon d/c and reports family will continue to assist with all IADL/home management tasks as needed. Patient presents at baseline and no additional skilled OT needs identified. D/C OT 12/26/17 with patient in agreement.       RECOMMENDATIONS FOR DISCHARGE:  Recommendations for Discharge: OT: Home (12/26/17 6783)      Equipment:  PT/OT Mobility Equipment for Discharge: no needs, has 2WW and 4WW  (12/26/17 8492)  PT/OT ADL Equipment for Discharge: no needs. (12/26/17 2437)    PLAN: Continue skilled OT  Treatment Plan for Next Session: D/C OT        Frequency Comments: D/C OT 12/26/17    AM-PAC Outcomes -Daily Activity Domain  How much help from another person does the patient currently need?*  Task Score  Norm   1. Putting on and taking off regular lower body clothing? 4 - None   2. Bathing (including washing, rinsing, drying)?   4 - None   3. Toileting, which includes using toilet, bedpan, or urinal? 4 - None    4. Putting on and taking off regular upper body clothing? 4 - None   5. Taking care of personal grooming such as brushing teeth? 4 - None   6. Eating meals?  4 - None   Raw Score: 24/24   Converted Score:  57.54 (Raw score = 24)   G code conversion Not applicable, G code reporting not required this visit   Converted score >39.4 predictive of discharge to home  *Score based on clinical judgment/expected performance, may not have been performed during this session  Scoring Guidelines  1) Patient may use assistive devices unless otherwise indicated in question  2) Do not consider help for management of medical devices only (IV poles, catheters, NG, etc) as part of assist level  3) If patient requires device that substitutes for toileting or eating function (catheter, NG tube, PEG) they do not engage in these activities and are scored as Total  4) If activity was not observed and patient unable to do the activity, select \"Total\" .  If the patient can do the activity but was not directly observed, use profession judgment to determine how much assistance needed.    Task Modification: clinical decision making of low complexity, no task modification    Diagnosis:  1. Cellulitis of left leg        Co-morbidities:   Patient Active Problem List   Diagnosis   • CHRONIC KIDNEY DISEASE NOS   • MALIGN NEOPL BREAST NOS   • Iron deficiency anemia, unspecified   • VENOUS THROMBOSIS HISTORY OF   • Hx PE (pulmonary embolism)    • CAD (coronary artery disease)   • Vaginal prolapse   • Osteoporosis   • HTN (hypertension)   • Degenerative disc disease, cervical   • Compression fracture   • GERD (esophageal reflux)   • COPD (chronic obstructive pulmonary disease) (CMS/formerly [facility])   • Hyperlipidemia   • Dilated nonischemic cardiomyopathy likely Takotsubo , resolved       • Paroxysmal atrial fibrillation, on Amiodarone, CHADsVASc score is 4 , on warfarin   • Congestive heart failure, chronic, systolic, currently New York Heart Association class  IIa status.   • Long-term (current) use of high-risk medication, on Amiodarone   • Unspecified venous (peripheral) insufficiency   • CKD (chronic kidney disease) stage 4, GFR 15-29 ml/min (CMS/[facility])   • Neck pain   • Closed fracture of lumbar vertebra without mention of spinal cord injury   • Compression fx, thoracic spine (CMS/HCC)   • Malnutrition (CMS/HCC)   • Normal left ventricular function 75% per NM stress test 3/18/2015   • Anticoagulated on warfarin   • Abnormal CT of the chest - F/U recommended in Aug 2017   • Raynaud's phenomenon without gangrene   • Secondary hyperparathyroidism (CMS/[facility])   • At risk for aspiration   • Decubitus ulcer of left leg, unstageable (CMS/[facility])       Precautions:  Precautions  Other Precautions: LLE cellulitis  (12/26/17 1507)  Precautions Comments: h/o BRCA, CAD, femur fracture (healed)  (12/26/17 1507)    Prior Living Situation:  Type of Home: House (12/26/17 1456)  Lives With: Significant other;Daughter;Other (comment) (Son-in-law) (12/26/17 1456)    EDUCATION:   On this date, the patient was educated on the role of skilled OT and plan of care.    The response to education was: Demonstrates understanding                                                    SUBJECTIVE: Patient's Personal Goal: To return home. (12/26/17 1426)   Subjective: Pt agreeable to OT eval. (12/26/17 1426)  Subjective/Objective Comments: RN okayed session. (12/26/17 1426)    OBJECTIVE:Basic Lines: Capped IV (12/26/17 1426)  Safety Measures: Other (comment) (Call light in reach.) (12/26/17 1426)    RN reported Muro Fall Scale Score: 60       Last 24 hours of Functional Data     ADLs  Self Cares/ADL's  Grooming Assistance: Modified independent (12/26/17 1426)  Grooming/Oral Hygiene Deficit: Standing with assistive device (12/26/17 1426)  Lower Body Clothing Assistance: Modified independent (12/26/17 1426)  Lower Body Dressing Deficit: Increased time to complete (12/26/17 1426)  Toileting Assistance: Modified  independent (12/26/17 1426)  Toileting Deficit: Increased time to complete (12/26/17 1426)  Self Cares/ADL's Comments #1: Mod I for increased time and use of 2 ww for support in stance. (12/26/17 1426)    Household mobility  Household Mobility  Supine to Sit: Modified Independent (12/26/17 1426)  Sit to Supine: Modified Independent (12/26/17 1426)  Sit to Stand: Modified Independent (12/26/17 1426)  Stand to Sit: Modified Independent (12/26/17 1426)  Toilet Transfers: Modified Independent (12/26/17 1426)  Transfer Equipment: gait belt, 2 ww (12/26/17 1426)  Sitting - Static: Independent (12/26/17 1426)  Sitting - Dynamic: Modified Independent (12/26/17 1426)  Standing - Static: Modified Independent (12/26/17 1426)  Standing - Dynamic: Modified Independent (12/26/17 1426)  Household Mobility Comments #1: Overall mod I for increased time during all functional transfers/mobility in room with 2 ww. (12/26/17 1426)    Home Management  Home Management Skills  Other: Pt demonstrating item retrieval/transport from ground level mod I with 2 ww for support. (12/26/17 1426)    Tolerance  OT Activity Tolerance  Activity Tolerance: 1:1 Activity to rest;2:1 Activity to rest (12/26/17 1426)  Activity Tolerance Comments: Fair to good. (12/26/17 1426)    Vitals       Cognition  Cognition Comments: Pt interacting appropriately throughout session. (12/26/17 1426)    Communication/Cognition  Cognition Comments: Pt interacting appropriately throughout session. (12/26/17 1426)    Patient's Personal Goal: To return home. (12/26/17 1426)    Therapy Goals       Interventions and Treatment Time     OT Time Spent: 28 minutes (12/26/17 1426)      See OT flowsheet for full details regarding the OT therapy provided.

## 2024-03-14 NOTE — PROGRESS NOTES
Patient arrived to IR for US guided paracentesis. Abd scanned with US, images reviewed with Dr Longo/Tiffany Fan PA-C. Procedure was changed to an ascites survey per Dr Longo d/t not enough fluid seen on US examination. Nurse to nurse report called to the floor. Patient transported out of the dept with all belongings and no complications.

## 2024-03-14 NOTE — CONSULTS
CONSULT  Олег Mitchell M.D.  [facility]  Dr. Lakhwinder Madera M.D.,  Dr. Simón Esquivel M.D.,  Dr. Hugo Rodríguez D.O.,  Dr. Robbin Mitchell D.O. ,  Dr. Royer Coppola M.D.,      Ellie Santacruz  53 y.o.  female      Re:\"Chronic Liver disease, worsening ascites\"  Requesting physician:Jair Massey  Date:1:40 PM 3/14/2024      HPI: 53-year-old female patient seen in the hospital for above described issue.  Patient apparently presented to the emergency department yesterday because of generalized abdominal pain.  Patient has history of alcohol abuse and alcoholic liver disease as well as history of chronic pancreatitis, substance/cocaine abuse, diabetes mellitus, GERD, hepatitis C, schizoaffective disorder, GERD, gout.  According to the notes patient has used cocaine the day prior to presentation.  Review of medical records indicates that patient was most recently seen in the hospital by our service in 2018 when she underwent colonoscopy revealing patchy areas of irritable friable ulcerations in the ascending colon with poor prep.  Upper endoscopy 2020.  For me patient this afternoon is quite somnolent and obtunded.  She will wake to voice but virtually does not follow commands and does not answer any questions.  On presentation to the hospital CT scan of the abdomen and pelvis was obtained and reported to show cirrhotic morphology with moderate to large ascites and abdominal wall thickening as well as increased stool burden and mild wall thickening of the colon.  Laboratory work reveals normocytic anemia with hemoglobin of 10.5 hematocrit of 32.1 consistent with baseline however most recent hemoglobin on 11 March was at 13.5.  White blood cell count is 2.2 and platelet count is 45.  INR is 1.  Chemistry panel shows BUN of 21 with creatinine of 0.4.  Liver profile shows elevated alkaline phosphatase, ALT and AST with nonelevated bilirubin.  Lipase also nonelevated 19.  Previous serology positive for

## 2024-03-14 NOTE — ED NOTES
ED to Inpatient Handoff Report    Notified Ellie that electronic handoff available and patient ready for transport to room 545.    Safety Risks: Risk of falls    Patient in Restraints: no    Constant Observer or Patient : no    Telemetry Monitoring Ordered :Yes           Order to transfer to unit without monitor:YES    Last MEWS: 1 Time completed: 1036    Deterioration Index Score:   Predictive Model Details          32 (Caution)  Factor Value    Calculated 3/14/2024 11:07 48% Serenity coma scale 12    Deterioration Index Model 27% Age 53 years old     11% Potassium 4.7 mmol/L     3% Sodium 133 mmol/L     3% BUN abnormal (21 mg/dL)     2% Systolic 119     2% WBC count abnormal (2.2 k/uL)     2% Pulse 90     2% Platelet count abnormal (66 k/uL)     1% Hematocrit abnormal (32.1 %)     0% Pulse oximetry 96 %     0% Respiratory rate 16     0% Blood pH 7.360     0% Temperature 98.2 °F (36.8 °C)        Vitals:    03/14/24 0305 03/14/24 1036   BP: 100/70 119/74   Pulse: 85 90   Resp: 16 16   Temp: 98.2 °F (36.8 °C) 98.2 °F (36.8 °C)   TempSrc: Oral    SpO2: 100% 96%   Weight: 31.8 kg (70 lb)    Height: 1.524 m (5')          Opportunity for questions and clarification was provided.

## 2024-03-14 NOTE — PROGRESS NOTES
4 Eyes Skin Assessment     NAME:  Ellie Santacruz  YOB: 1970  MEDICAL RECORD NUMBER:  [id number]    The patient is being assessed for  Admission    I agree that at least one RN has performed a thorough Head to Toe Skin Assessment on the patient. ALL assessment sites listed below have been assessed.      Areas assessed by both nurses:    Head, Face, Ears, Shoulders, Back, Chest, Arms, Elbows, Hands, Sacrum. Buttock, Coccyx, Ischium, and Legs. Feet and Heels        Does the Patient have a Wound? Yes wound(s) were present on assessment. LDA wound assessment was Initiated and completed by RN    Scabbed area to r side forehead, r ear, and mid forehead in hairline, numerous scabs noted to BUE/and BLE.       Abe Prevention initiated by RN: Yes  Wound Care Orders initiated by RN: Yes    Pressure Injury (Stage 3,4, Unstageable, DTI, NWPT, and Complex wounds) if present, place Wound referral order by RN under : No    New Ostomies, if present place, Ostomy referral order under : No     Nurse 1 eSignature: Electronically signed by Sarah Woods RN on 3/14/24 at 2:13 PM EDT    **SHARE this note so that the co-signing nurse can place an eSignature**    Nurse 2 eSignature: {Esignature:[id number]}

## 2024-03-14 NOTE — PROGRESS NOTES
Database initiated. Patient is AX0 at the moment.. She comes in from home with ADILIA Alejandro. She uses no assistive devices and is RA at baseline. She has an insulin pump on ABD but no sensor (it's on order). She has fallen recently. Javon states she smokes crack to \"help her pain\" and used yesterday around 5:30pm.

## 2024-03-14 NOTE — ED NOTES
Medication List Obtained from [email or web address], Reviewed with Patient's Boyfriend, Faisal at Bedside and Updated. Per [facility] Most Medications are OVER DUE FOR REFILLS. Patient is a Current Illicit Drug User, Per Faisal is \"Stubborn, She'll wake up in the late morning early afternoon and go back to bed a couple hours later, and forget to take her medication.\" Electronically signed by Lawanda Lilly on 3/14/24 at 11:27 AM EDT

## 2024-03-15 ENCOUNTER — APPOINTMENT (OUTPATIENT)
Dept: CT IMAGING | Age: 54
DRG: 279 | End: 2024-03-15
Payer: COMMERCIAL

## 2024-03-15 ENCOUNTER — APPOINTMENT (OUTPATIENT)
Dept: GENERAL RADIOLOGY | Age: 54
DRG: 279 | End: 2024-03-15
Payer: COMMERCIAL

## 2024-03-15 PROBLEM — E43 SEVERE PROTEIN-CALORIE MALNUTRITION (HCC): Chronic | Status: ACTIVE | Noted: 2024-03-15

## 2024-03-15 PROBLEM — K76.6 PORTAL HYPERTENSION (HCC): Status: ACTIVE | Noted: 2024-03-15

## 2024-03-15 LAB
ALBUMIN SERPL-MCNC: 3.1 G/DL (ref 3.5–5.2)
ALP SERPL-CCNC: 482 U/L (ref 35–104)
ALT SERPL-CCNC: 1066 U/L (ref 0–32)
ANION GAP SERPL CALCULATED.3IONS-SCNC: 15 MMOL/L (ref 7–16)
AST SERPL-CCNC: 1102 U/L (ref 0–31)
BASOPHILS # BLD: 0 K/UL (ref 0–0.2)
BASOPHILS NFR BLD: 0 % (ref 0–2)
BILIRUB DIRECT SERPL-MCNC: 0.6 MG/DL (ref 0–0.3)
BILIRUB INDIRECT SERPL-MCNC: 0.6 MG/DL (ref 0–1)
BILIRUB SERPL-MCNC: 1.2 MG/DL (ref 0–1.2)
BUN SERPL-MCNC: 19 MG/DL (ref 6–20)
CALCIUM SERPL-MCNC: 9.3 MG/DL (ref 8.6–10.2)
CHLORIDE SERPL-SCNC: 103 MMOL/L (ref 98–107)
CO2 SERPL-SCNC: 23 MMOL/L (ref 22–29)
CREAT SERPL-MCNC: 0.5 MG/DL (ref 0.5–1)
DATE, STOOL #1: NORMAL
EOSINOPHIL # BLD: 0.07 K/UL (ref 0.05–0.5)
EOSINOPHILS RELATIVE PERCENT: 2 % (ref 0–6)
ERYTHROCYTE [DISTWIDTH] IN BLOOD BY AUTOMATED COUNT: 15.1 % (ref 11.5–15)
FOLATE SERPL-MCNC: >20 NG/ML (ref 4.8–24.2)
GFR SERPL CREATININE-BSD FRML MDRD: >60 ML/MIN/1.73M2
GGT SERPL-CCNC: 430 U/L (ref 6–42)
GLUCOSE BLD-MCNC: 145 MG/DL (ref 74–99)
GLUCOSE BLD-MCNC: 148 MG/DL (ref 74–99)
GLUCOSE BLD-MCNC: 179 MG/DL (ref 74–99)
GLUCOSE BLD-MCNC: 208 MG/DL (ref 74–99)
GLUCOSE BLD-MCNC: 239 MG/DL (ref 74–99)
GLUCOSE BLD-MCNC: 279 MG/DL (ref 74–99)
GLUCOSE SERPL-MCNC: 253 MG/DL (ref 74–99)
HCT VFR BLD AUTO: 29.1 % (ref 34–48)
HCT VFR BLD AUTO: 35.7 % (ref 34–48)
HCT VFR BLD AUTO: 39.5 % (ref 34–48)
HEMOCCULT SP1 STL QL: NEGATIVE
HEMOCCULT SP2 STL QL: NEGATIVE
HEMOCCULT SP3 STL QL: NEGATIVE
HGB BLD-MCNC: 11.2 G/DL (ref 11.5–15.5)
HGB BLD-MCNC: 12.9 G/DL (ref 11.5–15.5)
HGB BLD-MCNC: 9.5 G/DL (ref 11.5–15.5)
LYMPHOCYTES NFR BLD: 0.2 K/UL (ref 1.5–4)
LYMPHOCYTES RELATIVE PERCENT: 5 % (ref 20–42)
MAGNESIUM SERPL-MCNC: 2.2 MG/DL (ref 1.6–2.6)
MCH RBC QN AUTO: 30.3 PG (ref 26–35)
MCHC RBC AUTO-ENTMCNC: 31.4 G/DL (ref 32–34.5)
MCV RBC AUTO: 96.5 FL (ref 80–99.9)
MONOCYTES NFR BLD: 0.07 K/UL (ref 0.1–0.95)
MONOCYTES NFR BLD: 2 % (ref 2–12)
NEUTROPHILS NFR BLD: 91 % (ref 43–80)
NEUTS SEG NFR BLD: 3.56 K/UL (ref 1.8–7.3)
NUCLEATED RED BLOOD CELLS: 1 PER 100 WBC
PLATELET CONFIRMATION: NORMAL
PLATELET, FLUORESCENCE: 43 K/UL (ref 130–450)
PMV BLD AUTO: 10.7 FL (ref 7–12)
POTASSIUM SERPL-SCNC: 2.6 MMOL/L (ref 3.5–5)
PROT SERPL-MCNC: 6.8 G/DL (ref 6.4–8.3)
RBC # BLD AUTO: 3.7 M/UL (ref 3.5–5.5)
RBC # BLD: ABNORMAL 10*6/UL
SODIUM SERPL-SCNC: 141 MMOL/L (ref 132–146)
TIME, STOOL #1: NORMAL
VIT B12 SERPL-MCNC: >2000 PG/ML (ref 211–946)
WBC OTHER # BLD: 3.9 K/UL (ref 4.5–11.5)

## 2024-03-15 PROCEDURE — 02HV33Z INSERTION OF INFUSION DEVICE INTO SUPERIOR VENA CAVA, PERCUTANEOUS APPROACH: ICD-10-PCS | Performed by: STUDENT IN AN ORGANIZED HEALTH CARE EDUCATION/TRAINING PROGRAM

## 2024-03-15 PROCEDURE — 85014 HEMATOCRIT: CPT

## 2024-03-15 PROCEDURE — 82248 BILIRUBIN DIRECT: CPT

## 2024-03-15 PROCEDURE — 2580000003 HC RX 258: Performed by: HOSPITALIST

## 2024-03-15 PROCEDURE — 71045 X-RAY EXAM CHEST 1 VIEW: CPT

## 2024-03-15 PROCEDURE — 6360000002 HC RX W HCPCS: Performed by: STUDENT IN AN ORGANIZED HEALTH CARE EDUCATION/TRAINING PROGRAM

## 2024-03-15 PROCEDURE — 6370000000 HC RX 637 (ALT 250 FOR IP): Performed by: STUDENT IN AN ORGANIZED HEALTH CARE EDUCATION/TRAINING PROGRAM

## 2024-03-15 PROCEDURE — 82977 ASSAY OF GGT: CPT

## 2024-03-15 PROCEDURE — 80074 ACUTE HEPATITIS PANEL: CPT

## 2024-03-15 PROCEDURE — 86301 IMMUNOASSAY TUMOR CA 19-9: CPT

## 2024-03-15 PROCEDURE — 85018 HEMOGLOBIN: CPT

## 2024-03-15 PROCEDURE — 2580000003 HC RX 258: Performed by: STUDENT IN AN ORGANIZED HEALTH CARE EDUCATION/TRAINING PROGRAM

## 2024-03-15 PROCEDURE — 2060000000 HC ICU INTERMEDIATE R&B

## 2024-03-15 PROCEDURE — 99223 1ST HOSP IP/OBS HIGH 75: CPT | Performed by: STUDENT IN AN ORGANIZED HEALTH CARE EDUCATION/TRAINING PROGRAM

## 2024-03-15 PROCEDURE — 6360000004 HC RX CONTRAST MEDICATION: Performed by: RADIOLOGY

## 2024-03-15 PROCEDURE — 82962 GLUCOSE BLOOD TEST: CPT

## 2024-03-15 PROCEDURE — 74175 CTA ABDOMEN W/CONTRAST: CPT

## 2024-03-15 PROCEDURE — 86316 IMMUNOASSAY TUMOR OTHER: CPT

## 2024-03-15 PROCEDURE — 6370000000 HC RX 637 (ALT 250 FOR IP): Performed by: INTERNAL MEDICINE

## 2024-03-15 PROCEDURE — 99222 1ST HOSP IP/OBS MODERATE 55: CPT | Performed by: INTERNAL MEDICINE

## 2024-03-15 PROCEDURE — A4216 STERILE WATER/SALINE, 10 ML: HCPCS | Performed by: HOSPITALIST

## 2024-03-15 PROCEDURE — 80053 COMPREHEN METABOLIC PANEL: CPT

## 2024-03-15 PROCEDURE — C9113 INJ PANTOPRAZOLE SODIUM, VIA: HCPCS | Performed by: HOSPITALIST

## 2024-03-15 PROCEDURE — 83735 ASSAY OF MAGNESIUM: CPT

## 2024-03-15 PROCEDURE — 94640 AIRWAY INHALATION TREATMENT: CPT

## 2024-03-15 PROCEDURE — 82270 OCCULT BLOOD FECES: CPT

## 2024-03-15 PROCEDURE — 85025 COMPLETE CBC W/AUTO DIFF WBC: CPT

## 2024-03-15 PROCEDURE — 99233 SBSQ HOSP IP/OBS HIGH 50: CPT | Performed by: STUDENT IN AN ORGANIZED HEALTH CARE EDUCATION/TRAINING PROGRAM

## 2024-03-15 PROCEDURE — 6360000002 HC RX W HCPCS: Performed by: HOSPITALIST

## 2024-03-15 PROCEDURE — 82105 ALPHA-FETOPROTEIN SERUM: CPT

## 2024-03-15 PROCEDURE — 36556 INSERT NON-TUNNEL CV CATH: CPT | Performed by: STUDENT IN AN ORGANIZED HEALTH CARE EDUCATION/TRAINING PROGRAM

## 2024-03-15 RX ORDER — DIPHENHYDRAMINE HYDROCHLORIDE 50 MG/ML
50 INJECTION INTRAMUSCULAR; INTRAVENOUS ONCE
Status: COMPLETED | OUTPATIENT
Start: 2024-03-15 | End: 2024-03-15

## 2024-03-15 RX ORDER — INSULIN GLARGINE 100 [IU]/ML
4 INJECTION, SOLUTION SUBCUTANEOUS EVERY MORNING
Status: DISCONTINUED | OUTPATIENT
Start: 2024-03-15 | End: 2024-03-16

## 2024-03-15 RX ORDER — INSULIN GLARGINE 100 [IU]/ML
5 INJECTION, SOLUTION SUBCUTANEOUS EVERY MORNING
Status: DISCONTINUED | OUTPATIENT
Start: 2024-03-15 | End: 2024-03-15

## 2024-03-15 RX ORDER — POTASSIUM CHLORIDE 29.8 MG/ML
20 INJECTION INTRAVENOUS
Status: COMPLETED | OUTPATIENT
Start: 2024-03-15 | End: 2024-03-15

## 2024-03-15 RX ORDER — PETROLATUM 42 G/100G
OINTMENT TOPICAL 2 TIMES DAILY PRN
Status: DISCONTINUED | OUTPATIENT
Start: 2024-03-15 | End: 2024-03-28 | Stop reason: HOSPADM

## 2024-03-15 RX ADMIN — LORAZEPAM 2 MG: 2 INJECTION INTRAMUSCULAR at 19:43

## 2024-03-15 RX ADMIN — ANASTROZOLE 1 MG: 1 TABLET, FILM COATED ORAL at 10:34

## 2024-03-15 RX ADMIN — Medication 100 MG: at 10:34

## 2024-03-15 RX ADMIN — PANCRELIPASE LIPASE, PANCRELIPASE PROTEASE, PANCRELIPASE AMYLASE 20000 UNITS: 20000; 63000; 84000 CAPSULE, DELAYED RELEASE ORAL at 10:34

## 2024-03-15 RX ADMIN — SODIUM CHLORIDE, PRESERVATIVE FREE 10 ML: 5 INJECTION INTRAVENOUS at 10:43

## 2024-03-15 RX ADMIN — POTASSIUM CHLORIDE 20 MEQ: 29.8 INJECTION, SOLUTION INTRAVENOUS at 18:40

## 2024-03-15 RX ADMIN — INSULIN GLARGINE 4 UNITS: 100 INJECTION, SOLUTION SUBCUTANEOUS at 10:36

## 2024-03-15 RX ADMIN — DIPHENHYDRAMINE HYDROCHLORIDE 50 MG: 50 INJECTION INTRAMUSCULAR; INTRAVENOUS at 17:36

## 2024-03-15 RX ADMIN — ARIPIPRAZOLE 20 MG: 10 TABLET ORAL at 10:34

## 2024-03-15 RX ADMIN — PETROLATUM: 420 OINTMENT TOPICAL at 20:45

## 2024-03-15 RX ADMIN — INSULIN LISPRO 1 UNITS: 100 INJECTION, SOLUTION INTRAVENOUS; SUBCUTANEOUS at 15:15

## 2024-03-15 RX ADMIN — RIFAXIMIN 550 MG: 550 TABLET ORAL at 10:33

## 2024-03-15 RX ADMIN — PANCRELIPASE LIPASE, PANCRELIPASE PROTEASE, PANCRELIPASE AMYLASE 15000 UNITS: 15000; 47000; 63000 CAPSULE, DELAYED RELEASE ORAL at 11:57

## 2024-03-15 RX ADMIN — ENOXAPARIN SODIUM 30 MG: 100 INJECTION SUBCUTANEOUS at 10:33

## 2024-03-15 RX ADMIN — LORAZEPAM 2 MG: 2 INJECTION INTRAMUSCULAR at 13:40

## 2024-03-15 RX ADMIN — POTASSIUM CHLORIDE 20 MEQ: 29.8 INJECTION, SOLUTION INTRAVENOUS at 17:21

## 2024-03-15 RX ADMIN — IPRATROPIUM BROMIDE 0.5 MG: 0.5 SOLUTION RESPIRATORY (INHALATION) at 09:00

## 2024-03-15 RX ADMIN — SODIUM CHLORIDE, PRESERVATIVE FREE 10 ML: 5 INJECTION INTRAVENOUS at 19:43

## 2024-03-15 RX ADMIN — IOPAMIDOL 75 ML: 755 INJECTION, SOLUTION INTRAVENOUS at 18:15

## 2024-03-15 RX ADMIN — INSULIN LISPRO 1 UNITS: 100 INJECTION, SOLUTION INTRAVENOUS; SUBCUTANEOUS at 17:39

## 2024-03-15 RX ADMIN — LORAZEPAM 1 MG: 2 INJECTION INTRAMUSCULAR; INTRAVENOUS at 05:36

## 2024-03-15 RX ADMIN — MULTIVITAMIN TABLET 1 TABLET: TABLET at 10:33

## 2024-03-15 RX ADMIN — SODIUM CHLORIDE, PRESERVATIVE FREE 10 ML: 5 INJECTION INTRAVENOUS at 10:34

## 2024-03-15 RX ADMIN — LORAZEPAM 2 MG: 2 INJECTION INTRAMUSCULAR at 17:54

## 2024-03-15 RX ADMIN — IPRATROPIUM BROMIDE 0.5 MG: 0.5 SOLUTION RESPIRATORY (INHALATION) at 21:02

## 2024-03-15 RX ADMIN — SODIUM CHLORIDE, PRESERVATIVE FREE 10 ML: 5 INJECTION INTRAVENOUS at 19:47

## 2024-03-15 RX ADMIN — CALCIUM 1000 MG: 500 TABLET ORAL at 10:34

## 2024-03-15 RX ADMIN — INSULIN LISPRO 2 UNITS: 100 INJECTION, SOLUTION INTRAVENOUS; SUBCUTANEOUS at 10:42

## 2024-03-15 RX ADMIN — PANCRELIPASE LIPASE, PANCRELIPASE PROTEASE, PANCRELIPASE AMYLASE 20000 UNITS: 20000; 63000; 84000 CAPSULE, DELAYED RELEASE ORAL at 11:57

## 2024-03-15 RX ADMIN — SODIUM CHLORIDE, PRESERVATIVE FREE 40 MG: 5 INJECTION INTRAVENOUS at 11:57

## 2024-03-15 RX ADMIN — PANCRELIPASE LIPASE, PANCRELIPASE PROTEASE, PANCRELIPASE AMYLASE 15000 UNITS: 15000; 47000; 63000 CAPSULE, DELAYED RELEASE ORAL at 10:34

## 2024-03-15 RX ADMIN — ANORECTAL OINTMENT: 15.7; .44; 24; 20.6 OINTMENT TOPICAL at 18:40

## 2024-03-15 RX ADMIN — OXYCODONE HYDROCHLORIDE AND ACETAMINOPHEN 1000 MG: 500 TABLET ORAL at 10:33

## 2024-03-15 RX ADMIN — POTASSIUM CHLORIDE 10 MEQ: 7.46 INJECTION, SOLUTION INTRAVENOUS at 13:34

## 2024-03-15 RX ADMIN — SODIUM CHLORIDE, PRESERVATIVE FREE 40 MG: 5 INJECTION INTRAVENOUS at 02:32

## 2024-03-15 NOTE — PROGRESS NOTES
Patient was not alert enough to take medications this morning. I just administered them around 10:30. She is more alert in a sense but still somewhat lethargic. She allowed me to draw her labs however she did not even respond to me poking her with the needle. Vitals rechecked, bp came up a little.

## 2024-03-15 NOTE — CONSULTS
Hepatobiliary and Pancreatic Surgery Attending History and Physical    Patient's Name/Date of Birth: Ellie Santacruz /1970 (53 y.o.)    Date: March 15, 2024     CC:Liver mass    HPI:  Ms. Santacruz is a 52 yo F with prior hx of DM, ETOH abuse, claims to have stopped 7 years ago, Polysubstance abuse uses cocaine daily for many years, Hepatitis C s/p treatment about 7 years ago and cirrhosis who presented with upper abdominal pain, lethargy and weakness. History is taken mostly from her boyfriend who brought her in because she is lethargic and difficult to arouse. She has been losing weight and having a poor appetitie for many months per his report. She has a cocaine addiction which is debilitating and apparently all she is concerned with. She does not eat for days and recently started having nausea and vomiting during cocaine withdrawals. She is known to Dr. Esquivel with GI for cirrhosis, hep c and prior hx of pancreatitis. She had a CT scan showing cirrhotic morphology, ascites, splenomegaly and portal hypertension and a 7cm mass in the right lobe segments 5/6. AST/ALT are elevated 1102/1066, , Bili 1.2. She has hypokalemia with normal kidney function. MELD-NA score 7. Unsure of prior hx of GI bleeding, paracentesis, or SBP.     Past Medical History:   Diagnosis Date    Alcoholism (HCC)     Since teens to early 20s    Anxiety and depression     Ascites of liver     Bronchitis     Cancer (HCC)     breast, left    Candidiasis of vagina     Chronic pancreatitis (HCC)     Cocaine abuse (HCC)     Constipation     Diabetes mellitus, type 2 (HCC)     GERD (gastroesophageal reflux disease)     Gout     Hepatitis C     Hernia of anterior abdominal wall     Jaundice 05/12/2016    Pneumonia     Polyneuropathy due to type 2 diabetes mellitus (HCC)     Schizoaffective disorder, bipolar type (HCC)     Splenomegaly 05/30/2017       Past Surgical History:   Procedure Laterality Date    ABDOMEN SURGERY      gallbladder  pending  - She has severe protein calorie malnutrition and is cachectic. Refuses to eat. At this time would recommend Corpak placement for supplemental nutrition however, doubt patient would be agreeable. \  - She would not tolerate a liver resection and unlikely to be a candidate for systemic therapy. May be candidate for Y-90 pending CT scan findings, but again given her social hx I doubt she will comply with any treatments that we offer.   - Follow up triphasic and tumor markers.            Thank you for the consultation allowing me to take part in Ms. Santacruz's care.   75 Minutes of which greater than 50% was spent counseling or coordinating her care.      Sury Weston MD

## 2024-03-15 NOTE — PLAN OF CARE
Noted iodine allergy of low severity, \"unknown reaction\", unclear if even a true allergy. Will proceed with CT, will give Benadryl 50 mg once prior to scan, no need for 13 hour protocol.

## 2024-03-15 NOTE — ACP (ADVANCE CARE PLANNING)
3/15/2024  Advance Care Planning   Healthcare Decision Maker:    Primary Decision Maker: Faisal Gonzalez - Domestic partner - [phone]    Click here to complete Healthcare Decision Makers including selection of the Healthcare Decision Maker Relationship (ie \"Primary\").

## 2024-03-15 NOTE — PROGRESS NOTES
[facility] Hospitalist Progress Note    Admitting Date and Time: 3/14/2024  3:54 AM  Admit Dx: Generalized abdominal pain [R10.84]  Liver mass [R16.0]  Elevated liver enzymes [R74.8]  Cocaine use [F14.90]    Subjective:  Patient is being followed for Generalized abdominal pain [R10.84]  Liver mass [R16.0]  Elevated liver enzymes [R74.8]  Cocaine use [F14.90]   Pt was seen and examined today. Denies any new issues. Remains confused and somnolent.    ROS: denies fever, chills, cp, sob, n/v, HA unless stated above.     insulin glargine  4 Units SubCUTAneous QAM    diphenhydrAMINE  50 mg IntraVENous Once    sodium chloride flush  5-40 mL IntraVENous 2 times per day    enoxaparin  30 mg SubCUTAneous Daily    anastrozole  1 mg Oral QAM    ARIPiprazole  20 mg Oral QAM    vitamin C  1,000 mg Oral QAM    calcium elemental  1,000 mg Oral QAM    melatonin  12 mg Oral Nightly    multivitamin  1 tablet Oral QAM    rifAXIMin  550 mg Oral BID    thiamine  100 mg Oral Daily    ipratropium  0.5 mg Nebulization TID RT    VORTIoxetine HBr  10 mg Oral Nightly    lipase-protease-amylas  15,000 Units Oral TID WC    And    lipase-protease-amylase  20,000 Units Oral TID WC    insulin lispro  0-4 Units SubCUTAneous Q4H    pantoprazole (PROTONIX) 40 mg in sodium chloride (PF) 0.9 % 10 mL injection  40 mg IntraVENous Q12H    lactulose  20 g Oral TID    sodium chloride flush  5-40 mL IntraVENous 2 times per day     mineral oil-hydrophilic petrolatum, , BID PRN  sodium chloride flush, 5-40 mL, PRN  sodium chloride, , PRN  potassium chloride, 40 mEq, PRN   Or  potassium alternative oral replacement, 40 mEq, PRN   Or  potassium chloride, 10 mEq, PRN  magnesium sulfate, 2,000 mg, PRN  ondansetron, 4 mg, Q8H PRN   Or  ondansetron, 4 mg, Q6H PRN  polyethylene glycol, 17 g, Daily PRN  albuterol, 2.5 mg, Q6H PRN  furosemide, 20 mg, Daily PRN  hydrOXYzine pamoate, 25 mg, 4x Daily PRN  traZODone, 50 mg, Nightly PRN  glucose, 4 tablet,

## 2024-03-15 NOTE — CONSULTS
ENDOCRINOLOGY INITIAL CONSULTATION NOTE      Date of admission: 3/14/2024  Date of service: 3/15/2024  Admitting physician: Jair Jauregui MD   Primary Care Physician: No primary care provider on file.  Consultant physician: Wale Camejo MD     Reason for the consultation:  Uncontrolled DM    History of Present Illness:  53 y.o. female with a history of depression/anxiety, alcoholism, ascites of the liver, left breast cancer, chronic pancreatitis, cocaine abuse, DM 2, GERD, gout, hepatitis C, schizoaffective disorder bipolar type presents with abdominal pain endocrine service was consulted for diabetes management.        Prior to admission  The patient was diagnosed with type 2 DM long time ago. Prior to admission patient was on 780g Medtronic insulin pump with following settings: Basal rate 1.0, CR 20, ISF 75, goal 100-150, AIT 3hr . Patient has had no hypoglycemic episodes. Patient has not been eating consistent carbohydrate meals, self-blood glucose monitoring has been above goal prior to admission. In addition, patient has macrovascular and  microvascular complications   Lab Results   Component Value Date/Time    LABA1C 11.4 09/10/2023 07:00 AM     Inpatient diet:   Carb Restricted diet     Point of care glucose monitoring   (Independently reviewed)   Recent Labs     03/14/24  1621 03/14/24  2105 03/15/24  0234 03/15/24  0547   POCGLU 84 117* 145* 179*       Past medical history:   Past Medical History:   Diagnosis Date    Alcoholism (HCC)     Since teens to early 20s    Anxiety and depression     Ascites of liver     Bronchitis     Cancer (HCC)     breast, left    Candidiasis of vagina     Chronic pancreatitis (HCC)     Cocaine abuse (HCC)     Constipation     Diabetes mellitus, type 2 (HCC)     GERD (gastroesophageal reflux disease)     Gout     Hepatitis C     Hernia of anterior abdominal wall     Jaundice 05/12/2016    Pneumonia     Polyneuropathy due to type 2 diabetes mellitus (HCC)     Schizoaffective  disorder, bipolar type (HCC)     Splenomegaly 05/30/2017       Past surgical history:  Past Surgical History:   Procedure Laterality Date    ABDOMEN SURGERY      gallbladder removal    BREAST BIOPSY Left     CHOLECYSTECTOMY  2010    COLONOSCOPY      COLONOSCOPY N/A 10/2/2018    COLONOSCOPY WITH BIOPSY performed by Simón Esquivel MD at [facility] ENDOSCOPY    ENDOSCOPY, COLON, DIAGNOSTIC      ERCP      april 2016 at [facility]    HYSTERECTOMY (CERVIX STATUS UNKNOWN)  2004    MASTECTOMY Left     Left breast mastectomy per patient    OTHER SURGICAL HISTORY Left 04/25/2017     Left breast blue dye injection, Sential Node Lymph Biopsy with left breast lumpectomy    OTHER SURGICAL HISTORY  10/05/2017    simple left mastectomy    OVARY REMOVAL Bilateral 2007    PANCREAS BIOPSY      with stent    PERINEAL SURGERY N/A 9/18/2023    PERINEAL INCISION AND DRAINAGE ABSCESS performed by Karl Santos MD at [facility] OR    RECTAL SURGERY N/A 8/28/2023    PERINEAL ABSCESS INCISION AND DRAINAGE performed by Sebastian Davis MD at [facility] OR    SHOULDER SURGERY Left 2003    UPPER GASTROINTESTINAL ENDOSCOPY         Social history:   Tobacco:   reports that she has been smoking cigarettes. She started smoking about 42 years ago. She has a 20.0 pack-year smoking history. She has never used smokeless tobacco.  Alcohol:   reports that she does not currently use alcohol.  Drugs:   reports current drug use. Drug: Cocaine.    Family history:    Family History   Problem Relation Age of Onset    Diabetes Mother     Cancer Mother         Breast    No Known Problems Father     Colon Cancer Maternal Grandfather        Allergy and drug reactions:   Allergies   Allergen Reactions    Dye [Iodides] Other (See Comments)     UNKNOWN REACTION    Tylenol [Acetaminophen] Other (See Comments)     \"PT TOLD NOT TO TAKE BY HER  D/T A BAD LIVER\"         Scheduled Meds:   sodium chloride flush  5-40 mL IntraVENous 2 times per day    enoxaparin  30 mg SubCUTAneous Daily

## 2024-03-15 NOTE — PROGRESS NOTES
PROGRESS NOTE  By Олег Mitchell M.D.    [facility]  Dr. Lakhwinder Madera M.D.,  Dr. Simón Esquivel M.D.,   Dr. Hugo Rodríguez D.O.,  Dr. Royer Coppola M.D.,  Dr. Robbin Mitchell D.O.,          Ellie Santacruz  53 y.o.  female    SUBJECTIVE:  Denies any significant abdominal pain.  Denies nausea vomiting    OBJECTIVE:    /60   Pulse 93   Temp 98.4 °F (36.9 °C)   Resp 18   Ht 1.524 m (5')   Wt 31.8 kg (70 lb)   SpO2 98%   BMI 13.67 kg/m²     General: NAD.  Chronically ill appearing  thin/malnourished  female.  AAOx3  HEENT: Anicteric sclera/moist oral mucosa/poor dentition  Neck: Supple with trachea midline  Chest: Symmetric excursion/nonlabored respirations  Cor: Regular  Abd.: Soft and nondistended.  Appears mostly nontender  Extr.:  BLE without significant edema.  Decreased muscle tone and bulk with severe diffuse muscle atrophy/wasting  Skin: Warm and dry/anicteric      DATA:    Monitor data reviewed - noted.       Lab Results   Component Value Date/Time    WBC 3.9 03/15/2024 10:55 AM    RBC 3.70 03/15/2024 10:55 AM    HGB 11.2 03/15/2024 10:55 AM    HCT 35.7 03/15/2024 10:55 AM    MCV 96.5 03/15/2024 10:55 AM    MCH 30.3 03/15/2024 10:55 AM    MCHC 31.4 03/15/2024 10:55 AM    RDW 15.1 03/15/2024 10:55 AM    PLT 66 03/11/2024 12:15 PM    MPV 10.7 03/15/2024 10:55 AM     Lab Results   Component Value Date/Time     03/14/2024 03:25 AM    K 4.7 03/14/2024 03:25 AM    K 5.2 05/11/2023 08:52 PM    CL 98 03/14/2024 03:25 AM    CO2 27 03/14/2024 03:25 AM    BUN 21 03/14/2024 03:25 AM    CREATININE 0.4 03/14/2024 03:25 AM    CALCIUM 9.2 03/14/2024 03:25 AM    PROT 6.6 03/14/2024 03:25 AM    LABALBU 3.5 03/14/2024 03:25 AM    BILITOT 0.7 03/14/2024 03:25 AM    ALKPHOS 462 03/14/2024 03:25 AM     03/14/2024 03:25 AM     03/14/2024 03:25 AM     Lab Results   Component Value Date/Time    LIPASE 19 03/14/2024 03:25 AM     Lab Results   Component Value Date/Time    AMYLASE 9

## 2024-03-15 NOTE — CARE COORDINATION
3/15/2024  Social Work Discharge Planning:MARY. Liver mass. Ascites-ab pain. Cocaine positive.Malnutrition.This worker met with Pt to discuss  role and transition of care/discharge planning;however, Pt was unable to stay awake. Will attempt to assess at another time.  Chart reviewed-need to confirm details. Pt is , has a boyfriend and resides in a 2 story home. Pt has a cane and insulin pump. Pt had a paracentesis yesterday and is currently on room air. Pt has a history with [facility]. Pt states she was using cocaine for pain relief.  Will need to see if Pt wants to discuss Peer Recovery Support. Electronically signed by SANDEE Negron on 3/15/2024 at 11:35 AM

## 2024-03-15 NOTE — PROGRESS NOTES
Updated Dr. Jauregui regarding iodide allergy - see new order for 1 time Benadryl    CT and Primary RN to communicate time and scheduling     Electronically signed by Kirsten Chan RN on 3/15/2024 at 2:00 PM

## 2024-03-15 NOTE — PLAN OF CARE
Problem: Discharge Planning  Goal: Discharge to home or other facility with appropriate resources  Outcome: Progressing  Flowsheets (Taken 3/14/2024 1215 by Sarah Woods, RN)  Discharge to home or other facility with appropriate resources: Identify barriers to discharge with patient and caregiver     Problem: ABCDS Injury Assessment  Goal: Absence of physical injury  3/15/2024 0038 by Kay Damico RN  Outcome: Progressing  3/14/2024 1440 by Sarah Woods RN  Outcome: Progressing     Problem: Skin/Tissue Integrity  Goal: Absence of new skin breakdown  Description: 1.  Monitor for areas of redness and/or skin breakdown  2.  Assess vascular access sites hourly  3.  Every 4-6 hours minimum:  Change oxygen saturation probe site  4.  Every 4-6 hours:  If on nasal continuous positive airway pressure, respiratory therapy assess nares and determine need for appliance change or resting period.  Outcome: Progressing     Problem: Pain  Goal: Verbalizes/displays adequate comfort level or baseline comfort level  3/15/2024 0038 by Kay Damico, RN  Outcome: Progressing  3/14/2024 1440 by Sarah Woods, RN  Outcome: Progressing     Problem: Safety - Adult  Goal: Free from fall injury  3/15/2024 0038 by Kay Damico, RN  Outcome: Progressing  3/14/2024 1440 by Sarah Woods RN  Outcome: Progressing     Problem: Chronic Conditions and Co-morbidities  Goal: Patient's chronic conditions and co-morbidity symptoms are monitored and maintained or improved  Outcome: Progressing

## 2024-03-15 NOTE — CARE COORDINATION
3/15/2024  Social Work Discharge Planning: MARY. Liver mass. Ascites-ab pain. Cocaine positive.Malnutrition.This worker met with Pt to discuss  role and transition of care/discharge planning;however, Pt was unable to stay awake. Will attempt to assess at another time.  Chart reviewed-need to confirm details. Pt is , has a boyfriend and resides in a 2 story home. Pt has a cane and insulin pump. Pt had a paracentesis yesterday and is currently on room air. Pt has a history with [facility]. Pt states she was using cocaine for pain relief.  Will need to see if Pt wants to discuss Peer Recovery Support. Electronically signed by SANDEE Negron on 3/15/2024 at 11:32 AM

## 2024-03-15 NOTE — PROGRESS NOTES
Call made to NP to notify of patient's 101.5 temp and the appropriateness regarding use of tylenol. NP advised administration of one time tylenol dose.

## 2024-03-15 NOTE — PROGRESS NOTES
Consult FMS, Right ear and forehead pressure injury  FMS inserted without difficult  Perianal and buttocks red from frequent liquid stooling  Dried dark flaky areas rt ear and forehead  No treatment needed for that.  Plan calmoseptine  Gege Fuller, CNS, Wound Care

## 2024-03-15 NOTE — ED PROVIDER NOTES
identified surrounding the liver within the right upper quadrant.     XR CHEST PORTABLE    Result Date: 3/11/2024  EXAMINATION: ONE XRAY VIEW OF THE CHEST 3/11/2024 12:42 pm COMPARISON: 03/04/2023 HISTORY: ORDERING SYSTEM PROVIDED HISTORY: Hyperglycemia TECHNOLOGIST PROVIDED HISTORY: Reason for exam:->Hyperglycemia FINDINGS: The lungs are without acute focal process.  There is no effusion or pneumothorax. The cardiomediastinal silhouette is without acute process. The osseous structures are without acute process.     No acute process.       No results found.    PROCEDURES   Unless otherwise noted below, none     Procedures    CRITICAL CARE TIME (.cct)   0    PAST MEDICAL HISTORY/Chronic Conditions Affecting Care      has a past medical history of Alcoholism (HCC), Anxiety and depression, Ascites of liver, Bronchitis, Cancer (HCC), Candidiasis of vagina, Chronic pancreatitis (HCC), Cocaine abuse (HCC), Constipation, Diabetes mellitus, type 2 (HCC), GERD (gastroesophageal reflux disease), Gout, Hepatitis C, Hernia of anterior abdominal wall, Jaundice (05/12/2016), Pneumonia, Polyneuropathy due to type 2 diabetes mellitus (HCC), Schizoaffective disorder, bipolar type (HCC), and Splenomegaly (05/30/2017).     EMERGENCY DEPARTMENT COURSE    Vitals:    Vitals:    03/14/24 1930 03/14/24 2030 03/15/24 0230 03/15/24 0730   BP: 101/61   (!) 86/52   Pulse: (!) 107   95   Resp: 16   16   Temp: (!) 101.7 °F (38.7 °C) (!) 101.5 °F (38.6 °C) 98.5 °F (36.9 °C) 98.6 °F (37 °C)   TempSrc: Axillary Oral Oral Oral   SpO2: 96%   96%   Weight:       Height:           Patient was given the following medications:  Medications   sodium chloride flush 0.9 % injection 5-40 mL ( IntraVENous Not Given 3/14/24 2058)   sodium chloride flush 0.9 % injection 5-40 mL (has no administration in time range)   0.9 % sodium chloride infusion (has no administration in time range)   potassium chloride (KLOR-CON M) extended release tablet 40 mEq (has no  time range)     Or   dextrose bolus 10% 250 mL (has no administration in time range)   glucagon injection 1 mg (has no administration in time range)   dextrose 10 % infusion (has no administration in time range)   lipase-protease-amylas (ZENPEP 15,000) delayed release capsule 15,000 Units (15,000 Units Oral Given 3/14/24 1623)     And   lipase-protease-amylase (ZENPEP) 47767-89093 units delayed release capsule 20,000 Units (20,000 Units Oral Given 3/14/24 1623)   insulin lispro (HUMALOG) injection vial 0-4 Units ( SubCUTAneous Not Given 3/15/24 0550)   pantoprazole (PROTONIX) 40 mg in sodium chloride (PF) 0.9 % 10 mL injection (40 mg IntraVENous Given 3/15/24 0232)   lactulose (CHRONULAC) 10 GM/15ML solution 20 g (20 g Oral Not Given 3/14/24 2058)   sodium chloride flush 0.9 % injection 5-40 mL (10 mLs IntraVENous Given 3/14/24 2057)   sodium chloride flush 0.9 % injection 5-40 mL (has no administration in time range)   0.9 % sodium chloride infusion (has no administration in time range)   LORazepam (ATIVAN) tablet 1 mg ( Oral See Alternative 3/15/24 0536)     Or   LORazepam (ATIVAN) injection 1 mg (1 mg IntraVENous Given 3/15/24 0536)     Or   LORazepam (ATIVAN) tablet 2 mg ( Oral See Alternative 3/15/24 0536)     Or   LORazepam (ATIVAN) injection 2 mg ( IntraVENous See Alternative 3/15/24 0536)     Or   LORazepam (ATIVAN) tablet 3 mg ( Oral See Alternative 3/15/24 0536)     Or   LORazepam (ATIVAN) injection 3 mg ( IntraVENous See Alternative 3/15/24 0536)     Or   LORazepam (ATIVAN) tablet 4 mg ( Oral See Alternative 3/15/24 0536)     Or   LORazepam (ATIVAN) injection 4 mg ( IntraVENous See Alternative 3/15/24 0536)   mineral oil-hydrophilic petrolatum (HYDROPHOR) ointment (has no administration in time range)   acetaminophen (TYLENOL) tablet 650 mg (650 mg Oral Given 3/14/24 2211)       ED Course as of 03/15/24 0735   Thu Mar 14, 2024   0322 EKG:  This EKG is signed and interpreted by me.    Rate: 84  Rhythm:

## 2024-03-15 NOTE — PROGRESS NOTES
Comprehensive Nutrition Assessment    Type and Reason for Visit:  Initial, Positive Nutrition Screen    Nutrition Recommendations/Plan:   Advance diet as medically feasible & add ONS with nutrition progression  May consider alternative means of nutrition if pt agreeable  Will monitor     Malnutrition Assessment:  Malnutrition Status:  Severe malnutrition (03/15/24 1432)    Context:  Social/Environmental Circumstances     Findings of the 6 clinical characteristics of malnutrition:  Energy Intake:  50% or less estimated energy requirements for 1 month or longer  Weight Loss:  Unable to assess (d/t cirrhosis)     Body Fat Loss:  Severe body fat loss Triceps, Buccal region   Muscle Mass Loss:  Severe muscle mass loss Temples (temporalis), Thigh (quadraceps), Calf (gastrocnemius)  Fluid Accumulation:  Unable to assess (d/t multifactorial)     Strength:  Not Performed    Nutrition Assessment:    Pt w/ decompensated cirrhosis w/ ascites, liver mass. Hx DM, ETOH abuse, sub abuse (+cocaine), chronic pancreatitis, hep C, COPD. Pt w/ severe malnutrition, currently NPO, ADAT & add ONS w/ nutrition progression.    Nutrition Related Findings:    A&O X3/lethargic, I&Os not avail, no edema, abd soft/distention, +BS, cachectic, doesn't eat for days/only concerned w/ cocaine, per pt's boyfriend she will be hungry w/ early satiety after a few bites, K+ 2.6, elevated LFTs, hyperglycemia   Wound Type: Wound Consult Pending (right eat & forehead PI per pending wound consult)       Current Nutrition Intake & Therapies:    Average Meal Intake: NPO  Average Supplements Intake: NPO  Diet NPO Exceptions are: Sips of Water with Meds, Ice Chips    Anthropometric Measures:  Height: 152.4 cm (5')  Ideal Body Weight (IBW): 100 lbs (45 kg)    Admission Body Weight: 35.7 kg (78 lb 12.8 oz) (3/15 bed)  Current Body Weight: 35.7 kg (78 lb 12.8 oz) (3/15), 78.8 % IBW. Weight Source: Bed Scale  Current BMI (kg/m2): 15.4  Usual Body Weight: 38.6 kg

## 2024-03-15 NOTE — PROGRESS NOTES
Consulted for 20 gauge preipheral IV for a Triphasic Liver CT and labs.  Left arm is not to be used due to history of mastectomy.  Right upper arm assessed from antecubital to axillary line.  The basilic and cephalic veins are not visualized.  There are two arteries.  The brachial vein is located directly under the median nerve bundle until half way up the arm at which point it is directly beneath the second artery, and cannot be accessed.  ENRICO Gray, was in the room and aware of above.  I spoke with Dr. Jauregui and made hiim aware that IVT is not able to provide peripheral, Midline or PICC line access due to positioning of the only appropriate vein.   In addition to the CT this patient requires a variety of medications. He plans to place a central line. 3/15/2024 2:33 PM KIERSTEN FRAZIER RN

## 2024-03-16 PROBLEM — K86.89 PANCREATIC INSUFFICIENCY: Status: ACTIVE | Noted: 2024-03-16

## 2024-03-16 LAB
AFP SERPL-MCNC: <1.8 UG/L
ALBUMIN SERPL-MCNC: 2.8 G/DL (ref 3.5–5.2)
ALP SERPL-CCNC: 365 U/L (ref 35–104)
ALT SERPL-CCNC: 659 U/L (ref 0–32)
ANION GAP SERPL CALCULATED.3IONS-SCNC: 10 MMOL/L (ref 7–16)
AST SERPL-CCNC: 468 U/L (ref 0–31)
BASOPHILS # BLD: 0 K/UL (ref 0–0.2)
BASOPHILS NFR BLD: 0 % (ref 0–2)
BILIRUB DIRECT SERPL-MCNC: 0.6 MG/DL (ref 0–0.3)
BILIRUB INDIRECT SERPL-MCNC: 0.6 MG/DL (ref 0–1)
BILIRUB SERPL-MCNC: 1.2 MG/DL (ref 0–1.2)
BUN SERPL-MCNC: 20 MG/DL (ref 6–20)
CALCIUM SERPL-MCNC: 9.4 MG/DL (ref 8.6–10.2)
CHLORIDE SERPL-SCNC: 107 MMOL/L (ref 98–107)
CO2 SERPL-SCNC: 22 MMOL/L (ref 22–29)
CREAT SERPL-MCNC: 0.5 MG/DL (ref 0.5–1)
EOSINOPHIL # BLD: 0 K/UL (ref 0.05–0.5)
EOSINOPHILS RELATIVE PERCENT: 0 % (ref 0–6)
ERYTHROCYTE [DISTWIDTH] IN BLOOD BY AUTOMATED COUNT: 14.9 % (ref 11.5–15)
GFR SERPL CREATININE-BSD FRML MDRD: >60 ML/MIN/1.73M2
GLUCOSE BLD-MCNC: 234 MG/DL (ref 74–99)
GLUCOSE BLD-MCNC: 292 MG/DL (ref 74–99)
GLUCOSE BLD-MCNC: 342 MG/DL (ref 74–99)
GLUCOSE BLD-MCNC: 347 MG/DL (ref 74–99)
GLUCOSE BLD-MCNC: 352 MG/DL (ref 74–99)
GLUCOSE SERPL-MCNC: 183 MG/DL (ref 74–99)
HCT VFR BLD AUTO: 27 % (ref 34–48)
HCT VFR BLD AUTO: 28.4 % (ref 34–48)
HCT VFR BLD AUTO: 29.1 % (ref 34–48)
HGB BLD-MCNC: 8.6 G/DL (ref 11.5–15.5)
HGB BLD-MCNC: 9.2 G/DL (ref 11.5–15.5)
HGB BLD-MCNC: 9.3 G/DL (ref 11.5–15.5)
LYMPHOCYTES NFR BLD: 0.11 K/UL (ref 1.5–4)
LYMPHOCYTES RELATIVE PERCENT: 4 % (ref 20–42)
MAGNESIUM SERPL-MCNC: 2 MG/DL (ref 1.6–2.6)
MCH RBC QN AUTO: 30.6 PG (ref 26–35)
MCHC RBC AUTO-ENTMCNC: 32.4 G/DL (ref 32–34.5)
MCV RBC AUTO: 94.4 FL (ref 80–99.9)
MONOCYTES NFR BLD: 0.17 K/UL (ref 0.1–0.95)
MONOCYTES NFR BLD: 5 % (ref 2–12)
NEUTROPHILS NFR BLD: 91 % (ref 43–80)
NEUTS SEG NFR BLD: 2.92 K/UL (ref 1.8–7.3)
PHOSPHATE SERPL-MCNC: 2.4 MG/DL (ref 2.5–4.5)
PLATELET CONFIRMATION: NORMAL
PLATELET, FLUORESCENCE: 50 K/UL (ref 130–450)
PMV BLD AUTO: 10.7 FL (ref 7–12)
POTASSIUM SERPL-SCNC: 2.5 MMOL/L (ref 3.5–5)
POTASSIUM SERPL-SCNC: 3.8 MMOL/L (ref 3.5–5)
PROT SERPL-MCNC: 6 G/DL (ref 6.4–8.3)
RBC # BLD AUTO: 3.01 M/UL (ref 3.5–5.5)
RBC # BLD: ABNORMAL 10*6/UL
RBC # BLD: ABNORMAL 10*6/UL
SODIUM SERPL-SCNC: 139 MMOL/L (ref 132–146)
WBC OTHER # BLD: 3.2 K/UL (ref 4.5–11.5)

## 2024-03-16 PROCEDURE — 6360000002 HC RX W HCPCS: Performed by: NURSE PRACTITIONER

## 2024-03-16 PROCEDURE — A4216 STERILE WATER/SALINE, 10 ML: HCPCS | Performed by: HOSPITALIST

## 2024-03-16 PROCEDURE — 6360000002 HC RX W HCPCS: Performed by: STUDENT IN AN ORGANIZED HEALTH CARE EDUCATION/TRAINING PROGRAM

## 2024-03-16 PROCEDURE — 83735 ASSAY OF MAGNESIUM: CPT

## 2024-03-16 PROCEDURE — 6370000000 HC RX 637 (ALT 250 FOR IP): Performed by: INTERNAL MEDICINE

## 2024-03-16 PROCEDURE — 2060000000 HC ICU INTERMEDIATE R&B

## 2024-03-16 PROCEDURE — 80053 COMPREHEN METABOLIC PANEL: CPT

## 2024-03-16 PROCEDURE — 2500000003 HC RX 250 WO HCPCS: Performed by: STUDENT IN AN ORGANIZED HEALTH CARE EDUCATION/TRAINING PROGRAM

## 2024-03-16 PROCEDURE — 82248 BILIRUBIN DIRECT: CPT

## 2024-03-16 PROCEDURE — 82962 GLUCOSE BLOOD TEST: CPT

## 2024-03-16 PROCEDURE — 85014 HEMATOCRIT: CPT

## 2024-03-16 PROCEDURE — 87040 BLOOD CULTURE FOR BACTERIA: CPT

## 2024-03-16 PROCEDURE — 6360000002 HC RX W HCPCS: Performed by: HOSPITALIST

## 2024-03-16 PROCEDURE — 84100 ASSAY OF PHOSPHORUS: CPT

## 2024-03-16 PROCEDURE — 2580000003 HC RX 258: Performed by: HOSPITALIST

## 2024-03-16 PROCEDURE — 84132 ASSAY OF SERUM POTASSIUM: CPT

## 2024-03-16 PROCEDURE — 85025 COMPLETE CBC W/AUTO DIFF WBC: CPT

## 2024-03-16 PROCEDURE — 2580000003 HC RX 258: Performed by: STUDENT IN AN ORGANIZED HEALTH CARE EDUCATION/TRAINING PROGRAM

## 2024-03-16 PROCEDURE — 99232 SBSQ HOSP IP/OBS MODERATE 35: CPT | Performed by: INTERNAL MEDICINE

## 2024-03-16 PROCEDURE — 6370000000 HC RX 637 (ALT 250 FOR IP): Performed by: STUDENT IN AN ORGANIZED HEALTH CARE EDUCATION/TRAINING PROGRAM

## 2024-03-16 PROCEDURE — 85018 HEMOGLOBIN: CPT

## 2024-03-16 PROCEDURE — 99233 SBSQ HOSP IP/OBS HIGH 50: CPT | Performed by: STUDENT IN AN ORGANIZED HEALTH CARE EDUCATION/TRAINING PROGRAM

## 2024-03-16 PROCEDURE — 36592 COLLECT BLOOD FROM PICC: CPT

## 2024-03-16 PROCEDURE — 99232 SBSQ HOSP IP/OBS MODERATE 35: CPT | Performed by: STUDENT IN AN ORGANIZED HEALTH CARE EDUCATION/TRAINING PROGRAM

## 2024-03-16 PROCEDURE — C9113 INJ PANTOPRAZOLE SODIUM, VIA: HCPCS | Performed by: HOSPITALIST

## 2024-03-16 PROCEDURE — 94640 AIRWAY INHALATION TREATMENT: CPT

## 2024-03-16 RX ORDER — INSULIN GLARGINE 100 [IU]/ML
6 INJECTION, SOLUTION SUBCUTANEOUS EVERY MORNING
Status: DISCONTINUED | OUTPATIENT
Start: 2024-03-17 | End: 2024-03-16

## 2024-03-16 RX ORDER — POTASSIUM CHLORIDE 29.8 MG/ML
20 INJECTION INTRAVENOUS
Status: COMPLETED | OUTPATIENT
Start: 2024-03-16 | End: 2024-03-16

## 2024-03-16 RX ORDER — INSULIN GLARGINE 100 [IU]/ML
5 INJECTION, SOLUTION SUBCUTANEOUS EVERY MORNING
Status: DISCONTINUED | OUTPATIENT
Start: 2024-03-17 | End: 2024-03-17

## 2024-03-16 RX ORDER — INSULIN LISPRO 100 [IU]/ML
0-4 INJECTION, SOLUTION INTRAVENOUS; SUBCUTANEOUS
Status: DISCONTINUED | OUTPATIENT
Start: 2024-03-16 | End: 2024-03-18

## 2024-03-16 RX ORDER — INSULIN LISPRO 100 [IU]/ML
0-4 INJECTION, SOLUTION INTRAVENOUS; SUBCUTANEOUS NIGHTLY
Status: DISCONTINUED | OUTPATIENT
Start: 2024-03-16 | End: 2024-03-19

## 2024-03-16 RX ORDER — INSULIN LISPRO 100 [IU]/ML
0-6 INJECTION, SOLUTION INTRAVENOUS; SUBCUTANEOUS EVERY 4 HOURS
Status: DISCONTINUED | OUTPATIENT
Start: 2024-03-16 | End: 2024-03-16

## 2024-03-16 RX ADMIN — POTASSIUM CHLORIDE 20 MEQ: 29.8 INJECTION, SOLUTION INTRAVENOUS at 11:16

## 2024-03-16 RX ADMIN — SODIUM CHLORIDE, PRESERVATIVE FREE 10 ML: 5 INJECTION INTRAVENOUS at 08:09

## 2024-03-16 RX ADMIN — HYDROXYZINE PAMOATE 25 MG: 25 CAPSULE ORAL at 06:19

## 2024-03-16 RX ADMIN — LACTULOSE 20 G: 20 SOLUTION ORAL at 20:47

## 2024-03-16 RX ADMIN — PANCRELIPASE LIPASE, PANCRELIPASE PROTEASE, PANCRELIPASE AMYLASE 15000 UNITS: 15000; 47000; 63000 CAPSULE, DELAYED RELEASE ORAL at 16:06

## 2024-03-16 RX ADMIN — PETROLATUM: 420 OINTMENT TOPICAL at 08:12

## 2024-03-16 RX ADMIN — VORTIOXETINE 10 MG: 10 TABLET, FILM COATED ORAL at 20:48

## 2024-03-16 RX ADMIN — POTASSIUM CHLORIDE 40 MEQ: 1500 TABLET, EXTENDED RELEASE ORAL at 06:19

## 2024-03-16 RX ADMIN — SODIUM CHLORIDE, PRESERVATIVE FREE 40 MG: 5 INJECTION INTRAVENOUS at 01:00

## 2024-03-16 RX ADMIN — TRAZODONE HYDROCHLORIDE 50 MG: 50 TABLET ORAL at 20:48

## 2024-03-16 RX ADMIN — LORAZEPAM 2 MG: 2 INJECTION INTRAMUSCULAR at 03:30

## 2024-03-16 RX ADMIN — SODIUM CHLORIDE, PRESERVATIVE FREE 10 ML: 5 INJECTION INTRAVENOUS at 20:48

## 2024-03-16 RX ADMIN — PIPERACILLIN AND TAZOBACTAM 3375 MG: 3; .375 INJECTION, POWDER, FOR SOLUTION INTRAVENOUS at 16:09

## 2024-03-16 RX ADMIN — PANCRELIPASE LIPASE, PANCRELIPASE PROTEASE, PANCRELIPASE AMYLASE 20000 UNITS: 20000; 63000; 84000 CAPSULE, DELAYED RELEASE ORAL at 16:06

## 2024-03-16 RX ADMIN — POTASSIUM PHOSPHATE, MONOBASIC AND POTASSIUM PHOSPHATE, DIBASIC 15 MMOL: 224; 236 INJECTION, SOLUTION, CONCENTRATE INTRAVENOUS at 09:37

## 2024-03-16 RX ADMIN — RIFAXIMIN 550 MG: 550 TABLET ORAL at 20:48

## 2024-03-16 RX ADMIN — LORAZEPAM 2 MG: 2 INJECTION INTRAMUSCULAR at 04:40

## 2024-03-16 RX ADMIN — PIPERACILLIN AND TAZOBACTAM 4500 MG: 4; .5 INJECTION, POWDER, FOR SOLUTION INTRAVENOUS at 09:40

## 2024-03-16 RX ADMIN — INSULIN LISPRO 4 UNITS: 100 INJECTION, SOLUTION INTRAVENOUS; SUBCUTANEOUS at 12:57

## 2024-03-16 RX ADMIN — POTASSIUM CHLORIDE 20 MEQ: 29.8 INJECTION, SOLUTION INTRAVENOUS at 07:06

## 2024-03-16 RX ADMIN — PETROLATUM: 420 OINTMENT TOPICAL at 16:05

## 2024-03-16 RX ADMIN — POTASSIUM CHLORIDE 20 MEQ: 29.8 INJECTION, SOLUTION INTRAVENOUS at 06:23

## 2024-03-16 RX ADMIN — SODIUM CHLORIDE, PRESERVATIVE FREE 10 ML: 5 INJECTION INTRAVENOUS at 08:22

## 2024-03-16 RX ADMIN — PANCRELIPASE LIPASE, PANCRELIPASE PROTEASE, PANCRELIPASE AMYLASE 20000 UNITS: 20000; 63000; 84000 CAPSULE, DELAYED RELEASE ORAL at 12:51

## 2024-03-16 RX ADMIN — Medication 12 MG: at 20:47

## 2024-03-16 RX ADMIN — LORAZEPAM 2 MG: 2 INJECTION INTRAMUSCULAR at 08:09

## 2024-03-16 RX ADMIN — LORAZEPAM 2 MG: 2 INJECTION INTRAMUSCULAR at 13:39

## 2024-03-16 RX ADMIN — LORAZEPAM 2 MG: 2 INJECTION INTRAMUSCULAR at 12:11

## 2024-03-16 RX ADMIN — SODIUM CHLORIDE, PRESERVATIVE FREE 40 MG: 5 INJECTION INTRAVENOUS at 12:51

## 2024-03-16 RX ADMIN — IPRATROPIUM BROMIDE 0.5 MG: 0.5 SOLUTION RESPIRATORY (INHALATION) at 09:20

## 2024-03-16 RX ADMIN — HYDROXYZINE PAMOATE 25 MG: 25 CAPSULE ORAL at 20:47

## 2024-03-16 RX ADMIN — POTASSIUM CHLORIDE 20 MEQ: 29.8 INJECTION, SOLUTION INTRAVENOUS at 12:18

## 2024-03-16 RX ADMIN — INSULIN LISPRO 3 UNITS: 100 INJECTION, SOLUTION INTRAVENOUS; SUBCUTANEOUS at 16:06

## 2024-03-16 RX ADMIN — ANORECTAL OINTMENT: 15.7; .44; 24; 20.6 OINTMENT TOPICAL at 08:12

## 2024-03-16 RX ADMIN — PANCRELIPASE LIPASE, PANCRELIPASE PROTEASE, PANCRELIPASE AMYLASE 15000 UNITS: 15000; 47000; 63000 CAPSULE, DELAYED RELEASE ORAL at 12:51

## 2024-03-16 RX ADMIN — POTASSIUM CHLORIDE 20 MEQ: 29.8 INJECTION, SOLUTION INTRAVENOUS at 09:55

## 2024-03-16 RX ADMIN — ANORECTAL OINTMENT: 15.7; .44; 24; 20.6 OINTMENT TOPICAL at 16:05

## 2024-03-16 RX ADMIN — INSULIN LISPRO 4 UNITS: 100 INJECTION, SOLUTION INTRAVENOUS; SUBCUTANEOUS at 22:29

## 2024-03-16 NOTE — PROGRESS NOTES
Hepatobiliary and Pancreatic Surgery Progress Note    CC: Liver mass    Subjective: Patient still aggitated today but more alert. Denies abdominal pain. CT triphasic done yesterday. AFP normal.     OBJECTIVE      Physical    /69   Pulse 81   Temp 97.2 °F (36.2 °C) (Axillary) Comment: 97.2  Resp 18   Ht 1.524 m (5')   Wt 35.7 kg (78 lb 12.8 oz)   SpO2 99%   BMI 15.39 kg/m²       GEN: Cachectic, lethargic  EYES: Sclera white, pupils equal round and reactive to light  ENMT: , trachea midline, ears externally intact  LYMPH: no obvious lympadenopathy in neck.   RESP: Respiratory effort was normal with no retractions or use of accessory muscles.  CV:  No pedal edema  GI/ Abdomen: Soft, mildly distended, nontender, no guarding, no peritoneal signs  MSK: no clubbing/ no cyanosis/ gaitabnormal, ataxia    ASSESSMENT: 52 yo M with cocaine abuse, ETOHC abuse, hepatitis C, chronic pancreatitis, cirrhosis with liver decompensation and now 7cm right hepatic lobe mass in segments 5/ 6                   PLAN:    - CT triphasic reviewed. Lesion more consistent with an abscess and less so HCC. AFP is <1.8  - LFTs down trending.   - Will ask IR to aspirate the abscess as she will likely pull a drain out. Send for grams stain bacterial dn fungal cultures.   - Starting zosyn, has a triple lumen  - ID consult placed.   - She needs aggressive electrolyte replacement, K to 4, mag to 2 and phos to 3  - GI consulted for cirrhosis management.     Thank you for the consultation and allowing me to take part in Ms. Santacruz's care.    Greater than or equal to 35 minutes was spent providing face-to-face patient care, including:  and coordinating care, reviewing the chart, labs, and diagnostics, as well as medical decision making. Greater than 50% of this time was spent instructing and counseling the patient face to face regarding findings and recommendations.  Sury Weston MD   3/16/2024 8:31 AM

## 2024-03-16 NOTE — PROGRESS NOTES
[facility] Hospitalist Progress Note    Admitting Date and Time: 3/14/2024  3:54 AM  Admit Dx: Generalized abdominal pain [R10.84]  Liver mass [R16.0]  Elevated liver enzymes [R74.8]  Cocaine use [F14.90]    Subjective:  Patient is being followed for Generalized abdominal pain [R10.84]  Liver mass [R16.0]  Elevated liver enzymes [R74.8]  Cocaine use [F14.90]   Pt was seen and examined today. Denies any new issues. Remains confused and somnolent. Had central line inserted urgently yesterday due to need for potassium and no other IV access able to be obtained.    ROS: denies fever, chills, cp, sob, n/v, HA unless stated above.     potassium chloride  20 mEq IntraVENous Q1H    piperacillin-tazobactam  3,375 mg IntraVENous Q8H    potassium phosphate IVPB (PERIPHERAL LINE)  15 mmol IntraVENous Once    insulin glargine  4 Units SubCUTAneous QAM    menthol-zinc oxide   Topical BID    white petrolatum   Topical BID    sodium chloride flush  5-40 mL IntraVENous 2 times per day    enoxaparin  30 mg SubCUTAneous Daily    anastrozole  1 mg Oral QAM    ARIPiprazole  20 mg Oral QAM    vitamin C  1,000 mg Oral QAM    calcium elemental  1,000 mg Oral QAM    melatonin  12 mg Oral Nightly    multivitamin  1 tablet Oral QAM    rifAXIMin  550 mg Oral BID    thiamine  100 mg Oral Daily    ipratropium  0.5 mg Nebulization TID RT    VORTIoxetine HBr  10 mg Oral Nightly    lipase-protease-amylas  15,000 Units Oral TID WC    And    lipase-protease-amylase  20,000 Units Oral TID WC    insulin lispro  0-4 Units SubCUTAneous Q4H    pantoprazole (PROTONIX) 40 mg in sodium chloride (PF) 0.9 % 10 mL injection  40 mg IntraVENous Q12H    lactulose  20 g Oral TID    sodium chloride flush  5-40 mL IntraVENous 2 times per day     mineral oil-hydrophilic petrolatum, , BID PRN  sodium chloride flush, 5-40 mL, PRN  sodium chloride, , PRN  potassium chloride, 40 mEq, PRN   Or  potassium alternative oral replacement, 40 mEq, PRN   Or  potassium  39.5 29.1* 28.4*   MCV 92.2  --  96.5  --   --  94.4   MCH 30.2  --  30.3  --   --  30.6   MCHC 32.7  --  31.4*  --   --  32.4   RDW 14.7  --  15.1*  --   --  14.9   MPV 9.9  --  10.7  --   --  10.7    < > = values in this interval not displayed.       Radiology:   XR CHEST PORTABLE   Final Result   Left internal jugular line tip in the right atrium 4.5 cm from the caval   atrial junction.  No pneumothorax.         US ABDOMEN LIMITED   Final Result   Minimal accessible intra-abdominal ascites.  Imaging findings compatible with   stigmata of liver cirrhotic disease.  No paracentesis was performed.         CT ABDOMEN PELVIS WO CONTRAST Additional Contrast? None   Final Result   1. Cirrhotic morphology of the liver with a large low-attenuation structure   seen inferiorly within the right lobe concerning for an underlying lesion   such as hepatocellular carcinoma. This area measures 7.1 x 5.6 cm. This was   visualized on the prior examination.   2. Moderate to large amount of ascites seen within the abdomen and pelvis.   3. Abnormal wall thickening identified throughout the small bowel loops   within the left flank. Findings may be related to the surrounding ascites.   4. Increased stool burden seen diffusely throughout the colon to suggest   clinical presentation of constipation. Mild wall thickening in which an   underlying colitis cannot be completely excluded. Findings may be related to   the surrounding ascites.   5. Nodularity involving the adrenal glands bilaterally.   6. Small umbilical hernia containing fat only.         US GALLBLADDER RUQ   Final Result   1. Cirrhotic morphology of the liver with no underlying mass or lesion   present.   2. Status post cholecystectomy. No biliary ductal dilatation.   3. Increased echogenicity within the renal cortex to suggest medical renal   disease. Cystic structure along the superior pole of the right kidney   measuring 4.6 x 4.8 cm.   4. Fluid identified surrounding the

## 2024-03-16 NOTE — PROGRESS NOTES
PROGRESS NOTE  By Олег Mitchell M.D.    [facility]  Dr. Lakhwinder Madera M.D.,  Dr. Simón Esquivel M.D.,   Dr. Hugo Rodríguez D.O.,  Dr. Royer Coppola M.D.,  Dr. Robbin Mitchell D.O.,          Ellie Santacruz  53 y.o.  female    SUBJECTIVE:  More agitated and confused.  Family at bedside    OBJECTIVE:    /67   Pulse 86   Temp 97.4 °F (36.3 °C) (Axillary)   Resp 18   Ht 1.524 m (5')   Wt 35.7 kg (78 lb 12.8 oz)   SpO2 99%   BMI 15.39 kg/m²     General: Agitated and confused.  Adult  female  HEENT: Anicteric sclera/moist oral mucosa  Neck: Supple with trachea midline  Chest: Symmetric excursion/nonlabored respirations  Cor: Regular/S1-S2  Abd.: Soft.  Nondistended and appears nontender  Extr.:  No significant peripheral edema.  Generalized muscle wasting  Skin: Warm and dry/anicteric      DATA:    Monitor data reviewed -sinus rhythm noted.       Lab Results   Component Value Date/Time    WBC 3.2 03/16/2024 04:30 AM    RBC 3.01 03/16/2024 04:30 AM    HGB 9.2 03/16/2024 04:30 AM    HCT 28.4 03/16/2024 04:30 AM    MCV 94.4 03/16/2024 04:30 AM    MCH 30.6 03/16/2024 04:30 AM    MCHC 32.4 03/16/2024 04:30 AM    RDW 14.9 03/16/2024 04:30 AM    PLT 66 03/11/2024 12:15 PM    MPV 10.7 03/16/2024 04:30 AM     Lab Results   Component Value Date/Time     03/16/2024 04:30 AM    K 2.5 03/16/2024 04:30 AM    K 5.2 05/11/2023 08:52 PM     03/16/2024 04:30 AM    CO2 22 03/16/2024 04:30 AM    BUN 20 03/16/2024 04:30 AM    CREATININE 0.5 03/16/2024 04:30 AM    CALCIUM 9.4 03/16/2024 04:30 AM    PROT 6.0 03/16/2024 04:30 AM    LABALBU 2.8 03/16/2024 04:30 AM    BILITOT 1.2 03/16/2024 04:30 AM    ALKPHOS 365 03/16/2024 04:30 AM     03/16/2024 04:30 AM     03/16/2024 04:30 AM     Lab Results   Component Value Date/Time    LIPASE 19 03/14/2024 03:25 AM     Lab Results   Component Value Date/Time    AMYLASE 9 09/11/2021 01:40 PM         ASSESSMENT/PLAN:  Patient Active Problem List

## 2024-03-16 NOTE — PLAN OF CARE
Problem: Discharge Planning  Goal: Discharge to home or other facility with appropriate resources  Outcome: Progressing     Problem: ABCDS Injury Assessment  Goal: Absence of physical injury  Outcome: Progressing     Problem: Skin/Tissue Integrity  Goal: Absence of new skin breakdown  Description: 1.  Monitor for areas of redness and/or skin breakdown  2.  Assess vascular access sites hourly  3.  Every 4-6 hours minimum:  Change oxygen saturation probe site  4.  Every 4-6 hours:  If on nasal continuous positive airway pressure, respiratory therapy assess nares and determine need for appliance change or resting period.  Outcome: Progressing     Problem: Pain  Goal: Verbalizes/displays adequate comfort level or baseline comfort level  Outcome: Progressing

## 2024-03-16 NOTE — CONSULTS
[facility]  [facility]    Consultation Note     Admit Date: 3/14/2024  3:54 AM    Reason for Consult:   Liver abscess    Attending Physician:  Jair Jauregui MD     Chief Complaint: pt has AMS    HISTORY OF PRESENT ILLNESS:   The patient is a 53 y.o.  female known to the Infectious Diseases service. The patient was seen by ID at [facility] patient had perineal abscess status post I&D culture showed Klebsiella pneumonia, coag negative staph and lactobacillus.  Patient was discharged on Augmentin and Flagyl.  Patient had another left ischiorectal fossa abscess in 9/2023.  Patient underwent I&D and tissue cultures did not show any organisms at the time.  Patient presented to the ER on 3/14 with abdominal pain.  Patient has uncontrolled diabetes liver cirrhosis history of hepatitis C and substance use disorder and chronic pancreatitis.  Patient has altered mental status and trying to get out of bed. Does not respond to any questions. Nurses in room assisting her.     On admission on 3/14 she did have a temp of 101.8 has been afebrile after that hemodynamically stable saturating well on room air.  Labs showed white count was 1.4 on 3/3 today is 3.2 hemoglobin is 9.2 platelet count is 50 PRASHANT 9 on 3/14 still positive for cocaine metabolites.  LFTs are abnormal has transaminitis and alk phos elevation albumin of 2.8 normal bilirubin.  BMP showed hypokalemia creatinine is 0.5/BUN of 20 UA looks clean.  CT abdomen pelvis showedCirrhotic morphology of the liver with a large low-attenuation structure  seen inferiorly within the right lobe concerning for an underlying lesion  such as hepatocellular carcinoma. This area measures 7.1 x 5.6 cm.     Past Medical History:        Diagnosis Date    Alcoholism (HCC)     Since teens to early 20s    Anxiety and depression     Ascites of liver     Bronchitis     Cancer (HCC)     breast, left    Candidiasis of vagina     Chronic pancreatitis (HCC)   case with the treating physicians.      Electronically signed by TITI MCNAMARA MD on 3/16/2024 at 10:33 AM

## 2024-03-16 NOTE — PROGRESS NOTES
ENDOCRINOLOGY PROGRESS NOTE      Date of admission: 3/14/2024  Date of service: 3/16/2024  Admitting physician: Jair Jauregui MD   Primary Care Physician: No primary care provider on file.  Consultant physician: Wale Camejo MD     Reason for the consultation:  Uncontrolled DM    History of Present Illness:  53 y.o. female with a history of depression/anxiety, alcoholism, ascites of the liver, left breast cancer, chronic pancreatitis, cocaine abuse, DM 2, GERD, gout, hepatitis C, schizoaffective disorder bipolar type presents with abdominal pain endocrine service was consulted for diabetes management.    Subjective  I saw and examined the patient at bedside this morning.  No acute events overnight.  Glucose level is variable.  Appetite is poor    Inpatient diet:   Carb Restricted diet     Point of care glucose monitoring   (Independently reviewed)   Recent Labs     03/15/24  0234 03/15/24  0547 03/15/24  1031 03/15/24  1448 03/15/24  1736 03/15/24  2203 03/16/24  0628 03/16/24  1001   POCGLU 145* 179* 279* 208* 239* 148* 234* 292*   Scheduled Meds:   piperacillin-tazobactam  3,375 mg IntraVENous Q8H    potassium phosphate IVPB (PERIPHERAL LINE)  15 mmol IntraVENous Once    [START ON 3/17/2024] insulin glargine  5 Units SubCUTAneous QAM    insulin lispro  0-4 Units SubCUTAneous TID WC    insulin lispro  0-4 Units SubCUTAneous Nightly    menthol-zinc oxide   Topical BID    white petrolatum   Topical BID    sodium chloride flush  5-40 mL IntraVENous 2 times per day    enoxaparin  30 mg SubCUTAneous Daily    anastrozole  1 mg Oral QAM    ARIPiprazole  20 mg Oral QAM    vitamin C  1,000 mg Oral QAM    calcium elemental  1,000 mg Oral QAM    melatonin  12 mg Oral Nightly    multivitamin  1 tablet Oral QAM    rifAXIMin  550 mg Oral BID    thiamine  100 mg Oral Daily    ipratropium  0.5 mg Nebulization TID RT    VORTIoxetine HBr  10 mg Oral Nightly    lipase-protease-amylas  15,000 Units Oral TID WC    And

## 2024-03-17 LAB
ALBUMIN SERPL-MCNC: 2.7 G/DL (ref 3.5–5.2)
ALP SERPL-CCNC: 325 U/L (ref 35–104)
ALT SERPL-CCNC: 423 U/L (ref 0–32)
ANION GAP SERPL CALCULATED.3IONS-SCNC: 9 MMOL/L (ref 7–16)
AST SERPL-CCNC: 134 U/L (ref 0–31)
BASOPHILS # BLD: 0 K/UL (ref 0–0.2)
BASOPHILS NFR BLD: 0 % (ref 0–2)
BILIRUB DIRECT SERPL-MCNC: 0.5 MG/DL (ref 0–0.3)
BILIRUB INDIRECT SERPL-MCNC: 0.5 MG/DL (ref 0–1)
BILIRUB SERPL-MCNC: 1 MG/DL (ref 0–1.2)
BUN SERPL-MCNC: 25 MG/DL (ref 6–20)
CALCIUM SERPL-MCNC: 9.7 MG/DL (ref 8.6–10.2)
CHLORIDE SERPL-SCNC: 114 MMOL/L (ref 98–107)
CO2 SERPL-SCNC: 20 MMOL/L (ref 22–29)
CREAT SERPL-MCNC: 0.4 MG/DL (ref 0.5–1)
EOSINOPHIL # BLD: 0 K/UL (ref 0.05–0.5)
EOSINOPHILS RELATIVE PERCENT: 0 % (ref 0–6)
ERYTHROCYTE [DISTWIDTH] IN BLOOD BY AUTOMATED COUNT: 14.9 % (ref 11.5–15)
GFR SERPL CREATININE-BSD FRML MDRD: >60 ML/MIN/1.73M2
GLUCOSE BLD-MCNC: 257 MG/DL (ref 74–99)
GLUCOSE BLD-MCNC: 274 MG/DL (ref 74–99)
GLUCOSE BLD-MCNC: 328 MG/DL (ref 74–99)
GLUCOSE BLD-MCNC: 414 MG/DL (ref 74–99)
GLUCOSE SERPL-MCNC: 372 MG/DL (ref 74–99)
HCT VFR BLD AUTO: 27 % (ref 34–48)
HCT VFR BLD AUTO: 28 % (ref 34–48)
HGB BLD-MCNC: 8.6 G/DL (ref 11.5–15.5)
HGB BLD-MCNC: 8.9 G/DL (ref 11.5–15.5)
LYMPHOCYTES NFR BLD: 0.08 K/UL (ref 1.5–4)
LYMPHOCYTES RELATIVE PERCENT: 4 % (ref 20–42)
MAGNESIUM SERPL-MCNC: 1.9 MG/DL (ref 1.6–2.6)
MCH RBC QN AUTO: 30.4 PG (ref 26–35)
MCHC RBC AUTO-ENTMCNC: 31.9 G/DL (ref 32–34.5)
MCV RBC AUTO: 95.4 FL (ref 80–99.9)
MONOCYTES NFR BLD: 0.06 K/UL (ref 0.1–0.95)
MONOCYTES NFR BLD: 3 % (ref 2–12)
NEUTROPHILS NFR BLD: 94 % (ref 43–80)
NEUTS SEG NFR BLD: 2.06 K/UL (ref 1.8–7.3)
PHOSPHATE SERPL-MCNC: 3 MG/DL (ref 2.5–4.5)
PLATELET # BLD AUTO: 51 K/UL (ref 130–450)
PLATELET CONFIRMATION: NORMAL
PMV BLD AUTO: 10.7 FL (ref 7–12)
POTASSIUM SERPL-SCNC: 3.3 MMOL/L (ref 3.5–5)
PROT SERPL-MCNC: 6 G/DL (ref 6.4–8.3)
RBC # BLD AUTO: 2.83 M/UL (ref 3.5–5.5)
RBC # BLD: ABNORMAL 10*6/UL
SODIUM SERPL-SCNC: 143 MMOL/L (ref 132–146)
WBC OTHER # BLD: 2.2 K/UL (ref 4.5–11.5)

## 2024-03-17 PROCEDURE — 83735 ASSAY OF MAGNESIUM: CPT

## 2024-03-17 PROCEDURE — 6370000000 HC RX 637 (ALT 250 FOR IP): Performed by: INTERNAL MEDICINE

## 2024-03-17 PROCEDURE — C9113 INJ PANTOPRAZOLE SODIUM, VIA: HCPCS | Performed by: HOSPITALIST

## 2024-03-17 PROCEDURE — 82962 GLUCOSE BLOOD TEST: CPT

## 2024-03-17 PROCEDURE — 2580000003 HC RX 258: Performed by: HOSPITALIST

## 2024-03-17 PROCEDURE — 6370000000 HC RX 637 (ALT 250 FOR IP)

## 2024-03-17 PROCEDURE — 6360000002 HC RX W HCPCS: Performed by: STUDENT IN AN ORGANIZED HEALTH CARE EDUCATION/TRAINING PROGRAM

## 2024-03-17 PROCEDURE — 99232 SBSQ HOSP IP/OBS MODERATE 35: CPT | Performed by: STUDENT IN AN ORGANIZED HEALTH CARE EDUCATION/TRAINING PROGRAM

## 2024-03-17 PROCEDURE — 99232 SBSQ HOSP IP/OBS MODERATE 35: CPT | Performed by: INTERNAL MEDICINE

## 2024-03-17 PROCEDURE — 80053 COMPREHEN METABOLIC PANEL: CPT

## 2024-03-17 PROCEDURE — 85014 HEMATOCRIT: CPT

## 2024-03-17 PROCEDURE — 36592 COLLECT BLOOD FROM PICC: CPT

## 2024-03-17 PROCEDURE — 94640 AIRWAY INHALATION TREATMENT: CPT

## 2024-03-17 PROCEDURE — A4216 STERILE WATER/SALINE, 10 ML: HCPCS | Performed by: HOSPITALIST

## 2024-03-17 PROCEDURE — 6370000000 HC RX 637 (ALT 250 FOR IP): Performed by: STUDENT IN AN ORGANIZED HEALTH CARE EDUCATION/TRAINING PROGRAM

## 2024-03-17 PROCEDURE — 84100 ASSAY OF PHOSPHORUS: CPT

## 2024-03-17 PROCEDURE — 2580000003 HC RX 258: Performed by: STUDENT IN AN ORGANIZED HEALTH CARE EDUCATION/TRAINING PROGRAM

## 2024-03-17 PROCEDURE — 85018 HEMOGLOBIN: CPT

## 2024-03-17 PROCEDURE — 82248 BILIRUBIN DIRECT: CPT

## 2024-03-17 PROCEDURE — 6360000002 HC RX W HCPCS: Performed by: HOSPITALIST

## 2024-03-17 PROCEDURE — 2060000000 HC ICU INTERMEDIATE R&B

## 2024-03-17 PROCEDURE — 85025 COMPLETE CBC W/AUTO DIFF WBC: CPT

## 2024-03-17 PROCEDURE — 99233 SBSQ HOSP IP/OBS HIGH 50: CPT | Performed by: STUDENT IN AN ORGANIZED HEALTH CARE EDUCATION/TRAINING PROGRAM

## 2024-03-17 RX ORDER — INSULIN LISPRO 100 [IU]/ML
2 INJECTION, SOLUTION INTRAVENOUS; SUBCUTANEOUS ONCE
Status: COMPLETED | OUTPATIENT
Start: 2024-03-17 | End: 2024-03-17

## 2024-03-17 RX ORDER — INSULIN GLARGINE 100 [IU]/ML
6 INJECTION, SOLUTION SUBCUTANEOUS EVERY MORNING
Status: DISCONTINUED | OUTPATIENT
Start: 2024-03-17 | End: 2024-03-19

## 2024-03-17 RX ORDER — INSULIN LISPRO 100 [IU]/ML
2 INJECTION, SOLUTION INTRAVENOUS; SUBCUTANEOUS
Status: DISCONTINUED | OUTPATIENT
Start: 2024-03-17 | End: 2024-03-19

## 2024-03-17 RX ADMIN — INSULIN LISPRO 3 UNITS: 100 INJECTION, SOLUTION INTRAVENOUS; SUBCUTANEOUS at 10:33

## 2024-03-17 RX ADMIN — IPRATROPIUM BROMIDE 0.5 MG: 0.5 SOLUTION RESPIRATORY (INHALATION) at 17:04

## 2024-03-17 RX ADMIN — HYDROXYZINE PAMOATE 25 MG: 25 CAPSULE ORAL at 21:09

## 2024-03-17 RX ADMIN — SODIUM CHLORIDE, PRESERVATIVE FREE 10 ML: 5 INJECTION INTRAVENOUS at 21:09

## 2024-03-17 RX ADMIN — INSULIN LISPRO 2 UNITS: 100 INJECTION, SOLUTION INTRAVENOUS; SUBCUTANEOUS at 08:22

## 2024-03-17 RX ADMIN — LACTULOSE 20 G: 20 SOLUTION ORAL at 21:09

## 2024-03-17 RX ADMIN — Medication 12 MG: at 21:09

## 2024-03-17 RX ADMIN — INSULIN LISPRO 4 UNITS: 100 INJECTION, SOLUTION INTRAVENOUS; SUBCUTANEOUS at 07:04

## 2024-03-17 RX ADMIN — ANORECTAL OINTMENT: 15.7; .44; 24; 20.6 OINTMENT TOPICAL at 08:19

## 2024-03-17 RX ADMIN — PIPERACILLIN AND TAZOBACTAM 3375 MG: 3; .375 INJECTION, POWDER, FOR SOLUTION INTRAVENOUS at 02:02

## 2024-03-17 RX ADMIN — LORAZEPAM 2 MG: 2 INJECTION INTRAMUSCULAR at 02:13

## 2024-03-17 RX ADMIN — PIPERACILLIN AND TAZOBACTAM 3375 MG: 3; .375 INJECTION, POWDER, FOR SOLUTION INTRAVENOUS at 08:20

## 2024-03-17 RX ADMIN — PETROLATUM: 420 OINTMENT TOPICAL at 16:35

## 2024-03-17 RX ADMIN — SODIUM CHLORIDE, PRESERVATIVE FREE 10 ML: 5 INJECTION INTRAVENOUS at 08:26

## 2024-03-17 RX ADMIN — ANASTROZOLE 1 MG: 1 TABLET, FILM COATED ORAL at 08:14

## 2024-03-17 RX ADMIN — SODIUM CHLORIDE, PRESERVATIVE FREE 40 MG: 5 INJECTION INTRAVENOUS at 01:57

## 2024-03-17 RX ADMIN — VORTIOXETINE 10 MG: 10 TABLET, FILM COATED ORAL at 21:09

## 2024-03-17 RX ADMIN — POTASSIUM BICARBONATE 40 MEQ: 782 TABLET, EFFERVESCENT ORAL at 06:43

## 2024-03-17 RX ADMIN — PETROLATUM: 420 OINTMENT TOPICAL at 08:19

## 2024-03-17 RX ADMIN — INSULIN LISPRO 2 UNITS: 100 INJECTION, SOLUTION INTRAVENOUS; SUBCUTANEOUS at 07:04

## 2024-03-17 RX ADMIN — LACTULOSE 20 G: 20 SOLUTION ORAL at 08:14

## 2024-03-17 RX ADMIN — ANORECTAL OINTMENT: 15.7; .44; 24; 20.6 OINTMENT TOPICAL at 16:34

## 2024-03-17 RX ADMIN — LORAZEPAM 3 MG: 2 INJECTION INTRAMUSCULAR; INTRAVENOUS at 08:15

## 2024-03-17 RX ADMIN — RIFAXIMIN 550 MG: 550 TABLET ORAL at 08:14

## 2024-03-17 RX ADMIN — INSULIN GLARGINE 6 UNITS: 100 INJECTION, SOLUTION SUBCUTANEOUS at 08:22

## 2024-03-17 RX ADMIN — TRAZODONE HYDROCHLORIDE 50 MG: 50 TABLET ORAL at 21:09

## 2024-03-17 RX ADMIN — PIPERACILLIN AND TAZOBACTAM 3375 MG: 3; .375 INJECTION, POWDER, FOR SOLUTION INTRAVENOUS at 16:55

## 2024-03-17 RX ADMIN — SODIUM CHLORIDE, PRESERVATIVE FREE 10 ML: 5 INJECTION INTRAVENOUS at 21:10

## 2024-03-17 RX ADMIN — ARIPIPRAZOLE 20 MG: 10 TABLET ORAL at 08:15

## 2024-03-17 RX ADMIN — OXYCODONE HYDROCHLORIDE AND ACETAMINOPHEN 1000 MG: 500 TABLET ORAL at 08:14

## 2024-03-17 RX ADMIN — INSULIN LISPRO 2 UNITS: 100 INJECTION, SOLUTION INTRAVENOUS; SUBCUTANEOUS at 16:33

## 2024-03-17 RX ADMIN — SODIUM CHLORIDE, PRESERVATIVE FREE 40 MG: 5 INJECTION INTRAVENOUS at 13:39

## 2024-03-17 RX ADMIN — Medication 100 MG: at 08:15

## 2024-03-17 RX ADMIN — LACTULOSE 20 G: 20 SOLUTION ORAL at 13:39

## 2024-03-17 RX ADMIN — INSULIN LISPRO 2 UNITS: 100 INJECTION, SOLUTION INTRAVENOUS; SUBCUTANEOUS at 10:33

## 2024-03-17 RX ADMIN — INSULIN LISPRO 2 UNITS: 100 INJECTION, SOLUTION INTRAVENOUS; SUBCUTANEOUS at 16:34

## 2024-03-17 RX ADMIN — RIFAXIMIN 550 MG: 550 TABLET ORAL at 21:09

## 2024-03-17 NOTE — PROGRESS NOTES
[facility] Hospitalist Progress Note    Admitting Date and Time: 3/14/2024  3:54 AM  Admit Dx: Generalized abdominal pain [R10.84]  Liver mass [R16.0]  Elevated liver enzymes [R74.8]  Cocaine use [F14.90]    Subjective:  Patient is being followed for Generalized abdominal pain [R10.84]  Liver mass [R16.0]  Elevated liver enzymes [R74.8]  Cocaine use [F14.90]   Pt was seen and examined today. Denies any new issues. Remains confused and somnolent    ROS: denies fever, chills, cp, sob, n/v, HA unless stated above.     insulin glargine  6 Units SubCUTAneous QAM    insulin lispro  2 Units SubCUTAneous TID WC    piperacillin-tazobactam  3,375 mg IntraVENous Q8H    insulin lispro  0-4 Units SubCUTAneous TID WC    insulin lispro  0-4 Units SubCUTAneous Nightly    menthol-zinc oxide   Topical BID    white petrolatum   Topical BID    sodium chloride flush  5-40 mL IntraVENous 2 times per day    [Held by provider] enoxaparin  30 mg SubCUTAneous Daily    anastrozole  1 mg Oral QAM    ARIPiprazole  20 mg Oral QAM    vitamin C  1,000 mg Oral QAM    calcium elemental  1,000 mg Oral QAM    melatonin  12 mg Oral Nightly    multivitamin  1 tablet Oral QAM    rifAXIMin  550 mg Oral BID    thiamine  100 mg Oral Daily    ipratropium  0.5 mg Nebulization TID RT    VORTIoxetine HBr  10 mg Oral Nightly    lipase-protease-amylas  15,000 Units Oral TID WC    And    lipase-protease-amylase  20,000 Units Oral TID WC    pantoprazole (PROTONIX) 40 mg in sodium chloride (PF) 0.9 % 10 mL injection  40 mg IntraVENous Q12H    lactulose  20 g Oral TID    sodium chloride flush  5-40 mL IntraVENous 2 times per day     mineral oil-hydrophilic petrolatum, , BID PRN  sodium chloride flush, 5-40 mL, PRN  sodium chloride, , PRN  potassium chloride, 40 mEq, PRN   Or  potassium alternative oral replacement, 40 mEq, PRN   Or  potassium chloride, 10 mEq, PRN  magnesium sulfate, 2,000 mg, PRN  ondansetron, 4 mg, Q8H PRN   Or  ondansetron, 4 mg, Q6H  cannot be completely excluded. Findings may be related to   the surrounding ascites.   5. Nodularity involving the adrenal glands bilaterally.   6. Small umbilical hernia containing fat only.         US GALLBLADDER RUQ   Final Result   1. Cirrhotic morphology of the liver with no underlying mass or lesion   present.   2. Status post cholecystectomy. No biliary ductal dilatation.   3. Increased echogenicity within the renal cortex to suggest medical renal   disease. Cystic structure along the superior pole of the right kidney   measuring 4.6 x 4.8 cm.   4. Fluid identified surrounding the liver within the right upper quadrant.         IR ABSCESS DRAINAGE PERC    (Results Pending)       Assessment:    Principal Problem:    Liver mass  Active Problems:    Poorly controlled diabetes mellitus (HCC)    Ascites due to alcoholic cirrhosis (HCC)    Severe protein-calorie malnutrition (HCC)    Generalized abdominal pain    Elevated liver enzymes    Portal hypertension (HCC)    Pancreatic insufficiency  Resolved Problems:    * No resolved hospital problems. *      Plan:  Uncontrolled DM with hyperglycemia - 9/2023 A1c 11.4%. Had insulin pump on arrival. Endocrinology consult, on Lantus 6u daily, Lispro 2u with meals and LDSS  Decompensated chronic cirrhosis with ascites - Hx Hepatitis C.  Had recent admission a few days prior to presenting at [facility] but left AMA.  IR for paracentesis but not enough to be removed.  GI consult, appreciate recommendations, for likely EGD and possible colonoscopy  Acute encephalopathy - likely 2/2 #2.  Keep n.p.o. for now until patient mentation improves.  Resume rifaximin and started lactulose  Liver mass - seen on initial CT abdomen without contrast, liver lesion 7.1 x 5.6 cm, concern for hepatocellular carcinoma given this patient's previous history.  Triphasic CT showed hypodense mass in segment 6 of the liver, could represent HCC or Cholangiocarcinoma, possible pancreatic mass.

## 2024-03-17 NOTE — PROGRESS NOTES
[facility]  [facility]  Progress Note    SUBJECTIVE:  Chief Complaint   Patient presents with    Abdominal Pain     Diffuse. History of cirrhosis. Patient stated she got up this morning and her abdomen was distened     Patient seems to be tolerating medications.   Boyfriend in room. - feels that he is being told stories about what's wrong with her  No fevers   Agitated and disoriented.   Sitter in room    Review of systems:  As stated above in the chief complaint, otherwise negative.    Medications:  Scheduled Meds:   insulin glargine  6 Units SubCUTAneous QAM    insulin lispro  2 Units SubCUTAneous TID WC    piperacillin-tazobactam  3,375 mg IntraVENous Q8H    insulin lispro  0-4 Units SubCUTAneous TID WC    insulin lispro  0-4 Units SubCUTAneous Nightly    menthol-zinc oxide   Topical BID    white petrolatum   Topical BID    sodium chloride flush  5-40 mL IntraVENous 2 times per day    [Held by provider] enoxaparin  30 mg SubCUTAneous Daily    anastrozole  1 mg Oral QAM    ARIPiprazole  20 mg Oral QAM    vitamin C  1,000 mg Oral QAM    calcium elemental  1,000 mg Oral QAM    melatonin  12 mg Oral Nightly    multivitamin  1 tablet Oral QAM    rifAXIMin  550 mg Oral BID    thiamine  100 mg Oral Daily    ipratropium  0.5 mg Nebulization TID RT    VORTIoxetine HBr  10 mg Oral Nightly    lipase-protease-amylas  15,000 Units Oral TID WC    And    lipase-protease-amylase  20,000 Units Oral TID WC    pantoprazole (PROTONIX) 40 mg in sodium chloride (PF) 0.9 % 10 mL injection  40 mg IntraVENous Q12H    lactulose  20 g Oral TID    sodium chloride flush  5-40 mL IntraVENous 2 times per day     Continuous Infusions:   sodium chloride      dextrose      sodium chloride       PRN Meds:mineral oil-hydrophilic petrolatum, sodium chloride flush, sodium chloride, potassium chloride **OR** potassium alternative oral replacement **OR** potassium chloride, magnesium sulfate, ondansetron **OR**

## 2024-03-17 NOTE — PROGRESS NOTES
ENDOCRINOLOGY PROGRESS NOTE      Date of admission: 3/14/2024  Date of service: 3/17/2024  Admitting physician: Jair Jauregui MD   Primary Care Physician: No primary care provider on file.  Consultant physician: Wale Camejo MD     Reason for the consultation:  Uncontrolled DM    History of Present Illness:  53 y.o. female with a history of depression/anxiety, alcoholism, ascites of the liver, left breast cancer, chronic pancreatitis, cocaine abuse, DM 2, GERD, gout, hepatitis C, schizoaffective disorder bipolar type presents with abdominal pain endocrine service was consulted for diabetes management.    Subjective  BS very this AM, pt didn't receive long acting insulin yesterday.     Inpatient diet:   Carb Restricted diet     Point of care glucose monitoring   (Independently reviewed)   Recent Labs     03/15/24  1736 03/15/24  2203 03/16/24  0628 03/16/24  1001 03/16/24  1249 03/16/24  1537 03/16/24  2024 03/17/24  0642   POCGLU 239* 148* 234* 292* 352* 342* 347* 414*   Scheduled Meds:   insulin glargine  6 Units SubCUTAneous QAM    insulin lispro  2 Units SubCUTAneous TID WC    piperacillin-tazobactam  3,375 mg IntraVENous Q8H    insulin lispro  0-4 Units SubCUTAneous TID WC    insulin lispro  0-4 Units SubCUTAneous Nightly    menthol-zinc oxide   Topical BID    white petrolatum   Topical BID    sodium chloride flush  5-40 mL IntraVENous 2 times per day    [Held by provider] enoxaparin  30 mg SubCUTAneous Daily    anastrozole  1 mg Oral QAM    ARIPiprazole  20 mg Oral QAM    vitamin C  1,000 mg Oral QAM    calcium elemental  1,000 mg Oral QAM    melatonin  12 mg Oral Nightly    multivitamin  1 tablet Oral QAM    rifAXIMin  550 mg Oral BID    thiamine  100 mg Oral Daily    ipratropium  0.5 mg Nebulization TID RT    VORTIoxetine HBr  10 mg Oral Nightly    lipase-protease-amylas  15,000 Units Oral TID WC    And    lipase-protease-amylase  20,000 Units Oral TID WC    pantoprazole (PROTONIX) 40 mg in sodium    1. Cirrhotic morphology of the liver with no underlying mass or lesion   present.   2. Status post cholecystectomy. No biliary ductal dilatation.   3. Increased echogenicity within the renal cortex to suggest medical renal   disease. Cystic structure along the superior pole of the right kidney   measuring 4.6 x 4.8 cm.   4. Fluid identified surrounding the liver within the right upper quadrant.         IR ABSCESS DRAINAGE PERC    (Results Pending)       Medical Records/Labs/Images review:   I personally reviewed and summarized previous records   All labs and imaging were reviewed independently     ASSESSMENT & PLAN   Ellie Santacruz, a 53 y.o.-old female seen today for inpatient diabetes management    Diabetes Mellitus secondary to pancreatic insufficiency   Patient's diabetes is uncontrolled, the patient's diabetes is very brittle  Pt currently very altered and can not operate her insulin pump   BS high this AM, pt didn't receive lantus yesterday. Also, she is on supplement with high glycemic index   We recommend the following DM regimen  Lantus 6 units daily in the morning  Start Humalog 2u with meals  Low dose sliding scale ACHS  Change to Glucerna supplement   Continue glucose check with meals and at bedtime  Will titrate insulin dose based on the blood glucose trend & insulin requirement  Upon discharge, I will arrange for the patient to be seen in the endocrinology clinic for routine diabetes maintenance and prevention    Chronic pancreatitis/pancreatic insufficiency   Close monitor glucose as pt at high risk of hypoglycemia   On Creon     Interdisciplinary plan for communication with healthcare providers:   Consult recommendations were discussed with the Primary Service/Nursing staff      Thank you for allowing us to participate in the care of this patient. Please do not hesitate to contact us with any additional questions.     Wale Camejo MD  Endocrinologist, [facility]   [phone]

## 2024-03-17 NOTE — PROGRESS NOTES
Hepatobiliary and Pancreatic Surgery Progress Note    CC: Liver mass    Subjective: Patient states she still has right upper quadrant abdominal pain.  Patient is still very agitated, and does not answered questions.  Her boyfriend is at bedside    OBJECTIVE      Physical    /70   Pulse 76   Temp 97.4 °F (36.3 °C) (Infrared)   Resp 18   Ht 1.524 m (5')   Wt 32.4 kg (71 lb 6.9 oz)   SpO2 100%   BMI 13.95 kg/m²     GEN: Cachectic, lethargic  EYES: Sclera white, pupils equal round and reactive to light  ENMT: , trachea midline, ears externally intact  LYMPH: no obvious lympadenopathy in neck.   RESP: Respiratory effort was normal with no retractions or use of accessory muscles.  CV:  No pedal edema  GI/ Abdomen: Soft, mildly distended, nontender, no guarding, no peritoneal signs  MSK: no clubbing/ no cyanosis/ gaitabnormal, ataxia    ASSESSMENT: 52 yo M with cocaine abuse, ETOHC abuse, hepatitis C, chronic pancreatitis, cirrhosis with liver decompensation and now 7cm right hepatic lobe mass in segments 5/ 6           PLAN:    - CT triphasic reviewed. Lesion more consistent with an abscess and less so HCC. AFP is <1.8  - LFTs down trending.   - Will ask IR to aspirate the abscess as she will likely pull a drain out. Send for grams stain bacterial and fungal cultures.   - Starting zosyn, has a triple lumen  - ID consulted, appreciate assistance  - She needs aggressive electrolyte replacement, K to 4, mag to 2 and phos to 3  - GI consulted for cirrhosis management.   - I discussed with her boyfriend at bedside that she would likely need placement at a facility at the time of discharge.  If she is to be on long-term antibiotics via a PICC in someone with substance abuse, this could pose some difficulty with her going home.  She has proven to him that she is unable to stop taking drugs when she is at home.  Suspect she will need to go to a nursing facility.    Thank you for the consultation and allowing me to

## 2024-03-18 ENCOUNTER — APPOINTMENT (OUTPATIENT)
Dept: CT IMAGING | Age: 54
DRG: 279 | End: 2024-03-18
Payer: COMMERCIAL

## 2024-03-18 LAB
ALBUMIN SERPL-MCNC: 3 G/DL (ref 3.5–5.2)
ALK PHOS BONE SPECIFIC: 146 U/L (ref 0–55)
ALK PHOS OTHER CALC: 0 U/L
ALK PHOSPHATASE: 542 U/L (ref 40–120)
ALKALINE PHOSPHATASE LIVER FRACTION: 396 U/L (ref 0–94)
ALP SERPL-CCNC: 430 U/L (ref 35–104)
ALT SERPL-CCNC: 345 U/L (ref 0–32)
AMMONIA PLAS-SCNC: 14 UMOL/L (ref 11–51)
ANION GAP SERPL CALCULATED.3IONS-SCNC: 10 MMOL/L (ref 7–16)
ANION GAP SERPL CALCULATED.3IONS-SCNC: 7 MMOL/L (ref 7–16)
AST SERPL-CCNC: 71 U/L (ref 0–31)
BASOPHILS # BLD: 0 K/UL (ref 0–0.2)
BASOPHILS NFR BLD: 0 % (ref 0–2)
BILIRUB SERPL-MCNC: 1.1 MG/DL (ref 0–1.2)
BUN SERPL-MCNC: 20 MG/DL (ref 6–20)
BUN SERPL-MCNC: 23 MG/DL (ref 6–20)
CALCIUM SERPL-MCNC: 10.1 MG/DL (ref 8.6–10.2)
CALCIUM SERPL-MCNC: 10.2 MG/DL (ref 8.6–10.2)
CHLORIDE SERPL-SCNC: 117 MMOL/L (ref 98–107)
CHLORIDE SERPL-SCNC: 120 MMOL/L (ref 98–107)
CO2 SERPL-SCNC: 22 MMOL/L (ref 22–29)
CO2 SERPL-SCNC: 24 MMOL/L (ref 22–29)
CREAT SERPL-MCNC: 0.4 MG/DL (ref 0.5–1)
CREAT SERPL-MCNC: 0.5 MG/DL (ref 0.5–1)
EOSINOPHIL # BLD: 0.04 K/UL (ref 0.05–0.5)
EOSINOPHILS RELATIVE PERCENT: 2 % (ref 0–6)
ERYTHROCYTE [DISTWIDTH] IN BLOOD BY AUTOMATED COUNT: 15.1 % (ref 11.5–15)
GFR SERPL CREATININE-BSD FRML MDRD: >60 ML/MIN/1.73M2
GFR SERPL CREATININE-BSD FRML MDRD: >60 ML/MIN/1.73M2
GLUCOSE BLD-MCNC: 111 MG/DL (ref 74–99)
GLUCOSE BLD-MCNC: 136 MG/DL (ref 74–99)
GLUCOSE BLD-MCNC: 271 MG/DL (ref 74–99)
GLUCOSE BLD-MCNC: 492 MG/DL (ref 74–99)
GLUCOSE SERPL-MCNC: 100 MG/DL (ref 74–99)
GLUCOSE SERPL-MCNC: 434 MG/DL (ref 74–99)
HAV IGM SERPL QL IA: NONREACTIVE
HBV CORE IGM SERPL QL IA: NONREACTIVE
HBV SURFACE AG SERPL QL IA: NONREACTIVE
HCT VFR BLD AUTO: 30.1 % (ref 34–48)
HCV AB SERPL QL IA: REACTIVE
HGB BLD-MCNC: 9.5 G/DL (ref 11.5–15.5)
INR PPP: 1.2
LYMPHOCYTES NFR BLD: 0.23 K/UL (ref 1.5–4)
LYMPHOCYTES RELATIVE PERCENT: 10 % (ref 20–42)
MCH RBC QN AUTO: 30 PG (ref 26–35)
MCHC RBC AUTO-ENTMCNC: 31.6 G/DL (ref 32–34.5)
MCV RBC AUTO: 95 FL (ref 80–99.9)
MONOCYTES NFR BLD: 0.19 K/UL (ref 0.1–0.95)
MONOCYTES NFR BLD: 8 % (ref 2–12)
NEUTROPHILS NFR BLD: 81 % (ref 43–80)
NEUTS SEG NFR BLD: 1.94 K/UL (ref 1.8–7.3)
PARTIAL THROMBOPLASTIN TIME: 26.5 SEC (ref 24.5–35.1)
PLATELET # BLD AUTO: 59 K/UL (ref 130–450)
PLATELET CONFIRMATION: NORMAL
PMV BLD AUTO: 10.1 FL (ref 7–12)
POTASSIUM SERPL-SCNC: 3 MMOL/L (ref 3.5–5)
POTASSIUM SERPL-SCNC: 3.7 MMOL/L (ref 3.5–5)
PROT SERPL-MCNC: 6.4 G/DL (ref 6.4–8.3)
PROTHROMBIN TIME: 13.2 SEC (ref 9.3–12.4)
RBC # BLD AUTO: 3.17 M/UL (ref 3.5–5.5)
RBC # BLD: ABNORMAL 10*6/UL
RBC # BLD: ABNORMAL 10*6/UL
SODIUM SERPL-SCNC: 148 MMOL/L (ref 132–146)
SODIUM SERPL-SCNC: 152 MMOL/L (ref 132–146)
WBC OTHER # BLD: 2.4 K/UL (ref 4.5–11.5)

## 2024-03-18 PROCEDURE — 2060000000 HC ICU INTERMEDIATE R&B

## 2024-03-18 PROCEDURE — A4216 STERILE WATER/SALINE, 10 ML: HCPCS | Performed by: HOSPITALIST

## 2024-03-18 PROCEDURE — 82140 ASSAY OF AMMONIA: CPT

## 2024-03-18 PROCEDURE — 6360000002 HC RX W HCPCS: Performed by: STUDENT IN AN ORGANIZED HEALTH CARE EDUCATION/TRAINING PROGRAM

## 2024-03-18 PROCEDURE — 6370000000 HC RX 637 (ALT 250 FOR IP): Performed by: STUDENT IN AN ORGANIZED HEALTH CARE EDUCATION/TRAINING PROGRAM

## 2024-03-18 PROCEDURE — C9113 INJ PANTOPRAZOLE SODIUM, VIA: HCPCS | Performed by: HOSPITALIST

## 2024-03-18 PROCEDURE — 99232 SBSQ HOSP IP/OBS MODERATE 35: CPT | Performed by: INTERNAL MEDICINE

## 2024-03-18 PROCEDURE — 2580000003 HC RX 258: Performed by: STUDENT IN AN ORGANIZED HEALTH CARE EDUCATION/TRAINING PROGRAM

## 2024-03-18 PROCEDURE — 85610 PROTHROMBIN TIME: CPT

## 2024-03-18 PROCEDURE — 6370000000 HC RX 637 (ALT 250 FOR IP): Performed by: INTERNAL MEDICINE

## 2024-03-18 PROCEDURE — 80048 BASIC METABOLIC PNL TOTAL CA: CPT

## 2024-03-18 PROCEDURE — 82962 GLUCOSE BLOOD TEST: CPT

## 2024-03-18 PROCEDURE — 94640 AIRWAY INHALATION TREATMENT: CPT

## 2024-03-18 PROCEDURE — 0FB13ZX EXCISION OF RIGHT LOBE LIVER, PERCUTANEOUS APPROACH, DIAGNOSTIC: ICD-10-PCS | Performed by: RADIOLOGY

## 2024-03-18 PROCEDURE — 80053 COMPREHEN METABOLIC PANEL: CPT

## 2024-03-18 PROCEDURE — 6360000002 HC RX W HCPCS: Performed by: HOSPITALIST

## 2024-03-18 PROCEDURE — 99232 SBSQ HOSP IP/OBS MODERATE 35: CPT | Performed by: CLINICAL NURSE SPECIALIST

## 2024-03-18 PROCEDURE — 85025 COMPLETE CBC W/AUTO DIFF WBC: CPT

## 2024-03-18 PROCEDURE — 99233 SBSQ HOSP IP/OBS HIGH 50: CPT | Performed by: STUDENT IN AN ORGANIZED HEALTH CARE EDUCATION/TRAINING PROGRAM

## 2024-03-18 PROCEDURE — 2580000003 HC RX 258: Performed by: HOSPITALIST

## 2024-03-18 PROCEDURE — 85730 THROMBOPLASTIN TIME PARTIAL: CPT

## 2024-03-18 RX ORDER — POTASSIUM CHLORIDE 29.8 MG/ML
20 INJECTION INTRAVENOUS
Status: COMPLETED | OUTPATIENT
Start: 2024-03-18 | End: 2024-03-18

## 2024-03-18 RX ORDER — INSULIN LISPRO 100 [IU]/ML
0-6 INJECTION, SOLUTION INTRAVENOUS; SUBCUTANEOUS
Status: DISCONTINUED | OUTPATIENT
Start: 2024-03-18 | End: 2024-03-19

## 2024-03-18 RX ORDER — INSULIN GLARGINE 100 [IU]/ML
3 INJECTION, SOLUTION SUBCUTANEOUS NIGHTLY
Status: DISCONTINUED | OUTPATIENT
Start: 2024-03-18 | End: 2024-03-19

## 2024-03-18 RX ORDER — SODIUM CHLORIDE 450 MG/100ML
INJECTION, SOLUTION INTRAVENOUS CONTINUOUS
Status: DISCONTINUED | OUTPATIENT
Start: 2024-03-18 | End: 2024-03-21

## 2024-03-18 RX ORDER — PETROLATUM 42 G/100G
OINTMENT TOPICAL 2 TIMES DAILY
Status: DISCONTINUED | OUTPATIENT
Start: 2024-03-18 | End: 2024-03-25

## 2024-03-18 RX ORDER — POTASSIUM CHLORIDE 29.8 MG/ML
20 INJECTION INTRAVENOUS
Status: DISPENSED | OUTPATIENT
Start: 2024-03-18 | End: 2024-03-18

## 2024-03-18 RX ADMIN — INSULIN GLARGINE 3 UNITS: 100 INJECTION, SOLUTION SUBCUTANEOUS at 22:17

## 2024-03-18 RX ADMIN — LACTULOSE 20 G: 20 SOLUTION ORAL at 22:07

## 2024-03-18 RX ADMIN — INSULIN LISPRO 2 UNITS: 100 INJECTION, SOLUTION INTRAVENOUS; SUBCUTANEOUS at 11:33

## 2024-03-18 RX ADMIN — SODIUM CHLORIDE, PRESERVATIVE FREE 40 MG: 5 INJECTION INTRAVENOUS at 01:29

## 2024-03-18 RX ADMIN — ARIPIPRAZOLE 20 MG: 10 TABLET ORAL at 09:14

## 2024-03-18 RX ADMIN — HYDROPHOR: 42 OINTMENT TOPICAL at 22:08

## 2024-03-18 RX ADMIN — SODIUM CHLORIDE, PRESERVATIVE FREE 10 ML: 5 INJECTION INTRAVENOUS at 09:17

## 2024-03-18 RX ADMIN — Medication 100 MG: at 09:13

## 2024-03-18 RX ADMIN — CALCIUM 1000 MG: 500 TABLET ORAL at 09:13

## 2024-03-18 RX ADMIN — RIFAXIMIN 550 MG: 550 TABLET ORAL at 23:15

## 2024-03-18 RX ADMIN — SODIUM CHLORIDE, PRESERVATIVE FREE 10 ML: 5 INJECTION INTRAVENOUS at 23:16

## 2024-03-18 RX ADMIN — SODIUM CHLORIDE: 4.5 INJECTION, SOLUTION INTRAVENOUS at 10:05

## 2024-03-18 RX ADMIN — SODIUM CHLORIDE, PRESERVATIVE FREE 10 ML: 5 INJECTION INTRAVENOUS at 22:08

## 2024-03-18 RX ADMIN — Medication 12 MG: at 22:07

## 2024-03-18 RX ADMIN — POTASSIUM BICARBONATE 20 MEQ: 782 TABLET, EFFERVESCENT ORAL at 18:41

## 2024-03-18 RX ADMIN — PETROLATUM: 420 OINTMENT TOPICAL at 09:16

## 2024-03-18 RX ADMIN — IPRATROPIUM BROMIDE 0.5 MG: 0.5 SOLUTION RESPIRATORY (INHALATION) at 20:25

## 2024-03-18 RX ADMIN — SODIUM CHLORIDE: 4.5 INJECTION, SOLUTION INTRAVENOUS at 14:12

## 2024-03-18 RX ADMIN — POTASSIUM CHLORIDE 20 MEQ: 29.8 INJECTION, SOLUTION INTRAVENOUS at 23:14

## 2024-03-18 RX ADMIN — ANASTROZOLE 1 MG: 1 TABLET, FILM COATED ORAL at 09:13

## 2024-03-18 RX ADMIN — MULTIVITAMIN TABLET 1 TABLET: TABLET at 09:14

## 2024-03-18 RX ADMIN — POTASSIUM CHLORIDE 20 MEQ: 29.8 INJECTION, SOLUTION INTRAVENOUS at 22:20

## 2024-03-18 RX ADMIN — VORTIOXETINE 10 MG: 10 TABLET, FILM COATED ORAL at 23:15

## 2024-03-18 RX ADMIN — INSULIN LISPRO 2 UNITS: 100 INJECTION, SOLUTION INTRAVENOUS; SUBCUTANEOUS at 06:16

## 2024-03-18 RX ADMIN — SODIUM CHLORIDE, PRESERVATIVE FREE 10 ML: 5 INJECTION INTRAVENOUS at 09:15

## 2024-03-18 RX ADMIN — PIPERACILLIN AND TAZOBACTAM 3375 MG: 3; .375 INJECTION, POWDER, FOR SOLUTION INTRAVENOUS at 17:10

## 2024-03-18 RX ADMIN — PIPERACILLIN AND TAZOBACTAM 3375 MG: 3; .375 INJECTION, POWDER, FOR SOLUTION INTRAVENOUS at 09:29

## 2024-03-18 RX ADMIN — INSULIN GLARGINE 6 UNITS: 100 INJECTION, SOLUTION SUBCUTANEOUS at 09:14

## 2024-03-18 RX ADMIN — SODIUM CHLORIDE, PRESERVATIVE FREE 40 MG: 5 INJECTION INTRAVENOUS at 14:21

## 2024-03-18 RX ADMIN — LORAZEPAM 2 MG: 2 INJECTION INTRAMUSCULAR at 11:34

## 2024-03-18 RX ADMIN — ANORECTAL OINTMENT: 15.7; .44; 24; 20.6 OINTMENT TOPICAL at 09:16

## 2024-03-18 RX ADMIN — INSULIN LISPRO 4 UNITS: 100 INJECTION, SOLUTION INTRAVENOUS; SUBCUTANEOUS at 06:16

## 2024-03-18 RX ADMIN — RIFAXIMIN 550 MG: 550 TABLET ORAL at 09:14

## 2024-03-18 RX ADMIN — PIPERACILLIN AND TAZOBACTAM 3375 MG: 3; .375 INJECTION, POWDER, FOR SOLUTION INTRAVENOUS at 01:30

## 2024-03-18 RX ADMIN — OXYCODONE HYDROCHLORIDE AND ACETAMINOPHEN 1000 MG: 500 TABLET ORAL at 09:14

## 2024-03-18 RX ADMIN — LACTULOSE 20 G: 20 SOLUTION ORAL at 09:14

## 2024-03-18 ASSESSMENT — PAIN SCALES - GENERAL: PAINLEVEL_OUTOF10: 0

## 2024-03-18 NOTE — PROGRESS NOTES
ENDOCRINOLOGY PROGRESS NOTE      Date of admission: 3/14/2024  Date of service: 3/18/2024  Admitting physician: Jair Jauregui MD   Primary Care Physician: No primary care provider on file.  Consultant physician: Wale Camejo MD     Reason for the consultation:  Uncontrolled DM    History of Present Illness:  53 y.o. female with a history of depression/anxiety, alcoholism, ascites of the liver, left breast cancer, chronic pancreatitis, cocaine abuse, DM 2, GERD, gout, hepatitis C, schizoaffective disorder bipolar type presents with abdominal pain endocrine service was consulted for diabetes management.    Subjective  BS very this AM, pt didn't receive long acting insulin yesterday.     Inpatient diet:   Carb Restricted diet     Point of care glucose monitoring   (Independently reviewed)   Recent Labs     03/16/24  1537 03/16/24  2024 03/17/24  0642 03/17/24  1032 03/17/24  1544 03/17/24  2104 03/18/24  0616 03/18/24  1122   POCGLU 342* 347* 414* 328* 257* 274* 492* 271*   Scheduled Meds:   Surgiflo with Evithrom Hemostatic Matrix  1 kit Nasal Once    Surgiflo with Evithrom Hemostatic Matrix  1 kit Other Once    insulin glargine  3 Units SubCUTAneous Nightly    insulin lispro  0-6 Units SubCUTAneous TID WC    insulin glargine  6 Units SubCUTAneous QAM    insulin lispro  2 Units SubCUTAneous TID WC    piperacillin-tazobactam  3,375 mg IntraVENous Q8H    insulin lispro  0-4 Units SubCUTAneous Nightly    menthol-zinc oxide   Topical BID    white petrolatum   Topical BID    sodium chloride flush  5-40 mL IntraVENous 2 times per day    [Held by provider] enoxaparin  30 mg SubCUTAneous Daily    anastrozole  1 mg Oral QAM    ARIPiprazole  20 mg Oral QAM    vitamin C  1,000 mg Oral QAM    calcium elemental  1,000 mg Oral QAM    melatonin  12 mg Oral Nightly    multivitamin  1 tablet Oral QAM    rifAXIMin  550 mg Oral BID    thiamine  100 mg Oral Daily    ipratropium  0.5 mg Nebulization TID RT    VORTIoxetine HBr  10 mg  adrenal glands bilaterally.   6. Small umbilical hernia containing fat only.         US GALLBLADDER RUQ   Final Result   1. Cirrhotic morphology of the liver with no underlying mass or lesion   present.   2. Status post cholecystectomy. No biliary ductal dilatation.   3. Increased echogenicity within the renal cortex to suggest medical renal   disease. Cystic structure along the superior pole of the right kidney   measuring 4.6 x 4.8 cm.   4. Fluid identified surrounding the liver within the right upper quadrant.         CT NEEDLE BIOPSY LIVER PERCUTANEOUS    (Results Pending)       Medical Records/Labs/Images review:   I personally reviewed and summarized previous records   All labs and imaging were reviewed independently     ASSESSMENT & PLAN   Ellie Santacruz, a 53 y.o.-old female seen today for inpatient diabetes management    Diabetes Mellitus secondary to pancreatic insufficiency   Patient's diabetes is uncontrolled, the patient's diabetes is very brittle  Pt currently very altered and can not operate her insulin pump   We recommend the following DM regimen  Lantus 6 units in the morning and 30 units at night  Humalog 2u with meals  Low dose sliding scale ACHS  Continue glucose check with meals and at bedtime  Will titrate insulin dose based on the blood glucose trend & insulin requirement  Upon discharge, I will arrange for the patient to be seen in the endocrinology clinic for routine diabetes maintenance and prevention    Chronic pancreatitis/pancreatic insufficiency   Close monitor glucose as pt at high risk of hypoglycemia   On Creon     Interdisciplinary plan for communication with healthcare providers:   Consult recommendations were discussed with the Primary Service/Nursing staff      Thank you for allowing us to participate in the care of this patient. Please do not hesitate to contact us with any additional questions.     Wale Camejo MD  Endocrinologist, [facility]   [phone]

## 2024-03-18 NOTE — PROGRESS NOTES
Patient brought down to CT from the floor (accompanied per sitter) for liver bx. Patient brought into the room and assessed per Dr Pyle. Patient confused/disoriented. Unable to follow directions. Pulling at the gown and blanket over her head. Dr Pyle ordered to cancel the procedure today and attempt to schedule the bx with anesthesia. Nurse to nurse report called. Patient transported out of the dept and back to the floor with sitter and transport.     Spoke with patients floor nurse and informed her that the ordering provider of the abscess drain/bx would need to be informed of the cancellation of the procedure d/t patients mental status and recommended that anesthesia be consulted for the procedure.

## 2024-03-18 NOTE — PROGRESS NOTES
alcohol intoxication (HCC)    Schizoaffective disorder, bipolar type (HCC)    Anxiety disorder    Depression    History of alcohol abuse    Pancreatic pseudocyst (HCC)    Liver dysfunction    Asthma    Gastroesophageal reflux disease without esophagitis    Bipolar I disorder (HCC)    Bipolar affective disorder (HCC)    Irritable bowel syndrome    Tobacco use disorder    Jaundice    RUQ abdominal pain    Poorly controlled diabetes mellitus (HCC)    Pain of upper abdomen    Elevated LFTs    Intractable abdominal pain    Abdominal pain, epigastric    Hyperglycemia    Type 2 diabetes mellitus with hyperosmolarity without coma, with long-term current use of insulin (HCC)    Fall (on) (from) other stairs and steps, initial encounter    Hepatitis, alcoholic    Nausea and vomiting    Alcoholism (HCC)    Pancytopenia (HCC)    Splenomegaly    Hypoglycemia episode    Hepatitis C    Anxiety and depression    Cigarette smoker, heavy    Acute pancreatitis    Alcohol abuse    Acute alcoholic gastritis    Other osteoporosis without current pathological fracture    Ketoacidosis, diabetic, no coma, insulin dependent    Bicytopenia - leukopenia and thrombocytopenia    Chronic abdominal pain    Chronic pancreatitis (HCC)    Electrolyte imbalance    Lactic acidosis    Severe protein-calorie malnutrition (HCC)    Idiopathic osteoporosis    Ascites due to alcoholic cirrhosis (HCC)    Diabetic polyneuropathy associated with type 2 diabetes mellitus (HCC)    Malignant neoplasm of left breast in female, estrogen receptor positive (HCC)    Cocaine dependence with withdrawal (HCC)    Vaginitis and vulvovaginitis    Abscess, gluteal, left    Nondependent cocaine abuse (HCC)    Cellulitis    DKA, type 1, not at goal (HCC)    Enterocolitis    Perineal abscess    Cheryle gangrene    Type 2 diabetes mellitus with hyperglycemia (HCC)    Failure to thrive in adult    Cocaine use    Liver mass    Generalized abdominal pain    Elevated liver enzymes     Portal hypertension (HCC)    Pancreatic insufficiency     1..  Cirrhosis  -Decompensated likely secondary to alcohol/hepatitis C  -MELD Na - 6 initially  -DF 5.8 -no indication for steroids  -EGD 2020 -plan for repeat upper endoscopy  -Outpatient repeat unless precipitous drop in H&H or overt bleed  -Nonelevated AFP 3/14/2024  -Ultrasound negative for mass -CT reports possible mass approximately 7 x 5 cm  -No paracentesis as insufficient fluid  -Triple phase CT with reported lesion malignant versus infectious.;  Lesion/heterogenous zone on pancreas -liver biopsy  -Consider endoscopic ultrasound as outpatient        2.  Hepatitis C  -Follow as outpatient if patient able to maintain sobriety     3.  History of chronic pancreatitis  -Nonobstructive liver profile  -Chronic oral pancreatic enzyme supplementation  -Consider dedicated imaging with MRI versus EUS     4.  Substance abuse  -Persistent abuse of cocaine  -Unknown timing of alcohol abuse     5.  Anemia  -Iron deficient/normocytic  -Chronic without evidence of overt bleed  -Defer iron supplementation to admitting  -Monitor blood work  -Endoscopies as above     Persistent confusion but improved  Await biopsy as scheduled today.  Patient newly found bedbugs in the room which may somewhat delay procedure.    Олег Mitchell MD  3/18/2024  11:30 AM    NOTE:  This report was transcribed using voice recognition software.  Every effort was made to ensure accuracy; however, inadvertent computerized transcription errors may be present.

## 2024-03-18 NOTE — PROGRESS NOTES
[facility]  [facility]  Progress Note    SUBJECTIVE:  Chief Complaint   Patient presents with    Abdominal Pain     Diffuse. History of cirrhosis. Patient stated she got up this morning and her abdomen was distened     Patient seems to be tolerating medications.   Boyfriend in room - but constantly on the phone. Doesnot even look at me when I say hi to him.   She is trying to get out of bed and doesnot want to keep anything on. Sitter trying to keep the pt in bed. No fever overnight.    Review of systems:  Can not be obtained. Pt is ams    Medications:  Scheduled Meds:   Surgiflo with Evithrom Hemostatic Matrix  1 kit Nasal Once    insulin glargine  6 Units SubCUTAneous QAM    insulin lispro  2 Units SubCUTAneous TID WC    piperacillin-tazobactam  3,375 mg IntraVENous Q8H    insulin lispro  0-4 Units SubCUTAneous TID WC    insulin lispro  0-4 Units SubCUTAneous Nightly    menthol-zinc oxide   Topical BID    white petrolatum   Topical BID    sodium chloride flush  5-40 mL IntraVENous 2 times per day    [Held by provider] enoxaparin  30 mg SubCUTAneous Daily    anastrozole  1 mg Oral QAM    ARIPiprazole  20 mg Oral QAM    vitamin C  1,000 mg Oral QAM    calcium elemental  1,000 mg Oral QAM    melatonin  12 mg Oral Nightly    multivitamin  1 tablet Oral QAM    rifAXIMin  550 mg Oral BID    thiamine  100 mg Oral Daily    ipratropium  0.5 mg Nebulization TID RT    VORTIoxetine HBr  10 mg Oral Nightly    lipase-protease-amylas  15,000 Units Oral TID WC    And    lipase-protease-amylase  20,000 Units Oral TID WC    pantoprazole (PROTONIX) 40 mg in sodium chloride (PF) 0.9 % 10 mL injection  40 mg IntraVENous Q12H    lactulose  20 g Oral TID    sodium chloride flush  5-40 mL IntraVENous 2 times per day     Continuous Infusions:   sodium chloride 50 mL/hr at 03/18/24 1005    sodium chloride      dextrose      sodium chloride       PRN Meds:mineral oil-hydrophilic petrolatum, sodium chloride  flush, sodium chloride, potassium chloride **OR** potassium alternative oral replacement **OR** potassium chloride, magnesium sulfate, ondansetron **OR** ondansetron, polyethylene glycol, albuterol, furosemide, hydrOXYzine pamoate, traZODone, glucose, dextrose bolus **OR** dextrose bolus, glucagon (rDNA), dextrose, sodium chloride flush, sodium chloride, LORazepam **OR** LORazepam **OR** LORazepam **OR** LORazepam **OR** [DISCONTINUED] LORazepam **OR** [DISCONTINUED] LORazepam **OR** [DISCONTINUED] LORazepam **OR** [DISCONTINUED] LORazepam    OBJECTIVE:  BP (!) 132/90   Pulse 76   Temp 97 °F (36.1 °C) (Axillary)   Resp 18   Ht 1.524 m (5')   Wt 31.6 kg (69 lb 10.7 oz)   SpO2 100%   BMI 13.61 kg/m²   Temp  Av.5 °F (36.4 °C)  Min: 97 °F (36.1 °C)  Max: 98.1 °F (36.7 °C)  Constitutional: The patient has altered mental status. Restless. cachectic  Skin: Warm and dry. No rashes were noted.   HEENT: Round and reactive pupils.  Moist mucous membranes.  No ulcerations or thrush.  Neck: Supple to movements.   Chest: No use of accessory muscles to breathe. Symmetrical expansion.  No wheezing, crackles or rhonchi.  Cardiovascular: S1 and S2 are rhythmic and regular. No murmurs appreciated.   Abdomen: Positive bowel sounds to auscultation. soft  Extremities: No edema. Muscle wasting  Lines: Peripheral.    Laboratory and Tests:  Lab Results   Component Value Date    CRP 88.0 (H) 2023    .0 (H) 2023    CRP 0.6 (H) 10/01/2018     Lab Results   Component Value Date    SEDRATE 60 (H) 2023    SEDRATE 25 (H) 10/01/2018    SEDRATE 35 (H) 2017       Radiology:  Reviewed    Microbiology:   Blood cultures 3/16: pending     ASSESSMENT:  Liver mass vs abscess:   Substance use disorder:   Liver cirrhosis:   Encephalopathy :     PLAN:  Continue IV Zosyn   Jagter found a bed bug in the bed , thought to be brought in by visitors. IR sent the pt back because of this reason. I spoke to IR. They are ok to

## 2024-03-18 NOTE — PLAN OF CARE
Problem: Discharge Planning  Goal: Discharge to home or other facility with appropriate resources  Outcome: Progressing     Problem: ABCDS Injury Assessment  Goal: Absence of physical injury  Outcome: Progressing     Problem: Skin/Tissue Integrity  Goal: Absence of new skin breakdown  Description: 1.  Monitor for areas of redness and/or skin breakdown  2.  Assess vascular access sites hourly  3.  Every 4-6 hours minimum:  Change oxygen saturation probe site  4.  Every 4-6 hours:  If on nasal continuous positive airway pressure, respiratory therapy assess nares and determine need for appliance change or resting period.  Outcome: Progressing     Problem: Pain  Goal: Verbalizes/displays adequate comfort level or baseline comfort level  Outcome: Progressing     Problem: Safety - Adult  Goal: Free from fall injury  Outcome: Progressing     Problem: Chronic Conditions and Co-morbidities  Goal: Patient's chronic conditions and co-morbidity symptoms are monitored and maintained or improved  Outcome: Progressing     Problem: Nutrition Deficit:  Goal: Optimize nutritional status  Outcome: Progressing

## 2024-03-18 NOTE — PROGRESS NOTES
Hepatobiliary and Pancreatic Surgery Progress Note    CC: Liver mass    Subjective: Patient states she has diffuse abdominal pain > ruq.  Patient is still very agitated, and only answers minimal questions.  Her boyfriend is at bedside stating she is more confused today. Per MA at BS, pt incontinent for multiple loose brown diarrhea. Denies vomiting, + nausea.     OBJECTIVE      Physical    /71   Pulse 73   Temp 98 °F (36.7 °C) (Axillary)   Resp 18   Ht 1.524 m (5')   Wt 31.6 kg (69 lb 10.7 oz)   SpO2 100%   BMI 13.61 kg/m²     GEN: Cachectic, drowsy, awake to verbal and tactile stimuli briefly then falls back to sleep, minimally verbal. ELLIOTT orientation, she is not answering questions to assess orientation  EYES: Sclera white, pupils equal round and reactive to light  ENMT: , trachea midline, ears externally intact  LYMPH: no obvious lympadenopathy in neck.   RESP: Respiratory effort was normal with no retractions or use of accessory muscles.  CV:  No pedal edema  GI/ Abdomen: Soft, mildly distended, diffusely tender to palpation without guarding or rebound, no peritoneal signs  MSK: no clubbing/ no cyanosis/ gaitabnormal, ataxia    ASSESSMENT: 52 yo M with cocaine abuse, ETOHC abuse, hepatitis C, chronic pancreatitis, cirrhosis with liver decompensation (MELD Na = 9, Child-Marx 8B) and now 7cm right hepatic lobe mass in segments 5/ 6           PLAN:    - CT triphasic reviewed. Lesion more consistent with an abscess and less so HCC. AFP is <1.8. IF tumor will obtain bx  - LFTs down trending.   - Will ask IR to aspirate the abscess as she will likely pull a drain out. Send for grams stain bacterial and fungal cultures.   - Sonia, has a triple lumen  - ID following, appreciate assistance  - She needs aggressive electrolyte replacement, K to 4, mag to 2 and phos to 3  - GI following for cirrhosis management.   - Dr Weston discussed with her boyfriend at bedside that she would likely need placement at a  facility at the time of discharge.  If she is to be on long-term antibiotics via a PICC in someone with substance abuse, this could pose some difficulty with her going home.  She has proven to him that she is unable to stop taking drugs when she is at home.  Suspect she will need to go to a nursing facility.    Thank you for allowing me to take part in Ms. Santacruz's care.    Greater than or equal to 35 minutes was spent providing face-to-face patient care, including:  and coordinating care, reviewing the chart, labs, and diagnostics, as well as medical decision making. Greater than 50% of this time was spent instructing and counseling the patient face to face regarding findings and recommendations.      Aubrey Lee, EVERETT - CNP  3/18/2024 9:03 AM

## 2024-03-18 NOTE — CONSULTS
Palliative Care Department  [phone]  Palliative Care Initial Consult  Provider Latrice Garvin, APRN - CNP     Ellie Santacruz  [id number]  Hospital Day: 5  Date of Initial Consult: 3/18/2024  Referring Provider: Jair Jauregui MD  Palliative Medicine was consulted for assistance with: Goals of care, end-stage disease    HPI:   Ellie Santacruz is a 53 y.o. with a medical history of anxiety, depression, alcoholic liver cirrhosis and ascites, breast cancer, drug abuse, chronic pancreatitis, diabetes, GERD, hepatitis C, polyneuropathy, schizoaffective disorder, who was admitted on 3/14/2024 from home with a CHIEF COMPLAINT of abdominal pain.  Patient states this morning while awakening developed generalized abdominal pain notes decision to abdomen.  Patient admits to crack cocaine use last known use 3/13.  Patient recently hospitalized on 3/4-3/5 Admitted to [facility] Generalized weakness, fatigue, and weight loss. She was noted with Hyperglycemia. Received NSS bolus and insulin in ED course. GI was consulted, however pt left AMA 0500. This admission, CT scan of the abdomen and pelvis was obtained and reported to show cirrhotic morphology with moderate to large ascites and abdominal wall thickening as well as increased stool burden and mild wall thickening of the colon.  GI, endocrinology, hepatobiliary, ID consulted.  Hepatobiliary feels the lesion is more consistent with an abscess and less so HCC.  Patient scheduled for procedure in IR to aspirate liver abscess.  Palliative medicine consulted for further assistance.    ASSESSMENT/PLAN:     Pertinent Hospital Diagnoses     Uncontrolled diabetes  Decompensated chronic cirrhosis with ascites-history hepatitis C  Acute encephalopathy  Liver mass  Chronic pancreatitis  Cocaine use disorder  Severe protein calorie malnutrition  History of breast centimeters  COPD    Palliative Care Encounter / Counseling Regarding Goals of Care  Please see detailed goals of care   Thin, disoriented  Lungs:  Easy and unlabored respirations  Abd:  Soft  Ext:  Moving all extremities, no edema  Skin:  Warm and dry  Neuro:  Confused and disoriented    Objective data reviewed: labs, images, records, medication use, vitals, and chart    Discussed patient and the plan of care with the other IDT members: Floor Nurse, Patient, and Family    Time/Communication  Greater than 50% of time spent, total 55 minutes in counseling and coordination of care at the bedside regarding goals of care, diagnosis and prognosis, and see above.    Thank you for allowing Palliative Medicine to participate in the care of Ellie Santacruz.

## 2024-03-18 NOTE — PROGRESS NOTES
[facility] Hospitalist Progress Note    Admitting Date and Time: 3/14/2024  3:54 AM  Admit Dx: Generalized abdominal pain [R10.84]  Liver mass [R16.0]  Elevated liver enzymes [R74.8]  Cocaine use [F14.90]    Subjective:  Patient is being followed for Generalized abdominal pain [R10.84]  Liver mass [R16.0]  Elevated liver enzymes [R74.8]  Cocaine use [F14.90]   Pt was seen and examined today. Remains confused but alert today. Bedbug found in patient's room, new family member were here to visit patient today. While examining patient observed bedbugs crawling over the visitor's chair. Staff informed and bedbug protocol to be followed.    ROS: denies fever, chills, cp, sob, n/v, HA unless stated above.     Surgiflo with Evithrom Hemostatic Matrix  1 kit Nasal Once    insulin glargine  6 Units SubCUTAneous QAM    insulin lispro  2 Units SubCUTAneous TID WC    piperacillin-tazobactam  3,375 mg IntraVENous Q8H    insulin lispro  0-4 Units SubCUTAneous TID WC    insulin lispro  0-4 Units SubCUTAneous Nightly    menthol-zinc oxide   Topical BID    white petrolatum   Topical BID    sodium chloride flush  5-40 mL IntraVENous 2 times per day    [Held by provider] enoxaparin  30 mg SubCUTAneous Daily    anastrozole  1 mg Oral QAM    ARIPiprazole  20 mg Oral QAM    vitamin C  1,000 mg Oral QAM    calcium elemental  1,000 mg Oral QAM    melatonin  12 mg Oral Nightly    multivitamin  1 tablet Oral QAM    rifAXIMin  550 mg Oral BID    thiamine  100 mg Oral Daily    ipratropium  0.5 mg Nebulization TID RT    VORTIoxetine HBr  10 mg Oral Nightly    lipase-protease-amylas  15,000 Units Oral TID WC    And    lipase-protease-amylase  20,000 Units Oral TID WC    pantoprazole (PROTONIX) 40 mg in sodium chloride (PF) 0.9 % 10 mL injection  40 mg IntraVENous Q12H    lactulose  20 g Oral TID    sodium chloride flush  5-40 mL IntraVENous 2 times per day     mineral oil-hydrophilic petrolatum, , BID PRN  sodium chloride flush, 5-40 mL,

## 2024-03-19 ENCOUNTER — ANESTHESIA (OUTPATIENT)
Dept: CT IMAGING | Age: 54
End: 2024-03-19
Payer: COMMERCIAL

## 2024-03-19 ENCOUNTER — APPOINTMENT (OUTPATIENT)
Dept: CT IMAGING | Age: 54
DRG: 279 | End: 2024-03-19
Payer: COMMERCIAL

## 2024-03-19 ENCOUNTER — ANESTHESIA EVENT (OUTPATIENT)
Dept: CT IMAGING | Age: 54
End: 2024-03-19
Payer: COMMERCIAL

## 2024-03-19 LAB
AFP SERPL-MCNC: <1.8 UG/L
ALBUMIN SERPL-MCNC: 3 G/DL (ref 3.5–5.2)
ALP SERPL-CCNC: 477 U/L (ref 35–104)
ALT SERPL-CCNC: 263 U/L (ref 0–32)
ANION GAP SERPL CALCULATED.3IONS-SCNC: 12 MMOL/L (ref 7–16)
ANION GAP SERPL CALCULATED.3IONS-SCNC: 8 MMOL/L (ref 7–16)
AST SERPL-CCNC: 74 U/L (ref 0–31)
BASOPHILS # BLD: 0 K/UL (ref 0–0.2)
BASOPHILS NFR BLD: 0 % (ref 0–2)
BILIRUB SERPL-MCNC: 1.1 MG/DL (ref 0–1.2)
BUN SERPL-MCNC: 14 MG/DL (ref 6–20)
BUN SERPL-MCNC: 16 MG/DL (ref 6–20)
CALCIUM SERPL-MCNC: 8.9 MG/DL (ref 8.6–10.2)
CALCIUM SERPL-MCNC: 9.6 MG/DL (ref 8.6–10.2)
CANCER AG19-9 SERPL IA-ACNC: 43 U/ML (ref 0–35)
CHLORIDE SERPL-SCNC: 119 MMOL/L (ref 98–107)
CHLORIDE SERPL-SCNC: 121 MMOL/L (ref 98–107)
CHROMOGRANIN A: 1451 NG/ML (ref 0–187)
CO2 SERPL-SCNC: 21 MMOL/L (ref 22–29)
CO2 SERPL-SCNC: 25 MMOL/L (ref 22–29)
CREAT SERPL-MCNC: 0.4 MG/DL (ref 0.5–1)
CREAT SERPL-MCNC: 0.4 MG/DL (ref 0.5–1)
EOSINOPHIL # BLD: 0.02 K/UL (ref 0.05–0.5)
EOSINOPHILS RELATIVE PERCENT: 1 % (ref 0–6)
ERYTHROCYTE [DISTWIDTH] IN BLOOD BY AUTOMATED COUNT: 15.2 % (ref 11.5–15)
GFR SERPL CREATININE-BSD FRML MDRD: >60 ML/MIN/1.73M2
GFR SERPL CREATININE-BSD FRML MDRD: >60 ML/MIN/1.73M2
GLUCOSE BLD-MCNC: 135 MG/DL (ref 74–99)
GLUCOSE BLD-MCNC: 138 MG/DL (ref 74–99)
GLUCOSE BLD-MCNC: 85 MG/DL (ref 74–99)
GLUCOSE BLD-MCNC: 85 MG/DL (ref 74–99)
GLUCOSE SERPL-MCNC: 111 MG/DL (ref 74–99)
GLUCOSE SERPL-MCNC: 79 MG/DL (ref 74–99)
HCT VFR BLD AUTO: 28.5 % (ref 34–48)
HGB BLD-MCNC: 9.1 G/DL (ref 11.5–15.5)
IMM GRANULOCYTES # BLD AUTO: <0.03 K/UL (ref 0–0.58)
IMM GRANULOCYTES NFR BLD: 1 % (ref 0–5)
LYMPHOCYTES NFR BLD: 0.3 K/UL (ref 1.5–4)
LYMPHOCYTES RELATIVE PERCENT: 16 % (ref 20–42)
MAGNESIUM SERPL-MCNC: 1.7 MG/DL (ref 1.6–2.6)
MCH RBC QN AUTO: 29.4 PG (ref 26–35)
MCHC RBC AUTO-ENTMCNC: 31.9 G/DL (ref 32–34.5)
MCV RBC AUTO: 92.2 FL (ref 80–99.9)
MITOCHONDRIA M2 IGG SER-ACNC: 0.5 U/ML (ref 0–4)
MONOCYTES NFR BLD: 0.27 K/UL (ref 0.1–0.95)
MONOCYTES NFR BLD: 14 % (ref 2–12)
NEUTROPHILS NFR BLD: 68 % (ref 43–80)
NEUTS SEG NFR BLD: 1.3 K/UL (ref 1.8–7.3)
PHOSPHATE SERPL-MCNC: 3.7 MG/DL (ref 2.5–4.5)
PLATELET # BLD AUTO: 59 K/UL (ref 130–450)
PLATELET CONFIRMATION: NORMAL
PMV BLD AUTO: 10.8 FL (ref 7–12)
POTASSIUM SERPL-SCNC: 3.4 MMOL/L (ref 3.5–5)
POTASSIUM SERPL-SCNC: 3.7 MMOL/L (ref 3.5–5)
PROT SERPL-MCNC: 6.3 G/DL (ref 6.4–8.3)
RBC # BLD AUTO: 3.09 M/UL (ref 3.5–5.5)
RBC # BLD: ABNORMAL 10*6/UL
SODIUM SERPL-SCNC: 152 MMOL/L (ref 132–146)
SODIUM SERPL-SCNC: 154 MMOL/L (ref 132–146)
WBC OTHER # BLD: 1.9 K/UL (ref 4.5–11.5)

## 2024-03-19 PROCEDURE — 6360000002 HC RX W HCPCS: Performed by: STUDENT IN AN ORGANIZED HEALTH CARE EDUCATION/TRAINING PROGRAM

## 2024-03-19 PROCEDURE — 87077 CULTURE AEROBIC IDENTIFY: CPT

## 2024-03-19 PROCEDURE — 2580000003 HC RX 258: Performed by: HOSPITALIST

## 2024-03-19 PROCEDURE — 99233 SBSQ HOSP IP/OBS HIGH 50: CPT | Performed by: CLINICAL NURSE SPECIALIST

## 2024-03-19 PROCEDURE — 2580000003 HC RX 258: Performed by: STUDENT IN AN ORGANIZED HEALTH CARE EDUCATION/TRAINING PROGRAM

## 2024-03-19 PROCEDURE — 87070 CULTURE OTHR SPECIMN AEROBIC: CPT

## 2024-03-19 PROCEDURE — 99222 1ST HOSP IP/OBS MODERATE 55: CPT | Performed by: NURSE PRACTITIONER

## 2024-03-19 PROCEDURE — 2709999900 CT NEEDLE BIOPSY LIVER PERCUTANEOUS

## 2024-03-19 PROCEDURE — 80048 BASIC METABOLIC PNL TOTAL CA: CPT

## 2024-03-19 PROCEDURE — 99233 SBSQ HOSP IP/OBS HIGH 50: CPT | Performed by: STUDENT IN AN ORGANIZED HEALTH CARE EDUCATION/TRAINING PROGRAM

## 2024-03-19 PROCEDURE — 83735 ASSAY OF MAGNESIUM: CPT

## 2024-03-19 PROCEDURE — 99254 IP/OBS CNSLTJ NEW/EST MOD 60: CPT | Performed by: NURSE PRACTITIONER

## 2024-03-19 PROCEDURE — 2060000000 HC ICU INTERMEDIATE R&B

## 2024-03-19 PROCEDURE — 2500000003 HC RX 250 WO HCPCS: Performed by: CLINICAL NURSE SPECIALIST

## 2024-03-19 PROCEDURE — 6360000002 HC RX W HCPCS: Performed by: NURSE ANESTHETIST, CERTIFIED REGISTERED

## 2024-03-19 PROCEDURE — 99232 SBSQ HOSP IP/OBS MODERATE 35: CPT | Performed by: INTERNAL MEDICINE

## 2024-03-19 PROCEDURE — A4216 STERILE WATER/SALINE, 10 ML: HCPCS | Performed by: HOSPITALIST

## 2024-03-19 PROCEDURE — 6370000000 HC RX 637 (ALT 250 FOR IP): Performed by: STUDENT IN AN ORGANIZED HEALTH CARE EDUCATION/TRAINING PROGRAM

## 2024-03-19 PROCEDURE — 6360000002 HC RX W HCPCS: Performed by: CLINICAL NURSE SPECIALIST

## 2024-03-19 PROCEDURE — 2500000003 HC RX 250 WO HCPCS: Performed by: RADIOLOGY

## 2024-03-19 PROCEDURE — 84100 ASSAY OF PHOSPHORUS: CPT

## 2024-03-19 PROCEDURE — 87205 SMEAR GRAM STAIN: CPT

## 2024-03-19 PROCEDURE — 85025 COMPLETE CBC W/AUTO DIFF WBC: CPT

## 2024-03-19 PROCEDURE — 7100000010 HC PHASE II RECOVERY - FIRST 15 MIN

## 2024-03-19 PROCEDURE — 7100000011 HC PHASE II RECOVERY - ADDTL 15 MIN

## 2024-03-19 PROCEDURE — 6360000002 HC RX W HCPCS: Performed by: HOSPITALIST

## 2024-03-19 PROCEDURE — 87102 FUNGUS ISOLATION CULTURE: CPT

## 2024-03-19 PROCEDURE — 2580000003 HC RX 258: Performed by: CLINICAL NURSE SPECIALIST

## 2024-03-19 PROCEDURE — 80053 COMPREHEN METABOLIC PANEL: CPT

## 2024-03-19 PROCEDURE — 82962 GLUCOSE BLOOD TEST: CPT

## 2024-03-19 PROCEDURE — 3700000000 HC ANESTHESIA ATTENDED CARE

## 2024-03-19 PROCEDURE — 88307 TISSUE EXAM BY PATHOLOGIST: CPT

## 2024-03-19 PROCEDURE — C9113 INJ PANTOPRAZOLE SODIUM, VIA: HCPCS | Performed by: HOSPITALIST

## 2024-03-19 PROCEDURE — 2580000003 HC RX 258: Performed by: NURSE ANESTHETIST, CERTIFIED REGISTERED

## 2024-03-19 PROCEDURE — 3700000001 HC ADD 15 MINUTES (ANESTHESIA)

## 2024-03-19 PROCEDURE — 94640 AIRWAY INHALATION TREATMENT: CPT

## 2024-03-19 RX ORDER — DIPHENHYDRAMINE HYDROCHLORIDE 50 MG/ML
12.5 INJECTION INTRAMUSCULAR; INTRAVENOUS
Status: ACTIVE | OUTPATIENT
Start: 2024-03-19 | End: 2024-03-20

## 2024-03-19 RX ORDER — LABETALOL HYDROCHLORIDE 5 MG/ML
5 INJECTION, SOLUTION INTRAVENOUS
Status: DISCONTINUED | OUTPATIENT
Start: 2024-03-19 | End: 2024-03-28 | Stop reason: HOSPADM

## 2024-03-19 RX ORDER — NALOXONE HYDROCHLORIDE 0.4 MG/ML
INJECTION, SOLUTION INTRAMUSCULAR; INTRAVENOUS; SUBCUTANEOUS PRN
Status: DISCONTINUED | OUTPATIENT
Start: 2024-03-19 | End: 2024-03-22 | Stop reason: SDUPTHER

## 2024-03-19 RX ORDER — MAGNESIUM SULFATE 1 G/100ML
1000 INJECTION INTRAVENOUS ONCE
Status: DISCONTINUED | OUTPATIENT
Start: 2024-03-19 | End: 2024-03-28 | Stop reason: HOSPADM

## 2024-03-19 RX ORDER — MEPERIDINE HYDROCHLORIDE 25 MG/ML
12.5 INJECTION INTRAMUSCULAR; INTRAVENOUS; SUBCUTANEOUS ONCE
Status: DISCONTINUED | OUTPATIENT
Start: 2024-03-19 | End: 2024-03-20 | Stop reason: ALTCHOICE

## 2024-03-19 RX ORDER — PROPOFOL 10 MG/ML
INJECTION, EMULSION INTRAVENOUS PRN
Status: DISCONTINUED | OUTPATIENT
Start: 2024-03-19 | End: 2024-03-19 | Stop reason: SDUPTHER

## 2024-03-19 RX ORDER — INSULIN LISPRO 100 [IU]/ML
0-4 INJECTION, SOLUTION INTRAVENOUS; SUBCUTANEOUS NIGHTLY
Status: DISCONTINUED | OUTPATIENT
Start: 2024-03-19 | End: 2024-03-21

## 2024-03-19 RX ORDER — SODIUM CHLORIDE 0.9 % (FLUSH) 0.9 %
5-40 SYRINGE (ML) INJECTION EVERY 12 HOURS SCHEDULED
Status: DISCONTINUED | OUTPATIENT
Start: 2024-03-19 | End: 2024-03-24

## 2024-03-19 RX ORDER — INSULIN GLARGINE 100 [IU]/ML
2 INJECTION, SOLUTION SUBCUTANEOUS NIGHTLY
Status: DISCONTINUED | OUTPATIENT
Start: 2024-03-19 | End: 2024-03-21

## 2024-03-19 RX ORDER — SODIUM CHLORIDE 9 MG/ML
INJECTION, SOLUTION INTRAVENOUS PRN
Status: DISCONTINUED | OUTPATIENT
Start: 2024-03-19 | End: 2024-03-28 | Stop reason: HOSPADM

## 2024-03-19 RX ORDER — POTASSIUM CHLORIDE 29.8 MG/ML
20 INJECTION INTRAVENOUS
Status: COMPLETED | OUTPATIENT
Start: 2024-03-19 | End: 2024-03-19

## 2024-03-19 RX ORDER — SODIUM CHLORIDE 0.9 % (FLUSH) 0.9 %
5-40 SYRINGE (ML) INJECTION PRN
Status: DISCONTINUED | OUTPATIENT
Start: 2024-03-19 | End: 2024-03-28 | Stop reason: HOSPADM

## 2024-03-19 RX ORDER — LIDOCAINE HYDROCHLORIDE 20 MG/ML
INJECTION, SOLUTION INFILTRATION; PERINEURAL PRN
Status: COMPLETED | OUTPATIENT
Start: 2024-03-19 | End: 2024-03-19

## 2024-03-19 RX ORDER — PROPOFOL 10 MG/ML
INJECTION, EMULSION INTRAVENOUS PRN
Status: DISCONTINUED | OUTPATIENT
Start: 2024-03-19 | End: 2024-03-19

## 2024-03-19 RX ORDER — PROCHLORPERAZINE EDISYLATE 5 MG/ML
5 INJECTION INTRAMUSCULAR; INTRAVENOUS
Status: DISCONTINUED | OUTPATIENT
Start: 2024-03-19 | End: 2024-03-20 | Stop reason: ALTCHOICE

## 2024-03-19 RX ORDER — HYDRALAZINE HYDROCHLORIDE 20 MG/ML
5 INJECTION INTRAMUSCULAR; INTRAVENOUS
Status: DISCONTINUED | OUTPATIENT
Start: 2024-03-19 | End: 2024-03-28 | Stop reason: HOSPADM

## 2024-03-19 RX ORDER — INSULIN LISPRO 100 [IU]/ML
0-4 INJECTION, SOLUTION INTRAVENOUS; SUBCUTANEOUS
Status: DISCONTINUED | OUTPATIENT
Start: 2024-03-20 | End: 2024-03-20

## 2024-03-19 RX ORDER — SODIUM CHLORIDE 9 MG/ML
INJECTION, SOLUTION INTRAVENOUS CONTINUOUS PRN
Status: DISCONTINUED | OUTPATIENT
Start: 2024-03-19 | End: 2024-03-19 | Stop reason: SDUPTHER

## 2024-03-19 RX ORDER — FENTANYL CITRATE 50 UG/ML
25 INJECTION, SOLUTION INTRAMUSCULAR; INTRAVENOUS EVERY 5 MIN PRN
Status: DISCONTINUED | OUTPATIENT
Start: 2024-03-19 | End: 2024-03-20 | Stop reason: ALTCHOICE

## 2024-03-19 RX ADMIN — SODIUM CHLORIDE, PRESERVATIVE FREE 10 ML: 5 INJECTION INTRAVENOUS at 20:45

## 2024-03-19 RX ADMIN — HYDROPHOR: 42 OINTMENT TOPICAL at 08:36

## 2024-03-19 RX ADMIN — SODIUM CHLORIDE, PRESERVATIVE FREE 10 ML: 5 INJECTION INTRAVENOUS at 08:38

## 2024-03-19 RX ADMIN — POTASSIUM PHOSPHATE, MONOBASIC AND POTASSIUM PHOSPHATE, DIBASIC 30 MMOL: 224; 236 INJECTION, SOLUTION, CONCENTRATE INTRAVENOUS at 10:37

## 2024-03-19 RX ADMIN — PIPERACILLIN AND TAZOBACTAM 3375 MG: 3; .375 INJECTION, POWDER, FOR SOLUTION INTRAVENOUS at 08:36

## 2024-03-19 RX ADMIN — LORAZEPAM 2 MG: 2 INJECTION INTRAMUSCULAR at 11:15

## 2024-03-19 RX ADMIN — IPRATROPIUM BROMIDE 0.5 MG: 0.5 SOLUTION RESPIRATORY (INHALATION) at 20:04

## 2024-03-19 RX ADMIN — ANORECTAL OINTMENT: 15.7; .44; 24; 20.6 OINTMENT TOPICAL at 08:37

## 2024-03-19 RX ADMIN — SODIUM CHLORIDE: 4.5 INJECTION, SOLUTION INTRAVENOUS at 00:09

## 2024-03-19 RX ADMIN — ANORECTAL OINTMENT: 15.7; .44; 24; 20.6 OINTMENT TOPICAL at 17:36

## 2024-03-19 RX ADMIN — PIPERACILLIN AND TAZOBACTAM 3375 MG: 3; .375 INJECTION, POWDER, FOR SOLUTION INTRAVENOUS at 01:14

## 2024-03-19 RX ADMIN — POTASSIUM CHLORIDE 20 MEQ: 29.8 INJECTION, SOLUTION INTRAVENOUS at 08:52

## 2024-03-19 RX ADMIN — SODIUM CHLORIDE, PRESERVATIVE FREE 40 MG: 5 INJECTION INTRAVENOUS at 12:37

## 2024-03-19 RX ADMIN — LIDOCAINE HYDROCHLORIDE 15 ML: 20 INJECTION, SOLUTION INFILTRATION; PERINEURAL at 15:00

## 2024-03-19 RX ADMIN — SODIUM CHLORIDE, PRESERVATIVE FREE 40 MG: 5 INJECTION INTRAVENOUS at 01:11

## 2024-03-19 RX ADMIN — PIPERACILLIN AND TAZOBACTAM 3375 MG: 3; .375 INJECTION, POWDER, FOR SOLUTION INTRAVENOUS at 17:42

## 2024-03-19 RX ADMIN — HYDROPHOR: 42 OINTMENT TOPICAL at 17:35

## 2024-03-19 RX ADMIN — Medication 1 KIT: at 15:11

## 2024-03-19 RX ADMIN — SODIUM CHLORIDE: 9 INJECTION, SOLUTION INTRAVENOUS at 14:29

## 2024-03-19 RX ADMIN — PROPOFOL 100 MG: 10 INJECTION, EMULSION INTRAVENOUS at 14:47

## 2024-03-19 RX ADMIN — POTASSIUM CHLORIDE 20 MEQ: 29.8 INJECTION, SOLUTION INTRAVENOUS at 09:56

## 2024-03-19 ASSESSMENT — LIFESTYLE VARIABLES: SMOKING_STATUS: 1

## 2024-03-19 ASSESSMENT — PAIN SCALES - GENERAL
PAINLEVEL_OUTOF10: 0
PAINLEVEL_OUTOF10: 0

## 2024-03-19 NOTE — ACP (ADVANCE CARE PLANNING)
Advance Care Planning   Healthcare Decision Maker:    Supplemental (Other) Decision Maker: Faisal Gonzalez - Domestic Partner - [phone]    Click here to complete Healthcare Decision Makers including selection of the Healthcare Decision Maker Relationship (ie \"Primary\").    Patient with AMS and unable to provide information. Spoke with patients significant other, Faisal. He stated that patient is estranged from her children (2 sons/1 daughter). Does not have contact with her sister and her parents are . He is unaware of any other relatives. We discussed ACP and Legal Next of Kin Hierarchy. Forms were provided to Faisal to completed and notarized when patient becomes A+O.

## 2024-03-19 NOTE — PROGRESS NOTES
Hepatobiliary and Pancreatic Surgery Progress Note    CC: Liver mass    Subjective: Patient less alert today, difficult to arouse and keep aroused.  Was verbal in light tactile stimulation patient answers minimal questions, reports pain diffuse in abdomen stating it is severe.  Pain worse right upper quadrant.  Patient's IR procedure was canceled yesterday due to she was uncooperative, tentatively scheduled for tomorrow at 1 PM.  Boyfriend at bedside, all his questions answered.    OBJECTIVE      Physical    BP (!) 102/92   Pulse 78   Temp (!) 96.5 °F (35.8 °C) (Axillary)   Resp 16   Ht 1.524 m (5')   Wt 31.8 kg (70 lb)   SpO2 100%   BMI 13.67 kg/m²     GEN: Cachectic, lethargic, awake to verbal and tactile stimuli briefly then falls back to sleep, minimally verbal. ELLIOTT orientation, she is not answering questions to assess orientation  EYES: Sclera white, pupils equal round and reactive to light  ENMT: , trachea midline, ears externally intact  LYMPH: no obvious lympadenopathy in neck.   RESP: Respiratory effort was normal with no retractions or use of accessory muscles.  CV:  No pedal edema  GI/ Abdomen: Soft, mildly distended, diffusely tender to palpation without guarding or rebound > RUQ, no peritoneal signs  MSK: no clubbing/ no cyanosis/ gaitabnormal, ataxia    ASSESSMENT: 54 yo M with cocaine abuse, ETOHC abuse, hepatitis C, chronic pancreatitis, cirrhosis with liver decompensation (MELD Na = 9, Child-Marx 8B) and now 7cm right hepatic lobe mass in segments 5/ 6           PLAN:    - CT triphasic reviewed. Lesion more consistent with an abscess and less so HCC. AFP is <1.8.   - Transaminases downtrending, Alk Phos 477  - IR to aspirate the abscess as she will likely pull a drain out. Send for grams stain bacterial and fungal cultures. IF tumor will obtain bx. Procedure cancelled, pt confused, disoriented, to be rescheduled under anesthesia.  Orders placed to be done with anesthesia.  Called IR, spoke to

## 2024-03-19 NOTE — ANESTHESIA PRE PROCEDURE
Department of Anesthesiology  Preprocedure Note       Name:  Ellie Santacruz   Age:  53 y.o.  :  1970                                          MRN:  [id number]         Date:  3/19/2024      Surgeon: * No surgeons listed *    Procedure: * No procedures listed *    Medications prior to admission:   Prior to Admission medications    Medication Sig Start Date End Date Taking? Authorizing Provider   anastrozole (ARIMIDEX) 1 MG tablet Take 1 tablet by mouth every morning   Yes Irma Dudley MD   ARIPiprazole (ABILIFY) 20 MG tablet Take 1 tablet by mouth every morning   Yes Irma Dudley MD   Ascorbic Acid (VITAMIN C) 1000 MG tablet Take 1 tablet by mouth every morning   Yes Irma Dudley MD   lipase-protease-amylase (CREON) 43241-783125 units CPEP delayed release capsule Take 1 capsule by mouth take with snacks   Yes Irma Dudley MD   furosemide (LASIX) 20 MG tablet Take 1 tablet by mouth daily as needed (SWELLING)   Yes Irma Dudley MD   insulin lispro (HUMALOG) 100 UNIT/ML SOLN injection vial by Insulin Pump - SubCUTAneous Infusion route continuous   Yes Irma Dudley MD   hydrOXYzine pamoate (VISTARIL) 25 MG capsule Take 1 capsule by mouth 4 times daily as needed for Anxiety   Yes Irma Dudley MD   omeprazole (PRILOSEC) 20 MG delayed release capsule Take 1 capsule by mouth every morning   Yes Irma Dudley MD   sennosides-docusate sodium (SENOKOT-S) 8.6-50 MG tablet Take 2 tablets by mouth 2 times daily as needed for Constipation   Yes Irma Dudley MD   VORTIoxetine HBr (TRINTELLIX) 20 MG TABS tablet Take 0.5 tablets by mouth nightly   Yes Irma Dudley MD   tiotropium (SPIRIVA RESPIMAT) 1.25 MCG/ACT AERS inhaler Inhale 2 puffs into the lungs daily as needed (SOB)   Yes Irma Dudley MD   traZODone (DESYREL) 50 MG tablet Take 1 tablet by mouth nightly as needed for Sleep   Yes Irma Dudley MD   insulin glargine

## 2024-03-19 NOTE — PROGRESS NOTES
Telephone numbers for pts adult children obtained from pts significant other Faisal. Mitchell, son [phone], daughter Lydia [phone] .Messages left for both Mitchell And Lydia. Await return call.     Return call from Mitchell. He is able to meet at the hospital tomorrow and will call his half brother Ashutosh Vergara to see if he can conference call tomorrow when Mitchell is at bedside to discuss goals. Son Mitchell does not speak with his sister Lydia and does not want to be present if she is around.  Pt also has a son Ashutosh Vergara( Mitchells half brother) [phone].    Mitchell will be at  bedside Wednesday at 10am and conference his brother Ashutosh at 10:15.NP updated.

## 2024-03-19 NOTE — ANESTHESIA POSTPROCEDURE EVALUATION
Department of Anesthesiology  Postprocedure Note    Patient: Ellie Santacruz  MRN: [id number]  YOB: 1970  Date of evaluation: 3/19/2024    Procedure Summary       Date: 03/19/24 Room / Location: [facility] CT Scan; [facility] Radiology    Anesthesia Start: 1430 Anesthesia Stop: 1533    Procedure: CT NEEDLE BIOPSY LIVER PERCUTANEOUS Diagnosis:       (Liver abscess, please aspirate as much as possible, patient will pull drain out. IF solid send for pathology)      (with anesthesia)    Scheduled Providers: Radiologist, [facility] Responsible Provider: Albert Linder DO    Anesthesia Type: MAC ASA Status: 4            Anesthesia Type: No value filed.    Max Phase I: Max Score: 9    Max Phase II:      Anesthesia Post Evaluation    Level of consciousness: confused and awake  Pain score: 3  Airway patency: patent  Nausea & Vomiting: no vomiting and no nausea  Cardiovascular status: hemodynamically stable  Respiratory status: acceptable  Hydration status: stable  Pain management: adequate    No notable events documented.

## 2024-03-19 NOTE — PROGRESS NOTES
Nancy from  notified this worker that patient will have liver bx on Wednesday 3/20/24 at 1300 with anaesthesia. Patient is to be NPO after midnight tonight, blood thinners are to be held and patients domestic partner is to be available to sign consents. Nursing notified  Kristin chavez

## 2024-03-19 NOTE — OR NURSING
Patient arrived from the floor to cat scan for image guided liver biopsy with possible aspiration possible drain placement with anesthesia. Dr. Pyle in to speak to the patient prior to the procedure, all questions/concerns addressed. Consent signed on the floor. IV flushed with good blood return. Patient connected to monitoring equipment, vitals checked pre procedure. Patient positioned supine on cat scan table. Patient prepped and draped. Anesthesia administered sedation during the procedure (see charting). Lidocaine administered to procedural site. Needle inserted to liver mass, biopsies x 5 cores collected. Less than 1 ml bloody fluid aspirated and collected (sent to lab for culture and gram stain). Images taken and reviewed by Dr. Pyle. Needle removed, site cleansed, and dry/sterile dressing applied. Vitals checked post procedure. Patient tolerated procedure well. Procedure completed @ 1512. Patient transported to stage I recovery, nurse handoff report given. Biopsy specimen taken to lab, post procedures orders placed.

## 2024-03-19 NOTE — PROGRESS NOTES
[facility] Hospitalist Progress Note    Admitting Date and Time: 3/14/2024  3:54 AM  Admit Dx: Generalized abdominal pain [R10.84]  Liver mass [R16.0]  Elevated liver enzymes [R74.8]  Cocaine use [F14.90]    Subjective:  Patient is being followed for Generalized abdominal pain [R10.84]  Liver mass [R16.0]  Elevated liver enzymes [R74.8]  Cocaine use [F14.90]   Pt was seen and examined today. Remains confused but alert today, reports increased abdominal pain.    ROS: denies fever, chills, cp, sob, n/v, HA unless stated above.     magnesium sulfate  1,000 mg IntraVENous Once    Surgiflo with Evithrom Hemostatic Matrix  1 kit Other Once    Surgiflo with Evithrom Hemostatic Matrix  1 kit Nasal Once    Surgiflo with Evithrom Hemostatic Matrix  1 kit Other Once    insulin glargine  3 Units SubCUTAneous Nightly    insulin lispro  0-6 Units SubCUTAneous TID WC    mineral oil-hydrophilic petrolatum   Topical BID    insulin glargine  6 Units SubCUTAneous QAM    insulin lispro  2 Units SubCUTAneous TID WC    piperacillin-tazobactam  3,375 mg IntraVENous Q8H    insulin lispro  0-4 Units SubCUTAneous Nightly    menthol-zinc oxide   Topical BID    sodium chloride flush  5-40 mL IntraVENous 2 times per day    [Held by provider] enoxaparin  30 mg SubCUTAneous Daily    anastrozole  1 mg Oral QAM    ARIPiprazole  20 mg Oral QAM    vitamin C  1,000 mg Oral QAM    calcium elemental  1,000 mg Oral QAM    melatonin  12 mg Oral Nightly    multivitamin  1 tablet Oral QAM    rifAXIMin  550 mg Oral BID    thiamine  100 mg Oral Daily    ipratropium  0.5 mg Nebulization TID RT    VORTIoxetine HBr  10 mg Oral Nightly    lipase-protease-amylas  15,000 Units Oral TID WC    And    lipase-protease-amylase  20,000 Units Oral TID WC    pantoprazole (PROTONIX) 40 mg in sodium chloride (PF) 0.9 % 10 mL injection  40 mg IntraVENous Q12H    lactulose  20 g Oral TID    sodium chloride flush  5-40 mL IntraVENous 2 times per day     mineral  95.0 92.2   MCH 30.4  --  30.0 29.4   MCHC 31.9*  --  31.6* 31.9*   RDW 14.9  --  15.1* 15.2*   PLT 51*  --  59* 59*   MPV 10.7  --  10.1 10.8       Radiology:   CT NEEDLE BIOPSY LIVER PERCUTANEOUS         CTA TRIPHASIC LIVER   Final Result   1. There is a new hypodense mass in segment 6 of the liver that appears to be   associated with dilated ducts. This could represent a mass such as a   hepatocellular carcinoma or cholangiocarcinoma.   2. There is a heterogeneous zone involving the head and uncinate process of   the pancreas concerning for a mass.   3. Splenomegaly.   4. Thickening of the wall of the ascending and transverse colon concerning   for colitis.   5. There is stranding of the subcutaneous tissues concerning for   hyperproteinemia.         XR CHEST PORTABLE   Final Result   Left internal jugular line tip in the right atrium 4.5 cm from the caval   atrial junction.  No pneumothorax.         US ABDOMEN LIMITED   Final Result   Minimal accessible intra-abdominal ascites.  Imaging findings compatible with   stigmata of liver cirrhotic disease.  No paracentesis was performed.         CT ABDOMEN PELVIS WO CONTRAST Additional Contrast? None   Final Result   1. Cirrhotic morphology of the liver with a large low-attenuation structure   seen inferiorly within the right lobe concerning for an underlying lesion   such as hepatocellular carcinoma. This area measures 7.1 x 5.6 cm. This was   visualized on the prior examination.   2. Moderate to large amount of ascites seen within the abdomen and pelvis.   3. Abnormal wall thickening identified throughout the small bowel loops   within the left flank. Findings may be related to the surrounding ascites.   4. Increased stool burden seen diffusely throughout the colon to suggest   clinical presentation of constipation. Mild wall thickening in which an   underlying colitis cannot be completely excluded. Findings may be related to   the surrounding ascites.   5.

## 2024-03-19 NOTE — PROGRESS NOTES
SPIRITUAL HEALTH SERVICES - [facility]  PROGRESS NOTE    Name: Ellie Santacruz                Advent: Rastafari   Anointed (Last Rites): NA    Referral: Routine Visit    Assessment:  Upon entering the room  observes patient was not in the room. Patient's boyfriend was seated on the couch on his phone.      Intervention:  Patient's boyfriend indicated that the patient was having her procedure. Offered spiritual services to boyfriend and patient.    Outcome:  Patient's boyfriend expressed gratitude for the visit.    Plan:  Chaplains will remain available to offer spiritual and emotional support as needed.      Electronically signed by Chaplain Gregory, on 3/19/2024 at 7:47 PM.  Spiritual Care Department  [facility]  [phone]

## 2024-03-19 NOTE — PROGRESS NOTES
ENDOCRINOLOGY PROGRESS NOTE      Date of admission: 3/14/2024  Date of service: 3/19/2024  Admitting physician: Jair Jauregui MD   Primary Care Physician: No primary care provider on file.  Consultant physician: Wale Camejo MD     Reason for the consultation:  Uncontrolled DM    History of Present Illness:  53 y.o. female with a history of depression/anxiety, alcoholism, ascites of the liver, left breast cancer, chronic pancreatitis, cocaine abuse, DM 2, GERD, gout, hepatitis C, schizoaffective disorder bipolar type presents with abdominal pain endocrine service was consulted for diabetes management.    Subjective  I saw the patient this afternoon, glucose level improving.  No hypoglycemia    Inpatient diet:   Carb Restricted diet     Point of care glucose monitoring   (Independently reviewed)   Recent Labs     03/17/24  2104 03/18/24  0616 03/18/24  1122 03/18/24  1641 03/18/24  2217 03/19/24  0604 03/19/24  1119 03/19/24  1732   POCGLU 274* 492* 271* 111* 136* 85 85 135*   Scheduled Meds:   magnesium sulfate  1,000 mg IntraVENous Once    Surgiflo with Evithrom Hemostatic Matrix  1 kit Other Once    sodium chloride flush  5-40 mL IntraVENous 2 times per day    meperidine  12.5 mg IntraVENous Once    Surgiflo with Evithrom Hemostatic Matrix  1 kit Nasal Once    Surgiflo with Evithrom Hemostatic Matrix  1 kit Other Once    insulin glargine  3 Units SubCUTAneous Nightly    insulin lispro  0-6 Units SubCUTAneous TID WC    mineral oil-hydrophilic petrolatum   Topical BID    insulin glargine  6 Units SubCUTAneous QAM    insulin lispro  2 Units SubCUTAneous TID WC    piperacillin-tazobactam  3,375 mg IntraVENous Q8H    insulin lispro  0-4 Units SubCUTAneous Nightly    menthol-zinc oxide   Topical BID    sodium chloride flush  5-40 mL IntraVENous 2 times per day    [Held by provider] enoxaparin  30 mg SubCUTAneous Daily    anastrozole  1 mg Oral QAM    ARIPiprazole  20 mg Oral QAM    vitamin C  1,000 mg Oral QAM     Results   Component Value Date/Time    TSH 0.184 (L) 02/16/2020 06:45 AM    T4FREE 1.16 02/17/2020 06:15 AM     Lab Results   Component Value Date/Time    LABA1C 11.4 09/10/2023 07:00 AM    GLUCOSE 79 03/19/2024 05:24 AM    MALBCR see below 07/09/2015 08:16 AM    LABCREA 56.2 07/09/2015 08:16 AM     Lab Results   Component Value Date/Time    TRIG 79 02/16/2020 06:45 AM    HDL 43 02/16/2020 06:45 AM    LDLCALC 35 02/16/2020 06:45 AM    CHOL 94 02/16/2020 06:45 AM       Blood culture   Lab Results   Component Value Date/Time    BC 5 Days- no growth 02/15/2020 11:56 AM    BC 5 Days- no growth 09/26/2018 06:31 AM       Radiology:  CT NEEDLE BIOPSY LIVER PERCUTANEOUS   Final Result   1. Status post CT-guided biopsy of lesion in the right lobe of the liver.   2. No purulent material was aspirated to suggest an abscess.  Only minimal   amount of sanguinous fluid was aspirated that was sent to the lab for testing.         CTA TRIPHASIC LIVER   Final Result   1. There is a new hypodense mass in segment 6 of the liver that appears to be   associated with dilated ducts. This could represent a mass such as a   hepatocellular carcinoma or cholangiocarcinoma.   2. There is a heterogeneous zone involving the head and uncinate process of   the pancreas concerning for a mass.   3. Splenomegaly.   4. Thickening of the wall of the ascending and transverse colon concerning   for colitis.   5. There is stranding of the subcutaneous tissues concerning for   hyperproteinemia.         XR CHEST PORTABLE   Final Result   Left internal jugular line tip in the right atrium 4.5 cm from the caval   atrial junction.  No pneumothorax.         US ABDOMEN LIMITED   Final Result   Minimal accessible intra-abdominal ascites.  Imaging findings compatible with   stigmata of liver cirrhotic disease.  No paracentesis was performed.         CT ABDOMEN PELVIS WO CONTRAST Additional Contrast? None   Final Result   1. Cirrhotic morphology of the liver with a

## 2024-03-19 NOTE — CARE COORDINATION
CASE MANAGEMENT....Patient scheduled for liver abscess drain placement under IR today. Patient unable to sign consent d/t AMS. Spoke with patients significant other, Faisal and he informed me that patient is estranged from her children (2 sons/1 daughter). Does not have contact with her sister and her parents are . He is unaware of any other relatives. We discussed ACP and Legal Next of Kin Hierarchy. Forms were provided to Faisal to completed and notarized when patient becomes A+O.   Faisal states that Ellie has an appt to become established with Dr Del Castillo at the [facility] in [place] on  - confirmed with  at the office. Ellie still requiring PSA at the bedside. On CIWA Scale and was last medicated yesterday am. Monitoring labs and treating accordingly. Has Right IJ. Continues on ivf, iv zosyn and iv protonix. Discharge needs TBD pending patient progress. Ie... need for PICC line, inpatient vs outpt Drug rehab vs inpatient Physical rehab - consider [facility]. OF NOTE... Faisal confirmed he is aware of Ellie's drug addiction. States she does \"crack\" and 2 years ago went to [facility], but then \"fell off the bandwagon.\" Will follow along and assist accordingly.

## 2024-03-19 NOTE — PROGRESS NOTES
[facility]  [facility]  Progress Note    SUBJECTIVE:  Chief Complaint   Patient presents with    Abdominal Pain     Diffuse. History of cirrhosis. Patient stated she got up this morning and her abdomen was distened     Patient seems to be tolerating medications.   Boyfriend in room - spoke to me today that pt told him that he would be incharge of her care. He called his daughter and son and no one is willing to take care of her.   Her mental status is the same. No fever overnight. No diarrhea. Sitter at bed side.    Review of systems:  Can not be obtained. Pt is ams    Medications:  Scheduled Meds:   potassium phosphate IVPB (CENTRAL LINE)  30 mmol IntraVENous Once    magnesium sulfate  1,000 mg IntraVENous Once    Surgiflo with Evithrom Hemostatic Matrix  1 kit Nasal Once    Surgiflo with Evithrom Hemostatic Matrix  1 kit Other Once    insulin glargine  3 Units SubCUTAneous Nightly    insulin lispro  0-6 Units SubCUTAneous TID WC    mineral oil-hydrophilic petrolatum   Topical BID    insulin glargine  6 Units SubCUTAneous QAM    insulin lispro  2 Units SubCUTAneous TID WC    piperacillin-tazobactam  3,375 mg IntraVENous Q8H    insulin lispro  0-4 Units SubCUTAneous Nightly    menthol-zinc oxide   Topical BID    sodium chloride flush  5-40 mL IntraVENous 2 times per day    [Held by provider] enoxaparin  30 mg SubCUTAneous Daily    anastrozole  1 mg Oral QAM    ARIPiprazole  20 mg Oral QAM    vitamin C  1,000 mg Oral QAM    calcium elemental  1,000 mg Oral QAM    melatonin  12 mg Oral Nightly    multivitamin  1 tablet Oral QAM    rifAXIMin  550 mg Oral BID    thiamine  100 mg Oral Daily    ipratropium  0.5 mg Nebulization TID RT    VORTIoxetine HBr  10 mg Oral Nightly    lipase-protease-amylas  15,000 Units Oral TID WC    And    lipase-protease-amylase  20,000 Units Oral TID WC    pantoprazole (PROTONIX) 40 mg in sodium chloride (PF) 0.9 % 10 mL injection  40 mg IntraVENous Q12H     after antibiotics)    ASSESSMENT:  Liver mass vs abscess:   Substance use disorder:   Liver cirrhosis:   Encephalopathy:     PLAN:  Continue IV Zosyn   Pending IR drain/aspiration hopefully today.   Monitor labs    TITI MCNAMARA MD  10:54 AM  3/19/2024

## 2024-03-20 LAB
ALBUMIN SERPL-MCNC: 2.9 G/DL (ref 3.5–5.2)
ALP SERPL-CCNC: 430 U/L (ref 35–104)
ALT SERPL-CCNC: 208 U/L (ref 0–32)
ANION GAP SERPL CALCULATED.3IONS-SCNC: 12 MMOL/L (ref 7–16)
AST SERPL-CCNC: 51 U/L (ref 0–31)
BASOPHILS # BLD: 0.01 K/UL (ref 0–0.2)
BASOPHILS NFR BLD: 1 % (ref 0–2)
BILIRUB SERPL-MCNC: 1 MG/DL (ref 0–1.2)
BUN SERPL-MCNC: 13 MG/DL (ref 6–20)
CALCIUM SERPL-MCNC: 8.6 MG/DL (ref 8.6–10.2)
CHLORIDE SERPL-SCNC: 115 MMOL/L (ref 98–107)
CO2 SERPL-SCNC: 21 MMOL/L (ref 22–29)
CREAT SERPL-MCNC: 0.4 MG/DL (ref 0.5–1)
EOSINOPHIL # BLD: 0.01 K/UL (ref 0.05–0.5)
EOSINOPHILS RELATIVE PERCENT: 1 % (ref 0–6)
ERYTHROCYTE [DISTWIDTH] IN BLOOD BY AUTOMATED COUNT: 15.3 % (ref 11.5–15)
GFR SERPL CREATININE-BSD FRML MDRD: >60 ML/MIN/1.73M2
GLUCOSE BLD-MCNC: 158 MG/DL (ref 74–99)
GLUCOSE BLD-MCNC: 326 MG/DL (ref 74–99)
GLUCOSE BLD-MCNC: 392 MG/DL (ref 74–99)
GLUCOSE BLD-MCNC: 473 MG/DL (ref 74–99)
GLUCOSE SERPL-MCNC: 152 MG/DL (ref 74–99)
HCT VFR BLD AUTO: 28.9 % (ref 34–48)
HGB BLD-MCNC: 9.2 G/DL (ref 11.5–15.5)
LYMPHOCYTES NFR BLD: 0.21 K/UL (ref 1.5–4)
LYMPHOCYTES RELATIVE PERCENT: 12 % (ref 20–42)
MAGNESIUM SERPL-MCNC: 1.8 MG/DL (ref 1.6–2.6)
MCH RBC QN AUTO: 29.8 PG (ref 26–35)
MCHC RBC AUTO-ENTMCNC: 31.8 G/DL (ref 32–34.5)
MCV RBC AUTO: 93.5 FL (ref 80–99.9)
MONOCYTES NFR BLD: 0.13 K/UL (ref 0.1–0.95)
MONOCYTES NFR BLD: 8 % (ref 2–12)
NEUTROPHILS NFR BLD: 78 % (ref 43–80)
NEUTS SEG NFR BLD: 1.33 K/UL (ref 1.8–7.3)
PHOSPHATE SERPL-MCNC: 3.7 MG/DL (ref 2.5–4.5)
PLATELET # BLD AUTO: 49 K/UL (ref 130–450)
PLATELET CONFIRMATION: NORMAL
PMV BLD AUTO: 9.6 FL (ref 7–12)
POTASSIUM SERPL-SCNC: 3.5 MMOL/L (ref 3.5–5)
PROT SERPL-MCNC: 6.2 G/DL (ref 6.4–8.3)
RBC # BLD AUTO: 3.09 M/UL (ref 3.5–5.5)
RBC # BLD: ABNORMAL 10*6/UL
SODIUM SERPL-SCNC: 148 MMOL/L (ref 132–146)
WBC OTHER # BLD: 1.7 K/UL (ref 4.5–11.5)

## 2024-03-20 PROCEDURE — 6370000000 HC RX 637 (ALT 250 FOR IP): Performed by: STUDENT IN AN ORGANIZED HEALTH CARE EDUCATION/TRAINING PROGRAM

## 2024-03-20 PROCEDURE — 94640 AIRWAY INHALATION TREATMENT: CPT

## 2024-03-20 PROCEDURE — 2060000000 HC ICU INTERMEDIATE R&B

## 2024-03-20 PROCEDURE — 6360000002 HC RX W HCPCS: Performed by: STUDENT IN AN ORGANIZED HEALTH CARE EDUCATION/TRAINING PROGRAM

## 2024-03-20 PROCEDURE — 6360000002 HC RX W HCPCS

## 2024-03-20 PROCEDURE — 2500000003 HC RX 250 WO HCPCS: Performed by: CLINICAL NURSE SPECIALIST

## 2024-03-20 PROCEDURE — 2580000003 HC RX 258: Performed by: STUDENT IN AN ORGANIZED HEALTH CARE EDUCATION/TRAINING PROGRAM

## 2024-03-20 PROCEDURE — 6360000002 HC RX W HCPCS: Performed by: CLINICAL NURSE SPECIALIST

## 2024-03-20 PROCEDURE — 85025 COMPLETE CBC W/AUTO DIFF WBC: CPT

## 2024-03-20 PROCEDURE — 83735 ASSAY OF MAGNESIUM: CPT

## 2024-03-20 PROCEDURE — A4216 STERILE WATER/SALINE, 10 ML: HCPCS | Performed by: HOSPITALIST

## 2024-03-20 PROCEDURE — 6360000002 HC RX W HCPCS: Performed by: HOSPITALIST

## 2024-03-20 PROCEDURE — 2580000003 HC RX 258: Performed by: CLINICAL NURSE SPECIALIST

## 2024-03-20 PROCEDURE — 6370000000 HC RX 637 (ALT 250 FOR IP): Performed by: INTERNAL MEDICINE

## 2024-03-20 PROCEDURE — 99233 SBSQ HOSP IP/OBS HIGH 50: CPT | Performed by: STUDENT IN AN ORGANIZED HEALTH CARE EDUCATION/TRAINING PROGRAM

## 2024-03-20 PROCEDURE — 2580000003 HC RX 258: Performed by: HOSPITALIST

## 2024-03-20 PROCEDURE — C9113 INJ PANTOPRAZOLE SODIUM, VIA: HCPCS | Performed by: HOSPITALIST

## 2024-03-20 PROCEDURE — 84100 ASSAY OF PHOSPHORUS: CPT

## 2024-03-20 PROCEDURE — 82962 GLUCOSE BLOOD TEST: CPT

## 2024-03-20 PROCEDURE — 99232 SBSQ HOSP IP/OBS MODERATE 35: CPT | Performed by: INTERNAL MEDICINE

## 2024-03-20 PROCEDURE — 2580000003 HC RX 258: Performed by: ANESTHESIOLOGY

## 2024-03-20 PROCEDURE — 6370000000 HC RX 637 (ALT 250 FOR IP)

## 2024-03-20 PROCEDURE — 80053 COMPREHEN METABOLIC PANEL: CPT

## 2024-03-20 PROCEDURE — 99233 SBSQ HOSP IP/OBS HIGH 50: CPT | Performed by: NURSE PRACTITIONER

## 2024-03-20 RX ORDER — INSULIN LISPRO 100 [IU]/ML
2 INJECTION, SOLUTION INTRAVENOUS; SUBCUTANEOUS ONCE
Status: COMPLETED | OUTPATIENT
Start: 2024-03-20 | End: 2024-03-20

## 2024-03-20 RX ORDER — INSULIN LISPRO 100 [IU]/ML
0-6 INJECTION, SOLUTION INTRAVENOUS; SUBCUTANEOUS
Status: DISCONTINUED | OUTPATIENT
Start: 2024-03-21 | End: 2024-03-26

## 2024-03-20 RX ORDER — MAGNESIUM SULFATE IN WATER 40 MG/ML
2000 INJECTION, SOLUTION INTRAVENOUS ONCE
Status: COMPLETED | OUTPATIENT
Start: 2024-03-20 | End: 2024-03-20

## 2024-03-20 RX ADMIN — INSULIN LISPRO 4 UNITS: 100 INJECTION, SOLUTION INTRAVENOUS; SUBCUTANEOUS at 16:18

## 2024-03-20 RX ADMIN — PANCRELIPASE LIPASE, PANCRELIPASE PROTEASE, PANCRELIPASE AMYLASE 15000 UNITS: 15000; 47000; 63000 CAPSULE, DELAYED RELEASE ORAL at 12:08

## 2024-03-20 RX ADMIN — PANCRELIPASE LIPASE, PANCRELIPASE PROTEASE, PANCRELIPASE AMYLASE 15000 UNITS: 15000; 47000; 63000 CAPSULE, DELAYED RELEASE ORAL at 16:20

## 2024-03-20 RX ADMIN — ARIPIPRAZOLE 20 MG: 10 TABLET ORAL at 09:43

## 2024-03-20 RX ADMIN — LACTULOSE 20 G: 20 SOLUTION ORAL at 12:09

## 2024-03-20 RX ADMIN — INSULIN GLARGINE 2 UNITS: 100 INJECTION, SOLUTION SUBCUTANEOUS at 22:14

## 2024-03-20 RX ADMIN — SODIUM CHLORIDE, PRESERVATIVE FREE 40 MG: 5 INJECTION INTRAVENOUS at 12:09

## 2024-03-20 RX ADMIN — SODIUM CHLORIDE, PRESERVATIVE FREE 10 ML: 5 INJECTION INTRAVENOUS at 09:33

## 2024-03-20 RX ADMIN — CALCIUM 1000 MG: 500 TABLET ORAL at 09:42

## 2024-03-20 RX ADMIN — MULTIVITAMIN TABLET 1 TABLET: TABLET at 09:42

## 2024-03-20 RX ADMIN — SODIUM CHLORIDE: 4.5 INJECTION, SOLUTION INTRAVENOUS at 20:12

## 2024-03-20 RX ADMIN — PANCRELIPASE LIPASE, PANCRELIPASE PROTEASE, PANCRELIPASE AMYLASE 20000 UNITS: 20000; 63000; 84000 CAPSULE, DELAYED RELEASE ORAL at 12:09

## 2024-03-20 RX ADMIN — ANASTROZOLE 1 MG: 1 TABLET, FILM COATED ORAL at 09:43

## 2024-03-20 RX ADMIN — PIPERACILLIN AND TAZOBACTAM 3375 MG: 3; .375 INJECTION, POWDER, FOR SOLUTION INTRAVENOUS at 09:38

## 2024-03-20 RX ADMIN — IPRATROPIUM BROMIDE 0.5 MG: 0.5 SOLUTION RESPIRATORY (INHALATION) at 09:03

## 2024-03-20 RX ADMIN — PIPERACILLIN AND TAZOBACTAM 3375 MG: 3; .375 INJECTION, POWDER, FOR SOLUTION INTRAVENOUS at 00:34

## 2024-03-20 RX ADMIN — INSULIN LISPRO 2 UNITS: 100 INJECTION, SOLUTION INTRAVENOUS; SUBCUTANEOUS at 22:22

## 2024-03-20 RX ADMIN — Medication 100 MG: at 09:42

## 2024-03-20 RX ADMIN — SODIUM CHLORIDE, PRESERVATIVE FREE 40 MG: 5 INJECTION INTRAVENOUS at 00:35

## 2024-03-20 RX ADMIN — IPRATROPIUM BROMIDE 0.5 MG: 0.5 SOLUTION RESPIRATORY (INHALATION) at 13:14

## 2024-03-20 RX ADMIN — PIPERACILLIN AND TAZOBACTAM 3375 MG: 3; .375 INJECTION, POWDER, FOR SOLUTION INTRAVENOUS at 16:27

## 2024-03-20 RX ADMIN — ANORECTAL OINTMENT: 15.7; .44; 24; 20.6 OINTMENT TOPICAL at 09:56

## 2024-03-20 RX ADMIN — SODIUM CHLORIDE, PRESERVATIVE FREE 10 ML: 5 INJECTION INTRAVENOUS at 09:34

## 2024-03-20 RX ADMIN — INSULIN LISPRO 4 UNITS: 100 INJECTION, SOLUTION INTRAVENOUS; SUBCUTANEOUS at 22:15

## 2024-03-20 RX ADMIN — SODIUM CHLORIDE: 4.5 INJECTION, SOLUTION INTRAVENOUS at 04:44

## 2024-03-20 RX ADMIN — MAGNESIUM SULFATE HEPTAHYDRATE 2000 MG: 40 INJECTION, SOLUTION INTRAVENOUS at 10:20

## 2024-03-20 RX ADMIN — HYDROPHOR: 42 OINTMENT TOPICAL at 16:28

## 2024-03-20 RX ADMIN — PANCRELIPASE LIPASE, PANCRELIPASE PROTEASE, PANCRELIPASE AMYLASE 15000 UNITS: 15000; 47000; 63000 CAPSULE, DELAYED RELEASE ORAL at 09:42

## 2024-03-20 RX ADMIN — Medication 12 MG: at 22:13

## 2024-03-20 RX ADMIN — LACTULOSE 20 G: 20 SOLUTION ORAL at 22:13

## 2024-03-20 RX ADMIN — POTASSIUM PHOSPHATE, MONOBASIC AND POTASSIUM PHOSPHATE, DIBASIC 30 MMOL: 224; 236 INJECTION, SOLUTION, CONCENTRATE INTRAVENOUS at 09:40

## 2024-03-20 RX ADMIN — HYDROPHOR: 42 OINTMENT TOPICAL at 09:56

## 2024-03-20 RX ADMIN — RIFAXIMIN 550 MG: 550 TABLET ORAL at 22:13

## 2024-03-20 RX ADMIN — SODIUM CHLORIDE, PRESERVATIVE FREE 10 ML: 5 INJECTION INTRAVENOUS at 22:22

## 2024-03-20 RX ADMIN — PANCRELIPASE LIPASE, PANCRELIPASE PROTEASE, PANCRELIPASE AMYLASE 20000 UNITS: 20000; 63000; 84000 CAPSULE, DELAYED RELEASE ORAL at 09:43

## 2024-03-20 RX ADMIN — INSULIN LISPRO 3 UNITS: 100 INJECTION, SOLUTION INTRAVENOUS; SUBCUTANEOUS at 12:19

## 2024-03-20 RX ADMIN — HYDROMORPHONE HYDROCHLORIDE 0.5 MG: 1 INJECTION, SOLUTION INTRAMUSCULAR; INTRAVENOUS; SUBCUTANEOUS at 23:19

## 2024-03-20 RX ADMIN — PANCRELIPASE LIPASE, PANCRELIPASE PROTEASE, PANCRELIPASE AMYLASE 20000 UNITS: 20000; 63000; 84000 CAPSULE, DELAYED RELEASE ORAL at 16:20

## 2024-03-20 RX ADMIN — ANORECTAL OINTMENT: 15.7; .44; 24; 20.6 OINTMENT TOPICAL at 16:28

## 2024-03-20 RX ADMIN — VORTIOXETINE 10 MG: 10 TABLET, FILM COATED ORAL at 22:13

## 2024-03-20 RX ADMIN — LACTULOSE 20 G: 20 SOLUTION ORAL at 09:42

## 2024-03-20 RX ADMIN — OXYCODONE HYDROCHLORIDE AND ACETAMINOPHEN 1000 MG: 500 TABLET ORAL at 09:42

## 2024-03-20 RX ADMIN — RIFAXIMIN 550 MG: 550 TABLET ORAL at 09:42

## 2024-03-20 ASSESSMENT — PAIN SCALES - GENERAL
PAINLEVEL_OUTOF10: 2
PAINLEVEL_OUTOF10: 5

## 2024-03-20 ASSESSMENT — PAIN DESCRIPTION - DESCRIPTORS: DESCRIPTORS: ACHING;DISCOMFORT

## 2024-03-20 ASSESSMENT — PAIN DESCRIPTION - LOCATION: LOCATION: OTHER (COMMENT)

## 2024-03-20 NOTE — PROGRESS NOTES
Physician Progress Note      PATIENT:               OSBALDO GAINES  CSN #:                  [id number]  :                       1970  ADMIT DATE:       3/14/2024 3:54 AM  DISCH DATE:  RESPONDING  PROVIDER #:        Jair Jauregui MD          QUERY TEXT:    Pt admitted with liver mass and has encephalopathy documented. If possible,   please document in progress notes and discharge summary further specificity   regarding the type of encephalopathy:    The medical record reflects the following:  Risk Factors: +cocaine, h/o alcohol abuse  Clinical Indicators: per IM 3/19 \"...Decompensated chronic cirrhosis with   ascites - Hx Hepatitis C.  Had recent admission a few days prior to presenting   at [facility] but left AMA.  IR for paracentesis but not enough to be   removed.  GI consult, appreciate recommendations, for likely EGD and possible   colonoscopy at some point. Acute encephalopathy - likely  #2. Somewhat   improved, but remains confused...\", per GI 3/20 \"...atient more alert today.    Reports pain liver biopsy site 8/10.  Denies nausea or vomiting.  Asking to   eat, currently on liquid diet and asking for regula  Treatment: lab monitoring, GI and ID consults    Thank you,  January Chávez, RN, BSN, CDIS  Clinical Documentation Integrity  [email or web address]  Options provided:  -- Alcoholic encephalopathy  -- Acute hepatic encephalopathy without coma  -- Chronic hepatic encephalopathy without coma  -- Toxic metabolic encephalopathy  -- Other - I will add my own diagnosis  -- Disagree - Not applicable / Not valid  -- Disagree - Clinically unable to determine / Unknown  -- Refer to Clinical Documentation Reviewer    PROVIDER RESPONSE TEXT:    Multifactorial, component of Alcoholic, acute on chronic hepatic and toxic   encephalopathy    Query created by: January Chávez on 3/20/2024 12:42 PM      Electronically signed by:  Jair Jauregui MD 3/20/2024 4:06 PM

## 2024-03-20 NOTE — PROGRESS NOTES
Hepatobiliary and Pancreatic Surgery Progress Note    CC: Liver mass    Subjective: Patient more alert today.  Reports pain liver biopsy site 8/10.  Denies nausea or vomiting.  Asking to eat, currently on liquid diet and asking for regular food stating she is hungry.  Sitter at bedside.    OBJECTIVE      Physical    BP (!) 124/58   Pulse 76   Temp 97.8 °F (36.6 °C) (Axillary)   Resp 18   Ht 1.524 m (5')   Wt 31.9 kg (70 lb 5.2 oz)   SpO2 100%   BMI 13.73 kg/m²     GEN: Cachectic, more awake, more verbal but minimally.   EYES: Sclera white, pupils equal round and reactive to light  ENMT: , trachea midline, ears externally intact  LYMPH: no obvious lympadenopathy in neck.   RESP: Respiratory effort was normal with no retractions or use of accessory muscles.  CV:  No pedal edema  GI/ Abdomen: Soft, mildly distended, diffusely tender to palpation without guarding or rebound > RUQ, no peritoneal signs  MSK: no clubbing/ no cyanosis/ gaitabnormal, ataxia    ASSESSMENT: 54 yo M with cocaine abuse, ETOHC abuse, hepatitis C, chronic pancreatitis, cirrhosis with liver decompensation (MELD Na = 9, Child-Marx 8B) and now 7cm right hepatic lobe mass in segments 5/ 6       S/P IR liver mass biopsies    PLAN:    - Await pathology  - LFTs downtrending   - Zosyn, has a triple lumen  - ID following, appreciate assistance  - She needs aggressive electrolyte replacement, K to 4, mag to 2 and phos to 4.  K-Phos and mag today as ordered.  - GI following for cirrhosis management.   - Dr Weston prior discussed with her boyfriend at bedside that she would likely need placement at a facility at the time of discharge.  If she is to be on long-term antibiotics via a PICC in someone with substance abuse, this could pose some difficulty with her going home.  She has proven to him that she is unable to stop taking drugs when she is at home.  Suspect she will need to go to a nursing facility.    Thank you for allowing me to take part in

## 2024-03-20 NOTE — PLAN OF CARE
Problem: Discharge Planning  Goal: Discharge to home or other facility with appropriate resources  Outcome: Progressing     Problem: ABCDS Injury Assessment  Goal: Absence of physical injury  Outcome: Progressing     Problem: Skin/Tissue Integrity  Goal: Absence of new skin breakdown  Description: 1.  Monitor for areas of redness and/or skin breakdown  2.  Assess vascular access sites hourly  3.  Every 4-6 hours minimum:  Change oxygen saturation probe site  4.  Every 4-6 hours:  If on nasal continuous positive airway pressure, respiratory therapy assess nares and determine need for appliance change or resting period.  Outcome: Progressing     Problem: Pain  Goal: Verbalizes/displays adequate comfort level or baseline comfort level  Outcome: Progressing     Problem: Safety - Adult  Goal: Free from fall injury  Outcome: Progressing     Problem: Chronic Conditions and Co-morbidities  Goal: Patient's chronic conditions and co-morbidity symptoms are monitored and maintained or improved  Outcome: Progressing

## 2024-03-20 NOTE — PROGRESS NOTES
[facility] Hospitalist Progress Note    Admitting Date and Time: 3/14/2024  3:54 AM  Admit Dx: Generalized abdominal pain [R10.84]  Liver mass [R16.0]  Elevated liver enzymes [R74.8]  Cocaine use [F14.90]    Subjective:  Patient is being followed for Generalized abdominal pain [R10.84]  Liver mass [R16.0]  Elevated liver enzymes [R74.8]  Cocaine use [F14.90]   Pt was seen and examined today. Patient more alert today, reports only abdominal pain around biopsy site.    ROS: denies fever, chills, cp, sob, n/v, HA unless stated above.     potassium phosphate IVPB (CENTRAL LINE)  30 mmol IntraVENous Once    magnesium sulfate  1,000 mg IntraVENous Once    Surgiflo with Evithrom Hemostatic Matrix  1 kit Other Once    sodium chloride flush  5-40 mL IntraVENous 2 times per day    insulin glargine  2 Units SubCUTAneous Nightly    insulin lispro  0-4 Units SubCUTAneous TID WC    insulin lispro  0-4 Units SubCUTAneous Nightly    Surgiflo with Evithrom Hemostatic Matrix  1 kit Nasal Once    Surgiflo with Evithrom Hemostatic Matrix  1 kit Other Once    mineral oil-hydrophilic petrolatum   Topical BID    piperacillin-tazobactam  3,375 mg IntraVENous Q8H    menthol-zinc oxide   Topical BID    sodium chloride flush  5-40 mL IntraVENous 2 times per day    [Held by provider] enoxaparin  30 mg SubCUTAneous Daily    anastrozole  1 mg Oral QAM    ARIPiprazole  20 mg Oral QAM    vitamin C  1,000 mg Oral QAM    calcium elemental  1,000 mg Oral QAM    melatonin  12 mg Oral Nightly    multivitamin  1 tablet Oral QAM    rifAXIMin  550 mg Oral BID    thiamine  100 mg Oral Daily    ipratropium  0.5 mg Nebulization TID RT    VORTIoxetine HBr  10 mg Oral Nightly    lipase-protease-amylas  15,000 Units Oral TID WC    And    lipase-protease-amylase  20,000 Units Oral TID WC    pantoprazole (PROTONIX) 40 mg in sodium chloride (PF) 0.9 % 10 mL injection  40 mg IntraVENous Q12H    lactulose  20 g Oral TID    sodium chloride flush  5-40 mL

## 2024-03-20 NOTE — PROGRESS NOTES
PROGRESS NOTE  By Олег Mitchell M.D.    [facility]  Dr. Lakhwinder Madera M.D.,  Dr. Simón Esquivel M.D.,   Dr. Hugo Rodríguez D.O.,  Dr. Royer Coppola M.D.,  IOANA MulliganO.,          Ellie TOMÁS Santacruz  53 y.o.  female    SUBJECTIVE:  No new complaints.  Patient is significantly more awake and calm today.  Remains confused.  Sitter at bedside but no family at bedside.    OBJECTIVE:    /77   Pulse 81   Temp 97.7 °F (36.5 °C) (Axillary)   Resp 18   Ht 1.524 m (5')   Wt 31.9 kg (70 lb 5.2 oz)   SpO2 100%   BMI 13.73 kg/m²     General: NAD/ill-appearing cachectic  female  HEENT: Anicteric sclera/moist oral mucosa  Neck: Appears supple with trachea midline  Chest: Symmetric excursion/nonlabored respirations  Cor: Regular/S1-S2  Abd.: Soft and nondistended.  Appears mildly diffusely tender  Extr.:  No peripheral edema.  Generalized muscle wasting  Skin: Warm and dry/anicteric      DATA:    Monitor data reviewed -sinus rhythm noted.       Lab Results   Component Value Date/Time    WBC 1.7 03/20/2024 03:00 AM    RBC 3.09 03/20/2024 03:00 AM    HGB 9.2 03/20/2024 03:00 AM    HCT 28.9 03/20/2024 03:00 AM    MCV 93.5 03/20/2024 03:00 AM    MCH 29.8 03/20/2024 03:00 AM    MCHC 31.8 03/20/2024 03:00 AM    RDW 15.3 03/20/2024 03:00 AM    PLT 49 03/20/2024 03:00 AM    MPV 9.6 03/20/2024 03:00 AM     Lab Results   Component Value Date/Time     03/20/2024 03:00 AM    K 3.5 03/20/2024 03:00 AM    K 5.2 05/11/2023 08:52 PM     03/20/2024 03:00 AM    CO2 21 03/20/2024 03:00 AM    BUN 13 03/20/2024 03:00 AM    CREATININE 0.4 03/20/2024 03:00 AM    CALCIUM 8.6 03/20/2024 03:00 AM    PROT 6.2 03/20/2024 03:00 AM    LABALBU 2.9 03/20/2024 03:00 AM    BILITOT 1.0 03/20/2024 03:00 AM    ALKPHOS 430 03/20/2024 03:00 AM    AST 51 03/20/2024 03:00 AM     03/20/2024 03:00 AM     Lab Results   Component Value Date/Time    LIPASE 19 03/14/2024 03:25 AM     Lab Results   Component Value

## 2024-03-20 NOTE — CARE COORDINATION
CASE MANAGEMENT....Spoke with Latrice from Palliative Care. States she had meeting with patient's son, Mitchell and decision was made to keep patient Full Code. Met with Mitchell to discuss patients lifestyle, hospital stay and dc needs. States his mother lives with Faisal (boyfriend).  Says she can function independently at home, is normally A+O, but is always in a \"groggy\" state, which he believes has worsened over the last 6 months. States his mother was at [facility] in the past and is requesting for her to return. Discussed other choices, including, [facility] where they can provide physical and drug rehab. He is not interested. States he believes his mother will become frustrated if people talk to her about drugs. List of choices provided should [facility] not be able to accommodate. At this time dc needs are TBD. May require iv atbs/nutritional support etc.... Currently, has Left IJ, is receiving ivf and iv zosyn q8hrs. Had liver bx under anesthesia yesterday. PSA in room -will need to be sitter free x 24hrs before going to [facility]. Referral called to Coretta from [employer]. No answer. Left vm. Await input. Will cont to follow along and assist with needs accordingly.     UPDATE.....Noted order for tunneled PICC under IR. Spoke with Coretta from [employer]. She will review patient info. Await input.     UPDATE..... [facility] cannot accept patient. Son, Mitchell, notified and will review list for other choices. Will cont to follow.

## 2024-03-20 NOTE — PROGRESS NOTES
ENDOCRINOLOGY PROGRESS NOTE      Date of admission: 3/14/2024  Date of service: 3/20/2024  Admitting physician: Jair Jauregui MD   Primary Care Physician: No primary care provider on file.  Consultant physician: Wale Camejo MD     Reason for the consultation:  Uncontrolled DM    History of Present Illness:  53 y.o. female with a history of depression/anxiety, alcoholism, ascites of the liver, left breast cancer, chronic pancreatitis, cocaine abuse, DM 2, GERD, gout, hepatitis C, schizoaffective disorder bipolar type presents with abdominal pain endocrine service was consulted for diabetes management.    Subjective  I saw the patient this afternoon, glucose level high. Pt was started on nutritional supplement     Inpatient diet:   Carb Restricted diet     Point of care glucose monitoring   (Independently reviewed)   Recent Labs     03/18/24  2217 03/19/24  0604 03/19/24  1119 03/19/24  1732 03/19/24  2042 03/20/24  0626 03/20/24  1151 03/20/24  1604   POCGLU 136* 85 85 135* 138* 158* 326* 392*   Scheduled Meds:   [START ON 3/21/2024] insulin lispro  0-6 Units SubCUTAneous TID WC    magnesium sulfate  1,000 mg IntraVENous Once    Surgiflo with Evithrom Hemostatic Matrix  1 kit Other Once    sodium chloride flush  5-40 mL IntraVENous 2 times per day    insulin glargine  2 Units SubCUTAneous Nightly    insulin lispro  0-4 Units SubCUTAneous Nightly    Surgiflo with Evithrom Hemostatic Matrix  1 kit Nasal Once    Surgiflo with Evithrom Hemostatic Matrix  1 kit Other Once    mineral oil-hydrophilic petrolatum   Topical BID    piperacillin-tazobactam  3,375 mg IntraVENous Q8H    menthol-zinc oxide   Topical BID    sodium chloride flush  5-40 mL IntraVENous 2 times per day    [Held by provider] enoxaparin  30 mg SubCUTAneous Daily    anastrozole  1 mg Oral QAM    ARIPiprazole  20 mg Oral QAM    vitamin C  1,000 mg Oral QAM    calcium elemental  1,000 mg Oral QAM    melatonin  12 mg Oral Nightly    multivitamin  1  an underlying lesion   such as hepatocellular carcinoma. This area measures 7.1 x 5.6 cm. This was   visualized on the prior examination.   2. Moderate to large amount of ascites seen within the abdomen and pelvis.   3. Abnormal wall thickening identified throughout the small bowel loops   within the left flank. Findings may be related to the surrounding ascites.   4. Increased stool burden seen diffusely throughout the colon to suggest   clinical presentation of constipation. Mild wall thickening in which an   underlying colitis cannot be completely excluded. Findings may be related to   the surrounding ascites.   5. Nodularity involving the adrenal glands bilaterally.   6. Small umbilical hernia containing fat only.         US GALLBLADDER RUQ   Final Result   1. Cirrhotic morphology of the liver with no underlying mass or lesion   present.   2. Status post cholecystectomy. No biliary ductal dilatation.   3. Increased echogenicity within the renal cortex to suggest medical renal   disease. Cystic structure along the superior pole of the right kidney   measuring 4.6 x 4.8 cm.   4. Fluid identified surrounding the liver within the right upper quadrant.         IR ABSCESS DRAINAGE PERC    (Results Pending)       Medical Records/Labs/Images review:   I personally reviewed and summarized previous records   All labs and imaging were reviewed independently     ASSESSMENT & PLAN   Ellie ENGLAND Noam, a 53 y.o.-old female seen today for inpatient diabetes management    Diabetes Mellitus secondary to pancreatic insufficiency   Patient's diabetes is uncontrolled, the patient's diabetes is very brittle  Pt currently very altered and can not operate her insulin pump   BS running high now   We recommend the following DM regimen  Lantus 2 units nightly  Low-dose sliding scale ACHS  Continue glucose check with meals and at bedtime  Will titrate insulin dose based on the blood glucose trend & insulin requirement  Upon discharge, I will

## 2024-03-20 NOTE — PROGRESS NOTES
[facility]  [facility]  Progress Note    SUBJECTIVE:  Chief Complaint   Patient presents with    Abdominal Pain     Diffuse. History of cirrhosis. Patient stated she got up this morning and her abdomen was distened     Patient is calm and answering some questions today.  Sitter at bedside.  Patient has abdominal pain.    Review of systems:  Can not be obtained. Pt is ams    Medications:  Scheduled Meds:   magnesium sulfate  2,000 mg IntraVENous Once    potassium phosphate IVPB (CENTRAL LINE)  30 mmol IntraVENous Once    magnesium sulfate  1,000 mg IntraVENous Once    Surgiflo with Evithrom Hemostatic Matrix  1 kit Other Once    sodium chloride flush  5-40 mL IntraVENous 2 times per day    insulin glargine  2 Units SubCUTAneous Nightly    insulin lispro  0-4 Units SubCUTAneous TID WC    insulin lispro  0-4 Units SubCUTAneous Nightly    Surgiflo with Evithrom Hemostatic Matrix  1 kit Nasal Once    Surgiflo with Evithrom Hemostatic Matrix  1 kit Other Once    mineral oil-hydrophilic petrolatum   Topical BID    piperacillin-tazobactam  3,375 mg IntraVENous Q8H    menthol-zinc oxide   Topical BID    sodium chloride flush  5-40 mL IntraVENous 2 times per day    [Held by provider] enoxaparin  30 mg SubCUTAneous Daily    anastrozole  1 mg Oral QAM    ARIPiprazole  20 mg Oral QAM    vitamin C  1,000 mg Oral QAM    calcium elemental  1,000 mg Oral QAM    melatonin  12 mg Oral Nightly    multivitamin  1 tablet Oral QAM    rifAXIMin  550 mg Oral BID    thiamine  100 mg Oral Daily    ipratropium  0.5 mg Nebulization TID RT    VORTIoxetine HBr  10 mg Oral Nightly    lipase-protease-amylas  15,000 Units Oral TID WC    And    lipase-protease-amylase  20,000 Units Oral TID WC    pantoprazole (PROTONIX) 40 mg in sodium chloride (PF) 0.9 % 10 mL injection  40 mg IntraVENous Q12H    lactulose  20 g Oral TID    sodium chloride flush  5-40 mL IntraVENous 2 times per day     Continuous Infusions:   sodium    Blood cultures 3/16: negative (taken after antibiotics)  Liver aspiration culture Gram stain is showing rare PMNs and rare GPC in pairs.  Culture is in process  Liver biopsy pending:    ASSESSMENT:  Liver mass vs abscess: Likely sources cholangitis.  No sign of diverticulitis or recent bloodstream infection.  Status post IR guided aspiration sent for cultures and pathology  Substance use disorder:   Liver cirrhosis:   Encephalopathy:     PLAN:  Continue IV Zosyn   Pending cultures.  She will likely need a tunneled PICC placement in her neck.  Monitor labs    TITI MCNAMARA MD  10:11 AM  3/20/2024

## 2024-03-20 NOTE — PROGRESS NOTES
Mabel NP called to discuss patients request for something to drink. Discussed current diet and Mabel gave order to advance to clear liquids.

## 2024-03-20 NOTE — CONSULTS
Comprehensive Nutrition Assessment    Type and Reason for Visit:  Reassess, Consult    Nutrition Recommendations/Plan:     Continue current diet, would consider carb control diet if there is improvement in PO intake  Will modify ONS to Glucerna TID & Magic Cup daily  If pt agreeable, would consider alternative means of nutrition  Will monitor       Malnutrition Assessment:  Malnutrition Status:  Severe malnutrition (03/15/24 1432)    Context:  Social/Environmental Circumstances     Findings of the 6 clinical characteristics of malnutrition:  Energy Intake:  50% or less estimated energy requirements for 1 month or longer  Weight Loss:  Unable to assess (d/t cirrhosis)     Body Fat Loss:  Severe body fat loss Triceps, Buccal region   Muscle Mass Loss:  Severe muscle mass loss Temples (temporalis), Thigh (quadraceps), Calf (gastrocnemius)  Fluid Accumulation:  Unable to assess (d/t multifactorial)     Strength:  Not Performed    Nutrition Assessment:    Pt is s/p IR liver biopsy of liver mass. She remains at nutritional risk 2/2 severe malnutrition w/ encephalopathy. Diet advanced to regular, will modify ONS.    Nutrition Related Findings:    confused/sitter at bedside, I&Os WDL, no edema, abd soft/flat, +BS, diarrhea w/ FMS, lactulose, zenpep, cachectic, Na 148, Glu 152, LFTs trending down   Wound Type: None (Dried dark flaky areas rt ear and forehead No treatment needed for that.- per wound care)       Current Nutrition Intake & Therapies:    Average Meal Intake: 26-50%  Average Supplements Intake: Unable to assess  ADULT ORAL NUTRITION SUPPLEMENT; Breakfast, Lunch, Dinner; Clear Liquid Oral Supplement  ADULT DIET; Regular  ADULT ORAL NUTRITION SUPPLEMENT; Breakfast, Lunch, Dinner, HS Snack; Standard High Calorie/High Protein Oral Supplement    Anthropometric Measures:  Height: 152.4 cm (5')  Ideal Body Weight (IBW): 100 lbs (45 kg)    Admission Body Weight: 35.7 kg (78 lb 12.8 oz) (3/15 bed)  Current Body

## 2024-03-20 NOTE — PROGRESS NOTES
Palliative Care Department  [phone]  Palliative Care Progress Note  Provider Latrice Garvin, APRN - CNP     Ellie Santacruz  [id number]  Hospital Day: 7  Date of Initial Consult: 3/18/2024  Referring Provider: Jair Jauregui MD  Palliative Medicine was consulted for assistance with: Goals of care, end-stage disease    HPI:   Ellie Santacruz is a 53 y.o. with a medical history of anxiety, depression, alcoholic liver cirrhosis and ascites, breast cancer, drug abuse, chronic pancreatitis, diabetes, GERD, hepatitis C, polyneuropathy, schizoaffective disorder, who was admitted on 3/14/2024 from home with a CHIEF COMPLAINT of abdominal pain.  Patient states this morning while awakening developed generalized abdominal pain notes decision to abdomen.  Patient admits to crack cocaine use last known use 3/13.  Patient recently hospitalized on 3/4-3/5 Admitted to [facility] Generalized weakness, fatigue, and weight loss. She was noted with Hyperglycemia. Received NSS bolus and insulin in ED course. GI was consulted, however pt left AMA 0500. This admission, CT scan of the abdomen and pelvis was obtained and reported to show cirrhotic morphology with moderate to large ascites and abdominal wall thickening as well as increased stool burden and mild wall thickening of the colon.  GI, endocrinology, hepatobiliary, ID consulted.  Hepatobiliary feels the lesion is more consistent with an abscess and less so HCC.  Patient scheduled for procedure in IR to aspirate liver abscess.  Palliative medicine consulted for further assistance.    ASSESSMENT/PLAN:     Pertinent Hospital Diagnoses     Uncontrolled diabetes  Decompensated chronic cirrhosis with ascites-history hepatitis C  Acute encephalopathy  Liver mass  Chronic pancreatitis  Cocaine use disorder  Severe protein calorie malnutrition  History of breast centimeters  COPD    Palliative Care Encounter / Counseling Regarding Goals of Care  Please see detailed goals of care  overall health and wellbeing.  Questions answered at length.  We will follow.    OBJECTIVE:   Prognosis: Guarded    Physical Exam:  /73   Pulse 78   Temp (!) 96.4 °F (35.8 °C) (Axillary)   Resp 18   Ht 1.524 m (5')   Wt 31.9 kg (70 lb 5.2 oz)   SpO2 100%   BMI 13.73 kg/m²   Constitutional:  Thin, disoriented  Lungs:  Easy and unlabored respirations  Abd:  Soft  Ext:  Moving all extremities, no edema  Skin:  Warm and dry  Neuro:  Confused and disoriented    Objective data reviewed: labs, images, records, medication use, vitals, and chart    Discussed patient and the plan of care with the other IDT members: Floor Nurse, Patient, and Family    Time/Communication  Greater than 50% of time spent, total 50 minutes in counseling and coordination of care at the bedside regarding goals of care, diagnosis and prognosis, and see above.    Thank you for allowing Palliative Medicine to participate in the care of Ellie Santacruz.

## 2024-03-21 PROBLEM — K75.0 HEPATIC ABSCESS: Status: ACTIVE | Noted: 2024-03-21

## 2024-03-21 PROBLEM — K65.1 INTRA-ABDOMINAL ABSCESS (HCC): Status: ACTIVE | Noted: 2024-03-21

## 2024-03-21 LAB
ALBUMIN SERPL-MCNC: 2.4 G/DL (ref 3.5–5.2)
ALP SERPL-CCNC: 386 U/L (ref 35–104)
ALT SERPL-CCNC: 127 U/L (ref 0–32)
ANION GAP SERPL CALCULATED.3IONS-SCNC: 8 MMOL/L (ref 7–16)
AST SERPL-CCNC: 26 U/L (ref 0–31)
BASOPHILS # BLD: 0.01 K/UL (ref 0–0.2)
BASOPHILS NFR BLD: 0 % (ref 0–2)
BILIRUB SERPL-MCNC: 0.6 MG/DL (ref 0–1.2)
BUN SERPL-MCNC: 9 MG/DL (ref 6–20)
CALCIUM SERPL-MCNC: 8.1 MG/DL (ref 8.6–10.2)
CHLORIDE SERPL-SCNC: 104 MMOL/L (ref 98–107)
CO2 SERPL-SCNC: 22 MMOL/L (ref 22–29)
CREAT SERPL-MCNC: 0.4 MG/DL (ref 0.5–1)
EOSINOPHIL # BLD: 0.05 K/UL (ref 0.05–0.5)
EOSINOPHILS RELATIVE PERCENT: 2 % (ref 0–6)
ERYTHROCYTE [DISTWIDTH] IN BLOOD BY AUTOMATED COUNT: 15 % (ref 11.5–15)
GFR SERPL CREATININE-BSD FRML MDRD: >60 ML/MIN/1.73M2
GLUCOSE BLD-MCNC: 342 MG/DL (ref 74–99)
GLUCOSE BLD-MCNC: 350 MG/DL (ref 74–99)
GLUCOSE BLD-MCNC: 351 MG/DL (ref 74–99)
GLUCOSE BLD-MCNC: 370 MG/DL (ref 74–99)
GLUCOSE BLD-MCNC: 392 MG/DL (ref 74–99)
GLUCOSE BLD-MCNC: 439 MG/DL (ref 74–99)
GLUCOSE SERPL-MCNC: 313 MG/DL (ref 74–99)
HCT VFR BLD AUTO: 26.3 % (ref 34–48)
HGB BLD-MCNC: 8.5 G/DL (ref 11.5–15.5)
IMM GRANULOCYTES # BLD AUTO: <0.03 K/UL (ref 0–0.58)
IMM GRANULOCYTES NFR BLD: 0 % (ref 0–5)
LYMPHOCYTES NFR BLD: 0.43 K/UL (ref 1.5–4)
LYMPHOCYTES RELATIVE PERCENT: 15 % (ref 20–42)
MAGNESIUM SERPL-MCNC: 2 MG/DL (ref 1.6–2.6)
MCH RBC QN AUTO: 30.2 PG (ref 26–35)
MCHC RBC AUTO-ENTMCNC: 32.3 G/DL (ref 32–34.5)
MCV RBC AUTO: 93.6 FL (ref 80–99.9)
MICROORGANISM SPEC CULT: NORMAL
MONOCYTES NFR BLD: 0.23 K/UL (ref 0.1–0.95)
MONOCYTES NFR BLD: 8 % (ref 2–12)
NEUTROPHILS NFR BLD: 74 % (ref 43–80)
NEUTS SEG NFR BLD: 2.11 K/UL (ref 1.8–7.3)
PHOSPHATE SERPL-MCNC: 2.2 MG/DL (ref 2.5–4.5)
PLATELET # BLD AUTO: 51 K/UL (ref 130–450)
PLATELET CONFIRMATION: NORMAL
PMV BLD AUTO: 9.8 FL (ref 7–12)
POTASSIUM SERPL-SCNC: 3.6 MMOL/L (ref 3.5–5)
PROT SERPL-MCNC: 5.4 G/DL (ref 6.4–8.3)
RBC # BLD AUTO: 2.81 M/UL (ref 3.5–5.5)
RBC # BLD: ABNORMAL 10*6/UL
SERVICE CMNT-IMP: NORMAL
SODIUM SERPL-SCNC: 134 MMOL/L (ref 132–146)
SPECIMEN DESCRIPTION: NORMAL
WBC OTHER # BLD: 2.8 K/UL (ref 4.5–11.5)

## 2024-03-21 PROCEDURE — 2580000003 HC RX 258: Performed by: ANESTHESIOLOGY

## 2024-03-21 PROCEDURE — 99231 SBSQ HOSP IP/OBS SF/LOW 25: CPT | Performed by: NURSE PRACTITIONER

## 2024-03-21 PROCEDURE — 6370000000 HC RX 637 (ALT 250 FOR IP): Performed by: STUDENT IN AN ORGANIZED HEALTH CARE EDUCATION/TRAINING PROGRAM

## 2024-03-21 PROCEDURE — 2500000003 HC RX 250 WO HCPCS: Performed by: CLINICAL NURSE SPECIALIST

## 2024-03-21 PROCEDURE — 6370000000 HC RX 637 (ALT 250 FOR IP): Performed by: INTERNAL MEDICINE

## 2024-03-21 PROCEDURE — 2580000003 HC RX 258: Performed by: STUDENT IN AN ORGANIZED HEALTH CARE EDUCATION/TRAINING PROGRAM

## 2024-03-21 PROCEDURE — 6360000002 HC RX W HCPCS: Performed by: STUDENT IN AN ORGANIZED HEALTH CARE EDUCATION/TRAINING PROGRAM

## 2024-03-21 PROCEDURE — APPSS45 APP SPLIT SHARED TIME 31-45 MINUTES: Performed by: CLINICAL NURSE SPECIALIST

## 2024-03-21 PROCEDURE — 6360000002 HC RX W HCPCS: Performed by: HOSPITALIST

## 2024-03-21 PROCEDURE — A4216 STERILE WATER/SALINE, 10 ML: HCPCS | Performed by: HOSPITALIST

## 2024-03-21 PROCEDURE — 99232 SBSQ HOSP IP/OBS MODERATE 35: CPT | Performed by: TRANSPLANT SURGERY

## 2024-03-21 PROCEDURE — 2580000003 HC RX 258: Performed by: CLINICAL NURSE SPECIALIST

## 2024-03-21 PROCEDURE — 6370000000 HC RX 637 (ALT 250 FOR IP)

## 2024-03-21 PROCEDURE — 80053 COMPREHEN METABOLIC PANEL: CPT

## 2024-03-21 PROCEDURE — C9113 INJ PANTOPRAZOLE SODIUM, VIA: HCPCS | Performed by: HOSPITALIST

## 2024-03-21 PROCEDURE — 83735 ASSAY OF MAGNESIUM: CPT

## 2024-03-21 PROCEDURE — 2580000003 HC RX 258: Performed by: HOSPITALIST

## 2024-03-21 PROCEDURE — 99232 SBSQ HOSP IP/OBS MODERATE 35: CPT | Performed by: STUDENT IN AN ORGANIZED HEALTH CARE EDUCATION/TRAINING PROGRAM

## 2024-03-21 PROCEDURE — 85025 COMPLETE CBC W/AUTO DIFF WBC: CPT

## 2024-03-21 PROCEDURE — 84100 ASSAY OF PHOSPHORUS: CPT

## 2024-03-21 PROCEDURE — 2060000000 HC ICU INTERMEDIATE R&B

## 2024-03-21 PROCEDURE — 94640 AIRWAY INHALATION TREATMENT: CPT

## 2024-03-21 PROCEDURE — 82962 GLUCOSE BLOOD TEST: CPT

## 2024-03-21 RX ORDER — INSULIN LISPRO 100 [IU]/ML
6 INJECTION, SOLUTION INTRAVENOUS; SUBCUTANEOUS ONCE
Status: COMPLETED | OUTPATIENT
Start: 2024-03-21 | End: 2024-03-21

## 2024-03-21 RX ORDER — INSULIN LISPRO 100 [IU]/ML
3 INJECTION, SOLUTION INTRAVENOUS; SUBCUTANEOUS ONCE
Status: DISCONTINUED | OUTPATIENT
Start: 2024-03-21 | End: 2024-03-21

## 2024-03-21 RX ORDER — INSULIN GLARGINE 100 [IU]/ML
6 INJECTION, SOLUTION SUBCUTANEOUS NIGHTLY
Status: DISCONTINUED | OUTPATIENT
Start: 2024-03-21 | End: 2024-03-23

## 2024-03-21 RX ORDER — INSULIN LISPRO 100 [IU]/ML
4 INJECTION, SOLUTION INTRAVENOUS; SUBCUTANEOUS ONCE
Status: COMPLETED | OUTPATIENT
Start: 2024-03-21 | End: 2024-03-21

## 2024-03-21 RX ADMIN — SODIUM CHLORIDE, PRESERVATIVE FREE 40 MG: 5 INJECTION INTRAVENOUS at 00:14

## 2024-03-21 RX ADMIN — LACTULOSE 20 G: 20 SOLUTION ORAL at 21:00

## 2024-03-21 RX ADMIN — CALCIUM 1000 MG: 500 TABLET ORAL at 11:36

## 2024-03-21 RX ADMIN — PIPERACILLIN AND TAZOBACTAM 3375 MG: 3; .375 INJECTION, POWDER, FOR SOLUTION INTRAVENOUS at 00:18

## 2024-03-21 RX ADMIN — LACTULOSE 20 G: 20 SOLUTION ORAL at 16:51

## 2024-03-21 RX ADMIN — POTASSIUM PHOSPHATE, MONOBASIC AND POTASSIUM PHOSPHATE, DIBASIC 30 MMOL: 224; 236 INJECTION, SOLUTION, CONCENTRATE INTRAVENOUS at 09:51

## 2024-03-21 RX ADMIN — INSULIN LISPRO 4 UNITS: 100 INJECTION, SOLUTION INTRAVENOUS; SUBCUTANEOUS at 18:14

## 2024-03-21 RX ADMIN — SODIUM CHLORIDE, PRESERVATIVE FREE 10 ML: 5 INJECTION INTRAVENOUS at 00:21

## 2024-03-21 RX ADMIN — HYDROMORPHONE HYDROCHLORIDE 1 MG: 1 INJECTION, SOLUTION INTRAMUSCULAR; INTRAVENOUS; SUBCUTANEOUS at 18:23

## 2024-03-21 RX ADMIN — VORTIOXETINE 10 MG: 10 TABLET, FILM COATED ORAL at 21:00

## 2024-03-21 RX ADMIN — OXYCODONE HYDROCHLORIDE AND ACETAMINOPHEN 1000 MG: 500 TABLET ORAL at 11:36

## 2024-03-21 RX ADMIN — SODIUM CHLORIDE, PRESERVATIVE FREE 10 ML: 5 INJECTION INTRAVENOUS at 21:00

## 2024-03-21 RX ADMIN — INSULIN GLARGINE 6 UNITS: 100 INJECTION, SOLUTION SUBCUTANEOUS at 21:00

## 2024-03-21 RX ADMIN — IPRATROPIUM BROMIDE 0.5 MG: 0.5 SOLUTION RESPIRATORY (INHALATION) at 20:01

## 2024-03-21 RX ADMIN — HYDROPHOR: 42 OINTMENT TOPICAL at 09:00

## 2024-03-21 RX ADMIN — ANASTROZOLE 1 MG: 1 TABLET, FILM COATED ORAL at 16:52

## 2024-03-21 RX ADMIN — Medication 100 MG: at 11:45

## 2024-03-21 RX ADMIN — RIFAXIMIN 550 MG: 550 TABLET ORAL at 21:00

## 2024-03-21 RX ADMIN — IPRATROPIUM BROMIDE 0.5 MG: 0.5 SOLUTION RESPIRATORY (INHALATION) at 08:46

## 2024-03-21 RX ADMIN — INSULIN LISPRO 4 UNITS: 100 INJECTION, SOLUTION INTRAVENOUS; SUBCUTANEOUS at 06:48

## 2024-03-21 RX ADMIN — INSULIN LISPRO 4 UNITS: 100 INJECTION, SOLUTION INTRAVENOUS; SUBCUTANEOUS at 18:04

## 2024-03-21 RX ADMIN — ANORECTAL OINTMENT: 15.7; .44; 24; 20.6 OINTMENT TOPICAL at 09:00

## 2024-03-21 RX ADMIN — PANCRELIPASE LIPASE, PANCRELIPASE PROTEASE, PANCRELIPASE AMYLASE 15000 UNITS: 15000; 47000; 63000 CAPSULE, DELAYED RELEASE ORAL at 16:52

## 2024-03-21 RX ADMIN — Medication 12 MG: at 21:00

## 2024-03-21 RX ADMIN — HYDROMORPHONE HYDROCHLORIDE 1 MG: 1 INJECTION, SOLUTION INTRAMUSCULAR; INTRAVENOUS; SUBCUTANEOUS at 11:47

## 2024-03-21 RX ADMIN — ARIPIPRAZOLE 20 MG: 10 TABLET ORAL at 11:44

## 2024-03-21 RX ADMIN — PANCRELIPASE LIPASE, PANCRELIPASE PROTEASE, PANCRELIPASE AMYLASE 20000 UNITS: 20000; 63000; 84000 CAPSULE, DELAYED RELEASE ORAL at 11:37

## 2024-03-21 RX ADMIN — SODIUM CHLORIDE, PRESERVATIVE FREE 40 MG: 5 INJECTION INTRAVENOUS at 11:37

## 2024-03-21 RX ADMIN — PANCRELIPASE LIPASE, PANCRELIPASE PROTEASE, PANCRELIPASE AMYLASE 20000 UNITS: 20000; 63000; 84000 CAPSULE, DELAYED RELEASE ORAL at 16:51

## 2024-03-21 RX ADMIN — PANCRELIPASE LIPASE, PANCRELIPASE PROTEASE, PANCRELIPASE AMYLASE 15000 UNITS: 15000; 47000; 63000 CAPSULE, DELAYED RELEASE ORAL at 11:44

## 2024-03-21 RX ADMIN — INSULIN LISPRO 6 UNITS: 100 INJECTION, SOLUTION INTRAVENOUS; SUBCUTANEOUS at 00:18

## 2024-03-21 RX ADMIN — ANORECTAL OINTMENT: 15.7; .44; 24; 20.6 OINTMENT TOPICAL at 18:24

## 2024-03-21 RX ADMIN — PIPERACILLIN AND TAZOBACTAM 3375 MG: 3; .375 INJECTION, POWDER, FOR SOLUTION INTRAVENOUS at 11:56

## 2024-03-21 RX ADMIN — CALCIUM 1000 MG: 500 TABLET ORAL at 11:46

## 2024-03-21 RX ADMIN — OXYCODONE HYDROCHLORIDE AND ACETAMINOPHEN 1000 MG: 500 TABLET ORAL at 11:45

## 2024-03-21 RX ADMIN — MULTIVITAMIN TABLET 1 TABLET: TABLET at 11:45

## 2024-03-21 RX ADMIN — PIPERACILLIN AND TAZOBACTAM 3375 MG: 3; .375 INJECTION, POWDER, FOR SOLUTION INTRAVENOUS at 20:00

## 2024-03-21 RX ADMIN — INSULIN LISPRO 5 UNITS: 100 INJECTION, SOLUTION INTRAVENOUS; SUBCUTANEOUS at 12:13

## 2024-03-21 RX ADMIN — SODIUM CHLORIDE, PRESERVATIVE FREE 40 MG: 5 INJECTION INTRAVENOUS at 11:46

## 2024-03-21 RX ADMIN — Medication 100 MG: at 11:36

## 2024-03-21 RX ADMIN — RIFAXIMIN 550 MG: 550 TABLET ORAL at 11:44

## 2024-03-21 RX ADMIN — HYDROPHOR: 42 OINTMENT TOPICAL at 18:24

## 2024-03-21 RX ADMIN — POTASSIUM PHOSPHATE, MONOBASIC AND POTASSIUM PHOSPHATE, DIBASIC 30 MMOL: 224; 236 INJECTION, SOLUTION, CONCENTRATE INTRAVENOUS at 21:00

## 2024-03-21 RX ADMIN — IPRATROPIUM BROMIDE 0.5 MG: 0.5 SOLUTION RESPIRATORY (INHALATION) at 12:43

## 2024-03-21 RX ADMIN — MULTIVITAMIN TABLET 1 TABLET: TABLET at 11:37

## 2024-03-21 ASSESSMENT — PAIN DESCRIPTION - ORIENTATION
ORIENTATION: RIGHT
ORIENTATION: RIGHT

## 2024-03-21 ASSESSMENT — PAIN DESCRIPTION - LOCATION
LOCATION: ABDOMEN;BACK
LOCATION: ABDOMEN

## 2024-03-21 ASSESSMENT — PAIN DESCRIPTION - DESCRIPTORS
DESCRIPTORS: STABBING;TENDER
DESCRIPTORS: ACHING;TENDER

## 2024-03-21 ASSESSMENT — PAIN SCALES - GENERAL: PAINLEVEL_OUTOF10: 9

## 2024-03-21 ASSESSMENT — PAIN - FUNCTIONAL ASSESSMENT: PAIN_FUNCTIONAL_ASSESSMENT: PREVENTS OR INTERFERES WITH ALL ACTIVE AND SOME PASSIVE ACTIVITIES

## 2024-03-21 NOTE — PROGRESS NOTES
[facility]  [facility]  Progress Note    SUBJECTIVE:  Chief Complaint   Patient presents with    Abdominal Pain     Diffuse. History of cirrhosis. Patient stated she got up this morning and her abdomen was distened     Patient is calm and having lunch. Has sitter in bed. No fever. Still has abdominal pain and has some nausea. Asking for pain medication.     Review of systems:  As above.    Medications:  Scheduled Meds:   potassium phosphate IVPB (CENTRAL LINE)  30 mmol IntraVENous BID    insulin lispro  0-6 Units SubCUTAneous TID WC    magnesium sulfate  1,000 mg IntraVENous Once    Surgiflo with Evithrom Hemostatic Matrix  1 kit Other Once    sodium chloride flush  5-40 mL IntraVENous 2 times per day    insulin glargine  2 Units SubCUTAneous Nightly    insulin lispro  0-4 Units SubCUTAneous Nightly    Surgiflo with Evithrom Hemostatic Matrix  1 kit Nasal Once    Surgiflo with Evithrom Hemostatic Matrix  1 kit Other Once    mineral oil-hydrophilic petrolatum   Topical BID    piperacillin-tazobactam  3,375 mg IntraVENous Q8H    menthol-zinc oxide   Topical BID    sodium chloride flush  5-40 mL IntraVENous 2 times per day    [Held by provider] enoxaparin  30 mg SubCUTAneous Daily    anastrozole  1 mg Oral QAM    ARIPiprazole  20 mg Oral QAM    vitamin C  1,000 mg Oral QAM    calcium elemental  1,000 mg Oral QAM    melatonin  12 mg Oral Nightly    multivitamin  1 tablet Oral QAM    rifAXIMin  550 mg Oral BID    thiamine  100 mg Oral Daily    ipratropium  0.5 mg Nebulization TID RT    VORTIoxetine HBr  10 mg Oral Nightly    lipase-protease-amylas  15,000 Units Oral TID WC    And    lipase-protease-amylase  20,000 Units Oral TID WC    pantoprazole (PROTONIX) 40 mg in sodium chloride (PF) 0.9 % 10 mL injection  40 mg IntraVENous Q12H    lactulose  20 g Oral TID    sodium chloride flush  5-40 mL IntraVENous 2 times per day     Continuous Infusions:   sodium chloride      sodium chloride 75

## 2024-03-21 NOTE — PROGRESS NOTES
Mabel NP notified of HS , order received for 2 additional units humalog and recheck BS in 2 hours.

## 2024-03-21 NOTE — PROGRESS NOTES
Hepatobiliary and Pancreatic Surgery Progress Note    CC: Liver mass    Subjective: Patient more alert today.  Reports diffuse abd pain worse RUQ liver biopsy site 5/10.  Denies nausea or vomiting.  Per sitter at BS, pt eating fair, she had 1/4 pancakes and Ensure this am. Still incontinent stool, loose to watery, brown.    OBJECTIVE      Physical    /62   Pulse 66   Temp 98 °F (36.7 °C) (Axillary)   Resp 16   Ht 1.524 m (5')   Wt 31.7 kg (69 lb 14.2 oz)   SpO2 100%   BMI 13.65 kg/m²     GEN: Cachectic, more awake, more verbal and appropriate, oriented to person and place.   EYES: Sclera white, pupils equal round and reactive to light  ENMT: , trachea midline, ears externally intact  LYMPH: no obvious lympadenopathy in neck.   RESP: Respiratory effort was normal with no retractions or use of accessory muscles.  CV:  No pedal edema  GI/ Abdomen: Soft, mildly distended, diffuse mild tenderness to palpation without guarding or rebound > RUQ, no peritoneal signs  MSK: no clubbing/ no cyanosis/ gaitabnormal, ataxia    ASSESSMENT: 54 yo M with cocaine abuse, ETOHC abuse, hepatitis C, chronic pancreatitis, cirrhosis with liver decompensation (MELD Na = 9, Child-Marx 8B) and now 7cm right hepatic lobe mass in segments 5/ 6 S/P IR liver mass biopsies    PLAN:    - Await pathology  - LFTs downtrending   - Zosyn, has a triple lumen  - ID following, appreciate assistance  - She needs aggressive electrolyte replacement, K to 4, mag to 2 and phos to 4.  K-Phos today as ordered (Phos 2.2).  - GI following for cirrhosis management.   - Dr Weston prior discussed with her boyfriend at bedside that she would likely need placement at a facility at the time of discharge.  If she is to be on long-term antibiotics via a PICC in someone with substance abuse, this could pose some difficulty with her going home.  She has proven to him that she is unable to stop taking drugs when she is at home.  Suspect she will need to go to  a nursing facility.  - Contact Iso, C-diff r/o per BS RN, pt boyfrienmiguel also has been in room with bed bugs, bed bugs noted on him and chair per BS RN    Thank you for allowing me to take part in Ms. Santacruz's care.    Greater than or equal to 50 minutes was spent providing face-to-face patient care, including:  and coordinating care, reviewing the chart, labs, and diagnostics, as well as medical decision making. Greater than 50% of this time was spent instructing and counseling the patient face to face regarding findings and recommendations.    Case discussed with and plan per Dr Enrico Lee, APRN - CNP  3/21/2024 8:22 AM     Attending Physician Statement:    Chief Complaint:   Chief Complaint   Patient presents with    Abdominal Pain     Diffuse. History of cirrhosis. Patient stated she got up this morning and her abdomen was distened       I have examined the patient and performed the key aspects of physical exam, reviewed the record (including all pertinent and new radiology images and laboratory findings), and discussed the case with the surgical team.  I agree with the assessment and plan with the following additions, corrections, and changes. 14pt review of symptoms completed and negative except as mentioned.    Patient has hepatic abscess  Will ask IR to place a drain into the abscess, this is not a tumor  Hepatic decompensation secondary to hepatic abscess  Patient does not have cancer of her liver    Juan Rangel MD  03/21/24  8:49 AM

## 2024-03-21 NOTE — PROGRESS NOTES
BELL Monroe notified of rechecked blood sugar 351. No new orders given, recheck scheduled for 0600.

## 2024-03-21 NOTE — PROGRESS NOTES
[facility] Hospitalist Progress Note    Admitting Date and Time: 3/14/2024  3:54 AM  Admit Dx: Generalized abdominal pain [R10.84]  Liver mass [R16.0]  Elevated liver enzymes [R74.8]  Cocaine use [F14.90]    Subjective:  Patient reports R abdominal pain and feels her abdomen is distended    ROS: denies fever, chills, cp, sob, n/v, HA unless stated above.     potassium phosphate IVPB (CENTRAL LINE)  30 mmol IntraVENous BID    insulin lispro  0-6 Units SubCUTAneous TID WC    magnesium sulfate  1,000 mg IntraVENous Once    Surgiflo with Evithrom Hemostatic Matrix  1 kit Other Once    sodium chloride flush  5-40 mL IntraVENous 2 times per day    insulin glargine  2 Units SubCUTAneous Nightly    insulin lispro  0-4 Units SubCUTAneous Nightly    Surgiflo with Evithrom Hemostatic Matrix  1 kit Nasal Once    Surgiflo with Evithrom Hemostatic Matrix  1 kit Other Once    mineral oil-hydrophilic petrolatum   Topical BID    piperacillin-tazobactam  3,375 mg IntraVENous Q8H    menthol-zinc oxide   Topical BID    sodium chloride flush  5-40 mL IntraVENous 2 times per day    [Held by provider] enoxaparin  30 mg SubCUTAneous Daily    anastrozole  1 mg Oral QAM    ARIPiprazole  20 mg Oral QAM    vitamin C  1,000 mg Oral QAM    calcium elemental  1,000 mg Oral QAM    melatonin  12 mg Oral Nightly    multivitamin  1 tablet Oral QAM    rifAXIMin  550 mg Oral BID    thiamine  100 mg Oral Daily    ipratropium  0.5 mg Nebulization TID RT    VORTIoxetine HBr  10 mg Oral Nightly    lipase-protease-amylas  15,000 Units Oral TID WC    And    lipase-protease-amylase  20,000 Units Oral TID WC    pantoprazole (PROTONIX) 40 mg in sodium chloride (PF) 0.9 % 10 mL injection  40 mg IntraVENous Q12H    lactulose  20 g Oral TID    sodium chloride flush  5-40 mL IntraVENous 2 times per day     Glucose, 15 g, PRN  HYDROmorphone, 0.5 mg, Q2H PRN   Or  HYDROmorphone, 1 mg, Q2H PRN  naloxone 0.4 mg in 10 mL sodium chloride syringe, , PRN  sodium  Triphasic CT showed hypodense mass in segment 6 of the liver. Hepatobiliary Surgery team reviewed images and says more consistent with abscess.  Continue Zosyn. ID consult, appreciate recommendations. IR obtained biopsy, follow results. Drain to be placed today per HBS  Chronic pancreatitis - on Creon  Pancytopenia - likely 2/2 #2.  Continue to monitor  Cocaine use disorder - patient admits to using crack cocaine, UDS positive for cocaine, cessation counseling offered  Severe protein calorie malnutrition - visible muscle and subcutaneous fat wasting, bitemporal wasting. Back on diet with oral supplement with meals, appreciate dietitian recommendation  Hypokalemia - likely 2/2 #8. Continue to replace potassium aggressively   GERD - continue PPI  Hx left breast cancer - on Arimidex  COPD - does not appear to be in acute exacerbation. Continue breathing treatments  Pressure injury to right ear and forehead - wound care consulted  Hypernatremia - 2/2 poor PO intake.  Improving.  Continue 1/2 NS at 75 cc/h. Had significant drop today so will stop 1/2NS. Repeat bmp.     Code Status: Full code  DVT/GI ppx: Lovenox/Protonix      NOTE: This report was transcribed using voice recognition software. Every effort was made to ensure accuracy; however, inadvertent computerized transcription errors may be present.  Electronically signed by Evelyn Calvert MD on 3/21/2024 at 1:49 PM

## 2024-03-21 NOTE — CARE COORDINATION
CASE MANAGEMENT.... Called son, Mitchell, to discuss breann choices. He lost the list and was unable to chose a facility. Requested another list be emailed to him at [email or web address]. Email sent. Instructed him to provide 3 choices by end of day today. States he will have choices soon. Spoke with patients significant other, Faisal. Educated him on the Healthcare POA decision maker hierarchy and therefore this appoints patients son, Mitchell, as POA. He voices an understanding. In the meantime, Ellie had liver bx on 3/19/24. Order in now for hepatic abscess drain placement under IR. ID following. She is currently receiving iv zosyn q8hrs. Antibiotic for dc pending final cx. Will need PICC line. Has Left IJ now. PSA remains in room. Nursing concerned she will pull at IJ. Will need to be sitter free x 24 hrs. PT/OT ordered and requested to see by tomorrow am. Will continue to follow along and assist with needs accordingly.     UPDATE, 8225.... Have not received email with list of choices. Called Mitchell and left  requesting breann choices.

## 2024-03-21 NOTE — PROGRESS NOTES
ENDOCRINOLOGY PROGRESS NOTE      Date of admission: 3/14/2024  Date of service: 3/21/2024  Admitting physician: Jair Jauregui MD   Primary Care Physician: No primary care provider on file.  Consultant physician: Wale Camejo MD     Reason for the consultation:  Uncontrolled DM    History of Present Illness:  53 y.o. female with a history of depression/anxiety, alcoholism, ascites of the liver, left breast cancer, chronic pancreatitis, cocaine abuse, DM 2, GERD, gout, hepatitis C, schizoaffective disorder bipolar type presents with abdominal pain endocrine service was consulted for diabetes management.    Subjective  I saw and examined the patient at bedside this morning.  No acute events overnight. Glucose level is still high    Inpatient diet:   Carb Restricted diet     Point of care glucose monitoring   (Independently reviewed)   Recent Labs     03/20/24  0626 03/20/24  1151 03/20/24  1604 03/20/24  2212 03/21/24  0001 03/21/24  0206 03/21/24  0646 03/21/24  1150   POCGLU 158* 326* 392* 473* 439* 351* 350* 392*   Scheduled Meds:   potassium phosphate IVPB (CENTRAL LINE)  30 mmol IntraVENous BID    insulin glargine  6 Units SubCUTAneous Nightly    insulin lispro  0-6 Units SubCUTAneous TID WC    magnesium sulfate  1,000 mg IntraVENous Once    Surgiflo with Evithrom Hemostatic Matrix  1 kit Other Once    sodium chloride flush  5-40 mL IntraVENous 2 times per day    insulin lispro  0-4 Units SubCUTAneous Nightly    Surgiflo with Evithrom Hemostatic Matrix  1 kit Nasal Once    Surgiflo with Evithrom Hemostatic Matrix  1 kit Other Once    mineral oil-hydrophilic petrolatum   Topical BID    piperacillin-tazobactam  3,375 mg IntraVENous Q8H    menthol-zinc oxide   Topical BID    sodium chloride flush  5-40 mL IntraVENous 2 times per day    [Held by provider] enoxaparin  30 mg SubCUTAneous Daily    anastrozole  1 mg Oral QAM    ARIPiprazole  20 mg Oral QAM    vitamin C  1,000 mg Oral QAM    calcium elemental  1,000  maintenance and prevention    Chronic pancreatitis/pancreatic insufficiency   Close monitor glucose as pt at high risk of hypoglycemia   On Creon     Interdisciplinary plan for communication with healthcare providers:   Consult recommendations were discussed with the Primary Service/Nursing staff      Thank you for allowing us to participate in the care of this patient. Please do not hesitate to contact us with any additional questions.     Wale Camejo MD  Endocrinologist, [facility]   [address] [place] [id number]   Phone: [phone]  Fax: [phone]  --------------------------------------------  An electronic signature was used to authenticate this note. Wale Camejo MD on 3/21/2024 at 5:59 PM

## 2024-03-21 NOTE — ACP (ADVANCE CARE PLANNING)
Advance Care Planning   Healthcare Decision Maker:    Primary Decision Maker: Mitchell Santacruz - Child - [phone]    Today we documented Decision Maker(s) consistent with Legal Next of Kin hierarchy.

## 2024-03-21 NOTE — PROGRESS NOTES
Spoke with Mitchell Santacruz, son and POA, to notify and inform him of procedure tomorrow and asked if he could be available for phone consent- Mitchell agreed and verbalized understanding of conversation. No alternate phone number given and asked to be reached at [phone].

## 2024-03-21 NOTE — PROGRESS NOTES
Palliative Care Department  [phone]  Palliative Care Progress Note  Provider Latrice Garvin, APRN - CNP     Ellie Santacruz  [id number]  Hospital Day: 8  Date of Initial Consult: 3/18/2024  Referring Provider: Jair Jauregui MD  Palliative Medicine was consulted for assistance with: Goals of care, end-stage disease    HPI:   Ellie Santacruz is a 53 y.o. with a medical history of anxiety, depression, alcoholic liver cirrhosis and ascites, breast cancer, drug abuse, chronic pancreatitis, diabetes, GERD, hepatitis C, polyneuropathy, schizoaffective disorder, who was admitted on 3/14/2024 from home with a CHIEF COMPLAINT of abdominal pain.  Patient states this morning while awakening developed generalized abdominal pain notes decision to abdomen.  Patient admits to crack cocaine use last known use 3/13.  Patient recently hospitalized on 3/4-3/5 Admitted to [facility] Generalized weakness, fatigue, and weight loss. She was noted with Hyperglycemia. Received NSS bolus and insulin in ED course. GI was consulted, however pt left AMA 0500. This admission, CT scan of the abdomen and pelvis was obtained and reported to show cirrhotic morphology with moderate to large ascites and abdominal wall thickening as well as increased stool burden and mild wall thickening of the colon.  GI, endocrinology, hepatobiliary, ID consulted.  Hepatobiliary feels the lesion is more consistent with an abscess and less so HCC.  Patient scheduled for procedure in IR to aspirate liver abscess.  Palliative medicine consulted for further assistance.    ASSESSMENT/PLAN:     Pertinent Hospital Diagnoses     Uncontrolled diabetes  Decompensated chronic cirrhosis with ascites-history hepatitis C  Acute encephalopathy  Liver mass  Chronic pancreatitis  Cocaine use disorder  Severe protein calorie malnutrition  History of breast centimeters  COPD    Palliative Care Encounter / Counseling Regarding Goals of Care  Please see detailed goals of care  discussion as below  At this time, Ellie Santacruz, Does Not have capacity for medical decision-making.  Capacity is time limited and situation/question specific  Outcome of goals of care meeting:   Continue full code  Continue current management  Code status Full Code  Advanced Directives: no POA or living will in [facility]  Surrogate/Legal NOK:   Mitchell Santacruz, son, [phone]  Lydia Santacruz, daughter, [phone]  Faisal Gonzalez (domestic partner) [phone]    Spiritual assessment: no spiritual distress identified  Bereavement and grief: to be determined  Referrals to: none today  SUBJECTIVE:     Current medical issues leading to Palliative Medicine involvement include   Active Hospital Problems    Diagnosis Date Noted    Ascites due to alcoholic cirrhosis (HCC) [K70.31] 10/13/2022     Priority: Medium    Poorly controlled diabetes mellitus (HCC) [E11.65] 05/16/2016     Priority: Medium    Hepatic abscess [K75.0] 03/21/2024    Intra-abdominal abscess (HCC) [K65.1] 03/21/2024    Pancreatic insufficiency [K86.89] 03/16/2024    Portal hypertension (HCC) [K76.6] 03/15/2024    Severe protein-calorie malnutrition (HCC) [E43] 03/15/2024    Liver mass [R16.0] 03/14/2024    Generalized abdominal pain [R10.84] 03/14/2024    Elevated liver enzymes [R74.8] 03/14/2024       Details of Conversation: Chart reviewed and patient seen.  Patient is in bed, sitter is at the bedside.  She is asleep, awakens briefly to voice.  She is lethargic.  There is no family at the bedside.  Received message that Mitchell, son, is requesting paperwork from [employer].  Attempted to call Mitchell to discuss however no answer message left.  Await callback.    OBJECTIVE:   Prognosis: Guarded    Physical Exam:  /70   Pulse 69   Temp 98.2 °F (36.8 °C) (Axillary)   Resp 16   Ht 1.524 m (5')   Wt 31.7 kg (69 lb 14.2 oz)   SpO2 97%   BMI 13.65 kg/m²   Constitutional:  Thin, lethargic  Lungs:  Easy and unlabored respirations  Abd:

## 2024-03-21 NOTE — PROGRESS NOTES
Spoke with boyfriend re:only visiting patient and not raoming around, or going anywhere else but to leave due to bed bugs.  In agreeance.    ENRICO Andrade  10:30 AM

## 2024-03-21 NOTE — PROGRESS NOTES
PROGRESS NOTE  By Олег Mitchell M.D.    [facility]  Dr. Lakhwinder Madera M.D.,  Dr. Simón Esquivel M.D.,   THOMAS Mendoza.O.,  Dr. Royer Coppola M.D.,  Dr. Robbin Mitchell D.O.,          Ellie Santacruz  53 y.o.  female    SUBJECTIVE:  No new complaints.  Denies nausea vomiting.  Persistent abdominal pain.  No family at bedside    OBJECTIVE:    /70   Pulse 69   Temp 98.2 °F (36.8 °C) (Axillary)   Resp 16   Ht 1.524 m (5')   Wt 31.7 kg (69 lb 14.2 oz)   SpO2 97%   BMI 13.65 kg/m²     General: NAD/ill-appearing, cachectic  female.  HEENT: Anicteric sclera/moist oral mucosa  Neck: Supple with trachea midline  Chest: Symmetric excursion/nonlabored respirations  Cor: Regular/S1-S2  Abd.: Soft and not distended  Extr.:  No peripheral edema/decreased muscle tone and bulk throughout  Skin: Warm and dry/anicteric      DATA:    Monitor data reviewed - sinus rhythm noted.       Lab Results   Component Value Date/Time    WBC 2.8 03/21/2024 04:35 AM    RBC 2.81 03/21/2024 04:35 AM    HGB 8.5 03/21/2024 04:35 AM    HCT 26.3 03/21/2024 04:35 AM    MCV 93.6 03/21/2024 04:35 AM    MCH 30.2 03/21/2024 04:35 AM    MCHC 32.3 03/21/2024 04:35 AM    RDW 15.0 03/21/2024 04:35 AM    PLT 51 03/21/2024 04:35 AM    MPV 9.8 03/21/2024 04:35 AM     Lab Results   Component Value Date/Time     03/21/2024 04:35 AM    K 3.6 03/21/2024 04:35 AM    K 5.2 05/11/2023 08:52 PM     03/21/2024 04:35 AM    CO2 22 03/21/2024 04:35 AM    BUN 9 03/21/2024 04:35 AM    CREATININE 0.4 03/21/2024 04:35 AM    CALCIUM 8.1 03/21/2024 04:35 AM    PROT 5.4 03/21/2024 04:35 AM    LABALBU 2.4 03/21/2024 04:35 AM    BILITOT 0.6 03/21/2024 04:35 AM    ALKPHOS 386 03/21/2024 04:35 AM    AST 26 03/21/2024 04:35 AM     03/21/2024 04:35 AM     Lab Results   Component Value Date/Time    LIPASE 19 03/14/2024 03:25 AM     Lab Results   Component Value Date/Time    AMYLASE 9 09/11/2021 01:40 PM

## 2024-03-22 ENCOUNTER — ANESTHESIA (OUTPATIENT)
Dept: CT IMAGING | Age: 54
End: 2024-03-22
Payer: COMMERCIAL

## 2024-03-22 ENCOUNTER — ANESTHESIA EVENT (OUTPATIENT)
Dept: CT IMAGING | Age: 54
End: 2024-03-22
Payer: COMMERCIAL

## 2024-03-22 ENCOUNTER — APPOINTMENT (OUTPATIENT)
Dept: CT IMAGING | Age: 54
DRG: 279 | End: 2024-03-22
Payer: COMMERCIAL

## 2024-03-22 LAB
ANION GAP SERPL CALCULATED.3IONS-SCNC: 8 MMOL/L (ref 7–16)
BASOPHILS # BLD: 0 K/UL (ref 0–0.2)
BASOPHILS NFR BLD: 0 % (ref 0–2)
BUN SERPL-MCNC: 6 MG/DL (ref 6–20)
CALCIUM SERPL-MCNC: 8 MG/DL (ref 8.6–10.2)
CHLORIDE SERPL-SCNC: 105 MMOL/L (ref 98–107)
CO2 SERPL-SCNC: 23 MMOL/L (ref 22–29)
CREAT SERPL-MCNC: 0.3 MG/DL (ref 0.5–1)
EOSINOPHIL # BLD: 0.01 K/UL (ref 0.05–0.5)
EOSINOPHILS RELATIVE PERCENT: 0 % (ref 0–6)
ERYTHROCYTE [DISTWIDTH] IN BLOOD BY AUTOMATED COUNT: 14.9 % (ref 11.5–15)
GFR SERPL CREATININE-BSD FRML MDRD: >60 ML/MIN/1.73M2
GLUCOSE BLD-MCNC: 214 MG/DL (ref 74–99)
GLUCOSE BLD-MCNC: 269 MG/DL (ref 74–99)
GLUCOSE BLD-MCNC: 299 MG/DL (ref 74–99)
GLUCOSE BLD-MCNC: 360 MG/DL (ref 74–99)
GLUCOSE SERPL-MCNC: 255 MG/DL (ref 74–99)
HCT VFR BLD AUTO: 25.7 % (ref 34–48)
HGB BLD-MCNC: 8.1 G/DL (ref 11.5–15.5)
IMM GRANULOCYTES # BLD AUTO: 0.03 K/UL (ref 0–0.58)
IMM GRANULOCYTES NFR BLD: 1 % (ref 0–5)
LYMPHOCYTES NFR BLD: 0.33 K/UL (ref 1.5–4)
LYMPHOCYTES RELATIVE PERCENT: 9 % (ref 20–42)
MAGNESIUM SERPL-MCNC: 1.8 MG/DL (ref 1.6–2.6)
MCH RBC QN AUTO: 29.8 PG (ref 26–35)
MCHC RBC AUTO-ENTMCNC: 31.5 G/DL (ref 32–34.5)
MCV RBC AUTO: 94.5 FL (ref 80–99.9)
MICROORGANISM SPEC CULT: ABNORMAL
MICROORGANISM/AGENT SPEC: ABNORMAL
MONOCYTES NFR BLD: 0.27 K/UL (ref 0.1–0.95)
MONOCYTES NFR BLD: 7 % (ref 2–12)
NEUTROPHILS NFR BLD: 83 % (ref 43–80)
NEUTS SEG NFR BLD: 3.23 K/UL (ref 1.8–7.3)
PHOSPHATE SERPL-MCNC: 3.4 MG/DL (ref 2.5–4.5)
PLATELET CONFIRMATION: NORMAL
PLATELET, FLUORESCENCE: 52 K/UL (ref 130–450)
PMV BLD AUTO: 10.8 FL (ref 7–12)
POTASSIUM SERPL-SCNC: 4.2 MMOL/L (ref 3.5–5)
RBC # BLD AUTO: 2.72 M/UL (ref 3.5–5.5)
RBC # BLD: ABNORMAL 10*6/UL
SODIUM SERPL-SCNC: 136 MMOL/L (ref 132–146)
SPECIMEN DESCRIPTION: ABNORMAL
WBC OTHER # BLD: 3.9 K/UL (ref 4.5–11.5)

## 2024-03-22 PROCEDURE — 82962 GLUCOSE BLOOD TEST: CPT

## 2024-03-22 PROCEDURE — 99232 SBSQ HOSP IP/OBS MODERATE 35: CPT | Performed by: INTERNAL MEDICINE

## 2024-03-22 PROCEDURE — 2580000003 HC RX 258: Performed by: HOSPITALIST

## 2024-03-22 PROCEDURE — 6360000002 HC RX W HCPCS: Performed by: NURSE ANESTHETIST, CERTIFIED REGISTERED

## 2024-03-22 PROCEDURE — 6360000002 HC RX W HCPCS: Performed by: HOSPITALIST

## 2024-03-22 PROCEDURE — 3700000001 HC ADD 15 MINUTES (ANESTHESIA)

## 2024-03-22 PROCEDURE — 7100000011 HC PHASE II RECOVERY - ADDTL 15 MIN

## 2024-03-22 PROCEDURE — C1769 GUIDE WIRE: HCPCS

## 2024-03-22 PROCEDURE — 6370000000 HC RX 637 (ALT 250 FOR IP): Performed by: INTERNAL MEDICINE

## 2024-03-22 PROCEDURE — 2580000003 HC RX 258: Performed by: ANESTHESIOLOGY

## 2024-03-22 PROCEDURE — 87077 CULTURE AEROBIC IDENTIFY: CPT

## 2024-03-22 PROCEDURE — 87075 CULTR BACTERIA EXCEPT BLOOD: CPT

## 2024-03-22 PROCEDURE — 99232 SBSQ HOSP IP/OBS MODERATE 35: CPT | Performed by: STUDENT IN AN ORGANIZED HEALTH CARE EDUCATION/TRAINING PROGRAM

## 2024-03-22 PROCEDURE — 6360000002 HC RX W HCPCS

## 2024-03-22 PROCEDURE — 85025 COMPLETE CBC W/AUTO DIFF WBC: CPT

## 2024-03-22 PROCEDURE — 6360000002 HC RX W HCPCS: Performed by: STUDENT IN AN ORGANIZED HEALTH CARE EDUCATION/TRAINING PROGRAM

## 2024-03-22 PROCEDURE — 2580000003 HC RX 258: Performed by: NURSE ANESTHETIST, CERTIFIED REGISTERED

## 2024-03-22 PROCEDURE — 02HV33Z INSERTION OF INFUSION DEVICE INTO SUPERIOR VENA CAVA, PERCUTANEOUS APPROACH: ICD-10-PCS | Performed by: RADIOLOGY

## 2024-03-22 PROCEDURE — 2500000003 HC RX 250 WO HCPCS: Performed by: NURSE ANESTHETIST, CERTIFIED REGISTERED

## 2024-03-22 PROCEDURE — 6370000000 HC RX 637 (ALT 250 FOR IP): Performed by: STUDENT IN AN ORGANIZED HEALTH CARE EDUCATION/TRAINING PROGRAM

## 2024-03-22 PROCEDURE — 87070 CULTURE OTHR SPECIMN AEROBIC: CPT

## 2024-03-22 PROCEDURE — 97165 OT EVAL LOW COMPLEX 30 MIN: CPT

## 2024-03-22 PROCEDURE — 0F9130Z DRAINAGE OF RIGHT LOBE LIVER WITH DRAINAGE DEVICE, PERCUTANEOUS APPROACH: ICD-10-PCS | Performed by: RADIOLOGY

## 2024-03-22 PROCEDURE — 84100 ASSAY OF PHOSPHORUS: CPT

## 2024-03-22 PROCEDURE — 2500000003 HC RX 250 WO HCPCS: Performed by: RADIOLOGY

## 2024-03-22 PROCEDURE — 2580000003 HC RX 258: Performed by: STUDENT IN AN ORGANIZED HEALTH CARE EDUCATION/TRAINING PROGRAM

## 2024-03-22 PROCEDURE — A4216 STERILE WATER/SALINE, 10 ML: HCPCS | Performed by: HOSPITALIST

## 2024-03-22 PROCEDURE — 97161 PT EVAL LOW COMPLEX 20 MIN: CPT

## 2024-03-22 PROCEDURE — 7100000010 HC PHASE II RECOVERY - FIRST 15 MIN

## 2024-03-22 PROCEDURE — 0JH63XZ INSERTION OF TUNNELED VASCULAR ACCESS DEVICE INTO CHEST SUBCUTANEOUS TISSUE AND FASCIA, PERCUTANEOUS APPROACH: ICD-10-PCS | Performed by: RADIOLOGY

## 2024-03-22 PROCEDURE — 87102 FUNGUS ISOLATION CULTURE: CPT

## 2024-03-22 PROCEDURE — 2060000000 HC ICU INTERMEDIATE R&B

## 2024-03-22 PROCEDURE — 3700000000 HC ANESTHESIA ATTENDED CARE

## 2024-03-22 PROCEDURE — 83735 ASSAY OF MAGNESIUM: CPT

## 2024-03-22 PROCEDURE — C9113 INJ PANTOPRAZOLE SODIUM, VIA: HCPCS | Performed by: HOSPITALIST

## 2024-03-22 PROCEDURE — 80048 BASIC METABOLIC PNL TOTAL CA: CPT

## 2024-03-22 PROCEDURE — 2500000003 HC RX 250 WO HCPCS

## 2024-03-22 PROCEDURE — 87205 SMEAR GRAM STAIN: CPT

## 2024-03-22 RX ORDER — LIDOCAINE HYDROCHLORIDE 20 MG/ML
INJECTION, SOLUTION INFILTRATION; PERINEURAL PRN
Status: DISCONTINUED | OUTPATIENT
Start: 2024-03-22 | End: 2024-03-22 | Stop reason: SDUPTHER

## 2024-03-22 RX ORDER — SODIUM CHLORIDE 0.9 % (FLUSH) 0.9 %
5-40 SYRINGE (ML) INJECTION PRN
Status: DISCONTINUED | OUTPATIENT
Start: 2024-03-22 | End: 2024-03-28 | Stop reason: HOSPADM

## 2024-03-22 RX ORDER — DIPHENHYDRAMINE HYDROCHLORIDE 50 MG/ML
12.5 INJECTION INTRAMUSCULAR; INTRAVENOUS
Status: ACTIVE | OUTPATIENT
Start: 2024-03-22 | End: 2024-03-23

## 2024-03-22 RX ORDER — MIDAZOLAM HYDROCHLORIDE 1 MG/ML
INJECTION INTRAMUSCULAR; INTRAVENOUS PRN
Status: DISCONTINUED | OUTPATIENT
Start: 2024-03-22 | End: 2024-03-22 | Stop reason: SDUPTHER

## 2024-03-22 RX ORDER — MIDAZOLAM HYDROCHLORIDE 1 MG/ML
INJECTION INTRAMUSCULAR; INTRAVENOUS
Status: COMPLETED
Start: 2024-03-22 | End: 2024-03-22

## 2024-03-22 RX ORDER — SODIUM CHLORIDE 9 MG/ML
INJECTION, SOLUTION INTRAVENOUS PRN
Status: DISCONTINUED | OUTPATIENT
Start: 2024-03-22 | End: 2024-03-28 | Stop reason: HOSPADM

## 2024-03-22 RX ORDER — FENTANYL CITRATE 50 UG/ML
INJECTION, SOLUTION INTRAMUSCULAR; INTRAVENOUS
Status: COMPLETED
Start: 2024-03-22 | End: 2024-03-22

## 2024-03-22 RX ORDER — SODIUM CHLORIDE 0.9 % (FLUSH) 0.9 %
5-40 SYRINGE (ML) INJECTION EVERY 12 HOURS SCHEDULED
Status: DISCONTINUED | OUTPATIENT
Start: 2024-03-22 | End: 2024-03-24

## 2024-03-22 RX ORDER — INSULIN GLARGINE 100 [IU]/ML
3 INJECTION, SOLUTION SUBCUTANEOUS EVERY MORNING
Status: DISCONTINUED | OUTPATIENT
Start: 2024-03-22 | End: 2024-03-23

## 2024-03-22 RX ORDER — LACTULOSE 10 G/15ML
20 SOLUTION ORAL 2 TIMES DAILY
Status: DISCONTINUED | OUTPATIENT
Start: 2024-03-22 | End: 2024-03-28 | Stop reason: HOSPADM

## 2024-03-22 RX ORDER — NALOXONE HYDROCHLORIDE 0.4 MG/ML
INJECTION, SOLUTION INTRAMUSCULAR; INTRAVENOUS; SUBCUTANEOUS PRN
Status: DISCONTINUED | OUTPATIENT
Start: 2024-03-22 | End: 2024-03-28 | Stop reason: HOSPADM

## 2024-03-22 RX ORDER — FENTANYL CITRATE 50 UG/ML
INJECTION, SOLUTION INTRAMUSCULAR; INTRAVENOUS PRN
Status: DISCONTINUED | OUTPATIENT
Start: 2024-03-22 | End: 2024-03-22 | Stop reason: SDUPTHER

## 2024-03-22 RX ORDER — PROPOFOL 10 MG/ML
INJECTION, EMULSION INTRAVENOUS PRN
Status: DISCONTINUED | OUTPATIENT
Start: 2024-03-22 | End: 2024-03-22 | Stop reason: SDUPTHER

## 2024-03-22 RX ORDER — SODIUM CHLORIDE 9 MG/ML
INJECTION, SOLUTION INTRAVENOUS CONTINUOUS PRN
Status: DISCONTINUED | OUTPATIENT
Start: 2024-03-22 | End: 2024-03-22 | Stop reason: SDUPTHER

## 2024-03-22 RX ORDER — FENTANYL CITRATE 50 UG/ML
25 INJECTION, SOLUTION INTRAMUSCULAR; INTRAVENOUS EVERY 5 MIN PRN
Status: DISCONTINUED | OUTPATIENT
Start: 2024-03-22 | End: 2024-03-23 | Stop reason: ALTCHOICE

## 2024-03-22 RX ORDER — PROCHLORPERAZINE EDISYLATE 5 MG/ML
5 INJECTION INTRAMUSCULAR; INTRAVENOUS
Status: DISCONTINUED | OUTPATIENT
Start: 2024-03-22 | End: 2024-03-23 | Stop reason: ALTCHOICE

## 2024-03-22 RX ORDER — INSULIN LISPRO 100 [IU]/ML
2 INJECTION, SOLUTION INTRAVENOUS; SUBCUTANEOUS
Status: DISCONTINUED | OUTPATIENT
Start: 2024-03-22 | End: 2024-03-23

## 2024-03-22 RX ORDER — LIDOCAINE HYDROCHLORIDE 20 MG/ML
INJECTION, SOLUTION INFILTRATION; PERINEURAL PRN
Status: COMPLETED | OUTPATIENT
Start: 2024-03-22 | End: 2024-03-22

## 2024-03-22 RX ORDER — NICOTINE 21 MG/24HR
1 PATCH, TRANSDERMAL 24 HOURS TRANSDERMAL DAILY
Status: DISCONTINUED | OUTPATIENT
Start: 2024-03-22 | End: 2024-03-28 | Stop reason: HOSPADM

## 2024-03-22 RX ORDER — INSULIN LISPRO 100 [IU]/ML
2 INJECTION, SOLUTION INTRAVENOUS; SUBCUTANEOUS ONCE
Status: DISCONTINUED | OUTPATIENT
Start: 2024-03-22 | End: 2024-03-22

## 2024-03-22 RX ADMIN — SODIUM CHLORIDE, PRESERVATIVE FREE 10 ML: 5 INJECTION INTRAVENOUS at 11:45

## 2024-03-22 RX ADMIN — INSULIN LISPRO 3 UNITS: 100 INJECTION, SOLUTION INTRAVENOUS; SUBCUTANEOUS at 06:55

## 2024-03-22 RX ADMIN — Medication 100 MG: at 11:41

## 2024-03-22 RX ADMIN — MULTIVITAMIN TABLET 1 TABLET: TABLET at 11:41

## 2024-03-22 RX ADMIN — HYDROMORPHONE HYDROCHLORIDE 1 MG: 1 INJECTION, SOLUTION INTRAMUSCULAR; INTRAVENOUS; SUBCUTANEOUS at 21:00

## 2024-03-22 RX ADMIN — ARIPIPRAZOLE 20 MG: 10 TABLET ORAL at 11:43

## 2024-03-22 RX ADMIN — PANCRELIPASE LIPASE, PANCRELIPASE PROTEASE, PANCRELIPASE AMYLASE 15000 UNITS: 15000; 47000; 63000 CAPSULE, DELAYED RELEASE ORAL at 16:16

## 2024-03-22 RX ADMIN — PROPOFOL 30 MG: 10 INJECTION, EMULSION INTRAVENOUS at 09:18

## 2024-03-22 RX ADMIN — INSULIN LISPRO 2 UNITS: 100 INJECTION, SOLUTION INTRAVENOUS; SUBCUTANEOUS at 11:48

## 2024-03-22 RX ADMIN — PANCRELIPASE LIPASE, PANCRELIPASE PROTEASE, PANCRELIPASE AMYLASE 15000 UNITS: 15000; 47000; 63000 CAPSULE, DELAYED RELEASE ORAL at 11:42

## 2024-03-22 RX ADMIN — PANCRELIPASE LIPASE, PANCRELIPASE PROTEASE, PANCRELIPASE AMYLASE 20000 UNITS: 20000; 63000; 84000 CAPSULE, DELAYED RELEASE ORAL at 16:16

## 2024-03-22 RX ADMIN — LACTULOSE 20 G: 20 SOLUTION ORAL at 11:41

## 2024-03-22 RX ADMIN — FENTANYL CITRATE 25 MCG: 50 INJECTION INTRAMUSCULAR; INTRAVENOUS at 10:21

## 2024-03-22 RX ADMIN — PROPOFOL 50 MCG/KG/MIN: 10 INJECTION, EMULSION INTRAVENOUS at 09:20

## 2024-03-22 RX ADMIN — Medication 12 MG: at 21:00

## 2024-03-22 RX ADMIN — LIDOCAINE HYDROCHLORIDE 7 ML: 20 INJECTION, SOLUTION INFILTRATION; PERINEURAL at 09:29

## 2024-03-22 RX ADMIN — AMPICILLIN SODIUM AND SULBACTAM SODIUM 3000 MG: 2; 1 INJECTION, POWDER, FOR SOLUTION INTRAMUSCULAR; INTRAVENOUS at 11:44

## 2024-03-22 RX ADMIN — FENTANYL CITRATE 25 MCG: 50 INJECTION, SOLUTION INTRAMUSCULAR; INTRAVENOUS at 09:18

## 2024-03-22 RX ADMIN — SODIUM CHLORIDE, PRESERVATIVE FREE 40 MG: 5 INJECTION INTRAVENOUS at 01:30

## 2024-03-22 RX ADMIN — HYDROMORPHONE HYDROCHLORIDE 1 MG: 1 INJECTION, SOLUTION INTRAMUSCULAR; INTRAVENOUS; SUBCUTANEOUS at 16:17

## 2024-03-22 RX ADMIN — HYDROMORPHONE HYDROCHLORIDE 1 MG: 1 INJECTION, SOLUTION INTRAMUSCULAR; INTRAVENOUS; SUBCUTANEOUS at 02:25

## 2024-03-22 RX ADMIN — PANCRELIPASE LIPASE, PANCRELIPASE PROTEASE, PANCRELIPASE AMYLASE 20000 UNITS: 20000; 63000; 84000 CAPSULE, DELAYED RELEASE ORAL at 11:43

## 2024-03-22 RX ADMIN — MIDAZOLAM 0.5 MG: 1 INJECTION INTRAMUSCULAR; INTRAVENOUS at 09:18

## 2024-03-22 RX ADMIN — AMPICILLIN SODIUM AND SULBACTAM SODIUM 3000 MG: 2; 1 INJECTION, POWDER, FOR SOLUTION INTRAMUSCULAR; INTRAVENOUS at 16:14

## 2024-03-22 RX ADMIN — SODIUM CHLORIDE: 9 INJECTION, SOLUTION INTRAVENOUS at 09:11

## 2024-03-22 RX ADMIN — SODIUM CHLORIDE, PRESERVATIVE FREE 10 ML: 5 INJECTION INTRAVENOUS at 21:00

## 2024-03-22 RX ADMIN — HYDROPHOR: 42 OINTMENT TOPICAL at 16:18

## 2024-03-22 RX ADMIN — FENTANYL CITRATE 25 MCG: 50 INJECTION, SOLUTION INTRAMUSCULAR; INTRAVENOUS at 10:21

## 2024-03-22 RX ADMIN — RIFAXIMIN 550 MG: 550 TABLET ORAL at 12:57

## 2024-03-22 RX ADMIN — VORTIOXETINE 10 MG: 10 TABLET, FILM COATED ORAL at 21:00

## 2024-03-22 RX ADMIN — ANASTROZOLE 1 MG: 1 TABLET, FILM COATED ORAL at 11:41

## 2024-03-22 RX ADMIN — LIDOCAINE HYDROCHLORIDE 40 MG: 20 INJECTION, SOLUTION INFILTRATION; PERINEURAL at 09:18

## 2024-03-22 RX ADMIN — ANORECTAL OINTMENT: 15.7; .44; 24; 20.6 OINTMENT TOPICAL at 16:18

## 2024-03-22 RX ADMIN — RIFAXIMIN 550 MG: 550 TABLET ORAL at 21:00

## 2024-03-22 RX ADMIN — PIPERACILLIN AND TAZOBACTAM 3375 MG: 3; .375 INJECTION, POWDER, FOR SOLUTION INTRAVENOUS at 01:30

## 2024-03-22 RX ADMIN — SODIUM CHLORIDE, PRESERVATIVE FREE 40 MG: 5 INJECTION INTRAVENOUS at 12:57

## 2024-03-22 RX ADMIN — HYDROMORPHONE HYDROCHLORIDE 1 MG: 1 INJECTION, SOLUTION INTRAMUSCULAR; INTRAVENOUS; SUBCUTANEOUS at 14:02

## 2024-03-22 RX ADMIN — INSULIN GLARGINE 3 UNITS: 100 INJECTION, SOLUTION SUBCUTANEOUS at 11:47

## 2024-03-22 RX ADMIN — INSULIN GLARGINE 6 UNITS: 100 INJECTION, SOLUTION SUBCUTANEOUS at 21:00

## 2024-03-22 RX ADMIN — INSULIN LISPRO 5 UNITS: 100 INJECTION, SOLUTION INTRAVENOUS; SUBCUTANEOUS at 16:18

## 2024-03-22 RX ADMIN — CALCIUM 1000 MG: 500 TABLET ORAL at 11:41

## 2024-03-22 RX ADMIN — INSULIN LISPRO 2 UNITS: 100 INJECTION, SOLUTION INTRAVENOUS; SUBCUTANEOUS at 16:18

## 2024-03-22 ASSESSMENT — PAIN DESCRIPTION - LOCATION
LOCATION: ABDOMEN
LOCATION: ABDOMEN;BACK
LOCATION: ABDOMEN

## 2024-03-22 ASSESSMENT — PAIN DESCRIPTION - DESCRIPTORS
DESCRIPTORS: ACHING;CRUSHING;CRAMPING
DESCRIPTORS: ACHING;CRUSHING;CRAMPING
DESCRIPTORS: DISCOMFORT;SORE

## 2024-03-22 ASSESSMENT — PAIN SCALES - GENERAL
PAINLEVEL_OUTOF10: 10
PAINLEVEL_OUTOF10: 3
PAINLEVEL_OUTOF10: 10
PAINLEVEL_OUTOF10: 3

## 2024-03-22 ASSESSMENT — LIFESTYLE VARIABLES: SMOKING_STATUS: 1

## 2024-03-22 ASSESSMENT — PAIN DESCRIPTION - ONSET
ONSET: ON-GOING
ONSET: GRADUAL
ONSET: GRADUAL
ONSET: AWAKENED FROM SLEEP

## 2024-03-22 ASSESSMENT — PAIN DESCRIPTION - FREQUENCY
FREQUENCY: CONTINUOUS

## 2024-03-22 ASSESSMENT — PAIN DESCRIPTION - ORIENTATION
ORIENTATION: RIGHT;ANTERIOR
ORIENTATION: RIGHT
ORIENTATION: RIGHT
ORIENTATION: RIGHT;ANTERIOR

## 2024-03-22 ASSESSMENT — PAIN DESCRIPTION - PAIN TYPE
TYPE: SURGICAL PAIN

## 2024-03-22 NOTE — PROGRESS NOTES
ENDOCRINOLOGY PROGRESS NOTE      Date of admission: 3/14/2024  Date of service: 3/22/2024  Admitting physician: Jair Jauregui MD   Primary Care Physician: No primary care provider on file.  Consultant physician: Wale Camejo MD     Reason for the consultation:  Uncontrolled DM    History of Present Illness:  53 y.o. female with a history of depression/anxiety, alcoholism, ascites of the liver, left breast cancer, chronic pancreatitis, cocaine abuse, DM 2, GERD, gout, hepatitis C, schizoaffective disorder bipolar type presents with abdominal pain endocrine service was consulted for diabetes management.    Subjective  The patient was seen today, no acute events overnight.  Glucose level is high    Inpatient diet:   Carb Restricted diet     Point of care glucose monitoring   (Independently reviewed)   Recent Labs     03/21/24  0001 03/21/24  0206 03/21/24  0646 03/21/24  1150 03/21/24  1803 03/21/24  2151 03/22/24  0547 03/22/24  1146   POCGLU 439* 351* 350* 392* 342* 370* 269* 214*   Scheduled Meds:   insulin glargine  3 Units SubCUTAneous QAM    insulin lispro  2 Units SubCUTAneous TID WC    sodium chloride flush  5-40 mL IntraVENous 2 times per day    ampicillin-sulbactam  3,000 mg IntraVENous Q6H    nicotine  1 patch TransDERmal Daily    insulin glargine  6 Units SubCUTAneous Nightly    insulin lispro  0-6 Units SubCUTAneous TID WC    magnesium sulfate  1,000 mg IntraVENous Once    Surgiflo with Evithrom Hemostatic Matrix  1 kit Other Once    sodium chloride flush  5-40 mL IntraVENous 2 times per day    Surgiflo with Evithrom Hemostatic Matrix  1 kit Nasal Once    Surgiflo with Evithrom Hemostatic Matrix  1 kit Other Once    mineral oil-hydrophilic petrolatum   Topical BID    menthol-zinc oxide   Topical BID    sodium chloride flush  5-40 mL IntraVENous 2 times per day    [Held by provider] enoxaparin  30 mg SubCUTAneous Daily    anastrozole  1 mg Oral QAM    ARIPiprazole  20 mg Oral QAM    vitamin C  1,000 mg   No pneumothorax.         US ABDOMEN LIMITED   Final Result   Minimal accessible intra-abdominal ascites.  Imaging findings compatible with   stigmata of liver cirrhotic disease.  No paracentesis was performed.         CT ABDOMEN PELVIS WO CONTRAST Additional Contrast? None   Final Result   1. Cirrhotic morphology of the liver with a large low-attenuation structure   seen inferiorly within the right lobe concerning for an underlying lesion   such as hepatocellular carcinoma. This area measures 7.1 x 5.6 cm. This was   visualized on the prior examination.   2. Moderate to large amount of ascites seen within the abdomen and pelvis.   3. Abnormal wall thickening identified throughout the small bowel loops   within the left flank. Findings may be related to the surrounding ascites.   4. Increased stool burden seen diffusely throughout the colon to suggest   clinical presentation of constipation. Mild wall thickening in which an   underlying colitis cannot be completely excluded. Findings may be related to   the surrounding ascites.   5. Nodularity involving the adrenal glands bilaterally.   6. Small umbilical hernia containing fat only.         US GALLBLADDER RUQ   Final Result   1. Cirrhotic morphology of the liver with no underlying mass or lesion   present.   2. Status post cholecystectomy. No biliary ductal dilatation.   3. Increased echogenicity within the renal cortex to suggest medical renal   disease. Cystic structure along the superior pole of the right kidney   measuring 4.6 x 4.8 cm.   4. Fluid identified surrounding the liver within the right upper quadrant.         IR Interventional Radiology Procedure Request    (Results Pending)       Medical Records/Labs/Images review:   I personally reviewed and summarized previous records   All labs and imaging were reviewed independently     ASSESSMENT & PLAN   Ellie Santacruz, a 53 y.o.-old female seen today for inpatient diabetes management    Diabetes Mellitus

## 2024-03-22 NOTE — DISCHARGE INSTRUCTIONS
An Interventional Radiology drainage tube has been placed.Output needs to be measured and recorded daily, please bring this record with you for your follow up tube checks in Radiology. Please flush tube per Dr. Rangel's orders. Notify Radiology if unable to flush.   On discharge, notify Radiology if transferred to a Facility, if not, patient may need Home Care. Flushing instructions and follow up tube check date and time are ordered.  Any questions or concerns or  please call [phone].     Tube check in radiology at [facility] on 4/5/24 at 9am      [facility]  ([facility])  [address]  [address] [place] [address]  Phone [phone]   Fax [phone]        Marco Orr MD, FACP, MD Teresa Pierce MD Indra P. Limbu, MD Shirisha pasula MD    STANDING ORDERS (“ID Protocol”)     Visiting nurses are to write the Primary Care Physician and their own call back number on all laboratory requisition forms.   Abnormal lab values are called to the physician by the nurse and NOT by the laboratory.   Fax all labs to the office in a timely manner, during office hours. All faxes should include nurse’s name and call back number.  Vascular Access Devices or VADs (TLC, PICC, Midline, etc) will be replaced as necessary.  Draw all blood work from VADs, except for drug levels.  If unable to access a VAD, insert a peripheral catheter temporarily. Contact the Primary Care Physician or [facility] office for surgical referral.  Use tPA (Cathflo®, Alteplase®) as per agency protocol to restore patency of VAD.  Remove VAD upon completion of IV antibiotics, unless otherwise specified by the ordering physician.  If VAD cannot be removed, schedule appointment at office for removal.  Notify ordering physician or office if patient requires admission to the hospital with reason for admission.  Discontinue all blood work upon  completion of IV antibiotics, unless otherwise specified by ordering physician.  Notify ordering physician if the patient does not receive the scheduled antibiotic for 24 hours or more.  The Pharmacy and Home Health Agency may adjust the timing of the infusion and blood work to accommodate the patient’s home care conditions.  When PICC or VAD is to be removed, documentation of length of inserted PICC. PICC or VAD length is to correlate with inserted length and sent to physician at the time of removal.  Give the patient a list of antibiotics being administered with:  Drug name  Route  Frequency  Start/Stop date      ROUTINE LABS TO BE DRAWN/ORDERED:  Twice weekly (preferably every Monday & Thursday):  BUN Creatinine and liver panel  Complete Blood Count with differential (CBC with dif)  Once weekly:  C-Reactive Protein (not high sensitivity CRP)  Erythrocyte/Westergren Sedimentation Rate (WSR or ESR)  Total CPK for patients on Daptomycin (Cubicin®). Obtain CPK more often if the patient is experiencing muscle weakness or myalgias.  Clinical Pharmacist is to adjust Vancomycin and Aminoglycosides unless otherwise indicated.  Draw Vancomycin trough 30 minutes before the third dose  After starting drug, or   After the dosing or interval is changed.  If the trough level is between 5 and 20 continue dose as ordered.  Draw troughs twice weekly thereafter until troughs are stable (i.e. until trough is between 5 and 20 mcg/ml for two consecutive laboratory values).  Once stable check troughs once weekly or every third dose.  Please do not call physician unless the trough is < 5 or >20.  If the trough is <20 continue dosing as ordered.  If the trough is >20 call the office for further orders.  Do not hold the dose while waiting for the trough result.  Amingoglycosides (e.g. Gentamicin, Tobramycin and Amikacin) peaks and troughs should be drawn twice weekly (preferably on Mondays and Thursdays) or every third

## 2024-03-22 NOTE — CARE COORDINATION
Transition of care update - [facility] for [facility] Medrano to obtain additional SNF choices, await a return call. Pt requires a precert and must be sitter free for 24 hrs before precert can be initiated. Pt will need IV unasyn upon discharge and a line. Will follow.  PAULA WangN, RN  [facility] Case Management  [phone]

## 2024-03-22 NOTE — PROGRESS NOTES
Evaluation  Date: 3/22/24 Treatment      Short Term/ Long Term   Goals   Was pt agreeable to Eval/treatment? yes     Does pt have pain? R side (at drain site)     Bed Mobility  Rolling: mod assist  Supine to sit: mod assist  Sit to supine: mod assist  Scooting: mod assist  Min assist   Transfers Sit to stand: mod assist  Stand to sit: mod assist  Stand pivot: NT  Min assist   Ambulation    Sidestepping along bed with ww mod assist  50 feet with ww with min assist   Stair Negotiation  Ascended and descended  NT      LE strength     3/5    3+/5   balance      Fair-     AM-PAC Raw score               11/24         Pt is alert and Oriented   LE ROM: WFL  Sensation: intact  Edema: none  Endurance: fair-       ASSESSMENT:    Pt displays functional ability as noted in the objective portion of this evaluation.      Patient education  Pt educated on PT objectives    Patient response to education:   Pt verbalized understanding Pt demonstrated skill Pt requires further education in this area   yes           Comments:  Pt fatigues quickly with mobility.  Mod assist for standing balance. Pt incontinent of bowel. Dependent for hygiene.     Pt's/ family goals   1. To return home    Conditions Requiring Skilled Therapeutic Intervention:    [x]Decreased strength     []Decreased ROM  [x]Decreased functional mobility  [x]Decreased balance   [x]Decreased endurance   []Decreased posture  []Decreased sensation  []Decreased coordination   []Decreased vision  []Decreased safety awareness   []Increased pain       Patient and or family understand(s) diagnosis, prognosis, and plan of care.    Prognosis is good for reaching above PT goals    PHYSICAL THERAPY PLAN OF CARE:    PT POC is established based on physician order and patient diagnosis     Referring provider/PT Order: Evelyn Calvert MD / PT eval and treat      Current Treatment Recommendations:     [x] Strengthening to improve independence with functional mobility   [] ROM to  improve independence with functional mobility   [x] Balance Training to improve static/dynamic balance and to reduce fall risk  [x] Endurance Training to improve activity tolerance during functional mobility   [x] Transfer Training to improve safety and independence with all functional transfers   [x] Gait Training to improve gait mechanics, endurance and assess need for appropriate assistive device  [] Stair Training in preparation for safe discharge home and/or into the community   [] Positioning to prevent skin breakdown and contractures  [x] Safety and Education Training   [x] Patient/Caregiver Education   [] HEP  [] Other     PT long term treatment goals are located in above grid    Frequency of treatments: 2-5x/week x 5-7 days.    Time in  105  Time out  120      Evaluation Time includes thorough review of current medical information, gathering information on past medical history/social history and prior level of function, completion of standardized testing/informal observation of tasks, assessment of data and education on plan of care and goals.      CPT codes:  [x] Low Complexity PT evaluation 09400  [] Moderate Complexity PT evaluation 16236  [] High Complexity PT evaluation 94270  [] PT Re-evaluation 75049  [] Gait training 55233 minutes  [] Manual therapy 05779 minutes  [] Therapeutic activities 33981 minutes  [] Therapeutic exercises 65544 minutes  [] Neuromuscular reeducation 54156 minutes     Lindsay Cobb PT [id number]

## 2024-03-22 NOTE — PROGRESS NOTES
Occupational Therapy  OCCUPATIONAL THERAPY INITIAL EVALUATION  [facility]  [address], [place]    Date: 3/22/2024     Patient Name: Ellie Santacruz  MRN: [id number]  : 1970  Room: 0448/0448-    Evaluating OT: Harleen Garces, OTR/L - [id number]    Referring Provider: Evelyn Calvert MD  Specific Provider Orders/Date: \"OT eval and treat\" - 3/21/2024    Diagnosis: Generalized abdominal pain [R10.84]  Liver mass [R16.0]  Elevated liver enzymes [R74.8]  Cocaine use [F14.90]     Patient underwent image guided liver biopsy on 3/19/2024.  Patient now s/p IR guided drain placement (3/22/2024).    Pertinent Medical History: alcohol abuse, anxiety and depression, DM, cocaine abuse, hepatitis C, polyneuropathy, schiozoaffective disorder     Precautions: fall risk, JIMMIE drain  Additional Precautions: skin integrity    Assessment of Current Deficits:    [x] Functional mobility   [x] ADLs  [x] Strength               [x] Cognition   [x] Functional transfers   [x] IADLs         [x] Safety Awareness   [x] Endurance   [] Fine Motor Coordination  [x] Balance      [] Vision/Perception   [x] Sensation    [] Gross Motor Coordination [] ROM          [] Delirium                  [] Motor Control     OT PLAN OF CARE   OT POC is based on physician orders, patient diagnosis, and results of clinical assessment.  Frequency/Duration 2-5 days/week for 2-4 weeks PRN   Specific OT Treatment Interventions to Include:   * Instruction/training on adapted ADL techniques and AE recommendations to increase functional independence within precautions       * Training on energy conservation strategies, correct breathing pattern and techniques to improve independence/tolerance for self-care routine  * Functional transfer/mobility training/DME recommendations for increased independence, safety, and fall prevention  * Patient/Family education to increase follow through with safety  to increase safety and independence with completion of ADL/IADL tasks for functional independence and quality of life.     Patient education provided regardin) importance of having staff assistance with ADLs and other OOB activities during hospitalization, 2) techniques to maximize safety with ADLs within home environment. Patient indicated Fair understanding.    Further skilled OT treatment indicated to increase patient's safety and independence with completion of ADL/IADL tasks in order to maximize patient's functional independence and quality of life.    Rehab Potential: Good for established goals.  Patient / Family Goal: Patient did not state a goal.  Patient and/or family were instructed on functional diagnosis, prognosis/goals, and OT plan of care. Demonstrated Fair understanding.    Eval Complexity: Low    Time In: 1305  Time Out: 1320  Total Treatment Time: 0 minutes      Minutes Units   OT Eval Low 98008 15 1   OT Eval Medium 57419     OT Eval High 20687     OT Re-Eval 92412     Therapeutic Ex 35012     Therapeutic Activities 81079     ADL/Self Care 26811     Orthotic Management 11990     Neuro Re-Ed 63561     Non-Billable Time N/A ---     Evaluation time includes thorough review of current medical information, gathering information on past medical history/social history and prior level of function, completion of standardized testing/informal observation of tasks, assessment of data, and education on plan of care and goals.    RACHEL Olguin/L  License Number: [id number]

## 2024-03-22 NOTE — PROGRESS NOTES
Diagnosis    Chronic pancreatitis due to acute alcohol intoxication (HCC)    Schizoaffective disorder, bipolar type (HCC)    Anxiety disorder    Depression    History of alcohol abuse    Pancreatic pseudocyst (HCC)    Liver dysfunction    Asthma    Gastroesophageal reflux disease without esophagitis    Bipolar I disorder (HCC)    Bipolar affective disorder (HCC)    Irritable bowel syndrome    Tobacco use disorder    Jaundice    RUQ abdominal pain    Poorly controlled diabetes mellitus (HCC)    Pain of upper abdomen    Elevated LFTs    Intractable abdominal pain    Abdominal pain, epigastric    Hyperglycemia    Type 2 diabetes mellitus with hyperosmolarity without coma, with long-term current use of insulin (HCC)    Fall (on) (from) other stairs and steps, initial encounter    Hepatitis, alcoholic    Nausea and vomiting    Alcoholism (HCC)    Pancytopenia (HCC)    Splenomegaly    Hypoglycemia episode    Hepatitis C    Anxiety and depression    Cigarette smoker, heavy    Acute pancreatitis    Alcohol abuse    Acute alcoholic gastritis    Other osteoporosis without current pathological fracture    Ketoacidosis, diabetic, no coma, insulin dependent    Bicytopenia - leukopenia and thrombocytopenia    Chronic abdominal pain    Chronic pancreatitis (HCC)    Electrolyte imbalance    Lactic acidosis    Severe protein-calorie malnutrition (HCC)    Idiopathic osteoporosis    Ascites due to alcoholic cirrhosis (HCC)    Diabetic polyneuropathy associated with type 2 diabetes mellitus (HCC)    Malignant neoplasm of left breast in female, estrogen receptor positive (HCC)    Cocaine dependence with withdrawal (HCC)    Vaginitis and vulvovaginitis    Abscess, gluteal, left    Nondependent cocaine abuse (HCC)    Cellulitis    DKA, type 1, not at goal (HCC)    Enterocolitis    Perineal abscess    Cheryle gangrene    Type 2 diabetes mellitus with hyperglycemia (HCC)    Failure to thrive in adult    Cocaine use    Liver mass     Generalized abdominal pain    Elevated liver enzymes    Portal hypertension (HCC)    Pancreatic insufficiency    Hepatic abscess    Intra-abdominal abscess (HCC)     1..  Cirrhosis  -Decompensated likely secondary to alcohol/hepatitis C  -MELD Na - 6 initially  -DF 5.8 -no indication for steroids  -EGD 2020 -plan for repeat upper endoscopy  -Outpatient repeat unless precipitous drop in H&H or overt bleed  -Nonelevated AFP 3/14/2024  -Ultrasound negative for mass -CT reports possible mass approximately 7 x 5 cm  -No paracentesis as insufficient fluid  -Triple phase CT with reported lesion malignant versus infectious.;  Lesion/heterogenous zone on pancreas -liver biopsy  -IR drain placed  -Consider endoscopic ultrasound as outpatient        2.  Hepatitis C  -Follow as outpatient if patient able to maintain sobriety     3.  History of chronic pancreatitis  -Nonobstructive liver profile  -Chronic oral pancreatic enzyme supplementation  -Consider dedicated imaging with MRI versus EUS     4.  Substance abuse  -Persistent abuse of cocaine  -Unknown timing of alcohol abuse     5.  Anemia  -Iron deficient/normocytic  -Chronic without evidence of overt bleed  -Defer iron supplementation to admitting  -Monitor blood work  -Endoscopies as above     Further improved mental status  Antibiotics per ID service.  Олег Mitchell MD  3/22/2024  4:12 PM    NOTE:  This report was transcribed using voice recognition software.  Every effort was made to ensure accuracy; however, inadvertent computerized transcription errors may be present.

## 2024-03-22 NOTE — PROGRESS NOTES
Message left for patients son/POA Mitchell requesting call back for consent for IR procedure today.

## 2024-03-22 NOTE — PROGRESS NOTES
[facility] Hospitalist Progress Note    Admitting Date and Time: 3/14/2024  3:54 AM  Admit Dx: Generalized abdominal pain [R10.84]  Liver mass [R16.0]  Elevated liver enzymes [R74.8]  Cocaine use [F14.90]    Subjective:  Patient reports she is having cigarette cravings and asked for nicotine patch. Has some soreness where drain was placed.     ROS: denies fever, chills, cp, sob, n/v, HA unless stated above.     insulin glargine  3 Units SubCUTAneous QAM    insulin lispro  2 Units SubCUTAneous TID WC    sodium chloride flush  5-40 mL IntraVENous 2 times per day    ampicillin-sulbactam  3,000 mg IntraVENous Q6H    nicotine  1 patch TransDERmal Daily    insulin glargine  6 Units SubCUTAneous Nightly    insulin lispro  0-6 Units SubCUTAneous TID WC    magnesium sulfate  1,000 mg IntraVENous Once    Surgiflo with Evithrom Hemostatic Matrix  1 kit Other Once    sodium chloride flush  5-40 mL IntraVENous 2 times per day    Surgiflo with Evithrom Hemostatic Matrix  1 kit Nasal Once    Surgiflo with Evithrom Hemostatic Matrix  1 kit Other Once    mineral oil-hydrophilic petrolatum   Topical BID    menthol-zinc oxide   Topical BID    sodium chloride flush  5-40 mL IntraVENous 2 times per day    [Held by provider] enoxaparin  30 mg SubCUTAneous Daily    anastrozole  1 mg Oral QAM    ARIPiprazole  20 mg Oral QAM    vitamin C  1,000 mg Oral QAM    calcium elemental  1,000 mg Oral QAM    melatonin  12 mg Oral Nightly    multivitamin  1 tablet Oral QAM    rifAXIMin  550 mg Oral BID    thiamine  100 mg Oral Daily    ipratropium  0.5 mg Nebulization TID RT    VORTIoxetine HBr  10 mg Oral Nightly    lipase-protease-amylas  15,000 Units Oral TID WC    And    lipase-protease-amylase  20,000 Units Oral TID WC    pantoprazole (PROTONIX) 40 mg in sodium chloride (PF) 0.9 % 10 mL injection  40 mg IntraVENous Q12H    lactulose  20 g Oral TID    sodium chloride flush  5-40 mL IntraVENous 2 times per day     naloxone 0.4 mg in 10 mL  cirrhotic disease.  No paracentesis was performed.         CT ABDOMEN PELVIS WO CONTRAST Additional Contrast? None   Final Result   1. Cirrhotic morphology of the liver with a large low-attenuation structure   seen inferiorly within the right lobe concerning for an underlying lesion   such as hepatocellular carcinoma. This area measures 7.1 x 5.6 cm. This was   visualized on the prior examination.   2. Moderate to large amount of ascites seen within the abdomen and pelvis.   3. Abnormal wall thickening identified throughout the small bowel loops   within the left flank. Findings may be related to the surrounding ascites.   4. Increased stool burden seen diffusely throughout the colon to suggest   clinical presentation of constipation. Mild wall thickening in which an   underlying colitis cannot be completely excluded. Findings may be related to   the surrounding ascites.   5. Nodularity involving the adrenal glands bilaterally.   6. Small umbilical hernia containing fat only.         US GALLBLADDER RUQ   Final Result   1. Cirrhotic morphology of the liver with no underlying mass or lesion   present.   2. Status post cholecystectomy. No biliary ductal dilatation.   3. Increased echogenicity within the renal cortex to suggest medical renal   disease. Cystic structure along the superior pole of the right kidney   measuring 4.6 x 4.8 cm.   4. Fluid identified surrounding the liver within the right upper quadrant.         IR Interventional Radiology Procedure Request    (Results Pending)       Assessment:    Principal Problem:    Intra-abdominal abscess (HCC)  Active Problems:    Poorly controlled diabetes mellitus (HCC)    Ascites due to alcoholic cirrhosis (HCC)    Severe protein-calorie malnutrition (HCC)    Liver mass    Generalized abdominal pain    Elevated liver enzymes    Portal hypertension (HCC)    Pancreatic insufficiency    Hepatic abscess  Resolved Problems:    * No resolved hospital problems.

## 2024-03-22 NOTE — PROGRESS NOTES
[facility]  [facility]  Progress Note    SUBJECTIVE:  Chief Complaint   Patient presents with    Abdominal Pain     Diffuse. History of cirrhosis. Patient stated she got up this morning and her abdomen was distened     Patient is calm and having lunch. Has sitter in bed. No fever. Still has abdominal pain. Had IR guided drain placement today.    Review of systems:  As above.    Medications:  Scheduled Meds:   insulin glargine  3 Units SubCUTAneous QAM    insulin lispro  2 Units SubCUTAneous TID WC    insulin lispro  2 Units SubCUTAneous Once    sodium chloride flush  5-40 mL IntraVENous 2 times per day    insulin glargine  6 Units SubCUTAneous Nightly    insulin lispro  0-6 Units SubCUTAneous TID WC    magnesium sulfate  1,000 mg IntraVENous Once    Surgiflo with Evithrom Hemostatic Matrix  1 kit Other Once    sodium chloride flush  5-40 mL IntraVENous 2 times per day    Surgiflo with Evithrom Hemostatic Matrix  1 kit Nasal Once    Surgiflo with Evithrom Hemostatic Matrix  1 kit Other Once    mineral oil-hydrophilic petrolatum   Topical BID    piperacillin-tazobactam  3,375 mg IntraVENous Q8H    menthol-zinc oxide   Topical BID    sodium chloride flush  5-40 mL IntraVENous 2 times per day    [Held by provider] enoxaparin  30 mg SubCUTAneous Daily    anastrozole  1 mg Oral QAM    ARIPiprazole  20 mg Oral QAM    vitamin C  1,000 mg Oral QAM    calcium elemental  1,000 mg Oral QAM    melatonin  12 mg Oral Nightly    multivitamin  1 tablet Oral QAM    rifAXIMin  550 mg Oral BID    thiamine  100 mg Oral Daily    ipratropium  0.5 mg Nebulization TID RT    VORTIoxetine HBr  10 mg Oral Nightly    lipase-protease-amylas  15,000 Units Oral TID WC    And    lipase-protease-amylase  20,000 Units Oral TID WC    pantoprazole (PROTONIX) 40 mg in sodium chloride (PF) 0.9 % 10 mL injection  40 mg IntraVENous Q12H    lactulose  20 g Oral TID    sodium chloride flush  5-40 mL IntraVENous 2 times per day  Results   Component Value Date    SEDRATE 60 (H) 08/28/2023    SEDRATE 25 (H) 10/01/2018    SEDRATE 35 (H) 06/23/2017       Radiology:  Reviewed    Microbiology:   Blood cultures 3/16: negative (taken after antibiotics)  Liver aspiration culture Gram stain is showing rare PMNs and rare GPC in pairs.  Culture showing Enterococcus faecalis.   Liver biopsy pending:    ASSESSMENT:  Liver abscess: Likely sources cholangitis.  No sign of diverticulitis or recent bloodstream infection.  Status post IR guided aspiration sent for cultures and pathology  Substance use disorder:   Liver cirrhosis:   Encephalopathy:     PLAN:  Switched IV zosyn to unasyn 3gr q 6 for 6 weeks.   Pending tunneled PICC placement. Consulted IR  Script in chart. Discharge instructions in place.   Monitor labs    TITI MCNAMARA MD  10:35 AM  3/22/2024

## 2024-03-22 NOTE — PROGRESS NOTES
On-going monitoring and chart review for PM psychosocial needs. Socia worker placed call to pt danielle Medrano for follow up regarding paperwork for Advanced Directives, no answer left message for return call. Will continue to follow as needed. No new psychosocial needs or concerns identified today for Ellie Santacruz.  will remain available for on-going psychosocial support, as needed.       Suyapa Cortes MSW,LSW  Palliative Medicine

## 2024-03-22 NOTE — ANESTHESIA POSTPROCEDURE EVALUATION
Department of Anesthesiology  Postprocedure Note    Patient: Ellie Santacruz  MRN: [id number]  YOB: 1970  Date of evaluation: 3/22/2024    Procedure Summary       Date: 03/22/24 Room / Location: [facility] CT Scan    Anesthesia Start: 0906 Anesthesia Stop:     Procedure: CT GUIDED ABSCESS FLUID DRAIN Diagnosis: (hepatic abscess confirmed - please place drain)    Scheduled Providers:  Responsible Provider: Ravi Rendon Jr., MD    Anesthesia Type: MAC ASA Status: 3            Anesthesia Type: No value filed.    Max Phase I: Max Score: 9    Max Phase II: Max Score: 10    Anesthesia Post Evaluation    Patient location during evaluation: PACU  Patient participation: complete - patient participated  Level of consciousness: awake  Airway patency: patent  Nausea & Vomiting: no nausea and no vomiting  Cardiovascular status: hemodynamically stable  Respiratory status: acceptable  Hydration status: euvolemic  Pain management: adequate        No notable events documented.

## 2024-03-22 NOTE — ANESTHESIA PRE PROCEDURE
Department of Anesthesiology  Preprocedure Note       Name:  Ellie Santacruz   Age:  53 y.o.  :  1970                                          MRN:  [id number]         Date:  3/22/2024      Surgeon: * No surgeons listed *    Procedure: * No procedures listed *    Medications prior to admission:   Prior to Admission medications    Medication Sig Start Date End Date Taking? Authorizing Provider   anastrozole (ARIMIDEX) 1 MG tablet Take 1 tablet by mouth every morning   Yes Irma Dudley MD   ARIPiprazole (ABILIFY) 20 MG tablet Take 1 tablet by mouth every morning   Yes Irma Dudley MD   Ascorbic Acid (VITAMIN C) 1000 MG tablet Take 1 tablet by mouth every morning   Yes Irma Dudley MD   lipase-protease-amylase (CREON) 21211-220207 units CPEP delayed release capsule Take 1 capsule by mouth take with snacks   Yes Irma Dudley MD   furosemide (LASIX) 20 MG tablet Take 1 tablet by mouth daily as needed (SWELLING)   Yes Irma Dudley MD   insulin lispro (HUMALOG) 100 UNIT/ML SOLN injection vial by Insulin Pump - SubCUTAneous Infusion route continuous   Yes Irma Dudley MD   hydrOXYzine pamoate (VISTARIL) 25 MG capsule Take 1 capsule by mouth 4 times daily as needed for Anxiety   Yes Irma Dudley MD   omeprazole (PRILOSEC) 20 MG delayed release capsule Take 1 capsule by mouth every morning   Yes Irma Dudley MD   sennosides-docusate sodium (SENOKOT-S) 8.6-50 MG tablet Take 2 tablets by mouth 2 times daily as needed for Constipation   Yes Irma Dudley MD   VORTIoxetine HBr (TRINTELLIX) 20 MG TABS tablet Take 0.5 tablets by mouth nightly   Yes Irma Dudley MD   tiotropium (SPIRIVA RESPIMAT) 1.25 MCG/ACT AERS inhaler Inhale 2 puffs into the lungs daily as needed (SOB)   Yes Irma Dudley MD   traZODone (DESYREL) 50 MG tablet Take 1 tablet by mouth nightly as needed for Sleep   Yes Irma Dudley MD   insulin glargine

## 2024-03-22 NOTE — OR NURSING
Patient taken to CT and positioned supine on exam table with O2 and monitoring equipment attached. Anesthesia here to monitor patient. Patient scanned and images reviewed by Dr. Longo. Patient prepped and draped per protocol. 10 [place] tube placed in RUQ  with CT guidance by Dr. Longo. Specimen obtained and sent to lab. Patient re-scanned. Puncture site cleansed, dry dressing applied. Tube connected to bulb drain. Nurse to nurse called and patient transported to PACU.

## 2024-03-23 LAB
ANION GAP SERPL CALCULATED.3IONS-SCNC: 6 MMOL/L (ref 7–16)
BASOPHILS # BLD: 0 K/UL (ref 0–0.2)
BASOPHILS NFR BLD: 0 % (ref 0–2)
BUN SERPL-MCNC: 4 MG/DL (ref 6–20)
CALCIUM SERPL-MCNC: 8 MG/DL (ref 8.6–10.2)
CHLORIDE SERPL-SCNC: 102 MMOL/L (ref 98–107)
CO2 SERPL-SCNC: 26 MMOL/L (ref 22–29)
CREAT SERPL-MCNC: 0.3 MG/DL (ref 0.5–1)
EOSINOPHIL # BLD: 0 K/UL (ref 0.05–0.5)
EOSINOPHILS RELATIVE PERCENT: 0 % (ref 0–6)
ERYTHROCYTE [DISTWIDTH] IN BLOOD BY AUTOMATED COUNT: 14.6 % (ref 11.5–15)
GFR SERPL CREATININE-BSD FRML MDRD: >60 ML/MIN/1.73M2
GLUCOSE BLD-MCNC: 242 MG/DL (ref 74–99)
GLUCOSE BLD-MCNC: 246 MG/DL (ref 74–99)
GLUCOSE BLD-MCNC: 281 MG/DL (ref 74–99)
GLUCOSE BLD-MCNC: 339 MG/DL (ref 74–99)
GLUCOSE SERPL-MCNC: 297 MG/DL (ref 74–99)
HCT VFR BLD AUTO: 23.8 % (ref 34–48)
HGB BLD-MCNC: 7.4 G/DL (ref 11.5–15.5)
LYMPHOCYTES NFR BLD: 0.27 K/UL (ref 1.5–4)
LYMPHOCYTES RELATIVE PERCENT: 8 % (ref 20–42)
MCH RBC QN AUTO: 29.8 PG (ref 26–35)
MCHC RBC AUTO-ENTMCNC: 31.1 G/DL (ref 32–34.5)
MCV RBC AUTO: 96 FL (ref 80–99.9)
METAMYELOCYTES ABSOLUTE COUNT: 0.03 K/UL (ref 0–0.12)
METAMYELOCYTES: 1 % (ref 0–1)
MONOCYTES NFR BLD: 0.06 K/UL (ref 0.1–0.95)
MONOCYTES NFR BLD: 2 % (ref 2–12)
NEUTROPHILS NFR BLD: 89 % (ref 43–80)
NEUTS SEG NFR BLD: 3.04 K/UL (ref 1.8–7.3)
PLATELET CONFIRMATION: NORMAL
PLATELET, FLUORESCENCE: 51 K/UL (ref 130–450)
PMV BLD AUTO: 11.3 FL (ref 7–12)
POTASSIUM SERPL-SCNC: 3.8 MMOL/L (ref 3.5–5)
RBC # BLD AUTO: 2.48 M/UL (ref 3.5–5.5)
RBC # BLD: ABNORMAL 10*6/UL
SODIUM SERPL-SCNC: 134 MMOL/L (ref 132–146)
WBC OTHER # BLD: 3.4 K/UL (ref 4.5–11.5)

## 2024-03-23 PROCEDURE — 6360000002 HC RX W HCPCS: Performed by: STUDENT IN AN ORGANIZED HEALTH CARE EDUCATION/TRAINING PROGRAM

## 2024-03-23 PROCEDURE — 6360000002 HC RX W HCPCS: Performed by: HOSPITALIST

## 2024-03-23 PROCEDURE — 6370000000 HC RX 637 (ALT 250 FOR IP): Performed by: INTERNAL MEDICINE

## 2024-03-23 PROCEDURE — 2580000003 HC RX 258: Performed by: HOSPITALIST

## 2024-03-23 PROCEDURE — 6370000000 HC RX 637 (ALT 250 FOR IP): Performed by: STUDENT IN AN ORGANIZED HEALTH CARE EDUCATION/TRAINING PROGRAM

## 2024-03-23 PROCEDURE — C9113 INJ PANTOPRAZOLE SODIUM, VIA: HCPCS | Performed by: HOSPITALIST

## 2024-03-23 PROCEDURE — 85025 COMPLETE CBC W/AUTO DIFF WBC: CPT

## 2024-03-23 PROCEDURE — 82962 GLUCOSE BLOOD TEST: CPT

## 2024-03-23 PROCEDURE — A4216 STERILE WATER/SALINE, 10 ML: HCPCS | Performed by: HOSPITALIST

## 2024-03-23 PROCEDURE — 99232 SBSQ HOSP IP/OBS MODERATE 35: CPT | Performed by: STUDENT IN AN ORGANIZED HEALTH CARE EDUCATION/TRAINING PROGRAM

## 2024-03-23 PROCEDURE — 2580000003 HC RX 258: Performed by: ANESTHESIOLOGY

## 2024-03-23 PROCEDURE — 99232 SBSQ HOSP IP/OBS MODERATE 35: CPT | Performed by: INTERNAL MEDICINE

## 2024-03-23 PROCEDURE — 2580000003 HC RX 258: Performed by: STUDENT IN AN ORGANIZED HEALTH CARE EDUCATION/TRAINING PROGRAM

## 2024-03-23 PROCEDURE — 2060000000 HC ICU INTERMEDIATE R&B

## 2024-03-23 PROCEDURE — 80048 BASIC METABOLIC PNL TOTAL CA: CPT

## 2024-03-23 RX ORDER — INSULIN GLARGINE 100 [IU]/ML
4 INJECTION, SOLUTION SUBCUTANEOUS EVERY MORNING
Status: DISCONTINUED | OUTPATIENT
Start: 2024-03-23 | End: 2024-03-26

## 2024-03-23 RX ORDER — INSULIN LISPRO 100 [IU]/ML
3 INJECTION, SOLUTION INTRAVENOUS; SUBCUTANEOUS
Status: DISCONTINUED | OUTPATIENT
Start: 2024-03-23 | End: 2024-03-23

## 2024-03-23 RX ORDER — INSULIN GLARGINE 100 [IU]/ML
8 INJECTION, SOLUTION SUBCUTANEOUS NIGHTLY
Status: DISCONTINUED | OUTPATIENT
Start: 2024-03-23 | End: 2024-03-24

## 2024-03-23 RX ORDER — INSULIN LISPRO 100 [IU]/ML
4 INJECTION, SOLUTION INTRAVENOUS; SUBCUTANEOUS
Status: DISCONTINUED | OUTPATIENT
Start: 2024-03-23 | End: 2024-03-24

## 2024-03-23 RX ADMIN — ARIPIPRAZOLE 20 MG: 10 TABLET ORAL at 08:07

## 2024-03-23 RX ADMIN — AMPICILLIN SODIUM AND SULBACTAM SODIUM 3000 MG: 2; 1 INJECTION, POWDER, FOR SOLUTION INTRAMUSCULAR; INTRAVENOUS at 05:20

## 2024-03-23 RX ADMIN — INSULIN LISPRO 4 UNITS: 100 INJECTION, SOLUTION INTRAVENOUS; SUBCUTANEOUS at 15:58

## 2024-03-23 RX ADMIN — HYDROMORPHONE HYDROCHLORIDE 1 MG: 1 INJECTION, SOLUTION INTRAMUSCULAR; INTRAVENOUS; SUBCUTANEOUS at 08:15

## 2024-03-23 RX ADMIN — SODIUM CHLORIDE, PRESERVATIVE FREE 10 ML: 5 INJECTION INTRAVENOUS at 08:06

## 2024-03-23 RX ADMIN — HYDROMORPHONE HYDROCHLORIDE 0.5 MG: 1 INJECTION, SOLUTION INTRAMUSCULAR; INTRAVENOUS; SUBCUTANEOUS at 02:20

## 2024-03-23 RX ADMIN — SODIUM CHLORIDE, PRESERVATIVE FREE 40 MG: 5 INJECTION INTRAVENOUS at 12:17

## 2024-03-23 RX ADMIN — CALCIUM 1000 MG: 500 TABLET ORAL at 08:07

## 2024-03-23 RX ADMIN — SODIUM CHLORIDE, PRESERVATIVE FREE 10 ML: 5 INJECTION INTRAVENOUS at 08:08

## 2024-03-23 RX ADMIN — INSULIN LISPRO 2 UNITS: 100 INJECTION, SOLUTION INTRAVENOUS; SUBCUTANEOUS at 15:58

## 2024-03-23 RX ADMIN — INSULIN LISPRO 3 UNITS: 100 INJECTION, SOLUTION INTRAVENOUS; SUBCUTANEOUS at 06:25

## 2024-03-23 RX ADMIN — HYDROMORPHONE HYDROCHLORIDE 1 MG: 1 INJECTION, SOLUTION INTRAMUSCULAR; INTRAVENOUS; SUBCUTANEOUS at 23:51

## 2024-03-23 RX ADMIN — MULTIVITAMIN TABLET 1 TABLET: TABLET at 08:07

## 2024-03-23 RX ADMIN — ANORECTAL OINTMENT: 15.7; .44; 24; 20.6 OINTMENT TOPICAL at 08:09

## 2024-03-23 RX ADMIN — HYDROMORPHONE HYDROCHLORIDE 1 MG: 1 INJECTION, SOLUTION INTRAMUSCULAR; INTRAVENOUS; SUBCUTANEOUS at 21:35

## 2024-03-23 RX ADMIN — SODIUM CHLORIDE, PRESERVATIVE FREE 40 MG: 5 INJECTION INTRAVENOUS at 00:10

## 2024-03-23 RX ADMIN — PANCRELIPASE LIPASE, PANCRELIPASE PROTEASE, PANCRELIPASE AMYLASE 15000 UNITS: 15000; 47000; 63000 CAPSULE, DELAYED RELEASE ORAL at 08:07

## 2024-03-23 RX ADMIN — VORTIOXETINE 10 MG: 10 TABLET, FILM COATED ORAL at 21:30

## 2024-03-23 RX ADMIN — INSULIN LISPRO 2 UNITS: 100 INJECTION, SOLUTION INTRAVENOUS; SUBCUTANEOUS at 10:56

## 2024-03-23 RX ADMIN — HYDROMORPHONE HYDROCHLORIDE 1 MG: 1 INJECTION, SOLUTION INTRAMUSCULAR; INTRAVENOUS; SUBCUTANEOUS at 00:10

## 2024-03-23 RX ADMIN — HYDROMORPHONE HYDROCHLORIDE 1 MG: 1 INJECTION, SOLUTION INTRAMUSCULAR; INTRAVENOUS; SUBCUTANEOUS at 10:52

## 2024-03-23 RX ADMIN — AMPICILLIN SODIUM AND SULBACTAM SODIUM 3000 MG: 2; 1 INJECTION, POWDER, FOR SOLUTION INTRAMUSCULAR; INTRAVENOUS at 16:15

## 2024-03-23 RX ADMIN — PANCRELIPASE LIPASE, PANCRELIPASE PROTEASE, PANCRELIPASE AMYLASE 15000 UNITS: 15000; 47000; 63000 CAPSULE, DELAYED RELEASE ORAL at 10:52

## 2024-03-23 RX ADMIN — PANCRELIPASE LIPASE, PANCRELIPASE PROTEASE, PANCRELIPASE AMYLASE 15000 UNITS: 15000; 47000; 63000 CAPSULE, DELAYED RELEASE ORAL at 15:58

## 2024-03-23 RX ADMIN — INSULIN GLARGINE 4 UNITS: 100 INJECTION, SOLUTION SUBCUTANEOUS at 08:06

## 2024-03-23 RX ADMIN — RIFAXIMIN 550 MG: 550 TABLET ORAL at 21:30

## 2024-03-23 RX ADMIN — AMPICILLIN SODIUM AND SULBACTAM SODIUM 3000 MG: 2; 1 INJECTION, POWDER, FOR SOLUTION INTRAMUSCULAR; INTRAVENOUS at 23:18

## 2024-03-23 RX ADMIN — SODIUM CHLORIDE, PRESERVATIVE FREE 40 MG: 5 INJECTION INTRAVENOUS at 23:18

## 2024-03-23 RX ADMIN — INSULIN LISPRO 4 UNITS: 100 INJECTION, SOLUTION INTRAVENOUS; SUBCUTANEOUS at 06:25

## 2024-03-23 RX ADMIN — INSULIN GLARGINE 8 UNITS: 100 INJECTION, SOLUTION SUBCUTANEOUS at 21:30

## 2024-03-23 RX ADMIN — RIFAXIMIN 550 MG: 550 TABLET ORAL at 08:07

## 2024-03-23 RX ADMIN — Medication 12 MG: at 21:30

## 2024-03-23 RX ADMIN — PANCRELIPASE LIPASE, PANCRELIPASE PROTEASE, PANCRELIPASE AMYLASE 20000 UNITS: 20000; 63000; 84000 CAPSULE, DELAYED RELEASE ORAL at 08:07

## 2024-03-23 RX ADMIN — HYDROPHOR: 42 OINTMENT TOPICAL at 08:08

## 2024-03-23 RX ADMIN — ANORECTAL OINTMENT: 15.7; .44; 24; 20.6 OINTMENT TOPICAL at 16:22

## 2024-03-23 RX ADMIN — Medication 100 MG: at 08:07

## 2024-03-23 RX ADMIN — SODIUM CHLORIDE, PRESERVATIVE FREE 10 ML: 5 INJECTION INTRAVENOUS at 21:31

## 2024-03-23 RX ADMIN — HYDROMORPHONE HYDROCHLORIDE 0.5 MG: 1 INJECTION, SOLUTION INTRAMUSCULAR; INTRAVENOUS; SUBCUTANEOUS at 16:12

## 2024-03-23 RX ADMIN — PANCRELIPASE LIPASE, PANCRELIPASE PROTEASE, PANCRELIPASE AMYLASE 20000 UNITS: 20000; 63000; 84000 CAPSULE, DELAYED RELEASE ORAL at 15:58

## 2024-03-23 RX ADMIN — AMPICILLIN SODIUM AND SULBACTAM SODIUM 3000 MG: 2; 1 INJECTION, POWDER, FOR SOLUTION INTRAMUSCULAR; INTRAVENOUS at 10:59

## 2024-03-23 RX ADMIN — HYDROPHOR: 42 OINTMENT TOPICAL at 16:22

## 2024-03-23 RX ADMIN — PANCRELIPASE LIPASE, PANCRELIPASE PROTEASE, PANCRELIPASE AMYLASE 20000 UNITS: 20000; 63000; 84000 CAPSULE, DELAYED RELEASE ORAL at 10:52

## 2024-03-23 RX ADMIN — INSULIN LISPRO 4 UNITS: 100 INJECTION, SOLUTION INTRAVENOUS; SUBCUTANEOUS at 10:57

## 2024-03-23 RX ADMIN — OXYCODONE HYDROCHLORIDE AND ACETAMINOPHEN 1000 MG: 500 TABLET ORAL at 08:07

## 2024-03-23 RX ADMIN — ANASTROZOLE 1 MG: 1 TABLET, FILM COATED ORAL at 08:07

## 2024-03-23 RX ADMIN — AMPICILLIN SODIUM AND SULBACTAM SODIUM 3000 MG: 2; 1 INJECTION, POWDER, FOR SOLUTION INTRAMUSCULAR; INTRAVENOUS at 00:15

## 2024-03-23 ASSESSMENT — PAIN DESCRIPTION - LOCATION
LOCATION: ABDOMEN;BACK
LOCATION: ABDOMEN
LOCATION: ABDOMEN;BACK

## 2024-03-23 ASSESSMENT — PAIN SCALES - GENERAL
PAINLEVEL_OUTOF10: 9
PAINLEVEL_OUTOF10: 8
PAINLEVEL_OUTOF10: 8

## 2024-03-23 ASSESSMENT — PAIN DESCRIPTION - FREQUENCY
FREQUENCY: CONTINUOUS

## 2024-03-23 ASSESSMENT — PAIN DESCRIPTION - ORIENTATION
ORIENTATION: RIGHT
ORIENTATION: MID;RIGHT
ORIENTATION: RIGHT

## 2024-03-23 ASSESSMENT — PAIN DESCRIPTION - DESCRIPTORS
DESCRIPTORS: ACHING;DISCOMFORT
DESCRIPTORS: SHARP;STABBING
DESCRIPTORS: DISCOMFORT;STABBING;SORE
DESCRIPTORS: DISCOMFORT;STABBING
DESCRIPTORS: ACHING
DESCRIPTORS: DISCOMFORT;STABBING;SORE

## 2024-03-23 ASSESSMENT — PAIN DESCRIPTION - PAIN TYPE
TYPE: SURGICAL PAIN
TYPE: ACUTE PAIN
TYPE: ACUTE PAIN

## 2024-03-23 ASSESSMENT — PAIN DESCRIPTION - ONSET
ONSET: ON-GOING

## 2024-03-23 NOTE — PROGRESS NOTES
ENDOCRINOLOGY PROGRESS NOTE      Date of admission: 3/14/2024  Date of service: 3/23/2024  Admitting physician: Jair Jauregui MD   Primary Care Physician: No primary care provider on file.  Consultant physician: Wale Camejo MD     Reason for the consultation:  Uncontrolled DM    History of Present Illness:  53 y.o. female with a history of depression/anxiety, alcoholism, ascites of the liver, left breast cancer, chronic pancreatitis, cocaine abuse, DM 2, GERD, gout, hepatitis C, schizoaffective disorder bipolar type presents with abdominal pain endocrine service was consulted for diabetes management.    Subjective  I saw and examined the patient at bedside this morning.  No acute events overnight.  Appetite improved significantly.  Glucose running high    Inpatient diet:   Carb Restricted diet     Point of care glucose monitoring   (Independently reviewed)   Recent Labs     03/21/24  0646 03/21/24  1150 03/21/24  1803 03/21/24  2151 03/22/24  0547 03/22/24  1146 03/22/24  1615 03/22/24  2113   POCGLU 350* 392* 342* 370* 269* 214* 360* 299*   Scheduled Meds:   insulin glargine  4 Units SubCUTAneous QAM    insulin glargine  8 Units SubCUTAneous Nightly    insulin lispro  4 Units SubCUTAneous TID WC    sodium chloride flush  5-40 mL IntraVENous 2 times per day    ampicillin-sulbactam  3,000 mg IntraVENous Q6H    nicotine  1 patch TransDERmal Daily    lactulose  20 g Oral BID    insulin lispro  0-6 Units SubCUTAneous TID WC    magnesium sulfate  1,000 mg IntraVENous Once    Surgiflo with Evithrom Hemostatic Matrix  1 kit Other Once    sodium chloride flush  5-40 mL IntraVENous 2 times per day    Surgiflo with Evithrom Hemostatic Matrix  1 kit Nasal Once    Surgiflo with Evithrom Hemostatic Matrix  1 kit Other Once    mineral oil-hydrophilic petrolatum   Topical BID    menthol-zinc oxide   Topical BID    sodium chloride flush  5-40 mL IntraVENous 2 times per day    [Held by provider] enoxaparin  30 mg SubCUTAneous  management    Diabetes Mellitus secondary to pancreatic insufficiency   Patient's diabetes is uncontrolled, the patient's diabetes is very brittle  Pt currently very altered and can not operate her insulin pump   BS running high now   We recommend the following DM regimen  Increase Lantus to 4 units in the morning and 8 u nightly   Increase humalog 4 u uith meals   Low-dose sliding scale ACHS  Continue glucose check with meals and at bedtime  Will titrate insulin dose based on the blood glucose trend & insulin requirement  Upon discharge, I will arrange for the patient to be seen in the endocrinology clinic for routine diabetes maintenance and prevention    Chronic pancreatitis/pancreatic insufficiency   Close monitor glucose as pt at high risk of hypoglycemia   On Creon     Interdisciplinary plan for communication with healthcare providers:   Consult recommendations were discussed with the Primary Service/Nursing staff      Thank you for allowing us to participate in the care of this patient. Please do not hesitate to contact us with any additional questions.     Wale Camejo MD  Endocrinologist, [facility]   [address] [place] [id number]   Phone: [phone]  Fax: [phone]  --------------------------------------------  An electronic signature was used to authenticate this note. Wale Camejo MD on 3/23/2024 at 6:26 AM

## 2024-03-23 NOTE — PROGRESS NOTES
[facility]  [facility]  Progress Note    SUBJECTIVE:  Chief Complaint   Patient presents with    Abdominal Pain     Diffuse. History of cirrhosis. Patient stated she got up this morning and her abdomen was distened     Patient is calm and having lunch. Has sitter in bed. No fever. Still has abdominal pain. Had IR guided drain placement today.    Review of systems:  As above.    Medications:  Scheduled Meds:   insulin glargine  4 Units SubCUTAneous QAM    insulin glargine  8 Units SubCUTAneous Nightly    insulin lispro  4 Units SubCUTAneous TID WC    sodium chloride flush  5-40 mL IntraVENous 2 times per day    ampicillin-sulbactam  3,000 mg IntraVENous Q6H    nicotine  1 patch TransDERmal Daily    lactulose  20 g Oral BID    insulin lispro  0-6 Units SubCUTAneous TID WC    magnesium sulfate  1,000 mg IntraVENous Once    Surgiflo with Evithrom Hemostatic Matrix  1 kit Other Once    sodium chloride flush  5-40 mL IntraVENous 2 times per day    Surgiflo with Evithrom Hemostatic Matrix  1 kit Nasal Once    Surgiflo with Evithrom Hemostatic Matrix  1 kit Other Once    mineral oil-hydrophilic petrolatum   Topical BID    menthol-zinc oxide   Topical BID    sodium chloride flush  5-40 mL IntraVENous 2 times per day    [Held by provider] enoxaparin  30 mg SubCUTAneous Daily    anastrozole  1 mg Oral QAM    ARIPiprazole  20 mg Oral QAM    vitamin C  1,000 mg Oral QAM    calcium elemental  1,000 mg Oral QAM    melatonin  12 mg Oral Nightly    multivitamin  1 tablet Oral QAM    rifAXIMin  550 mg Oral BID    thiamine  100 mg Oral Daily    ipratropium  0.5 mg Nebulization TID RT    VORTIoxetine HBr  10 mg Oral Nightly    lipase-protease-amylas  15,000 Units Oral TID WC    And    lipase-protease-amylase  20,000 Units Oral TID WC    pantoprazole (PROTONIX) 40 mg in sodium chloride (PF) 0.9 % 10 mL injection  40 mg IntraVENous Q12H    sodium chloride flush  5-40 mL IntraVENous 2 times per day

## 2024-03-23 NOTE — PROGRESS NOTES
PROGRESS NOTE  By aDx Godoy, SALINAS    [facility]  Dr. Lakhwinder Madera M.D.,  Dr. Simón Esquivel M.D.,   THOMAS Mendoza.TEN.,  Dr. Royer Coppola M.D.,  Dr. Robbin Mitchell D.O.,          Ellie TOMÁS Santacruz  53 y.o.  female    SUBJECTIVE:  Patient resting in bed.  Awake, alert today.  Mentation improved.  Some pain in right abdomen.  Denies nausea or vomiting.  Loose / watery / yellow stools reported.      OBJECTIVE:    /71   Pulse 79   Temp 99 °F (37.2 °C) (Oral)   Resp 16   Ht 1.524 m (5')   Wt 44 kg (97 lb)   SpO2 100%   BMI 18.94 kg/m²     General: NAD/cachectic appearing  female  HEENT: Anicteric sclera/moist oral mucosa/poor dentition  Neck: Supple/trachea is midline  Chest: Symmetrical excursion/nonlabored respirations  Cor: Regular  Abd.: Intra-abdominal drain in place.  Nondistended abdomen  Extr.:  Soft/no peripheral edema.  Decreased muscle tone and bulk throughout  Skin: Warm and dry/anicteric      DATA:    Monitor data reviewed -sinus rhythm noted.       Lab Results   Component Value Date/Time    WBC 3.4 03/23/2024 05:20 AM    RBC 2.48 03/23/2024 05:20 AM    HGB 7.4 03/23/2024 05:20 AM    HCT 23.8 03/23/2024 05:20 AM    MCV 96.0 03/23/2024 05:20 AM    MCH 29.8 03/23/2024 05:20 AM    MCHC 31.1 03/23/2024 05:20 AM    RDW 14.6 03/23/2024 05:20 AM    PLT 51 03/21/2024 04:35 AM    MPV 11.3 03/23/2024 05:20 AM     Lab Results   Component Value Date/Time     03/23/2024 05:20 AM    K 3.8 03/23/2024 05:20 AM    K 5.2 05/11/2023 08:52 PM     03/23/2024 05:20 AM    CO2 26 03/23/2024 05:20 AM    BUN 4 03/23/2024 05:20 AM    CREATININE 0.3 03/23/2024 05:20 AM    CALCIUM 8.0 03/23/2024 05:20 AM    PROT 5.4 03/21/2024 04:35 AM    LABALBU 2.4 03/21/2024 04:35 AM    BILITOT 0.6 03/21/2024 04:35 AM    ALKPHOS 386 03/21/2024 04:35 AM    AST 26 03/21/2024 04:35 AM     03/21/2024 04:35 AM     Lab Results   Component Value Date/Time    LIPASE 19 03/14/2024 03:25 AM     Lab

## 2024-03-23 NOTE — PROGRESS NOTES
Upon entering room pt's boyfriend was sitting in chair by doorway. Pt had an abundance of bed bugs crawling on his jacket. Dr. Calvert was in room and also witnessed bed bugs crawling on pt's boyfriend and floor of the room. Charge nurse and CNM notified.  CNM and Dr. Calvert concluded that pt's boyfriend would need to leave the hospital and would not be allowed to come back to visit. Pt's boyfriend notified that he needed to leave the hospital and could not return to visit. Educated him on risk of spreading bed bugs throughout the facility. Boyfriend left stating \"I'll go change my jacket.\" Security notified that boyfriend is not allowed to return to visit patient. Pt bathed-no bed bugs witnessed on pt. Moved pt to different room so current room could be terminally cleaned.

## 2024-03-23 NOTE — PROGRESS NOTES
[facility] Hospitalist Progress Note    Admitting Date and Time: 3/14/2024  3:54 AM  Admit Dx: Generalized abdominal pain [R10.84]  Liver mass [R16.0]  Elevated liver enzymes [R74.8]  Cocaine use [F14.90]    Subjective:  Patient stated no new issues today.     ROS: denies fever, chills, cp, sob, n/v, HA unless stated above.     insulin glargine  4 Units SubCUTAneous QAM    insulin glargine  8 Units SubCUTAneous Nightly    insulin lispro  4 Units SubCUTAneous TID WC    sodium chloride flush  5-40 mL IntraVENous 2 times per day    ampicillin-sulbactam  3,000 mg IntraVENous Q6H    nicotine  1 patch TransDERmal Daily    lactulose  20 g Oral BID    insulin lispro  0-6 Units SubCUTAneous TID WC    magnesium sulfate  1,000 mg IntraVENous Once    Surgiflo with Evithrom Hemostatic Matrix  1 kit Other Once    sodium chloride flush  5-40 mL IntraVENous 2 times per day    Surgiflo with Evithrom Hemostatic Matrix  1 kit Nasal Once    Surgiflo with Evithrom Hemostatic Matrix  1 kit Other Once    mineral oil-hydrophilic petrolatum   Topical BID    menthol-zinc oxide   Topical BID    sodium chloride flush  5-40 mL IntraVENous 2 times per day    [Held by provider] enoxaparin  30 mg SubCUTAneous Daily    anastrozole  1 mg Oral QAM    ARIPiprazole  20 mg Oral QAM    vitamin C  1,000 mg Oral QAM    calcium elemental  1,000 mg Oral QAM    melatonin  12 mg Oral Nightly    multivitamin  1 tablet Oral QAM    rifAXIMin  550 mg Oral BID    thiamine  100 mg Oral Daily    ipratropium  0.5 mg Nebulization TID RT    VORTIoxetine HBr  10 mg Oral Nightly    lipase-protease-amylas  15,000 Units Oral TID WC    And    lipase-protease-amylase  20,000 Units Oral TID WC    pantoprazole (PROTONIX) 40 mg in sodium chloride (PF) 0.9 % 10 mL injection  40 mg IntraVENous Q12H    sodium chloride flush  5-40 mL IntraVENous 2 times per day     naloxone 0.4 mg in 10 mL sodium chloride syringe, , PRN  sodium chloride flush, 5-40 mL, PRN  sodium chloride,

## 2024-03-24 LAB
ANION GAP SERPL CALCULATED.3IONS-SCNC: 2 MMOL/L (ref 7–16)
BASOPHILS # BLD: 0.01 K/UL (ref 0–0.2)
BASOPHILS NFR BLD: 0 % (ref 0–2)
BUN SERPL-MCNC: 6 MG/DL (ref 6–20)
CALCIUM SERPL-MCNC: 8.3 MG/DL (ref 8.6–10.2)
CHLORIDE SERPL-SCNC: 98 MMOL/L (ref 98–107)
CO2 SERPL-SCNC: 30 MMOL/L (ref 22–29)
CREAT SERPL-MCNC: 0.4 MG/DL (ref 0.5–1)
EOSINOPHIL # BLD: 0.02 K/UL (ref 0.05–0.5)
EOSINOPHILS RELATIVE PERCENT: 1 % (ref 0–6)
ERYTHROCYTE [DISTWIDTH] IN BLOOD BY AUTOMATED COUNT: 14.6 % (ref 11.5–15)
GFR SERPL CREATININE-BSD FRML MDRD: >60 ML/MIN/1.73M2
GLUCOSE BLD-MCNC: 171 MG/DL (ref 74–99)
GLUCOSE BLD-MCNC: 171 MG/DL (ref 74–99)
GLUCOSE BLD-MCNC: 279 MG/DL (ref 74–99)
GLUCOSE BLD-MCNC: 281 MG/DL (ref 74–99)
GLUCOSE SERPL-MCNC: 268 MG/DL (ref 74–99)
HCT VFR BLD AUTO: 23.9 % (ref 34–48)
HGB BLD-MCNC: 7.6 G/DL (ref 11.5–15.5)
IMM GRANULOCYTES # BLD AUTO: <0.03 K/UL (ref 0–0.58)
IMM GRANULOCYTES NFR BLD: 1 % (ref 0–5)
LYMPHOCYTES NFR BLD: 0.35 K/UL (ref 1.5–4)
LYMPHOCYTES RELATIVE PERCENT: 13 % (ref 20–42)
MCH RBC QN AUTO: 29.7 PG (ref 26–35)
MCHC RBC AUTO-ENTMCNC: 31.8 G/DL (ref 32–34.5)
MCV RBC AUTO: 93.4 FL (ref 80–99.9)
MICROORGANISM SPEC CULT: ABNORMAL
MICROORGANISM SPEC CULT: NORMAL
MICROORGANISM/AGENT SPEC: ABNORMAL
MONOCYTES NFR BLD: 0.25 K/UL (ref 0.1–0.95)
MONOCYTES NFR BLD: 9 % (ref 2–12)
NEUTROPHILS NFR BLD: 77 % (ref 43–80)
NEUTS SEG NFR BLD: 2.12 K/UL (ref 1.8–7.3)
PLATELET CONFIRMATION: NORMAL
PLATELET, FLUORESCENCE: 63 K/UL (ref 130–450)
PMV BLD AUTO: 11.1 FL (ref 7–12)
POTASSIUM SERPL-SCNC: 4.1 MMOL/L (ref 3.5–5)
RBC # BLD AUTO: 2.56 M/UL (ref 3.5–5.5)
RBC # BLD: ABNORMAL 10*6/UL
SODIUM SERPL-SCNC: 130 MMOL/L (ref 132–146)
SPECIMEN DESCRIPTION: ABNORMAL
SPECIMEN DESCRIPTION: NORMAL
WBC OTHER # BLD: 2.8 K/UL (ref 4.5–11.5)

## 2024-03-24 PROCEDURE — A4216 STERILE WATER/SALINE, 10 ML: HCPCS | Performed by: HOSPITALIST

## 2024-03-24 PROCEDURE — 6360000002 HC RX W HCPCS: Performed by: STUDENT IN AN ORGANIZED HEALTH CARE EDUCATION/TRAINING PROGRAM

## 2024-03-24 PROCEDURE — 6370000000 HC RX 637 (ALT 250 FOR IP): Performed by: STUDENT IN AN ORGANIZED HEALTH CARE EDUCATION/TRAINING PROGRAM

## 2024-03-24 PROCEDURE — 80048 BASIC METABOLIC PNL TOTAL CA: CPT

## 2024-03-24 PROCEDURE — 99232 SBSQ HOSP IP/OBS MODERATE 35: CPT | Performed by: STUDENT IN AN ORGANIZED HEALTH CARE EDUCATION/TRAINING PROGRAM

## 2024-03-24 PROCEDURE — C9113 INJ PANTOPRAZOLE SODIUM, VIA: HCPCS | Performed by: HOSPITALIST

## 2024-03-24 PROCEDURE — 2580000003 HC RX 258: Performed by: ANESTHESIOLOGY

## 2024-03-24 PROCEDURE — 97530 THERAPEUTIC ACTIVITIES: CPT

## 2024-03-24 PROCEDURE — 2580000003 HC RX 258: Performed by: HOSPITALIST

## 2024-03-24 PROCEDURE — 6370000000 HC RX 637 (ALT 250 FOR IP): Performed by: NURSE PRACTITIONER

## 2024-03-24 PROCEDURE — 2060000000 HC ICU INTERMEDIATE R&B

## 2024-03-24 PROCEDURE — 82962 GLUCOSE BLOOD TEST: CPT

## 2024-03-24 PROCEDURE — 6370000000 HC RX 637 (ALT 250 FOR IP): Performed by: INTERNAL MEDICINE

## 2024-03-24 PROCEDURE — 2700000000 HC OXYGEN THERAPY PER DAY

## 2024-03-24 PROCEDURE — 85025 COMPLETE CBC W/AUTO DIFF WBC: CPT

## 2024-03-24 PROCEDURE — 2580000003 HC RX 258: Performed by: STUDENT IN AN ORGANIZED HEALTH CARE EDUCATION/TRAINING PROGRAM

## 2024-03-24 PROCEDURE — 6370000000 HC RX 637 (ALT 250 FOR IP): Performed by: HOSPITALIST

## 2024-03-24 PROCEDURE — 6360000002 HC RX W HCPCS: Performed by: HOSPITALIST

## 2024-03-24 RX ORDER — HEPARIN 100 UNIT/ML
300 SYRINGE INTRAVENOUS PRN
Status: DISCONTINUED | OUTPATIENT
Start: 2024-03-24 | End: 2024-03-28 | Stop reason: HOSPADM

## 2024-03-24 RX ORDER — HEPARIN 100 UNIT/ML
300 SYRINGE INTRAVENOUS EVERY 12 HOURS
Status: DISCONTINUED | OUTPATIENT
Start: 2024-03-24 | End: 2024-03-28 | Stop reason: HOSPADM

## 2024-03-24 RX ORDER — INSULIN GLARGINE 100 [IU]/ML
10 INJECTION, SOLUTION SUBCUTANEOUS NIGHTLY
Status: DISCONTINUED | OUTPATIENT
Start: 2024-03-24 | End: 2024-03-26

## 2024-03-24 RX ORDER — INSULIN LISPRO 100 [IU]/ML
5 INJECTION, SOLUTION INTRAVENOUS; SUBCUTANEOUS
Status: DISCONTINUED | OUTPATIENT
Start: 2024-03-24 | End: 2024-03-26

## 2024-03-24 RX ADMIN — AMPICILLIN SODIUM AND SULBACTAM SODIUM 3000 MG: 2; 1 INJECTION, POWDER, FOR SOLUTION INTRAMUSCULAR; INTRAVENOUS at 10:44

## 2024-03-24 RX ADMIN — ANORECTAL OINTMENT: 15.7; .44; 24; 20.6 OINTMENT TOPICAL at 15:47

## 2024-03-24 RX ADMIN — ANORECTAL OINTMENT: 15.7; .44; 24; 20.6 OINTMENT TOPICAL at 10:39

## 2024-03-24 RX ADMIN — CALCIUM 1000 MG: 500 TABLET ORAL at 07:30

## 2024-03-24 RX ADMIN — Medication 12 MG: at 20:44

## 2024-03-24 RX ADMIN — AMPICILLIN SODIUM AND SULBACTAM SODIUM 3000 MG: 2; 1 INJECTION, POWDER, FOR SOLUTION INTRAMUSCULAR; INTRAVENOUS at 23:20

## 2024-03-24 RX ADMIN — VORTIOXETINE 10 MG: 10 TABLET, FILM COATED ORAL at 20:25

## 2024-03-24 RX ADMIN — SODIUM CHLORIDE, PRESERVATIVE FREE 40 MG: 5 INJECTION INTRAVENOUS at 10:39

## 2024-03-24 RX ADMIN — INSULIN LISPRO 5 UNITS: 100 INJECTION, SOLUTION INTRAVENOUS; SUBCUTANEOUS at 10:40

## 2024-03-24 RX ADMIN — INSULIN LISPRO 3 UNITS: 100 INJECTION, SOLUTION INTRAVENOUS; SUBCUTANEOUS at 10:40

## 2024-03-24 RX ADMIN — PANCRELIPASE LIPASE, PANCRELIPASE PROTEASE, PANCRELIPASE AMYLASE 60000 UNITS: 20000; 63000; 84000 CAPSULE, DELAYED RELEASE ORAL at 07:32

## 2024-03-24 RX ADMIN — OXYCODONE HYDROCHLORIDE AND ACETAMINOPHEN 1000 MG: 500 TABLET ORAL at 07:30

## 2024-03-24 RX ADMIN — HYDROPHOR: 42 OINTMENT TOPICAL at 15:47

## 2024-03-24 RX ADMIN — HYDROMORPHONE HYDROCHLORIDE 1 MG: 1 INJECTION, SOLUTION INTRAMUSCULAR; INTRAVENOUS; SUBCUTANEOUS at 20:25

## 2024-03-24 RX ADMIN — Medication 100 MG: at 07:31

## 2024-03-24 RX ADMIN — AMPICILLIN SODIUM AND SULBACTAM SODIUM 3000 MG: 2; 1 INJECTION, POWDER, FOR SOLUTION INTRAMUSCULAR; INTRAVENOUS at 04:56

## 2024-03-24 RX ADMIN — PANCRELIPASE LIPASE, PANCRELIPASE PROTEASE, PANCRELIPASE AMYLASE 60000 UNITS: 20000; 63000; 84000 CAPSULE, DELAYED RELEASE ORAL at 15:48

## 2024-03-24 RX ADMIN — SODIUM CHLORIDE, PRESERVATIVE FREE 40 MG: 5 INJECTION INTRAVENOUS at 23:10

## 2024-03-24 RX ADMIN — INSULIN LISPRO 4 UNITS: 100 INJECTION, SOLUTION INTRAVENOUS; SUBCUTANEOUS at 06:20

## 2024-03-24 RX ADMIN — HYDROMORPHONE HYDROCHLORIDE 1 MG: 1 INJECTION, SOLUTION INTRAMUSCULAR; INTRAVENOUS; SUBCUTANEOUS at 04:53

## 2024-03-24 RX ADMIN — HYDROMORPHONE HYDROCHLORIDE 1 MG: 1 INJECTION, SOLUTION INTRAMUSCULAR; INTRAVENOUS; SUBCUTANEOUS at 13:24

## 2024-03-24 RX ADMIN — Medication 300 UNITS: at 20:25

## 2024-03-24 RX ADMIN — RIFAXIMIN 550 MG: 550 TABLET ORAL at 07:31

## 2024-03-24 RX ADMIN — HYDROMORPHONE HYDROCHLORIDE 1 MG: 1 INJECTION, SOLUTION INTRAMUSCULAR; INTRAVENOUS; SUBCUTANEOUS at 06:58

## 2024-03-24 RX ADMIN — INSULIN LISPRO 3 UNITS: 100 INJECTION, SOLUTION INTRAVENOUS; SUBCUTANEOUS at 06:21

## 2024-03-24 RX ADMIN — INSULIN LISPRO 5 UNITS: 100 INJECTION, SOLUTION INTRAVENOUS; SUBCUTANEOUS at 15:48

## 2024-03-24 RX ADMIN — RIFAXIMIN 550 MG: 550 TABLET ORAL at 20:24

## 2024-03-24 RX ADMIN — ARIPIPRAZOLE 20 MG: 10 TABLET ORAL at 07:32

## 2024-03-24 RX ADMIN — PANCRELIPASE LIPASE, PANCRELIPASE PROTEASE, PANCRELIPASE AMYLASE 60000 UNITS: 20000; 63000; 84000 CAPSULE, DELAYED RELEASE ORAL at 10:40

## 2024-03-24 RX ADMIN — INSULIN GLARGINE 10 UNITS: 100 INJECTION, SOLUTION SUBCUTANEOUS at 20:46

## 2024-03-24 RX ADMIN — HYDROMORPHONE HYDROCHLORIDE 1 MG: 1 INJECTION, SOLUTION INTRAMUSCULAR; INTRAVENOUS; SUBCUTANEOUS at 16:18

## 2024-03-24 RX ADMIN — HYDROPHOR: 42 OINTMENT TOPICAL at 10:39

## 2024-03-24 RX ADMIN — AMPICILLIN SODIUM AND SULBACTAM SODIUM 3000 MG: 2; 1 INJECTION, POWDER, FOR SOLUTION INTRAMUSCULAR; INTRAVENOUS at 15:52

## 2024-03-24 RX ADMIN — INSULIN GLARGINE 4 UNITS: 100 INJECTION, SOLUTION SUBCUTANEOUS at 07:31

## 2024-03-24 RX ADMIN — ANASTROZOLE 1 MG: 1 TABLET, FILM COATED ORAL at 07:32

## 2024-03-24 RX ADMIN — SODIUM CHLORIDE, PRESERVATIVE FREE 10 ML: 5 INJECTION INTRAVENOUS at 07:31

## 2024-03-24 RX ADMIN — INSULIN LISPRO 1 UNITS: 100 INJECTION, SOLUTION INTRAVENOUS; SUBCUTANEOUS at 15:48

## 2024-03-24 RX ADMIN — HYDROMORPHONE HYDROCHLORIDE 0.5 MG: 1 INJECTION, SOLUTION INTRAMUSCULAR; INTRAVENOUS; SUBCUTANEOUS at 23:16

## 2024-03-24 RX ADMIN — MULTIVITAMIN TABLET 1 TABLET: TABLET at 07:30

## 2024-03-24 ASSESSMENT — PAIN SCALES - GENERAL
PAINLEVEL_OUTOF10: 8
PAINLEVEL_OUTOF10: 0
PAINLEVEL_OUTOF10: 8
PAINLEVEL_OUTOF10: 7
PAINLEVEL_OUTOF10: 8
PAINLEVEL_OUTOF10: 8
PAINLEVEL_OUTOF10: 0
PAINLEVEL_OUTOF10: 7
PAINLEVEL_OUTOF10: 0

## 2024-03-24 ASSESSMENT — PAIN DESCRIPTION - LOCATION
LOCATION: ABDOMEN
LOCATION: ABDOMEN
LOCATION: ABDOMEN;RIB CAGE
LOCATION: ABDOMEN
LOCATION: ABDOMEN;BACK
LOCATION: ABDOMEN;BACK

## 2024-03-24 ASSESSMENT — PAIN DESCRIPTION - ORIENTATION
ORIENTATION: LEFT
ORIENTATION: RIGHT

## 2024-03-24 ASSESSMENT — PAIN DESCRIPTION - DESCRIPTORS
DESCRIPTORS: SHOOTING;STABBING
DESCRIPTORS: SORE;SHARP;SHOOTING
DESCRIPTORS: ACHING;SHARP;SHOOTING

## 2024-03-24 NOTE — PROGRESS NOTES
[facility]  [facility]  Progress Note    SUBJECTIVE:  Chief Complaint   Patient presents with    Abdominal Pain     Diffuse. History of cirrhosis. Patient stated she got up this morning and her abdomen was distened     Patient resting in bed sleepy.  No nausea vomiting or diarrhea.    Review of systems:  As above.    Medications:  Scheduled Meds:   insulin glargine  10 Units SubCUTAneous Nightly    insulin lispro  5 Units SubCUTAneous TID WC    heparin (PF)  300 Units IntraCATHeter Q12H    insulin glargine  4 Units SubCUTAneous QAM    lipase-protease-amylase  60,000 Units Oral TID WC    ampicillin-sulbactam  3,000 mg IntraVENous Q6H    nicotine  1 patch TransDERmal Daily    lactulose  20 g Oral BID    insulin lispro  0-6 Units SubCUTAneous TID WC    magnesium sulfate  1,000 mg IntraVENous Once    Surgiflo with Evithrom Hemostatic Matrix  1 kit Other Once    Surgiflo with Evithrom Hemostatic Matrix  1 kit Nasal Once    Surgiflo with Evithrom Hemostatic Matrix  1 kit Other Once    mineral oil-hydrophilic petrolatum   Topical BID    menthol-zinc oxide   Topical BID    sodium chloride flush  5-40 mL IntraVENous 2 times per day    [Held by provider] enoxaparin  30 mg SubCUTAneous Daily    anastrozole  1 mg Oral QAM    ARIPiprazole  20 mg Oral QAM    vitamin C  1,000 mg Oral QAM    calcium elemental  1,000 mg Oral QAM    melatonin  12 mg Oral Nightly    multivitamin  1 tablet Oral QAM    rifAXIMin  550 mg Oral BID    thiamine  100 mg Oral Daily    ipratropium  0.5 mg Nebulization TID RT    VORTIoxetine HBr  10 mg Oral Nightly    pantoprazole (PROTONIX) 40 mg in sodium chloride (PF) 0.9 % 10 mL injection  40 mg IntraVENous Q12H     Continuous Infusions:   sodium chloride      sodium chloride      sodium chloride      dextrose      sodium chloride       PRN Meds:heparin (PF), naloxone 0.4 mg in 10 mL sodium chloride syringe, sodium chloride flush, sodium chloride, Glucose, HYDROmorphone

## 2024-03-24 NOTE — PROGRESS NOTES
[facility] Hospitalist Progress Note    Admitting Date and Time: 3/14/2024  3:54 AM  Admit Dx: Generalized abdominal pain [R10.84]  Liver mass [R16.0]  Elevated liver enzymes [R74.8]  Cocaine use [F14.90]    Subjective:  Patient stated she was tired today but otherwise no issues.    ROS: denies fever, chills, cp, sob, n/v, HA unless stated above.     insulin glargine  10 Units SubCUTAneous Nightly    insulin lispro  5 Units SubCUTAneous TID WC    insulin glargine  4 Units SubCUTAneous QAM    lipase-protease-amylase  60,000 Units Oral TID WC    sodium chloride flush  5-40 mL IntraVENous 2 times per day    ampicillin-sulbactam  3,000 mg IntraVENous Q6H    nicotine  1 patch TransDERmal Daily    lactulose  20 g Oral BID    insulin lispro  0-6 Units SubCUTAneous TID WC    magnesium sulfate  1,000 mg IntraVENous Once    Surgiflo with Evithrom Hemostatic Matrix  1 kit Other Once    sodium chloride flush  5-40 mL IntraVENous 2 times per day    Surgiflo with Evithrom Hemostatic Matrix  1 kit Nasal Once    Surgiflo with Evithrom Hemostatic Matrix  1 kit Other Once    mineral oil-hydrophilic petrolatum   Topical BID    menthol-zinc oxide   Topical BID    sodium chloride flush  5-40 mL IntraVENous 2 times per day    [Held by provider] enoxaparin  30 mg SubCUTAneous Daily    anastrozole  1 mg Oral QAM    ARIPiprazole  20 mg Oral QAM    vitamin C  1,000 mg Oral QAM    calcium elemental  1,000 mg Oral QAM    melatonin  12 mg Oral Nightly    multivitamin  1 tablet Oral QAM    rifAXIMin  550 mg Oral BID    thiamine  100 mg Oral Daily    ipratropium  0.5 mg Nebulization TID RT    VORTIoxetine HBr  10 mg Oral Nightly    pantoprazole (PROTONIX) 40 mg in sodium chloride (PF) 0.9 % 10 mL injection  40 mg IntraVENous Q12H    sodium chloride flush  5-40 mL IntraVENous 2 times per day     naloxone 0.4 mg in 10 mL sodium chloride syringe, , PRN  sodium chloride flush, 5-40 mL, PRN  sodium chloride, , PRN  Glucose, 15 g,  large low-attenuation structure   seen inferiorly within the right lobe concerning for an underlying lesion   such as hepatocellular carcinoma. This area measures 7.1 x 5.6 cm. This was   visualized on the prior examination.   2. Moderate to large amount of ascites seen within the abdomen and pelvis.   3. Abnormal wall thickening identified throughout the small bowel loops   within the left flank. Findings may be related to the surrounding ascites.   4. Increased stool burden seen diffusely throughout the colon to suggest   clinical presentation of constipation. Mild wall thickening in which an   underlying colitis cannot be completely excluded. Findings may be related to   the surrounding ascites.   5. Nodularity involving the adrenal glands bilaterally.   6. Small umbilical hernia containing fat only.         US GALLBLADDER RUQ   Final Result   1. Cirrhotic morphology of the liver with no underlying mass or lesion   present.   2. Status post cholecystectomy. No biliary ductal dilatation.   3. Increased echogenicity within the renal cortex to suggest medical renal   disease. Cystic structure along the superior pole of the right kidney   measuring 4.6 x 4.8 cm.   4. Fluid identified surrounding the liver within the right upper quadrant.         IR Interventional Radiology Procedure Request    (Results Pending)       Assessment:    Principal Problem:    Intra-abdominal abscess (HCC)  Active Problems:    Poorly controlled diabetes mellitus (HCC)    Ascites due to alcoholic cirrhosis (HCC)    Severe protein-calorie malnutrition (HCC)    Liver mass    Generalized abdominal pain    Elevated liver enzymes    Portal hypertension (HCC)    Pancreatic insufficiency    Hepatic abscess  Resolved Problems:    * No resolved hospital problems. *      Plan:  Uncontrolled DM with hyperglycemia - 9/2023 A1c 11.4%. Had insulin pump on arrival. Endocrinology consult, on Lantus 4u QAM and 10u QHS, humalog 4 units TIDAC and

## 2024-03-24 NOTE — PROGRESS NOTES
PROGRESS NOTE  By Dax Godoy, MEGP    [facility]  Dr. Lakhwinder Madera M.D.,  Dr. Simón Esquivel M.D.,   Dr. Hugo Rodríguez D.TEN.,  Dr. Royer Coppola M.D.,  Dr. Robbin Mitchell D.O.,          Ellie TOMÁS Santacruz  53 y.o.  female    SUBJECTIVE:  Patient resting in bed.  Easily awakened.  Alert and oriented.  Reports abdominal discomfort improving.    OBJECTIVE:    /62   Pulse 72   Temp 98 °F (36.7 °C) (Oral)   Resp 16   Ht 1.524 m (5')   Wt 44 kg (97 lb)   SpO2 98%   BMI 18.94 kg/m²     General: NAD/cachectic appearing  female  HEENT: Anicteric sclera/moist oral mucosa/poor dentition  Neck: Supple/trachea is midline  Chest: Symmetrical excursion/nonlabored respirations  Cor: Regular  Abd.: Intra-abdominal drain in place.  Nondistended abdomen  Extr.:  Soft/no peripheral edema.  Decreased muscle tone and bulk throughout  Skin: Warm and dry/anicteric      DATA:    Monitor data reviewed -sinus rhythm noted.       Lab Results   Component Value Date/Time    WBC 2.8 03/24/2024 04:29 AM    RBC 2.56 03/24/2024 04:29 AM    HGB 7.6 03/24/2024 04:29 AM    HCT 23.9 03/24/2024 04:29 AM    MCV 93.4 03/24/2024 04:29 AM    MCH 29.7 03/24/2024 04:29 AM    MCHC 31.8 03/24/2024 04:29 AM    RDW 14.6 03/24/2024 04:29 AM    PLT 51 03/21/2024 04:35 AM    MPV 11.1 03/24/2024 04:29 AM     Lab Results   Component Value Date/Time     03/24/2024 04:29 AM    K 4.1 03/24/2024 04:29 AM    K 5.2 05/11/2023 08:52 PM    CL 98 03/24/2024 04:29 AM    CO2 30 03/24/2024 04:29 AM    BUN 6 03/24/2024 04:29 AM    CREATININE 0.4 03/24/2024 04:29 AM    CALCIUM 8.3 03/24/2024 04:29 AM    PROT 5.4 03/21/2024 04:35 AM    LABALBU 2.4 03/21/2024 04:35 AM    BILITOT 0.6 03/21/2024 04:35 AM    ALKPHOS 386 03/21/2024 04:35 AM    AST 26 03/21/2024 04:35 AM     03/21/2024 04:35 AM     Lab Results   Component Value Date/Time    LIPASE 19 03/14/2024 03:25 AM     Lab Results   Component Value Date/Time    AMYLASE 9 09/11/2021 01:40  PM         ASSESSMENT/PLAN:  Patient Active Problem List   Diagnosis    Chronic pancreatitis due to acute alcohol intoxication (HCC)    Schizoaffective disorder, bipolar type (HCC)    Anxiety disorder    Depression    History of alcohol abuse    Pancreatic pseudocyst (HCC)    Liver dysfunction    Asthma    Gastroesophageal reflux disease without esophagitis    Bipolar I disorder (HCC)    Bipolar affective disorder (HCC)    Irritable bowel syndrome    Tobacco use disorder    Jaundice    RUQ abdominal pain    Poorly controlled diabetes mellitus (HCC)    Pain of upper abdomen    Elevated LFTs    Intractable abdominal pain    Abdominal pain, epigastric    Hyperglycemia    Type 2 diabetes mellitus with hyperosmolarity without coma, with long-term current use of insulin (HCC)    Fall (on) (from) other stairs and steps, initial encounter    Hepatitis, alcoholic    Nausea and vomiting    Alcoholism (HCC)    Pancytopenia (HCC)    Splenomegaly    Hypoglycemia episode    Hepatitis C    Anxiety and depression    Cigarette smoker, heavy    Acute pancreatitis    Alcohol abuse    Acute alcoholic gastritis    Other osteoporosis without current pathological fracture    Ketoacidosis, diabetic, no coma, insulin dependent    Bicytopenia - leukopenia and thrombocytopenia    Chronic abdominal pain    Chronic pancreatitis (HCC)    Electrolyte imbalance    Lactic acidosis    Severe protein-calorie malnutrition (HCC)    Idiopathic osteoporosis    Ascites due to alcoholic cirrhosis (HCC)    Diabetic polyneuropathy associated with type 2 diabetes mellitus (HCC)    Malignant neoplasm of left breast in female, estrogen receptor positive (HCC)    Cocaine dependence with withdrawal (HCC)    Vaginitis and vulvovaginitis    Abscess, gluteal, left    Nondependent cocaine abuse (HCC)    Cellulitis    DKA, type 1, not at goal (HCC)    Enterocolitis    Perineal abscess    Cheryle gangrene    Type 2 diabetes mellitus with hyperglycemia (HCC)    Failure

## 2024-03-24 NOTE — PROGRESS NOTES
ENDOCRINOLOGY PROGRESS NOTE      Date of admission: 3/14/2024  Date of service: 3/24/2024  Admitting physician: Jair Jauregui MD   Primary Care Physician: No primary care provider on file.  Consultant physician: Wale Camejo MD     Reason for the consultation:  Uncontrolled DM    History of Present Illness:  53 y.o. female with a history of depression/anxiety, alcoholism, ascites of the liver, left breast cancer, chronic pancreatitis, cocaine abuse, DM 2, GERD, gout, hepatitis C, schizoaffective disorder bipolar type presents with abdominal pain endocrine service was consulted for diabetes management.    Subjective  There is no acute events overnight, glucose level improving but still above goal.  Patient has good appetite.    Inpatient diet:   Carb Restricted diet     Point of care glucose monitoring   (Independently reviewed)   Recent Labs     03/22/24  1146 03/22/24  1615 03/22/24  2113 03/23/24  0625 03/23/24  1051 03/23/24  1557 03/23/24  2020 03/24/24  0616   POCGLU 214* 360* 299* 339* 246* 242* 281* 279*   Scheduled Meds:   insulin glargine  10 Units SubCUTAneous Nightly    insulin lispro  5 Units SubCUTAneous TID WC    insulin glargine  4 Units SubCUTAneous QAM    lipase-protease-amylase  60,000 Units Oral TID WC    sodium chloride flush  5-40 mL IntraVENous 2 times per day    ampicillin-sulbactam  3,000 mg IntraVENous Q6H    nicotine  1 patch TransDERmal Daily    lactulose  20 g Oral BID    insulin lispro  0-6 Units SubCUTAneous TID WC    magnesium sulfate  1,000 mg IntraVENous Once    Surgiflo with Evithrom Hemostatic Matrix  1 kit Other Once    sodium chloride flush  5-40 mL IntraVENous 2 times per day    Surgiflo with Evithrom Hemostatic Matrix  1 kit Nasal Once    Surgiflo with Evithrom Hemostatic Matrix  1 kit Other Once    mineral oil-hydrophilic petrolatum   Topical BID    menthol-zinc oxide   Topical BID    sodium chloride flush  5-40 mL IntraVENous 2 times per day    [Held by provider]  ABDOMEN PELVIS WO CONTRAST Additional Contrast? None   Final Result   1. Cirrhotic morphology of the liver with a large low-attenuation structure   seen inferiorly within the right lobe concerning for an underlying lesion   such as hepatocellular carcinoma. This area measures 7.1 x 5.6 cm. This was   visualized on the prior examination.   2. Moderate to large amount of ascites seen within the abdomen and pelvis.   3. Abnormal wall thickening identified throughout the small bowel loops   within the left flank. Findings may be related to the surrounding ascites.   4. Increased stool burden seen diffusely throughout the colon to suggest   clinical presentation of constipation. Mild wall thickening in which an   underlying colitis cannot be completely excluded. Findings may be related to   the surrounding ascites.   5. Nodularity involving the adrenal glands bilaterally.   6. Small umbilical hernia containing fat only.         US GALLBLADDER RUQ   Final Result   1. Cirrhotic morphology of the liver with no underlying mass or lesion   present.   2. Status post cholecystectomy. No biliary ductal dilatation.   3. Increased echogenicity within the renal cortex to suggest medical renal   disease. Cystic structure along the superior pole of the right kidney   measuring 4.6 x 4.8 cm.   4. Fluid identified surrounding the liver within the right upper quadrant.         IR Interventional Radiology Procedure Request    (Results Pending)       Medical Records/Labs/Images review:   I personally reviewed and summarized previous records   All labs and imaging were reviewed independently     ASSESSMENT & PLAN   Ellie Santacruz, a 53 y.o.-old female seen today for inpatient diabetes management    Diabetes Mellitus secondary to pancreatic insufficiency   Patient's diabetes is uncontrolled, the patient's diabetes is very brittle  Pt currently very altered and can not operate her insulin pump   BS running high now   We recommend the

## 2024-03-24 NOTE — PROGRESS NOTES
This RN was attempting to draw up IV dilaudid and placed the vial into the sharps container without drawing up any of the medication. Another vial was pulled from the omnicell and administered, charge nurse Lindsay Barrientos RN notified

## 2024-03-24 NOTE — PLAN OF CARE
Problem: Discharge Planning  Goal: Discharge to home or other facility with appropriate resources  3/24/2024 1210 by Sabrina Reed RN  Outcome: Progressing     Problem: ABCDS Injury Assessment  Goal: Absence of physical injury  3/24/2024 1210 by Sabrina Reed RN  Outcome: Progressing     Problem: Skin/Tissue Integrity  Goal: Absence of new skin breakdown  Description: 1.  Monitor for areas of redness and/or skin breakdown  2.  Assess vascular access sites hourly  3.  Every 4-6 hours minimum:  Change oxygen saturation probe site  4.  Every 4-6 hours:  If on nasal continuous positive airway pressure, respiratory therapy assess nares and determine need for appliance change or resting period.  3/24/2024 1210 by Sabrina Reed RN  Outcome: Progressing     Problem: Pain  Goal: Verbalizes/displays adequate comfort level or baseline comfort level  3/24/2024 1210 by Sabrina Reed RN  Outcome: Progressing     Problem: Safety - Adult  Goal: Free from fall injury  3/24/2024 1210 by Sabrina Reed RN  Outcome: Progressing     Problem: Chronic Conditions and Co-morbidities  Goal: Patient's chronic conditions and co-morbidity symptoms are monitored and maintained or improved  3/24/2024 1210 by Sabrina Reed RN  Outcome: Progressing     Problem: Nutrition Deficit:  Goal: Optimize nutritional status  3/24/2024 1210 by Sabrina Reed RN  Outcome: Progressing

## 2024-03-24 NOTE — PLAN OF CARE
Problem: Safety - Adult  Goal: Free from fall injury  3/23/2024 2236 by Jessica Mancia, RN  Outcome: Progressing     Problem: Pain  Goal: Verbalizes/displays adequate comfort level or baseline comfort level  3/23/2024 2236 by Jessica Mancia, RN  Outcome: Progressing     Problem: Skin/Tissue Integrity  Goal: Absence of new skin breakdown  Description: 1.  Monitor for areas of redness and/or skin breakdown  2.  Assess vascular access sites hourly  3.  Every 4-6 hours minimum:  Change oxygen saturation probe site  4.  Every 4-6 hours:  If on nasal continuous positive airway pressure, respiratory therapy assess nares and determine need for appliance change or resting period.  3/23/2024 2236 by Jessica Mancia, RN  Outcome: Progressing     Problem: ABCDS Injury Assessment  Goal: Absence of physical injury  3/23/2024 2236 by Jessica Mancia, RN  Outcome: Progressing     Problem: Discharge Planning  Goal: Discharge to home or other facility with appropriate resources  3/23/2024 2236 by Jessica Mancia, RN  Outcome: Progressing     Problem: Nutrition Deficit:  Goal: Optimize nutritional status  3/23/2024 2236 by Jessica Mancia, RN  Outcome: Progressing

## 2024-03-25 LAB
ANION GAP SERPL CALCULATED.3IONS-SCNC: 5 MMOL/L (ref 7–16)
BASOPHILS # BLD: 0.01 K/UL (ref 0–0.2)
BASOPHILS NFR BLD: 0 % (ref 0–2)
BUN SERPL-MCNC: 9 MG/DL (ref 6–20)
CALCIUM SERPL-MCNC: 8.1 MG/DL (ref 8.6–10.2)
CHLORIDE SERPL-SCNC: 99 MMOL/L (ref 98–107)
CO2 SERPL-SCNC: 31 MMOL/L (ref 22–29)
CREAT SERPL-MCNC: 0.4 MG/DL (ref 0.5–1)
EOSINOPHIL # BLD: 0.02 K/UL (ref 0.05–0.5)
EOSINOPHILS RELATIVE PERCENT: 1 % (ref 0–6)
ERYTHROCYTE [DISTWIDTH] IN BLOOD BY AUTOMATED COUNT: 14.9 % (ref 11.5–15)
GFR SERPL CREATININE-BSD FRML MDRD: >60 ML/MIN/1.73M2
GLUCOSE BLD-MCNC: 263 MG/DL (ref 74–99)
GLUCOSE BLD-MCNC: 301 MG/DL (ref 74–99)
GLUCOSE BLD-MCNC: 303 MG/DL (ref 74–99)
GLUCOSE BLD-MCNC: 308 MG/DL (ref 74–99)
GLUCOSE BLD-MCNC: 319 MG/DL (ref 74–99)
GLUCOSE SERPL-MCNC: 266 MG/DL (ref 74–99)
HCT VFR BLD AUTO: 22.9 % (ref 34–48)
HGB BLD-MCNC: 7.2 G/DL (ref 11.5–15.5)
IMM GRANULOCYTES # BLD AUTO: 0.03 K/UL (ref 0–0.58)
IMM GRANULOCYTES NFR BLD: 1 % (ref 0–5)
LYMPHOCYTES NFR BLD: 0.45 K/UL (ref 1.5–4)
LYMPHOCYTES RELATIVE PERCENT: 16 % (ref 20–42)
MCH RBC QN AUTO: 29.9 PG (ref 26–35)
MCHC RBC AUTO-ENTMCNC: 31.4 G/DL (ref 32–34.5)
MCV RBC AUTO: 95 FL (ref 80–99.9)
MONOCYTES NFR BLD: 0.3 K/UL (ref 0.1–0.95)
MONOCYTES NFR BLD: 11 % (ref 2–12)
NEUTROPHILS NFR BLD: 72 % (ref 43–80)
NEUTS SEG NFR BLD: 2.05 K/UL (ref 1.8–7.3)
PLATELET CONFIRMATION: NORMAL
PLATELET, FLUORESCENCE: 80 K/UL (ref 130–450)
PMV BLD AUTO: 11.5 FL (ref 7–12)
POTASSIUM SERPL-SCNC: 3.9 MMOL/L (ref 3.5–5)
RBC # BLD AUTO: 2.41 M/UL (ref 3.5–5.5)
RBC # BLD: ABNORMAL 10*6/UL
SODIUM SERPL-SCNC: 135 MMOL/L (ref 132–146)
WBC OTHER # BLD: 2.9 K/UL (ref 4.5–11.5)

## 2024-03-25 PROCEDURE — 99232 SBSQ HOSP IP/OBS MODERATE 35: CPT | Performed by: NURSE PRACTITIONER

## 2024-03-25 PROCEDURE — 6370000000 HC RX 637 (ALT 250 FOR IP): Performed by: HOSPITALIST

## 2024-03-25 PROCEDURE — 6370000000 HC RX 637 (ALT 250 FOR IP): Performed by: STUDENT IN AN ORGANIZED HEALTH CARE EDUCATION/TRAINING PROGRAM

## 2024-03-25 PROCEDURE — 80048 BASIC METABOLIC PNL TOTAL CA: CPT

## 2024-03-25 PROCEDURE — 6360000002 HC RX W HCPCS: Performed by: STUDENT IN AN ORGANIZED HEALTH CARE EDUCATION/TRAINING PROGRAM

## 2024-03-25 PROCEDURE — 2580000003 HC RX 258: Performed by: STUDENT IN AN ORGANIZED HEALTH CARE EDUCATION/TRAINING PROGRAM

## 2024-03-25 PROCEDURE — 2580000003 HC RX 258: Performed by: HOSPITALIST

## 2024-03-25 PROCEDURE — 99232 SBSQ HOSP IP/OBS MODERATE 35: CPT | Performed by: STUDENT IN AN ORGANIZED HEALTH CARE EDUCATION/TRAINING PROGRAM

## 2024-03-25 PROCEDURE — 85025 COMPLETE CBC W/AUTO DIFF WBC: CPT

## 2024-03-25 PROCEDURE — A4216 STERILE WATER/SALINE, 10 ML: HCPCS | Performed by: HOSPITALIST

## 2024-03-25 PROCEDURE — 6370000000 HC RX 637 (ALT 250 FOR IP): Performed by: INTERNAL MEDICINE

## 2024-03-25 PROCEDURE — 6370000000 HC RX 637 (ALT 250 FOR IP): Performed by: NURSE PRACTITIONER

## 2024-03-25 PROCEDURE — 82962 GLUCOSE BLOOD TEST: CPT

## 2024-03-25 PROCEDURE — C9113 INJ PANTOPRAZOLE SODIUM, VIA: HCPCS | Performed by: HOSPITALIST

## 2024-03-25 PROCEDURE — 2060000000 HC ICU INTERMEDIATE R&B

## 2024-03-25 PROCEDURE — 6360000002 HC RX W HCPCS: Performed by: HOSPITALIST

## 2024-03-25 RX ORDER — PETROLATUM 42 G/100G
OINTMENT TOPICAL 2 TIMES DAILY
Status: DISCONTINUED | OUTPATIENT
Start: 2024-03-25 | End: 2024-03-28 | Stop reason: HOSPADM

## 2024-03-25 RX ADMIN — INSULIN LISPRO 4 UNITS: 100 INJECTION, SOLUTION INTRAVENOUS; SUBCUTANEOUS at 12:15

## 2024-03-25 RX ADMIN — SODIUM CHLORIDE, PRESERVATIVE FREE 10 ML: 5 INJECTION INTRAVENOUS at 12:19

## 2024-03-25 RX ADMIN — HYDROMORPHONE HYDROCHLORIDE 1 MG: 1 INJECTION, SOLUTION INTRAMUSCULAR; INTRAVENOUS; SUBCUTANEOUS at 14:27

## 2024-03-25 RX ADMIN — AMPICILLIN SODIUM AND SULBACTAM SODIUM 3000 MG: 2; 1 INJECTION, POWDER, FOR SOLUTION INTRAMUSCULAR; INTRAVENOUS at 12:08

## 2024-03-25 RX ADMIN — Medication 300 UNITS: at 20:00

## 2024-03-25 RX ADMIN — HYDROPHOR: 42 OINTMENT TOPICAL at 20:00

## 2024-03-25 RX ADMIN — HYDROPHOR: 42 OINTMENT TOPICAL at 08:29

## 2024-03-25 RX ADMIN — HYDROMORPHONE HYDROCHLORIDE 1 MG: 1 INJECTION, SOLUTION INTRAMUSCULAR; INTRAVENOUS; SUBCUTANEOUS at 21:11

## 2024-03-25 RX ADMIN — ANASTROZOLE 1 MG: 1 TABLET, FILM COATED ORAL at 08:31

## 2024-03-25 RX ADMIN — RIFAXIMIN 550 MG: 550 TABLET ORAL at 20:00

## 2024-03-25 RX ADMIN — Medication 300 UNITS: at 12:01

## 2024-03-25 RX ADMIN — HYDROMORPHONE HYDROCHLORIDE 1 MG: 1 INJECTION, SOLUTION INTRAMUSCULAR; INTRAVENOUS; SUBCUTANEOUS at 23:11

## 2024-03-25 RX ADMIN — ARIPIPRAZOLE 20 MG: 10 TABLET ORAL at 08:31

## 2024-03-25 RX ADMIN — ANORECTAL OINTMENT: 15.7; .44; 24; 20.6 OINTMENT TOPICAL at 20:00

## 2024-03-25 RX ADMIN — INSULIN LISPRO 5 UNITS: 100 INJECTION, SOLUTION INTRAVENOUS; SUBCUTANEOUS at 06:12

## 2024-03-25 RX ADMIN — MULTIVITAMIN TABLET 1 TABLET: TABLET at 08:31

## 2024-03-25 RX ADMIN — HYDROMORPHONE HYDROCHLORIDE 1 MG: 1 INJECTION, SOLUTION INTRAMUSCULAR; INTRAVENOUS; SUBCUTANEOUS at 02:59

## 2024-03-25 RX ADMIN — PANCRELIPASE LIPASE, PANCRELIPASE PROTEASE, PANCRELIPASE AMYLASE 60000 UNITS: 20000; 63000; 84000 CAPSULE, DELAYED RELEASE ORAL at 12:01

## 2024-03-25 RX ADMIN — INSULIN LISPRO 5 UNITS: 100 INJECTION, SOLUTION INTRAVENOUS; SUBCUTANEOUS at 15:20

## 2024-03-25 RX ADMIN — HYDROMORPHONE HYDROCHLORIDE 1 MG: 1 INJECTION, SOLUTION INTRAMUSCULAR; INTRAVENOUS; SUBCUTANEOUS at 11:57

## 2024-03-25 RX ADMIN — SODIUM CHLORIDE, PRESERVATIVE FREE 40 MG: 5 INJECTION INTRAVENOUS at 12:01

## 2024-03-25 RX ADMIN — HYDROMORPHONE HYDROCHLORIDE 1 MG: 1 INJECTION, SOLUTION INTRAMUSCULAR; INTRAVENOUS; SUBCUTANEOUS at 16:54

## 2024-03-25 RX ADMIN — PANCRELIPASE LIPASE, PANCRELIPASE PROTEASE, PANCRELIPASE AMYLASE 60000 UNITS: 20000; 63000; 84000 CAPSULE, DELAYED RELEASE ORAL at 15:16

## 2024-03-25 RX ADMIN — AMPICILLIN SODIUM AND SULBACTAM SODIUM 3000 MG: 2; 1 INJECTION, POWDER, FOR SOLUTION INTRAMUSCULAR; INTRAVENOUS at 15:27

## 2024-03-25 RX ADMIN — AMPICILLIN SODIUM AND SULBACTAM SODIUM 3000 MG: 2; 1 INJECTION, POWDER, FOR SOLUTION INTRAMUSCULAR; INTRAVENOUS at 23:10

## 2024-03-25 RX ADMIN — INSULIN LISPRO 4 UNITS: 100 INJECTION, SOLUTION INTRAVENOUS; SUBCUTANEOUS at 06:13

## 2024-03-25 RX ADMIN — OXYCODONE HYDROCHLORIDE AND ACETAMINOPHEN 1000 MG: 500 TABLET ORAL at 08:31

## 2024-03-25 RX ADMIN — INSULIN LISPRO 5 UNITS: 100 INJECTION, SOLUTION INTRAVENOUS; SUBCUTANEOUS at 12:14

## 2024-03-25 RX ADMIN — INSULIN GLARGINE 10 UNITS: 100 INJECTION, SOLUTION SUBCUTANEOUS at 20:00

## 2024-03-25 RX ADMIN — VORTIOXETINE 10 MG: 10 TABLET, FILM COATED ORAL at 20:00

## 2024-03-25 RX ADMIN — HYDROMORPHONE HYDROCHLORIDE 1 MG: 1 INJECTION, SOLUTION INTRAMUSCULAR; INTRAVENOUS; SUBCUTANEOUS at 19:11

## 2024-03-25 RX ADMIN — ANORECTAL OINTMENT: 15.7; .44; 24; 20.6 OINTMENT TOPICAL at 08:29

## 2024-03-25 RX ADMIN — AMPICILLIN SODIUM AND SULBACTAM SODIUM 3000 MG: 2; 1 INJECTION, POWDER, FOR SOLUTION INTRAMUSCULAR; INTRAVENOUS at 05:23

## 2024-03-25 RX ADMIN — INSULIN GLARGINE 4 UNITS: 100 INJECTION, SOLUTION SUBCUTANEOUS at 08:50

## 2024-03-25 RX ADMIN — CALCIUM 1000 MG: 500 TABLET ORAL at 08:31

## 2024-03-25 RX ADMIN — Medication 12 MG: at 20:00

## 2024-03-25 RX ADMIN — Medication 100 MG: at 08:30

## 2024-03-25 RX ADMIN — RIFAXIMIN 550 MG: 550 TABLET ORAL at 08:30

## 2024-03-25 RX ADMIN — INSULIN LISPRO 4 UNITS: 100 INJECTION, SOLUTION INTRAVENOUS; SUBCUTANEOUS at 15:20

## 2024-03-25 RX ADMIN — PANCRELIPASE LIPASE, PANCRELIPASE PROTEASE, PANCRELIPASE AMYLASE 60000 UNITS: 20000; 63000; 84000 CAPSULE, DELAYED RELEASE ORAL at 08:30

## 2024-03-25 RX ADMIN — HYDROMORPHONE HYDROCHLORIDE 1 MG: 1 INJECTION, SOLUTION INTRAMUSCULAR; INTRAVENOUS; SUBCUTANEOUS at 05:20

## 2024-03-25 RX ADMIN — SODIUM CHLORIDE, PRESERVATIVE FREE 10 ML: 5 INJECTION INTRAVENOUS at 20:00

## 2024-03-25 RX ADMIN — SODIUM CHLORIDE, PRESERVATIVE FREE 40 MG: 5 INJECTION INTRAVENOUS at 23:10

## 2024-03-25 RX ADMIN — HYDROMORPHONE HYDROCHLORIDE 1 MG: 1 INJECTION, SOLUTION INTRAMUSCULAR; INTRAVENOUS; SUBCUTANEOUS at 00:49

## 2024-03-25 ASSESSMENT — PAIN SCALES - GENERAL
PAINLEVEL_OUTOF10: 10
PAINLEVEL_OUTOF10: 8
PAINLEVEL_OUTOF10: 9
PAINLEVEL_OUTOF10: 5
PAINLEVEL_OUTOF10: 8
PAINLEVEL_OUTOF10: 8
PAINLEVEL_OUTOF10: 4
PAINLEVEL_OUTOF10: 8
PAINLEVEL_OUTOF10: 3
PAINLEVEL_OUTOF10: 10
PAINLEVEL_OUTOF10: 9
PAINLEVEL_OUTOF10: 10
PAINLEVEL_OUTOF10: 9
PAINLEVEL_OUTOF10: 9

## 2024-03-25 ASSESSMENT — PAIN DESCRIPTION - DESCRIPTORS
DESCRIPTORS: STABBING;BURNING
DESCRIPTORS: STABBING
DESCRIPTORS: SHARP;SHOOTING;STABBING
DESCRIPTORS: STABBING;BURNING
DESCRIPTORS: STABBING
DESCRIPTORS: STABBING
DESCRIPTORS: STABBING;SHARP
DESCRIPTORS: ACHING;SHARP;SHOOTING

## 2024-03-25 ASSESSMENT — PAIN DESCRIPTION - ORIENTATION
ORIENTATION: RIGHT
ORIENTATION: RIGHT
ORIENTATION: LEFT
ORIENTATION: RIGHT

## 2024-03-25 ASSESSMENT — PAIN DESCRIPTION - PAIN TYPE
TYPE: SURGICAL PAIN

## 2024-03-25 ASSESSMENT — PAIN DESCRIPTION - LOCATION
LOCATION: ABDOMEN
LOCATION: ABDOMEN;RIB CAGE
LOCATION: ABDOMEN
LOCATION: ABDOMEN;RIB CAGE
LOCATION: ABDOMEN;RIB CAGE
LOCATION: ABDOMEN

## 2024-03-25 ASSESSMENT — PAIN DESCRIPTION - ONSET
ONSET: ON-GOING
ONSET: ON-GOING

## 2024-03-25 ASSESSMENT — PAIN DESCRIPTION - FREQUENCY
FREQUENCY: CONTINUOUS
FREQUENCY: CONTINUOUS

## 2024-03-25 NOTE — PROGRESS NOTES
GPC in pairs.  Culture showing Enterococcus faecalis.   Liver biopsy pending:  Liver IR drain 3/22 culture showing Enterococcus faecalis    ASSESSMENT:  Liver abscess: Likely sources cholangitis.  No sign of diverticulitis or recent bloodstream infection.  Status post IR guided aspiration sent for cultures and pathology  Substance use disorder:   Liver cirrhosis:   Encephalopathy:     PLAN:  Continue IV Unasyn 3gr q 6 for 6 weeks.   Pending tunneled PICC placement by IR  Script in chart. Discharge instructions in place.   Monitor labs    TITI MCNAMARA MD  10:37 AM  3/25/2024

## 2024-03-25 NOTE — PROGRESS NOTES
PROGRESS NOTE  By Олег Mitchell M.D.    [facility]  Dr. Lakhwinder Madera M.D.,  Dr. Simón Esquivel M.D.,   Dr. Hugo Rodríguez D.O.,  Dr. Royer Coppola M.D.,  Dr. Robbin Mitchell D.O.,          Ellie Santacruz  53 y.o.  female    SUBJECTIVE:  Denies nausea vomiting and able to tolerate breakfast.  No family at bedside    OBJECTIVE:    BP (!) 89/73   Pulse 81   Temp 98.3 °F (36.8 °C) (Axillary)   Resp 18   Ht 1.524 m (5')   Wt 43.5 kg (96 lb)   SpO2 96%   BMI 18.75 kg/m²     General: NAD/cachectic appearing  adult female  HEENT: Anicteric sclera/moist oral mucosa/poor dentition  Neck: Supple with trachea midline  Chest: Symmetrical excursion/CTAB  Cor: Regular  Abd.: Soft and nondistended  Extr.:  No peripheral edema.  Decreased muscle tone and bulk throughout  Skin: Warm and dry/anicteric      DATA:    Monitor data reviewed -sinus rhythm noted.       Lab Results   Component Value Date/Time    WBC 2.9 03/25/2024 06:05 AM    RBC 2.41 03/25/2024 06:05 AM    HGB 7.2 03/25/2024 06:05 AM    HCT 22.9 03/25/2024 06:05 AM    MCV 95.0 03/25/2024 06:05 AM    MCH 29.9 03/25/2024 06:05 AM    MCHC 31.4 03/25/2024 06:05 AM    RDW 14.9 03/25/2024 06:05 AM    PLT 51 03/21/2024 04:35 AM    MPV 11.5 03/25/2024 06:05 AM     Lab Results   Component Value Date/Time     03/25/2024 06:05 AM    K 3.9 03/25/2024 06:05 AM    K 5.2 05/11/2023 08:52 PM    CL 99 03/25/2024 06:05 AM    CO2 31 03/25/2024 06:05 AM    BUN 9 03/25/2024 06:05 AM    CREATININE 0.4 03/25/2024 06:05 AM    CALCIUM 8.1 03/25/2024 06:05 AM    PROT 5.4 03/21/2024 04:35 AM    LABALBU 2.4 03/21/2024 04:35 AM    BILITOT 0.6 03/21/2024 04:35 AM    ALKPHOS 386 03/21/2024 04:35 AM    AST 26 03/21/2024 04:35 AM     03/21/2024 04:35 AM     Lab Results   Component Value Date/Time    LIPASE 19 03/14/2024 03:25 AM     Lab Results   Component Value Date/Time    AMYLASE 9 09/11/2021 01:40 PM         ASSESSMENT/PLAN:  Patient Active Problem List    Diagnosis    Chronic pancreatitis due to acute alcohol intoxication (HCC)    Schizoaffective disorder, bipolar type (HCC)    Anxiety disorder    Depression    History of alcohol abuse    Pancreatic pseudocyst (HCC)    Liver dysfunction    Asthma    Gastroesophageal reflux disease without esophagitis    Bipolar I disorder (HCC)    Bipolar affective disorder (HCC)    Irritable bowel syndrome    Tobacco use disorder    Jaundice    RUQ abdominal pain    Poorly controlled diabetes mellitus (HCC)    Pain of upper abdomen    Elevated LFTs    Intractable abdominal pain    Abdominal pain, epigastric    Hyperglycemia    Type 2 diabetes mellitus with hyperosmolarity without coma, with long-term current use of insulin (HCC)    Fall (on) (from) other stairs and steps, initial encounter    Hepatitis, alcoholic    Nausea and vomiting    Alcoholism (HCC)    Pancytopenia (HCC)    Splenomegaly    Hypoglycemia episode    Hepatitis C    Anxiety and depression    Cigarette smoker, heavy    Acute pancreatitis    Alcohol abuse    Acute alcoholic gastritis    Other osteoporosis without current pathological fracture    Ketoacidosis, diabetic, no coma, insulin dependent    Bicytopenia - leukopenia and thrombocytopenia    Chronic abdominal pain    Chronic pancreatitis (HCC)    Electrolyte imbalance    Lactic acidosis    Severe protein-calorie malnutrition (HCC)    Idiopathic osteoporosis    Ascites due to alcoholic cirrhosis (HCC)    Diabetic polyneuropathy associated with type 2 diabetes mellitus (HCC)    Malignant neoplasm of left breast in female, estrogen receptor positive (HCC)    Cocaine dependence with withdrawal (HCC)    Vaginitis and vulvovaginitis    Abscess, gluteal, left    Nondependent cocaine abuse (HCC)    Cellulitis    DKA, type 1, not at goal (HCC)    Enterocolitis    Perineal abscess    Cheryle gangrene    Type 2 diabetes mellitus with hyperglycemia (HCC)    Failure to thrive in adult    Cocaine use    Liver mass

## 2024-03-25 NOTE — PROGRESS NOTES
[facility] Hospitalist Progress Note    Admitting Date and Time: 3/14/2024  3:54 AM  Admit Dx: Generalized abdominal pain [R10.84]  Liver mass [R16.0]  Elevated liver enzymes [R74.8]  Cocaine use [F14.90]    Subjective:  Patient complaining of pain around drain site. No other issues.     ROS: denies fever, chills, cp, sob, n/v, HA unless stated above.     insulin glargine  10 Units SubCUTAneous Nightly    insulin lispro  5 Units SubCUTAneous TID WC    heparin (PF)  300 Units IntraCATHeter Q12H    insulin glargine  4 Units SubCUTAneous QAM    lipase-protease-amylase  60,000 Units Oral TID WC    ampicillin-sulbactam  3,000 mg IntraVENous Q6H    nicotine  1 patch TransDERmal Daily    lactulose  20 g Oral BID    insulin lispro  0-6 Units SubCUTAneous TID WC    magnesium sulfate  1,000 mg IntraVENous Once    Surgiflo with Evithrom Hemostatic Matrix  1 kit Other Once    Surgiflo with Evithrom Hemostatic Matrix  1 kit Nasal Once    Surgiflo with Evithrom Hemostatic Matrix  1 kit Other Once    mineral oil-hydrophilic petrolatum   Topical BID    menthol-zinc oxide   Topical BID    sodium chloride flush  5-40 mL IntraVENous 2 times per day    [Held by provider] enoxaparin  30 mg SubCUTAneous Daily    anastrozole  1 mg Oral QAM    ARIPiprazole  20 mg Oral QAM    vitamin C  1,000 mg Oral QAM    calcium elemental  1,000 mg Oral QAM    melatonin  12 mg Oral Nightly    multivitamin  1 tablet Oral QAM    rifAXIMin  550 mg Oral BID    thiamine  100 mg Oral Daily    ipratropium  0.5 mg Nebulization TID RT    VORTIoxetine HBr  10 mg Oral Nightly    pantoprazole (PROTONIX) 40 mg in sodium chloride (PF) 0.9 % 10 mL injection  40 mg IntraVENous Q12H     heparin (PF), 300 Units, PRN  naloxone 0.4 mg in 10 mL sodium chloride syringe, , PRN  sodium chloride flush, 5-40 mL, PRN  sodium chloride, , PRN  Glucose, 15 g, PRN  HYDROmorphone, 0.5 mg, Q2H PRN   Or  HYDROmorphone, 1 mg, Q2H PRN  sodium chloride flush, 5-40 mL, PRN  sodium  29.9   MCHC 31.1* 31.8* 31.4*   RDW 14.6 14.6 14.9   MPV 11.3 11.1 11.5       Radiology:   CT ABSCESS DRAINAGE W CATH PLACEMENT S&I   Final Result   Attempt made to place a 10 [place] drain into a gradually enhancing masslike   process within the right lobe of the liver.  Less than 1 cc of blood was   aspirated via the drain.  Pathology results are pending.  The patient will   follow-up with the referring clinical service.      General anesthesia provided by the anesthesiology service.  Please refer to   their report for further details.         CT NEEDLE BIOPSY LIVER PERCUTANEOUS   Final Result   1. Status post CT-guided biopsy of lesion in the right lobe of the liver.   2. No purulent material was aspirated to suggest an abscess.  Only minimal   amount of sanguinous fluid was aspirated that was sent to the lab for testing.         CTA TRIPHASIC LIVER   Final Result   1. There is a new hypodense mass in segment 6 of the liver that appears to be   associated with dilated ducts. This could represent a mass such as a   hepatocellular carcinoma or cholangiocarcinoma.   2. There is a heterogeneous zone involving the head and uncinate process of   the pancreas concerning for a mass.   3. Splenomegaly.   4. Thickening of the wall of the ascending and transverse colon concerning   for colitis.   5. There is stranding of the subcutaneous tissues concerning for   hyperproteinemia.         XR CHEST PORTABLE   Final Result   Left internal jugular line tip in the right atrium 4.5 cm from the caval   atrial junction.  No pneumothorax.         US ABDOMEN LIMITED   Final Result   Minimal accessible intra-abdominal ascites.  Imaging findings compatible with   stigmata of liver cirrhotic disease.  No paracentesis was performed.         CT ABDOMEN PELVIS WO CONTRAST Additional Contrast? None   Final Result   1. Cirrhotic morphology of the liver with a large low-attenuation structure   seen inferiorly within the right lobe concerning

## 2024-03-25 NOTE — CARE COORDINATION
CASE MANAGEMENT.... Had drain placed under IR for liver abscess RUQ on 3/22/24. PT 13/OT 13. Son, Mitchell, called with breann brown. 1) [facility] - no female beds. 2) [facility] - does not take Medicaid. 3) [facility] -no answer - left referral on Dennise's confidential vm. Updated Mitchell. He will review more choices. Patient will have PICC line placed under IR today for iv unasyn x 6 weeks. Will cont to follow.     12:40pm....Spoke with Capo and patient may have LTAC criteria. Discussed LTAC choices with Mitchell, but he could not provide an answer or location. Met with Ellie at the bedside to discuss her options. She is alert/oriented and can carry on an appropriate conversation. She is agreeable to LTAC. 1) [facility], 2) [facility]. Capo notified and will initiate precert for [place]. Palliative Care saw today. No needs. S/off. Will follow.

## 2024-03-26 LAB
ANION GAP SERPL CALCULATED.3IONS-SCNC: 7 MMOL/L (ref 7–16)
BASOPHILS # BLD: 0.03 K/UL (ref 0–0.2)
BASOPHILS NFR BLD: 1 % (ref 0–2)
BUN SERPL-MCNC: 11 MG/DL (ref 6–20)
CALCIUM SERPL-MCNC: 7.9 MG/DL (ref 8.6–10.2)
CHLORIDE SERPL-SCNC: 97 MMOL/L (ref 98–107)
CO2 SERPL-SCNC: 31 MMOL/L (ref 22–29)
CREAT SERPL-MCNC: 0.4 MG/DL (ref 0.5–1)
EOSINOPHIL # BLD: 0 K/UL (ref 0.05–0.5)
EOSINOPHILS RELATIVE PERCENT: 0 % (ref 0–6)
ERYTHROCYTE [DISTWIDTH] IN BLOOD BY AUTOMATED COUNT: 15 % (ref 11.5–15)
GFR SERPL CREATININE-BSD FRML MDRD: >90 ML/MIN/1.73M2
GLUCOSE BLD-MCNC: 243 MG/DL (ref 74–99)
GLUCOSE BLD-MCNC: 273 MG/DL (ref 74–99)
GLUCOSE BLD-MCNC: 301 MG/DL (ref 74–99)
GLUCOSE BLD-MCNC: 335 MG/DL (ref 74–99)
GLUCOSE SERPL-MCNC: 301 MG/DL (ref 74–99)
HCT VFR BLD AUTO: 21.4 % (ref 34–48)
HCT VFR BLD AUTO: 26.3 % (ref 34–48)
HGB BLD-MCNC: 6.7 G/DL (ref 11.5–15.5)
HGB BLD-MCNC: 8.3 G/DL (ref 11.5–15.5)
LYMPHOCYTES NFR BLD: 0.36 K/UL (ref 1.5–4)
LYMPHOCYTES RELATIVE PERCENT: 12 % (ref 20–42)
MCH RBC QN AUTO: 29.8 PG (ref 26–35)
MCHC RBC AUTO-ENTMCNC: 31.3 G/DL (ref 32–34.5)
MCV RBC AUTO: 95.1 FL (ref 80–99.9)
MONOCYTES NFR BLD: 0.25 K/UL (ref 0.1–0.95)
MONOCYTES NFR BLD: 9 % (ref 2–12)
NEUTROPHILS NFR BLD: 78 % (ref 43–80)
NEUTS SEG NFR BLD: 2.26 K/UL (ref 1.8–7.3)
PLATELET # BLD AUTO: 73 K/UL (ref 130–450)
PLATELET CONFIRMATION: NORMAL
PMV BLD AUTO: 10.5 FL (ref 7–12)
POTASSIUM SERPL-SCNC: 4.2 MMOL/L (ref 3.5–5)
RBC # BLD AUTO: 2.25 M/UL (ref 3.5–5.5)
RBC # BLD: ABNORMAL 10*6/UL
SODIUM SERPL-SCNC: 135 MMOL/L (ref 132–146)
WBC OTHER # BLD: 2.9 K/UL (ref 4.5–11.5)

## 2024-03-26 PROCEDURE — 6370000000 HC RX 637 (ALT 250 FOR IP): Performed by: HOSPITALIST

## 2024-03-26 PROCEDURE — 2580000003 HC RX 258: Performed by: STUDENT IN AN ORGANIZED HEALTH CARE EDUCATION/TRAINING PROGRAM

## 2024-03-26 PROCEDURE — 85025 COMPLETE CBC W/AUTO DIFF WBC: CPT

## 2024-03-26 PROCEDURE — 99232 SBSQ HOSP IP/OBS MODERATE 35: CPT | Performed by: INTERNAL MEDICINE

## 2024-03-26 PROCEDURE — 85018 HEMOGLOBIN: CPT

## 2024-03-26 PROCEDURE — C9113 INJ PANTOPRAZOLE SODIUM, VIA: HCPCS | Performed by: HOSPITALIST

## 2024-03-26 PROCEDURE — 85014 HEMATOCRIT: CPT

## 2024-03-26 PROCEDURE — 6360000002 HC RX W HCPCS: Performed by: STUDENT IN AN ORGANIZED HEALTH CARE EDUCATION/TRAINING PROGRAM

## 2024-03-26 PROCEDURE — 30233N1 TRANSFUSION OF NONAUTOLOGOUS RED BLOOD CELLS INTO PERIPHERAL VEIN, PERCUTANEOUS APPROACH: ICD-10-PCS | Performed by: STUDENT IN AN ORGANIZED HEALTH CARE EDUCATION/TRAINING PROGRAM

## 2024-03-26 PROCEDURE — 97535 SELF CARE MNGMENT TRAINING: CPT

## 2024-03-26 PROCEDURE — 6370000000 HC RX 637 (ALT 250 FOR IP): Performed by: STUDENT IN AN ORGANIZED HEALTH CARE EDUCATION/TRAINING PROGRAM

## 2024-03-26 PROCEDURE — 80048 BASIC METABOLIC PNL TOTAL CA: CPT

## 2024-03-26 PROCEDURE — 6370000000 HC RX 637 (ALT 250 FOR IP): Performed by: NURSE PRACTITIONER

## 2024-03-26 PROCEDURE — 86901 BLOOD TYPING SEROLOGIC RH(D): CPT

## 2024-03-26 PROCEDURE — 97530 THERAPEUTIC ACTIVITIES: CPT

## 2024-03-26 PROCEDURE — 86900 BLOOD TYPING SEROLOGIC ABO: CPT

## 2024-03-26 PROCEDURE — 82962 GLUCOSE BLOOD TEST: CPT

## 2024-03-26 PROCEDURE — 2580000003 HC RX 258: Performed by: HOSPITALIST

## 2024-03-26 PROCEDURE — 99232 SBSQ HOSP IP/OBS MODERATE 35: CPT | Performed by: STUDENT IN AN ORGANIZED HEALTH CARE EDUCATION/TRAINING PROGRAM

## 2024-03-26 PROCEDURE — 86850 RBC ANTIBODY SCREEN: CPT

## 2024-03-26 PROCEDURE — 2060000000 HC ICU INTERMEDIATE R&B

## 2024-03-26 PROCEDURE — 6370000000 HC RX 637 (ALT 250 FOR IP): Performed by: INTERNAL MEDICINE

## 2024-03-26 PROCEDURE — 36592 COLLECT BLOOD FROM PICC: CPT

## 2024-03-26 PROCEDURE — P9016 RBC LEUKOCYTES REDUCED: HCPCS

## 2024-03-26 PROCEDURE — A4216 STERILE WATER/SALINE, 10 ML: HCPCS | Performed by: HOSPITALIST

## 2024-03-26 PROCEDURE — 86923 COMPATIBILITY TEST ELECTRIC: CPT

## 2024-03-26 PROCEDURE — 6360000002 HC RX W HCPCS: Performed by: HOSPITALIST

## 2024-03-26 PROCEDURE — 36430 TRANSFUSION BLD/BLD COMPNT: CPT

## 2024-03-26 RX ORDER — INSULIN LISPRO 100 [IU]/ML
6 INJECTION, SOLUTION INTRAVENOUS; SUBCUTANEOUS
Status: DISCONTINUED | OUTPATIENT
Start: 2024-03-27 | End: 2024-03-28 | Stop reason: HOSPADM

## 2024-03-26 RX ORDER — INSULIN GLARGINE 100 [IU]/ML
5 INJECTION, SOLUTION SUBCUTANEOUS EVERY MORNING
Status: DISCONTINUED | OUTPATIENT
Start: 2024-03-27 | End: 2024-03-28 | Stop reason: HOSPADM

## 2024-03-26 RX ORDER — INSULIN LISPRO 100 [IU]/ML
0-6 INJECTION, SOLUTION INTRAVENOUS; SUBCUTANEOUS NIGHTLY
Status: DISCONTINUED | OUTPATIENT
Start: 2024-03-26 | End: 2024-03-28 | Stop reason: HOSPADM

## 2024-03-26 RX ORDER — SODIUM CHLORIDE 9 MG/ML
INJECTION, SOLUTION INTRAVENOUS PRN
Status: DISCONTINUED | OUTPATIENT
Start: 2024-03-26 | End: 2024-03-28 | Stop reason: HOSPADM

## 2024-03-26 RX ORDER — OXYCODONE HYDROCHLORIDE 5 MG/1
5 TABLET ORAL EVERY 6 HOURS PRN
Status: DISCONTINUED | OUTPATIENT
Start: 2024-03-26 | End: 2024-03-28 | Stop reason: HOSPADM

## 2024-03-26 RX ORDER — INSULIN GLARGINE 100 [IU]/ML
12 INJECTION, SOLUTION SUBCUTANEOUS NIGHTLY
Status: DISCONTINUED | OUTPATIENT
Start: 2024-03-26 | End: 2024-03-28 | Stop reason: HOSPADM

## 2024-03-26 RX ORDER — INSULIN LISPRO 100 [IU]/ML
0-8 INJECTION, SOLUTION INTRAVENOUS; SUBCUTANEOUS
Status: DISCONTINUED | OUTPATIENT
Start: 2024-03-27 | End: 2024-03-28 | Stop reason: HOSPADM

## 2024-03-26 RX ADMIN — SODIUM CHLORIDE, PRESERVATIVE FREE 40 MG: 5 INJECTION INTRAVENOUS at 10:18

## 2024-03-26 RX ADMIN — ANORECTAL OINTMENT: 15.7; .44; 24; 20.6 OINTMENT TOPICAL at 09:12

## 2024-03-26 RX ADMIN — RIFAXIMIN 550 MG: 550 TABLET ORAL at 21:03

## 2024-03-26 RX ADMIN — AMPICILLIN SODIUM AND SULBACTAM SODIUM 3000 MG: 2; 1 INJECTION, POWDER, FOR SOLUTION INTRAMUSCULAR; INTRAVENOUS at 04:38

## 2024-03-26 RX ADMIN — SODIUM CHLORIDE, PRESERVATIVE FREE 10 ML: 5 INJECTION INTRAVENOUS at 20:18

## 2024-03-26 RX ADMIN — CALCIUM 1000 MG: 500 TABLET ORAL at 09:13

## 2024-03-26 RX ADMIN — HYDROMORPHONE HYDROCHLORIDE 1 MG: 1 INJECTION, SOLUTION INTRAMUSCULAR; INTRAVENOUS; SUBCUTANEOUS at 11:48

## 2024-03-26 RX ADMIN — VORTIOXETINE 10 MG: 10 TABLET, FILM COATED ORAL at 21:03

## 2024-03-26 RX ADMIN — PANCRELIPASE LIPASE, PANCRELIPASE PROTEASE, PANCRELIPASE AMYLASE 60000 UNITS: 20000; 63000; 84000 CAPSULE, DELAYED RELEASE ORAL at 10:18

## 2024-03-26 RX ADMIN — PANCRELIPASE LIPASE, PANCRELIPASE PROTEASE, PANCRELIPASE AMYLASE 60000 UNITS: 20000; 63000; 84000 CAPSULE, DELAYED RELEASE ORAL at 16:10

## 2024-03-26 RX ADMIN — HYDROMORPHONE HYDROCHLORIDE 1 MG: 1 INJECTION, SOLUTION INTRAMUSCULAR; INTRAVENOUS; SUBCUTANEOUS at 01:20

## 2024-03-26 RX ADMIN — MULTIVITAMIN TABLET 1 TABLET: TABLET at 09:14

## 2024-03-26 RX ADMIN — HYDROPHOR: 42 OINTMENT TOPICAL at 09:19

## 2024-03-26 RX ADMIN — ANORECTAL OINTMENT: 15.7; .44; 24; 20.6 OINTMENT TOPICAL at 21:06

## 2024-03-26 RX ADMIN — OXYCODONE 5 MG: 5 TABLET ORAL at 14:20

## 2024-03-26 RX ADMIN — INSULIN LISPRO 4 UNITS: 100 INJECTION, SOLUTION INTRAVENOUS; SUBCUTANEOUS at 06:10

## 2024-03-26 RX ADMIN — OXYCODONE HYDROCHLORIDE AND ACETAMINOPHEN 1000 MG: 500 TABLET ORAL at 09:14

## 2024-03-26 RX ADMIN — Medication 100 MG: at 09:13

## 2024-03-26 RX ADMIN — HYDROPHOR: 42 OINTMENT TOPICAL at 21:06

## 2024-03-26 RX ADMIN — INSULIN GLARGINE 4 UNITS: 100 INJECTION, SOLUTION SUBCUTANEOUS at 09:36

## 2024-03-26 RX ADMIN — ANASTROZOLE 1 MG: 1 TABLET, FILM COATED ORAL at 09:14

## 2024-03-26 RX ADMIN — RIFAXIMIN 550 MG: 550 TABLET ORAL at 09:14

## 2024-03-26 RX ADMIN — SODIUM CHLORIDE, PRESERVATIVE FREE 40 MG: 5 INJECTION INTRAVENOUS at 20:18

## 2024-03-26 RX ADMIN — INSULIN LISPRO 4 UNITS: 100 INJECTION, SOLUTION INTRAVENOUS; SUBCUTANEOUS at 10:18

## 2024-03-26 RX ADMIN — INSULIN LISPRO 5 UNITS: 100 INJECTION, SOLUTION INTRAVENOUS; SUBCUTANEOUS at 06:10

## 2024-03-26 RX ADMIN — HYDROMORPHONE HYDROCHLORIDE 1 MG: 1 INJECTION, SOLUTION INTRAMUSCULAR; INTRAVENOUS; SUBCUTANEOUS at 09:14

## 2024-03-26 RX ADMIN — AMPICILLIN SODIUM AND SULBACTAM SODIUM 3000 MG: 2; 1 INJECTION, POWDER, FOR SOLUTION INTRAMUSCULAR; INTRAVENOUS at 16:08

## 2024-03-26 RX ADMIN — SODIUM CHLORIDE, PRESERVATIVE FREE 10 ML: 5 INJECTION INTRAVENOUS at 09:18

## 2024-03-26 RX ADMIN — ARIPIPRAZOLE 20 MG: 10 TABLET ORAL at 09:13

## 2024-03-26 RX ADMIN — INSULIN LISPRO 5 UNITS: 100 INJECTION, SOLUTION INTRAVENOUS; SUBCUTANEOUS at 10:18

## 2024-03-26 RX ADMIN — INSULIN LISPRO 5 UNITS: 100 INJECTION, SOLUTION INTRAVENOUS; SUBCUTANEOUS at 16:11

## 2024-03-26 RX ADMIN — Medication 12 MG: at 21:02

## 2024-03-26 RX ADMIN — PANCRELIPASE LIPASE, PANCRELIPASE PROTEASE, PANCRELIPASE AMYLASE 60000 UNITS: 20000; 63000; 84000 CAPSULE, DELAYED RELEASE ORAL at 09:13

## 2024-03-26 RX ADMIN — AMPICILLIN SODIUM AND SULBACTAM SODIUM 3000 MG: 2; 1 INJECTION, POWDER, FOR SOLUTION INTRAMUSCULAR; INTRAVENOUS at 23:09

## 2024-03-26 RX ADMIN — INSULIN LISPRO 3 UNITS: 100 INJECTION, SOLUTION INTRAVENOUS; SUBCUTANEOUS at 16:11

## 2024-03-26 RX ADMIN — HYDROMORPHONE HYDROCHLORIDE 1 MG: 1 INJECTION, SOLUTION INTRAMUSCULAR; INTRAVENOUS; SUBCUTANEOUS at 16:31

## 2024-03-26 RX ADMIN — AMPICILLIN SODIUM AND SULBACTAM SODIUM 3000 MG: 2; 1 INJECTION, POWDER, FOR SOLUTION INTRAMUSCULAR; INTRAVENOUS at 10:24

## 2024-03-26 RX ADMIN — HYDROMORPHONE HYDROCHLORIDE 0.5 MG: 1 INJECTION, SOLUTION INTRAMUSCULAR; INTRAVENOUS; SUBCUTANEOUS at 23:25

## 2024-03-26 RX ADMIN — HYDROMORPHONE HYDROCHLORIDE 0.5 MG: 1 INJECTION, SOLUTION INTRAMUSCULAR; INTRAVENOUS; SUBCUTANEOUS at 20:18

## 2024-03-26 RX ADMIN — Medication 300 UNITS: at 09:13

## 2024-03-26 ASSESSMENT — PAIN DESCRIPTION - ORIENTATION
ORIENTATION: RIGHT

## 2024-03-26 ASSESSMENT — PAIN DESCRIPTION - DESCRIPTORS
DESCRIPTORS: ACHING;DISCOMFORT
DESCRIPTORS: ACHING;PRESSURE;SHARP
DESCRIPTORS: ACHING;DISCOMFORT
DESCRIPTORS: DISCOMFORT;ACHING
DESCRIPTORS: ACHING;THROBBING
DESCRIPTORS: PRESSURE;SHARP;SHOOTING

## 2024-03-26 ASSESSMENT — PAIN SCALES - WONG BAKER
WONGBAKER_NUMERICALRESPONSE: NO HURT
WONGBAKER_NUMERICALRESPONSE: NO HURT

## 2024-03-26 ASSESSMENT — PAIN - FUNCTIONAL ASSESSMENT
PAIN_FUNCTIONAL_ASSESSMENT: PREVENTS OR INTERFERES SOME ACTIVE ACTIVITIES AND ADLS
PAIN_FUNCTIONAL_ASSESSMENT: ACTIVITIES ARE NOT PREVENTED
PAIN_FUNCTIONAL_ASSESSMENT: PREVENTS OR INTERFERES SOME ACTIVE ACTIVITIES AND ADLS
PAIN_FUNCTIONAL_ASSESSMENT: ACTIVITIES ARE NOT PREVENTED
PAIN_FUNCTIONAL_ASSESSMENT: PREVENTS OR INTERFERES SOME ACTIVE ACTIVITIES AND ADLS
PAIN_FUNCTIONAL_ASSESSMENT: ACTIVITIES ARE NOT PREVENTED

## 2024-03-26 ASSESSMENT — PAIN DESCRIPTION - FREQUENCY
FREQUENCY: CONTINUOUS
FREQUENCY: CONTINUOUS

## 2024-03-26 ASSESSMENT — PAIN SCALES - GENERAL
PAINLEVEL_OUTOF10: 9
PAINLEVEL_OUTOF10: 5
PAINLEVEL_OUTOF10: 1
PAINLEVEL_OUTOF10: 8
PAINLEVEL_OUTOF10: 6
PAINLEVEL_OUTOF10: 4
PAINLEVEL_OUTOF10: 4
PAINLEVEL_OUTOF10: 9
PAINLEVEL_OUTOF10: 8
PAINLEVEL_OUTOF10: 10
PAINLEVEL_OUTOF10: 4
PAINLEVEL_OUTOF10: 7
PAINLEVEL_OUTOF10: 3
PAINLEVEL_OUTOF10: 4
PAINLEVEL_OUTOF10: 10
PAINLEVEL_OUTOF10: 0

## 2024-03-26 ASSESSMENT — PAIN DESCRIPTION - LOCATION
LOCATION: ABDOMEN
LOCATION: ABDOMEN
LOCATION: ABDOMEN;FLANK
LOCATION: ABDOMEN;RIB CAGE
LOCATION: ABDOMEN

## 2024-03-26 ASSESSMENT — PAIN DESCRIPTION - PAIN TYPE
TYPE: SURGICAL PAIN
TYPE: SURGICAL PAIN

## 2024-03-26 ASSESSMENT — PAIN DESCRIPTION - ONSET
ONSET: ON-GOING
ONSET: ON-GOING

## 2024-03-26 NOTE — PLAN OF CARE
Problem: Discharge Planning  Goal: Discharge to home or other facility with appropriate resources  Outcome: Progressing     Problem: ABCDS Injury Assessment  Goal: Absence of physical injury  Outcome: Progressing     Problem: Skin/Tissue Integrity  Goal: Absence of new skin breakdown  Description: 1.  Monitor for areas of redness and/or skin breakdown  2.  Assess vascular access sites hourly  3.  Every 4-6 hours minimum:  Change oxygen saturation probe site  4.  Every 4-6 hours:  If on nasal continuous positive airway pressure, respiratory therapy assess nares and determine need for appliance change or resting period.  Outcome: Progressing

## 2024-03-26 NOTE — CARE COORDINATION
CASE MANAGEMENT....Needs iv unasyn q6hrs x 6 weeks. IR unable to place PICC line yesterday d/t scheduling. Plan was for today, however, NPO order was not in place and patient ate breakfast. IR cannot accommodate until tomorrow. NPO order obtained. RUQ liver drain placed 3/22/24. Confirmed with Capo from [facility] that auth for [facility] location pending from yesterday. Will cont to follow along and assist with needs accordingly.     2:15 pm.... Met with patient at the bedside. She is alert/oriented and can carry on with a normal conversation. Confirmed she is still interested in [facility]. Capo updated. Auth still pending.

## 2024-03-26 NOTE — PROGRESS NOTES
Occupational Therapy  OT BEDSIDE TREATMENT NOTE      Date:3/26/2024  Patient Name: Ellie Santacruz  MRN: [id number]  : 1970  Room: [address] [place] [address]       Evaluating OT: Harleen Garces, OTR/L - [id number]     Referring Provider: Evelyn Calvert MD  Specific Provider Orders/Date: \"OT eval and treat\" - 3/21/2024     Diagnosis: Generalized abdominal pain [R10.84]  Liver mass [R16.0]  Elevated liver enzymes [R74.8]  Cocaine use [F14.90]      Patient underwent image guided liver biopsy on 3/19/2024.  Patient now s/p IR guided drain placement (3/22/2024).     Pertinent Medical History: alcohol abuse, anxiety and depression, DM, cocaine abuse, hepatitis C, polyneuropathy, schiozoaffective disorder      Precautions: fall risk, JIMMIE drain  Additional Precautions: skin integrity     Assessment of Current Deficits:    [x] Functional mobility             [x] ADLs          [x] Strength                  [x] Cognition   [x] Functional transfers           [x] IADLs         [x] Safety Awareness   [x] Endurance   [] Fine Motor Coordination    [x] Balance      [] Vision/Perception   [x] Sensation    [] Gross Motor Coordination [] ROM          [] Delirium                  [] Motor Control      OT PLAN OF CARE   OT POC is based on physician orders, patient diagnosis, and results of clinical assessment.  Frequency/Duration 2-5 days/week for 2-4 weeks PRN   Specific OT Treatment Interventions to Include:   * Instruction/training on adapted ADL techniques and AE recommendations to increase functional independence within precautions       * Training on energy conservation strategies, correct breathing pattern and techniques to improve independence/tolerance for self-care routine  * Functional transfer/mobility training/DME recommendations for increased independence, safety, and fall prevention  * Patient/Family education to increase follow through with safety techniques and functional independence  * Recommendation of environmental  feet.   Pt able to manage over hips when standing.    CGA for balance during ADL.    Min A - with use of AE, as needed/appropriate   Bathing Max A   Min A - with use of AE/DME, as needed/appropriate   Toileting Dependent for facilitation of perineal hygiene while standing with walker following evidence of bowel incontinence.  pt incontinent of bowel in her brief.  She was instructed with importance to let staff know when she has to have a bowel movement so that she can sit on the toilet/BSC.   Also educated with importance of letting staff know if she has had an accident for skin integrity.    Mod A   Bed Mobility  Supine-to-Sit: Mod A  Sit-to-Supine: Mod A  Min A supine <> sit     SBA in order to maximize patient's independence with ADLs, re-positioning, and other functional tasks.   Functional Transfers Sit-to-Stand: Mod A   from EOB. Cues needed to maximize safety awareness. Min A sit to stand from bed surface.    SBA   Functional Mobility Mod A (with walker) for few side steps toward HOB. Unsteadiness demonstrated. Pt able to take 3-4 steps forward then returned to the EOB.  Pt reporting fatigue limiting her function.     SBA with functional mobility (with device, as needed/appropriate) in order to maximize independence with ADLs/IADLs and other functional tasks.   Balance Sitting: Fair+ (at EOB)  Standing: Fair- (with walker) Min A stand balance during ADL   Fair+ dynamic standing balance during completion of ADLs/IADLs and other functional tasks.   Activity Tolerance Fair-  poor stand tolerance.   Poor + seated tolerance.    Patient will demonstrate Good understanding and consistent implementation of energy conservation techniques and work simplification techniques into ADL/IADL routines.   Visual/  Perceptual WFL grossly      N/A   B UE Strength and ROM  B UE ROM: WFL  B UE Strength: 3+/5 grossly Encouraged AROM throughout the day.   Patient will demonstrate 4/5 B UE strength in order to maximize independence

## 2024-03-26 NOTE — PROGRESS NOTES
PROGRESS NOTE  By Олег Mitchell M.D.    [facility]  Dr. Lakhwinder Madera M.D.,  Dr. Simón Esquivel M.D.,   Dr. Hugo Rodríguez D.O.,  Dr. Royer Coppola M.D.,  Dr. Robbin Mitchell D.O.,          Ellie TOMÁS Santacruz  53 y.o.  female    SUBJECTIVE:  Complains of persistent abdominal pain.    OBJECTIVE:    BP 99/61   Pulse 82   Temp 98.2 °F (36.8 °C)   Resp 18   Ht 1.524 m (5')   Wt 46.9 kg (103 lb 4.8 oz)   SpO2 97%   BMI 20.17 kg/m²     General: NAD/cachectic  female  HEENT: Anicteric sclera/moist oral mucosa/poor dentition  Neck: Supple/trachea midline  Chest: Symmetric excursion/labored respirations  Cor: Regular  Abd.: Soft and nondistended/intra-abdominal drain in place  Extr.:  No peripheral edema.  Decreased muscle tone and bulk throughout  Skin: Warm and dry      DATA:    Monitor data reviewed -sinus rhythm noted.       Lab Results   Component Value Date/Time    WBC 2.9 03/26/2024 05:10 AM    RBC 2.25 03/26/2024 05:10 AM    HGB 6.7 03/26/2024 05:10 AM    HCT 21.4 03/26/2024 05:10 AM    MCV 95.1 03/26/2024 05:10 AM    MCH 29.8 03/26/2024 05:10 AM    MCHC 31.3 03/26/2024 05:10 AM    RDW 15.0 03/26/2024 05:10 AM    PLT 73 03/26/2024 05:10 AM    MPV 10.5 03/26/2024 05:10 AM     Lab Results   Component Value Date/Time     03/26/2024 05:10 AM    K 4.2 03/26/2024 05:10 AM    K 5.2 05/11/2023 08:52 PM    CL 97 03/26/2024 05:10 AM    CO2 31 03/26/2024 05:10 AM    BUN 11 03/26/2024 05:10 AM    CREATININE 0.4 03/26/2024 05:10 AM    CALCIUM 7.9 03/26/2024 05:10 AM    PROT 5.4 03/21/2024 04:35 AM    LABALBU 2.4 03/21/2024 04:35 AM    BILITOT 0.6 03/21/2024 04:35 AM    ALKPHOS 386 03/21/2024 04:35 AM    AST 26 03/21/2024 04:35 AM     03/21/2024 04:35 AM     Lab Results   Component Value Date/Time    LIPASE 19 03/14/2024 03:25 AM     Lab Results   Component Value Date/Time    AMYLASE 9 09/11/2021 01:40 PM         ASSESSMENT/PLAN:  Patient Active Problem List   Diagnosis    Chronic     Elevated liver enzymes    Portal hypertension (HCC)    Pancreatic insufficiency    Hepatic abscess    Intra-abdominal abscess (HCC)     1.  Cirrhosis / liver lesion  -Decompensated likely secondary to alcohol/hepatitis C  -MELD Na - 6 initially  -DF 5.8 -no indication for steroids  -EGD 2020 - plan for repeat upper endoscopy  -Outpatient repeat unless precipitous drop in H&H or overt bleed  -Nonelevated AFP <1.8 (3/15/2024)  -Elevated  = 43 (3/15/2024)  -Elevated CGA = 1451 (3/15/2024)  -Ultrasound negative for mass - CT reports possible mass approximately 7 x 5 cm  -No paracentesis as insufficient fluid  -Triple phase CT with reported lesion malignant versus infectious.;  Lesion/heterogenous zone on pancreas - liver biopsy  -IR drain placed  -Consider endoscopic ultrasound as outpatient     2.  Hepatitis C  -Follow as outpatient if patient able to maintain sobriety     3.  History of chronic pancreatitis  -Nonobstructive liver profile  -Chronic oral pancreatic enzyme supplementation  -Consider dedicated imaging with MRI versus outpatient EUS     4.  Substance abuse  -Persistent abuse of cocaine  -Unknown timing of alcohol abuse     5.  Anemia  -Iron deficient/normocytic  -Chronic without evidence of overt bleed  -Decreased H&H  -Defer iron supplementation to admitting  -Monitor blood work  -Endoscopies as above    Add oral pain medications  Monitor H&H/labs    Олег Mitchell MD  3/26/2024  1:33 PM    NOTE:  This report was transcribed using voice recognition software.  Every effort was made to ensure accuracy; however, inadvertent computerized transcription errors may be present.

## 2024-03-26 NOTE — PROGRESS NOTES
heparin (PF), naloxone 0.4 mg in 10 mL sodium chloride syringe, sodium chloride flush, sodium chloride, Glucose, HYDROmorphone **OR** HYDROmorphone, sodium chloride flush, sodium chloride, labetalol **OR** hydrALAZINE, mineral oil-hydrophilic petrolatum, sodium chloride flush, sodium chloride, potassium chloride **OR** potassium alternative oral replacement **OR** potassium chloride, magnesium sulfate, ondansetron **OR** ondansetron, polyethylene glycol, albuterol, furosemide, hydrOXYzine pamoate, traZODone, dextrose bolus **OR** dextrose bolus, glucagon (rDNA), dextrose, sodium chloride flush, sodium chloride, LORazepam **OR** LORazepam **OR** LORazepam **OR** LORazepam **OR** [DISCONTINUED] LORazepam **OR** [DISCONTINUED] LORazepam **OR** [DISCONTINUED] LORazepam **OR** [DISCONTINUED] LORazepam    OBJECTIVE:  /70   Pulse 83   Temp 98.7 °F (37.1 °C) (Oral)   Resp 16   Ht 1.524 m (5')   Wt 46.9 kg (103 lb 4.8 oz)   SpO2 96%   BMI 20.17 kg/m²   Temp  Av.2 °F (36.8 °C)  Min: 97.4 °F (36.3 °C)  Max: 98.7 °F (37.1 °C)  Constitutional: The patient sleepy awakes for exam - cachectic  Skin: Warm and dry. No rashes were noted.   HEENT: Round and reactive pupils.  Moist mucous membranes.  No ulcerations or thrush.  Neck: Supple to movements.   Chest: No use of accessory muscles to breathe. Symmetrical expansion.  No wheezing, crackles or rhonchi.  Cardiovascular: S1 and S2 are rhythmic and regular. No murmurs appreciated.   Abdomen: Soft . Right uQ abdominal tenderness, rigth UQ drain with serosanguinous fluid minimal.  Extremities: No edema. Muscle wasting  Lines: Peripheral.    Laboratory and Tests:  Lab Results   Component Value Date    CRP 88.0 (H) 2023    .0 (H) 2023    CRP 0.6 (H) 10/01/2018     Lab Results   Component Value Date    SEDRATE 60 (H) 2023    SEDRATE 25 (H) 10/01/2018    SEDRATE 35 (H) 2017       Radiology:  Reviewed    Microbiology:   Blood cultures 3/16:  negative (taken after antibiotics)  Liver aspiration culture 3/19 Gram stain is showing rare PMNs and rare GPC in pairs.  Culture showing Enterococcus faecalis.   Liver biopsy pending:  Liver IR drain 3/22 culture showing Enterococcus faecalis    ASSESSMENT:  Liver abscess: Likely sources cholangitis.    Status post IR guided aspiration on 19th and drain placement on 3/22.  Substance use disorder:   Liver cirrhosis:   Encephalopathy:     PLAN:  Continue IV Unasyn 3gr q 6 for 6 weeks.   Pending tunneled PICC placement by IR  Script in chart. Discharge instructions in place.   Monitor labs    TITI MCNAMARA MD  10:44 AM  3/26/2024

## 2024-03-26 NOTE — PROGRESS NOTES
ENDOCRINOLOGY PROGRESS NOTE      Date of admission: 3/14/2024  Date of service: 3/26/2024  Admitting physician: Jair Jauregui MD   Primary Care Physician: No primary care provider on file.  Consultant physician: Wale Camejo MD     Reason for the consultation:  Uncontrolled DM    History of Present Illness:  53 y.o. female with a history of depression/anxiety, alcoholism, ascites of the liver, left breast cancer, chronic pancreatitis, cocaine abuse, DM 2, GERD, gout, hepatitis C, schizoaffective disorder bipolar type presents with abdominal pain endocrine service was consulted for diabetes management.    Subjective  The patient was seen today, no acute events overnight, glucose level running high.    Inpatient diet:   Carb Restricted diet     Point of care glucose monitoring   (Independently reviewed)   Recent Labs     03/25/24  0608 03/25/24  0844 03/25/24  1211 03/25/24  1519 03/25/24  1959 03/26/24  0607 03/26/24  1009 03/26/24  1606   POCGLU 308* 263* 303* 301* 319* 335* 301* 273*   Scheduled Meds:   menthol-zinc oxide   Topical BID    mineral oil-hydrophilic petrolatum   Topical BID    insulin glargine  10 Units SubCUTAneous Nightly    insulin lispro  5 Units SubCUTAneous TID WC    heparin (PF)  300 Units IntraCATHeter Q12H    insulin glargine  4 Units SubCUTAneous QAM    lipase-protease-amylase  60,000 Units Oral TID WC    ampicillin-sulbactam  3,000 mg IntraVENous Q6H    nicotine  1 patch TransDERmal Daily    lactulose  20 g Oral BID    insulin lispro  0-6 Units SubCUTAneous TID WC    magnesium sulfate  1,000 mg IntraVENous Once    Surgiflo with Evithrom Hemostatic Matrix  1 kit Other Once    Surgiflo with Evithrom Hemostatic Matrix  1 kit Nasal Once    Surgiflo with Evithrom Hemostatic Matrix  1 kit Other Once    sodium chloride flush  5-40 mL IntraVENous 2 times per day    [Held by provider] enoxaparin  30 mg SubCUTAneous Daily    anastrozole  1 mg Oral QAM    ARIPiprazole  20 mg Oral QAM    vitamin C   Wt 46.9 kg (103 lb 4.8 oz)   SpO2 100%   BMI 20.17 kg/m²     Intake/Output Summary (Last 24 hours) at 3/26/2024 1754  Last data filed at 3/26/2024 1409  Gross per 24 hour   Intake 1879.42 ml   Output 0 ml   Net 1879.42 ml         Physical examination:  General: awake alert, oriented x3  HEENT: normocephalic non traumatic, no exophthalmos   Neck: supple, No thyroid tenderness,  Pulm: good equal air entry no added sounds  CVS: S1 + S2  Abd: soft lax, no tenderness  Skin: warm, no lesions, no rash. No open wounds, no ulcers   Neuro: CN intact, sensation decreased bilateral , muscle power normal  Psych: normal mood, and affect    Review of Laboratory Data:  I personally reviewed the following labs:   Recent Labs     03/24/24 0429 03/25/24  0605 03/26/24  0510 03/26/24  1608   WBC 2.8* 2.9* 2.9*  --    RBC 2.56* 2.41* 2.25*  --    HGB 7.6* 7.2* 6.7* 8.3*   HCT 23.9* 22.9* 21.4* 26.3*   MCV 93.4 95.0 95.1  --    MCH 29.7 29.9 29.8  --    MCHC 31.8* 31.4* 31.3*  --    RDW 14.6 14.9 15.0  --    PLT  --   --  73*  --    MPV 11.1 11.5 10.5  --      Recent Labs     03/24/24 0429 03/25/24  0605 03/26/24  0510   * 135 135   K 4.1 3.9 4.2   CL 98 99 97*   CO2 30* 31* 31*   BUN 6 9 11   CREATININE 0.4* 0.4* 0.4*   GLUCOSE 268* 266* 301*   CALCIUM 8.3* 8.1* 7.9*     Beta-Hydroxybutyrate   Date Value Ref Range Status   03/11/2024 3.00 (H) 0.02 - 0.27 mmol/L Final   03/04/2024 0.15 0.02 - 0.27 mmol/L Final   09/16/2023 0.26 0.02 - 0.27 mmol/L Final     Lab Results   Component Value Date/Time    LABA1C 11.4 09/10/2023 07:00 AM    LABA1C 11.2 08/28/2023 03:26 AM    LABA1C 11.6 01/24/2023 12:57 PM     Lab Results   Component Value Date/Time    TSH 0.184 (L) 02/16/2020 06:45 AM    T4FREE 1.16 02/17/2020 06:15 AM     Lab Results   Component Value Date/Time    LABA1C 11.4 09/10/2023 07:00 AM    GLUCOSE 301 03/26/2024 05:10 AM    MALBCR see below 07/09/2015 08:16 AM    LABCREA 56.2 07/09/2015 08:16 AM     Lab Results   Component

## 2024-03-26 NOTE — PROGRESS NOTES
[facility] Hospitalist Progress Note    Admitting Date and Time: 3/14/2024  3:54 AM  Admit Dx: Generalized abdominal pain [R10.84]  Liver mass [R16.0]  Elevated liver enzymes [R74.8]  Cocaine use [F14.90]    Subjective:  Patient still complaining of pain around drain site. No other issues.     ROS: denies fever, chills, cp, sob, n/v, HA unless stated above.     menthol-zinc oxide   Topical BID    mineral oil-hydrophilic petrolatum   Topical BID    insulin glargine  10 Units SubCUTAneous Nightly    insulin lispro  5 Units SubCUTAneous TID WC    heparin (PF)  300 Units IntraCATHeter Q12H    insulin glargine  4 Units SubCUTAneous QAM    lipase-protease-amylase  60,000 Units Oral TID WC    ampicillin-sulbactam  3,000 mg IntraVENous Q6H    nicotine  1 patch TransDERmal Daily    lactulose  20 g Oral BID    insulin lispro  0-6 Units SubCUTAneous TID WC    magnesium sulfate  1,000 mg IntraVENous Once    Surgiflo with Evithrom Hemostatic Matrix  1 kit Other Once    Surgiflo with Evithrom Hemostatic Matrix  1 kit Nasal Once    Surgiflo with Evithrom Hemostatic Matrix  1 kit Other Once    sodium chloride flush  5-40 mL IntraVENous 2 times per day    [Held by provider] enoxaparin  30 mg SubCUTAneous Daily    anastrozole  1 mg Oral QAM    ARIPiprazole  20 mg Oral QAM    vitamin C  1,000 mg Oral QAM    calcium elemental  1,000 mg Oral QAM    melatonin  12 mg Oral Nightly    multivitamin  1 tablet Oral QAM    rifAXIMin  550 mg Oral BID    thiamine  100 mg Oral Daily    ipratropium  0.5 mg Nebulization TID RT    VORTIoxetine HBr  10 mg Oral Nightly    pantoprazole (PROTONIX) 40 mg in sodium chloride (PF) 0.9 % 10 mL injection  40 mg IntraVENous Q12H     sodium chloride, , PRN  heparin (PF), 300 Units, PRN  naloxone 0.4 mg in 10 mL sodium chloride syringe, , PRN  sodium chloride flush, 5-40 mL, PRN  sodium chloride, , PRN  Glucose, 15 g, PRN  HYDROmorphone, 0.5 mg, Q2H PRN   Or  HYDROmorphone, 1 mg, Q2H PRN  sodium chloride    seen inferiorly within the right lobe concerning for an underlying lesion   such as hepatocellular carcinoma. This area measures 7.1 x 5.6 cm. This was   visualized on the prior examination.   2. Moderate to large amount of ascites seen within the abdomen and pelvis.   3. Abnormal wall thickening identified throughout the small bowel loops   within the left flank. Findings may be related to the surrounding ascites.   4. Increased stool burden seen diffusely throughout the colon to suggest   clinical presentation of constipation. Mild wall thickening in which an   underlying colitis cannot be completely excluded. Findings may be related to   the surrounding ascites.   5. Nodularity involving the adrenal glands bilaterally.   6. Small umbilical hernia containing fat only.         US GALLBLADDER RUQ   Final Result   1. Cirrhotic morphology of the liver with no underlying mass or lesion   present.   2. Status post cholecystectomy. No biliary ductal dilatation.   3. Increased echogenicity within the renal cortex to suggest medical renal   disease. Cystic structure along the superior pole of the right kidney   measuring 4.6 x 4.8 cm.   4. Fluid identified surrounding the liver within the right upper quadrant.         IR Interventional Radiology Procedure Request    (Results Pending)       Assessment:    Principal Problem:    Intra-abdominal abscess (HCC)  Active Problems:    Poorly controlled diabetes mellitus (HCC)    Ascites due to alcoholic cirrhosis (HCC)    Severe protein-calorie malnutrition (HCC)    Liver mass    Generalized abdominal pain    Elevated liver enzymes    Portal hypertension (HCC)    Pancreatic insufficiency    Hepatic abscess  Resolved Problems:    * No resolved hospital problems. *      Plan:  Uncontrolled DM with hyperglycemia - 9/2023 A1c 11.4%. Had insulin pump on arrival. Endocrinology consult, on Lantus 4u QAM and 10u QHS, humalog 5 units TIDAC and LDSS  Decompensated chronic cirrhosis with

## 2024-03-27 ENCOUNTER — APPOINTMENT (OUTPATIENT)
Dept: GENERAL RADIOLOGY | Age: 54
DRG: 279 | End: 2024-03-27
Payer: COMMERCIAL

## 2024-03-27 VITALS
OXYGEN SATURATION: 100 % | SYSTOLIC BLOOD PRESSURE: 113 MMHG | HEART RATE: 73 BPM | DIASTOLIC BLOOD PRESSURE: 80 MMHG | BODY MASS INDEX: 20.85 KG/M2 | WEIGHT: 106.2 LBS | RESPIRATION RATE: 16 BRPM | TEMPERATURE: 98 F | HEIGHT: 60 IN

## 2024-03-27 LAB
ABO/RH: NORMAL
ANION GAP SERPL CALCULATED.3IONS-SCNC: 3 MMOL/L (ref 7–16)
ANTIBODY SCREEN: NEGATIVE
ARM BAND NUMBER: NORMAL
BASOPHILS # BLD: 0.06 K/UL (ref 0–0.2)
BASOPHILS NFR BLD: 2 % (ref 0–2)
BLOOD BANK BLOOD PRODUCT EXPIRATION DATE: NORMAL
BLOOD BANK DISPENSE STATUS: NORMAL
BLOOD BANK ISBT PRODUCT BLOOD TYPE: 600
BLOOD BANK PRODUCT CODE: NORMAL
BLOOD BANK SAMPLE EXPIRATION: NORMAL
BLOOD BANK UNIT TYPE AND RH: NORMAL
BPU ID: NORMAL
BUN SERPL-MCNC: 14 MG/DL (ref 6–20)
CALCIUM SERPL-MCNC: 8 MG/DL (ref 8.6–10.2)
CHLORIDE SERPL-SCNC: 97 MMOL/L (ref 98–107)
CO2 SERPL-SCNC: 33 MMOL/L (ref 22–29)
COMPONENT: NORMAL
CREAT SERPL-MCNC: 0.4 MG/DL (ref 0.5–1)
CROSSMATCH RESULT: NORMAL
EOSINOPHIL # BLD: 0 K/UL (ref 0.05–0.5)
EOSINOPHILS RELATIVE PERCENT: 0 % (ref 0–6)
ERYTHROCYTE [DISTWIDTH] IN BLOOD BY AUTOMATED COUNT: 15.9 % (ref 11.5–15)
GFR SERPL CREATININE-BSD FRML MDRD: >90 ML/MIN/1.73M2
GLUCOSE BLD-MCNC: 282 MG/DL (ref 74–99)
GLUCOSE BLD-MCNC: 406 MG/DL (ref 74–99)
GLUCOSE BLD-MCNC: 456 MG/DL (ref 74–99)
GLUCOSE SERPL-MCNC: 392 MG/DL (ref 74–99)
HCT VFR BLD AUTO: 25 % (ref 34–48)
HGB BLD-MCNC: 8 G/DL (ref 11.5–15.5)
LYMPHOCYTES NFR BLD: 0.46 K/UL (ref 1.5–4)
LYMPHOCYTES RELATIVE PERCENT: 13 % (ref 20–42)
MCH RBC QN AUTO: 29.7 PG (ref 26–35)
MCHC RBC AUTO-ENTMCNC: 32 G/DL (ref 32–34.5)
MCV RBC AUTO: 92.9 FL (ref 80–99.9)
MONOCYTES NFR BLD: 0.27 K/UL (ref 0.1–0.95)
MONOCYTES NFR BLD: 8 % (ref 2–12)
NEUTROPHILS NFR BLD: 77 % (ref 43–80)
NEUTS SEG NFR BLD: 2.71 K/UL (ref 1.8–7.3)
PLATELET # BLD AUTO: 85 K/UL (ref 130–450)
PLATELET CONFIRMATION: NORMAL
PMV BLD AUTO: 11.2 FL (ref 7–12)
POTASSIUM SERPL-SCNC: 4.8 MMOL/L (ref 3.5–5)
RBC # BLD AUTO: 2.69 M/UL (ref 3.5–5.5)
RBC # BLD: ABNORMAL 10*6/UL
SODIUM SERPL-SCNC: 133 MMOL/L (ref 132–146)
TRANSFUSION STATUS: NORMAL
UNIT DIVISION: 0
UNIT ISSUE DATE/TIME: NORMAL
WBC OTHER # BLD: 3.5 K/UL (ref 4.5–11.5)

## 2024-03-27 PROCEDURE — 2580000003 HC RX 258: Performed by: STUDENT IN AN ORGANIZED HEALTH CARE EDUCATION/TRAINING PROGRAM

## 2024-03-27 PROCEDURE — 6370000000 HC RX 637 (ALT 250 FOR IP): Performed by: HOSPITALIST

## 2024-03-27 PROCEDURE — 2500000003 HC RX 250 WO HCPCS: Performed by: RADIOLOGY

## 2024-03-27 PROCEDURE — 6370000000 HC RX 637 (ALT 250 FOR IP): Performed by: INTERNAL MEDICINE

## 2024-03-27 PROCEDURE — 6360000002 HC RX W HCPCS: Performed by: STUDENT IN AN ORGANIZED HEALTH CARE EDUCATION/TRAINING PROGRAM

## 2024-03-27 PROCEDURE — 6370000000 HC RX 637 (ALT 250 FOR IP): Performed by: STUDENT IN AN ORGANIZED HEALTH CARE EDUCATION/TRAINING PROGRAM

## 2024-03-27 PROCEDURE — 2709999900 FL TUNNELED CVC PLACE WO SQ PORT/PUMP > 5 YEARS

## 2024-03-27 PROCEDURE — 2580000003 HC RX 258: Performed by: HOSPITALIST

## 2024-03-27 PROCEDURE — 6360000002 HC RX W HCPCS: Performed by: HOSPITALIST

## 2024-03-27 PROCEDURE — 6370000000 HC RX 637 (ALT 250 FOR IP): Performed by: NURSE PRACTITIONER

## 2024-03-27 PROCEDURE — 80048 BASIC METABOLIC PNL TOTAL CA: CPT

## 2024-03-27 PROCEDURE — 0JH63XZ INSERTION OF TUNNELED VASCULAR ACCESS DEVICE INTO CHEST SUBCUTANEOUS TISSUE AND FASCIA, PERCUTANEOUS APPROACH: ICD-10-PCS | Performed by: STUDENT IN AN ORGANIZED HEALTH CARE EDUCATION/TRAINING PROGRAM

## 2024-03-27 PROCEDURE — 02HV33Z INSERTION OF INFUSION DEVICE INTO SUPERIOR VENA CAVA, PERCUTANEOUS APPROACH: ICD-10-PCS | Performed by: STUDENT IN AN ORGANIZED HEALTH CARE EDUCATION/TRAINING PROGRAM

## 2024-03-27 PROCEDURE — 85025 COMPLETE CBC W/AUTO DIFF WBC: CPT

## 2024-03-27 PROCEDURE — A4216 STERILE WATER/SALINE, 10 ML: HCPCS | Performed by: HOSPITALIST

## 2024-03-27 PROCEDURE — C9113 INJ PANTOPRAZOLE SODIUM, VIA: HCPCS | Performed by: HOSPITALIST

## 2024-03-27 PROCEDURE — 99232 SBSQ HOSP IP/OBS MODERATE 35: CPT | Performed by: INTERNAL MEDICINE

## 2024-03-27 PROCEDURE — 99239 HOSP IP/OBS DSCHRG MGMT >30: CPT | Performed by: STUDENT IN AN ORGANIZED HEALTH CARE EDUCATION/TRAINING PROGRAM

## 2024-03-27 PROCEDURE — 82962 GLUCOSE BLOOD TEST: CPT

## 2024-03-27 PROCEDURE — 76937 US GUIDE VASCULAR ACCESS: CPT

## 2024-03-27 RX ORDER — NICOTINE 21 MG/24HR
1 PATCH, TRANSDERMAL 24 HOURS TRANSDERMAL DAILY
Qty: 30 PATCH | Refills: 0 | DISCHARGE
Start: 2024-03-28

## 2024-03-27 RX ORDER — INSULIN GLARGINE 100 [IU]/ML
12 INJECTION, SOLUTION SUBCUTANEOUS NIGHTLY
Qty: 10 ML | Refills: 0 | DISCHARGE
Start: 2024-03-27

## 2024-03-27 RX ORDER — INSULIN GLARGINE 100 [IU]/ML
5 INJECTION, SOLUTION SUBCUTANEOUS EVERY MORNING
Qty: 10 ML | Refills: 0 | DISCHARGE
Start: 2024-03-28

## 2024-03-27 RX ORDER — LACTULOSE 10 G/15ML
20 SOLUTION ORAL 2 TIMES DAILY
Refills: 0 | DISCHARGE
Start: 2024-03-27

## 2024-03-27 RX ORDER — INSULIN LISPRO 100 [IU]/ML
0-8 INJECTION, SOLUTION INTRAVENOUS; SUBCUTANEOUS
DISCHARGE
Start: 2024-03-27

## 2024-03-27 RX ORDER — PETROLATUM 42 G/100G
OINTMENT TOPICAL
Refills: 0 | DISCHARGE
Start: 2024-03-27

## 2024-03-27 RX ORDER — POLYETHYLENE GLYCOL 3350 17 G/17G
17 POWDER, FOR SOLUTION ORAL DAILY PRN
Qty: 527 G | Refills: 0 | DISCHARGE
Start: 2024-03-27 | End: 2024-04-26

## 2024-03-27 RX ORDER — INSULIN LISPRO 100 [IU]/ML
6 INJECTION, SOLUTION INTRAVENOUS; SUBCUTANEOUS
DISCHARGE
Start: 2024-03-27

## 2024-03-27 RX ORDER — LIDOCAINE HYDROCHLORIDE 20 MG/ML
INJECTION, SOLUTION INFILTRATION; PERINEURAL PRN
Status: COMPLETED | OUTPATIENT
Start: 2024-03-27 | End: 2024-03-27

## 2024-03-27 RX ORDER — INSULIN LISPRO 100 [IU]/ML
0-6 INJECTION, SOLUTION INTRAVENOUS; SUBCUTANEOUS NIGHTLY
DISCHARGE
Start: 2024-03-27

## 2024-03-27 RX ADMIN — ANORECTAL OINTMENT: 15.7; .44; 24; 20.6 OINTMENT TOPICAL at 20:35

## 2024-03-27 RX ADMIN — RIFAXIMIN 550 MG: 550 TABLET ORAL at 20:53

## 2024-03-27 RX ADMIN — INSULIN LISPRO 8 UNITS: 100 INJECTION, SOLUTION INTRAVENOUS; SUBCUTANEOUS at 06:17

## 2024-03-27 RX ADMIN — HYDROPHOR: 42 OINTMENT TOPICAL at 09:42

## 2024-03-27 RX ADMIN — HYDROPHOR: 42 OINTMENT TOPICAL at 20:35

## 2024-03-27 RX ADMIN — Medication 12 MG: at 20:33

## 2024-03-27 RX ADMIN — INSULIN GLARGINE 5 UNITS: 100 INJECTION, SOLUTION SUBCUTANEOUS at 09:37

## 2024-03-27 RX ADMIN — VORTIOXETINE 10 MG: 10 TABLET, FILM COATED ORAL at 20:53

## 2024-03-27 RX ADMIN — Medication 300 UNITS: at 09:41

## 2024-03-27 RX ADMIN — INSULIN LISPRO 6 UNITS: 100 INJECTION, SOLUTION INTRAVENOUS; SUBCUTANEOUS at 15:28

## 2024-03-27 RX ADMIN — OXYCODONE 5 MG: 5 TABLET ORAL at 20:33

## 2024-03-27 RX ADMIN — ANORECTAL OINTMENT: 15.7; .44; 24; 20.6 OINTMENT TOPICAL at 09:42

## 2024-03-27 RX ADMIN — LIDOCAINE HYDROCHLORIDE 5 ML: 20 INJECTION, SOLUTION INFILTRATION; PERINEURAL at 12:51

## 2024-03-27 RX ADMIN — AMPICILLIN SODIUM AND SULBACTAM SODIUM 3000 MG: 2; 1 INJECTION, POWDER, FOR SOLUTION INTRAMUSCULAR; INTRAVENOUS at 15:30

## 2024-03-27 RX ADMIN — SODIUM CHLORIDE, PRESERVATIVE FREE 10 ML: 5 INJECTION INTRAVENOUS at 20:44

## 2024-03-27 RX ADMIN — INSULIN GLARGINE 12 UNITS: 100 INJECTION, SOLUTION SUBCUTANEOUS at 20:41

## 2024-03-27 RX ADMIN — OXYCODONE 5 MG: 5 TABLET ORAL at 14:09

## 2024-03-27 RX ADMIN — SODIUM CHLORIDE, PRESERVATIVE FREE 10 ML: 5 INJECTION INTRAVENOUS at 09:43

## 2024-03-27 RX ADMIN — INSULIN LISPRO 6 UNITS: 100 INJECTION, SOLUTION INTRAVENOUS; SUBCUTANEOUS at 20:42

## 2024-03-27 RX ADMIN — HYDROMORPHONE HYDROCHLORIDE 0.5 MG: 1 INJECTION, SOLUTION INTRAMUSCULAR; INTRAVENOUS; SUBCUTANEOUS at 05:19

## 2024-03-27 RX ADMIN — INSULIN LISPRO 4 UNITS: 100 INJECTION, SOLUTION INTRAVENOUS; SUBCUTANEOUS at 15:27

## 2024-03-27 RX ADMIN — AMPICILLIN SODIUM AND SULBACTAM SODIUM 3000 MG: 2; 1 INJECTION, POWDER, FOR SOLUTION INTRAMUSCULAR; INTRAVENOUS at 04:29

## 2024-03-27 RX ADMIN — SODIUM CHLORIDE, PRESERVATIVE FREE 40 MG: 5 INJECTION INTRAVENOUS at 09:40

## 2024-03-27 RX ADMIN — AMPICILLIN SODIUM AND SULBACTAM SODIUM 3000 MG: 2; 1 INJECTION, POWDER, FOR SOLUTION INTRAMUSCULAR; INTRAVENOUS at 09:42

## 2024-03-27 RX ADMIN — PANCRELIPASE LIPASE, PANCRELIPASE PROTEASE, PANCRELIPASE AMYLASE 60000 UNITS: 20000; 63000; 84000 CAPSULE, DELAYED RELEASE ORAL at 15:26

## 2024-03-27 RX ADMIN — HYDROMORPHONE HYDROCHLORIDE 0.5 MG: 1 INJECTION, SOLUTION INTRAMUSCULAR; INTRAVENOUS; SUBCUTANEOUS at 02:10

## 2024-03-27 RX ADMIN — Medication 300 UNITS: at 20:35

## 2024-03-27 ASSESSMENT — PAIN SCALES - GENERAL
PAINLEVEL_OUTOF10: 10
PAINLEVEL_OUTOF10: 2
PAINLEVEL_OUTOF10: 10
PAINLEVEL_OUTOF10: 2
PAINLEVEL_OUTOF10: 5
PAINLEVEL_OUTOF10: 5
PAINLEVEL_OUTOF10: 0
PAINLEVEL_OUTOF10: 6

## 2024-03-27 ASSESSMENT — PAIN DESCRIPTION - DESCRIPTORS: DESCRIPTORS: ACHING;DISCOMFORT

## 2024-03-27 ASSESSMENT — PAIN DESCRIPTION - ORIENTATION: ORIENTATION: RIGHT

## 2024-03-27 ASSESSMENT — PAIN DESCRIPTION - LOCATION
LOCATION: BACK
LOCATION: BACK
LOCATION: ABDOMEN;FLANK

## 2024-03-27 NOTE — PROGRESS NOTES
Comprehensive Nutrition Assessment    Type and Reason for Visit:  Reassess    Nutrition Recommendations/Plan:   Continue NPO, ADAT & add ONS with nutrition progression (Glucerna TID)     Malnutrition Assessment:  Malnutrition Status:  Severe malnutrition (03/15/24 1432)    Context:  Social/Environmental Circumstances     Findings of the 6 clinical characteristics of malnutrition:  Energy Intake:  50% or less estimated energy requirements for 1 month or longer  Weight Loss:  Unable to assess (d/t cirrhosis)     Body Fat Loss:  Severe body fat loss Triceps, Buccal region   Muscle Mass Loss:  Severe muscle mass loss Temples (temporalis), Thigh (quadraceps), Calf (gastrocnemius)  Fluid Accumulation:  Unable to assess (d/t multifactorial)     Strength:  Not Performed    Nutrition Assessment:    Pt w/ liver abscess (is not a tumor) s/p IR drain placement. Pt remains at nutritional risk 2/2 severe malnutrition. Currently NPO for PICC placement, ADAT & add ONS w/ nutrition progression. Auth pending for LTAC at discharge.    Nutrition Related Findings:    A&O X4, +3.5L, BLE +1 edema, abd rounded/distended, +BS, Glu 392 (Endocrinology following), Zenpep, IR drain   Wound Type: None (Dried dark flaky areas rt ear and forehead No treatment needed for that.- per wound care)       Current Nutrition Intake & Therapies:    Average Meal Intake: NPO (appears improved to ~50-75% w/ PO diet)  Average Supplements Intake: NPO  Diet NPO    Anthropometric Measures:  Height: 152.4 cm (5')  Ideal Body Weight (IBW): 100 lbs (45 kg)    Admission Body Weight: 35.7 kg (78 lb 12.8 oz) (3/15 bed)  Current Body Weight: 48.2 kg (106 lb 3.2 oz) (3/27 elevated (+I&Os/edema)), 78.8 % IBW. Weight Source: Bed Scale  Current BMI (kg/m2): 20.7  Usual Body Weight: 38.6 kg (85 lb) (9/2023 actual per EMR, variable wt hx per EMR likely r/t fluid shifts (ascites))  % Weight Change (Calculated): -7.3                    BMI Categories: Underweight (BMI less

## 2024-03-27 NOTE — PLAN OF CARE
Problem: Discharge Planning  Goal: Discharge to home or other facility with appropriate resources  Outcome: Completed     Problem: ABCDS Injury Assessment  Goal: Absence of physical injury  Outcome: Completed     Problem: Skin/Tissue Integrity  Goal: Absence of new skin breakdown  Description: 1.  Monitor for areas of redness and/or skin breakdown  2.  Assess vascular access sites hourly  3.  Every 4-6 hours minimum:  Change oxygen saturation probe site  4.  Every 4-6 hours:  If on nasal continuous positive airway pressure, respiratory therapy assess nares and determine need for appliance change or resting period.  Outcome: Completed     Problem: Pain  Goal: Verbalizes/displays adequate comfort level or baseline comfort level  Outcome: Completed     Problem: Safety - Adult  Goal: Free from fall injury  Outcome: Completed     Problem: Chronic Conditions and Co-morbidities  Goal: Patient's chronic conditions and co-morbidity symptoms are monitored and maintained or improved  Outcome: Completed     Problem: Nutrition Deficit:  Goal: Optimize nutritional status  Outcome: Completed  Flowsheets (Taken 3/27/2024 2646 by D'Amico, Miranda, RD, LD)  Nutrient intake appropriate for improving, restoring, or maintaining nutritional needs: Assess nutritional status and recommend course of action

## 2024-03-27 NOTE — CARE COORDINATION
CASE MANAGEMENT... Ellie is approved for LTAC. [facility] location. [facility] will call with room and time. Nursing updated. Patient off the floor for PICC line under IR. Ambulance forms in chart. Will follow.    UPDATE...Room 912 at [facility]. Nursing arranged ambulance for 6:30pm with [facility] - [facility] notified.

## 2024-03-27 NOTE — PROGRESS NOTES
Page sent to [facility] regarding AM blood sugar 406. 8 units Humalog administered, no new orders obtained due to NPO status

## 2024-03-27 NOTE — DISCHARGE INSTR - COC
Continuity of Care Form    Patient Name: Ellie Santacruz   :  1970  MRN:  [id number]    Admit date:  3/14/2024  Discharge date:  3/27/24    Code Status Order: Full Code   Advance Directives:     Admitting Physician:  Jair Jauregui MD  PCP: No primary care provider on file.    Discharging Nurse: AM  Discharging Hospital Unit/Room#: 0428/0428-A  Discharging Unit Phone Number: [phone]    Emergency Contact:   Extended Emergency Contact Information  Primary Emergency Contact: Mitchell Santacruz  Mobile Phone: [phone]  Relation: Child  Secondary Emergency Contact: Lydia Santacruz  Mobile Phone: [phone]  Relation: Child    Past Surgical History:  Past Surgical History:   Procedure Laterality Date    ABDOMEN SURGERY      gallbladder removal    BREAST BIOPSY Left     CHOLECYSTECTOMY      COLONOSCOPY      COLONOSCOPY N/A 10/2/2018    COLONOSCOPY WITH BIOPSY performed by Simón Esquivel MD at [facility] ENDOSCOPY    CT NEEDLE BIOPSY LIVER PERCUTANEOUS  3/19/2024    CT NEEDLE BIOPSY LIVER PERCUTANEOUS 3/19/2024 [facility] CT    ENDOSCOPY, COLON, DIAGNOSTIC      ERCP      2016 at [facility]    HYSTERECTOMY (CERVIX STATUS UNKNOWN)  2004    MASTECTOMY Left     Left breast mastectomy per patient    OTHER SURGICAL HISTORY Left 2017     Left breast blue dye injection, Sential Node Lymph Biopsy with left breast lumpectomy    OTHER SURGICAL HISTORY  10/05/2017    simple left mastectomy    OVARY REMOVAL Bilateral     PANCREAS BIOPSY      with stent    PERINEAL SURGERY N/A 2023    PERINEAL INCISION AND DRAINAGE ABSCESS performed by Karl Santos MD at [facility] OR    RECTAL SURGERY N/A 2023    PERINEAL ABSCESS INCISION AND DRAINAGE performed by Sebastian Davis MD at [facility] OR    SHOULDER SURGERY Left     UPPER GASTROINTESTINAL ENDOSCOPY         Immunization History:   Immunization History   Administered Date(s) Administered    COVID-19, PFIZER PURPLE top, DILUTE for use, (age 12 y+), 30mcg/0.3mL 2020,  alert, and able to concentrate and follow conversation    IV Access:  - PICC - site  Right chest tunneled , insertion date: 3/27/24    Nursing Mobility/ADLs:  Walking   Assisted  Transfer  Assisted  Bathing  Assisted  Dressing  Assisted  Toileting  Assisted  Feeding  Independent  Med Admin  Assisted  Med Delivery   whole    Wound Care Documentation and Therapy:  Puncture 03/19/24 Abdomen (Active)   Ning-wound Assessment Intact 03/23/24 0500   Closure Adhesive bandage 03/19/24 1600   Culture Taken Yes 03/19/24 1515   Drainage Amount None 03/19/24 1600   Odor None 03/19/24 1600   Dressing/Treatment Dry dressing 03/23/24 0500   Dressing Status Clean, dry & intact 03/23/24 0500   Number of days: 7        Elimination:  Continence:   Bowel: Yes  Bladder: Yes  Urinary Catheter: None   Colostomy/Ileostomy/Ileal Conduit: No      Date of Last BM: 3/26/24    Intake/Output Summary (Last 24 hours) at 3/27/2024 1246  Last data filed at 3/26/2024 1409  Gross per 24 hour   Intake 281.67 ml   Output --   Net 281.67 ml     I/O last 3 completed shifts:  In: 1879.4 [I.V.:13; Blood:281.7; IV Piggyback:1584.8]  Out: 0     Safety Concerns:     At Risk for Falls    Impairments/Disabilities:      None    Nutrition Therapy:  Current Nutrition Therapy:   - Oral Diet:  General    Routes of Feeding: Oral  Liquids: Thin Liquids  Daily Fluid Restriction: no  Last Modified Barium Swallow with Video (Video Swallowing Test): not done    Treatments at the Time of Hospital Discharge:   Respiratory Treatments: none  Oxygen Therapy:  is not on home oxygen therapy.  Ventilator:    - No ventilator support    Rehab Therapies: Physical Therapy and Occupational Therapy  Weight Bearing Status/Restrictions: No weight bearing restrictions  Other Medical Equipment (for information only, NOT a DME order):  walker, commode  Other Treatments: ***    Patient's personal belongings (please select all that are sent with patient):  None    RN SIGNATURE:  Electronically

## 2024-03-27 NOTE — PRE SEDATION
Sedation Pre-Procedure Note    Patient Name: Ellie Santacruz   YOB: 1970  Room/Bed: 0428/0428-A  Medical Record Number: [id number]  Date: 3/27/2024   Time: 12:33 PM       Indication:  Image guided insertion of tunneled central venous catheter with possible conscious sedation.     Consent: IDr. Pyle have discussed with the patient and/or the patient representative the indication, alternatives, and the possible risks and/or complications of the planned procedure and the anesthesia methods. The patient and/or patient representative appear to understand and agree to proceed.    Vital Signs:   Vitals:    03/27/24 1217   BP: 104/70   Pulse:    Resp:    Temp:    SpO2:        Past Medical History:   has a past medical history of Alcoholism (HCC), Anxiety and depression, Ascites of liver, Bronchitis, Cancer (HCC), Candidiasis of vagina, Chronic pancreatitis (HCC), Cocaine abuse (HCC), Constipation, Diabetes mellitus, type 2 (HCC), GERD (gastroesophageal reflux disease), Gout, Hepatitis C, Hernia of anterior abdominal wall, Jaundice, Pneumonia, Polyneuropathy due to type 2 diabetes mellitus (HCC), Schizoaffective disorder, bipolar type (HCC), and Splenomegaly.    Past Surgical History:   has a past surgical history that includes Hysterectomy (2004); Ovary removal (Bilateral, 2007); shoulder surgery (Left, 2003); Cholecystectomy (2010); ERCP; Abdomen surgery; Colonoscopy; Upper gastrointestinal endoscopy; other surgical history (Left, 04/25/2017); Breast biopsy (Left); Pancreas Biopsy; Endoscopy, colon, diagnostic; other surgical history (10/05/2017); Colonoscopy (N/A, 10/2/2018); Mastectomy (Left); Rectal surgery (N/A, 8/28/2023); Perineal surgery (N/A, 9/18/2023); and CT NEEDLE BIOPSY LIVER PERCUTANEOUS (3/19/2024).    Medications:   Scheduled Meds:    insulin glargine  12 Units SubCUTAneous Nightly    insulin lispro  0-6 Units SubCUTAneous Nightly    insulin lispro  0-8 Units SubCUTAneous TID WC    insulin  tablet by mouth nightly as needed for Sleep   Yes Irma Dudley MD   insulin glargine (LANTUS SOLOSTAR) 100 UNIT/ML injection pen Inject 25 Units into the skin 2 times daily 11/21/23   Wale Camejo MD   acetaminophen (TYLENOL) 500 MG tablet Take 1 tablet by mouth 4 times daily as needed for Pain 10/24/23   Rick Mendenhall DO   thiamine 100 MG tablet Take 1 tablet by mouth daily    Irma Dudley MD   albuterol sulfate HFA (PROVENTIL;VENTOLIN;PROAIR) 108 (90 Base) MCG/ACT inhaler Inhale 2 puffs into the lungs every 6 hours as needed for Wheezing or Shortness of Breath 7/26/23   Rick Mendenhall DO   Nutritional Supplements (BOOST GLUCOSE CTRL MAX PROTEIN) LIQD Take 1 each by mouth in the morning, at noon, and at bedtime 7/26/23   Rick Mendenhall DO   rifAXIMin (XIFAXAN) 550 MG tablet Take 1 tablet by mouth 2 times daily    Irma Dudley MD   Multiple Vitamin (MULTI VITAMIN DAILY) TABS Take 1 tablet by mouth every morning    Irma Dudley MD   Melatonin 12 MG TABS Take 12 mg by mouth nightly    Irma Dudley MD   calcium carbonate 1500 (600 Ca) MG TABS tablet Take 2 tablets by mouth every morning    Irma Dudley MD   ondansetron (ZOFRAN-ODT) 4 MG disintegrating tablet Take 1 tablet by mouth 3 times daily as needed for Nausea or Vomiting 1/16/23   Katerine Lopez, EVERETT - CNP   CREON 15815-232703 units CPEP delayed release capsule Take 2 capsules by mouth 3 times daily (with meals)    Irma Dudley MD   albuterol (PROVENTIL) (2.5 MG/3ML) 0.083% nebulizer solution Take 3 mLs by nebulization every 4 hours as needed for Wheezing May substitute generic if insurance does not cover.  Please provide 1 box (25 or 30 ampules) depending on how supplied per box 6/6/22   Douglas Alegria DO   denosumab (PROLIA) 60 MG/ML SOSY SC injection Inject 1 mL into the skin every 6 months  Given at [facility]    Irma Dudley MD     Coumadin Use Last 7

## 2024-03-27 NOTE — PROGRESS NOTES
[facility]  [facility]  Progress Note    SUBJECTIVE:  Chief Complaint   Patient presents with    Abdominal Pain     Diffuse. History of cirrhosis. Patient stated she got up this morning and her abdomen was distened     Patient resting in bed.  Complaining of abdominal pain still.  No nausea vomiting had a soft bowel movement today.  Requesting pain medication.  Had right IJ tunneled line placement.    Review of systems:  As above.    Medications:  Scheduled Meds:   insulin glargine  12 Units SubCUTAneous Nightly    insulin lispro  0-6 Units SubCUTAneous Nightly    insulin lispro  0-8 Units SubCUTAneous TID WC    insulin lispro  6 Units SubCUTAneous TID WC    insulin glargine  5 Units SubCUTAneous QAM    menthol-zinc oxide   Topical BID    mineral oil-hydrophilic petrolatum   Topical BID    heparin (PF)  300 Units IntraCATHeter Q12H    lipase-protease-amylase  60,000 Units Oral TID WC    ampicillin-sulbactam  3,000 mg IntraVENous Q6H    nicotine  1 patch TransDERmal Daily    lactulose  20 g Oral BID    magnesium sulfate  1,000 mg IntraVENous Once    Surgiflo with Evithrom Hemostatic Matrix  1 kit Other Once    Surgiflo with Evithrom Hemostatic Matrix  1 kit Nasal Once    Surgiflo with Evithrom Hemostatic Matrix  1 kit Other Once    sodium chloride flush  5-40 mL IntraVENous 2 times per day    [Held by provider] enoxaparin  30 mg SubCUTAneous Daily    anastrozole  1 mg Oral QAM    ARIPiprazole  20 mg Oral QAM    vitamin C  1,000 mg Oral QAM    calcium elemental  1,000 mg Oral QAM    melatonin  12 mg Oral Nightly    multivitamin  1 tablet Oral QAM    rifAXIMin  550 mg Oral BID    thiamine  100 mg Oral Daily    ipratropium  0.5 mg Nebulization TID RT    VORTIoxetine HBr  10 mg Oral Nightly    pantoprazole (PROTONIX) 40 mg in sodium chloride (PF) 0.9 % 10 mL injection  40 mg IntraVENous Q12H     Continuous Infusions:   sodium chloride      sodium chloride      sodium chloride      sodium

## 2024-03-27 NOTE — OR NURSING
Patient brought to fluoroscopy for tunneled PICC placement. Dr Pyle here to speak to patient, all questions answered, and consent obtained from son. Placed supine on table and connected to monitoring devices. Patient prepped and draped. Using ultrasound and fluoroscopy, right internal jugular vein accessed and 5 Serbian PICC inserted. 3.0 Vicryl used to close venotomy. Placement of catheter verified with fluoroscopy and blood return. Procedure complete, patient tolerated procedure well. Site cleansed and dry sterile dressing applied. Handoff report called to floor nurse, patient transported back to room.

## 2024-03-27 NOTE — DISCHARGE SUMMARY
[facility] - Hospitalist       Hospitalist Physician Discharge Summary       Delfina Mireles MD  [address]  [address]  [facility] [address]  [phone]    Schedule an appointment as soon as possible for a visit in 3 week(s)      [address]  9[address]  [facility] [address]  [phone]          Activity level: as tolerated    Diet: Diet NPO    Labs:as per PCP    Dispo:LTAC    Condition at discharge: Stable      Patient ID:  Ellie Santacruz  [id number]  53 y.o.  1970    Admit date: 3/14/2024    Discharge date and time:  3/27/2024  1:05 PM    Admission Diagnoses: Principal Problem:    Intra-abdominal abscess (HCC)  Active Problems:    Poorly controlled diabetes mellitus (HCC)    Ascites due to alcoholic cirrhosis (HCC)    Severe protein-calorie malnutrition (HCC)    Liver mass    Generalized abdominal pain    Elevated liver enzymes    Portal hypertension (HCC)    Pancreatic insufficiency    Hepatic abscess  Resolved Problems:    * No resolved hospital problems. *      Discharge Diagnoses: Principal Problem:    Intra-abdominal abscess (HCC)  Active Problems:    Poorly controlled diabetes mellitus (HCC)    Ascites due to alcoholic cirrhosis (HCC)    Severe protein-calorie malnutrition (HCC)    Liver mass    Generalized abdominal pain    Elevated liver enzymes    Portal hypertension (HCC)    Pancreatic insufficiency    Hepatic abscess  Resolved Problems:    * No resolved hospital problems. *      Consults:  IP CONSULT TO GI  IP CONSULT TO IV TEAM  IP CONSULT TO ENDOCRINOLOGY  IP CONSULT TO SOCIAL WORK  IP CONSULT TO GENERAL SURGERY  IP CONSULT TO IV TEAM  IP CONSULT TO INFECTIOUS DISEASES  IP CONSULT TO SOCIAL WORK  IP CONSULT TO PALLIATIVE CARE  IP CONSULT TO DIETITIAN  IP CONSULT TO IV TEAM    Procedures: R     Hospital Course: Patient was admitted with decompensated liver cirrhosis and liver mass which was determined to be liver abscess. GI, hepatobiliary

## 2024-03-27 NOTE — PROGRESS NOTES
ENDOCRINOLOGY PROGRESS NOTE      Date of admission: 3/14/2024  Date of service: 3/27/2024  Admitting physician: Jair Jauregui MD   Primary Care Physician: No primary care provider on file.  Consultant physician: Wale Camejo MD     Reason for the consultation:  Uncontrolled DM    History of Present Illness:  53 y.o. female with a history of depression/anxiety, alcoholism, ascites of the liver, left breast cancer, chronic pancreatitis, cocaine abuse, DM 2, GERD, gout, hepatitis C, schizoaffective disorder bipolar type presents with abdominal pain endocrine service was consulted for diabetes management.    Subjective  I saw the patient at this afternoon, she is feels well in general, appetite is very good.  Glucose level still above goal    Inpatient diet:   Carb Restricted diet     Point of care glucose monitoring   (Independently reviewed)   Recent Labs     03/25/24  1519 03/25/24  1959 03/26/24  0607 03/26/24  1009 03/26/24  1606 03/26/24  2050 03/27/24  0615 03/27/24  1507   POCGLU 301* 319* 335* 301* 273* 243* 406* 282*   Scheduled Meds:   insulin glargine  12 Units SubCUTAneous Nightly    insulin lispro  0-6 Units SubCUTAneous Nightly    insulin lispro  0-8 Units SubCUTAneous TID WC    insulin lispro  6 Units SubCUTAneous TID WC    insulin glargine  5 Units SubCUTAneous QAM    menthol-zinc oxide   Topical BID    mineral oil-hydrophilic petrolatum   Topical BID    heparin (PF)  300 Units IntraCATHeter Q12H    lipase-protease-amylase  60,000 Units Oral TID WC    ampicillin-sulbactam  3,000 mg IntraVENous Q6H    nicotine  1 patch TransDERmal Daily    lactulose  20 g Oral BID    magnesium sulfate  1,000 mg IntraVENous Once    Surgiflo with Evithrom Hemostatic Matrix  1 kit Other Once    Surgiflo with Evithrom Hemostatic Matrix  1 kit Nasal Once    Surgiflo with Evithrom Hemostatic Matrix  1 kit Other Once    sodium chloride flush  5-40 mL IntraVENous 2 times per day    [Held by provider] enoxaparin  30 mg  paracentesis was performed.         CT ABDOMEN PELVIS WO CONTRAST Additional Contrast? None   Final Result   1. Cirrhotic morphology of the liver with a large low-attenuation structure   seen inferiorly within the right lobe concerning for an underlying lesion   such as hepatocellular carcinoma. This area measures 7.1 x 5.6 cm. This was   visualized on the prior examination.   2. Moderate to large amount of ascites seen within the abdomen and pelvis.   3. Abnormal wall thickening identified throughout the small bowel loops   within the left flank. Findings may be related to the surrounding ascites.   4. Increased stool burden seen diffusely throughout the colon to suggest   clinical presentation of constipation. Mild wall thickening in which an   underlying colitis cannot be completely excluded. Findings may be related to   the surrounding ascites.   5. Nodularity involving the adrenal glands bilaterally.   6. Small umbilical hernia containing fat only.         US GALLBLADDER RUQ   Final Result   1. Cirrhotic morphology of the liver with no underlying mass or lesion   present.   2. Status post cholecystectomy. No biliary ductal dilatation.   3. Increased echogenicity within the renal cortex to suggest medical renal   disease. Cystic structure along the superior pole of the right kidney   measuring 4.6 x 4.8 cm.   4. Fluid identified surrounding the liver within the right upper quadrant.         FL TUNNELED CVC PLACE WO SQ PORT/PUMP > 5 YEARS    (Results Pending)   XR US GUIDED VASCULAR ACCESS    (Results Pending)       Medical Records/Labs/Images review:   I personally reviewed and summarized previous records   All labs and imaging were reviewed independently     ASSESSMENT & PLAN   Ellie Santacruz, a 53 y.o.-old female seen today for inpatient diabetes management    Diabetes Mellitus secondary to pancreatic insufficiency   Patient's diabetes is uncontrolled, the patient's diabetes is very brittle  Pt currently very

## 2024-03-27 NOTE — PLAN OF CARE
Problem: Discharge Planning  Goal: Discharge to home or other facility with appropriate resources  3/26/2024 2315 by Poonam Cordova RN  Outcome: Progressing  3/26/2024 1631 by Maria Fernanda Castanon RN  Outcome: Progressing     Problem: ABCDS Injury Assessment  Goal: Absence of physical injury  3/26/2024 2315 by Poonam Cordova RN  Outcome: Progressing  3/26/2024 1631 by Maria Fernanda Castanon RN  Outcome: Progressing     Problem: Skin/Tissue Integrity  Goal: Absence of new skin breakdown  Description: 1.  Monitor for areas of redness and/or skin breakdown  2.  Assess vascular access sites hourly  3.  Every 4-6 hours minimum:  Change oxygen saturation probe site  4.  Every 4-6 hours:  If on nasal continuous positive airway pressure, respiratory therapy assess nares and determine need for appliance change or resting period.  3/26/2024 2315 by Poonam Cordova RN  Outcome: Progressing  3/26/2024 1631 by Maria Fernanda Castanon RN  Outcome: Progressing     Problem: Pain  Goal: Verbalizes/displays adequate comfort level or baseline comfort level  Outcome: Progressing     Problem: Safety - Adult  Goal: Free from fall injury  Outcome: Progressing     Problem: Chronic Conditions and Co-morbidities  Goal: Patient's chronic conditions and co-morbidity symptoms are monitored and maintained or improved  Outcome: Progressing     Problem: Nutrition Deficit:  Goal: Optimize nutritional status  Outcome: Progressing

## 2024-03-27 NOTE — PROGRESS NOTES
performed.         CT ABDOMEN PELVIS WO CONTRAST Additional Contrast? None   Final Result   1. Cirrhotic morphology of the liver with a large low-attenuation structure   seen inferiorly within the right lobe concerning for an underlying lesion   such as hepatocellular carcinoma. This area measures 7.1 x 5.6 cm. This was   visualized on the prior examination.   2. Moderate to large amount of ascites seen within the abdomen and pelvis.   3. Abnormal wall thickening identified throughout the small bowel loops   within the left flank. Findings may be related to the surrounding ascites.   4. Increased stool burden seen diffusely throughout the colon to suggest   clinical presentation of constipation. Mild wall thickening in which an   underlying colitis cannot be completely excluded. Findings may be related to   the surrounding ascites.   5. Nodularity involving the adrenal glands bilaterally.   6. Small umbilical hernia containing fat only.         US GALLBLADDER RUQ   Final Result   1. Cirrhotic morphology of the liver with no underlying mass or lesion   present.   2. Status post cholecystectomy. No biliary ductal dilatation.   3. Increased echogenicity within the renal cortex to suggest medical renal   disease. Cystic structure along the superior pole of the right kidney   measuring 4.6 x 4.8 cm.   4. Fluid identified surrounding the liver within the right upper quadrant.         FL TUNNELED CVC PLACE WO SQ PORT/PUMP > 5 YEARS    (Results Pending)   XR US GUIDED VASCULAR ACCESS    (Results Pending)       Assessment:    Principal Problem:    Intra-abdominal abscess (HCC)  Active Problems:    Poorly controlled diabetes mellitus (HCC)    Ascites due to alcoholic cirrhosis (HCC)    Severe protein-calorie malnutrition (HCC)    Liver mass    Generalized abdominal pain    Elevated liver enzymes    Portal hypertension (HCC)    Pancreatic insufficiency    Hepatic abscess  Resolved Problems:    * No resolved hospital problems.  *      Plan:  Uncontrolled DM with hyperglycemia - 9/2023 A1c 11.4%. Had insulin pump on arrival. Endocrinology consult, on Lantus 5u QAM and 12u QHS, humalog 5 units TIDAC and LDSS  Decompensated chronic cirrhosis with ascites - Hx Hepatitis C.  Had recent admission a few days prior to presenting at [facility] but left AMA.  IR for paracentesis but not enough to be removed.  GI consult, appreciate recommendations, for likely EGD and possible colonoscopy at some point  Acute encephalopathy - likely 2/2 #2. Somewhat improved, less confused. continue rifaximin and lactulose  Liver mass - seen on initial CT abdomen without contrast, liver lesion 7.1 x 5.6 cm, concern for hepatocellular carcinoma given this patient's previous history.  Triphasic CT showed hypodense mass in segment 6 of the liver. Hepatobiliary Surgery team reviewed images and says more consistent with abscess.  Zosyn switched to unasyn per ID. IR obtained biopsy, follow results. Drain placed per HBS. Culture growing enterococcus faecalis. HBS intends drain to be in until at least April 5th  Chronic pancreatitis - on Creon. Dose increased by GI  Pancytopenia - likely 2/2 #2.  Continue to monitor  Cocaine use disorder - patient admits to using crack cocaine, UDS positive for cocaine, cessation counseling offered  Severe protein calorie malnutrition - visible muscle and subcutaneous fat wasting, bitemporal wasting. Back on diet with oral supplement with meals, appreciate dietitian recommendation  Hypokalemia - likely 2/2 #8. Continue to replace potassium aggressively. Resolved now.  GERD - continue PPI  Hx left breast cancer - on Arimidex  COPD - does not appear to be in acute exacerbation. Continue breathing treatments  Pressure injury to right ear and forehead - wound care consulted  Hypernatremia - 2/2 poor PO intake.  Improving.  Now off IVF. Sodium 133 today, monitor.    Code Status: Full code  DVT/GI ppx: Lovenox/Protonix  DispoL referral made to

## 2024-03-28 DIAGNOSIS — K75.0 LIVER ABSCESS: Primary | ICD-10-CM

## 2024-03-29 LAB
MICROORGANISM SPEC CULT: NORMAL
MICROORGANISM/AGENT SPEC: NORMAL
SERVICE CMNT-IMP: NORMAL
SPECIMEN DESCRIPTION: NORMAL

## 2024-03-31 LAB
MICROORGANISM SPEC CULT: NORMAL
MICROORGANISM/AGENT SPEC: NORMAL
SPECIMEN DESCRIPTION: NORMAL

## 2024-04-05 ENCOUNTER — HOSPITAL ENCOUNTER (OUTPATIENT)
Dept: CT IMAGING | Age: 54
End: 2024-04-05
Payer: COMMERCIAL

## 2024-04-05 DIAGNOSIS — L02.91 ABSCESS: ICD-10-CM

## 2024-04-05 LAB — SURGICAL PATHOLOGY REPORT: NORMAL

## 2024-04-05 PROCEDURE — 76000 FLUOROSCOPY <1 HR PHYS/QHP: CPT

## 2024-04-05 NOTE — DISCHARGE INSTRUCTIONS
Clean area with soap and water only, pat dry.   Keep dressing dry for 24 hours, then may remove. May shower after dressing is removed.   No baths for 14 days.   No alcohol, neosporin, or peroxide to site.   Site may leak for a few days until healed, may apply new dressing if needed.

## 2024-04-05 NOTE — FLOWSHEET NOTE
Patient arrived to radiology for tube check with fluoroscopy/CT imaging. Site appears clean and dry with scant amount of drainage noted. Images taken and reviewed by  without contrast due to iodine allergy. Tube discontinued per Dr. Pyle's order due to tube not being in proper position. Patient tolerated procedure well.

## 2024-04-11 DIAGNOSIS — K75.0 HEPATIC ABSCESS: Primary | ICD-10-CM

## 2024-04-12 ENCOUNTER — TELEPHONE (OUTPATIENT)
Dept: HEMATOLOGY | Age: 54
End: 2024-04-12

## 2024-04-12 NOTE — TELEPHONE ENCOUNTER
[facility] and spoke with [facility]. I let Linette know that Aubrey Lee NP put a order in for a new drain, due to abscess.  Linette stated that the patient was having a paracentesis today at [facility]. She stated she was going to reach out to IR there and see if they could place the drain today. Linette also stated that she would call our office to let us know when the drain would actually be placed. I faxed the order to Linette at [phone]. Linette also updated me that the patient will continue her Unasyn until 05/03/2024. Aubrey Lee NP was also updated with the above information.   Electronically signed by Lauren Hayden MA on 4/12/2024 at 8:27 AM

## 2024-04-14 LAB
MICROORGANISM SPEC CULT: NORMAL
MICROORGANISM SPEC CULT: NORMAL
MICROORGANISM/AGENT SPEC: NORMAL
MICROORGANISM/AGENT SPEC: NORMAL
SERVICE CMNT-IMP: NORMAL
SPECIMEN DESCRIPTION: NORMAL
SPECIMEN DESCRIPTION: NORMAL

## 2024-04-15 DIAGNOSIS — K75.0 HEPATIC ABSCESS: Primary | ICD-10-CM

## 2024-04-16 ENCOUNTER — TELEPHONE (OUTPATIENT)
Dept: HEMATOLOGY | Age: 54
End: 2024-04-16

## 2024-04-16 NOTE — TELEPHONE ENCOUNTER
I spoke with Linette from [facility]. The Ct report was reviewed by Dr Rangel and he would like the disc with images mailed to us. Once those images are received and reviewed a decision will be made on what the next steps for the patient are. Linette was also updated with the above information. I just spoke with Linette and she said that she is going to see if someone from their office can bring a disc to our [place] office today, she will call our office back to let us know  Electronically signed by Lauren Hayden MA on 4/16/2024 at 12:52 PM

## 2024-04-18 LAB
MICROORGANISM SPEC CULT: NORMAL
MICROORGANISM/AGENT SPEC: NORMAL
SPECIMEN DESCRIPTION: NORMAL

## 2024-05-13 ENCOUNTER — HOSPITAL ENCOUNTER (OUTPATIENT)
Dept: INTERVENTIONAL RADIOLOGY/VASCULAR | Age: 54
Discharge: HOME | End: 2024-05-15
Payer: COMMERCIAL

## 2024-05-13 VITALS
SYSTOLIC BLOOD PRESSURE: 140 MMHG | OXYGEN SATURATION: 100 % | HEART RATE: 89 BPM | DIASTOLIC BLOOD PRESSURE: 78 MMHG | RESPIRATION RATE: 17 BRPM

## 2024-05-13 DIAGNOSIS — Z45.89: ICD-10-CM

## 2024-05-13 PROCEDURE — 76000 FLUOROSCOPY <1 HR PHYS/QHP: CPT

## 2024-05-13 NOTE — PROGRESS NOTES
Patient came to special procedures for tunneled PICC line removal.      Procedure was explained and questions were answered.  Consent signed.  Patient was educated about the amount of radiation used with today's procedure.   Patient prepped secured and draped.     Emotional support given     1311 - Procedure start /79 90 18 100% on room air, supine      1312  - Procedure end /78 89 17 100% on room air, supine    Pressure held to right chest for 5 minutes.      DSD of folded 4 x 4 and tegaderm applied to right chest.  CDI    Patient ride called to be transported back to [facility].    Copy of progress note sent back with patient.     Okay for patient to be discharged back to facility.

## 2024-05-15 PROCEDURE — 36589 REMOVAL TUNNELED CV CATH: CPT

## 2024-06-06 ENCOUNTER — OFFICE VISIT (OUTPATIENT)
Dept: ENDOCRINOLOGY | Age: 54
End: 2024-06-06
Payer: COMMERCIAL

## 2024-06-06 VITALS
HEART RATE: 77 BPM | BODY MASS INDEX: 20.81 KG/M2 | RESPIRATION RATE: 18 BRPM | WEIGHT: 106 LBS | OXYGEN SATURATION: 100 % | SYSTOLIC BLOOD PRESSURE: 106 MMHG | DIASTOLIC BLOOD PRESSURE: 80 MMHG | HEIGHT: 60 IN

## 2024-06-06 DIAGNOSIS — E11.65 TYPE 2 DIABETES MELLITUS WITH HYPERGLYCEMIA, WITH LONG-TERM CURRENT USE OF INSULIN (HCC): Primary | ICD-10-CM

## 2024-06-06 DIAGNOSIS — Z79.4 TYPE 2 DIABETES MELLITUS WITH HYPERGLYCEMIA, WITH LONG-TERM CURRENT USE OF INSULIN (HCC): Primary | ICD-10-CM

## 2024-06-06 DIAGNOSIS — T38.7X5A OSTEOPOROSIS DUE TO ANDROGEN THERAPY: ICD-10-CM

## 2024-06-06 DIAGNOSIS — E16.2 HYPOGLYCEMIA: ICD-10-CM

## 2024-06-06 DIAGNOSIS — M81.8 OSTEOPOROSIS DUE TO ANDROGEN THERAPY: ICD-10-CM

## 2024-06-06 DIAGNOSIS — E55.9 VITAMIN D DEFICIENCY: ICD-10-CM

## 2024-06-06 PROCEDURE — 3017F COLORECTAL CA SCREEN DOC REV: CPT | Performed by: FAMILY MEDICINE

## 2024-06-06 PROCEDURE — 2022F DILAT RTA XM EVC RTNOPTHY: CPT | Performed by: FAMILY MEDICINE

## 2024-06-06 PROCEDURE — G8428 CUR MEDS NOT DOCUMENT: HCPCS | Performed by: FAMILY MEDICINE

## 2024-06-06 PROCEDURE — G8420 CALC BMI NORM PARAMETERS: HCPCS | Performed by: FAMILY MEDICINE

## 2024-06-06 PROCEDURE — 99214 OFFICE O/P EST MOD 30 MIN: CPT | Performed by: FAMILY MEDICINE

## 2024-06-06 PROCEDURE — 3046F HEMOGLOBIN A1C LEVEL >9.0%: CPT | Performed by: FAMILY MEDICINE

## 2024-06-06 PROCEDURE — 4004F PT TOBACCO SCREEN RCVD TLK: CPT | Performed by: FAMILY MEDICINE

## 2024-06-06 NOTE — PROGRESS NOTES
[facility] Department of Endocrinology Diabetes and Metabolism   [address] [place] [address]    Phone: [phone]  Fax: [phone]    Date of Service: 6/6/2024  Primary Care Physician: No primary care provider on file.  Referring physician: No ref. provider found  Provider: EVERETT Larkin CNP    Reason for the visit:  Type 2 DM     History of Present Illness:  The history is provided by the patient. No  was used. Accuracy of the patient data is excellent.  Ellie Santacruz is a very pleasant 54 y.o. female seen today for diabetes management     Recently admitted to the hospital 3/27/2024 through 4/23/2024 for hepatic abscess.  She was discharged to a rehab facility and now currently resides in a group home    Was started on insulin pump after her last visit, but came off of it when she went into the hospital.  Presents today with all of her insulin pump supplies however she has not been wearing.    Ellie Santacruz was diagnosed with diabetes many years ago  and currently on Lantus 15 in am and 20 units at night , Humalog per high dose sliding scale ranging from 0 to 16 units.    The patient has been checking blood sugar four a day   Per recall, highly variable ranging from .  Most recent A1c results summarized below  Lab Results   Component Value Date/Time    LABA1C 11.4 09/10/2023 07:00 AM    LABA1C 11.2 08/28/2023 03:26 AM    LABA1C 11.6 01/24/2023 12:57 PM     Patient reported hypoglycemic episodes  The patient has been mindful of what has been eating and wasn't following diabetes diet    I reviewed current medications and the patient has no issues with diabetes medications  The patient is due for an eye exam.   The patient performs her own feet care  Microvascular complications:  No Retinopathy, Nephropathy +  Neuropathy   Macrovascular complications: no CAD, PVD, or Stroke      History of chronic pancreatitis, liver

## 2024-06-14 ENCOUNTER — HOSPITAL ENCOUNTER (EMERGENCY)
Age: 54
Discharge: HOME | End: 2024-06-14
Attending: EMERGENCY MEDICINE
Payer: COMMERCIAL

## 2024-06-14 ENCOUNTER — APPOINTMENT (OUTPATIENT)
Dept: CT IMAGING | Age: 54
End: 2024-06-14
Payer: COMMERCIAL

## 2024-06-14 VITALS
HEART RATE: 82 BPM | RESPIRATION RATE: 18 BRPM | SYSTOLIC BLOOD PRESSURE: 164 MMHG | OXYGEN SATURATION: 99 % | TEMPERATURE: 99 F | DIASTOLIC BLOOD PRESSURE: 94 MMHG

## 2024-06-14 DIAGNOSIS — R10.9 ABDOMINAL PAIN, UNSPECIFIED ABDOMINAL LOCATION: Primary | ICD-10-CM

## 2024-06-14 LAB
ALBUMIN SERPL-MCNC: 4.2 G/DL (ref 3.5–5.2)
ALP SERPL-CCNC: 271 U/L (ref 35–104)
ALT SERPL-CCNC: 20 U/L (ref 0–32)
ANION GAP SERPL CALCULATED.3IONS-SCNC: 7 MMOL/L (ref 7–16)
AST SERPL-CCNC: 33 U/L (ref 0–31)
BASOPHILS # BLD: 0 K/UL (ref 0–0.2)
BASOPHILS NFR BLD: 0 % (ref 0–2)
BILIRUB SERPL-MCNC: 0.4 MG/DL (ref 0–1.2)
BILIRUB UR QL STRIP: NEGATIVE
BUN SERPL-MCNC: 14 MG/DL (ref 6–20)
CALCIUM SERPL-MCNC: 10 MG/DL (ref 8.6–10.2)
CHLORIDE SERPL-SCNC: 101 MMOL/L (ref 98–107)
CLARITY UR: CLEAR
CO2 SERPL-SCNC: 28 MMOL/L (ref 22–29)
COLOR UR: YELLOW
CREAT SERPL-MCNC: 0.6 MG/DL (ref 0.5–1)
EOSINOPHIL # BLD: 0.04 K/UL (ref 0.05–0.5)
EOSINOPHILS RELATIVE PERCENT: 2 % (ref 0–6)
EPI CELLS #/AREA URNS HPF: NORMAL /HPF
ERYTHROCYTE [DISTWIDTH] IN BLOOD BY AUTOMATED COUNT: 15.3 % (ref 11.5–15)
GFR, ESTIMATED: >90 ML/MIN/1.73M2
GLUCOSE SERPL-MCNC: 233 MG/DL (ref 74–99)
GLUCOSE UR STRIP-MCNC: NEGATIVE MG/DL
HCT VFR BLD AUTO: 32.4 % (ref 34–48)
HGB BLD-MCNC: 10.8 G/DL (ref 11.5–15.5)
HGB UR QL STRIP.AUTO: NEGATIVE
KETONES UR STRIP-MCNC: NEGATIVE MG/DL
LACTATE BLDV-SCNC: 0.7 MMOL/L (ref 0.5–2.2)
LEUKOCYTE ESTERASE UR QL STRIP: NEGATIVE
LIPASE SERPL-CCNC: 9 U/L (ref 13–60)
LYMPHOCYTES NFR BLD: 0.42 K/UL (ref 1.5–4)
LYMPHOCYTES RELATIVE PERCENT: 21 % (ref 20–42)
MCH RBC QN AUTO: 29.6 PG (ref 26–35)
MCHC RBC AUTO-ENTMCNC: 33.3 G/DL (ref 32–34.5)
MCV RBC AUTO: 88.8 FL (ref 80–99.9)
MONOCYTES NFR BLD: 0.16 K/UL (ref 0.1–0.95)
MONOCYTES NFR BLD: 8 % (ref 2–12)
NEUTROPHILS NFR BLD: 69 % (ref 43–80)
NEUTS SEG NFR BLD: 1.38 K/UL (ref 1.8–7.3)
NITRITE UR QL STRIP: NEGATIVE
PH UR STRIP: 6.5 [PH] (ref 5–9)
PLATELET CONFIRMATION: NORMAL
PLATELET, FLUORESCENCE: 63 K/UL (ref 130–450)
PMV BLD AUTO: 10.7 FL (ref 7–12)
POTASSIUM SERPL-SCNC: 5.3 MMOL/L (ref 3.5–5)
PROT SERPL-MCNC: 8.1 G/DL (ref 6.4–8.3)
PROT UR STRIP-MCNC: NEGATIVE MG/DL
RBC # BLD AUTO: 3.65 M/UL (ref 3.5–5.5)
RBC # BLD: NORMAL 10*6/UL
RBC #/AREA URNS HPF: NORMAL /HPF
SODIUM SERPL-SCNC: 136 MMOL/L (ref 132–146)
SP GR UR STRIP: 1.01 (ref 1–1.03)
UROBILINOGEN UR STRIP-ACNC: 0.2 EU/DL (ref 0–1)
WBC #/AREA URNS HPF: NORMAL /HPF
WBC OTHER # BLD: 2 K/UL (ref 4.5–11.5)

## 2024-06-14 PROCEDURE — 96374 THER/PROPH/DIAG INJ IV PUSH: CPT

## 2024-06-14 PROCEDURE — 99284 EMERGENCY DEPT VISIT MOD MDM: CPT

## 2024-06-14 PROCEDURE — 6360000002 HC RX W HCPCS

## 2024-06-14 PROCEDURE — 81001 URINALYSIS AUTO W/SCOPE: CPT

## 2024-06-14 PROCEDURE — 96375 TX/PRO/DX INJ NEW DRUG ADDON: CPT

## 2024-06-14 PROCEDURE — 83690 ASSAY OF LIPASE: CPT

## 2024-06-14 PROCEDURE — 83605 ASSAY OF LACTIC ACID: CPT

## 2024-06-14 PROCEDURE — 74176 CT ABD & PELVIS W/O CONTRAST: CPT

## 2024-06-14 PROCEDURE — 80053 COMPREHEN METABOLIC PANEL: CPT

## 2024-06-14 PROCEDURE — 85025 COMPLETE CBC W/AUTO DIFF WBC: CPT

## 2024-06-14 PROCEDURE — 2580000003 HC RX 258

## 2024-06-14 RX ORDER — MORPHINE SULFATE 4 MG/ML
4 INJECTION, SOLUTION INTRAMUSCULAR; INTRAVENOUS ONCE
Status: COMPLETED | OUTPATIENT
Start: 2024-06-14 | End: 2024-06-14

## 2024-06-14 RX ORDER — ONDANSETRON 2 MG/ML
4 INJECTION INTRAMUSCULAR; INTRAVENOUS ONCE
Status: COMPLETED | OUTPATIENT
Start: 2024-06-14 | End: 2024-06-14

## 2024-06-14 RX ORDER — 0.9 % SODIUM CHLORIDE 0.9 %
1000 INTRAVENOUS SOLUTION INTRAVENOUS ONCE
Status: COMPLETED | OUTPATIENT
Start: 2024-06-14 | End: 2024-06-14

## 2024-06-14 RX ADMIN — SODIUM CHLORIDE 1000 ML: 9 INJECTION, SOLUTION INTRAVENOUS at 15:16

## 2024-06-14 RX ADMIN — MORPHINE SULFATE 4 MG: 4 INJECTION, SOLUTION INTRAMUSCULAR; INTRAVENOUS at 15:00

## 2024-06-14 RX ADMIN — ONDANSETRON 4 MG: 2 INJECTION INTRAMUSCULAR; INTRAVENOUS at 15:00

## 2024-06-14 ASSESSMENT — PAIN DESCRIPTION - ONSET: ONSET: ON-GOING

## 2024-06-14 ASSESSMENT — PAIN DESCRIPTION - FREQUENCY: FREQUENCY: CONTINUOUS

## 2024-06-14 ASSESSMENT — PAIN DESCRIPTION - DESCRIPTORS
DESCRIPTORS: CRUSHING
DESCRIPTORS_2: ACHING

## 2024-06-14 ASSESSMENT — PAIN DESCRIPTION - LOCATION: LOCATION: BACK

## 2024-06-14 ASSESSMENT — PAIN DESCRIPTION - PAIN TYPE: TYPE: ACUTE PAIN

## 2024-06-14 ASSESSMENT — PAIN DESCRIPTION - ORIENTATION
ORIENTATION_2: ANTERIOR
ORIENTATION: RIGHT

## 2024-06-14 ASSESSMENT — PAIN SCALES - GENERAL: PAINLEVEL_OUTOF10: 10

## 2024-06-14 ASSESSMENT — PAIN DESCRIPTION - INTENSITY: RATING_2: 10

## 2024-06-14 NOTE — ED PROVIDER NOTES
[facility] EMERGENCY DEPARTMENT  EMERGENCY DEPARTMENT ENCOUNTER        Pt Name: Ellie Santacruz  MRN: [id number]  Birthdate 1970  Date of evaluation: 6/14/2024  Provider: Wili Chao MD  PCP: No primary care provider on file.  Note Started: 2:49 PM EDT 6/14/24    CHIEF COMPLAINT       Chief Complaint   Patient presents with    Abdominal Pain     from [facility].       HISTORY OF PRESENT ILLNESS: 1 or more Elements   History From: Patient.    Limitations to history : None    Ellie Santacruz is a 54 y.o. female with a history of pancreatitis, hypertension, hyperlipidemia, diabetes mellitus who presents to the ED with complaints of epigastric abdominal pain.  Patient is from a recovery house due to his alcohol abuse.  Patient states that she started having epigastric abdominal pain since this morning, her symptoms are consistent.  Patient also complains of nauseous.  Patient denies any chest pain or shortness of breath.  Patient denies any fever, chills, constipation, diarrhea, urinary symptoms.    Nursing Notes were all reviewed and agreed with or any disagreements were addressed in the HPI.    ROS:   Pertinent positives and negatives are stated within HPI, all other systems reviewed and are negative.    --------------------------------------------- PAST HISTORY ---------------------------------------------  Past Medical History:  has a past medical history of Alcoholism (HCC), Anxiety and depression, Ascites of liver, Bronchitis, Cancer (HCC), Candidiasis of vagina, Chronic pancreatitis (HCC), Cocaine abuse (HCC), Constipation, Diabetes mellitus, type 2 (HCC), GERD (gastroesophageal reflux disease), Gout, Hepatitis C, Hernia of anterior abdominal wall, Jaundice, Pneumonia, Polyneuropathy due to type 2 diabetes mellitus (HCC), Schizoaffective disorder, bipolar type (HCC), and Splenomegaly.    Past Surgical History:  has a past surgical history that  colitis.  Has had diarrhea that has been orange in color.  No blood.  No fever or chills.  Is also had episodes of nausea and vomiting.  No reported dizziness, lightness, or syncope.  Does report increasing fatigue    My findings: Ellie Santacruz is a 54 y.o. female whom is in no distress. Physical exam reveals patient sitting in bed comfortably.  Heart regular rate and rhythm.  Lungs clear to auscultation.  No diaphoresis or pallor.  She does have some discomfort in the left side of abdomen.  No rebound or guarding.  No abdominal distention noted.    My plan: Symptomatic and supportive care.  Appropriate labs and imaging [MS]   1637 Recurrent pancreatitis and colitis  Abdominal pain    Awaiting Imaging.  [SD]   1707 IMPRESSION:  1. No acute intra-abdominal or pelvic abnormality.  2. Heterogenous appearance of the liver with lobulated contour, upper limits  of normal in size, and multiple varices along greater curvature of stomach  and mesentery. Findings suggest cirrhosis with portal hypertension.  3. Right renal cyst with intermittent rim calcification.  If clinical concern  warrants, CT urogram may be utilized for further evaluation to rule out  malignancy.  4. Cholecystectomy.  5. Hysterectomy.  6. Punctate calcification in the spleen likely related to prior granulomatous  disease.  7. No postoperative changes. No fractures.   [SD]   1714 Patient reassessed  Patient feels more comfortable  Patient was explained about the findings on the CT abdomen and pelvis, no acute abnormality detected however her prior findings are still present.  She was asked to follow-up with her primary care provider. [SD]      ED Course User Index  [MS] William Tran DO  [SD] Rayray Carvajal MD         This patient's ED course included: History, physical examination, reevaluation prior to disposition    This patient has remained hemodynamically stable and improved during their ED course.    Counseling:   The emergency provider has

## 2024-06-14 NOTE — DISCHARGE INSTRUCTIONS
Follow-up with your primary care provider.    CT ABDOMEN PELVIS WO CONTRAST Additional Contrast? None   Final Result   1. No acute intra-abdominal or pelvic abnormality.   2. Heterogenous appearance of the liver with lobulated contour, upper limits   of normal in size, and multiple varices along greater curvature of stomach   and mesentery. Findings suggest cirrhosis with portal hypertension.   3. Right renal cyst with intermittent rim calcification.  If clinical concern   warrants, CT urogram may be utilized for further evaluation to rule out   malignancy.   4. Cholecystectomy.   5. Hysterectomy.   6. Punctate calcification in the spleen likely related to prior granulomatous   disease.   7. No postoperative changes. No fractures.

## 2024-07-23 ENCOUNTER — APPOINTMENT (OUTPATIENT)
Dept: GENERAL RADIOLOGY | Age: 54
End: 2024-07-23
Payer: COMMERCIAL

## 2024-07-23 ENCOUNTER — HOSPITAL ENCOUNTER (EMERGENCY)
Age: 54
Discharge: HOME | End: 2024-07-24
Attending: EMERGENCY MEDICINE
Payer: COMMERCIAL

## 2024-07-23 DIAGNOSIS — R10.9 CHRONIC ABDOMINAL PAIN: ICD-10-CM

## 2024-07-23 DIAGNOSIS — G89.29 CHRONIC ABDOMINAL PAIN: ICD-10-CM

## 2024-07-23 DIAGNOSIS — Z71.1 FEARED CONDITION NOT DEMONSTRATED: Primary | ICD-10-CM

## 2024-07-23 LAB
ALBUMIN SERPL-MCNC: 4.1 G/DL (ref 3.5–5.2)
ALP SERPL-CCNC: 310 U/L (ref 35–104)
ALT SERPL-CCNC: 35 U/L (ref 0–32)
AMMONIA PLAS-SCNC: 32 UMOL/L (ref 11–51)
ANION GAP SERPL CALCULATED.3IONS-SCNC: 12 MMOL/L (ref 7–16)
AST SERPL-CCNC: 39 U/L (ref 0–31)
BASOPHILS # BLD: 0 K/UL (ref 0–0.2)
BASOPHILS NFR BLD: 0 % (ref 0–2)
BILIRUB DIRECT SERPL-MCNC: <0.2 MG/DL (ref 0–0.3)
BILIRUB INDIRECT SERPL-MCNC: ABNORMAL MG/DL (ref 0–1)
BILIRUB SERPL-MCNC: 0.4 MG/DL (ref 0–1.2)
BUN SERPL-MCNC: 23 MG/DL (ref 6–20)
CALCIUM SERPL-MCNC: 9.6 MG/DL (ref 8.6–10.2)
CHLORIDE SERPL-SCNC: 100 MMOL/L (ref 98–107)
CO2 SERPL-SCNC: 26 MMOL/L (ref 22–29)
CREAT SERPL-MCNC: 0.7 MG/DL (ref 0.5–1)
EOSINOPHIL # BLD: 0.1 K/UL (ref 0.05–0.5)
EOSINOPHILS RELATIVE PERCENT: 3 % (ref 0–6)
ERYTHROCYTE [DISTWIDTH] IN BLOOD BY AUTOMATED COUNT: 14.5 % (ref 11.5–15)
GFR, ESTIMATED: >90 ML/MIN/1.73M2
GLUCOSE BLD-MCNC: 101 MG/DL (ref 74–99)
GLUCOSE BLD-MCNC: 78 MG/DL (ref 74–99)
GLUCOSE SERPL-MCNC: 55 MG/DL (ref 74–99)
HCT VFR BLD AUTO: 36 % (ref 34–48)
HGB BLD-MCNC: 12.2 G/DL (ref 11.5–15.5)
LACTATE BLDV-SCNC: 0.9 MMOL/L (ref 0.5–2.2)
LIPASE SERPL-CCNC: 9 U/L (ref 13–60)
LYMPHOCYTES NFR BLD: 0.64 K/UL (ref 1.5–4)
LYMPHOCYTES RELATIVE PERCENT: 17 % (ref 20–42)
MCH RBC QN AUTO: 30.2 PG (ref 26–35)
MCHC RBC AUTO-ENTMCNC: 33.9 G/DL (ref 32–34.5)
MCV RBC AUTO: 89.1 FL (ref 80–99.9)
MONOCYTES NFR BLD: 0.26 K/UL (ref 0.1–0.95)
MONOCYTES NFR BLD: 7 % (ref 2–12)
NEUTROPHILS NFR BLD: 73 % (ref 43–80)
NEUTS SEG NFR BLD: 2.7 K/UL (ref 1.8–7.3)
PLATELET # BLD AUTO: 59 K/UL (ref 130–450)
PLATELET CONFIRMATION: NORMAL
PMV BLD AUTO: 9.4 FL (ref 7–12)
POTASSIUM SERPL-SCNC: 5 MMOL/L (ref 3.5–5)
PROT SERPL-MCNC: 7.7 G/DL (ref 6.4–8.3)
RBC # BLD AUTO: 4.04 M/UL (ref 3.5–5.5)
RBC # BLD: ABNORMAL 10*6/UL
SODIUM SERPL-SCNC: 138 MMOL/L (ref 132–146)
TROPONIN I SERPL HS-MCNC: 15 NG/L (ref 0–9)
WBC OTHER # BLD: 3.7 K/UL (ref 4.5–11.5)

## 2024-07-23 PROCEDURE — 82962 GLUCOSE BLOOD TEST: CPT

## 2024-07-23 PROCEDURE — 73630 X-RAY EXAM OF FOOT: CPT

## 2024-07-23 PROCEDURE — 2580000003 HC RX 258: Performed by: EMERGENCY MEDICINE

## 2024-07-23 PROCEDURE — 82140 ASSAY OF AMMONIA: CPT

## 2024-07-23 PROCEDURE — 6360000002 HC RX W HCPCS: Performed by: EMERGENCY MEDICINE

## 2024-07-23 PROCEDURE — 80053 COMPREHEN METABOLIC PANEL: CPT

## 2024-07-23 PROCEDURE — 83605 ASSAY OF LACTIC ACID: CPT

## 2024-07-23 PROCEDURE — 96374 THER/PROPH/DIAG INJ IV PUSH: CPT

## 2024-07-23 PROCEDURE — 93005 ELECTROCARDIOGRAM TRACING: CPT | Performed by: EMERGENCY MEDICINE

## 2024-07-23 PROCEDURE — 83690 ASSAY OF LIPASE: CPT

## 2024-07-23 PROCEDURE — 85025 COMPLETE CBC W/AUTO DIFF WBC: CPT

## 2024-07-23 PROCEDURE — 99285 EMERGENCY DEPT VISIT HI MDM: CPT

## 2024-07-23 PROCEDURE — 82248 BILIRUBIN DIRECT: CPT

## 2024-07-23 PROCEDURE — 84484 ASSAY OF TROPONIN QUANT: CPT

## 2024-07-23 RX ORDER — 0.9 % SODIUM CHLORIDE 0.9 %
500 INTRAVENOUS SOLUTION INTRAVENOUS ONCE
Status: COMPLETED | OUTPATIENT
Start: 2024-07-23 | End: 2024-07-24

## 2024-07-23 RX ORDER — ONDANSETRON 2 MG/ML
4 INJECTION INTRAMUSCULAR; INTRAVENOUS ONCE
Status: COMPLETED | OUTPATIENT
Start: 2024-07-23 | End: 2024-07-23

## 2024-07-23 RX ORDER — FENTANYL CITRATE 0.05 MG/ML
25 INJECTION, SOLUTION INTRAMUSCULAR; INTRAVENOUS ONCE
Status: DISCONTINUED | OUTPATIENT
Start: 2024-07-23 | End: 2024-07-23

## 2024-07-23 RX ADMIN — ONDANSETRON 4 MG: 2 INJECTION INTRAMUSCULAR; INTRAVENOUS at 22:30

## 2024-07-23 RX ADMIN — SODIUM CHLORIDE 500 ML: 9 INJECTION, SOLUTION INTRAVENOUS at 22:29

## 2024-07-23 ASSESSMENT — ENCOUNTER SYMPTOMS
SINUS PRESSURE: 0
ABDOMINAL DISTENTION: 0
EYE PAIN: 0
SHORTNESS OF BREATH: 0
SORE THROAT: 0
BACK PAIN: 0
EYE REDNESS: 0
WHEEZING: 0
COUGH: 0
VOMITING: 0
NAUSEA: 1
ABDOMINAL PAIN: 1
DIARRHEA: 0
EYE DISCHARGE: 0

## 2024-07-24 VITALS
SYSTOLIC BLOOD PRESSURE: 118 MMHG | OXYGEN SATURATION: 100 % | BODY MASS INDEX: 20.62 KG/M2 | WEIGHT: 105 LBS | HEART RATE: 71 BPM | RESPIRATION RATE: 18 BRPM | HEIGHT: 60 IN | DIASTOLIC BLOOD PRESSURE: 74 MMHG | TEMPERATURE: 98.1 F

## 2024-07-24 LAB
EKG ATRIAL RATE: 66 BPM
EKG P AXIS: 92 DEGREES
EKG P-R INTERVAL: 142 MS
EKG Q-T INTERVAL: 420 MS
EKG QRS DURATION: 74 MS
EKG QTC CALCULATION (BAZETT): 440 MS
EKG R AXIS: 98 DEGREES
EKG T AXIS: 80 DEGREES
EKG VENTRICULAR RATE: 66 BPM
TROPONIN I SERPL HS-MCNC: 15 NG/L (ref 0–9)

## 2024-07-24 PROCEDURE — 84484 ASSAY OF TROPONIN QUANT: CPT

## 2024-07-24 PROCEDURE — 93010 ELECTROCARDIOGRAM REPORT: CPT | Performed by: INTERNAL MEDICINE

## 2024-07-24 NOTE — ED PROVIDER NOTES
Patient is a 53 y/o female who presents to the ED via EMS from [facility]. Patient states that she had labs performed today which showed a potassium of 6.1. She denies any history of hyperkalemia or renal failure. She states that she has diffuse abdominal pain. She reports a history of cirrhosis and pancreatitis. She is nauseated but denies vomiting. She denies any fever, diarrhea or dysuria. She states that she has a wound of her left foot for which she is following with podiatry. She denies any drainage from the wound. She denies any fever.         Review of Systems   Constitutional:  Negative for chills and fever.   HENT:  Negative for ear pain, sinus pressure and sore throat.    Eyes:  Negative for pain, discharge and redness.   Respiratory:  Negative for cough, shortness of breath and wheezing.    Cardiovascular:  Negative for chest pain.   Gastrointestinal:  Positive for abdominal pain and nausea. Negative for abdominal distention, diarrhea and vomiting.   Genitourinary:  Negative for dysuria and frequency.   Musculoskeletal:  Negative for arthralgias and back pain.   Skin:  Positive for wound. Negative for rash.   Neurological:  Negative for weakness and headaches.   Hematological:  Negative for adenopathy.   All other systems reviewed and are negative.       Physical Exam  Vitals and nursing note reviewed.   Constitutional:       General: She is not in acute distress.  HENT:      Head: Normocephalic and atraumatic.      Right Ear: External ear normal.      Left Ear: External ear normal.      Nose: Nose normal.      Mouth/Throat:      Mouth: Mucous membranes are moist.   Eyes:      Conjunctiva/sclera: Conjunctivae normal.      Pupils: Pupils are equal, round, and reactive to light.   Cardiovascular:      Rate and Rhythm: Normal rate and regular rhythm.      Heart sounds: No murmur heard.  Pulmonary:      Effort: Pulmonary effort is normal. No respiratory distress.      Breath sounds: Normal breath

## 2024-09-13 ENCOUNTER — CLINICAL DOCUMENTATION (OUTPATIENT)
Dept: INFUSION THERAPY | Age: 54
End: 2024-09-13

## 2024-09-13 ENCOUNTER — OFFICE VISIT (OUTPATIENT)
Dept: ONCOLOGY | Age: 54
End: 2024-09-13

## 2024-09-13 ENCOUNTER — HOSPITAL ENCOUNTER (OUTPATIENT)
Dept: INFUSION THERAPY | Age: 54
Discharge: HOME | End: 2024-09-13
Payer: COMMERCIAL

## 2024-09-13 ENCOUNTER — TELEPHONE (OUTPATIENT)
Dept: CASE MANAGEMENT | Age: 54
End: 2024-09-13

## 2024-09-13 VITALS
OXYGEN SATURATION: 100 % | SYSTOLIC BLOOD PRESSURE: 141 MMHG | HEART RATE: 82 BPM | HEIGHT: 60 IN | WEIGHT: 114 LBS | BODY MASS INDEX: 22.38 KG/M2 | TEMPERATURE: 98.2 F | DIASTOLIC BLOOD PRESSURE: 70 MMHG

## 2024-09-13 DIAGNOSIS — M81.0 AGE RELATED OSTEOPOROSIS, UNSPECIFIED PATHOLOGICAL FRACTURE PRESENCE: ICD-10-CM

## 2024-09-13 DIAGNOSIS — Z17.0 MALIGNANT NEOPLASM OF LEFT BREAST IN FEMALE, ESTROGEN RECEPTOR POSITIVE, UNSPECIFIED SITE OF BREAST (HCC): ICD-10-CM

## 2024-09-13 DIAGNOSIS — M81.8 IDIOPATHIC OSTEOPOROSIS: ICD-10-CM

## 2024-09-13 DIAGNOSIS — M89.8X9 BONE PAIN: ICD-10-CM

## 2024-09-13 DIAGNOSIS — C50.912 MALIGNANT NEOPLASM OF LEFT BREAST IN FEMALE, ESTROGEN RECEPTOR POSITIVE, UNSPECIFIED SITE OF BREAST (HCC): ICD-10-CM

## 2024-09-13 DIAGNOSIS — Z85.3 PERSONAL HISTORY OF BREAST CANCER: Primary | ICD-10-CM

## 2024-09-13 LAB
ALBUMIN SERPL-MCNC: 4.4 G/DL (ref 3.5–5.2)
ALP SERPL-CCNC: 387 U/L (ref 35–104)
ALT SERPL-CCNC: 36 U/L (ref 0–32)
ANION GAP SERPL CALCULATED.3IONS-SCNC: 12 MMOL/L (ref 7–16)
AST SERPL-CCNC: 33 U/L (ref 0–31)
BASOPHILS # BLD: 0.01 K/UL (ref 0–0.2)
BASOPHILS NFR BLD: 0 % (ref 0–2)
BILIRUB SERPL-MCNC: 0.6 MG/DL (ref 0–1.2)
BUN SERPL-MCNC: 16 MG/DL (ref 6–20)
CALCIUM SERPL-MCNC: 9.8 MG/DL (ref 8.6–10.2)
CHLORIDE SERPL-SCNC: 93 MMOL/L (ref 98–107)
CO2 SERPL-SCNC: 26 MMOL/L (ref 22–29)
CREAT SERPL-MCNC: 0.7 MG/DL (ref 0.5–1)
EOSINOPHIL # BLD: 0.11 K/UL (ref 0.05–0.5)
EOSINOPHILS RELATIVE PERCENT: 3 % (ref 0–6)
ERYTHROCYTE [DISTWIDTH] IN BLOOD BY AUTOMATED COUNT: 14.7 % (ref 11.5–15)
GFR, ESTIMATED: >90 ML/MIN/1.73M2
GLUCOSE SERPL-MCNC: 363 MG/DL (ref 74–99)
HCT VFR BLD AUTO: 38.1 % (ref 34–48)
HGB BLD-MCNC: 12.7 G/DL (ref 11.5–15.5)
IMM GRANULOCYTES # BLD AUTO: <0.03 K/UL (ref 0–0.58)
IMM GRANULOCYTES NFR BLD: 0 % (ref 0–5)
LYMPHOCYTES NFR BLD: 0.78 K/UL (ref 1.5–4)
LYMPHOCYTES RELATIVE PERCENT: 23 % (ref 20–42)
MCH RBC QN AUTO: 29.9 PG (ref 26–35)
MCHC RBC AUTO-ENTMCNC: 33.3 G/DL (ref 32–34.5)
MCV RBC AUTO: 89.6 FL (ref 80–99.9)
MONOCYTES NFR BLD: 0.27 K/UL (ref 0.1–0.95)
MONOCYTES NFR BLD: 8 % (ref 2–12)
NEUTROPHILS NFR BLD: 65 % (ref 43–80)
NEUTS SEG NFR BLD: 2.16 K/UL (ref 1.8–7.3)
PLATELET # BLD AUTO: 62 K/UL (ref 130–450)
PLATELET CONFIRMATION: NORMAL
PMV BLD AUTO: 10.4 FL (ref 7–12)
POTASSIUM SERPL-SCNC: 5 MMOL/L (ref 3.5–5)
PROT SERPL-MCNC: 8.2 G/DL (ref 6.4–8.3)
RBC # BLD AUTO: 4.25 M/UL (ref 3.5–5.5)
SODIUM SERPL-SCNC: 131 MMOL/L (ref 132–146)
WBC OTHER # BLD: 3.3 K/UL (ref 4.5–11.5)

## 2024-09-13 PROCEDURE — 80053 COMPREHEN METABOLIC PANEL: CPT

## 2024-09-13 PROCEDURE — 36415 COLL VENOUS BLD VENIPUNCTURE: CPT

## 2024-09-13 PROCEDURE — 85025 COMPLETE CBC W/AUTO DIFF WBC: CPT

## 2024-09-13 RX ORDER — SODIUM CHLORIDE 9 MG/ML
INJECTION, SOLUTION INTRAVENOUS CONTINUOUS
OUTPATIENT
Start: 2024-09-13

## 2024-09-13 RX ORDER — EPINEPHRINE 1 MG/ML
0.3 INJECTION, SOLUTION, CONCENTRATE INTRAVENOUS PRN
OUTPATIENT
Start: 2024-09-13

## 2024-09-13 RX ORDER — DIPHENHYDRAMINE HYDROCHLORIDE 50 MG/ML
50 INJECTION INTRAMUSCULAR; INTRAVENOUS
OUTPATIENT
Start: 2024-09-13

## 2024-09-13 RX ORDER — ACETAMINOPHEN 325 MG/1
650 TABLET ORAL
OUTPATIENT
Start: 2024-09-13

## 2024-09-13 RX ORDER — ANASTROZOLE 1 MG/1
1 TABLET ORAL EVERY MORNING
Qty: 90 TABLET | Refills: 1 | Status: SHIPPED | OUTPATIENT
Start: 2024-09-13

## 2024-09-13 RX ORDER — ONDANSETRON 2 MG/ML
8 INJECTION INTRAMUSCULAR; INTRAVENOUS
OUTPATIENT
Start: 2024-09-13

## 2024-09-13 RX ORDER — FAMOTIDINE 10 MG/ML
20 INJECTION, SOLUTION INTRAVENOUS
OUTPATIENT
Start: 2024-09-13

## 2024-09-13 RX ORDER — ALBUTEROL SULFATE 90 UG/1
4 AEROSOL, METERED RESPIRATORY (INHALATION) PRN
OUTPATIENT
Start: 2024-09-13

## 2024-09-26 LAB — SURGICAL PATHOLOGY REPORT: NORMAL

## 2024-10-11 ENCOUNTER — HOSPITAL ENCOUNTER (OUTPATIENT)
Dept: INFUSION THERAPY | Age: 54
Discharge: HOME | End: 2024-10-11
Payer: COMMERCIAL

## 2024-10-11 ENCOUNTER — OFFICE VISIT (OUTPATIENT)
Dept: ONCOLOGY | Age: 54
End: 2024-10-11
Payer: COMMERCIAL

## 2024-10-11 VITALS
WEIGHT: 113.8 LBS | OXYGEN SATURATION: 99 % | DIASTOLIC BLOOD PRESSURE: 85 MMHG | SYSTOLIC BLOOD PRESSURE: 123 MMHG | TEMPERATURE: 97.5 F | BODY MASS INDEX: 22.34 KG/M2 | HEART RATE: 89 BPM | HEIGHT: 60 IN

## 2024-10-11 DIAGNOSIS — Z17.0 MALIGNANT NEOPLASM OF LEFT BREAST IN FEMALE, ESTROGEN RECEPTOR POSITIVE, UNSPECIFIED SITE OF BREAST (HCC): Primary | ICD-10-CM

## 2024-10-11 DIAGNOSIS — M81.0 AGE RELATED OSTEOPOROSIS, UNSPECIFIED PATHOLOGICAL FRACTURE PRESENCE: Primary | ICD-10-CM

## 2024-10-11 DIAGNOSIS — Z85.3 PERSONAL HISTORY OF BREAST CANCER: ICD-10-CM

## 2024-10-11 DIAGNOSIS — M81.8 IDIOPATHIC OSTEOPOROSIS: Primary | ICD-10-CM

## 2024-10-11 DIAGNOSIS — C50.912 MALIGNANT NEOPLASM OF LEFT BREAST IN FEMALE, ESTROGEN RECEPTOR POSITIVE, UNSPECIFIED SITE OF BREAST (HCC): Primary | ICD-10-CM

## 2024-10-11 LAB
ALBUMIN SERPL-MCNC: 4.2 G/DL (ref 3.5–5.2)
ALP SERPL-CCNC: 395 U/L (ref 35–104)
ALT SERPL-CCNC: 34 U/L (ref 0–32)
ANION GAP SERPL CALCULATED.3IONS-SCNC: 13 MMOL/L (ref 7–16)
AST SERPL-CCNC: 47 U/L (ref 0–31)
BASOPHILS # BLD: 0 K/UL (ref 0–0.2)
BASOPHILS NFR BLD: 0 % (ref 0–2)
BILIRUB SERPL-MCNC: 0.6 MG/DL (ref 0–1.2)
BUN SERPL-MCNC: 26 MG/DL (ref 6–20)
CALCIUM SERPL-MCNC: 9.4 MG/DL (ref 8.6–10.2)
CHLORIDE SERPL-SCNC: 95 MMOL/L (ref 98–107)
CO2 SERPL-SCNC: 21 MMOL/L (ref 22–29)
CREAT SERPL-MCNC: 0.7 MG/DL (ref 0.5–1)
EOSINOPHIL # BLD: 0 K/UL (ref 0.05–0.5)
EOSINOPHILS RELATIVE PERCENT: 0 % (ref 0–6)
ERYTHROCYTE [DISTWIDTH] IN BLOOD BY AUTOMATED COUNT: 13.9 % (ref 11.5–15)
GFR, ESTIMATED: >90 ML/MIN/1.73M2
GLUCOSE SERPL-MCNC: 485 MG/DL (ref 74–99)
HCT VFR BLD AUTO: 36.4 % (ref 34–48)
HGB BLD-MCNC: 12.2 G/DL (ref 11.5–15.5)
LYMPHOCYTES NFR BLD: 0.19 K/UL (ref 1.5–4)
LYMPHOCYTES RELATIVE PERCENT: 4 % (ref 20–42)
MCH RBC QN AUTO: 30.3 PG (ref 26–35)
MCHC RBC AUTO-ENTMCNC: 33.5 G/DL (ref 32–34.5)
MCV RBC AUTO: 90.3 FL (ref 80–99.9)
MONOCYTES NFR BLD: 0.04 K/UL (ref 0.1–0.95)
MONOCYTES NFR BLD: 1 % (ref 2–12)
NEUTROPHILS NFR BLD: 95 % (ref 43–80)
NEUTS SEG NFR BLD: 4.17 K/UL (ref 1.8–7.3)
PHOSPHATE SERPL-MCNC: 4.9 MG/DL (ref 2.5–4.5)
PLATELET CONFIRMATION: NORMAL
PLATELET, FLUORESCENCE: 62 K/UL (ref 130–450)
PMV BLD AUTO: 10.9 FL (ref 7–12)
POTASSIUM SERPL-SCNC: 5.4 MMOL/L (ref 3.5–5)
PROT SERPL-MCNC: 8 G/DL (ref 6.4–8.3)
RBC # BLD AUTO: 4.03 M/UL (ref 3.5–5.5)
RBC # BLD: ABNORMAL 10*6/UL
RBC # BLD: ABNORMAL 10*6/UL
SODIUM SERPL-SCNC: 129 MMOL/L (ref 132–146)
WBC OTHER # BLD: 4.4 K/UL (ref 4.5–11.5)

## 2024-10-11 PROCEDURE — 3017F COLORECTAL CA SCREEN DOC REV: CPT | Performed by: STUDENT IN AN ORGANIZED HEALTH CARE EDUCATION/TRAINING PROGRAM

## 2024-10-11 PROCEDURE — 36415 COLL VENOUS BLD VENIPUNCTURE: CPT

## 2024-10-11 PROCEDURE — 96372 THER/PROPH/DIAG INJ SC/IM: CPT

## 2024-10-11 PROCEDURE — 84100 ASSAY OF PHOSPHORUS: CPT

## 2024-10-11 PROCEDURE — G8484 FLU IMMUNIZE NO ADMIN: HCPCS | Performed by: STUDENT IN AN ORGANIZED HEALTH CARE EDUCATION/TRAINING PROGRAM

## 2024-10-11 PROCEDURE — G8420 CALC BMI NORM PARAMETERS: HCPCS | Performed by: STUDENT IN AN ORGANIZED HEALTH CARE EDUCATION/TRAINING PROGRAM

## 2024-10-11 PROCEDURE — 6360000002 HC RX W HCPCS: Performed by: STUDENT IN AN ORGANIZED HEALTH CARE EDUCATION/TRAINING PROGRAM

## 2024-10-11 PROCEDURE — G8427 DOCREV CUR MEDS BY ELIG CLIN: HCPCS | Performed by: STUDENT IN AN ORGANIZED HEALTH CARE EDUCATION/TRAINING PROGRAM

## 2024-10-11 PROCEDURE — 80053 COMPREHEN METABOLIC PANEL: CPT

## 2024-10-11 PROCEDURE — 85025 COMPLETE CBC W/AUTO DIFF WBC: CPT

## 2024-10-11 PROCEDURE — 4004F PT TOBACCO SCREEN RCVD TLK: CPT | Performed by: STUDENT IN AN ORGANIZED HEALTH CARE EDUCATION/TRAINING PROGRAM

## 2024-10-11 PROCEDURE — 99213 OFFICE O/P EST LOW 20 MIN: CPT | Performed by: STUDENT IN AN ORGANIZED HEALTH CARE EDUCATION/TRAINING PROGRAM

## 2024-10-11 RX ORDER — EPINEPHRINE 1 MG/ML
0.3 INJECTION, SOLUTION, CONCENTRATE INTRAVENOUS PRN
OUTPATIENT
Start: 2025-04-06

## 2024-10-11 RX ORDER — ONDANSETRON 2 MG/ML
8 INJECTION INTRAMUSCULAR; INTRAVENOUS
OUTPATIENT
Start: 2025-04-06

## 2024-10-11 RX ORDER — FAMOTIDINE 10 MG/ML
20 INJECTION, SOLUTION INTRAVENOUS
OUTPATIENT
Start: 2025-04-06

## 2024-10-11 RX ORDER — DIPHENHYDRAMINE HYDROCHLORIDE 50 MG/ML
50 INJECTION INTRAMUSCULAR; INTRAVENOUS
OUTPATIENT
Start: 2025-04-06

## 2024-10-11 RX ORDER — ALBUTEROL SULFATE 90 UG/1
4 INHALANT RESPIRATORY (INHALATION) PRN
OUTPATIENT
Start: 2025-04-06

## 2024-10-11 RX ORDER — FENTANYL 25 UG/1
1 PATCH TRANSDERMAL
COMMUNITY

## 2024-10-11 RX ORDER — ACETAMINOPHEN 325 MG/1
650 TABLET ORAL
OUTPATIENT
Start: 2025-04-06

## 2024-10-11 RX ORDER — OXYCODONE HYDROCHLORIDE 5 MG/1
5 TABLET ORAL EVERY 4 HOURS PRN
COMMUNITY

## 2024-10-11 RX ORDER — GABAPENTIN 100 MG/1
100 CAPSULE ORAL 3 TIMES DAILY
COMMUNITY

## 2024-10-11 RX ORDER — CALCIUM CARBONATE 500(1250)
500 TABLET ORAL 2 TIMES DAILY
COMMUNITY

## 2024-10-11 RX ORDER — SODIUM CHLORIDE 9 MG/ML
INJECTION, SOLUTION INTRAVENOUS CONTINUOUS
OUTPATIENT
Start: 2025-04-06

## 2024-10-11 RX ORDER — PANTOPRAZOLE SODIUM 40 MG/1
40 FOR SUSPENSION ORAL
COMMUNITY

## 2024-10-11 RX ADMIN — DENOSUMAB 60 MG: 60 INJECTION SUBCUTANEOUS at 10:30

## 2024-10-11 NOTE — PROGRESS NOTES
Patient presents to clinic today for Prolia injection.  Patient's labs monitored, specifically, Calcium level, to ensure no increased risk of hypocalcemia with administration of the medication. Patient's calcium level is 9.4 mg/dL.  Patient received therapy and will continue to be monitored prior to each dose.     Lisa Chandler RPh 10/11/2024 10:04 AM

## 2024-10-11 NOTE — PROGRESS NOTES
minutes on chart review as well as time spent on patient encounter, discussing the laboratory, imaging, and clinical findings; and documentation. I have discussed clinical implications and recommendations on the patient's primary issues. More than 50% of time was spent counseling patient. The patient verbalized understanding.      Isaak Rojas MD  Medical Oncology  [facility]  10/11/2024    [place] [facility] Office  P: [phone]  F: [phone]    [facility]) Office  P: [phone]  F: [phone]     [place] [facility] Office  P: [phone]  F: [phone]

## 2024-10-17 ENCOUNTER — TELEPHONE (OUTPATIENT)
Dept: INFUSION THERAPY | Age: 54
End: 2024-10-17

## 2024-10-17 NOTE — TELEPHONE ENCOUNTER
Left voicemail re: high glucose on last lab draw. Dr. Rojas would like patient to follow up  with PCP.  Awaiting return call.  Pat Fung, PAULAN, RN, OCN 10/17/24

## 2024-10-17 NOTE — RESULT ENCOUNTER NOTE
After patient left, labs resulted. BG's are high. Will try to update patient on results and reach out to PCP regarding this.

## 2024-10-19 ENCOUNTER — APPOINTMENT (OUTPATIENT)
Dept: GENERAL RADIOLOGY | Age: 54
End: 2024-10-19
Payer: COMMERCIAL

## 2024-10-19 ENCOUNTER — HOSPITAL ENCOUNTER (EMERGENCY)
Age: 54
Discharge: HOME | End: 2024-10-19
Attending: EMERGENCY MEDICINE
Payer: COMMERCIAL

## 2024-10-19 VITALS
SYSTOLIC BLOOD PRESSURE: 148 MMHG | RESPIRATION RATE: 18 BRPM | WEIGHT: 111 LBS | HEIGHT: 60 IN | OXYGEN SATURATION: 100 % | BODY MASS INDEX: 21.79 KG/M2 | HEART RATE: 80 BPM | TEMPERATURE: 98.6 F | DIASTOLIC BLOOD PRESSURE: 83 MMHG

## 2024-10-19 DIAGNOSIS — B34.9 VIRAL ILLNESS: Primary | ICD-10-CM

## 2024-10-19 LAB
ANION GAP SERPL CALCULATED.3IONS-SCNC: 10 MMOL/L (ref 7–16)
B-OH-BUTYR SERPL-MCNC: 0.14 MMOL/L (ref 0.02–0.27)
B.E.: -2.9 MMOL/L (ref -3–3)
BASOPHILS # BLD: 0 K/UL (ref 0–0.2)
BASOPHILS NFR BLD: 0 % (ref 0–2)
BNP SERPL-MCNC: 435 PG/ML (ref 0–450)
BUN SERPL-MCNC: 11 MG/DL (ref 6–20)
CALCIUM SERPL-MCNC: 8.8 MG/DL (ref 8.6–10.2)
CHLORIDE SERPL-SCNC: 101 MMOL/L (ref 98–107)
CO2 SERPL-SCNC: 24 MMOL/L (ref 22–29)
COHB: 3.2 % (ref 0–1.5)
CREAT SERPL-MCNC: 0.6 MG/DL (ref 0.5–1)
CRITICAL: ABNORMAL
DATE ANALYZED: ABNORMAL
DATE OF COLLECTION: ABNORMAL
EKG ATRIAL RATE: 83 BPM
EKG P AXIS: 66 DEGREES
EKG P-R INTERVAL: 136 MS
EKG Q-T INTERVAL: 360 MS
EKG QRS DURATION: 76 MS
EKG QTC CALCULATION (BAZETT): 423 MS
EKG R AXIS: 69 DEGREES
EKG T AXIS: 23 DEGREES
EKG VENTRICULAR RATE: 83 BPM
EOSINOPHIL # BLD: 0 K/UL (ref 0.05–0.5)
EOSINOPHILS RELATIVE PERCENT: 0 % (ref 0–6)
ERYTHROCYTE [DISTWIDTH] IN BLOOD BY AUTOMATED COUNT: 14.9 % (ref 11.5–15)
FLUAV RNA RESP QL NAA+PROBE: NOT DETECTED
FLUBV RNA RESP QL NAA+PROBE: NOT DETECTED
GFR, ESTIMATED: >90 ML/MIN/1.73M2
GLUCOSE BLD-MCNC: 131 MG/DL (ref 74–99)
GLUCOSE BLD-MCNC: 385 MG/DL (ref 74–99)
GLUCOSE SERPL-MCNC: 383 MG/DL (ref 74–99)
HCO3: 21.1 MMOL/L (ref 22–26)
HCT VFR BLD AUTO: 34.8 % (ref 34–48)
HGB BLD-MCNC: 12.1 G/DL (ref 11.5–15.5)
HHB: 2 % (ref 0–5)
LAB: ABNORMAL
LYMPHOCYTES NFR BLD: 0.65 K/UL (ref 1.5–4)
LYMPHOCYTES RELATIVE PERCENT: 22 % (ref 20–42)
Lab: 1102
MAGNESIUM SERPL-MCNC: 2.5 MG/DL (ref 1.6–2.6)
MCH RBC QN AUTO: 30.9 PG (ref 26–35)
MCHC RBC AUTO-ENTMCNC: 34.8 G/DL (ref 32–34.5)
MCV RBC AUTO: 89 FL (ref 80–99.9)
METHB: 0.3 % (ref 0–1.5)
MODE: ABNORMAL
MONOCYTES NFR BLD: 0.08 K/UL (ref 0.1–0.95)
MONOCYTES NFR BLD: 3 % (ref 2–12)
NEUTROPHILS NFR BLD: 76 % (ref 43–80)
NEUTS SEG NFR BLD: 2.27 K/UL (ref 1.8–7.3)
O2 CONTENT: 13.5 ML/DL
O2 SATURATION: 97.9 % (ref 92–98.5)
O2HB: 94.5 % (ref 94–97)
OPERATOR ID: 797
PATIENT TEMP: 37 C
PCO2: 33.7 MMHG (ref 35–45)
PH BLOOD GAS: 7.42 (ref 7.35–7.45)
PH VENOUS: 7.24 (ref 7.35–7.45)
PLATELET # BLD AUTO: 61 K/UL (ref 130–450)
PLATELET CONFIRMATION: NORMAL
PMV BLD AUTO: 9.4 FL (ref 7–12)
PO2: 102.4 MMHG (ref 75–100)
POTASSIUM SERPL-SCNC: 4.1 MMOL/L (ref 3.5–5)
RBC # BLD AUTO: 3.91 M/UL (ref 3.5–5.5)
SARS-COV-2 RNA RESP QL NAA+PROBE: NOT DETECTED
SODIUM SERPL-SCNC: 135 MMOL/L (ref 132–146)
SOURCE, BLOOD GAS: ABNORMAL
SOURCE: NORMAL
SPECIMEN DESCRIPTION: NORMAL
THB: 10 G/DL (ref 11.5–16.5)
TIME ANALYZED: 1110
TROPONIN I SERPL HS-MCNC: 7 NG/L (ref 0–9)
WBC OTHER # BLD: 3 K/UL (ref 4.5–11.5)

## 2024-10-19 PROCEDURE — 93005 ELECTROCARDIOGRAM TRACING: CPT

## 2024-10-19 PROCEDURE — 84484 ASSAY OF TROPONIN QUANT: CPT

## 2024-10-19 PROCEDURE — 2580000003 HC RX 258

## 2024-10-19 PROCEDURE — 83735 ASSAY OF MAGNESIUM: CPT

## 2024-10-19 PROCEDURE — 85025 COMPLETE CBC W/AUTO DIFF WBC: CPT

## 2024-10-19 PROCEDURE — 93010 ELECTROCARDIOGRAM REPORT: CPT | Performed by: INTERNAL MEDICINE

## 2024-10-19 PROCEDURE — 82010 KETONE BODYS QUAN: CPT

## 2024-10-19 PROCEDURE — 82962 GLUCOSE BLOOD TEST: CPT

## 2024-10-19 PROCEDURE — 83880 ASSAY OF NATRIURETIC PEPTIDE: CPT

## 2024-10-19 PROCEDURE — 71045 X-RAY EXAM CHEST 1 VIEW: CPT

## 2024-10-19 PROCEDURE — 82805 BLOOD GASES W/O2 SATURATION: CPT

## 2024-10-19 PROCEDURE — 80048 BASIC METABOLIC PNL TOTAL CA: CPT

## 2024-10-19 PROCEDURE — 82800 BLOOD PH: CPT

## 2024-10-19 PROCEDURE — 87636 SARSCOV2 & INF A&B AMP PRB: CPT

## 2024-10-19 PROCEDURE — 99285 EMERGENCY DEPT VISIT HI MDM: CPT

## 2024-10-19 RX ORDER — 0.9 % SODIUM CHLORIDE 0.9 %
1000 INTRAVENOUS SOLUTION INTRAVENOUS ONCE
Status: COMPLETED | OUTPATIENT
Start: 2024-10-19 | End: 2024-10-19

## 2024-10-19 RX ORDER — BENZONATATE 100 MG/1
100 CAPSULE ORAL 3 TIMES DAILY PRN
Qty: 21 CAPSULE | Refills: 0 | Status: SHIPPED | OUTPATIENT
Start: 2024-10-19 | End: 2024-10-26

## 2024-10-19 RX ADMIN — SODIUM CHLORIDE 1000 ML: 9 INJECTION, SOLUTION INTRAVENOUS at 09:55

## 2024-10-19 ASSESSMENT — PAIN - FUNCTIONAL ASSESSMENT: PAIN_FUNCTIONAL_ASSESSMENT: 0-10

## 2024-10-19 ASSESSMENT — LIFESTYLE VARIABLES
HOW MANY STANDARD DRINKS CONTAINING ALCOHOL DO YOU HAVE ON A TYPICAL DAY: PATIENT DECLINED
HOW OFTEN DO YOU HAVE A DRINK CONTAINING ALCOHOL: PATIENT DECLINED

## 2024-10-19 ASSESSMENT — PAIN DESCRIPTION - PAIN TYPE: TYPE: CHRONIC PAIN

## 2024-10-19 ASSESSMENT — PAIN DESCRIPTION - LOCATION: LOCATION: ABDOMEN

## 2024-10-19 ASSESSMENT — PAIN SCALES - GENERAL: PAINLEVEL_OUTOF10: 3

## 2024-10-19 NOTE — DISCHARGE INSTRUCTIONS
Please return to ED if symptoms worsen, persist, or occur.  Please follow-up with PCP.  Please take your medications as prescribed.    XR CHEST PORTABLE   Final Result   No acute cardiopulmonary process.

## 2024-10-19 NOTE — ED PROVIDER NOTES
Department of Emergency Medicine     Written by: Cruzito Otoole MD  Patient Name: Ellie Santacruz  Admit Date: 10/19/2024  9:25 AM  MRN: [id number]                   : 1970    HPI     Chief Complaint   Patient presents with    Shortness of Breath     Pt from [facility] in [place], Sent for cold/like symptoms, SOB. Pt BGL was 530 this am and took reports taking 20 units of short acting and 20 units of long acting. Pt concerned she could bottom out because she did not eat.       Ellie Santacruz is a 54 y.o. female that presents to the ED with concerns for hyperglycemia.  The patient is currently at an assisted facility, she is 11 months clean from drug use.  She had her lungs auscultated by nursing staff today and they noticed coarse lung sounds therefore they called EMS stating that she was short of breath.  The patient she has no shortness of breath no chest pain no lightheadedness no diaphoresis.  They incidentally checked her blood sugar and it was 530.  They could not do an IV access as she has small veins.  She has chronic cirrhosis and chronic pancreatitis who she has no new nausea vomiting no abdominal pain no abdominal discomfort.  Abdomen is soft nontender nondistended on exam.  There is no jaundice or scleral icterus appreciated on exam either.  Her lungs does sound very coarse diffusely.  She does endorse some congestion rhinorrhea along with other symptoms.  Concern for COVID or other viral etiology of symptoms.  Plan to rule out DKA and evaluate cause of rhonchorous lung sounds.    Review of systems   Pertinent positives and negatives mentioned in the HPI/MDM.      Physical   Physical Exam  Constitutional:       General: She is not in acute distress.     Appearance: Normal appearance.   Eyes:      Extraocular Movements: Extraocular movements intact.      Pupils: Pupils are equal, round, and reactive to light.   Cardiovascular:      Rate and Rhythm: Normal rate and regular rhythm.  and QT interval. CBC was ordered as part of my assessment for possible infection, anemia or thrombocytopenia. BMP to assess electrolytes, kidney function or any metabolic derangements. Troponin as a marker for myocardial ischemia or heart strain. BNP to evaluate for heart failure and/or as a marker for heart strain. Viral swabs due to possible viral etiology of symptoms. Beta-hydroxybutyrate to rule out DKA as it is a hydroxyacid known to be elevated with it. Venous pH to assess the patient's blood pH level. Chest x-ray for any possible signs of, but without limitation to, pneumonia, pleural effusions, cardiomegaly, pneumothorax, atelectasis, rib or sternal abnormalities including fractures.      ED Course as of 10/19/24 1210   Sat Oct 19, 2024   0943   ATTENDING PROVIDER ATTESTATION:     I have personally performed and/or participated in the history, exam, medical decision making, and procedures and agree with all pertinent clinical information unless otherwise noted.    I have also reviewed and agree with the past medical, family and social history unless otherwise noted.    I have discussed this patient in detail with the resident and provided the instruction and education regarding the evidence-based evaluation and treatment of shortness of breath.    Any EKG that may have been performed has been personally reviewed by me and I agree with the documentation as noted by the resident.    History: Patient resents to the ED with complaint shortness of breath.  Symptoms also consist of cough and congestion.  Productive cough.  No chest pain.  Not currently having any nausea and vomiting or abdominal pain.  EMS also noticed patient's blood sugar was elevated around 500.  Patient states she is a poorly controlled diabetic on insulin therapy.  Prior history of DKA.    My findings: Ellie Santacruz is a 54 y.o. female whom is in no distress. Physical exam reveals patient to be sitting in bed comfortably.  Heart regular in

## 2024-10-28 ENCOUNTER — TELEPHONE (OUTPATIENT)
Dept: CASE MANAGEMENT | Age: 54
End: 2024-10-28

## 2024-10-28 NOTE — TELEPHONE ENCOUNTER
Faxed additional requested information to [employer] for patient's Breast Cancer Index testing per Dr. Rojas's order with confirmation.  Information is requested for insurance coverage of the testing.  RANDEE Kearns, BSW, RN, OCN  Oncology Nurse Navigator

## 2024-10-31 ENCOUNTER — HOSPITAL ENCOUNTER (OUTPATIENT)
Dept: NUCLEAR MEDICINE | Age: 54
Discharge: HOME | End: 2024-10-31
Payer: COMMERCIAL

## 2024-10-31 ENCOUNTER — HOSPITAL ENCOUNTER (OUTPATIENT)
Dept: MAMMOGRAPHY | Age: 54
Discharge: HOME | End: 2024-11-02

## 2024-10-31 DIAGNOSIS — M89.8X9 BONE PAIN: ICD-10-CM

## 2024-10-31 DIAGNOSIS — M81.0 AGE RELATED OSTEOPOROSIS, UNSPECIFIED PATHOLOGICAL FRACTURE PRESENCE: ICD-10-CM

## 2024-10-31 PROCEDURE — 78306 BONE IMAGING WHOLE BODY: CPT | Performed by: STUDENT IN AN ORGANIZED HEALTH CARE EDUCATION/TRAINING PROGRAM

## 2024-10-31 PROCEDURE — 3430000000 HC RX DIAGNOSTIC RADIOPHARMACEUTICAL: Performed by: RADIOLOGY

## 2024-10-31 PROCEDURE — A9503 TC99M MEDRONATE: HCPCS | Performed by: RADIOLOGY

## 2024-10-31 RX ORDER — TC 99M MEDRONATE 20 MG/10ML
25 INJECTION, POWDER, LYOPHILIZED, FOR SOLUTION INTRAVENOUS
Status: COMPLETED | OUTPATIENT
Start: 2024-10-31 | End: 2024-10-31

## 2024-10-31 RX ADMIN — TC 99M MEDRONATE 25 MILLICURIE: 20 INJECTION, POWDER, LYOPHILIZED, FOR SOLUTION INTRAVENOUS at 09:22

## 2024-11-01 ENCOUNTER — HOSPITAL ENCOUNTER (OUTPATIENT)
Dept: INFUSION THERAPY | Age: 54
Discharge: HOME | End: 2024-11-01

## 2024-11-01 ENCOUNTER — OFFICE VISIT (OUTPATIENT)
Dept: ONCOLOGY | Age: 54
End: 2024-11-01
Payer: COMMERCIAL

## 2024-11-01 VITALS
DIASTOLIC BLOOD PRESSURE: 75 MMHG | BODY MASS INDEX: 24.64 KG/M2 | HEIGHT: 60 IN | OXYGEN SATURATION: 100 % | TEMPERATURE: 98.2 F | SYSTOLIC BLOOD PRESSURE: 134 MMHG | WEIGHT: 125.5 LBS | HEART RATE: 87 BPM

## 2024-11-01 DIAGNOSIS — M89.9 BONE LESION: Primary | ICD-10-CM

## 2024-11-01 DIAGNOSIS — Z85.3 PERSONAL HISTORY OF BREAST CANCER: ICD-10-CM

## 2024-11-01 DIAGNOSIS — C50.912 MALIGNANT NEOPLASM OF LEFT BREAST IN FEMALE, ESTROGEN RECEPTOR POSITIVE, UNSPECIFIED SITE OF BREAST (HCC): Primary | ICD-10-CM

## 2024-11-01 DIAGNOSIS — D50.8 OTHER IRON DEFICIENCY ANEMIA: ICD-10-CM

## 2024-11-01 DIAGNOSIS — F40.240 CLAUSTROPHOBIA: ICD-10-CM

## 2024-11-01 DIAGNOSIS — Z17.0 MALIGNANT NEOPLASM OF LEFT BREAST IN FEMALE, ESTROGEN RECEPTOR POSITIVE, UNSPECIFIED SITE OF BREAST (HCC): Primary | ICD-10-CM

## 2024-11-01 PROCEDURE — 99213 OFFICE O/P EST LOW 20 MIN: CPT | Performed by: STUDENT IN AN ORGANIZED HEALTH CARE EDUCATION/TRAINING PROGRAM

## 2024-11-01 PROCEDURE — G8427 DOCREV CUR MEDS BY ELIG CLIN: HCPCS | Performed by: STUDENT IN AN ORGANIZED HEALTH CARE EDUCATION/TRAINING PROGRAM

## 2024-11-01 PROCEDURE — 4004F PT TOBACCO SCREEN RCVD TLK: CPT | Performed by: STUDENT IN AN ORGANIZED HEALTH CARE EDUCATION/TRAINING PROGRAM

## 2024-11-01 PROCEDURE — G8420 CALC BMI NORM PARAMETERS: HCPCS | Performed by: STUDENT IN AN ORGANIZED HEALTH CARE EDUCATION/TRAINING PROGRAM

## 2024-11-01 PROCEDURE — 3017F COLORECTAL CA SCREEN DOC REV: CPT | Performed by: STUDENT IN AN ORGANIZED HEALTH CARE EDUCATION/TRAINING PROGRAM

## 2024-11-01 PROCEDURE — G8484 FLU IMMUNIZE NO ADMIN: HCPCS | Performed by: STUDENT IN AN ORGANIZED HEALTH CARE EDUCATION/TRAINING PROGRAM

## 2024-11-01 RX ORDER — LORAZEPAM 1 MG/1
1 TABLET ORAL
Qty: 1 TABLET | Refills: 0 | Status: SHIPPED | OUTPATIENT
Start: 2024-11-01 | End: 2024-11-01

## 2024-11-01 RX ORDER — DEXAMETHASONE 2 MG/1
TABLET ORAL
COMMUNITY
Start: 2024-10-25

## 2024-11-01 NOTE — PROGRESS NOTES
mammogram demonstrating no evidence of malignancy. Annual right sided mammogram recommended.     Left Breast Ultrasound on 01/04/2017 noted circumscribed solid nodule without increased vascularity or shadowing located at the 11 o'clock position corresponding to the patient's palpable lesion.   Nodule measures 0.5 cm x 0.4 cm x 0.5 cm.       Left Breast Mass 11 o'clock Core Needle Biopsy was on 01/18/2017: Invasive Well Differentiated Ductal Carcinoma; Grade 1; Lymph-vascular invasion Not identified.  ER positive (100%); IL positive (5%), HER/2neu Equivocal (2+)  HER/2neu FISH Negative (Her2/CEN17 signal ratio 1.2)     Blue dye injection with left breast lumpectomy and sentinel lymph node biopsy after technetium sulfur colloid injection was performed on 04/25/2017:  A.  Lymph node, left axillary, left breast sentinel node, biopsy: One lymph node with no diagnostic abnormality.  B.  Breast, left, excision of mass:  -Invasive well-differentiated ductal carcinoma and intermediate grade ductal carcinoma in situ (see comment and cancer case summary).  -Fibrocystic changes.     CANCER CASE SUMMARY:  Procedure: Excision without image guided localization.  Lymph node sampling: [place] lymph node.  Specimen laterality: Left.  Tumor size, size of largest invasive carcinoma: 7 mm.  Histologic type: Invasive mammary carcinoma of no special type(ductal,not otherwise specified).  Histologic grade:   Glandular (acinar)/tubular differentiation: Score 2.   Nuclear pleomorphism: Score 1.   Mitotic rate: Score 1.   Overall grade: Grade 1.  Tumor focality: Single focus of invasive carcinoma.  Ductal carcinoma in situ: DCIS is present.   Size of DCIS: Estimated size: At least 15 mm.   Architectural patterns: Cribriform and solid.   Nuclear grade: Grade 2 (intermediate).   Necrosis: Not identified.  Margins:   Invasive carcinoma:    -Margins uninvolved by invasive carcinoma.    -Distance from closest margin: 4 mm (anterior and lateral

## 2024-11-17 ENCOUNTER — HOSPITAL ENCOUNTER (INPATIENT)
Age: 54
LOS: 4 days | Discharge: HOME | DRG: 425 | End: 2024-11-21
Attending: EMERGENCY MEDICINE | Admitting: FAMILY MEDICINE
Payer: COMMERCIAL

## 2024-11-17 ENCOUNTER — APPOINTMENT (OUTPATIENT)
Dept: GENERAL RADIOLOGY | Age: 54
DRG: 425 | End: 2024-11-17
Payer: COMMERCIAL

## 2024-11-17 DIAGNOSIS — E87.5 HYPERKALEMIA: Primary | ICD-10-CM

## 2024-11-17 PROBLEM — R82.81 BACTERIURIA WITH PYURIA: Status: ACTIVE | Noted: 2024-11-17

## 2024-11-17 PROBLEM — R82.71 BACTERIURIA WITH PYURIA: Status: ACTIVE | Noted: 2024-11-17

## 2024-11-17 LAB
ALBUMIN SERPL-MCNC: 3.6 G/DL (ref 3.5–5.2)
ALP SERPL-CCNC: 392 U/L (ref 35–104)
ALT SERPL-CCNC: 39 U/L (ref 0–32)
AMMONIA PLAS-SCNC: 37 UMOL/L (ref 11–51)
ANION GAP SERPL CALCULATED.3IONS-SCNC: 4 MMOL/L (ref 7–16)
ANION GAP SERPL CALCULATED.3IONS-SCNC: 5 MMOL/L (ref 7–16)
AST SERPL-CCNC: 23 U/L (ref 0–31)
BACTERIA URNS QL MICRO: ABNORMAL
BASOPHILS # BLD: 0 K/UL (ref 0–0.2)
BASOPHILS NFR BLD: 0 % (ref 0–2)
BILIRUB SERPL-MCNC: 0.5 MG/DL (ref 0–1.2)
BILIRUB UR QL STRIP: NEGATIVE
BNP SERPL-MCNC: 209 PG/ML (ref 0–450)
BUN SERPL-MCNC: 10 MG/DL (ref 6–20)
BUN SERPL-MCNC: 9 MG/DL (ref 6–20)
CALCIUM SERPL-MCNC: 8.8 MG/DL (ref 8.6–10.2)
CALCIUM SERPL-MCNC: 9 MG/DL (ref 8.6–10.2)
CHLORIDE SERPL-SCNC: 103 MMOL/L (ref 98–107)
CHLORIDE SERPL-SCNC: 97 MMOL/L (ref 98–107)
CLARITY UR: CLEAR
CO2 SERPL-SCNC: 23 MMOL/L (ref 22–29)
CO2 SERPL-SCNC: 26 MMOL/L (ref 22–29)
COLOR UR: YELLOW
CREAT SERPL-MCNC: 0.6 MG/DL (ref 0.5–1)
CREAT SERPL-MCNC: 0.6 MG/DL (ref 0.5–1)
EKG ATRIAL RATE: 73 BPM
EKG P AXIS: 69 DEGREES
EKG P-R INTERVAL: 154 MS
EKG Q-T INTERVAL: 396 MS
EKG QRS DURATION: 102 MS
EKG QTC CALCULATION (BAZETT): 436 MS
EKG R AXIS: -16 DEGREES
EKG T AXIS: 74 DEGREES
EKG VENTRICULAR RATE: 73 BPM
EOSINOPHIL # BLD: 0.08 K/UL (ref 0.05–0.5)
EOSINOPHILS RELATIVE PERCENT: 4 % (ref 0–6)
EPI CELLS #/AREA URNS HPF: ABNORMAL /HPF
ERYTHROCYTE [DISTWIDTH] IN BLOOD BY AUTOMATED COUNT: 14.7 % (ref 11.5–15)
FLUAV RNA RESP QL NAA+PROBE: NOT DETECTED
FLUBV RNA RESP QL NAA+PROBE: NOT DETECTED
GFR, ESTIMATED: >90 ML/MIN/1.73M2
GFR, ESTIMATED: >90 ML/MIN/1.73M2
GLUCOSE BLD-MCNC: 170 MG/DL (ref 74–99)
GLUCOSE BLD-MCNC: 322 MG/DL (ref 74–99)
GLUCOSE BLD-MCNC: >500 MG/DL (ref 74–99)
GLUCOSE SERPL-MCNC: 350 MG/DL (ref 74–99)
GLUCOSE SERPL-MCNC: 597 MG/DL (ref 74–99)
GLUCOSE UR STRIP-MCNC: >=1000 MG/DL
HCT VFR BLD AUTO: 31.7 % (ref 34–48)
HGB BLD-MCNC: 10.6 G/DL (ref 11.5–15.5)
HGB UR QL STRIP.AUTO: ABNORMAL
INR PPP: 0.9
KETONES UR STRIP-MCNC: NEGATIVE MG/DL
LACTATE BLDV-SCNC: 1.6 MMOL/L (ref 0.5–2.2)
LEUKOCYTE ESTERASE UR QL STRIP: ABNORMAL
LIPASE SERPL-CCNC: 8 U/L (ref 13–60)
LYMPHOCYTES NFR BLD: 0.34 K/UL (ref 1.5–4)
LYMPHOCYTES RELATIVE PERCENT: 16 % (ref 20–42)
MCH RBC QN AUTO: 32 PG (ref 26–35)
MCHC RBC AUTO-ENTMCNC: 33.4 G/DL (ref 32–34.5)
MCV RBC AUTO: 95.8 FL (ref 80–99.9)
MONOCYTES NFR BLD: 0.27 K/UL (ref 0.1–0.95)
MONOCYTES NFR BLD: 12 % (ref 2–12)
MYELOCYTES ABSOLUTE COUNT: 0.02 K/UL
MYELOCYTES: 1 %
NEUTROPHILS NFR BLD: 68 % (ref 43–80)
NEUTS SEG NFR BLD: 1.49 K/UL (ref 1.8–7.3)
NITRITE UR QL STRIP: NEGATIVE
PH UR STRIP: 6 [PH] (ref 5–9)
PLATELET # BLD AUTO: 52 K/UL (ref 130–450)
PLATELET CONFIRMATION: NORMAL
PMV BLD AUTO: 10.1 FL (ref 7–12)
POTASSIUM SERPL-SCNC: 5.6 MMOL/L (ref 3.5–5)
POTASSIUM SERPL-SCNC: 7.1 MMOL/L (ref 3.5–5)
PROT SERPL-MCNC: 6.7 G/DL (ref 6.4–8.3)
PROT UR STRIP-MCNC: NEGATIVE MG/DL
PROTHROMBIN TIME: 10 SEC (ref 9.3–12.4)
RBC # BLD AUTO: 3.31 M/UL (ref 3.5–5.5)
RBC # BLD: NORMAL 10*6/UL
RBC #/AREA URNS HPF: ABNORMAL /HPF
SARS-COV-2 RNA RESP QL NAA+PROBE: NOT DETECTED
SODIUM SERPL-SCNC: 127 MMOL/L (ref 132–146)
SODIUM SERPL-SCNC: 131 MMOL/L (ref 132–146)
SOURCE: NORMAL
SP GR UR STRIP: 1.01 (ref 1–1.03)
SPECIMEN DESCRIPTION: NORMAL
TROPONIN I SERPL HS-MCNC: 9 NG/L (ref 0–9)
UROBILINOGEN UR STRIP-ACNC: 0.2 EU/DL (ref 0–1)
WBC #/AREA URNS HPF: ABNORMAL /HPF
WBC OTHER # BLD: 2.2 K/UL (ref 4.5–11.5)

## 2024-11-17 PROCEDURE — 85025 COMPLETE CBC W/AUTO DIFF WBC: CPT

## 2024-11-17 PROCEDURE — 83605 ASSAY OF LACTIC ACID: CPT

## 2024-11-17 PROCEDURE — 82962 GLUCOSE BLOOD TEST: CPT

## 2024-11-17 PROCEDURE — 71045 X-RAY EXAM CHEST 1 VIEW: CPT

## 2024-11-17 PROCEDURE — 93010 ELECTROCARDIOGRAM REPORT: CPT | Performed by: INTERNAL MEDICINE

## 2024-11-17 PROCEDURE — 85610 PROTHROMBIN TIME: CPT

## 2024-11-17 PROCEDURE — 83880 ASSAY OF NATRIURETIC PEPTIDE: CPT

## 2024-11-17 PROCEDURE — 93005 ELECTROCARDIOGRAM TRACING: CPT | Performed by: EMERGENCY MEDICINE

## 2024-11-17 PROCEDURE — 6360000002 HC RX W HCPCS: Performed by: EMERGENCY MEDICINE

## 2024-11-17 PROCEDURE — 83690 ASSAY OF LIPASE: CPT

## 2024-11-17 PROCEDURE — 80053 COMPREHEN METABOLIC PANEL: CPT

## 2024-11-17 PROCEDURE — 82140 ASSAY OF AMMONIA: CPT

## 2024-11-17 PROCEDURE — 87636 SARSCOV2 & INF A&B AMP PRB: CPT

## 2024-11-17 PROCEDURE — 80048 BASIC METABOLIC PNL TOTAL CA: CPT

## 2024-11-17 PROCEDURE — 87077 CULTURE AEROBIC IDENTIFY: CPT

## 2024-11-17 PROCEDURE — 87086 URINE CULTURE/COLONY COUNT: CPT

## 2024-11-17 PROCEDURE — 2060000000 HC ICU INTERMEDIATE R&B

## 2024-11-17 PROCEDURE — 84484 ASSAY OF TROPONIN QUANT: CPT

## 2024-11-17 PROCEDURE — 96375 TX/PRO/DX INJ NEW DRUG ADDON: CPT

## 2024-11-17 PROCEDURE — 96374 THER/PROPH/DIAG INJ IV PUSH: CPT

## 2024-11-17 PROCEDURE — 99285 EMERGENCY DEPT VISIT HI MDM: CPT

## 2024-11-17 PROCEDURE — 6370000000 HC RX 637 (ALT 250 FOR IP): Performed by: EMERGENCY MEDICINE

## 2024-11-17 PROCEDURE — 81001 URINALYSIS AUTO W/SCOPE: CPT

## 2024-11-17 RX ORDER — GLUCAGON 1 MG/ML
1 KIT INJECTION PRN
Status: DISCONTINUED | OUTPATIENT
Start: 2024-11-17 | End: 2024-11-21 | Stop reason: HOSPADM

## 2024-11-17 RX ORDER — FUROSEMIDE 10 MG/ML
40 INJECTION INTRAMUSCULAR; INTRAVENOUS ONCE
Status: COMPLETED | OUTPATIENT
Start: 2024-11-17 | End: 2024-11-17

## 2024-11-17 RX ORDER — FENTANYL CITRATE 0.05 MG/ML
50 INJECTION, SOLUTION INTRAMUSCULAR; INTRAVENOUS ONCE
Status: COMPLETED | OUTPATIENT
Start: 2024-11-17 | End: 2024-11-17

## 2024-11-17 RX ORDER — DEXTROSE MONOHYDRATE 100 MG/ML
INJECTION, SOLUTION INTRAVENOUS CONTINUOUS PRN
Status: DISCONTINUED | OUTPATIENT
Start: 2024-11-17 | End: 2024-11-21 | Stop reason: HOSPADM

## 2024-11-17 RX ORDER — FENTANYL 25 UG/1
1 PATCH TRANSDERMAL ONCE
Status: DISCONTINUED | OUTPATIENT
Start: 2024-11-17 | End: 2024-11-18

## 2024-11-17 RX ORDER — CALCIUM GLUCONATE 20 MG/ML
1000 INJECTION, SOLUTION INTRAVENOUS ONCE
Status: COMPLETED | OUTPATIENT
Start: 2024-11-17 | End: 2024-11-17

## 2024-11-17 RX ADMIN — FENTANYL CITRATE 50 MCG: 0.05 INJECTION, SOLUTION INTRAMUSCULAR; INTRAVENOUS at 20:39

## 2024-11-17 RX ADMIN — FUROSEMIDE 40 MG: 10 INJECTION, SOLUTION INTRAMUSCULAR; INTRAVENOUS at 17:25

## 2024-11-17 RX ADMIN — CALCIUM GLUCONATE 1000 MG: 20 INJECTION, SOLUTION INTRAVENOUS at 17:06

## 2024-11-17 RX ADMIN — SODIUM ZIRCONIUM CYCLOSILICATE 10 G: 10 POWDER, FOR SUSPENSION ORAL at 17:19

## 2024-11-17 RX ADMIN — INSULIN HUMAN 10 UNITS: 100 INJECTION, SOLUTION PARENTERAL at 17:22

## 2024-11-17 ASSESSMENT — PAIN DESCRIPTION - DESCRIPTORS: DESCRIPTORS: BURNING;STABBING

## 2024-11-17 ASSESSMENT — PAIN SCALES - GENERAL: PAINLEVEL_OUTOF10: 8

## 2024-11-17 ASSESSMENT — PAIN DESCRIPTION - LOCATION: LOCATION: ABDOMEN;BACK

## 2024-11-17 NOTE — ED PROVIDER NOTES
[facility] EMERGENCY DEPARTMENT  EMERGENCY DEPARTMENT ENCOUNTER      Pt Name: Ellie Santacruz  MRN: [id number]  Birthdate 1970  Date of evaluation: 11/17/2024  Provider: Celestino Quintana DO  PCP: Liza Oh DO  Note Started: 4:16 PM EST 11/17/24    CHIEF COMPLAINT       Chief Complaint   Patient presents with    Shortness of Breath     Increased shortness of breath with ambulating. Denies chest pain.        HISTORY OF PRESENT ILLNESS: 1 or more Elements   History From: Patient  Limitations to history : None    Ellie Santacruz is a 54 y.o. female who presents to the ED for evaluation of shortness of breath. Patient states that for the past several days she has been feeling increasingly weak and short of breath. Patient states that she feels increasingly short of breath. Patient has no black or bloody stools. Patient has no vomiting or black or bloody stools. Patient has no ricki or chills.    Nursing Notes were all reviewed and agreed with or any disagreements were addressed in the HPI.    REVIEW OF SYSTEMS :    Positives and Pertinent negatives as per HPI.     SURGICAL HISTORY     Past Surgical History:   Procedure Laterality Date    ABDOMEN SURGERY      gallbladder removal    BREAST BIOPSY Left     CHOLECYSTECTOMY  2010    COLONOSCOPY      COLONOSCOPY N/A 10/2/2018    COLONOSCOPY WITH BIOPSY performed by Simón Esquivel MD at [facility] ENDOSCOPY    CT NEEDLE BIOPSY LIVER PERCUTANEOUS  3/19/2024    CT NEEDLE BIOPSY LIVER PERCUTANEOUS 3/19/2024 [facility] CT    ENDOSCOPY, COLON, DIAGNOSTIC      ERCP      april 2016 at [facility]    HYSTERECTOMY (CERVIX STATUS UNKNOWN)  2004    IR TUNNELED CATHETER PLACEMENT GREATER THAN 5 YEARS  3/27/2024    FL TUNNELED CVC PLACE WO SQ PORT/PUMP > 5 YEARS 3/27/2024 [facility]    MASTECTOMY Left     Left breast mastectomy per patient    OTHER SURGICAL HISTORY Left 04/25/2017     Left breast blue dye injection, Sential Node Lymph Biopsy with left breast lumpectomy

## 2024-11-18 LAB
ANION GAP SERPL CALCULATED.3IONS-SCNC: 7 MMOL/L (ref 7–16)
BASOPHILS # BLD: 0 K/UL (ref 0–0.2)
BASOPHILS NFR BLD: 0 % (ref 0–2)
BUN SERPL-MCNC: 11 MG/DL (ref 6–20)
CALCIUM SERPL-MCNC: 8.7 MG/DL (ref 8.6–10.2)
CHLORIDE SERPL-SCNC: 103 MMOL/L (ref 98–107)
CO2 SERPL-SCNC: 25 MMOL/L (ref 22–29)
CREAT SERPL-MCNC: 0.7 MG/DL (ref 0.5–1)
EOSINOPHIL # BLD: 0.09 K/UL (ref 0.05–0.5)
EOSINOPHILS RELATIVE PERCENT: 4 % (ref 0–6)
ERYTHROCYTE [DISTWIDTH] IN BLOOD BY AUTOMATED COUNT: 14.9 % (ref 11.5–15)
GFR, ESTIMATED: >90 ML/MIN/1.73M2
GLUCOSE BLD-MCNC: 137 MG/DL (ref 74–99)
GLUCOSE BLD-MCNC: 141 MG/DL (ref 74–99)
GLUCOSE BLD-MCNC: 150 MG/DL (ref 74–99)
GLUCOSE BLD-MCNC: 168 MG/DL (ref 74–99)
GLUCOSE BLD-MCNC: 208 MG/DL (ref 74–99)
GLUCOSE BLD-MCNC: 227 MG/DL (ref 74–99)
GLUCOSE BLD-MCNC: 301 MG/DL (ref 74–99)
GLUCOSE SERPL-MCNC: 140 MG/DL (ref 74–99)
HCT VFR BLD AUTO: 34.4 % (ref 34–48)
HGB BLD-MCNC: 11.3 G/DL (ref 11.5–15.5)
LYMPHOCYTES NFR BLD: 0.42 K/UL (ref 1.5–4)
LYMPHOCYTES RELATIVE PERCENT: 17 % (ref 20–42)
MCH RBC QN AUTO: 30.9 PG (ref 26–35)
MCHC RBC AUTO-ENTMCNC: 32.8 G/DL (ref 32–34.5)
MCV RBC AUTO: 94 FL (ref 80–99.9)
MONOCYTES NFR BLD: 0.18 K/UL (ref 0.1–0.95)
MONOCYTES NFR BLD: 7 % (ref 2–12)
NEUTROPHILS NFR BLD: 73 % (ref 43–80)
NEUTS SEG NFR BLD: 1.82 K/UL (ref 1.8–7.3)
PLATELET # BLD AUTO: 57 K/UL (ref 130–450)
PLATELET CONFIRMATION: NORMAL
PMV BLD AUTO: 9.8 FL (ref 7–12)
POTASSIUM SERPL-SCNC: 5.2 MMOL/L (ref 3.5–5)
RBC # BLD AUTO: 3.66 M/UL (ref 3.5–5.5)
RBC # BLD: NORMAL 10*6/UL
SODIUM SERPL-SCNC: 135 MMOL/L (ref 132–146)
WBC OTHER # BLD: 2.5 K/UL (ref 4.5–11.5)

## 2024-11-18 PROCEDURE — 94640 AIRWAY INHALATION TREATMENT: CPT

## 2024-11-18 PROCEDURE — 80048 BASIC METABOLIC PNL TOTAL CA: CPT

## 2024-11-18 PROCEDURE — 6370000000 HC RX 637 (ALT 250 FOR IP): Performed by: FAMILY MEDICINE

## 2024-11-18 PROCEDURE — 2060000000 HC ICU INTERMEDIATE R&B

## 2024-11-18 PROCEDURE — 6360000002 HC RX W HCPCS: Performed by: FAMILY MEDICINE

## 2024-11-18 PROCEDURE — 85025 COMPLETE CBC W/AUTO DIFF WBC: CPT

## 2024-11-18 PROCEDURE — 82962 GLUCOSE BLOOD TEST: CPT

## 2024-11-18 RX ORDER — SENNA AND DOCUSATE SODIUM 50; 8.6 MG/1; MG/1
2 TABLET, FILM COATED ORAL 2 TIMES DAILY PRN
Status: DISCONTINUED | OUTPATIENT
Start: 2024-11-18 | End: 2024-11-21 | Stop reason: HOSPADM

## 2024-11-18 RX ORDER — NICOTINE 21 MG/24HR
1 PATCH, TRANSDERMAL 24 HOURS TRANSDERMAL DAILY
Status: DISCONTINUED | OUTPATIENT
Start: 2024-11-18 | End: 2024-11-21 | Stop reason: HOSPADM

## 2024-11-18 RX ORDER — FENTANYL 25 UG/1
1 PATCH TRANSDERMAL
Status: DISCONTINUED | OUTPATIENT
Start: 2024-11-20 | End: 2024-11-21 | Stop reason: HOSPADM

## 2024-11-18 RX ORDER — GABAPENTIN 100 MG/1
100 CAPSULE ORAL 3 TIMES DAILY
Status: DISCONTINUED | OUTPATIENT
Start: 2024-11-18 | End: 2024-11-21 | Stop reason: HOSPADM

## 2024-11-18 RX ORDER — ALBUTEROL SULFATE 0.83 MG/ML
2.5 SOLUTION RESPIRATORY (INHALATION) EVERY 4 HOURS PRN
Status: DISCONTINUED | OUTPATIENT
Start: 2024-11-18 | End: 2024-11-21 | Stop reason: HOSPADM

## 2024-11-18 RX ORDER — HYDROXYZINE PAMOATE 25 MG/1
25 CAPSULE ORAL 4 TIMES DAILY PRN
Status: DISCONTINUED | OUTPATIENT
Start: 2024-11-18 | End: 2024-11-21 | Stop reason: HOSPADM

## 2024-11-18 RX ORDER — FUROSEMIDE 20 MG/1
20 TABLET ORAL DAILY PRN
Status: DISCONTINUED | OUTPATIENT
Start: 2024-11-18 | End: 2024-11-19

## 2024-11-18 RX ORDER — ALBUTEROL SULFATE 90 UG/1
2 INHALANT RESPIRATORY (INHALATION) EVERY 6 HOURS PRN
Status: DISCONTINUED | OUTPATIENT
Start: 2024-11-18 | End: 2024-11-18 | Stop reason: SDUPTHER

## 2024-11-18 RX ORDER — INSULIN GLARGINE 100 [IU]/ML
5 INJECTION, SOLUTION SUBCUTANEOUS EVERY MORNING
Status: DISCONTINUED | OUTPATIENT
Start: 2024-11-18 | End: 2024-11-21 | Stop reason: HOSPADM

## 2024-11-18 RX ORDER — GAUZE BANDAGE 2" X 2"
100 BANDAGE TOPICAL DAILY
Status: DISCONTINUED | OUTPATIENT
Start: 2024-11-18 | End: 2024-11-21 | Stop reason: HOSPADM

## 2024-11-18 RX ORDER — ASCORBIC ACID 500 MG
1000 TABLET ORAL EVERY MORNING
Status: DISCONTINUED | OUTPATIENT
Start: 2024-11-18 | End: 2024-11-21 | Stop reason: HOSPADM

## 2024-11-18 RX ORDER — LACTULOSE 10 G/15ML
20 SOLUTION ORAL 2 TIMES DAILY
Status: DISCONTINUED | OUTPATIENT
Start: 2024-11-18 | End: 2024-11-21 | Stop reason: HOSPADM

## 2024-11-18 RX ORDER — INSULIN LISPRO 100 [IU]/ML
6 INJECTION, SOLUTION INTRAVENOUS; SUBCUTANEOUS
Status: DISCONTINUED | OUTPATIENT
Start: 2024-11-18 | End: 2024-11-21 | Stop reason: HOSPADM

## 2024-11-18 RX ORDER — ONDANSETRON 4 MG/1
4 TABLET, ORALLY DISINTEGRATING ORAL 3 TIMES DAILY PRN
Status: DISCONTINUED | OUTPATIENT
Start: 2024-11-18 | End: 2024-11-21 | Stop reason: HOSPADM

## 2024-11-18 RX ORDER — TRAZODONE HYDROCHLORIDE 50 MG/1
50 TABLET, FILM COATED ORAL NIGHTLY PRN
Status: DISCONTINUED | OUTPATIENT
Start: 2024-11-18 | End: 2024-11-21 | Stop reason: HOSPADM

## 2024-11-18 RX ORDER — OXYCODONE HYDROCHLORIDE 5 MG/1
5 TABLET ORAL EVERY 4 HOURS PRN
Status: DISCONTINUED | OUTPATIENT
Start: 2024-11-18 | End: 2024-11-21 | Stop reason: HOSPADM

## 2024-11-18 RX ORDER — PANTOPRAZOLE SODIUM 40 MG/1
40 TABLET, DELAYED RELEASE ORAL
Status: DISCONTINUED | OUTPATIENT
Start: 2024-11-18 | End: 2024-11-21 | Stop reason: HOSPADM

## 2024-11-18 RX ORDER — ANASTROZOLE 1 MG/1
1 TABLET ORAL EVERY MORNING
Status: DISCONTINUED | OUTPATIENT
Start: 2024-11-18 | End: 2024-11-21 | Stop reason: HOSPADM

## 2024-11-18 RX ORDER — INSULIN GLARGINE 100 [IU]/ML
12 INJECTION, SOLUTION SUBCUTANEOUS NIGHTLY
Status: DISCONTINUED | OUTPATIENT
Start: 2024-11-18 | End: 2024-11-21 | Stop reason: HOSPADM

## 2024-11-18 RX ORDER — CALCIUM CARBONATE 500(1250)
500 TABLET ORAL 2 TIMES DAILY
Status: DISCONTINUED | OUTPATIENT
Start: 2024-11-18 | End: 2024-11-21 | Stop reason: HOSPADM

## 2024-11-18 RX ORDER — SODIUM POLYSTYRENE SULFONATE 15 G/60ML
15 SUSPENSION ORAL; RECTAL ONCE
Status: COMPLETED | OUTPATIENT
Start: 2024-11-18 | End: 2024-11-19

## 2024-11-18 RX ORDER — M-VIT,TX,IRON,MINS/CALC/FOLIC 27MG-0.4MG
1 TABLET ORAL EVERY MORNING
Status: DISCONTINUED | OUTPATIENT
Start: 2024-11-18 | End: 2024-11-21 | Stop reason: HOSPADM

## 2024-11-18 RX ORDER — SODIUM CHLORIDE 450 MG/100ML
INJECTION, SOLUTION INTRAVENOUS CONTINUOUS
Status: DISCONTINUED | OUTPATIENT
Start: 2024-11-18 | End: 2024-11-21 | Stop reason: HOSPADM

## 2024-11-18 RX ORDER — ACETAMINOPHEN 500 MG
500 TABLET ORAL 4 TIMES DAILY PRN
Status: DISCONTINUED | OUTPATIENT
Start: 2024-11-18 | End: 2024-11-21 | Stop reason: HOSPADM

## 2024-11-18 RX ORDER — ARIPIPRAZOLE 10 MG/1
20 TABLET ORAL EVERY MORNING
Status: DISCONTINUED | OUTPATIENT
Start: 2024-11-18 | End: 2024-11-21 | Stop reason: HOSPADM

## 2024-11-18 RX ADMIN — OXYCODONE 5 MG: 5 TABLET ORAL at 21:10

## 2024-11-18 RX ADMIN — RIFAXIMIN 400 MG: 200 TABLET ORAL at 17:09

## 2024-11-18 RX ADMIN — GABAPENTIN 100 MG: 100 CAPSULE ORAL at 09:57

## 2024-11-18 RX ADMIN — ALBUTEROL SULFATE 2.5 MG: 2.5 SOLUTION RESPIRATORY (INHALATION) at 04:20

## 2024-11-18 RX ADMIN — Medication 100 MG: at 09:56

## 2024-11-18 RX ADMIN — Medication 1 TABLET: at 09:57

## 2024-11-18 RX ADMIN — GABAPENTIN 100 MG: 100 CAPSULE ORAL at 21:10

## 2024-11-18 RX ADMIN — RIFAXIMIN 400 MG: 200 TABLET ORAL at 10:00

## 2024-11-18 RX ADMIN — VORTIOXETINE 10 MG: 10 TABLET, FILM COATED ORAL at 21:12

## 2024-11-18 RX ADMIN — PANCRELIPASE LIPASE, PANCRELIPASE PROTEASE, PANCRELIPASE AMYLASE 10000 UNITS: 5000; 17000; 24000 CAPSULE, DELAYED RELEASE ORAL at 09:57

## 2024-11-18 RX ADMIN — CALCIUM 500 MG: 500 TABLET ORAL at 21:10

## 2024-11-18 RX ADMIN — INSULIN LISPRO 6 UNITS: 100 INJECTION, SOLUTION INTRAVENOUS; SUBCUTANEOUS at 18:03

## 2024-11-18 RX ADMIN — IPRATROPIUM BROMIDE 0.5 MG: 0.5 SOLUTION RESPIRATORY (INHALATION) at 04:21

## 2024-11-18 RX ADMIN — OXYCODONE 5 MG: 5 TABLET ORAL at 09:57

## 2024-11-18 RX ADMIN — PANCRELIPASE LIPASE, PANCRELIPASE PROTEASE, PANCRELIPASE AMYLASE 10000 UNITS: 5000; 17000; 24000 CAPSULE, DELAYED RELEASE ORAL at 18:03

## 2024-11-18 RX ADMIN — TRAZODONE HYDROCHLORIDE 50 MG: 50 TABLET ORAL at 21:20

## 2024-11-18 RX ADMIN — OXYCODONE 5 MG: 5 TABLET ORAL at 17:09

## 2024-11-18 RX ADMIN — INSULIN GLARGINE 5 UNITS: 100 INJECTION, SOLUTION SUBCUTANEOUS at 10:01

## 2024-11-18 RX ADMIN — RIFAXIMIN 400 MG: 200 TABLET ORAL at 21:12

## 2024-11-18 RX ADMIN — GABAPENTIN 100 MG: 100 CAPSULE ORAL at 17:09

## 2024-11-18 RX ADMIN — IPRATROPIUM BROMIDE 0.5 MG: 0.5 SOLUTION RESPIRATORY (INHALATION) at 12:01

## 2024-11-18 RX ADMIN — PANTOPRAZOLE SODIUM 40 MG: 40 TABLET, DELAYED RELEASE ORAL at 09:56

## 2024-11-18 RX ADMIN — OXYCODONE 5 MG: 5 TABLET ORAL at 02:19

## 2024-11-18 RX ADMIN — OXYCODONE HYDROCHLORIDE AND ACETAMINOPHEN 1000 MG: 500 TABLET ORAL at 09:57

## 2024-11-18 RX ADMIN — Medication 12 MG: at 21:10

## 2024-11-18 RX ADMIN — INSULIN GLARGINE 12 UNITS: 100 INJECTION, SOLUTION SUBCUTANEOUS at 21:16

## 2024-11-18 ASSESSMENT — PAIN DESCRIPTION - DESCRIPTORS
DESCRIPTORS: SHARP;STABBING;DISCOMFORT
DESCRIPTORS: ACHING;DISCOMFORT
DESCRIPTORS: ACHING;DISCOMFORT
DESCRIPTORS: BURNING

## 2024-11-18 ASSESSMENT — PAIN DESCRIPTION - LOCATION
LOCATION: ABDOMEN;BACK
LOCATION: BACK;ABDOMEN

## 2024-11-18 ASSESSMENT — PAIN SCALES - GENERAL
PAINLEVEL_OUTOF10: 4
PAINLEVEL_OUTOF10: 8
PAINLEVEL_OUTOF10: 7
PAINLEVEL_OUTOF10: 6
PAINLEVEL_OUTOF10: 7

## 2024-11-18 ASSESSMENT — PAIN DESCRIPTION - ORIENTATION: ORIENTATION: RIGHT;LEFT

## 2024-11-18 ASSESSMENT — PAIN - FUNCTIONAL ASSESSMENT: PAIN_FUNCTIONAL_ASSESSMENT: ACTIVITIES ARE NOT PREVENTED

## 2024-11-18 NOTE — H&P
5.2 05/11/2023 08:52 PM     11/17/2024 05:57 PM    CO2 23 11/17/2024 05:57 PM    BUN 9 11/17/2024 05:57 PM    CREATININE 0.6 11/17/2024 05:57 PM    GFRAA >60 08/17/2022 06:53 AM    LABGLOM >90 11/17/2024 05:57 PM    LABGLOM >90 03/27/2024 05:20 AM    GLUCOSE 350 11/17/2024 05:57 PM    CALCIUM 8.8 11/17/2024 05:57 PM    BILITOT 0.5 11/17/2024 04:02 PM    ALKPHOS 392 11/17/2024 04:02 PM    AST 23 11/17/2024 04:02 PM    ALT 39 11/17/2024 04:02 PM     U/A:    Lab Results   Component Value Date/Time    NITRITE NEG 03/19/2015 12:53 PM    COLORU Yellow 11/17/2024 04:00 PM    PROTEINU NEGATIVE 11/17/2024 04:00 PM    PHUR 6.0 11/17/2024 04:00 PM    PHUR 7.0 03/14/2024 04:58 AM    LABCAST 0-1 Hyaline 03/19/2015 01:12 PM    WBCUA 0 TO 5 11/17/2024 04:00 PM    RBCUA 3 to 5 11/17/2024 04:00 PM    MUCUS Present 03/19/2015 01:12 PM    TRICHOMONAS PRESENT 08/26/2023 10:30 PM    YEAST FEW 11/10/2016 11:00 PM    BACTERIA TRACE 11/17/2024 04:00 PM    CLARITYU SL CLOUDY 05/21/2023 10:40 PM    SPECGRAV 1.025 03/19/2015 12:53 PM    LEUKOCYTESUR TRACE 11/17/2024 04:00 PM    UROBILINOGEN 0.2 11/17/2024 04:00 PM    BILIRUBINUR NEGATIVE 11/17/2024 04:00 PM    BLOODU Negative 05/21/2023 10:40 PM    GLUCOSEU >=1000 11/17/2024 04:00 PM    AMORPHOUS MANY 09/16/2016 06:30 PM     ASSESSMENT AND PLAN:      Principal Problem:    Hyperkalemia  Plan: DAILY LABS  Active Problems:    Type 2 diabetes mellitus with hyperglycemia (HCC)  Plan: MONITOR GLUCOSE    Poorly controlled diabetes mellitus (HCC)  Plan: MONITOR GLUCOSE    Bicytopenia - leukopenia and thrombocytopenia  Plan: CHRONIC, FOLLOWS ONCOLOGY/HEMATOLOGY    Bacteriuria with pyuria  Plan: URINE CULTURE    Electrolyte imbalance  Plan: DAILY LABS      INPATIENT ADMISSION.  DAILY LABS.  MONITOR GLUCOSE AND COVER.  DISCHARGE PLANS TO RETURN HOME.  ESTIMATED LENGTH OF STAY IS 5 DAYS.

## 2024-11-19 PROBLEM — N30.00 ACUTE CYSTITIS WITHOUT HEMATURIA: Status: ACTIVE | Noted: 2024-11-19

## 2024-11-19 LAB
ANION GAP SERPL CALCULATED.3IONS-SCNC: 7 MMOL/L (ref 7–16)
BASOPHILS # BLD: 0 K/UL (ref 0–0.2)
BASOPHILS NFR BLD: 0 % (ref 0–2)
BUN SERPL-MCNC: 14 MG/DL (ref 6–20)
CALCIUM SERPL-MCNC: 8.1 MG/DL (ref 8.6–10.2)
CHLORIDE SERPL-SCNC: 100 MMOL/L (ref 98–107)
CO2 SERPL-SCNC: 26 MMOL/L (ref 22–29)
CREAT SERPL-MCNC: 0.7 MG/DL (ref 0.5–1)
EOSINOPHIL # BLD: 0 K/UL (ref 0.05–0.5)
EOSINOPHILS RELATIVE PERCENT: 0 % (ref 0–6)
ERYTHROCYTE [DISTWIDTH] IN BLOOD BY AUTOMATED COUNT: 14.7 % (ref 11.5–15)
GFR, ESTIMATED: >90 ML/MIN/1.73M2
GLUCOSE BLD-MCNC: 188 MG/DL (ref 74–99)
GLUCOSE BLD-MCNC: 226 MG/DL (ref 74–99)
GLUCOSE BLD-MCNC: 229 MG/DL (ref 74–99)
GLUCOSE BLD-MCNC: 252 MG/DL (ref 74–99)
GLUCOSE BLD-MCNC: 301 MG/DL (ref 74–99)
GLUCOSE SERPL-MCNC: 296 MG/DL (ref 74–99)
HCT VFR BLD AUTO: 35.7 % (ref 34–48)
HGB BLD-MCNC: 11.9 G/DL (ref 11.5–15.5)
LYMPHOCYTES NFR BLD: 0.61 K/UL (ref 1.5–4)
LYMPHOCYTES RELATIVE PERCENT: 22 % (ref 20–42)
MCH RBC QN AUTO: 31 PG (ref 26–35)
MCHC RBC AUTO-ENTMCNC: 33.3 G/DL (ref 32–34.5)
MCV RBC AUTO: 93 FL (ref 80–99.9)
MONOCYTES NFR BLD: 0.29 K/UL (ref 0.1–0.95)
MONOCYTES NFR BLD: 10 % (ref 2–12)
NEUTROPHILS NFR BLD: 68 % (ref 43–80)
NEUTS SEG NFR BLD: 1.9 K/UL (ref 1.8–7.3)
PLATELET # BLD AUTO: 61 K/UL (ref 130–450)
PLATELET CONFIRMATION: NORMAL
PMV BLD AUTO: 9.8 FL (ref 7–12)
POTASSIUM SERPL-SCNC: 5.2 MMOL/L (ref 3.5–5)
RBC # BLD AUTO: 3.84 M/UL (ref 3.5–5.5)
RBC # BLD: NORMAL 10*6/UL
SODIUM SERPL-SCNC: 133 MMOL/L (ref 132–146)
WBC OTHER # BLD: 2.8 K/UL (ref 4.5–11.5)

## 2024-11-19 PROCEDURE — 76937 US GUIDE VASCULAR ACCESS: CPT

## 2024-11-19 PROCEDURE — 97161 PT EVAL LOW COMPLEX 20 MIN: CPT | Performed by: PHYSICAL THERAPIST

## 2024-11-19 PROCEDURE — 80048 BASIC METABOLIC PNL TOTAL CA: CPT

## 2024-11-19 PROCEDURE — 2580000003 HC RX 258: Performed by: FAMILY MEDICINE

## 2024-11-19 PROCEDURE — 85025 COMPLETE CBC W/AUTO DIFF WBC: CPT

## 2024-11-19 PROCEDURE — 82947 ASSAY GLUCOSE BLOOD QUANT: CPT

## 2024-11-19 PROCEDURE — 94640 AIRWAY INHALATION TREATMENT: CPT

## 2024-11-19 PROCEDURE — 36415 COLL VENOUS BLD VENIPUNCTURE: CPT

## 2024-11-19 PROCEDURE — 6370000000 HC RX 637 (ALT 250 FOR IP): Performed by: FAMILY MEDICINE

## 2024-11-19 PROCEDURE — 2060000000 HC ICU INTERMEDIATE R&B

## 2024-11-19 PROCEDURE — 6360000002 HC RX W HCPCS: Performed by: FAMILY MEDICINE

## 2024-11-19 RX ORDER — FUROSEMIDE 20 MG/1
20 TABLET ORAL DAILY
Status: DISCONTINUED | OUTPATIENT
Start: 2024-11-19 | End: 2024-11-21 | Stop reason: HOSPADM

## 2024-11-19 RX ADMIN — PANCRELIPASE LIPASE, PANCRELIPASE PROTEASE, PANCRELIPASE AMYLASE 10000 UNITS: 5000; 17000; 24000 CAPSULE, DELAYED RELEASE ORAL at 11:35

## 2024-11-19 RX ADMIN — OXYCODONE 5 MG: 5 TABLET ORAL at 14:04

## 2024-11-19 RX ADMIN — Medication 12 MG: at 19:56

## 2024-11-19 RX ADMIN — INSULIN GLARGINE 12 UNITS: 100 INJECTION, SOLUTION SUBCUTANEOUS at 20:05

## 2024-11-19 RX ADMIN — SODIUM CHLORIDE: 4.5 INJECTION, SOLUTION INTRAVENOUS at 16:17

## 2024-11-19 RX ADMIN — TRAZODONE HYDROCHLORIDE 50 MG: 50 TABLET ORAL at 19:56

## 2024-11-19 RX ADMIN — INSULIN LISPRO 6 UNITS: 100 INJECTION, SOLUTION INTRAVENOUS; SUBCUTANEOUS at 08:15

## 2024-11-19 RX ADMIN — GABAPENTIN 100 MG: 100 CAPSULE ORAL at 19:56

## 2024-11-19 RX ADMIN — ARIPIPRAZOLE 20 MG: 10 TABLET ORAL at 08:15

## 2024-11-19 RX ADMIN — OXYCODONE HYDROCHLORIDE AND ACETAMINOPHEN 1000 MG: 500 TABLET ORAL at 08:14

## 2024-11-19 RX ADMIN — RIFAXIMIN 400 MG: 200 TABLET ORAL at 19:56

## 2024-11-19 RX ADMIN — RIFAXIMIN 400 MG: 200 TABLET ORAL at 14:02

## 2024-11-19 RX ADMIN — OXYCODONE 5 MG: 5 TABLET ORAL at 01:15

## 2024-11-19 RX ADMIN — SODIUM CHLORIDE: 4.5 INJECTION, SOLUTION INTRAVENOUS at 00:16

## 2024-11-19 RX ADMIN — PANTOPRAZOLE SODIUM 40 MG: 40 TABLET, DELAYED RELEASE ORAL at 05:18

## 2024-11-19 RX ADMIN — GABAPENTIN 100 MG: 100 CAPSULE ORAL at 14:02

## 2024-11-19 RX ADMIN — GABAPENTIN 100 MG: 100 CAPSULE ORAL at 08:14

## 2024-11-19 RX ADMIN — ANASTROZOLE 1 MG: 1 TABLET ORAL at 08:15

## 2024-11-19 RX ADMIN — OXYCODONE 5 MG: 5 TABLET ORAL at 19:56

## 2024-11-19 RX ADMIN — Medication 1 TABLET: at 08:15

## 2024-11-19 RX ADMIN — Medication 100 MG: at 08:14

## 2024-11-19 RX ADMIN — SODIUM POLYSTYRENE SULFONATE 15 G: 15 SUSPENSION ORAL; RECTAL at 00:17

## 2024-11-19 RX ADMIN — INSULIN GLARGINE 5 UNITS: 100 INJECTION, SOLUTION SUBCUTANEOUS at 08:16

## 2024-11-19 RX ADMIN — INSULIN LISPRO 6 UNITS: 100 INJECTION, SOLUTION INTRAVENOUS; SUBCUTANEOUS at 11:35

## 2024-11-19 RX ADMIN — OXYCODONE 5 MG: 5 TABLET ORAL at 05:18

## 2024-11-19 RX ADMIN — FUROSEMIDE 20 MG: 20 TABLET ORAL at 19:56

## 2024-11-19 RX ADMIN — VORTIOXETINE 10 MG: 10 TABLET, FILM COATED ORAL at 19:56

## 2024-11-19 RX ADMIN — PANCRELIPASE LIPASE, PANCRELIPASE PROTEASE, PANCRELIPASE AMYLASE 10000 UNITS: 5000; 17000; 24000 CAPSULE, DELAYED RELEASE ORAL at 16:17

## 2024-11-19 RX ADMIN — IPRATROPIUM BROMIDE 0.5 MG: 0.5 SOLUTION RESPIRATORY (INHALATION) at 06:28

## 2024-11-19 RX ADMIN — RIFAXIMIN 400 MG: 200 TABLET ORAL at 08:14

## 2024-11-19 RX ADMIN — OXYCODONE 5 MG: 5 TABLET ORAL at 09:48

## 2024-11-19 RX ADMIN — IPRATROPIUM BROMIDE 0.5 MG: 0.5 SOLUTION RESPIRATORY (INHALATION) at 10:37

## 2024-11-19 RX ADMIN — CALCIUM 500 MG: 500 TABLET ORAL at 19:56

## 2024-11-19 RX ADMIN — PANCRELIPASE LIPASE, PANCRELIPASE PROTEASE, PANCRELIPASE AMYLASE 10000 UNITS: 5000; 17000; 24000 CAPSULE, DELAYED RELEASE ORAL at 08:40

## 2024-11-19 RX ADMIN — CALCIUM 500 MG: 500 TABLET ORAL at 08:14

## 2024-11-19 ASSESSMENT — PAIN DESCRIPTION - DESCRIPTORS
DESCRIPTORS: ACHING;DISCOMFORT
DESCRIPTORS: SHOOTING
DESCRIPTORS: ACHING;DISCOMFORT

## 2024-11-19 ASSESSMENT — PAIN SCALES - GENERAL
PAINLEVEL_OUTOF10: 6
PAINLEVEL_OUTOF10: 7
PAINLEVEL_OUTOF10: 4
PAINLEVEL_OUTOF10: 0
PAINLEVEL_OUTOF10: 7
PAINLEVEL_OUTOF10: 4
PAINLEVEL_OUTOF10: 8
PAINLEVEL_OUTOF10: 8
PAINLEVEL_OUTOF10: 4
PAINLEVEL_OUTOF10: 8

## 2024-11-19 ASSESSMENT — PAIN DESCRIPTION - LOCATION
LOCATION: ABDOMEN;BACK
LOCATION: ABDOMEN

## 2024-11-19 ASSESSMENT — PAIN - FUNCTIONAL ASSESSMENT: PAIN_FUNCTIONAL_ASSESSMENT: ACTIVITIES ARE NOT PREVENTED

## 2024-11-19 NOTE — ACP (ADVANCE CARE PLANNING)
Advance Care Planning   Healthcare Decision Maker:    Primary Decision Maker: Mitchell Santacruz - Child - [phone]      Today we documented Decision Maker(s) consistent with Legal Next of Kin hierarchy.

## 2024-11-19 NOTE — PLAN OF CARE
Problem: Chronic Conditions and Co-morbidities  Goal: Patient's chronic conditions and co-morbidity symptoms are monitored and maintained or improved  Outcome: Progressing  Flowsheets (Taken 11/19/2024 0800)  Care Plan - Patient's Chronic Conditions and Co-Morbidity Symptoms are Monitored and Maintained or Improved: Monitor and assess patient's chronic conditions and comorbid symptoms for stability, deterioration, or improvement     Problem: Discharge Planning  Goal: Discharge to home or other facility with appropriate resources  Outcome: Progressing  Flowsheets (Taken 11/19/2024 0800)  Discharge to home or other facility with appropriate resources:   Identify barriers to discharge with patient and caregiver   Arrange for needed discharge resources and transportation as appropriate   Identify discharge learning needs (meds, wound care, etc)     Problem: Pain  Goal: Verbalizes/displays adequate comfort level or baseline comfort level  Outcome: Progressing     Problem: Safety - Adult  Goal: Free from fall injury  Outcome: Progressing

## 2024-11-20 LAB
ANION GAP SERPL CALCULATED.3IONS-SCNC: 7 MMOL/L (ref 7–16)
BASOPHILS # BLD: 0.01 K/UL (ref 0–0.2)
BASOPHILS NFR BLD: 1 % (ref 0–2)
BUN SERPL-MCNC: 11 MG/DL (ref 6–20)
CALCIUM SERPL-MCNC: 7.8 MG/DL (ref 8.6–10.2)
CHLORIDE SERPL-SCNC: 105 MMOL/L (ref 98–107)
CO2 SERPL-SCNC: 24 MMOL/L (ref 22–29)
CREAT SERPL-MCNC: 0.6 MG/DL (ref 0.5–1)
EOSINOPHIL # BLD: 0.03 K/UL (ref 0.05–0.5)
EOSINOPHILS RELATIVE PERCENT: 2 % (ref 0–6)
ERYTHROCYTE [DISTWIDTH] IN BLOOD BY AUTOMATED COUNT: 14.4 % (ref 11.5–15)
GFR, ESTIMATED: >90 ML/MIN/1.73M2
GLUCOSE BLD-MCNC: 200 MG/DL (ref 74–99)
GLUCOSE BLD-MCNC: 220 MG/DL (ref 74–99)
GLUCOSE BLD-MCNC: 282 MG/DL (ref 74–99)
GLUCOSE BLD-MCNC: 333 MG/DL (ref 74–99)
GLUCOSE SERPL-MCNC: 200 MG/DL (ref 74–99)
HCT VFR BLD AUTO: 31.2 % (ref 34–48)
HGB BLD-MCNC: 10.3 G/DL (ref 11.5–15.5)
IMM GRANULOCYTES # BLD AUTO: <0.03 K/UL (ref 0–0.58)
IMM GRANULOCYTES NFR BLD: 1 % (ref 0–5)
LYMPHOCYTES NFR BLD: 0.48 K/UL (ref 1.5–4)
LYMPHOCYTES RELATIVE PERCENT: 24 % (ref 20–42)
MCH RBC QN AUTO: 31.3 PG (ref 26–35)
MCHC RBC AUTO-ENTMCNC: 33 G/DL (ref 32–34.5)
MCV RBC AUTO: 94.8 FL (ref 80–99.9)
MICROORGANISM SPEC CULT: ABNORMAL
MONOCYTES NFR BLD: 0.16 K/UL (ref 0.1–0.95)
MONOCYTES NFR BLD: 8 % (ref 2–12)
NEUTROPHILS NFR BLD: 66 % (ref 43–80)
NEUTS SEG NFR BLD: 1.31 K/UL (ref 1.8–7.3)
PLATELET CONFIRMATION: NORMAL
PLATELET, FLUORESCENCE: 58 K/UL (ref 130–450)
PMV BLD AUTO: 10 FL (ref 7–12)
POTASSIUM SERPL-SCNC: 4.4 MMOL/L (ref 3.5–5)
PROCALCITONIN SERPL-MCNC: 0.07 NG/ML (ref 0–0.08)
RBC # BLD AUTO: 3.29 M/UL (ref 3.5–5.5)
SERVICE CMNT-IMP: ABNORMAL
SODIUM SERPL-SCNC: 136 MMOL/L (ref 132–146)
SPECIMEN DESCRIPTION: ABNORMAL
WBC OTHER # BLD: 2 K/UL (ref 4.5–11.5)

## 2024-11-20 PROCEDURE — 82947 ASSAY GLUCOSE BLOOD QUANT: CPT

## 2024-11-20 PROCEDURE — 2060000000 HC ICU INTERMEDIATE R&B

## 2024-11-20 PROCEDURE — 80048 BASIC METABOLIC PNL TOTAL CA: CPT

## 2024-11-20 PROCEDURE — 84145 PROCALCITONIN (PCT): CPT

## 2024-11-20 PROCEDURE — 36415 COLL VENOUS BLD VENIPUNCTURE: CPT

## 2024-11-20 PROCEDURE — 85025 COMPLETE CBC W/AUTO DIFF WBC: CPT

## 2024-11-20 PROCEDURE — 2580000003 HC RX 258: Performed by: FAMILY MEDICINE

## 2024-11-20 PROCEDURE — 94640 AIRWAY INHALATION TREATMENT: CPT

## 2024-11-20 PROCEDURE — 6370000000 HC RX 637 (ALT 250 FOR IP): Performed by: FAMILY MEDICINE

## 2024-11-20 PROCEDURE — 6360000002 HC RX W HCPCS: Performed by: FAMILY MEDICINE

## 2024-11-20 PROCEDURE — 6370000000 HC RX 637 (ALT 250 FOR IP): Performed by: SPECIALIST

## 2024-11-20 RX ORDER — INSULIN GLARGINE 100 [IU]/ML
5 INJECTION, SOLUTION SUBCUTANEOUS EVERY MORNING
Qty: 10 ML | Refills: 0 | Status: SHIPPED | OUTPATIENT
Start: 2024-11-20 | End: 2024-12-20

## 2024-11-20 RX ORDER — CIPROFLOXACIN 500 MG/1
500 TABLET, FILM COATED ORAL EVERY 12 HOURS SCHEDULED
Qty: 10 TABLET | Refills: 0 | Status: SHIPPED | OUTPATIENT
Start: 2024-11-20 | End: 2024-11-25

## 2024-11-20 RX ORDER — CIPROFLOXACIN 500 MG/1
500 TABLET, FILM COATED ORAL EVERY 12 HOURS SCHEDULED
Status: DISCONTINUED | OUTPATIENT
Start: 2024-11-20 | End: 2024-11-21 | Stop reason: HOSPADM

## 2024-11-20 RX ORDER — INSULIN LISPRO 100 [IU]/ML
0-6 INJECTION, SOLUTION INTRAVENOUS; SUBCUTANEOUS NIGHTLY
Qty: 3 ML | Refills: 0 | Status: SHIPPED | OUTPATIENT
Start: 2024-11-20 | End: 2024-12-20

## 2024-11-20 RX ORDER — INSULIN LISPRO 100 [IU]/ML
0-8 INJECTION, SOLUTION INTRAVENOUS; SUBCUTANEOUS
Qty: 3 ML | Refills: 0 | Status: SHIPPED | OUTPATIENT
Start: 2024-11-20

## 2024-11-20 RX ADMIN — INSULIN GLARGINE 5 UNITS: 100 INJECTION, SOLUTION SUBCUTANEOUS at 09:17

## 2024-11-20 RX ADMIN — SODIUM CHLORIDE: 4.5 INJECTION, SOLUTION INTRAVENOUS at 19:10

## 2024-11-20 RX ADMIN — VORTIOXETINE 10 MG: 10 TABLET, FILM COATED ORAL at 20:13

## 2024-11-20 RX ADMIN — OXYCODONE 5 MG: 5 TABLET ORAL at 20:13

## 2024-11-20 RX ADMIN — OXYCODONE 5 MG: 5 TABLET ORAL at 05:39

## 2024-11-20 RX ADMIN — Medication 1 TABLET: at 09:12

## 2024-11-20 RX ADMIN — ANASTROZOLE 1 MG: 1 TABLET ORAL at 09:16

## 2024-11-20 RX ADMIN — CALCIUM 500 MG: 500 TABLET ORAL at 09:11

## 2024-11-20 RX ADMIN — GABAPENTIN 100 MG: 100 CAPSULE ORAL at 14:21

## 2024-11-20 RX ADMIN — LACTULOSE 20 G: 20 SOLUTION ORAL at 09:12

## 2024-11-20 RX ADMIN — ONDANSETRON 4 MG: 4 TABLET, ORALLY DISINTEGRATING ORAL at 10:44

## 2024-11-20 RX ADMIN — ARIPIPRAZOLE 20 MG: 10 TABLET ORAL at 09:12

## 2024-11-20 RX ADMIN — PANCRELIPASE LIPASE, PANCRELIPASE PROTEASE, PANCRELIPASE AMYLASE 10000 UNITS: 5000; 17000; 24000 CAPSULE, DELAYED RELEASE ORAL at 12:04

## 2024-11-20 RX ADMIN — OXYCODONE 5 MG: 5 TABLET ORAL at 01:06

## 2024-11-20 RX ADMIN — RIFAXIMIN 400 MG: 200 TABLET ORAL at 20:12

## 2024-11-20 RX ADMIN — CIPROFLOXACIN HYDROCHLORIDE 500 MG: 500 TABLET, FILM COATED ORAL at 20:13

## 2024-11-20 RX ADMIN — GABAPENTIN 100 MG: 100 CAPSULE ORAL at 20:13

## 2024-11-20 RX ADMIN — OXYCODONE 5 MG: 5 TABLET ORAL at 10:46

## 2024-11-20 RX ADMIN — FUROSEMIDE 20 MG: 20 TABLET ORAL at 09:12

## 2024-11-20 RX ADMIN — SODIUM CHLORIDE: 4.5 INJECTION, SOLUTION INTRAVENOUS at 05:40

## 2024-11-20 RX ADMIN — CALCIUM 500 MG: 500 TABLET ORAL at 20:14

## 2024-11-20 RX ADMIN — INSULIN LISPRO 6 UNITS: 100 INJECTION, SOLUTION INTRAVENOUS; SUBCUTANEOUS at 17:16

## 2024-11-20 RX ADMIN — PANTOPRAZOLE SODIUM 40 MG: 40 TABLET, DELAYED RELEASE ORAL at 05:39

## 2024-11-20 RX ADMIN — LACTULOSE 20 G: 20 SOLUTION ORAL at 20:12

## 2024-11-20 RX ADMIN — TRAZODONE HYDROCHLORIDE 50 MG: 50 TABLET ORAL at 20:12

## 2024-11-20 RX ADMIN — OXYCODONE HYDROCHLORIDE AND ACETAMINOPHEN 1000 MG: 500 TABLET ORAL at 09:12

## 2024-11-20 RX ADMIN — OXYCODONE 5 MG: 5 TABLET ORAL at 15:14

## 2024-11-20 RX ADMIN — RIFAXIMIN 400 MG: 200 TABLET ORAL at 09:15

## 2024-11-20 RX ADMIN — RIFAXIMIN 400 MG: 200 TABLET ORAL at 14:21

## 2024-11-20 RX ADMIN — Medication 100 MG: at 09:15

## 2024-11-20 RX ADMIN — PANCRELIPASE LIPASE, PANCRELIPASE PROTEASE, PANCRELIPASE AMYLASE 10000 UNITS: 5000; 17000; 24000 CAPSULE, DELAYED RELEASE ORAL at 09:15

## 2024-11-20 RX ADMIN — INSULIN GLARGINE 12 UNITS: 100 INJECTION, SOLUTION SUBCUTANEOUS at 20:09

## 2024-11-20 RX ADMIN — GABAPENTIN 100 MG: 100 CAPSULE ORAL at 09:11

## 2024-11-20 RX ADMIN — PANCRELIPASE LIPASE, PANCRELIPASE PROTEASE, PANCRELIPASE AMYLASE 10000 UNITS: 5000; 17000; 24000 CAPSULE, DELAYED RELEASE ORAL at 17:10

## 2024-11-20 RX ADMIN — Medication 12 MG: at 20:12

## 2024-11-20 RX ADMIN — IPRATROPIUM BROMIDE 0.5 MG: 0.5 SOLUTION RESPIRATORY (INHALATION) at 06:49

## 2024-11-20 ASSESSMENT — PAIN SCALES - GENERAL
PAINLEVEL_OUTOF10: 8
PAINLEVEL_OUTOF10: 7
PAINLEVEL_OUTOF10: 3
PAINLEVEL_OUTOF10: 6
PAINLEVEL_OUTOF10: 6
PAINLEVEL_OUTOF10: 7
PAINLEVEL_OUTOF10: 7

## 2024-11-20 ASSESSMENT — PAIN DESCRIPTION - LOCATION
LOCATION: ABDOMEN;BACK
LOCATION: BACK;ABDOMEN
LOCATION: ABDOMEN
LOCATION: ABDOMEN;BACK
LOCATION: ABDOMEN;BACK

## 2024-11-20 ASSESSMENT — PAIN DESCRIPTION - ORIENTATION: ORIENTATION: POSTERIOR;ANTERIOR

## 2024-11-20 ASSESSMENT — PAIN DESCRIPTION - DESCRIPTORS
DESCRIPTORS: ACHING;DISCOMFORT
DESCRIPTORS: ACHING
DESCRIPTORS: ACHING;DISCOMFORT
DESCRIPTORS: DISCOMFORT
DESCRIPTORS: STABBING;BURNING

## 2024-11-20 ASSESSMENT — PAIN - FUNCTIONAL ASSESSMENT
PAIN_FUNCTIONAL_ASSESSMENT: ACTIVITIES ARE NOT PREVENTED
PAIN_FUNCTIONAL_ASSESSMENT: ACTIVITIES ARE NOT PREVENTED

## 2024-11-20 NOTE — CONSULTS
Chronic pancreatitis (HCC)     Cocaine abuse (HCC)     Constipation     Diabetes mellitus, type 2 (HCC)     GERD (gastroesophageal reflux disease)     Gout     Hepatitis C     Hernia of anterior abdominal wall     Jaundice 05/12/2016    Pneumonia     Polyneuropathy due to type 2 diabetes mellitus (HCC)     Schizoaffective disorder, bipolar type (HCC)     Splenomegaly 05/30/2017        Past Surgical History     Past Surgical History:   Procedure Laterality Date    ABDOMEN SURGERY      gallbladder removal    BREAST BIOPSY Left     CHOLECYSTECTOMY  2010    COLONOSCOPY      COLONOSCOPY N/A 10/2/2018    COLONOSCOPY WITH BIOPSY performed by Simón Esquivel MD at [facility] ENDOSCOPY    CT NEEDLE BIOPSY LIVER PERCUTANEOUS  3/19/2024    CT NEEDLE BIOPSY LIVER PERCUTANEOUS 3/19/2024 [facility] CT    ENDOSCOPY, COLON, DIAGNOSTIC      ERCP      april 2016 at [facility]    HYSTERECTOMY (CERVIX STATUS UNKNOWN)  2004    IR TUNNELED CATHETER PLACEMENT GREATER THAN 5 YEARS  3/27/2024    FL TUNNELED CVC PLACE WO SQ PORT/PUMP > 5 YEARS 3/27/2024 [facility]IOLOGY    MASTECTOMY Left     Left breast mastectomy per patient    OTHER SURGICAL HISTORY Left 04/25/2017     Left breast blue dye injection, Sential Node Lymph Biopsy with left breast lumpectomy    OTHER SURGICAL HISTORY  10/05/2017    simple left mastectomy    OVARY REMOVAL Bilateral 2007    PANCREAS BIOPSY      with stent    PERINEAL SURGERY N/A 9/18/2023    PERINEAL INCISION AND DRAINAGE ABSCESS performed by Karl Santos MD at [facility] OR    RECTAL SURGERY N/A 8/28/2023    PERINEAL ABSCESS INCISION AND DRAINAGE [place] [facility] [place]    SHOULDER SURGERY Left 2003    UPPER GASTROINTESTINAL ENDOSCOPY          Medications   Medications Prior to Admission: fentaNYL (DURAGESIC) 25 MCG/HR, Place 1 patch onto the skin every 72 hours.  calcium carbonate (OSCAL) 500 MG TABS tablet, Take 1 tablet by mouth 2 times daily  gabapentin (NEURONTIN) 100 MG capsule, Take 1 capsule by mouth 3 times

## 2024-11-21 VITALS
HEART RATE: 71 BPM | OXYGEN SATURATION: 97 % | TEMPERATURE: 98.1 F | WEIGHT: 125.44 LBS | DIASTOLIC BLOOD PRESSURE: 84 MMHG | SYSTOLIC BLOOD PRESSURE: 131 MMHG | BODY MASS INDEX: 24.5 KG/M2 | RESPIRATION RATE: 17 BRPM

## 2024-11-21 LAB
CRP SERPL HS-MCNC: <3 MG/L (ref 0–5)
ERYTHROCYTE [SEDIMENTATION RATE] IN BLOOD BY WESTERGREN METHOD: 33 MM/HR (ref 0–20)
GLUCOSE BLD-MCNC: 351 MG/DL (ref 74–99)

## 2024-11-21 PROCEDURE — 36415 COLL VENOUS BLD VENIPUNCTURE: CPT

## 2024-11-21 PROCEDURE — 86140 C-REACTIVE PROTEIN: CPT

## 2024-11-21 PROCEDURE — 6370000000 HC RX 637 (ALT 250 FOR IP): Performed by: SPECIALIST

## 2024-11-21 PROCEDURE — 6370000000 HC RX 637 (ALT 250 FOR IP): Performed by: FAMILY MEDICINE

## 2024-11-21 PROCEDURE — 85652 RBC SED RATE AUTOMATED: CPT

## 2024-11-21 PROCEDURE — 82947 ASSAY GLUCOSE BLOOD QUANT: CPT

## 2024-11-21 RX ADMIN — INSULIN LISPRO 6 UNITS: 100 INJECTION, SOLUTION INTRAVENOUS; SUBCUTANEOUS at 08:28

## 2024-11-21 RX ADMIN — OXYCODONE 5 MG: 5 TABLET ORAL at 07:04

## 2024-11-21 RX ADMIN — OXYCODONE HYDROCHLORIDE AND ACETAMINOPHEN 1000 MG: 500 TABLET ORAL at 08:30

## 2024-11-21 RX ADMIN — Medication 1 TABLET: at 08:29

## 2024-11-21 RX ADMIN — FUROSEMIDE 20 MG: 20 TABLET ORAL at 08:29

## 2024-11-21 RX ADMIN — INSULIN GLARGINE 5 UNITS: 100 INJECTION, SOLUTION SUBCUTANEOUS at 08:28

## 2024-11-21 RX ADMIN — Medication 100 MG: at 08:29

## 2024-11-21 RX ADMIN — ANASTROZOLE 1 MG: 1 TABLET ORAL at 08:30

## 2024-11-21 RX ADMIN — CIPROFLOXACIN HYDROCHLORIDE 500 MG: 500 TABLET, FILM COATED ORAL at 08:29

## 2024-11-21 RX ADMIN — CALCIUM 500 MG: 500 TABLET ORAL at 08:29

## 2024-11-21 RX ADMIN — GABAPENTIN 100 MG: 100 CAPSULE ORAL at 08:29

## 2024-11-21 RX ADMIN — PANCRELIPASE LIPASE, PANCRELIPASE PROTEASE, PANCRELIPASE AMYLASE 10000 UNITS: 5000; 17000; 24000 CAPSULE, DELAYED RELEASE ORAL at 08:29

## 2024-11-21 RX ADMIN — ARIPIPRAZOLE 20 MG: 10 TABLET ORAL at 08:29

## 2024-11-21 RX ADMIN — PANTOPRAZOLE SODIUM 40 MG: 40 TABLET, DELAYED RELEASE ORAL at 07:04

## 2024-11-21 RX ADMIN — OXYCODONE 5 MG: 5 TABLET ORAL at 01:19

## 2024-11-21 RX ADMIN — RIFAXIMIN 400 MG: 200 TABLET ORAL at 08:29

## 2024-11-21 ASSESSMENT — PAIN SCALES - GENERAL
PAINLEVEL_OUTOF10: 7
PAINLEVEL_OUTOF10: 5
PAINLEVEL_OUTOF10: 8

## 2024-11-21 ASSESSMENT — PAIN DESCRIPTION - LOCATION
LOCATION: ABDOMEN;BACK
LOCATION: ABDOMEN;BACK

## 2024-11-21 ASSESSMENT — PAIN DESCRIPTION - DESCRIPTORS: DESCRIPTORS: ACHING

## 2024-11-21 NOTE — CARE COORDINATION
11/20/2024 1125 CM Note:  Pt is here from [facility] and plans to return there at d/c.  Per Giacomo at [facility] pt can return but she will need transportation d/t their transport van is not working.They have nurses 10 hrs/day if needs meds at [place]. Please call [facility] ([phone]) when pt expected discharge is known. CM will follow. Electronically signed by Ellie Michel RN on 11/20/2024 at 11:39 AM   
11/21/2024 1015 CM Note: Pt is being discharged today back to [facility]. Pooja notified of pt's return([phone]).  Transportation was scheduled with [employer] through [employer], ETA is from 1015 am-1215 pm the  will wait only 5 minutes ref#[id number]. They will notify pt via cell phone when they are arriving. Electronically signed by Ellie Michel RN on 11/21/2024 at 10:52 AM   
[phone]

## 2024-11-21 NOTE — PLAN OF CARE
Problem: Chronic Conditions and Co-morbidities  Goal: Patient's chronic conditions and co-morbidity symptoms are monitored and maintained or improved  11/21/2024 1014 by Sabrina Martinez RN  Outcome: Progressing  11/21/2024 0129 by Zoraida Colin RN  Outcome: Progressing  Flowsheets (Taken 11/20/2024 1937)  Care Plan - Patient's Chronic Conditions and Co-Morbidity Symptoms are Monitored and Maintained or Improved: Monitor and assess patient's chronic conditions and comorbid symptoms for stability, deterioration, or improvement     Problem: Discharge Planning  Goal: Discharge to home or other facility with appropriate resources  11/21/2024 1014 by Sabrina Martinez RN  Outcome: Progressing  11/21/2024 0129 by Zoraida Colin RN  Outcome: Progressing  Flowsheets (Taken 11/20/2024 1937)  Discharge to home or other facility with appropriate resources: Identify barriers to discharge with patient and caregiver     Problem: Pain  Goal: Verbalizes/displays adequate comfort level or baseline comfort level  11/21/2024 1014 by Sabrina Martinez RN  Outcome: Progressing  11/21/2024 0129 by Zoraida Colin RN  Outcome: Progressing     Problem: Safety - Adult  Goal: Free from fall injury  Outcome: Progressing

## 2024-11-21 NOTE — PLAN OF CARE
Problem: Chronic Conditions and Co-morbidities  Goal: Patient's chronic conditions and co-morbidity symptoms are monitored and maintained or improved  11/21/2024 0129 by Zoraida Colin RN  Outcome: Progressing  Flowsheets (Taken 11/20/2024 1937)  Care Plan - Patient's Chronic Conditions and Co-Morbidity Symptoms are Monitored and Maintained or Improved: Monitor and assess patient's chronic conditions and comorbid symptoms for stability, deterioration, or improvement  11/20/2024 1850 by Lisa Salazar RN  Outcome: Progressing     Problem: Discharge Planning  Goal: Discharge to home or other facility with appropriate resources  11/21/2024 0129 by Zoraida Colin RN  Outcome: Progressing  Flowsheets (Taken 11/20/2024 1937)  Discharge to home or other facility with appropriate resources: Identify barriers to discharge with patient and caregiver  11/20/2024 1850 by Lisa Salazar RN  Outcome: Progressing     Problem: Pain  Goal: Verbalizes/displays adequate comfort level or baseline comfort level  11/21/2024 0129 by Zoraida Colin RN  Outcome: Progressing  11/20/2024 1850 by Lisa Salazar RN  Outcome: Progressing

## 2024-11-21 NOTE — PROGRESS NOTES
4 Eyes Skin Assessment     NAME:  Ellie Santacruz  YOB: 1970  MEDICAL RECORD NUMBER:  [id number]    The patient is being assessed for  Admission    I agree that at least one RN has performed a thorough Head to Toe Skin Assessment on the patient. ALL assessment sites listed below have been assessed.      Areas assessed by both nurses:    Head, Face, Ears, Shoulders, Back, Chest, Arms, Elbows, Hands, Sacrum. Buttock, Coccyx, Ischium, Legs. Feet and Heels, and Under Medical Devices         Does the Patient have a Wound? No noted wound(s) scab/old ulcer no open area, scattered scratches       Abe Prevention initiated by RN: No  Wound Care Orders initiated by RN: No    Pressure Injury (Stage 3,4, Unstageable, DTI, NWPT, and Complex wounds) if present, place Wound referral order by RN under : No    New Ostomies, if present place, Ostomy referral order under : No     Nurse 1 eSignature: Electronically signed by Lisa Salazar RN on 11/18/24 at 7:22 PM EST    **SHARE this note so that the co-signing nurse can place an eSignature**    Nurse 2 eSignature: Electronically signed by Ivan Jung RN on 11/18/24 at 7:22 PM EST    
Department of Family Practice  Adult Daily Progress Note      SUBJECTIVE  OSBALDO IS SEEN IN FOLLOW UP ON IM.  SHE IS TOLERATING TREATMENT.  SHE SAYS SHE IS COUGHING A LOT.  WOULD LIKE COUGH MEDICINE.  HER URINE IS GROWING KLEBSIELLA AEROGENES IN THE URINE.  WILL CONSULT ID FOR RECOMMENDATIONS.  POTASSIUM  5.2 AGAIN TODAY.  SHE HAD PT AND OT EVALUATIONS; GETTING AROUND OK.    OBJECTIVE    Physical  VITALS:  /86   Pulse 84   Temp 97.9 °F (36.6 °C) (Oral)   Resp 20   Wt 56.9 kg (125 lb 7.1 oz) Comment: 11/1/24  SpO2 99%   BMI 24.50 kg/m²   CONSTITUTIONAL:  awake, alert, cooperative, no apparent distress, and appears stated age  LUNGS:  No increased work of breathing, good air exchange, clear to auscultation bilaterally, no crackles or wheezing  CARDIOVASCULAR:  Normal apical impulse, regular rate and rhythm, normal S1 and S2, no S3 or S4, and no murmur noted  ABDOMEN:  No scars, normal bowel sounds, soft, non-distended, non-tender, no masses palpated, no hepatosplenomegally  SKIN:  WARM AND DRY  Data  CBC with Differential:    Lab Results   Component Value Date/Time    WBC 2.8 11/19/2024 07:00 AM    RBC 3.84 11/19/2024 07:00 AM    HGB 11.9 11/19/2024 07:00 AM    HCT 35.7 11/19/2024 07:00 AM    PLT 61 11/19/2024 07:00 AM    MCV 93.0 11/19/2024 07:00 AM    MCH 31.0 11/19/2024 07:00 AM    MCHC 33.3 11/19/2024 07:00 AM    RDW 14.7 11/19/2024 07:00 AM    NRBC 1 03/15/2024 10:55 AM    BANDSPCT 1 05/15/2016 08:30 AM    METASPCT 1 03/23/2024 05:20 AM    METASPCT 1.7 06/18/2017 06:33 AM    LYMPHOPCT 22 11/19/2024 07:00 AM    MONOPCT 10 11/19/2024 07:00 AM    MYELOPCT 1 11/17/2024 04:02 PM    MYELOPCT 0.5 04/01/2017 09:45 AM    EOSPCT 0 11/19/2024 07:00 AM    BASOPCT 0 11/19/2024 07:00 AM    MONOSABS 0.29 11/19/2024 07:00 AM    LYMPHSABS 0.61 11/19/2024 07:00 AM    EOSABS 0.00 11/19/2024 07:00 AM    BASOSABS 0.00 11/19/2024 07:00 AM     BMP:    Lab Results   Component Value Date/Time     11/19/2024 07:00 AM    
Department of Family Practice  Adult Daily Progress Note      SUBJECTIVE  OSBALDO IS SEEN IN FOLLOW UP ON [facility].  SHE IS TOLERATING TREATMENT.  SHE SAYS HER DAY WENT WELL TODAY BUT SHE IS NOT FEELING WELL TONIGHT.  CANNOT ELABORATE.  SERUM POTASSIUM IS DOWN TO 5.2.      OBJECTIVE    Physical  VITALS:  /70   Pulse 83   Temp 98.5 °F (36.9 °C) (Oral)   Resp 18   Wt 56.9 kg (125 lb 7.1 oz) Comment: 11/1/24  SpO2 96%   BMI 24.50 kg/m²   CONSTITUTIONAL:  awake, alert, cooperative, no apparent distress, and appears stated age  LUNGS:  No increased work of breathing, good air exchange, clear to auscultation bilaterally, no crackles or wheezing  CARDIOVASCULAR:  Normal apical impulse, regular rate and rhythm, normal S1 and S2, no S3 or S4, and no murmur noted  ABDOMEN:  No scars, normal bowel sounds, soft, non-distended, non-tender, no masses palpated, no hepatosplenomegally  SKIN:  WARM AND DRY  Data  CBC with Differential:    Lab Results   Component Value Date/Time    WBC 2.5 11/18/2024 06:00 AM    RBC 3.66 11/18/2024 06:00 AM    HGB 11.3 11/18/2024 06:00 AM    HCT 34.4 11/18/2024 06:00 AM    PLT 57 11/18/2024 06:00 AM    MCV 94.0 11/18/2024 06:00 AM    MCH 30.9 11/18/2024 06:00 AM    MCHC 32.8 11/18/2024 06:00 AM    RDW 14.9 11/18/2024 06:00 AM    NRBC 1 03/15/2024 10:55 AM    BANDSPCT 1 05/15/2016 08:30 AM    METASPCT 1 03/23/2024 05:20 AM    METASPCT 1.7 06/18/2017 06:33 AM    LYMPHOPCT 17 11/18/2024 06:00 AM    MONOPCT 7 11/18/2024 06:00 AM    MYELOPCT 1 11/17/2024 04:02 PM    MYELOPCT 0.5 04/01/2017 09:45 AM    EOSPCT 4 11/18/2024 06:00 AM    BASOPCT 0 11/18/2024 06:00 AM    MONOSABS 0.18 11/18/2024 06:00 AM    LYMPHSABS 0.42 11/18/2024 06:00 AM    EOSABS 0.09 11/18/2024 06:00 AM    BASOSABS 0.00 11/18/2024 06:00 AM     BMP:    Lab Results   Component Value Date/Time     11/18/2024 06:00 AM    K 5.2 11/18/2024 06:00 AM    K 5.2 05/11/2023 08:52 PM     11/18/2024 06:00 AM    CO2 25 11/18/2024 06:00 
Occupational Therapy    Room #:  0638/0638-01  Patient Name: Ellie Santacruz  YOB: 1970  MRN: [id number]      Date of Service: 11/19/2024    Chart reviewed. Spoke with nursing. Patient  observed to be independent with ADLs and functional mobility.  No skilled Occupational Therapy needs at this time. Please re-order Occupational Therapy if there is a change in physical status and Occupational Therapy is needed. Thank you.     Howard Dolan OTR/L; #[id number]      
11/20/2024 05:56 AM    K 5.2 05/11/2023 08:52 PM     11/20/2024 05:56 AM    CO2 24 11/20/2024 05:56 AM    BUN 11 11/20/2024 05:56 AM    CREATININE 0.6 11/20/2024 05:56 AM    CALCIUM 7.8 11/20/2024 05:56 AM    GFRAA >60 08/17/2022 06:53 AM    LABGLOM >90 11/20/2024 05:56 AM    LABGLOM >90 03/27/2024 05:20 AM    GLUCOSE 200 11/20/2024 05:56 AM     Current Medications  Scheduled Meds:   ciprofloxacin  500 mg Oral 2 times per day    furosemide  20 mg Oral Daily    ipratropium  0.5 mg Nebulization 4x Daily RT    anastrozole  1 mg Oral QAM    ARIPiprazole  20 mg Oral QAM    vitamin C  1,000 mg Oral QAM    calcium elemental  500 mg Oral BID    lipase-protease-amylase  10,000 Units Oral TID WC    fentaNYL  1 patch TransDERmal Q72H    gabapentin  100 mg Oral TID    insulin glargine  5 Units SubCUTAneous QAM    insulin glargine  12 Units SubCUTAneous Nightly    insulin lispro  6 Units SubCUTAneous TID WC    lactulose  20 g Oral BID    melatonin  12 mg Oral Nightly    therapeutic multivitamin-minerals  1 tablet Oral QAM    pantoprazole  40 mg Oral QAM AC    rifAXIMin  400 mg Oral TID    thiamine mononitrate  100 mg Oral Daily    VORTIoxetine HBr  10 mg Oral Nightly    nicotine  1 patch TransDERmal Daily     Continuous Infusions:   sodium chloride 75 mL/hr at 11/20/24 1910    dextrose       PRN Meds:.acetaminophen, albuterol, hydrOXYzine pamoate, ondansetron, sennosides-docusate sodium, traZODone, oxyCODONE, glucose, dextrose bolus **OR** dextrose bolus, glucagon (rDNA), dextrose    ASSESSMENT AND PLAN      Principal Problem:    Hyperkalemia  Active Problems:    Type 2 diabetes mellitus with hyperglycemia (HCC)    Poorly controlled diabetes mellitus (HCC)    Bicytopenia - leukopenia and thrombocytopenia    Bacteriuria with pyuria    Acute cystitis without hematuria    Electrolyte imbalance  Resolved Problems:    * No resolved hospital problems. *      CONTINUE PLAN OF CARE.  DISCHARGE TOMORROW.    
to sitting on the side of a flat bed without using bedrails?: None  How much help is needed moving to and from a bed to a chair?: None  How much help is needed standing up from a chair using your arms?: None  How much help is needed walking in hospital room?: None  How much help is needed climbing 3-5 steps with a railing?: A Little  AM-[facility] Inpatient Mobility Raw Score : 23  AM-PAC Inpatient T-Scale Score : 56.93  Mobility Inpatient CMS 0-100% Score: 11.2  Mobility Inpatient CMS G-Code Modifier : CI    Nursing cleared patient for PT evaluation. The admitting diagnosis and active problem list as listed above have been reviewed prior to the initiation of this evaluation.    OBJECTIVE:   Initial Evaluation  Date: 11/19/2024 Treatment Date:     Short Term/ Long Term   Goals   Was pt agreeable to Eval/treatment? Yes  To be met in 3 days   Pain level   7/10  Low back, legs     Bed Mobility  Using rails and head of bed elevated:     Rolling: Independent    Supine to sit: Independent    Sit to supine: Independent    Scooting: Independent       Transfers Sit to stand: Independent      Ambulation     125' and 250 feet using  IV pole with Modified Independent         > 350 feet using  no device with Independent    Stair negotiation: ascended and descended   2 steps, with rail with Supervision    2 steps, with rail with Mod I   ROM Within functional limits    Increase range of motion 10% of affected joints    Strength BUE:  refer to OT eval  RLE:  4+/5  LLE:   4+/5  Increase strength in affected mm groups by 1/3 grade   Balance Sitting EOB:  good   Dynamic Standing:  good with IV line  Sitting EOB:  good   Dynamic Standing: good      Patient is Alert & Oriented x person, place, time, and situation and follows directions    Sensation:  Patient  denies numbness/tingling   Edema:  no   Endurance: good     Vitals: room air   Blood Pressure at rest  Blood Pressure during session    Heart Rate at rest  Heart Rate during session

## 2024-11-21 NOTE — FLOWSHEET NOTE
Patient refused vitals at this time, she stated \"please no I keep getting woken up. Maybe later\"

## 2024-11-22 ENCOUNTER — HOSPITAL ENCOUNTER (OUTPATIENT)
Dept: INFUSION THERAPY | Age: 54
End: 2024-11-22

## 2024-11-25 ENCOUNTER — TELEPHONE (OUTPATIENT)
Dept: INFUSION THERAPY | Age: 54
End: 2024-11-25

## 2024-11-25 NOTE — TELEPHONE ENCOUNTER
Patient requesting pain med. States she takes oxycodone and \"it's not helping.\" Said RN states that since Dr. Rojas did not prescribe he would not increase dose. Patient states she is in a lot of pain and requesting that he be informed. Said RN will discuss with Dr. Rojas and call her back. Patient verbalized understanding.  Pat Fung, BSN, RN, OCN 11/25/24 [id number]

## 2024-11-26 ENCOUNTER — HOSPITAL ENCOUNTER (OUTPATIENT)
Dept: MRI IMAGING | Age: 54
Discharge: HOME | End: 2024-11-28
Payer: COMMERCIAL

## 2024-11-26 DIAGNOSIS — Z85.3 PERSONAL HISTORY OF BREAST CANCER: ICD-10-CM

## 2024-11-26 DIAGNOSIS — M89.9 BONE LESION: ICD-10-CM

## 2024-11-26 PROCEDURE — 72197 MRI PELVIS W/O & W/DYE: CPT

## 2024-11-26 PROCEDURE — A9577 INJ MULTIHANCE: HCPCS | Performed by: RADIOLOGY

## 2024-11-26 PROCEDURE — 72158 MRI LUMBAR SPINE W/O & W/DYE: CPT

## 2024-11-26 PROCEDURE — 6360000004 HC RX CONTRAST MEDICATION: Performed by: RADIOLOGY

## 2024-11-26 RX ADMIN — GADOBENATE DIMEGLUMINE 12 ML: 529 INJECTION, SOLUTION INTRAVENOUS at 10:36

## 2024-12-06 ENCOUNTER — HOSPITAL ENCOUNTER (OUTPATIENT)
Dept: INFUSION THERAPY | Age: 54
Discharge: HOME | End: 2024-12-06
Payer: COMMERCIAL

## 2024-12-06 ENCOUNTER — OFFICE VISIT (OUTPATIENT)
Dept: ONCOLOGY | Age: 54
End: 2024-12-06

## 2024-12-06 VITALS
DIASTOLIC BLOOD PRESSURE: 80 MMHG | HEIGHT: 60 IN | TEMPERATURE: 97 F | BODY MASS INDEX: 26.13 KG/M2 | SYSTOLIC BLOOD PRESSURE: 144 MMHG | OXYGEN SATURATION: 97 % | WEIGHT: 133.1 LBS | HEART RATE: 87 BPM

## 2024-12-06 DIAGNOSIS — D50.8 OTHER IRON DEFICIENCY ANEMIA: ICD-10-CM

## 2024-12-06 DIAGNOSIS — Z85.3 PERSONAL HISTORY OF BREAST CANCER: ICD-10-CM

## 2024-12-06 DIAGNOSIS — Z85.3 PERSONAL HISTORY OF BREAST CANCER: Primary | ICD-10-CM

## 2024-12-06 LAB
BASOPHILS # BLD: 0 K/UL (ref 0–0.2)
BASOPHILS NFR BLD: 0 % (ref 0–2)
EOSINOPHIL # BLD: 0.04 K/UL (ref 0.05–0.5)
EOSINOPHILS RELATIVE PERCENT: 2 % (ref 0–6)
ERYTHROCYTE [DISTWIDTH] IN BLOOD BY AUTOMATED COUNT: 13.7 % (ref 11.5–15)
FERRITIN SERPL-MCNC: 221 NG/ML
HCT VFR BLD AUTO: 33.6 % (ref 34–48)
HGB BLD-MCNC: 11.2 G/DL (ref 11.5–15.5)
IRON SATN MFR SERPL: 49 % (ref 15–50)
IRON SERPL-MCNC: 146 UG/DL (ref 37–145)
LYMPHOCYTES NFR BLD: 0.33 K/UL (ref 1.5–4)
LYMPHOCYTES RELATIVE PERCENT: 14 % (ref 20–42)
MCH RBC QN AUTO: 30.9 PG (ref 26–35)
MCHC RBC AUTO-ENTMCNC: 33.3 G/DL (ref 32–34.5)
MCV RBC AUTO: 92.6 FL (ref 80–99.9)
MONOCYTES NFR BLD: 0.1 K/UL (ref 0.1–0.95)
MONOCYTES NFR BLD: 4 % (ref 2–12)
NEUTROPHILS NFR BLD: 80 % (ref 43–80)
NEUTS SEG NFR BLD: 1.92 K/UL (ref 1.8–7.3)
NUCLEATED RED BLOOD CELLS: 1 PER 100 WBC
PLATELET CONFIRMATION: NORMAL
PLATELET, FLUORESCENCE: 69 K/UL (ref 130–450)
PMV BLD AUTO: 9.5 FL (ref 7–12)
RBC # BLD AUTO: 3.63 M/UL (ref 3.5–5.5)
RBC # BLD: NORMAL 10*6/UL
TIBC SERPL-MCNC: 298 UG/DL (ref 250–450)
WBC OTHER # BLD: 2.4 K/UL (ref 4.5–11.5)

## 2024-12-06 PROCEDURE — 82728 ASSAY OF FERRITIN: CPT

## 2024-12-06 PROCEDURE — 85025 COMPLETE CBC W/AUTO DIFF WBC: CPT

## 2024-12-06 PROCEDURE — 83540 ASSAY OF IRON: CPT

## 2024-12-06 PROCEDURE — 83550 IRON BINDING TEST: CPT

## 2024-12-06 PROCEDURE — 36415 COLL VENOUS BLD VENIPUNCTURE: CPT

## 2024-12-06 NOTE — PROGRESS NOTES
[facility] MEDICAL ONCOLOGY  [address] [address]  Dept: [phone]  Loc: [phone]    Gina Freitas MD   ·   MD Bella Betancourt APRN  ·  EVERETT Asif        Hematology/Oncology   Clinic Progress Note              Patient: Ellie Santacruz,  54 y.o. female    Date of Encounter: 2024         Reason for Visit:   History of left breast cancer, early stage, ER/SC positive, HER2 negative        PCP:  Liza Oh DO      Chief Complaint   Patient presents with    Follow-up     Bone lesion             Subjective:  Continues have some sacral/coccygeal pain.  Also having shooting pains down her legs bilaterally.  Otherwise left feels largely unchanged.  As the symptoms are largely chronic.    HPI from Initial Outpatient Consultation (2024):  The patient is a 54 y.o. female who returns to clinic, after over a year since last fall.  She was previously established with Dr. Lui, who has now moved on from our practice.  No major events of late.  She is accompanied by an acquaintance.  She currently lives in a group home.  She denies of new breast masses.  But states that she has been having bony pain last several months.  She remains to be on anastrozole, tolerating fairly well.        ONCOLOGIC HISTORY:   The left breast mass was located at the 11 o'clock position.  Age of menarche 12;  3 Para 3; LMP early 30's; FHX Mother with Breast Cancer at Age 60     Bilateral Diagnostic Mammogram on 2017:  Circumscribed solid nodule without increased vascularity at the 11 o'clock position corresponding to the patient's palpable lesion.   6 mm smooth bordered nodule within the left upper outer quadrant which is not identifiable on the lateral view (6 month f/u left breast mammogram recommended).  Unremarkable right-sided mammogram demonstrating no evidence of malignancy. Annual right sided mammogram recommended.

## 2024-12-29 ENCOUNTER — APPOINTMENT (OUTPATIENT)
Dept: CT IMAGING | Age: 54
DRG: 283 | End: 2024-12-29
Payer: COMMERCIAL

## 2024-12-29 ENCOUNTER — APPOINTMENT (OUTPATIENT)
Dept: GENERAL RADIOLOGY | Age: 54
DRG: 283 | End: 2024-12-29
Payer: COMMERCIAL

## 2024-12-29 ENCOUNTER — HOSPITAL ENCOUNTER (INPATIENT)
Age: 54
LOS: 3 days | Discharge: OTHER INSTITUTION | DRG: 283 | End: 2025-01-01
Attending: EMERGENCY MEDICINE | Admitting: FAMILY MEDICINE
Payer: COMMERCIAL

## 2024-12-29 DIAGNOSIS — E87.20 LACTIC ACIDOSIS: ICD-10-CM

## 2024-12-29 DIAGNOSIS — K74.60 CIRRHOSIS OF LIVER WITH ASCITES, UNSPECIFIED HEPATIC CIRRHOSIS TYPE (HCC): Primary | ICD-10-CM

## 2024-12-29 DIAGNOSIS — E87.5 HYPERKALEMIA: ICD-10-CM

## 2024-12-29 DIAGNOSIS — R73.9 HYPERGLYCEMIA: ICD-10-CM

## 2024-12-29 DIAGNOSIS — R10.13 ABDOMINAL PAIN, EPIGASTRIC: ICD-10-CM

## 2024-12-29 DIAGNOSIS — R18.8 CIRRHOSIS OF LIVER WITH ASCITES, UNSPECIFIED HEPATIC CIRRHOSIS TYPE (HCC): Primary | ICD-10-CM

## 2024-12-29 LAB
ABO + RH BLD: NORMAL
ALBUMIN SERPL-MCNC: 3.8 G/DL (ref 3.5–5.2)
ALP SERPL-CCNC: 187 U/L (ref 35–104)
ALT SERPL-CCNC: 23 U/L (ref 0–32)
AMPHET UR QL SCN: NEGATIVE
ANION GAP SERPL CALCULATED.3IONS-SCNC: 9 MMOL/L (ref 7–16)
ARM BAND NUMBER: NORMAL
AST SERPL-CCNC: 30 U/L (ref 0–31)
B-OH-BUTYR SERPL-MCNC: 0.15 MMOL/L (ref 0.02–0.27)
BARBITURATES UR QL SCN: NEGATIVE
BASOPHILS # BLD: 0 K/UL (ref 0–0.2)
BASOPHILS NFR BLD: 0 % (ref 0–2)
BENZODIAZ UR QL: NEGATIVE
BILIRUB DIRECT SERPL-MCNC: <0.2 MG/DL (ref 0–0.3)
BILIRUB INDIRECT SERPL-MCNC: ABNORMAL MG/DL (ref 0–1)
BILIRUB SERPL-MCNC: 0.3 MG/DL (ref 0–1.2)
BILIRUB UR QL STRIP: NEGATIVE
BLOOD BANK SAMPLE EXPIRATION: NORMAL
BLOOD GROUP ANTIBODIES SERPL: NEGATIVE
BNP SERPL-MCNC: 443 PG/ML (ref 0–450)
BUN SERPL-MCNC: 12 MG/DL (ref 6–20)
BUPRENORPHINE UR QL: NEGATIVE
CALCIUM SERPL-MCNC: 9.9 MG/DL (ref 8.6–10.2)
CANNABINOIDS UR QL SCN: NEGATIVE
CHLORIDE SERPL-SCNC: 96 MMOL/L (ref 98–107)
CLARITY UR: CLEAR
CO2 SERPL-SCNC: 27 MMOL/L (ref 22–29)
COCAINE UR QL SCN: NEGATIVE
COLOR UR: YELLOW
CREAT SERPL-MCNC: 0.7 MG/DL (ref 0.5–1)
EOSINOPHIL # BLD: 0.03 K/UL (ref 0.05–0.5)
EOSINOPHILS RELATIVE PERCENT: 2 % (ref 0–6)
EPI CELLS #/AREA URNS HPF: ABNORMAL /HPF
ERYTHROCYTE [DISTWIDTH] IN BLOOD BY AUTOMATED COUNT: 13.2 % (ref 11.5–15)
FENTANYL UR QL: POSITIVE
GFR, ESTIMATED: >90 ML/MIN/1.73M2
GLUCOSE BLD-MCNC: 366 MG/DL
GLUCOSE BLD-MCNC: 420 MG/DL (ref 74–99)
GLUCOSE BLD-MCNC: 81 MG/DL (ref 74–99)
GLUCOSE SERPL-MCNC: 366 MG/DL (ref 74–99)
GLUCOSE UR STRIP-MCNC: >=1000 MG/DL
HCT VFR BLD AUTO: 26.4 % (ref 34–48)
HGB BLD-MCNC: 9.3 G/DL (ref 11.5–15.5)
HGB UR QL STRIP.AUTO: NEGATIVE
IMM GRANULOCYTES # BLD AUTO: <0.03 K/UL (ref 0–0.58)
IMM GRANULOCYTES NFR BLD: 1 % (ref 0–5)
INR PPP: 1
KETONES UR STRIP-MCNC: NEGATIVE MG/DL
LACTATE BLDV-SCNC: 2 MMOL/L (ref 0.5–1.9)
LACTATE BLDV-SCNC: 3.2 MMOL/L (ref 0.5–2.2)
LEUKOCYTE ESTERASE UR QL STRIP: NEGATIVE
LIPASE SERPL-CCNC: 9 U/L (ref 13–60)
LYMPHOCYTES NFR BLD: 0.41 K/UL (ref 1.5–4)
LYMPHOCYTES RELATIVE PERCENT: 20 % (ref 20–42)
MCH RBC QN AUTO: 30.7 PG (ref 26–35)
MCHC RBC AUTO-ENTMCNC: 35.2 G/DL (ref 32–34.5)
MCV RBC AUTO: 87.1 FL (ref 80–99.9)
METHADONE UR QL: NEGATIVE
MONOCYTES NFR BLD: 0.19 K/UL (ref 0.1–0.95)
MONOCYTES NFR BLD: 9 % (ref 2–12)
NEUTROPHILS NFR BLD: 69 % (ref 43–80)
NEUTS SEG NFR BLD: 1.42 K/UL (ref 1.8–7.3)
NITRITE UR QL STRIP: NEGATIVE
OPIATES UR QL SCN: NEGATIVE
OXYCODONE UR QL SCN: POSITIVE
PARTIAL THROMBOPLASTIN TIME: 33.5 SEC (ref 24.5–35.1)
PCP UR QL SCN: NEGATIVE
PH UR STRIP: 6 [PH] (ref 5–9)
PLATELET # BLD AUTO: 58 K/UL (ref 130–450)
PLATELET CONFIRMATION: NORMAL
PMV BLD AUTO: 9.9 FL (ref 7–12)
POTASSIUM SERPL-SCNC: 5.3 MMOL/L (ref 3.5–5)
PROT SERPL-MCNC: 6.5 G/DL (ref 6.4–8.3)
PROT UR STRIP-MCNC: NEGATIVE MG/DL
PROTHROMBIN TIME: 11 SEC (ref 9.3–12.4)
RBC # BLD AUTO: 3.03 M/UL (ref 3.5–5.5)
RBC #/AREA URNS HPF: ABNORMAL /HPF
SODIUM SERPL-SCNC: 132 MMOL/L (ref 132–146)
SP GR UR STRIP: 1.01 (ref 1–1.03)
TEST INFORMATION: ABNORMAL
TROPONIN I SERPL HS-MCNC: 10 NG/L (ref 0–9)
TROPONIN I SERPL HS-MCNC: 10 NG/L (ref 0–9)
UROBILINOGEN UR STRIP-ACNC: 0.2 EU/DL (ref 0–1)
WBC #/AREA URNS HPF: ABNORMAL /HPF
WBC OTHER # BLD: 2.1 K/UL (ref 4.5–11.5)

## 2024-12-29 PROCEDURE — 74176 CT ABD & PELVIS W/O CONTRAST: CPT

## 2024-12-29 PROCEDURE — 80179 DRUG ASSAY SALICYLATE: CPT

## 2024-12-29 PROCEDURE — 6370000000 HC RX 637 (ALT 250 FOR IP): Performed by: EMERGENCY MEDICINE

## 2024-12-29 PROCEDURE — 80053 COMPREHEN METABOLIC PANEL: CPT

## 2024-12-29 PROCEDURE — 71045 X-RAY EXAM CHEST 1 VIEW: CPT

## 2024-12-29 PROCEDURE — 2060000000 HC ICU INTERMEDIATE R&B

## 2024-12-29 PROCEDURE — 99285 EMERGENCY DEPT VISIT HI MDM: CPT

## 2024-12-29 PROCEDURE — 6360000002 HC RX W HCPCS: Performed by: EMERGENCY MEDICINE

## 2024-12-29 PROCEDURE — 96375 TX/PRO/DX INJ NEW DRUG ADDON: CPT

## 2024-12-29 PROCEDURE — 84484 ASSAY OF TROPONIN QUANT: CPT

## 2024-12-29 PROCEDURE — 96374 THER/PROPH/DIAG INJ IV PUSH: CPT

## 2024-12-29 PROCEDURE — 85025 COMPLETE CBC W/AUTO DIFF WBC: CPT

## 2024-12-29 PROCEDURE — 83690 ASSAY OF LIPASE: CPT

## 2024-12-29 PROCEDURE — 86850 RBC ANTIBODY SCREEN: CPT

## 2024-12-29 PROCEDURE — 6360000002 HC RX W HCPCS

## 2024-12-29 PROCEDURE — 2580000003 HC RX 258

## 2024-12-29 PROCEDURE — 82248 BILIRUBIN DIRECT: CPT

## 2024-12-29 PROCEDURE — 83880 ASSAY OF NATRIURETIC PEPTIDE: CPT

## 2024-12-29 PROCEDURE — 82947 ASSAY GLUCOSE BLOOD QUANT: CPT

## 2024-12-29 PROCEDURE — 83605 ASSAY OF LACTIC ACID: CPT

## 2024-12-29 PROCEDURE — 85610 PROTHROMBIN TIME: CPT

## 2024-12-29 PROCEDURE — G0480 DRUG TEST DEF 1-7 CLASSES: HCPCS

## 2024-12-29 PROCEDURE — 86901 BLOOD TYPING SEROLOGIC RH(D): CPT

## 2024-12-29 PROCEDURE — 87040 BLOOD CULTURE FOR BACTERIA: CPT

## 2024-12-29 PROCEDURE — 81001 URINALYSIS AUTO W/SCOPE: CPT

## 2024-12-29 PROCEDURE — 87086 URINE CULTURE/COLONY COUNT: CPT

## 2024-12-29 PROCEDURE — 85730 THROMBOPLASTIN TIME PARTIAL: CPT

## 2024-12-29 PROCEDURE — 80307 DRUG TEST PRSMV CHEM ANLYZR: CPT

## 2024-12-29 PROCEDURE — 86900 BLOOD TYPING SEROLOGIC ABO: CPT

## 2024-12-29 PROCEDURE — 82010 KETONE BODYS QUAN: CPT

## 2024-12-29 PROCEDURE — 80143 DRUG ASSAY ACETAMINOPHEN: CPT

## 2024-12-29 RX ORDER — ONDANSETRON 2 MG/ML
4 INJECTION INTRAMUSCULAR; INTRAVENOUS ONCE
Status: COMPLETED | OUTPATIENT
Start: 2024-12-29 | End: 2024-12-29

## 2024-12-29 RX ORDER — ARIPIPRAZOLE 10 MG/1
20 TABLET ORAL EVERY MORNING
Status: DISCONTINUED | OUTPATIENT
Start: 2024-12-30 | End: 2025-01-01 | Stop reason: HOSPADM

## 2024-12-29 RX ORDER — GAUZE BANDAGE 2" X 2"
100 BANDAGE TOPICAL DAILY
Status: DISCONTINUED | OUTPATIENT
Start: 2024-12-30 | End: 2025-01-01 | Stop reason: HOSPADM

## 2024-12-29 RX ORDER — 0.9 % SODIUM CHLORIDE 0.9 %
500 INTRAVENOUS SOLUTION INTRAVENOUS ONCE
Status: COMPLETED | OUTPATIENT
Start: 2024-12-29 | End: 2024-12-29

## 2024-12-29 RX ORDER — NICOTINE 21 MG/24HR
1 PATCH, TRANSDERMAL 24 HOURS TRANSDERMAL DAILY
Status: DISCONTINUED | OUTPATIENT
Start: 2024-12-30 | End: 2025-01-01 | Stop reason: HOSPADM

## 2024-12-29 RX ORDER — INSULIN LISPRO 100 [IU]/ML
0-8 INJECTION, SOLUTION INTRAVENOUS; SUBCUTANEOUS
Status: DISCONTINUED | OUTPATIENT
Start: 2024-12-30 | End: 2024-12-31

## 2024-12-29 RX ORDER — OXYCODONE HYDROCHLORIDE 5 MG/1
5 TABLET ORAL EVERY 4 HOURS PRN
Status: DISCONTINUED | OUTPATIENT
Start: 2024-12-29 | End: 2024-12-30

## 2024-12-29 RX ORDER — TRAZODONE HYDROCHLORIDE 50 MG/1
50 TABLET, FILM COATED ORAL NIGHTLY PRN
Status: DISCONTINUED | OUTPATIENT
Start: 2024-12-29 | End: 2025-01-01 | Stop reason: HOSPADM

## 2024-12-29 RX ORDER — INSULIN LISPRO 100 [IU]/ML
0-6 INJECTION, SOLUTION INTRAVENOUS; SUBCUTANEOUS NIGHTLY
Status: DISCONTINUED | OUTPATIENT
Start: 2024-12-29 | End: 2024-12-31

## 2024-12-29 RX ORDER — FENTANYL 25 UG/1
1 PATCH TRANSDERMAL
Status: DISCONTINUED | OUTPATIENT
Start: 2025-01-01 | End: 2025-01-01 | Stop reason: HOSPADM

## 2024-12-29 RX ORDER — SODIUM CHLORIDE 9 MG/ML
INJECTION, SOLUTION INTRAVENOUS
Status: COMPLETED
Start: 2024-12-29 | End: 2024-12-29

## 2024-12-29 RX ORDER — GABAPENTIN 100 MG/1
100 CAPSULE ORAL 3 TIMES DAILY
Status: DISCONTINUED | OUTPATIENT
Start: 2024-12-29 | End: 2025-01-01 | Stop reason: HOSPADM

## 2024-12-29 RX ORDER — ASCORBIC ACID 500 MG
1000 TABLET ORAL EVERY MORNING
Status: DISCONTINUED | OUTPATIENT
Start: 2024-12-30 | End: 2025-01-01 | Stop reason: HOSPADM

## 2024-12-29 RX ORDER — ALBUTEROL SULFATE 0.83 MG/ML
2.5 SOLUTION RESPIRATORY (INHALATION) EVERY 4 HOURS PRN
Status: DISCONTINUED | OUTPATIENT
Start: 2024-12-29 | End: 2025-01-01 | Stop reason: HOSPADM

## 2024-12-29 RX ORDER — ACETAMINOPHEN 500 MG
500 TABLET ORAL 4 TIMES DAILY PRN
Status: DISCONTINUED | OUTPATIENT
Start: 2024-12-29 | End: 2025-01-01 | Stop reason: HOSPADM

## 2024-12-29 RX ORDER — PANTOPRAZOLE SODIUM 40 MG/1
40 TABLET, DELAYED RELEASE ORAL
Status: DISCONTINUED | OUTPATIENT
Start: 2024-12-30 | End: 2025-01-01 | Stop reason: HOSPADM

## 2024-12-29 RX ORDER — LACTULOSE 10 G/15ML
20 SOLUTION ORAL 2 TIMES DAILY
Status: DISCONTINUED | OUTPATIENT
Start: 2024-12-29 | End: 2025-01-01 | Stop reason: HOSPADM

## 2024-12-29 RX ORDER — INSULIN GLARGINE 100 [IU]/ML
5 INJECTION, SOLUTION SUBCUTANEOUS EVERY MORNING
Status: DISCONTINUED | OUTPATIENT
Start: 2024-12-30 | End: 2025-01-01 | Stop reason: HOSPADM

## 2024-12-29 RX ORDER — CALCIUM CARBONATE 500(1250)
500 TABLET ORAL 2 TIMES DAILY
Status: DISCONTINUED | OUTPATIENT
Start: 2024-12-29 | End: 2025-01-01 | Stop reason: HOSPADM

## 2024-12-29 RX ORDER — ANASTROZOLE 1 MG/1
1 TABLET ORAL EVERY MORNING
Status: DISCONTINUED | OUTPATIENT
Start: 2024-12-30 | End: 2025-01-01 | Stop reason: HOSPADM

## 2024-12-29 RX ORDER — HYDROXYZINE PAMOATE 25 MG/1
25 CAPSULE ORAL 4 TIMES DAILY PRN
Status: DISCONTINUED | OUTPATIENT
Start: 2024-12-29 | End: 2025-01-01 | Stop reason: HOSPADM

## 2024-12-29 RX ORDER — FENTANYL CITRATE 0.05 MG/ML
50 INJECTION, SOLUTION INTRAMUSCULAR; INTRAVENOUS ONCE
Status: COMPLETED | OUTPATIENT
Start: 2024-12-29 | End: 2024-12-29

## 2024-12-29 RX ORDER — FUROSEMIDE 20 MG/1
20 TABLET ORAL DAILY PRN
Status: DISCONTINUED | OUTPATIENT
Start: 2024-12-29 | End: 2025-01-01 | Stop reason: HOSPADM

## 2024-12-29 RX ORDER — INSULIN GLARGINE 100 [IU]/ML
12 INJECTION, SOLUTION SUBCUTANEOUS NIGHTLY
Status: DISCONTINUED | OUTPATIENT
Start: 2024-12-30 | End: 2025-01-01 | Stop reason: HOSPADM

## 2024-12-29 RX ORDER — M-VIT,TX,IRON,MINS/CALC/FOLIC 27MG-0.4MG
1 TABLET ORAL EVERY MORNING
Status: DISCONTINUED | OUTPATIENT
Start: 2024-12-30 | End: 2025-01-01 | Stop reason: HOSPADM

## 2024-12-29 RX ORDER — MORPHINE SULFATE 4 MG/ML
4 INJECTION, SOLUTION INTRAMUSCULAR; INTRAVENOUS ONCE
Status: COMPLETED | OUTPATIENT
Start: 2024-12-29 | End: 2024-12-29

## 2024-12-29 RX ORDER — ONDANSETRON 4 MG/1
4 TABLET, ORALLY DISINTEGRATING ORAL 3 TIMES DAILY PRN
Status: DISCONTINUED | OUTPATIENT
Start: 2024-12-29 | End: 2025-01-01 | Stop reason: HOSPADM

## 2024-12-29 RX ORDER — SENNA AND DOCUSATE SODIUM 50; 8.6 MG/1; MG/1
2 TABLET, FILM COATED ORAL 2 TIMES DAILY PRN
Status: DISCONTINUED | OUTPATIENT
Start: 2024-12-29 | End: 2025-01-01 | Stop reason: HOSPADM

## 2024-12-29 RX ORDER — METOCLOPRAMIDE HYDROCHLORIDE 5 MG/ML
5 INJECTION INTRAMUSCULAR; INTRAVENOUS ONCE
Status: COMPLETED | OUTPATIENT
Start: 2024-12-29 | End: 2024-12-29

## 2024-12-29 RX ADMIN — ONDANSETRON 4 MG: 2 INJECTION, SOLUTION INTRAMUSCULAR; INTRAVENOUS at 20:02

## 2024-12-29 RX ADMIN — FENTANYL CITRATE 50 MCG: 0.05 INJECTION, SOLUTION INTRAMUSCULAR; INTRAVENOUS at 20:02

## 2024-12-29 RX ADMIN — Medication 500 ML: at 20:12

## 2024-12-29 RX ADMIN — MORPHINE SULFATE 4 MG: 4 INJECTION, SOLUTION INTRAMUSCULAR; INTRAVENOUS at 21:05

## 2024-12-29 RX ADMIN — SODIUM CHLORIDE 500 ML: 9 INJECTION, SOLUTION INTRAVENOUS at 20:12

## 2024-12-29 RX ADMIN — METOCLOPRAMIDE HYDROCHLORIDE 5 MG: 5 INJECTION INTRAMUSCULAR; INTRAVENOUS at 21:03

## 2024-12-29 RX ADMIN — HUMAN INSULIN 5 UNITS: 100 INJECTION, SOLUTION SUBCUTANEOUS at 20:14

## 2024-12-29 ASSESSMENT — PAIN SCALES - GENERAL: PAINLEVEL_OUTOF10: 8

## 2024-12-29 ASSESSMENT — PAIN DESCRIPTION - LOCATION: LOCATION: ABDOMEN

## 2024-12-29 NOTE — ED PROVIDER NOTES
and recommendations with Ellie Santacruz and members of family present at the time of disposition.      ED Course as of 12/29/24 2142   Sun Dec 29, 2024   2042 Spoke on the phone with Dr. Oh [facility] regarding admission of patient.  Explained any and all workup including chief complaint, HPI, orders, results, interventions.  Answered any and all questions.  She is in agreement with plan for admission and has no further questions at this time.  Will put orders in.  Appreciate admission/consult. [NM]      ED Course User Index  [NM] Maurice Momin DO           I have personally reviewed all laboratory radiology results from today's visit.  I have personally reviewed and agree with resident interpretation of EKG for all EKGs from today's visit.     MDM:     I, Dr. Shaffer am the primary provider of record    Medical Decision Making  The patient was placed on the cardiac monitor for continuous monitoring of rhythm and vitals. EKG ordered to evaluate patient's current cardiac rate, rhythm, and QT interval. CBC was ordered as part of my assessment for possible infection, anemia or thrombocytopenia. CMP to assess electrolytes, kidney function, liver function or any metabolic derangements. Lipase to evaluate for pancreatitis. Lactic acid as a marker of hypoperfusion or ischemia. Urinalysis to evaluate for a UTI. CT abdomen for, but without limitation to, ureterolithiasis, nephrolithiasis, constipation, hollow organ perforation, small bowel obstruction, bowel ischemia, pneumoperitoneum, diverticulitis, cholecystitis, appendicitis, intra-abdominal abscess, or malignancy.      Amount and/or Complexity of Data Reviewed  External Data Reviewed: labs, ECG and notes.  Labs: ordered. Decision-making details documented in ED Course.  Radiology: ordered. Decision-making details documented in ED Course.  ECG/medicine tests: ordered and independent interpretation performed. Decision-making details documented in ED

## 2024-12-30 ENCOUNTER — APPOINTMENT (OUTPATIENT)
Dept: CT IMAGING | Age: 54
DRG: 283 | End: 2024-12-30
Payer: COMMERCIAL

## 2024-12-30 LAB
ALBUMIN SERPL-MCNC: 3.5 G/DL (ref 3.5–5.2)
ALP SERPL-CCNC: 166 U/L (ref 35–104)
ALT SERPL-CCNC: 23 U/L (ref 0–32)
ANION GAP SERPL CALCULATED.3IONS-SCNC: 7 MMOL/L (ref 7–16)
APAP SERPL-MCNC: <5 UG/ML (ref 10–30)
AST SERPL-CCNC: 34 U/L (ref 0–31)
BASOPHILS # BLD: 0 K/UL (ref 0–0.2)
BASOPHILS NFR BLD: 0 % (ref 0–2)
BILIRUB SERPL-MCNC: 0.4 MG/DL (ref 0–1.2)
BUN SERPL-MCNC: 13 MG/DL (ref 6–20)
CALCIUM SERPL-MCNC: 9.4 MG/DL (ref 8.6–10.2)
CHLORIDE SERPL-SCNC: 102 MMOL/L (ref 98–107)
CO2 SERPL-SCNC: 28 MMOL/L (ref 22–29)
CREAT SERPL-MCNC: 0.7 MG/DL (ref 0.5–1)
EOSINOPHIL # BLD: 0.04 K/UL (ref 0.05–0.5)
EOSINOPHILS RELATIVE PERCENT: 2 % (ref 0–6)
ERYTHROCYTE [DISTWIDTH] IN BLOOD BY AUTOMATED COUNT: 13 % (ref 11.5–15)
ETHANOLAMINE SERPL-MCNC: <10 MG/DL (ref 0–0.08)
GFR, ESTIMATED: >90 ML/MIN/1.73M2
GLUCOSE BLD-MCNC: 124 MG/DL (ref 74–99)
GLUCOSE BLD-MCNC: 128 MG/DL (ref 74–99)
GLUCOSE BLD-MCNC: 396 MG/DL (ref 74–99)
GLUCOSE BLD-MCNC: 68 MG/DL (ref 74–99)
GLUCOSE BLD-MCNC: 88 MG/DL (ref 74–99)
GLUCOSE SERPL-MCNC: 79 MG/DL (ref 74–99)
HCT VFR BLD AUTO: 26.9 % (ref 34–48)
HGB BLD-MCNC: 9.5 G/DL (ref 11.5–15.5)
LACTATE BLDV-SCNC: 0.8 MMOL/L (ref 0.5–2.2)
LYMPHOCYTES NFR BLD: 0.52 K/UL (ref 1.5–4)
LYMPHOCYTES RELATIVE PERCENT: 24 % (ref 20–42)
MCH RBC QN AUTO: 30.7 PG (ref 26–35)
MCHC RBC AUTO-ENTMCNC: 35.3 G/DL (ref 32–34.5)
MCV RBC AUTO: 87.1 FL (ref 80–99.9)
MONOCYTES NFR BLD: 0.08 K/UL (ref 0.1–0.95)
MONOCYTES NFR BLD: 4 % (ref 2–12)
NEUTROPHILS NFR BLD: 71 % (ref 43–80)
NEUTS SEG NFR BLD: 1.57 K/UL (ref 1.8–7.3)
PLATELET CONFIRMATION: NORMAL
PLATELET, FLUORESCENCE: 64 K/UL (ref 130–450)
PMV BLD AUTO: 10.3 FL (ref 7–12)
POTASSIUM SERPL-SCNC: 6.1 MMOL/L (ref 3.5–5)
PROCALCITONIN SERPL-MCNC: 0.1 NG/ML (ref 0–0.08)
PROT SERPL-MCNC: 6.2 G/DL (ref 6.4–8.3)
RBC # BLD AUTO: 3.09 M/UL (ref 3.5–5.5)
RBC # BLD: ABNORMAL 10*6/UL
SALICYLATES SERPL-MCNC: <0.3 MG/DL (ref 0–30)
SODIUM SERPL-SCNC: 137 MMOL/L (ref 132–146)
TOXIC TRICYCLIC SC,BLOOD: NEGATIVE
WBC OTHER # BLD: 2.2 K/UL (ref 4.5–11.5)

## 2024-12-30 PROCEDURE — 74174 CTA ABD&PLVS W/CONTRAST: CPT

## 2024-12-30 PROCEDURE — 80053 COMPREHEN METABOLIC PANEL: CPT

## 2024-12-30 PROCEDURE — 6370000000 HC RX 637 (ALT 250 FOR IP): Performed by: FAMILY MEDICINE

## 2024-12-30 PROCEDURE — 99252 IP/OBS CONSLTJ NEW/EST SF 35: CPT

## 2024-12-30 PROCEDURE — 83605 ASSAY OF LACTIC ACID: CPT

## 2024-12-30 PROCEDURE — 84145 PROCALCITONIN (PCT): CPT

## 2024-12-30 PROCEDURE — 6360000002 HC RX W HCPCS: Performed by: FAMILY MEDICINE

## 2024-12-30 PROCEDURE — 82947 ASSAY GLUCOSE BLOOD QUANT: CPT

## 2024-12-30 PROCEDURE — 6360000004 HC RX CONTRAST MEDICATION: Performed by: RADIOLOGY

## 2024-12-30 PROCEDURE — 99221 1ST HOSP IP/OBS SF/LOW 40: CPT

## 2024-12-30 PROCEDURE — 6360000002 HC RX W HCPCS: Performed by: CLINICAL NURSE SPECIALIST

## 2024-12-30 PROCEDURE — 2060000000 HC ICU INTERMEDIATE R&B

## 2024-12-30 PROCEDURE — 2580000003 HC RX 258: Performed by: CLINICAL NURSE SPECIALIST

## 2024-12-30 PROCEDURE — 6370000000 HC RX 637 (ALT 250 FOR IP): Performed by: SURGERY

## 2024-12-30 PROCEDURE — 85025 COMPLETE CBC W/AUTO DIFF WBC: CPT

## 2024-12-30 RX ORDER — SODIUM POLYSTYRENE SULFONATE 15 G/60ML
15 SUSPENSION ORAL; RECTAL ONCE
Status: COMPLETED | OUTPATIENT
Start: 2024-12-30 | End: 2024-12-30

## 2024-12-30 RX ORDER — HYDROMORPHONE HYDROCHLORIDE 1 MG/ML
1 INJECTION, SOLUTION INTRAMUSCULAR; INTRAVENOUS; SUBCUTANEOUS ONCE
Status: COMPLETED | OUTPATIENT
Start: 2024-12-30 | End: 2024-12-30

## 2024-12-30 RX ORDER — OXYCODONE HYDROCHLORIDE 5 MG/1
10 TABLET ORAL EVERY 4 HOURS PRN
Status: DISCONTINUED | OUTPATIENT
Start: 2024-12-30 | End: 2025-01-01 | Stop reason: HOSPADM

## 2024-12-30 RX ORDER — IOPAMIDOL 755 MG/ML
70 INJECTION, SOLUTION INTRAVASCULAR
Status: COMPLETED | OUTPATIENT
Start: 2024-12-30 | End: 2024-12-30

## 2024-12-30 RX ORDER — DIPHENHYDRAMINE HCL 25 MG
50 TABLET ORAL ONCE
Status: COMPLETED | OUTPATIENT
Start: 2024-12-30 | End: 2024-12-30

## 2024-12-30 RX ADMIN — ONDANSETRON 4 MG: 4 TABLET, ORALLY DISINTEGRATING ORAL at 09:04

## 2024-12-30 RX ADMIN — VORTIOXETINE 10 MG: 10 TABLET, FILM COATED ORAL at 01:30

## 2024-12-30 RX ADMIN — Medication 1 TABLET: at 09:04

## 2024-12-30 RX ADMIN — THIAMINE HCL TAB 100 MG 100 MG: 100 TAB at 09:04

## 2024-12-30 RX ADMIN — DIPHENHYDRAMINE HYDROCHLORIDE 50 MG: 25 TABLET ORAL at 13:14

## 2024-12-30 RX ADMIN — OXYCODONE 5 MG: 5 TABLET ORAL at 00:46

## 2024-12-30 RX ADMIN — CALCIUM 500 MG: 500 TABLET ORAL at 22:05

## 2024-12-30 RX ADMIN — Medication 12 MG: at 01:30

## 2024-12-30 RX ADMIN — INSULIN LISPRO 6 UNITS: 100 INJECTION, SOLUTION INTRAVENOUS; SUBCUTANEOUS at 22:32

## 2024-12-30 RX ADMIN — TRAZODONE HYDROCHLORIDE 50 MG: 50 TABLET ORAL at 01:30

## 2024-12-30 RX ADMIN — OXYCODONE 5 MG: 5 TABLET ORAL at 13:18

## 2024-12-30 RX ADMIN — OXYCODONE 5 MG: 5 TABLET ORAL at 09:04

## 2024-12-30 RX ADMIN — PANCRELIPASE LIPASE, PANCRELIPASE PROTEASE, PANCRELIPASE AMYLASE 15000 UNITS: 15000; 47000; 63000 CAPSULE, DELAYED RELEASE ORAL at 09:03

## 2024-12-30 RX ADMIN — Medication 12 MG: at 22:06

## 2024-12-30 RX ADMIN — PREDNISONE 50 MG: 10 TABLET ORAL at 13:15

## 2024-12-30 RX ADMIN — MEROPENEM 1000 MG: 1 INJECTION INTRAVENOUS at 17:10

## 2024-12-30 RX ADMIN — CALCIUM 500 MG: 500 TABLET ORAL at 01:30

## 2024-12-30 RX ADMIN — PANTOPRAZOLE SODIUM 40 MG: 40 TABLET, DELAYED RELEASE ORAL at 09:02

## 2024-12-30 RX ADMIN — RIFAXIMIN 400 MG: 200 TABLET ORAL at 22:06

## 2024-12-30 RX ADMIN — IOPAMIDOL 70 ML: 755 INJECTION, SOLUTION INTRAVENOUS at 13:43

## 2024-12-30 RX ADMIN — SODIUM POLYSTYRENE SULFONATE 15 G: 15 SUSPENSION ORAL; RECTAL at 22:31

## 2024-12-30 RX ADMIN — VORTIOXETINE 10 MG: 10 TABLET, FILM COATED ORAL at 22:05

## 2024-12-30 RX ADMIN — OXYCODONE 10 MG: 5 TABLET ORAL at 22:05

## 2024-12-30 RX ADMIN — HYDROMORPHONE HYDROCHLORIDE 1 MG: 1 INJECTION, SOLUTION INTRAMUSCULAR; INTRAVENOUS; SUBCUTANEOUS at 02:56

## 2024-12-30 RX ADMIN — PANCRELIPASE LIPASE, PANCRELIPASE PROTEASE, PANCRELIPASE AMYLASE 15000 UNITS: 15000; 47000; 63000 CAPSULE, DELAYED RELEASE ORAL at 17:05

## 2024-12-30 RX ADMIN — OXYCODONE HYDROCHLORIDE AND ACETAMINOPHEN 1000 MG: 500 TABLET ORAL at 09:02

## 2024-12-30 RX ADMIN — GABAPENTIN 100 MG: 100 CAPSULE ORAL at 09:02

## 2024-12-30 RX ADMIN — OXYCODONE 5 MG: 5 TABLET ORAL at 18:40

## 2024-12-30 RX ADMIN — LACTULOSE 20 G: 20 SOLUTION ORAL at 22:04

## 2024-12-30 RX ADMIN — PREDNISONE 50 MG: 10 TABLET ORAL at 18:40

## 2024-12-30 RX ADMIN — GABAPENTIN 100 MG: 100 CAPSULE ORAL at 22:05

## 2024-12-30 RX ADMIN — LACTULOSE 20 G: 20 SOLUTION ORAL at 09:04

## 2024-12-30 RX ADMIN — GABAPENTIN 100 MG: 100 CAPSULE ORAL at 01:29

## 2024-12-30 ASSESSMENT — PAIN DESCRIPTION - DESCRIPTORS
DESCRIPTORS: ACHING;STABBING
DESCRIPTORS: ACHING;STABBING;SHARP
DESCRIPTORS: SHARP;JABBING;ACHING
DESCRIPTORS: ACHING;DISCOMFORT

## 2024-12-30 ASSESSMENT — PAIN DESCRIPTION - LOCATION
LOCATION: ABDOMEN
LOCATION: ABDOMEN;BACK

## 2024-12-30 ASSESSMENT — PAIN SCALES - GENERAL
PAINLEVEL_OUTOF10: 3
PAINLEVEL_OUTOF10: 10
PAINLEVEL_OUTOF10: 9
PAINLEVEL_OUTOF10: 8
PAINLEVEL_OUTOF10: 9
PAINLEVEL_OUTOF10: 9
PAINLEVEL_OUTOF10: 0

## 2024-12-30 ASSESSMENT — PAIN SCALES - WONG BAKER
WONGBAKER_NUMERICALRESPONSE: NO HURT
WONGBAKER_NUMERICALRESPONSE: HURTS A LITTLE BIT

## 2024-12-30 ASSESSMENT — PAIN DESCRIPTION - ORIENTATION
ORIENTATION: UPPER;RIGHT;LEFT
ORIENTATION: RIGHT;LEFT;UPPER

## 2024-12-30 NOTE — CARE COORDINATION
Case Management Assessment  Initial Evaluation    Date/Time of Evaluation: 12/30/2024 2:58 PM  Assessment Completed by: SANDEE Chun    If patient is discharged prior to next notation, then this note serves as note for discharge by case management.    Patient Name: Ellie Santacruz                   YOB: 1970  Diagnosis: Cirrhosis of liver with ascites, unspecified hepatic cirrhosis type (HCC) [K74.60, R18.8]                   Date / Time: 12/29/2024  6:25 PM    Patient Admission Status: Inpatient   Readmission Risk (Low < 19, Mod (19-27), High > 27): Readmission Risk Score: 24.3    Current PCP: Liza Oh, DO  PCP verified by CM? Yes    Chart Reviewed: Yes      History Provided by: Patient  Patient Orientation: Alert and Oriented, Person, Place, Situation, Self    Patient Cognition: Alert    Hospitalization in the last 30 days (Readmission):  No    If yes, Readmission Assessment in CM Navigator will be completed.    Advance Directives:      Code Status: Prior   Patient's Primary Decision Maker is: Legal Next of Kin    Primary Decision Maker: Mitchell Santacruz - Child - [phone]    Discharge Planning:    Patient lives with:   Type of Home:    Primary Care Giver: Self  Patient Support Systems include: Family Members, Other (Comment) (Staff at [facility] and [facility])   Current Financial resources:    Current community resources:    Current services prior to admission:              Current DME:              Type of Home Care services:       ADLS  Prior functional level: Independent in ADLs/IADLs  Current functional level: Independent in ADLs/IADLs    PT AM-PAC:   /24  OT AM-PAC:   /24    Family can provide assistance at DC: No  Would you like Case Management to discuss the discharge plan with any other family members/significant others, and if so, who? No  Plans to Return to Present Housing: Yes  Other Identified Issues/Barriers to RETURNING to current housing: none  Potential

## 2024-12-30 NOTE — ED NOTES
Pt stated fentanyl patch was applied at facility 12/29 at 1600. Called and notified Giancarlo in pharmacy. He stated he would re-time the medication.

## 2024-12-30 NOTE — ACP (ADVANCE CARE PLANNING)
Advance Care Planning   Healthcare Decision Maker:    Primary Decision Maker: Mitchell Santacruz - Child - [phone]    Primary Decision Maker: Lydia Santacruz - Child - [phone]    Primary Decision Maker: Ashutosh Vergara - Child - [phone]    Click here to complete Healthcare Decision Makers including selection of the Healthcare Decision Maker Relationship (ie \"Primary\").  Today we documented Decision Maker(s) consistent with Legal Next of Kin hierarchy.   SW explained that [place] Law- Next of Kin requires majority consensus of her children for HCDM if she is incapacitated. SW offered to complete POA to name one person as agent & pt notes she will think about this.  Electronically signed by SANDEE Chun on 12/30/2024 at 3:02 PM

## 2024-12-30 NOTE — PROGRESS NOTES
4 Eyes Skin Assessment     NAME:  Ellie Santacruz  YOB: 1970  MEDICAL RECORD NUMBER:  [id number]    The patient is being assessed for  Admission    I agree that at least one RN has performed a thorough Head to Toe Skin Assessment on the patient. ALL assessment sites listed below have been assessed.      Areas assessed by both nurses:    Head, Face, Ears, Shoulders, Back, Chest, Arms, Elbows, Hands, Sacrum. Buttock, Coccyx, Ischium, Legs. Feet and Heels, and Under Medical Devices     Patient has callus area to left foot second toe that she states podiatry addresses        Does the Patient have a Wound? No noted wound(s)       Abe Prevention initiated by RN: No  Wound Care Orders initiated by RN: No    Pressure Injury (Stage 3,4, Unstageable, DTI, NWPT, and Complex wounds) if present, place Wound referral order by RN under : No    New Ostomies, if present place, Ostomy referral order under : No     Nurse 1 eSignature: Electronically signed by Miri Pérez RN on 12/30/24 at 6:51 PM EST    **SHARE this note so that the co-signing nurse can place an eSignature**    Nurse 2 eSignature: Electronically signed by Lisa Salazar RN on 12/30/24 at 7:00 PM EST

## 2024-12-30 NOTE — ED NOTES
Pt c/o unrelieved abdominal pain and requesting medication. Call placed to Dr. hO and n/o dilaudid 1mg IV now x1. Read back and verified.

## 2024-12-31 PROBLEM — Z85.3 HISTORY OF BREAST CANCER: Status: ACTIVE | Noted: 2024-12-31

## 2024-12-31 LAB
ALBUMIN SERPL-MCNC: 3.8 G/DL (ref 3.5–5.2)
ALP SERPL-CCNC: 195 U/L (ref 35–104)
ALT SERPL-CCNC: 26 U/L (ref 0–32)
ANION GAP SERPL CALCULATED.3IONS-SCNC: 9 MMOL/L (ref 7–16)
AST SERPL-CCNC: 32 U/L (ref 0–31)
BASOPHILS # BLD: 0 K/UL (ref 0–0.2)
BASOPHILS NFR BLD: 0 % (ref 0–2)
BILIRUB SERPL-MCNC: 0.5 MG/DL (ref 0–1.2)
BUN SERPL-MCNC: 15 MG/DL (ref 6–20)
CALCIUM SERPL-MCNC: 9.2 MG/DL (ref 8.6–10.2)
CHLORIDE SERPL-SCNC: 96 MMOL/L (ref 98–107)
CO2 SERPL-SCNC: 26 MMOL/L (ref 22–29)
CREAT SERPL-MCNC: 0.6 MG/DL (ref 0.5–1)
EOSINOPHIL # BLD: 0 K/UL (ref 0.05–0.5)
EOSINOPHILS RELATIVE PERCENT: 0 % (ref 0–6)
ERYTHROCYTE [DISTWIDTH] IN BLOOD BY AUTOMATED COUNT: 12.9 % (ref 11.5–15)
GFR, ESTIMATED: >90 ML/MIN/1.73M2
GLUCOSE BLD-MCNC: 242 MG/DL (ref 74–99)
GLUCOSE BLD-MCNC: 372 MG/DL (ref 74–99)
GLUCOSE BLD-MCNC: 482 MG/DL (ref 74–99)
GLUCOSE BLD-MCNC: >500 MG/DL (ref 74–99)
GLUCOSE BLD-MCNC: >500 MG/DL (ref 74–99)
GLUCOSE SERPL-MCNC: 333 MG/DL (ref 74–99)
HCT VFR BLD AUTO: 30.3 % (ref 34–48)
HGB BLD-MCNC: 10.6 G/DL (ref 11.5–15.5)
LACTATE BLDV-SCNC: 1 MMOL/L (ref 0.5–2.2)
LACTATE BLDV-SCNC: 2.9 MMOL/L (ref 0.5–2.2)
LYMPHOCYTES NFR BLD: 0.17 K/UL (ref 1.5–4)
LYMPHOCYTES RELATIVE PERCENT: 7 % (ref 20–42)
MCH RBC QN AUTO: 30.2 PG (ref 26–35)
MCHC RBC AUTO-ENTMCNC: 35 G/DL (ref 32–34.5)
MCV RBC AUTO: 86.3 FL (ref 80–99.9)
MICROORGANISM SPEC CULT: ABNORMAL
MICROORGANISM SPEC CULT: ABNORMAL
MONOCYTES NFR BLD: 0.02 K/UL (ref 0.1–0.95)
MONOCYTES NFR BLD: 1 % (ref 2–12)
NEUTROPHILS NFR BLD: 92 % (ref 43–80)
NEUTS SEG NFR BLD: 2.21 K/UL (ref 1.8–7.3)
PLATELET # BLD AUTO: 54 K/UL (ref 130–450)
PLATELET CONFIRMATION: NORMAL
PMV BLD AUTO: 10.2 FL (ref 7–12)
POTASSIUM SERPL-SCNC: 5.5 MMOL/L (ref 3.5–5)
POTASSIUM SERPL-SCNC: 5.8 MMOL/L (ref 3.5–5)
PROT SERPL-MCNC: 7.1 G/DL (ref 6.4–8.3)
RBC # BLD AUTO: 3.51 M/UL (ref 3.5–5.5)
SERVICE CMNT-IMP: ABNORMAL
SODIUM SERPL-SCNC: 131 MMOL/L (ref 132–146)
SPECIMEN DESCRIPTION: ABNORMAL
WBC OTHER # BLD: 2.4 K/UL (ref 4.5–11.5)

## 2024-12-31 PROCEDURE — 84132 ASSAY OF SERUM POTASSIUM: CPT

## 2024-12-31 PROCEDURE — 36415 COLL VENOUS BLD VENIPUNCTURE: CPT

## 2024-12-31 PROCEDURE — 6370000000 HC RX 637 (ALT 250 FOR IP): Performed by: FAMILY MEDICINE

## 2024-12-31 PROCEDURE — 2580000003 HC RX 258: Performed by: CLINICAL NURSE SPECIALIST

## 2024-12-31 PROCEDURE — 83605 ASSAY OF LACTIC ACID: CPT

## 2024-12-31 PROCEDURE — 2060000000 HC ICU INTERMEDIATE R&B

## 2024-12-31 PROCEDURE — 6370000000 HC RX 637 (ALT 250 FOR IP): Performed by: SURGERY

## 2024-12-31 PROCEDURE — 99254 IP/OBS CNSLTJ NEW/EST MOD 60: CPT | Performed by: STUDENT IN AN ORGANIZED HEALTH CARE EDUCATION/TRAINING PROGRAM

## 2024-12-31 PROCEDURE — 6360000002 HC RX W HCPCS: Performed by: CLINICAL NURSE SPECIALIST

## 2024-12-31 PROCEDURE — 82962 GLUCOSE BLOOD TEST: CPT

## 2024-12-31 PROCEDURE — 85025 COMPLETE CBC W/AUTO DIFF WBC: CPT

## 2024-12-31 PROCEDURE — 99231 SBSQ HOSP IP/OBS SF/LOW 25: CPT | Performed by: SURGERY

## 2024-12-31 PROCEDURE — 80053 COMPREHEN METABOLIC PANEL: CPT

## 2024-12-31 RX ORDER — SODIUM POLYSTYRENE SULFONATE 15 G/60ML
15 SUSPENSION ORAL; RECTAL ONCE
Status: COMPLETED | OUTPATIENT
Start: 2024-12-31 | End: 2024-12-31

## 2024-12-31 RX ORDER — GLUCAGON 1 MG/ML
1 KIT INJECTION PRN
Status: DISCONTINUED | OUTPATIENT
Start: 2024-12-31 | End: 2025-01-01 | Stop reason: HOSPADM

## 2024-12-31 RX ORDER — DEXTROSE MONOHYDRATE 100 MG/ML
INJECTION, SOLUTION INTRAVENOUS CONTINUOUS PRN
Status: DISCONTINUED | OUTPATIENT
Start: 2024-12-31 | End: 2025-01-01 | Stop reason: HOSPADM

## 2024-12-31 RX ORDER — INSULIN LISPRO 100 [IU]/ML
4 INJECTION, SOLUTION INTRAVENOUS; SUBCUTANEOUS ONCE
Status: COMPLETED | OUTPATIENT
Start: 2024-12-31 | End: 2024-12-31

## 2024-12-31 RX ORDER — INSULIN LISPRO 100 [IU]/ML
0-16 INJECTION, SOLUTION INTRAVENOUS; SUBCUTANEOUS
Status: DISCONTINUED | OUTPATIENT
Start: 2024-12-31 | End: 2025-01-01 | Stop reason: HOSPADM

## 2024-12-31 RX ORDER — INSULIN LISPRO 100 [IU]/ML
0-8 INJECTION, SOLUTION INTRAVENOUS; SUBCUTANEOUS NIGHTLY
Status: DISCONTINUED | OUTPATIENT
Start: 2024-12-31 | End: 2025-01-01 | Stop reason: HOSPADM

## 2024-12-31 RX ADMIN — INSULIN LISPRO 2 UNITS: 100 INJECTION, SOLUTION INTRAVENOUS; SUBCUTANEOUS at 22:50

## 2024-12-31 RX ADMIN — PANCRELIPASE LIPASE, PANCRELIPASE PROTEASE, PANCRELIPASE AMYLASE 15000 UNITS: 15000; 47000; 63000 CAPSULE, DELAYED RELEASE ORAL at 17:01

## 2024-12-31 RX ADMIN — PANCRELIPASE LIPASE, PANCRELIPASE PROTEASE, PANCRELIPASE AMYLASE 15000 UNITS: 15000; 47000; 63000 CAPSULE, DELAYED RELEASE ORAL at 11:45

## 2024-12-31 RX ADMIN — INSULIN LISPRO 8 UNITS: 100 INJECTION, SOLUTION INTRAVENOUS; SUBCUTANEOUS at 12:59

## 2024-12-31 RX ADMIN — INSULIN LISPRO 4 UNITS: 100 INJECTION, SOLUTION INTRAVENOUS; SUBCUTANEOUS at 13:00

## 2024-12-31 RX ADMIN — INSULIN GLARGINE 5 UNITS: 100 INJECTION, SOLUTION SUBCUTANEOUS at 08:37

## 2024-12-31 RX ADMIN — OXYCODONE 10 MG: 5 TABLET ORAL at 06:29

## 2024-12-31 RX ADMIN — OXYCODONE 10 MG: 5 TABLET ORAL at 18:55

## 2024-12-31 RX ADMIN — RIFAXIMIN 400 MG: 200 TABLET ORAL at 08:37

## 2024-12-31 RX ADMIN — INSULIN LISPRO 4 UNITS: 100 INJECTION, SOLUTION INTRAVENOUS; SUBCUTANEOUS at 17:57

## 2024-12-31 RX ADMIN — ARIPIPRAZOLE 20 MG: 10 TABLET ORAL at 08:35

## 2024-12-31 RX ADMIN — LACTULOSE 20 G: 20 SOLUTION ORAL at 08:35

## 2024-12-31 RX ADMIN — MEROPENEM 1000 MG: 1 INJECTION INTRAVENOUS at 17:02

## 2024-12-31 RX ADMIN — OXYCODONE 10 MG: 5 TABLET ORAL at 15:14

## 2024-12-31 RX ADMIN — MEROPENEM 1000 MG: 1 INJECTION INTRAVENOUS at 00:48

## 2024-12-31 RX ADMIN — OXYCODONE 10 MG: 5 TABLET ORAL at 10:56

## 2024-12-31 RX ADMIN — PANTOPRAZOLE SODIUM 40 MG: 40 TABLET, DELAYED RELEASE ORAL at 06:29

## 2024-12-31 RX ADMIN — ANASTROZOLE 1 MG: 1 TABLET ORAL at 08:37

## 2024-12-31 RX ADMIN — INSULIN LISPRO 16 UNITS: 100 INJECTION, SOLUTION INTRAVENOUS; SUBCUTANEOUS at 17:56

## 2024-12-31 RX ADMIN — THIAMINE HCL TAB 100 MG 100 MG: 100 TAB at 08:36

## 2024-12-31 RX ADMIN — INSULIN LISPRO 8 UNITS: 100 INJECTION, SOLUTION INTRAVENOUS; SUBCUTANEOUS at 06:35

## 2024-12-31 RX ADMIN — SODIUM ZIRCONIUM CYCLOSILICATE 10 G: 10 POWDER, FOR SUSPENSION ORAL at 11:45

## 2024-12-31 RX ADMIN — CALCIUM 500 MG: 500 TABLET ORAL at 08:36

## 2024-12-31 RX ADMIN — RIFAXIMIN 400 MG: 200 TABLET ORAL at 22:45

## 2024-12-31 RX ADMIN — Medication 12 MG: at 22:46

## 2024-12-31 RX ADMIN — VORTIOXETINE 10 MG: 10 TABLET, FILM COATED ORAL at 22:45

## 2024-12-31 RX ADMIN — GABAPENTIN 100 MG: 100 CAPSULE ORAL at 08:36

## 2024-12-31 RX ADMIN — PANCRELIPASE LIPASE, PANCRELIPASE PROTEASE, PANCRELIPASE AMYLASE 15000 UNITS: 15000; 47000; 63000 CAPSULE, DELAYED RELEASE ORAL at 08:35

## 2024-12-31 RX ADMIN — GABAPENTIN 100 MG: 100 CAPSULE ORAL at 15:14

## 2024-12-31 RX ADMIN — OXYCODONE 10 MG: 5 TABLET ORAL at 02:04

## 2024-12-31 RX ADMIN — LACTULOSE 20 G: 20 SOLUTION ORAL at 22:46

## 2024-12-31 RX ADMIN — PREDNISONE 50 MG: 10 TABLET ORAL at 00:44

## 2024-12-31 RX ADMIN — CALCIUM 500 MG: 500 TABLET ORAL at 22:46

## 2024-12-31 RX ADMIN — GABAPENTIN 100 MG: 100 CAPSULE ORAL at 22:46

## 2024-12-31 RX ADMIN — OXYCODONE 10 MG: 5 TABLET ORAL at 22:59

## 2024-12-31 RX ADMIN — MEROPENEM 1000 MG: 1 INJECTION INTRAVENOUS at 08:46

## 2024-12-31 RX ADMIN — SODIUM POLYSTYRENE SULFONATE 15 G: 15 SUSPENSION ORAL; RECTAL at 19:00

## 2024-12-31 RX ADMIN — RIFAXIMIN 400 MG: 200 TABLET ORAL at 15:15

## 2024-12-31 RX ADMIN — OXYCODONE HYDROCHLORIDE AND ACETAMINOPHEN 1000 MG: 500 TABLET ORAL at 08:36

## 2024-12-31 RX ADMIN — Medication 1 TABLET: at 08:35

## 2024-12-31 ASSESSMENT — PAIN DESCRIPTION - DESCRIPTORS
DESCRIPTORS: ACHING;DISCOMFORT
DESCRIPTORS: ACHING;DISCOMFORT
DESCRIPTORS: ACHING;STABBING
DESCRIPTORS: ACHING;STABBING
DESCRIPTORS: ACHING

## 2024-12-31 ASSESSMENT — PAIN SCALES - GENERAL
PAINLEVEL_OUTOF10: 10
PAINLEVEL_OUTOF10: 2
PAINLEVEL_OUTOF10: 10
PAINLEVEL_OUTOF10: 0
PAINLEVEL_OUTOF10: 10
PAINLEVEL_OUTOF10: 8
PAINLEVEL_OUTOF10: 0
PAINLEVEL_OUTOF10: 0
PAINLEVEL_OUTOF10: 8
PAINLEVEL_OUTOF10: 8
PAINLEVEL_OUTOF10: 7

## 2024-12-31 ASSESSMENT — PAIN SCALES - WONG BAKER
WONGBAKER_NUMERICALRESPONSE: NO HURT
WONGBAKER_NUMERICALRESPONSE: NO HURT
WONGBAKER_NUMERICALRESPONSE: HURTS A LITTLE BIT
WONGBAKER_NUMERICALRESPONSE: NO HURT

## 2024-12-31 ASSESSMENT — PAIN DESCRIPTION - ORIENTATION: ORIENTATION: RIGHT

## 2024-12-31 ASSESSMENT — ENCOUNTER SYMPTOMS
ABDOMINAL PAIN: 1
NAUSEA: 1
CONSTIPATION: 1
ABDOMINAL DISTENTION: 1
BACK PAIN: 1
RESPIRATORY NEGATIVE: 1

## 2024-12-31 ASSESSMENT — PAIN DESCRIPTION - LOCATION
LOCATION: BREAST
LOCATION: ABDOMEN;BACK
LOCATION: ABDOMEN
LOCATION: GENERALIZED
LOCATION: ABDOMEN;BACK
LOCATION: ABDOMEN

## 2024-12-31 NOTE — PROGRESS NOTES
Pts blood glucose reads High on glucometer x 2, and potassium is 5.5. new orders received to adjust sliding scales and to give kayexelate. Pt updated.

## 2024-12-31 NOTE — H&P
Department of Family Practice  Attending History and Physical        CHIEF COMPLAINT:  ABDOMINAL PAIN    Reason for Admission:  LACTIC ACIDOSIS, CIRRHOSIS, ABDOMINAL PAIN    History Obtained From:  patient, ED, Quality of history:  good historian    HISTORY OF PRESENT ILLNESS:      The patient is a 54 y.o. female with significant past medical history of CIRRHOSIS who presents with ABDOMINAL PAIN.     Ellie Santacruz is a 54 y.o. female with a past medical history of alcoholism, splenomegaly, schizoaffective, tobacco use disorder, anxiety, depression, breast cancer, pancreatic pseudocyst, pancreatitis, alcoholic cirrhosis, diabetes, GERD, IBS, DKA, portal hypertension, cystitis, cocaine use, hysterectomy, cholecystectomy, bilateral oophorectomy who presents to the ED with epigastric abdominal pain, abdominal distention, nausea, and positional shortness of breath since yesterday.  Patient states that this feels similar to exacerbations of her previous cirrhosis with ascites and pancreatitis.  Patient states that her blood sugar usually fluctuates very high sometimes up in the 600s.  Patient states her last alcohol ingestion was over a year ago.  Patient states her last drug ingestion was also over 1 year ago.  Patient states she has a fentanyl patch on her right arm, which she has for her pancreatitis chronically.  Patient denies fever, chills, headache, chest pain, shortness of breath at rest, vomiting, diarrhea, lightheadedness, dysuria, hematuria, hematochezia, or melena.     SHE WAS AT THE NURSES STATION LOOKING FOR ME WHEN I GOT TO THE FLOOR.     Past Medical History:        Diagnosis Date    Alcoholism (HCC)     Since teens to early 20s    Anxiety and depression     Ascites of liver     Bronchitis     Cancer (HCC)     breast, left    Candidiasis of vagina     Chronic pancreatitis (HCC)     Cocaine abuse (HCC)     Constipation     Diabetes mellitus, type 2 (HCC)     GERD (gastroesophageal reflux disease)     Gout

## 2024-12-31 NOTE — PROGRESS NOTES
General Surgery Progress Note  [facility]  Celestino Acosta MD, MS    Patient's Name/Date of Birth: Ellie Santacruz / 1970    Date: December 31, 2024     Surgeon: MD Dave    Chief Complaint: abd pain    Patient Active Problem List   Diagnosis    Chronic pancreatitis due to acute alcohol intoxication (HCC)    Schizoaffective disorder, bipolar type (HCC)    Anxiety disorder    Depression    History of alcohol abuse    Pancreatic pseudocyst (HCC)    Liver dysfunction    Asthma    Gastroesophageal reflux disease without esophagitis    Bipolar I disorder (HCC)    Bipolar affective disorder (HCC)    Irritable bowel syndrome    Tobacco use disorder    Jaundice    RUQ abdominal pain    Poorly controlled diabetes mellitus (HCC)    Pain of upper abdomen    Elevated LFTs    Intractable abdominal pain    Abdominal pain, epigastric    Hyperglycemia    Type 2 diabetes mellitus with hyperosmolarity without coma, with long-term current use of insulin (HCC)    Fall (on) (from) other stairs and steps, initial encounter    Hepatitis, alcoholic    Nausea and vomiting    Alcoholism (HCC)    Pancytopenia (HCC)    Splenomegaly    Hypoglycemia episode    Hepatitis C    Anxiety and depression    Cigarette smoker, heavy    Acute pancreatitis    Alcohol abuse    Acute alcoholic gastritis    Other osteoporosis without current pathological fracture    Ketoacidosis, diabetic, no coma, insulin dependent    Bicytopenia - leukopenia and thrombocytopenia    Chronic abdominal pain    Chronic pancreatitis (HCC)    Electrolyte imbalance    Lactic acidosis    Severe protein-calorie malnutrition (HCC)    Idiopathic osteoporosis    Ascites due to alcoholic cirrhosis (HCC)    Diabetic polyneuropathy associated with type 2 diabetes mellitus (HCC)    Malignant neoplasm of left breast in female, estrogen receptor positive (HCC)    Cocaine dependence with withdrawal (HCC)    Vaginitis and vulvovaginitis    Abscess, gluteal, left

## 2024-12-31 NOTE — CARE COORDINATION
SOCIAL WORK / DISCHARGE PLANNING:  Sw met with pt at bedside. She was ambulating in halls pushing IV pole. Dc plan is to return to [facility]. [phone]. Room air. Currently on IV Merrem. Hx [facility]. PTA on Lactulose and Xifaxan. Denies any dc needs, other than assistance with ride to return to [facility] from [facility] Provide a Ride. [phone].             Electronically signed by SANDEE Schaefer on 12/31/2024 at 12:19 PM

## 2024-12-31 NOTE — PLAN OF CARE
Problem: Chronic Conditions and Co-morbidities  Goal: Patient's chronic conditions and co-morbidity symptoms are monitored and maintained or improved  12/31/2024 0953 by Lawanda Harris RN  Outcome: Progressing  Flowsheets (Taken 12/31/2024 0830)  Care Plan - Patient's Chronic Conditions and Co-Morbidity Symptoms are Monitored and Maintained or Improved: Monitor and assess patient's chronic conditions and comorbid symptoms for stability, deterioration, or improvement  12/31/2024 0009 by Romana Jarrell RN  Outcome: Progressing     Problem: Discharge Planning  Goal: Discharge to home or other facility with appropriate resources  12/31/2024 0953 by Lawanda Harris RN  Outcome: Progressing  Flowsheets (Taken 12/31/2024 0830)  Discharge to home or other facility with appropriate resources: Identify barriers to discharge with patient and caregiver  12/31/2024 0009 by Romana Jarrell RN  Outcome: Progressing  Flowsheets (Taken 12/30/2024 2000)  Discharge to home or other facility with appropriate resources:   Identify barriers to discharge with patient and caregiver   Arrange for needed discharge resources and transportation as appropriate   Identify discharge learning needs (meds, wound care, etc)   Refer to discharge planning if patient needs post-hospital services based on physician order or complex needs related to functional status, cognitive ability or social support system     Problem: Safety - Adult  Goal: Free from fall injury  12/31/2024 0953 by Lawanda Harris RN  Outcome: Progressing  12/31/2024 0009 by Romana Jarrell RN  Outcome: Progressing     Problem: Skin/Tissue Integrity  Goal: Absence of new skin breakdown  Description: 1.  Monitor for areas of redness and/or skin breakdown  2.  Assess vascular access sites hourly  3.  Every 4-6 hours minimum:  Change oxygen saturation probe site  4.  Every 4-6 hours:  If on nasal continuous positive airway pressure, respiratory therapy assess nares and determine need for

## 2024-12-31 NOTE — PLAN OF CARE
Problem: Chronic Conditions and Co-morbidities  Goal: Patient's chronic conditions and co-morbidity symptoms are monitored and maintained or improved  Outcome: Progressing     Problem: Discharge Planning  Goal: Discharge to home or other facility with appropriate resources  Outcome: Progressing  Flowsheets (Taken 12/30/2024 2000)  Discharge to home or other facility with appropriate resources:   Identify barriers to discharge with patient and caregiver   Arrange for needed discharge resources and transportation as appropriate   Identify discharge learning needs (meds, wound care, etc)   Refer to discharge planning if patient needs post-hospital services based on physician order or complex needs related to functional status, cognitive ability or social support system     Problem: Safety - Adult  Goal: Free from fall injury  Outcome: Progressing     Problem: Skin/Tissue Integrity  Goal: Absence of new skin breakdown  Description: 1.  Monitor for areas of redness and/or skin breakdown  2.  Assess vascular access sites hourly  3.  Every 4-6 hours minimum:  Change oxygen saturation probe site  4.  Every 4-6 hours:  If on nasal continuous positive airway pressure, respiratory therapy assess nares and determine need for appliance change or resting period.  Outcome: Progressing

## 2024-12-31 NOTE — CONSULTS
[address] [place] [address]  Phone [phone]   [phone]      Admit Date: 2024  6:25 PM  Pt Name: Ellie Santacruz  MRN: [id number]  : 1970  Reason for Consult:    Chief Complaint   Patient presents with    Abdominal Pain     Pt c/o abd pain and distention that started yesterday and has been getting progressively worse.     Requesting Physician:  Liza Oh DO  PCP: Liza Oh DO  History Obtained From:  patient, chart   ID consulted for Abdominal pain, epigastric [R10.13]  Hyperkalemia [E87.5]  Lactic acidosis [E87.20]  Hyperglycemia [R73.9]  Cirrhosis of liver with ascites, unspecified hepatic cirrhosis type (HCC) [K74.60, R18.8]  on hospital day 2  Face to face encounter   CHIEF COMPLAINT       Chief Complaint   Patient presents with    Abdominal Pain     Pt c/o abd pain and distention that started yesterday and has been getting progressively worse.     HISTORYOF PRESENT ILLNESS   Ellie Santacruz is a 54 y.o. female who  has a past medical history of Alcoholism (HCC), Anxiety and depression, Ascites of liver, Bronchitis, Cancer (HCC), Candidiasis of vagina, Chronic pancreatitis (HCC), Cocaine abuse (HCC), Constipation, Diabetes mellitus, type 2 (HCC), GERD (gastroesophageal reflux disease), Gout, Hepatitis C, Hernia of anterior abdominal wall, Jaundice, Pneumonia, Polyneuropathy due to type 2 diabetes mellitus (HCC), Schizoaffective disorder, bipolar type (HCC), and Splenomegaly.   Presented to ED TRIAGE VITALS  BP: 135/78, Temp: 98.4 °F (36.9 °C), Pulse: 70, Respirations: 20, SpO2: 99 %  HPI:  ID was consulted on 24 for infection management  Pt presented to ER on 2024 with diagnosis of  Abdominal pain, epigastric [R10.13]  Hyperkalemia [E87.5]  Lactic acidosis [E87.20]  Hyperglycemia [R73.9]  Cirrhosis of liver with ascites, unspecified hepatic cirrhosis type (HCC) [K74.60, R18.8]     Patient is a 54 year old female - who presented to 
bruising  Endocrine ROS: negative  lethargy, mood swings, palpitations or polydipsia/polyuria  Respiratory ROS: negative sputum changes, stridor, tachypnea or wheezing  Cardiovascular ROS: negative for - loss of consciousness, murmur or orthopnea  Gastrointestinal ROS: negative for - hematochezia or hematemesis  Genito-Urinary ROS: negative for -  genital discharge or hematuria  Musculoskeletal ROS: negative for - focal weakness, gangrene  Psych/Neuro ROS: negative for - visual or auditory hallucinations, suicidal ideation      Physical exam:   BP (!) 132/90   Pulse 74   Temp 98.1 °F (36.7 °C) (Oral)   Resp 18   SpO2 96%   General appearance:  NAD, appears stated age  Head: NCAT, PERRLA, EOMI, red conjunctiva  Neck: supple, no masses, trachea midline  Lungs: Equal chest rise bilateral, no retractions, no wheezing  Heart: Reg rate  Abdomen: soft, tender, distended, ascites  Skin; warm and dry, no cyanosis  Gu: no cva tenderness  Extremities: atraumatic, no focal motor deficits, no open wounds  Psych: No tremor, visual hallucinations    Pathology:     Radiology: I reviewed relevant abdominal imaging from this admission and that available in the EMR including CT abd/pel from yesterday 12/29/24 in the ER. My assessment is positive for abdominal     Assessment:  Ellie Santacruz is a 54 y.o. female with abdominal pain and tenderness and ascites.    Patient Active Problem List   Diagnosis    Chronic pancreatitis due to acute alcohol intoxication (HCC)    Schizoaffective disorder, bipolar type (HCC)    Anxiety disorder    Depression    History of alcohol abuse    Pancreatic pseudocyst (HCC)    Liver dysfunction    Asthma    Gastroesophageal reflux disease without esophagitis    Bipolar I disorder (HCC)    Bipolar affective disorder (HCC)    Irritable bowel syndrome    Tobacco use disorder    Jaundice    RUQ abdominal pain    Poorly controlled diabetes mellitus (HCC)    Pain of upper abdomen    Elevated LFTs    
least pT1c N0 (i-), Oncotype 7  Initial lumpectomy noted Stage pT1b N(i-) disease  Status post left mastectomy, found  2 foci of invasive carcinoma, with larger lesion being 1.1 cm, upstaging her disease  Thrombocytopenia likely 2/2 cirrhosis  Chronic leukopenia, likely 2/2 cirrhosis  Osteoporosis  Chronic pancreatitis  Cirrhosis with varices and splenomegaly  H/o hepatitis C  Gout  Polyneuropathy due to diabetes  Schizoaffective disorder    The patient is a 54 y.o. female who comes in for abdominal pain and back pain. We have been consulted to rule out malignancy.     PLAN     No overt evidence of malignancy from the CT abd/pel; other findings noted to be stable; per radiology, compared scans as far back as 2018  Established with Dr. Esquivel with GI; I advised follow-up   Gen surg following  ID also following, started on Meropenem  Continue HCC screening; will order AFP  Right hepatic liver lobe lesion evaluated by [facility] surgery back in March 2024  Pt has a good amount of fecal retention, continue stool softeners and lactulose; continue good bowel regimen while on opioids  Monitor patient's sacral pain  May continue anastrozole in spite of recent BCI results, and being on this med for >5 years   If anastrozole may be contributing to her MSK symptoms, consider taking a breast from this med  I ordered mammogram for right breast back in Oct 2024; needs to be completed        Approximately spent 63 minutes with patient, discussing the laboratory, imaging, and clinical findings; and documentation, and I have discussed the clinical implications and recommendations. More than 50% of time was spent counseling patient. The patient verbalized understanding.      Isaak Rojas MD  Medical Oncology  [facility]  12/31/2024    [place] Office  P: [phone]  F: [phone]    [facility]) Office  P: [phone]  F: [phone]    [facility]) Office  P: [phone]  F:

## 2025-01-01 VITALS
HEIGHT: 60 IN | WEIGHT: 132 LBS | DIASTOLIC BLOOD PRESSURE: 57 MMHG | SYSTOLIC BLOOD PRESSURE: 116 MMHG | HEART RATE: 74 BPM | BODY MASS INDEX: 25.91 KG/M2 | OXYGEN SATURATION: 97 % | RESPIRATION RATE: 20 BRPM | TEMPERATURE: 98.2 F

## 2025-01-01 LAB
ALBUMIN SERPL-MCNC: 3.9 G/DL (ref 3.5–5.2)
ALP SERPL-CCNC: 168 U/L (ref 35–104)
ALT SERPL-CCNC: 29 U/L (ref 0–32)
ANION GAP SERPL CALCULATED.3IONS-SCNC: 11 MMOL/L (ref 7–16)
AST SERPL-CCNC: 45 U/L (ref 0–31)
BASOPHILS # BLD: 0.02 K/UL (ref 0–0.2)
BASOPHILS NFR BLD: 1 % (ref 0–2)
BILIRUB SERPL-MCNC: 0.4 MG/DL (ref 0–1.2)
BUN SERPL-MCNC: 20 MG/DL (ref 6–20)
CALCIUM SERPL-MCNC: 9 MG/DL (ref 8.6–10.2)
CHLORIDE SERPL-SCNC: 102 MMOL/L (ref 98–107)
CO2 SERPL-SCNC: 24 MMOL/L (ref 22–29)
CREAT SERPL-MCNC: 0.8 MG/DL (ref 0.5–1)
EOSINOPHIL # BLD: 0.08 K/UL (ref 0.05–0.5)
EOSINOPHILS RELATIVE PERCENT: 3 % (ref 0–6)
ERYTHROCYTE [DISTWIDTH] IN BLOOD BY AUTOMATED COUNT: 13.2 % (ref 11.5–15)
GFR, ESTIMATED: 85 ML/MIN/1.73M2
GLUCOSE BLD-MCNC: 141 MG/DL (ref 74–99)
GLUCOSE BLD-MCNC: 226 MG/DL (ref 74–99)
GLUCOSE BLD-MCNC: 348 MG/DL (ref 74–99)
GLUCOSE SERPL-MCNC: 68 MG/DL (ref 74–99)
HCT VFR BLD AUTO: 25.5 % (ref 34–48)
HGB BLD-MCNC: 9.3 G/DL (ref 11.5–15.5)
IMM GRANULOCYTES # BLD AUTO: 0.05 K/UL (ref 0–0.58)
IMM GRANULOCYTES NFR BLD: 2 % (ref 0–5)
LYMPHOCYTES NFR BLD: 0.59 K/UL (ref 1.5–4)
LYMPHOCYTES RELATIVE PERCENT: 20 % (ref 20–42)
MCH RBC QN AUTO: 30.8 PG (ref 26–35)
MCHC RBC AUTO-ENTMCNC: 36.5 G/DL (ref 32–34.5)
MCV RBC AUTO: 84.4 FL (ref 80–99.9)
MONOCYTES NFR BLD: 0.35 K/UL (ref 0.1–0.95)
MONOCYTES NFR BLD: 12 % (ref 2–12)
NEUTROPHILS NFR BLD: 63 % (ref 43–80)
NEUTS SEG NFR BLD: 1.86 K/UL (ref 1.8–7.3)
PLATELET # BLD AUTO: 58 K/UL (ref 130–450)
PLATELET CONFIRMATION: NORMAL
PMV BLD AUTO: 10 FL (ref 7–12)
POTASSIUM SERPL-SCNC: 4.3 MMOL/L (ref 3.5–5)
PROT SERPL-MCNC: 6.8 G/DL (ref 6.4–8.3)
RBC # BLD AUTO: 3.02 M/UL (ref 3.5–5.5)
SODIUM SERPL-SCNC: 137 MMOL/L (ref 132–146)
WBC OTHER # BLD: 3 K/UL (ref 4.5–11.5)

## 2025-01-01 PROCEDURE — 6370000000 HC RX 637 (ALT 250 FOR IP): Performed by: FAMILY MEDICINE

## 2025-01-01 PROCEDURE — 36415 COLL VENOUS BLD VENIPUNCTURE: CPT

## 2025-01-01 PROCEDURE — 2580000003 HC RX 258: Performed by: CLINICAL NURSE SPECIALIST

## 2025-01-01 PROCEDURE — 80053 COMPREHEN METABOLIC PANEL: CPT

## 2025-01-01 PROCEDURE — 82962 GLUCOSE BLOOD TEST: CPT

## 2025-01-01 PROCEDURE — 85025 COMPLETE CBC W/AUTO DIFF WBC: CPT

## 2025-01-01 PROCEDURE — 6360000002 HC RX W HCPCS: Performed by: CLINICAL NURSE SPECIALIST

## 2025-01-01 RX ORDER — INSULIN GLARGINE 100 [IU]/ML
38 INJECTION, SOLUTION SUBCUTANEOUS EVERY MORNING
Qty: 2 EACH | Refills: 0 | Status: SHIPPED | OUTPATIENT
Start: 2025-01-01 | End: 2025-01-31

## 2025-01-01 RX ADMIN — THIAMINE HCL TAB 100 MG 100 MG: 100 TAB at 08:29

## 2025-01-01 RX ADMIN — INSULIN LISPRO 4 UNITS: 100 INJECTION, SOLUTION INTRAVENOUS; SUBCUTANEOUS at 11:47

## 2025-01-01 RX ADMIN — Medication 1 TABLET: at 08:30

## 2025-01-01 RX ADMIN — OXYCODONE 10 MG: 5 TABLET ORAL at 17:06

## 2025-01-01 RX ADMIN — PANTOPRAZOLE SODIUM 40 MG: 40 TABLET, DELAYED RELEASE ORAL at 05:56

## 2025-01-01 RX ADMIN — OXYCODONE HYDROCHLORIDE AND ACETAMINOPHEN 1000 MG: 500 TABLET ORAL at 08:29

## 2025-01-01 RX ADMIN — OXYCODONE 10 MG: 5 TABLET ORAL at 12:38

## 2025-01-01 RX ADMIN — GABAPENTIN 100 MG: 100 CAPSULE ORAL at 13:42

## 2025-01-01 RX ADMIN — RIFAXIMIN 400 MG: 200 TABLET ORAL at 08:30

## 2025-01-01 RX ADMIN — ANASTROZOLE 1 MG: 1 TABLET ORAL at 08:30

## 2025-01-01 RX ADMIN — ARIPIPRAZOLE 20 MG: 10 TABLET ORAL at 08:29

## 2025-01-01 RX ADMIN — HYDROXYZINE PAMOATE 25 MG: 25 CAPSULE ORAL at 04:59

## 2025-01-01 RX ADMIN — PANCRELIPASE LIPASE, PANCRELIPASE PROTEASE, PANCRELIPASE AMYLASE 15000 UNITS: 15000; 47000; 63000 CAPSULE, DELAYED RELEASE ORAL at 17:06

## 2025-01-01 RX ADMIN — CALCIUM 500 MG: 500 TABLET ORAL at 08:29

## 2025-01-01 RX ADMIN — INSULIN GLARGINE 5 UNITS: 100 INJECTION, SOLUTION SUBCUTANEOUS at 08:38

## 2025-01-01 RX ADMIN — MEROPENEM 1000 MG: 1 INJECTION INTRAVENOUS at 03:51

## 2025-01-01 RX ADMIN — PANCRELIPASE LIPASE, PANCRELIPASE PROTEASE, PANCRELIPASE AMYLASE 15000 UNITS: 15000; 47000; 63000 CAPSULE, DELAYED RELEASE ORAL at 12:38

## 2025-01-01 RX ADMIN — PANCRELIPASE LIPASE, PANCRELIPASE PROTEASE, PANCRELIPASE AMYLASE 15000 UNITS: 15000; 47000; 63000 CAPSULE, DELAYED RELEASE ORAL at 08:30

## 2025-01-01 RX ADMIN — OXYCODONE 10 MG: 5 TABLET ORAL at 08:29

## 2025-01-01 RX ADMIN — HYDROXYZINE PAMOATE 25 MG: 25 CAPSULE ORAL at 11:40

## 2025-01-01 RX ADMIN — OXYCODONE 10 MG: 5 TABLET ORAL at 03:52

## 2025-01-01 RX ADMIN — INSULIN LISPRO 10 UNITS: 100 INJECTION, SOLUTION INTRAVENOUS; SUBCUTANEOUS at 08:37

## 2025-01-01 RX ADMIN — LACTULOSE 20 G: 20 SOLUTION ORAL at 08:30

## 2025-01-01 RX ADMIN — MEROPENEM 1000 MG: 1 INJECTION INTRAVENOUS at 10:05

## 2025-01-01 RX ADMIN — RIFAXIMIN 400 MG: 200 TABLET ORAL at 13:42

## 2025-01-01 RX ADMIN — GABAPENTIN 100 MG: 100 CAPSULE ORAL at 08:30

## 2025-01-01 ASSESSMENT — PAIN SCALES - WONG BAKER: WONGBAKER_NUMERICALRESPONSE: HURTS A LITTLE BIT

## 2025-01-01 ASSESSMENT — PAIN SCALES - GENERAL
PAINLEVEL_OUTOF10: 8
PAINLEVEL_OUTOF10: 6
PAINLEVEL_OUTOF10: 3
PAINLEVEL_OUTOF10: 10

## 2025-01-01 ASSESSMENT — PAIN DESCRIPTION - LOCATION
LOCATION: ABDOMEN;BACK
LOCATION: BACK;ABDOMEN
LOCATION: ABDOMEN;BACK
LOCATION: ABDOMEN;BACK

## 2025-01-01 ASSESSMENT — PAIN DESCRIPTION - DESCRIPTORS: DESCRIPTORS: ACHING;PRESSURE

## 2025-01-01 NOTE — PLAN OF CARE
Problem: Chronic Conditions and Co-morbidities  Goal: Patient's chronic conditions and co-morbidity symptoms are monitored and maintained or improved  Outcome: Progressing     Problem: Discharge Planning  Goal: Discharge to home or other facility with appropriate resources  Outcome: Progressing  Flowsheets (Taken 12/31/2024 2000)  Discharge to home or other facility with appropriate resources:   Identify barriers to discharge with patient and caregiver   Arrange for needed discharge resources and transportation as appropriate   Identify discharge learning needs (meds, wound care, etc)   Refer to discharge planning if patient needs post-hospital services based on physician order or complex needs related to functional status, cognitive ability or social support system     Problem: Safety - Adult  Goal: Free from fall injury  Outcome: Progressing     Problem: Skin/Tissue Integrity  Goal: Absence of new skin breakdown  Description: 1.  Monitor for areas of redness and/or skin breakdown  2.  Assess vascular access sites hourly  3.  Every 4-6 hours minimum:  Change oxygen saturation probe site  4.  Every 4-6 hours:  If on nasal continuous positive airway pressure, respiratory therapy assess nares and determine need for appliance change or resting period.  Outcome: Progressing     Problem: Pain  Goal: Verbalizes/displays adequate comfort level or baseline comfort level  Outcome: Progressing

## 2025-01-01 NOTE — PROGRESS NOTES
Department of Family Practice  Adult Daily Progress Note      SUBJECTIVE  OSBALDO IS SEEN IN FOLLOW UP TODAY 12/31/24 ON IMCU.  SHE IS TOLERATING TREATMENT.  SHE HAS BEEN SEEN BY ONCOLOGY.  NO SUSPICION OF METASTATIC DISEASE FROM BREAST.  URINE CULTURE WITH GRAM POSITIVE AND GRAM NEGATIVE ORGANISMS.  ID FOLLOWING.  HER POTASSIUM KEEPS GOING UP.  HAS GOTTEN KAYEXELATE TWICE AND LOKELMA ONCE.  LAST K+ WAS 5.5.  SHE HAS BEEN WALKING THE HALLS OFTEN WITH HER IV POLE.  SHE SAYS SHE FEELS BETTER AND HAS LESS PAIN.     OBJECTIVE    Physical  VITALS:  BP 99/65   Pulse 76   Temp 98.4 °F (36.9 °C) (Oral)   Resp 16   Ht 1.524 m (5')   Wt 59.9 kg (132 lb)   SpO2 100%   BMI 25.78 kg/m²   CONSTITUTIONAL:  awake, alert, cooperative, no apparent distress, and appears stated age  LUNGS:  No increased work of breathing, good air exchange, clear to auscultation bilaterally, no crackles or wheezing  CARDIOVASCULAR:  Normal apical impulse, regular rate and rhythm, normal S1 and S2, no S3 or S4, and no murmur noted  ABDOMEN:  scars CONSISTENT WITH SURGICAL HISTORY, normal bowel sounds, soft, non-distended, non-tender, no masses palpated, no hepatosplenomegally  SKIN:  WARM AND DRY  Data  CBC with Differential:    Lab Results   Component Value Date/Time    WBC 2.4 12/31/2024 05:15 AM    RBC 3.51 12/31/2024 05:15 AM    HGB 10.6 12/31/2024 05:15 AM    HCT 30.3 12/31/2024 05:15 AM    PLT 54 12/31/2024 05:15 AM    MCV 86.3 12/31/2024 05:15 AM    MCH 30.2 12/31/2024 05:15 AM    MCHC 35.0 12/31/2024 05:15 AM    RDW 12.9 12/31/2024 05:15 AM    NRBC 1 12/06/2024 10:11 AM    BANDSPCT 1 05/15/2016 08:30 AM    METASPCT 1 03/23/2024 05:20 AM    METASPCT 1.7 06/18/2017 06:33 AM    LYMPHOPCT 7 12/31/2024 05:15 AM    MONOPCT 1 12/31/2024 05:15 AM    MYELOPCT 1 11/17/2024 04:02 PM    MYELOPCT 0.5 04/01/2017 09:45 AM    EOSPCT 0 12/31/2024 05:15 AM    BASOPCT 0 12/31/2024 05:15 AM    MONOSABS 0.02 12/31/2024 05:15 AM    LYMPHSABS 0.17 12/31/2024 05:15

## 2025-01-01 NOTE — PROGRESS NOTES
[facility]  [facility]  Progress Note    CC: abdominal pain   Face to face encounter   SUBJECTIVE:  1/1/2025  In bed- still with abdominal tenderness  Patient is tolerating medications. No reported adverse drug reactions.  No nausea, vomiting, diarrhea.    Medications:  Scheduled Meds:   insulin lispro  0-16 Units SubCUTAneous TID WC    insulin lispro  0-8 Units SubCUTAneous Nightly    rifAXIMin  400 mg Oral TID    meropenem  1,000 mg IntraVENous Q8H    ipratropium  0.5 mg Nebulization 4x Daily RT    anastrozole  1 mg Oral QAM    ARIPiprazole  20 mg Oral QAM    vitamin C  1,000 mg Oral QAM    calcium elemental  500 mg Oral BID    lipase-protease-amylas  15,000 Units Oral TID WC    fentaNYL  1 patch TransDERmal Q72H    gabapentin  100 mg Oral TID    insulin glargine  12 Units SubCUTAneous Nightly    insulin glargine  5 Units SubCUTAneous QAM    lactulose  20 g Oral BID    melatonin  12 mg Oral Nightly    therapeutic multivitamin-minerals  1 tablet Oral QAM    nicotine  1 patch TransDERmal Daily    pantoprazole  40 mg Oral QAM AC    thiamine mononitrate  100 mg Oral Daily    VORTIoxetine HBr  10 mg Oral Nightly     Continuous Infusions:   dextrose       PRN Meds:glucose, dextrose bolus **OR** dextrose bolus, glucagon (rDNA), dextrose, oxyCODONE, acetaminophen, albuterol, furosemide, hydrOXYzine pamoate, ondansetron, sennosides-docusate sodium, traZODone  OBJECTIVE:  Patient Vitals for the past 24 hrs:   BP Temp Temp src Pulse Resp SpO2   01/01/25 1127 115/60 98.2 °F (36.8 °C) -- 73 18 100 %   01/01/25 0824 104/71 98.1 °F (36.7 °C) Oral 76 18 100 %   01/01/25 0422 -- -- -- -- 16 --   01/01/25 0352 121/72 98.3 °F (36.8 °C) Oral 77 16 97 %   01/01/25 0010 108/71 97.8 °F (36.6 °C) Oral 77 16 97 %   12/31/24 2329 -- -- -- -- 18 --   12/31/24 2259 -- -- -- -- 18 --   12/31/24 1925 112/68 98 °F (36.7 °C) Oral 71 16 100 %   12/31/24 1544 -- -- -- -- 16 --     Body Habitus: normal/healthy weight

## 2025-01-01 NOTE — PROGRESS NOTES
PATIENT APPROVED FOR DISCHARGE BY INFECTIOUS DISEASE.  GENERAL SURGERY WITH NO SURGICAL INDICATIONS/PLANS.  NO METASTATIC DISEASE PER ONCOLOGY.  WILL DISCHARGE PATIENT.

## 2025-01-04 LAB
MICROORGANISM SPEC CULT: NORMAL
MICROORGANISM SPEC CULT: NORMAL
SERVICE CMNT-IMP: NORMAL
SERVICE CMNT-IMP: NORMAL
SPECIMEN DESCRIPTION: NORMAL
SPECIMEN DESCRIPTION: NORMAL

## 2025-01-05 LAB
EKG ATRIAL RATE: 76 BPM
EKG P AXIS: 65 DEGREES
EKG P-R INTERVAL: 150 MS
EKG Q-T INTERVAL: 382 MS
EKG QRS DURATION: 84 MS
EKG QTC CALCULATION (BAZETT): 429 MS
EKG R AXIS: 23 DEGREES
EKG T AXIS: 69 DEGREES
EKG VENTRICULAR RATE: 76 BPM

## 2025-01-28 ENCOUNTER — HOSPITAL ENCOUNTER (INPATIENT)
Age: 55
LOS: 5 days | Discharge: HOME | DRG: 425 | End: 2025-02-02
Attending: EMERGENCY MEDICINE | Admitting: FAMILY MEDICINE
Payer: COMMERCIAL

## 2025-01-28 ENCOUNTER — APPOINTMENT (OUTPATIENT)
Dept: CT IMAGING | Age: 55
DRG: 425 | End: 2025-01-28
Payer: COMMERCIAL

## 2025-01-28 ENCOUNTER — APPOINTMENT (OUTPATIENT)
Dept: GENERAL RADIOLOGY | Age: 55
DRG: 425 | End: 2025-01-28
Payer: COMMERCIAL

## 2025-01-28 DIAGNOSIS — K74.60 HEPATIC CIRRHOSIS, UNSPECIFIED HEPATIC CIRRHOSIS TYPE, UNSPECIFIED WHETHER ASCITES PRESENT (HCC): ICD-10-CM

## 2025-01-28 DIAGNOSIS — E11.65 POORLY CONTROLLED DIABETES MELLITUS (HCC): ICD-10-CM

## 2025-01-28 DIAGNOSIS — R10.13 ABDOMINAL PAIN, EPIGASTRIC: ICD-10-CM

## 2025-01-28 DIAGNOSIS — E87.5 HYPERKALEMIA: Primary | ICD-10-CM

## 2025-01-28 PROBLEM — R10.12 LUQ ABDOMINAL PAIN: Status: ACTIVE | Noted: 2025-01-28

## 2025-01-28 LAB
ALBUMIN SERPL-MCNC: 4.2 G/DL (ref 3.5–5.2)
ALP SERPL-CCNC: 370 U/L (ref 35–104)
ALT SERPL-CCNC: 43 U/L (ref 0–32)
AMPHET UR QL SCN: NEGATIVE
ANION GAP SERPL CALCULATED.3IONS-SCNC: 7 MMOL/L (ref 7–16)
ANION GAP SERPL CALCULATED.3IONS-SCNC: 9 MMOL/L (ref 7–16)
APAP SERPL-MCNC: 11 UG/ML (ref 10–30)
AST SERPL-CCNC: 30 U/L (ref 0–31)
B-OH-BUTYR SERPL-MCNC: 0.21 MMOL/L (ref 0.02–0.27)
BARBITURATES UR QL SCN: NEGATIVE
BASOPHILS # BLD: 0 K/UL (ref 0–0.2)
BASOPHILS NFR BLD: 0 % (ref 0–2)
BENZODIAZ UR QL: NEGATIVE
BILIRUB DIRECT SERPL-MCNC: <0.2 MG/DL (ref 0–0.3)
BILIRUB INDIRECT SERPL-MCNC: ABNORMAL MG/DL (ref 0–1)
BILIRUB SERPL-MCNC: 0.4 MG/DL (ref 0–1.2)
BILIRUB UR QL STRIP: NEGATIVE
BUN SERPL-MCNC: 11 MG/DL (ref 6–20)
BUN SERPL-MCNC: 11 MG/DL (ref 6–20)
BUPRENORPHINE UR QL: NEGATIVE
CALCIUM SERPL-MCNC: 8.7 MG/DL (ref 8.6–10.2)
CALCIUM SERPL-MCNC: 9.5 MG/DL (ref 8.6–10.2)
CANNABINOIDS UR QL SCN: NEGATIVE
CHLORIDE SERPL-SCNC: 100 MMOL/L (ref 98–107)
CHLORIDE SERPL-SCNC: 109 MMOL/L (ref 98–107)
CHLORIDE UR-SCNC: 33 MMOL/L
CK SERPL-CCNC: 55 U/L (ref 20–180)
CLARITY UR: CLEAR
CO2 SERPL-SCNC: 21 MMOL/L (ref 22–29)
CO2 SERPL-SCNC: 22 MMOL/L (ref 22–29)
COCAINE UR QL SCN: NEGATIVE
COLOR UR: YELLOW
CREAT SERPL-MCNC: 0.7 MG/DL (ref 0.5–1)
CREAT SERPL-MCNC: 0.7 MG/DL (ref 0.5–1)
EOSINOPHIL # BLD: 0.04 K/UL (ref 0.05–0.5)
EOSINOPHILS RELATIVE PERCENT: 2 % (ref 0–6)
EPI CELLS #/AREA URNS HPF: ABNORMAL /HPF
ERYTHROCYTE [DISTWIDTH] IN BLOOD BY AUTOMATED COUNT: 13.3 % (ref 11.5–15)
ETHANOLAMINE SERPL-MCNC: <10 MG/DL (ref 0–0.08)
FENTANYL UR QL: POSITIVE
GFR, ESTIMATED: >90 ML/MIN/1.73M2
GFR, ESTIMATED: >90 ML/MIN/1.73M2
GLUCOSE BLD-MCNC: 174 MG/DL (ref 74–99)
GLUCOSE BLD-MCNC: 56 MG/DL (ref 74–99)
GLUCOSE BLD-MCNC: 59 MG/DL (ref 74–99)
GLUCOSE SERPL-MCNC: 176 MG/DL (ref 74–99)
GLUCOSE SERPL-MCNC: 330 MG/DL (ref 74–99)
GLUCOSE UR STRIP-MCNC: 500 MG/DL
HCT VFR BLD AUTO: 32.8 % (ref 34–48)
HGB BLD-MCNC: 10.9 G/DL (ref 11.5–15.5)
HGB UR QL STRIP.AUTO: NEGATIVE
KETONES UR STRIP-MCNC: NEGATIVE MG/DL
LACTATE BLDV-SCNC: 2.2 MMOL/L (ref 0.5–1.9)
LACTATE BLDV-SCNC: 2.8 MMOL/L (ref 0.5–1.9)
LEUKOCYTE ESTERASE UR QL STRIP: NEGATIVE
LYMPHOCYTES NFR BLD: 0.39 K/UL (ref 1.5–4)
LYMPHOCYTES RELATIVE PERCENT: 19 % (ref 20–42)
MCH RBC QN AUTO: 29.9 PG (ref 26–35)
MCHC RBC AUTO-ENTMCNC: 33.2 G/DL (ref 32–34.5)
MCV RBC AUTO: 89.9 FL (ref 80–99.9)
METHADONE UR QL: NEGATIVE
MONOCYTES NFR BLD: 0.11 K/UL (ref 0.1–0.95)
MONOCYTES NFR BLD: 5 % (ref 2–12)
NEUTROPHILS NFR BLD: 74 % (ref 43–80)
NEUTS SEG NFR BLD: 1.47 K/UL (ref 1.8–7.3)
NITRITE UR QL STRIP: NEGATIVE
OPIATES UR QL SCN: NEGATIVE
OSMOLALITY SERPL: 299 MOSM/KG (ref 285–310)
OSMOLALITY UR: 333 MOSM/KG (ref 300–900)
OXYCODONE UR QL SCN: POSITIVE
PCP UR QL SCN: NEGATIVE
PH UR STRIP: 5.5 [PH] (ref 5–9)
PH VENOUS: 7.17 (ref 7.35–7.45)
PLATELET # BLD AUTO: 59 K/UL (ref 130–450)
PLATELET CONFIRMATION: NORMAL
PMV BLD AUTO: 9.3 FL (ref 7–12)
POTASSIUM SERPL-SCNC: 4.7 MMOL/L (ref 3.5–5)
POTASSIUM SERPL-SCNC: 5.4 MMOL/L (ref 3.5–5)
POTASSIUM SERPL-SCNC: 7 MMOL/L (ref 3.5–5)
PROCALCITONIN SERPL-MCNC: 0.21 NG/ML (ref 0–0.08)
PROT SERPL-MCNC: 7.6 G/DL (ref 6.4–8.3)
PROT UR STRIP-MCNC: NEGATIVE MG/DL
RBC # BLD AUTO: 3.65 M/UL (ref 3.5–5.5)
RBC # BLD: ABNORMAL 10*6/UL
RBC #/AREA URNS HPF: ABNORMAL /HPF
SALICYLATES SERPL-MCNC: <0.3 MG/DL (ref 0–30)
SODIUM SERPL-SCNC: 130 MMOL/L (ref 132–146)
SODIUM SERPL-SCNC: 138 MMOL/L (ref 132–146)
SODIUM UR-SCNC: 46 MMOL/L
SP GR UR STRIP: 1.01 (ref 1–1.03)
TEST INFORMATION: ABNORMAL
TOXIC TRICYCLIC SC,BLOOD: NEGATIVE
UROBILINOGEN UR STRIP-ACNC: 0.2 EU/DL (ref 0–1)
WBC #/AREA URNS HPF: ABNORMAL /HPF
WBC OTHER # BLD: 2 K/UL (ref 4.5–11.5)

## 2025-01-28 PROCEDURE — 94640 AIRWAY INHALATION TREATMENT: CPT

## 2025-01-28 PROCEDURE — 96375 TX/PRO/DX INJ NEW DRUG ADDON: CPT

## 2025-01-28 PROCEDURE — 80307 DRUG TEST PRSMV CHEM ANLYZR: CPT

## 2025-01-28 PROCEDURE — 2580000003 HC RX 258

## 2025-01-28 PROCEDURE — 6360000002 HC RX W HCPCS

## 2025-01-28 PROCEDURE — 6370000000 HC RX 637 (ALT 250 FOR IP)

## 2025-01-28 PROCEDURE — 80053 COMPREHEN METABOLIC PANEL: CPT

## 2025-01-28 PROCEDURE — 82248 BILIRUBIN DIRECT: CPT

## 2025-01-28 PROCEDURE — 74176 CT ABD & PELVIS W/O CONTRAST: CPT

## 2025-01-28 PROCEDURE — 36415 COLL VENOUS BLD VENIPUNCTURE: CPT

## 2025-01-28 PROCEDURE — 84300 ASSAY OF URINE SODIUM: CPT

## 2025-01-28 PROCEDURE — 87086 URINE CULTURE/COLONY COUNT: CPT

## 2025-01-28 PROCEDURE — 6370000000 HC RX 637 (ALT 250 FOR IP): Performed by: FAMILY MEDICINE

## 2025-01-28 PROCEDURE — 80048 BASIC METABOLIC PNL TOTAL CA: CPT

## 2025-01-28 PROCEDURE — 82010 KETONE BODYS QUAN: CPT

## 2025-01-28 PROCEDURE — 80179 DRUG ASSAY SALICYLATE: CPT

## 2025-01-28 PROCEDURE — 85025 COMPLETE CBC W/AUTO DIFF WBC: CPT

## 2025-01-28 PROCEDURE — 80143 DRUG ASSAY ACETAMINOPHEN: CPT

## 2025-01-28 PROCEDURE — 82962 GLUCOSE BLOOD TEST: CPT

## 2025-01-28 PROCEDURE — 83605 ASSAY OF LACTIC ACID: CPT

## 2025-01-28 PROCEDURE — 87040 BLOOD CULTURE FOR BACTERIA: CPT

## 2025-01-28 PROCEDURE — 99285 EMERGENCY DEPT VISIT HI MDM: CPT

## 2025-01-28 PROCEDURE — 82436 ASSAY OF URINE CHLORIDE: CPT

## 2025-01-28 PROCEDURE — 84145 PROCALCITONIN (PCT): CPT

## 2025-01-28 PROCEDURE — 84132 ASSAY OF SERUM POTASSIUM: CPT

## 2025-01-28 PROCEDURE — 71045 X-RAY EXAM CHEST 1 VIEW: CPT

## 2025-01-28 PROCEDURE — 83935 ASSAY OF URINE OSMOLALITY: CPT

## 2025-01-28 PROCEDURE — G0480 DRUG TEST DEF 1-7 CLASSES: HCPCS

## 2025-01-28 PROCEDURE — 93005 ELECTROCARDIOGRAM TRACING: CPT

## 2025-01-28 PROCEDURE — 81001 URINALYSIS AUTO W/SCOPE: CPT

## 2025-01-28 PROCEDURE — 83930 ASSAY OF BLOOD OSMOLALITY: CPT

## 2025-01-28 PROCEDURE — 2060000000 HC ICU INTERMEDIATE R&B

## 2025-01-28 PROCEDURE — 82800 BLOOD PH: CPT

## 2025-01-28 PROCEDURE — 96365 THER/PROPH/DIAG IV INF INIT: CPT

## 2025-01-28 PROCEDURE — 70450 CT HEAD/BRAIN W/O DYE: CPT

## 2025-01-28 PROCEDURE — 82550 ASSAY OF CK (CPK): CPT

## 2025-01-28 PROCEDURE — 96361 HYDRATE IV INFUSION ADD-ON: CPT

## 2025-01-28 PROCEDURE — 2500000003 HC RX 250 WO HCPCS

## 2025-01-28 RX ORDER — FUROSEMIDE 20 MG/1
20 TABLET ORAL DAILY PRN
Status: DISCONTINUED | OUTPATIENT
Start: 2025-01-28 | End: 2025-02-02 | Stop reason: HOSPADM

## 2025-01-28 RX ORDER — ALBUTEROL SULFATE 0.83 MG/ML
10 SOLUTION RESPIRATORY (INHALATION) ONCE
Status: COMPLETED | OUTPATIENT
Start: 2025-01-28 | End: 2025-01-28

## 2025-01-28 RX ORDER — ASCORBIC ACID 500 MG
1000 TABLET ORAL EVERY MORNING
Status: DISCONTINUED | OUTPATIENT
Start: 2025-01-29 | End: 2025-02-02 | Stop reason: HOSPADM

## 2025-01-28 RX ORDER — INSULIN GLARGINE 100 [IU]/ML
38 INJECTION, SOLUTION SUBCUTANEOUS EVERY MORNING
Status: DISCONTINUED | OUTPATIENT
Start: 2025-01-29 | End: 2025-02-02

## 2025-01-28 RX ORDER — INSULIN LISPRO 100 [IU]/ML
0-6 INJECTION, SOLUTION INTRAVENOUS; SUBCUTANEOUS NIGHTLY
Status: DISCONTINUED | OUTPATIENT
Start: 2025-01-28 | End: 2025-01-29

## 2025-01-28 RX ORDER — SODIUM CHLORIDE 9 MG/ML
INJECTION, SOLUTION INTRAVENOUS CONTINUOUS
Status: ACTIVE | OUTPATIENT
Start: 2025-01-28 | End: 2025-01-29

## 2025-01-28 RX ORDER — ARIPIPRAZOLE 10 MG/1
20 TABLET ORAL EVERY MORNING
Status: DISCONTINUED | OUTPATIENT
Start: 2025-01-29 | End: 2025-02-02 | Stop reason: HOSPADM

## 2025-01-28 RX ORDER — ANASTROZOLE 1 MG/1
1 TABLET ORAL EVERY MORNING
Status: DISCONTINUED | OUTPATIENT
Start: 2025-01-29 | End: 2025-02-02 | Stop reason: HOSPADM

## 2025-01-28 RX ORDER — GABAPENTIN 100 MG/1
200 CAPSULE ORAL 3 TIMES DAILY
Status: DISCONTINUED | OUTPATIENT
Start: 2025-01-28 | End: 2025-02-02 | Stop reason: HOSPADM

## 2025-01-28 RX ORDER — TRAZODONE HYDROCHLORIDE 50 MG/1
100 TABLET, FILM COATED ORAL NIGHTLY PRN
Status: DISCONTINUED | OUTPATIENT
Start: 2025-01-28 | End: 2025-02-02 | Stop reason: HOSPADM

## 2025-01-28 RX ORDER — 0.9 % SODIUM CHLORIDE 0.9 %
1000 INTRAVENOUS SOLUTION INTRAVENOUS ONCE
Status: COMPLETED | OUTPATIENT
Start: 2025-01-28 | End: 2025-01-28

## 2025-01-28 RX ORDER — OXYCODONE AND ACETAMINOPHEN 10; 325 MG/1; MG/1
1 TABLET ORAL ONCE
Status: COMPLETED | OUTPATIENT
Start: 2025-01-28 | End: 2025-01-28

## 2025-01-28 RX ORDER — M-VIT,TX,IRON,MINS/CALC/FOLIC 27MG-0.4MG
1 TABLET ORAL EVERY MORNING
Status: DISCONTINUED | OUTPATIENT
Start: 2025-01-29 | End: 2025-02-02 | Stop reason: HOSPADM

## 2025-01-28 RX ORDER — INSULIN LISPRO 100 [IU]/ML
0-8 INJECTION, SOLUTION INTRAVENOUS; SUBCUTANEOUS
Status: DISCONTINUED | OUTPATIENT
Start: 2025-01-29 | End: 2025-01-29

## 2025-01-28 RX ORDER — ONDANSETRON 4 MG/1
4 TABLET, ORALLY DISINTEGRATING ORAL 3 TIMES DAILY PRN
Status: DISCONTINUED | OUTPATIENT
Start: 2025-01-28 | End: 2025-02-02 | Stop reason: HOSPADM

## 2025-01-28 RX ORDER — ONDANSETRON 2 MG/ML
4 INJECTION INTRAMUSCULAR; INTRAVENOUS ONCE
Status: COMPLETED | OUTPATIENT
Start: 2025-01-28 | End: 2025-01-28

## 2025-01-28 RX ORDER — INSULIN LISPRO 100 [IU]/ML
0-6 INJECTION, SOLUTION INTRAVENOUS; SUBCUTANEOUS NIGHTLY
Status: DISCONTINUED | OUTPATIENT
Start: 2025-01-28 | End: 2025-01-28 | Stop reason: SDUPTHER

## 2025-01-28 RX ORDER — OXYCODONE HYDROCHLORIDE 5 MG/1
5 TABLET ORAL EVERY 4 HOURS PRN
Status: DISCONTINUED | OUTPATIENT
Start: 2025-01-28 | End: 2025-02-02 | Stop reason: HOSPADM

## 2025-01-28 RX ORDER — CALCIUM CARBONATE 500(1250)
500 TABLET ORAL 2 TIMES DAILY
Status: DISCONTINUED | OUTPATIENT
Start: 2025-01-28 | End: 2025-02-02 | Stop reason: HOSPADM

## 2025-01-28 RX ORDER — FENTANYL CITRATE 0.05 MG/ML
50 INJECTION, SOLUTION INTRAMUSCULAR; INTRAVENOUS ONCE
Status: COMPLETED | OUTPATIENT
Start: 2025-01-28 | End: 2025-01-28

## 2025-01-28 RX ORDER — GAUZE BANDAGE 2" X 2"
100 BANDAGE TOPICAL DAILY
Status: DISCONTINUED | OUTPATIENT
Start: 2025-01-29 | End: 2025-02-02 | Stop reason: HOSPADM

## 2025-01-28 RX ORDER — PANTOPRAZOLE SODIUM 40 MG/1
40 TABLET, DELAYED RELEASE ORAL
Status: DISCONTINUED | OUTPATIENT
Start: 2025-01-29 | End: 2025-01-29

## 2025-01-28 RX ORDER — MORPHINE SULFATE 4 MG/ML
4 INJECTION, SOLUTION INTRAMUSCULAR; INTRAVENOUS ONCE
Status: COMPLETED | OUTPATIENT
Start: 2025-01-28 | End: 2025-01-28

## 2025-01-28 RX ORDER — MORPHINE SULFATE 4 MG/ML
4 INJECTION, SOLUTION INTRAMUSCULAR; INTRAVENOUS EVERY 6 HOURS PRN
Status: DISCONTINUED | OUTPATIENT
Start: 2025-01-28 | End: 2025-02-02 | Stop reason: HOSPADM

## 2025-01-28 RX ORDER — LACTULOSE 10 G/15ML
20 SOLUTION ORAL 2 TIMES DAILY
Status: DISCONTINUED | OUTPATIENT
Start: 2025-01-28 | End: 2025-02-01

## 2025-01-28 RX ORDER — GLUCAGON 1 MG/ML
1 KIT INJECTION PRN
Status: DISCONTINUED | OUTPATIENT
Start: 2025-01-28 | End: 2025-02-02 | Stop reason: HOSPADM

## 2025-01-28 RX ORDER — ALBUTEROL SULFATE 0.83 MG/ML
2.5 SOLUTION RESPIRATORY (INHALATION) EVERY 4 HOURS PRN
Status: DISCONTINUED | OUTPATIENT
Start: 2025-01-28 | End: 2025-02-02 | Stop reason: HOSPADM

## 2025-01-28 RX ORDER — FENTANYL 25 UG/1
1 PATCH TRANSDERMAL
Status: DISCONTINUED | OUTPATIENT
Start: 2025-01-28 | End: 2025-02-02 | Stop reason: HOSPADM

## 2025-01-28 RX ORDER — SENNA AND DOCUSATE SODIUM 50; 8.6 MG/1; MG/1
2 TABLET, FILM COATED ORAL 2 TIMES DAILY PRN
Status: DISCONTINUED | OUTPATIENT
Start: 2025-01-28 | End: 2025-02-02 | Stop reason: HOSPADM

## 2025-01-28 RX ORDER — HYDROXYZINE PAMOATE 25 MG/1
25 CAPSULE ORAL 4 TIMES DAILY PRN
Status: DISCONTINUED | OUTPATIENT
Start: 2025-01-28 | End: 2025-02-02 | Stop reason: HOSPADM

## 2025-01-28 RX ORDER — ACETAMINOPHEN 500 MG
500 TABLET ORAL 4 TIMES DAILY PRN
Status: DISCONTINUED | OUTPATIENT
Start: 2025-01-28 | End: 2025-02-02 | Stop reason: HOSPADM

## 2025-01-28 RX ORDER — CALCIUM GLUCONATE 20 MG/ML
1000 INJECTION, SOLUTION INTRAVENOUS ONCE
Status: COMPLETED | OUTPATIENT
Start: 2025-01-28 | End: 2025-01-28

## 2025-01-28 RX ORDER — DEXTROSE MONOHYDRATE 100 MG/ML
INJECTION, SOLUTION INTRAVENOUS CONTINUOUS PRN
Status: DISCONTINUED | OUTPATIENT
Start: 2025-01-28 | End: 2025-02-02 | Stop reason: HOSPADM

## 2025-01-28 RX ADMIN — VORTIOXETINE 10 MG: 10 TABLET, FILM COATED ORAL at 23:11

## 2025-01-28 RX ADMIN — RIFAXIMIN 400 MG: 200 TABLET ORAL at 23:11

## 2025-01-28 RX ADMIN — ALBUTEROL SULFATE 10 MG: 2.5 SOLUTION RESPIRATORY (INHALATION) at 17:03

## 2025-01-28 RX ADMIN — SODIUM BICARBONATE 50 MEQ: 84 INJECTION INTRAVENOUS at 17:42

## 2025-01-28 RX ADMIN — OXYCODONE AND ACETAMINOPHEN 1 TABLET: 325; 10 TABLET ORAL at 22:01

## 2025-01-28 RX ADMIN — SODIUM CHLORIDE: 9 INJECTION, SOLUTION INTRAVENOUS at 18:20

## 2025-01-28 RX ADMIN — SODIUM CHLORIDE 1000 ML: 9 INJECTION, SOLUTION INTRAVENOUS at 15:25

## 2025-01-28 RX ADMIN — Medication 12 MG: at 22:00

## 2025-01-28 RX ADMIN — FENTANYL CITRATE 50 MCG: 0.05 INJECTION, SOLUTION INTRAMUSCULAR; INTRAVENOUS at 15:27

## 2025-01-28 RX ADMIN — SODIUM CHLORIDE 1000 ML: 9 INJECTION, SOLUTION INTRAVENOUS at 17:11

## 2025-01-28 RX ADMIN — GABAPENTIN 200 MG: 100 CAPSULE ORAL at 22:01

## 2025-01-28 RX ADMIN — SODIUM ZIRCONIUM CYCLOSILICATE 10 G: 10 POWDER, FOR SUSPENSION ORAL at 17:42

## 2025-01-28 RX ADMIN — ONDANSETRON 4 MG: 2 INJECTION, SOLUTION INTRAMUSCULAR; INTRAVENOUS at 15:27

## 2025-01-28 RX ADMIN — DEXTROSE MONOHYDRATE 125 ML: 100 INJECTION, SOLUTION INTRAVENOUS at 17:48

## 2025-01-28 RX ADMIN — MORPHINE SULFATE 4 MG: 4 INJECTION, SOLUTION INTRAMUSCULAR; INTRAVENOUS at 16:40

## 2025-01-28 RX ADMIN — CALCIUM 500 MG: 500 TABLET ORAL at 22:01

## 2025-01-28 RX ADMIN — HUMAN INSULIN 5 UNITS: 100 INJECTION, SOLUTION SUBCUTANEOUS at 17:44

## 2025-01-28 RX ADMIN — CALCIUM GLUCONATE 1000 MG: 20 INJECTION, SOLUTION INTRAVENOUS at 17:14

## 2025-01-28 ASSESSMENT — PAIN SCALES - GENERAL
PAINLEVEL_OUTOF10: 9
PAINLEVEL_OUTOF10: 10
PAINLEVEL_OUTOF10: 9

## 2025-01-28 ASSESSMENT — PAIN DESCRIPTION - LOCATION
LOCATION: ABDOMEN
LOCATION: ABDOMEN

## 2025-01-28 ASSESSMENT — PAIN DESCRIPTION - ORIENTATION: ORIENTATION: LEFT

## 2025-01-28 ASSESSMENT — PAIN DESCRIPTION - DESCRIPTORS: DESCRIPTORS: ACHING;PRESSURE;STABBING

## 2025-01-28 ASSESSMENT — PAIN - FUNCTIONAL ASSESSMENT
PAIN_FUNCTIONAL_ASSESSMENT: 0-10
PAIN_FUNCTIONAL_ASSESSMENT: ACTIVITIES ARE NOT PREVENTED

## 2025-01-28 NOTE — ED PROVIDER NOTES
7.172    Lactic acid 2.2    CT head without contrast: No acute intracranial abnormality    CT abdomen pelvis without contrast: Longstanding liver cirrhosis/portal hypertension/splenomegaly/small size ascites in the right upper quadrant.  Longstanding chronic calcified pancreatitis.  More accentuated peripheral extension of the portal triads throughout the liver parenchyma.    Serum drug screen unremarkable    Beta-hydroxybutyrate normal at 0.21.    Hepatic function panel shows alk phos 370, ALT 43    BMP shows sodium 130, potassium 7.0, bicarb 21, glucose 338    Ordered additional fluids, morphine, calcium gluconate, sodium bicarb, albuterol, Lokelma, insulin, dextrose.    Urinary screen positive for oxycodone and fentanyl    Signed out to oncoming ED resident Dr. Vera.  Explained any and all workup including chief complaint, HPI, orders, results, interventions.  Answered any and all questions.  Nephrology consult and repeat potassium pending at the time of this note.  Disposition pending at the time of this note.  Appreciate ED signout.      CONSULTS: (Who and What was discussed)  IP CONSULT TO NEPHROLOGY        FINAL IMPRESSION      1. Hyperkalemia    2. Abdominal pain, epigastric          DISPOSITION/PLAN     DISPOSITION Decision To Admit 01/28/2025 04:32:22 PM    DISPOSITION  Disposition: Pending at the time of ED signout  Patient condition is stable    1/28/25, 5:00 PM EST.    Maurice Momin, PGY-1  Emergency Medicine    PATIENT REFERRED TO:  No follow-up provider specified.    DISCHARGE MEDICATIONS:  New Prescriptions    No medications on file       DISCONTINUED MEDICATIONS:  Discontinued Medications    No medications on file              (Please note that portions of this note were completed with a voice recognition program.  Efforts were made to edit the dictations but occasionally words are mis-transcribed.)    Maurice Momin DO (electronically signed)

## 2025-01-29 LAB
ANION GAP SERPL CALCULATED.3IONS-SCNC: 6 MMOL/L (ref 7–16)
ANION GAP SERPL CALCULATED.3IONS-SCNC: 8 MMOL/L (ref 7–16)
ANION GAP SERPL CALCULATED.3IONS-SCNC: 9 MMOL/L (ref 7–16)
BUN SERPL-MCNC: 6 MG/DL (ref 6–20)
BUN SERPL-MCNC: 7 MG/DL (ref 6–20)
BUN SERPL-MCNC: 8 MG/DL (ref 6–20)
CALCIUM SERPL-MCNC: 9.2 MG/DL (ref 8.6–10.2)
CALCIUM SERPL-MCNC: 9.3 MG/DL (ref 8.6–10.2)
CALCIUM SERPL-MCNC: 9.3 MG/DL (ref 8.6–10.2)
CHLORIDE SERPL-SCNC: 101 MMOL/L (ref 98–107)
CHLORIDE SERPL-SCNC: 102 MMOL/L (ref 98–107)
CHLORIDE SERPL-SCNC: 107 MMOL/L (ref 98–107)
CO2 SERPL-SCNC: 23 MMOL/L (ref 22–29)
CO2 SERPL-SCNC: 24 MMOL/L (ref 22–29)
CO2 SERPL-SCNC: 25 MMOL/L (ref 22–29)
CREAT SERPL-MCNC: 0.5 MG/DL (ref 0.5–1)
CREAT SERPL-MCNC: 0.6 MG/DL (ref 0.5–1)
CREAT SERPL-MCNC: 0.6 MG/DL (ref 0.5–1)
EKG ATRIAL RATE: 79 BPM
EKG P AXIS: 64 DEGREES
EKG P-R INTERVAL: 144 MS
EKG Q-T INTERVAL: 364 MS
EKG QRS DURATION: 90 MS
EKG QTC CALCULATION (BAZETT): 417 MS
EKG R AXIS: 4 DEGREES
EKG T AXIS: 76 DEGREES
EKG VENTRICULAR RATE: 79 BPM
GFR, ESTIMATED: >90 ML/MIN/1.73M2
GLUCOSE BLD-MCNC: 128 MG/DL (ref 74–99)
GLUCOSE BLD-MCNC: 144 MG/DL (ref 74–99)
GLUCOSE BLD-MCNC: 176 MG/DL (ref 74–99)
GLUCOSE BLD-MCNC: 221 MG/DL (ref 74–99)
GLUCOSE BLD-MCNC: 53 MG/DL (ref 74–99)
GLUCOSE BLD-MCNC: 93 MG/DL (ref 74–99)
GLUCOSE BLD-MCNC: 97 MG/DL (ref 74–99)
GLUCOSE SERPL-MCNC: 133 MG/DL (ref 74–99)
GLUCOSE SERPL-MCNC: 260 MG/DL (ref 74–99)
GLUCOSE SERPL-MCNC: 82 MG/DL (ref 74–99)
OSMOLALITY SERPL: 298 MOSM/KG (ref 285–310)
POTASSIUM SERPL-SCNC: 5.1 MMOL/L (ref 3.5–5)
POTASSIUM SERPL-SCNC: 5.7 MMOL/L (ref 3.5–5)
POTASSIUM SERPL-SCNC: 6.2 MMOL/L (ref 3.5–5)
POTASSIUM, UR: 22.3 MMOL/L
SODIUM SERPL-SCNC: 133 MMOL/L (ref 132–146)
SODIUM SERPL-SCNC: 135 MMOL/L (ref 132–146)
SODIUM SERPL-SCNC: 137 MMOL/L (ref 132–146)
UUN UR-MCNC: 116 MG/DL (ref 800–1666)

## 2025-01-29 PROCEDURE — 76937 US GUIDE VASCULAR ACCESS: CPT

## 2025-01-29 PROCEDURE — 80048 BASIC METABOLIC PNL TOTAL CA: CPT

## 2025-01-29 PROCEDURE — 82962 GLUCOSE BLOOD TEST: CPT

## 2025-01-29 PROCEDURE — 84133 ASSAY OF URINE POTASSIUM: CPT

## 2025-01-29 PROCEDURE — 6370000000 HC RX 637 (ALT 250 FOR IP): Performed by: INTERNAL MEDICINE

## 2025-01-29 PROCEDURE — 6360000002 HC RX W HCPCS: Performed by: FAMILY MEDICINE

## 2025-01-29 PROCEDURE — 2580000003 HC RX 258: Performed by: FAMILY MEDICINE

## 2025-01-29 PROCEDURE — 93010 ELECTROCARDIOGRAM REPORT: CPT | Performed by: INTERNAL MEDICINE

## 2025-01-29 PROCEDURE — 6360000002 HC RX W HCPCS

## 2025-01-29 PROCEDURE — 83930 ASSAY OF BLOOD OSMOLALITY: CPT

## 2025-01-29 PROCEDURE — 6370000000 HC RX 637 (ALT 250 FOR IP): Performed by: FAMILY MEDICINE

## 2025-01-29 PROCEDURE — 84540 ASSAY OF URINE/UREA-N: CPT

## 2025-01-29 PROCEDURE — 36415 COLL VENOUS BLD VENIPUNCTURE: CPT

## 2025-01-29 PROCEDURE — 2060000000 HC ICU INTERMEDIATE R&B

## 2025-01-29 RX ORDER — INSULIN LISPRO 100 [IU]/ML
0-8 INJECTION, SOLUTION INTRAVENOUS; SUBCUTANEOUS
Status: DISCONTINUED | OUTPATIENT
Start: 2025-01-29 | End: 2025-02-02 | Stop reason: HOSPADM

## 2025-01-29 RX ORDER — PROCHLORPERAZINE EDISYLATE 5 MG/ML
10 INJECTION INTRAMUSCULAR; INTRAVENOUS EVERY 6 HOURS PRN
Status: DISCONTINUED | OUTPATIENT
Start: 2025-01-29 | End: 2025-02-02 | Stop reason: HOSPADM

## 2025-01-29 RX ORDER — PANTOPRAZOLE SODIUM 40 MG/1
40 TABLET, DELAYED RELEASE ORAL
Status: DISCONTINUED | OUTPATIENT
Start: 2025-01-29 | End: 2025-02-02 | Stop reason: HOSPADM

## 2025-01-29 RX ORDER — INSULIN LISPRO 100 [IU]/ML
0-4 INJECTION, SOLUTION INTRAVENOUS; SUBCUTANEOUS NIGHTLY
Status: DISCONTINUED | OUTPATIENT
Start: 2025-01-29 | End: 2025-02-02 | Stop reason: HOSPADM

## 2025-01-29 RX ADMIN — PROCHLORPERAZINE EDISYLATE 10 MG: 5 INJECTION INTRAMUSCULAR; INTRAVENOUS at 17:04

## 2025-01-29 RX ADMIN — HYDROXYZINE PAMOATE 25 MG: 25 CAPSULE ORAL at 08:17

## 2025-01-29 RX ADMIN — MORPHINE SULFATE 4 MG: 4 INJECTION, SOLUTION INTRAMUSCULAR; INTRAVENOUS at 01:25

## 2025-01-29 RX ADMIN — OXYCODONE 5 MG: 5 TABLET ORAL at 12:52

## 2025-01-29 RX ADMIN — VORTIOXETINE 10 MG: 10 TABLET, FILM COATED ORAL at 20:23

## 2025-01-29 RX ADMIN — PANTOPRAZOLE SODIUM 40 MG: 40 TABLET, DELAYED RELEASE ORAL at 06:32

## 2025-01-29 RX ADMIN — TRAZODONE HYDROCHLORIDE 100 MG: 50 TABLET, FILM COATED ORAL at 20:34

## 2025-01-29 RX ADMIN — HYDROXYZINE PAMOATE 25 MG: 25 CAPSULE ORAL at 03:38

## 2025-01-29 RX ADMIN — PANCRELIPASE LIPASE, PANCRELIPASE PROTEASE, PANCRELIPASE AMYLASE 20000 UNITS: 20000; 63000; 84000 CAPSULE, DELAYED RELEASE ORAL at 08:17

## 2025-01-29 RX ADMIN — GABAPENTIN 200 MG: 100 CAPSULE ORAL at 13:59

## 2025-01-29 RX ADMIN — ANASTROZOLE 1 MG: 1 TABLET ORAL at 08:20

## 2025-01-29 RX ADMIN — Medication 12 MG: at 20:34

## 2025-01-29 RX ADMIN — GABAPENTIN 200 MG: 100 CAPSULE ORAL at 20:24

## 2025-01-29 RX ADMIN — ONDANSETRON 4 MG: 4 TABLET, ORALLY DISINTEGRATING ORAL at 23:32

## 2025-01-29 RX ADMIN — CALCIUM 500 MG: 500 TABLET ORAL at 20:24

## 2025-01-29 RX ADMIN — MORPHINE SULFATE 4 MG: 4 INJECTION, SOLUTION INTRAMUSCULAR; INTRAVENOUS at 16:44

## 2025-01-29 RX ADMIN — ONDANSETRON 4 MG: 4 TABLET, ORALLY DISINTEGRATING ORAL at 12:56

## 2025-01-29 RX ADMIN — LACTULOSE 20 G: 20 SOLUTION ORAL at 08:17

## 2025-01-29 RX ADMIN — SODIUM CHLORIDE: 9 INJECTION, SOLUTION INTRAVENOUS at 06:34

## 2025-01-29 RX ADMIN — PANCRELIPASE LIPASE, PANCRELIPASE PROTEASE, PANCRELIPASE AMYLASE 20000 UNITS: 20000; 63000; 84000 CAPSULE, DELAYED RELEASE ORAL at 12:03

## 2025-01-29 RX ADMIN — SODIUM ZIRCONIUM CYCLOSILICATE 10 G: 10 POWDER, FOR SUSPENSION ORAL at 13:54

## 2025-01-29 RX ADMIN — OXYCODONE 5 MG: 5 TABLET ORAL at 03:38

## 2025-01-29 RX ADMIN — ACETAMINOPHEN 500 MG: 500 TABLET ORAL at 15:06

## 2025-01-29 RX ADMIN — GABAPENTIN 200 MG: 100 CAPSULE ORAL at 08:17

## 2025-01-29 RX ADMIN — TRAZODONE HYDROCHLORIDE 100 MG: 50 TABLET, FILM COATED ORAL at 03:38

## 2025-01-29 RX ADMIN — OXYCODONE 5 MG: 5 TABLET ORAL at 20:24

## 2025-01-29 RX ADMIN — OXYCODONE HYDROCHLORIDE AND ACETAMINOPHEN 1000 MG: 500 TABLET ORAL at 08:17

## 2025-01-29 RX ADMIN — INSULIN LISPRO 2 UNITS: 100 INJECTION, SOLUTION INTRAVENOUS; SUBCUTANEOUS at 08:53

## 2025-01-29 RX ADMIN — Medication 100 MG: at 08:17

## 2025-01-29 RX ADMIN — PANTOPRAZOLE SODIUM 40 MG: 40 TABLET, DELAYED RELEASE ORAL at 16:43

## 2025-01-29 RX ADMIN — RIFAXIMIN 400 MG: 200 TABLET ORAL at 08:20

## 2025-01-29 RX ADMIN — ARIPIPRAZOLE 20 MG: 10 TABLET ORAL at 08:17

## 2025-01-29 RX ADMIN — RIFAXIMIN 400 MG: 200 TABLET ORAL at 13:56

## 2025-01-29 RX ADMIN — RIFAXIMIN 400 MG: 200 TABLET ORAL at 20:24

## 2025-01-29 RX ADMIN — MORPHINE SULFATE 4 MG: 4 INJECTION, SOLUTION INTRAMUSCULAR; INTRAVENOUS at 10:25

## 2025-01-29 RX ADMIN — CALCIUM 500 MG: 500 TABLET ORAL at 08:17

## 2025-01-29 RX ADMIN — MORPHINE SULFATE 4 MG: 4 INJECTION, SOLUTION INTRAMUSCULAR; INTRAVENOUS at 23:32

## 2025-01-29 RX ADMIN — Medication 1 TABLET: at 08:17

## 2025-01-29 RX ADMIN — OXYCODONE 5 MG: 5 TABLET ORAL at 08:17

## 2025-01-29 ASSESSMENT — PAIN DESCRIPTION - DESCRIPTORS
DESCRIPTORS: ACHING
DESCRIPTORS: ACHING;STABBING;DISCOMFORT
DESCRIPTORS: ACHING
DESCRIPTORS: ACHING;STABBING;DISCOMFORT
DESCRIPTORS: ACHING
DESCRIPTORS: ACHING;STABBING;SHARP
DESCRIPTORS: ACHING;STABBING;DISCOMFORT
DESCRIPTORS: ACHING;STABBING
DESCRIPTORS: ACHING;STABBING
DESCRIPTORS: ACHING
DESCRIPTORS: ACHING;STABBING

## 2025-01-29 ASSESSMENT — PAIN DESCRIPTION - ONSET
ONSET: ON-GOING

## 2025-01-29 ASSESSMENT — PAIN - FUNCTIONAL ASSESSMENT

## 2025-01-29 ASSESSMENT — PAIN SCALES - GENERAL
PAINLEVEL_OUTOF10: 7
PAINLEVEL_OUTOF10: 8
PAINLEVEL_OUTOF10: 7
PAINLEVEL_OUTOF10: 7
PAINLEVEL_OUTOF10: 3
PAINLEVEL_OUTOF10: 7
PAINLEVEL_OUTOF10: 8
PAINLEVEL_OUTOF10: 7
PAINLEVEL_OUTOF10: 6
PAINLEVEL_OUTOF10: 10
PAINLEVEL_OUTOF10: 7
PAINLEVEL_OUTOF10: 7
PAINLEVEL_OUTOF10: 9
PAINLEVEL_OUTOF10: 6
PAINLEVEL_OUTOF10: 7
PAINLEVEL_OUTOF10: 8

## 2025-01-29 ASSESSMENT — PAIN DESCRIPTION - ORIENTATION
ORIENTATION: LEFT
ORIENTATION: RIGHT;LEFT
ORIENTATION: LEFT

## 2025-01-29 ASSESSMENT — PAIN DESCRIPTION - LOCATION
LOCATION: RIB CAGE;BACK
LOCATION: RIB CAGE
LOCATION: RIB CAGE
LOCATION: HEAD
LOCATION: HEAD
LOCATION: RIB CAGE
LOCATION: HEAD;GENERALIZED
LOCATION: HEAD
LOCATION: HEAD;ABDOMEN;RIB CAGE

## 2025-01-29 ASSESSMENT — PAIN DESCRIPTION - DIRECTION
RADIATING_TOWARDS: BACK
RADIATING_TOWARDS: BACK

## 2025-01-29 ASSESSMENT — PAIN DESCRIPTION - FREQUENCY
FREQUENCY: CONTINUOUS

## 2025-01-29 NOTE — PLAN OF CARE
Problem: Chronic Conditions and Co-morbidities  Goal: Patient's chronic conditions and co-morbidity symptoms are monitored and maintained or improved  Outcome: Progressing     Problem: Discharge Planning  Goal: Discharge to home or other facility with appropriate resources  Outcome: Progressing  Flowsheets (Taken 1/28/2025 2143 by Marie Paul, RN)  Discharge to home or other facility with appropriate resources:   Identify barriers to discharge with patient and caregiver   Identify discharge learning needs (meds, wound care, etc)   Refer to discharge planning if patient needs post-hospital services based on physician order or complex needs related to functional status, cognitive ability or social support system   Arrange for needed discharge resources and transportation as appropriate     Problem: Pain  Goal: Verbalizes/displays adequate comfort level or baseline comfort level  Outcome: Progressing     Problem: Safety - Adult  Goal: Free from fall injury  Outcome: Progressing     Problem: ABCDS Injury Assessment  Goal: Absence of physical injury  Outcome: Progressing

## 2025-01-29 NOTE — H&P
spironolactone will be permanently discontinued.  Lasix will be a better alternative to avoid recurrent ascites and potassium can be monitored more closely as an outpatient.  Antireflux medications will be added.  Diet will be advanced and I have encouraged her to become more ambulatory.  I anticipate discharge home tomorrow.     We are currently providing coverage for Dr. Oh and extensive chart review has been undertaken.    Torsten Harrison DO, IOANAO., FACOI  1:30 PM  1/29/2025    Electronically signed by Torsten Harrison DO on 1/29/25 at 1:30 PM EST

## 2025-01-29 NOTE — CARE COORDINATION
Case Management Assessment  Initial Evaluation    Date/Time of Evaluation: 1/29/2025 3:29 PM  Assessment Completed by: SANDEE Schaefer    If patient is discharged prior to next notation, then this note serves as note for discharge by case management.    Patient Name: Ellie Santacruz                   YOB: 1970  Diagnosis: Abdominal pain, epigastric [R10.13]  Hyperkalemia [E87.5]                   Date / Time: 1/28/2025  1:24 PM    Patient Admission Status: Inpatient   Readmission Risk (Low < 19, Mod (19-27), High > 27): Readmission Risk Score: 25.5    Current PCP: Liza Oh, DO  PCP verified by CM? Yes    Chart Reviewed: Yes      History Provided by: Patient  Patient Orientation: Alert and Oriented, Person, Place, Situation    Patient Cognition: Alert    Hospitalization in the last 30 days (Readmission):  Yes    Readmission Assessment  Number of Days since last admission?: 8-30 days  Previous Disposition: Other (comment) (Sober Living)  Who is being Interviewed: Patient  What was the patient's/caregiver's perception as to why they think they needed to return back to the hospital?: Other (Comment) (nausea and vomiting)  Did you visit your Primary Care Physician after you left the hospital, before you returned this time?: Yes  Did you see a specialist, such as Cardiac, Pulmonary, Orthopedic Physician, etc. after you left the hospital?: No  Who advised the patient to return to the hospital?: Self-referral, Other (Comment) (nurse at [facility])  In our efforts to provide the best possible care to you and others like you, can you think of anything that we could have done to help you after you left the hospital the first time, so that you might not have needed to return so soon?: Other (Comment) (n/a)    Advance Directives:      Code Status: Prior   Patient's Primary Decision Maker is: Legal Next of Kin    Primary Decision Maker: Mitchell Santacruz - Child - [phone]    Primary Decision Maker:

## 2025-01-30 ENCOUNTER — APPOINTMENT (OUTPATIENT)
Dept: INTERVENTIONAL RADIOLOGY/VASCULAR | Age: 55
DRG: 425 | End: 2025-01-30
Payer: COMMERCIAL

## 2025-01-30 LAB
ANION GAP SERPL CALCULATED.3IONS-SCNC: 8 MMOL/L (ref 7–16)
BASOPHILS # BLD: 0 K/UL (ref 0–0.2)
BASOPHILS NFR BLD: 0 % (ref 0–2)
BUN SERPL-MCNC: 9 MG/DL (ref 6–20)
CALCIUM SERPL-MCNC: 9.4 MG/DL (ref 8.6–10.2)
CHLORIDE SERPL-SCNC: 100 MMOL/L (ref 98–107)
CO2 SERPL-SCNC: 27 MMOL/L (ref 22–29)
CREAT SERPL-MCNC: 0.6 MG/DL (ref 0.5–1)
EOSINOPHIL # BLD: 0.04 K/UL (ref 0.05–0.5)
EOSINOPHILS RELATIVE PERCENT: 2 % (ref 0–6)
ERYTHROCYTE [DISTWIDTH] IN BLOOD BY AUTOMATED COUNT: 13.2 % (ref 11.5–15)
FERRITIN SERPL-MCNC: 361 NG/ML
GFR, ESTIMATED: >90 ML/MIN/1.73M2
GLUCOSE BLD-MCNC: 157 MG/DL (ref 74–99)
GLUCOSE BLD-MCNC: 214 MG/DL (ref 74–99)
GLUCOSE BLD-MCNC: 215 MG/DL (ref 74–99)
GLUCOSE BLD-MCNC: 260 MG/DL (ref 74–99)
GLUCOSE SERPL-MCNC: 151 MG/DL (ref 74–99)
HCT VFR BLD AUTO: 28.1 % (ref 34–48)
HGB BLD-MCNC: 9.5 G/DL (ref 11.5–15.5)
IRON SATN MFR SERPL: 24 % (ref 15–50)
IRON SERPL-MCNC: 49 UG/DL (ref 37–145)
LIPASE SERPL-CCNC: 5 U/L (ref 13–60)
LYMPHOCYTES NFR BLD: 0.21 K/UL (ref 1.5–4)
LYMPHOCYTES RELATIVE PERCENT: 10 % (ref 20–42)
MAGNESIUM SERPL-MCNC: 1.7 MG/DL (ref 1.6–2.6)
MCH RBC QN AUTO: 30 PG (ref 26–35)
MCHC RBC AUTO-ENTMCNC: 33.8 G/DL (ref 32–34.5)
MCV RBC AUTO: 88.6 FL (ref 80–99.9)
MICROORGANISM SPEC CULT: ABNORMAL
MICROORGANISM SPEC CULT: ABNORMAL
MONOCYTES NFR BLD: 0.17 K/UL (ref 0.1–0.95)
MONOCYTES NFR BLD: 8 % (ref 2–12)
NEUTROPHILS NFR BLD: 81 % (ref 43–80)
NEUTS SEG NFR BLD: 1.78 K/UL (ref 1.8–7.3)
NUCLEATED RED BLOOD CELLS: 1 PER 100 WBC
PHOSPHATE SERPL-MCNC: 4.3 MG/DL (ref 2.5–4.5)
PLATELET CONFIRMATION: NORMAL
PLATELET, FLUORESCENCE: 57 K/UL (ref 130–450)
PMV BLD AUTO: 10 FL (ref 7–12)
POTASSIUM SERPL-SCNC: 4.8 MMOL/L (ref 3.5–5)
RBC # BLD AUTO: 3.17 M/UL (ref 3.5–5.5)
RBC # BLD: NORMAL 10*6/UL
SERVICE CMNT-IMP: ABNORMAL
SODIUM SERPL-SCNC: 135 MMOL/L (ref 132–146)
SPECIMEN DESCRIPTION: ABNORMAL
TIBC SERPL-MCNC: 207 UG/DL (ref 250–450)
URATE SERPL-MCNC: 4.3 MG/DL (ref 2.4–5.7)
WBC OTHER # BLD: 2.2 K/UL (ref 4.5–11.5)

## 2025-01-30 PROCEDURE — 6370000000 HC RX 637 (ALT 250 FOR IP): Performed by: INTERNAL MEDICINE

## 2025-01-30 PROCEDURE — 84100 ASSAY OF PHOSPHORUS: CPT

## 2025-01-30 PROCEDURE — 84550 ASSAY OF BLOOD/URIC ACID: CPT

## 2025-01-30 PROCEDURE — 6370000000 HC RX 637 (ALT 250 FOR IP): Performed by: FAMILY MEDICINE

## 2025-01-30 PROCEDURE — 36415 COLL VENOUS BLD VENIPUNCTURE: CPT

## 2025-01-30 PROCEDURE — 82728 ASSAY OF FERRITIN: CPT

## 2025-01-30 PROCEDURE — 83690 ASSAY OF LIPASE: CPT

## 2025-01-30 PROCEDURE — 85025 COMPLETE CBC W/AUTO DIFF WBC: CPT

## 2025-01-30 PROCEDURE — 83735 ASSAY OF MAGNESIUM: CPT

## 2025-01-30 PROCEDURE — 2060000000 HC ICU INTERMEDIATE R&B

## 2025-01-30 PROCEDURE — 82962 GLUCOSE BLOOD TEST: CPT

## 2025-01-30 PROCEDURE — 6360000002 HC RX W HCPCS

## 2025-01-30 PROCEDURE — 80048 BASIC METABOLIC PNL TOTAL CA: CPT

## 2025-01-30 PROCEDURE — 93923 UPR/LXTR ART STDY 3+ LVLS: CPT

## 2025-01-30 PROCEDURE — 83550 IRON BINDING TEST: CPT

## 2025-01-30 PROCEDURE — 83540 ASSAY OF IRON: CPT

## 2025-01-30 RX ADMIN — PROCHLORPERAZINE EDISYLATE 10 MG: 5 INJECTION INTRAMUSCULAR; INTRAVENOUS at 09:17

## 2025-01-30 RX ADMIN — Medication 12 MG: at 20:49

## 2025-01-30 RX ADMIN — RIFAXIMIN 400 MG: 200 TABLET ORAL at 20:48

## 2025-01-30 RX ADMIN — INSULIN LISPRO 1 UNITS: 100 INJECTION, SOLUTION INTRAVENOUS; SUBCUTANEOUS at 20:53

## 2025-01-30 RX ADMIN — SODIUM ZIRCONIUM CYCLOSILICATE 10 G: 10 POWDER, FOR SUSPENSION ORAL at 12:32

## 2025-01-30 RX ADMIN — OXYCODONE 5 MG: 5 TABLET ORAL at 02:07

## 2025-01-30 RX ADMIN — PROCHLORPERAZINE EDISYLATE 10 MG: 5 INJECTION INTRAMUSCULAR; INTRAVENOUS at 21:14

## 2025-01-30 RX ADMIN — GABAPENTIN 200 MG: 100 CAPSULE ORAL at 20:48

## 2025-01-30 RX ADMIN — OXYCODONE 5 MG: 5 TABLET ORAL at 15:13

## 2025-01-30 RX ADMIN — TRAZODONE HYDROCHLORIDE 100 MG: 50 TABLET, FILM COATED ORAL at 20:49

## 2025-01-30 RX ADMIN — OXYCODONE 5 MG: 5 TABLET ORAL at 06:30

## 2025-01-30 RX ADMIN — ONDANSETRON 4 MG: 4 TABLET, ORALLY DISINTEGRATING ORAL at 06:30

## 2025-01-30 RX ADMIN — PANTOPRAZOLE SODIUM 40 MG: 40 TABLET, DELAYED RELEASE ORAL at 17:01

## 2025-01-30 RX ADMIN — OXYCODONE 5 MG: 5 TABLET ORAL at 10:30

## 2025-01-30 RX ADMIN — ANASTROZOLE 1 MG: 1 TABLET ORAL at 12:32

## 2025-01-30 RX ADMIN — PANCRELIPASE LIPASE, PANCRELIPASE PROTEASE, PANCRELIPASE AMYLASE 20000 UNITS: 20000; 63000; 84000 CAPSULE, DELAYED RELEASE ORAL at 17:01

## 2025-01-30 RX ADMIN — OXYCODONE 5 MG: 5 TABLET ORAL at 20:47

## 2025-01-30 RX ADMIN — INSULIN LISPRO 4 UNITS: 100 INJECTION, SOLUTION INTRAVENOUS; SUBCUTANEOUS at 17:01

## 2025-01-30 RX ADMIN — HYDROXYZINE PAMOATE 25 MG: 25 CAPSULE ORAL at 21:14

## 2025-01-30 RX ADMIN — PANTOPRAZOLE SODIUM 40 MG: 40 TABLET, DELAYED RELEASE ORAL at 06:30

## 2025-01-30 RX ADMIN — ARIPIPRAZOLE 20 MG: 10 TABLET ORAL at 12:32

## 2025-01-30 RX ADMIN — VORTIOXETINE 10 MG: 10 TABLET, FILM COATED ORAL at 20:49

## 2025-01-30 RX ADMIN — PROCHLORPERAZINE EDISYLATE 10 MG: 5 INJECTION INTRAMUSCULAR; INTRAVENOUS at 15:17

## 2025-01-30 ASSESSMENT — PAIN SCALES - GENERAL
PAINLEVEL_OUTOF10: 5
PAINLEVEL_OUTOF10: 10
PAINLEVEL_OUTOF10: 5
PAINLEVEL_OUTOF10: 9
PAINLEVEL_OUTOF10: 7
PAINLEVEL_OUTOF10: 10
PAINLEVEL_OUTOF10: 8

## 2025-01-30 ASSESSMENT — PAIN DESCRIPTION - DESCRIPTORS
DESCRIPTORS: ACHING

## 2025-01-30 ASSESSMENT — PAIN DESCRIPTION - LOCATION
LOCATION: ABDOMEN;HEAD
LOCATION: ABDOMEN
LOCATION: HEAD;GENERALIZED
LOCATION: HEAD;GENERALIZED

## 2025-01-30 ASSESSMENT — PAIN - FUNCTIONAL ASSESSMENT
PAIN_FUNCTIONAL_ASSESSMENT: PREVENTS OR INTERFERES SOME ACTIVE ACTIVITIES AND ADLS
PAIN_FUNCTIONAL_ASSESSMENT: ACTIVITIES ARE NOT PREVENTED

## 2025-01-30 ASSESSMENT — PAIN DESCRIPTION - ONSET: ONSET: ON-GOING

## 2025-01-30 ASSESSMENT — PAIN DESCRIPTION - FREQUENCY: FREQUENCY: CONTINUOUS

## 2025-01-30 NOTE — CARE COORDINATION
SOCIAL WORK / DISCHARGE PLANNING:  Sw met with pt at bedside. Ambulatory in room. Denies any dc needs. Plans to return to [facility]. [phone]. Pt will need to use [facility] [employer] [facility] [phone] to return. Pt on Lactulose and Xifanin prior to admission. Will need to check coverage for Lokelma if to dc on this.             Electronically signed by SANDEE Schaefer on 1/30/2025 at 12:26 PM

## 2025-01-31 LAB
ALBUMIN SERPL-MCNC: 3.5 G/DL (ref 3.5–5.2)
ALP SERPL-CCNC: 396 U/L (ref 35–104)
ALT SERPL-CCNC: 52 U/L (ref 0–32)
AMMONIA PLAS-SCNC: 67 UMOL/L (ref 11–51)
ANION GAP SERPL CALCULATED.3IONS-SCNC: 9 MMOL/L (ref 7–16)
AST SERPL-CCNC: 54 U/L (ref 0–31)
BASOPHILS # BLD: 0 K/UL (ref 0–0.2)
BASOPHILS NFR BLD: 0 % (ref 0–2)
BILIRUB SERPL-MCNC: 4.1 MG/DL (ref 0–1.2)
BUN SERPL-MCNC: 13 MG/DL (ref 6–20)
CALCIUM SERPL-MCNC: 9 MG/DL (ref 8.6–10.2)
CHLORIDE SERPL-SCNC: 100 MMOL/L (ref 98–107)
CO2 SERPL-SCNC: 26 MMOL/L (ref 22–29)
CREAT SERPL-MCNC: 0.7 MG/DL (ref 0.5–1)
EOSINOPHIL # BLD: 0.03 K/UL (ref 0.05–0.5)
EOSINOPHILS RELATIVE PERCENT: 2 % (ref 0–6)
ERYTHROCYTE [DISTWIDTH] IN BLOOD BY AUTOMATED COUNT: 13.4 % (ref 11.5–15)
FOLATE SERPL-MCNC: >20 NG/ML (ref 4.8–24.2)
GFR, ESTIMATED: >90 ML/MIN/1.73M2
GLUCOSE BLD-MCNC: 201 MG/DL (ref 74–99)
GLUCOSE BLD-MCNC: 218 MG/DL (ref 74–99)
GLUCOSE BLD-MCNC: 256 MG/DL (ref 74–99)
GLUCOSE BLD-MCNC: 267 MG/DL (ref 74–99)
GLUCOSE BLD-MCNC: 490 MG/DL (ref 74–99)
GLUCOSE SERPL-MCNC: 250 MG/DL (ref 74–99)
HCT VFR BLD AUTO: 27.5 % (ref 34–48)
HGB BLD-MCNC: 9.5 G/DL (ref 11.5–15.5)
INR PPP: 1.2
LYMPHOCYTES NFR BLD: 0.16 K/UL (ref 1.5–4)
LYMPHOCYTES RELATIVE PERCENT: 10 % (ref 20–42)
MCH RBC QN AUTO: 29.9 PG (ref 26–35)
MCHC RBC AUTO-ENTMCNC: 34.5 G/DL (ref 32–34.5)
MCV RBC AUTO: 86.5 FL (ref 80–99.9)
MONOCYTES NFR BLD: 0.09 K/UL (ref 0.1–0.95)
MONOCYTES NFR BLD: 5 % (ref 2–12)
NEUTROPHILS NFR BLD: 84 % (ref 43–80)
NEUTS SEG NFR BLD: 1.42 K/UL (ref 1.8–7.3)
NUCLEATED RED BLOOD CELLS: 1 PER 100 WBC
PLATELET CONFIRMATION: NORMAL
PLATELET, FLUORESCENCE: 64 K/UL (ref 130–450)
PMV BLD AUTO: 9.6 FL (ref 7–12)
POTASSIUM SERPL-SCNC: 4.2 MMOL/L (ref 3.5–5)
PROT SERPL-MCNC: 6.5 G/DL (ref 6.4–8.3)
PROTHROMBIN TIME: 12.8 SEC (ref 9.3–12.4)
RBC # BLD AUTO: 3.18 M/UL (ref 3.5–5.5)
RBC # BLD: ABNORMAL 10*6/UL
SODIUM SERPL-SCNC: 135 MMOL/L (ref 132–146)
VIT B12 SERPL-MCNC: 1375 PG/ML (ref 211–946)
WBC OTHER # BLD: 1.7 K/UL (ref 4.5–11.5)

## 2025-01-31 PROCEDURE — 82962 GLUCOSE BLOOD TEST: CPT

## 2025-01-31 PROCEDURE — 87522 HEPATITIS C REVRS TRNSCRPJ: CPT

## 2025-01-31 PROCEDURE — 82105 ALPHA-FETOPROTEIN SERUM: CPT

## 2025-01-31 PROCEDURE — 6370000000 HC RX 637 (ALT 250 FOR IP): Performed by: FAMILY MEDICINE

## 2025-01-31 PROCEDURE — 6370000000 HC RX 637 (ALT 250 FOR IP): Performed by: INTERNAL MEDICINE

## 2025-01-31 PROCEDURE — 6370000000 HC RX 637 (ALT 250 FOR IP): Performed by: HOSPITALIST

## 2025-01-31 PROCEDURE — 36415 COLL VENOUS BLD VENIPUNCTURE: CPT

## 2025-01-31 PROCEDURE — 82607 VITAMIN B-12: CPT

## 2025-01-31 PROCEDURE — 6360000002 HC RX W HCPCS: Performed by: INTERNAL MEDICINE

## 2025-01-31 PROCEDURE — 86301 IMMUNOASSAY TUMOR CA 19-9: CPT

## 2025-01-31 PROCEDURE — 2060000000 HC ICU INTERMEDIATE R&B

## 2025-01-31 PROCEDURE — 85025 COMPLETE CBC W/AUTO DIFF WBC: CPT

## 2025-01-31 PROCEDURE — 6360000002 HC RX W HCPCS

## 2025-01-31 PROCEDURE — 82746 ASSAY OF FOLIC ACID SERUM: CPT

## 2025-01-31 PROCEDURE — 6360000002 HC RX W HCPCS: Performed by: FAMILY MEDICINE

## 2025-01-31 PROCEDURE — 82140 ASSAY OF AMMONIA: CPT

## 2025-01-31 PROCEDURE — 85610 PROTHROMBIN TIME: CPT

## 2025-01-31 PROCEDURE — 80053 COMPREHEN METABOLIC PANEL: CPT

## 2025-01-31 RX ORDER — MORPHINE SULFATE 2 MG/ML
2 INJECTION, SOLUTION INTRAMUSCULAR; INTRAVENOUS ONCE
Status: COMPLETED | OUTPATIENT
Start: 2025-01-31 | End: 2025-01-31

## 2025-01-31 RX ORDER — LORAZEPAM 2 MG/ML
1 INJECTION INTRAMUSCULAR ONCE
Status: DISCONTINUED | OUTPATIENT
Start: 2025-01-31 | End: 2025-01-31

## 2025-01-31 RX ORDER — LORAZEPAM 2 MG/ML
1 INJECTION INTRAMUSCULAR ONCE
Status: COMPLETED | OUTPATIENT
Start: 2025-02-01 | End: 2025-02-01

## 2025-01-31 RX ORDER — LORAZEPAM 0.5 MG/1
0.5 TABLET ORAL ONCE
Status: DISCONTINUED | OUTPATIENT
Start: 2025-01-31 | End: 2025-01-31

## 2025-01-31 RX ORDER — DIPHENHYDRAMINE HCL 25 MG
50 TABLET ORAL ONCE
Status: COMPLETED | OUTPATIENT
Start: 2025-02-01 | End: 2025-02-01

## 2025-01-31 RX ORDER — DIPHENHYDRAMINE HCL 25 MG
50 TABLET ORAL ONCE
Status: DISCONTINUED | OUTPATIENT
Start: 2025-01-31 | End: 2025-01-31

## 2025-01-31 RX ADMIN — PROCHLORPERAZINE EDISYLATE 10 MG: 5 INJECTION INTRAMUSCULAR; INTRAVENOUS at 09:40

## 2025-01-31 RX ADMIN — PANTOPRAZOLE SODIUM 40 MG: 40 TABLET, DELAYED RELEASE ORAL at 06:37

## 2025-01-31 RX ADMIN — CALCIUM 500 MG: 500 TABLET ORAL at 07:53

## 2025-01-31 RX ADMIN — INSULIN LISPRO 4 UNITS: 100 INJECTION, SOLUTION INTRAVENOUS; SUBCUTANEOUS at 16:16

## 2025-01-31 RX ADMIN — Medication 100 MG: at 07:53

## 2025-01-31 RX ADMIN — PANTOPRAZOLE SODIUM 40 MG: 40 TABLET, DELAYED RELEASE ORAL at 16:22

## 2025-01-31 RX ADMIN — MORPHINE SULFATE 4 MG: 4 INJECTION, SOLUTION INTRAMUSCULAR; INTRAVENOUS at 18:52

## 2025-01-31 RX ADMIN — Medication 1 TABLET: at 07:53

## 2025-01-31 RX ADMIN — PROCHLORPERAZINE EDISYLATE 10 MG: 5 INJECTION INTRAMUSCULAR; INTRAVENOUS at 03:27

## 2025-01-31 RX ADMIN — OXYCODONE 5 MG: 5 TABLET ORAL at 15:04

## 2025-01-31 RX ADMIN — GABAPENTIN 200 MG: 100 CAPSULE ORAL at 07:54

## 2025-01-31 RX ADMIN — RIFAXIMIN 400 MG: 200 TABLET ORAL at 13:43

## 2025-01-31 RX ADMIN — INSULIN LISPRO 4 UNITS: 100 INJECTION, SOLUTION INTRAVENOUS; SUBCUTANEOUS at 07:55

## 2025-01-31 RX ADMIN — ANASTROZOLE 1 MG: 1 TABLET ORAL at 09:39

## 2025-01-31 RX ADMIN — RIFAXIMIN 400 MG: 200 TABLET ORAL at 09:39

## 2025-01-31 RX ADMIN — ARIPIPRAZOLE 20 MG: 10 TABLET ORAL at 07:53

## 2025-01-31 RX ADMIN — RIFAXIMIN 400 MG: 200 TABLET ORAL at 20:53

## 2025-01-31 RX ADMIN — OXYCODONE 5 MG: 5 TABLET ORAL at 20:54

## 2025-01-31 RX ADMIN — GABAPENTIN 200 MG: 100 CAPSULE ORAL at 20:52

## 2025-01-31 RX ADMIN — OXYCODONE HYDROCHLORIDE AND ACETAMINOPHEN 1000 MG: 500 TABLET ORAL at 07:53

## 2025-01-31 RX ADMIN — Medication 12 MG: at 20:54

## 2025-01-31 RX ADMIN — VORTIOXETINE 10 MG: 10 TABLET, FILM COATED ORAL at 20:53

## 2025-01-31 RX ADMIN — PREDNISONE 50 MG: 10 TABLET ORAL at 20:53

## 2025-01-31 RX ADMIN — PREDNISONE 50 MG: 10 TABLET ORAL at 09:40

## 2025-01-31 RX ADMIN — OXYCODONE 5 MG: 5 TABLET ORAL at 10:45

## 2025-01-31 RX ADMIN — LACTULOSE 20 G: 20 SOLUTION ORAL at 07:53

## 2025-01-31 RX ADMIN — PANCRELIPASE LIPASE, PANCRELIPASE PROTEASE, PANCRELIPASE AMYLASE 20000 UNITS: 20000; 63000; 84000 CAPSULE, DELAYED RELEASE ORAL at 07:53

## 2025-01-31 RX ADMIN — GABAPENTIN 200 MG: 100 CAPSULE ORAL at 13:43

## 2025-01-31 RX ADMIN — PREDNISONE 50 MG: 10 TABLET ORAL at 13:43

## 2025-01-31 RX ADMIN — MORPHINE SULFATE 2 MG: 2 INJECTION, SOLUTION INTRAMUSCULAR; INTRAVENOUS at 12:55

## 2025-01-31 RX ADMIN — INSULIN LISPRO 4 UNITS: 100 INJECTION, SOLUTION INTRAVENOUS; SUBCUTANEOUS at 20:57

## 2025-01-31 RX ADMIN — OXYCODONE 5 MG: 5 TABLET ORAL at 02:19

## 2025-01-31 RX ADMIN — PROCHLORPERAZINE EDISYLATE 10 MG: 5 INJECTION INTRAMUSCULAR; INTRAVENOUS at 16:22

## 2025-01-31 RX ADMIN — OXYCODONE 5 MG: 5 TABLET ORAL at 06:37

## 2025-01-31 RX ADMIN — MORPHINE SULFATE 4 MG: 4 INJECTION, SOLUTION INTRAMUSCULAR; INTRAVENOUS at 04:13

## 2025-01-31 RX ADMIN — PANCRELIPASE LIPASE, PANCRELIPASE PROTEASE, PANCRELIPASE AMYLASE 20000 UNITS: 20000; 63000; 84000 CAPSULE, DELAYED RELEASE ORAL at 16:22

## 2025-01-31 RX ADMIN — ACETAMINOPHEN 500 MG: 500 TABLET ORAL at 02:21

## 2025-01-31 RX ADMIN — TRAZODONE HYDROCHLORIDE 100 MG: 50 TABLET, FILM COATED ORAL at 20:53

## 2025-01-31 ASSESSMENT — PAIN SCALES - GENERAL
PAINLEVEL_OUTOF10: 4
PAINLEVEL_OUTOF10: 4
PAINLEVEL_OUTOF10: 7
PAINLEVEL_OUTOF10: 4
PAINLEVEL_OUTOF10: 5
PAINLEVEL_OUTOF10: 8
PAINLEVEL_OUTOF10: 5
PAINLEVEL_OUTOF10: 8
PAINLEVEL_OUTOF10: 4
PAINLEVEL_OUTOF10: 5
PAINLEVEL_OUTOF10: 7

## 2025-01-31 ASSESSMENT — PAIN DESCRIPTION - LOCATION
LOCATION: ABDOMEN

## 2025-01-31 ASSESSMENT — PAIN DESCRIPTION - DESCRIPTORS
DESCRIPTORS: ACHING

## 2025-01-31 ASSESSMENT — PAIN - FUNCTIONAL ASSESSMENT
PAIN_FUNCTIONAL_ASSESSMENT: ACTIVITIES ARE NOT PREVENTED

## 2025-01-31 NOTE — CARE COORDINATION
SOCIAL WORK / DISCHARGE PLANNING:  Sw spoke with pt in hallway. Dc plan remains unchanged, to return to [facility] [phone]. She is ambulating in hallways independently Vascular studies were completed and now to have MRI and US. No dc needs noted at this time. Will need assist with transportation to [facility] [phone]  Pt was on Lactulose and Xifanin prior to admission. Lokelma has been discontinued.               Electronically signed by SANDEE Schaefer on 1/31/2025 at 12:46 PM

## 2025-01-31 NOTE — CONSULTS
[address] [address]                              CONSULTATION      PATIENT NAME: OSBALDO GAINES               : 1970  MED REC NO: [id number]                        ROOM: 0630  ACCOUNT NO: [id number]                       ADMIT DATE: 2025  PROVIDER: Rajiv Nicole MD      CONSULT DATE: 2025    REASON FOR CONSULTATION:  Hyperkalemia.    HISTORY OF PRESENT ILLNESS:  The patient is being seen in consultation at request of Dr. Maurice Momin, Emergency Room Physician.  The patient is a 54-year-old lady who presented to the emergency room at [facility] with chief complaints of abdominal pain.  The patient has history of pancreatitis and cirrhosis.  The patient's labs upon presentation revealed a markedly elevated serum potassium of 7.0 mmol/L.  This was treated with dextrose insulin and sodium bicarbonate.  The patient was given Lokelma.  Serum potassium improved to 4.7 mmol/L and is up to 5.7 mmol/L today.  The patient lives in a group home/assisted living facility.  Although, the home medications does not mention the use of spironolactone, medication record obtained from the facility by fax and confirmed by phone shows that the patient has been on spironolactone since 2024, and the dose was recently increased to 50 mg a day to help lower extremity edema.  This is not listed in the home medications on the chart.  The patient herself confirms that she is taking spironolactone, although she is not aware of the dose.  When seen up on the floor, the patient has no edema.  She is denying any shortness of breath.  It is of note that the patient was recently hospitalized at this [facility] hospital from 2024, to 2024, and her that hospital visit was also complicated by hyperkalemia with serum potassium being 7.1 mmol/L upon presentation and down to 4.4 mmol/L after that.  
          Patient seen and examined.    Consult dictated.  Patient is a 54-year-old lady with history of recent issues with hyperkalemia.  She has fairly well-preserved renal function.  Although spironolactone is not noted on in her medication list prior to admission , the  medication list obtained from the facility where the patient resides shows that the patient has been on spironolactone since August 2024 and recently her dose was increased to 50 mg a day.  Will keep the patient off spironolactone.  Have the patient on low potassium diet.    As the spironolactone wears out of the patient's system hyperkalemia should be improved.              Dr. Oh, Liza BURCH, DO  , Thank You for allowing me to participate in the care of this patient.  Will follow the patient with you.    Rajiv Nicole MD  Nephrology    Electronically signed by Rajiv Nicole MD on 1/29/2025 at 1:05 PM  
01/31/2025 06:43 AM    RBC 3.18 01/31/2025 06:43 AM    HGB 9.5 01/31/2025 06:43 AM    HCT 27.5 01/31/2025 06:43 AM    MCV 86.5 01/31/2025 06:43 AM    MCH 29.9 01/31/2025 06:43 AM    MCHC 34.5 01/31/2025 06:43 AM    RDW 13.4 01/31/2025 06:43 AM    PLT 59 01/28/2025 02:34 PM    MPV 9.6 01/31/2025 06:43 AM     Lab Results   Component Value Date/Time     01/31/2025 06:43 AM    K 4.2 01/31/2025 06:43 AM    K 5.2 05/11/2023 08:52 PM     01/31/2025 06:43 AM    CO2 26 01/31/2025 06:43 AM    BUN 13 01/31/2025 06:43 AM    CREATININE 0.7 01/31/2025 06:43 AM    CALCIUM 9.0 01/31/2025 06:43 AM    BILITOT 4.1 01/31/2025 06:43 AM    ALKPHOS 396 01/31/2025 06:43 AM    AST 54 01/31/2025 06:43 AM    ALT 52 01/31/2025 06:43 AM     Lab Results   Component Value Date/Time    LIPASE 5 01/30/2025 06:39 AM     Lab Results   Component Value Date/Time    AMYLASE 9 09/11/2021 01:40 PM         ASSESSMENT/PLAN:  Patient Active Problem List   Diagnosis    Chronic pancreatitis due to acute alcohol intoxication (HCC)    Schizoaffective disorder, bipolar type (HCC)    Anxiety disorder    Depression    History of alcohol abuse    Pancreatic pseudocyst (HCC)    Liver dysfunction    Asthma    Gastroesophageal reflux disease without esophagitis    Bipolar I disorder (HCC)    Bipolar affective disorder (HCC)    Irritable bowel syndrome    Tobacco use disorder    Jaundice    RUQ abdominal pain    Poorly controlled diabetes mellitus (HCC)    Pain of upper abdomen    Elevated LFTs    Intractable abdominal pain    Abdominal pain, epigastric    Hyperglycemia    Type 2 diabetes mellitus with hyperosmolarity without coma, with long-term current use of insulin (HCC)    Fall (on) (from) other stairs and steps, initial encounter    Hepatitis, alcoholic    Nausea and vomiting    Alcoholism (HCC)    Pancytopenia (HCC)    Splenomegaly    Hypoglycemia episode    Hepatitis C    Anxiety and depression    Cigarette smoker, heavy    Acute pancreatitis

## 2025-02-01 ENCOUNTER — APPOINTMENT (OUTPATIENT)
Dept: ULTRASOUND IMAGING | Age: 55
DRG: 425 | End: 2025-02-01
Payer: COMMERCIAL

## 2025-02-01 ENCOUNTER — APPOINTMENT (OUTPATIENT)
Dept: MRI IMAGING | Age: 55
DRG: 425 | End: 2025-02-01
Payer: COMMERCIAL

## 2025-02-01 LAB
ALBUMIN SERPL-MCNC: 3.8 G/DL (ref 3.5–5.2)
ALP SERPL-CCNC: 431 U/L (ref 35–104)
ALT SERPL-CCNC: 52 U/L (ref 0–32)
AMMONIA PLAS-SCNC: 71 UMOL/L (ref 11–51)
ANION GAP SERPL CALCULATED.3IONS-SCNC: 10 MMOL/L (ref 7–16)
AST SERPL-CCNC: 48 U/L (ref 0–31)
BASOPHILS # BLD: 0 K/UL (ref 0–0.2)
BASOPHILS NFR BLD: 0 % (ref 0–2)
BILIRUB SERPL-MCNC: 2.6 MG/DL (ref 0–1.2)
BUN SERPL-MCNC: 22 MG/DL (ref 6–20)
CALCIUM SERPL-MCNC: 8.8 MG/DL (ref 8.6–10.2)
CHLORIDE SERPL-SCNC: 93 MMOL/L (ref 98–107)
CO2 SERPL-SCNC: 26 MMOL/L (ref 22–29)
CREAT SERPL-MCNC: 0.7 MG/DL (ref 0.5–1)
EOSINOPHIL # BLD: 0 K/UL (ref 0.05–0.5)
EOSINOPHILS RELATIVE PERCENT: 0 % (ref 0–6)
ERYTHROCYTE [DISTWIDTH] IN BLOOD BY AUTOMATED COUNT: 13.5 % (ref 11.5–15)
GFR, ESTIMATED: >90 ML/MIN/1.73M2
GLUCOSE BLD-MCNC: 198 MG/DL (ref 74–99)
GLUCOSE BLD-MCNC: 274 MG/DL (ref 74–99)
GLUCOSE BLD-MCNC: 352 MG/DL (ref 74–99)
GLUCOSE BLD-MCNC: 353 MG/DL (ref 74–99)
GLUCOSE BLD-MCNC: 354 MG/DL (ref 74–99)
GLUCOSE BLD-MCNC: 381 MG/DL (ref 74–99)
GLUCOSE SERPL-MCNC: 231 MG/DL (ref 74–99)
HCT VFR BLD AUTO: 22 % (ref 34–48)
HGB BLD-MCNC: 7.6 G/DL (ref 11.5–15.5)
LYMPHOCYTES NFR BLD: 0.28 K/UL (ref 1.5–4)
LYMPHOCYTES RELATIVE PERCENT: 11 % (ref 20–42)
MCH RBC QN AUTO: 29.7 PG (ref 26–35)
MCHC RBC AUTO-ENTMCNC: 34.5 G/DL (ref 32–34.5)
MCV RBC AUTO: 85.9 FL (ref 80–99.9)
MONOCYTES NFR BLD: 0.05 K/UL (ref 0.1–0.95)
MONOCYTES NFR BLD: 2 % (ref 2–12)
NEUTROPHILS NFR BLD: 88 % (ref 43–80)
NEUTS SEG NFR BLD: 2.37 K/UL (ref 1.8–7.3)
OSMOLALITY UR: 334 MOSM/KG (ref 300–900)
PLATELET # BLD AUTO: 77 K/UL (ref 130–450)
PLATELET CONFIRMATION: NORMAL
PMV BLD AUTO: 10.2 FL (ref 7–12)
POTASSIUM SERPL-SCNC: 4.1 MMOL/L (ref 3.5–5)
PROT SERPL-MCNC: 7.3 G/DL (ref 6.4–8.3)
RBC # BLD AUTO: 2.56 M/UL (ref 3.5–5.5)
RBC # BLD: ABNORMAL 10*6/UL
RBC # BLD: ABNORMAL 10*6/UL
SODIUM SERPL-SCNC: 129 MMOL/L (ref 132–146)
SODIUM UR-SCNC: 31 MMOL/L
WBC OTHER # BLD: 2.7 K/UL (ref 4.5–11.5)

## 2025-02-01 PROCEDURE — 6370000000 HC RX 637 (ALT 250 FOR IP): Performed by: HOSPITALIST

## 2025-02-01 PROCEDURE — 6370000000 HC RX 637 (ALT 250 FOR IP): Performed by: FAMILY MEDICINE

## 2025-02-01 PROCEDURE — 2060000000 HC ICU INTERMEDIATE R&B

## 2025-02-01 PROCEDURE — 82962 GLUCOSE BLOOD TEST: CPT

## 2025-02-01 PROCEDURE — 84300 ASSAY OF URINE SODIUM: CPT

## 2025-02-01 PROCEDURE — 74183 MRI ABD W/O CNTR FLWD CNTR: CPT

## 2025-02-01 PROCEDURE — 83935 ASSAY OF URINE OSMOLALITY: CPT

## 2025-02-01 PROCEDURE — A9577 INJ MULTIHANCE: HCPCS | Performed by: RADIOLOGY

## 2025-02-01 PROCEDURE — 36415 COLL VENOUS BLD VENIPUNCTURE: CPT

## 2025-02-01 PROCEDURE — 76705 ECHO EXAM OF ABDOMEN: CPT

## 2025-02-01 PROCEDURE — 85025 COMPLETE CBC W/AUTO DIFF WBC: CPT

## 2025-02-01 PROCEDURE — 6360000002 HC RX W HCPCS

## 2025-02-01 PROCEDURE — 82140 ASSAY OF AMMONIA: CPT

## 2025-02-01 PROCEDURE — 80053 COMPREHEN METABOLIC PANEL: CPT

## 2025-02-01 PROCEDURE — 6360000002 HC RX W HCPCS: Performed by: FAMILY MEDICINE

## 2025-02-01 PROCEDURE — 6360000004 HC RX CONTRAST MEDICATION: Performed by: RADIOLOGY

## 2025-02-01 PROCEDURE — 6370000000 HC RX 637 (ALT 250 FOR IP): Performed by: INTERNAL MEDICINE

## 2025-02-01 PROCEDURE — 93976 VASCULAR STUDY: CPT

## 2025-02-01 PROCEDURE — 6360000002 HC RX W HCPCS: Performed by: HOSPITALIST

## 2025-02-01 RX ORDER — LACTULOSE 10 G/15ML
20 SOLUTION ORAL 3 TIMES DAILY
Status: DISCONTINUED | OUTPATIENT
Start: 2025-02-01 | End: 2025-02-02 | Stop reason: HOSPADM

## 2025-02-01 RX ORDER — INSULIN LISPRO 100 [IU]/ML
15 INJECTION, SOLUTION INTRAVENOUS; SUBCUTANEOUS ONCE
Status: COMPLETED | OUTPATIENT
Start: 2025-02-01 | End: 2025-02-01

## 2025-02-01 RX ADMIN — LACTULOSE 20 G: 20 SOLUTION ORAL at 13:13

## 2025-02-01 RX ADMIN — PANCRELIPASE LIPASE, PANCRELIPASE PROTEASE, PANCRELIPASE AMYLASE 20000 UNITS: 20000; 63000; 84000 CAPSULE, DELAYED RELEASE ORAL at 13:04

## 2025-02-01 RX ADMIN — CALCIUM 500 MG: 500 TABLET ORAL at 20:53

## 2025-02-01 RX ADMIN — DIPHENHYDRAMINE HYDROCHLORIDE 50 MG: 25 TABLET ORAL at 11:24

## 2025-02-01 RX ADMIN — HYDROXYZINE PAMOATE 25 MG: 25 CAPSULE ORAL at 00:56

## 2025-02-01 RX ADMIN — OXYCODONE 5 MG: 5 TABLET ORAL at 00:56

## 2025-02-01 RX ADMIN — TRAZODONE HYDROCHLORIDE 100 MG: 50 TABLET, FILM COATED ORAL at 20:52

## 2025-02-01 RX ADMIN — GABAPENTIN 200 MG: 100 CAPSULE ORAL at 13:05

## 2025-02-01 RX ADMIN — INSULIN LISPRO 8 UNITS: 100 INJECTION, SOLUTION INTRAVENOUS; SUBCUTANEOUS at 17:35

## 2025-02-01 RX ADMIN — Medication 12 MG: at 20:51

## 2025-02-01 RX ADMIN — OXYCODONE 5 MG: 5 TABLET ORAL at 05:31

## 2025-02-01 RX ADMIN — RIFAXIMIN 400 MG: 200 TABLET ORAL at 15:32

## 2025-02-01 RX ADMIN — Medication 100 MG: at 13:05

## 2025-02-01 RX ADMIN — INSULIN LISPRO 15 UNITS: 100 INJECTION, SOLUTION INTRAVENOUS; SUBCUTANEOUS at 01:16

## 2025-02-01 RX ADMIN — INSULIN LISPRO 8 UNITS: 100 INJECTION, SOLUTION INTRAVENOUS; SUBCUTANEOUS at 13:04

## 2025-02-01 RX ADMIN — PANTOPRAZOLE SODIUM 40 MG: 40 TABLET, DELAYED RELEASE ORAL at 05:31

## 2025-02-01 RX ADMIN — LORAZEPAM 1 MG: 2 INJECTION INTRAMUSCULAR; INTRAVENOUS at 11:24

## 2025-02-01 RX ADMIN — OXYCODONE 5 MG: 5 TABLET ORAL at 20:51

## 2025-02-01 RX ADMIN — MORPHINE SULFATE 4 MG: 4 INJECTION, SOLUTION INTRAMUSCULAR; INTRAVENOUS at 10:11

## 2025-02-01 RX ADMIN — MORPHINE SULFATE 4 MG: 4 INJECTION, SOLUTION INTRAMUSCULAR; INTRAVENOUS at 17:39

## 2025-02-01 RX ADMIN — CALCIUM 500 MG: 500 TABLET ORAL at 13:05

## 2025-02-01 RX ADMIN — PROCHLORPERAZINE EDISYLATE 10 MG: 5 INJECTION INTRAMUSCULAR; INTRAVENOUS at 13:04

## 2025-02-01 RX ADMIN — PANTOPRAZOLE SODIUM 40 MG: 40 TABLET, DELAYED RELEASE ORAL at 17:35

## 2025-02-01 RX ADMIN — INSULIN LISPRO 2 UNITS: 100 INJECTION, SOLUTION INTRAVENOUS; SUBCUTANEOUS at 20:55

## 2025-02-01 RX ADMIN — RIFAXIMIN 400 MG: 200 TABLET ORAL at 20:52

## 2025-02-01 RX ADMIN — OXYCODONE 5 MG: 5 TABLET ORAL at 13:04

## 2025-02-01 RX ADMIN — PROCHLORPERAZINE EDISYLATE 10 MG: 5 INJECTION INTRAMUSCULAR; INTRAVENOUS at 21:55

## 2025-02-01 RX ADMIN — GADOBENATE DIMEGLUMINE 12 ML: 529 INJECTION, SOLUTION INTRAVENOUS at 12:03

## 2025-02-01 RX ADMIN — PANCRELIPASE LIPASE, PANCRELIPASE PROTEASE, PANCRELIPASE AMYLASE 20000 UNITS: 20000; 63000; 84000 CAPSULE, DELAYED RELEASE ORAL at 17:35

## 2025-02-01 RX ADMIN — MORPHINE SULFATE 4 MG: 4 INJECTION, SOLUTION INTRAMUSCULAR; INTRAVENOUS at 02:26

## 2025-02-01 RX ADMIN — VORTIOXETINE 10 MG: 10 TABLET, FILM COATED ORAL at 20:52

## 2025-02-01 RX ADMIN — GABAPENTIN 200 MG: 100 CAPSULE ORAL at 20:52

## 2025-02-01 ASSESSMENT — PAIN DESCRIPTION - LOCATION
LOCATION: ABDOMEN

## 2025-02-01 ASSESSMENT — PAIN SCALES - GENERAL
PAINLEVEL_OUTOF10: 8
PAINLEVEL_OUTOF10: 9
PAINLEVEL_OUTOF10: 8

## 2025-02-02 VITALS
BODY MASS INDEX: 24.8 KG/M2 | HEIGHT: 60 IN | SYSTOLIC BLOOD PRESSURE: 127 MMHG | WEIGHT: 126.32 LBS | OXYGEN SATURATION: 96 % | HEART RATE: 66 BPM | TEMPERATURE: 98.1 F | RESPIRATION RATE: 19 BRPM | DIASTOLIC BLOOD PRESSURE: 72 MMHG

## 2025-02-02 LAB
AFP SERPL-MCNC: <1.8 UG/L
AFP SERPL-MCNC: <1.8 UG/L
ALBUMIN SERPL-MCNC: 3.5 G/DL (ref 3.5–5.2)
ALP SERPL-CCNC: 394 U/L (ref 35–104)
ALT SERPL-CCNC: 46 U/L (ref 0–32)
AMMONIA PLAS-SCNC: 99 UMOL/L (ref 11–51)
ANION GAP SERPL CALCULATED.3IONS-SCNC: 14 MMOL/L (ref 7–16)
AST SERPL-CCNC: 41 U/L (ref 0–31)
BASOPHILS # BLD: 0.01 K/UL (ref 0–0.2)
BASOPHILS NFR BLD: 1 % (ref 0–2)
BILIRUB SERPL-MCNC: 1.6 MG/DL (ref 0–1.2)
BUN SERPL-MCNC: 27 MG/DL (ref 6–20)
CALCIUM SERPL-MCNC: 8.9 MG/DL (ref 8.6–10.2)
CANCER AG19-9 SERPL IA-ACNC: 181 U/ML (ref 0–35)
CHLORIDE SERPL-SCNC: 98 MMOL/L (ref 98–107)
CHOLEST SERPL-MCNC: 216 MG/DL
CO2 SERPL-SCNC: 21 MMOL/L (ref 22–29)
CREAT SERPL-MCNC: 0.8 MG/DL (ref 0.5–1)
EOSINOPHIL # BLD: 0.04 K/UL (ref 0.05–0.5)
EOSINOPHILS RELATIVE PERCENT: 2 % (ref 0–6)
ERYTHROCYTE [DISTWIDTH] IN BLOOD BY AUTOMATED COUNT: 13.7 % (ref 11.5–15)
GFR, ESTIMATED: 89 ML/MIN/1.73M2
GLUCOSE BLD-MCNC: 113 MG/DL (ref 74–99)
GLUCOSE BLD-MCNC: 322 MG/DL (ref 74–99)
GLUCOSE BLD-MCNC: 343 MG/DL (ref 74–99)
GLUCOSE SERPL-MCNC: 402 MG/DL (ref 74–99)
HCT VFR BLD AUTO: 29.7 % (ref 34–48)
HDLC SERPL-MCNC: 63 MG/DL
HGB BLD-MCNC: 10.1 G/DL (ref 11.5–15.5)
IMM GRANULOCYTES # BLD AUTO: <0.03 K/UL (ref 0–0.58)
IMM GRANULOCYTES NFR BLD: 1 % (ref 0–5)
LDLC SERPL CALC-MCNC: 130 MG/DL
LYMPHOCYTES NFR BLD: 0.47 K/UL (ref 1.5–4)
LYMPHOCYTES RELATIVE PERCENT: 24 % (ref 20–42)
MCH RBC QN AUTO: 29.9 PG (ref 26–35)
MCHC RBC AUTO-ENTMCNC: 34 G/DL (ref 32–34.5)
MCV RBC AUTO: 87.9 FL (ref 80–99.9)
MICROORGANISM SPEC CULT: NORMAL
MICROORGANISM SPEC CULT: NORMAL
MONOCYTES NFR BLD: 0.18 K/UL (ref 0.1–0.95)
MONOCYTES NFR BLD: 9 % (ref 2–12)
NEUTROPHILS NFR BLD: 64 % (ref 43–80)
NEUTS SEG NFR BLD: 1.27 K/UL (ref 1.8–7.3)
PLATELET # BLD AUTO: 80 K/UL (ref 130–450)
PLATELET CONFIRMATION: NORMAL
PMV BLD AUTO: 10.4 FL (ref 7–12)
POTASSIUM SERPL-SCNC: 4.1 MMOL/L (ref 3.5–5)
PROT SERPL-MCNC: 6.8 G/DL (ref 6.4–8.3)
RBC # BLD AUTO: 3.38 M/UL (ref 3.5–5.5)
SERVICE CMNT-IMP: NORMAL
SERVICE CMNT-IMP: NORMAL
SODIUM SERPL-SCNC: 133 MMOL/L (ref 132–146)
SPECIMEN DESCRIPTION: NORMAL
SPECIMEN DESCRIPTION: NORMAL
TRIGL SERPL-MCNC: 113 MG/DL
VLDLC SERPL CALC-MCNC: 23 MG/DL
WBC OTHER # BLD: 2 K/UL (ref 4.5–11.5)

## 2025-02-02 PROCEDURE — 6360000002 HC RX W HCPCS: Performed by: FAMILY MEDICINE

## 2025-02-02 PROCEDURE — 36415 COLL VENOUS BLD VENIPUNCTURE: CPT

## 2025-02-02 PROCEDURE — 6370000000 HC RX 637 (ALT 250 FOR IP): Performed by: FAMILY MEDICINE

## 2025-02-02 PROCEDURE — 82962 GLUCOSE BLOOD TEST: CPT

## 2025-02-02 PROCEDURE — 6370000000 HC RX 637 (ALT 250 FOR IP): Performed by: INTERNAL MEDICINE

## 2025-02-02 PROCEDURE — 82140 ASSAY OF AMMONIA: CPT

## 2025-02-02 PROCEDURE — 80061 LIPID PANEL: CPT

## 2025-02-02 PROCEDURE — 80053 COMPREHEN METABOLIC PANEL: CPT

## 2025-02-02 PROCEDURE — 6360000002 HC RX W HCPCS

## 2025-02-02 PROCEDURE — 85025 COMPLETE CBC W/AUTO DIFF WBC: CPT

## 2025-02-02 PROCEDURE — 6370000000 HC RX 637 (ALT 250 FOR IP)

## 2025-02-02 PROCEDURE — 87389 HIV-1 AG W/HIV-1&-2 AB AG IA: CPT

## 2025-02-02 RX ORDER — INSULIN GLARGINE 100 [IU]/ML
38 INJECTION, SOLUTION SUBCUTANEOUS EVERY MORNING
Status: DISCONTINUED | OUTPATIENT
Start: 2025-02-02 | End: 2025-02-02 | Stop reason: HOSPADM

## 2025-02-02 RX ORDER — INSULIN GLARGINE 100 [IU]/ML
38 INJECTION, SOLUTION SUBCUTANEOUS EVERY MORNING
COMMUNITY
Start: 2025-02-02

## 2025-02-02 RX ORDER — LACTULOSE 10 G/15ML
20 SOLUTION ORAL 3 TIMES DAILY
COMMUNITY
Start: 2025-02-02

## 2025-02-02 RX ADMIN — ANASTROZOLE 1 MG: 1 TABLET ORAL at 08:16

## 2025-02-02 RX ADMIN — RIFAXIMIN 400 MG: 200 TABLET ORAL at 08:13

## 2025-02-02 RX ADMIN — LACTULOSE 20 G: 20 SOLUTION ORAL at 08:15

## 2025-02-02 RX ADMIN — OXYCODONE 5 MG: 5 TABLET ORAL at 08:12

## 2025-02-02 RX ADMIN — PROCHLORPERAZINE EDISYLATE 10 MG: 5 INJECTION INTRAMUSCULAR; INTRAVENOUS at 08:12

## 2025-02-02 RX ADMIN — INSULIN LISPRO 6 UNITS: 100 INJECTION, SOLUTION INTRAVENOUS; SUBCUTANEOUS at 08:15

## 2025-02-02 RX ADMIN — Medication 1 TABLET: at 08:12

## 2025-02-02 RX ADMIN — ARIPIPRAZOLE 20 MG: 10 TABLET ORAL at 08:12

## 2025-02-02 RX ADMIN — INSULIN GLARGINE 38 UNITS: 100 INJECTION, SOLUTION SUBCUTANEOUS at 14:08

## 2025-02-02 RX ADMIN — PANCRELIPASE LIPASE, PANCRELIPASE PROTEASE, PANCRELIPASE AMYLASE 20000 UNITS: 20000; 63000; 84000 CAPSULE, DELAYED RELEASE ORAL at 12:13

## 2025-02-02 RX ADMIN — MORPHINE SULFATE 4 MG: 4 INJECTION, SOLUTION INTRAMUSCULAR; INTRAVENOUS at 06:39

## 2025-02-02 RX ADMIN — PANCRELIPASE LIPASE, PANCRELIPASE PROTEASE, PANCRELIPASE AMYLASE 20000 UNITS: 20000; 63000; 84000 CAPSULE, DELAYED RELEASE ORAL at 08:12

## 2025-02-02 RX ADMIN — GABAPENTIN 200 MG: 100 CAPSULE ORAL at 14:09

## 2025-02-02 RX ADMIN — CALCIUM 500 MG: 500 TABLET ORAL at 08:13

## 2025-02-02 RX ADMIN — OXYCODONE 5 MG: 5 TABLET ORAL at 03:19

## 2025-02-02 RX ADMIN — GABAPENTIN 200 MG: 100 CAPSULE ORAL at 08:13

## 2025-02-02 RX ADMIN — OXYCODONE 5 MG: 5 TABLET ORAL at 12:18

## 2025-02-02 RX ADMIN — OXYCODONE HYDROCHLORIDE AND ACETAMINOPHEN 1000 MG: 500 TABLET ORAL at 08:12

## 2025-02-02 RX ADMIN — MORPHINE SULFATE 4 MG: 4 INJECTION, SOLUTION INTRAMUSCULAR; INTRAVENOUS at 14:16

## 2025-02-02 RX ADMIN — INSULIN LISPRO 6 UNITS: 100 INJECTION, SOLUTION INTRAVENOUS; SUBCUTANEOUS at 12:15

## 2025-02-02 RX ADMIN — MORPHINE SULFATE 4 MG: 4 INJECTION, SOLUTION INTRAMUSCULAR; INTRAVENOUS at 00:16

## 2025-02-02 RX ADMIN — Medication 100 MG: at 08:12

## 2025-02-02 RX ADMIN — PANTOPRAZOLE SODIUM 40 MG: 40 TABLET, DELAYED RELEASE ORAL at 06:28

## 2025-02-02 ASSESSMENT — PAIN DESCRIPTION - LOCATION
LOCATION: ABDOMEN

## 2025-02-02 ASSESSMENT — PAIN SCALES - GENERAL
PAINLEVEL_OUTOF10: 8
PAINLEVEL_OUTOF10: 8

## 2025-02-02 ASSESSMENT — PAIN DESCRIPTION - DESCRIPTORS
DESCRIPTORS: CRAMPING
DESCRIPTORS: ACHING
DESCRIPTORS: ACHING;CRAMPING

## 2025-02-02 NOTE — PROGRESS NOTES
Nephrology Note  Rajiv Nicole MD          Patient seen and examined.  No family member is present at bedside.  Chart reviewed.     Patient is feeling better.  Denies shortness of breath.   Appetite and nausea have improved.  Awake and alert .   In no acute distress.    Vital SignsBP (!) 140/90   Pulse 69   Temp 97.9 °F (36.6 °C) (Oral)   Resp 20   Ht 1.524 m (5')   Wt 61.1 kg (134 lb 12.8 oz)   SpO2 99%   BMI 26.33 kg/m²   24HR INTAKE/OUTPUT:  No intake or output data in the 24 hours ending 01/31/25 1157      PHYSICAL EXAM        Neck: No JVD  Lungs: Breath sounds decreased at the bases. No rales or ronchi.  Heart: Regular rate and rhythm. No S3 gallop. No  murmrur.  Abdomen: Soft non distended, non tender and normal bowel sounds.  Extremeties:   No edema.         Current Facility-Administered Medications   Medication Dose Route Frequency Provider Last Rate Last Admin    predniSONE (DELTASONE) tablet 50 mg  50 mg Oral Q6H Олег Mitchell MD   50 mg at 01/31/25 0940    diphenhydrAMINE (BENADRYL) tablet 50 mg  50 mg Oral Once Олег Mitchell MD        ipratropium (ATROVENT) 0.02 % nebulizer solution 0.5 mg  0.5 mg Nebulization Q4H PRN Liza Oh DO        insulin lispro (HUMALOG,ADMELOG) injection vial 0-8 Units  0-8 Units SubCUTAneous TID WC Liza Oh DO   4 Units at 01/31/25 0755    insulin lispro (HUMALOG,ADMELOG) injection vial 0-4 Units  0-4 Units SubCUTAneous Nightly Liza Oh DO   1 Units at 01/30/25 2053    pantoprazole (PROTONIX) tablet 40 mg  40 mg Oral BID Torsten Lu DO   40 mg at 01/31/25 0637    prochlorperazine (COMPAZINE) injection 10 mg  10 mg IntraVENous Q6H PRN Diana Lloyd APRN - CNP   10 mg at 01/31/25 0940    acetaminophen (TYLENOL) tablet 500 mg  500 mg Oral 4x Daily PRN Liza Oh DO   500 mg at 01/31/25 0221    albuterol (PROVENTIL) (2.5 MG/3ML) 0.083% nebulizer solution 2.5 mg  2.5 mg Nebulization Q4H PRN Adriano 
                  Nephrology Note  Rajiv Nicole MD          Patient seen and examined.  No family member is present at bedside.  Chart reviewed.     Patient is feeling better.  Denies shortness of breath.   Appetite and nausea have improved.  GI workup in progress.  Awake and alert .   In no acute distress.    Vital SignsBP (!) 130/93   Pulse 72   Temp 98.1 °F (36.7 °C)   Resp 18   Ht 1.524 m (5')   Wt 61.1 kg (134 lb 12.8 oz)   SpO2 99%   BMI 26.33 kg/m²   24HR INTAKE/OUTPUT:    Intake/Output Summary (Last 24 hours) at 2/1/2025 1422  Last data filed at 2/1/2025 1310  Gross per 24 hour   Intake 480 ml   Output --   Net 480 ml         PHYSICAL EXAM        Neck: No JVD  Lungs: Breath sounds decreased at the bases. No rales or ronchi.  Heart: Regular rate and rhythm. No S3 gallop. No  murmrur.  Abdomen: Soft non distended, non tender and normal bowel sounds.  Extremeties:   No edema.         Current Facility-Administered Medications   Medication Dose Route Frequency Provider Last Rate Last Admin    lactulose (CHRONULAC) 10 GM/15ML solution 20 g  20 g Oral TID Isaak Monk APRN - CNP        ipratropium (ATROVENT) 0.02 % nebulizer solution 0.5 mg  0.5 mg Nebulization Q4H PRN Liza Oh DO        insulin lispro (HUMALOG,ADMELOG) injection vial 0-8 Units  0-8 Units SubCUTAneous TID WC Liza Oh DO   8 Units at 02/01/25 1304    insulin lispro (HUMALOG,ADMELOG) injection vial 0-4 Units  0-4 Units SubCUTAneous Nightly Liza Oh DO   4 Units at 01/31/25 2057    pantoprazole (PROTONIX) tablet 40 mg  40 mg Oral BID AC Torsten Harrison DO   40 mg at 02/01/25 0531    prochlorperazine (COMPAZINE) injection 10 mg  10 mg IntraVENous Q6H PRN Diana Lloyd APRN - CNP   10 mg at 02/01/25 1304    acetaminophen (TYLENOL) tablet 500 mg  500 mg Oral 4x Daily PRN Liza Oh DO   500 mg at 01/31/25 0221    albuterol (PROVENTIL) (2.5 MG/3ML) 0.083% nebulizer solution 2.5 mg  2.5 mg Nebulization Q4H PRN 
                  Nephrology Note  Rajiv Nicole MD          Patient seen and examined.  No family member is present at bedside.  Chart reviewed.  As per nursing staff the patient is refusing to take any medications other than the as needed medications for pain and nausea.  Patient is feeling better.  Denies shortness of breath. Appetite fair.  She has intermittent nausea.  Awake and alert .   In no acute distress.    Vital SignsBP (!) 161/95   Pulse 78   Temp 98.6 °F (37 °C) (Oral)   Resp 16   Ht 1.524 m (5')   Wt 61.1 kg (134 lb 12.8 oz)   SpO2 97%   BMI 26.33 kg/m²   24HR INTAKE/OUTPUT:  No intake or output data in the 24 hours ending 01/30/25 1218      PHYSICAL EXAM        Neck: No JVD  Lungs: Breath sounds decreased at the bases. No rales or ronchi.  Heart: Regular rate and rhythm. No S3 gallop. No  murmrur.  Abdomen: Soft non distended, non tender and normal bowel sounds.  Extremeties:   No edema.         Current Facility-Administered Medications   Medication Dose Route Frequency Provider Last Rate Last Admin    insulin lispro (HUMALOG,ADMELOG) injection vial 0-8 Units  0-8 Units SubCUTAneous TID WC Liza Oh DO   2 Units at 01/29/25 0853    insulin lispro (HUMALOG,ADMELOG) injection vial 0-4 Units  0-4 Units SubCUTAneous Nightly Liza Oh DO        sodium zirconium cyclosilicate (LOKELMA) oral suspension 10 g  10 g Oral Daily Rajiv Nicole MD   10 g at 01/29/25 1354    pantoprazole (PROTONIX) tablet 40 mg  40 mg Oral BID AC Torsten Harrison DO   40 mg at 01/30/25 0630    prochlorperazine (COMPAZINE) injection 10 mg  10 mg IntraVENous Q6H PRN Diana Lloyd APRN - CNP   10 mg at 01/30/25 0917    acetaminophen (TYLENOL) tablet 500 mg  500 mg Oral 4x Daily PRN Liza Oh DO   500 mg at 01/29/25 1506    albuterol (PROVENTIL) (2.5 MG/3ML) 0.083% nebulizer solution 2.5 mg  2.5 mg Nebulization Q4H PRN Liza Oh DO        anastrozole (ARIMIDEX) tablet 1 mg  1 mg Oral QAM Adriano, 
                  Nephrology Note  Rajiv Nicole MD          Patient seen and examined.  No family member is present at bedside.  Chart reviewed.  Patient is ambulating in the hallway.  Feeling better.  Denies shortness of breath.   Appetite and nausea have improved.  GI workup in progress.  Awake and alert .   In no acute distress.    Vital SignsBP 127/72   Pulse 66   Temp 98.1 °F (36.7 °C) (Oral)   Resp 19   Ht 1.524 m (5')   Wt 57.3 kg (126 lb 5.2 oz)   SpO2 96%   BMI 24.67 kg/m²   24HR INTAKE/OUTPUT:    Intake/Output Summary (Last 24 hours) at 2/2/2025 1224  Last data filed at 2/2/2025 0907  Gross per 24 hour   Intake 480 ml   Output --   Net 480 ml         PHYSICAL EXAM        Neck: No JVD  Lungs: Breath sounds decreased at the bases. No rales or ronchi.  Heart: Regular rate and rhythm. No S3 gallop. No  murmrur.  Abdomen: Soft non distended, non tender and normal bowel sounds.  Extremeties:   No edema.         Current Facility-Administered Medications   Medication Dose Route Frequency Provider Last Rate Last Admin    lactulose (CHRONULAC) 10 GM/15ML solution 20 g  20 g Oral TID Isaak Monk APRN - CNP   20 g at 02/02/25 0815    ipratropium (ATROVENT) 0.02 % nebulizer solution 0.5 mg  0.5 mg Nebulization Q4H PRN Liza Oh DO        insulin lispro (HUMALOG,ADMELOG) injection vial 0-8 Units  0-8 Units SubCUTAneous TID WC Liza Oh DO   6 Units at 02/02/25 1215    insulin lispro (HUMALOG,ADMELOG) injection vial 0-4 Units  0-4 Units SubCUTAneous Nightly Liza Oh DO   2 Units at 02/01/25 2055    pantoprazole (PROTONIX) tablet 40 mg  40 mg Oral BID AC Torsten Harrison DO   40 mg at 02/02/25 0628    prochlorperazine (COMPAZINE) injection 10 mg  10 mg IntraVENous Q6H PRN Diana Lloyd APRN - CNP   10 mg at 02/02/25 0812    acetaminophen (TYLENOL) tablet 500 mg  500 mg Oral 4x Daily PRN Liza Oh DO   500 mg at 01/31/25 0221    albuterol (PROVENTIL) (2.5 MG/3ML) 0.083% nebulizer 
   01/31/25 0914   RT Protocol   History Pulmonary Disease 1   Respiratory pattern 0   Breath sounds 0   Cough 0   Indications for Bronchodilator Therapy On home bronchodilators   Bronchodilator Assessment Score 1       
4 Eyes Skin Assessment     NAME:  Ellie Santacruz  YOB: 1970  MEDICAL RECORD NUMBER:  [id number]    The patient is being assessed for  Admission    I agree that at least one RN has performed a thorough Head to Toe Skin Assessment on the patient. ALL assessment sites listed below have been assessed.      Areas assessed by both nurses:    Head, Face, Ears, Shoulders, Back, Chest, Arms, Elbows, Hands, Sacrum. Buttock, Coccyx, Ischium, and Legs. Feet and Heels      Scar on L breast, calouse left bottom 2nd toe  Does the Patient have a Wound? No noted wound(s)       Abe Prevention initiated by RN: No  Wound Care Orders initiated by RN: No    Pressure Injury (Stage 3,4, Unstageable, DTI, NWPT, and Complex wounds) if present, place Wound referral order by RN under : No    New Ostomies, if present place, Ostomy referral order under : No     Nurse 1 eSignature: Electronically signed by Stacey Mathew RN on 1/29/25 at 1:15 AM EST    **SHARE this note so that the co-signing nurse can place an eSignature**    Nurse 2 eSignature: Electronically signed by Marie Paul RN on 1/29/25 at 3:53 AM EST   
Attempted to call Dr Harrison again but no answer.  
Consulted noted. Unsure nature of symptoms as no leg pain currently documented. Arterial studies have been ordered. Will follow up on results.   
Internal Medicine Consult Note    [facility]    Mina Riley D.O., BHARGAVOScottI.                    Torsten Harrison D.O., BHARGAVOScottI.                             Jaya Josue D.O.     Autumn Alfaro, MSN, APRN, NP-C  Tanmay Farah, MSN, APRN-CNP  Isaak Monk, MSN, APRN-CNP  Marie Diaz, MSN APRN-CNP  Diana Lloyd, MSN. APRN-NP-C     Primary Care Physician: Liza Oh DO   Admitting Physician:  Liza Oh DO  Admission date and time: 1/28/2025  1:24 PM    Room:  0630/0630-01  Admitting diagnosis: Abdominal pain, epigastric [R10.13]  Hyperkalemia [E87.5]    Patient Name: Ellie Santacruz  MRN: [id number]    Date of Service: 1/30/2025     Coverage for Dr. Oh is being provided by Dr. Riley and Dr. Harrison    Subjective:  Ellie is a 54 y.o. female who was seen and examined today,1/30/2025, at the bedside.  Patient appears to be comfortable in bed.  She does continue to report some mild epigastric pain that is better than yesterday.  Her potassium is down within normal limits today.  Patient complaining of decreased circulation and neuropathy in her lower extremities.  Her toes are slightly cyanotic after removing her socks.  Patient is allergic to iodine so we will hold off on CTA with runoff we will obtain segmental pressures.  Vascular surgery has been consulted    No family present during my examination.    Review of System:   Constitutional:   Denies fever or chills, weight loss or gain, reports ongoing fatigue  HEENT:   Denies ear pain, sore throat, sinus or eye problems.  Cardiovascular:   Denies any chest pain, irregular heartbeats, or palpitations.   Respiratory:   Denies shortness of breath, coughing, sputum production, hemoptysis, or wheezing.  Gastrointestinal:   Denies nausea, vomiting, diarrhea, or constipation.  Mild epigastric discomfort.  Genitourinary:    Denies any urgency, frequency, hematuria. Voiding  without difficulty.  Extremities:   Reports cold, mildly 
Internal Medicine Consult Note    [facility]    Mina Riley D.O., BHARGAVOScottI.                    Torsten Harrison D.O., BHARGAVOScottI.                             Jaya Josue D.O.     Autumn Alfaro, MSN, APRN, NP-C  Tanmay Farah, MSN, APRN-CNP  Isaak Monk, MSN, APRN-CNP  Marie Diaz, MSN APRN-CNP  Diana Lloyd, MSN. APRN-NP-C     Primary Care Physician: Liza Oh DO   Admitting Physician:  Liza Oh DO  Admission date and time: 1/28/2025  1:24 PM    Room:  0630/0630-01  Admitting diagnosis: Abdominal pain, epigastric [R10.13]  Hyperkalemia [E87.5]    Patient Name: Ellie Santacruz  MRN: [id number]    Date of Service: 1/31/2025     Coverage for Dr. Oh is being provided by Dr. Riley and Dr. Harrison    Subjective:  Ellie is a 54 y.o. female who was seen and examined today,1/31/2025, at the bedside.  Patient resting comfort at the present time.  Patient is scheduled for MRCP today.  Results of arterial Doppler study discussed with the patient.  Patient does have pain but this does seem to be improving.    No family present during my examination.    Review of System:   Constitutional:   Denies fever or chills, weight loss or gain, reports ongoing fatigue  HEENT:   Denies ear pain, sore throat, sinus or eye problems.  Cardiovascular:   Denies any chest pain, irregular heartbeats, or palpitations.   Respiratory:   Denies shortness of breath, coughing, sputum production, hemoptysis, or wheezing.  Gastrointestinal:   Denies nausea, vomiting, diarrhea, or constipation.  Mild epigastric discomfort.  Genitourinary:    Denies any urgency, frequency, hematuria. Voiding  without difficulty.  Extremities:   Reports cold, mildly discolored and neuropathy pain in lower extremities  Neurology:    Denies any headache or focal neurological deficits, Denies generalized weakness or memory difficulty.   Psch:   Denies being anxious or depressed.  Musculoskeletal:    Denies  myalgias, joint 
Mauricio provided for patient to return to [facility] and [facility]. Facility aware of patient's return.   
Name:  Ellie Santacruz  :  1970  MRN:  [id number]  Room:  [facility]0630  DOS:  2025    [facility]  [facility]  Dr. Lakhwinder Madera M.D.  Dr. Simón Esquivel M.D.  Dr. Hugo Rodríguez D.O.  Dr. Royer Coppola M.D.  Dr. Robbin Mitchell D.O.    -NP Progress Note-    PCP:  Liza Oh DO  Admitting Physician:  Liza Oh DO  Chief Complaint:    Chief Complaint   Patient presents with    Abdominal Pain     Hx of pancreatitis and cirrhosis. Pain 10/10.       Subjective  Patient sitting up on edge of bed.  Reports some nausea.  Responds well to Compazine.  Reports chronic pain throughout the abdomen.  Denies nausea or vomiting.  Bowel movements yesterday, none today.    Physical Examination  Vitals:  /74   Pulse 77   Temp 98.5 °F (36.9 °C)   Resp 20   Ht 1.524 m (5')   Wt 61.1 kg (134 lb 12.8 oz)   SpO2 99%   BMI 26.33 kg/m²   General Appearance:  awake, alert, and oriented to person, place, time, and purpose; appears stated age and cooperative; no apparent distress no labored breathing  HEENT:  PERRL; EOMI; sclera clear; buccal mucosa moist  Neck:  supple; trachea midline; no thyromegaly; no JVD; no bruits  Heart:  rhythm regular; rate controlled; no murmurs  Lungs:  symmetrical; clear to auscultation bilaterally; no wheezes; no rhonchi; no rales  Abdomen:  soft, generalized tenderness, non-distended; bowel sounds positive; no organomegaly or masses  Extremities:  peripheral pulses present; no peripheral edema; no ulcers  Neurologic:  alert and oriented x 3; no focal deficit; cranial nerves grossly intact  Skin:  no petechia; no hemorrhage; no wounds    Medications  Scheduled Meds    diphenhydrAMINE  50 mg Oral Once    LORazepam  1 mg IntraVENous Once    insulin lispro  0-8 Units SubCUTAneous TID WC    insulin lispro  0-4 Units SubCUTAneous Nightly    pantoprazole  40 mg Oral BID AC    anastrozole  1 mg Oral QAM    ARIPiprazole  20 mg Oral QAM    vitamin C  1,000 mg 
Name:  Ellie Santacruz  :  1970  MRN:  [id number]  Room:  [facility]0630  DOS:  2025    [facility]  [facility]  Dr. Lakhwinder Madera M.D.  Dr. Simón Esquivel M.D.  RHIANNON Mendoza Dr., M.D.  Dr. Robbin Mitchell D.O.    -NP Progress Note-    PCP:  Liza Oh DO  Admitting Physician:  Liza Oh DO  Chief Complaint:    Chief Complaint   Patient presents with    Abdominal Pain     Hx of pancreatitis and cirrhosis. Pain 10/10.       Subjective  Patient sitting up on edge of bed.  Some postprandial abdominal discomfort, improved.  Nausea controlled with medication.  Patient states that she would like to go home.    Physical Examination  Vitals:  /65   Pulse 67   Temp 97.5 °F (36.4 °C) (Oral)   Resp 20   Ht 1.524 m (5')   Wt 57.3 kg (126 lb 5.2 oz)   SpO2 97%   BMI 24.67 kg/m²   General Appearance:  awake, alert, and oriented to person, place, time, and purpose; appears stated age and cooperative; no apparent distress no labored breathing  HEENT:  PERRL; EOMI; sclera clear; buccal mucosa moist  Neck:  supple; trachea midline; no thyromegaly; no JVD; no bruits  Heart:  rhythm regular; rate controlled; no murmurs  Lungs:  symmetrical; clear to auscultation bilaterally; no wheezes; no rhonchi; no rales  Abdomen:  soft, generalized tenderness, non-distended; bowel sounds positive; no organomegaly or masses  Extremities:  peripheral pulses present; no peripheral edema; no ulcers  Neurologic:  alert and oriented x 3; no focal deficit; cranial nerves grossly intact  Skin:  no petechia; no hemorrhage; no wounds    Medications  Scheduled Meds    lactulose  20 g Oral TID    insulin lispro  0-8 Units SubCUTAneous TID WC    insulin lispro  0-4 Units SubCUTAneous Nightly    pantoprazole  40 mg Oral BID AC    anastrozole  1 mg Oral QAM    ARIPiprazole  20 mg Oral QAM    vitamin C  1,000 mg Oral QAM    calcium elemental  500 mg Oral BID    
Patient refusing medications and noon vitals. Patient only willing to take PRN pain and nausea medication.  
Patient's potassium came back 5.4 .Attempted to call Dr Harrison but no answer. Voice message left.  
Spiritual Health History and Assessment/Progress Note  [facility]    Initial Encounter, Spiritual/Emotional Needs,  ,  ,      Name: Ellie Santacruz MRN: [id number]    Age: 54 y.o.     Sex: female   Language: English   Moravian: Restoration   Hyperkalemia     Date: 1/30/2025                           Spiritual Assessment began in [facility]        Referral/Consult From: Rounding   Encounter Overview/Reason: Initial Encounter, Spiritual/Emotional Needs  Service Provided For: Patient    Mary, Belief, Meaning:   Patient identifies as spiritual  Family/Friends No family/friends present      Importance and Influence:  Patient has spiritual/personal beliefs that influence decisions regarding their health  Family/Friends No family/friends present    Community:  Patient feels well-supported. Support system includes: Unknown  Family/Friends No family/friends present    Assessment and Plan of Care:     Patient Interventions include: Engaged in theological reflection  Family/Friends Interventions include: No family/friends present    Patient Plan of Care: Spiritual Care available upon further referral  Family/Friends Plan of Care: No family/friends present    Electronically signed by Chaplain Mechelle on 1/30/2025 at 2:51 PM   
Stool for occult negative  
intact. Gait not assessed.  Integumentary:  No rashes  Skin normal color and texture.  Genitalia/Breast:  Deferred    Medication:  Scheduled Meds:   lactulose  20 g Oral TID    insulin lispro  0-8 Units SubCUTAneous TID WC    insulin lispro  0-4 Units SubCUTAneous Nightly    pantoprazole  40 mg Oral BID AC    anastrozole  1 mg Oral QAM    ARIPiprazole  20 mg Oral QAM    vitamin C  1,000 mg Oral QAM    calcium elemental  500 mg Oral BID    lipase-protease-amylase  20,000 Units Oral TID WC    fentaNYL  1 patch TransDERmal Q72H    gabapentin  200 mg Oral TID    [Held by provider] insulin glargine  38 Units SubCUTAneous QAM    melatonin  12 mg Oral Nightly    therapeutic multivitamin-minerals  1 tablet Oral QAM    rifAXIMin  400 mg Oral TID    thiamine mononitrate  100 mg Oral Daily    VORTIoxetine HBr  10 mg Oral Nightly     Continuous Infusions:   dextrose         Objective Data:  Recent Labs     01/30/25  0639 01/31/25  0643 02/01/25  0856   WBC 2.2* 1.7* 2.7*   RBC 3.17* 3.18* 2.56*   HGB 9.5* 9.5* 7.6*   HCT 28.1* 27.5* 22.0*   MCV 88.6 86.5 85.9   MCH 30.0 29.9 29.7   MCHC 33.8 34.5 34.5   RDW 13.2 13.4 13.5   PLT  --   --  77*   MPV 10.0 9.6 10.2     Recent Labs     01/30/25  0639 01/31/25  0643 02/01/25  0856    135 129*   K 4.8 4.2 4.1    100 93*   CO2 27 26 26   BUN 9 13 22*   CREATININE 0.6 0.7 0.7   GLUCOSE 151* 250* 231*   CALCIUM 9.4 9.0 8.8   BILITOT  --  4.1* 2.6*   ALKPHOS  --  396* 431*   AST  --  54* 48*   ALT  --  52* 52*   ALBUMIN  --  3.5 3.8     Lab Results   Component Value Date    TROPONINI <0.01 03/11/2020    TROPONINI <0.01 02/15/2020    TROPONINI <0.01 04/20/2019          Wound Documentation:   Wound 12/30/24 Foot Left;Plantar Left foot second toe. Appears to be a calus patient states its an ulcer treated by podiatry (Active)   Dressing Status Intact 01/01/25 1200   Dressing/Treatment Open to air 01/29/25 2000   Wound Length (cm) 1 cm 12/30/24 1553   Wound Width (cm) 1.2 cm

## 2025-02-02 NOTE — CARE COORDINATION
2/2/2025 1422 CM note: Weekend note, discharge order noted. Pt from [facility] and per Magaly at facility, pt can return. Facility can not provide transportation and pt does not have any family/friends to transport. Per [facility] Provide a Ride, no transportation available this afternoon. Per phone call,no Taxi services available Sunday afternoon. $10 [employer] will provide transportation at 2:40pm. Pt informed and agreeable and CM notified Magaly at [facility]. Alba [place]   Tetracycline Counseling: Patient counseled regarding possible photosensitivity and increased risk for sunburn.  Patient instructed to avoid sunlight, if possible.  When exposed to sunlight, patients should wear protective clothing, sunglasses, and sunscreen.  The patient was instructed to call the office immediately if the following severe adverse effects occur:  hearing changes, easy bruising/bleeding, severe headache, or vision changes.  The patient verbalized understanding of the proper use and possible adverse effects of tetracycline.  All of the patient's questions and concerns were addressed. Patient understands to avoid pregnancy while on therapy due to potential birth defects.

## 2025-02-02 NOTE — DISCHARGE INSTRUCTIONS
Your information:  Name: Ellie Santacruz  : 1970    Your instructions:    YOU ARE BEING DISCHARGED HOME. PLEASE MAKE AND KEEP YOUR FOLLOW UP APPOINTMENTS.    What to do after you leave the hospital:    Recommended diet: regular diet    Recommended activity: activity as tolerated        The following personal items were collected during your admission and were returned to you:    Belongings  Dental Appliances: Uppers, Lowers  Vision - Corrective Lenses: Eyeglasses  Hearing Aid: None  Clothing: Footwear, Jacket/Coat, Pants, Shirt, Socks, Undergarments  Jewelry: Bracelet  Body Piercings Removed: N/A  Electronic Devices: Cell Phone,   Weapons (Notify Protective Services/Security): None  Other Valuables: Purse  Home Medications: None  Valuables Given To: Patient  Provide Name(s) of Who Valuable(s) Were Given To: ELLIE  Patient approves for provider to speak to responsible person post operatively: Yes    Information obtained by:  By signing below, I understand that if any problems occur once I leave the hospital I am to contact Dr. Oh.  I understand and acknowledge receipt of the instructions indicated above.

## 2025-02-03 LAB
HCV RNA SERPL NAA+PROBE-ACNC: NOT DETECTED IU/ML
HCV RNA SERPL NAA+PROBE-LOG IU: NOT DETECTED LOG IU/ML
HCV RNA SERPL QL NAA+PROBE: NOT DETECTED
HIV 1+2 AB+HIV1 P24 AG SERPL QL IA: NONREACTIVE

## 2025-02-03 NOTE — DISCHARGE SUMMARY
[place] [id number]                            DISCHARGE SUMMARY      PATIENT NAME: OSBALDO GAINES               : 1970  MED REC NO: [id number]                        ROOM: [id number]  ACCOUNT NO: [id number]                       ADMIT DATE: 2025  PROVIDER: Mina Riley DO      ADMITTING DIAGNOSIS:  Viral gastroenteritis.    FINAL DISCHARGE DIAGNOSES:  Viral gastroenteritis, spironolactone-induced hyperkalemia, previous alcohol abuse resultant cirrhosis and chronic pancreatitis, generalized anxiety disorder and depression, history of breast cancer, noninsulin-dependent diabetes mellitus.    COMPLICATIONS:  None.    OPERATION:  None.    CONSULTATIONS OBTAINED:  With Dr. Lieberman.  No OMT was given.    ADDITIONAL ADMITTING PHYSICIAN:  Dr. Harrison.    CHIEF COMPLAINT AND HISTORY OF CHIEF COMPLAINT:  This is a pleasant 54-year-old white female who was admitted to [facility].  The patient presented to the hospital here on 2025. The patient presented to the hospital here for evaluation of epigastric abdominal pain.  Workup in the Emergency Department revealed hyperkalemia.  The patient was admitted to the hospital for further evaluation and treatment.  The patient's past medical history is extensive.  The patient does follow up with Dr. Oh.  The patient was admitted to the hospital here at this time.    PAST MEDICAL HISTORY:  Positive for history of alcoholism, history of anxiety and depression, cirrhosis of the liver, bronchitis, history of left breast cancer, candidiasis, chronic pancreatitis, history of cocaine abuse in the past, constipation, type 2 diabetes mellitus, gastroesophageal reflux disease, history of hepatitis C, jaundice, pneumonia, polyneuropathy due to diabetes, schizoaffective disorder, and splenomegaly.    PHYSICAL EXAMINATION:  VITAL SIGNS:  Stable.  GENERAL APPEARANCE:  Alert and

## 2025-03-17 ENCOUNTER — TELEPHONE (OUTPATIENT)
Dept: ONCOLOGY | Age: 55
End: 2025-03-17

## 2025-03-17 NOTE — TELEPHONE ENCOUNTER
Attempt #1 to contact pt and reschedule appt to line  up with prolia inj. No answer at this time. Left message with request for call back.

## 2025-04-09 ENCOUNTER — OFFICE VISIT (OUTPATIENT)
Dept: ONCOLOGY | Age: 55
End: 2025-04-09
Payer: COMMERCIAL

## 2025-04-09 ENCOUNTER — HOSPITAL ENCOUNTER (OUTPATIENT)
Dept: INFUSION THERAPY | Age: 55
Discharge: HOME | End: 2025-04-09
Payer: COMMERCIAL

## 2025-04-09 ENCOUNTER — RESULTS FOLLOW-UP (OUTPATIENT)
Dept: ENDOCRINOLOGY | Age: 55
End: 2025-04-09

## 2025-04-09 ENCOUNTER — TELEPHONE (OUTPATIENT)
Dept: ENDOCRINOLOGY | Age: 55
End: 2025-04-09

## 2025-04-09 VITALS
OXYGEN SATURATION: 100 % | DIASTOLIC BLOOD PRESSURE: 71 MMHG | TEMPERATURE: 97.2 F | HEART RATE: 79 BPM | WEIGHT: 148.9 LBS | BODY MASS INDEX: 29.23 KG/M2 | SYSTOLIC BLOOD PRESSURE: 133 MMHG | HEIGHT: 60 IN

## 2025-04-09 DIAGNOSIS — Z12.31 SCREENING MAMMOGRAM FOR BREAST CANCER: ICD-10-CM

## 2025-04-09 DIAGNOSIS — Z85.3 PERSONAL HISTORY OF BREAST CANCER: Primary | ICD-10-CM

## 2025-04-09 DIAGNOSIS — Z79.4 TYPE 2 DIABETES MELLITUS WITH HYPERGLYCEMIA, WITH LONG-TERM CURRENT USE OF INSULIN (HCC): ICD-10-CM

## 2025-04-09 DIAGNOSIS — E11.65 TYPE 2 DIABETES MELLITUS WITH HYPERGLYCEMIA, WITH LONG-TERM CURRENT USE OF INSULIN (HCC): ICD-10-CM

## 2025-04-09 DIAGNOSIS — C50.912 MALIGNANT NEOPLASM OF LEFT BREAST IN FEMALE, ESTROGEN RECEPTOR POSITIVE, UNSPECIFIED SITE OF BREAST: ICD-10-CM

## 2025-04-09 DIAGNOSIS — Z79.811 PREVENTATIVE USE OF AROMATASE INHIBITORS: ICD-10-CM

## 2025-04-09 DIAGNOSIS — K75.0 HEPATIC ABSCESS: ICD-10-CM

## 2025-04-09 DIAGNOSIS — M81.8 IDIOPATHIC OSTEOPOROSIS: Primary | ICD-10-CM

## 2025-04-09 DIAGNOSIS — Z17.0 MALIGNANT NEOPLASM OF LEFT BREAST IN FEMALE, ESTROGEN RECEPTOR POSITIVE, UNSPECIFIED SITE OF BREAST: ICD-10-CM

## 2025-04-09 LAB
ALBUMIN SERPL-MCNC: 3.9 G/DL (ref 3.5–5.2)
ALP SERPL-CCNC: 443 U/L (ref 35–104)
ALT SERPL-CCNC: 26 U/L (ref 0–32)
ANION GAP SERPL CALCULATED.3IONS-SCNC: 11 MMOL/L (ref 7–16)
AST SERPL-CCNC: 32 U/L (ref 0–31)
BASOPHILS # BLD: 0 K/UL (ref 0–0.2)
BASOPHILS NFR BLD: 0 % (ref 0–2)
BILIRUB SERPL-MCNC: 0.6 MG/DL (ref 0–1.2)
BUN SERPL-MCNC: 20 MG/DL (ref 6–20)
CALCIUM SERPL-MCNC: 9.5 MG/DL (ref 8.6–10.2)
CHLORIDE SERPL-SCNC: 97 MMOL/L (ref 98–107)
CO2 SERPL-SCNC: 25 MMOL/L (ref 22–29)
CREAT SERPL-MCNC: 0.7 MG/DL (ref 0.5–1)
EOSINOPHIL # BLD: 0.05 K/UL (ref 0.05–0.5)
EOSINOPHILS RELATIVE PERCENT: 2 % (ref 0–6)
ERYTHROCYTE [DISTWIDTH] IN BLOOD BY AUTOMATED COUNT: 14 % (ref 11.5–15)
GFR, ESTIMATED: >90 ML/MIN/1.73M2
GLUCOSE SERPL-MCNC: 568 MG/DL (ref 74–99)
HCT VFR BLD AUTO: 35 % (ref 34–48)
HGB BLD-MCNC: 11.2 G/DL (ref 11.5–15.5)
LYMPHOCYTES NFR BLD: 0.39 K/UL (ref 1.5–4)
LYMPHOCYTES RELATIVE PERCENT: 17 % (ref 20–42)
MCH RBC QN AUTO: 29.8 PG (ref 26–35)
MCHC RBC AUTO-ENTMCNC: 32 G/DL (ref 32–34.5)
MCV RBC AUTO: 93.1 FL (ref 80–99.9)
MONOCYTES NFR BLD: 0.14 K/UL (ref 0.1–0.95)
MONOCYTES NFR BLD: 6 % (ref 2–12)
NEUTROPHILS NFR BLD: 75 % (ref 43–80)
NEUTS SEG NFR BLD: 1.73 K/UL (ref 1.8–7.3)
PLATELET # BLD AUTO: 59 K/UL (ref 130–450)
PLATELET CONFIRMATION: NORMAL
PMV BLD AUTO: 10.7 FL (ref 7–12)
POTASSIUM SERPL-SCNC: 5.2 MMOL/L (ref 3.5–5)
PROT SERPL-MCNC: 7.2 G/DL (ref 6.4–8.3)
RBC # BLD AUTO: 3.76 M/UL (ref 3.5–5.5)
SODIUM SERPL-SCNC: 133 MMOL/L (ref 132–146)
WBC OTHER # BLD: 2.3 K/UL (ref 4.5–11.5)

## 2025-04-09 PROCEDURE — 99213 OFFICE O/P EST LOW 20 MIN: CPT

## 2025-04-09 PROCEDURE — 85025 COMPLETE CBC W/AUTO DIFF WBC: CPT

## 2025-04-09 PROCEDURE — 80053 COMPREHEN METABOLIC PANEL: CPT

## 2025-04-09 PROCEDURE — 96372 THER/PROPH/DIAG INJ SC/IM: CPT

## 2025-04-09 PROCEDURE — 6360000002 HC RX W HCPCS: Performed by: STUDENT IN AN ORGANIZED HEALTH CARE EDUCATION/TRAINING PROGRAM

## 2025-04-09 PROCEDURE — 36415 COLL VENOUS BLD VENIPUNCTURE: CPT

## 2025-04-09 RX ORDER — FAMOTIDINE 10 MG/ML
20 INJECTION, SOLUTION INTRAVENOUS
OUTPATIENT
Start: 2025-10-05

## 2025-04-09 RX ORDER — ACETAMINOPHEN 325 MG/1
650 TABLET ORAL
OUTPATIENT
Start: 2025-10-05

## 2025-04-09 RX ORDER — ALBUTEROL SULFATE 90 UG/1
4 INHALANT RESPIRATORY (INHALATION) PRN
OUTPATIENT
Start: 2025-10-05

## 2025-04-09 RX ORDER — DIPHENHYDRAMINE HYDROCHLORIDE 50 MG/ML
50 INJECTION, SOLUTION INTRAMUSCULAR; INTRAVENOUS
OUTPATIENT
Start: 2025-10-05

## 2025-04-09 RX ORDER — EPINEPHRINE 1 MG/ML
0.3 INJECTION, SOLUTION, CONCENTRATE INTRAVENOUS PRN
OUTPATIENT
Start: 2025-10-05

## 2025-04-09 RX ORDER — HYDROCORTISONE SODIUM SUCCINATE 100 MG/2ML
100 INJECTION INTRAMUSCULAR; INTRAVENOUS
OUTPATIENT
Start: 2025-10-05

## 2025-04-09 RX ORDER — ONDANSETRON 2 MG/ML
8 INJECTION INTRAMUSCULAR; INTRAVENOUS
OUTPATIENT
Start: 2025-10-05

## 2025-04-09 RX ORDER — SODIUM CHLORIDE 9 MG/ML
INJECTION, SOLUTION INTRAVENOUS CONTINUOUS
OUTPATIENT
Start: 2025-10-05

## 2025-04-09 RX ADMIN — DENOSUMAB 60 MG: 60 INJECTION SUBCUTANEOUS at 12:32

## 2025-04-09 NOTE — TELEPHONE ENCOUNTER
Patient's blood sugars extremely high on recent lab work.  She has no upcoming appointments with us and has not been seen since June 2024.    If she is no longer following with us, please forward to her PCP    If she wants to continue following with us, please clarify her regimen and let me know so that we can make recommendations.    She will also need an appointment if she wants to continue having us manage her diabetes.

## 2025-04-09 NOTE — PROGRESS NOTES
Patient presents to clinic today for Prolia injection.  Patient's labs monitored, specifically, Calcium level, to ensure no increased risk of hypocalcemia with administration of the medication. Patient's calcium level is 9.5 mg/dL.  Patient received therapy and will continue to be monitored prior to each dose.    Lisa Chandler RPh 4/9/2025 12:35 PM

## 2025-04-09 NOTE — PROGRESS NOTES
Pt here today to receive prolia injection. Pt labs reviewed. It is noted that her potassium is 5.2 and her glucose is 568. Dr. Rojas made aware of this finding. Calcium is 9.5. Dr. Rojas ok with pt getting prolia today. Dr. Rojas would like for pt to go to the ER. Pt updated of this. Pt states \"this always happens and I'm not going to the ER.\" Pt instructed on the importance of maintaining adequate glucose levels. Pt instructed to follow up with her PCP if she refuses to go to the ER.

## 2025-04-09 NOTE — TELEPHONE ENCOUNTER
Spoke to patient and yes she wants to continue to come to office for her diabetes. She is not on pump any longer kept breaking so wnt back to [facility]I. She is taking 15 units of humalog three times daily and lantus 38 units in morning. She was at cancer doctor when I talked to her.  Will try to get her an appt to see you.

## 2025-04-09 NOTE — TELEPHONE ENCOUNTER
Have her start adding moderate dose sliding scale to her Humalog and take a dose now based off sliding scale (this would be 12 units)    Please ask her to check her blood sugars before meals and at bedtime and send us a blood sugar log in about 3 to 4 days    If blood sugar remains >500 she will need to use the sliding scale every 3-4 hours until down.  If she is unable to get it down or if she develops nausea and vomiting, go to the emergency room

## 2025-04-09 NOTE — PROGRESS NOTES
[facility] MEDICAL ONCOLOGY  [address] [address]  Dept: [phone]  Loc: [phone]    Gina Freitas MD   ·   MD Bella Betancourt APRN  ·  [facility]        Hematology/Oncology   Clinic Progress Note              Patient: Ellie Santacruz,  54 y.o. female    Date of Encounter: 2025         Reason for Visit:   History of left breast cancer, early stage, ER/WV positive, HER2 negative        PCP:  Liza Oh DO      Chief Complaint   Patient presents with    Breast Cancer    Follow-up         Subjective:  No major events.  Patient is doing overall well.  Coccygeal pain has improved.  The patient reports of some left-sided chest wall discomfort that started about 2 weeks ago.    HPI from Initial Outpatient Consultation (2024):  The patient is a 54 y.o. female who returns to clinic, after over a year since last .  She was previously established with Dr. Lui, who has now moved on from our practice.  No major events of late.  She is accompanied by an acquaintance.  She currently lives in a group home.  She denies of new breast masses.  But states that she has been having bony pain last several months.  She remains to be on anastrozole, tolerating fairly well.        ONCOLOGIC HISTORY:   The left breast mass was located at the 11 o'clock position.  Age of menarche 12;  3 Para 3; LMP early 30's; FHX Mother with Breast Cancer at Age 60     Bilateral Diagnostic Mammogram on 2017:  Circumscribed solid nodule without increased vascularity at the 11 o'clock position corresponding to the patient's palpable lesion.   6 mm smooth bordered nodule within the left upper outer quadrant which is not identifiable on the lateral view (6 month f/u left breast mammogram recommended).  Unremarkable right-sided mammogram demonstrating no evidence of malignancy. Annual right sided mammogram recommended.     Left

## 2025-05-03 ENCOUNTER — APPOINTMENT (OUTPATIENT)
Dept: CT IMAGING | Age: 55
DRG: 280 | End: 2025-05-03
Payer: COMMERCIAL

## 2025-05-03 ENCOUNTER — HOSPITAL ENCOUNTER (EMERGENCY)
Age: 55
Discharge: HOME | DRG: 280 | End: 2025-05-03
Attending: EMERGENCY MEDICINE
Payer: COMMERCIAL

## 2025-05-03 VITALS
WEIGHT: 140 LBS | RESPIRATION RATE: 18 BRPM | HEART RATE: 81 BPM | TEMPERATURE: 98.7 F | BODY MASS INDEX: 27.48 KG/M2 | SYSTOLIC BLOOD PRESSURE: 152 MMHG | DIASTOLIC BLOOD PRESSURE: 126 MMHG | HEIGHT: 60 IN | OXYGEN SATURATION: 100 %

## 2025-05-03 DIAGNOSIS — R10.9 CHRONIC ABDOMINAL PAIN: Primary | ICD-10-CM

## 2025-05-03 DIAGNOSIS — G89.29 CHRONIC ABDOMINAL PAIN: Primary | ICD-10-CM

## 2025-05-03 DIAGNOSIS — R93.5 ABNORMAL CT OF THE ABDOMEN: ICD-10-CM

## 2025-05-03 LAB
ALBUMIN SERPL-MCNC: 4 G/DL (ref 3.5–5.2)
ALP SERPL-CCNC: 498 U/L (ref 35–104)
ALT SERPL-CCNC: 26 U/L (ref 0–32)
AMMONIA PLAS-SCNC: 46 UMOL/L (ref 11–51)
ANION GAP SERPL CALCULATED.3IONS-SCNC: 16 MMOL/L (ref 7–16)
ANION GAP SERPL CALCULATED.3IONS-SCNC: 20 MMOL/L (ref 7–16)
APAP SERPL-MCNC: <5 UG/ML (ref 10–30)
AST SERPL-CCNC: 40 U/L (ref 0–31)
BASOPHILS # BLD: 0.01 K/UL (ref 0–0.2)
BASOPHILS NFR BLD: 0 % (ref 0–2)
BILIRUB DIRECT SERPL-MCNC: 0.2 MG/DL (ref 0–0.3)
BILIRUB INDIRECT SERPL-MCNC: 0.8 MG/DL (ref 0–1)
BILIRUB SERPL-MCNC: 1 MG/DL (ref 0–1.2)
BUN SERPL-MCNC: 20 MG/DL (ref 6–20)
BUN SERPL-MCNC: 24 MG/DL (ref 6–20)
CALCIUM SERPL-MCNC: 8 MG/DL (ref 8.6–10.2)
CALCIUM SERPL-MCNC: 9.3 MG/DL (ref 8.6–10.2)
CHLORIDE SERPL-SCNC: 106 MMOL/L (ref 98–107)
CHLORIDE SERPL-SCNC: 98 MMOL/L (ref 98–107)
CO2 SERPL-SCNC: 17 MMOL/L (ref 22–29)
CO2 SERPL-SCNC: 19 MMOL/L (ref 22–29)
CREAT SERPL-MCNC: 0.6 MG/DL (ref 0.5–1)
CREAT SERPL-MCNC: 0.8 MG/DL (ref 0.5–1)
EOSINOPHIL # BLD: 0.04 K/UL (ref 0.05–0.5)
EOSINOPHILS RELATIVE PERCENT: 1 % (ref 0–6)
ERYTHROCYTE [DISTWIDTH] IN BLOOD BY AUTOMATED COUNT: 14.1 % (ref 11.5–15)
ETHANOLAMINE SERPL-MCNC: <10 MG/DL (ref 0–0.08)
GFR, ESTIMATED: >90 ML/MIN/1.73M2
GFR, ESTIMATED: >90 ML/MIN/1.73M2
GLUCOSE SERPL-MCNC: 339 MG/DL (ref 74–99)
GLUCOSE SERPL-MCNC: 465 MG/DL (ref 74–99)
HCT VFR BLD AUTO: 35.8 % (ref 34–48)
HGB BLD-MCNC: 12.2 G/DL (ref 11.5–15.5)
IMM GRANULOCYTES # BLD AUTO: 0.03 K/UL (ref 0–0.58)
IMM GRANULOCYTES NFR BLD: 1 % (ref 0–5)
INR PPP: 1.3
LACTATE BLDV-SCNC: 1 MMOL/L (ref 0.5–1.9)
LIPASE SERPL-CCNC: 15 U/L (ref 13–60)
LYMPHOCYTES NFR BLD: 0.69 K/UL (ref 1.5–4)
LYMPHOCYTES RELATIVE PERCENT: 11 % (ref 20–42)
MCH RBC QN AUTO: 29.6 PG (ref 26–35)
MCHC RBC AUTO-ENTMCNC: 34.1 G/DL (ref 32–34.5)
MCV RBC AUTO: 86.9 FL (ref 80–99.9)
MONOCYTES NFR BLD: 0.29 K/UL (ref 0.1–0.95)
MONOCYTES NFR BLD: 5 % (ref 2–12)
NEUTROPHILS NFR BLD: 83 % (ref 43–80)
NEUTS SEG NFR BLD: 5.35 K/UL (ref 1.8–7.3)
PLATELET CONFIRMATION: NORMAL
PLATELET, FLUORESCENCE: 54 K/UL (ref 130–450)
PMV BLD AUTO: 11.4 FL (ref 7–12)
POTASSIUM SERPL-SCNC: 4 MMOL/L (ref 3.5–5)
POTASSIUM SERPL-SCNC: 4.6 MMOL/L (ref 3.5–5)
PROT SERPL-MCNC: 7.6 G/DL (ref 6.4–8.3)
PROTHROMBIN TIME: 13.9 SEC (ref 9.3–12.4)
RBC # BLD AUTO: 4.12 M/UL (ref 3.5–5.5)
SALICYLATES SERPL-MCNC: <0.3 MG/DL (ref 0–30)
SODIUM SERPL-SCNC: 135 MMOL/L (ref 132–146)
SODIUM SERPL-SCNC: 141 MMOL/L (ref 132–146)
TOXIC TRICYCLIC SC,BLOOD: NEGATIVE
WBC OTHER # BLD: 6.4 K/UL (ref 4.5–11.5)

## 2025-05-03 PROCEDURE — 80048 BASIC METABOLIC PNL TOTAL CA: CPT

## 2025-05-03 PROCEDURE — 82140 ASSAY OF AMMONIA: CPT

## 2025-05-03 PROCEDURE — 80143 DRUG ASSAY ACETAMINOPHEN: CPT

## 2025-05-03 PROCEDURE — G0480 DRUG TEST DEF 1-7 CLASSES: HCPCS

## 2025-05-03 PROCEDURE — 83690 ASSAY OF LIPASE: CPT

## 2025-05-03 PROCEDURE — 85025 COMPLETE CBC W/AUTO DIFF WBC: CPT

## 2025-05-03 PROCEDURE — 6360000002 HC RX W HCPCS: Performed by: EMERGENCY MEDICINE

## 2025-05-03 PROCEDURE — 6370000000 HC RX 637 (ALT 250 FOR IP): Performed by: EMERGENCY MEDICINE

## 2025-05-03 PROCEDURE — 2580000003 HC RX 258: Performed by: EMERGENCY MEDICINE

## 2025-05-03 PROCEDURE — 76937 US GUIDE VASCULAR ACCESS: CPT

## 2025-05-03 PROCEDURE — 82248 BILIRUBIN DIRECT: CPT

## 2025-05-03 PROCEDURE — 80307 DRUG TEST PRSMV CHEM ANLYZR: CPT

## 2025-05-03 PROCEDURE — 80179 DRUG ASSAY SALICYLATE: CPT

## 2025-05-03 PROCEDURE — 83605 ASSAY OF LACTIC ACID: CPT

## 2025-05-03 PROCEDURE — 80053 COMPREHEN METABOLIC PANEL: CPT

## 2025-05-03 PROCEDURE — 74177 CT ABD & PELVIS W/CONTRAST: CPT

## 2025-05-03 PROCEDURE — 85610 PROTHROMBIN TIME: CPT

## 2025-05-03 PROCEDURE — 6360000004 HC RX CONTRAST MEDICATION: Performed by: RADIOLOGY

## 2025-05-03 RX ORDER — 0.9 % SODIUM CHLORIDE 0.9 %
1000 INTRAVENOUS SOLUTION INTRAVENOUS ONCE
Status: COMPLETED | OUTPATIENT
Start: 2025-05-03 | End: 2025-05-03

## 2025-05-03 RX ORDER — IOPAMIDOL 755 MG/ML
75 INJECTION, SOLUTION INTRAVASCULAR
Status: COMPLETED | OUTPATIENT
Start: 2025-05-03 | End: 2025-05-03

## 2025-05-03 RX ORDER — ONDANSETRON 2 MG/ML
4 INJECTION INTRAMUSCULAR; INTRAVENOUS ONCE
Status: DISCONTINUED | OUTPATIENT
Start: 2025-05-03 | End: 2025-05-03 | Stop reason: HOSPADM

## 2025-05-03 RX ORDER — DIPHENHYDRAMINE HYDROCHLORIDE 50 MG/ML
50 INJECTION, SOLUTION INTRAMUSCULAR; INTRAVENOUS ONCE
Status: COMPLETED | OUTPATIENT
Start: 2025-05-03 | End: 2025-05-03

## 2025-05-03 RX ORDER — PROMETHAZINE HYDROCHLORIDE 25 MG/ML
25 INJECTION, SOLUTION INTRAMUSCULAR; INTRAVENOUS ONCE
Status: DISCONTINUED | OUTPATIENT
Start: 2025-05-03 | End: 2025-05-03 | Stop reason: HOSPADM

## 2025-05-03 RX ORDER — METOCLOPRAMIDE HYDROCHLORIDE 5 MG/ML
10 INJECTION INTRAMUSCULAR; INTRAVENOUS ONCE
Status: COMPLETED | OUTPATIENT
Start: 2025-05-03 | End: 2025-05-03

## 2025-05-03 RX ORDER — MORPHINE SULFATE 4 MG/ML
4 INJECTION, SOLUTION INTRAMUSCULAR; INTRAVENOUS ONCE
Status: COMPLETED | OUTPATIENT
Start: 2025-05-03 | End: 2025-05-03

## 2025-05-03 RX ORDER — MORPHINE SULFATE 4 MG/ML
4 INJECTION, SOLUTION INTRAMUSCULAR; INTRAVENOUS ONCE
Refills: 0 | Status: COMPLETED | OUTPATIENT
Start: 2025-05-03 | End: 2025-05-03

## 2025-05-03 RX ORDER — ONDANSETRON 4 MG/1
4 TABLET, ORALLY DISINTEGRATING ORAL ONCE
Status: COMPLETED | OUTPATIENT
Start: 2025-05-03 | End: 2025-05-03

## 2025-05-03 RX ADMIN — MORPHINE SULFATE 4 MG: 4 INJECTION INTRAVENOUS at 09:47

## 2025-05-03 RX ADMIN — ONDANSETRON 4 MG: 4 TABLET, ORALLY DISINTEGRATING ORAL at 09:58

## 2025-05-03 RX ADMIN — SODIUM CHLORIDE 1000 ML: 9 INJECTION, SOLUTION INTRAVENOUS at 17:08

## 2025-05-03 RX ADMIN — DIPHENHYDRAMINE HYDROCHLORIDE 50 MG: 50 INJECTION INTRAMUSCULAR; INTRAVENOUS at 11:17

## 2025-05-03 RX ADMIN — SODIUM CHLORIDE 1000 ML: 9 INJECTION, SOLUTION INTRAVENOUS at 14:07

## 2025-05-03 RX ADMIN — SODIUM CHLORIDE 1000 ML: 9 INJECTION, SOLUTION INTRAVENOUS at 11:13

## 2025-05-03 RX ADMIN — METOCLOPRAMIDE HYDROCHLORIDE 10 MG: 5 INJECTION INTRAMUSCULAR; INTRAVENOUS at 12:42

## 2025-05-03 RX ADMIN — IOPAMIDOL 75 ML: 755 INJECTION, SOLUTION INTRAVENOUS at 11:46

## 2025-05-03 RX ADMIN — MORPHINE SULFATE 4 MG: 4 INJECTION INTRAVENOUS at 14:06

## 2025-05-03 ASSESSMENT — ENCOUNTER SYMPTOMS
NAUSEA: 1
VOMITING: 1
SHORTNESS OF BREATH: 0
ABDOMINAL PAIN: 1
CHEST TIGHTNESS: 0

## 2025-05-03 NOTE — ED PROVIDER NOTES
55-year-old female presenting with abdominal pain for about 3 to 4 days.  EMS was called because of the abdominal pain.  She arrives to the ED with nausea and vomiting as well.  History of alcohol abuse, pancreatitis, cirrhosis of the liver.  Is actively having emesis upon arrival, appears uncomfortable but is awake and alert and oriented x 4         Family History   Problem Relation Age of Onset    Diabetes Mother     Cancer Mother         Breast    No Known Problems Father     Colon Cancer Maternal Grandfather      Past Surgical History:   Procedure Laterality Date    ABDOMEN SURGERY      gallbladder removal    BREAST BIOPSY Left     CHOLECYSTECTOMY  2010    COLONOSCOPY      COLONOSCOPY N/A 10/2/2018    COLONOSCOPY WITH BIOPSY performed by Simón Esquivel MD at [facility] ENDOSCOPY    CT NEEDLE BIOPSY LIVER PERCUTANEOUS  3/19/2024    CT NEEDLE BIOPSY LIVER PERCUTANEOUS 3/19/2024 [facility] CT    ENDOSCOPY, COLON, DIAGNOSTIC      ERCP      april 2016 at [facility]    HYSTERECTOMY (CERVIX STATUS UNKNOWN)  2004    IR TUNNELED CVC PLACE WO SQ PORT/PUMP > 5 YEARS  3/27/2024    FL TUNNELED CVC PLACE WO SQ PORT/PUMP > 5 YEARS 3/27/2024 [facility] RADIOLOGY    MASTECTOMY Left     Left breast mastectomy per patient    OTHER SURGICAL HISTORY Left 04/25/2017     Left breast blue dye injection, Sential Node Lymph Biopsy with left breast lumpectomy    OTHER SURGICAL HISTORY  10/05/2017    simple left mastectomy    OVARY REMOVAL Bilateral 2007    PANCREAS BIOPSY      with stent    PERINEAL SURGERY N/A 9/18/2023    PERINEAL INCISION AND DRAINAGE ABSCESS performed by Karl Santos MD at [facility] OR    RECTAL SURGERY N/A 8/28/2023    PERINEAL ABSCESS INCISION AND DRAINAGE performed by Sebastian Davis MD at [facility] OR    SHOULDER SURGERY Left 2003    UPPER GASTROINTESTINAL ENDOSCOPY         Review of Systems   Constitutional:  Negative for chills and fever.   Respiratory:  Negative for chest tightness and shortness of breath.    Gastrointestinal:  Positive for  elevated, after fluids patient had improvement in her gap.  She was able to tolerate oral intake.  No distress.  No evidence of acute abnormalities.  There are multiple chronic issues on her CAT scan that were visualized.  All of this information provided to the patient.  No evidence of acute intra-abdominal abnormalities that require immediate intervention or hospitalization at this time.    Social Determinants affecting Dx or Tx: Stress, alcohol abuse, hepatitis, liver disease    Chronic Conditions: Alcoholism, thrombocytopenia history, alcoholic cirrhosis, history of pancreatitis    Records Reviewed: Office visit with Dr. Rojas on 4/9/2025 for history of breast cancer.    Inpatient on 12/29/2024 by Dr. Oh for cirrhosis liver with ascites.    ED Course as of 05/04/25 0648   Sat May 03, 2025   0935 Venous Access Procedure Note  Indication: Need blood for laboratory evaluation, nursing staff unable to obtain access, and MD skill needed    Procedure: The patient was placed in the appropriate position and the skin over the puncture site was prepped with Chloraprep. Intravenous access was attempted in left and right external jugular but was unsuccessful.  Procedure was preformed under live ultrasound for guidance.    The patient tolerated the procedure well.    Complications: None      [SO]   1511 Patient feeling better after the medication.  CAT scan shows significant number of chronic findings but no evidence of acute abnormalities.  She showing signs of dehydration with a low CO2 and elevated anion gap.  Glucose is also elevated.  Fortunately, lactic and lipase ammonia were all nonconcerning findings at this point. [SO]   1753 Rpt BMP shows improvement of metabolic acidosis, normal anion gap. BGL downtrending.  On my evaluation patient is resting comfortably in bed in no acute distress.  I updated her about her findings.  She we will keep an eye on her blood glucose and continue to use her insulin.  She will

## 2025-05-05 ENCOUNTER — ANESTHESIA EVENT (OUTPATIENT)
Dept: ENDOSCOPY | Age: 55
End: 2025-05-05
Payer: COMMERCIAL

## 2025-05-05 ENCOUNTER — HOSPITAL ENCOUNTER (INPATIENT)
Age: 55
LOS: 4 days | Discharge: HOME | DRG: 280 | End: 2025-05-09
Attending: EMERGENCY MEDICINE | Admitting: FAMILY MEDICINE
Payer: COMMERCIAL

## 2025-05-05 ENCOUNTER — ANESTHESIA (OUTPATIENT)
Dept: ENDOSCOPY | Age: 55
End: 2025-05-05
Payer: COMMERCIAL

## 2025-05-05 DIAGNOSIS — K92.0 COFFEE GROUND EMESIS: Primary | ICD-10-CM

## 2025-05-05 DIAGNOSIS — D64.9 ACUTE ANEMIA: ICD-10-CM

## 2025-05-05 LAB
AFP SERPL-MCNC: <1.9 UG/L
ALBUMIN SERPL-MCNC: 4.4 G/DL (ref 3.5–5.2)
ALP SERPL-CCNC: 689 U/L (ref 35–104)
ALT SERPL-CCNC: 32 U/L (ref 0–32)
AMMONIA PLAS-SCNC: 41 UMOL/L (ref 11–51)
ANION GAP SERPL CALCULATED.3IONS-SCNC: 21 MMOL/L (ref 7–16)
ANION GAP SERPL CALCULATED.3IONS-SCNC: 22 MMOL/L (ref 7–16)
AST SERPL-CCNC: 48 U/L (ref 0–31)
B-OH-BUTYR SERPL-MCNC: 7.62 MMOL/L (ref 0.02–0.27)
BASOPHILS # BLD: 0.05 K/UL (ref 0–0.2)
BASOPHILS NFR BLD: 1 % (ref 0–2)
BILIRUB SERPL-MCNC: 2 MG/DL (ref 0–1.2)
BUN SERPL-MCNC: 16 MG/DL (ref 6–20)
BUN SERPL-MCNC: 19 MG/DL (ref 6–20)
CALCIUM SERPL-MCNC: 8.3 MG/DL (ref 8.6–10.2)
CALCIUM SERPL-MCNC: 9.3 MG/DL (ref 8.6–10.2)
CHLORIDE SERPL-SCNC: 100 MMOL/L (ref 98–107)
CHLORIDE SERPL-SCNC: 106 MMOL/L (ref 98–107)
CO2 SERPL-SCNC: 16 MMOL/L (ref 22–29)
CO2 SERPL-SCNC: 18 MMOL/L (ref 22–29)
CREAT SERPL-MCNC: 0.7 MG/DL (ref 0.5–1)
CREAT SERPL-MCNC: 0.7 MG/DL (ref 0.5–1)
EOSINOPHIL # BLD: 0.05 K/UL (ref 0.05–0.5)
EOSINOPHILS RELATIVE PERCENT: 1 % (ref 0–6)
ERYTHROCYTE [DISTWIDTH] IN BLOOD BY AUTOMATED COUNT: 14.4 % (ref 11.5–15)
GFR, ESTIMATED: >90 ML/MIN/1.73M2
GFR, ESTIMATED: >90 ML/MIN/1.73M2
GLUCOSE BLD-MCNC: 361 MG/DL (ref 74–99)
GLUCOSE BLD-MCNC: 394 MG/DL (ref 74–99)
GLUCOSE SERPL-MCNC: 269 MG/DL (ref 74–99)
GLUCOSE SERPL-MCNC: 311 MG/DL (ref 74–99)
HCT VFR BLD AUTO: 31.8 % (ref 34–48)
HCT VFR BLD AUTO: 32.5 % (ref 34–48)
HCT VFR BLD AUTO: 35.8 % (ref 34–48)
HGB BLD-MCNC: 10.7 G/DL (ref 11.5–15.5)
HGB BLD-MCNC: 11.3 G/DL (ref 11.5–15.5)
HGB BLD-MCNC: 12.5 G/DL (ref 11.5–15.5)
INR PPP: 1.3
LACTATE BLDV-SCNC: 1.5 MMOL/L (ref 0.5–2.2)
LIPASE SERPL-CCNC: 9 U/L (ref 13–60)
LYMPHOCYTES NFR BLD: 0.46 K/UL (ref 1.5–4)
LYMPHOCYTES RELATIVE PERCENT: 9 % (ref 20–42)
MCH RBC QN AUTO: 30 PG (ref 26–35)
MCHC RBC AUTO-ENTMCNC: 34.9 G/DL (ref 32–34.5)
MCV RBC AUTO: 85.9 FL (ref 80–99.9)
MONOCYTES NFR BLD: 0.05 K/UL (ref 0.1–0.95)
MONOCYTES NFR BLD: 1 % (ref 2–12)
NEUTROPHILS NFR BLD: 89 % (ref 43–80)
NEUTS SEG NFR BLD: 4.7 K/UL (ref 1.8–7.3)
PARTIAL THROMBOPLASTIN TIME: 24.3 SEC (ref 24.5–35.1)
PH VENOUS: 7.35 (ref 7.35–7.45)
PLATELET CONFIRMATION: NORMAL
PLATELET, FLUORESCENCE: 53 K/UL (ref 130–450)
PMV BLD AUTO: 11.7 FL (ref 7–12)
POTASSIUM SERPL-SCNC: 3.5 MMOL/L (ref 3.5–5)
POTASSIUM SERPL-SCNC: 3.8 MMOL/L (ref 3.5–5)
PROT SERPL-MCNC: 8 G/DL (ref 6.4–8.3)
PROTHROMBIN TIME: 14.3 SEC (ref 9.3–12.4)
RBC # BLD AUTO: 4.17 M/UL (ref 3.5–5.5)
RBC # BLD: ABNORMAL 10*6/UL
SODIUM SERPL-SCNC: 140 MMOL/L (ref 132–146)
SODIUM SERPL-SCNC: 143 MMOL/L (ref 132–146)
TROPONIN I SERPL HS-MCNC: 11 NG/L (ref 0–14)
TROPONIN I SERPL HS-MCNC: 12 NG/L (ref 0–14)
WBC OTHER # BLD: 5.3 K/UL (ref 4.5–11.5)

## 2025-05-05 PROCEDURE — 2580000003 HC RX 258: Performed by: HOSPITALIST

## 2025-05-05 PROCEDURE — 82962 GLUCOSE BLOOD TEST: CPT

## 2025-05-05 PROCEDURE — 82010 KETONE BODYS QUAN: CPT

## 2025-05-05 PROCEDURE — 96366 THER/PROPH/DIAG IV INF ADDON: CPT

## 2025-05-05 PROCEDURE — 93005 ELECTROCARDIOGRAM TRACING: CPT | Performed by: EMERGENCY MEDICINE

## 2025-05-05 PROCEDURE — 83605 ASSAY OF LACTIC ACID: CPT

## 2025-05-05 PROCEDURE — 85610 PROTHROMBIN TIME: CPT

## 2025-05-05 PROCEDURE — 6360000002 HC RX W HCPCS: Performed by: HOSPITALIST

## 2025-05-05 PROCEDURE — 2709999900 HC NON-CHARGEABLE SUPPLY: Performed by: INTERNAL MEDICINE

## 2025-05-05 PROCEDURE — 82105 ALPHA-FETOPROTEIN SERUM: CPT

## 2025-05-05 PROCEDURE — 85730 THROMBOPLASTIN TIME PARTIAL: CPT

## 2025-05-05 PROCEDURE — 82800 BLOOD PH: CPT

## 2025-05-05 PROCEDURE — 80074 ACUTE HEPATITIS PANEL: CPT

## 2025-05-05 PROCEDURE — 85018 HEMOGLOBIN: CPT

## 2025-05-05 PROCEDURE — 96365 THER/PROPH/DIAG IV INF INIT: CPT

## 2025-05-05 PROCEDURE — 2060000000 HC ICU INTERMEDIATE R&B

## 2025-05-05 PROCEDURE — 86923 COMPATIBILITY TEST ELECTRIC: CPT

## 2025-05-05 PROCEDURE — 85014 HEMATOCRIT: CPT

## 2025-05-05 PROCEDURE — 6360000002 HC RX W HCPCS: Performed by: EMERGENCY MEDICINE

## 2025-05-05 PROCEDURE — 82140 ASSAY OF AMMONIA: CPT

## 2025-05-05 PROCEDURE — 85025 COMPLETE CBC W/AUTO DIFF WBC: CPT

## 2025-05-05 PROCEDURE — 2580000003 HC RX 258: Performed by: EMERGENCY MEDICINE

## 2025-05-05 PROCEDURE — 2500000003 HC RX 250 WO HCPCS: Performed by: HOSPITALIST

## 2025-05-05 PROCEDURE — 96375 TX/PRO/DX INJ NEW DRUG ADDON: CPT

## 2025-05-05 PROCEDURE — 86850 RBC ANTIBODY SCREEN: CPT

## 2025-05-05 PROCEDURE — 6370000000 HC RX 637 (ALT 250 FOR IP): Performed by: INTERNAL MEDICINE

## 2025-05-05 PROCEDURE — 80048 BASIC METABOLIC PNL TOTAL CA: CPT

## 2025-05-05 PROCEDURE — 86900 BLOOD TYPING SEROLOGIC ABO: CPT

## 2025-05-05 PROCEDURE — 99285 EMERGENCY DEPT VISIT HI MDM: CPT

## 2025-05-05 PROCEDURE — 2580000003 HC RX 258: Performed by: NURSE ANESTHETIST, CERTIFIED REGISTERED

## 2025-05-05 PROCEDURE — 84484 ASSAY OF TROPONIN QUANT: CPT

## 2025-05-05 PROCEDURE — 6370000000 HC RX 637 (ALT 250 FOR IP): Performed by: FAMILY MEDICINE

## 2025-05-05 PROCEDURE — 83690 ASSAY OF LIPASE: CPT

## 2025-05-05 PROCEDURE — 80053 COMPREHEN METABOLIC PANEL: CPT

## 2025-05-05 PROCEDURE — 86901 BLOOD TYPING SEROLOGIC RH(D): CPT

## 2025-05-05 RX ORDER — SENNA AND DOCUSATE SODIUM 50; 8.6 MG/1; MG/1
2 TABLET, FILM COATED ORAL 2 TIMES DAILY PRN
Status: DISCONTINUED | OUTPATIENT
Start: 2025-05-05 | End: 2025-05-09 | Stop reason: HOSPADM

## 2025-05-05 RX ORDER — MECOBALAMIN 5000 MCG
10 TABLET,DISINTEGRATING ORAL NIGHTLY
Status: DISCONTINUED | OUTPATIENT
Start: 2025-05-05 | End: 2025-05-09 | Stop reason: HOSPADM

## 2025-05-05 RX ORDER — SODIUM CHLORIDE 9 MG/ML
INJECTION, SOLUTION INTRAVENOUS PRN
Status: DISCONTINUED | OUTPATIENT
Start: 2025-05-05 | End: 2025-05-09 | Stop reason: HOSPADM

## 2025-05-05 RX ORDER — ANASTROZOLE 1 MG/1
1 TABLET ORAL EVERY MORNING
Status: DISCONTINUED | OUTPATIENT
Start: 2025-05-06 | End: 2025-05-09 | Stop reason: HOSPADM

## 2025-05-05 RX ORDER — PROCHLORPERAZINE EDISYLATE 5 MG/ML
10 INJECTION INTRAMUSCULAR; INTRAVENOUS EVERY 6 HOURS PRN
Status: DISCONTINUED | OUTPATIENT
Start: 2025-05-05 | End: 2025-05-09 | Stop reason: HOSPADM

## 2025-05-05 RX ORDER — ONDANSETRON 2 MG/ML
4 INJECTION INTRAMUSCULAR; INTRAVENOUS ONCE
Status: COMPLETED | OUTPATIENT
Start: 2025-05-05 | End: 2025-05-05

## 2025-05-05 RX ORDER — INSULIN LISPRO 100 [IU]/ML
0-8 INJECTION, SOLUTION INTRAVENOUS; SUBCUTANEOUS
Status: DISCONTINUED | OUTPATIENT
Start: 2025-05-05 | End: 2025-05-05

## 2025-05-05 RX ORDER — INSULIN LISPRO 100 [IU]/ML
0-8 INJECTION, SOLUTION INTRAVENOUS; SUBCUTANEOUS
Status: DISCONTINUED | OUTPATIENT
Start: 2025-05-06 | End: 2025-05-09 | Stop reason: HOSPADM

## 2025-05-05 RX ORDER — FUROSEMIDE 40 MG/1
40 TABLET ORAL DAILY PRN
Status: DISCONTINUED | OUTPATIENT
Start: 2025-05-05 | End: 2025-05-09 | Stop reason: HOSPADM

## 2025-05-05 RX ORDER — HYDROXYZINE PAMOATE 25 MG/1
25 CAPSULE ORAL 4 TIMES DAILY PRN
Status: DISCONTINUED | OUTPATIENT
Start: 2025-05-05 | End: 2025-05-09 | Stop reason: HOSPADM

## 2025-05-05 RX ORDER — M-VIT,TX,IRON,MINS/CALC/FOLIC 27MG-0.4MG
1 TABLET ORAL EVERY MORNING
Status: DISCONTINUED | OUTPATIENT
Start: 2025-05-06 | End: 2025-05-09 | Stop reason: HOSPADM

## 2025-05-05 RX ORDER — HYDROMORPHONE HYDROCHLORIDE 1 MG/ML
1 INJECTION, SOLUTION INTRAMUSCULAR; INTRAVENOUS; SUBCUTANEOUS EVERY 4 HOURS PRN
Status: DISCONTINUED | OUTPATIENT
Start: 2025-05-05 | End: 2025-05-07

## 2025-05-05 RX ORDER — ONDANSETRON 4 MG/1
4 TABLET, ORALLY DISINTEGRATING ORAL 3 TIMES DAILY PRN
Status: DISCONTINUED | OUTPATIENT
Start: 2025-05-05 | End: 2025-05-09 | Stop reason: HOSPADM

## 2025-05-05 RX ORDER — GAUZE BANDAGE 2" X 2"
100 BANDAGE TOPICAL DAILY
Status: DISCONTINUED | OUTPATIENT
Start: 2025-05-05 | End: 2025-05-09 | Stop reason: HOSPADM

## 2025-05-05 RX ORDER — CALCIUM CARBONATE 500(1250)
500 TABLET ORAL 2 TIMES DAILY
Status: DISCONTINUED | OUTPATIENT
Start: 2025-05-05 | End: 2025-05-09 | Stop reason: HOSPADM

## 2025-05-05 RX ORDER — 0.9 % SODIUM CHLORIDE 0.9 %
1000 INTRAVENOUS SOLUTION INTRAVENOUS ONCE
Status: COMPLETED | OUTPATIENT
Start: 2025-05-05 | End: 2025-05-05

## 2025-05-05 RX ORDER — LACTULOSE 10 G/15ML
20 SOLUTION ORAL 3 TIMES DAILY
Status: DISCONTINUED | OUTPATIENT
Start: 2025-05-05 | End: 2025-05-09 | Stop reason: HOSPADM

## 2025-05-05 RX ORDER — PROMETHAZINE HYDROCHLORIDE 25 MG/ML
25 INJECTION, SOLUTION INTRAMUSCULAR; INTRAVENOUS ONCE
Status: COMPLETED | OUTPATIENT
Start: 2025-05-05 | End: 2025-05-05

## 2025-05-05 RX ORDER — DEXTROSE MONOHYDRATE 100 MG/ML
INJECTION, SOLUTION INTRAVENOUS CONTINUOUS PRN
Status: DISCONTINUED | OUTPATIENT
Start: 2025-05-05 | End: 2025-05-09 | Stop reason: HOSPADM

## 2025-05-05 RX ORDER — INSULIN LISPRO 100 [IU]/ML
0-6 INJECTION, SOLUTION INTRAVENOUS; SUBCUTANEOUS NIGHTLY
Status: DISCONTINUED | OUTPATIENT
Start: 2025-05-05 | End: 2025-05-05

## 2025-05-05 RX ORDER — ALBUTEROL SULFATE 0.83 MG/ML
2.5 SOLUTION RESPIRATORY (INHALATION) EVERY 4 HOURS PRN
Status: DISCONTINUED | OUTPATIENT
Start: 2025-05-05 | End: 2025-05-09 | Stop reason: HOSPADM

## 2025-05-05 RX ORDER — INSULIN LISPRO 100 [IU]/ML
0-4 INJECTION, SOLUTION INTRAVENOUS; SUBCUTANEOUS NIGHTLY
Status: DISCONTINUED | OUTPATIENT
Start: 2025-05-05 | End: 2025-05-09 | Stop reason: HOSPADM

## 2025-05-05 RX ORDER — FENTANYL 25 UG/1
1 PATCH TRANSDERMAL
Status: DISCONTINUED | OUTPATIENT
Start: 2025-05-05 | End: 2025-05-05

## 2025-05-05 RX ORDER — GABAPENTIN 100 MG/1
200 CAPSULE ORAL 3 TIMES DAILY
Status: DISCONTINUED | OUTPATIENT
Start: 2025-05-05 | End: 2025-05-09 | Stop reason: HOSPADM

## 2025-05-05 RX ORDER — PANTOPRAZOLE SODIUM 40 MG/1
40 TABLET, DELAYED RELEASE ORAL
Status: DISCONTINUED | OUTPATIENT
Start: 2025-05-05 | End: 2025-05-06

## 2025-05-05 RX ORDER — ASCORBIC ACID 500 MG
1000 TABLET ORAL EVERY MORNING
Status: DISCONTINUED | OUTPATIENT
Start: 2025-05-06 | End: 2025-05-09 | Stop reason: HOSPADM

## 2025-05-05 RX ORDER — FENTANYL CITRATE 0.05 MG/ML
50 INJECTION, SOLUTION INTRAMUSCULAR; INTRAVENOUS ONCE
Status: COMPLETED | OUTPATIENT
Start: 2025-05-05 | End: 2025-05-05

## 2025-05-05 RX ORDER — OXYCODONE HYDROCHLORIDE 5 MG/1
5 TABLET ORAL EVERY 4 HOURS PRN
Status: DISCONTINUED | OUTPATIENT
Start: 2025-05-05 | End: 2025-05-09 | Stop reason: HOSPADM

## 2025-05-05 RX ORDER — SODIUM CHLORIDE 9 MG/ML
INJECTION, SOLUTION INTRAVENOUS
Status: DISCONTINUED | OUTPATIENT
Start: 2025-05-05 | End: 2025-05-05 | Stop reason: SDUPTHER

## 2025-05-05 RX ORDER — PANTOPRAZOLE SODIUM 40 MG/1
40 TABLET, DELAYED RELEASE ORAL
Status: DISCONTINUED | OUTPATIENT
Start: 2025-05-06 | End: 2025-05-05 | Stop reason: SDUPTHER

## 2025-05-05 RX ORDER — GLUCAGON 1 MG/ML
1 KIT INJECTION PRN
Status: DISCONTINUED | OUTPATIENT
Start: 2025-05-05 | End: 2025-05-09 | Stop reason: HOSPADM

## 2025-05-05 RX ORDER — INSULIN GLARGINE 100 [IU]/ML
38 INJECTION, SOLUTION SUBCUTANEOUS EVERY MORNING
Status: DISCONTINUED | OUTPATIENT
Start: 2025-05-06 | End: 2025-05-09 | Stop reason: HOSPADM

## 2025-05-05 RX ORDER — TRAZODONE HYDROCHLORIDE 50 MG/1
100 TABLET ORAL NIGHTLY PRN
Status: DISCONTINUED | OUTPATIENT
Start: 2025-05-05 | End: 2025-05-09 | Stop reason: HOSPADM

## 2025-05-05 RX ADMIN — OXYCODONE 5 MG: 5 TABLET ORAL at 22:19

## 2025-05-05 RX ADMIN — SODIUM CHLORIDE: 9 INJECTION, SOLUTION INTRAVENOUS at 17:52

## 2025-05-05 RX ADMIN — ONDANSETRON 4 MG: 4 TABLET, ORALLY DISINTEGRATING ORAL at 20:59

## 2025-05-05 RX ADMIN — ONDANSETRON 4 MG: 2 INJECTION, SOLUTION INTRAMUSCULAR; INTRAVENOUS at 02:55

## 2025-05-05 RX ADMIN — PROMETHAZINE HYDROCHLORIDE 25 MG: 25 INJECTION INTRAMUSCULAR; INTRAVENOUS at 07:49

## 2025-05-05 RX ADMIN — PANTOPRAZOLE SODIUM 40 MG: 40 TABLET, DELAYED RELEASE ORAL at 19:27

## 2025-05-05 RX ADMIN — INSULIN LISPRO 4 UNITS: 100 INJECTION, SOLUTION INTRAVENOUS; SUBCUTANEOUS at 21:44

## 2025-05-05 RX ADMIN — HYDROMORPHONE HYDROCHLORIDE 1 MG: 1 INJECTION, SOLUTION INTRAMUSCULAR; INTRAVENOUS; SUBCUTANEOUS at 08:44

## 2025-05-05 RX ADMIN — SODIUM CHLORIDE, PRESERVATIVE FREE 40 MG: 5 INJECTION INTRAVENOUS at 08:53

## 2025-05-05 RX ADMIN — HYDROMORPHONE HYDROCHLORIDE 1 MG: 1 INJECTION, SOLUTION INTRAMUSCULAR; INTRAVENOUS; SUBCUTANEOUS at 13:05

## 2025-05-05 RX ADMIN — SODIUM CHLORIDE 1000 ML: 0.9 INJECTION, SOLUTION INTRAVENOUS at 04:10

## 2025-05-05 RX ADMIN — FENTANYL CITRATE 50 MCG: 50 INJECTION INTRAMUSCULAR; INTRAVENOUS at 04:12

## 2025-05-05 RX ADMIN — PANTOPRAZOLE SODIUM 80 MG: 40 INJECTION, POWDER, FOR SOLUTION INTRAVENOUS at 02:55

## 2025-05-05 RX ADMIN — SODIUM CHLORIDE, PRESERVATIVE FREE 40 MG: 5 INJECTION INTRAVENOUS at 16:34

## 2025-05-05 RX ADMIN — OCTREOTIDE ACETATE 25 MCG/HR: 500 INJECTION, SOLUTION INTRAVENOUS; SUBCUTANEOUS at 02:56

## 2025-05-05 RX ADMIN — OCTREOTIDE ACETATE 50 MCG/HR: 500 INJECTION, SOLUTION INTRAVENOUS; SUBCUTANEOUS at 16:42

## 2025-05-05 RX ADMIN — WATER 1000 MG: 1 INJECTION INTRAMUSCULAR; INTRAVENOUS; SUBCUTANEOUS at 08:50

## 2025-05-05 ASSESSMENT — PAIN DESCRIPTION - LOCATION
LOCATION: ABDOMEN
LOCATION: ABDOMEN;BACK

## 2025-05-05 ASSESSMENT — PAIN SCALES - GENERAL
PAINLEVEL_OUTOF10: 4
PAINLEVEL_OUTOF10: 8
PAINLEVEL_OUTOF10: 10
PAINLEVEL_OUTOF10: 10
PAINLEVEL_OUTOF10: 9

## 2025-05-05 ASSESSMENT — LIFESTYLE VARIABLES
HOW OFTEN DO YOU HAVE A DRINK CONTAINING ALCOHOL: NEVER
SMOKING_STATUS: 1
HOW MANY STANDARD DRINKS CONTAINING ALCOHOL DO YOU HAVE ON A TYPICAL DAY: PATIENT DOES NOT DRINK

## 2025-05-05 ASSESSMENT — ENCOUNTER SYMPTOMS
ABDOMINAL PAIN: 1
DIARRHEA: 0
SORE THROAT: 0
EYE REDNESS: 0
EYE DISCHARGE: 0
SHORTNESS OF BREATH: 0
COUGH: 0
SINUS PRESSURE: 0
ABDOMINAL DISTENTION: 0
VOMITING: 1
BACK PAIN: 0

## 2025-05-05 ASSESSMENT — PAIN - FUNCTIONAL ASSESSMENT
PAIN_FUNCTIONAL_ASSESSMENT: 0-10
PAIN_FUNCTIONAL_ASSESSMENT: 0-10

## 2025-05-05 NOTE — CONSULTS
CONSULT  Олег Mitchell M.D.  [facility]  Dr. Lakhwinder Madera M.D.,  Dr. Simón Esquivel M.D.,  Dr. Hugo Rodríguez D.O.,  Dr. Robbin Mitchell D.O. ,  Dr. Royer Coppola M.D.,          Ellie TOMÁS Santacruz  55 y.o.  female      Re: \"Coffee-ground emesis\"  Requesting physician: Dr. Sahu/emergency department  Admitting physician Dr. Oh  Date:7:45 AM 5/5/2025          HPI: 55-year-old female patient seen in the hospital for above described issue.  She presented to the emergency department yesterday because of abdominal pain with associated nausea vomiting as well as diarrhea.  Patient has pertinent medical history of alcoholic cirrhosis and chronic pancreatitis.  Patient reports yellow liquid diarrhea without blood or black/tarry stool.  Patient reports nausea vomiting initially nonbloody for which she came to the emergency department on 3 May H which point patient was discharged home.  Patient did present as above because of emesis as bright red blood and some coffee-ground appearing material.  Patient continues to complain of abdominal pain which she localizes in the epigastrium with radiation to the back.  Patient denies alcohol use over 5 years.  Laboratory work reveals hemoglobin of 12.2 on 3 May and 12.5 on 5 May decreasing to 11.3 earlier today.  Platelet count is decreased at 53 which appears chronic.  White blood cell count is 5.3.  INR is 1.3.  Liver profile shows bilirubin of 2 with AST of 48.  Imaging has not been obtained on this presentation but on 3 May CT scan of the abdomen and pelvis with IV contrast was obtained reported to show cirrhotic morphology of the liver with portal hypertension and gastroesophageal varices.  Also pancreatic calcifications.  Area of irregularity in the right lobe but no focal lesion.    Information sources:   -Patient  -family ( at bedside)  -medical record  -health care team    PMHx:  Past Medical History:   Diagnosis Date    Alcoholism (HCC)     Since teens

## 2025-05-05 NOTE — ACP (ADVANCE CARE PLANNING)
Advance Care Planning   Healthcare Decision Maker:    Primary Decision Maker: Lydia Santacruz - Child - [phone]    Primary Decision Maker: Ashutosh Vergara - Srinath - [phone]    Primary Decision Maker: Mitchell Santacruz - [phone]

## 2025-05-05 NOTE — PROGRESS NOTES
4 Eyes Skin Assessment     NAME:  Ellie Santacruz  YOB: 1970  MEDICAL RECORD NUMBER:  [id number]    The patient is being assessed for  Admission    I agree that at least one RN has performed a thorough Head to Toe Skin Assessment on the patient. ALL assessment sites listed below have been assessed.      Areas assessed by both nurses:    Head, Face, Ears, Shoulders, Back, Chest, Arms, Elbows, Hands, Sacrum. Buttock, Coccyx, Ischium, and Legs. Feet and Heels        Does the Patient have a Wound? No noted wound(s)  Scratch on back        Abe Prevention initiated by RN: No  Wound Care Orders initiated by RN: No    Pressure Injury (Stage 3,4, Unstageable, DTI, NWPT, and Complex wounds) if present, place Wound referral order by RN under : No    New Ostomies, if present place, Ostomy referral order under : No     Nurse 1 eSignature: Electronically signed by Diana Dupree RN on 5/5/25 at 4:15 PM EDT    **SHARE this note so that the co-signing nurse can place an eSignature**    Nurse 2 eSignature: Electronically signed by Sara Irwin RN on 5/5/25 at 4:35 PM EDT

## 2025-05-05 NOTE — H&P
Immediately prior to the procedure the patient's History and Physical was reviewed- there are no changes with the current vitals.Blood pressure (!) 148/106, pulse 78, temperature 98.1 °F (36.7 °C), temperature source Oral, resp. rate 15, height 1.524 m (5'), weight 63.5 kg (140 lb), SpO2 97%, not currently breastfeeding.

## 2025-05-05 NOTE — ANESTHESIA PRE PROCEDURE
mastectomy    OVARY REMOVAL Bilateral 2007    PANCREAS BIOPSY      with stent    PERINEAL SURGERY N/A 2023    PERINEAL INCISION AND DRAINAGE ABSCESS performed by Karl Santos MD at [facility] OR    RECTAL SURGERY N/A 2023    PERINEAL ABSCESS INCISION AND DRAINAGE performed by Sebastian Davis MD at [facility] [place]    SHOULDER SURGERY Left     UPPER GASTROINTESTINAL ENDOSCOPY         Social History:    Social History     Tobacco Use    Smoking status: Every Day     Current packs/day: 0.00     Average packs/day: 0.5 packs/day for 40.0 years (20.0 ttl pk-yrs)     Types: Cigarettes     Start date: 1982     Last attempt to quit: 2020     Years since quittin.2    Smokeless tobacco: Never   Substance Use Topics    Alcohol use: Not Currently     Comment: rare                                Ready to quit: Not Answered  Counseling given: Not Answered      Vital Signs (Current):   Vitals:    25 1453 25 1503 25 1515 25 1630   BP: (!) 142/81 (!) 153/89 (!) 155/101 (!) 148/106   Pulse: 76 80 78    Resp: 16 19 15    Temp:   36.7 °C (98.1 °F)    TempSrc:   Oral    SpO2: 95% 96% 97%    Weight:       Height:                                                  BP Readings from Last 3 Encounters:   25 (!) 148/106   25 (!) 152/126   25 133/71       NPO Status:                                                                                 BMI:   Wt Readings from Last 3 Encounters:   25 63.5 kg (140 lb)   25 63.5 kg (140 lb)   25 67.5 kg (148 lb 14.4 oz)     Body mass index is 27.34 kg/m².    CBC:   Lab Results   Component Value Date/Time    WBC 5.3 2025 02:27 AM    RBC 4.17 2025 02:27 AM    HGB 10.7 2025 12:24 PM    HCT 31.8 2025 12:24 PM    MCV 85.9 2025 02:27 AM    RDW 14.4 2025 02:27 AM    PLT 59 2025 10:57 AM       CMP:   Lab Results   Component Value Date/Time     2025 05:00 AM    K 3.8 2025 05:00 AM

## 2025-05-05 NOTE — CARE COORDINATION
Case Management Assessment  Initial Evaluation    Date/Time of Evaluation: 5/5/2025 12:57 PM  Assessment Completed by: SANDEE Alvarez    If patient is discharged prior to next notation, then this note serves as note for discharge by case management.    Patient Name: Ellie Santacruz                   YOB: 1970  Diagnosis: Coffee ground emesis [K92.0]                   Date / Time: 5/5/2025  1:29 AM    Patient Admission Status: Inpatient   Readmission Risk (Low < 19, Mod (19-27), High > 27): Readmission Risk Score: 18    Current PCP: Liza Oh, DO  PCP verified by CM? Yes    Chart Reviewed: Yes      History Provided by: Patient  Patient Orientation: Alert and Oriented    Patient Cognition: Alert    Hospitalization in the last 30 days (Readmission):  No    If yes, Readmission Assessment in CM Navigator will be completed.    Advance Directives:      Code Status: Prior   Patient's Primary Decision Maker is: Legal Next of Kin    Primary Decision Maker: Lydia Santacruz  Child - [phone]    Primary Decision Maker: Ashutosh Vergara  Child - [phone]    Primary Decision Maker: Mitchell Santacruz - [phone]    Discharge Planning:    Patient lives with:   Type of Home:    Primary Care Giver: Self  Patient Support Systems include: Children, Friends/Neighbors   Current Financial resources:    Current community resources:    Current services prior to admission:              Current DME:              Type of Home Care services:       ADLS  Prior functional level: Independent in ADLs/IADLs  Current functional level: Independent in ADLs/IADLs    PT AM-PAC:   /24  OT AM-PAC:   /24    Family can provide assistance at DC: Other (comment) (unknown what dc needs pt will have)  Would you like Case Management to discuss the discharge plan with any other family members/significant others, and if so, who? No  Plans to Return to Present Housing: Yes  Other Identified Issues/Barriers to RETURNING to current

## 2025-05-05 NOTE — ED PROVIDER NOTES
Patient is a 54 y/o female who presents to the ED via EMS with hematemesis. Patient states that she began vomiting blood tonight. She has a history of liver disease and chronic pancreatitis. She denies any history of previous GI bleeding. She does report upper abdominal pain. Currently, her pain is 10/10. She denies any fever.         Review of Systems   Constitutional:  Negative for chills and fever.   HENT:  Negative for ear pain, sinus pressure and sore throat.    Eyes:  Negative for pain, discharge and redness.   Respiratory:  Negative for cough, shortness of breath and wheezing.    Cardiovascular:  Negative for chest pain.   Gastrointestinal:  Positive for abdominal pain, nausea and vomiting. Negative for abdominal distention and diarrhea.   Genitourinary:  Negative for dysuria and frequency.   Musculoskeletal:  Negative for arthralgias and back pain.   Skin:  Negative for rash and wound.   Neurological:  Negative for weakness and headaches.   Hematological:  Negative for adenopathy.   All other systems reviewed and are negative.       Physical Exam  Vitals and nursing note reviewed.   Constitutional:       General: She is not in acute distress.  HENT:      Head: Normocephalic and atraumatic.      Right Ear: External ear normal.      Left Ear: External ear normal.      Nose: Nose normal.      Mouth/Throat:      Mouth: Mucous membranes are moist.   Eyes:      Conjunctiva/sclera: Conjunctivae normal.      Pupils: Pupils are equal, round, and reactive to light.   Cardiovascular:      Rate and Rhythm: Regular rhythm. Tachycardia present.      Heart sounds: No murmur heard.  Pulmonary:      Effort: Pulmonary effort is normal. No respiratory distress.      Breath sounds: Normal breath sounds. No stridor. No wheezing, rhonchi or rales.   Abdominal:      General: Bowel sounds are normal. There is no distension.      Palpations: Abdomen is soft.      Tenderness: There is abdominal tenderness (Epigastric). There is no  Chloride 106 98 - 107 mmol/L    CO2 16 (L) 22 - 29 mmol/L    Anion Gap 21 (H) 7 - 16 mmol/L    Glucose 269 (H) 74 - 99 mg/dL    BUN 16 6 - 20 mg/dL    Creatinine 0.7 0.50 - 1.00 mg/dL    Est, Glom Filt Rate >90 >60 mL/min/1.73m2    Calcium 8.3 (L) 8.6 - 10.2 mg/dL   Hemoglobin and Hematocrit   Result Value Ref Range    Hemoglobin 11.3 (L) 11.5 - 15.5 g/dL    Hematocrit 32.5 (L) 34.0 - 48.0 %   TYPE AND SCREEN   Result Value Ref Range    Blood Bank Sample Expiration 05/08/2025,2359     Arm Band Number [id number]     ABO/Rh A NEGATIVE     Antibody Screen NEGATIVE        RADIOLOGY:  No orders to display       EKG:  This EKG is signed and interpreted by me.    Rate: 92  Rhythm: Sinus  Interpretation: non-specific EKG and prolonged QT interval  Comparison: no previous EKG available      ------------------------- NURSING NOTES AND VITALS REVIEWED ---------------------------  Date / Time Roomed:  5/5/2025  1:29 AM  ED Bed Assignment:  15/15    The nursing notes within the ED encounter and vital signs as below have been reviewed.     Patient Vitals for the past 24 hrs:   BP Temp Temp src Pulse Resp SpO2   05/05/25 0630 (!) 181/120 -- -- 95 22 99 %   05/05/25 0600 (!) 190/140 -- -- 92 17 99 %   05/05/25 0530 (!) 178/132 -- -- 94 15 99 %   05/05/25 0500 (!) 181/125 -- -- 98 24 97 %   05/05/25 0430 (!) 178/103 -- -- 89 27 96 %   05/05/25 0400 (!) 191/102 -- -- 94 23 94 %   05/05/25 0330 (!) 182/112 -- -- 95 21 98 %   05/05/25 0300 (!) 186/93 -- -- 92 27 --   05/05/25 0300 (!) 164/110 -- -- 93 18 --   05/05/25 0158 -- 98.5 °F (36.9 °C) Oral -- -- --   05/05/25 0150 (!) 160/102 -- -- (!) 108 13 97 %       Oxygen Saturation Interpretation: Normal    ------------------------------------------ PROGRESS NOTES ------------------------------------------  Re-evaluation(s):  Time: 0600.  Patient’s symptoms show no change  Repeat physical examination is not changed    Counseling:  I have spoken with the patient and discussed today’s

## 2025-05-06 ENCOUNTER — ANESTHESIA (OUTPATIENT)
Dept: ENDOSCOPY | Age: 55
End: 2025-05-06
Payer: COMMERCIAL

## 2025-05-06 ENCOUNTER — ANESTHESIA EVENT (OUTPATIENT)
Dept: ENDOSCOPY | Age: 55
End: 2025-05-06
Payer: COMMERCIAL

## 2025-05-06 LAB
EKG ATRIAL RATE: 92 BPM
EKG P AXIS: 63 DEGREES
EKG P-R INTERVAL: 136 MS
EKG Q-T INTERVAL: 390 MS
EKG QRS DURATION: 86 MS
EKG QTC CALCULATION (BAZETT): 482 MS
EKG R AXIS: 37 DEGREES
EKG T AXIS: 76 DEGREES
EKG VENTRICULAR RATE: 92 BPM
GLUCOSE BLD-MCNC: 222 MG/DL (ref 74–99)
GLUCOSE BLD-MCNC: 232 MG/DL (ref 74–99)
GLUCOSE BLD-MCNC: 289 MG/DL (ref 74–99)
GLUCOSE BLD-MCNC: 296 MG/DL (ref 74–99)
HAV IGM SERPL QL IA: NONREACTIVE
HBV CORE IGM SERPL QL IA: NONREACTIVE
HBV SURFACE AG SERPL QL IA: NONREACTIVE
HCT VFR BLD AUTO: 29.8 % (ref 34–48)
HCT VFR BLD AUTO: 30.4 % (ref 34–48)
HCT VFR BLD AUTO: 30.9 % (ref 34–48)
HCT VFR BLD AUTO: 31.6 % (ref 34–48)
HCV AB SERPL QL IA: REACTIVE
HGB BLD-MCNC: 10.1 G/DL (ref 11.5–15.5)
HGB BLD-MCNC: 10.3 G/DL (ref 11.5–15.5)
HGB BLD-MCNC: 10.5 G/DL (ref 11.5–15.5)
HGB BLD-MCNC: 10.8 G/DL (ref 11.5–15.5)

## 2025-05-06 PROCEDURE — 85018 HEMOGLOBIN: CPT

## 2025-05-06 PROCEDURE — 36415 COLL VENOUS BLD VENIPUNCTURE: CPT

## 2025-05-06 PROCEDURE — 2580000003 HC RX 258: Performed by: FAMILY MEDICINE

## 2025-05-06 PROCEDURE — 82962 GLUCOSE BLOOD TEST: CPT

## 2025-05-06 PROCEDURE — 6360000002 HC RX W HCPCS: Performed by: INTERNAL MEDICINE

## 2025-05-06 PROCEDURE — 3700000001 HC ADD 15 MINUTES (ANESTHESIA): Performed by: INTERNAL MEDICINE

## 2025-05-06 PROCEDURE — 6360000002 HC RX W HCPCS: Performed by: FAMILY MEDICINE

## 2025-05-06 PROCEDURE — 2709999900 HC NON-CHARGEABLE SUPPLY: Performed by: INTERNAL MEDICINE

## 2025-05-06 PROCEDURE — 6370000000 HC RX 637 (ALT 250 FOR IP): Performed by: FAMILY MEDICINE

## 2025-05-06 PROCEDURE — 85014 HEMATOCRIT: CPT

## 2025-05-06 PROCEDURE — 3700000000 HC ANESTHESIA ATTENDED CARE: Performed by: INTERNAL MEDICINE

## 2025-05-06 PROCEDURE — 0DB68ZX EXCISION OF STOMACH, VIA NATURAL OR ARTIFICIAL OPENING ENDOSCOPIC, DIAGNOSTIC: ICD-10-PCS | Performed by: INTERNAL MEDICINE

## 2025-05-06 PROCEDURE — 3609012300 HC EGD BAND LIGATION ESOPHGEAL/GASTRIC VARICES: Performed by: INTERNAL MEDICINE

## 2025-05-06 PROCEDURE — 6360000002 HC RX W HCPCS: Performed by: ANESTHESIOLOGY

## 2025-05-06 PROCEDURE — 88342 IMHCHEM/IMCYTCHM 1ST ANTB: CPT

## 2025-05-06 PROCEDURE — 83516 IMMUNOASSAY NONANTIBODY: CPT

## 2025-05-06 PROCEDURE — 2580000003 HC RX 258: Performed by: NURSE ANESTHETIST, CERTIFIED REGISTERED

## 2025-05-06 PROCEDURE — 6360000002 HC RX W HCPCS: Performed by: NURSE ANESTHETIST, CERTIFIED REGISTERED

## 2025-05-06 PROCEDURE — 2720000010 HC SURG SUPPLY STERILE: Performed by: INTERNAL MEDICINE

## 2025-05-06 PROCEDURE — 88305 TISSUE EXAM BY PATHOLOGIST: CPT

## 2025-05-06 PROCEDURE — 06L38CZ OCCLUSION OF ESOPHAGEAL VEIN WITH EXTRALUMINAL DEVICE, VIA NATURAL OR ARTIFICIAL OPENING ENDOSCOPIC: ICD-10-PCS | Performed by: INTERNAL MEDICINE

## 2025-05-06 PROCEDURE — 76937 US GUIDE VASCULAR ACCESS: CPT

## 2025-05-06 PROCEDURE — 93010 ELECTROCARDIOGRAM REPORT: CPT | Performed by: INTERNAL MEDICINE

## 2025-05-06 PROCEDURE — 7100000001 HC PACU RECOVERY - ADDTL 15 MIN: Performed by: INTERNAL MEDICINE

## 2025-05-06 PROCEDURE — 2500000003 HC RX 250 WO HCPCS: Performed by: INTERNAL MEDICINE

## 2025-05-06 PROCEDURE — 2060000000 HC ICU INTERMEDIATE R&B

## 2025-05-06 PROCEDURE — 3609012400 HC EGD TRANSORAL BIOPSY SINGLE/MULTIPLE: Performed by: INTERNAL MEDICINE

## 2025-05-06 PROCEDURE — 7100000000 HC PACU RECOVERY - FIRST 15 MIN: Performed by: INTERNAL MEDICINE

## 2025-05-06 RX ORDER — PROPOFOL 10 MG/ML
INJECTION, EMULSION INTRAVENOUS
Status: DISCONTINUED | OUTPATIENT
Start: 2025-05-06 | End: 2025-05-06 | Stop reason: SDUPTHER

## 2025-05-06 RX ORDER — HYDRALAZINE HYDROCHLORIDE 20 MG/ML
10 INJECTION INTRAMUSCULAR; INTRAVENOUS
Status: CANCELLED | OUTPATIENT
Start: 2025-05-06

## 2025-05-06 RX ORDER — CLONIDINE HYDROCHLORIDE 0.1 MG/1
0.1 TABLET ORAL 2 TIMES DAILY
Status: DISCONTINUED | OUTPATIENT
Start: 2025-05-06 | End: 2025-05-09 | Stop reason: HOSPADM

## 2025-05-06 RX ORDER — SODIUM CHLORIDE 9 MG/ML
INJECTION, SOLUTION INTRAVENOUS PRN
Status: DISCONTINUED | OUTPATIENT
Start: 2025-05-06 | End: 2025-05-09 | Stop reason: HOSPADM

## 2025-05-06 RX ORDER — LIDOCAINE HYDROCHLORIDE 10 MG/ML
50 INJECTION, SOLUTION EPIDURAL; INFILTRATION; INTRACAUDAL; PERINEURAL ONCE
Status: DISCONTINUED | OUTPATIENT
Start: 2025-05-06 | End: 2025-05-09 | Stop reason: HOSPADM

## 2025-05-06 RX ORDER — SODIUM CHLORIDE 9 MG/ML
INJECTION, SOLUTION INTRAVENOUS
Status: DISCONTINUED | OUTPATIENT
Start: 2025-05-06 | End: 2025-05-06 | Stop reason: SDUPTHER

## 2025-05-06 RX ORDER — SODIUM CHLORIDE 0.9 % (FLUSH) 0.9 %
5-40 SYRINGE (ML) INJECTION PRN
Status: DISCONTINUED | OUTPATIENT
Start: 2025-05-06 | End: 2025-05-09 | Stop reason: HOSPADM

## 2025-05-06 RX ORDER — SODIUM CHLORIDE 0.9 % (FLUSH) 0.9 %
5-40 SYRINGE (ML) INJECTION EVERY 12 HOURS SCHEDULED
Status: DISCONTINUED | OUTPATIENT
Start: 2025-05-06 | End: 2025-05-09 | Stop reason: HOSPADM

## 2025-05-06 RX ADMIN — PROCHLORPERAZINE EDISYLATE 10 MG: 5 INJECTION INTRAMUSCULAR; INTRAVENOUS at 11:09

## 2025-05-06 RX ADMIN — PROCHLORPERAZINE EDISYLATE 10 MG: 5 INJECTION INTRAMUSCULAR; INTRAVENOUS at 21:34

## 2025-05-06 RX ADMIN — HYDROMORPHONE HYDROCHLORIDE 0.5 MG: 1 INJECTION, SOLUTION INTRAMUSCULAR; INTRAVENOUS; SUBCUTANEOUS at 11:17

## 2025-05-06 RX ADMIN — HYDROMORPHONE HYDROCHLORIDE 1 MG: 1 INJECTION, SOLUTION INTRAMUSCULAR; INTRAVENOUS; SUBCUTANEOUS at 01:19

## 2025-05-06 RX ADMIN — OCTREOTIDE ACETATE 50 MCG/HR: 500 INJECTION, SOLUTION INTRAVENOUS; SUBCUTANEOUS at 21:18

## 2025-05-06 RX ADMIN — CLONIDINE HYDROCHLORIDE 0.1 MG: 0.1 TABLET ORAL at 23:14

## 2025-05-06 RX ADMIN — HYDROMORPHONE HYDROCHLORIDE 1 MG: 1 INJECTION, SOLUTION INTRAMUSCULAR; INTRAVENOUS; SUBCUTANEOUS at 06:58

## 2025-05-06 RX ADMIN — INSULIN LISPRO 4 UNITS: 100 INJECTION, SOLUTION INTRAVENOUS; SUBCUTANEOUS at 12:30

## 2025-05-06 RX ADMIN — ONDANSETRON 4 MG: 4 TABLET, ORALLY DISINTEGRATING ORAL at 06:58

## 2025-05-06 RX ADMIN — SODIUM CHLORIDE: 9 INJECTION, SOLUTION INTRAVENOUS at 10:13

## 2025-05-06 RX ADMIN — OXYCODONE 5 MG: 5 TABLET ORAL at 13:25

## 2025-05-06 RX ADMIN — INSULIN LISPRO 1 UNITS: 100 INJECTION, SOLUTION INTRAVENOUS; SUBCUTANEOUS at 21:20

## 2025-05-06 RX ADMIN — HYDROMORPHONE HYDROCHLORIDE 1 MG: 1 INJECTION, SOLUTION INTRAMUSCULAR; INTRAVENOUS; SUBCUTANEOUS at 23:14

## 2025-05-06 RX ADMIN — ONDANSETRON 4 MG: 4 TABLET, ORALLY DISINTEGRATING ORAL at 13:25

## 2025-05-06 RX ADMIN — INSULIN LISPRO 2 UNITS: 100 INJECTION, SOLUTION INTRAVENOUS; SUBCUTANEOUS at 17:26

## 2025-05-06 RX ADMIN — SODIUM CHLORIDE, PRESERVATIVE FREE 40 MG: 5 INJECTION INTRAVENOUS at 17:26

## 2025-05-06 RX ADMIN — PROPOFOL 370 MG: 10 INJECTION, EMULSION INTRAVENOUS at 10:55

## 2025-05-06 RX ADMIN — HYDROMORPHONE HYDROCHLORIDE 0.5 MG: 1 INJECTION, SOLUTION INTRAMUSCULAR; INTRAVENOUS; SUBCUTANEOUS at 19:04

## 2025-05-06 RX ADMIN — SODIUM CHLORIDE, PRESERVATIVE FREE 40 MG: 5 INJECTION INTRAVENOUS at 01:23

## 2025-05-06 RX ADMIN — HYDROMORPHONE HYDROCHLORIDE 1 MG: 1 INJECTION, SOLUTION INTRAMUSCULAR; INTRAVENOUS; SUBCUTANEOUS at 14:58

## 2025-05-06 ASSESSMENT — PAIN DESCRIPTION - LOCATION
LOCATION: ABDOMEN

## 2025-05-06 ASSESSMENT — PAIN SCALES - GENERAL
PAINLEVEL_OUTOF10: 8
PAINLEVEL_OUTOF10: 4
PAINLEVEL_OUTOF10: 0
PAINLEVEL_OUTOF10: 7
PAINLEVEL_OUTOF10: 7
PAINLEVEL_OUTOF10: 4
PAINLEVEL_OUTOF10: 9

## 2025-05-06 ASSESSMENT — PAIN DESCRIPTION - PAIN TYPE
TYPE: ACUTE PAIN;SURGICAL PAIN
TYPE: ACUTE PAIN;SURGICAL PAIN

## 2025-05-06 ASSESSMENT — LIFESTYLE VARIABLES: SMOKING_STATUS: 1

## 2025-05-06 ASSESSMENT — PAIN DESCRIPTION - DESCRIPTORS: DESCRIPTORS: ACHING

## 2025-05-06 NOTE — H&P
Department of Family Practice  Attending History and Physical        CHIEF COMPLAINT:  COFFEE GROUND EMESIS    Reason for Admission:  GI BLEED    History Obtained From:  patient, electronic medical record    HISTORY OF PRESENT ILLNESS:      The patient is a 55 y.o. female with significant past medical history of CIRRHOSIS OF THE LIVER who presents with COFFEE GROUND EMESIS.  SHE HAD A VISIT TO THE ED 2 DAYS AGO WITH ABDOMINAL PAIN, NAUSEA AND VOMITING.      Patient is a 54 y/o female who presents to the ED via EMS with hematemesis. Patient states that she began vomiting blood tonight. She has a history of liver disease and chronic pancreatitis. She denies any history of previous GI bleeding. She does report upper abdominal pain. Currently, her pain is 10/10. She denies any fever.     Past Medical History:        Diagnosis Date    Alcoholism (HCC)     Since teens to early 20s    Anxiety and depression     Ascites of liver     Bronchitis     Cancer (HCC)     breast, left    Candidiasis of vagina     Chronic pancreatitis (HCC)     Cocaine abuse (HCC)     Constipation     Diabetes mellitus, type 2 (HCC)     GERD (gastroesophageal reflux disease)     Gout     Hepatitis C     Hernia of anterior abdominal wall     Jaundice 05/12/2016    Pneumonia     Polyneuropathy due to type 2 diabetes mellitus (HCC)     Schizoaffective disorder, bipolar type (HCC)     Splenomegaly 05/30/2017     Past Surgical History:        Procedure Laterality Date    ABDOMEN SURGERY      gallbladder removal    BREAST BIOPSY Left     CHOLECYSTECTOMY  2010    COLONOSCOPY      COLONOSCOPY N/A 10/2/2018    COLONOSCOPY WITH BIOPSY performed by Simón Esquivel MD at [facility] ENDOSCOPY    CT NEEDLE BIOPSY LIVER PERCUTANEOUS  3/19/2024    CT NEEDLE BIOPSY LIVER PERCUTANEOUS 3/19/2024 [facility] CT    ENDOSCOPY, COLON, DIAGNOSTIC      ERCP      april 2016 at [facility]    HYSTERECTOMY (CERVIX STATUS UNKNOWN)  2004    IR TUNNELED CVC PLACE WO SQ PORT/PUMP > 5 YEARS  3/27/2024     Hyaline 03/19/2015 01:12 PM    WBCUA 0 TO 5 01/28/2025 02:34 PM    RBCUA 0 TO 2 01/28/2025 02:34 PM    MUCUS Present 03/19/2015 01:12 PM    TRICHOMONAS PRESENT 08/26/2023 10:30 PM    YEAST FEW 11/10/2016 11:00 PM    BACTERIA TRACE 11/17/2024 04:00 PM    CLARITYU SL CLOUDY 05/21/2023 10:40 PM    SPECGRAV 1.025 03/19/2015 12:53 PM    LEUKOCYTESUR NEGATIVE 01/28/2025 02:34 PM    UROBILINOGEN 0.2 01/28/2025 02:34 PM    BILIRUBINUR NEGATIVE 01/28/2025 02:34 PM    BLOODU Negative 05/21/2023 10:40 PM    GLUCOSEU 500 01/28/2025 02:34 PM    AMORPHOUS MANY 09/16/2016 06:30 PM     ASSESSMENT AND PLAN:      Principal Problem:    Coffee ground emesis  Plan: GI CONSULT, Q6H HGB  Active Problems:    Type 2 diabetes mellitus with hyperglycemia (HCC)  Plan: MONITOR GLUCOSE    Elevated LFTs  Plan: DAILY LABS    Chronic abdominal pain  Plan: PAIN MANAGEMENT    Cirrhosis of liver with ascites, unspecified hepatic cirrhosis type (HCC)  Plan: ON CHRONIC MEDICATIONS    Schizoaffective disorder, bipolar type (HCC)  Plan: ON CHRONIC MEDICATIONS      INPATIENT ADMISSION.  PLANS AS ABOVE.  DISCHARGE PLANS TO RETURN HOME.  ESTIMATED LENGTH OF STAY IS 3 DAYS.

## 2025-05-06 NOTE — PROGRESS NOTES
Immediately prior to the procedure the patient's History and Physical was reviewed- there are no changes with the current vitals.  /77   Pulse 73   Temp 98.6 °F (37 °C) (Oral)   Resp 16   Ht 1.524 m (5')   Wt 63.5 kg (140 lb)   SpO2 98%   BMI 27.34 kg/m²     No CP/SOB.  Risks/benefits d/w pt.  All questions answered.  Proceed with EGD.    GIOVNANY AWAN DO  5/6/2025  10:43 AM

## 2025-05-06 NOTE — CARE COORDINATION
Attempted to see patient in room but she was out of room at [facility].  Previous note reviewed, patient from home with her friend Mary.  Its a 2 story home, with a first floor set up.  Patient was planning to return home on DC with Friend.  Prior to admit she was independent.  No history of HHC, has been to [facility] in the past.  Patient has history of [facility].  Prior to admit was on Xifaxin, currently on IV Rocephin here.  SW will follow and assess for needs as able.

## 2025-05-06 NOTE — PROGRESS NOTES
SBAR form completed. Placed on chart. Chart with patient in transit to PACU. Nurse to Nurse report given at bedside.

## 2025-05-06 NOTE — PROGRESS NOTES
Dr. Oh in to see patient, spoke to her about the no arm restriction for Ellie.  We are unable to get an IV in the R arm, Dr. Oh stated we can use the L arm.  IV obtained in L arm.

## 2025-05-06 NOTE — PROGRESS NOTES
Patient seen and examined prior to procedure.  EGD canceled yesterday secondary to poor IV access.  Patient with peripheral IV in left forearm.  No new complaints.  Midline ordered.  H&P reviewed -no new changes except as described above.  Vitals:    05/06/25 0800   BP:    Pulse:    Resp:    Temp:    SpO2: 98%     Plan: Proceed with upper endoscopy today    Олег Mitchell MD

## 2025-05-06 NOTE — PLAN OF CARE
Problem: Chronic Conditions and Co-morbidities  Goal: Patient's chronic conditions and co-morbidity symptoms are monitored and maintained or improved  5/6/2025 0338 by Diana Cunha RN  Outcome: Progressing  5/5/2025 2240 by Diana Dupree RN  Outcome: Progressing     Problem: Discharge Planning  Goal: Discharge to home or other facility with appropriate resources  5/6/2025 0338 by Diana Cunha RN  Outcome: Progressing  5/5/2025 2240 by Diana Dupree RN  Outcome: Progressing     Problem: Pain  Goal: Verbalizes/displays adequate comfort level or baseline comfort level  5/6/2025 0338 by Diana Cunha RN  Outcome: Progressing  5/5/2025 2240 by Diana Dupree RN  Outcome: Progressing     Problem: Safety - Adult  Goal: Free from fall injury  5/6/2025 0338 by Diana Cunha RN  Outcome: Progressing  5/5/2025 2240 by Diana Dupree RN  Outcome: Progressing

## 2025-05-06 NOTE — PLAN OF CARE
Problem: Chronic Conditions and Co-morbidities  Goal: Patient's chronic conditions and co-morbidity symptoms are monitored and maintained or improved  5/6/2025 0800 by Joi Roman RN  Outcome: Progressing  5/6/2025 0338 by Diana Cunha RN  Outcome: Progressing  5/5/2025 2240 by Diana Dupree RN  Outcome: Progressing     Problem: Discharge Planning  Goal: Discharge to home or other facility with appropriate resources  5/6/2025 0800 by Joi Roman RN  Outcome: Progressing  5/6/2025 0338 by Diana Cunha RN  Outcome: Progressing  5/5/2025 2240 by Diana Dupree RN  Outcome: Progressing     Problem: Pain  Goal: Verbalizes/displays adequate comfort level or baseline comfort level  5/6/2025 0800 by Joi Roman RN  Outcome: Progressing  5/6/2025 0338 by Diana Cunha RN  Outcome: Progressing  5/5/2025 2240 by Diana Dupree RN  Outcome: Progressing     Problem: Safety - Adult  Goal: Free from fall injury  5/6/2025 0800 by Joi Roman RN  Outcome: Progressing  5/6/2025 0338 by Diana Cunha RN  Outcome: Progressing  5/5/2025 2240 by Diana Dupree RN  Outcome: Progressing

## 2025-05-06 NOTE — ANESTHESIA POSTPROCEDURE EVALUATION
Department of Anesthesiology  Postprocedure Note    Patient: Ellie Santacruz  MRN: [id number]  YOB: 1970  Date of evaluation: 5/6/2025    Procedure Summary       Date: 05/06/25 Room / Location: [place] [id number] / [facility]    Anesthesia Start: 1041 Anesthesia Stop: 1059    Procedures:       ESOPHAGOGASTRODUODENOSCOPY BAND LIGATION      ESOPHAGOGASTRODUODENOSCOPY BIOPSY Diagnosis:       Acute anemia      (Acute anemia [D64.9])    Surgeons: Hugo Rodríguez DO Responsible Provider: William Quinones DO    Anesthesia Type: MAC ASA Status: 4            Anesthesia Type: No value filed.    Max Phase I:      Max Phase II: Max Score: 10    Anesthesia Post Evaluation    Patient location during evaluation: PACU  Patient participation: complete - patient participated  Level of consciousness: awake and alert  Pain score: 0  Airway patency: patent  Nausea & Vomiting: no nausea and no vomiting  Cardiovascular status: blood pressure returned to baseline and hemodynamically stable  Respiratory status: acceptable  Hydration status: stable  Pain management: adequate    No notable events documented.

## 2025-05-06 NOTE — OP NOTE
Operative Note      Patient: Ellie Santacruz  YOB: 1970  MRN: [id number]    Date of Procedure: 5/6/2025    Pre-Op Diagnosis Codes:      * Acute anemia [D64.9], Cirrhosis, Hemetemesis, Varies on CT    Post-Op Diagnosis: SAME       Procedure(s):  ESOPHAGOGASTRODUODENOSCOPY    Surgeon(s):  Hugo Rodríguez DO    Assistant:   * No surgical staff found *    Anesthesia: Monitor Anesthesia Care    Estimated Blood Loss (mL): < 5cc    Complications: None    Specimens:   * No specimens in log *    Implants:  * No implants in log *      Drains: * No LDAs found *    Detailed Description of Procedure:   Procedure:  Esophagogastroduodenoscopy    Indication:  Cirrhosis, Hemetemesis, Anemia, Varices on CT    Consent: Informed consent was obtained from the patient including and not limited to risk of perforation, aspiration of gastric contents or teeth, bleeding, infection, dental breakage, ileus, need for surgery, or worst case death.    Sedation  MAC    Estimated Blood Loss -- < 5cc    Endoscope was advanced easily through mouth to second portion of duodenum      Oropharynx views are limited but grossly normal.    Esophagus:   Mucosa is normal other than 2 moderate varices, no red manda lashawn.  GEJ at ~38 cm.   A total of 4 bands were placed over the varices with good proximal decompression and no bleeding or complications.      Stomach:   Antrum with mild gastritis with biopsy.  No suggestion of GAVE.  J shaped stomach    Gastric body with moderate to severe portal HTN gastropathy.    Retroflexed views showed fundus and cardia with moderate to severe portal HTN gastropathy.  No gastric varices seen.    Duodenum: Bulb is normal.    Second portion of duodenum is normal.  No fresh of old blood.  NO AVM seen.    IMPRESSION AND PLAN:     1.  Esophageal varices S/P banding x 4      2.  No gastric varices seen    3.  Moderate to severe portal HTN gastropathy    4.  Gastritis in antrum with biopsy.  NO suggestion of GAVE.    5.   J shaped stomach    6.  Normal duodenum to D2 segment    7.  Continue Octreotide.  PPI BID x 2 weeks then daily.  No NSAID's.  Plan for repeat EGD in 3-4 weeks for further banding to obliterate esophageal varices.  Plan for colonoscopy with Dr. Esquivel in our office unless change in clinical course.  No EtOH.  No smoking!  Pt with significantly elevated Alk Phos.  Will check Isoenzymes and mitochondrial ab.  Recommend breast cancer screening and consider bone scan if bone alk phos fraction elevated.  Our service will follow.  See orders.      Follow up as outpatient in office, call [phone] to schedule for appointment.      GIOVANNY AWAN DO  5/6/2025  10:44 AM    Electronically signed by GIOVANNY AWAN DO on 5/6/2025 at 10:43 AM

## 2025-05-07 LAB
ALBUMIN SERPL-MCNC: 3.9 G/DL (ref 3.5–5.2)
ALP SERPL-CCNC: 547 U/L (ref 35–104)
ALT SERPL-CCNC: 26 U/L (ref 0–32)
ANION GAP SERPL CALCULATED.3IONS-SCNC: 20 MMOL/L (ref 7–16)
AST SERPL-CCNC: 28 U/L (ref 0–31)
BASOPHILS # BLD: 0 K/UL (ref 0–0.2)
BASOPHILS NFR BLD: 0 % (ref 0–2)
BILIRUB SERPL-MCNC: 1.1 MG/DL (ref 0–1.2)
BUN SERPL-MCNC: 20 MG/DL (ref 6–20)
CALCIUM SERPL-MCNC: 8.2 MG/DL (ref 8.6–10.2)
CHLORIDE SERPL-SCNC: 104 MMOL/L (ref 98–107)
CO2 SERPL-SCNC: 16 MMOL/L (ref 22–29)
CREAT SERPL-MCNC: 0.6 MG/DL (ref 0.5–1)
EOSINOPHIL # BLD: 0 K/UL (ref 0.05–0.5)
EOSINOPHILS RELATIVE PERCENT: 0 % (ref 0–6)
ERYTHROCYTE [DISTWIDTH] IN BLOOD BY AUTOMATED COUNT: 14.9 % (ref 11.5–15)
GFR, ESTIMATED: >90 ML/MIN/1.73M2
GLUCOSE BLD-MCNC: 163 MG/DL (ref 74–99)
GLUCOSE BLD-MCNC: 234 MG/DL (ref 74–99)
GLUCOSE BLD-MCNC: 292 MG/DL (ref 74–99)
GLUCOSE BLD-MCNC: 351 MG/DL (ref 74–99)
GLUCOSE SERPL-MCNC: 321 MG/DL (ref 74–99)
HCT VFR BLD AUTO: 30.1 % (ref 34–48)
HCT VFR BLD AUTO: 33.4 % (ref 34–48)
HGB BLD-MCNC: 10.3 G/DL (ref 11.5–15.5)
HGB BLD-MCNC: 11.4 G/DL (ref 11.5–15.5)
LYMPHOCYTES NFR BLD: 0.33 K/UL (ref 1.5–4)
LYMPHOCYTES RELATIVE PERCENT: 11 % (ref 20–42)
MCH RBC QN AUTO: 30 PG (ref 26–35)
MCHC RBC AUTO-ENTMCNC: 34.1 G/DL (ref 32–34.5)
MCV RBC AUTO: 87.9 FL (ref 80–99.9)
MONOCYTES NFR BLD: 0.03 K/UL (ref 0.1–0.95)
MONOCYTES NFR BLD: 1 % (ref 2–12)
NEUTROPHILS NFR BLD: 88 % (ref 43–80)
NEUTS SEG NFR BLD: 2.54 K/UL (ref 1.8–7.3)
PLATELET # BLD AUTO: 42 K/UL (ref 130–450)
PLATELET CONFIRMATION: NORMAL
PMV BLD AUTO: 9.6 FL (ref 7–12)
POTASSIUM SERPL-SCNC: 4 MMOL/L (ref 3.5–5)
PROT SERPL-MCNC: 7.3 G/DL (ref 6.4–8.3)
RBC # BLD AUTO: 3.8 M/UL (ref 3.5–5.5)
RBC # BLD: NORMAL 10*6/UL
SODIUM SERPL-SCNC: 140 MMOL/L (ref 132–146)
WBC OTHER # BLD: 2.9 K/UL (ref 4.5–11.5)

## 2025-05-07 PROCEDURE — 6360000002 HC RX W HCPCS: Performed by: FAMILY MEDICINE

## 2025-05-07 PROCEDURE — 80053 COMPREHEN METABOLIC PANEL: CPT

## 2025-05-07 PROCEDURE — 82962 GLUCOSE BLOOD TEST: CPT

## 2025-05-07 PROCEDURE — 84075 ASSAY ALKALINE PHOSPHATASE: CPT

## 2025-05-07 PROCEDURE — 2500000003 HC RX 250 WO HCPCS: Performed by: FAMILY MEDICINE

## 2025-05-07 PROCEDURE — 6370000000 HC RX 637 (ALT 250 FOR IP): Performed by: FAMILY MEDICINE

## 2025-05-07 PROCEDURE — 2500000003 HC RX 250 WO HCPCS: Performed by: HOSPITALIST

## 2025-05-07 PROCEDURE — 36415 COLL VENOUS BLD VENIPUNCTURE: CPT

## 2025-05-07 PROCEDURE — 2580000003 HC RX 258: Performed by: INTERNAL MEDICINE

## 2025-05-07 PROCEDURE — 2580000003 HC RX 258: Performed by: FAMILY MEDICINE

## 2025-05-07 PROCEDURE — 85025 COMPLETE CBC W/AUTO DIFF WBC: CPT

## 2025-05-07 PROCEDURE — 84080 ASSAY ALKALINE PHOSPHATASES: CPT

## 2025-05-07 PROCEDURE — 85014 HEMATOCRIT: CPT

## 2025-05-07 PROCEDURE — 85018 HEMOGLOBIN: CPT

## 2025-05-07 PROCEDURE — 6360000002 HC RX W HCPCS: Performed by: INTERNAL MEDICINE

## 2025-05-07 PROCEDURE — 2060000000 HC ICU INTERMEDIATE R&B

## 2025-05-07 RX ORDER — INSULIN LISPRO 100 [IU]/ML
2 INJECTION, SOLUTION INTRAVENOUS; SUBCUTANEOUS ONCE
Status: COMPLETED | OUTPATIENT
Start: 2025-05-07 | End: 2025-05-07

## 2025-05-07 RX ORDER — FENTANYL 25 UG/1
1 PATCH TRANSDERMAL
Refills: 0 | Status: DISCONTINUED | OUTPATIENT
Start: 2025-05-07 | End: 2025-05-09 | Stop reason: HOSPADM

## 2025-05-07 RX ORDER — PROPRANOLOL HYDROCHLORIDE 10 MG/1
20 TABLET ORAL 2 TIMES DAILY
Status: DISCONTINUED | OUTPATIENT
Start: 2025-05-07 | End: 2025-05-09 | Stop reason: HOSPADM

## 2025-05-07 RX ADMIN — HYDROMORPHONE HYDROCHLORIDE 1 MG: 1 INJECTION, SOLUTION INTRAMUSCULAR; INTRAVENOUS; SUBCUTANEOUS at 08:20

## 2025-05-07 RX ADMIN — INSULIN GLARGINE 38 UNITS: 100 INJECTION, SOLUTION SUBCUTANEOUS at 08:24

## 2025-05-07 RX ADMIN — PROPRANOLOL HYDROCHLORIDE 20 MG: 10 TABLET ORAL at 22:06

## 2025-05-07 RX ADMIN — HYDROMORPHONE HYDROCHLORIDE 1 MG: 1 INJECTION, SOLUTION INTRAMUSCULAR; INTRAVENOUS; SUBCUTANEOUS at 03:27

## 2025-05-07 RX ADMIN — INSULIN LISPRO 4 UNITS: 100 INJECTION, SOLUTION INTRAVENOUS; SUBCUTANEOUS at 08:24

## 2025-05-07 RX ADMIN — VORTIOXETINE 10 MG: 10 TABLET, FILM COATED ORAL at 21:07

## 2025-05-07 RX ADMIN — OXYCODONE 5 MG: 5 TABLET ORAL at 21:01

## 2025-05-07 RX ADMIN — HYDROMORPHONE HYDROCHLORIDE 1 MG: 1 INJECTION, SOLUTION INTRAMUSCULAR; INTRAVENOUS; SUBCUTANEOUS at 13:03

## 2025-05-07 RX ADMIN — OCTREOTIDE ACETATE 50 MCG/HR: 500 INJECTION, SOLUTION INTRAVENOUS; SUBCUTANEOUS at 08:27

## 2025-05-07 RX ADMIN — OCTREOTIDE ACETATE 50 MCG/HR: 500 INJECTION, SOLUTION INTRAVENOUS; SUBCUTANEOUS at 19:56

## 2025-05-07 RX ADMIN — WATER 1000 MG: 1 INJECTION INTRAMUSCULAR; INTRAVENOUS; SUBCUTANEOUS at 08:18

## 2025-05-07 RX ADMIN — INSULIN LISPRO 8 UNITS: 100 INJECTION, SOLUTION INTRAVENOUS; SUBCUTANEOUS at 13:28

## 2025-05-07 RX ADMIN — CLONIDINE HYDROCHLORIDE 0.1 MG: 0.1 TABLET ORAL at 21:01

## 2025-05-07 RX ADMIN — INSULIN LISPRO 2 UNITS: 100 INJECTION, SOLUTION INTRAVENOUS; SUBCUTANEOUS at 13:28

## 2025-05-07 RX ADMIN — SODIUM CHLORIDE, PRESERVATIVE FREE 10 ML: 5 INJECTION INTRAVENOUS at 08:27

## 2025-05-07 RX ADMIN — INSULIN LISPRO 2 UNITS: 100 INJECTION, SOLUTION INTRAVENOUS; SUBCUTANEOUS at 17:04

## 2025-05-07 RX ADMIN — PROCHLORPERAZINE EDISYLATE 10 MG: 5 INJECTION INTRAMUSCULAR; INTRAVENOUS at 08:18

## 2025-05-07 RX ADMIN — SODIUM CHLORIDE, PRESERVATIVE FREE 10 ML: 5 INJECTION INTRAVENOUS at 21:02

## 2025-05-07 RX ADMIN — SODIUM CHLORIDE, PRESERVATIVE FREE 40 MG: 5 INJECTION INTRAVENOUS at 08:18

## 2025-05-07 RX ADMIN — OXYCODONE 5 MG: 5 TABLET ORAL at 17:03

## 2025-05-07 ASSESSMENT — PAIN SCALES - GENERAL
PAINLEVEL_OUTOF10: 8
PAINLEVEL_OUTOF10: 7
PAINLEVEL_OUTOF10: 5
PAINLEVEL_OUTOF10: 7
PAINLEVEL_OUTOF10: 9
PAINLEVEL_OUTOF10: 7
PAINLEVEL_OUTOF10: 2
PAINLEVEL_OUTOF10: 7

## 2025-05-07 ASSESSMENT — PAIN DESCRIPTION - LOCATION
LOCATION: ABDOMEN

## 2025-05-07 ASSESSMENT — PAIN DESCRIPTION - DESCRIPTORS
DESCRIPTORS: ACHING;DISCOMFORT
DESCRIPTORS: ACHING;DISCOMFORT
DESCRIPTORS: ACHING
DESCRIPTORS: ACHING;DISCOMFORT

## 2025-05-07 ASSESSMENT — PAIN DESCRIPTION - ORIENTATION
ORIENTATION: RIGHT;LEFT

## 2025-05-07 ASSESSMENT — PAIN - FUNCTIONAL ASSESSMENT: PAIN_FUNCTIONAL_ASSESSMENT: ACTIVITIES ARE NOT PREVENTED

## 2025-05-07 NOTE — PLAN OF CARE
Problem: Chronic Conditions and Co-morbidities  Goal: Patient's chronic conditions and co-morbidity symptoms are monitored and maintained or improved  Outcome: Progressing  Flowsheets (Taken 5/6/2025 2000)  Care Plan - Patient's Chronic Conditions and Co-Morbidity Symptoms are Monitored and Maintained or Improved: Monitor and assess patient's chronic conditions and comorbid symptoms for stability, deterioration, or improvement     Problem: Pain  Goal: Verbalizes/displays adequate comfort level or baseline comfort level  Outcome: Progressing     Problem: Safety - Adult  Goal: Free from fall injury  Outcome: Progressing  Flowsheets (Taken 5/6/2025 2000)  Free From Fall Injury: Instruct family/caregiver on patient safety

## 2025-05-07 NOTE — PROGRESS NOTES
Department of Family Practice  Adult Daily Progress Note      SUBJECTIVE  OSBALDO IS SEEN IN FOLLOW UP 05/06/2025 ON PIC/ICU.  SHE IS TOLERATING TREATMENT. SHE IS SLEEPING AT PRESENT.  SHE HAD HER EGD TODAY.  SHE HAD ESOPHAGEAL VARICES,  MILD ANTRAL GASTRITIS AND  GASTRIC BODY WITH MODERATE TO SEVERE MODERATE TO SEVERE PORTAL HYPERTENSIVE GASTROPATHY.  HER EX-BOYFRIEND IS PRESENT.  HE SAYS SHE IS STAYING WITH HIM.  HE IS NOT ALLOWED TO HAVE ANYONE STAYING WITH HIM IN HIS APARTMENT.  SHE CANNOT STAY, OR HE WILL BE EVICTED.  SHE WILL NEED ALTERNATE DISCHARGE PLANS.  HE REPORTED THAT SHE IS STILL DRINKING ALCOHOL.     OBJECTIVE    Physical  VITALS:  BP (!) 167/88   Pulse 89   Temp 98.4 °F (36.9 °C) (Oral)   Resp 16   Ht 1.524 m (5')   Wt 63.5 kg (140 lb)   SpO2 97%   BMI 27.34 kg/m²   CONSTITUTIONAL:  SLEEPING, NOT AWAKENED, no apparent distress, and appears stated age  ENT:  Normocephalic, without obvious abnormality, atraumatic, sinuses nontender on palpation, external ears without lesions, oral pharynx with moist mucus membranes, tonsils without erythema or exudates, gums normal and good dentition.  LUNGS:  No increased work of breathing, good air exchange, clear to auscultation bilaterally, no crackles or wheezing  CARDIOVASCULAR:  Normal apical impulse, regular rate and rhythm, normal S1 and S2, no S3 or S4, and no murmur noted  ABDOMEN:  scars CONSISTENT WITH SURGICAL HISTORY, normal bowel sounds, soft, non-distended, non-tender, no masses palpated, no hepatosplenomegally  SKIN:  WARM AND DRY  Data  CBC with Differential:    Lab Results   Component Value Date/Time    WBC 5.3 05/05/2025 02:27 AM    RBC 4.17 05/05/2025 02:27 AM    HGB 10.3 05/06/2025 04:08 PM    HCT 29.8 05/06/2025 04:08 PM    PLT 59 04/09/2025 10:57 AM    MCV 85.9 05/05/2025 02:27 AM    MCH 30.0 05/05/2025 02:27 AM    MCHC 34.9 05/05/2025 02:27 AM    RDW 14.4 05/05/2025 02:27 AM    NRBC 1 01/31/2025 06:43 AM    BANDSPCT 1 05/15/2016 08:30 AM

## 2025-05-07 NOTE — PLAN OF CARE
Problem: Chronic Conditions and Co-morbidities  Goal: Patient's chronic conditions and co-morbidity symptoms are monitored and maintained or improved  5/7/2025 0858 by Joi Roman RN  Outcome: Progressing  5/7/2025 0641 by Abdirahman Sanders RN  Outcome: Progressing  Flowsheets (Taken 5/6/2025 2000)  Care Plan - Patient's Chronic Conditions and Co-Morbidity Symptoms are Monitored and Maintained or Improved: Monitor and assess patient's chronic conditions and comorbid symptoms for stability, deterioration, or improvement     Problem: Discharge Planning  Goal: Discharge to home or other facility with appropriate resources  5/7/2025 0858 by Joi Roman RN  Outcome: Progressing  5/7/2025 0641 by Abdirahman Sanders RN  Outcome: Progressing  Flowsheets (Taken 5/6/2025 2000)  Discharge to home or other facility with appropriate resources: Identify barriers to discharge with patient and caregiver     Problem: Pain  Goal: Verbalizes/displays adequate comfort level or baseline comfort level  5/7/2025 0858 by Joi Roman RN  Outcome: Progressing  5/7/2025 0641 by Abdirahman Sanders RN  Outcome: Progressing     Problem: Safety - Adult  Goal: Free from fall injury  5/7/2025 0858 by Joi Roman RN  Outcome: Progressing  5/7/2025 0641 by Abdirahman Sanders RN  Outcome: Progressing  Flowsheets (Taken 5/6/2025 2000)  Free From Fall Injury: Instruct family/caregiver on patient safety

## 2025-05-07 NOTE — PROGRESS NOTES
PROGRESS NOTE  By Олег Mitchell M.D.    [facility]  Dr. Lakhwinder Madera M.D.,  Dr. Simón Esquivel M.D.,   Dr. Hugo Rodríguez D.O.,  Dr. Royer Coppola M.D.,  IOANA MulliganO.,          Ellie TOMÁS Santacruz  55 y.o.  female    SUBJECTIVE:  Denies abdominal pain.  Denies nausea vomiting    OBJECTIVE:    BP (!) 156/94   Pulse 89   Temp 98.2 °F (36.8 °C) (Axillary)   Resp 18   Ht 1.524 m (5')   Wt 63.5 kg (140 lb)   SpO2 97%   BMI 27.34 kg/m²     General: NAD/adult  female.  AAO x 3  HEENT: Anicteric sclera/moist oral mucosa  Neck: Supple/trachea midline  Chest: CTAB/symmetric excursions  Cor: Regular/S1-S2  Abd.: Soft.  Nontender and nondistended  Extr.:  No significant peripheral edema  Skin: Warm and dry/anicteric      DATA:    Monitor data reviewed -sinus rhythm noted.       Lab Results   Component Value Date/Time    WBC 2.9 05/07/2025 07:59 AM    RBC 3.80 05/07/2025 07:59 AM    HGB 11.4 05/07/2025 07:59 AM    HCT 33.4 05/07/2025 07:59 AM    MCV 87.9 05/07/2025 07:59 AM    MCH 30.0 05/07/2025 07:59 AM    MCHC 34.1 05/07/2025 07:59 AM    RDW 14.9 05/07/2025 07:59 AM    PLT 42 05/07/2025 07:59 AM    MPV 9.6 05/07/2025 07:59 AM     Lab Results   Component Value Date/Time     05/07/2025 07:59 AM    K 4.0 05/07/2025 07:59 AM    K 5.2 05/11/2023 08:52 PM     05/07/2025 07:59 AM    CO2 16 05/07/2025 07:59 AM    BUN 20 05/07/2025 07:59 AM    CREATININE 0.6 05/07/2025 07:59 AM    CALCIUM 8.2 05/07/2025 07:59 AM    BILITOT 1.1 05/07/2025 07:59 AM    ALKPHOS 547 05/07/2025 07:59 AM    AST 28 05/07/2025 07:59 AM    ALT 26 05/07/2025 07:59 AM     Lab Results   Component Value Date/Time    LIPASE 9 05/05/2025 02:27 AM     Lab Results   Component Value Date/Time    AMYLASE 9 09/11/2021 01:40 PM         ASSESSMENT/PLAN:  Patient Active Problem List   Diagnosis    Chronic pancreatitis due to acute alcohol intoxication (HCC)    Schizoaffective disorder, bipolar type (HCC)    Anxiety disorder

## 2025-05-07 NOTE — CARE COORDINATION
Patient had EGD with Banding x4 yesterday.  Plan to advance diet today.  Patient up in halls independent, plan home at [place], no needs currently noted.  Patient is currently staying with her friend Mary in a 2 story home.  She states she will have transportation home from a friend at [place]. Prior to admit was on Xifaxin.  SW will follow

## 2025-05-08 LAB
GLUCOSE BLD-MCNC: 177 MG/DL (ref 74–99)
GLUCOSE BLD-MCNC: 242 MG/DL (ref 74–99)
GLUCOSE BLD-MCNC: 260 MG/DL (ref 74–99)
GLUCOSE BLD-MCNC: 284 MG/DL (ref 74–99)
HCT VFR BLD AUTO: 29.7 % (ref 34–48)
HGB BLD-MCNC: 10.3 G/DL (ref 11.5–15.5)

## 2025-05-08 PROCEDURE — 2580000003 HC RX 258: Performed by: FAMILY MEDICINE

## 2025-05-08 PROCEDURE — 36415 COLL VENOUS BLD VENIPUNCTURE: CPT

## 2025-05-08 PROCEDURE — 85014 HEMATOCRIT: CPT

## 2025-05-08 PROCEDURE — 6370000000 HC RX 637 (ALT 250 FOR IP): Performed by: FAMILY MEDICINE

## 2025-05-08 PROCEDURE — 2500000003 HC RX 250 WO HCPCS: Performed by: FAMILY MEDICINE

## 2025-05-08 PROCEDURE — 85018 HEMOGLOBIN: CPT

## 2025-05-08 PROCEDURE — 2500000003 HC RX 250 WO HCPCS: Performed by: HOSPITALIST

## 2025-05-08 PROCEDURE — 6360000002 HC RX W HCPCS: Performed by: FAMILY MEDICINE

## 2025-05-08 PROCEDURE — 2060000000 HC ICU INTERMEDIATE R&B

## 2025-05-08 PROCEDURE — 82962 GLUCOSE BLOOD TEST: CPT

## 2025-05-08 PROCEDURE — 6370000000 HC RX 637 (ALT 250 FOR IP): Performed by: HOSPITALIST

## 2025-05-08 RX ORDER — PANTOPRAZOLE SODIUM 40 MG/1
40 TABLET, DELAYED RELEASE ORAL
Status: DISCONTINUED | OUTPATIENT
Start: 2025-05-08 | End: 2025-05-09 | Stop reason: HOSPADM

## 2025-05-08 RX ORDER — LIDOCAINE HYDROCHLORIDE 20 MG/ML
15 SOLUTION OROPHARYNGEAL
Status: DISCONTINUED | OUTPATIENT
Start: 2025-05-08 | End: 2025-05-09 | Stop reason: HOSPADM

## 2025-05-08 RX ADMIN — LIDOCAINE HYDROCHLORIDE 15 ML: 20 SOLUTION ORAL at 12:26

## 2025-05-08 RX ADMIN — RIFAXIMIN 550 MG: 550 TABLET ORAL at 20:38

## 2025-05-08 RX ADMIN — OXYCODONE 5 MG: 5 TABLET ORAL at 06:38

## 2025-05-08 RX ADMIN — OXYCODONE 5 MG: 5 TABLET ORAL at 02:01

## 2025-05-08 RX ADMIN — LIDOCAINE HYDROCHLORIDE 15 ML: 20 SOLUTION ORAL at 16:58

## 2025-05-08 RX ADMIN — WATER 1000 MG: 1 INJECTION INTRAMUSCULAR; INTRAVENOUS; SUBCUTANEOUS at 08:22

## 2025-05-08 RX ADMIN — OXYCODONE 5 MG: 5 TABLET ORAL at 19:19

## 2025-05-08 RX ADMIN — SODIUM CHLORIDE, PRESERVATIVE FREE 10 ML: 5 INJECTION INTRAVENOUS at 20:40

## 2025-05-08 RX ADMIN — PANTOPRAZOLE SODIUM 40 MG: 40 TABLET, DELAYED RELEASE ORAL at 16:58

## 2025-05-08 RX ADMIN — INSULIN LISPRO 1 UNITS: 100 INJECTION, SOLUTION INTRAVENOUS; SUBCUTANEOUS at 20:39

## 2025-05-08 RX ADMIN — OCTREOTIDE ACETATE 50 MCG/HR: 500 INJECTION, SOLUTION INTRAVENOUS; SUBCUTANEOUS at 04:06

## 2025-05-08 RX ADMIN — PANTOPRAZOLE SODIUM 40 MG: 40 TABLET, DELAYED RELEASE ORAL at 08:14

## 2025-05-08 RX ADMIN — CLONIDINE HYDROCHLORIDE 0.1 MG: 0.1 TABLET ORAL at 08:14

## 2025-05-08 RX ADMIN — ANASTROZOLE 1 MG: 1 TABLET ORAL at 08:14

## 2025-05-08 RX ADMIN — CLONIDINE HYDROCHLORIDE 0.1 MG: 0.1 TABLET ORAL at 20:38

## 2025-05-08 RX ADMIN — INSULIN LISPRO 4 UNITS: 100 INJECTION, SOLUTION INTRAVENOUS; SUBCUTANEOUS at 17:24

## 2025-05-08 RX ADMIN — Medication 10 MG: at 20:38

## 2025-05-08 RX ADMIN — INSULIN LISPRO 2 UNITS: 100 INJECTION, SOLUTION INTRAVENOUS; SUBCUTANEOUS at 12:47

## 2025-05-08 RX ADMIN — GABAPENTIN 200 MG: 100 CAPSULE ORAL at 20:38

## 2025-05-08 RX ADMIN — VORTIOXETINE 10 MG: 10 TABLET, FILM COATED ORAL at 20:38

## 2025-05-08 RX ADMIN — OXYCODONE 5 MG: 5 TABLET ORAL at 10:54

## 2025-05-08 RX ADMIN — GABAPENTIN 200 MG: 100 CAPSULE ORAL at 13:30

## 2025-05-08 RX ADMIN — INSULIN GLARGINE 38 UNITS: 100 INJECTION, SOLUTION SUBCUTANEOUS at 08:14

## 2025-05-08 RX ADMIN — CALCIUM 500 MG: 500 TABLET ORAL at 20:38

## 2025-05-08 RX ADMIN — OXYCODONE 5 MG: 5 TABLET ORAL at 15:04

## 2025-05-08 ASSESSMENT — PAIN DESCRIPTION - LOCATION
LOCATION: ABDOMEN

## 2025-05-08 ASSESSMENT — PAIN - FUNCTIONAL ASSESSMENT
PAIN_FUNCTIONAL_ASSESSMENT: ACTIVITIES ARE NOT PREVENTED

## 2025-05-08 ASSESSMENT — PAIN DESCRIPTION - DESCRIPTORS
DESCRIPTORS: ACHING;DISCOMFORT
DESCRIPTORS: ACHING;DISCOMFORT
DESCRIPTORS: ACHING

## 2025-05-08 ASSESSMENT — PAIN DESCRIPTION - ORIENTATION
ORIENTATION: RIGHT;LEFT
ORIENTATION: RIGHT;LEFT
ORIENTATION: MID
ORIENTATION: RIGHT;LEFT;MID
ORIENTATION: RIGHT;LEFT
ORIENTATION: MID;RIGHT;LEFT

## 2025-05-08 ASSESSMENT — PAIN SCALES - GENERAL
PAINLEVEL_OUTOF10: 5
PAINLEVEL_OUTOF10: 5
PAINLEVEL_OUTOF10: 0
PAINLEVEL_OUTOF10: 7
PAINLEVEL_OUTOF10: 0
PAINLEVEL_OUTOF10: 7
PAINLEVEL_OUTOF10: 7
PAINLEVEL_OUTOF10: 8
PAINLEVEL_OUTOF10: 7
PAINLEVEL_OUTOF10: 7
PAINLEVEL_OUTOF10: 0
PAINLEVEL_OUTOF10: 7
PAINLEVEL_OUTOF10: 5
PAINLEVEL_OUTOF10: 8

## 2025-05-08 NOTE — PLAN OF CARE
Problem: Chronic Conditions and Co-morbidities  Goal: Patient's chronic conditions and co-morbidity symptoms are monitored and maintained or improved  5/8/2025 0853 by Azucena Etienne RN  Outcome: Progressing  Flowsheets (Taken 5/8/2025 0814)  Care Plan - Patient's Chronic Conditions and Co-Morbidity Symptoms are Monitored and Maintained or Improved:   Monitor and assess patient's chronic conditions and comorbid symptoms for stability, deterioration, or improvement   Collaborate with multidisciplinary team to address chronic and comorbid conditions and prevent exacerbation or deterioration   Update acute care plan with appropriate goals if chronic or comorbid symptoms are exacerbated and prevent overall improvement and discharge  5/8/2025 0128 by Radha Muñoz RN  Outcome: Progressing     Problem: Discharge Planning  Goal: Discharge to home or other facility with appropriate resources  5/8/2025 0853 by Azucena Etienne RN  Outcome: Progressing  Flowsheets (Taken 5/8/2025 0814)  Discharge to home or other facility with appropriate resources:   Identify barriers to discharge with patient and caregiver   Arrange for needed discharge resources and transportation as appropriate   Identify discharge learning needs (meds, wound care, etc)   Refer to discharge planning if patient needs post-hospital services based on physician order or complex needs related to functional status, cognitive ability or social support system  5/8/2025 0128 by Radha Muñoz RN  Outcome: Progressing     Problem: Pain  Goal: Verbalizes/displays adequate comfort level or baseline comfort level  5/8/2025 0853 by Azucena Etienne RN  Outcome: Progressing  Flowsheets (Taken 5/8/2025 0814)  Verbalizes/displays adequate comfort level or baseline comfort level:   Encourage patient to monitor pain and request assistance   Assess pain using appropriate pain scale   Administer analgesics based on type and severity of pain and evaluate response

## 2025-05-08 NOTE — CARE COORDINATION
Patient had EGD with Banding x4 5/7/25.  Patient up in halls independent, plan home at [place], no needs currently noted.  Patient reports having friends to stay with at OR and She states she will have transportation home from a friend at [place]. Prior to admit was on Xifaxin.  SW will follow

## 2025-05-08 NOTE — PROGRESS NOTES
Department of Family Practice  Adult Daily Progress Note      SUBJECTIVE  OSBALDO IS SEEN IN FOLLOW UP 05/07/2025 ON PIC/ICU.  SHE IS TOLERATING TREATMENT.  SHE HAD HER EGD 05/06/25.  SHE HAD ESOPHAGEAL VARICES,  MILD ANTRAL GASTRITIS AND  GASTRIC BODY WITH MODERATE TO SEVERE MODERATE TO SEVERE PORTAL HYPERTENSIVE GASTROPATHY.  TREATMENT FOR THIS IS NON-SELECTIVE BETA BLOCKERS.  SHE DOES ADMIT TO STAYING WITH HER EX-BOYFRIEND.  SHE DENIES DRINKING ANY ALCOHOL FOR 8 YEARS.  SHE SAYS SHE HAS ANOTHER FRIEND THAT SHE CAN STAY WITH.    SHE REPORTS THAT SHE WAS ON FENTANYL PATCH FOR PAIN.  SHE IS NOT GETTING IT HERE.       OBJECTIVE    Physical  VITALS:  /68   Pulse 65   Temp 98 °F (36.7 °C) (Oral)   Resp 14   Ht 1.524 m (5')   Wt 63.5 kg (140 lb)   SpO2 98%   BMI 27.34 kg/m²   CONSTITUTIONAL:  AWAKE, ALERT, SKIN WARM AND DRY, no apparent distress, and appears stated age  ENT:  Normocephalic, without obvious abnormality, atraumatic, sinuses nontender on palpation, external ears without lesions, oral pharynx with moist mucus membranes, tonsils without erythema or exudates, gums normal and good dentition.  LUNGS:  No increased work of breathing, good air exchange, clear to auscultation bilaterally, no crackles or wheezing  CARDIOVASCULAR:  Normal apical impulse, regular rate and rhythm, normal S1 and S2, no S3 or S4, and no murmur noted  ABDOMEN:  scars CONSISTENT WITH SURGICAL HISTORY, normal bowel sounds, soft, non-distended, non-tender, no masses palpated, no hepatosplenomegally  SKIN:  WARM AND DRY  Data  CBC with Differential:    Lab Results   Component Value Date/Time    WBC 2.9 05/07/2025 07:59 AM    RBC 3.80 05/07/2025 07:59 AM    HGB 11.4 05/07/2025 07:59 AM    HCT 33.4 05/07/2025 07:59 AM    PLT 42 05/07/2025 07:59 AM    MCV 87.9 05/07/2025 07:59 AM    MCH 30.0 05/07/2025 07:59 AM    MCHC 34.1 05/07/2025 07:59 AM    RDW 14.9 05/07/2025 07:59 AM    NRBC 1 01/31/2025 06:43 AM    BANDSPCT 1 05/15/2016 08:30  Meds:.sodium chloride flush, sodium chloride, prochlorperazine, sodium chloride, albuterol, furosemide, hydrOXYzine pamoate, ondansetron, oxyCODONE, sennosides-docusate sodium, ipratropium, traZODone, glucose, dextrose bolus **OR** dextrose bolus, glucagon (rDNA), dextrose    ASSESSMENT AND PLAN      Principal Problem:    Coffee ground emesis  Active Problems:    Type 2 diabetes mellitus with hyperglycemia (HCC)    Elevated LFTs    Chronic abdominal pain    Cirrhosis of liver with ascites, unspecified hepatic cirrhosis type (HCC)    Schizoaffective disorder, bipolar type (HCC)  Resolved Problems:    * No resolved hospital problems. *      CONTINUE PLAN OF CARE.  CONTINUE IV OCTREOTIDE.  PI BID X 2 WEEKS.

## 2025-05-08 NOTE — PROGRESS NOTES
Spiritual Health History and Assessment/Progress Note  [facility]    Initial Encounter, Spiritual/Emotional Needs,  ,  ,      Name: Ellie Santacruz MRN: [id number]    Age: 55 y.o.     Sex: female   Language: English   Anabaptist: [employer]ism   Coffee ground emesis     Date: 5/8/2025                           Spiritual Assessment began in [facility] PIC/ICU        Referral/Consult From: Rounding   Encounter Overview/Reason: Initial Encounter, Spiritual/Emotional Needs  Service Provided For: Patient    Mary, Belief, Meaning:   Patient identifies as spiritual  Family/Friends No family/friends present      Importance and Influence:  Patient has spiritual/personal beliefs that influence decisions regarding their health  Family/Friends No family/friends present    Community:  Patient feels well-supported. Support system includes: Friends  Family/Friends No family/friends present    Assessment and Plan of Care:     Patient Interventions include: Engaged in theological reflection  Family/Friends Interventions include: No family/friends present    Patient Plan of Care: Spiritual Care available upon further referral  Family/Friends Plan of Care: No family/friends present    Electronically signed by Chaplain Mechelle on 5/8/2025 at 3:17 PM

## 2025-05-08 NOTE — PROGRESS NOTES
PROGRESS NOTE  By Олег Mitchell M.D.    [facility]  Dr. Lakhwinder Madera M.D.,  Dr. Simón Esquivel M.D.,   Dr. Hugo Rodríguez DScottO.,  Dr. Royer Coppola M.D.,  Dr. Robbin Mitchell D.O.,          Ellie TOMÁS Santacruz  55 y.o.  female    SUBJECTIVE:  Complains of somewhat persistent discomfort with swallowing    OBJECTIVE:    /74   Pulse 55   Temp 98.1 °F (36.7 °C)   Resp 14   Ht 1.524 m (5')   Wt 63.5 kg (140 lb)   SpO2 96%   BMI 27.34 kg/m²     General: NAD/adult  female  HEENT: Moist oral mucosa/anicteric sclera  Neck: Supple with trachea midline  Chest: Symmetric excursion/nonlabored respirations  Cor: Regular/S1-S2  Abd.: Soft and nondistended  Extr.:  No significant peripheral edema  Skin: Warm and dry/anicteric      DATA:    Monitor data reviewed -sinus rhythm noted.       Lab Results   Component Value Date/Time    WBC 2.9 05/07/2025 07:59 AM    RBC 3.80 05/07/2025 07:59 AM    HGB 11.4 05/07/2025 07:59 AM    HCT 33.4 05/07/2025 07:59 AM    MCV 87.9 05/07/2025 07:59 AM    MCH 30.0 05/07/2025 07:59 AM    MCHC 34.1 05/07/2025 07:59 AM    RDW 14.9 05/07/2025 07:59 AM    PLT 42 05/07/2025 07:59 AM    MPV 9.6 05/07/2025 07:59 AM     Lab Results   Component Value Date/Time     05/07/2025 07:59 AM    K 4.0 05/07/2025 07:59 AM    K 5.2 05/11/2023 08:52 PM     05/07/2025 07:59 AM    CO2 16 05/07/2025 07:59 AM    BUN 20 05/07/2025 07:59 AM    CREATININE 0.6 05/07/2025 07:59 AM    CALCIUM 8.2 05/07/2025 07:59 AM    BILITOT 1.1 05/07/2025 07:59 AM    ALKPHOS 547 05/07/2025 07:59 AM    AST 28 05/07/2025 07:59 AM    ALT 26 05/07/2025 07:59 AM     Lab Results   Component Value Date/Time    LIPASE 9 05/05/2025 02:27 AM     Lab Results   Component Value Date/Time    AMYLASE 9 09/11/2021 01:40 PM         ASSESSMENT/PLAN:  Patient Active Problem List   Diagnosis    Chronic pancreatitis due to acute alcohol intoxication (HCC)    Schizoaffective disorder, bipolar type (HCC)    Anxiety disorder

## 2025-05-09 ENCOUNTER — HOSPITAL ENCOUNTER (OUTPATIENT)
Dept: INFUSION THERAPY | Age: 55
End: 2025-05-09

## 2025-05-09 VITALS
WEIGHT: 140 LBS | DIASTOLIC BLOOD PRESSURE: 85 MMHG | RESPIRATION RATE: 16 BRPM | HEIGHT: 60 IN | OXYGEN SATURATION: 98 % | SYSTOLIC BLOOD PRESSURE: 127 MMHG | BODY MASS INDEX: 27.48 KG/M2 | HEART RATE: 62 BPM | TEMPERATURE: 97.5 F

## 2025-05-09 DIAGNOSIS — Z85.3 HISTORY OF BREAST CANCER: Primary | ICD-10-CM

## 2025-05-09 LAB
ABO/RH: NORMAL
ANTIBODY SCREEN: NEGATIVE
ARM BAND NUMBER: NORMAL
BLOOD BANK DISPENSE STATUS: NORMAL
BLOOD BANK DISPENSE STATUS: NORMAL
BLOOD BANK SAMPLE EXPIRATION: NORMAL
BPU ID: NORMAL
BPU ID: NORMAL
COMPONENT: NORMAL
COMPONENT: NORMAL
CROSSMATCH RESULT: NORMAL
CROSSMATCH RESULT: NORMAL
GLUCOSE BLD-MCNC: 104 MG/DL (ref 74–99)
GLUCOSE BLD-MCNC: 144 MG/DL (ref 74–99)
GLUCOSE BLD-MCNC: 233 MG/DL (ref 74–99)
HCT VFR BLD AUTO: 29.3 % (ref 34–48)
HGB BLD-MCNC: 10.1 G/DL (ref 11.5–15.5)
MITOCHONDRIA M2 IGG SER-ACNC: 1.1 U/ML (ref 0–4)
TRANSFUSION STATUS: NORMAL
TRANSFUSION STATUS: NORMAL
UNIT DIVISION: 0
UNIT DIVISION: 0

## 2025-05-09 PROCEDURE — 2500000003 HC RX 250 WO HCPCS: Performed by: HOSPITALIST

## 2025-05-09 PROCEDURE — 2500000003 HC RX 250 WO HCPCS: Performed by: FAMILY MEDICINE

## 2025-05-09 PROCEDURE — 82962 GLUCOSE BLOOD TEST: CPT

## 2025-05-09 PROCEDURE — 6370000000 HC RX 637 (ALT 250 FOR IP): Performed by: FAMILY MEDICINE

## 2025-05-09 PROCEDURE — 36415 COLL VENOUS BLD VENIPUNCTURE: CPT

## 2025-05-09 PROCEDURE — 6360000002 HC RX W HCPCS: Performed by: FAMILY MEDICINE

## 2025-05-09 PROCEDURE — 85014 HEMATOCRIT: CPT

## 2025-05-09 PROCEDURE — 6370000000 HC RX 637 (ALT 250 FOR IP): Performed by: HOSPITALIST

## 2025-05-09 PROCEDURE — 85018 HEMOGLOBIN: CPT

## 2025-05-09 RX ORDER — PROPRANOLOL HCL 20 MG
20 TABLET ORAL 2 TIMES DAILY
Qty: 90 TABLET | Refills: 3 | Status: SHIPPED | OUTPATIENT
Start: 2025-05-09

## 2025-05-09 RX ORDER — CLONIDINE HYDROCHLORIDE 0.1 MG/1
0.1 TABLET ORAL 2 TIMES DAILY
Qty: 60 TABLET | Refills: 3 | Status: SHIPPED | OUTPATIENT
Start: 2025-05-09

## 2025-05-09 RX ORDER — PANTOPRAZOLE SODIUM 40 MG/1
40 TABLET, DELAYED RELEASE ORAL
Qty: 30 TABLET | Refills: 3 | Status: SHIPPED | OUTPATIENT
Start: 2025-05-09

## 2025-05-09 RX ORDER — PANCRELIPASE 36000; 180000; 114000 [USP'U]/1; [USP'U]/1; [USP'U]/1
2 CAPSULE, DELAYED RELEASE PELLETS ORAL
Qty: 180 CAPSULE | Refills: 0 | Status: SHIPPED | OUTPATIENT
Start: 2025-05-09

## 2025-05-09 RX ORDER — INSULIN LISPRO 100 [IU]/ML
0-8 INJECTION, SOLUTION INTRAVENOUS; SUBCUTANEOUS
Qty: 10 ML | Refills: 0 | Status: SHIPPED | OUTPATIENT
Start: 2025-05-09 | End: 2025-06-08

## 2025-05-09 RX ORDER — INSULIN LISPRO 100 [IU]/ML
0-4 INJECTION, SOLUTION INTRAVENOUS; SUBCUTANEOUS
Qty: 10 ML | Refills: 0 | Status: SHIPPED | OUTPATIENT
Start: 2025-05-09 | End: 2025-06-08

## 2025-05-09 RX ADMIN — LIDOCAINE HYDROCHLORIDE 15 ML: 20 SOLUTION ORAL at 12:29

## 2025-05-09 RX ADMIN — LIDOCAINE HYDROCHLORIDE 15 ML: 20 SOLUTION ORAL at 05:17

## 2025-05-09 RX ADMIN — GABAPENTIN 200 MG: 100 CAPSULE ORAL at 16:33

## 2025-05-09 RX ADMIN — PANTOPRAZOLE SODIUM 40 MG: 40 TABLET, DELAYED RELEASE ORAL at 05:17

## 2025-05-09 RX ADMIN — Medication 1 TABLET: at 08:40

## 2025-05-09 RX ADMIN — OXYCODONE 5 MG: 5 TABLET ORAL at 15:16

## 2025-05-09 RX ADMIN — PANTOPRAZOLE SODIUM 40 MG: 40 TABLET, DELAYED RELEASE ORAL at 16:33

## 2025-05-09 RX ADMIN — CALCIUM 500 MG: 500 TABLET ORAL at 08:40

## 2025-05-09 RX ADMIN — RIFAXIMIN 550 MG: 550 TABLET ORAL at 08:42

## 2025-05-09 RX ADMIN — INSULIN GLARGINE 38 UNITS: 100 INJECTION, SOLUTION SUBCUTANEOUS at 08:46

## 2025-05-09 RX ADMIN — CLONIDINE HYDROCHLORIDE 0.1 MG: 0.1 TABLET ORAL at 08:40

## 2025-05-09 RX ADMIN — INSULIN LISPRO 2 UNITS: 100 INJECTION, SOLUTION INTRAVENOUS; SUBCUTANEOUS at 12:34

## 2025-05-09 RX ADMIN — OXYCODONE HYDROCHLORIDE AND ACETAMINOPHEN 1000 MG: 500 TABLET ORAL at 08:41

## 2025-05-09 RX ADMIN — WATER 1000 MG: 1 INJECTION INTRAMUSCULAR; INTRAVENOUS; SUBCUTANEOUS at 12:29

## 2025-05-09 RX ADMIN — OXYCODONE 5 MG: 5 TABLET ORAL at 05:16

## 2025-05-09 RX ADMIN — GABAPENTIN 200 MG: 100 CAPSULE ORAL at 08:40

## 2025-05-09 RX ADMIN — Medication 100 MG: at 08:40

## 2025-05-09 RX ADMIN — PROPRANOLOL HYDROCHLORIDE 20 MG: 10 TABLET ORAL at 08:41

## 2025-05-09 RX ADMIN — OXYCODONE 5 MG: 5 TABLET ORAL at 01:07

## 2025-05-09 RX ADMIN — ANASTROZOLE 1 MG: 1 TABLET ORAL at 08:42

## 2025-05-09 RX ADMIN — OXYCODONE 5 MG: 5 TABLET ORAL at 09:57

## 2025-05-09 RX ADMIN — SODIUM CHLORIDE, PRESERVATIVE FREE 10 ML: 5 INJECTION INTRAVENOUS at 08:43

## 2025-05-09 ASSESSMENT — PAIN SCALES - GENERAL
PAINLEVEL_OUTOF10: 4
PAINLEVEL_OUTOF10: 7
PAINLEVEL_OUTOF10: 8
PAINLEVEL_OUTOF10: 6
PAINLEVEL_OUTOF10: 6
PAINLEVEL_OUTOF10: 4
PAINLEVEL_OUTOF10: 6
PAINLEVEL_OUTOF10: 7

## 2025-05-09 ASSESSMENT — PAIN DESCRIPTION - LOCATION
LOCATION: ABDOMEN

## 2025-05-09 ASSESSMENT — PAIN DESCRIPTION - DESCRIPTORS
DESCRIPTORS: ACHING;THROBBING
DESCRIPTORS: ACHING;DISCOMFORT

## 2025-05-09 ASSESSMENT — PAIN DESCRIPTION - ORIENTATION
ORIENTATION: RIGHT;LEFT;MID
ORIENTATION: RIGHT;LEFT;MID;UPPER

## 2025-05-09 NOTE — CARE COORDINATION
5/9/25  Note: Followed up with pt & friend at bedside. Pt confirmed plan to d/c home w/ friends, declined any home going needs. Ordered xifaxin pta. Friend to provide transport upon d/c. CM following. Electronically signed by SANDEE Marin on 5/9/2025 at 1:27 PM

## 2025-05-09 NOTE — PROGRESS NOTES
Department of Family Practice  Adult Daily Progress Note      SUBJECTIVE  OSBALDO IS SEEN IN FOLLOW UP 2025 ON PIC/ICU.  SHE IS TOLERATING TREATMENT.  SHE HAD HER EGD 25.  SHE HAD ESOPHAGEAL VARICES,  MILD ANTRAL GASTRITIS AND  GASTRIC BODY WITH MODERATE TO SEVERE MODERATE TO SEVERE PORTAL HYPERTENSIVE GASTROPATHY.  TREATMENT FOR THIS IS NON-SELECTIVE BETA BLOCKERS.    SHE HAS BEEN ON THE OCTREOTIDE INFUSION.  IT LOOKS LIKE IT IS  IN THE ORDERS BUT SHE STILL HAS ANOTHER BAG TO GO IN HER ROOM.    PROPRANOLOL WAS STARTED LAST NIGHT.   BLOOD PRESSURES ARE GOOD.  IF THEY DROP TOO LOW CAN STOP THE CATAPRES.      OBJECTIVE    Physical  VITALS:  /82   Pulse 56   Temp 97.9 °F (36.6 °C) (Oral)   Resp 18   Ht 1.524 m (5')   Wt 63.5 kg (140 lb)   SpO2 100%   BMI 27.34 kg/m²   CONSTITUTIONAL:  AWAKE, ALERT, SKIN WARM AND DRY, no apparent distress, and appears stated age  ENT:  Normocephalic, without obvious abnormality, atraumatic, sinuses nontender on palpation, external ears without lesions, oral pharynx with moist mucus membranes, tonsils without erythema or exudates, gums normal and good dentition.  LUNGS:  No increased work of breathing, good air exchange, clear to auscultation bilaterally, no crackles or wheezing  CARDIOVASCULAR:  Normal apical impulse, regular rate and rhythm, normal S1 and S2, no S3 or S4, and no murmur noted  ABDOMEN:  scars CONSISTENT WITH SURGICAL HISTORY, normal bowel sounds, soft, non-distended, non-tender, no masses palpated, no hepatosplenomegally  SKIN:  WARM AND DRY  Data  CBC with Differential:    Lab Results   Component Value Date/Time    WBC 2.9 2025 07:59 AM    RBC 3.80 2025 07:59 AM    HGB 10.3 2025 07:36 AM    HCT 29.7 2025 07:36 AM    PLT 42 2025 07:59 AM    MCV 87.9 2025 07:59 AM    MCH 30.0 2025 07:59 AM    MCHC 34.1 2025 07:59 AM    RDW 14.9 2025 07:59 AM    NRBC 1 2025 06:43 AM    BANDSPCT 1

## 2025-05-09 NOTE — PLAN OF CARE
Problem: Chronic Conditions and Co-morbidities  Goal: Patient's chronic conditions and co-morbidity symptoms are monitored and maintained or improved  Outcome: Progressing     Problem: Discharge Planning  Goal: Discharge to home or other facility with appropriate resources  Outcome: Progressing     Problem: Pain  Goal: Verbalizes/displays adequate comfort level or baseline comfort level  Outcome: Progressing  Flowsheets  Taken 5/9/2025 0956  Verbalizes/displays adequate comfort level or baseline comfort level: Encourage patient to monitor pain and request assistance  Taken 5/9/2025 0837  Verbalizes/displays adequate comfort level or baseline comfort level: Encourage patient to monitor pain and request assistance     Problem: Safety - Adult  Goal: Free from fall injury  Outcome: Progressing

## 2025-05-09 NOTE — PROGRESS NOTES
PROGRESS NOTE  By Олег Mitchell M.D.    [facility]  Dr. Lakhwinder Madera M.D.,  Dr. Simón Esquivel M.D.,   Dr. Hugo Rodríguez D.O.,  Dr. Royer Coppola M.D.,  Dr. Robbin Mitchell D.O.,          Ellie TOMÁS Santacruz  55 y.o.  female    SUBJECTIVE:  Persistent abdominal pain but no nausea vomiting and able to tolerate diet better    OBJECTIVE:    /75   Pulse 57   Temp 97.5 °F (36.4 °C) (Oral)   Resp 16   Ht 1.524 m (5')   Wt 63.5 kg (140 lb)   SpO2 99%   BMI 27.34 kg/m²     General: NAD/adult  female.  AAO x 3  HEENT: Anicteric sclera/moist oral mucosa  Neck: Supple trachea midline  Chest: Symmetric excursion/nonlabored respirations  Cor: Regular/S1-S2  Abd.: Soft and mildly distended.  Mildly tender.  Extr.:  No significant peripheral edema  Skin: Warm and dry/anicteric      DATA:    Monitor data reviewed -sinus rhythm noted.       Lab Results   Component Value Date/Time    WBC 2.9 05/07/2025 07:59 AM    RBC 3.80 05/07/2025 07:59 AM    HGB 10.3 05/08/2025 07:36 AM    HCT 29.7 05/08/2025 07:36 AM    MCV 87.9 05/07/2025 07:59 AM    MCH 30.0 05/07/2025 07:59 AM    MCHC 34.1 05/07/2025 07:59 AM    RDW 14.9 05/07/2025 07:59 AM    PLT 42 05/07/2025 07:59 AM    MPV 9.6 05/07/2025 07:59 AM     Lab Results   Component Value Date/Time     05/07/2025 07:59 AM    K 4.0 05/07/2025 07:59 AM    K 5.2 05/11/2023 08:52 PM     05/07/2025 07:59 AM    CO2 16 05/07/2025 07:59 AM    BUN 20 05/07/2025 07:59 AM    CREATININE 0.6 05/07/2025 07:59 AM    CALCIUM 8.2 05/07/2025 07:59 AM    BILITOT 1.1 05/07/2025 07:59 AM    ALKPHOS 547 05/07/2025 07:59 AM    AST 28 05/07/2025 07:59 AM    ALT 26 05/07/2025 07:59 AM     Lab Results   Component Value Date/Time    LIPASE 9 05/05/2025 02:27 AM     Lab Results   Component Value Date/Time    AMYLASE 9 09/11/2021 01:40 PM         ASSESSMENT/PLAN:  Patient Active Problem List   Diagnosis    Chronic pancreatitis due to acute alcohol intoxication (HCC)     Schizoaffective disorder, bipolar type (HCC)    Anxiety disorder    Depression    History of alcohol abuse    Pancreatic pseudocyst (HCC)    Liver dysfunction    Asthma    Gastroesophageal reflux disease without esophagitis    Bipolar I disorder (HCC)    Bipolar affective disorder (HCC)    Irritable bowel syndrome    Tobacco use disorder    Jaundice    RUQ abdominal pain    Poorly controlled diabetes mellitus (HCC)    Pain of upper abdomen    Elevated LFTs    Intractable abdominal pain    Abdominal pain, epigastric    Hyperglycemia    Type 2 diabetes mellitus with hyperosmolarity without coma, with long-term current use of insulin (HCC)    Fall (on) (from) other stairs and steps, initial encounter    Hepatitis, alcoholic (HCC)    Nausea and vomiting    Alcoholism (HCC)    Pancytopenia (HCC)    Splenomegaly    Hypoglycemia episode    Hepatitis C    Anxiety and depression    Cigarette smoker, heavy    Acute pancreatitis    Alcohol abuse    Acute alcoholic gastritis    Other osteoporosis without current pathological fracture    Ketoacidosis, diabetic, no coma, insulin dependent    Bicytopenia - leukopenia and thrombocytopenia    Chronic abdominal pain    Chronic pancreatitis (HCC)    Electrolyte imbalance    Lactic acidosis    Severe protein-calorie malnutrition    Idiopathic osteoporosis    Ascites due to alcoholic cirrhosis (HCC)    Diabetic polyneuropathy associated with type 2 diabetes mellitus (HCC)    Malignant neoplasm of left breast in female, estrogen receptor positive (HCC)    Cocaine dependence with withdrawal (HCC)    Vaginitis and vulvovaginitis    Abscess, gluteal, left    Nondependent cocaine abuse (HCC)    Cellulitis    DKA, type 1, not at goal (HCC)    Enterocolitis    Perineal abscess    Cheryle gangrene (HCC)    Type 2 diabetes mellitus with hyperglycemia (HCC)    Failure to thrive in adult    Cocaine use    Liver mass    Generalized abdominal pain    Elevated liver enzymes    Portal hypertension

## 2025-05-10 LAB
ALK PHOS BONE SPECIFIC: 100 U/L (ref 0–55)
ALK PHOS OTHER CALC: 0 U/L
ALK PHOSPHATASE: 528 U/L (ref 40–120)
ALKALINE PHOSPHATASE LIVER FRACTION: 428 U/L (ref 0–94)

## 2025-05-12 LAB — SURGICAL PATHOLOGY REPORT: NORMAL

## 2025-05-16 NOTE — PROGRESS NOTES
CLINICAL PHARMACY NOTE: MEDS TO BEDS    Total # of Prescriptions Filled: 4   The following medications were delivered to the patient:  Humalog 100u/mL vial  Propranolol 20 mg  Clonidine 0.1 mg  Pantoprazole 40 mg    Additional Documentation:     Creon was out of stock and sent to [facility] on [address], per patient's request.

## 2025-05-16 NOTE — DISCHARGE SUMMARY
Physician Discharge Summary     Patient ID:  Elile Santacruz  [id number]  55 y.o.  1970    Admit date: 5/5/2025    Discharge date and time: 5/9/2025  5:33 PM     Admitting Physician: Liza Montelongo DO     Discharge Physician: LIZA MONTELONGO DO    Admission Diagnoses: Coffee ground emesis [K92.0]    Discharge Diagnoses: MODERATE TO SEVERE PORTAL HYPERTENSIVE GASTROPATHY     Admission Condition: fair    Discharged Condition: good    Indication for Admission: COFFEE GROUND EMESIS    Hospital Course: 05/05/2025    The patient is a 55 y.o. female with significant past medical history of CIRRHOSIS OF THE LIVER who presents with COFFEE GROUND EMESIS.  SHE HAD A VISIT TO THE ED 2 DAYS AGO WITH ABDOMINAL PAIN, NAUSEA AND VOMITING.       Patient is a 54 y/o female who presents to the ED via EMS with hematemesis. Patient states that she began vomiting blood tonight. She has a history of liver disease and chronic pancreatitis. She denies any history of previous GI bleeding. She does report upper abdominal pain. Currently, her pain is 10/10. She denies any fever.  05/06/2025    ELLIE IS SEEN IN FOLLOW UP 05/06/2025 ON PIC/ICU.  SHE IS TOLERATING TREATMENT. SHE IS SLEEPING AT PRESENT.  SHE HAD HER EGD TODAY.  SHE HAD ESOPHAGEAL VARICES,  MILD ANTRAL GASTRITIS AND  GASTRIC BODY WITH MODERATE TO SEVERE MODERATE TO SEVERE PORTAL HYPERTENSIVE GASTROPATHY.  HER EX-BOYFRIEND IS PRESENT.  HE SAYS SHE IS STAYING WITH HIM.  HE IS NOT ALLOWED TO HAVE ANYONE STAYING WITH HIM IN HIS APARTMENT.  SHE CANNOT STAY, OR HE WILL BE EVICTED.  SHE WILL NEED ALTERNATE DISCHARGE PLANS.  HE REPORTED THAT SHE IS STILL DRINKING ALCOHOL.   05/07/2025    ELLIE IS SEEN IN FOLLOW UP 05/07/2025 ON PIC/ICU.  SHE IS TOLERATING TREATMENT.  SHE HAD HER EGD 05/06/25.  SHE HAD ESOPHAGEAL VARICES,  MILD ANTRAL GASTRITIS AND  GASTRIC BODY WITH MODERATE TO SEVERE MODERATE TO SEVERE PORTAL HYPERTENSIVE GASTROPATHY.  TREATMENT FOR THIS IS NON-SELECTIVE BETA

## 2025-05-20 ENCOUNTER — APPOINTMENT (OUTPATIENT)
Dept: GENERAL RADIOLOGY | Age: 55
DRG: 420 | End: 2025-05-20
Payer: COMMERCIAL

## 2025-05-20 ENCOUNTER — APPOINTMENT (OUTPATIENT)
Dept: CT IMAGING | Age: 55
DRG: 420 | End: 2025-05-20
Payer: COMMERCIAL

## 2025-05-20 ENCOUNTER — HOSPITAL ENCOUNTER (INPATIENT)
Age: 55
LOS: 4 days | Discharge: NURSING, REHAB OR LONG-TERM CARE | DRG: 420 | End: 2025-05-24
Attending: STUDENT IN AN ORGANIZED HEALTH CARE EDUCATION/TRAINING PROGRAM | Admitting: FAMILY MEDICINE
Payer: COMMERCIAL

## 2025-05-20 DIAGNOSIS — G89.29 OTHER CHRONIC PAIN: ICD-10-CM

## 2025-05-20 DIAGNOSIS — R13.10 DYSPHAGIA, UNSPECIFIED TYPE: ICD-10-CM

## 2025-05-20 DIAGNOSIS — E10.10 TYPE 1 DIABETES MELLITUS WITH KETOACIDOSIS WITHOUT COMA (HCC): Primary | ICD-10-CM

## 2025-05-20 DIAGNOSIS — Z91.148 DRUG NONCOMPLIANCE: ICD-10-CM

## 2025-05-20 DIAGNOSIS — E87.20 LACTIC ACIDOSIS: ICD-10-CM

## 2025-05-20 PROBLEM — E09.10: Status: ACTIVE | Noted: 2025-05-20

## 2025-05-20 PROBLEM — F14.10 COCAINE USE DISORDER (HCC): Status: ACTIVE | Noted: 2025-05-20

## 2025-05-20 PROBLEM — E11.10 DIABETIC KETOACIDOSIS (HCC): Status: ACTIVE | Noted: 2025-05-20

## 2025-05-20 PROBLEM — E87.6 HYPOKALEMIA: Status: ACTIVE | Noted: 2025-05-20

## 2025-05-20 LAB
ABO + RH BLD: NORMAL
ALBUMIN SERPL-MCNC: 4.3 G/DL (ref 3.5–5.2)
ALP SERPL-CCNC: 361 U/L (ref 35–104)
ALT SERPL-CCNC: 19 U/L (ref 0–32)
AMMONIA PLAS-SCNC: 30 UMOL/L (ref 11–51)
AMPHET UR QL SCN: NEGATIVE
ANION GAP SERPL CALCULATED.3IONS-SCNC: 13 MMOL/L (ref 7–16)
ANION GAP SERPL CALCULATED.3IONS-SCNC: 22 MMOL/L (ref 7–16)
ANION GAP SERPL CALCULATED.3IONS-SCNC: 38 MMOL/L (ref 7–16)
ANION GAP SERPL CALCULATED.3IONS-SCNC: 44 MMOL/L (ref 7–16)
APAP SERPL-MCNC: <5 UG/ML (ref 10–30)
ARM BAND NUMBER: NORMAL
AST SERPL-CCNC: 23 U/L (ref 0–31)
B PARAP IS1001 DNA NPH QL NAA+NON-PROBE: NOT DETECTED
B PERT DNA SPEC QL NAA+PROBE: NOT DETECTED
B-OH-BUTYR SERPL-MCNC: >4.5 MMOL/L (ref 0.02–0.27)
B.E.: -14.4 MMOL/L (ref -3–3)
BARBITURATES UR QL SCN: NEGATIVE
BASOPHILS # BLD: 0.06 K/UL (ref 0–0.2)
BASOPHILS NFR BLD: 1 % (ref 0–2)
BENZODIAZ UR QL: NEGATIVE
BILIRUB DIRECT SERPL-MCNC: 0.4 MG/DL (ref 0–0.3)
BILIRUB INDIRECT SERPL-MCNC: 0.8 MG/DL (ref 0–1)
BILIRUB SERPL-MCNC: 1.2 MG/DL (ref 0–1.2)
BILIRUB UR QL STRIP: ABNORMAL
BLOOD BANK SAMPLE EXPIRATION: NORMAL
BLOOD GROUP ANTIBODIES SERPL: NEGATIVE
BNP SERPL-MCNC: 575 PG/ML (ref 0–450)
BUN SERPL-MCNC: 22 MG/DL (ref 6–20)
BUN SERPL-MCNC: 26 MG/DL (ref 6–20)
BUN SERPL-MCNC: 29 MG/DL (ref 6–20)
BUN SERPL-MCNC: 31 MG/DL (ref 6–20)
BUPRENORPHINE UR QL: NEGATIVE
C PNEUM DNA NPH QL NAA+NON-PROBE: NOT DETECTED
CALCIUM SERPL-MCNC: 7.1 MG/DL (ref 8.6–10.2)
CALCIUM SERPL-MCNC: 7.6 MG/DL (ref 8.6–10.2)
CALCIUM SERPL-MCNC: 7.9 MG/DL (ref 8.6–10.2)
CALCIUM SERPL-MCNC: 9.6 MG/DL (ref 8.6–10.2)
CANNABINOIDS UR QL SCN: NEGATIVE
CHLORIDE SERPL-SCNC: 102 MMOL/L (ref 98–107)
CHLORIDE SERPL-SCNC: 104 MMOL/L (ref 98–107)
CHLORIDE SERPL-SCNC: 78 MMOL/L (ref 98–107)
CHLORIDE SERPL-SCNC: 91 MMOL/L (ref 98–107)
CLARITY UR: CLEAR
CO2 SERPL-SCNC: 16 MMOL/L (ref 22–29)
CO2 SERPL-SCNC: 19 MMOL/L (ref 22–29)
CO2 SERPL-SCNC: 8 MMOL/L (ref 22–29)
CO2 SERPL-SCNC: 9 MMOL/L (ref 22–29)
COCAINE UR QL SCN: POSITIVE
COHB: 1 % (ref 0–1.5)
COLOR UR: YELLOW
COMMENT: ABNORMAL
CREAT SERPL-MCNC: 0.7 MG/DL (ref 0.5–1)
CREAT SERPL-MCNC: 0.9 MG/DL (ref 0.5–1)
CREAT SERPL-MCNC: 1.1 MG/DL (ref 0.5–1)
CREAT SERPL-MCNC: 1.1 MG/DL (ref 0.5–1)
CRITICAL: ABNORMAL
DATE ANALYZED: ABNORMAL
DATE OF COLLECTION: ABNORMAL
EKG ATRIAL RATE: 104 BPM
EKG P AXIS: 82 DEGREES
EKG P-R INTERVAL: 128 MS
EKG Q-T INTERVAL: 396 MS
EKG QRS DURATION: 88 MS
EKG QTC CALCULATION (BAZETT): 520 MS
EKG R AXIS: 74 DEGREES
EKG T AXIS: 106 DEGREES
EKG VENTRICULAR RATE: 104 BPM
EOSINOPHIL # BLD: 0.01 K/UL (ref 0.05–0.5)
EOSINOPHILS RELATIVE PERCENT: 0 % (ref 0–6)
EPI CELLS #/AREA URNS HPF: ABNORMAL /HPF
ERYTHROCYTE [DISTWIDTH] IN BLOOD BY AUTOMATED COUNT: 15.8 % (ref 11.5–15)
ETHANOLAMINE SERPL-MCNC: <10 MG/DL (ref 0–0.08)
FENTANYL UR QL: NEGATIVE
FLUAV RNA NPH QL NAA+NON-PROBE: NOT DETECTED
FLUBV RNA NPH QL NAA+NON-PROBE: NOT DETECTED
GFR, ESTIMATED: 57 ML/MIN/1.73M2
GFR, ESTIMATED: 61 ML/MIN/1.73M2
GFR, ESTIMATED: 81 ML/MIN/1.73M2
GFR, ESTIMATED: >90 ML/MIN/1.73M2
GLUCOSE BLD-MCNC: 175 MG/DL (ref 74–99)
GLUCOSE BLD-MCNC: 194 MG/DL (ref 74–99)
GLUCOSE BLD-MCNC: 206 MG/DL (ref 74–99)
GLUCOSE BLD-MCNC: 216 MG/DL (ref 74–99)
GLUCOSE BLD-MCNC: 253 MG/DL (ref 74–99)
GLUCOSE BLD-MCNC: 256 MG/DL (ref 74–99)
GLUCOSE BLD-MCNC: 274 MG/DL (ref 74–99)
GLUCOSE BLD-MCNC: 279 MG/DL (ref 74–99)
GLUCOSE BLD-MCNC: 280 MG/DL (ref 74–99)
GLUCOSE BLD-MCNC: 302 MG/DL (ref 74–99)
GLUCOSE BLD-MCNC: >500 MG/DL (ref 74–99)
GLUCOSE BLD-MCNC: >500 MG/DL (ref 74–99)
GLUCOSE SERPL-MCNC: 215 MG/DL (ref 74–99)
GLUCOSE SERPL-MCNC: 279 MG/DL (ref 74–99)
GLUCOSE SERPL-MCNC: 525 MG/DL (ref 74–99)
GLUCOSE SERPL-MCNC: 815 MG/DL (ref 74–99)
GLUCOSE UR STRIP-MCNC: >=1000 MG/DL
HADV DNA NPH QL NAA+NON-PROBE: NOT DETECTED
HBA1C MFR BLD: 8.5 % (ref 4–5.6)
HCO3: 8.3 MMOL/L (ref 22–26)
HCOV 229E RNA NPH QL NAA+NON-PROBE: NOT DETECTED
HCOV HKU1 RNA NPH QL NAA+NON-PROBE: NOT DETECTED
HCOV NL63 RNA NPH QL NAA+NON-PROBE: NOT DETECTED
HCOV OC43 RNA NPH QL NAA+NON-PROBE: NOT DETECTED
HCT VFR BLD AUTO: 39.4 % (ref 34–48)
HGB BLD-MCNC: 14.1 G/DL (ref 11.5–15.5)
HGB UR QL STRIP.AUTO: ABNORMAL
HHB: 2 % (ref 0–5)
HMPV RNA NPH QL NAA+NON-PROBE: NOT DETECTED
HPIV1 RNA NPH QL NAA+NON-PROBE: NOT DETECTED
HPIV2 RNA NPH QL NAA+NON-PROBE: NOT DETECTED
HPIV3 RNA NPH QL NAA+NON-PROBE: NOT DETECTED
HPIV4 RNA NPH QL NAA+NON-PROBE: NOT DETECTED
IMM GRANULOCYTES # BLD AUTO: 0.07 K/UL (ref 0–0.58)
IMM GRANULOCYTES NFR BLD: 1 % (ref 0–5)
INR PPP: 1.3
KETONES UR STRIP-MCNC: >80 MG/DL
LAB: ABNORMAL
LACTATE BLDV-SCNC: 4.2 MMOL/L (ref 0.5–1.9)
LACTATE BLDV-SCNC: 4.4 MMOL/L (ref 0.5–1.9)
LEUKOCYTE ESTERASE UR QL STRIP: NEGATIVE
LIPASE SERPL-CCNC: 10 U/L (ref 13–60)
LYMPHOCYTES NFR BLD: 0.67 K/UL (ref 1.5–4)
LYMPHOCYTES RELATIVE PERCENT: 5 % (ref 20–42)
Lab: 703
M PNEUMO DNA NPH QL NAA+NON-PROBE: NOT DETECTED
MAGNESIUM SERPL-MCNC: 1.8 MG/DL (ref 1.6–2.6)
MAGNESIUM SERPL-MCNC: 2.1 MG/DL (ref 1.6–2.6)
MAGNESIUM SERPL-MCNC: 2.3 MG/DL (ref 1.6–2.6)
MAGNESIUM SERPL-MCNC: 2.7 MG/DL (ref 1.6–2.6)
MCH RBC QN AUTO: 29.9 PG (ref 26–35)
MCHC RBC AUTO-ENTMCNC: 35.8 G/DL (ref 32–34.5)
MCV RBC AUTO: 83.7 FL (ref 80–99.9)
METHADONE UR QL: NEGATIVE
METHB: 0 % (ref 0–1.5)
MODE: ABNORMAL
MONOCYTES NFR BLD: 0.27 K/UL (ref 0.1–0.95)
MONOCYTES NFR BLD: 2 % (ref 2–12)
NEUTROPHILS NFR BLD: 92 % (ref 43–80)
NEUTS SEG NFR BLD: 11.87 K/UL (ref 1.8–7.3)
NITRITE UR QL STRIP: NEGATIVE
O2 CONTENT: 19.2 ML/DL
O2 SATURATION: 98 % (ref 92–98.5)
O2HB: 97 % (ref 94–97)
OPERATOR ID: ABNORMAL
OPIATES UR QL SCN: NEGATIVE
OXYCODONE UR QL SCN: NEGATIVE
PATIENT TEMP: 37 C
PCO2: 15.6 MMHG (ref 35–45)
PCP UR QL SCN: NEGATIVE
PH BLOOD GAS: 7.35 (ref 7.35–7.45)
PH UR STRIP: 5.5 [PH] (ref 5–8)
PHOSPHATE SERPL-MCNC: 1.4 MG/DL (ref 2.5–4.5)
PHOSPHATE SERPL-MCNC: 1.8 MG/DL (ref 2.5–4.5)
PHOSPHATE SERPL-MCNC: 3.3 MG/DL (ref 2.5–4.5)
PHOSPHATE SERPL-MCNC: 5.2 MG/DL (ref 2.5–4.5)
PLATELET # BLD AUTO: 87 K/UL (ref 130–450)
PLATELET CONFIRMATION: NORMAL
PMV BLD AUTO: 10.8 FL (ref 7–12)
PO2: 107.8 MMHG (ref 75–100)
POTASSIUM SERPL-SCNC: 3.2 MMOL/L (ref 3.5–5)
POTASSIUM SERPL-SCNC: 3.4 MMOL/L (ref 3.5–5)
POTASSIUM SERPL-SCNC: 4.2 MMOL/L (ref 3.5–5)
POTASSIUM SERPL-SCNC: 4.5 MMOL/L (ref 3.5–5)
PROCALCITONIN SERPL-MCNC: 0.18 NG/ML (ref 0–0.08)
PROT SERPL-MCNC: 8.6 G/DL (ref 6.4–8.3)
PROT UR STRIP-MCNC: NEGATIVE MG/DL
PROTHROMBIN TIME: 14 SEC (ref 9.3–12.4)
RBC # BLD AUTO: 4.71 M/UL (ref 3.5–5.5)
RBC # BLD: NORMAL 10*6/UL
RBC #/AREA URNS HPF: ABNORMAL /HPF
RSV RNA NPH QL NAA+NON-PROBE: NOT DETECTED
RV+EV RNA NPH QL NAA+NON-PROBE: NOT DETECTED
SALICYLATES SERPL-MCNC: <0.3 MG/DL (ref 0–30)
SARS-COV-2 RNA NPH QL NAA+NON-PROBE: NOT DETECTED
SODIUM SERPL-SCNC: 131 MMOL/L (ref 132–146)
SODIUM SERPL-SCNC: 136 MMOL/L (ref 132–146)
SODIUM SERPL-SCNC: 137 MMOL/L (ref 132–146)
SODIUM SERPL-SCNC: 140 MMOL/L (ref 132–146)
SOURCE, BLOOD GAS: ABNORMAL
SP GR UR STRIP: 1.01 (ref 1–1.03)
SPECIMEN DESCRIPTION: NORMAL
T4 FREE SERPL-MCNC: 1.9 NG/DL (ref 0.9–1.7)
TEST INFORMATION: ABNORMAL
THB: 14 G/DL (ref 11.5–16.5)
TIME ANALYZED: 709
TOXIC TRICYCLIC SC,BLOOD: NEGATIVE
TROPONIN I SERPL HS-MCNC: 16 NG/L (ref 0–14)
TROPONIN I SERPL HS-MCNC: 20 NG/L (ref 0–14)
TSH SERPL DL<=0.05 MIU/L-ACNC: 0.89 UIU/ML (ref 0.27–4.2)
UROBILINOGEN UR STRIP-ACNC: 0.2 EU/DL (ref 0–1)
WBC #/AREA URNS HPF: ABNORMAL /HPF
WBC OTHER # BLD: 13 K/UL (ref 4.5–11.5)

## 2025-05-20 PROCEDURE — 86900 BLOOD TYPING SEROLOGIC ABO: CPT

## 2025-05-20 PROCEDURE — 86850 RBC ANTIBODY SCREEN: CPT

## 2025-05-20 PROCEDURE — 83605 ASSAY OF LACTIC ACID: CPT

## 2025-05-20 PROCEDURE — 99285 EMERGENCY DEPT VISIT HI MDM: CPT

## 2025-05-20 PROCEDURE — 84439 ASSAY OF FREE THYROXINE: CPT

## 2025-05-20 PROCEDURE — 80307 DRUG TEST PRSMV CHEM ANLYZR: CPT

## 2025-05-20 PROCEDURE — 6360000002 HC RX W HCPCS

## 2025-05-20 PROCEDURE — 2500000003 HC RX 250 WO HCPCS: Performed by: INTERNAL MEDICINE

## 2025-05-20 PROCEDURE — 84443 ASSAY THYROID STIM HORMONE: CPT

## 2025-05-20 PROCEDURE — 93005 ELECTROCARDIOGRAM TRACING: CPT

## 2025-05-20 PROCEDURE — 81001 URINALYSIS AUTO W/SCOPE: CPT

## 2025-05-20 PROCEDURE — 6370000000 HC RX 637 (ALT 250 FOR IP): Performed by: INTERNAL MEDICINE

## 2025-05-20 PROCEDURE — 99291 CRITICAL CARE FIRST HOUR: CPT | Performed by: INTERNAL MEDICINE

## 2025-05-20 PROCEDURE — 83880 ASSAY OF NATRIURETIC PEPTIDE: CPT

## 2025-05-20 PROCEDURE — 96366 THER/PROPH/DIAG IV INF ADDON: CPT

## 2025-05-20 PROCEDURE — 80143 DRUG ASSAY ACETAMINOPHEN: CPT

## 2025-05-20 PROCEDURE — 82805 BLOOD GASES W/O2 SATURATION: CPT

## 2025-05-20 PROCEDURE — 96375 TX/PRO/DX INJ NEW DRUG ADDON: CPT

## 2025-05-20 PROCEDURE — 84484 ASSAY OF TROPONIN QUANT: CPT

## 2025-05-20 PROCEDURE — 82010 KETONE BODYS QUAN: CPT

## 2025-05-20 PROCEDURE — 80048 BASIC METABOLIC PNL TOTAL CA: CPT

## 2025-05-20 PROCEDURE — 2580000003 HC RX 258: Performed by: INTERNAL MEDICINE

## 2025-05-20 PROCEDURE — 83036 HEMOGLOBIN GLYCOSYLATED A1C: CPT

## 2025-05-20 PROCEDURE — 76937 US GUIDE VASCULAR ACCESS: CPT

## 2025-05-20 PROCEDURE — 83690 ASSAY OF LIPASE: CPT

## 2025-05-20 PROCEDURE — 2000000000 HC ICU R&B

## 2025-05-20 PROCEDURE — 82248 BILIRUBIN DIRECT: CPT

## 2025-05-20 PROCEDURE — 6360000002 HC RX W HCPCS: Performed by: INTERNAL MEDICINE

## 2025-05-20 PROCEDURE — 2580000003 HC RX 258

## 2025-05-20 PROCEDURE — 85610 PROTHROMBIN TIME: CPT

## 2025-05-20 PROCEDURE — 82140 ASSAY OF AMMONIA: CPT

## 2025-05-20 PROCEDURE — 71045 X-RAY EXAM CHEST 1 VIEW: CPT

## 2025-05-20 PROCEDURE — 6360000004 HC RX CONTRAST MEDICATION: Performed by: RADIOLOGY

## 2025-05-20 PROCEDURE — 94640 AIRWAY INHALATION TREATMENT: CPT

## 2025-05-20 PROCEDURE — 87086 URINE CULTURE/COLONY COUNT: CPT

## 2025-05-20 PROCEDURE — 82962 GLUCOSE BLOOD TEST: CPT

## 2025-05-20 PROCEDURE — 93010 ELECTROCARDIOGRAM REPORT: CPT | Performed by: INTERNAL MEDICINE

## 2025-05-20 PROCEDURE — 6370000000 HC RX 637 (ALT 250 FOR IP)

## 2025-05-20 PROCEDURE — 83735 ASSAY OF MAGNESIUM: CPT

## 2025-05-20 PROCEDURE — 96365 THER/PROPH/DIAG IV INF INIT: CPT

## 2025-05-20 PROCEDURE — 87077 CULTURE AEROBIC IDENTIFY: CPT

## 2025-05-20 PROCEDURE — 74177 CT ABD & PELVIS W/CONTRAST: CPT

## 2025-05-20 PROCEDURE — 96361 HYDRATE IV INFUSION ADD-ON: CPT

## 2025-05-20 PROCEDURE — 70450 CT HEAD/BRAIN W/O DYE: CPT

## 2025-05-20 PROCEDURE — 86901 BLOOD TYPING SEROLOGIC RH(D): CPT

## 2025-05-20 PROCEDURE — 84100 ASSAY OF PHOSPHORUS: CPT

## 2025-05-20 PROCEDURE — 80053 COMPREHEN METABOLIC PANEL: CPT

## 2025-05-20 PROCEDURE — 80179 DRUG ASSAY SALICYLATE: CPT

## 2025-05-20 PROCEDURE — G0480 DRUG TEST DEF 1-7 CLASSES: HCPCS

## 2025-05-20 PROCEDURE — 85025 COMPLETE CBC W/AUTO DIFF WBC: CPT

## 2025-05-20 PROCEDURE — 87081 CULTURE SCREEN ONLY: CPT

## 2025-05-20 PROCEDURE — 84145 PROCALCITONIN (PCT): CPT

## 2025-05-20 PROCEDURE — 87040 BLOOD CULTURE FOR BACTERIA: CPT

## 2025-05-20 PROCEDURE — 0202U NFCT DS 22 TRGT SARS-COV-2: CPT

## 2025-05-20 RX ORDER — DIPHENHYDRAMINE HYDROCHLORIDE 50 MG/ML
25 INJECTION, SOLUTION INTRAMUSCULAR; INTRAVENOUS ONCE
Status: COMPLETED | OUTPATIENT
Start: 2025-05-20 | End: 2025-05-20

## 2025-05-20 RX ORDER — FENTANYL 25 UG/1
1 PATCH TRANSDERMAL
Status: DISCONTINUED | OUTPATIENT
Start: 2025-05-20 | End: 2025-05-21 | Stop reason: DRUGHIGH

## 2025-05-20 RX ORDER — ALBUTEROL SULFATE 0.83 MG/ML
2.5 SOLUTION RESPIRATORY (INHALATION) EVERY 6 HOURS PRN
Status: DISCONTINUED | OUTPATIENT
Start: 2025-05-20 | End: 2025-05-24 | Stop reason: HOSPADM

## 2025-05-20 RX ORDER — SODIUM CHLORIDE, SODIUM LACTATE, POTASSIUM CHLORIDE, CALCIUM CHLORIDE 600; 310; 30; 20 MG/100ML; MG/100ML; MG/100ML; MG/100ML
INJECTION, SOLUTION INTRAVENOUS CONTINUOUS
Status: DISCONTINUED | OUTPATIENT
Start: 2025-05-20 | End: 2025-05-20

## 2025-05-20 RX ORDER — IOPAMIDOL 755 MG/ML
70 INJECTION, SOLUTION INTRAVASCULAR
Status: COMPLETED | OUTPATIENT
Start: 2025-05-20 | End: 2025-05-20

## 2025-05-20 RX ORDER — POTASSIUM CHLORIDE 7.45 MG/ML
10 INJECTION INTRAVENOUS PRN
Status: DISCONTINUED | OUTPATIENT
Start: 2025-05-20 | End: 2025-05-20

## 2025-05-20 RX ORDER — MAGNESIUM SULFATE IN WATER 40 MG/ML
2000 INJECTION, SOLUTION INTRAVENOUS PRN
Status: DISCONTINUED | OUTPATIENT
Start: 2025-05-20 | End: 2025-05-20 | Stop reason: SDUPTHER

## 2025-05-20 RX ORDER — PANTOPRAZOLE SODIUM 40 MG/10ML
40 INJECTION, POWDER, LYOPHILIZED, FOR SOLUTION INTRAVENOUS DAILY
Status: DISCONTINUED | OUTPATIENT
Start: 2025-05-20 | End: 2025-05-20

## 2025-05-20 RX ORDER — IPRATROPIUM BROMIDE AND ALBUTEROL SULFATE 2.5; .5 MG/3ML; MG/3ML
1 SOLUTION RESPIRATORY (INHALATION)
Status: DISCONTINUED | OUTPATIENT
Start: 2025-05-20 | End: 2025-05-24 | Stop reason: HOSPADM

## 2025-05-20 RX ORDER — SODIUM CHLORIDE 450 MG/100ML
INJECTION, SOLUTION INTRAVENOUS CONTINUOUS
Status: DISCONTINUED | OUTPATIENT
Start: 2025-05-20 | End: 2025-05-22

## 2025-05-20 RX ORDER — POTASSIUM CHLORIDE 7.45 MG/ML
10 INJECTION INTRAVENOUS PRN
Status: DISCONTINUED | OUTPATIENT
Start: 2025-05-20 | End: 2025-05-21

## 2025-05-20 RX ORDER — 0.9 % SODIUM CHLORIDE 0.9 %
15 INTRAVENOUS SOLUTION INTRAVENOUS ONCE
Status: COMPLETED | OUTPATIENT
Start: 2025-05-20 | End: 2025-05-20

## 2025-05-20 RX ORDER — GABAPENTIN 100 MG/1
200 CAPSULE ORAL 3 TIMES DAILY
Status: DISCONTINUED | OUTPATIENT
Start: 2025-05-20 | End: 2025-05-24 | Stop reason: HOSPADM

## 2025-05-20 RX ORDER — ENOXAPARIN SODIUM 100 MG/ML
40 INJECTION SUBCUTANEOUS DAILY
Status: DISCONTINUED | OUTPATIENT
Start: 2025-05-20 | End: 2025-05-23

## 2025-05-20 RX ORDER — ONDANSETRON 2 MG/ML
4 INJECTION INTRAMUSCULAR; INTRAVENOUS EVERY 6 HOURS PRN
Status: DISCONTINUED | OUTPATIENT
Start: 2025-05-20 | End: 2025-05-24 | Stop reason: HOSPADM

## 2025-05-20 RX ORDER — ANASTROZOLE 1 MG/1
1 TABLET ORAL EVERY MORNING
Status: DISCONTINUED | OUTPATIENT
Start: 2025-05-20 | End: 2025-05-24 | Stop reason: HOSPADM

## 2025-05-20 RX ORDER — DEXTROSE MONOHYDRATE, SODIUM CHLORIDE, AND POTASSIUM CHLORIDE 50; 1.49; 4.5 G/1000ML; G/1000ML; G/1000ML
INJECTION, SOLUTION INTRAVENOUS CONTINUOUS PRN
Status: DISCONTINUED | OUTPATIENT
Start: 2025-05-20 | End: 2025-05-21

## 2025-05-20 RX ORDER — OXYCODONE HYDROCHLORIDE 5 MG/1
5 TABLET ORAL EVERY 6 HOURS PRN
Status: DISCONTINUED | OUTPATIENT
Start: 2025-05-20 | End: 2025-05-24 | Stop reason: HOSPADM

## 2025-05-20 RX ORDER — MAGNESIUM SULFATE IN WATER 40 MG/ML
2000 INJECTION, SOLUTION INTRAVENOUS PRN
Status: DISCONTINUED | OUTPATIENT
Start: 2025-05-20 | End: 2025-05-21

## 2025-05-20 RX ORDER — 0.9 % SODIUM CHLORIDE 0.9 %
1000 INTRAVENOUS SOLUTION INTRAVENOUS ONCE
Status: COMPLETED | OUTPATIENT
Start: 2025-05-20 | End: 2025-05-20

## 2025-05-20 RX ORDER — LACTULOSE 10 G/15ML
20 SOLUTION ORAL 3 TIMES DAILY
Status: DISCONTINUED | OUTPATIENT
Start: 2025-05-20 | End: 2025-05-24 | Stop reason: HOSPADM

## 2025-05-20 RX ORDER — BUDESONIDE 0.5 MG/2ML
500 INHALANT ORAL
Status: DISCONTINUED | OUTPATIENT
Start: 2025-05-20 | End: 2025-05-24 | Stop reason: HOSPADM

## 2025-05-20 RX ORDER — SODIUM CHLORIDE 450 MG/100ML
INJECTION, SOLUTION INTRAVENOUS
Status: COMPLETED
Start: 2025-05-20 | End: 2025-05-20

## 2025-05-20 RX ORDER — DEXTROSE MONOHYDRATE, SODIUM CHLORIDE, AND POTASSIUM CHLORIDE 50; 1.49; 4.5 G/1000ML; G/1000ML; G/1000ML
INJECTION, SOLUTION INTRAVENOUS CONTINUOUS PRN
Status: DISCONTINUED | OUTPATIENT
Start: 2025-05-20 | End: 2025-05-20

## 2025-05-20 RX ORDER — POTASSIUM CHLORIDE 1500 MG/1
40 TABLET, EXTENDED RELEASE ORAL PRN
Status: DISCONTINUED | OUTPATIENT
Start: 2025-05-20 | End: 2025-05-20

## 2025-05-20 RX ORDER — HYDROXYZINE PAMOATE 25 MG/1
25 CAPSULE ORAL 4 TIMES DAILY PRN
Status: DISCONTINUED | OUTPATIENT
Start: 2025-05-20 | End: 2025-05-24 | Stop reason: HOSPADM

## 2025-05-20 RX ADMIN — SODIUM CHLORIDE 945 ML: 9 INJECTION, SOLUTION INTRAVENOUS at 09:00

## 2025-05-20 RX ADMIN — IOPAMIDOL 70 ML: 755 INJECTION, SOLUTION INTRAVENOUS at 10:36

## 2025-05-20 RX ADMIN — SODIUM CHLORIDE: 0.45 INJECTION, SOLUTION INTRAVENOUS at 15:16

## 2025-05-20 RX ADMIN — BUDESONIDE 500 MCG: 0.5 SUSPENSION RESPIRATORY (INHALATION) at 19:06

## 2025-05-20 RX ADMIN — SODIUM CHLORIDE 1000 ML: 9 INJECTION, SOLUTION INTRAVENOUS at 07:45

## 2025-05-20 RX ADMIN — POTASSIUM PHOSPHATE, MONOBASIC POTASSIUM PHOSPHATE, DIBASIC 30 MMOL: 224; 236 INJECTION, SOLUTION, CONCENTRATE INTRAVENOUS at 19:03

## 2025-05-20 RX ADMIN — POTASSIUM CHLORIDE 10 MEQ: 7.46 INJECTION, SOLUTION INTRAVENOUS at 19:04

## 2025-05-20 RX ADMIN — POTASSIUM CHLORIDE 10 MEQ: 7.46 INJECTION, SOLUTION INTRAVENOUS at 18:04

## 2025-05-20 RX ADMIN — PANTOPRAZOLE SODIUM 80 MG: 40 INJECTION, POWDER, FOR SOLUTION INTRAVENOUS at 07:16

## 2025-05-20 RX ADMIN — SODIUM CHLORIDE 10.9 UNITS/HR: 9 INJECTION, SOLUTION INTRAVENOUS at 23:18

## 2025-05-20 RX ADMIN — DIPHENHYDRAMINE HYDROCHLORIDE 25 MG: 50 INJECTION INTRAMUSCULAR; INTRAVENOUS at 10:04

## 2025-05-20 RX ADMIN — IPRATROPIUM BROMIDE AND ALBUTEROL SULFATE 1 DOSE: .5; 2.5 SOLUTION RESPIRATORY (INHALATION) at 14:55

## 2025-05-20 RX ADMIN — ONDANSETRON 4 MG: 2 INJECTION, SOLUTION INTRAMUSCULAR; INTRAVENOUS at 17:08

## 2025-05-20 RX ADMIN — POTASSIUM CHLORIDE 10 MEQ: 7.46 INJECTION, SOLUTION INTRAVENOUS at 20:06

## 2025-05-20 RX ADMIN — SODIUM CHLORIDE 10.8 UNITS/HR: 9 INJECTION, SOLUTION INTRAVENOUS at 09:51

## 2025-05-20 RX ADMIN — SODIUM CHLORIDE 4.8 UNITS/HR: 9 INJECTION, SOLUTION INTRAVENOUS at 14:00

## 2025-05-20 RX ADMIN — POTASSIUM CHLORIDE 10 MEQ: 7.46 INJECTION, SOLUTION INTRAVENOUS at 23:27

## 2025-05-20 RX ADMIN — PANTOPRAZOLE SODIUM 8 MG/HR: 40 INJECTION, POWDER, FOR SOLUTION INTRAVENOUS at 17:16

## 2025-05-20 RX ADMIN — SODIUM CHLORIDE 1000 ML: 0.9 INJECTION, SOLUTION INTRAVENOUS at 07:17

## 2025-05-20 RX ADMIN — DEXTROSE, SODIUM CHLORIDE, AND POTASSIUM CHLORIDE: 5; .45; .15 INJECTION INTRAVENOUS at 17:07

## 2025-05-20 RX ADMIN — ONDANSETRON 4 MG: 2 INJECTION, SOLUTION INTRAMUSCULAR; INTRAVENOUS at 21:13

## 2025-05-20 RX ADMIN — SODIUM CHLORIDE: 450 INJECTION, SOLUTION INTRAVENOUS at 15:16

## 2025-05-20 RX ADMIN — IPRATROPIUM BROMIDE AND ALBUTEROL SULFATE 1 DOSE: .5; 2.5 SOLUTION RESPIRATORY (INHALATION) at 19:05

## 2025-05-20 RX ADMIN — PANTOPRAZOLE SODIUM 8 MG/HR: 40 INJECTION, POWDER, FOR SOLUTION INTRAVENOUS at 23:15

## 2025-05-20 RX ADMIN — POTASSIUM CHLORIDE 10 MEQ: 7.46 INJECTION, SOLUTION INTRAVENOUS at 17:21

## 2025-05-20 ASSESSMENT — PAIN DESCRIPTION - LOCATION: LOCATION: ABDOMEN

## 2025-05-20 ASSESSMENT — PAIN - FUNCTIONAL ASSESSMENT: PAIN_FUNCTIONAL_ASSESSMENT: 0-10

## 2025-05-20 ASSESSMENT — PAIN DESCRIPTION - PAIN TYPE: TYPE: ACUTE PAIN

## 2025-05-20 ASSESSMENT — PAIN SCALES - GENERAL
PAINLEVEL_OUTOF10: 0
PAINLEVEL_OUTOF10: 0

## 2025-05-20 NOTE — H&P
Department of Family Practice  Attending History and Physical        CHIEF COMPLAINT:  ELEVATED BLOOD SUGAR    Reason for Admission:  DKA    History Obtained From:  patient, electronic medical record    HISTORY OF PRESENT ILLNESS:      The patient is a 55 y.o. female with significant past medical history of DIABETES MELLITUS who presents with HYPERGLYCEMIA.  SHE MOVED [facility] TO [facility] BUT HAS NOT BEEN CHECKING HER GLUCOSE.     Ellie Santacruz is a 55 y.o. female that presents to the ED due to concerns for hyperglycemia.  Patient says she not been feel well for the past week.  Has not taken her medicine.  She was refusing point-of-care glucose checks at her facility apparently.  She is from to the recovery for alcohol use.  Recently did have varices banding.     SHE WAS DISCHARGED FROM THERE PREVIOUSLY DUE TO POSITIVE DRUG SCREEN.  URINE DRUG SCREEN TODAY SHOWS COCAINE METABOLITES.     Past Medical History:        Diagnosis Date    Alcoholism (HCC)     Since teens to early 20s    Anxiety and depression     Ascites of liver     Bronchitis     Cancer (HCC)     breast, left    Candidiasis of vagina     Chronic pancreatitis (HCC)     Cocaine abuse (HCC)     Constipation     Diabetes mellitus, type 2 (HCC)     GERD (gastroesophageal reflux disease)     Gout     Hepatitis C     Hernia of anterior abdominal wall     Jaundice 05/12/2016    Pneumonia     Polyneuropathy due to type 2 diabetes mellitus (HCC)     Schizoaffective disorder, bipolar type (HCC)     Splenomegaly 05/30/2017     Past Surgical History:        Procedure Laterality Date    ABDOMEN SURGERY      gallbladder removal    BREAST BIOPSY Left     CHOLECYSTECTOMY  2010    COLONOSCOPY      COLONOSCOPY N/A 10/02/2018    COLONOSCOPY WITH BIOPSY performed by Simón Esquivel MD at [facility] ENDOSCOPY    CT NEEDLE BIOPSY LIVER PERCUTANEOUS  03/19/2024    CT NEEDLE BIOPSY LIVER PERCUTANEOUS 3/19/2024 [facility] CT    ENDOSCOPY, COLON, DIAGNOSTIC      ERCP      april 2016 at  tenderness  LUNGS:  No increased work of breathing, good air exchange, clear to auscultation bilaterally, no crackles or wheezing  CARDIOVASCULAR:  Normal apical impulse, regular rate and rhythm, normal S1 and S2, no S3 or S4, and no murmur noted  ABDOMEN:  No scars, normal bowel sounds, soft, non-distended, non-tender, no masses palpated, no hepatosplenomegally  GENITAL/URINARY:  ADULT FEMALE  SKIN:  WARM AND DRY    DATA:  CBC with Differential:    Lab Results   Component Value Date/Time    WBC 13.0 05/20/2025 07:11 AM    RBC 4.71 05/20/2025 07:11 AM    HGB 14.1 05/20/2025 07:11 AM    HCT 39.4 05/20/2025 07:11 AM    PLT 87 05/20/2025 07:11 AM    MCV 83.7 05/20/2025 07:11 AM    MCH 29.9 05/20/2025 07:11 AM    MCHC 35.8 05/20/2025 07:11 AM    RDW 15.8 05/20/2025 07:11 AM    NRBC 1 01/31/2025 06:43 AM    BANDSPCT 1 05/15/2016 08:30 AM    METASPCT 1 03/23/2024 05:20 AM    METASPCT 1.7 06/18/2017 06:33 AM    LYMPHOPCT 5 05/20/2025 07:11 AM    MONOPCT 2 05/20/2025 07:11 AM    MYELOPCT 1 11/17/2024 04:02 PM    MYELOPCT 0.5 04/01/2017 09:45 AM    EOSPCT 0 05/20/2025 07:11 AM    BASOPCT 1 05/20/2025 07:11 AM    MONOSABS 0.27 05/20/2025 07:11 AM    LYMPHSABS 0.67 05/20/2025 07:11 AM    EOSABS 0.01 05/20/2025 07:11 AM    BASOSABS 0.06 05/20/2025 07:11 AM     CMP:    Lab Results   Component Value Date/Time     05/20/2025 05:23 PM    K 3.4 05/20/2025 05:23 PM    K 5.2 05/11/2023 08:52 PM     05/20/2025 05:23 PM    CO2 16 05/20/2025 05:23 PM    BUN 26 05/20/2025 05:23 PM    CREATININE 0.9 05/20/2025 05:23 PM    GFRAA >60 08/17/2022 06:53 AM    LABGLOM 81 05/20/2025 05:23 PM    LABGLOM >90 03/27/2024 05:20 AM    GLUCOSE 215 05/20/2025 05:23 PM    CALCIUM 7.6 05/20/2025 05:23 PM    BILITOT 1.2 05/20/2025 07:11 AM    ALKPHOS 361 05/20/2025 07:11 AM    AST 23 05/20/2025 07:11 AM    ALT 19 05/20/2025 07:11 AM     Troponin:    Lab Results   Component Value Date/Time    TROPONINI <0.01 03/11/2020 12:15 AM     U/A:    Lab

## 2025-05-20 NOTE — CONSULTS
Assessment and Plan  Patient is a 55 y.o. female with the following medical Problems:   Type 2 diabetes with diabetic ketoacidosis with poor compliance ((hemoglobin A1c 8.5 May 20, 2025))  Alcoholic liver cirrhosis with minimal ascites  Chronic pancreatitis.  Polysubstance abuse with cocaine abuse  Recent hospitalization secondary to GI bleeding related to esophageal varices status post EGD with banding x 4  Generalized anxiety and depression  History of hepatitis C  Thrombocytopenia  Acute kidney injury       Plan of care:  She will be admitted to the ICU for management of DKA.  IV fluid and insulin as per DKA protocol.  Replete electrolytes as per protocol.  SCDs for DVT prophylaxis.  Continue with rifaximin and lactulose for prevention of hepatic encephalopathy.  Hold Lovenox due to thrombocytopenia.  SCDs and ambulation for DVT prophylaxis.  CT scan of the abdomen and pelvis showed minimal ascites.  No indication for paracentesis at this stage.    History of Present Illness:   Patient is a 55-year-old woman with above-mentioned medical problem who presented from a rehab facility due to uncontrolled blood sugar.  Patient was found on DKA and was started on insulin drip.  Patient was recently discharged on May 9, 2025 after being managed for upper GI bleeding and varices.  She is awake and communicative but intermittently confused.       Past Medical History:  Past Medical History:   Diagnosis Date    Alcoholism (HCC)     Since teens to early 20s    Anxiety and depression     Ascites of liver     Bronchitis     Cancer (HCC)     breast, left    Candidiasis of vagina     Chronic pancreatitis (HCC)     Cocaine abuse (HCC)     Constipation     Diabetes mellitus, type 2 (HCC)     GERD (gastroesophageal reflux disease)     Gout     Hepatitis C     Hernia of anterior abdominal wall     Jaundice 05/12/2016    Pneumonia     Polyneuropathy due to type 2 diabetes mellitus (HCC)     Schizoaffective disorder, bipolar type  Reported on 5/5/2025), Disp: 24775 mL, Rfl: 1    rifAXIMin (XIFAXAN) 550 MG tablet, Take 1 tablet by mouth 2 times daily, Disp: , Rfl:     Multiple Vitamin (MULTI VITAMIN DAILY) TABS, Take 1 tablet by mouth every morning, Disp: , Rfl:     Melatonin 12 MG TABS, Take 12 mg by mouth nightly, Disp: , Rfl:     ondansetron (ZOFRAN-ODT) 4 MG disintegrating tablet, Take 1 tablet by mouth 3 times daily as needed for Nausea or Vomiting, Disp: 21 tablet, Rfl: 0    albuterol (PROVENTIL) (2.5 MG/3ML) 0.083% nebulizer solution, Take 3 mLs by nebulization every 4 hours as needed for Wheezing May substitute generic if insurance does not cover. Please provide 1 box (25 or 30 ampules) depending on how supplied per box, Disp: 120 each, Rfl: 3    Review of Systems:   General: denies weight gain, denies loss of appetite, fever, chills, night sweats.  HEENT: denies headaches, dizziness, head trauma, visual changes, eye pain, tinnitus, nosebleeds, hoarseness or throat pain    Respiratory: denies chest pain, dyspnea, cough and hemoptysis  Cardiovascular: denies orthopnea, paroxysmal nocturnal dyspnea, leg swelling, and previous heart attack.    Gastrointestinal: denies pain, nausea vomiting, diarrhea, constipation, melena or bleeding.  Genitourinary: denies hematuria, frequency, urgency or dysuria  Neurology: denies syncope, seizures, paralysis, paraesthesia   Endocrine: +ve polyuria, polydipsia, denies skin or hair changes, and heat or cold intolerance  Musculoskeletal: denies joint pain, swelling, arthritis or myalgia  Hematologic: denies bleeding, adenopathy and easy bruising  Skin: denies rashes and skin discoloration  Psychiatry: denies depression    Physical Exam:   Vital Signs:  /72   Pulse (!) 119   Temp 97.7 °F (36.5 °C) (Oral)   Resp 18   Wt 63 kg (138 lb 14.2 oz)   SpO2 100%   BMI 27.13 kg/m²     Input/Output:  No intake/output data recorded.    Oxygen requirements: RA    Ventilator Information:       General

## 2025-05-20 NOTE — PLAN OF CARE
Problem: Chronic Conditions and Co-morbidities  Goal: Patient's chronic conditions and co-morbidity symptoms are monitored and maintained or improved  Outcome: Progressing     Problem: Discharge Planning  Goal: Discharge to home or other facility with appropriate resources  Outcome: Progressing     Problem: Safety - Adult  Goal: Free from fall injury  Outcome: Progressing     Problem: Cardiovascular - Adult  Goal: Maintains optimal cardiac output and hemodynamic stability  Outcome: Progressing     Problem: Skin/Tissue Integrity - Adult  Goal: Skin integrity remains intact  Outcome: Progressing     Problem: Gastrointestinal - Adult  Goal: Minimal or absence of nausea and vomiting  Outcome: Progressing     Problem: Metabolic/Fluid and Electrolytes - Adult  Goal: Electrolytes maintained within normal limits  Outcome: Progressing

## 2025-05-20 NOTE — PROGRESS NOTES
4 Eyes Skin Assessment     NAME:  Ellie Santacruz  YOB: 1970  MEDICAL RECORD NUMBER:  [id number]    The patient is being assessed for  Admission    I agree that at least one RN has performed a thorough Head to Toe Skin Assessment on the patient. ALL assessment sites listed below have been assessed.      Areas assessed by both nurses:    Head, Face, Ears, Shoulders, Back, Chest, Arms, Elbows, Hands, Sacrum. Buttock, Coccyx, Ischium, and Legs. Feet and Heels    Complete head to toe skin assessment completed with the following findings:  Scattered abrasions/scabs (none opened)  BUE ecchymosis  BL heels reddened & blanchable      Does the Patient have a Wound? No noted wound(s)       Abe Prevention initiated by RN: Yes  Wound Care Orders initiated by RN: No    Pressure Injury (Stage 3,4, Unstageable, DTI, NWPT, and Complex wounds) if present, place Wound referral order by RN under : No    New Ostomies, if present place, Ostomy referral order under : No     Nurse 1 eSignature: Electronically signed by Huma Lemus RN on 5/20/25 at 4:48 PM EDT    **SHARE this note so that the co-signing nurse can place an eSignature**    Nurse 2 eSignature: Electronically signed by Abdoul Moon RN on 5/20/25 at 6:00 PM EDT

## 2025-05-20 NOTE — CONSULTS
Department of Internal Medicine  Internal Medicine Consultation Note    Primary Care Physician: Liza Oh DO   Admitting Physician:  Liza Oh DO  Admission date and time: 5/20/2025  6:29 AM    Room:  06/06  Admitting diagnosis: Diabetic ketoacidosis without coma associated with drug or chemical induced diabetes mellitus [E09.10]    Patient Name: Ellie Santacruz  MRN: [id number]    Date of Service: 5/20/2025     Reason for consultation:  Medical management    History of present illness:    Ellie is a 55-year-old chronically decrepit female who presented to [facility] emergency department directly from a rehab facility.  The patient was recently admitted to the hospital and discharged on 5/9/2025.  Extensive chart review has been undertaken.  Workup during previous hospitalization identified esophageal varices requiring banding x 4 by the GI team.  She suffers from cirrhosis at baseline secondary to severe alcoholism.  Apparently, she has been residing in an acute rehabilitation facility but her urine drug screen is currently positive for cocaine.  She states that she was not checking her blood sugars at the rehab facility nor was she taking her medications.  As a result, she presents to the hospital in fulminant diabetic ketoacidosis.  She is ill-appearing on examination and somewhat confused.  She will be admitted to the intensive care unit as she has been started on diabetic ketoacidosis protocol and the critical care team has evaluated the patient.  We have been asked to provide consultation for medical management.     PAST MEDICAL Hx:  Past Medical History:   Diagnosis Date    Alcoholism (HCC)     Since teens to early 20s    Anxiety and depression     Ascites of liver     Bronchitis     Cancer (HCC)     breast, left    Candidiasis of vagina     Chronic pancreatitis (HCC)     Cocaine abuse (HCC)     Constipation     Diabetes mellitus, type 2 (HCC)     GERD (gastroesophageal reflux disease)     Gout   05/20/2025 07:11 AM    MONOPCT 2 05/20/2025 07:11 AM    MYELOPCT 1 11/17/2024 04:02 PM    MYELOPCT 0.5 04/01/2017 09:45 AM    EOSPCT 0 05/20/2025 07:11 AM    BASOPCT 1 05/20/2025 07:11 AM    MONOSABS 0.27 05/20/2025 07:11 AM    LYMPHSABS 0.67 05/20/2025 07:11 AM    EOSABS 0.01 05/20/2025 07:11 AM    BASOSABS 0.06 05/20/2025 07:11 AM     BMP:    Lab Results   Component Value Date/Time     05/20/2025 11:35 AM    K 3.2 05/20/2025 11:35 AM    K 5.2 05/11/2023 08:52 PM    CL 91 05/20/2025 11:35 AM    CO2 8 05/20/2025 11:35 AM    BUN 31 05/20/2025 11:35 AM    CREATININE 1.1 05/20/2025 11:35 AM    CALCIUM 7.9 05/20/2025 11:35 AM    GFRAA >60 08/17/2022 06:53 AM    LABGLOM 57 05/20/2025 11:35 AM    LABGLOM >90 03/27/2024 05:20 AM    GLUCOSE 525 05/20/2025 11:35 AM       ASSESSMENT:  Diabetic ketoacidosis in the setting of insulin noncompliance and noncompliance with blood sugar checks at the rehab facility  Recent hospitalization secondary to GI bleeding related to esophageal varices status post EGD with banding x 4  Alcoholic cirrhosis with reported sober x 5 years  Urine drug screen positive for cocaine  Generalized anxiety and depression  History of hepatitis C    PLAN:  Ellie admits to noncompliance with insulin and blood sugar checks at the rehabilitation facility.  She presents to the hospital critically ill with diabetic ketoacidosis.  Appropriate protocol is being employed and she will be admitted to the intensive care unit.  IV fluid resuscitation will be undertaken.  Following my examination, urine drug screen came back positive for cocaine (at a rehab facility ??).  She is on multiple psychiatric medications and these will need to be adjusted as she is acutely confused.  She has a recent history of GI bleeding necessitating esophageal variceal banding x 4.  IV Protonix will be utilized.    Greater than 40 minutes of critical care time was spent with the patient.  This time included chart review, ,

## 2025-05-20 NOTE — ED PROVIDER NOTES
sterile fashion. Local anesthesia was obtained by infiltration using 1% Lidocaine without epinephrine.  A large bore needle was used to identify the vein.  A guide wire was then inserted into the vein through the needle. A triple lumen catheter was then inserted into the vessel over the guide wire using the Seldinger technique.  All ports showed good, free flowing blood return and were flushed with saline solution.   The catheter was then securely fastened to the skin with suture at 1 cm.  Two sutures were placed into the kit included tube clamp, proximal eyelets, and a suture end from each of the securing sutures was extended around the catheter and tied to the proximal eyelets as an added measure to prevent dislodgement. An antibiotic disk was placed and the site was then covered with a sterile dressing.  A post procedure X-ray was not indicated.    The patient tolerated the procedure well.    Complications: None         MDM and ED course   History is obtained from patient and EMS for patient's acute onset of severe malaise over the past week    Differential diagnoses: DKA, bacteremia, UTI, pneumonia, meningitis, pleural effusion, variceal bleed     ED Course as of 05/20/25 1139   Tue May 20, 2025   0723 EKG read interpreted by me.  Rate 104 bpm.  Normal this.  Normal CA interval.  Nonspecific T wave abnormality.  Sinus tachycardia.  QTc 520.  Compared to prior EKG on May 5, 2025 with worsening QTc. [JM]   0927 ICU made aware the patient. [JM]      ED Course User Index  [JM] Edward De La Rosa MD     I interpreted the patient's labs and imaging please see above.   Diabetic ketoacidosis likely due to noncompliance  Glucose 800  Anion gap 44  Bicarbonate 9  Beta-hydroxybutyrate > 4.5  Blood cultures  IV insulin without bolus  Lactic acidosis 4.2  2 L normal saline  Esophagitis appearance on CT- does have history of recent esophageal variceal banding, was given 80 mg Protonix IV on arrival  No hematemesis  Discussed  with IM and intensive care    Clinical Impression  1. Type 1 diabetes mellitus with ketoacidosis without coma (HCC)    2. Lactic acidosis    3. Drug noncompliance         Disposition  Patient's disposition: Admit to CCU/ICU    Cruzito Otoole MD

## 2025-05-20 NOTE — ED NOTES
Critical lab result phone call taken by this nurse from lab for a Lactic acid 4.2, Provider made aware.

## 2025-05-21 LAB
ANION GAP SERPL CALCULATED.3IONS-SCNC: 11 MMOL/L (ref 7–16)
ANION GAP SERPL CALCULATED.3IONS-SCNC: 11 MMOL/L (ref 7–16)
B-OH-BUTYR SERPL-MCNC: 0.76 MMOL/L (ref 0.02–0.27)
BASOPHILS # BLD: 0 K/UL (ref 0–0.2)
BASOPHILS NFR BLD: 0 % (ref 0–2)
BUN SERPL-MCNC: 12 MG/DL (ref 6–20)
BUN SERPL-MCNC: 17 MG/DL (ref 6–20)
CALCIUM SERPL-MCNC: 6.4 MG/DL (ref 8.6–10.2)
CALCIUM SERPL-MCNC: 6.8 MG/DL (ref 8.6–10.2)
CHLORIDE SERPL-SCNC: 107 MMOL/L (ref 98–107)
CHLORIDE SERPL-SCNC: 108 MMOL/L (ref 98–107)
CO2 SERPL-SCNC: 18 MMOL/L (ref 22–29)
CO2 SERPL-SCNC: 20 MMOL/L (ref 22–29)
CREAT SERPL-MCNC: 0.6 MG/DL (ref 0.5–1)
CREAT SERPL-MCNC: 0.6 MG/DL (ref 0.5–1)
EKG ATRIAL RATE: 88 BPM
EKG P AXIS: 62 DEGREES
EKG P-R INTERVAL: 124 MS
EKG Q-T INTERVAL: 426 MS
EKG QRS DURATION: 80 MS
EKG QTC CALCULATION (BAZETT): 515 MS
EKG R AXIS: 42 DEGREES
EKG T AXIS: 148 DEGREES
EKG VENTRICULAR RATE: 88 BPM
EOSINOPHIL # BLD: 0 K/UL (ref 0.05–0.5)
EOSINOPHILS RELATIVE PERCENT: 0 % (ref 0–6)
ERYTHROCYTE [DISTWIDTH] IN BLOOD BY AUTOMATED COUNT: 16 % (ref 11.5–15)
GFR, ESTIMATED: >90 ML/MIN/1.73M2
GFR, ESTIMATED: >90 ML/MIN/1.73M2
GLUCOSE BLD-MCNC: 103 MG/DL (ref 74–99)
GLUCOSE BLD-MCNC: 122 MG/DL (ref 74–99)
GLUCOSE BLD-MCNC: 122 MG/DL (ref 74–99)
GLUCOSE BLD-MCNC: 136 MG/DL (ref 74–99)
GLUCOSE BLD-MCNC: 152 MG/DL (ref 74–99)
GLUCOSE BLD-MCNC: 161 MG/DL (ref 74–99)
GLUCOSE BLD-MCNC: 161 MG/DL (ref 74–99)
GLUCOSE BLD-MCNC: 170 MG/DL (ref 74–99)
GLUCOSE BLD-MCNC: 182 MG/DL (ref 74–99)
GLUCOSE BLD-MCNC: 200 MG/DL (ref 74–99)
GLUCOSE BLD-MCNC: 207 MG/DL (ref 74–99)
GLUCOSE BLD-MCNC: 217 MG/DL (ref 74–99)
GLUCOSE BLD-MCNC: 219 MG/DL (ref 74–99)
GLUCOSE BLD-MCNC: 232 MG/DL (ref 74–99)
GLUCOSE BLD-MCNC: 257 MG/DL (ref 74–99)
GLUCOSE BLD-MCNC: 266 MG/DL (ref 74–99)
GLUCOSE SERPL-MCNC: 145 MG/DL (ref 74–99)
GLUCOSE SERPL-MCNC: 150 MG/DL (ref 74–99)
HCT VFR BLD AUTO: 23.1 % (ref 34–48)
HCT VFR BLD AUTO: 24.8 % (ref 34–48)
HCT VFR BLD AUTO: 25 % (ref 34–48)
HGB BLD-MCNC: 8.2 G/DL (ref 11.5–15.5)
HGB BLD-MCNC: 9 G/DL (ref 11.5–15.5)
HGB BLD-MCNC: 9.2 G/DL (ref 11.5–15.5)
LYMPHOCYTES NFR BLD: 0.55 K/UL (ref 1.5–4)
LYMPHOCYTES RELATIVE PERCENT: 12 % (ref 20–42)
MAGNESIUM SERPL-MCNC: 1.7 MG/DL (ref 1.6–2.6)
MAGNESIUM SERPL-MCNC: 2.6 MG/DL (ref 1.6–2.6)
MCH RBC QN AUTO: 30.6 PG (ref 26–35)
MCHC RBC AUTO-ENTMCNC: 36.8 G/DL (ref 32–34.5)
MCV RBC AUTO: 83.1 FL (ref 80–99.9)
MONOCYTES NFR BLD: 0.28 K/UL (ref 0.1–0.95)
MONOCYTES NFR BLD: 6 % (ref 2–12)
NEUTROPHILS NFR BLD: 82 % (ref 43–80)
NEUTS SEG NFR BLD: 3.77 K/UL (ref 1.8–7.3)
PHOSPHATE SERPL-MCNC: 1.2 MG/DL (ref 2.5–4.5)
PHOSPHATE SERPL-MCNC: 2.2 MG/DL (ref 2.5–4.5)
PLATELET # BLD AUTO: 35 K/UL (ref 130–450)
PLATELET CONFIRMATION: NORMAL
PMV BLD AUTO: 10.3 FL (ref 7–12)
POTASSIUM SERPL-SCNC: 4 MMOL/L (ref 3.5–5)
POTASSIUM SERPL-SCNC: 4.6 MMOL/L (ref 3.5–5)
RBC # BLD AUTO: 3.01 M/UL (ref 3.5–5.5)
RBC # BLD: ABNORMAL 10*6/UL
RBC # BLD: ABNORMAL 10*6/UL
SODIUM SERPL-SCNC: 137 MMOL/L (ref 132–146)
SODIUM SERPL-SCNC: 138 MMOL/L (ref 132–146)
T4 FREE SERPL-MCNC: 1.6 NG/DL (ref 0.9–1.7)
TSH SERPL DL<=0.05 MIU/L-ACNC: 0.55 UIU/ML (ref 0.27–4.2)
WBC OTHER # BLD: 4.6 K/UL (ref 4.5–11.5)

## 2025-05-21 PROCEDURE — 85018 HEMOGLOBIN: CPT

## 2025-05-21 PROCEDURE — 97530 THERAPEUTIC ACTIVITIES: CPT

## 2025-05-21 PROCEDURE — 2580000003 HC RX 258

## 2025-05-21 PROCEDURE — 6360000002 HC RX W HCPCS: Performed by: INTERNAL MEDICINE

## 2025-05-21 PROCEDURE — 2500000003 HC RX 250 WO HCPCS

## 2025-05-21 PROCEDURE — 84443 ASSAY THYROID STIM HORMONE: CPT

## 2025-05-21 PROCEDURE — 51701 INSERT BLADDER CATHETER: CPT

## 2025-05-21 PROCEDURE — 80048 BASIC METABOLIC PNL TOTAL CA: CPT

## 2025-05-21 PROCEDURE — 84100 ASSAY OF PHOSPHORUS: CPT

## 2025-05-21 PROCEDURE — 83735 ASSAY OF MAGNESIUM: CPT

## 2025-05-21 PROCEDURE — 82962 GLUCOSE BLOOD TEST: CPT

## 2025-05-21 PROCEDURE — 2580000003 HC RX 258: Performed by: INTERNAL MEDICINE

## 2025-05-21 PROCEDURE — 85014 HEMATOCRIT: CPT

## 2025-05-21 PROCEDURE — 2000000000 HC ICU R&B

## 2025-05-21 PROCEDURE — 99291 CRITICAL CARE FIRST HOUR: CPT | Performed by: INTERNAL MEDICINE

## 2025-05-21 PROCEDURE — 51798 US URINE CAPACITY MEASURE: CPT

## 2025-05-21 PROCEDURE — 93005 ELECTROCARDIOGRAM TRACING: CPT | Performed by: INTERNAL MEDICINE

## 2025-05-21 PROCEDURE — 84439 ASSAY OF FREE THYROXINE: CPT

## 2025-05-21 PROCEDURE — 2500000003 HC RX 250 WO HCPCS: Performed by: INTERNAL MEDICINE

## 2025-05-21 PROCEDURE — 6370000000 HC RX 637 (ALT 250 FOR IP): Performed by: INTERNAL MEDICINE

## 2025-05-21 PROCEDURE — 6360000002 HC RX W HCPCS

## 2025-05-21 PROCEDURE — 82010 KETONE BODYS QUAN: CPT

## 2025-05-21 PROCEDURE — 97165 OT EVAL LOW COMPLEX 30 MIN: CPT

## 2025-05-21 PROCEDURE — 85025 COMPLETE CBC W/AUTO DIFF WBC: CPT

## 2025-05-21 RX ORDER — MAGNESIUM SULFATE IN WATER 40 MG/ML
2000 INJECTION, SOLUTION INTRAVENOUS ONCE
Status: COMPLETED | OUTPATIENT
Start: 2025-05-21 | End: 2025-05-21

## 2025-05-21 RX ORDER — CALCIUM GLUCONATE 20 MG/ML
1000 INJECTION, SOLUTION INTRAVENOUS ONCE
Status: COMPLETED | OUTPATIENT
Start: 2025-05-21 | End: 2025-05-21

## 2025-05-21 RX ORDER — FENTANYL 25 UG/1
1 PATCH TRANSDERMAL
Refills: 0 | Status: DISCONTINUED | OUTPATIENT
Start: 2025-05-21 | End: 2025-05-23

## 2025-05-21 RX ORDER — DEXTROSE MONOHYDRATE 100 MG/ML
INJECTION, SOLUTION INTRAVENOUS CONTINUOUS PRN
Status: DISCONTINUED | OUTPATIENT
Start: 2025-05-21 | End: 2025-05-24 | Stop reason: HOSPADM

## 2025-05-21 RX ORDER — PROPRANOLOL HYDROCHLORIDE 10 MG/1
20 TABLET ORAL 2 TIMES DAILY
Status: DISCONTINUED | OUTPATIENT
Start: 2025-05-22 | End: 2025-05-24 | Stop reason: HOSPADM

## 2025-05-21 RX ORDER — PROCHLORPERAZINE EDISYLATE 5 MG/ML
10 INJECTION INTRAMUSCULAR; INTRAVENOUS ONCE
Status: COMPLETED | OUTPATIENT
Start: 2025-05-21 | End: 2025-05-21

## 2025-05-21 RX ORDER — GLUCAGON 1 MG/ML
1 KIT INJECTION PRN
Status: DISCONTINUED | OUTPATIENT
Start: 2025-05-21 | End: 2025-05-24 | Stop reason: HOSPADM

## 2025-05-21 RX ORDER — INSULIN LISPRO 100 [IU]/ML
0-8 INJECTION, SOLUTION INTRAVENOUS; SUBCUTANEOUS
Status: DISCONTINUED | OUTPATIENT
Start: 2025-05-21 | End: 2025-05-24 | Stop reason: HOSPADM

## 2025-05-21 RX ORDER — INSULIN GLARGINE 100 [IU]/ML
15 INJECTION, SOLUTION SUBCUTANEOUS 2 TIMES DAILY
Status: DISCONTINUED | OUTPATIENT
Start: 2025-05-21 | End: 2025-05-24 | Stop reason: HOSPADM

## 2025-05-21 RX ADMIN — GABAPENTIN 200 MG: 100 CAPSULE ORAL at 13:35

## 2025-05-21 RX ADMIN — CALCIUM GLUCONATE 1000 MG: 20 INJECTION, SOLUTION INTRAVENOUS at 13:34

## 2025-05-21 RX ADMIN — GABAPENTIN 200 MG: 100 CAPSULE ORAL at 20:29

## 2025-05-21 RX ADMIN — MAGNESIUM SULFATE HEPTAHYDRATE 2000 MG: 40 INJECTION, SOLUTION INTRAVENOUS at 09:24

## 2025-05-21 RX ADMIN — DEXTROSE, SODIUM CHLORIDE, AND POTASSIUM CHLORIDE: 5; .45; .15 INJECTION INTRAVENOUS at 00:10

## 2025-05-21 RX ADMIN — GABAPENTIN 200 MG: 100 CAPSULE ORAL at 09:09

## 2025-05-21 RX ADMIN — POTASSIUM CHLORIDE 10 MEQ: 7.46 INJECTION, SOLUTION INTRAVENOUS at 07:10

## 2025-05-21 RX ADMIN — OXYCODONE 5 MG: 5 TABLET ORAL at 12:07

## 2025-05-21 RX ADMIN — LACTULOSE 20 G: 20 SOLUTION ORAL at 20:30

## 2025-05-21 RX ADMIN — INSULIN GLARGINE 15 UNITS: 100 INJECTION, SOLUTION SUBCUTANEOUS at 20:43

## 2025-05-21 RX ADMIN — POTASSIUM CHLORIDE 10 MEQ: 7.46 INJECTION, SOLUTION INTRAVENOUS at 08:27

## 2025-05-21 RX ADMIN — PROCHLORPERAZINE EDISYLATE 10 MG: 5 INJECTION INTRAMUSCULAR; INTRAVENOUS at 01:29

## 2025-05-21 RX ADMIN — INSULIN LISPRO 4 UNITS: 100 INJECTION, SOLUTION INTRAVENOUS; SUBCUTANEOUS at 16:31

## 2025-05-21 RX ADMIN — POTASSIUM CHLORIDE 10 MEQ: 7.46 INJECTION, SOLUTION INTRAVENOUS at 00:19

## 2025-05-21 RX ADMIN — SODIUM PHOSPHATE, MONOBASIC, MONOHYDRATE AND SODIUM PHOSPHATE, DIBASIC, ANHYDROUS 30 MMOL: 142; 276 INJECTION, SOLUTION INTRAVENOUS at 07:00

## 2025-05-21 RX ADMIN — SODIUM PHOSPHATE, MONOBASIC, MONOHYDRATE AND SODIUM PHOSPHATE, DIBASIC, ANHYDROUS 15 MMOL: 142; 276 INJECTION, SOLUTION INTRAVENOUS at 14:03

## 2025-05-21 RX ADMIN — OXYCODONE 5 MG: 5 TABLET ORAL at 20:27

## 2025-05-21 RX ADMIN — PANTOPRAZOLE SODIUM 8 MG/HR: 40 INJECTION, POWDER, FOR SOLUTION INTRAVENOUS at 07:01

## 2025-05-21 RX ADMIN — ANASTROZOLE 1 MG: 1 TABLET ORAL at 09:08

## 2025-05-21 RX ADMIN — DEXTROSE, SODIUM CHLORIDE, AND POTASSIUM CHLORIDE: 5; .45; .15 INJECTION INTRAVENOUS at 06:48

## 2025-05-21 RX ADMIN — INSULIN GLARGINE 15 UNITS: 100 INJECTION, SOLUTION SUBCUTANEOUS at 13:34

## 2025-05-21 RX ADMIN — INSULIN LISPRO 4 UNITS: 100 INJECTION, SOLUTION INTRAVENOUS; SUBCUTANEOUS at 20:41

## 2025-05-21 RX ADMIN — ONDANSETRON 4 MG: 2 INJECTION, SOLUTION INTRAMUSCULAR; INTRAVENOUS at 09:04

## 2025-05-21 RX ADMIN — LACTULOSE 20 G: 20 SOLUTION ORAL at 09:32

## 2025-05-21 RX ADMIN — POTASSIUM CHLORIDE 10 MEQ: 7.46 INJECTION, SOLUTION INTRAVENOUS at 01:13

## 2025-05-21 RX ADMIN — RIFAXIMIN 400 MG: 200 TABLET ORAL at 09:08

## 2025-05-21 RX ADMIN — PANTOPRAZOLE SODIUM 8 MG/HR: 40 INJECTION, POWDER, FOR SOLUTION INTRAVENOUS at 16:27

## 2025-05-21 RX ADMIN — ONDANSETRON 4 MG: 2 INJECTION, SOLUTION INTRAMUSCULAR; INTRAVENOUS at 18:29

## 2025-05-21 RX ADMIN — RIFAXIMIN 550 MG: 550 TABLET ORAL at 20:30

## 2025-05-21 RX ADMIN — DEXTROSE, SODIUM CHLORIDE, AND POTASSIUM CHLORIDE: 5; .45; .15 INJECTION INTRAVENOUS at 13:00

## 2025-05-21 RX ADMIN — POTASSIUM CHLORIDE 10 MEQ: 7.46 INJECTION, SOLUTION INTRAVENOUS at 06:17

## 2025-05-21 ASSESSMENT — PAIN DESCRIPTION - DESCRIPTORS
DESCRIPTORS: ACHING;DISCOMFORT;SHARP
DESCRIPTORS: ACHING;DISCOMFORT;STABBING
DESCRIPTORS: ACHING;DISCOMFORT
DESCRIPTORS: ACHING;DISCOMFORT;SHARP
DESCRIPTORS: DISCOMFORT

## 2025-05-21 ASSESSMENT — PAIN DESCRIPTION - ONSET
ONSET: ON-GOING
ONSET: ON-GOING

## 2025-05-21 ASSESSMENT — PAIN SCALES - GENERAL
PAINLEVEL_OUTOF10: 3
PAINLEVEL_OUTOF10: 0
PAINLEVEL_OUTOF10: 7
PAINLEVEL_OUTOF10: 7
PAINLEVEL_OUTOF10: 2
PAINLEVEL_OUTOF10: 7
PAINLEVEL_OUTOF10: 4
PAINLEVEL_OUTOF10: 7

## 2025-05-21 ASSESSMENT — PAIN DESCRIPTION - FREQUENCY
FREQUENCY: CONTINUOUS

## 2025-05-21 ASSESSMENT — PAIN DESCRIPTION - LOCATION
LOCATION: GENERALIZED
LOCATION: ABDOMEN
LOCATION: BACK
LOCATION: ABDOMEN
LOCATION: BACK

## 2025-05-21 ASSESSMENT — PAIN DESCRIPTION - PAIN TYPE
TYPE: ACUTE PAIN;CHRONIC PAIN
TYPE: CHRONIC PAIN
TYPE: CHRONIC PAIN
TYPE: ACUTE PAIN;CHRONIC PAIN

## 2025-05-21 ASSESSMENT — PAIN DESCRIPTION - ORIENTATION
ORIENTATION: UPPER;MID
ORIENTATION: MID
ORIENTATION: MID;LOWER
ORIENTATION: MID;UPPER

## 2025-05-21 NOTE — PROGRESS NOTES
Reason for consult: DKA     A1C: 8.5%            Patient states the following concerns/barriers to diabetes self-management:     [x] None       [] Medication cost   [] Food cost/availability        [] Reading  [] Hearing   [] Vision                [] Work    [] Transportation  [] No insurance  [] Physical limitations    [] Other:        Patient states the following about their diabetes/health:   [x] She was diagnosed with diabetes over 10 years ago.     [x] She takes Lantus 38 units every morning and Humalog s.s. insulin 45 minutes after she eats. I recommended that she try to time this before meals instead.    [x] She does not eat scheduled meals at home. She states she eats very little when she does eat. She drinks mostly water and diet pop.     [x] She owns a glucose monitor, but was not monitoring her levels at home. She has worn a CGM and insulin pump in the past, but states these did not work out for her. The CGM was a Dexcom, so I suggested she try again with the Shanna 3+, which is much smaller. I also suggested she obtains sensor patches to place over her sensor to reduce the chances of it being knocked off her arm.     [x]  She has seen Dr. Camejo in the past, but has not kept her appointments for some time now.     Diabetes survival packet provided to:   [x] Patient     [] Other:    Information given:   Definition of diabetes   Target glucose ranges/A1C   Self-monitoring of blood glucose   Prevention/symptoms/treatment of hypo-/hyperglycemia   Medication adherence             The plate method/meal planning guidelines             The benefits of exercise and recommendations             Reducing the risk of chronic complications     Diabetes medications reviewed (use, purpose, action): Lantus, Humalog             Post-education Assessment  [x]  Attentive to teaching  [x]  Answered questions appropriately when asked   []  Seems able to apply concepts to daily lifestyle  [x]  Seems motivated to do well  [x]

## 2025-05-21 NOTE — PROGRESS NOTES
FL TUNNELED CVC PLACE WO SQ PORT/PUMP > 5 YEARS 3/27/2024 [facility]    MASTECTOMY Left     Left breast mastectomy per patient    OTHER SURGICAL HISTORY Left 04/25/2017     Left breast blue dye injection, Sential Node Lymph Biopsy with left breast lumpectomy    OTHER SURGICAL HISTORY  10/05/2017    simple left mastectomy    OVARY REMOVAL Bilateral 2007    PANCREAS BIOPSY      with stent    PERINEAL SURGERY N/A 09/18/2023    PERINEAL INCISION AND DRAINAGE ABSCESS performed by Karl Santos MD at [facility] OR    RECTAL SURGERY N/A 08/28/2023    PERINEAL ABSCESS INCISION AND DRAINAGE performed by Sebastian Davis MD at [facility] OR    SHOULDER SURGERY Left 2003    UPPER GASTROINTESTINAL ENDOSCOPY      UPPER GASTROINTESTINAL ENDOSCOPY N/A 5/6/2025    ESOPHAGOGASTRODUODENOSCOPY BAND LIGATION performed by Hugo Rodríguez DO at [facility] ENDOSCOPY    UPPER GASTROINTESTINAL ENDOSCOPY  5/6/2025    ESOPHAGOGASTRODUODENOSCOPY BIOPSY performed by Hugo Rodríguez DO at [facility] ENDOSCOPY      Precautions:  Fall Risk, alarm      Assessment of current deficits:     [x] Functional mobility  [x]ADLs  [x] Strength               []Cognition    [x] Functional transfers   [x] IADLs         [] Safety Awareness   [x]Endurance    [] Fine Coordination              [x] Balance      [] Vision/perception   []Sensation     []Gross Motor Coordination  [] ROM  [] Delirium                   [] Motor Control     OT PLAN OF CARE   OT POC based on physician orders, patient diagnosis and results of clinical assessment    Frequency/Duration 1-3 days/wk for 2 weeks PRN     Specific OT Treatment Interventions to include:   * Instruction/training on adapted ADL techniques and AE recommendations to increase functional independence within precautions       * Training on energy conservation strategies, correct breathing pattern and techniques to improve independence/tolerance for self-care routine  * Functional transfer/mobility training/DME recommendations for  increased independence, safety, and fall prevention  * Patient/Family education to increase follow through with safety techniques and functional independence  * Recommendation of environmental modifications for increased safety with functional transfers/mobility and ADLs  * Therapeutic exercise to improve motor endurance, ROM, and functional strength for ADLs/functional transfers  * Therapeutic activities to facilitate/challenge dynamic balance, stand tolerance for increased safety and independence with ADLs  * Therapeutic activities to facilitate gross/fine motor skills for increased independence with ADLs    Recommended Adaptive Equipment: none    Home Living: [facility]- 1 day. Living with friend, 2 story, bathroom on second, no steps to enter, full flight to second floor with 2 rails, sleeps couch, tub shower      Equipment owned: none    Prior Level of Function: Independent with ADLs , Independent with IADLs; ambulated no device    Driving: No  Occupation: No    Pain Level: 6/10 pain in belly; Nursing notified.      Cognition: A&O: 4/4; Follows multiple step directions   Memory: good    Sequencing: good    Problem solving: fair plus   Judgement/safety: fair     [facility] Daily Activity - Inpatient   How much help is needed for putting on and taking off regular lower body clothing?: A Little  How much help is needed for bathing (which includes washing, rinsing, drying)?: A Little  How much help is needed for toileting (which includes using toilet, bedpan, or urinal)?: A Little  How much help is needed for putting on and taking off regular upper body clothing?: A Little  How much help is needed for taking care of personal grooming?: None  How much help for eating meals?: None  AM-[facility] Inpatient Daily Activity Raw Score: 20  AM-[facility] Inpatient ADL T-Scale Score : 42.03  ADL Inpatient CMS 0-100% Score: 38.32  ADL Inpatient CMS G-Code Modifier : POLY     Functional Assessment:    Initial Eval Status  Date:

## 2025-05-21 NOTE — ACP (ADVANCE CARE PLANNING)
Advance Care Planning   Healthcare Decision Maker:    Primary Decision Maker: Lydia Santacruz - Child - [phone]    Primary Decision Maker: JaiAshutosh - Child - [phone]    Primary Decision Maker: Mitchell Santacruz - [phone]    Today we documented Decision Maker(s) consistent with Legal Next of Kin hierarchy.       Electronically signed by RICHARD Garcia on 5/21/2025 at 7:22 AM

## 2025-05-21 NOTE — PLAN OF CARE
Problem: Chronic Conditions and Co-morbidities  Goal: Patient's chronic conditions and co-morbidity symptoms are monitored and maintained or improved  5/20/2025 2038 by Juana Adame RN  Outcome: Progressing     Problem: Discharge Planning  Goal: Discharge to home or other facility with appropriate resources  5/20/2025 2038 by Juana Adame RN  Outcome: Progressing     Problem: Safety - Adult  Goal: Free from fall injury  5/20/2025 2038 by Juana Adame RN  Outcome: Progressing     Problem: Cardiovascular - Adult  Goal: Maintains optimal cardiac output and hemodynamic stability  5/20/2025 2038 by Juana Adame RN  Outcome: Progressing     Problem: Skin/Tissue Integrity - Adult  Goal: Skin integrity remains intact  5/20/2025 2038 by Juana Adame RN  Outcome: Progressing     Problem: Gastrointestinal - Adult  Goal: Minimal or absence of nausea and vomiting  5/20/2025 2038 by Juana Adame RN  Outcome: Progressing     Problem: Metabolic/Fluid and Electrolytes - Adult  Goal: Electrolytes maintained within normal limits  5/20/2025 2038 by Juana Adame RN  Outcome: Progressing     Problem: Pain  Goal: Verbalizes/displays adequate comfort level or baseline comfort level  Outcome: Progressing

## 2025-05-21 NOTE — PROGRESS NOTES
Internal Medicine Progress Note     [facility]     Mina Riley D.O., STEVENI.                         Torsten Harrison D.O., BHARGAVOScottIScott Josue D.O.     Tanmay Farah, MSN, APRN-CNP  Isaak Monk, MSN, APRN, NP-C  Marie Diaz, MSN, APRN-CNP  Diana Lloyd, MSN, APRN, NP-C     Primary Care Physician: Liza Oh DO   Admitting Physician:  Liza Oh DO  Admission date and time: 5/20/2025  6:29 AM    Room:  [id number]  Admitting diagnosis: Lactic acidosis [E87.20]  Drug noncompliance [Z91.148]  Diabetic ketoacidosis without coma associated with drug or chemical induced diabetes mellitus [E09.10]  Type 1 diabetes mellitus with ketoacidosis without coma (HCC) [E10.10]    Patient Name: Ellie Santacruz  MRN: [id number]    Date of Service: 5/21/2025     Subjective:  Ellie is a 55 y.o. female who was seen and examined today,5/21/2025, at the bedside.  Ellie continues to describe abdominal discomfort with associated nausea.  Blood sugars have improved as has acidosis.  She remains on diabetic ketoacidosis protocol.  We discussed her positive cocaine screen and she states she was using cocaine prior to enrolling in the rehab facility as an outpatient.  Nursing describes a significant downward trend in hemoglobin with fluid resuscitation.  There has been no outward signs of GI bleeding.    Review of System:   Constitutional:   Describes generalized malaise and fatigue.  HEENT:   Denies ear pain, sore throat, sinus or eye problems.  Cardiovascular:   Denies any chest pain, irregular heartbeats, or palpitations.   Respiratory:   Denies shortness of breath, coughing, sputum production, hemoptysis, or wheezing.  Gastrointestinal:   Admits to ongoing nausea and abdominal discomfort.  Genitourinary:    Denies any urgency, frequency, hematuria. Voiding  without difficulty.  Extremities:   Denies lower extremity swelling, edema or cyanosis.   Neurology:    Denies any headache  05/21/2025 04:48 AM    LABGLOM >90 03/27/2024 05:20 AM    GLUCOSE 150 05/21/2025 04:48 AM       Assessment:  Diabetic ketoacidosis in the setting of insulin noncompliance and noncompliance with blood sugar checks at the rehab facility  Recent hospitalization secondary to GI bleeding related to esophageal varices status post EGD with banding x 4  Alcoholic cirrhosis with reported sober x 5 years  Urine drug screen positive for cocaine  Generalized anxiety and depression  History of hepatitis C    Plan:   Diabetic ketoacidosis is improving on appropriate protocol.  Blood sugars are better controlled and acidosis seems to be improving.  Beta hydroxybutyrate is trending downward.  Electrolyte derangements are being addressed.  We are hopeful for transition to basal bolus insulin therapy today with the institution of a diet.  There has been a significant downward trend in her hemoglobin while receiving aggressive fluid resuscitation.  Nursing reports no outward signs of GI bleeding with known history of varices.  We will continue to monitor hemoglobin accordingly and a Protonix infusion is being employed.  We discussed her positive drug screen.  She appears far more comfortable today when compared to my examination in the emergency department yesterday.  Continue current therapy.  See orders for further plan of care.    Greater than 40 minutes of critical care time was spent with the patient.  This time included chart review, , and discussion with those consultants involved in the patient's care.    More than 50% of my  time was spent at the bedside counseling/coordinating care with the patient and/or family with face to face contact.  This time was spent reviewing notes and laboratory data as well as instructing and counseling the patient. Time I spent with the family or surrogate(s) is included only if the patient was incapable of providing the necessary information or participating in medical decisions. I

## 2025-05-21 NOTE — CARE COORDINATION
Case Management Assessment  Initial Evaluation    Date/Time of Evaluation: 5/21/2025 10:03 AM  Assessment Completed by: Diane Lira RN    If patient is discharged prior to next notation, then this note serves as note for discharge by case management.    Patient Name: Ellie Santacruz                   YOB: 1970  Diagnosis: Lactic acidosis [E87.20]  Drug noncompliance [Z91.148]  Diabetic ketoacidosis without coma associated with drug or chemical induced diabetes mellitus [E09.10]  Type 1 diabetes mellitus with ketoacidosis without coma (HCC) [E10.10]                   Date / Time: 5/20/2025  6:29 AM    Patient Admission Status: Inpatient   Readmission Risk (Low < 19, Mod (19-27), High > 27): Readmission Risk Score: 27.7    Current PCP: Liza Oh, DO  PCP verified by CM? Yes    Chart Reviewed: Yes      History Provided by: Patient  Patient Orientation: Alert and Oriented, Person, Place, Situation, Self    Patient Cognition: Alert    Hospitalization in the last 30 days (Readmission):  Yes      Readmission Assessment  Number of Days since last admission?: 8-30 days  Previous Disposition: Home with Family  Who is being Interviewed: Patient  What was the patient's/caregiver's perception as to why they think they needed to return back to the hospital?: Other (Comment) (dka)  Did you visit your Primary Care Physician after you left the hospital, before you returned this time?: No  Why weren't you able to visit your PCP?: Did not have an appointment  Did you see a specialist, such as Cardiac, Pulmonary, Orthopedic Physician, etc. after you left the hospital?: No  Who advised the patient to return to the hospital?: Other (Comment) ([facility])  Does the patient report anything that got in the way of taking their medications?: No  In our efforts to provide the best possible care to you and others like you, can you think of anything that we could have done to help you after you left the hospital

## 2025-05-21 NOTE — PLAN OF CARE
Problem: Chronic Conditions and Co-morbidities  Goal: Patient's chronic conditions and co-morbidity symptoms are monitored and maintained or improved  5/21/2025 1354 by Kylee Hand RN  Outcome: Progressing  Flowsheets (Taken 5/21/2025 0800)  Care Plan - Patient's Chronic Conditions and Co-Morbidity Symptoms are Monitored and Maintained or Improved:   Monitor and assess patient's chronic conditions and comorbid symptoms for stability, deterioration, or improvement   Collaborate with multidisciplinary team to address chronic and comorbid conditions and prevent exacerbation or deterioration  5/21/2025 0644 by Juana Adame RN  Outcome: Progressing     Problem: Discharge Planning  Goal: Discharge to home or other facility with appropriate resources  5/21/2025 1354 by Kylee Hand RN  Outcome: Progressing  Flowsheets (Taken 5/21/2025 0800)  Discharge to home or other facility with appropriate resources:   Identify barriers to discharge with patient and caregiver   Arrange for needed discharge resources and transportation as appropriate  5/21/2025 0644 by Juana Adame RN  Outcome: Progressing     Problem: Safety - Adult  Goal: Free from fall injury  5/21/2025 1354 by Kylee Hand RN  Outcome: Progressing  5/21/2025 0644 by Juana Adame RN  Outcome: Progressing     Problem: Cardiovascular - Adult  Goal: Maintains optimal cardiac output and hemodynamic stability  5/21/2025 1354 by Kylee Hand RN  Outcome: Progressing  Flowsheets (Taken 5/21/2025 0800)  Maintains optimal cardiac output and hemodynamic stability:   Monitor blood pressure and heart rate   Monitor urine output and notify Licensed Independent Practitioner for values outside of normal range   Assess for signs of decreased cardiac output   Administer fluid and/or volume expanders as ordered  5/21/2025 0644 by Juana Adaem RN  Outcome: Progressing  Goal: Absence of cardiac dysrhythmias or at  returns to baseline bowel function: Assess bowel function  Goal: Maintains adequate nutritional intake  Outcome: Progressing  Flowsheets (Taken 5/21/2025 0800)  Maintains adequate nutritional intake:   Monitor percentage of each meal consumed   Identify factors contributing to decreased intake, treat as appropriate     Problem: Metabolic/Fluid and Electrolytes - Adult  Goal: Electrolytes maintained within normal limits  5/21/2025 1354 by Kylee Hand RN  Outcome: Progressing  Flowsheets (Taken 5/21/2025 0800)  Electrolytes maintained within normal limits:   Monitor labs and assess patient for signs and symptoms of electrolyte imbalances   Administer electrolyte replacement as ordered   Monitor response to electrolyte replacements, including repeat lab results as appropriate  5/21/2025 0644 by Juana Adame RN  Outcome: Progressing  Goal: Hemodynamic stability and optimal renal function maintained  Outcome: Progressing  Flowsheets (Taken 5/21/2025 0800)  Hemodynamic stability and optimal renal function maintained:   Monitor intake, output and patient weight   Monitor urine specific gravity, serum osmolarity and serum sodium as indicated or ordered   Monitor labs and assess for signs and symptoms of volume excess or deficit  Goal: Glucose maintained within prescribed range  Outcome: Progressing  Flowsheets (Taken 5/21/2025 0800)  Glucose maintained within prescribed range:   Monitor blood glucose as ordered   Assess for signs and symptoms of hyperglycemia and hypoglycemia   Administer ordered medications to maintain glucose within target range     Problem: Pain  Goal: Verbalizes/displays adequate comfort level or baseline comfort level  5/21/2025 1354 by Kylee Hand RN  Outcome: Progressing  5/21/2025 0644 by Juana Adame RN  Outcome: Progressing     Problem: Skin/Tissue Integrity  Goal: Skin integrity remains intact  Description: 1.  Monitor for areas of redness and/or skin breakdown2.

## 2025-05-21 NOTE — PROGRESS NOTES
Physical Therapy Initial Evaluation/Plan of Care    Room #:  AOWOZV62/MUNZHP35-45  Patient Name: Ellie Santacruz  YOB: 1970  MRN: [id number]    Date of Service: 5/22/2025     Tentative placement recommendation: Home return to [facility]  Equipment recommendation: None at this time      Evaluating Physical Therapist: Valerie Chu, PT #[id number]      Specific Provider Orders/Date/Referring Provider :  05/20/25 1315    PT eval and treat  Start:  05/20/25 1315,   End:  05/20/25 1315,   ONE TIME,   Standing Count:  1 Occurrences,   R       Torsten Harrison DO    Admitting Diagnosis:   Lactic acidosis [E87.20]  Drug noncompliance [Z91.148]  Diabetic ketoacidosis without coma associated with drug or chemical induced diabetes mellitus [E09.10]  Type 1 diabetes mellitus with ketoacidosis without coma (HCC) [E10.10]     hyperglycemia She states that she was not checking her blood sugars at the rehab facility nor was she taking her medications.  As a result, she presents to the hospital in fulminant diabetic ketoacidosis.   Surgery:   Recently had varices banding.   Visit Diagnoses         Codes      Type 1 diabetes mellitus with ketoacidosis without coma (HCC)    -  Primary E10.10      Drug noncompliance     Z91.148            Patient Active Problem List   Diagnosis    Chronic pancreatitis due to acute alcohol intoxication (HCC)    Schizoaffective disorder, bipolar type (HCC)    Anxiety disorder    Depression    History of alcohol abuse    Pancreatic pseudocyst (HCC)    Liver dysfunction    Asthma    Gastroesophageal reflux disease without esophagitis    Bipolar I disorder (HCC)    Bipolar affective disorder (HCC)    Irritable bowel syndrome    Tobacco use disorder    Jaundice    RUQ abdominal pain    Poorly controlled diabetes mellitus (HCC)    Pain of upper abdomen    Elevated LFTs    Intractable abdominal pain    Abdominal pain, epigastric    Hyperglycemia    Type 2 diabetes mellitus with hyperosmolarity without

## 2025-05-21 NOTE — PLAN OF CARE
Problem: Chronic Conditions and Co-morbidities  Goal: Patient's chronic conditions and co-morbidity symptoms are monitored and maintained or improved  5/21/2025 0644 by Juana Adame RN  Outcome: Progressing     Problem: Discharge Planning  Goal: Discharge to home or other facility with appropriate resources  5/21/2025 0644 by Juana Adame RN  Outcome: Progressing     Problem: Safety - Adult  Goal: Free from fall injury  5/21/2025 0644 by Juana Adame RN  Outcome: Progressing     Problem: Cardiovascular - Adult  Goal: Maintains optimal cardiac output and hemodynamic stability  5/21/2025 0644 by Juana Adame RN  Outcome: Progressing     Problem: Skin/Tissue Integrity - Adult  Goal: Skin integrity remains intact  5/21/2025 0644 by Juana Adame RN  Outcome: Progressing     Problem: Gastrointestinal - Adult  Goal: Minimal or absence of nausea and vomiting  5/21/2025 0644 by Juana Adame RN  Outcome: Progressing     Problem: Metabolic/Fluid and Electrolytes - Adult  Goal: Electrolytes maintained within normal limits  5/21/2025 0644 by Juana Adame RN  Outcome: Progressing     Problem: Pain  Goal: Verbalizes/displays adequate comfort level or baseline comfort level  5/21/2025 0644 by Juana Adame RN  Outcome: Progressing

## 2025-05-21 NOTE — PROGRESS NOTES
place peripheral IV  Urine cx + efacium 10-50,000 CFU- currently asymptomatic  Continue to monitor hemoglobin    Nutrition: Diet    Lines: Femoral TLC, will place peripherals and remove    Case was discussed with care team.    This patient is unstable and critically ill. There are life and organ supporting interventions that require frequent monitoring and personal assessment. There is a high possibility of sudden, clinically significant or life-threatening deterioration in this patient's condition which may require prompt therapeutic interventions.   I spent 33 independent minutes of critical care time excluding procedures.      Elana Chen, DO

## 2025-05-22 ENCOUNTER — ANESTHESIA EVENT (OUTPATIENT)
Dept: ENDOSCOPY | Age: 55
End: 2025-05-22
Payer: COMMERCIAL

## 2025-05-22 ENCOUNTER — APPOINTMENT (OUTPATIENT)
Dept: INTERVENTIONAL RADIOLOGY/VASCULAR | Age: 55
DRG: 420 | End: 2025-05-22
Payer: COMMERCIAL

## 2025-05-22 ENCOUNTER — APPOINTMENT (OUTPATIENT)
Dept: GENERAL RADIOLOGY | Age: 55
DRG: 420 | End: 2025-05-22
Payer: COMMERCIAL

## 2025-05-22 LAB
ALBUMIN SERPL-MCNC: 3 G/DL (ref 3.5–5.2)
ALP SERPL-CCNC: 211 U/L (ref 35–104)
ALT SERPL-CCNC: 21 U/L (ref 0–32)
ANION GAP SERPL CALCULATED.3IONS-SCNC: 7 MMOL/L (ref 7–16)
ANION GAP SERPL CALCULATED.3IONS-SCNC: 9 MMOL/L (ref 7–16)
AST SERPL-CCNC: 42 U/L (ref 0–31)
BASOPHILS # BLD: 0 K/UL (ref 0–0.2)
BASOPHILS NFR BLD: 0 % (ref 0–2)
BILIRUB SERPL-MCNC: 0.6 MG/DL (ref 0–1.2)
BUN SERPL-MCNC: 7 MG/DL (ref 6–20)
BUN SERPL-MCNC: 8 MG/DL (ref 6–20)
CA-I BLD-SCNC: 1 MMOL/L (ref 1.15–1.33)
CALCIUM SERPL-MCNC: 6.5 MG/DL (ref 8.6–10.2)
CALCIUM SERPL-MCNC: 6.6 MG/DL (ref 8.6–10.2)
CHLORIDE SERPL-SCNC: 106 MMOL/L (ref 98–107)
CHLORIDE SERPL-SCNC: 107 MMOL/L (ref 98–107)
CO2 SERPL-SCNC: 19 MMOL/L (ref 22–29)
CO2 SERPL-SCNC: 20 MMOL/L (ref 22–29)
CREAT SERPL-MCNC: 0.7 MG/DL (ref 0.5–1)
CREAT SERPL-MCNC: 0.7 MG/DL (ref 0.5–1)
EOSINOPHIL # BLD: 0.02 K/UL (ref 0.05–0.5)
EOSINOPHILS RELATIVE PERCENT: 1 % (ref 0–6)
ERYTHROCYTE [DISTWIDTH] IN BLOOD BY AUTOMATED COUNT: 17.2 % (ref 11.5–15)
GFR, ESTIMATED: >90 ML/MIN/1.73M2
GFR, ESTIMATED: >90 ML/MIN/1.73M2
GLUCOSE BLD-MCNC: 138 MG/DL (ref 74–99)
GLUCOSE BLD-MCNC: 172 MG/DL (ref 74–99)
GLUCOSE BLD-MCNC: 202 MG/DL (ref 74–99)
GLUCOSE BLD-MCNC: 259 MG/DL (ref 74–99)
GLUCOSE SERPL-MCNC: 154 MG/DL (ref 74–99)
GLUCOSE SERPL-MCNC: 205 MG/DL (ref 74–99)
HCT VFR BLD AUTO: 22.6 % (ref 34–48)
HCT VFR BLD AUTO: 23.7 % (ref 34–48)
HCT VFR BLD AUTO: 24.3 % (ref 34–48)
HGB BLD-MCNC: 8.1 G/DL (ref 11.5–15.5)
HGB BLD-MCNC: 8.4 G/DL (ref 11.5–15.5)
HGB BLD-MCNC: 8.4 G/DL (ref 11.5–15.5)
LYMPHOCYTES NFR BLD: 0.34 K/UL (ref 1.5–4)
LYMPHOCYTES RELATIVE PERCENT: 15 % (ref 20–42)
MAGNESIUM SERPL-MCNC: 2.1 MG/DL (ref 1.6–2.6)
MCH RBC QN AUTO: 30.4 PG (ref 26–35)
MCHC RBC AUTO-ENTMCNC: 35.4 G/DL (ref 32–34.5)
MCV RBC AUTO: 85.9 FL (ref 80–99.9)
METAMYELOCYTES ABSOLUTE COUNT: 0.02 K/UL (ref 0–0.12)
METAMYELOCYTES: 1 % (ref 0–1)
MICROORGANISM SPEC CULT: NORMAL
MONOCYTES NFR BLD: 0.02 K/UL (ref 0.1–0.95)
MONOCYTES NFR BLD: 1 % (ref 2–12)
MYELOCYTES ABSOLUTE COUNT: 0.02 K/UL
MYELOCYTES: 1 %
NEUTROPHILS NFR BLD: 81 % (ref 43–80)
NEUTS SEG NFR BLD: 1.79 K/UL (ref 1.8–7.3)
PHOSPHATE SERPL-MCNC: 2.3 MG/DL (ref 2.5–4.5)
PLATELET # BLD AUTO: 38 K/UL (ref 130–450)
PLATELET CONFIRMATION: NORMAL
PMV BLD AUTO: 9.9 FL (ref 7–12)
POTASSIUM SERPL-SCNC: 3.8 MMOL/L (ref 3.5–5)
POTASSIUM SERPL-SCNC: 3.9 MMOL/L (ref 3.5–5)
PROT SERPL-MCNC: 5.5 G/DL (ref 6.4–8.3)
RBC # BLD AUTO: 2.76 M/UL (ref 3.5–5.5)
RBC # BLD: ABNORMAL 10*6/UL
SODIUM SERPL-SCNC: 133 MMOL/L (ref 132–146)
SODIUM SERPL-SCNC: 135 MMOL/L (ref 132–146)
SPECIMEN DESCRIPTION: NORMAL
WBC OTHER # BLD: 2.2 K/UL (ref 4.5–11.5)

## 2025-05-22 PROCEDURE — 36592 COLLECT BLOOD FROM PICC: CPT

## 2025-05-22 PROCEDURE — C1751 CATH, INF, PER/CENT/MIDLINE: HCPCS

## 2025-05-22 PROCEDURE — 2500000003 HC RX 250 WO HCPCS: Performed by: HOSPITALIST

## 2025-05-22 PROCEDURE — 6370000000 HC RX 637 (ALT 250 FOR IP): Performed by: INTERNAL MEDICINE

## 2025-05-22 PROCEDURE — 97530 THERAPEUTIC ACTIVITIES: CPT | Performed by: PHYSICAL THERAPIST

## 2025-05-22 PROCEDURE — 6360000002 HC RX W HCPCS: Performed by: INTERNAL MEDICINE

## 2025-05-22 PROCEDURE — 99233 SBSQ HOSP IP/OBS HIGH 50: CPT | Performed by: INTERNAL MEDICINE

## 2025-05-22 PROCEDURE — 84100 ASSAY OF PHOSPHORUS: CPT

## 2025-05-22 PROCEDURE — 85025 COMPLETE CBC W/AUTO DIFF WBC: CPT

## 2025-05-22 PROCEDURE — 85014 HEMATOCRIT: CPT

## 2025-05-22 PROCEDURE — 1200000000 HC SEMI PRIVATE

## 2025-05-22 PROCEDURE — 80053 COMPREHEN METABOLIC PANEL: CPT

## 2025-05-22 PROCEDURE — 76000 FLUOROSCOPY <1 HR PHYS/QHP: CPT

## 2025-05-22 PROCEDURE — 6360000002 HC RX W HCPCS

## 2025-05-22 PROCEDURE — 51798 US URINE CAPACITY MEASURE: CPT

## 2025-05-22 PROCEDURE — 85018 HEMOGLOBIN: CPT

## 2025-05-22 PROCEDURE — 2500000003 HC RX 250 WO HCPCS

## 2025-05-22 PROCEDURE — 2580000003 HC RX 258: Performed by: INTERNAL MEDICINE

## 2025-05-22 PROCEDURE — 36410 VNPNXR 3YR/> PHY/QHP DX/THER: CPT

## 2025-05-22 PROCEDURE — 74220 X-RAY XM ESOPHAGUS 1CNTRST: CPT

## 2025-05-22 PROCEDURE — 97161 PT EVAL LOW COMPLEX 20 MIN: CPT | Performed by: PHYSICAL THERAPIST

## 2025-05-22 PROCEDURE — 6370000000 HC RX 637 (ALT 250 FOR IP): Performed by: FAMILY MEDICINE

## 2025-05-22 PROCEDURE — 6370000000 HC RX 637 (ALT 250 FOR IP)

## 2025-05-22 PROCEDURE — 82330 ASSAY OF CALCIUM: CPT

## 2025-05-22 PROCEDURE — 76937 US GUIDE VASCULAR ACCESS: CPT

## 2025-05-22 PROCEDURE — 82962 GLUCOSE BLOOD TEST: CPT

## 2025-05-22 PROCEDURE — 83735 ASSAY OF MAGNESIUM: CPT

## 2025-05-22 PROCEDURE — 80048 BASIC METABOLIC PNL TOTAL CA: CPT

## 2025-05-22 PROCEDURE — 2580000003 HC RX 258

## 2025-05-22 PROCEDURE — 05H933Z INSERTION OF INFUSION DEVICE INTO RIGHT BRACHIAL VEIN, PERCUTANEOUS APPROACH: ICD-10-PCS | Performed by: FAMILY MEDICINE

## 2025-05-22 RX ORDER — PANTOPRAZOLE SODIUM 40 MG/1
40 TABLET, DELAYED RELEASE ORAL
Status: DISCONTINUED | OUTPATIENT
Start: 2025-05-22 | End: 2025-05-24 | Stop reason: HOSPADM

## 2025-05-22 RX ORDER — LINEZOLID 2 MG/ML
600 INJECTION, SOLUTION INTRAVENOUS EVERY 12 HOURS
Status: DISCONTINUED | OUTPATIENT
Start: 2025-05-22 | End: 2025-05-24 | Stop reason: HOSPADM

## 2025-05-22 RX ADMIN — GABAPENTIN 200 MG: 100 CAPSULE ORAL at 14:02

## 2025-05-22 RX ADMIN — PANTOPRAZOLE SODIUM 8 MG/HR: 40 INJECTION, POWDER, FOR SOLUTION INTRAVENOUS at 02:42

## 2025-05-22 RX ADMIN — ONDANSETRON 4 MG: 2 INJECTION, SOLUTION INTRAMUSCULAR; INTRAVENOUS at 11:20

## 2025-05-22 RX ADMIN — PANTOPRAZOLE SODIUM 40 MG: 40 TABLET, DELAYED RELEASE ORAL at 06:56

## 2025-05-22 RX ADMIN — CALCIUM GLUCONATE 3000 MG: 98 INJECTION, SOLUTION INTRAVENOUS at 09:12

## 2025-05-22 RX ADMIN — RIFAXIMIN 550 MG: 550 TABLET ORAL at 08:13

## 2025-05-22 RX ADMIN — ONDANSETRON 4 MG: 2 INJECTION, SOLUTION INTRAMUSCULAR; INTRAVENOUS at 04:55

## 2025-05-22 RX ADMIN — OXYCODONE 5 MG: 5 TABLET ORAL at 16:18

## 2025-05-22 RX ADMIN — ANASTROZOLE 1 MG: 1 TABLET ORAL at 08:14

## 2025-05-22 RX ADMIN — POTASSIUM PHOSPHATE, MONOBASIC POTASSIUM PHOSPHATE, DIBASIC 10 MMOL: 224; 236 INJECTION, SOLUTION, CONCENTRATE INTRAVENOUS at 09:14

## 2025-05-22 RX ADMIN — INSULIN LISPRO 2 UNITS: 100 INJECTION, SOLUTION INTRAVENOUS; SUBCUTANEOUS at 07:01

## 2025-05-22 RX ADMIN — Medication 1 TABLET: at 19:59

## 2025-05-22 RX ADMIN — ONDANSETRON 4 MG: 2 INJECTION, SOLUTION INTRAMUSCULAR; INTRAVENOUS at 17:40

## 2025-05-22 RX ADMIN — PROPRANOLOL HYDROCHLORIDE 20 MG: 10 TABLET ORAL at 08:13

## 2025-05-22 RX ADMIN — Medication 1 TABLET: at 08:13

## 2025-05-22 RX ADMIN — GABAPENTIN 200 MG: 100 CAPSULE ORAL at 08:13

## 2025-05-22 RX ADMIN — OXYCODONE 5 MG: 5 TABLET ORAL at 22:25

## 2025-05-22 RX ADMIN — BARIUM SULFATE 176 ML: 960 POWDER, FOR SUSPENSION ORAL at 15:15

## 2025-05-22 RX ADMIN — OXYCODONE 5 MG: 5 TABLET ORAL at 04:29

## 2025-05-22 RX ADMIN — GABAPENTIN 200 MG: 100 CAPSULE ORAL at 20:26

## 2025-05-22 RX ADMIN — ONDANSETRON 4 MG: 2 INJECTION, SOLUTION INTRAMUSCULAR; INTRAVENOUS at 23:21

## 2025-05-22 RX ADMIN — LACTULOSE 20 G: 20 SOLUTION ORAL at 19:58

## 2025-05-22 RX ADMIN — INSULIN LISPRO 4 UNITS: 100 INJECTION, SOLUTION INTRAVENOUS; SUBCUTANEOUS at 16:21

## 2025-05-22 RX ADMIN — RIFAXIMIN 550 MG: 550 TABLET ORAL at 20:35

## 2025-05-22 RX ADMIN — PANTOPRAZOLE SODIUM 40 MG: 40 TABLET, DELAYED RELEASE ORAL at 16:18

## 2025-05-22 RX ADMIN — LINEZOLID 600 MG: 600 INJECTION, SOLUTION INTRAVENOUS at 14:34

## 2025-05-22 RX ADMIN — OXYCODONE 5 MG: 5 TABLET ORAL at 10:31

## 2025-05-22 RX ADMIN — INSULIN GLARGINE 15 UNITS: 100 INJECTION, SOLUTION SUBCUTANEOUS at 20:30

## 2025-05-22 RX ADMIN — Medication 1 TABLET: at 14:02

## 2025-05-22 RX ADMIN — INSULIN GLARGINE 15 UNITS: 100 INJECTION, SOLUTION SUBCUTANEOUS at 08:14

## 2025-05-22 ASSESSMENT — PAIN DESCRIPTION - DESCRIPTORS
DESCRIPTORS: ACHING;CRAMPING;DULL
DESCRIPTORS: ACHING;STABBING;DISCOMFORT
DESCRIPTORS: ACHING
DESCRIPTORS: ACHING;DISCOMFORT;SORE
DESCRIPTORS: DISCOMFORT;ACHING;STABBING
DESCRIPTORS: ACHING;SORE;DISCOMFORT
DESCRIPTORS: ACHING;CRAMPING

## 2025-05-22 ASSESSMENT — PAIN SCALES - GENERAL
PAINLEVEL_OUTOF10: 7
PAINLEVEL_OUTOF10: 7
PAINLEVEL_OUTOF10: 8
PAINLEVEL_OUTOF10: 5
PAINLEVEL_OUTOF10: 3
PAINLEVEL_OUTOF10: 7
PAINLEVEL_OUTOF10: 0
PAINLEVEL_OUTOF10: 7
PAINLEVEL_OUTOF10: 7
PAINLEVEL_OUTOF10: 0
PAINLEVEL_OUTOF10: 0
PAINLEVEL_OUTOF10: 3
PAINLEVEL_OUTOF10: 7

## 2025-05-22 ASSESSMENT — PAIN DESCRIPTION - LOCATION
LOCATION: ABDOMEN

## 2025-05-22 ASSESSMENT — PAIN DESCRIPTION - FREQUENCY
FREQUENCY: CONTINUOUS

## 2025-05-22 ASSESSMENT — PAIN DESCRIPTION - ORIENTATION
ORIENTATION: MID;UPPER
ORIENTATION: MID;UPPER
ORIENTATION: MID
ORIENTATION: MID
ORIENTATION: MID;UPPER
ORIENTATION: MID
ORIENTATION: MID;UPPER

## 2025-05-22 ASSESSMENT — PAIN DESCRIPTION - ONSET
ONSET: ON-GOING

## 2025-05-22 ASSESSMENT — PAIN - FUNCTIONAL ASSESSMENT: PAIN_FUNCTIONAL_ASSESSMENT: ACTIVITIES ARE NOT PREVENTED

## 2025-05-22 ASSESSMENT — PAIN DESCRIPTION - PAIN TYPE
TYPE: CHRONIC PAIN

## 2025-05-22 NOTE — PROGRESS NOTES
Spoke with IR regarding order for midline, stated they will try to fit patient in later this afternoon and will call when patient can come.

## 2025-05-22 NOTE — PLAN OF CARE
Problem: Chronic Conditions and Co-morbidities  Goal: Patient's chronic conditions and co-morbidity symptoms are monitored and maintained or improved  5/22/2025 1049 by Abdoul Moon RN  Outcome: Progressing     Problem: Discharge Planning  Goal: Discharge to home or other facility with appropriate resources  5/22/2025 1049 by Abdoul Moon RN  Outcome: Progressing     Problem: Safety - Adult  Goal: Free from fall injury  5/22/2025 1049 by Abdoul Moon RN  Outcome: Progressing     Problem: Cardiovascular - Adult  Goal: Maintains optimal cardiac output and hemodynamic stability  5/22/2025 1049 by Abdoul Moon RN  Outcome: Progressing     Problem: Cardiovascular - Adult  Goal: Absence of cardiac dysrhythmias or at baseline  5/22/2025 1049 by Abdoul Moon RN  Outcome: Progressing     Problem: Skin/Tissue Integrity - Adult  Goal: Skin integrity remains intact  Description: 1.  Monitor for areas of redness and/or skin breakdown2.  Assess vascular access sites hourly3.  Every 4-6 hours minimum:  Change oxygen saturation probe site4.  Every 4-6 hours:  If on nasal continuous positive airway pressure, respiratory therapy assess nares and determine need for appliance change or resting period  5/22/2025 1049 by Abdoul Moon RN  Outcome: Progressing  Flowsheets (Taken 5/22/2025 0800)  Skin Integrity Remains Intact:   Monitor for areas of redness and/or skin breakdown   Assess vascular access sites hourly     Problem: Skin/Tissue Integrity - Adult  Goal: Incisions, wounds, or drain sites healing without S/S of infection  Outcome: Progressing     Problem: Skin/Tissue Integrity - Adult  Goal: Oral mucous membranes remain intact  Outcome: Progressing  Flowsheets (Taken 5/22/2025 0800)  Oral Mucous Membranes Remain Intact: Assess oral mucosa and hygiene practices     Problem: Gastrointestinal - Adult  Goal: Minimal or absence of nausea and vomiting  5/22/2025 1049 by Abdoul Moon

## 2025-05-22 NOTE — PROGRESS NOTES
Critical Care Team - Daily Progress Note         Date and time: 5/22/2025 12:21 PM  Patient's name:  Ellie Santacruz  Medical Record Number: [id number]  Patient's account/billing number: [id number]  Patient's YOB: 1970  Age: 55 y.o.  Date of Admission: 5/20/2025  6:29 AM  Length of stay during current admission: 2      Primary Care Physician: Liza Oh DO  ICU Attending Physician:      Code Status: Full Code    Reason for ICU admission: DKA      SUBJECTIVE:     Patient is a 55-year-old woman with above-mentioned medical problem who presented from a rehab facility due to uncontrolled blood sugar. Patient was found on DKA and was started on insulin drip. Patient was recently discharged on May 9, 2025 after being managed for upper GI bleeding and varices. She is awake and communicative but intermittently confused.     CURRENT VENTILATION STATUS:     [] Ventilator  [] BIPAP  [] Nasal Cannula [x] Room Air      IF INTUBATED, ET TUBE MARKING AT LOWER LIP:       cms    SECRETIONS Amount:  [] Small [] Moderate  [] Large  [x] None  Color:     [] White [] Colored  [] Bloody    SEDATION:  RAAS Score:  [] Propofol gtt  [] Versed gtt  [] Ativan gtt   [x] No Sedation    PARALYZED:  [x] No    [] Yes    VASOPRESSORS:  [x] No    [] Yes    If yes -   [] Levophed       [] Dopamine     [] Vasopressin       [] Dobutamine  [] Phenylephrine         [] Epinephrine    CENTRAL LINES:     [] No   [x] Yes   (Date of Insertion:   )           If yes -     [] Right IJ     [] Left IJ [x] Right Femoral [] Left Femoral                   [] Right Subclavian [] Left Subclavian     XAVIER'S CATHETER:   [x] No   [] Yes  (Date of Insertion:   )     URINE OUTPUT:            [x] Good   [] Low              [] Anuric      OBJECTIVE:     VITAL SIGNS:  /76   Pulse 67   Temp 98 °F (36.7 °C) (Oral)   Resp 20   Ht 1.524 m (5')   Wt 60.5 kg (133 lb 4.8 oz)   SpO2 99%   BMI 26.03 kg/m²   Tmax over 24 hours:  Temp

## 2025-05-22 NOTE — PLAN OF CARE
Problem: Chronic Conditions and Co-morbidities  Goal: Patient's chronic conditions and co-morbidity symptoms are monitored and maintained or improved  5/22/2025 0053 by Evelyne Vann RN  Outcome: Progressing     Problem: Discharge Planning  Goal: Discharge to home or other facility with appropriate resources  5/22/2025 0053 by Evelyne Vann RN  Outcome: Progressing     Problem: Safety - Adult  Goal: Free from fall injury  5/22/2025 0053 by Evelyne Vann RN  Outcome: Progressing     Problem: Cardiovascular - Adult  Goal: Maintains optimal cardiac output and hemodynamic stability  5/22/2025 0053 by Evelyne Vann RN  Outcome: Progressing     Problem: Cardiovascular - Adult  Goal: Absence of cardiac dysrhythmias or at baseline  5/22/2025 0053 by Evelyne Vann RN  Outcome: Progressing     Problem: Skin/Tissue Integrity - Adult  Goal: Skin integrity remains intact  Description: 1.  Monitor for areas of redness and/or skin breakdown2.  Assess vascular access sites hourly3.  Every 4-6 hours minimum:  Change oxygen saturation probe site4.  Every 4-6 hours:  If on nasal continuous positive airway pressure, respiratory therapy assess nares and determine need for appliance change or resting period  5/22/2025 0053 by Evelyne Vann RN  Outcome: Progressing     Problem: Gastrointestinal - Adult  Goal: Minimal or absence of nausea and vomiting  5/22/2025 0053 by Evelyne Vann RN  Outcome: Progressing     Problem: Metabolic/Fluid and Electrolytes - Adult  Goal: Electrolytes maintained within normal limits  5/22/2025 0053 by Evelyne Vann RN  Outcome: Progressing     Problem: Pain  Goal: Verbalizes/displays adequate comfort level or baseline comfort level  5/22/2025 0053 by Evelyne Vann RN  Outcome: Progressing     Problem: Skin/Tissue Integrity  Goal: Skin integrity remains intact  Description: 1.  Monitor for areas of redness and/or skin breakdown2.  Assess vascular access sites hourly3.   Every 4-6 hours minimum:  Change oxygen saturation probe site4.  Every 4-6 hours:  If on nasal continuous positive airway pressure, respiratory therapy assess nares and determine need for appliance change or resting period  5/22/2025 0053 by Evelyne Vann RN  Outcome: Progressing     Problem: Neurosensory - Adult  Goal: Achieves stable or improved neurological status  5/22/2025 0053 by Evelyne Vann RN  Outcome: Progressing     Problem: Neurosensory - Adult  Goal: Achieves maximal functionality and self care  5/22/2025 0053 by Evelyne Vann RN  Outcome: Progressing     Problem: Respiratory - Adult  Goal: Achieves optimal ventilation and oxygenation  5/22/2025 0053 by Evelyne Vann RN  Outcome: Progressing     Problem: Musculoskeletal - Adult  Goal: Return mobility to safest level of function  5/22/2025 0053 by Evelyne Vann RN  Outcome: Progressing     Problem: Musculoskeletal - Adult  Goal: Maintain proper alignment of affected body part  5/22/2025 0053 by Evelyne Vann RN  Outcome: Progressing     Problem: Musculoskeletal - Adult  Goal: Return ADL status to a safe level of function  5/22/2025 0053 by Evelyne Vann RN  Outcome: Progressing     Problem: Infection - Adult  Goal: Absence of infection at discharge  5/22/2025 0053 by Evelyne Vann RN  Outcome: Progressing     Problem: Infection - Adult  Goal: Absence of infection during hospitalization  5/22/2025 0053 by Evelyne Vann RN  Outcome: Progressing     Problem: Hematologic - Adult  Goal: Maintains hematologic stability  5/22/2025 0053 by Evelyne Vann RN  Outcome: Progressing     Problem: Anxiety  Goal: Will report anxiety at manageable levels  Description: INTERVENTIONS:1. Administer medication as ordered2. Teach and rehearse alternative coping skills3. Provide emotional support with 1:1 interaction with staff  5/22/2025 0053 by Evelyne Vann RN  Outcome: Progressing     Problem: Decision Making  Goal: Pt/Family

## 2025-05-22 NOTE — PROGRESS NOTES
Department of Family Practice  Adult Daily Progress Note      SUBJECTIVE  OSBALDO IS SEEN IN FOLLOW UP 05/21/2025 ON [id number].  SHE IS TOLERATING TREATMENT.  SHE REPORTS HER FOOD GETTING STUCK IN HER CHEST WHEN SHE IS EATING AND \"IT REALLY HURTS\".  IT IS MOSTLY WITH SOLIDS.  SHE HAS EATEN WHAT SHE CAN OF HER MEALS TODAY.  SHE IS RESPONDING TO DKA TREATMENT.    OBJECTIVE    Physical  VITALS:  /77   Pulse 83   Temp 98.3 °F (36.8 °C) (Oral)   Resp 17   Ht 1.524 m (5')   Wt 57.8 kg (127 lb 6.8 oz)   SpO2 97%   BMI 24.89 kg/m²   CONSTITUTIONAL:  awake, alert, cooperative, no apparent distress, and appears stated age  ENT:  Normocephalic, without obvious abnormality, atraumatic, sinuses nontender on palpation, external ears without lesions, oral pharynx with moist mucus membranes, tonsils without erythema or exudates, gums normal and good dentition.  LUNGS:  No increased work of breathing, good air exchange, clear to auscultation bilaterally, no crackles or wheezing  CARDIOVASCULAR:  Normal apical impulse, regular rate and rhythm, normal S1 and S2, no S3 or S4, and no murmur noted  ABDOMEN:  scars CONSISTENT WITH SURGICAL HISTORY, normal bowel sounds, soft, non-distended, non-tender, no masses palpated, no hepatosplenomegally  SKIN:  WARM AND DRY  Data  CBC with Differential:    Lab Results   Component Value Date/Time    WBC 4.6 05/21/2025 04:48 AM    RBC 3.01 05/21/2025 04:48 AM    HGB 8.2 05/21/2025 06:19 PM    HCT 23.1 05/21/2025 06:19 PM    PLT 35 05/21/2025 04:48 AM    MCV 83.1 05/21/2025 04:48 AM    MCH 30.6 05/21/2025 04:48 AM    MCHC 36.8 05/21/2025 04:48 AM    RDW 16.0 05/21/2025 04:48 AM    NRBC 1 01/31/2025 06:43 AM    BANDSPCT 1 05/15/2016 08:30 AM    METASPCT 1 03/23/2024 05:20 AM    METASPCT 1.7 06/18/2017 06:33 AM    LYMPHOPCT 12 05/21/2025 04:48 AM    MONOPCT 6 05/21/2025 04:48 AM    MYELOPCT 1 11/17/2024 04:02 PM    MYELOPCT 0.5 04/01/2017 09:45 AM    EOSPCT 0 05/21/2025 04:48 AM    BASOPCT 0

## 2025-05-22 NOTE — CARE COORDINATION
5/22/2025 Faxed clinicals to [facility], pending acceptance. Xifaxan pta. Electronically signed by Diane Lira RN-BC on 5/22/2025 at 7:53 AM

## 2025-05-22 NOTE — PROGRESS NOTES
Spiritual Health History and Assessment/Progress Note  [facility]    Spiritual/Emotional Needs,  ,  ,      Name: Ellie Santacruz MRN: [id number]    Age: 55 y.o.     Sex: female   Language: English   Uatsdin: Mu-ism   Diabetic ketoacidosis (HCC)     Date: 5/22/2025                           Spiritual Assessment began in [facility] 2 ICU        Referral/Consult From: Rounding   Encounter Overview/Reason: Spiritual/Emotional Needs  Service Provided For: Patient    Mary, Belief, Meaning:   Patient identifies as spiritual and has beliefs or practices that help with coping during difficult times  Family/Friends No family/friends present      Importance and Influence:  Patient has spiritual/personal beliefs that influence decisions regarding their health  Family/Friends No family/friends present    Community:  Patient feels well-supported. Support system includes: Children  Family/Friends No family/friends present    Assessment and Plan of Care:     Patient Interventions include: Facilitated expression of thoughts and feelings, Explored spiritual coping/struggle/distress, and Affirmed coping skills/support systems  Family/Friends Interventions include: No family/friends present    Patient Plan of Care: Spiritual Care available upon further referral  Family/Friends Plan of Care: No family/friends present    Electronically signed by EHSAN Zapata on 5/22/2025 at 2:57 PM

## 2025-05-22 NOTE — CARE COORDINATION
5/22/2025 Transfer to [facility]. Pt was being admitted to [facility] for Drug rehab- Positive cocaine. Pt states she's been taking her Xifaxan pta. Plans to return to [facility] to start her rehab. Faxed clinicals to [facility], pending acceptance. Electronically signed by Diane Lira RN-BC on 5/22/2025 at 3:01 PM

## 2025-05-22 NOTE — PROGRESS NOTES
Patient came to IR to have midline cath placed as ordered by Dr Kerr. Midline inserted under use of ultrasound by Dr Hobson at 25cm to R brachial.

## 2025-05-22 NOTE — PROGRESS NOTES
Internal Medicine Progress Note     [facility]     Mina Riley D.O., STEVENI.                         Torsten Harrison D.O., JOSE Josue D.O.     Tanmay Farah, MSN, APRN-CNP  Isaak Monk, MSN, APRN, NP-C  Marie Diaz, MSN, APRN-CNP  Diana Lloyd, MSN, APRN, NP-C     Primary Care Physician: Liza Oh DO   Admitting Physician:  Liza Oh DO  Admission date and time: 5/20/2025  6:29 AM    Room:  [id number]/[id number]  Admitting diagnosis: Lactic acidosis [E87.20]  Drug noncompliance [Z91.148]  Diabetic ketoacidosis without coma associated with drug or chemical induced diabetes mellitus [E09.10]  Type 1 diabetes mellitus with ketoacidosis without coma (HCC) [E10.10]    Patient Name: Ellie Santacruz  MRN: [id number]    Date of Service: 5/22/2025     Subjective:  Ellie is a 55 y.o. female who was seen and examined today,5/22/2025, at the bedside.  Patient resting company at the present time.  The patient currently off insulin drip Lantus and Humalog subcu.  Medication has been changed to orally for Protonix..  Stable for transfer out of ICU.  Will start antibiotics for Enterococcus UTI    No family member present    Review of System:   Constitutional:   Describes generalized malaise and fatigue.  HEENT:   Denies ear pain, sore throat, sinus or eye problems.  Cardiovascular:   Denies any chest pain, irregular heartbeats, or palpitations.   Respiratory:   Denies shortness of breath, coughing, sputum production, hemoptysis, or wheezing.  Gastrointestinal:   Admits to ongoing nausea and abdominal discomfort.  Genitourinary:    Denies any urgency, frequency, hematuria. Voiding  without difficulty.  Extremities:   Denies lower extremity swelling, edema or cyanosis.   Neurology:    Denies any headache or focal neurological deficits, Denies generalized weakness or memory difficulty.   Psch:   Denies being anxious or depressed.  Musculoskeletal:    Denies  myalgias,  joint complaints or back pain.   Integumentary:   Denies any rashes, ulcers, or excoriations.  Denies bruising.  Hematologic/Lymphatic:  Denies bruising or bleeding.    Physical Exam:  I/O this shift:  In: 606.8 [P.O.:540; I.V.:66.8]  Out: 450 [Urine:450]    Intake/Output Summary (Last 24 hours) at 5/22/2025 1338  Last data filed at 5/22/2025 1200  Gross per 24 hour   Intake 1731.3 ml   Output 970 ml   Net 761.3 ml   I/O last 3 completed shifts:  In: 6393.7 [P.O.:400; I.V.:4011; IV Piggyback:1982.7]  Out: 1345 [Urine:1345]  Patient Vitals for the past 96 hrs (Last 3 readings):   Weight   05/22/25 0700 60.5 kg (133 lb 4.8 oz)   05/21/25 0600 57.8 kg (127 lb 6.8 oz)   05/20/25 1640 56.1 kg (123 lb 11.2 oz)     Vital Signs:   Blood pressure 114/71, pulse 65, temperature 98.4 °F (36.9 °C), temperature source Oral, resp. rate 14, height 1.524 m (5'), weight 60.5 kg (133 lb 4.8 oz), SpO2 95%, not currently breastfeeding.    General appearance:  Alert, responsive, oriented to person, place, and time. Well preserved, alert, no distress.  Head:  Normocephalic. No masses, lesions or tenderness.  Eyes:  PERRLA.  EOMI.  Sclera clear.  Buccal mucosa moist.  ENT:  Ears normal. Mucosa normal.  Neck:    Supple. Trachea midline. No thyromegaly. No JVD. No bruits.  Heart:    Rhythm regular. Rate controlled.  No murmurs.  Lungs:    Symmetrical. Clear to auscultation bilaterally.  No wheezes. No rhonchi. No rales.  Abdomen:   Soft. Non-tender. Non-distended. Bowel sounds positive. No organomegaly or masses.  No pain on palpation.  Extremities:    Peripheral pulses present.  No peripheral edema.  No ulcers. No cyanosis. No clubbing.  Neurologic:    Alert x 3.  No focal deficit.  Cranial nerves grossly intact. No focal weakness.  Psych:   Behavior is normal. Mood appears normal. Speech is not rapid and/or pressured.  Musculoskeletal:   Spine ROM normal. Muscular strength intact. Gait not assessed.  Integumentary:  No rashes  Skin normal

## 2025-05-22 NOTE — CONSULTS
CONSULT  Олег Mitchell M.D.  [facility]  Dr. Lakhwinder Madera M.D.,  Dr. Simón Esquivel M.D.,  Dr. Hugo Rodríguez D.O.,  Dr. Robbin Mitchell D.O. ,  Dr. Royer Coppola M.D.,          Ellie Santacruz  55 y.o.  female      Re: \"food getting stuck; odynophagia\"  Requesting physician: Dr. Oh  Date:7:16 AM 5/22/2025          HPI: 55-year-old female patient seen in the hospital for above described issue.  Patient is well-known to me from previous hospital admission most recently in the beginning of May of this year.  Patient has past medical history significant for cirrhosis, breast cancer, bronchitis, candidiasis, chronic pancreatitis, substance and alcohol abuse, constipation, diabetes mellitus, GERD, gout, hepatitis C, polyneuropathy, psychiatric disorder.  Patient was recently admitted in the beginning of May and underwent upper endoscopy with banding of esophageal varices (x 4).  On current admission patient has presented to the hospital on 28 May with chief complaint of hyperglycemia and she has been managed in the ICU.  Patient reports that she is experiencing difficulty swallowing with feeling of food being stuck in her esophagus mid chest mostly for solids.  Patient denies any significant abdominal pain.  Laboratory work reveals hemoglobin of 8.4 with hematocrit of 23.7.  Platelet count is 38.  White blood cell count is 2.2.  INR on presentation is 1.3 consistent with baseline.  Chemistry panel shows BUN of 8 with creatinine of 0.7.  Prior hepatitis C antibody positive however nondetectable hepatitis C viral load in January of this year.    Information sources:   -Patient  -medical record  -health care team    PMHx:  Past Medical History:   Diagnosis Date    Alcoholism (HCC)     Since teens to early 20s    Anxiety and depression     Ascites of liver     Bronchitis     Cancer (HCC)     breast, left    Candidiasis of vagina     Chronic pancreatitis (HCC)     Cocaine abuse (HCC)     Constipation      Diabetes mellitus, type 2 (HCC)     GERD (gastroesophageal reflux disease)     Gout     Hepatitis C     Hernia of anterior abdominal wall     Jaundice 05/12/2016    Pneumonia     Polyneuropathy due to type 2 diabetes mellitus (HCC)     Schizoaffective disorder, bipolar type (HCC)     Splenomegaly 05/30/2017       PSHx:  Past Surgical History:   Procedure Laterality Date    ABDOMEN SURGERY      gallbladder removal    BREAST BIOPSY Left     CHOLECYSTECTOMY  2010    COLONOSCOPY      COLONOSCOPY N/A 10/02/2018    COLONOSCOPY WITH BIOPSY performed by Simón Esquivel MD at [facility] ENDOSCOPY    CT NEEDLE BIOPSY LIVER PERCUTANEOUS  03/19/2024    CT NEEDLE BIOPSY LIVER PERCUTANEOUS 3/19/2024 [facility] CT    ENDOSCOPY, COLON, DIAGNOSTIC      ERCP      april 2016 at [facility]    HYSTERECTOMY (CERVIX STATUS UNKNOWN)  2004    IR TUNNELED CVC PLACE WO SQ PORT/PUMP > 5 YEARS  03/27/2024    FL TUNNELED CVC PLACE WO SQ PORT/PUMP > 5 YEARS 3/27/2024 [facility] RADIOLOGY    MASTECTOMY Left     Left breast mastectomy per patient    OTHER SURGICAL HISTORY Left 04/25/2017     Left breast blue dye injection, Sential Node Lymph Biopsy with left breast lumpectomy    OTHER SURGICAL HISTORY  10/05/2017    simple left mastectomy    OVARY REMOVAL Bilateral 2007    PANCREAS BIOPSY      with stent    PERINEAL SURGERY N/A 09/18/2023    PERINEAL INCISION AND DRAINAGE ABSCESS performed by Karl Santos MD at [facility] OR    RECTAL SURGERY N/A 08/28/2023    PERINEAL ABSCESS INCISION AND DRAINAGE performed by Sebastian Davis MD at [facility] [place]    SHOULDER SURGERY Left 2003    UPPER GASTROINTESTINAL ENDOSCOPY      UPPER GASTROINTESTINAL ENDOSCOPY N/A 5/6/2025    ESOPHAGOGASTRODUODENOSCOPY BAND LIGATION performed by Hugo Rodríguez DO at [facility] ENDOSCOPY    UPPER GASTROINTESTINAL ENDOSCOPY  5/6/2025    ESOPHAGOGASTRODUODENOSCOPY BIOPSY performed by Hugo Rodríguez DO at [facility] ENDOSCOPY       Meds:  Current Facility-Administered Medications   Medication Dose Route Frequency Provider Last

## 2025-05-23 ENCOUNTER — ANESTHESIA (OUTPATIENT)
Dept: ENDOSCOPY | Age: 55
End: 2025-05-23
Payer: COMMERCIAL

## 2025-05-23 LAB
ALBUMIN SERPL-MCNC: 2.7 G/DL (ref 3.5–5.2)
ALP SERPL-CCNC: 201 U/L (ref 35–104)
ALT SERPL-CCNC: 20 U/L (ref 0–32)
ANION GAP SERPL CALCULATED.3IONS-SCNC: 6 MMOL/L (ref 7–16)
AST SERPL-CCNC: 30 U/L (ref 0–31)
BASOPHILS # BLD: 0.02 K/UL (ref 0–0.2)
BASOPHILS NFR BLD: 1 % (ref 0–2)
BILIRUB SERPL-MCNC: 0.6 MG/DL (ref 0–1.2)
BUN SERPL-MCNC: 6 MG/DL (ref 6–20)
CALCIUM SERPL-MCNC: 7.5 MG/DL (ref 8.6–10.2)
CHLORIDE SERPL-SCNC: 105 MMOL/L (ref 98–107)
CO2 SERPL-SCNC: 22 MMOL/L (ref 22–29)
CREAT SERPL-MCNC: 0.7 MG/DL (ref 0.5–1)
EOSINOPHIL # BLD: 0 K/UL (ref 0.05–0.5)
EOSINOPHILS RELATIVE PERCENT: 0 % (ref 0–6)
ERYTHROCYTE [DISTWIDTH] IN BLOOD BY AUTOMATED COUNT: 16.2 % (ref 11.5–15)
ERYTHROCYTE [DISTWIDTH] IN BLOOD BY AUTOMATED COUNT: 16.4 % (ref 11.5–15)
GFR, ESTIMATED: >90 ML/MIN/1.73M2
GLUCOSE BLD-MCNC: 196 MG/DL (ref 74–99)
GLUCOSE BLD-MCNC: 202 MG/DL (ref 74–99)
GLUCOSE BLD-MCNC: 211 MG/DL (ref 74–99)
GLUCOSE BLD-MCNC: 229 MG/DL (ref 74–99)
GLUCOSE SERPL-MCNC: 224 MG/DL (ref 74–99)
HCT VFR BLD AUTO: 21.4 % (ref 34–48)
HCT VFR BLD AUTO: 22.7 % (ref 34–48)
HCT VFR BLD AUTO: 23.2 % (ref 34–48)
HCT VFR BLD AUTO: 23.7 % (ref 34–48)
HCT VFR BLD AUTO: 24 % (ref 34–48)
HGB BLD-MCNC: 7.4 G/DL (ref 11.5–15.5)
HGB BLD-MCNC: 7.8 G/DL (ref 11.5–15.5)
HGB BLD-MCNC: 7.9 G/DL (ref 11.5–15.5)
HGB BLD-MCNC: 7.9 G/DL (ref 11.5–15.5)
HGB BLD-MCNC: 8.1 G/DL (ref 11.5–15.5)
INR PPP: 1.6
LYMPHOCYTES NFR BLD: 0.36 K/UL (ref 1.5–4)
LYMPHOCYTES RELATIVE PERCENT: 17 % (ref 20–42)
MAGNESIUM SERPL-MCNC: 1.9 MG/DL (ref 1.6–2.6)
MCH RBC QN AUTO: 30.1 PG (ref 26–35)
MCH RBC QN AUTO: 30.4 PG (ref 26–35)
MCHC RBC AUTO-ENTMCNC: 33.6 G/DL (ref 32–34.5)
MCHC RBC AUTO-ENTMCNC: 33.8 G/DL (ref 32–34.5)
MCV RBC AUTO: 89.2 FL (ref 80–99.9)
MCV RBC AUTO: 90.3 FL (ref 80–99.9)
MICROORGANISM SPEC CULT: ABNORMAL
MICROORGANISM SPEC CULT: ABNORMAL
MONOCYTES NFR BLD: 0 % (ref 2–12)
MONOCYTES NFR BLD: 0 K/UL (ref 0.1–0.95)
NEUTROPHILS NFR BLD: 83 % (ref 43–80)
NEUTS SEG NFR BLD: 1.82 K/UL (ref 1.8–7.3)
NUCLEATED RED BLOOD CELLS: 2 PER 100 WBC
PHOSPHATE SERPL-MCNC: 2 MG/DL (ref 2.5–4.5)
PLATELET CONFIRMATION: NORMAL
PLATELET CONFIRMATION: NORMAL
PLATELET, FLUORESCENCE: 41 K/UL (ref 130–450)
PLATELET, FLUORESCENCE: 47 K/UL (ref 130–450)
PMV BLD AUTO: 10.2 FL (ref 7–12)
PMV BLD AUTO: 11.2 FL (ref 7–12)
POTASSIUM SERPL-SCNC: 4.3 MMOL/L (ref 3.5–5)
PROT SERPL-MCNC: 5.3 G/DL (ref 6.4–8.3)
PROTHROMBIN TIME: 17.4 SEC (ref 9.3–12.4)
RBC # BLD AUTO: 2.57 M/UL (ref 3.5–5.5)
RBC # BLD AUTO: 2.69 M/UL (ref 3.5–5.5)
RBC # BLD: ABNORMAL 10*6/UL
SERVICE CMNT-IMP: ABNORMAL
SODIUM SERPL-SCNC: 133 MMOL/L (ref 132–146)
SPECIMEN DESCRIPTION: ABNORMAL
WBC OTHER # BLD: 1.9 K/UL (ref 4.5–11.5)
WBC OTHER # BLD: 2.2 K/UL (ref 4.5–11.5)

## 2025-05-23 PROCEDURE — 3609012400 HC EGD TRANSORAL BIOPSY SINGLE/MULTIPLE: Performed by: INTERNAL MEDICINE

## 2025-05-23 PROCEDURE — 3700000000 HC ANESTHESIA ATTENDED CARE: Performed by: INTERNAL MEDICINE

## 2025-05-23 PROCEDURE — 0DB58ZX EXCISION OF ESOPHAGUS, VIA NATURAL OR ARTIFICIAL OPENING ENDOSCOPIC, DIAGNOSTIC: ICD-10-PCS | Performed by: INTERNAL MEDICINE

## 2025-05-23 PROCEDURE — 7100000011 HC PHASE II RECOVERY - ADDTL 15 MIN: Performed by: INTERNAL MEDICINE

## 2025-05-23 PROCEDURE — 3700000001 HC ADD 15 MINUTES (ANESTHESIA): Performed by: INTERNAL MEDICINE

## 2025-05-23 PROCEDURE — 6370000000 HC RX 637 (ALT 250 FOR IP): Performed by: INTERNAL MEDICINE

## 2025-05-23 PROCEDURE — 6360000002 HC RX W HCPCS: Performed by: NURSE ANESTHETIST, CERTIFIED REGISTERED

## 2025-05-23 PROCEDURE — 85025 COMPLETE CBC W/AUTO DIFF WBC: CPT

## 2025-05-23 PROCEDURE — 36430 TRANSFUSION BLD/BLD COMPNT: CPT

## 2025-05-23 PROCEDURE — 6360000002 HC RX W HCPCS

## 2025-05-23 PROCEDURE — 2580000003 HC RX 258: Performed by: HOSPITALIST

## 2025-05-23 PROCEDURE — 6370000000 HC RX 637 (ALT 250 FOR IP)

## 2025-05-23 PROCEDURE — 6370000000 HC RX 637 (ALT 250 FOR IP): Performed by: FAMILY MEDICINE

## 2025-05-23 PROCEDURE — 85610 PROTHROMBIN TIME: CPT

## 2025-05-23 PROCEDURE — P9073 PLATELETS PHERESIS PATH REDU: HCPCS

## 2025-05-23 PROCEDURE — 84100 ASSAY OF PHOSPHORUS: CPT

## 2025-05-23 PROCEDURE — 82962 GLUCOSE BLOOD TEST: CPT

## 2025-05-23 PROCEDURE — 2580000003 HC RX 258

## 2025-05-23 PROCEDURE — 3609017700 HC EGD DILATION GASTRIC/DUODENAL STRICTURE: Performed by: INTERNAL MEDICINE

## 2025-05-23 PROCEDURE — 80053 COMPREHEN METABOLIC PANEL: CPT

## 2025-05-23 PROCEDURE — 85027 COMPLETE CBC AUTOMATED: CPT

## 2025-05-23 PROCEDURE — 83735 ASSAY OF MAGNESIUM: CPT

## 2025-05-23 PROCEDURE — 1200000000 HC SEMI PRIVATE

## 2025-05-23 PROCEDURE — 2500000003 HC RX 250 WO HCPCS

## 2025-05-23 PROCEDURE — 7100000010 HC PHASE II RECOVERY - FIRST 15 MIN: Performed by: INTERNAL MEDICINE

## 2025-05-23 PROCEDURE — P9047 ALBUMIN (HUMAN), 25%, 50ML: HCPCS

## 2025-05-23 PROCEDURE — 6360000002 HC RX W HCPCS: Performed by: INTERNAL MEDICINE

## 2025-05-23 PROCEDURE — 2709999900 HC NON-CHARGEABLE SUPPLY: Performed by: INTERNAL MEDICINE

## 2025-05-23 RX ORDER — PROPOFOL 10 MG/ML
INJECTION, EMULSION INTRAVENOUS
Status: DISCONTINUED | OUTPATIENT
Start: 2025-05-23 | End: 2025-05-23 | Stop reason: SDUPTHER

## 2025-05-23 RX ORDER — NICOTINE 21 MG/24HR
1 PATCH, TRANSDERMAL 24 HOURS TRANSDERMAL DAILY
Status: DISCONTINUED | OUTPATIENT
Start: 2025-05-23 | End: 2025-05-23

## 2025-05-23 RX ORDER — ALBUMIN (HUMAN) 12.5 G/50ML
50 SOLUTION INTRAVENOUS ONCE
Status: COMPLETED | OUTPATIENT
Start: 2025-05-23 | End: 2025-05-23

## 2025-05-23 RX ORDER — FENTANYL 12.5 UG/1
1 PATCH TRANSDERMAL
Refills: 0 | Status: DISCONTINUED | OUTPATIENT
Start: 2025-05-23 | End: 2025-05-24 | Stop reason: HOSPADM

## 2025-05-23 RX ORDER — SODIUM CHLORIDE 9 MG/ML
INJECTION, SOLUTION INTRAVENOUS PRN
Status: COMPLETED | OUTPATIENT
Start: 2025-05-23 | End: 2025-05-23

## 2025-05-23 RX ADMIN — INSULIN LISPRO 2 UNITS: 100 INJECTION, SOLUTION INTRAVENOUS; SUBCUTANEOUS at 20:21

## 2025-05-23 RX ADMIN — Medication 1 TABLET: at 20:17

## 2025-05-23 RX ADMIN — RIFAXIMIN 550 MG: 550 TABLET ORAL at 08:00

## 2025-05-23 RX ADMIN — LINEZOLID 600 MG: 600 INJECTION, SOLUTION INTRAVENOUS at 02:37

## 2025-05-23 RX ADMIN — ONDANSETRON 4 MG: 2 INJECTION, SOLUTION INTRAMUSCULAR; INTRAVENOUS at 11:49

## 2025-05-23 RX ADMIN — GABAPENTIN 200 MG: 100 CAPSULE ORAL at 07:58

## 2025-05-23 RX ADMIN — SODIUM PHOSPHATE, MONOBASIC, MONOHYDRATE AND SODIUM PHOSPHATE, DIBASIC, ANHYDROUS 9.69 MMOL: 142; 276 INJECTION, SOLUTION INTRAVENOUS at 12:11

## 2025-05-23 RX ADMIN — GABAPENTIN 200 MG: 100 CAPSULE ORAL at 20:16

## 2025-05-23 RX ADMIN — LINEZOLID 600 MG: 600 INJECTION, SOLUTION INTRAVENOUS at 13:59

## 2025-05-23 RX ADMIN — ANASTROZOLE 1 MG: 1 TABLET ORAL at 07:59

## 2025-05-23 RX ADMIN — LACTULOSE 20 G: 20 SOLUTION ORAL at 08:00

## 2025-05-23 RX ADMIN — PANTOPRAZOLE SODIUM 40 MG: 40 TABLET, DELAYED RELEASE ORAL at 14:00

## 2025-05-23 RX ADMIN — INSULIN GLARGINE 15 UNITS: 100 INJECTION, SOLUTION SUBCUTANEOUS at 20:21

## 2025-05-23 RX ADMIN — PROPRANOLOL HYDROCHLORIDE 20 MG: 10 TABLET ORAL at 20:16

## 2025-05-23 RX ADMIN — GABAPENTIN 200 MG: 100 CAPSULE ORAL at 14:00

## 2025-05-23 RX ADMIN — INSULIN LISPRO 2 UNITS: 100 INJECTION, SOLUTION INTRAVENOUS; SUBCUTANEOUS at 17:48

## 2025-05-23 RX ADMIN — HYDROXYZINE PAMOATE 25 MG: 25 CAPSULE ORAL at 20:16

## 2025-05-23 RX ADMIN — SODIUM CHLORIDE: 9 INJECTION, SOLUTION INTRAVENOUS at 16:25

## 2025-05-23 RX ADMIN — OXYCODONE 5 MG: 5 TABLET ORAL at 17:59

## 2025-05-23 RX ADMIN — ALBUMIN (HUMAN) 50 G: 0.25 INJECTION, SOLUTION INTRAVENOUS at 11:59

## 2025-05-23 RX ADMIN — RIFAXIMIN 550 MG: 550 TABLET ORAL at 20:17

## 2025-05-23 RX ADMIN — PROPOFOL 150 MG: 10 INJECTION, EMULSION INTRAVENOUS at 16:27

## 2025-05-23 RX ADMIN — ONDANSETRON 4 MG: 2 INJECTION, SOLUTION INTRAMUSCULAR; INTRAVENOUS at 17:58

## 2025-05-23 RX ADMIN — OXYCODONE 5 MG: 5 TABLET ORAL at 23:45

## 2025-05-23 RX ADMIN — Medication 1 TABLET: at 07:57

## 2025-05-23 RX ADMIN — OXYCODONE 5 MG: 5 TABLET ORAL at 05:35

## 2025-05-23 RX ADMIN — OXYCODONE 5 MG: 5 TABLET ORAL at 11:49

## 2025-05-23 RX ADMIN — PANTOPRAZOLE SODIUM 40 MG: 40 TABLET, DELAYED RELEASE ORAL at 05:36

## 2025-05-23 ASSESSMENT — PAIN DESCRIPTION - DESCRIPTORS
DESCRIPTORS: ACHING
DESCRIPTORS: ACHING
DESCRIPTORS: ACHING;SHARP
DESCRIPTORS: ACHING
DESCRIPTORS: ACHING

## 2025-05-23 ASSESSMENT — LIFESTYLE VARIABLES: SMOKING_STATUS: 1

## 2025-05-23 ASSESSMENT — PAIN SCALES - GENERAL
PAINLEVEL_OUTOF10: 8
PAINLEVEL_OUTOF10: 7
PAINLEVEL_OUTOF10: 4
PAINLEVEL_OUTOF10: 4
PAINLEVEL_OUTOF10: 7
PAINLEVEL_OUTOF10: 5

## 2025-05-23 ASSESSMENT — PAIN DESCRIPTION - LOCATION
LOCATION: ABDOMEN
LOCATION: ABDOMEN;HEAD

## 2025-05-23 ASSESSMENT — PAIN DESCRIPTION - ORIENTATION: ORIENTATION: MID

## 2025-05-23 NOTE — PROGRESS NOTES
Internal Medicine Progress Note     [facility]     Mina Riley D.O., STEVENI.                         Torsten Harrison D.O., JOSE Josue D.O.     Tanmay Farah, MSN, APRN-CNP  Isaak Monk, MSN, APRN, NP-C  Marie Diaz, MSN, APRN-CNP  Diana Lloyd, MSN, APRN, NP-C     Primary Care Physician: Liza Oh DO   Admitting Physician:  Liza Oh DO  Admission date and time: 5/20/2025  6:29 AM    Room:  [address] [place] [address]  Admitting diagnosis: Lactic acidosis [E87.20]  Drug noncompliance [Z91.148]  Diabetic ketoacidosis without coma associated with drug or chemical induced diabetes mellitus [E09.10]  Type 1 diabetes mellitus with ketoacidosis without coma (HCC) [E10.10]    Patient Name: Ellie Santacruz  MRN: [id number]    Date of Service: 5/23/2025     Subjective:  Ellie is a 55 y.o. female who was seen and examined today,5/23/2025, at the bedside.  Patient resting company at the present time.  Her blood glucose has been running stable since the initiation of Lantus and Humalog insulin.  Will reduce her fentanyl patch today.  She is scheduled for an EGD today by gastroenterology.  No family member present    Review of System:   Constitutional:   Describes generalized malaise and fatigue.  HEENT:   Denies ear pain, sore throat, sinus or eye problems.  Cardiovascular:   Denies any chest pain, irregular heartbeats, or palpitations.   Respiratory:   Denies shortness of breath, coughing, sputum production, hemoptysis, or wheezing.  Gastrointestinal:   Admits to ongoing nausea and abdominal discomfort.  Genitourinary:    Denies any urgency, frequency, hematuria. Voiding  without difficulty.  Extremities:   Denies lower extremity swelling, edema or cyanosis.   Neurology:    Denies any headache or focal neurological deficits, Denies generalized weakness or memory difficulty.   Psch:   Denies being anxious or depressed.  Musculoskeletal:    Denies  myalgias, joint complaints or

## 2025-05-23 NOTE — PROGRESS NOTES
Department of Family Practice  Adult Daily Progress Note      SUBJECTIVE  OSBALDO IS SEEN IN FOLLOW UP 05/22/2025 ON [facility].  SHE IS TOLERATING TREATMENT.  SHE REPORTS HER FOOD GETTING STUCK IN HER CHEST WHEN SHE IS EATING AND \"IT REALLY HURTS\".  IT IS MOSTLY WITH SOLIDS.  SHE WAS SEEN BY GI; CONSULT NOTED.  ESOPHAGRAM DONE.  SHOWED ESOPHAGEAL DYSMOTILITY DISORDER.      OBJECTIVE    Physical  VITALS:  BP (!) 96/58   Pulse 68   Temp 98.8 °F (37.1 °C) (Oral)   Resp 16   Ht 1.524 m (5')   Wt 60.5 kg (133 lb 4.8 oz)   SpO2 100%   BMI 26.03 kg/m²   CONSTITUTIONAL:  awake, alert, cooperative, no apparent distress, and appears stated age  ENT:  Normocephalic, without obvious abnormality, atraumatic, sinuses nontender on palpation, external ears without lesions, oral pharynx with moist mucus membranes, tonsils without erythema or exudates, gums normal and good dentition.  LUNGS:  No increased work of breathing, good air exchange, clear to auscultation bilaterally, no crackles or wheezing  CARDIOVASCULAR:  Normal apical impulse, regular rate and rhythm, normal S1 and S2, no S3 or S4, and no murmur noted  ABDOMEN:  scars CONSISTENT WITH SURGICAL HISTORY, normal bowel sounds, soft, non-distended, non-tender, no masses palpated, no hepatosplenomegally  SKIN:  WARM AND DRY  Data  CBC with Differential:    Lab Results   Component Value Date/Time    WBC 2.2 05/22/2025 03:49 AM    RBC 2.76 05/22/2025 03:49 AM    HGB 8.4 05/22/2025 05:59 PM    HCT 24.3 05/22/2025 05:59 PM    PLT 38 05/22/2025 03:49 AM    MCV 85.9 05/22/2025 03:49 AM    MCH 30.4 05/22/2025 03:49 AM    MCHC 35.4 05/22/2025 03:49 AM    RDW 17.2 05/22/2025 03:49 AM    NRBC 1 01/31/2025 06:43 AM    BANDSPCT 1 05/15/2016 08:30 AM    METASPCT 1 05/22/2025 03:49 AM    METASPCT 1.7 06/18/2017 06:33 AM    LYMPHOPCT 15 05/22/2025 03:49 AM    MONOPCT 1 05/22/2025 03:49 AM    MYELOPCT 1 05/22/2025 03:49 AM    MYELOPCT 0.5 04/01/2017 09:45 AM    EOSPCT 1 05/22/2025 03:49 AM     BASOPCT 0 05/22/2025 03:49 AM    MONOSABS 0.02 05/22/2025 03:49 AM    LYMPHSABS 0.34 05/22/2025 03:49 AM    EOSABS 0.02 05/22/2025 03:49 AM    BASOSABS 0.00 05/22/2025 03:49 AM     BMP:    Lab Results   Component Value Date/Time     05/22/2025 06:45 AM    K 3.9 05/22/2025 06:45 AM    K 5.2 05/11/2023 08:52 PM     05/22/2025 06:45 AM    CO2 19 05/22/2025 06:45 AM    BUN 7 05/22/2025 06:45 AM    CREATININE 0.7 05/22/2025 06:45 AM    CALCIUM 6.5 05/22/2025 06:45 AM    GFRAA >60 08/17/2022 06:53 AM    LABGLOM >90 05/22/2025 06:45 AM    LABGLOM >90 03/27/2024 05:20 AM    GLUCOSE 154 05/22/2025 06:45 AM     Current Medications  Scheduled Meds:   pantoprazole  40 mg Oral BID AC    oyster shell calcium w/D  1 tablet Oral TID    linezolid  600 mg IntraVENous Q12H    sodium bicarb-citric acid-simethicone  4 g Oral Once    fentaNYL  1 patch TransDERmal Q72H    rifAXIMin  550 mg Oral BID    insulin glargine  15 Units SubCUTAneous BID    insulin lispro  0-8 Units SubCUTAneous 4x Daily AC & HS    propranolol  20 mg Oral BID    anastrozole  1 mg Oral QAM    gabapentin  200 mg Oral TID    lactulose  20 g Oral TID    ipratropium 0.5 mg-albuterol 2.5 mg  1 Dose Inhalation Q4H WA RT    budesonide  500 mcg Nebulization BID RT    [Held by provider] enoxaparin  40 mg SubCUTAneous Daily     Continuous Infusions:   dextrose       PRN Meds:.barium sulfate, glucose, dextrose bolus **OR** dextrose bolus, glucagon (rDNA), dextrose, hydrOXYzine pamoate, oxyCODONE, albuterol, ondansetron    ASSESSMENT AND PLAN      Principal Problem:    Diabetic ketoacidosis (HCC)  Active Problems:    Nausea and vomiting    Lactic acidosis    Diabetic ketoacidosis without coma associated with drug or chemical induced diabetes mellitus    Cocaine use disorder (HCC)    Hypokalemia    Type 1 diabetes mellitus with ketoacidosis without coma (HCC)  Resolved Problems:    * No resolved hospital problems. *      CONTINUE PLAN OF CARE.  BEING TRANSFERRED

## 2025-05-23 NOTE — PROGRESS NOTES
Patient seen and examined prior to procedure.  Persistent dysphagia yesterday (n.p.o. this morning for procedure)  Decreased platelets at 41  Plan for platelet transfusion prior to procedure -discussed with patient and all questions answered to her satisfaction.  H&P reviewed -no new changes except as described above  Vitals:    05/23/25 1053   BP: 108/78   Pulse: 68   Resp: 18   Temp: 98.4 °F (36.9 °C)   SpO2: 99%     Plan: Proceed with upper endoscopy later today.  Please, see orders for plan of care.  Thank you.    Олег Mitchell MD

## 2025-05-23 NOTE — PLAN OF CARE
Problem: Chronic Conditions and Co-morbidities  Goal: Patient's chronic conditions and co-morbidity symptoms are monitored and maintained or improved  5/23/2025 0918 by More James, RN  Outcome: Progressing  5/23/2025 0426 by Judith Roche, RN  Outcome: Progressing

## 2025-05-23 NOTE — ANESTHESIA PRE PROCEDURE
Lab Results   Component Value Date/Time    PHART 7.401 03/10/2016 08:16 PM    PO2ART 86 03/10/2016 08:16 PM    ODV7SLW 39 03/10/2016 08:16 PM    LFX0PGM 24.1 03/10/2016 08:16 PM    BEART -1 03/10/2016 08:16 PM    T9HKOYRD 97 03/10/2016 08:16 PM        Type & Screen (If Applicable):  Lab Results   Component Value Date    ABORH A NEGATIVE 05/20/2025    LABANTI NEGATIVE 05/20/2025       Drug/Infectious Status (If Applicable):  Lab Results   Component Value Date/Time    HEPCAB REACTIVE 05/05/2025 12:24 PM       COVID-19 Screening (If Applicable):   Lab Results   Component Value Date/Time    COVID19 Not Detected 05/20/2025 07:11 AM           Anesthesia Evaluation  Patient summary reviewed   no history of anesthetic complications:   Airway: Mallampati: I  TM distance: >3 FB   Neck ROM: full  Mouth opening: > = 3 FB   Dental: normal exam         Pulmonary: breath sounds clear to auscultation  (+)           asthma: current smoker                           Cardiovascular:          ECG reviewed  Rhythm: regular  Rate: normal                 ROS comment: EKG 05/21/2025  Normal sinus rhythm  Septal infarct (cited on or before 20-May-2025)  ST & T wave abnormality, consider anterolateral ischemia  Prolonged QT  Abnormal ECG  When compared with ECG of 20-May-2025 07:04,  Serial changes of evolving Septal infarct present         Neuro/Psych:   (+) neuromuscular disease:, psychiatric history (Alcohol abuse, Bipolar I disorder (HCC) Bipolar affective disorder, Cocaine dependence with withdrawal, Schizoaffective disorder, bipolar type):depression/anxiety             GI/Hepatic/Renal:   (+) GERD (Gastroesophageal reflux disease without esophagitis):, hepatitis: C, liver disease (Cirrhosis of liver with ascites, unspecified hepatic cirrhosis type):         ROS comment: Chronic pancreatitis due to acute alcohol intoxication    .   Endo/Other:    (+) Diabetes (POCT Glucose 229)Type II DM, blood dyscrasia (Hgb 7.9): anemia:., 
dyscrasia (Hgb 7.9): anemia:., malignancy/cancer (Malignant neoplasm of left breast in female, estrogen receptor positive).                 Abdominal:             Vascular:          Other Findings:             Anesthesia Plan      MAC     ASA 4       Induction: intravenous.      Anesthetic plan and risks discussed with patient.        Attending anesthesiologist reviewed and agrees with Preprocedure content          DOS STAFF ADDENDUM:    Pt seen and examined, chart reviewed (including anesthesia, drug and allergy history).       Anesthetic plan, risks, benefits, alternatives, and personnel involved discussed with patient.  Patient verbalized an understanding and agrees to proceed.  Plan discussed with care team members and agreed upon.    William Quinones DO  Staff Anesthesiologist  4:00 PM        
180.34

## 2025-05-23 NOTE — PLAN OF CARE
Problem: Chronic Conditions and Co-morbidities  Goal: Patient's chronic conditions and co-morbidity symptoms are monitored and maintained or improved  Outcome: Progressing     Problem: Discharge Planning  Goal: Discharge to home or other facility with appropriate resources  Outcome: Progressing     Problem: Safety - Adult  Goal: Free from fall injury  Outcome: Progressing     Problem: Cardiovascular - Adult  Goal: Maintains optimal cardiac output and hemodynamic stability  Outcome: Progressing  Goal: Absence of cardiac dysrhythmias or at baseline  Outcome: Progressing     Problem: Skin/Tissue Integrity - Adult  Goal: Skin integrity remains intact  Description: 1.  Monitor for areas of redness and/or skin breakdown2.  Assess vascular access sites hourly3.  Every 4-6 hours minimum:  Change oxygen saturation probe site4.  Every 4-6 hours:  If on nasal continuous positive airway pressure, respiratory therapy assess nares and determine need for appliance change or resting period  Outcome: Progressing  Goal: Incisions, wounds, or drain sites healing without S/S of infection  Outcome: Progressing  Goal: Oral mucous membranes remain intact  Outcome: Progressing     Problem: Gastrointestinal - Adult  Goal: Minimal or absence of nausea and vomiting  Outcome: Progressing  Goal: Maintains or returns to baseline bowel function  Outcome: Progressing  Goal: Maintains adequate nutritional intake  Outcome: Progressing     Problem: Metabolic/Fluid and Electrolytes - Adult  Goal: Electrolytes maintained within normal limits  Outcome: Progressing  Goal: Hemodynamic stability and optimal renal function maintained  Outcome: Progressing  Goal: Glucose maintained within prescribed range  Outcome: Progressing     Problem: Pain  Goal: Verbalizes/displays adequate comfort level or baseline comfort level  Outcome: Progressing     Problem: Skin/Tissue Integrity  Goal: Skin integrity remains intact  Description: 1.  Monitor for areas of

## 2025-05-23 NOTE — ANESTHESIA POSTPROCEDURE EVALUATION
Department of Anesthesiology  Postprocedure Note    Patient: Ellie Santacruz  MRN: [id number]  YOB: 1970  Date of evaluation: 5/23/2025    Procedure Summary       Date: 05/23/25 Room / Location: [place] [address] / [facility]    Anesthesia Start: 1625 Anesthesia Stop: 1640    Procedure: ESOPHAGOGASTRODUODENOSCOPY BIOPSY Diagnosis:       Dysphagia, unspecified type      (Dysphagia, unspecified type [R13.10])    Surgeons: Agustin Delgado MD Responsible Provider: William Quinones DO    Anesthesia Type: MAC ASA Status: 4            Anesthesia Type: No value filed.    Max Phase I:      Max Phase II: Max Score: 9    Anesthesia Post Evaluation    Patient location during evaluation: PACU  Patient participation: complete - patient participated  Level of consciousness: awake and alert  Pain score: 0  Airway patency: patent  Nausea & Vomiting: no nausea and no vomiting  Cardiovascular status: blood pressure returned to baseline and hemodynamically stable  Respiratory status: acceptable  Hydration status: stable  Pain management: adequate    No notable events documented.

## 2025-05-23 NOTE — OP NOTE
Operative Note      Patient: Ellie Santacruz  YOB: 1970  MRN: [id number]    Date of Procedure: 5/23/2025    Pre-Op Diagnosis Codes:      * Dysphagia, unspecified type [R13.10]    Post-Op Diagnosis: Esophagitis       Procedure(s):  ESOPHAGOGASTRODUODENOSCOPY    Surgeon(s):  Agustin Delgado MD    Assistant:   Surgical Assistant: Eva Mendenhall RN    Anesthesia: Monitor Anesthesia Care    Estimated Blood Loss (mL): None    Complications: None none    Specimens:   * No specimens in log *    Implants:  * No implants in log *      Drains: * No LDAs found *    Findings:  Infection Present At Time Of Surgery (PATOS) (choose all levels that have infection present):  None  Other Findings: None    Detailed Description of Procedure:   55-year-old female patient, with dysphagia.    The upper endoscope was introduced through the mouth.  The esophagus, stomach and proximal duodenum were inspected.  Exam of the gastric fundus for retroflexion.  The stomach distends well with air insufflation.  Hiatus hernia.  Multiple ulcers in the distal esophagus, biopsies.  Exam of the upper esophagus and hypopharynx during withdrawal.  Then, the esophagus was dilated with a 50 [place] bougie.    Discharge to floor.  Resume hospital care.  Further GI plan as per Dr. Zhang.    Electronically signed by Agustin Delgado MD on 5/23/2025 at 4:23 PM

## 2025-05-23 NOTE — CONSENT
Informed Consent for Blood Component Transfusion Note    I have discussed with the patient the rationale for blood component transfusion; its benefits in treating or preventing fatigue, organ damage, or death; and its risk which includes mild transfusion reactions, rare risk of blood borne infection, or more serious but rare reactions. I have discussed the alternatives to transfusion, including the risk and consequences of not receiving transfusion. The patient had an opportunity to ask questions and had agreed to proceed with transfusion of blood components.    Electronically signed by Олег Mitchell MD on 5/23/25 at 11:02 AM EDT

## 2025-05-24 VITALS
DIASTOLIC BLOOD PRESSURE: 50 MMHG | BODY MASS INDEX: 26.17 KG/M2 | OXYGEN SATURATION: 99 % | HEIGHT: 60 IN | WEIGHT: 133.3 LBS | TEMPERATURE: 97.9 F | RESPIRATION RATE: 16 BRPM | SYSTOLIC BLOOD PRESSURE: 109 MMHG | HEART RATE: 98 BPM

## 2025-05-24 LAB
ALBUMIN SERPL-MCNC: 3.3 G/DL (ref 3.5–5.2)
ALP SERPL-CCNC: 183 U/L (ref 35–104)
ALT SERPL-CCNC: 21 U/L (ref 0–32)
ANION GAP SERPL CALCULATED.3IONS-SCNC: 8 MMOL/L (ref 7–16)
ARM BAND NUMBER: NORMAL
AST SERPL-CCNC: 33 U/L (ref 0–31)
BASOPHILS # BLD: 0 K/UL (ref 0–0.2)
BASOPHILS NFR BLD: 0 % (ref 0–2)
BILIRUB SERPL-MCNC: 0.7 MG/DL (ref 0–1.2)
BLOOD BANK BLOOD PRODUCT EXPIRATION DATE: NORMAL
BLOOD BANK DISPENSE STATUS: NORMAL
BLOOD BANK ISBT PRODUCT BLOOD TYPE: 6200
BLOOD BANK PRODUCT CODE: NORMAL
BLOOD BANK UNIT TYPE AND RH: NORMAL
BPU ID: NORMAL
BUN SERPL-MCNC: 4 MG/DL (ref 6–20)
CALCIUM SERPL-MCNC: 7.7 MG/DL (ref 8.6–10.2)
CHLORIDE SERPL-SCNC: 107 MMOL/L (ref 98–107)
CO2 SERPL-SCNC: 22 MMOL/L (ref 22–29)
COMPONENT: NORMAL
CREAT SERPL-MCNC: 0.7 MG/DL (ref 0.5–1)
EOSINOPHIL # BLD: 0.03 K/UL (ref 0.05–0.5)
EOSINOPHILS RELATIVE PERCENT: 2 % (ref 0–6)
ERYTHROCYTE [DISTWIDTH] IN BLOOD BY AUTOMATED COUNT: 15.9 % (ref 11.5–15)
GFR, ESTIMATED: >90 ML/MIN/1.73M2
GLUCOSE BLD-MCNC: 122 MG/DL (ref 74–99)
GLUCOSE BLD-MCNC: 274 MG/DL (ref 74–99)
GLUCOSE SERPL-MCNC: 282 MG/DL (ref 74–99)
HCT VFR BLD AUTO: 22.4 % (ref 34–48)
HGB BLD-MCNC: 7.5 G/DL (ref 11.5–15.5)
LYMPHOCYTES NFR BLD: 0.4 K/UL (ref 1.5–4)
LYMPHOCYTES RELATIVE PERCENT: 25 % (ref 20–42)
MAGNESIUM SERPL-MCNC: 1.9 MG/DL (ref 1.6–2.6)
MCH RBC QN AUTO: 30.4 PG (ref 26–35)
MCHC RBC AUTO-ENTMCNC: 33.5 G/DL (ref 32–34.5)
MCV RBC AUTO: 90.7 FL (ref 80–99.9)
METAMYELOCYTES ABSOLUTE COUNT: 0.01 K/UL (ref 0–0.12)
METAMYELOCYTES: 1 % (ref 0–1)
MONOCYTES NFR BLD: 0.06 K/UL (ref 0.1–0.95)
MONOCYTES NFR BLD: 4 % (ref 2–12)
NEUTROPHILS NFR BLD: 69 % (ref 43–80)
NEUTS SEG NFR BLD: 1.1 K/UL (ref 1.8–7.3)
PHOSPHATE SERPL-MCNC: 1.9 MG/DL (ref 2.5–4.5)
PLATELET CONFIRMATION: NORMAL
PLATELET, FLUORESCENCE: 43 K/UL (ref 130–450)
PMV BLD AUTO: 10.9 FL (ref 7–12)
POTASSIUM SERPL-SCNC: 4.5 MMOL/L (ref 3.5–5)
PROT SERPL-MCNC: 5.5 G/DL (ref 6.4–8.3)
RBC # BLD AUTO: 2.47 M/UL (ref 3.5–5.5)
RBC # BLD: NORMAL 10*6/UL
SODIUM SERPL-SCNC: 137 MMOL/L (ref 132–146)
TRANSFUSION STATUS: NORMAL
UNIT DIVISION: 0
UNIT ISSUE DATE/TIME: NORMAL
WBC OTHER # BLD: 1.6 K/UL (ref 4.5–11.5)

## 2025-05-24 PROCEDURE — 6370000000 HC RX 637 (ALT 250 FOR IP): Performed by: INTERNAL MEDICINE

## 2025-05-24 PROCEDURE — 80053 COMPREHEN METABOLIC PANEL: CPT

## 2025-05-24 PROCEDURE — 85025 COMPLETE CBC W/AUTO DIFF WBC: CPT

## 2025-05-24 PROCEDURE — 6360000002 HC RX W HCPCS

## 2025-05-24 PROCEDURE — 84100 ASSAY OF PHOSPHORUS: CPT

## 2025-05-24 PROCEDURE — 83735 ASSAY OF MAGNESIUM: CPT

## 2025-05-24 PROCEDURE — 6370000000 HC RX 637 (ALT 250 FOR IP): Performed by: HOSPITALIST

## 2025-05-24 PROCEDURE — 6370000000 HC RX 637 (ALT 250 FOR IP)

## 2025-05-24 PROCEDURE — 82962 GLUCOSE BLOOD TEST: CPT

## 2025-05-24 RX ORDER — LIDOCAINE HYDROCHLORIDE 20 MG/ML
15 SOLUTION OROPHARYNGEAL
Status: DISCONTINUED | OUTPATIENT
Start: 2025-05-24 | End: 2025-05-24 | Stop reason: HOSPADM

## 2025-05-24 RX ORDER — FUROSEMIDE 20 MG/1
20 TABLET ORAL DAILY
Status: ON HOLD | COMMUNITY
Start: 2025-05-24

## 2025-05-24 RX ORDER — PANTOPRAZOLE SODIUM 40 MG/1
40 TABLET, DELAYED RELEASE ORAL
Status: ON HOLD | COMMUNITY
Start: 2025-05-24

## 2025-05-24 RX ORDER — FENTANYL 12.5 UG/1
1 PATCH TRANSDERMAL
Status: ON HOLD | COMMUNITY
Start: 2025-05-26

## 2025-05-24 RX ADMIN — GABAPENTIN 200 MG: 100 CAPSULE ORAL at 08:32

## 2025-05-24 RX ADMIN — PANTOPRAZOLE SODIUM 40 MG: 40 TABLET, DELAYED RELEASE ORAL at 06:07

## 2025-05-24 RX ADMIN — INSULIN GLARGINE 15 UNITS: 100 INJECTION, SOLUTION SUBCUTANEOUS at 08:33

## 2025-05-24 RX ADMIN — OXYCODONE 5 MG: 5 TABLET ORAL at 06:07

## 2025-05-24 RX ADMIN — Medication 1 TABLET: at 08:31

## 2025-05-24 RX ADMIN — RIFAXIMIN 550 MG: 550 TABLET ORAL at 09:42

## 2025-05-24 RX ADMIN — ANASTROZOLE 1 MG: 1 TABLET ORAL at 09:42

## 2025-05-24 RX ADMIN — OXYCODONE 5 MG: 5 TABLET ORAL at 11:52

## 2025-05-24 RX ADMIN — HYDROXYZINE PAMOATE 25 MG: 25 CAPSULE ORAL at 08:31

## 2025-05-24 RX ADMIN — LIDOCAINE HYDROCHLORIDE 15 ML: 20 SOLUTION ORAL at 11:53

## 2025-05-24 RX ADMIN — LIDOCAINE HYDROCHLORIDE 15 ML: 20 SOLUTION ORAL at 08:31

## 2025-05-24 RX ADMIN — INSULIN LISPRO 4 UNITS: 100 INJECTION, SOLUTION INTRAVENOUS; SUBCUTANEOUS at 08:33

## 2025-05-24 RX ADMIN — LINEZOLID 600 MG: 600 INJECTION, SOLUTION INTRAVENOUS at 01:45

## 2025-05-24 RX ADMIN — LACTULOSE 20 G: 20 SOLUTION ORAL at 08:31

## 2025-05-24 ASSESSMENT — PAIN SCALES - GENERAL
PAINLEVEL_OUTOF10: 5
PAINLEVEL_OUTOF10: 5
PAINLEVEL_OUTOF10: 7
PAINLEVEL_OUTOF10: 7

## 2025-05-24 ASSESSMENT — PAIN DESCRIPTION - LOCATION
LOCATION: ABDOMEN
LOCATION: ABDOMEN

## 2025-05-24 ASSESSMENT — PAIN - FUNCTIONAL ASSESSMENT: PAIN_FUNCTIONAL_ASSESSMENT: ACTIVITIES ARE NOT PREVENTED

## 2025-05-24 ASSESSMENT — PAIN DESCRIPTION - DESCRIPTORS
DESCRIPTORS: ACHING
DESCRIPTORS: ACHING
DESCRIPTORS: THROBBING;SHARP

## 2025-05-24 ASSESSMENT — PAIN DESCRIPTION - ORIENTATION: ORIENTATION: MID

## 2025-05-24 NOTE — PLAN OF CARE
Problem: Chronic Conditions and Co-morbidities  Goal: Patient's chronic conditions and co-morbidity symptoms are monitored and maintained or improved  5/23/2025 2120 by Tiffany Lyon RN  Outcome: Progressing  Flowsheets (Taken 5/23/2025 2114)  Care Plan - Patient's Chronic Conditions and Co-Morbidity Symptoms are Monitored and Maintained or Improved: Monitor and assess patient's chronic conditions and comorbid symptoms for stability, deterioration, or improvement  5/23/2025 0918 by More James RN  Outcome: Progressing     Problem: Discharge Planning  Goal: Discharge to home or other facility with appropriate resources  5/23/2025 2120 by Tiffany Lyon RN  Outcome: Progressing  Flowsheets (Taken 5/23/2025 2114)  Discharge to home or other facility with appropriate resources:   Identify barriers to discharge with patient and caregiver   Arrange for needed discharge resources and transportation as appropriate   Identify discharge learning needs (meds, wound care, etc)   Refer to discharge planning if patient needs post-hospital services based on physician order or complex needs related to functional status, cognitive ability or social support system  5/23/2025 0918 by More James RN  Outcome: Progressing     Problem: Safety - Adult  Goal: Free from fall injury  5/23/2025 2120 by Tiffany Lyon RN  Outcome: Progressing  5/23/2025 0918 by More James RN  Outcome: Progressing     Problem: Cardiovascular - Adult  Goal: Maintains optimal cardiac output and hemodynamic stability  Outcome: Progressing  Flowsheets (Taken 5/23/2025 2114)  Maintains optimal cardiac output and hemodynamic stability: Monitor blood pressure and heart rate  Goal: Absence of cardiac dysrhythmias or at baseline  Outcome: Progressing  Flowsheets (Taken 5/23/2025 2114)  Absence of cardiac dysrhythmias or at baseline: Monitor cardiac rate and rhythm     Problem: Skin/Tissue Integrity - Adult  Goal: Skin integrity remains

## 2025-05-24 NOTE — PLAN OF CARE
Problem: Chronic Conditions and Co-morbidities  Goal: Patient's chronic conditions and co-morbidity symptoms are monitored and maintained or improved  5/24/2025 1105 by More Andrade RN  Outcome: Progressing  5/23/2025 2120 by Tiffany Lyon RN  Outcome: Progressing  Flowsheets (Taken 5/23/2025 2114)  Care Plan - Patient's Chronic Conditions and Co-Morbidity Symptoms are Monitored and Maintained or Improved: Monitor and assess patient's chronic conditions and comorbid symptoms for stability, deterioration, or improvement     Problem: Safety - Adult  Goal: Free from fall injury  5/24/2025 1105 by More Andrade RN  Outcome: Progressing  5/23/2025 2120 by Tiffany Lyon RN  Outcome: Progressing     Problem: Cardiovascular - Adult  Goal: Maintains optimal cardiac output and hemodynamic stability  5/24/2025 1105 by More Andrade RN  Outcome: Progressing  5/23/2025 2120 by Tiffany Lyon RN  Outcome: Progressing  Flowsheets (Taken 5/23/2025 2114)  Maintains optimal cardiac output and hemodynamic stability: Monitor blood pressure and heart rate

## 2025-05-24 NOTE — PROGRESS NOTES
Department of Family Practice  Adult Daily Progress Note      SUBJECTIVE  OSBALDO IS SEEN IN FOLLOW UP 05/23/2025 ON PIC/ICU.  SHE IS TOLERATING TREATMENT.    SHE HAD AN EGD TODAY BY DR. PUGH.  SHE HAD HER ESOPHAGUS DILATED.    SHE HAS BEEN ASKING HER NURSE FOR FOOD FREQUENTLY.    HER EGD YESTERDAY, 05/22/2025, SHOWED ESOPHAGEAL DYSMOTILITY DISORDER.  BLOOD SUGARS ARE IMPROVED.    OBJECTIVE    Physical  VITALS:  /82   Pulse 75   Temp 98.6 °F (37 °C) (Oral)   Resp 18   Ht 1.524 m (5')   Wt 60.5 kg (133 lb 4.8 oz)   SpO2 99%   BMI 26.03 kg/m²   CONSTITUTIONAL:  SLEEPING VERY SOUNDLY, NOT AWAKENED, no apparent distress, and appears stated age  LUNGS:  No increased work of breathing, good air exchange, clear to auscultation bilaterally, no crackles or wheezing  CARDIOVASCULAR:  Normal apical impulse, regular rate and rhythm, normal S1 and S2, no S3 or S4, and no murmur noted  ABDOMEN:  scars CONSISTENT WITH SURGICAL HISTORY, normal bowel sounds, soft, non-distended, non-tender, no masses palpated, no hepatosplenomegally  SKIN:  WARM AND DRY  Data  CBC with Differential:    Lab Results   Component Value Date/Time    WBC 1.9 05/23/2025 06:00 PM    RBC 2.57 05/23/2025 06:00 PM    HGB 7.8 05/23/2025 06:00 PM    HCT 23.2 05/23/2025 06:00 PM    PLT 38 05/22/2025 03:49 AM    MCV 90.3 05/23/2025 06:00 PM    MCH 30.4 05/23/2025 06:00 PM    MCHC 33.6 05/23/2025 06:00 PM    RDW 16.2 05/23/2025 06:00 PM    NRBC 2 05/23/2025 05:42 AM    BANDSPCT 1 05/15/2016 08:30 AM    METASPCT 1 05/22/2025 03:49 AM    METASPCT 1.7 06/18/2017 06:33 AM    LYMPHOPCT 17 05/23/2025 05:42 AM    MONOPCT 0 05/23/2025 05:42 AM    MYELOPCT 1 05/22/2025 03:49 AM    MYELOPCT 0.5 04/01/2017 09:45 AM    EOSPCT 0 05/23/2025 05:42 AM    BASOPCT 1 05/23/2025 05:42 AM    MONOSABS 0.00 05/23/2025 05:42 AM    LYMPHSABS 0.36 05/23/2025 05:42 AM    EOSABS 0.00 05/23/2025 05:42 AM    BASOSABS 0.02 05/23/2025 05:42 AM     BMP:    Lab Results   Component

## 2025-05-24 NOTE — PROGRESS NOTES
Liver dysfunction    Asthma    Gastroesophageal reflux disease without esophagitis    Bipolar I disorder (HCC)    Bipolar affective disorder (HCC)    Irritable bowel syndrome    Tobacco use disorder    Jaundice    RUQ abdominal pain    Poorly controlled diabetes mellitus (HCC)    Pain of upper abdomen    Elevated LFTs    Intractable abdominal pain    Abdominal pain, epigastric    Hyperglycemia    Type 2 diabetes mellitus with hyperosmolarity without coma, with long-term current use of insulin (HCC)    Fall (on) (from) other stairs and steps, initial encounter    Hepatitis, alcoholic (HCC)    Nausea and vomiting    Alcoholism (HCC)    Pancytopenia (HCC)    Splenomegaly    Hypoglycemia episode    Hepatitis C    Anxiety and depression    Cigarette smoker, heavy    Acute pancreatitis    Alcohol abuse    Acute alcoholic gastritis    Other osteoporosis without current pathological fracture    Ketoacidosis, diabetic, no coma, insulin dependent    Bicytopenia - leukopenia and thrombocytopenia    Chronic abdominal pain    Chronic pancreatitis (HCC)    Electrolyte imbalance    Lactic acidosis    Severe protein-calorie malnutrition    Idiopathic osteoporosis    Ascites due to alcoholic cirrhosis (HCC)    Diabetic polyneuropathy associated with type 2 diabetes mellitus (HCC)    Malignant neoplasm of left breast in female, estrogen receptor positive (HCC)    Cocaine dependence with withdrawal (HCC)    Vaginitis and vulvovaginitis    Abscess, gluteal, left    Nondependent cocaine abuse (HCC)    Cellulitis    Type 1 diabetes mellitus with ketoacidosis without coma (HCC)    Enterocolitis    Perineal abscess    Cheryle gangrene (HCC)    Type 2 diabetes mellitus with hyperglycemia (HCC)    Failure to thrive in adult    Cocaine use    Liver mass    Generalized abdominal pain    Elevated liver enzymes    Portal hypertension (HCC)    Pancreatic insufficiency    Hepatic abscess    Intra-abdominal abscess (HCC)    Hyperkalemia

## 2025-05-25 NOTE — DISCHARGE SUMMARY
[address] [address]                            DISCHARGE SUMMARY      PATIENT NAME: OSBALDO GAINES               : 1970  MED REC NO: [id number]                        ROOM: 0529  ACCOUNT NO: [id number]                       ADMIT DATE: 2025  PROVIDER: Mina Riley DO      ADMITTING DIAGNOSIS:  Diabetic ketoacidosis in the setting of insulin noncompliant, noncompliant with blood sugar check at the rehab facility.    SECONDARY DISCHARGE DIAGNOSES:  Recent hospitalization secondary to gastrointestinal bleeding related to esophageal varices with status post esophagogastroduodenoscopy with banding x4.  Alcoholic cirrhosis, reports sober x5 years.  Thrombocytopenia, probably secondary to advanced liver disease.  Urine drug screen positive for cocaine.  Anxiety and depression, history of hepatitis C, Enterococcus urinary tract infection, and hypocalcemia.    OPERATION PERFORMED:  Upper GI panendoscopy on May 23, 2025, showing multiple ulcer in the distal esophagus with biopsy taken.    CONSULTATIONS OBTAINED:  With [facility], Dr. Riley, Dr. Harrison, Critical Care.  No OMT was given.    CHIEF COMPLAINT AND HISTORY OF CHIEF COMPLAINT:  This is a 55-year-old white female who was admitted to [facility].  The patient presented to the hospital on May 20, 2025.  The patient presented to the hospital here, was admitted under the service of Dr. Oh.  The patient presented from a rehab facility.  The patient was recently admitted and discharged on May 9, 2025.  The patient's extensive chart review had been undertaken.  Workup during previous hospitalization identified esophageal varices with banding x4.  Apparently, the patient did suffer from cirrhosis.  The patient apparently had been residing at acute rehab facility, but her urine drug screen was currently positive for cocaine, states that she was not

## 2025-05-31 ENCOUNTER — APPOINTMENT (OUTPATIENT)
Dept: GENERAL RADIOLOGY | Age: 55
DRG: 137 | End: 2025-05-31
Payer: COMMERCIAL

## 2025-05-31 ENCOUNTER — APPOINTMENT (OUTPATIENT)
Dept: CT IMAGING | Age: 55
DRG: 137 | End: 2025-05-31
Payer: COMMERCIAL

## 2025-05-31 ENCOUNTER — APPOINTMENT (OUTPATIENT)
Age: 55
DRG: 137 | End: 2025-05-31
Payer: COMMERCIAL

## 2025-05-31 ENCOUNTER — HOSPITAL ENCOUNTER (INPATIENT)
Age: 55
LOS: 4 days | Discharge: OTHER INSTITUTION | DRG: 137 | End: 2025-06-04
Attending: EMERGENCY MEDICINE | Admitting: INTERNAL MEDICINE
Payer: COMMERCIAL

## 2025-05-31 DIAGNOSIS — I25.85 CHRONIC CORONARY MICROVASCULAR DYSFUNCTION: ICD-10-CM

## 2025-05-31 DIAGNOSIS — R06.02 SHORTNESS OF BREATH: ICD-10-CM

## 2025-05-31 DIAGNOSIS — J96.01 ACUTE RESPIRATORY FAILURE WITH HYPOXIA (HCC): Primary | ICD-10-CM

## 2025-05-31 DIAGNOSIS — E11.65 UNCONTROLLED TYPE 2 DIABETES MELLITUS WITH HYPERGLYCEMIA (HCC): ICD-10-CM

## 2025-05-31 DIAGNOSIS — I50.22 HEART FAILURE WITH MID-RANGE EJECTION FRACTION (HFMEF) (HCC): ICD-10-CM

## 2025-05-31 DIAGNOSIS — F19.10 SUBSTANCE ABUSE (HCC): ICD-10-CM

## 2025-05-31 LAB
ALBUMIN SERPL-MCNC: 2.7 G/DL (ref 3.5–5.2)
ALBUMIN SERPL-MCNC: 2.8 G/DL (ref 3.5–5.2)
ALP SERPL-CCNC: 212 U/L (ref 35–104)
ALP SERPL-CCNC: 216 U/L (ref 35–104)
ALT SERPL-CCNC: 14 U/L (ref 0–32)
ALT SERPL-CCNC: 15 U/L (ref 0–32)
AMMONIA PLAS-SCNC: 14 UMOL/L (ref 11–51)
AMPHET UR QL SCN: NEGATIVE
ANION GAP SERPL CALCULATED.3IONS-SCNC: 10 MMOL/L (ref 7–16)
ANION GAP SERPL CALCULATED.3IONS-SCNC: 9 MMOL/L (ref 7–16)
APAP SERPL-MCNC: <5 UG/ML (ref 10–30)
AST SERPL-CCNC: 25 U/L (ref 0–31)
AST SERPL-CCNC: 27 U/L (ref 0–31)
B PARAP IS1001 DNA NPH QL NAA+NON-PROBE: NOT DETECTED
B PERT DNA SPEC QL NAA+PROBE: NOT DETECTED
B-OH-BUTYR SERPL-MCNC: 0.2 MMOL/L (ref 0.02–0.27)
BACTERIA URNS QL MICRO: ABNORMAL
BARBITURATES UR QL SCN: NEGATIVE
BASOPHILS # BLD: 0 K/UL (ref 0–0.2)
BASOPHILS # BLD: 0.01 K/UL (ref 0–0.2)
BASOPHILS NFR BLD: 0 % (ref 0–2)
BASOPHILS NFR BLD: 0 % (ref 0–2)
BENZODIAZ UR QL: NEGATIVE
BILIRUB DIRECT SERPL-MCNC: <0.2 MG/DL (ref 0–0.3)
BILIRUB INDIRECT SERPL-MCNC: ABNORMAL MG/DL (ref 0–1)
BILIRUB SERPL-MCNC: 0.3 MG/DL (ref 0–1.2)
BILIRUB SERPL-MCNC: 0.4 MG/DL (ref 0–1.2)
BILIRUB UR QL STRIP: NEGATIVE
BNP SERPL-MCNC: 9190 PG/ML (ref 0–450)
BUN SERPL-MCNC: 18 MG/DL (ref 6–20)
BUN SERPL-MCNC: 18 MG/DL (ref 6–20)
BUPRENORPHINE UR QL: POSITIVE
C PNEUM DNA NPH QL NAA+NON-PROBE: NOT DETECTED
CALCIUM SERPL-MCNC: 8.7 MG/DL (ref 8.6–10.2)
CALCIUM SERPL-MCNC: 9 MG/DL (ref 8.6–10.2)
CANNABINOIDS UR QL SCN: NEGATIVE
CHLORIDE SERPL-SCNC: 93 MMOL/L (ref 98–107)
CHLORIDE SERPL-SCNC: 96 MMOL/L (ref 98–107)
CHP ED QC CHECK: YES
CK SERPL-CCNC: 66 U/L (ref 20–180)
CLARITY UR: CLEAR
CO2 SERPL-SCNC: 31 MMOL/L (ref 22–29)
CO2 SERPL-SCNC: 31 MMOL/L (ref 22–29)
COCAINE UR QL SCN: NEGATIVE
COLOR UR: YELLOW
CREAT SERPL-MCNC: 1.1 MG/DL (ref 0.5–1)
CREAT SERPL-MCNC: 1.3 MG/DL (ref 0.5–1)
ECHO AO ASC DIAM: 2.5 CM
ECHO AO ASCENDING AORTA INDEX: 1.67 CM/M2
ECHO AV AREA PEAK VELOCITY: 2.4 CM2
ECHO AV AREA VTI: 2.9 CM2
ECHO AV AREA/BSA PEAK VELOCITY: 1.6 CM2/M2
ECHO AV AREA/BSA VTI: 1.9 CM2/M2
ECHO AV CUSP MM: 2.1 CM
ECHO AV MEAN GRADIENT: 3 MMHG
ECHO AV MEAN VELOCITY: 0.8 M/S
ECHO AV PEAK GRADIENT: 6 MMHG
ECHO AV PEAK VELOCITY: 1.3 M/S
ECHO AV VELOCITY RATIO: 0.69
ECHO AV VTI: 26.2 CM
ECHO BSA: 1.52 M2
ECHO EST RA PRESSURE: 8 MMHG
ECHO LA DIAMETER INDEX: 2.47 CM/M2
ECHO LA DIAMETER: 3.7 CM
ECHO LA VOL A-L A2C: 39 ML (ref 22–52)
ECHO LA VOL A-L A4C: 31 ML (ref 22–52)
ECHO LA VOL BP: 31 ML (ref 22–52)
ECHO LA VOL MOD A2C: 36 ML (ref 22–52)
ECHO LA VOL MOD A4C: 27 ML (ref 22–52)
ECHO LA VOL/BSA BIPLANE: 21 ML/M2 (ref 16–34)
ECHO LA VOLUME AREA LENGTH: 35 ML
ECHO LA VOLUME INDEX A-L A2C: 26 ML/M2 (ref 16–34)
ECHO LA VOLUME INDEX A-L A4C: 21 ML/M2 (ref 16–34)
ECHO LA VOLUME INDEX AREA LENGTH: 23 ML/M2 (ref 16–34)
ECHO LA VOLUME INDEX MOD A2C: 24 ML/M2 (ref 16–34)
ECHO LA VOLUME INDEX MOD A4C: 18 ML/M2 (ref 16–34)
ECHO LV EDV A2C: 83 ML
ECHO LV EDV A4C: 65 ML
ECHO LV EDV BP: 74 ML (ref 56–104)
ECHO LV EDV INDEX A4C: 43 ML/M2
ECHO LV EDV INDEX BP: 49 ML/M2
ECHO LV EDV NDEX A2C: 55 ML/M2
ECHO LV EF PHYSICIAN: 50 %
ECHO LV EJECTION FRACTION A2C: 48 %
ECHO LV EJECTION FRACTION A4C: 49 %
ECHO LV EJECTION FRACTION BIPLANE: 48 % (ref 55–100)
ECHO LV ESV A2C: 43 ML
ECHO LV ESV A4C: 33 ML
ECHO LV ESV BP: 39 ML (ref 19–49)
ECHO LV ESV INDEX A2C: 29 ML/M2
ECHO LV ESV INDEX A4C: 22 ML/M2
ECHO LV ESV INDEX BP: 26 ML/M2
ECHO LV FRACTIONAL SHORTENING: 19 % (ref 28–44)
ECHO LV INTERNAL DIMENSION DIASTOLE INDEX: 2.87 CM/M2
ECHO LV INTERNAL DIMENSION DIASTOLIC: 4.3 CM (ref 3.9–5.3)
ECHO LV INTERNAL DIMENSION SYSTOLIC INDEX: 2.33 CM/M2
ECHO LV INTERNAL DIMENSION SYSTOLIC: 3.5 CM
ECHO LV ISOVOLUMETRIC RELAXATION TIME (IVRT): 83.7 MS
ECHO LV IVSD: 0.9 CM (ref 0.6–0.9)
ECHO LV IVSS: 1 CM
ECHO LV MASS 2D: 132.7 G (ref 67–162)
ECHO LV MASS INDEX 2D: 88.5 G/M2 (ref 43–95)
ECHO LV POSTERIOR WALL DIASTOLIC: 1 CM (ref 0.6–0.9)
ECHO LV POSTERIOR WALL SYSTOLIC: 1.1 CM
ECHO LV RELATIVE WALL THICKNESS RATIO: 0.47
ECHO LVOT AREA: 3.5 CM2
ECHO LVOT AV VTI INDEX: 0.81
ECHO LVOT DIAM: 2.1 CM
ECHO LVOT MEAN GRADIENT: 1 MMHG
ECHO LVOT PEAK GRADIENT: 3 MMHG
ECHO LVOT PEAK VELOCITY: 0.9 M/S
ECHO LVOT STROKE VOLUME INDEX: 49.2 ML/M2
ECHO LVOT SV: 73.7 ML
ECHO LVOT VTI: 21.3 CM
ECHO MV "A" WAVE DURATION: 152.2 MSEC
ECHO MV A VELOCITY: 0.83 M/S
ECHO MV AREA PHT: 3.2 CM2
ECHO MV AREA VTI: 2.9 CM2
ECHO MV E DECELERATION TIME (DT): 194.7 MS
ECHO MV E VELOCITY: 0.88 M/S
ECHO MV E/A RATIO: 1.06
ECHO MV LVOT VTI INDEX: 1.21
ECHO MV MAX VELOCITY: 0.9 M/S
ECHO MV MEAN GRADIENT: 1 MMHG
ECHO MV MEAN VELOCITY: 0.6 M/S
ECHO MV PEAK GRADIENT: 3 MMHG
ECHO MV PRESSURE HALF TIME (PHT): 68 MS
ECHO MV VTI: 25.8 CM
ECHO PULMONARY ARTERY END DIASTOLIC PRESSURE: 3 MMHG
ECHO PV MAX VELOCITY: 0.8 M/S
ECHO PV MEAN GRADIENT: 1 MMHG
ECHO PV MEAN VELOCITY: 0.5 M/S
ECHO PV PEAK GRADIENT: 2 MMHG
ECHO PV REGURGITANT MAX VELOCITY: 0.9 M/S
ECHO PV VTI: 19 CM
ECHO RIGHT VENTRICULAR SYSTOLIC PRESSURE (RVSP): 44 MMHG
ECHO RV INTERNAL DIMENSION: 2.8 CM
ECHO RV LONGITUDINAL DIMENSION: 5.9 CM
ECHO RV MID DIMENSION: 3.5 CM
ECHO RVOT MEAN GRADIENT: 0 MMHG
ECHO RVOT PEAK GRADIENT: 1 MMHG
ECHO RVOT PEAK VELOCITY: 0.4 M/S
ECHO RVOT VTI: 9.1 CM
ECHO TV REGURGITANT MAX VELOCITY: 2.98 M/S
ECHO TV REGURGITANT PEAK GRADIENT: 36 MMHG
EOSINOPHIL # BLD: 0.03 K/UL (ref 0.05–0.5)
EOSINOPHIL # BLD: 0.03 K/UL (ref 0.05–0.5)
EOSINOPHILS RELATIVE PERCENT: 1 % (ref 0–6)
EOSINOPHILS RELATIVE PERCENT: 1 % (ref 0–6)
ERYTHROCYTE [DISTWIDTH] IN BLOOD BY AUTOMATED COUNT: 17.1 % (ref 11.5–15)
ERYTHROCYTE [DISTWIDTH] IN BLOOD BY AUTOMATED COUNT: 17.2 % (ref 11.5–15)
ETHANOLAMINE SERPL-MCNC: <10 MG/DL (ref 0–0.08)
FENTANYL UR QL: POSITIVE
FLOW RATE: 6
FLUAV RNA NPH QL NAA+NON-PROBE: NOT DETECTED
FLUBV RNA NPH QL NAA+NON-PROBE: NOT DETECTED
GFR, ESTIMATED: 50 ML/MIN/1.73M2
GFR, ESTIMATED: 59 ML/MIN/1.73M2
GLUCOSE BLD-MCNC: 100 MG/DL (ref 74–99)
GLUCOSE BLD-MCNC: 106 MG/DL (ref 74–99)
GLUCOSE BLD-MCNC: 115 MG/DL
GLUCOSE BLD-MCNC: 115 MG/DL (ref 74–99)
GLUCOSE BLD-MCNC: 123 MG/DL (ref 74–99)
GLUCOSE BLD-MCNC: 161 MG/DL (ref 74–99)
GLUCOSE BLD-MCNC: 309 MG/DL
GLUCOSE BLD-MCNC: 309 MG/DL (ref 74–99)
GLUCOSE BLD-MCNC: 41 MG/DL
GLUCOSE BLD-MCNC: 41 MG/DL (ref 74–99)
GLUCOSE BLD-MCNC: 55 MG/DL
GLUCOSE BLD-MCNC: 55 MG/DL (ref 74–99)
GLUCOSE BLD-MCNC: 68 MG/DL (ref 74–99)
GLUCOSE BLD-MCNC: 73 MG/DL (ref 74–99)
GLUCOSE BLD-MCNC: 82 MG/DL
GLUCOSE BLD-MCNC: 82 MG/DL (ref 74–99)
GLUCOSE BLD-MCNC: 88 MG/DL
GLUCOSE BLD-MCNC: 88 MG/DL (ref 74–99)
GLUCOSE BLD-MCNC: 92 MG/DL (ref 74–99)
GLUCOSE SERPL-MCNC: 329 MG/DL (ref 74–99)
GLUCOSE SERPL-MCNC: 82 MG/DL (ref 74–99)
GLUCOSE UR STRIP-MCNC: NEGATIVE MG/DL
HADV DNA NPH QL NAA+NON-PROBE: NOT DETECTED
HCOV 229E RNA NPH QL NAA+NON-PROBE: NOT DETECTED
HCOV HKU1 RNA NPH QL NAA+NON-PROBE: NOT DETECTED
HCOV NL63 RNA NPH QL NAA+NON-PROBE: NOT DETECTED
HCOV OC43 RNA NPH QL NAA+NON-PROBE: NOT DETECTED
HCT VFR BLD AUTO: 22.2 % (ref 34–48)
HCT VFR BLD AUTO: 24.8 % (ref 34–48)
HGB BLD-MCNC: 7 G/DL (ref 11.5–15.5)
HGB BLD-MCNC: 7.9 G/DL (ref 11.5–15.5)
HGB UR QL STRIP.AUTO: NEGATIVE
HMPV RNA NPH QL NAA+NON-PROBE: NOT DETECTED
HPIV1 RNA NPH QL NAA+NON-PROBE: NOT DETECTED
HPIV2 RNA NPH QL NAA+NON-PROBE: NOT DETECTED
HPIV3 RNA NPH QL NAA+NON-PROBE: NOT DETECTED
HPIV4 RNA NPH QL NAA+NON-PROBE: NOT DETECTED
IMM GRANULOCYTES # BLD AUTO: 0.03 K/UL (ref 0–0.58)
IMM GRANULOCYTES NFR BLD: 1 % (ref 0–5)
KETONES UR STRIP-MCNC: NEGATIVE MG/DL
LEUKOCYTE ESTERASE UR QL STRIP: ABNORMAL
LIPASE SERPL-CCNC: 6 U/L (ref 13–60)
LYMPHOCYTES NFR BLD: 0.43 K/UL (ref 1.5–4)
LYMPHOCYTES NFR BLD: 0.61 K/UL (ref 1.5–4)
LYMPHOCYTES RELATIVE PERCENT: 13 % (ref 20–42)
LYMPHOCYTES RELATIVE PERCENT: 15 % (ref 20–42)
M PNEUMO DNA NPH QL NAA+NON-PROBE: NOT DETECTED
MAGNESIUM SERPL-MCNC: 2.3 MG/DL (ref 1.6–2.6)
MCH RBC QN AUTO: 30.2 PG (ref 26–35)
MCH RBC QN AUTO: 30.3 PG (ref 26–35)
MCHC RBC AUTO-ENTMCNC: 31.5 G/DL (ref 32–34.5)
MCHC RBC AUTO-ENTMCNC: 31.9 G/DL (ref 32–34.5)
MCV RBC AUTO: 95 FL (ref 80–99.9)
MCV RBC AUTO: 95.7 FL (ref 80–99.9)
METHADONE UR QL: NEGATIVE
MONOCYTES NFR BLD: 0.12 K/UL (ref 0.1–0.95)
MONOCYTES NFR BLD: 0.29 K/UL (ref 0.1–0.95)
MONOCYTES NFR BLD: 4 % (ref 2–12)
MONOCYTES NFR BLD: 7 % (ref 2–12)
NEUTROPHILS NFR BLD: 75 % (ref 43–80)
NEUTROPHILS NFR BLD: 82 % (ref 43–80)
NEUTS SEG NFR BLD: 2.72 K/UL (ref 1.8–7.3)
NEUTS SEG NFR BLD: 2.98 K/UL (ref 1.8–7.3)
NITRITE UR QL STRIP: NEGATIVE
NUCLEATED RED BLOOD CELLS: 1 PER 100 WBC
O2 DELIVERY DEVICE: ABNORMAL
OPIATES UR QL SCN: NEGATIVE
OXYCODONE UR QL SCN: NEGATIVE
PCP UR QL SCN: NEGATIVE
PH UR STRIP: 6 [PH] (ref 5–8)
PHOSPHATE SERPL-MCNC: 3.9 MG/DL (ref 2.5–4.5)
PLATELET # BLD AUTO: 58 K/UL (ref 130–450)
PLATELET # BLD AUTO: 70 K/UL (ref 130–450)
PLATELET CONFIRMATION: NORMAL
PLATELET CONFIRMATION: NORMAL
PMV BLD AUTO: 10.4 FL (ref 7–12)
PMV BLD AUTO: 10.9 FL (ref 7–12)
POC HCO3: 31.5 MMOL/L (ref 22–26)
POC O2 SATURATION: 98.5 % (ref 92–98.5)
POC PCO2: 48.5 MM HG (ref 35–45)
POC PH: 7.42 (ref 7.35–7.45)
POC PO2: 113.6 MM HG (ref 80–100)
POSITIVE BASE EXCESS, ART: 6.3 MMOL/L (ref 0–3)
POTASSIUM SERPL-SCNC: 4.4 MMOL/L (ref 3.5–5)
POTASSIUM SERPL-SCNC: 4.6 MMOL/L (ref 3.5–5)
PROCALCITONIN SERPL-MCNC: 0.3 NG/ML (ref 0–0.08)
PROT SERPL-MCNC: 5.7 G/DL (ref 6.4–8.3)
PROT SERPL-MCNC: 5.8 G/DL (ref 6.4–8.3)
PROT UR STRIP-MCNC: NEGATIVE MG/DL
RBC # BLD AUTO: 2.32 M/UL (ref 3.5–5.5)
RBC # BLD AUTO: 2.61 M/UL (ref 3.5–5.5)
RBC # BLD: ABNORMAL 10*6/UL
RBC # BLD: ABNORMAL 10*6/UL
RBC #/AREA URNS HPF: ABNORMAL /HPF
RSV RNA NPH QL NAA+NON-PROBE: NOT DETECTED
RV+EV RNA NPH QL NAA+NON-PROBE: NOT DETECTED
SALICYLATES SERPL-MCNC: <0.3 MG/DL (ref 0–30)
SARS-COV-2 RNA NPH QL NAA+NON-PROBE: NOT DETECTED
SODIUM SERPL-SCNC: 134 MMOL/L (ref 132–146)
SODIUM SERPL-SCNC: 136 MMOL/L (ref 132–146)
SP GR UR STRIP: 1.01 (ref 1–1.03)
SPECIMEN DESCRIPTION: NORMAL
TEST INFORMATION: ABNORMAL
TOXIC TRICYCLIC SC,BLOOD: NEGATIVE
TROPONIN I SERPL HS-MCNC: 33 NG/L (ref 0–14)
TROPONIN I SERPL HS-MCNC: 80 NG/L (ref 0–14)
TROPONIN I SERPL HS-MCNC: 85 NG/L (ref 0–14)
TROPONIN I SERPL HS-MCNC: 86 NG/L (ref 0–14)
TROPONIN I SERPL HS-MCNC: 88 NG/L (ref 0–14)
UROBILINOGEN UR STRIP-ACNC: 0.2 EU/DL (ref 0–1)
WBC #/AREA URNS HPF: ABNORMAL /HPF
WBC OTHER # BLD: 3.3 K/UL (ref 4.5–11.5)
WBC OTHER # BLD: 4 K/UL (ref 4.5–11.5)

## 2025-05-31 PROCEDURE — 0202U NFCT DS 22 TRGT SARS-COV-2: CPT

## 2025-05-31 PROCEDURE — 93306 TTE W/DOPPLER COMPLETE: CPT | Performed by: INTERNAL MEDICINE

## 2025-05-31 PROCEDURE — 87899 AGENT NOS ASSAY W/OPTIC: CPT

## 2025-05-31 PROCEDURE — 2580000003 HC RX 258: Performed by: NURSE PRACTITIONER

## 2025-05-31 PROCEDURE — 80053 COMPREHEN METABOLIC PANEL: CPT

## 2025-05-31 PROCEDURE — 83690 ASSAY OF LIPASE: CPT

## 2025-05-31 PROCEDURE — 94640 AIRWAY INHALATION TREATMENT: CPT

## 2025-05-31 PROCEDURE — 87040 BLOOD CULTURE FOR BACTERIA: CPT

## 2025-05-31 PROCEDURE — 96374 THER/PROPH/DIAG INJ IV PUSH: CPT

## 2025-05-31 PROCEDURE — 80307 DRUG TEST PRSMV CHEM ANLYZR: CPT

## 2025-05-31 PROCEDURE — 93306 TTE W/DOPPLER COMPLETE: CPT

## 2025-05-31 PROCEDURE — 71045 X-RAY EXAM CHEST 1 VIEW: CPT

## 2025-05-31 PROCEDURE — 83735 ASSAY OF MAGNESIUM: CPT

## 2025-05-31 PROCEDURE — 99285 EMERGENCY DEPT VISIT HI MDM: CPT

## 2025-05-31 PROCEDURE — 6370000000 HC RX 637 (ALT 250 FOR IP)

## 2025-05-31 PROCEDURE — 2500000003 HC RX 250 WO HCPCS: Performed by: INTERNAL MEDICINE

## 2025-05-31 PROCEDURE — 30233N1 TRANSFUSION OF NONAUTOLOGOUS RED BLOOD CELLS INTO PERIPHERAL VEIN, PERCUTANEOUS APPROACH: ICD-10-PCS | Performed by: INTERNAL MEDICINE

## 2025-05-31 PROCEDURE — G0480 DRUG TEST DEF 1-7 CLASSES: HCPCS

## 2025-05-31 PROCEDURE — 6360000002 HC RX W HCPCS

## 2025-05-31 PROCEDURE — 83880 ASSAY OF NATRIURETIC PEPTIDE: CPT

## 2025-05-31 PROCEDURE — 80179 DRUG ASSAY SALICYLATE: CPT

## 2025-05-31 PROCEDURE — 2500000003 HC RX 250 WO HCPCS: Performed by: NURSE PRACTITIONER

## 2025-05-31 PROCEDURE — 82962 GLUCOSE BLOOD TEST: CPT

## 2025-05-31 PROCEDURE — 6360000002 HC RX W HCPCS: Performed by: NURSE PRACTITIONER

## 2025-05-31 PROCEDURE — 84145 PROCALCITONIN (PCT): CPT

## 2025-05-31 PROCEDURE — 2580000003 HC RX 258: Performed by: EMERGENCY MEDICINE

## 2025-05-31 PROCEDURE — 82140 ASSAY OF AMMONIA: CPT

## 2025-05-31 PROCEDURE — 6370000000 HC RX 637 (ALT 250 FOR IP): Performed by: NURSE PRACTITIONER

## 2025-05-31 PROCEDURE — 82248 BILIRUBIN DIRECT: CPT

## 2025-05-31 PROCEDURE — 1200000000 HC SEMI PRIVATE

## 2025-05-31 PROCEDURE — 85025 COMPLETE CBC W/AUTO DIFF WBC: CPT

## 2025-05-31 PROCEDURE — 82803 BLOOD GASES ANY COMBINATION: CPT

## 2025-05-31 PROCEDURE — 74176 CT ABD & PELVIS W/O CONTRAST: CPT

## 2025-05-31 PROCEDURE — 2580000003 HC RX 258

## 2025-05-31 PROCEDURE — P9047 ALBUMIN (HUMAN), 25%, 50ML: HCPCS | Performed by: NURSE PRACTITIONER

## 2025-05-31 PROCEDURE — 87086 URINE CULTURE/COLONY COUNT: CPT

## 2025-05-31 PROCEDURE — 82010 KETONE BODYS QUAN: CPT

## 2025-05-31 PROCEDURE — 2700000000 HC OXYGEN THERAPY PER DAY

## 2025-05-31 PROCEDURE — 93005 ELECTROCARDIOGRAM TRACING: CPT | Performed by: EMERGENCY MEDICINE

## 2025-05-31 PROCEDURE — 70450 CT HEAD/BRAIN W/O DYE: CPT

## 2025-05-31 PROCEDURE — 81001 URINALYSIS AUTO W/SCOPE: CPT

## 2025-05-31 PROCEDURE — 80143 DRUG ASSAY ACETAMINOPHEN: CPT

## 2025-05-31 PROCEDURE — 84100 ASSAY OF PHOSPHORUS: CPT

## 2025-05-31 PROCEDURE — 84484 ASSAY OF TROPONIN QUANT: CPT

## 2025-05-31 PROCEDURE — 82550 ASSAY OF CK (CPK): CPT

## 2025-05-31 PROCEDURE — 87449 NOS EACH ORGANISM AG IA: CPT

## 2025-05-31 RX ORDER — PANTOPRAZOLE SODIUM 40 MG/1
40 TABLET, DELAYED RELEASE ORAL
Status: DISCONTINUED | OUTPATIENT
Start: 2025-05-31 | End: 2025-06-01

## 2025-05-31 RX ORDER — FENTANYL 12.5 UG/1
1 PATCH TRANSDERMAL
Refills: 0 | Status: DISCONTINUED | OUTPATIENT
Start: 2025-05-31 | End: 2025-06-03

## 2025-05-31 RX ORDER — ONDANSETRON 2 MG/ML
4 INJECTION INTRAMUSCULAR; INTRAVENOUS EVERY 6 HOURS PRN
Status: DISCONTINUED | OUTPATIENT
Start: 2025-05-31 | End: 2025-06-04 | Stop reason: HOSPADM

## 2025-05-31 RX ORDER — OXYCODONE HYDROCHLORIDE 5 MG/1
5 TABLET ORAL EVERY 6 HOURS PRN
Refills: 0 | Status: DISCONTINUED | OUTPATIENT
Start: 2025-05-31 | End: 2025-06-04

## 2025-05-31 RX ORDER — ONDANSETRON 4 MG/1
4 TABLET, ORALLY DISINTEGRATING ORAL EVERY 8 HOURS PRN
Status: DISCONTINUED | OUTPATIENT
Start: 2025-05-31 | End: 2025-06-04 | Stop reason: HOSPADM

## 2025-05-31 RX ORDER — PROPRANOLOL HYDROCHLORIDE 10 MG/1
20 TABLET ORAL 2 TIMES DAILY
Status: DISCONTINUED | OUTPATIENT
Start: 2025-05-31 | End: 2025-06-04 | Stop reason: HOSPADM

## 2025-05-31 RX ORDER — FUROSEMIDE 20 MG/1
20 TABLET ORAL DAILY
Status: DISCONTINUED | OUTPATIENT
Start: 2025-05-31 | End: 2025-06-04

## 2025-05-31 RX ORDER — POTASSIUM CHLORIDE 1500 MG/1
40 TABLET, EXTENDED RELEASE ORAL PRN
Status: DISCONTINUED | OUTPATIENT
Start: 2025-05-31 | End: 2025-06-04 | Stop reason: HOSPADM

## 2025-05-31 RX ORDER — HYDROXYZINE HYDROCHLORIDE 25 MG/1
25 TABLET, FILM COATED ORAL ONCE
Status: COMPLETED | OUTPATIENT
Start: 2025-06-01 | End: 2025-06-01

## 2025-05-31 RX ORDER — HYDROXYZINE PAMOATE 25 MG/1
25 CAPSULE ORAL 4 TIMES DAILY PRN
Status: DISCONTINUED | OUTPATIENT
Start: 2025-05-31 | End: 2025-06-04

## 2025-05-31 RX ORDER — BUDESONIDE 0.5 MG/2ML
0.5 INHALANT ORAL
Status: DISCONTINUED | OUTPATIENT
Start: 2025-05-31 | End: 2025-06-04 | Stop reason: HOSPADM

## 2025-05-31 RX ORDER — ONDANSETRON 2 MG/ML
4 INJECTION INTRAMUSCULAR; INTRAVENOUS ONCE
Status: DISCONTINUED | OUTPATIENT
Start: 2025-05-31 | End: 2025-06-04 | Stop reason: HOSPADM

## 2025-05-31 RX ORDER — GABAPENTIN 100 MG/1
200 CAPSULE ORAL 3 TIMES DAILY
Status: DISCONTINUED | OUTPATIENT
Start: 2025-05-31 | End: 2025-06-04 | Stop reason: HOSPADM

## 2025-05-31 RX ORDER — 0.9 % SODIUM CHLORIDE 0.9 %
1000 INTRAVENOUS SOLUTION INTRAVENOUS ONCE
Status: COMPLETED | OUTPATIENT
Start: 2025-05-31 | End: 2025-05-31

## 2025-05-31 RX ORDER — DEXTROSE MONOHYDRATE, SODIUM CHLORIDE, AND POTASSIUM CHLORIDE 50; 1.49; 4.5 G/1000ML; G/1000ML; G/1000ML
INJECTION, SOLUTION INTRAVENOUS CONTINUOUS
Status: DISPENSED | OUTPATIENT
Start: 2025-05-31 | End: 2025-05-31

## 2025-05-31 RX ORDER — IPRATROPIUM BROMIDE AND ALBUTEROL SULFATE 2.5; .5 MG/3ML; MG/3ML
1 SOLUTION RESPIRATORY (INHALATION)
Status: DISCONTINUED | OUTPATIENT
Start: 2025-05-31 | End: 2025-06-04 | Stop reason: HOSPADM

## 2025-05-31 RX ORDER — HYDRALAZINE HYDROCHLORIDE 20 MG/ML
5 INJECTION INTRAMUSCULAR; INTRAVENOUS EVERY 4 HOURS PRN
Status: DISCONTINUED | OUTPATIENT
Start: 2025-05-31 | End: 2025-06-04 | Stop reason: HOSPADM

## 2025-05-31 RX ORDER — LACTULOSE 10 G/15ML
20 SOLUTION ORAL 3 TIMES DAILY
Status: DISCONTINUED | OUTPATIENT
Start: 2025-05-31 | End: 2025-06-04 | Stop reason: HOSPADM

## 2025-05-31 RX ORDER — ENOXAPARIN SODIUM 100 MG/ML
40 INJECTION SUBCUTANEOUS DAILY
Status: DISCONTINUED | OUTPATIENT
Start: 2025-05-31 | End: 2025-06-04 | Stop reason: HOSPADM

## 2025-05-31 RX ORDER — FUROSEMIDE 10 MG/ML
20 INJECTION INTRAMUSCULAR; INTRAVENOUS 2 TIMES DAILY
Status: DISCONTINUED | OUTPATIENT
Start: 2025-05-31 | End: 2025-05-31

## 2025-05-31 RX ORDER — MAGNESIUM SULFATE IN WATER 40 MG/ML
2000 INJECTION, SOLUTION INTRAVENOUS PRN
Status: DISCONTINUED | OUTPATIENT
Start: 2025-05-31 | End: 2025-06-04 | Stop reason: HOSPADM

## 2025-05-31 RX ORDER — INSULIN LISPRO 100 [IU]/ML
0-4 INJECTION, SOLUTION INTRAVENOUS; SUBCUTANEOUS
Status: DISCONTINUED | OUTPATIENT
Start: 2025-05-31 | End: 2025-06-03

## 2025-05-31 RX ORDER — INSULIN LISPRO 100 [IU]/ML
0-8 INJECTION, SOLUTION INTRAVENOUS; SUBCUTANEOUS
Status: DISCONTINUED | OUTPATIENT
Start: 2025-05-31 | End: 2025-06-03

## 2025-05-31 RX ORDER — ASPIRIN 81 MG/1
81 TABLET, CHEWABLE ORAL
Status: DISCONTINUED | OUTPATIENT
Start: 2025-05-31 | End: 2025-06-04 | Stop reason: HOSPADM

## 2025-05-31 RX ORDER — GLUCAGON 1 MG/ML
1 KIT INJECTION PRN
Status: DISCONTINUED | OUTPATIENT
Start: 2025-05-31 | End: 2025-06-04 | Stop reason: HOSPADM

## 2025-05-31 RX ORDER — INSULIN LISPRO 100 [IU]/ML
0-8 INJECTION, SOLUTION INTRAVENOUS; SUBCUTANEOUS
Status: DISCONTINUED | OUTPATIENT
Start: 2025-05-31 | End: 2025-06-02

## 2025-05-31 RX ORDER — DEXTROSE MONOHYDRATE 100 MG/ML
INJECTION, SOLUTION INTRAVENOUS CONTINUOUS PRN
Status: DISCONTINUED | OUTPATIENT
Start: 2025-05-31 | End: 2025-05-31

## 2025-05-31 RX ORDER — POTASSIUM CHLORIDE 7.45 MG/ML
10 INJECTION INTRAVENOUS PRN
Status: DISCONTINUED | OUTPATIENT
Start: 2025-05-31 | End: 2025-06-04 | Stop reason: HOSPADM

## 2025-05-31 RX ORDER — SODIUM CHLORIDE 0.9 % (FLUSH) 0.9 %
5-40 SYRINGE (ML) INJECTION EVERY 12 HOURS SCHEDULED
Status: DISCONTINUED | OUTPATIENT
Start: 2025-05-31 | End: 2025-06-04 | Stop reason: HOSPADM

## 2025-05-31 RX ORDER — SODIUM CHLORIDE 9 MG/ML
INJECTION, SOLUTION INTRAVENOUS PRN
Status: DISCONTINUED | OUTPATIENT
Start: 2025-05-31 | End: 2025-06-04 | Stop reason: HOSPADM

## 2025-05-31 RX ORDER — SODIUM CHLORIDE 0.9 % (FLUSH) 0.9 %
5-40 SYRINGE (ML) INJECTION PRN
Status: DISCONTINUED | OUTPATIENT
Start: 2025-05-31 | End: 2025-06-04 | Stop reason: HOSPADM

## 2025-05-31 RX ORDER — ALBUTEROL SULFATE 0.83 MG/ML
2.5 SOLUTION RESPIRATORY (INHALATION) EVERY 4 HOURS PRN
Status: DISCONTINUED | OUTPATIENT
Start: 2025-05-31 | End: 2025-06-04 | Stop reason: HOSPADM

## 2025-05-31 RX ORDER — POLYETHYLENE GLYCOL 3350 17 G/17G
17 POWDER, FOR SOLUTION ORAL DAILY PRN
Status: DISCONTINUED | OUTPATIENT
Start: 2025-05-31 | End: 2025-06-04 | Stop reason: HOSPADM

## 2025-05-31 RX ORDER — INSULIN GLARGINE 100 [IU]/ML
38 INJECTION, SOLUTION SUBCUTANEOUS EVERY MORNING
Status: DISCONTINUED | OUTPATIENT
Start: 2025-05-31 | End: 2025-06-03

## 2025-05-31 RX ORDER — INSULIN LISPRO 100 [IU]/ML
0-8 INJECTION, SOLUTION INTRAVENOUS; SUBCUTANEOUS EVERY 6 HOURS SCHEDULED
Status: DISCONTINUED | OUTPATIENT
Start: 2025-05-31 | End: 2025-05-31

## 2025-05-31 RX ORDER — GAUZE BANDAGE 2" X 2"
100 BANDAGE TOPICAL DAILY
Status: DISCONTINUED | OUTPATIENT
Start: 2025-05-31 | End: 2025-06-01

## 2025-05-31 RX ORDER — ALBUMIN (HUMAN) 12.5 G/50ML
25 SOLUTION INTRAVENOUS 2 TIMES DAILY
Status: COMPLETED | OUTPATIENT
Start: 2025-05-31 | End: 2025-06-01

## 2025-05-31 RX ADMIN — DEXTROSE 125 ML: 10 SOLUTION INTRAVENOUS at 14:54

## 2025-05-31 RX ADMIN — BUDESONIDE 500 MCG: 0.5 SUSPENSION RESPIRATORY (INHALATION) at 05:49

## 2025-05-31 RX ADMIN — IPRATROPIUM BROMIDE AND ALBUTEROL SULFATE 1 DOSE: .5; 2.5 SOLUTION RESPIRATORY (INHALATION) at 13:58

## 2025-05-31 RX ADMIN — PROPRANOLOL HYDROCHLORIDE 20 MG: 10 TABLET ORAL at 20:44

## 2025-05-31 RX ADMIN — IPRATROPIUM BROMIDE AND ALBUTEROL SULFATE 1 DOSE: .5; 2.5 SOLUTION RESPIRATORY (INHALATION) at 05:49

## 2025-05-31 RX ADMIN — RIFAXIMIN 550 MG: 550 TABLET ORAL at 22:15

## 2025-05-31 RX ADMIN — PROPRANOLOL HYDROCHLORIDE 20 MG: 10 TABLET ORAL at 09:45

## 2025-05-31 RX ADMIN — ALBUMIN (HUMAN) 25 G: 0.25 INJECTION, SOLUTION INTRAVENOUS at 09:42

## 2025-05-31 RX ADMIN — LACTULOSE 20 G: 10 SOLUTION ORAL at 15:44

## 2025-05-31 RX ADMIN — RIFAXIMIN 550 MG: 550 TABLET ORAL at 09:41

## 2025-05-31 RX ADMIN — LACTULOSE 20 G: 10 SOLUTION ORAL at 20:45

## 2025-05-31 RX ADMIN — LACTULOSE 20 G: 10 SOLUTION ORAL at 09:42

## 2025-05-31 RX ADMIN — FUROSEMIDE 20 MG: 10 INJECTION, SOLUTION INTRAMUSCULAR; INTRAVENOUS at 09:42

## 2025-05-31 RX ADMIN — INSULIN HUMAN 4 UNITS: 100 INJECTION, SOLUTION PARENTERAL at 03:48

## 2025-05-31 RX ADMIN — Medication 10 ML: at 22:06

## 2025-05-31 RX ADMIN — IPRATROPIUM BROMIDE AND ALBUTEROL SULFATE 1 DOSE: .5; 2.5 SOLUTION RESPIRATORY (INHALATION) at 09:31

## 2025-05-31 RX ADMIN — DEXTROSE, SODIUM CHLORIDE, AND POTASSIUM CHLORIDE: 5; .45; .15 INJECTION INTRAVENOUS at 15:49

## 2025-05-31 RX ADMIN — SODIUM CHLORIDE 1000 ML: 0.9 INJECTION, SOLUTION INTRAVENOUS at 02:39

## 2025-05-31 RX ADMIN — Medication 100 MG: at 09:45

## 2025-05-31 RX ADMIN — PIPERACILLIN AND TAZOBACTAM 4500 MG: 4; .5 INJECTION, POWDER, FOR SOLUTION INTRAVENOUS at 20:42

## 2025-05-31 RX ADMIN — ASPIRIN 81 MG: 81 TABLET, CHEWABLE ORAL at 06:51

## 2025-05-31 RX ADMIN — FAMOTIDINE 20 MG: 10 INJECTION, SOLUTION INTRAVENOUS at 03:48

## 2025-05-31 RX ADMIN — Medication 10 ML: at 09:20

## 2025-05-31 RX ADMIN — BUDESONIDE 500 MCG: 0.5 SUSPENSION RESPIRATORY (INHALATION) at 16:55

## 2025-05-31 RX ADMIN — PIPERACILLIN AND TAZOBACTAM 4500 MG: 4; .5 INJECTION, POWDER, LYOPHILIZED, FOR SOLUTION INTRAVENOUS at 06:48

## 2025-05-31 RX ADMIN — PANTOPRAZOLE SODIUM 40 MG: 40 TABLET, DELAYED RELEASE ORAL at 06:51

## 2025-05-31 RX ADMIN — IPRATROPIUM BROMIDE AND ALBUTEROL SULFATE 1 DOSE: .5; 2.5 SOLUTION RESPIRATORY (INHALATION) at 16:56

## 2025-05-31 RX ADMIN — DEXTROSE 125 ML: 10 SOLUTION INTRAVENOUS at 12:08

## 2025-05-31 RX ADMIN — PIPERACILLIN AND TAZOBACTAM 4500 MG: 4; .5 INJECTION, POWDER, FOR SOLUTION INTRAVENOUS at 12:47

## 2025-05-31 RX ADMIN — PANTOPRAZOLE SODIUM 40 MG: 40 TABLET, DELAYED RELEASE ORAL at 16:36

## 2025-05-31 NOTE — CONSULTS
Inpatient CARDIOLOGY Consultation        Reason for Consult:  CHF, Respiratory failure    Date of Consultation: 5/31/2025    Patient previously not known to [facility] cardiology     HISTORY OF PRESENT ILLNESS:  55 year old with history of chronic troponin elevation, diabetes, PUD- gastrointestinal bleeding related to esophageal varices s/p EGD with banding x4, cirrhosis   presented [facility] for evaluation of blood sugar issues and shortness of breath.     Cardiology consulted for SOB  Denies CP, heart palpitations, dizzy or syncope.  No orthopnea. No fever or chills.  No family at bed side  States compliant with her medications.    REVIEW OF SYSTEMS:   Review of rest of 12 systems negative except as mentioned in HPI.      Please note: Past medical records were reviewed per electronic medical record (EMR) - see detailed reports under Past Medical/ Surgical History.       Past Medical History:    Past Medical History:   Diagnosis Date    Alcoholism (HCC)     Since teens to early 20s    Anxiety and depression     Ascites of liver     Bronchitis     Cancer (HCC)     breast, left    Candidiasis of vagina     Chronic pancreatitis (HCC)     Cocaine abuse (HCC)     Constipation     Diabetes mellitus, type 2 (HCC)     GERD (gastroesophageal reflux disease)     Gout     Hepatitis C     Hernia of anterior abdominal wall     Jaundice 05/12/2016    Pneumonia     Polyneuropathy due to type 2 diabetes mellitus (HCC)     Schizoaffective disorder, bipolar type (HCC)     Splenomegaly 05/30/2017       Past Surgical History:    Past Surgical History:   Procedure Laterality Date    ABDOMEN SURGERY      gallbladder removal    BREAST BIOPSY Left     CHOLECYSTECTOMY  2010    COLONOSCOPY      COLONOSCOPY N/A 10/02/2018    COLONOSCOPY WITH BIOPSY performed by Simón Esquivel MD at [facility] ENDOSCOPY    CT NEEDLE BIOPSY LIVER PERCUTANEOUS  03/19/2024

## 2025-05-31 NOTE — ED PROVIDER NOTES
stable    5/31/25, 1:46 AM EDT.    Dax Lockhart MD PGY1  Emergency Medicine    PATIENT REFERRED TO:  No follow-up provider specified.    DISCHARGE MEDICATIONS:  New Prescriptions    No medications on file       DISCONTINUED MEDICATIONS:  Discontinued Medications    No medications on file              (Please note that portions of this note were completed with a voice recognition program.  Efforts were made to edit the dictations but occasionally words are mis-transcribed.)    Electronically signed by Manuel Lockhart MD on 5/31/2025 at 6:41 AM

## 2025-05-31 NOTE — H&P
shortages, we have had a blanche discussion with the patient/family regarding the likelihood of prolonged ER boarding status and potential delays/disruptions in care related to this.  They understand and agree to maintain hospitalization at this facility while accepting these risks.  We have made every attempt to coordinate care with the ER nursing staff as well to avoid any disruptions in care.    Greater than 40 minutes of critical care time was spent with the patient.  This time included chart review, , and discussion with those consultants involved in the patient's care.      EVERETT Lux CNP  5:20 AM  5/31/2025    Electronically signed by EVERETT Lux CNP on 5/31/25 at 5:18 AM EDT

## 2025-06-01 LAB
ALBUMIN SERPL-MCNC: 3.4 G/DL (ref 3.5–5.2)
ALP SERPL-CCNC: 200 U/L (ref 35–104)
ALT SERPL-CCNC: 13 U/L (ref 0–32)
AMMONIA PLAS-SCNC: 33 UMOL/L (ref 11–51)
ANION GAP SERPL CALCULATED.3IONS-SCNC: 11 MMOL/L (ref 7–16)
AST SERPL-CCNC: 25 U/L (ref 0–31)
B.E.: 7.5 MMOL/L (ref -3–3)
BASOPHILS # BLD: 0 K/UL (ref 0–0.2)
BASOPHILS NFR BLD: 0 % (ref 0–2)
BILIRUB DIRECT SERPL-MCNC: 0.2 MG/DL (ref 0–0.3)
BILIRUB INDIRECT SERPL-MCNC: 0.5 MG/DL (ref 0–1)
BILIRUB SERPL-MCNC: 0.7 MG/DL (ref 0–1.2)
BUN SERPL-MCNC: 11 MG/DL (ref 6–20)
CALCIUM SERPL-MCNC: 8.7 MG/DL (ref 8.6–10.2)
CHLORIDE SERPL-SCNC: 100 MMOL/L (ref 98–107)
CHOLEST SERPL-MCNC: 71 MG/DL
CO2 SERPL-SCNC: 31 MMOL/L (ref 22–29)
COHB: 1 % (ref 0–1.5)
CREAT SERPL-MCNC: 0.9 MG/DL (ref 0.5–1)
CRITICAL: ABNORMAL
DATE ANALYZED: ABNORMAL
DATE OF COLLECTION: ABNORMAL
EOSINOPHIL # BLD: 0.05 K/UL (ref 0.05–0.5)
EOSINOPHILS RELATIVE PERCENT: 3 % (ref 0–6)
ERYTHROCYTE [DISTWIDTH] IN BLOOD BY AUTOMATED COUNT: 16.6 % (ref 11.5–15)
GFR, ESTIMATED: 74 ML/MIN/1.73M2
GLUCOSE BLD-MCNC: 109 MG/DL (ref 74–99)
GLUCOSE BLD-MCNC: 129 MG/DL (ref 74–99)
GLUCOSE BLD-MCNC: 182 MG/DL (ref 74–99)
GLUCOSE BLD-MCNC: 316 MG/DL (ref 74–99)
GLUCOSE SERPL-MCNC: 107 MG/DL (ref 74–99)
HBA1C MFR BLD: 7.9 % (ref 4–5.6)
HCO3: 32.8 MMOL/L (ref 22–26)
HCT VFR BLD AUTO: 22 % (ref 34–48)
HCT VFR BLD AUTO: 24.2 % (ref 34–48)
HDLC SERPL-MCNC: 30 MG/DL
HGB BLD-MCNC: 7 G/DL (ref 11.5–15.5)
HGB BLD-MCNC: 8.1 G/DL (ref 11.5–15.5)
HHB: 0.8 % (ref 0–5)
L PNEUMO1 AG UR QL IA.RAPID: NEGATIVE
LAB: ABNORMAL
LDLC SERPL CALC-MCNC: 29 MG/DL
LYMPHOCYTES NFR BLD: 0.18 K/UL (ref 1.5–4)
LYMPHOCYTES RELATIVE PERCENT: 10 % (ref 20–42)
Lab: 900
MAGNESIUM SERPL-MCNC: 2 MG/DL (ref 1.6–2.6)
MCH RBC QN AUTO: 30.3 PG (ref 26–35)
MCHC RBC AUTO-ENTMCNC: 31.8 G/DL (ref 32–34.5)
MCV RBC AUTO: 95.2 FL (ref 80–99.9)
METHB: 0.2 % (ref 0–1.5)
MICROORGANISM SPEC CULT: NO GROWTH
MODE: ABNORMAL
MONOCYTES NFR BLD: 0.07 K/UL (ref 0.1–0.95)
MONOCYTES NFR BLD: 4 % (ref 2–12)
NEUTROPHILS NFR BLD: 84 % (ref 43–80)
NEUTS SEG NFR BLD: 1.6 K/UL (ref 1.8–7.3)
O2 CONTENT: 11.3 ML/DL
O2 SATURATION: 99.2 % (ref 92–98.5)
O2HB: 98 % (ref 94–97)
OPERATOR ID: ABNORMAL
PATIENT TEMP: 37 C
PCO2: 51 MMHG (ref 35–45)
PH BLOOD GAS: 7.43 (ref 7.35–7.45)
PHOSPHATE SERPL-MCNC: 3.4 MG/DL (ref 2.5–4.5)
PLATELET # BLD AUTO: 57 K/UL (ref 130–450)
PLATELET CONFIRMATION: NORMAL
PMV BLD AUTO: 10.5 FL (ref 7–12)
PO2: 134.9 MMHG (ref 75–100)
POTASSIUM SERPL-SCNC: 3.9 MMOL/L (ref 3.5–5)
PROCALCITONIN SERPL-MCNC: 0.17 NG/ML (ref 0–0.08)
PROT SERPL-MCNC: 6.2 G/DL (ref 6.4–8.3)
RBC # BLD AUTO: 2.31 M/UL (ref 3.5–5.5)
RBC # BLD: ABNORMAL 10*6/UL
S PNEUM AG SPEC QL: NEGATIVE
SERVICE CMNT-IMP: NORMAL
SODIUM SERPL-SCNC: 142 MMOL/L (ref 132–146)
SOURCE, BLOOD GAS: ABNORMAL
SPECIMEN DESCRIPTION: NORMAL
SPECIMEN SOURCE: NORMAL
T4 FREE SERPL-MCNC: 1.5 NG/DL (ref 0.9–1.7)
THB: 8 G/DL (ref 11.5–16.5)
TIME ANALYZED: 906
TRIGL SERPL-MCNC: 61 MG/DL
TSH SERPL DL<=0.05 MIU/L-ACNC: 3.11 UIU/ML (ref 0.27–4.2)
VLDLC SERPL CALC-MCNC: 12 MG/DL
WBC OTHER # BLD: 1.9 K/UL (ref 4.5–11.5)

## 2025-06-01 PROCEDURE — 6370000000 HC RX 637 (ALT 250 FOR IP): Performed by: NURSE PRACTITIONER

## 2025-06-01 PROCEDURE — 36430 TRANSFUSION BLD/BLD COMPNT: CPT

## 2025-06-01 PROCEDURE — 82962 GLUCOSE BLOOD TEST: CPT

## 2025-06-01 PROCEDURE — 85014 HEMATOCRIT: CPT

## 2025-06-01 PROCEDURE — 86901 BLOOD TYPING SEROLOGIC RH(D): CPT

## 2025-06-01 PROCEDURE — 6360000002 HC RX W HCPCS: Performed by: INTERNAL MEDICINE

## 2025-06-01 PROCEDURE — P9016 RBC LEUKOCYTES REDUCED: HCPCS

## 2025-06-01 PROCEDURE — P9047 ALBUMIN (HUMAN), 25%, 50ML: HCPCS | Performed by: NURSE PRACTITIONER

## 2025-06-01 PROCEDURE — 86900 BLOOD TYPING SEROLOGIC ABO: CPT

## 2025-06-01 PROCEDURE — 6360000002 HC RX W HCPCS: Performed by: NURSE PRACTITIONER

## 2025-06-01 PROCEDURE — 83036 HEMOGLOBIN GLYCOSYLATED A1C: CPT

## 2025-06-01 PROCEDURE — 2500000003 HC RX 250 WO HCPCS: Performed by: NURSE PRACTITIONER

## 2025-06-01 PROCEDURE — 6370000000 HC RX 637 (ALT 250 FOR IP): Performed by: INTERNAL MEDICINE

## 2025-06-01 PROCEDURE — 85025 COMPLETE CBC W/AUTO DIFF WBC: CPT

## 2025-06-01 PROCEDURE — 82140 ASSAY OF AMMONIA: CPT

## 2025-06-01 PROCEDURE — 36415 COLL VENOUS BLD VENIPUNCTURE: CPT

## 2025-06-01 PROCEDURE — 2580000003 HC RX 258: Performed by: INTERNAL MEDICINE

## 2025-06-01 PROCEDURE — 84443 ASSAY THYROID STIM HORMONE: CPT

## 2025-06-01 PROCEDURE — 80061 LIPID PANEL: CPT

## 2025-06-01 PROCEDURE — 85018 HEMOGLOBIN: CPT

## 2025-06-01 PROCEDURE — 83735 ASSAY OF MAGNESIUM: CPT

## 2025-06-01 PROCEDURE — 97530 THERAPEUTIC ACTIVITIES: CPT | Performed by: PHYSICAL THERAPIST

## 2025-06-01 PROCEDURE — 1200000000 HC SEMI PRIVATE

## 2025-06-01 PROCEDURE — 86850 RBC ANTIBODY SCREEN: CPT

## 2025-06-01 PROCEDURE — 94640 AIRWAY INHALATION TREATMENT: CPT

## 2025-06-01 PROCEDURE — 97161 PT EVAL LOW COMPLEX 20 MIN: CPT | Performed by: PHYSICAL THERAPIST

## 2025-06-01 PROCEDURE — 2580000003 HC RX 258: Performed by: NURSE PRACTITIONER

## 2025-06-01 PROCEDURE — 84145 PROCALCITONIN (PCT): CPT

## 2025-06-01 PROCEDURE — 82248 BILIRUBIN DIRECT: CPT

## 2025-06-01 PROCEDURE — 82805 BLOOD GASES W/O2 SATURATION: CPT

## 2025-06-01 PROCEDURE — 80053 COMPREHEN METABOLIC PANEL: CPT

## 2025-06-01 PROCEDURE — 86923 COMPATIBILITY TEST ELECTRIC: CPT

## 2025-06-01 PROCEDURE — 84439 ASSAY OF FREE THYROXINE: CPT

## 2025-06-01 PROCEDURE — 84100 ASSAY OF PHOSPHORUS: CPT

## 2025-06-01 PROCEDURE — 2700000000 HC OXYGEN THERAPY PER DAY

## 2025-06-01 RX ORDER — PANTOPRAZOLE SODIUM 40 MG/10ML
40 INJECTION, POWDER, LYOPHILIZED, FOR SOLUTION INTRAVENOUS 2 TIMES DAILY
Status: DISCONTINUED | OUTPATIENT
Start: 2025-06-01 | End: 2025-06-02

## 2025-06-01 RX ORDER — THIAMINE HYDROCHLORIDE 100 MG/ML
100 INJECTION, SOLUTION INTRAMUSCULAR; INTRAVENOUS DAILY
Status: DISCONTINUED | OUTPATIENT
Start: 2025-06-01 | End: 2025-06-03

## 2025-06-01 RX ORDER — HALOPERIDOL 5 MG/ML
2 INJECTION INTRAMUSCULAR EVERY 6 HOURS PRN
Status: DISCONTINUED | OUTPATIENT
Start: 2025-06-01 | End: 2025-06-04 | Stop reason: HOSPADM

## 2025-06-01 RX ORDER — HALOPERIDOL 5 MG/ML
2 INJECTION INTRAMUSCULAR ONCE
Status: COMPLETED | OUTPATIENT
Start: 2025-06-01 | End: 2025-06-01

## 2025-06-01 RX ORDER — SODIUM CHLORIDE 9 MG/ML
INJECTION, SOLUTION INTRAVENOUS PRN
Status: DISCONTINUED | OUTPATIENT
Start: 2025-06-01 | End: 2025-06-04 | Stop reason: HOSPADM

## 2025-06-01 RX ADMIN — IPRATROPIUM BROMIDE AND ALBUTEROL SULFATE 1 DOSE: .5; 2.5 SOLUTION RESPIRATORY (INHALATION) at 08:47

## 2025-06-01 RX ADMIN — PROPRANOLOL HYDROCHLORIDE 20 MG: 10 TABLET ORAL at 09:40

## 2025-06-01 RX ADMIN — HALOPERIDOL LACTATE 2 MG: 5 INJECTION, SOLUTION INTRAMUSCULAR at 11:27

## 2025-06-01 RX ADMIN — PANTOPRAZOLE SODIUM 40 MG: 40 INJECTION, POWDER, FOR SOLUTION INTRAVENOUS at 20:26

## 2025-06-01 RX ADMIN — HALOPERIDOL LACTATE 2 MG: 5 INJECTION, SOLUTION INTRAMUSCULAR at 23:26

## 2025-06-01 RX ADMIN — Medication 10 ML: at 09:37

## 2025-06-01 RX ADMIN — PIPERACILLIN AND TAZOBACTAM 4500 MG: 4; .5 INJECTION, POWDER, FOR SOLUTION INTRAVENOUS at 15:00

## 2025-06-01 RX ADMIN — ALBUMIN (HUMAN) 25 G: 0.25 INJECTION, SOLUTION INTRAVENOUS at 10:46

## 2025-06-01 RX ADMIN — BUDESONIDE 500 MCG: 0.5 SUSPENSION RESPIRATORY (INHALATION) at 04:44

## 2025-06-01 RX ADMIN — PANTOPRAZOLE SODIUM 40 MG: 40 INJECTION, POWDER, FOR SOLUTION INTRAVENOUS at 09:39

## 2025-06-01 RX ADMIN — PANTOPRAZOLE SODIUM 40 MG: 40 TABLET, DELAYED RELEASE ORAL at 05:24

## 2025-06-01 RX ADMIN — HYDROXYZINE HYDROCHLORIDE 25 MG: 25 TABLET, FILM COATED ORAL at 00:09

## 2025-06-01 RX ADMIN — RIFAXIMIN 550 MG: 550 TABLET ORAL at 09:41

## 2025-06-01 RX ADMIN — ASPIRIN 81 MG: 81 TABLET, CHEWABLE ORAL at 09:41

## 2025-06-01 RX ADMIN — INSULIN LISPRO 6 UNITS: 100 INJECTION, SOLUTION INTRAVENOUS; SUBCUTANEOUS at 20:41

## 2025-06-01 RX ADMIN — PIPERACILLIN AND TAZOBACTAM 4500 MG: 4; .5 INJECTION, POWDER, FOR SOLUTION INTRAVENOUS at 23:18

## 2025-06-01 RX ADMIN — RIFAXIMIN 550 MG: 550 TABLET ORAL at 20:37

## 2025-06-01 RX ADMIN — ALBUMIN (HUMAN) 25 G: 0.25 INJECTION, SOLUTION INTRAVENOUS at 00:00

## 2025-06-01 RX ADMIN — Medication 10 ML: at 20:48

## 2025-06-01 RX ADMIN — LACTULOSE 20 G: 10 SOLUTION ORAL at 20:28

## 2025-06-01 RX ADMIN — THIAMINE HYDROCHLORIDE 100 MG: 100 INJECTION, SOLUTION INTRAMUSCULAR; INTRAVENOUS at 09:39

## 2025-06-01 RX ADMIN — ENOXAPARIN SODIUM 40 MG: 100 INJECTION SUBCUTANEOUS at 09:38

## 2025-06-01 RX ADMIN — ALBUMIN (HUMAN) 25 G: 0.25 INJECTION, SOLUTION INTRAVENOUS at 20:37

## 2025-06-01 RX ADMIN — LACTULOSE 20 G: 10 SOLUTION ORAL at 09:40

## 2025-06-01 RX ADMIN — PROPRANOLOL HYDROCHLORIDE 20 MG: 10 TABLET ORAL at 20:27

## 2025-06-01 RX ADMIN — VANCOMYCIN HYDROCHLORIDE 1000 MG: 1 INJECTION, POWDER, LYOPHILIZED, FOR SOLUTION INTRAVENOUS at 18:43

## 2025-06-01 RX ADMIN — FUROSEMIDE 20 MG: 20 TABLET ORAL at 09:40

## 2025-06-01 RX ADMIN — IPRATROPIUM BROMIDE AND ALBUTEROL SULFATE 1 DOSE: .5; 2.5 SOLUTION RESPIRATORY (INHALATION) at 12:49

## 2025-06-01 RX ADMIN — IPRATROPIUM BROMIDE AND ALBUTEROL SULFATE 1 DOSE: .5; 2.5 SOLUTION RESPIRATORY (INHALATION) at 04:44

## 2025-06-01 RX ADMIN — PIPERACILLIN AND TAZOBACTAM 4500 MG: 4; .5 INJECTION, POWDER, FOR SOLUTION INTRAVENOUS at 04:18

## 2025-06-01 NOTE — PLAN OF CARE
Problem: Safety - Adult  Goal: Free from fall injury  6/1/2025 1036 by Beth Mercado RN  Outcome: Progressing  5/31/2025 2315 by MICHAEL Vigil RN  Outcome: Progressing     Problem: Chronic Conditions and Co-morbidities  Goal: Patient's chronic conditions and co-morbidity symptoms are monitored and maintained or improved  6/1/2025 1036 by Beth Mercado RN  Outcome: Progressing  5/31/2025 2315 by MICHAEL Vigil, ENRICO  Outcome: Progressing     Problem: Skin/Tissue Integrity  Goal: Skin integrity remains intact  Description: 1.  Monitor for areas of redness and/or skin breakdown2.  Assess vascular access sites hourly3.  Every 4-6 hours minimum:  Change oxygen saturation probe site4.  Every 4-6 hours:  If on nasal continuous positive airway pressure, respiratory therapy assess nares and determine need for appliance change or resting period  6/1/2025 1036 by Beth Mercado, RN  Outcome: Progressing  5/31/2025 2315 by MICHAEL Vigil, ENRICO  Outcome: Progressing

## 2025-06-02 ENCOUNTER — APPOINTMENT (OUTPATIENT)
Age: 55
DRG: 137 | End: 2025-06-02
Payer: COMMERCIAL

## 2025-06-02 ENCOUNTER — APPOINTMENT (OUTPATIENT)
Dept: NUCLEAR MEDICINE | Age: 55
DRG: 137 | End: 2025-06-02
Payer: COMMERCIAL

## 2025-06-02 LAB
ABO/RH: NORMAL
ALBUMIN SERPL-MCNC: 3.3 G/DL (ref 3.5–5.2)
ALP SERPL-CCNC: 209 U/L (ref 35–104)
ALT SERPL-CCNC: 14 U/L (ref 0–32)
AMMONIA PLAS-SCNC: 42 UMOL/L (ref 11–51)
ANION GAP SERPL CALCULATED.3IONS-SCNC: 11 MMOL/L (ref 7–16)
ANTIBODY SCREEN: NEGATIVE
ARM BAND NUMBER: NORMAL
AST SERPL-CCNC: 38 U/L (ref 0–31)
B-HCG SERPL EIA 3RD IS-ACNC: 0.2 MIU/ML (ref 0–7)
BASOPHILS # BLD: 0 K/UL (ref 0–0.2)
BASOPHILS NFR BLD: 0 % (ref 0–2)
BILIRUB DIRECT SERPL-MCNC: 0.3 MG/DL (ref 0–0.3)
BILIRUB INDIRECT SERPL-MCNC: 0.8 MG/DL (ref 0–1)
BILIRUB SERPL-MCNC: 1.1 MG/DL (ref 0–1.2)
BLOOD BANK BLOOD PRODUCT EXPIRATION DATE: NORMAL
BLOOD BANK DISPENSE STATUS: NORMAL
BLOOD BANK ISBT PRODUCT BLOOD TYPE: 600
BLOOD BANK PRODUCT CODE: NORMAL
BLOOD BANK SAMPLE EXPIRATION: NORMAL
BLOOD BANK UNIT TYPE AND RH: NORMAL
BPU ID: NORMAL
BUN SERPL-MCNC: 8 MG/DL (ref 6–20)
CALCIUM SERPL-MCNC: 8.1 MG/DL (ref 8.6–10.2)
CHLORIDE SERPL-SCNC: 102 MMOL/L (ref 98–107)
CO2 SERPL-SCNC: 28 MMOL/L (ref 22–29)
COMPONENT: NORMAL
CREAT SERPL-MCNC: 0.8 MG/DL (ref 0.5–1)
CROSSMATCH RESULT: NORMAL
EKG ATRIAL RATE: 71 BPM
EKG P AXIS: 55 DEGREES
EKG P-R INTERVAL: 134 MS
EKG Q-T INTERVAL: 398 MS
EKG QRS DURATION: 82 MS
EKG QTC CALCULATION (BAZETT): 432 MS
EKG R AXIS: 6 DEGREES
EKG T AXIS: 33 DEGREES
EKG VENTRICULAR RATE: 71 BPM
EOSINOPHIL # BLD: 0.02 K/UL (ref 0.05–0.5)
EOSINOPHILS RELATIVE PERCENT: 1 % (ref 0–6)
ERYTHROCYTE [DISTWIDTH] IN BLOOD BY AUTOMATED COUNT: 16.2 % (ref 11.5–15)
GFR, ESTIMATED: 90 ML/MIN/1.73M2
GLUCOSE BLD-MCNC: 194 MG/DL (ref 74–99)
GLUCOSE BLD-MCNC: 206 MG/DL (ref 74–99)
GLUCOSE BLD-MCNC: 351 MG/DL (ref 74–99)
GLUCOSE BLD-MCNC: 52 MG/DL (ref 74–99)
GLUCOSE BLD-MCNC: 54 MG/DL (ref 74–99)
GLUCOSE BLD-MCNC: 97 MG/DL (ref 74–99)
GLUCOSE BLD-MCNC: 97 MG/DL (ref 74–99)
GLUCOSE SERPL-MCNC: 97 MG/DL (ref 74–99)
HCT VFR BLD AUTO: 25.4 % (ref 34–48)
HGB BLD-MCNC: 8.1 G/DL (ref 11.5–15.5)
LYMPHOCYTES NFR BLD: 0.29 K/UL (ref 1.5–4)
LYMPHOCYTES RELATIVE PERCENT: 16 % (ref 20–42)
MAGNESIUM SERPL-MCNC: 2.1 MG/DL (ref 1.6–2.6)
MCH RBC QN AUTO: 30.5 PG (ref 26–35)
MCHC RBC AUTO-ENTMCNC: 31.9 G/DL (ref 32–34.5)
MCV RBC AUTO: 95.5 FL (ref 80–99.9)
METAMYELOCYTES ABSOLUTE COUNT: 0.02 K/UL (ref 0–0.12)
METAMYELOCYTES: 1 % (ref 0–1)
MONOCYTES NFR BLD: 0.09 K/UL (ref 0.1–0.95)
MONOCYTES NFR BLD: 5 % (ref 2–12)
NEUTROPHILS NFR BLD: 77 % (ref 43–80)
NEUTS SEG NFR BLD: 1.38 K/UL (ref 1.8–7.3)
PHOSPHATE SERPL-MCNC: 2.4 MG/DL (ref 2.5–4.5)
PLATELET # BLD AUTO: 57 K/UL (ref 130–450)
PLATELET CONFIRMATION: NORMAL
PMV BLD AUTO: 10.2 FL (ref 7–12)
POTASSIUM SERPL-SCNC: 4.3 MMOL/L (ref 3.5–5)
PROT SERPL-MCNC: 6.3 G/DL (ref 6.4–8.3)
RBC # BLD AUTO: 2.66 M/UL (ref 3.5–5.5)
RBC # BLD: ABNORMAL 10*6/UL
RBC # BLD: ABNORMAL 10*6/UL
SODIUM SERPL-SCNC: 141 MMOL/L (ref 132–146)
SURGICAL PATHOLOGY REPORT: NORMAL
TRANSFUSION STATUS: NORMAL
UNIT DIVISION: 0
UNIT ISSUE DATE/TIME: NORMAL
WBC OTHER # BLD: 1.8 K/UL (ref 4.5–11.5)

## 2025-06-02 PROCEDURE — 3430000000 HC RX DIAGNOSTIC RADIOPHARMACEUTICAL: Performed by: RADIOLOGY

## 2025-06-02 PROCEDURE — 6370000000 HC RX 637 (ALT 250 FOR IP): Performed by: NURSE PRACTITIONER

## 2025-06-02 PROCEDURE — 76937 US GUIDE VASCULAR ACCESS: CPT

## 2025-06-02 PROCEDURE — 82140 ASSAY OF AMMONIA: CPT

## 2025-06-02 PROCEDURE — 83735 ASSAY OF MAGNESIUM: CPT

## 2025-06-02 PROCEDURE — 93010 ELECTROCARDIOGRAM REPORT: CPT | Performed by: INTERNAL MEDICINE

## 2025-06-02 PROCEDURE — 2580000003 HC RX 258: Performed by: INTERNAL MEDICINE

## 2025-06-02 PROCEDURE — 2500000003 HC RX 250 WO HCPCS: Performed by: NURSE PRACTITIONER

## 2025-06-02 PROCEDURE — 82248 BILIRUBIN DIRECT: CPT

## 2025-06-02 PROCEDURE — 84702 CHORIONIC GONADOTROPIN TEST: CPT

## 2025-06-02 PROCEDURE — 2700000000 HC OXYGEN THERAPY PER DAY

## 2025-06-02 PROCEDURE — 2500000003 HC RX 250 WO HCPCS: Performed by: INTERNAL MEDICINE

## 2025-06-02 PROCEDURE — 82962 GLUCOSE BLOOD TEST: CPT

## 2025-06-02 PROCEDURE — A9500 TC99M SESTAMIBI: HCPCS | Performed by: RADIOLOGY

## 2025-06-02 PROCEDURE — 93017 CV STRESS TEST TRACING ONLY: CPT

## 2025-06-02 PROCEDURE — 6360000002 HC RX W HCPCS: Performed by: NURSE PRACTITIONER

## 2025-06-02 PROCEDURE — 2580000003 HC RX 258: Performed by: NURSE PRACTITIONER

## 2025-06-02 PROCEDURE — 94640 AIRWAY INHALATION TREATMENT: CPT

## 2025-06-02 PROCEDURE — 6360000002 HC RX W HCPCS: Performed by: INTERNAL MEDICINE

## 2025-06-02 PROCEDURE — 6370000000 HC RX 637 (ALT 250 FOR IP)

## 2025-06-02 PROCEDURE — 85025 COMPLETE CBC W/AUTO DIFF WBC: CPT

## 2025-06-02 PROCEDURE — 6370000000 HC RX 637 (ALT 250 FOR IP): Performed by: INTERNAL MEDICINE

## 2025-06-02 PROCEDURE — 36415 COLL VENOUS BLD VENIPUNCTURE: CPT

## 2025-06-02 PROCEDURE — 94761 N-INVAS EAR/PLS OXIMETRY MLT: CPT

## 2025-06-02 PROCEDURE — 1200000000 HC SEMI PRIVATE

## 2025-06-02 PROCEDURE — 80053 COMPREHEN METABOLIC PANEL: CPT

## 2025-06-02 PROCEDURE — 78452 HT MUSCLE IMAGE SPECT MULT: CPT

## 2025-06-02 PROCEDURE — 84100 ASSAY OF PHOSPHORUS: CPT

## 2025-06-02 PROCEDURE — 51798 US URINE CAPACITY MEASURE: CPT

## 2025-06-02 RX ORDER — HEPARIN 100 UNIT/ML
1 SYRINGE INTRAVENOUS EVERY 12 HOURS SCHEDULED
Status: DISCONTINUED | OUTPATIENT
Start: 2025-06-02 | End: 2025-06-02

## 2025-06-02 RX ORDER — TETRAKIS(2-METHOXYISOBUTYLISOCYANIDE)COPPER(I) TETRAFLUOROBORATE 1 MG/ML
20 INJECTION, POWDER, LYOPHILIZED, FOR SOLUTION INTRAVENOUS
Status: COMPLETED | OUTPATIENT
Start: 2025-06-02 | End: 2025-06-02

## 2025-06-02 RX ORDER — BUPRENORPHINE HYDROCHLORIDE AND NALOXONE HYDROCHLORIDE DIHYDRATE 8; 2 MG/1; MG/1
1 TABLET SUBLINGUAL 2 TIMES DAILY
Status: DISCONTINUED | OUTPATIENT
Start: 2025-06-02 | End: 2025-06-04 | Stop reason: HOSPADM

## 2025-06-02 RX ORDER — SODIUM CHLORIDE 0.9 % (FLUSH) 0.9 %
5-40 SYRINGE (ML) INJECTION PRN
Status: DISCONTINUED | OUTPATIENT
Start: 2025-06-02 | End: 2025-06-04 | Stop reason: HOSPADM

## 2025-06-02 RX ORDER — INSULIN GLARGINE 100 [IU]/ML
11 INJECTION, SOLUTION SUBCUTANEOUS NIGHTLY
Status: DISCONTINUED | OUTPATIENT
Start: 2025-06-02 | End: 2025-06-04

## 2025-06-02 RX ORDER — SODIUM CHLORIDE 0.9 % (FLUSH) 0.9 %
5-40 SYRINGE (ML) INJECTION EVERY 12 HOURS SCHEDULED
Status: DISCONTINUED | OUTPATIENT
Start: 2025-06-02 | End: 2025-06-04 | Stop reason: HOSPADM

## 2025-06-02 RX ORDER — SODIUM CHLORIDE 9 MG/ML
INJECTION, SOLUTION INTRAVENOUS PRN
Status: DISCONTINUED | OUTPATIENT
Start: 2025-06-02 | End: 2025-06-04 | Stop reason: HOSPADM

## 2025-06-02 RX ORDER — PANTOPRAZOLE SODIUM 40 MG/1
40 TABLET, DELAYED RELEASE ORAL
Status: DISCONTINUED | OUTPATIENT
Start: 2025-06-02 | End: 2025-06-04 | Stop reason: HOSPADM

## 2025-06-02 RX ORDER — INSULIN LISPRO 100 [IU]/ML
4 INJECTION, SOLUTION INTRAVENOUS; SUBCUTANEOUS ONCE
Status: COMPLETED | OUTPATIENT
Start: 2025-06-02 | End: 2025-06-02

## 2025-06-02 RX ORDER — REGADENOSON 0.08 MG/ML
0.4 INJECTION, SOLUTION INTRAVENOUS
Status: DISCONTINUED | OUTPATIENT
Start: 2025-06-02 | End: 2025-06-03

## 2025-06-02 RX ORDER — HEPARIN 100 UNIT/ML
1 SYRINGE INTRAVENOUS PRN
Status: DISCONTINUED | OUTPATIENT
Start: 2025-06-02 | End: 2025-06-02

## 2025-06-02 RX ADMIN — PIPERACILLIN AND TAZOBACTAM 4500 MG: 4; .5 INJECTION, POWDER, FOR SOLUTION INTRAVENOUS at 11:04

## 2025-06-02 RX ADMIN — RIFAXIMIN 550 MG: 550 TABLET ORAL at 11:08

## 2025-06-02 RX ADMIN — INSULIN LISPRO 4 UNITS: 100 INJECTION, SOLUTION INTRAVENOUS; SUBCUTANEOUS at 17:57

## 2025-06-02 RX ADMIN — FUROSEMIDE 20 MG: 20 TABLET ORAL at 08:22

## 2025-06-02 RX ADMIN — Medication 10 ML: at 22:41

## 2025-06-02 RX ADMIN — Medication 10 ML: at 08:40

## 2025-06-02 RX ADMIN — Medication 10 ML: at 21:41

## 2025-06-02 RX ADMIN — Medication 20 MILLICURIE: at 11:34

## 2025-06-02 RX ADMIN — VANCOMYCIN HYDROCHLORIDE 1000 MG: 1 INJECTION, POWDER, LYOPHILIZED, FOR SOLUTION INTRAVENOUS at 21:59

## 2025-06-02 RX ADMIN — INSULIN LISPRO 8 UNITS: 100 INJECTION, SOLUTION INTRAVENOUS; SUBCUTANEOUS at 16:30

## 2025-06-02 RX ADMIN — BUPRENORPHINE HYDROCHLORIDE AND NALOXONE HYDROCHLORIDE DIHYDRATE 1 TABLET: 8; 2 TABLET SUBLINGUAL at 21:39

## 2025-06-02 RX ADMIN — ENOXAPARIN SODIUM 40 MG: 100 INJECTION SUBCUTANEOUS at 08:22

## 2025-06-02 RX ADMIN — BUDESONIDE 500 MCG: 0.5 SUSPENSION RESPIRATORY (INHALATION) at 19:29

## 2025-06-02 RX ADMIN — LACTULOSE 20 G: 10 SOLUTION ORAL at 08:22

## 2025-06-02 RX ADMIN — IPRATROPIUM BROMIDE AND ALBUTEROL SULFATE 1 DOSE: .5; 2.5 SOLUTION RESPIRATORY (INHALATION) at 09:26

## 2025-06-02 RX ADMIN — VANCOMYCIN HYDROCHLORIDE 1000 MG: 1 INJECTION, POWDER, LYOPHILIZED, FOR SOLUTION INTRAVENOUS at 15:14

## 2025-06-02 RX ADMIN — PIPERACILLIN AND TAZOBACTAM 4500 MG: 4; .5 INJECTION, POWDER, FOR SOLUTION INTRAVENOUS at 18:41

## 2025-06-02 RX ADMIN — ASPIRIN 81 MG: 81 TABLET, CHEWABLE ORAL at 08:23

## 2025-06-02 RX ADMIN — THIAMINE HYDROCHLORIDE 100 MG: 100 INJECTION, SOLUTION INTRAMUSCULAR; INTRAVENOUS at 11:08

## 2025-06-02 RX ADMIN — RIFAXIMIN 550 MG: 550 TABLET ORAL at 21:40

## 2025-06-02 RX ADMIN — IPRATROPIUM BROMIDE AND ALBUTEROL SULFATE 1 DOSE: .5; 2.5 SOLUTION RESPIRATORY (INHALATION) at 19:28

## 2025-06-02 RX ADMIN — GLUCAGON 1 MG: 1 INJECTION, POWDER, LYOPHILIZED, FOR SOLUTION INTRAMUSCULAR; INTRAVENOUS at 21:55

## 2025-06-02 RX ADMIN — PROPRANOLOL HYDROCHLORIDE 20 MG: 10 TABLET ORAL at 08:22

## 2025-06-02 RX ADMIN — INSULIN LISPRO 2 UNITS: 100 INJECTION, SOLUTION INTRAVENOUS; SUBCUTANEOUS at 11:08

## 2025-06-02 RX ADMIN — PROPRANOLOL HYDROCHLORIDE 20 MG: 10 TABLET ORAL at 21:39

## 2025-06-02 NOTE — PLAN OF CARE
Problem: Safety - Adult  Goal: Free from fall injury  6/2/2025 0026 by MICHAEL Vigil RN  Outcome: Progressing  6/1/2025 1036 by Beth Mercado RN  Outcome: Progressing     Problem: Chronic Conditions and Co-morbidities  Goal: Patient's chronic conditions and co-morbidity symptoms are monitored and maintained or improved  6/2/2025 0026 by MICHAEL Vigil RN  Outcome: Progressing  6/1/2025 1036 by Beth Mercado RN  Outcome: Progressing     Problem: Skin/Tissue Integrity  Goal: Skin integrity remains intact  Description: 1.  Monitor for areas of redness and/or skin breakdown2.  Assess vascular access sites hourly3.  Every 4-6 hours minimum:  Change oxygen saturation probe site4.  Every 4-6 hours:  If on nasal continuous positive airway pressure, respiratory therapy assess nares and determine need for appliance change or resting period  6/2/2025 0026 by MICHAEL Vigil RN  Outcome: Progressing  6/1/2025 1036 by Beth Mercado RN  Outcome: Progressing

## 2025-06-03 ENCOUNTER — APPOINTMENT (OUTPATIENT)
Dept: GENERAL RADIOLOGY | Age: 55
DRG: 137 | End: 2025-06-03
Payer: COMMERCIAL

## 2025-06-03 LAB
ALBUMIN SERPL-MCNC: 3.3 G/DL (ref 3.5–5.2)
ALP SERPL-CCNC: 207 U/L (ref 35–104)
ALT SERPL-CCNC: 10 U/L (ref 0–32)
AMMONIA PLAS-SCNC: 56 UMOL/L (ref 11–51)
ANION GAP SERPL CALCULATED.3IONS-SCNC: 14 MMOL/L (ref 7–16)
AST SERPL-CCNC: 18 U/L (ref 0–31)
B-HCG SERPL EIA 3RD IS-ACNC: 0.2 MIU/ML (ref 0–7)
BASOPHILS # BLD: 0.06 K/UL (ref 0–0.2)
BASOPHILS NFR BLD: 3 % (ref 0–2)
BILIRUB DIRECT SERPL-MCNC: 0.3 MG/DL (ref 0–0.3)
BILIRUB INDIRECT SERPL-MCNC: 0.7 MG/DL (ref 0–1)
BILIRUB SERPL-MCNC: 1 MG/DL (ref 0–1.2)
BNP SERPL-MCNC: ABNORMAL PG/ML (ref 0–450)
BUN SERPL-MCNC: 8 MG/DL (ref 6–20)
CALCIUM SERPL-MCNC: 7.3 MG/DL (ref 8.6–10.2)
CHLORIDE SERPL-SCNC: 99 MMOL/L (ref 98–107)
CO2 SERPL-SCNC: 24 MMOL/L (ref 22–29)
CREAT SERPL-MCNC: 0.7 MG/DL (ref 0.5–1)
EOSINOPHIL # BLD: 0.02 K/UL (ref 0.05–0.5)
EOSINOPHILS RELATIVE PERCENT: 1 % (ref 0–6)
ERYTHROCYTE [DISTWIDTH] IN BLOOD BY AUTOMATED COUNT: 16.1 % (ref 11.5–15)
GFR, ESTIMATED: >90 ML/MIN/1.73M2
GLUCOSE BLD-MCNC: 210 MG/DL (ref 74–99)
GLUCOSE BLD-MCNC: 218 MG/DL (ref 74–99)
GLUCOSE BLD-MCNC: 222 MG/DL (ref 74–99)
GLUCOSE BLD-MCNC: 226 MG/DL (ref 74–99)
GLUCOSE BLD-MCNC: 343 MG/DL (ref 74–99)
GLUCOSE SERPL-MCNC: 213 MG/DL (ref 74–99)
HCT VFR BLD AUTO: 25.8 % (ref 34–48)
HGB BLD-MCNC: 8.3 G/DL (ref 11.5–15.5)
LYMPHOCYTES NFR BLD: 0.44 K/UL (ref 1.5–4)
LYMPHOCYTES RELATIVE PERCENT: 19 % (ref 20–42)
MAGNESIUM SERPL-MCNC: 2.1 MG/DL (ref 1.6–2.6)
MCH RBC QN AUTO: 30.7 PG (ref 26–35)
MCHC RBC AUTO-ENTMCNC: 32.2 G/DL (ref 32–34.5)
MCV RBC AUTO: 95.6 FL (ref 80–99.9)
MONOCYTES NFR BLD: 0.08 K/UL (ref 0.1–0.95)
MONOCYTES NFR BLD: 4 % (ref 2–12)
NEUTROPHILS NFR BLD: 74 % (ref 43–80)
NEUTS SEG NFR BLD: 1.7 K/UL (ref 1.8–7.3)
NUCLEATED RED BLOOD CELLS: 1 PER 100 WBC
PHOSPHATE SERPL-MCNC: 1.4 MG/DL (ref 2.5–4.5)
PLATELET # BLD AUTO: 50 K/UL (ref 130–450)
PLATELET CONFIRMATION: NORMAL
PMV BLD AUTO: 9.5 FL (ref 7–12)
POTASSIUM SERPL-SCNC: 4.1 MMOL/L (ref 3.5–5)
PROT SERPL-MCNC: 5.5 G/DL (ref 6.4–8.3)
RBC # BLD AUTO: 2.7 M/UL (ref 3.5–5.5)
RBC # BLD: NORMAL 10*6/UL
SODIUM SERPL-SCNC: 137 MMOL/L (ref 132–146)
WBC OTHER # BLD: 2.3 K/UL (ref 4.5–11.5)

## 2025-06-03 PROCEDURE — 71045 X-RAY EXAM CHEST 1 VIEW: CPT

## 2025-06-03 PROCEDURE — 2580000003 HC RX 258: Performed by: NURSE PRACTITIONER

## 2025-06-03 PROCEDURE — 6360000002 HC RX W HCPCS: Performed by: INTERNAL MEDICINE

## 2025-06-03 PROCEDURE — 6360000002 HC RX W HCPCS: Performed by: NURSE PRACTITIONER

## 2025-06-03 PROCEDURE — 84100 ASSAY OF PHOSPHORUS: CPT

## 2025-06-03 PROCEDURE — 97165 OT EVAL LOW COMPLEX 30 MIN: CPT

## 2025-06-03 PROCEDURE — 82140 ASSAY OF AMMONIA: CPT

## 2025-06-03 PROCEDURE — 2500000003 HC RX 250 WO HCPCS: Performed by: NURSE PRACTITIONER

## 2025-06-03 PROCEDURE — 36415 COLL VENOUS BLD VENIPUNCTURE: CPT

## 2025-06-03 PROCEDURE — 82248 BILIRUBIN DIRECT: CPT

## 2025-06-03 PROCEDURE — 83735 ASSAY OF MAGNESIUM: CPT

## 2025-06-03 PROCEDURE — 94761 N-INVAS EAR/PLS OXIMETRY MLT: CPT

## 2025-06-03 PROCEDURE — 1200000000 HC SEMI PRIVATE

## 2025-06-03 PROCEDURE — 6370000000 HC RX 637 (ALT 250 FOR IP): Performed by: NURSE PRACTITIONER

## 2025-06-03 PROCEDURE — 2700000000 HC OXYGEN THERAPY PER DAY

## 2025-06-03 PROCEDURE — 85025 COMPLETE CBC W/AUTO DIFF WBC: CPT

## 2025-06-03 PROCEDURE — 94640 AIRWAY INHALATION TREATMENT: CPT

## 2025-06-03 PROCEDURE — 83880 ASSAY OF NATRIURETIC PEPTIDE: CPT

## 2025-06-03 PROCEDURE — 2580000003 HC RX 258: Performed by: INTERNAL MEDICINE

## 2025-06-03 PROCEDURE — 97110 THERAPEUTIC EXERCISES: CPT

## 2025-06-03 PROCEDURE — 6370000000 HC RX 637 (ALT 250 FOR IP): Performed by: INTERNAL MEDICINE

## 2025-06-03 PROCEDURE — 2500000003 HC RX 250 WO HCPCS: Performed by: INTERNAL MEDICINE

## 2025-06-03 PROCEDURE — 84702 CHORIONIC GONADOTROPIN TEST: CPT

## 2025-06-03 PROCEDURE — 82962 GLUCOSE BLOOD TEST: CPT

## 2025-06-03 PROCEDURE — 97530 THERAPEUTIC ACTIVITIES: CPT

## 2025-06-03 PROCEDURE — 80053 COMPREHEN METABOLIC PANEL: CPT

## 2025-06-03 PROCEDURE — 97535 SELF CARE MNGMENT TRAINING: CPT

## 2025-06-03 RX ORDER — GAUZE BANDAGE 2" X 2"
100 BANDAGE TOPICAL DAILY
Status: DISCONTINUED | OUTPATIENT
Start: 2025-06-03 | End: 2025-06-04 | Stop reason: HOSPADM

## 2025-06-03 RX ORDER — METHOCARBAMOL 750 MG/1
750 TABLET, FILM COATED ORAL 4 TIMES DAILY
Status: ON HOLD | COMMUNITY
End: 2025-06-04 | Stop reason: HOSPADM

## 2025-06-03 RX ORDER — TETRAKIS(2-METHOXYISOBUTYLISOCYANIDE)COPPER(I) TETRAFLUOROBORATE 1 MG/ML
30 INJECTION, POWDER, LYOPHILIZED, FOR SOLUTION INTRAVENOUS
Status: COMPLETED | OUTPATIENT
Start: 2025-06-03 | End: 2025-06-04

## 2025-06-03 RX ORDER — LOSARTAN POTASSIUM 25 MG/1
25 TABLET ORAL DAILY
Status: DISCONTINUED | OUTPATIENT
Start: 2025-06-03 | End: 2025-06-04

## 2025-06-03 RX ORDER — FUROSEMIDE 10 MG/ML
40 INJECTION INTRAMUSCULAR; INTRAVENOUS 2 TIMES DAILY
Status: DISCONTINUED | OUTPATIENT
Start: 2025-06-03 | End: 2025-06-04

## 2025-06-03 RX ORDER — INSULIN LISPRO 100 [IU]/ML
0-8 INJECTION, SOLUTION INTRAVENOUS; SUBCUTANEOUS
Status: DISCONTINUED | OUTPATIENT
Start: 2025-06-03 | End: 2025-06-04 | Stop reason: HOSPADM

## 2025-06-03 RX ORDER — REGADENOSON 0.08 MG/ML
0.4 INJECTION, SOLUTION INTRAVENOUS
Status: COMPLETED | OUTPATIENT
Start: 2025-06-03 | End: 2025-06-04

## 2025-06-03 RX ADMIN — VANCOMYCIN HYDROCHLORIDE 1000 MG: 1 INJECTION, POWDER, LYOPHILIZED, FOR SOLUTION INTRAVENOUS at 08:02

## 2025-06-03 RX ADMIN — Medication 100 MG: at 10:16

## 2025-06-03 RX ADMIN — IPRATROPIUM BROMIDE AND ALBUTEROL SULFATE 1 DOSE: .5; 2.5 SOLUTION RESPIRATORY (INHALATION) at 16:08

## 2025-06-03 RX ADMIN — Medication 10 ML: at 08:04

## 2025-06-03 RX ADMIN — RIFAXIMIN 550 MG: 550 TABLET ORAL at 21:38

## 2025-06-03 RX ADMIN — GABAPENTIN 200 MG: 100 CAPSULE ORAL at 14:53

## 2025-06-03 RX ADMIN — Medication 10 ML: at 21:38

## 2025-06-03 RX ADMIN — INSULIN LISPRO 2 UNITS: 100 INJECTION, SOLUTION INTRAVENOUS; SUBCUTANEOUS at 12:25

## 2025-06-03 RX ADMIN — LOSARTAN POTASSIUM 25 MG: 25 TABLET, FILM COATED ORAL at 10:24

## 2025-06-03 RX ADMIN — IPRATROPIUM BROMIDE AND ALBUTEROL SULFATE 1 DOSE: .5; 2.5 SOLUTION RESPIRATORY (INHALATION) at 12:50

## 2025-06-03 RX ADMIN — PANTOPRAZOLE SODIUM 40 MG: 40 TABLET, DELAYED RELEASE ORAL at 05:35

## 2025-06-03 RX ADMIN — ENOXAPARIN SODIUM 40 MG: 100 INJECTION SUBCUTANEOUS at 10:16

## 2025-06-03 RX ADMIN — PROPRANOLOL HYDROCHLORIDE 20 MG: 10 TABLET ORAL at 10:16

## 2025-06-03 RX ADMIN — PIPERACILLIN AND TAZOBACTAM 4500 MG: 4; .5 INJECTION, POWDER, FOR SOLUTION INTRAVENOUS at 03:14

## 2025-06-03 RX ADMIN — IPRATROPIUM BROMIDE AND ALBUTEROL SULFATE 1 DOSE: .5; 2.5 SOLUTION RESPIRATORY (INHALATION) at 05:17

## 2025-06-03 RX ADMIN — FUROSEMIDE 40 MG: 10 INJECTION, SOLUTION INTRAMUSCULAR; INTRAVENOUS at 10:15

## 2025-06-03 RX ADMIN — BUPRENORPHINE HYDROCHLORIDE AND NALOXONE HYDROCHLORIDE DIHYDRATE 1 TABLET: 8; 2 TABLET SUBLINGUAL at 21:38

## 2025-06-03 RX ADMIN — Medication 10 ML: at 21:45

## 2025-06-03 RX ADMIN — GABAPENTIN 200 MG: 100 CAPSULE ORAL at 10:16

## 2025-06-03 RX ADMIN — IPRATROPIUM BROMIDE AND ALBUTEROL SULFATE 1 DOSE: .5; 2.5 SOLUTION RESPIRATORY (INHALATION) at 08:51

## 2025-06-03 RX ADMIN — PIPERACILLIN AND TAZOBACTAM 4500 MG: 4; .5 INJECTION, POWDER, FOR SOLUTION INTRAVENOUS at 18:39

## 2025-06-03 RX ADMIN — FUROSEMIDE 40 MG: 10 INJECTION, SOLUTION INTRAMUSCULAR; INTRAVENOUS at 17:13

## 2025-06-03 RX ADMIN — BUPRENORPHINE HYDROCHLORIDE AND NALOXONE HYDROCHLORIDE DIHYDRATE 1 TABLET: 8; 2 TABLET SUBLINGUAL at 10:16

## 2025-06-03 RX ADMIN — SODIUM PHOSPHATE, MONOBASIC, MONOHYDRATE AND SODIUM PHOSPHATE, DIBASIC, ANHYDROUS 17.4 MMOL: 142; 276 INJECTION, SOLUTION INTRAVENOUS at 21:44

## 2025-06-03 RX ADMIN — BUDESONIDE 500 MCG: 0.5 SUSPENSION RESPIRATORY (INHALATION) at 16:08

## 2025-06-03 RX ADMIN — PIPERACILLIN AND TAZOBACTAM 4500 MG: 4; .5 INJECTION, POWDER, FOR SOLUTION INTRAVENOUS at 12:33

## 2025-06-03 RX ADMIN — GABAPENTIN 200 MG: 100 CAPSULE ORAL at 21:38

## 2025-06-03 RX ADMIN — INSULIN LISPRO 6 UNITS: 100 INJECTION, SOLUTION INTRAVENOUS; SUBCUTANEOUS at 17:13

## 2025-06-03 RX ADMIN — RIFAXIMIN 550 MG: 550 TABLET ORAL at 13:24

## 2025-06-03 RX ADMIN — BUDESONIDE 500 MCG: 0.5 SUSPENSION RESPIRATORY (INHALATION) at 05:17

## 2025-06-03 NOTE — ACP (ADVANCE CARE PLANNING)
Advance Care Planning         The patient has appointed the following active healthcare agents:    Primary Decision Maker: Lydia Santacruz - Srinath - [phone]    Primary Decision Maker: Ashutosh Vergara - Srinath - [phone]    Primary Decision Maker: Mitchell Santacruz - [phone]

## 2025-06-03 NOTE — DISCHARGE INSTRUCTIONS
Your information:  Name: Daniella Santacruz  : 1970    Your instructions:    You are discharged home.  Please make and keep all follow-up appointments.  Take all medications as prescribed.    If you have a return of your symptoms or have any of the following, call your doctor or return to the emergency room: chest pain, shortness of breath, weakness, loss of consciousness, fever, chills, nausea, vomiting, or generally not feeling well.     What to do after you leave the hospital:    Recommended diet: regular diet    Recommended activity: activity as tolerated        The following personal items were collected during your admission and were returned to you:    Belongings  Dental Appliances: At home  Vision - Corrective Lenses: At home  Hearing Aid: None  Clothing: Shirt, Pants, At bedside, Socks, Undergarments  Jewelry: Bracelet, Necklace, Ring  Electronic Devices: None  Weapons (Notify Protective Services/Security): None  Home Medications: None  Valuables Given To: Patient  Provide Name(s) of Who Valuable(s) Were Given To: daniella    Information obtained by:  By signing below, I understand that if any problems occur once I leave the hospital I am to contact Dr. Oh.  I understand and acknowledge receipt of the instructions indicated above.                            HEART FAILURE  / CONGESTIVE HEART FAILURE  DISCHARGE INSTRUCTIONS:  GUIDELINES TO FOLLOW AT HOME    Self- Managed Care:     MEDICATIONS:  Take your medication as directed. If you are experiencing any side effects, inform your doctor, Do not stop taking any of your medications without letting your doctor know.   Check with your doctor before taking any over-the-counter medications / herbal / or dietary supplements. They may interfere with your other medications.  Do not take ibuprofen (Advil or Motrin) and naproxen (Aleve) without talking to your doctor first. They could make your heart failure worse.         WEIGHT MONITORING:   Weigh yourself

## 2025-06-03 NOTE — PLAN OF CARE
Patient's chart updated to reflect:        - HF care plan, HF education points and HF discharge instructions.  -Orders: 2 gram sodium diet, daily weights, I/O.  -PCP and cardiology follow up appointments to be scheduled within 7 days of hospital discharge.  -CHF education session will be provided to the patient prior to hospital discharge.    most recent EF:  No results found for: \"LVEF\", \"LVEFMODE\"    Francy Torre RN MSN,RN  Heart Failure Navigator    No future appointments.

## 2025-06-03 NOTE — CONSULTS
[facility]   Inpatient CHF Nurse Navigator Consult        Cardiologist: Dr. Carbajal  Primary Care Physician: Liza Oh DO Lisa M Heckman is a 55 y.o. (1970) female with a history of HFmrEF, most recent EF:  No results found for: \"LVEF\", \"LVEFMODE\"    Patient was awake and alert, laying in bed during the consultation and is agreeable to heart failure education. She was engaged and asked appropriate questions throughout the education session.     Barriers identified during consult contributing to HF Hospitalization: impairment:  substance abuse: hx of illicit drug use, lack of support, lack of education, and comorbid factors.     [x] Limited medication adherence   [x] Poor health literacy, education regarding HF medications provided   [] Pill box provided to patient  [] Difficulty affording medications  [] Difficulty obtaining/ managing medications  [] Prescription assistance information given     [x] Not weighing themselves daily  [x] Weight log provided for easy monitoring  [] Scale provided     [x] Not following low sodium diet  [] Food insecurity   [x] 2 gram sodium diet education provided   [] Low sodium recipes provided  [] Sodium free seasoning provided   [] Low sodium meal delivery options given to patient  [x] Dietician consulted     [] Lack of transportation to appointments     [] Depression, given chronic illness  [] Primary team notified     [] Goals of care need addressed  [] Palliative care consulted     [] CHF community health worker Marie Sorto consulted [phone], to assist with N/A.    Chart Reviewed:  Diet: Diet NPO  ADULT DIET; Regular; 4 carb choices (60 gm/meal)   Daily Weights: Patient Vitals for the past 96 hrs (Last 3 readings):   Weight   05/31/25 1501 54.4 kg (120 lb)     I/O:   Intake/Output Summary (Last 24 hours) at 6/3/2025 1300  Last data filed at 6/3/2025 0720  Gross per 24 hour   Intake 510 ml   Output --   Net 510 ml

## 2025-06-04 VITALS
HEART RATE: 65 BPM | HEIGHT: 60 IN | DIASTOLIC BLOOD PRESSURE: 77 MMHG | OXYGEN SATURATION: 100 % | SYSTOLIC BLOOD PRESSURE: 163 MMHG | TEMPERATURE: 98 F | BODY MASS INDEX: 24.13 KG/M2 | WEIGHT: 122.9 LBS | RESPIRATION RATE: 18 BRPM

## 2025-06-04 LAB
ALBUMIN SERPL-MCNC: 3.1 G/DL (ref 3.5–5.2)
ALP SERPL-CCNC: 188 U/L (ref 35–104)
ALT SERPL-CCNC: 8 U/L (ref 0–32)
ANION GAP SERPL CALCULATED.3IONS-SCNC: 13 MMOL/L (ref 7–16)
AST SERPL-CCNC: 14 U/L (ref 0–31)
BASOPHILS # BLD: 0.02 K/UL (ref 0–0.2)
BASOPHILS NFR BLD: 1 % (ref 0–2)
BILIRUB DIRECT SERPL-MCNC: 0.3 MG/DL (ref 0–0.3)
BILIRUB INDIRECT SERPL-MCNC: 0.6 MG/DL (ref 0–1)
BILIRUB SERPL-MCNC: 0.9 MG/DL (ref 0–1.2)
BUN SERPL-MCNC: 6 MG/DL (ref 6–20)
CALCIUM SERPL-MCNC: 7.4 MG/DL (ref 8.6–10.2)
CHLORIDE SERPL-SCNC: 98 MMOL/L (ref 98–107)
CO2 SERPL-SCNC: 26 MMOL/L (ref 22–29)
CREAT SERPL-MCNC: 0.7 MG/DL (ref 0.5–1)
ECHO BSA: 1.52 M2
EOSINOPHIL # BLD: 0.04 K/UL (ref 0.05–0.5)
EOSINOPHILS RELATIVE PERCENT: 2 % (ref 0–6)
ERYTHROCYTE [DISTWIDTH] IN BLOOD BY AUTOMATED COUNT: 15.6 % (ref 11.5–15)
GFR, ESTIMATED: >90 ML/MIN/1.73M2
GLUCOSE BLD-MCNC: 324 MG/DL (ref 74–99)
GLUCOSE BLD-MCNC: 346 MG/DL (ref 74–99)
GLUCOSE SERPL-MCNC: 347 MG/DL (ref 74–99)
HCT VFR BLD AUTO: 25.9 % (ref 34–48)
HGB BLD-MCNC: 8.6 G/DL (ref 11.5–15.5)
LABORATORY REPORT: NORMAL
LYMPHOCYTES NFR BLD: 0.44 K/UL (ref 1.5–4)
LYMPHOCYTES RELATIVE PERCENT: 22 % (ref 20–42)
MAGNESIUM SERPL-MCNC: 1.8 MG/DL (ref 1.6–2.6)
MCH RBC QN AUTO: 30.1 PG (ref 26–35)
MCHC RBC AUTO-ENTMCNC: 33.2 G/DL (ref 32–34.5)
MCV RBC AUTO: 90.6 FL (ref 80–99.9)
MONOCYTES NFR BLD: 0.06 K/UL (ref 0.1–0.95)
MONOCYTES NFR BLD: 3 % (ref 2–12)
NEUTROPHILS NFR BLD: 72 % (ref 43–80)
NEUTS SEG NFR BLD: 1.44 K/UL (ref 1.8–7.3)
NUC STRESS EJECTION FRACTION: 75 %
PETH INTERPRETATION: NORMAL
PHOSPHATE SERPL-MCNC: 1.8 MG/DL (ref 2.5–4.5)
PLATELET CONFIRMATION: NORMAL
PLATELET, FLUORESCENCE: 57 K/UL (ref 130–450)
PLPETH BLD-MCNC: <10 NG/ML
PMV BLD AUTO: 9.9 FL (ref 7–12)
POPETH BLD-MCNC: <10 NG/ML
POTASSIUM SERPL-SCNC: 3.7 MMOL/L (ref 3.5–5)
PROT SERPL-MCNC: 5.9 G/DL (ref 6.4–8.3)
RBC # BLD AUTO: 2.86 M/UL (ref 3.5–5.5)
SODIUM SERPL-SCNC: 137 MMOL/L (ref 132–146)
STRESS BASELINE DIAS BP: 79 MMHG
STRESS BASELINE HR: 65 BPM
STRESS BASELINE ST DEPRESSION: 0 MM
STRESS BASELINE SYS BP: 146 MMHG
STRESS O2 SAT REST: 96 %
STRESS ST DEPRESSION: 0 MM
STRESS STAGE 1 BP: NORMAL MMHG
STRESS STAGE 1 COMMENTS: NORMAL
STRESS STAGE 1 DURATION: 1 MIN:SEC
STRESS STAGE 1 HR: 69 BPM
STRESS STAGE RECOVERY 1 BP: NORMAL MMHG
STRESS STAGE RECOVERY 1 COMMENTS: NORMAL
STRESS STAGE RECOVERY 1 DURATION: 1 MIN:SEC
STRESS STAGE RECOVERY 1 HR: 71 BPM
STRESS STAGE RECOVERY 2 BP: NORMAL MMHG
STRESS STAGE RECOVERY 2 COMMENTS: NORMAL
STRESS STAGE RECOVERY 2 DURATION: 1 MIN:SEC
STRESS STAGE RECOVERY 2 HR: 57 BPM
STRESS STAGE RECOVERY 3 BP: NORMAL MMHG
STRESS STAGE RECOVERY 3 COMMENTS: NORMAL
STRESS STAGE RECOVERY 3 DURATION: 1 MIN:SEC
STRESS STAGE RECOVERY 3 HR: 88 BPM
STRESS STAGE RECOVERY 4 BP: NORMAL MMHG
STRESS STAGE RECOVERY 4 COMMENTS: NORMAL
STRESS STAGE RECOVERY 4 DURATION: 1 MIN:SEC
STRESS STAGE RECOVERY 4 HR: 90 BPM
STRESS TARGET HR: 165 BPM
WBC OTHER # BLD: 2 K/UL (ref 4.5–11.5)

## 2025-06-04 PROCEDURE — 82248 BILIRUBIN DIRECT: CPT

## 2025-06-04 PROCEDURE — 80053 COMPREHEN METABOLIC PANEL: CPT

## 2025-06-04 PROCEDURE — 84100 ASSAY OF PHOSPHORUS: CPT

## 2025-06-04 PROCEDURE — 2580000003 HC RX 258: Performed by: INTERNAL MEDICINE

## 2025-06-04 PROCEDURE — 93018 CV STRESS TEST I&R ONLY: CPT | Performed by: INTERNAL MEDICINE

## 2025-06-04 PROCEDURE — 97530 THERAPEUTIC ACTIVITIES: CPT

## 2025-06-04 PROCEDURE — 94761 N-INVAS EAR/PLS OXIMETRY MLT: CPT

## 2025-06-04 PROCEDURE — A9500 TC99M SESTAMIBI: HCPCS | Performed by: RADIOLOGY

## 2025-06-04 PROCEDURE — 36415 COLL VENOUS BLD VENIPUNCTURE: CPT

## 2025-06-04 PROCEDURE — 6370000000 HC RX 637 (ALT 250 FOR IP): Performed by: INTERNAL MEDICINE

## 2025-06-04 PROCEDURE — 82962 GLUCOSE BLOOD TEST: CPT

## 2025-06-04 PROCEDURE — 2500000003 HC RX 250 WO HCPCS: Performed by: NURSE PRACTITIONER

## 2025-06-04 PROCEDURE — 3430000000 HC RX DIAGNOSTIC RADIOPHARMACEUTICAL: Performed by: RADIOLOGY

## 2025-06-04 PROCEDURE — 83735 ASSAY OF MAGNESIUM: CPT

## 2025-06-04 PROCEDURE — 93016 CV STRESS TEST SUPVJ ONLY: CPT | Performed by: INTERNAL MEDICINE

## 2025-06-04 PROCEDURE — 6360000002 HC RX W HCPCS: Performed by: NURSE PRACTITIONER

## 2025-06-04 PROCEDURE — 85025 COMPLETE CBC W/AUTO DIFF WBC: CPT

## 2025-06-04 PROCEDURE — 6360000002 HC RX W HCPCS: Performed by: INTERNAL MEDICINE

## 2025-06-04 PROCEDURE — 94640 AIRWAY INHALATION TREATMENT: CPT

## 2025-06-04 PROCEDURE — 6370000000 HC RX 637 (ALT 250 FOR IP): Performed by: NURSE PRACTITIONER

## 2025-06-04 PROCEDURE — 2580000003 HC RX 258: Performed by: NURSE PRACTITIONER

## 2025-06-04 PROCEDURE — 78452 HT MUSCLE IMAGE SPECT MULT: CPT | Performed by: INTERNAL MEDICINE

## 2025-06-04 PROCEDURE — 2700000000 HC OXYGEN THERAPY PER DAY

## 2025-06-04 RX ORDER — INSULIN GLARGINE 100 [IU]/ML
38 INJECTION, SOLUTION SUBCUTANEOUS NIGHTLY
Status: DISCONTINUED | OUTPATIENT
Start: 2025-06-04 | End: 2025-06-04 | Stop reason: HOSPADM

## 2025-06-04 RX ORDER — HYDROXYZINE PAMOATE 25 MG/1
25 CAPSULE ORAL NIGHTLY
Status: DISCONTINUED | OUTPATIENT
Start: 2025-06-04 | End: 2025-06-04 | Stop reason: HOSPADM

## 2025-06-04 RX ORDER — HYDROXYZINE PAMOATE 25 MG/1
25 CAPSULE ORAL NIGHTLY
Qty: 30 CAPSULE | Refills: 0
Start: 2025-06-04

## 2025-06-04 RX ORDER — BUPRENORPHINE HYDROCHLORIDE AND NALOXONE HYDROCHLORIDE DIHYDRATE 8; 2 MG/1; MG/1
1 TABLET SUBLINGUAL 2 TIMES DAILY
Qty: 60 TABLET | Refills: 0
Start: 2025-06-04 | End: 2025-07-04

## 2025-06-04 RX ORDER — POLYETHYLENE GLYCOL 3350 17 G/17G
17 POWDER, FOR SOLUTION ORAL DAILY PRN
COMMUNITY
Start: 2025-06-04 | End: 2025-07-04

## 2025-06-04 RX ORDER — FUROSEMIDE 40 MG/1
40 TABLET ORAL DAILY
Qty: 30 TABLET | Refills: 0 | Status: SHIPPED | OUTPATIENT
Start: 2025-06-04

## 2025-06-04 RX ORDER — FUROSEMIDE 40 MG/1
40 TABLET ORAL DAILY
Status: DISCONTINUED | OUTPATIENT
Start: 2025-06-05 | End: 2025-06-04 | Stop reason: HOSPADM

## 2025-06-04 RX ORDER — LOSARTAN POTASSIUM 50 MG/1
50 TABLET ORAL DAILY
Status: DISCONTINUED | OUTPATIENT
Start: 2025-06-05 | End: 2025-06-04 | Stop reason: HOSPADM

## 2025-06-04 RX ORDER — ASPIRIN 81 MG/1
81 TABLET ORAL DAILY
Qty: 30 TABLET | Refills: 0 | Status: SHIPPED | OUTPATIENT
Start: 2025-06-04

## 2025-06-04 RX ORDER — LOSARTAN POTASSIUM 50 MG/1
50 TABLET ORAL DAILY
Qty: 30 TABLET | Refills: 0 | Status: SHIPPED | OUTPATIENT
Start: 2025-06-05

## 2025-06-04 RX ADMIN — REGADENOSON 0.4 MG: 0.08 INJECTION, SOLUTION INTRAVENOUS at 10:00

## 2025-06-04 RX ADMIN — BUDESONIDE 500 MCG: 0.5 SUSPENSION RESPIRATORY (INHALATION) at 06:22

## 2025-06-04 RX ADMIN — ENOXAPARIN SODIUM 40 MG: 100 INJECTION SUBCUTANEOUS at 09:00

## 2025-06-04 RX ADMIN — ASPIRIN 81 MG: 81 TABLET, CHEWABLE ORAL at 09:00

## 2025-06-04 RX ADMIN — ONDANSETRON 4 MG: 2 INJECTION, SOLUTION INTRAMUSCULAR; INTRAVENOUS at 11:36

## 2025-06-04 RX ADMIN — PANTOPRAZOLE SODIUM 40 MG: 40 TABLET, DELAYED RELEASE ORAL at 05:48

## 2025-06-04 RX ADMIN — INSULIN LISPRO 6 UNITS: 100 INJECTION, SOLUTION INTRAVENOUS; SUBCUTANEOUS at 11:36

## 2025-06-04 RX ADMIN — VANCOMYCIN HYDROCHLORIDE 1000 MG: 1 INJECTION, POWDER, LYOPHILIZED, FOR SOLUTION INTRAVENOUS at 03:31

## 2025-06-04 RX ADMIN — PROPRANOLOL HYDROCHLORIDE 20 MG: 10 TABLET ORAL at 08:53

## 2025-06-04 RX ADMIN — IPRATROPIUM BROMIDE AND ALBUTEROL SULFATE 1 DOSE: .5; 2.5 SOLUTION RESPIRATORY (INHALATION) at 13:09

## 2025-06-04 RX ADMIN — PIPERACILLIN AND TAZOBACTAM 4500 MG: 4; .5 INJECTION, POWDER, FOR SOLUTION INTRAVENOUS at 11:43

## 2025-06-04 RX ADMIN — GABAPENTIN 200 MG: 100 CAPSULE ORAL at 14:53

## 2025-06-04 RX ADMIN — RIFAXIMIN 550 MG: 550 TABLET ORAL at 11:35

## 2025-06-04 RX ADMIN — LOSARTAN POTASSIUM 25 MG: 25 TABLET, FILM COATED ORAL at 08:57

## 2025-06-04 RX ADMIN — IPRATROPIUM BROMIDE AND ALBUTEROL SULFATE 1 DOSE: .5; 2.5 SOLUTION RESPIRATORY (INHALATION) at 06:22

## 2025-06-04 RX ADMIN — FUROSEMIDE 40 MG: 10 INJECTION, SOLUTION INTRAMUSCULAR; INTRAVENOUS at 08:52

## 2025-06-04 RX ADMIN — BUPRENORPHINE HYDROCHLORIDE AND NALOXONE HYDROCHLORIDE DIHYDRATE 1 TABLET: 8; 2 TABLET SUBLINGUAL at 08:57

## 2025-06-04 RX ADMIN — GABAPENTIN 200 MG: 100 CAPSULE ORAL at 08:53

## 2025-06-04 RX ADMIN — Medication 30 MILLICURIE: at 09:35

## 2025-06-04 RX ADMIN — PIPERACILLIN AND TAZOBACTAM 4500 MG: 4; .5 INJECTION, POWDER, FOR SOLUTION INTRAVENOUS at 04:42

## 2025-06-04 RX ADMIN — LACTULOSE 20 G: 10 SOLUTION ORAL at 09:03

## 2025-06-04 RX ADMIN — Medication 100 MG: at 08:57

## 2025-06-04 RX ADMIN — Medication 10 ML: at 11:35

## 2025-06-04 NOTE — DISCHARGE INSTR - COC
R10.84    Elevated liver enzymes R74.8    Portal hypertension (HCC) K76.6    Pancreatic insufficiency K86.89    Hepatic abscess K75.0    Intra-abdominal abscess (HCC) K65.1    Hyperkalemia E87.5    Bacteriuria with pyuria R82.71, R82.81    Acute cystitis without hematuria N30.00    Cirrhosis of liver with ascites, unspecified hepatic cirrhosis type (HCC) K74.60, R18.8    History of breast cancer Z85.3    LUQ abdominal pain R10.12    Coffee ground emesis K92.0    Diabetic ketoacidosis without coma associated with drug or chemical induced diabetes mellitus E09.10    Diabetic ketoacidosis (HCC) E11.10    Hypokalemia E87.6    Cocaine use disorder (HCC) F14.10    Acute respiratory failure with hypoxia (HCC) J96.01       Isolation/Infection:   Isolation            No Isolation          Patient Infection Status    No active infections.   Resolved       Infection Onset Added Last Indicated Last Indicated By Resolved Resolved By    Influenza 20 Respiratory Panel, Molecular 20 Infection                          Nurse Assessment:  Last Vital Signs: BP (!) 163/77   Pulse 65   Temp 98 °F (36.7 °C) (Oral)   Resp 18   Ht 1.524 m (5')   Wt 55.7 kg (122 lb 14.4 oz)   SpO2 100%   BMI 24.00 kg/m²     Last documented pain score (0-10 scale):    Last Weight:   Wt Readings from Last 1 Encounters:   25 55.7 kg (122 lb 14.4 oz)     Mental Status:  {IP PT MENTAL STATUS:70038}    IV Access:  { HANNA IV ACCESS:[id number]}    Nursing Mobility/ADLs:  Walking   {CHP DME ADLs:362107005}  Transfer  {CHP DME ADLs:743601278}  Bathing  {CHP DME ADLs:155944393}  Dressing  {CHP DME ADLs:989753182}  Toileting  {CHP DME ADLs:190770793}  Feeding  {CHP DME ADLs:184800645}  Med Admin  {CHP DME ADLs:008154737}  Med Delivery   { HANNA MED Delivery:534095708}    Wound Care Documentation and Therapy:  Wound 24 Foot Left;Plantar Left foot second toe. Appears to be a calus patient states its an ulcer treated

## 2025-06-04 NOTE — PLAN OF CARE
Problem: Safety - Adult  Goal: Free from fall injury  6/4/2025 1018 by Hugo Sr RN  Outcome: Not Progressing  6/4/2025 0003 by Stacey Mathew RN  Outcome: Progressing     Problem: Chronic Conditions and Co-morbidities  Goal: Patient's chronic conditions and co-morbidity symptoms are monitored and maintained or improved  6/4/2025 1018 by Hugo Sr RN  Outcome: Not Progressing  Flowsheets (Taken 6/4/2025 0900)  Care Plan - Patient's Chronic Conditions and Co-Morbidity Symptoms are Monitored and Maintained or Improved:   Monitor and assess patient's chronic conditions and comorbid symptoms for stability, deterioration, or improvement   Collaborate with multidisciplinary team to address chronic and comorbid conditions and prevent exacerbation or deterioration   Update acute care plan with appropriate goals if chronic or comorbid symptoms are exacerbated and prevent overall improvement and discharge  6/4/2025 0003 by Stacey Mathew RN  Outcome: Progressing     Problem: Skin/Tissue Integrity  Goal: Skin integrity remains intact  Description: 1.  Monitor for areas of redness and/or skin breakdown2.  Assess vascular access sites hourly3.  Every 4-6 hours minimum:  Change oxygen saturation probe site4.  Every 4-6 hours:  If on nasal continuous positive airway pressure, respiratory therapy assess nares and determine need for appliance change or resting period  6/4/2025 1018 by Hugo Sr RN  Outcome: Not Progressing  Flowsheets (Taken 6/4/2025 0900)  Skin Integrity Remains Intact:   Monitor for areas of redness and/or skin breakdown   Assess vascular access sites hourly  6/4/2025 0003 by Stacey Mathew RN  Outcome: Progressing     Problem: Safety - Adult  Goal: Free from fall injury  6/4/2025 1018 by Hugo Sr RN  Outcome: Not Progressing  6/4/2025 0003 by Stacey Mathew RN  Outcome: Progressing     Problem: Chronic Conditions and Co-morbidities  Goal: Patient's chronic conditions and

## 2025-06-04 NOTE — PROGRESS NOTES
[facility]        CARDIOLOGY                 INPATIENT PROGRESS NOTE          PATIENT SEEN IN FOLLOW UP FOR: CHF, elevated Troponin    Hospital Day: 2     Ellie Santacruz is a 55 year old patient previously not known to [facility]       SUBJECTIVE: Denies any CP, look tired. No orthopnea. No distress. Sitter a bed side    ROS: Review of rest of 10 systems negative except as mentioned above    OBJECTIVE: No acute distress.  See Assessment     Diagnostics:       Telemetry: Reviewed        Intake/Output Summary (Last 24 hours) at 6/1/2025 0802  Last data filed at 6/1/2025 0442  Gross per 24 hour   Intake 10 ml   Output 5800 ml   Net -5790 ml       Labs:   CBC:   Recent Labs     05/31/25  0633 06/01/25  0625   WBC 3.3* 1.9*   HGB 7.0* 7.0*   HCT 22.2* 22.0*   PLT 58* 57*     BMP:   Recent Labs     05/31/25  0210 05/31/25  0633    136   K 4.6 4.4   CO2 31* 31*   BUN 18 18   CREATININE 1.1* 1.3*   LABGLOM 59* 50*   CALCIUM 9.0 8.7     Mag:   Recent Labs     05/31/25  0633   MG 2.3     Phos:   Recent Labs     05/31/25  0633   PHOS 3.9     TSH: No results for input(s): \"TSH\" in the last 72 hours.  HgA1c:     BNP: No results for input(s): \"BNP\" in the last 72 hours.  PT/INR: No results for input(s): \"PROTIME\", \"INR\" in the last 72 hours.  APTT:No results for input(s): \"APTT\" in the last 72 hours.  CARDIAC ENZYMES:  Recent Labs     05/31/25  0345 05/31/25  0633 05/31/25  0715 05/31/25  1001   CKTOTAL 66  --   --   --    TROPHS 85* 80* 86* 33*     FASTING LIPID PANEL:  Lab Results   Component Value Date/Time    CHOL 216 02/02/2025 07:46 AM    HDL 63 02/02/2025 07:46 AM    TRIG 113 02/02/2025 07:46 AM     LIVER PROFILE:  Recent Labs     05/31/25  0210 05/31/25  0633   AST 25 27   ALT 15 14       Current Inpatient Medications:   ondansetron  4 mg IntraVENous Once    [Held by provider] fentaNYL  1 patch TransDERmal Q72H    [Held by provider] furosemide  20 mg Oral Daily    [Held by provider] 
    INPATIENT CARDIOLOGY FOLLOW-UP    Name: Ellie Santacruz    Age: 55 y.o.    Date of Admission: 5/31/2025  1:42 AM    Date of Service: 6/2/2025    Chief Complaint: Follow-up for CHF and elevated troponin    Interim History:  No new overnight cardiac complaints. Currently with no complaints of CP, SOB, palpitations, dizziness, or lightheadedness. SR on telemetry.    Review of Systems:   Cardiac: As per HPI  General: No fever, chills  Pulmonary: As per HPI  HEENT: No visual disturbances, difficult swallowing  GI: No nausea, vomiting  Endocrine: No thyroid disease or DM  Musculoskeletal: SANTIAGO x 4, no focal motor deficits  Skin: Intact, no rashes  Neuro/Psych: No headache or seizures    Problem List:  Patient Active Problem List   Diagnosis    Chronic pancreatitis due to acute alcohol intoxication (HCC)    Schizoaffective disorder, bipolar type (HCC)    Anxiety disorder    Depression    History of alcohol abuse    Pancreatic pseudocyst (HCC)    Liver dysfunction    Asthma    Gastroesophageal reflux disease without esophagitis    Bipolar I disorder (HCC)    Bipolar affective disorder (HCC)    Irritable bowel syndrome    Tobacco use disorder    Jaundice    RUQ abdominal pain    Poorly controlled diabetes mellitus (HCC)    Pain of upper abdomen    Elevated LFTs    Intractable abdominal pain    Abdominal pain, epigastric    Hyperglycemia    Type 2 diabetes mellitus with hyperosmolarity without coma, with long-term current use of insulin (HCC)    Fall (on) (from) other stairs and steps, initial encounter    Hepatitis, alcoholic (HCC)    Nausea and vomiting    Alcoholism (HCC)    Pancytopenia (HCC)    Splenomegaly    Hypoglycemia episode    Hepatitis C    Anxiety and depression    Cigarette smoker, heavy    Acute pancreatitis    Alcohol abuse    Acute alcoholic gastritis    Other osteoporosis without current pathological fracture    Ketoacidosis, diabetic, no coma, insulin dependent    Bicytopenia - leukopenia and 
    INPATIENT CARDIOLOGY FOLLOW-UP    Name: Ellie Santacruz    Age: 55 y.o.    Date of Admission: 5/31/2025  1:42 AM    Date of Service: 6/3/2025    Chief Complaint: Follow-up for CHF and elevated troponin    Interim History:  No new overnight cardiac complaints.  Patient told me that she noticed increased dyspnea unable to lay down flat and cough intermittently especially when she lays down.  Currently with no complaints of CP,  palpitations, dizziness, or lightheadedness. SR on telemetry.    Review of Systems:   Cardiac: As per HPI  General: No fever, chills  Pulmonary: As per HPI  HEENT: No visual disturbances, difficult swallowing  GI: No nausea, vomiting  Endocrine: No thyroid disease or DM  Musculoskeletal: SANTIAGO x 4, no focal motor deficits  Skin: Intact, no rashes  Neuro/Psych: No headache or seizures    Problem List:  Patient Active Problem List   Diagnosis    Chronic pancreatitis due to acute alcohol intoxication (HCC)    Schizoaffective disorder, bipolar type (HCC)    Anxiety disorder    Depression    History of alcohol abuse    Pancreatic pseudocyst (HCC)    Liver dysfunction    Asthma    Gastroesophageal reflux disease without esophagitis    Bipolar I disorder (HCC)    Bipolar affective disorder (HCC)    Irritable bowel syndrome    Tobacco use disorder    Jaundice    RUQ abdominal pain    Poorly controlled diabetes mellitus (HCC)    Pain of upper abdomen    Elevated LFTs    Intractable abdominal pain    Abdominal pain, epigastric    Hyperglycemia    Type 2 diabetes mellitus with hyperosmolarity without coma, with long-term current use of insulin (HCC)    Fall (on) (from) other stairs and steps, initial encounter    Hepatitis, alcoholic (HCC)    Nausea and vomiting    Alcoholism (HCC)    Pancytopenia (HCC)    Splenomegaly    Hypoglycemia episode    Hepatitis C    Anxiety and depression    Cigarette smoker, heavy    Acute pancreatitis    Alcohol abuse    Acute alcoholic gastritis    Other osteoporosis 
    INPATIENT CARDIOLOGY FOLLOW-UP    Name: Ellie Santacruz    Age: 55 y.o.    Date of Admission: 5/31/2025  1:42 AM    Date of Service: 6/4/2025    Chief Complaint: Follow-up for CHF and elevated troponin    Interim History:  No new overnight cardiac complaints.  Today, feeling good, cough and dyspnea better, able to lay down flat without any dyspnea.  Currently with no complaints of CP,  palpitations, dizziness, or lightheadedness. SR on telemetry.    Review of Systems:   Cardiac: As per HPI  General: No fever, chills  Pulmonary: As per HPI  HEENT: No visual disturbances, difficult swallowing  GI: No nausea, vomiting  Endocrine: No thyroid disease or DM  Musculoskeletal: SANTIAGO x 4, no focal motor deficits  Skin: Intact, no rashes  Neuro/Psych: No headache or seizures    Problem List:  Patient Active Problem List   Diagnosis    Chronic pancreatitis due to acute alcohol intoxication (HCC)    Schizoaffective disorder, bipolar type (HCC)    Anxiety disorder    Depression    History of alcohol abuse    Pancreatic pseudocyst (HCC)    Liver dysfunction    Asthma    Gastroesophageal reflux disease without esophagitis    Bipolar I disorder (HCC)    Bipolar affective disorder (HCC)    Irritable bowel syndrome    Tobacco use disorder    Jaundice    RUQ abdominal pain    Poorly controlled diabetes mellitus (HCC)    Pain of upper abdomen    Elevated LFTs    Intractable abdominal pain    Abdominal pain, epigastric    Hyperglycemia    Type 2 diabetes mellitus with hyperosmolarity without coma, with long-term current use of insulin (HCC)    Fall (on) (from) other stairs and steps, initial encounter    Hepatitis, alcoholic (HCC)    Nausea and vomiting    Alcoholism (HCC)    Pancytopenia (HCC)    Splenomegaly    Hypoglycemia episode    Hepatitis C    Anxiety and depression    Cigarette smoker, heavy    Acute pancreatitis    Alcohol abuse    Acute alcoholic gastritis    Other osteoporosis without current pathological fracture    
4 Eyes Skin Assessment     NAME:  Ellie Santacruz  YOB: 1970  MEDICAL RECORD NUMBER:  [id number]    The patient is being assessed for  Admission    I agree that at least one RN has performed a thorough Head to Toe Skin Assessment on the patient. ALL assessment sites listed below have been assessed.      Areas assessed by both nurses:    Head, Face, Ears, Shoulders, Back, Chest, Arms, Elbows, Hands, Sacrum. Buttock, Coccyx, Ischium, and Legs. Feet and Heels        Does the Patient have a Wound? No noted wound(s)       Abe Prevention initiated by RN: Yes  Wound Care Orders initiated by RN: No    Pressure Injury (Stage 3,4, Unstageable, DTI, NWPT, and Complex wounds) if present, place Wound referral order by RN under : No    New Ostomies, if present place, Ostomy referral order under : No     Nurse 1 eSignature: Electronically signed by Beth Mercado RN on 5/31/25 at 6:23 PM EDT    **SHARE this note so that the co-signing nurse can place an eSignature**    Nurse 2 eSignature: Electronically signed by Krysten Coburn RN on 5/31/25 at 6:24 PM EDT   
Antibiotic Extended Infusion Policy     This patient is on medication that requires renal, weight, and/or indication dose adjustment.      Date Body Weight IBW  Adjusted BW SCr  CrCl Dialysis status BMI   5/31/2025   Patient weight not recorded Creatinine clearance cannot be calculated (Unknown ideal weight.) N/a There is no height or weight on file to calculate BMI.       Pharmacy has dose-adjusted the following medication(s):    Ordered Medication: Zosyn 3375mg q8H     Order Changed/converted to: Zosyn 4500mg q8h (Dose adjusted for pneumonia indication per protocol)    These changes were made per protocol according to the [facility]   Automatic Extended Infusion Dose Adjustment Policy.     *Please note this dose may need readjusted if patient's condition changes.    Please contact pharmacy with any questions regarding these changes.    William Hardin RPH  5/31/2025  5:42 AM    
Bladder scan patient for 484 ml. Straight cath 400 ml clear yellow urine.   
CLINICAL PHARMACY NOTE: MEDS TO BEDS    Total # of Prescriptions Filled: 3   The following medications were delivered to the patient:  Aspirin 81 mg  Losartan 50 mg  Furosemide 40 mg    Additional Documentation:    
Date:2025  Patient Name: Ellie Santacruz  MRN: [id number]  : 1970  ROOM #: [id number]    Occupational Therapy order received, chart reviewed and evaluation attempted this date. Patient is unavailable for OT evaluation due to off floor for testing.     Will attempt OT evaluation at a later time. Thank you.   Howard Dolan OTR/L #[id number]    '  
Internal Medicine Progress Note     [facility]     Mina Riley D.O., BHARGAVOScottI.                         Torsten Harrison D.O., BHARGAVOScottI.  Jaya Josue D.O.     Tanmay Farah, MSN, APRN-CNP  Isaak Monk, MSN, APRN, NP-C  Marie Diaz, MSN, APRN-CNP  Diana Lloyd, MSN, APRN, NP-C     Primary Care Physician: Liza Oh DO   Admitting Physician:  Torsten Harrison DO  Admission date and time: 5/31/2025  1:42 AM    Room:  [address] [place] [address]  Admitting diagnosis: Acute respiratory failure with hypoxia (HCC) [J96.01]  Uncontrolled type 2 diabetes mellitus with hyperglycemia (HCC) [E11.65]    Patient Name: Ellie Santacruz  MRN: [id number]    Date of Service: 6/1/2025     Subjective:  Ellie is a 55 y.o. female who was seen and examined today,6/1/2025, at the bedside. Ellie remains intermittently somnolent and falls asleep easily during my examination.  I believe this to be largely patient effort oriented.  Multiple issues are at play here and several subspecialists have been asked to provide consultation.  Antibiotic therapy is being employed for healthcare associated pneumonia.  Cardiac enzymes have trended downward and she denies active chest pain.  We discussed packed red blood cell transfusion today.    Review of System:   Constitutional:   Admits to debilitating malaise and fatigue.  HEENT:   Denies ear pain, sore throat, sinus or eye problems.  Cardiovascular:   Denies active chest pain or irregular heartbeats.  Respiratory:   Acute on chronic shortness of breath.  Shallow inspiratory effort.  Intermittent sputum production.  Gastrointestinal:   Decreased appetite currently.  She did tolerate advancement in her diet yesterday without complication.  Genitourinary:    Denies any urgency, frequency, hematuria.  Voiding with the use of a George catheter in the setting of urinary retention yesterday.  Extremities:   Denies lower extremity swelling, edema or cyanosis.   Neurology:    Denies any 
Internal Medicine Progress Note     [facility]     Mina Riley D.O., JOSE Harrison D.O., KIERAN.  Jaya Josue D.O.     Tanmay Farah, MSN, APRN-CNP  Isaak Monk, MSN, APRN, NP-C  Marie Diaz, MSN, APRN-CNP  Diana Lloyd, MSN, APRN, NP-C     Primary Care Physician: Liza Oh DO   Admitting Physician:  Torsten Harrison DO  Admission date and time: 5/31/2025  1:42 AM    Room:  [address] [place] [address]  Admitting diagnosis: Acute respiratory failure with hypoxia (HCC) [J96.01]  Uncontrolled type 2 diabetes mellitus with hyperglycemia (HCC) [E11.65]    Patient Name: Ellie Santacruz  MRN: [id number]    Date of Service: 6/3/2025     Subjective:  Ellie is a 55 y.o. female who was seen and examined today,6/3/2025, at the bedside.  Ellie seems to be approaching her chronically debilitated baseline.  Plans are for weaning off nasal cannula oxygen today.  Plans are for stress test evaluation.  Repeat chest x-ray will be obtained with hopeful de-escalation of antibiotics.  Discharge planning is underway.    Review of System:   Constitutional:   Improving malaise and fatigue.  HEENT:   Denies ear pain, sore throat, sinus or eye problems.  Nasal cannula oxygen is in place.  Cardiovascular:   Denies active chest pain or irregular heartbeats.  Respiratory:   Shortness of breath seems to have resolved.  Gastrointestinal:   Tolerated oral ingestion yesterday without complication.  Genitourinary:    Denies any urgency, frequency, hematuria.  Voiding without complication.  Extremities:   Denies lower extremity swelling, edema or cyanosis.   Neurology:    Denies any headache or focal neurological deficits, acute on chronic weakness with deconditioning.  Psch:   Chronic anxiety and depression.  Musculoskeletal:    Denies  myalgias, joint complaints or back pain.   Integumentary:   Denies any rashes, ulcers, or excoriations.  Denies bruising.  Hematologic/Lymphatic:  Denies 
Internal Medicine Progress Note     [facility]     Mina Riley D.O., STEVENI.                         Torsten Harrison D.O., STEVENIScott Josue D.O.     Tanmay Farah, MSN, APRN-CNP  Isaak Monk, MSN, APRN, NP-C  Marie Diaz, MSN, APRN-CNP  Diana Lloyd, MSN, APRN, NP-C     Primary Care Physician: Liza Oh DO   Admitting Physician:  Torsten Harrison DO  Admission date and time: 5/31/2025  1:42 AM    Room:  [address] [place] [address]  Admitting diagnosis: Acute respiratory failure with hypoxia (HCC) [J96.01]  Uncontrolled type 2 diabetes mellitus with hyperglycemia (HCC) [E11.65]    Patient Name: Ellie Santacruz  MRN: [id number]    Date of Service: 6/2/2025     Subjective:  Ellie is a 55 y.o. female who was seen and examined today,6/2/2025, at the bedside. Ellie is much more awake and alert during my examination today.  We again discussed the need for better compliance at the rehabilitation facility.  We discussed moving forward with stress test evaluation tomorrow as per cardiovascular recommendations.  She is currently without chest pain.  She requires extensive work with the therapy teams as she remains weak and deconditioned.     Review of System:   Constitutional:   Ongoing malaise and fatigue.  HEENT:   Denies ear pain, sore throat, sinus or eye problems.  Nasal cannula oxygen is in place.  Cardiovascular:   Denies active chest pain or irregular heartbeats.  Respiratory:   Improving shortness of breath.  Gastrointestinal:   Increasing appetite.  Eating breakfast during my examination.  Genitourinary:    Denies any urgency, frequency, hematuria.  Voiding with the use of a George catheter in the setting of urinary retention yesterday.  Extremities:   Denies lower extremity swelling, edema or cyanosis.   Neurology:    Denies any headache or focal neurological deficits, acute on chronic weakness with deconditioning.  Psch:   Chronic anxiety and depression.  Musculoskeletal:    Denies  
OCCUPATIONAL THERAPY INITIAL EVALUATION    [facility]  [address] [place] [place]        Date:6/3/2025                                                  Patient Name: Ellie Santacruz    MRN: [id number]    : 1970    Room: [address] [place] [address]      Evaluating OT: Howard Dolan OTR/L; #[id number]     Referring Provider and Specific Provider Orders/Date:      25  OT eval and treat  Start:  25,   End:  25,   ONE TIME,   Standing Count:  1 Occurrences,   R         Torsten Harrison,       Placement Recommendation: Subacute Rehab       Diagnosis:   1. Acute respiratory failure with hypoxia (HCC)    2. Uncontrolled type 2 diabetes mellitus with hyperglycemia (HCC)    3. Chronic coronary microvascular dysfunction    4. Shortness of breath         Surgery: None      Pertinent Medical History:       Past Medical History:   Diagnosis Date    Alcoholism (HCC)     Since teens to early 20s    Anxiety and depression     Ascites of liver     Bronchitis     Cancer (HCC)     breast, left    Candidiasis of vagina     Chronic pancreatitis (HCC)     Cocaine abuse (HCC)     Constipation     Diabetes mellitus, type 2 (HCC)     GERD (gastroesophageal reflux disease)     Gout     Hepatitis C     Hernia of anterior abdominal wall     Jaundice 2016    Pneumonia     Polyneuropathy due to type 2 diabetes mellitus (HCC)     Schizoaffective disorder, bipolar type (HCC)     Splenomegaly 2017         Past Surgical History:   Procedure Laterality Date    ABDOMEN SURGERY      gallbladder removal    BREAST BIOPSY Left     CHOLECYSTECTOMY      COLONOSCOPY      COLONOSCOPY N/A 10/02/2018    COLONOSCOPY WITH BIOPSY performed by Simón Esquivel MD at [facility] ENDOSCOPY    CT NEEDLE BIOPSY LIVER PERCUTANEOUS  2024    CT NEEDLE BIOPSY LIVER PERCUTANEOUS 3/19/2024 [facility] CT    ENDOSCOPY, COLON, DIAGNOSTIC      ERCP      2016 at [facility]    HYSTERECTOMY (CERVIX STATUS 
Pharmacy Consultation Note  (Antibiotic Dosing and Monitoring)    Initial consult date: 6/1/25  Consulting physician/provider: Dr. Harrison  Drug: Vancomycin  Indication: Pneumonia    Age/  Gender Height Weight IBW  Allergy Information   55 y.o./female 152.4 cm (5') 54.4 kg (120 lb)     Ideal body weight: 45.5 kg (100 lb 4.9 oz)  Adjusted ideal body weight: 49.1 kg (108 lb 3 oz)   Dye [iodides] and Tylenol [acetaminophen]      Renal Function:  Recent Labs     05/31/25  0210 05/31/25  0633 06/01/25  0625   BUN 18 18 11   CREATININE 1.1* 1.3* 0.9       Intake/Output Summary (Last 24 hours) at 6/1/2025 1329  Last data filed at 6/1/2025 0442  Gross per 24 hour   Intake --   Output 4800 ml   Net -4800 ml       Vancomycin Monitoring:  Trough:  No results for input(s): \"VANCOTROUGH\" in the last 72 hours.  Random:  No results for input(s): \"VANCORANDOM\" in the last 72 hours.    Vancomycin Administration Times:  Recent vancomycin administrations        No vancomycin IV orders with administrations found.                    Assessment:  Patient is a 55 y.o. female who has been initiated on vancomycin  Estimated Creatinine Clearance: 51 mL/min (based on SCr of 0.9 mg/dL).  To dose vancomycin, pharmacy will be utilizing [employer] calculation software for goal AUC/STEPHAN 400-600 mg/L-hr     Plan:  Will start vancomycin 1,000 mg IV every 12 hours  Will check vancomycin levels when appropriate  Will continue to monitor renal function   Pharmacy to follow      Anna Middleton, PharmD, BCPS 6/1/2025 1:29 PM   [phone]  \  SJW: [phone]    
Pharmacy Consultation Note  (Antibiotic Dosing and Monitoring)    Initial consult date: 6/1/25  Consulting physician/provider: Dr. Harrison  Drug: Vancomycin  Indication: Pneumonia    Age/  Gender Height Weight IBW  Allergy Information   55 y.o./female 152.4 cm (5') 54.4 kg (120 lb)     Ideal body weight: 45.5 kg (100 lb 4.9 oz)  Adjusted ideal body weight: 49.1 kg (108 lb 3 oz)   Dye [iodides] and Tylenol [acetaminophen]      Renal Function:  Recent Labs     05/31/25  0633 06/01/25  0625 06/02/25  0612   BUN 18 11 8   CREATININE 1.3* 0.9 0.8       Intake/Output Summary (Last 24 hours) at 6/2/2025 1225  Last data filed at 6/2/2025 0926  Gross per 24 hour   Intake 547.42 ml   Output 2250 ml   Net -1702.58 ml       Vancomycin Monitoring:  Trough:  No results for input(s): \"VANCOTROUGH\" in the last 72 hours.  Random:  No results for input(s): \"VANCORANDOM\" in the last 72 hours.    Vancomycin Administration Times:  Recent vancomycin administrations                     vancomycin (VANCOCIN) 1,000 mg in sodium chloride 0.9 % 250 mL IVPB ([id number]) (mg) 1,000 mg New Bag 06/01/25 1843                        Assessment:  Patient is a 55 y.o. female who has been initiated on vancomycin  Estimated Creatinine Clearance: 57 mL/min (based on SCr of 0.8 mg/dL).  To dose vancomycin, pharmacy will be utilizing [employer] calculation software for goal AUC/STEPHAN 400-600 mg/L-hr   Was not given dose at 0730 this morning    Plan:  Continue vancomycin 1,000 mg IV every 12 hours, expected   Because dose was not given this morning, I will not schedule a level at this time  Will continue to monitor renal function   Pharmacy to follow      Meghna Chery, PharmD  6/2/2025 12:25 PM  SJW: [phone]      
Pharmacy Consultation Note  (Antibiotic Dosing and Monitoring)    Initial consult date: 6/1/25  Consulting physician/provider: Dr. Harrison  Drug: Vancomycin  Indication: Pneumonia    Age/  Gender Height Weight IBW  Allergy Information   55 y.o./female 152.4 cm (5') 54.4 kg (120 lb)     Ideal body weight: 45.5 kg (100 lb 4.9 oz)  Adjusted ideal body weight: 49.1 kg (108 lb 3 oz)   Dye [iodides] and Tylenol [acetaminophen]      Renal Function:  Recent Labs     06/01/25  0625 06/02/25  0612 06/03/25  0609   BUN 11 8 8   CREATININE 0.9 0.8 0.7       Intake/Output Summary (Last 24 hours) at 6/3/2025 1220  Last data filed at 6/3/2025 0720  Gross per 24 hour   Intake 510 ml   Output --   Net 510 ml       Vancomycin Monitoring:  Trough:  No results for input(s): \"VANCOTROUGH\" in the last 72 hours.  Random:  No results for input(s): \"VANCORANDOM\" in the last 72 hours.    Vancomycin Administration Times:  Recent vancomycin administrations                     vancomycin (VANCOCIN) 1,000 mg in sodium chloride 0.9 % 250 mL IVPB ([id number]) (mg) 1,000 mg New Bag 06/03/25 0802     1,000 mg New Bag 06/02/25 2159     1,000 mg New Bag  1514     1,000 mg New Bag 06/01/25 1843                        Assessment:  Patient is a 55 y.o. female who has been initiated on vancomycin  Estimated Creatinine Clearance: 65 mL/min (based on SCr of 0.7 mg/dL).  To dose vancomycin, pharmacy will be utilizing [employer] calculation software for goal AUC/STEPHAN 400-600 mg/L-hr   Scr stable, actually improved    Plan:  Continue vancomycin 1,000 mg IV every 12 hours  Discharge planning is in process, scr is stable so no level scheduled at this time  Will continue to monitor renal function   Pharmacy to follow      Meghna Chery, PharmD  6/3/2025 12:20 PM  SJW: [phone]      
Pharmacy Consultation Note  (Antibiotic Dosing and Monitoring)    Initial consult date: 6/1/25  Consulting physician/provider: Dr. Harrison  Drug: Vancomycin  Indication: Pneumonia    Age/  Gender Height Weight IBW  Allergy Information   55 y.o./female 152.4 cm (5') 54.4 kg (120 lb)     Ideal body weight: 45.5 kg (100 lb 4.9 oz)  Adjusted ideal body weight: 49.6 kg (109 lb 5.5 oz)   Dye [iodides] and Tylenol [acetaminophen]      Renal Function:  Recent Labs     06/02/25  0612 06/03/25  0609 06/04/25  0552   BUN 8 8 6   CREATININE 0.8 0.7 0.7       Intake/Output Summary (Last 24 hours) at 6/4/2025 1502  Last data filed at 6/4/2025 1037  Gross per 24 hour   Intake 1327.33 ml   Output --   Net 1327.33 ml       Vancomycin Monitoring:  Trough:  No results for input(s): \"VANCOTROUGH\" in the last 72 hours.  Random:  No results for input(s): \"VANCORANDOM\" in the last 72 hours.              Vancomycin Administration Times:  Recent vancomycin administrations                     vancomycin (VANCOCIN) 1,000 mg in sodium chloride 0.9 % 250 mL IVPB ([id number]) (mg) 1,000 mg New Bag 06/04/25 0331     1,000 mg New Bag 06/03/25 0802     1,000 mg New Bag 06/02/25 2159     1,000 mg New Bag  1514     1,000 mg New Bag 06/01/25 1843                    Assessment:  Patient is a 55 y.o. female who has been initiated on vancomycin  Estimated Creatinine Clearance: 71 mL/min (based on SCr of 0.7 mg/dL).  To dose vancomycin, pharmacy will be utilizing Just[employer] calculation software for goal AUC/STEPHAN 400-600 mg/L-hr   Scr stable, actually improved    Plan:  Continue vancomycin 1,000 mg IV every 12 hours  Discharge planning is in process, scr is stable so no level scheduled at this time  Will continue to monitor renal function   Pharmacy to follow      Anna Middleton, PharmD, BCPS 6/4/2025 3:02 PM   [phone]     W: [phone]      
Physical Therapy    Physical Therapy Initial Evaluation/Plan of Care    Room #:  0424/0424-02  Patient Name: Ellie Santacruz  YOB: 1970  MRN: [id number]    Date of Service: 6/1/2025     Tentative placement recommendation: Subacute unless patient meets goals then Home Health Physical Therapy  Equipment recommendation: To be determined      Evaluating Physical Therapist: Rober Williamson, PT  #[id number]      Specific Provider Orders/Date/Referring Provider :     PT eval and treat  Start:  05/31/25 0515,   End:  05/31/25 0515,   ONE TIME,   Standing Count:  1 Occurrences,   R       Tanmay Farah, APRN - CNP    Admitting Diagnosis:   Acute respiratory failure with hypoxia (HCC) [J96.01]  Uncontrolled type 2 diabetes mellitus with hyperglycemia (HCC) [E11.65]     Admitted with    shortness of breath, hypoxic and uncontrolled blood sugar, pneumonia, altered mental status   Surgery: none  Visit Diagnoses         Codes      Uncontrolled type 2 diabetes mellitus with hyperglycemia (HCC)     E11.65            Patient Active Problem List   Diagnosis    Chronic pancreatitis due to acute alcohol intoxication (HCC)    Schizoaffective disorder, bipolar type (HCC)    Anxiety disorder    Depression    History of alcohol abuse    Pancreatic pseudocyst (HCC)    Liver dysfunction    Asthma    Gastroesophageal reflux disease without esophagitis    Bipolar I disorder (HCC)    Bipolar affective disorder (HCC)    Irritable bowel syndrome    Tobacco use disorder    Jaundice    RUQ abdominal pain    Poorly controlled diabetes mellitus (HCC)    Pain of upper abdomen    Elevated LFTs    Intractable abdominal pain    Abdominal pain, epigastric    Hyperglycemia    Type 2 diabetes mellitus with hyperosmolarity without coma, with long-term current use of insulin (HCC)    Fall (on) (from) other stairs and steps, initial encounter    Hepatitis, alcoholic (HCC)    Nausea and vomiting    Alcoholism (HCC)    Pancytopenia (HCC)    
Physical Therapy    Physical Therapy Treatment Note/Plan of Care    Room #:  0424/0424-02  Patient Name: Ellie Santacruz  YOB: 1970  MRN: [id number]    Date of Service: 6/3/2025     Tentative placement recommendation: Subacute unless patient meets goals then Home Health Physical Therapy  Equipment recommendation: To be determined      Evaluating Physical Therapist: Rober Williamson, PT  #[id number]      Specific Provider Orders/Date/Referring Provider :     PT eval and treat  Start:  05/31/25 0515,   End:  05/31/25 0515,   ONE TIME,   Standing Count:  1 Occurrences,   R       Tanmay Farah, APRN - CNP    Admitting Diagnosis:   Acute respiratory failure with hypoxia (HCC) [J96.01]  Uncontrolled type 2 diabetes mellitus with hyperglycemia (HCC) [E11.65]     Admitted with    shortness of breath, hypoxic and uncontrolled blood sugar, pneumonia, altered mental status   Surgery: none  Visit Diagnoses         Codes      Uncontrolled type 2 diabetes mellitus with hyperglycemia (HCC)     E11.65      Chronic coronary microvascular dysfunction     I25.85      Shortness of breath     R06.02            Patient Active Problem List   Diagnosis    Chronic pancreatitis due to acute alcohol intoxication (HCC)    Schizoaffective disorder, bipolar type (HCC)    Anxiety disorder    Depression    History of alcohol abuse    Pancreatic pseudocyst (HCC)    Liver dysfunction    Asthma    Gastroesophageal reflux disease without esophagitis    Bipolar I disorder (HCC)    Bipolar affective disorder (HCC)    Irritable bowel syndrome    Tobacco use disorder    Jaundice    RUQ abdominal pain    Poorly controlled diabetes mellitus (HCC)    Pain of upper abdomen    Elevated LFTs    Intractable abdominal pain    Abdominal pain, epigastric    Hyperglycemia    Type 2 diabetes mellitus with hyperosmolarity without coma, with long-term current use of insulin (HCC)    Fall (on) (from) other stairs and steps, initial encounter    Hepatitis, 
Physical Therapy  Physical Therapy    Room #:   0424/0424-02    Date: 2025       Patient Name: Ellie Santacruz  : 1970      MRN: [id number]     Patient unavailable for physical therapy treatment due to off floor at stress test . Will attempt PT treatment at a later time. Thank you.      Ivan Tineo  [facility]  LIC # [id number]        
Pt Bladder scan 484. Diana Lloyd NP notified.   
Pt refusing labs at this time. Pt educated on the importance of having her labs checked due to her hemoglobin being 7.0 and receiving blood earlier today. Pt still refused. Charge nurse notified.   
Spiritual Health History and Assessment/Progress Note  [facility]    Spiritual/Emotional Needs,  ,  ,      Name: Ellie Santacruz MRN: [id number]    Age: 55 y.o.     Sex: female   Language: English   [employer]: [employer]   Acute respiratory failure with hypoxia (HCC)     Date: 6/3/2025                           Spiritual Assessment began in [facility] MED SURG TELE        Referral/Consult From: Rounding   Encounter Overview/Reason: Spiritual/Emotional Needs  Service Provided For: Patient    Mary, Belief, Meaning:   Patient identifies as spiritual  Family/Friends No family/friends present      Importance and Influence:  Patient has spiritual/personal beliefs that influence decisions regarding their health  Family/Friends No family/friends present    Community:  Patient feels well-supported. Support system includes: Children and Friends  Family/Friends No family/friends present    Assessment and Plan of Care:     Patient Interventions include: Facilitated expression of thoughts and feelings, Explored spiritual coping/struggle/distress, and Affirmed coping skills/support systems  Family/Friends Interventions include: No family/friends present    Patient Plan of Care: Spiritual Care available upon further referral  Family/Friends Plan of Care: No family/friends present    Electronically signed by EHSAN Zapata on 6/3/2025 at 1:40 PM   
COLONOSCOPY      COLONOSCOPY N/A 10/02/2018    COLONOSCOPY WITH BIOPSY performed by Simón Esquivel MD at [facility] ENDOSCOPY    CT NEEDLE BIOPSY LIVER PERCUTANEOUS  03/19/2024    CT NEEDLE BIOPSY LIVER PERCUTANEOUS 3/19/2024 [facility] CT    ENDOSCOPY, COLON, DIAGNOSTIC      ERCP      april 2016 at [facility]    HYSTERECTOMY (CERVIX STATUS UNKNOWN)  2004    IR MIDLINE CATH  5/22/2025    IR MIDLINE CATH 5/22/2025 [facility] CARDIAC CATH/IR LAB    IR TUNNELED CVC PLACE WO SQ PORT/PUMP > 5 YEARS  03/27/2024    FL TUNNELED CVC PLACE WO SQ PORT/PUMP > 5 YEARS 3/27/2024 [facility] RADIOLOGY    MASTECTOMY Left     Left breast mastectomy per patient    OTHER SURGICAL HISTORY Left 04/25/2017     Left breast blue dye injection, Sential Node Lymph Biopsy with left breast lumpectomy    OTHER SURGICAL HISTORY  10/05/2017    simple left mastectomy    OVARY REMOVAL Bilateral 2007    PANCREAS BIOPSY      with stent    PERINEAL SURGERY N/A 09/18/2023    PERINEAL INCISION AND DRAINAGE ABSCESS performed by Karl Santos MD at [facility] OR    RECTAL SURGERY N/A 08/28/2023    PERINEAL ABSCESS INCISION AND DRAINAGE [place] [facility] [place]    SHOULDER SURGERY Left 2003    UPPER GASTROINTESTINAL ENDOSCOPY      UPPER GASTROINTESTINAL ENDOSCOPY N/A 5/6/2025    ESOPHAGOGASTRODUODENOSCOPY BAND LIGATION performed by Hugo Rodríguez DO at [facility] ENDOSCOPY    UPPER GASTROINTESTINAL ENDOSCOPY  5/6/2025    ESOPHAGOGASTRODUODENOSCOPY BIOPSY performed by Hugo Rodríguez DO at [facility] ENDOSCOPY    UPPER GASTROINTESTINAL ENDOSCOPY N/A 5/23/2025    ESOPHAGOGASTRODUODENOSCOPY BIOPSY performed by Agustin Delgado MD at [facility] ENDOSCOPY    UPPER GASTROINTESTINAL ENDOSCOPY  5/23/2025    ESOPHAGOGASTRODUODENOSCOPY DILATATION #50 SHIELDS performed by Agustin Delgado MD at [facility] ENDOSCOPY       SUBJECTIVE:    Precautions: titrate/wean oxygen, Continuous Pulse Oximetry , incentive spirometer, and pep flutter valve, falls and alarm ,    Incontinent of bowel  Social history:

## 2025-06-04 NOTE — CARE COORDINATION
Ss note:5/31/2025 8:22 AM Pt currently in ED room 8 to be admitted to 4th floor. Consult noted for HHC vs SNF. Therapy has been ordered. SW/CM to do full assessment once therapy evals are available. Pt has [employer] [place] medicaid. SANDEE Noonan  
Ss note:6/4/2025 9:19 AM Pt from [facility], lisa faxed clinicals yesterday, made follow up call to Shy at [facility] whom reports that facility will need updated PT note as not yesterday reports pt with flexed posture cues for upright posture, based on yesterday note facility cannot accept. LISA paged therapy for updated PT note to then send to [facility] for review. Per IDR and Dr. Harrison, pt does not require 02. Will need to await update PT note and contact [facility] to determine if pt can return to this setting at discharge. SANDEE Noonan  
Ss note:6/4/2025.2:09 PM Updated PT note was faxed to [facility], pt is independent and on room air now. Representative [place] relays will need to await review of PT note by medical team to determine if pt can return there. [facility] phone number [phone]. SANDEE Noonan    Ss note:6/3/2025 1:04 PM Pt presents from [facility], was NOT on oxygen at this facility, currently on 02 here at 2-3 liters. SW faxed clinical information to Shy at [facility] for facility to review to determine if pt can return to this facility at discharge, fax is [phone]--  IF [facility] can accept back, lisa would need to arrange outpt oxygen prior to discharge. SW will await return call from [facility]. SANDEE Noonan  
Ss note:6/4/2025.3:08 PM Per Shy at [facility] [phone], pt can return. Facility will send transport at about 4:30 pm today. Transport will call the floor. Pt, nursing aware. SANDEE Noonan  
at DC: No    Would you like Case Management to discuss the discharge plan with any other family members/significant others, and if so, who? No  Plans to Return to Present Housing: Unknown at present (will need to send clinicals to [facility] vs possible SNF)    Other Identified Issues/Barriers to RETURNING to current housing: care needs may be too great for [facility].     Potential Assistance needed at discharge:             Potential DME:    Patient expects to discharge to:   Plan for transportation at discharge:      Financial    Payor: [employer] / Plan: [employer] [place] MEDICAID / Product Type: *No Product type* /     Does insurance require precert for SNF: YES if plan is SNF.     Potential assistance Purchasing Medications:    Meds-to-Beds request: Yes      [id number] - [place], [place] - [address] [phone] - F [phone]  [address] [place] [address]  Phone: [phone] Fax: [phone]      Notes:    Ss note:6/3/2025 10:25 AM Chelo met with pt briefly this am, slight confusion noted, pleasant. PER IDR for stress test, unclear if the was completed. Pt is currently on 2 liters and states she feels short of breath. Pt presents from [facility], (readmit)  will need to await updated PT/OT notes to send clinicals to [facility] to determine if pt can return to this setting at discharge. Pt may need subacute rehab if unable to return to [facility]. Pt is agreeable if there is a skilled need at discharge. Would need accepting SNF such at [facility], would need [facility], [facility] and signed HANNA. Per charting on Xifaxin prior to admission, prior to going to [facility] notes reflect was staying with a friend, unsure if pt could return to that setting if needed. SW will follow for final care coordination plan. SANDEE Noonan    The Plan for Transition of Care is related to the following treatment goals of Acute respiratory failure with hypoxia (HCC) [J96.01]  Uncontrolled type 2 diabetes

## 2025-06-04 NOTE — DISCHARGE SUMMARY
This is new.  If symptoms do not resolve recommend follow-up with IV and oral contrast     CT HEAD WO CONTRAST  Result Date: 5/31/2025  EXAMINATION: CT OF THE HEAD WITHOUT CONTRAST  5/31/2025 8:08 am TECHNIQUE: CT of the head was performed without the administration of intravenous contrast. Automated exposure control, iterative reconstruction, and/or weight based adjustment of the mA/kV was utilized to reduce the radiation dose to as low as reasonably achievable. COMPARISON: 05/20/2020 HISTORY: ORDERING SYSTEM PROVIDED HISTORY: AMS TECHNOLOGIST PROVIDED HISTORY: Reason for exam:->AMS Has a \"code stroke\" or \"stroke alert\" been called?->No FINDINGS: BRAIN/VENTRICLES: There is no acute intracranial hemorrhage, mass effect or midline shift.  No abnormal extra-axial fluid collection.  The gray-white differentiation is maintained without evidence of an acute infarct.  There is no evidence of hydrocephalus. ORBITS: The visualized portion of the orbits demonstrate no acute abnormality. SINUSES: The visualized paranasal sinuses and mastoid air cells demonstrate no acute abnormality. SOFT TISSUES/SKULL:  No acute abnormality of the visualized skull or soft tissues.     No acute intracranial abnormality.     XR CHEST PORTABLE  Result Date: 5/31/2025  EXAMINATION: ONE XRAY VIEW OF THE CHEST 5/31/2025 3:09 am COMPARISON: Chest radiograph 05/20/2025 HISTORY: ORDERING SYSTEM PROVIDED HISTORY: weakness TECHNOLOGIST PROVIDED HISTORY: Reason for exam:->weakness FINDINGS: Indistinct central pulmonary vasculature with increased interstitial markings bilaterally.  There are left-sided Kerley B lines.  Left basilar and bilateral upper lung field opacities.  No pneumothorax or pleural effusion bilaterally. Prominent cardiac silhouette, likely exaggerated by AP acquisition. Partially visualized sclerotic lesion in the proximal left humerus, similar to prior.     Pulmonary venous congestion with left basilar and bilateral upper lung field

## 2025-06-04 NOTE — PROCEDURES
PROCEDURE NOTE  Date: 6/4/2025   Name: Ellie Santacruz  YOB: 1970    Procedures:    Lexiscan Nuclear Stress Test:    Cardiologist: Dr. Joaquín Holloway    Baseline EKG: NSR, septal and lateral infarct age undetermined abnormal EKG.     Indications for study: Chest pain    1. No chest pain  2. No new arrhythmias  3. No EKG changes suggestive of stress induced ischemia  4. Nuclear images pending    Joaquín Holloway MD., FACC.   [facility]

## 2025-06-04 NOTE — CONSULTS
CHF Nutrition Education    Consult received for heart failure diet education. Education deferred or not appropriate at this time related to  pt from [facility], plans to return . Pt requested information be left at bedside. Provided educational materials with contact information.      Electronically signed by ÁNGEL GRAYSON MPH, RD, LD on 6/4/2025 at 1:57 PM  [phone]

## 2025-06-24 ENCOUNTER — TELEPHONE (OUTPATIENT)
Dept: OTHER | Age: 55
End: 2025-06-24

## 2025-06-24 NOTE — TELEPHONE ENCOUNTER
Left message on Ellie's answering machine regarding her missing her new consult appointment today.

## (undated) DEVICE — LUBRICANT SURG JELLY ST BACTER TUBE 4.25OZ

## (undated) DEVICE — TAPE,WATERPROOF,CURAD,2"X10YD,LF,72/CS: Brand: CURAD

## (undated) DEVICE — CONTAINER SPEC COLL 960ML POLYPR TRIANG GRAD INTAKE/OUTPUT

## (undated) DEVICE — AIR/WATER CLEANING ADAPTER FOR OLYMPUS® GI ENDOSCOPE: Brand: BULLDOG®

## (undated) DEVICE — 6 X 9  1.75MIL 4-WALL LABGUARD: Brand: MINIGRIP COMMERCIAL LLC

## (undated) DEVICE — ADAPTER CLEANING PORPOISE CLEANING

## (undated) DEVICE — BAG SPECIMEN BIOHAZARD 6IN X 9IN

## (undated) DEVICE — GOWN,SIRUS,NONRNF,SETINSLV,XL,20/CS: Brand: MEDLINE

## (undated) DEVICE — GLOVE SURG SZ 8 L12IN FNGR THK79MIL GRN LTX FREE

## (undated) DEVICE — GLOVE ORANGE PI 7 1/2   MSG9075

## (undated) DEVICE — FORCEPS BX L240CM JAW DIA2.8MM L CAP W/ NDL MIC MESH TOOTH

## (undated) DEVICE — KIT BEDSIDE REVITAL OX 500ML

## (undated) DEVICE — COVER,LIGHT HANDLE,FLX,1/PK: Brand: MEDLINE INDUSTRIES, INC.

## (undated) DEVICE — SPONGE GZ 4IN 4IN 4 PLY N WVN AVANT

## (undated) DEVICE — JELLY,LUBE,STERILE,FLIP TOP,TUBE,4-OZ: Brand: MEDLINE

## (undated) DEVICE — SPONGE LAP W18XL18IN WHT COT 4 PLY FLD STRUNG RADPQ DISP ST 2 PER PACK

## (undated) DEVICE — WIPES SKIN CLOTH READYPREP 9 X 10.5 IN 2% CHLORHEX GLUCONATE CHG PREOP

## (undated) DEVICE — BANDAGE,GAUZE,4.5"X4.1YD,STERILE,LF: Brand: MEDLINE

## (undated) DEVICE — TUBING, SUCTION, 1/4" X 10', STRAIGHT: Brand: MEDLINE

## (undated) DEVICE — SWABSTCK, BENZOIN TINCTURE, 1/PK, STRL: Brand: APLICARE

## (undated) DEVICE — BLOCK BITE 60FR CAREGUARD

## (undated) DEVICE — KENDALL 450 SERIES MONITORING FOAM ELECTRODE - RECTANGULAR SHAPE ( 3/PK): Brand: KENDALL

## (undated) DEVICE — GOWN,SIRUS,POLYRNF,BRTHSLV,XLN/XXL,18/CS: Brand: MEDLINE

## (undated) DEVICE — PAD,ABDOMINAL,5"X9",ST,LF,25/BX: Brand: MEDLINE INDUSTRIES, INC.

## (undated) DEVICE — PAD,ABDOMINAL,8"X10",ST,LF: Brand: MEDLINE

## (undated) DEVICE — 4-PORT MANIFOLD: Brand: NEPTUNE 2

## (undated) DEVICE — GLOVE SURG SZ 65 THK91MIL LTX FREE SYN POLYISOPRENE

## (undated) DEVICE — Device: Brand: DEFENDO VALVE AND CONNECTOR KIT

## (undated) DEVICE — GAUZE,PACKING STRIP,IODOFORM,1/2"X5YD,ST: Brand: CURAD

## (undated) DEVICE — YANKAUER,BULB TIP,W/O VENT,RIGID,STERILE: Brand: MEDLINE

## (undated) DEVICE — UNIVERSAL DRAPE: Brand: MEDLINE INDUSTRIES, INC.

## (undated) DEVICE — ELECTRODE PT RET AD L9FT HI MOIST COND ADH HYDRGEL CORDED

## (undated) DEVICE — PAD,NON-ADHERENT,3X8,STERILE,LF,1/PK: Brand: MEDLINE

## (undated) DEVICE — LIGATOR ENDOSCP L2.8MM DIA8.6-11.5MM MULT BND SPEEDBAND

## (undated) DEVICE — PACK,LAPAROTOMY,NO GOWNS: Brand: MEDLINE

## (undated) DEVICE — SYRINGE MED 10ML TRNSLUC BRL PLUNG BLK MRK POLYPR CTRL

## (undated) DEVICE — PREMIUM WET SKIN PREP TRAY: Brand: MEDLINE INDUSTRIES, INC.

## (undated) DEVICE — BLADE,STAINLESS-STEEL,15,STRL,DISPOSABLE: Brand: MEDLINE

## (undated) DEVICE — TOWEL,OR,DSP,ST,BLUE,STD,6/PK,12PK/CS: Brand: MEDLINE

## (undated) DEVICE — SUPPLEMENT DIGESTIVE H2O SOL GI-EASE

## (undated) DEVICE — CUFF RESTRN WRST OR ANK 25FT HK AND LOOP CLSR AD

## (undated) DEVICE — GAUZE,SPONGE,4"X4",16PLY,STRL,LF,10/TRAY: Brand: MEDLINE

## (undated) DEVICE — NDL CNTR 40CT FM MAG: Brand: MEDLINE INDUSTRIES, INC.

## (undated) DEVICE — MASK CAPNOGRAPHY AD W35IN DIA58IN SAMP LN L10FT O2 LN

## (undated) DEVICE — BASIC DOUBLE BASIN 2-LF: Brand: MEDLINE INDUSTRIES, INC.

## (undated) DEVICE — GOWN ISOLATN REG YEL M WT MULTIPLY SIDETIE LEV 2

## (undated) DEVICE — MARKER,SKIN,WI/RULER AND LABELS: Brand: MEDLINE

## (undated) DEVICE — MASK,FACE,MAXFLUIDPROTECT,SHIELD/ERLPS: Brand: MEDLINE